# Patient Record
Sex: FEMALE | Race: WHITE | NOT HISPANIC OR LATINO
[De-identification: names, ages, dates, MRNs, and addresses within clinical notes are randomized per-mention and may not be internally consistent; named-entity substitution may affect disease eponyms.]

---

## 2023-01-04 PROBLEM — Z00.00 ENCOUNTER FOR PREVENTIVE HEALTH EXAMINATION: Status: ACTIVE | Noted: 2023-01-04

## 2023-01-20 ENCOUNTER — NON-APPOINTMENT (OUTPATIENT)
Age: 88
End: 2023-01-20

## 2023-01-31 ENCOUNTER — APPOINTMENT (OUTPATIENT)
Dept: ORTHOPEDIC SURGERY | Facility: CLINIC | Age: 88
End: 2023-01-31
Payer: MEDICARE

## 2023-01-31 VITALS — WEIGHT: 105 LBS | BODY MASS INDEX: 19.32 KG/M2 | HEIGHT: 62 IN

## 2023-01-31 DIAGNOSIS — M79.675 PAIN IN LEFT TOE(S): ICD-10-CM

## 2023-01-31 DIAGNOSIS — I99.8 OTHER DISORDER OF CIRCULATORY SYSTEM: ICD-10-CM

## 2023-01-31 DIAGNOSIS — S91.302A UNSPECIFIED OPEN WOUND, LEFT FOOT, INITIAL ENCOUNTER: ICD-10-CM

## 2023-01-31 DIAGNOSIS — S91.309A UNSPECIFIED OPEN WOUND, UNSPECIFIED FOOT, INITIAL ENCOUNTER: ICD-10-CM

## 2023-01-31 PROCEDURE — 99204 OFFICE O/P NEW MOD 45 MIN: CPT

## 2023-01-31 PROCEDURE — 73630 X-RAY EXAM OF FOOT: CPT | Mod: LT

## 2023-02-01 PROBLEM — I99.8 VASCULAR CALCIFICATION: Status: ACTIVE | Noted: 2023-02-01

## 2023-02-01 PROBLEM — S91.309A WOUND OF FOOT: Status: ACTIVE | Noted: 2023-02-01

## 2023-02-01 PROBLEM — S91.302A WOUND OF LEFT FOOT: Status: ACTIVE | Noted: 2023-02-01

## 2023-02-07 ENCOUNTER — APPOINTMENT (OUTPATIENT)
Dept: INFECTIOUS DISEASE | Facility: CLINIC | Age: 88
End: 2023-02-07
Payer: MEDICARE

## 2023-02-07 VITALS
WEIGHT: 105 LBS | HEIGHT: 62 IN | TEMPERATURE: 97.6 F | BODY MASS INDEX: 19.32 KG/M2 | HEART RATE: 76 BPM | SYSTOLIC BLOOD PRESSURE: 120 MMHG | DIASTOLIC BLOOD PRESSURE: 71 MMHG

## 2023-02-07 DIAGNOSIS — A49.02 METHICILLIN RESISTANT STAPHYLOCOCCUS AUREUS INFECTION, UNSPECIFIED SITE: ICD-10-CM

## 2023-02-07 DIAGNOSIS — Z87.39 PERSONAL HISTORY OF OTHER DISEASES OF THE MUSCULOSKELETAL SYSTEM AND CONNECTIVE TISSUE: ICD-10-CM

## 2023-02-07 PROCEDURE — 99203 OFFICE O/P NEW LOW 30 MIN: CPT

## 2023-02-10 NOTE — ASSESSMENT
[FreeTextEntry1] : 88 y/o female comes for visit for recent MRSA toe infection/osteomyelitis.\par \par She is stable and toe looks stable.\par \par Will check labs noted below.  No active s/s of infection at this time. \par \par Hold off abx given above. \par \par Doubt MRI will be of benefit given daughters refusing toe amputation and currently there is no clinical s/s of infection.

## 2023-02-10 NOTE — HISTORY OF PRESENT ILLNESS
[FreeTextEntry1] : 88 y/o female comes for visit for left 1st toe osteomyelitis and MRSA infection. \par \par She was managed in [place] by Dr. Quintana. Got 6 weeks iv abx with picc. \par \par Toe is better and pt has no complaints. \par \par She has a ho UTIs also. No symptoms today.\par \par Not on abx currently. \par \par No fever, GI or  issues today.

## 2023-02-10 NOTE — PHYSICAL EXAM
[General Appearance - Alert] : alert [General Appearance - In No Acute Distress] : in no acute distress [Sclera] : the sclera and conjunctiva were normal [PERRL With Normal Accommodation] : pupils were equal in size, round, reactive to light [Extraocular Movements] : extraocular movements were intact [Outer Ear] : the ears and nose were normal in appearance [Oropharynx] : the oropharynx was normal with no thrush [Jugular Venous Distention Increased] : there was no jugular-venous distention [Heart Sounds] : normal S1 and S2 [Auscultation Breath Sounds / Voice Sounds] : lungs were clear to auscultation bilaterally [Edema] : there was no peripheral edema [Bowel Sounds] : normal bowel sounds [Abdomen Soft] : soft [Abdomen Tenderness] : non-tender [Abdomen Mass (___ Cm)] : no abdominal mass palpated [Costovertebral Angle Tenderness] : no CVA tenderness [Skin Color & Pigmentation] : normal skin color and pigmentation [] : no rash [No Focal Deficits] : no focal deficits [FreeTextEntry1] : flat affect

## 2023-02-14 LAB
ANION GAP SERPL CALC-SCNC: 15 MMOL/L
BASOPHILS # BLD AUTO: 0.06 K/UL
BASOPHILS NFR BLD AUTO: 0.4 %
BUN SERPL-MCNC: 21 MG/DL
CALCIUM SERPL-MCNC: 9 MG/DL
CHLORIDE SERPL-SCNC: 105 MMOL/L
CO2 SERPL-SCNC: 20 MMOL/L
CREAT SERPL-MCNC: 0.72 MG/DL
CRP SERPL-MCNC: <3 MG/L
EGFR: 81 ML/MIN/1.73M2
EOSINOPHIL # BLD AUTO: 0.03 K/UL
EOSINOPHIL NFR BLD AUTO: 0.2 %
ERYTHROCYTE [SEDIMENTATION RATE] IN BLOOD BY WESTERGREN METHOD: 12 MM/HR
GLUCOSE SERPL-MCNC: 77 MG/DL
HCT VFR BLD CALC: 35.5 %
HGB BLD-MCNC: 10.2 G/DL
IMM GRANULOCYTES NFR BLD AUTO: 0.5 %
LYMPHOCYTES # BLD AUTO: 0.66 K/UL
LYMPHOCYTES NFR BLD AUTO: 4.2 %
MAN DIFF?: NORMAL
MCHC RBC-ENTMCNC: 25.2 PG
MCHC RBC-ENTMCNC: 28.7 GM/DL
MCV RBC AUTO: 87.7 FL
MONOCYTES # BLD AUTO: 0.75 K/UL
MONOCYTES NFR BLD AUTO: 4.7 %
NEUTROPHILS # BLD AUTO: 14.27 K/UL
NEUTROPHILS NFR BLD AUTO: 90 %
PLATELET # BLD AUTO: 307 K/UL
POTASSIUM SERPL-SCNC: 5.4 MMOL/L
RBC # BLD: 4.05 M/UL
RBC # FLD: 18.6 %
SODIUM SERPL-SCNC: 140 MMOL/L
WBC # FLD AUTO: 15.85 K/UL

## 2023-02-24 ENCOUNTER — APPOINTMENT (OUTPATIENT)
Dept: MRI IMAGING | Facility: CLINIC | Age: 88
End: 2023-02-24

## 2023-03-22 ENCOUNTER — EMERGENCY (EMERGENCY)
Facility: HOSPITAL | Age: 88
LOS: 1 days | Discharge: HOME | End: 2023-03-22
Payer: MEDICARE

## 2023-03-22 VITALS
DIASTOLIC BLOOD PRESSURE: 69 MMHG | SYSTOLIC BLOOD PRESSURE: 115 MMHG | TEMPERATURE: 98 F | RESPIRATION RATE: 16 BRPM | HEART RATE: 72 BPM | OXYGEN SATURATION: 97 %

## 2023-03-22 VITALS
RESPIRATION RATE: 20 BRPM | SYSTOLIC BLOOD PRESSURE: 154 MMHG | HEIGHT: 63 IN | OXYGEN SATURATION: 96 % | DIASTOLIC BLOOD PRESSURE: 80 MMHG | WEIGHT: 110.01 LBS | TEMPERATURE: 100 F | HEART RATE: 121 BPM

## 2023-03-22 LAB
ALBUMIN SERPL ELPH-MCNC: 3.8 G/DL — SIGNIFICANT CHANGE UP (ref 3.3–5)
ALP SERPL-CCNC: 125 U/L — HIGH (ref 40–120)
ALT FLD-CCNC: 9 U/L — LOW (ref 10–45)
ANION GAP SERPL CALC-SCNC: 13 MMOL/L — SIGNIFICANT CHANGE UP (ref 5–17)
APPEARANCE UR: ABNORMAL
AST SERPL-CCNC: 17 U/L — SIGNIFICANT CHANGE UP (ref 10–40)
BACTERIA # UR AUTO: NEGATIVE — SIGNIFICANT CHANGE UP
BASE EXCESS BLDV CALC-SCNC: -1 MMOL/L — SIGNIFICANT CHANGE UP (ref -2–3)
BASOPHILS # BLD AUTO: 0.01 K/UL — SIGNIFICANT CHANGE UP (ref 0–0.2)
BASOPHILS NFR BLD AUTO: 0.2 % — SIGNIFICANT CHANGE UP (ref 0–2)
BILIRUB SERPL-MCNC: 0.2 MG/DL — SIGNIFICANT CHANGE UP (ref 0.2–1.2)
BILIRUB UR-MCNC: NEGATIVE — SIGNIFICANT CHANGE UP
BUN SERPL-MCNC: 18 MG/DL — SIGNIFICANT CHANGE UP (ref 7–23)
CA-I SERPL-SCNC: 1.24 MMOL/L — SIGNIFICANT CHANGE UP (ref 1.15–1.33)
CALCIUM SERPL-MCNC: 9.3 MG/DL — SIGNIFICANT CHANGE UP (ref 8.4–10.5)
CHLORIDE BLDV-SCNC: 104 MMOL/L — SIGNIFICANT CHANGE UP (ref 96–108)
CHLORIDE SERPL-SCNC: 104 MMOL/L — SIGNIFICANT CHANGE UP (ref 96–108)
CO2 BLDV-SCNC: 26 MMOL/L — SIGNIFICANT CHANGE UP (ref 22–26)
CO2 SERPL-SCNC: 23 MMOL/L — SIGNIFICANT CHANGE UP (ref 22–31)
COLOR SPEC: SIGNIFICANT CHANGE UP
COMMENT - URINE: SIGNIFICANT CHANGE UP
CREAT SERPL-MCNC: 0.82 MG/DL — SIGNIFICANT CHANGE UP (ref 0.5–1.3)
DIFF PNL FLD: ABNORMAL
EGFR: 70 ML/MIN/1.73M2 — SIGNIFICANT CHANGE UP
EOSINOPHIL # BLD AUTO: 0.05 K/UL — SIGNIFICANT CHANGE UP (ref 0–0.5)
EOSINOPHIL NFR BLD AUTO: 0.8 % — SIGNIFICANT CHANGE UP (ref 0–6)
EPI CELLS # UR: 2 /HPF — SIGNIFICANT CHANGE UP
GAS PNL BLDV: 136 MMOL/L — SIGNIFICANT CHANGE UP (ref 136–145)
GAS PNL BLDV: SIGNIFICANT CHANGE UP
GAS PNL BLDV: SIGNIFICANT CHANGE UP
GLUCOSE BLDV-MCNC: 109 MG/DL — HIGH (ref 70–99)
GLUCOSE SERPL-MCNC: 117 MG/DL — HIGH (ref 70–99)
GLUCOSE UR QL: NEGATIVE — SIGNIFICANT CHANGE UP
HCO3 BLDV-SCNC: 25 MMOL/L — SIGNIFICANT CHANGE UP (ref 22–29)
HCT VFR BLD CALC: 30.2 % — LOW (ref 34.5–45)
HCT VFR BLDA CALC: 32 % — LOW (ref 34.5–46.5)
HGB BLD CALC-MCNC: 10.5 G/DL — LOW (ref 11.7–16.1)
HGB BLD-MCNC: 8.8 G/DL — LOW (ref 11.5–15.5)
HYALINE CASTS # UR AUTO: 5 /LPF — HIGH (ref 0–2)
IMM GRANULOCYTES NFR BLD AUTO: 0.5 % — SIGNIFICANT CHANGE UP (ref 0–0.9)
KETONES UR-MCNC: NEGATIVE — SIGNIFICANT CHANGE UP
LACTATE BLDV-MCNC: 2 MMOL/L — SIGNIFICANT CHANGE UP (ref 0.5–2)
LEUKOCYTE ESTERASE UR-ACNC: ABNORMAL
LYMPHOCYTES # BLD AUTO: 0.66 K/UL — LOW (ref 1–3.3)
LYMPHOCYTES # BLD AUTO: 10 % — LOW (ref 13–44)
MAGNESIUM SERPL-MCNC: 2 MG/DL — SIGNIFICANT CHANGE UP (ref 1.6–2.6)
MCHC RBC-ENTMCNC: 24.5 PG — LOW (ref 27–34)
MCHC RBC-ENTMCNC: 29.1 GM/DL — LOW (ref 32–36)
MCV RBC AUTO: 84.1 FL — SIGNIFICANT CHANGE UP (ref 80–100)
MONOCYTES # BLD AUTO: 0.7 K/UL — SIGNIFICANT CHANGE UP (ref 0–0.9)
MONOCYTES NFR BLD AUTO: 10.7 % — SIGNIFICANT CHANGE UP (ref 2–14)
NEUTROPHILS # BLD AUTO: 5.12 K/UL — SIGNIFICANT CHANGE UP (ref 1.8–7.4)
NEUTROPHILS NFR BLD AUTO: 77.8 % — HIGH (ref 43–77)
NITRITE UR-MCNC: NEGATIVE — SIGNIFICANT CHANGE UP
NRBC # BLD: 0 /100 WBCS — SIGNIFICANT CHANGE UP (ref 0–0)
PCO2 BLDV: 45 MMHG — HIGH (ref 39–42)
PH BLDV: 7.35 — SIGNIFICANT CHANGE UP (ref 7.32–7.43)
PH UR: 6 — SIGNIFICANT CHANGE UP (ref 5–8)
PLATELET # BLD AUTO: 241 K/UL — SIGNIFICANT CHANGE UP (ref 150–400)
PO2 BLDV: 24 MMHG — LOW (ref 25–45)
POTASSIUM BLDV-SCNC: 4.2 MMOL/L — SIGNIFICANT CHANGE UP (ref 3.5–5.1)
POTASSIUM SERPL-MCNC: 4.4 MMOL/L — SIGNIFICANT CHANGE UP (ref 3.5–5.3)
POTASSIUM SERPL-SCNC: 4.4 MMOL/L — SIGNIFICANT CHANGE UP (ref 3.5–5.3)
PROT SERPL-MCNC: 6.5 G/DL — SIGNIFICANT CHANGE UP (ref 6–8.3)
PROT UR-MCNC: ABNORMAL
RAPID RVP RESULT: SIGNIFICANT CHANGE UP
RBC # BLD: 3.59 M/UL — LOW (ref 3.8–5.2)
RBC # FLD: 17.5 % — HIGH (ref 10.3–14.5)
RBC CASTS # UR COMP ASSIST: 6 /HPF — HIGH (ref 0–4)
SAO2 % BLDV: 32.5 % — LOW (ref 67–88)
SARS-COV-2 RNA SPEC QL NAA+PROBE: SIGNIFICANT CHANGE UP
SODIUM SERPL-SCNC: 140 MMOL/L — SIGNIFICANT CHANGE UP (ref 135–145)
SP GR SPEC: 1.01 — SIGNIFICANT CHANGE UP (ref 1.01–1.02)
UROBILINOGEN FLD QL: NEGATIVE — SIGNIFICANT CHANGE UP
WBC # BLD: 6.57 K/UL — SIGNIFICANT CHANGE UP (ref 3.8–10.5)
WBC # FLD AUTO: 6.57 K/UL — SIGNIFICANT CHANGE UP (ref 3.8–10.5)
WBC UR QL: 435 /HPF — HIGH (ref 0–5)

## 2023-03-22 PROCEDURE — 81001 URINALYSIS AUTO W/SCOPE: CPT

## 2023-03-22 PROCEDURE — 83735 ASSAY OF MAGNESIUM: CPT

## 2023-03-22 PROCEDURE — 82947 ASSAY GLUCOSE BLOOD QUANT: CPT

## 2023-03-22 PROCEDURE — 85014 HEMATOCRIT: CPT

## 2023-03-22 PROCEDURE — 71045 X-RAY EXAM CHEST 1 VIEW: CPT

## 2023-03-22 PROCEDURE — 83605 ASSAY OF LACTIC ACID: CPT

## 2023-03-22 PROCEDURE — 80053 COMPREHEN METABOLIC PANEL: CPT

## 2023-03-22 PROCEDURE — 87040 BLOOD CULTURE FOR BACTERIA: CPT

## 2023-03-22 PROCEDURE — 99285 EMERGENCY DEPT VISIT HI MDM: CPT | Mod: 25

## 2023-03-22 PROCEDURE — 85025 COMPLETE CBC W/AUTO DIFF WBC: CPT

## 2023-03-22 PROCEDURE — 94640 AIRWAY INHALATION TREATMENT: CPT

## 2023-03-22 PROCEDURE — 84132 ASSAY OF SERUM POTASSIUM: CPT

## 2023-03-22 PROCEDURE — 84295 ASSAY OF SERUM SODIUM: CPT

## 2023-03-22 PROCEDURE — 82330 ASSAY OF CALCIUM: CPT

## 2023-03-22 PROCEDURE — 93005 ELECTROCARDIOGRAM TRACING: CPT

## 2023-03-22 PROCEDURE — 36415 COLL VENOUS BLD VENIPUNCTURE: CPT

## 2023-03-22 PROCEDURE — 82435 ASSAY OF BLOOD CHLORIDE: CPT

## 2023-03-22 PROCEDURE — 99053 MED SERV 10PM-8AM 24 HR FAC: CPT

## 2023-03-22 PROCEDURE — 71045 X-RAY EXAM CHEST 1 VIEW: CPT | Mod: 26

## 2023-03-22 PROCEDURE — 0225U NFCT DS DNA&RNA 21 SARSCOV2: CPT

## 2023-03-22 PROCEDURE — 85018 HEMOGLOBIN: CPT

## 2023-03-22 PROCEDURE — 99284 EMERGENCY DEPT VISIT MOD MDM: CPT

## 2023-03-22 PROCEDURE — 82803 BLOOD GASES ANY COMBINATION: CPT

## 2023-03-22 PROCEDURE — 96374 THER/PROPH/DIAG INJ IV PUSH: CPT

## 2023-03-22 RX ORDER — CEFPODOXIME PROXETIL 100 MG
1 TABLET ORAL
Qty: 28 | Refills: 0
Start: 2023-03-22 | End: 2023-04-04

## 2023-03-22 RX ORDER — SODIUM CHLORIDE 9 MG/ML
1000 INJECTION INTRAMUSCULAR; INTRAVENOUS; SUBCUTANEOUS ONCE
Refills: 0 | Status: COMPLETED | OUTPATIENT
Start: 2023-03-22 | End: 2023-03-22

## 2023-03-22 RX ORDER — CEFTRIAXONE 500 MG/1
1000 INJECTION, POWDER, FOR SOLUTION INTRAMUSCULAR; INTRAVENOUS ONCE
Refills: 0 | Status: COMPLETED | OUTPATIENT
Start: 2023-03-22 | End: 2023-03-22

## 2023-03-22 RX ORDER — IPRATROPIUM/ALBUTEROL SULFATE 18-103MCG
3 AEROSOL WITH ADAPTER (GRAM) INHALATION ONCE
Refills: 0 | Status: COMPLETED | OUTPATIENT
Start: 2023-03-22 | End: 2023-03-22

## 2023-03-22 RX ORDER — ACETAMINOPHEN 500 MG
975 TABLET ORAL ONCE
Refills: 0 | Status: COMPLETED | OUTPATIENT
Start: 2023-03-22 | End: 2023-03-22

## 2023-03-22 RX ADMIN — CEFTRIAXONE 100 MILLIGRAM(S): 500 INJECTION, POWDER, FOR SOLUTION INTRAMUSCULAR; INTRAVENOUS at 06:33

## 2023-03-22 RX ADMIN — SODIUM CHLORIDE 1000 MILLILITER(S): 9 INJECTION INTRAMUSCULAR; INTRAVENOUS; SUBCUTANEOUS at 01:21

## 2023-03-22 RX ADMIN — Medication 975 MILLIGRAM(S): at 01:40

## 2023-03-22 RX ADMIN — Medication 975 MILLIGRAM(S): at 01:10

## 2023-03-22 RX ADMIN — Medication 3 MILLILITER(S): at 01:20

## 2023-03-22 NOTE — ED ADULT NURSE NOTE - OBJECTIVE STATEMENT
87-year-old female with a history of hyperlipidemia coming from assisted living home for 3 days of cough.  Patient denying any fevers, chills, chest pain, shortness of breath, nausea, vomiting, belly pain, dysuria.  Patient was seen by PMD yesterday for her leg and notes that she has chronic edema and  weeping of the right leg   Has been unchanged.  Denying any pain in the leg.

## 2023-03-22 NOTE — ED PROVIDER NOTE - PHYSICAL EXAMINATION
Const:  thin elderly lady,  intermittently coughing at bedside  Eyes: no conjunctival injection, and symmetrical lids.  HEENT: Head NCAT, no lesions. Atraumatic external nose and ears. Moist MM.  Neck: Symmetric, trachea midline.   CVS: +S1/S2   RESP: Unlabored respiratory effort. Clear to auscultation bilaterally.  GI: Nontender/Nondistended   MSK: Normocephalic/Atraumatic   Skin: Warm, dry and intact.   Neuro: CNs II-XII grossly intact. Motor & Sensation grossly intact.  Psych: Awake, Alert, & Oriented (AAO) x3. Appropriate mood and affect.

## 2023-03-22 NOTE — ED PROVIDER NOTE - PATIENT PORTAL LINK FT
You can access the FollowMyHealth Patient Portal offered by [facility] by registering at the following website: [email or web address]. By joining [employer]’s FollowMyHealth portal, you will also be able to view your health information using other applications (apps) compatible with our system. You can access the FollowMyHealth Patient Portal offered by [facility] by registering at the following website: [email or web address]. By joining [employer]’s FollowMyHealth portal, you will also be able to view your health information using other applications (apps) compatible with our system.

## 2023-03-22 NOTE — ED ADULT NURSE NOTE - CHIEF COMPLAINT QUOTE
Rehabilitation Nursing  Inpatient Rehabilitation Admission Assessment of Functional Measures  and Plan of Care    Plan of Care  Copy from POC  Safety    Potential for Injury (Active)  Current Status (6/1/2018 10:30:00 AM): potential for falls  Weekly Goal: no falls this week  Discharge Goal: no falls by discharge    Body Systems    Integumentary (Active)  Current Status (6/1/2018 10:30:00 AM): potential for skin breakdown  Weekly Goal: no skin breakdown this week  Discharge Goal: no skin breakdown by discharge  Functional Measures  DONNY Eating:  DONNY Grooming:  DONNY Bathing:  DONNY Upper Body Dressing:  DONNY Lower Body Dressing:  DONNY Toileting:    DONNY Bladder Management  Level of Assistance:  Bladder Score = 2. Patient performs 25-49% of tasks and  requires maximal assistance for bladder management. [place] provides most of the  assist to roll or bridge to place and remove bedpan.  Frequency/Number of Accidents (4 days prior to admission):  Bladder accidents  prior to admission:  6  Frequency/Number of Accidents this Shift: Bladder accidents this shift:  0 .  Patient has not had an accident, but used a device/medication this shift  requiring: bedpan .    DONNY Bowel Management  Level of Assistance: Bowel Score = 2. Patient performs 25-49% of tasks and  requires maximal assistance for bowel management. [place] provides most of the  assist to roll or bridge to place AND remove bedpan.  Frequency/Number of Accidents (4 days prior to admission): Bowel accidents prior  to admission:  0 Patient has not had an accident, but used a device/medication 4  days prior to admission requiring: bedpan .  Frequency/Number of Accidents this Shift: Bowel accidents this shift: 0 .  Patient has not had an accident, but used a device/medication this shift  requiring: bedpan .    [facility] Bed/Chair/Wheelchair Transfer:  [facility] Toilet Transfer:  DONNY Tub/Shower Transfer:    Previously Documented Mode of Locomotion at Discharge:  [facility] Expected Mode of  Locomotion at Discharge:  DONNY Walk/Wheelchair:  DONNY Stairs:    DONNY Comprehension:  Both ( auditory and visual) modes of comprehension are used  equally. Comprehension Score = 6, Modified [place].  Patient comprehends  complex/abstract information in their primary language, requiring: Additional  time. Glasses.  DONNY Expression:  Vocal expression is the usual mode. Expression Score = 6,  Modified Independent.  Patient expresses complex/abstract information in their  primary language, requiring: Additional time.  DONNY Social Interaction:  Social Interaction Score = 6, Modified Independent.  Patient is modified independent for social interaction, requiring: Requires  additional time.  DONNY Problem Solving:  Problem Solving Score = 6, Modified [place].  Patient  makes appropriate decisions in order to solve complex problems, but requires  extra time.  DONNY Memory:  Memory Score = 6, Modified [place].  Patient is modified  independent for memory, requiring: Requires additional time.    Discharge Functional Goals:  Bladder: 6  Bowel: 7  Mode of Comprehension: B  Comprehension: 6  Mode of Expression: B  Expression: 7  Problem Solvin  Social Interaction: 6  Memory: 6    Signed by: Adriana Carr, Nurse     cough and fever x1 day

## 2023-03-22 NOTE — ED PROVIDER NOTE - PROGRESS NOTE DETAILS
Bernardino Dewey, PGY2 pt found to haqve uti. pt denies hx of allergies. will txt with ctx here. Pt is no longer tachy s/p IV fluids. will dc with return precautions. Bernardino Dewey, PGY2 pt found to haqve uti. pt chart noting iodine drug allergy and no others.  will txt with ctx here. Pt is no longer tachy s/p IV fluids. will dc with return precautions.

## 2023-03-22 NOTE — ED PROVIDER NOTE - OBJECTIVE STATEMENT
Patient is an 87-year-old female with a history of hyperlipidemia coming from assisted living home for 3 days of cough.  Patient denying any fevers, chills, chest pain, shortness of breath, nausea, vomiting, belly pain, dysuria.  Patient was seen by PMD yesterday for her leg and notes that she has chronic edema and  weeping of the right leg   Has been unchanged.  Denying any pain in the leg. Patient is an 87-year-old female with a history of hyperlipidemia coming from assisted living home for 3 days of cough.  Patient denying any fevers, chills, chest pain, shortness of breath, nausea, vomiting, belly pain, dysuria.  Patient was seen by PMD yesterday for her leg and notes that she has chronic edema and  weeping of the right leg   Has been unchanged.  Denying any pain in the leg.  iodine drug allergy.

## 2023-03-22 NOTE — ED PROVIDER NOTE - ATTENDING CONTRIBUTION TO CARE
MD Campbell:  patient seen and evaluated with the resident.  I was present for key portions of the History & Physical, and I agree with the Impression & Plan.    Patient is a 87-year-old female brought in by EMS for evaluation of cough.  Duration 4 days.  Context: Patient states that she lives at assisted living.  Walks with a walker at baseline  Associated symptoms: No chest pain, no shortness of breath no nausea, no vomiting.  No abdominal pain, no lower extremity edema    Vital signs: Tachycardia and fever appreciated.  Gen: eldelry female, mild respiratory distress.    Head: NC/AT.   PERRL, EOMI.    Neck: trachea midline, supple.    Resp: Good air movement, faint wheezing bilaterally   Cardiac RRR, no RMG.    Abdomen:  soft, nondistended, nontender; no R/G.  Ext: no deformities, no edema.    Neuro:  A&Ox4 appears non focal.  Moving all 4 extremities equally  Skin:  thin skin, otherwise arm and dry as visualized, no rash.     Psych:  Normal affect and mood.    Impression and plan: History physical concerning for pneumonia, and warrants work-up with RVP, blood cultures chest x-ray reassessment.  Given tachycardia fever and work of breathing anticipate admission.    ECG - pending

## 2023-03-22 NOTE — ED PROVIDER NOTE - NSFOLLOWUPINSTRUCTIONS_ED_ALL_ED_FT
You were found to have a urinary tract infection.   You were sent antibiotics to your pharmacy. Please take this for the next 2 weeks.   Please see your primary doctor in 2-3 days for follow-up care. Return to ER for any new or worsening symptoms including worsening fevers, chills, vomiting or abdominal pain.   You may take 500-1000 mg acetaminophen every 6 hours, as needed for pain          Urinary Tract Infection, Adult       A urinary tract infection (UTI) is an infection of any part of the urinary tract. The urinary tract includes the kidneys, ureters, bladder, and urethra. These organs make, store, and get rid of urine in the body.    An upper UTI affects the ureters and kidneys. A lower UTI affects the bladder and urethra.      What are the causes?    Most urinary tract infections are caused by bacteria in your genital area around your urethra, where urine leaves your body. These bacteria grow and cause inflammation of your urinary tract.      What increases the risk?    You are more likely to develop this condition if:  •You have a urinary catheter that stays in place.      •You are not able to control when you urinate or have a bowel movement (incontinence).    •You are female and you:  •Use a spermicide or diaphragm for birth control.      •Have low estrogen levels.      •Are pregnant.        •You have certain genes that increase your risk.      •You are sexually active.      •You take antibiotic medicines.    •You have a condition that causes your flow of urine to slow down, such as:  •An enlarged prostate, if you are male.      •Blockage in your urethra.      •A kidney stone.      •A nerve condition that affects your bladder control (neurogenic bladder).      •Not getting enough to drink, or not urinating often.      •You have certain medical conditions, such as:  •Diabetes.      •A weak disease-fighting system (immunesystem).      •Sickle cell disease.      •Gout.      •Spinal cord injury.          What are the signs or symptoms?    Symptoms of this condition include:  •Needing to urinate right away (urgency).      •Frequent urination. This may include small amounts of urine each time you urinate.      •Pain or burning with urination.      •Blood in the urine.      •Urine that smells bad or unusual.      •Trouble urinating.      •Cloudy urine.      •Vaginal discharge, if you are female.      •Pain in the abdomen or the lower back.      You may also have:  •Vomiting or a decreased appetite.      •Confusion.      •Irritability or tiredness.      •A fever or chills.      •Diarrhea.      The first symptom in older adults may be confusion. In some cases, they may not have any symptoms until the infection has worsened.      How is this diagnosed?    This condition is diagnosed based on your medical history and a physical exam. You may also have other tests, including:  •Urine tests.      •Blood tests.      •Tests for STIs (sexually transmitted infections).      If you have had more than one UTI, a cystoscopy or imaging studies may be done to determine the cause of the infections.      How is this treated?    Treatment for this condition includes:  •Antibiotic medicine.      •Over-the-counter medicines to treat discomfort.      •Drinking enough water to stay hydrated.      If you have frequent infections or have other conditions such as a kidney stone, you may need to see a health care provider who specializes in the urinary tract (urologist).    In rare cases, urinary tract infections can cause sepsis. Sepsis is a life-threatening condition that occurs when the body responds to an infection. Sepsis is treated in the hospital with IV antibiotics, fluids, and other medicines.      Follow these instructions at home:     Medicines     •Take over-the-counter and prescription medicines only as told by your health care provider.      •If you were prescribed an antibiotic medicine, take it as told by your health care provider. Do not stop using the antibiotic even if you start to feel better.      General instructions   •Make sure you:  •Empty your bladder often and completely. Do not hold urine for long periods of time.      •Empty your bladder after sex.      •Wipe from front to back after urinating or having a bowel movement if you are female. Use each tissue only one time when you wipe.        •Drink enough fluid to keep your urine pale yellow.      •Keep all follow-up visits. This is important.        Contact a health care provider if:    •Your symptoms do not get better after 1–2 days.      •Your symptoms go away and then return.        Get help right away if:    •You have severe pain in your back or your lower abdomen.      •You have a fever or chills.      •You have nausea or vomiting.        Summary    •A urinary tract infection (UTI) is an infection of any part of the urinary tract, which includes the kidneys, ureters, bladder, and urethra.      •Most urinary tract infections are caused by bacteria in your genital area.      •Treatment for this condition often includes antibiotic medicines.      •If you were prescribed an antibiotic medicine, take it as told by your health care provider. Do not stop using the antibiotic even if you start to feel better.      •Keep all follow-up visits. This is important.      This information is not intended to replace advice given to you by your health care provider. Make sure you discuss any questions you have with your health care provider.

## 2023-03-27 ENCOUNTER — NON-APPOINTMENT (OUTPATIENT)
Age: 88
End: 2023-03-27

## 2023-03-27 LAB
CULTURE RESULTS: SIGNIFICANT CHANGE UP
CULTURE RESULTS: SIGNIFICANT CHANGE UP
SPECIMEN SOURCE: SIGNIFICANT CHANGE UP
SPECIMEN SOURCE: SIGNIFICANT CHANGE UP

## 2023-04-05 ENCOUNTER — APPOINTMENT (OUTPATIENT)
Dept: WOUND CARE | Facility: CLINIC | Age: 88
End: 2023-04-05

## 2023-04-07 ENCOUNTER — APPOINTMENT (OUTPATIENT)
Dept: OPHTHALMOLOGY | Facility: CLINIC | Age: 88
End: 2023-04-07
Payer: MEDICARE

## 2023-04-07 ENCOUNTER — NON-APPOINTMENT (OUTPATIENT)
Age: 88
End: 2023-04-07

## 2023-04-07 PROCEDURE — 92002 INTRM OPH EXAM NEW PATIENT: CPT

## 2023-04-28 ENCOUNTER — APPOINTMENT (OUTPATIENT)
Dept: OPHTHALMOLOGY | Facility: CLINIC | Age: 88
End: 2023-04-28
Payer: MEDICARE

## 2023-04-28 ENCOUNTER — NON-APPOINTMENT (OUTPATIENT)
Age: 88
End: 2023-04-28

## 2023-04-28 PROCEDURE — 92012 INTRM OPH EXAM EST PATIENT: CPT

## 2023-05-09 ENCOUNTER — APPOINTMENT (OUTPATIENT)
Dept: OPHTHALMOLOGY | Facility: CLINIC | Age: 88
End: 2023-05-09
Payer: MEDICARE

## 2023-05-09 ENCOUNTER — NON-APPOINTMENT (OUTPATIENT)
Age: 88
End: 2023-05-09

## 2023-05-09 ENCOUNTER — EMERGENCY (EMERGENCY)
Facility: HOSPITAL | Age: 88
LOS: 1 days | Discharge: NURSING, REHAB OR LONG-TERM CARE | End: 2023-05-09
Attending: EMERGENCY MEDICINE
Payer: MEDICARE

## 2023-05-09 VITALS
DIASTOLIC BLOOD PRESSURE: 104 MMHG | HEIGHT: 63 IN | SYSTOLIC BLOOD PRESSURE: 144 MMHG | HEART RATE: 74 BPM | TEMPERATURE: 98 F | RESPIRATION RATE: 20 BRPM | WEIGHT: 100.09 LBS

## 2023-05-09 VITALS
DIASTOLIC BLOOD PRESSURE: 84 MMHG | HEART RATE: 73 BPM | SYSTOLIC BLOOD PRESSURE: 137 MMHG | OXYGEN SATURATION: 95 % | TEMPERATURE: 98 F | RESPIRATION RATE: 20 BRPM

## 2023-05-09 LAB
ALBUMIN SERPL ELPH-MCNC: 3.9 G/DL — SIGNIFICANT CHANGE UP (ref 3.3–5)
ALP SERPL-CCNC: 96 U/L — SIGNIFICANT CHANGE UP (ref 40–120)
ALT FLD-CCNC: 10 U/L — SIGNIFICANT CHANGE UP (ref 10–45)
ANION GAP SERPL CALC-SCNC: 12 MMOL/L — SIGNIFICANT CHANGE UP (ref 5–17)
ANISOCYTOSIS BLD QL: SIGNIFICANT CHANGE UP
APTT BLD: 31.4 SEC — SIGNIFICANT CHANGE UP (ref 27.5–35.5)
AST SERPL-CCNC: 15 U/L — SIGNIFICANT CHANGE UP (ref 10–40)
BASOPHILS # BLD AUTO: 0 K/UL — SIGNIFICANT CHANGE UP (ref 0–0.2)
BASOPHILS NFR BLD AUTO: 0 % — SIGNIFICANT CHANGE UP (ref 0–2)
BILIRUB SERPL-MCNC: 0.3 MG/DL — SIGNIFICANT CHANGE UP (ref 0.2–1.2)
BLD GP AB SCN SERPL QL: NEGATIVE — SIGNIFICANT CHANGE UP
BUN SERPL-MCNC: 28 MG/DL — HIGH (ref 7–23)
CALCIUM SERPL-MCNC: 9.4 MG/DL — SIGNIFICANT CHANGE UP (ref 8.4–10.5)
CHLORIDE SERPL-SCNC: 105 MMOL/L — SIGNIFICANT CHANGE UP (ref 96–108)
CO2 SERPL-SCNC: 25 MMOL/L — SIGNIFICANT CHANGE UP (ref 22–31)
CREAT SERPL-MCNC: 0.83 MG/DL — SIGNIFICANT CHANGE UP (ref 0.5–1.3)
EGFR: 69 ML/MIN/1.73M2 — SIGNIFICANT CHANGE UP
EOSINOPHIL # BLD AUTO: 0.09 K/UL — SIGNIFICANT CHANGE UP (ref 0–0.5)
EOSINOPHIL NFR BLD AUTO: 0.9 % — SIGNIFICANT CHANGE UP (ref 0–6)
GIANT PLATELETS BLD QL SMEAR: PRESENT — SIGNIFICANT CHANGE UP
GLUCOSE SERPL-MCNC: 104 MG/DL — HIGH (ref 70–99)
HCT VFR BLD CALC: 32.7 % — LOW (ref 34.5–45)
HGB BLD-MCNC: 9.7 G/DL — LOW (ref 11.5–15.5)
INR BLD: 1.12 RATIO — SIGNIFICANT CHANGE UP (ref 0.88–1.16)
LYMPHOCYTES # BLD AUTO: 1.21 K/UL — SIGNIFICANT CHANGE UP (ref 1–3.3)
LYMPHOCYTES # BLD AUTO: 12.2 % — LOW (ref 13–44)
MACROCYTES BLD QL: SLIGHT — SIGNIFICANT CHANGE UP
MAGNESIUM SERPL-MCNC: 2.2 MG/DL — SIGNIFICANT CHANGE UP (ref 1.6–2.6)
MANUAL SMEAR VERIFICATION: SIGNIFICANT CHANGE UP
MCHC RBC-ENTMCNC: 25.4 PG — LOW (ref 27–34)
MCHC RBC-ENTMCNC: 29.7 GM/DL — LOW (ref 32–36)
MCV RBC AUTO: 85.6 FL — SIGNIFICANT CHANGE UP (ref 80–100)
MONOCYTES # BLD AUTO: 0.6 K/UL — SIGNIFICANT CHANGE UP (ref 0–0.9)
MONOCYTES NFR BLD AUTO: 6.1 % — SIGNIFICANT CHANGE UP (ref 2–14)
NEUTROPHILS # BLD AUTO: 8 K/UL — HIGH (ref 1.8–7.4)
NEUTROPHILS NFR BLD AUTO: 80.8 % — HIGH (ref 43–77)
PLAT MORPH BLD: NORMAL — SIGNIFICANT CHANGE UP
PLATELET # BLD AUTO: 210 K/UL — SIGNIFICANT CHANGE UP (ref 150–400)
POIKILOCYTOSIS BLD QL AUTO: SLIGHT — SIGNIFICANT CHANGE UP
POLYCHROMASIA BLD QL SMEAR: SLIGHT — SIGNIFICANT CHANGE UP
POTASSIUM SERPL-MCNC: 4.1 MMOL/L — SIGNIFICANT CHANGE UP (ref 3.5–5.3)
POTASSIUM SERPL-SCNC: 4.1 MMOL/L — SIGNIFICANT CHANGE UP (ref 3.5–5.3)
PROT SERPL-MCNC: 6.4 G/DL — SIGNIFICANT CHANGE UP (ref 6–8.3)
PROTHROM AB SERPL-ACNC: 12.9 SEC — SIGNIFICANT CHANGE UP (ref 10.5–13.4)
RBC # BLD: 3.82 M/UL — SIGNIFICANT CHANGE UP (ref 3.8–5.2)
RBC # FLD: 22.6 % — HIGH (ref 10.3–14.5)
RBC BLD AUTO: ABNORMAL
RH IG SCN BLD-IMP: POSITIVE — SIGNIFICANT CHANGE UP
RH IG SCN BLD-IMP: POSITIVE — SIGNIFICANT CHANGE UP
SODIUM SERPL-SCNC: 142 MMOL/L — SIGNIFICANT CHANGE UP (ref 135–145)
WBC # BLD: 9.9 K/UL — SIGNIFICANT CHANGE UP (ref 3.8–10.5)
WBC # FLD AUTO: 9.9 K/UL — SIGNIFICANT CHANGE UP (ref 3.8–10.5)

## 2023-05-09 PROCEDURE — 92012 INTRM OPH EXAM EST PATIENT: CPT

## 2023-05-09 PROCEDURE — 99285 EMERGENCY DEPT VISIT HI MDM: CPT

## 2023-05-09 PROCEDURE — 85025 COMPLETE CBC W/AUTO DIFF WBC: CPT

## 2023-05-09 PROCEDURE — 73502 X-RAY EXAM HIP UNI 2-3 VIEWS: CPT | Mod: 26,RT

## 2023-05-09 PROCEDURE — 93005 ELECTROCARDIOGRAM TRACING: CPT

## 2023-05-09 PROCEDURE — 71045 X-RAY EXAM CHEST 1 VIEW: CPT

## 2023-05-09 PROCEDURE — 99053 MED SERV 10PM-8AM 24 HR FAC: CPT

## 2023-05-09 PROCEDURE — 80053 COMPREHEN METABOLIC PANEL: CPT

## 2023-05-09 PROCEDURE — 73562 X-RAY EXAM OF KNEE 3: CPT

## 2023-05-09 PROCEDURE — 85018 HEMOGLOBIN: CPT

## 2023-05-09 PROCEDURE — 83735 ASSAY OF MAGNESIUM: CPT

## 2023-05-09 PROCEDURE — 36415 COLL VENOUS BLD VENIPUNCTURE: CPT

## 2023-05-09 PROCEDURE — 82803 BLOOD GASES ANY COMBINATION: CPT

## 2023-05-09 PROCEDURE — 84132 ASSAY OF SERUM POTASSIUM: CPT

## 2023-05-09 PROCEDURE — 70450 CT HEAD/BRAIN W/O DYE: CPT | Mod: 26,MA

## 2023-05-09 PROCEDURE — 85610 PROTHROMBIN TIME: CPT

## 2023-05-09 PROCEDURE — 86901 BLOOD TYPING SEROLOGIC RH(D): CPT

## 2023-05-09 PROCEDURE — 86850 RBC ANTIBODY SCREEN: CPT

## 2023-05-09 PROCEDURE — 73080 X-RAY EXAM OF ELBOW: CPT | Mod: 26,RT

## 2023-05-09 PROCEDURE — 83605 ASSAY OF LACTIC ACID: CPT

## 2023-05-09 PROCEDURE — 71045 X-RAY EXAM CHEST 1 VIEW: CPT | Mod: 26

## 2023-05-09 PROCEDURE — 82947 ASSAY GLUCOSE BLOOD QUANT: CPT

## 2023-05-09 PROCEDURE — 73502 X-RAY EXAM HIP UNI 2-3 VIEWS: CPT

## 2023-05-09 PROCEDURE — 73552 X-RAY EXAM OF FEMUR 2/>: CPT

## 2023-05-09 PROCEDURE — 82330 ASSAY OF CALCIUM: CPT

## 2023-05-09 PROCEDURE — 85014 HEMATOCRIT: CPT

## 2023-05-09 PROCEDURE — 72170 X-RAY EXAM OF PELVIS: CPT

## 2023-05-09 PROCEDURE — 82435 ASSAY OF BLOOD CHLORIDE: CPT

## 2023-05-09 PROCEDURE — 73552 X-RAY EXAM OF FEMUR 2/>: CPT | Mod: 26,RT

## 2023-05-09 PROCEDURE — 86900 BLOOD TYPING SEROLOGIC ABO: CPT

## 2023-05-09 PROCEDURE — 99285 EMERGENCY DEPT VISIT HI MDM: CPT | Mod: 25

## 2023-05-09 PROCEDURE — 73080 X-RAY EXAM OF ELBOW: CPT

## 2023-05-09 PROCEDURE — 85730 THROMBOPLASTIN TIME PARTIAL: CPT

## 2023-05-09 PROCEDURE — 84295 ASSAY OF SERUM SODIUM: CPT

## 2023-05-09 PROCEDURE — 70450 CT HEAD/BRAIN W/O DYE: CPT | Mod: MA

## 2023-05-09 PROCEDURE — 73562 X-RAY EXAM OF KNEE 3: CPT | Mod: 26,RT

## 2023-05-09 RX ORDER — ACETAMINOPHEN 500 MG
650 TABLET ORAL ONCE
Refills: 0 | Status: COMPLETED | OUTPATIENT
Start: 2023-05-09 | End: 2023-05-09

## 2023-05-09 RX ADMIN — Medication 650 MILLIGRAM(S): at 01:39

## 2023-05-09 NOTE — ED PROVIDER NOTE - CROS ED CONS ALL NEG
Attempt to reach patient.  Left message for patient to call the surgery scheduling line.       Dennis Alvares, Surgery Scheduler     negative...

## 2023-05-09 NOTE — ED ADULT NURSE NOTE - NSIMPLEMENTINTERV_GEN_ALL_ED
Implemented All Fall with Harm Risk Interventions:  [place] to call system. Call bell, personal items and telephone within reach. Instruct patient to call for assistance. Room bathroom lighting operational. Non-slip footwear when patient is off stretcher. Physically safe environment: no spills, clutter or unnecessary equipment. Stretcher in lowest position, wheels locked, appropriate side rails in place. Provide visual cue, wrist band, yellow gown, etc. Monitor gait and stability. Monitor for mental status changes and reorient to person, place, and time. Review medications for side effects contributing to fall risk. Reinforce activity limits and safety measures with patient and family. Provide visual clues: red socks.

## 2023-05-09 NOTE — ED PROVIDER NOTE - PATIENT PORTAL LINK FT
You can access the FollowMyHealth Patient Portal offered by [facility] by registering at the following website: [email or web address]. By joining [employer]’s FollowMyHealth portal, you will also be able to view your health information using other applications (apps) compatible with our system.

## 2023-05-09 NOTE — ED PROVIDER NOTE - ATTENDING CONTRIBUTION TO CARE
Patient is an 87-year-old female with a history of hypertension, hypothyroidism, CAD here with her daughter for evaluation after a fall while in her nursing home earlier today.  Patient states that she was sleeping and got up to go to the bathroom when she fell.  Patient states that she recalls the entire event.  She reports that she did hit her head.  She reports she has a mild headache but denies neck pain, numbness or tingling. at baseline, patient walks with a walker.  Patient sustained skin tears to her right elbow and abrasion to her right knee.  No nausea, vomiting.  She denies any chest pain, shortness of breath.  She denies any abdominal pain or urinary symptoms.  Of note, patient is blind in her left eye.    VS noted  Gen. no acute distress, Non toxic, elderly  HEENT: EOMI, mmm  Lungs: CTAB/L no C/ W /R   CVS: RRR   Abd; Soft non tender, non distended   Pelvis: stable, no ttp to bilateral hips, full ROM  Ext: no edema, skin tear to right elbow, abrasion to right knee  Skin: no rash  Neuro AAOx3 non focal clear speech  a/p: s/p unwitnessed fall - patient recalls event, denies preceding symptoms of palpitations, chest pain, dizziness. Plan for CT Head, XR of chest, right elbow, pelvis XR, right LE XRAYS. Will give tylenol for pain.   - Miguel WILLIAM

## 2023-05-09 NOTE — ED PROVIDER NOTE - WR ORDER NAME 5
Xray Pelvis AP only Partial Purse String (Intermediate) Text: Given the location of the defect and the characteristics of the surrounding skin an intermediate purse string closure was deemed most appropriate.  Undermining was performed circumferentially around the surgical defect.  A purse string suture was then placed and tightened. Wound tension of the circular defect prevented complete closure of the wound.

## 2023-05-09 NOTE — ED PROVIDER NOTE - CLINICAL SUMMARY MEDICAL DECISION MAKING FREE TEXT BOX
87-year-old female with a history of CAD on aspirin, HTN, hypothyroidism, osteoporosis presents after an unwitnessed fall at her nursing home earlier this evening.  Concern for head strike given fall was unwitnessed however patient is not on anticoagulation.  Labs, CT head, XR chest/right elbow/pelvis/right hip/right femur/right knee, Tylenol ordered.

## 2023-05-09 NOTE — ED PROVIDER NOTE - NSICDXPASTMEDICALHX_GEN_ALL_CORE_FT
PAST MEDICAL HISTORY:  CAD (coronary artery disease)     HTN (hypertension)     Hypothyroidism     Osteoporosis

## 2023-05-09 NOTE — ED ADULT NURSE NOTE - OBJECTIVE STATEMENT
pt is an 88yo female PMH HTN, osteoporosis, CAD, thyroid disease, loop recorder, L eye blind BIBEMS from [facility] following a fall. per EMS, pt was found on the floor after falling out of bed around 1230AM, unknown LOC, pt takes aspirin daily, pt endorsing L frontal RICARDO, otherwise denies pain. pt has skin tear noted to R elbow, R knee, no active bleeding noted, some oozing noted at both sites. pt A&Ox3 gross neuro intact, no difficulty speaking in complete sentences, s1s2 heart sounds heard, pulses x 4, cochran x4, abdomen soft nontender nondistended, skin intact. pt denies chest pain, sob, n/v/d, abdominal pain, f/c, urinary symptoms, hematuria

## 2023-05-09 NOTE — ED PROVIDER NOTE - PROGRESS NOTE DETAILS
If testing negative patient will require nonemergent transport back to [facility].  Patient's daughter Sandra is to be updated at [phone]. Lab work shows Hgb 9.7.  Otherwise unremarkable.  XR right elbow, right knee, right hip, right femur, hips, CXR all negative for acute fracture.  CT head shows no evidence of acute intracranial hemorrhage, brain edema or mass effect.  However CT head also showed crescentic hyperdensity in the posterior aspect of the left optic globe that may represent acute on chronic hemorrhage.  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  Patient saw Dr. Cottrell who is a [facility] physician partner.  Ophthalmology was called over the phone and results were discussed.  Patient will be slotted for an appointment in their office this afternoon.  Patient daughter Sandra was contacted and is able to take the patient to the ophthalmology appointment this afternoon for further ophthalmologic evaluation.  Otherwise patient has no headache currently, eye is not proptotic.  Dispo to nursing facility per nonemergent transport.

## 2023-05-09 NOTE — ED PROVIDER NOTE - OBJECTIVE STATEMENT
87-year-old female with a history of CAD on aspirin, HTN, hypothyroidism, osteoporosis presents after a fall in her nursing home earlier today.  Patient was helped into her pajamas and put to sleep and when she woke up to go the bathroom she fell.  Patient pulled her call bell and was brought in by EMS for unwitnessed fall.  Patient believes she might of struck her head but has no injury to her head.  Patient has no neck, back, abdominal pain.  Patient has an abrasion and laceration of her right elbow and right knee.  Patient is able to move all 4 extremities without significant pain.  Patient walks with a walker at baseline and has poor proprioception due to bilateral wounds on both feet that are seen and followed by wound care in the outpatient setting.  patient has a mild headache that started when she arrived at the hospital.  Otherwise patient has no chest pain, shortness of breath, N/V/D/abdominal pain, dysuria, peripheral edema, fever/chills.

## 2023-05-09 NOTE — ED ADULT TRIAGE NOTE - ARRIVAL INFO ADDITIONAL COMMENTS
EMS and daughter states it is difficult getting pulse ox on pt due to cold fingers. No SOB or chest pain

## 2023-05-09 NOTE — ED PROVIDER NOTE - PHYSICAL EXAMINATION
GENERAL: well appearing in no acute distress, non-toxic appearing  HEAD: normocephalic, atraumatic  HEENT: normal conjunctiva, oral mucosa moist, uvula midline  CARDIAC: regular rate and rhythm, normal S1S2, no appreciable murmurs  PULM: normal breath sounds, clear to ascultation bilaterally, no rales, rhonchi, wheezing  GI: abdomen nondistended, soft, nontender, no guarding, rebound tenderness  :  no suprapubic tenderness  NEURO: moving all 4 extremities, no focal deficits, normal speech, AAOx3   MSK: no peripheral edema, no calf tenderness b/l, Patient is able to move all 4 extremities without significant pain or limitations in active or passive ROM  SKIN: well-perfused, extremities warm, Ecchymosis and wound to right elbow and right knee  PSYCH: appropriate mood and affect

## 2023-05-09 NOTE — ED PROVIDER NOTE - NSFOLLOWUPINSTRUCTIONS_ED_ALL_ED_FT
Please follow-up with your ophthalmologist Dr. Cottrell in his office this afternoon.    Please follow up with your primary care physician within 1 week of discharge from the emergency department.  Please bring a copy of your results with you.  Please return to the emergency department for worsening of your symptoms.    You may take Acetaminophen over the counter as needed for pain and/or fever. Use as directed and see medication warnings.  You may take Ibuprofen over the counter as needed for pain and/or fever. Use as directed and see medication warnings.      Call 911 or have someone else call if:  You have fallen and are unconscious.  You have fallen and cannot move part of your body.  Contact your healthcare provider if:  You have fallen and have pain or a headache.  You have questions or concerns about your condition or care.

## 2023-05-12 PROBLEM — I10 ESSENTIAL (PRIMARY) HYPERTENSION: Chronic | Status: ACTIVE | Noted: 2023-05-09

## 2023-05-12 PROBLEM — I25.10 ATHEROSCLEROTIC HEART DISEASE OF NATIVE CORONARY ARTERY WITHOUT ANGINA PECTORIS: Chronic | Status: ACTIVE | Noted: 2023-05-09

## 2023-05-12 PROBLEM — E03.9 HYPOTHYROIDISM, UNSPECIFIED: Chronic | Status: ACTIVE | Noted: 2023-05-09

## 2023-05-12 PROBLEM — M81.0 AGE-RELATED OSTEOPOROSIS WITHOUT CURRENT PATHOLOGICAL FRACTURE: Chronic | Status: ACTIVE | Noted: 2023-05-09

## 2023-05-31 ENCOUNTER — APPOINTMENT (OUTPATIENT)
Dept: OTOLARYNGOLOGY | Facility: CLINIC | Age: 88
End: 2023-05-31
Payer: MEDICARE

## 2023-05-31 ENCOUNTER — APPOINTMENT (OUTPATIENT)
Dept: ORTHOPEDIC SURGERY | Facility: CLINIC | Age: 88
End: 2023-05-31

## 2023-05-31 ENCOUNTER — NON-APPOINTMENT (OUTPATIENT)
Age: 88
End: 2023-05-31

## 2023-05-31 VITALS
HEART RATE: 82 BPM | DIASTOLIC BLOOD PRESSURE: 61 MMHG | WEIGHT: 105 LBS | HEIGHT: 62 IN | TEMPERATURE: 96.7 F | BODY MASS INDEX: 19.32 KG/M2 | SYSTOLIC BLOOD PRESSURE: 104 MMHG

## 2023-05-31 DIAGNOSIS — H61.23 IMPACTED CERUMEN, BILATERAL: ICD-10-CM

## 2023-05-31 DIAGNOSIS — H90.3 SENSORINEURAL HEARING LOSS, BILATERAL: ICD-10-CM

## 2023-05-31 PROCEDURE — 69210 REMOVE IMPACTED EAR WAX UNI: CPT

## 2023-05-31 PROCEDURE — 99203 OFFICE O/P NEW LOW 30 MIN: CPT | Mod: 25

## 2023-05-31 PROCEDURE — 92567 TYMPANOMETRY: CPT

## 2023-05-31 PROCEDURE — 92557 COMPREHENSIVE HEARING TEST: CPT

## 2023-05-31 NOTE — HISTORY OF PRESENT ILLNESS
[de-identified] : 89 yo \par c/o chr hearing loss\par no tinnitus or vertigo\par no other modifying factors\par no nasal or throat complaints\par  [Hearing Loss] : hearing loss [Presbycusis] : presbycusis [Nasal Congestion] : no nasal congestion [Ear Fullness] : ear fullness [Neck Mass] : no neck mass [Cough] : no cough

## 2023-05-31 NOTE — ASSESSMENT
[FreeTextEntry1] : Bilat SNHL\par w/only fair SDS\par Rec;: HAE\par bilateral sensorineural hearing loss-cleared for hearing aids\par

## 2023-05-31 NOTE — DATA REVIEWED
[de-identified] : Hearing Test performed to evaluate the extent of hearing loss and  to explain pt's symptoms\par today's hearing test was personally reviewed and revealed\par Tymps-wnl\par bilat SNHL

## 2023-09-18 ENCOUNTER — APPOINTMENT (OUTPATIENT)
Dept: GERIATRICS | Facility: CLINIC | Age: 88
End: 2023-09-18

## 2023-09-18 ENCOUNTER — APPOINTMENT (OUTPATIENT)
Dept: INFECTIOUS DISEASE | Facility: CLINIC | Age: 88
End: 2023-09-18
Payer: MEDICARE

## 2023-09-18 VITALS
HEIGHT: 62 IN | WEIGHT: 107 LBS | SYSTOLIC BLOOD PRESSURE: 101 MMHG | TEMPERATURE: 97.8 F | DIASTOLIC BLOOD PRESSURE: 59 MMHG | OXYGEN SATURATION: 97 % | BODY MASS INDEX: 19.69 KG/M2 | HEART RATE: 80 BPM

## 2023-09-18 PROCEDURE — 99213 OFFICE O/P EST LOW 20 MIN: CPT

## 2023-09-21 LAB
AMORPHOUS URATE CRYSTALS: PRESENT
APPEARANCE: ABNORMAL
BACTERIA: ABNORMAL /HPF
BILIRUBIN URINE: NEGATIVE
BLOOD URINE: ABNORMAL
CAST: 0 /LPF
COLOR: YELLOW
EPITHELIAL CELLS: 9 /HPF
GLUCOSE QUALITATIVE U: NEGATIVE MG/DL
KETONES URINE: NEGATIVE MG/DL
LEUKOCYTE ESTERASE URINE: ABNORMAL
MICROSCOPIC-UA: NORMAL
NITRITE URINE: NEGATIVE
PH URINE: 6
PROTEIN URINE: NORMAL MG/DL
RED BLOOD CELLS URINE: 225 /HPF
REVIEW: NORMAL
SPECIFIC GRAVITY URINE: 1.01
URINE CYTOLOGY: NORMAL
UROBILINOGEN URINE: 0.2 MG/DL
WBC CLUMPS: PRESENT
WHITE BLOOD CELLS URINE: 776 /HPF
YEAST-LIKE CELLS: PRESENT

## 2023-09-22 LAB — BACTERIA UR CULT: ABNORMAL

## 2023-09-26 ENCOUNTER — INPATIENT (INPATIENT)
Facility: HOSPITAL | Age: 88
LOS: 9 days | Discharge: HOME | DRG: 871 | End: 2023-10-06
Attending: INTERNAL MEDICINE | Admitting: STUDENT IN AN ORGANIZED HEALTH CARE EDUCATION/TRAINING PROGRAM
Payer: MEDICARE

## 2023-09-26 VITALS
RESPIRATION RATE: 24 BRPM | HEIGHT: 62 IN | SYSTOLIC BLOOD PRESSURE: 164 MMHG | WEIGHT: 98.11 LBS | OXYGEN SATURATION: 96 % | HEART RATE: 91 BPM | TEMPERATURE: 99 F | DIASTOLIC BLOOD PRESSURE: 75 MMHG

## 2023-09-26 DIAGNOSIS — U07.1 COVID-19: ICD-10-CM

## 2023-09-26 DIAGNOSIS — F32.9 MAJOR DEPRESSIVE DISORDER, SINGLE EPISODE, UNSPECIFIED: ICD-10-CM

## 2023-09-26 DIAGNOSIS — D86.0 SARCOIDOSIS OF LUNG: ICD-10-CM

## 2023-09-26 DIAGNOSIS — I25.10 ATHEROSCLEROTIC HEART DISEASE OF NATIVE CORONARY ARTERY WITHOUT ANGINA PECTORIS: ICD-10-CM

## 2023-09-26 DIAGNOSIS — I10 ESSENTIAL (PRIMARY) HYPERTENSION: ICD-10-CM

## 2023-09-26 DIAGNOSIS — J96.01 ACUTE RESPIRATORY FAILURE WITH HYPOXIA: ICD-10-CM

## 2023-09-26 LAB
ALBUMIN SERPL ELPH-MCNC: 3.7 G/DL — SIGNIFICANT CHANGE UP (ref 3.3–5)
ALP SERPL-CCNC: 161 U/L — HIGH (ref 40–120)
ALT FLD-CCNC: 12 U/L — SIGNIFICANT CHANGE UP (ref 10–45)
ANION GAP SERPL CALC-SCNC: 15 MMOL/L — SIGNIFICANT CHANGE UP (ref 5–17)
APPEARANCE UR: ABNORMAL
APTT BLD: 31 SEC — SIGNIFICANT CHANGE UP (ref 24.5–35.6)
AST SERPL-CCNC: 20 U/L — SIGNIFICANT CHANGE UP (ref 10–40)
BACTERIA # UR AUTO: NEGATIVE — SIGNIFICANT CHANGE UP
BASE EXCESS BLDA CALC-SCNC: -3.8 MMOL/L — LOW (ref -2–3)
BASE EXCESS BLDV CALC-SCNC: -20.4 MMOL/L — LOW (ref -2–3)
BASE EXCESS BLDV CALC-SCNC: 2 MMOL/L — SIGNIFICANT CHANGE UP (ref -2–3)
BASOPHILS # BLD AUTO: 0 K/UL — SIGNIFICANT CHANGE UP (ref 0–0.2)
BASOPHILS NFR BLD AUTO: 0 % — SIGNIFICANT CHANGE UP (ref 0–2)
BILIRUB SERPL-MCNC: 0.6 MG/DL — SIGNIFICANT CHANGE UP (ref 0.2–1.2)
BILIRUB UR-MCNC: NEGATIVE — SIGNIFICANT CHANGE UP
BUN SERPL-MCNC: 26 MG/DL — HIGH (ref 7–23)
CA-I SERPL-SCNC: 0.45 MMOL/L — CRITICAL LOW (ref 1.15–1.33)
CA-I SERPL-SCNC: 1.16 MMOL/L — SIGNIFICANT CHANGE UP (ref 1.15–1.33)
CALCIUM SERPL-MCNC: 9.2 MG/DL — SIGNIFICANT CHANGE UP (ref 8.4–10.5)
CHLORIDE BLDV-SCNC: 102 MMOL/L — SIGNIFICANT CHANGE UP (ref 96–108)
CHLORIDE BLDV-SCNC: 132 MMOL/L — HIGH (ref 96–108)
CHLORIDE SERPL-SCNC: 102 MMOL/L — SIGNIFICANT CHANGE UP (ref 96–108)
CO2 BLDA-SCNC: 23 MMOL/L — SIGNIFICANT CHANGE UP (ref 19–24)
CO2 BLDV-SCNC: 28 MMOL/L — HIGH (ref 22–26)
CO2 BLDV-SCNC: 6 MMOL/L — LOW (ref 22–26)
CO2 SERPL-SCNC: 23 MMOL/L — SIGNIFICANT CHANGE UP (ref 22–31)
COLOR SPEC: SIGNIFICANT CHANGE UP
CREAT SERPL-MCNC: 0.91 MG/DL — SIGNIFICANT CHANGE UP (ref 0.5–1.3)
D DIMER BLD IA.RAPID-MCNC: 652 NG/ML DDU — HIGH
DIFF PNL FLD: ABNORMAL
EGFR: 61 ML/MIN/1.73M2 — SIGNIFICANT CHANGE UP
EOSINOPHIL # BLD AUTO: 0 K/UL — SIGNIFICANT CHANGE UP (ref 0–0.5)
EOSINOPHIL NFR BLD AUTO: 0 % — SIGNIFICANT CHANGE UP (ref 0–6)
EPI CELLS # UR: 0 /HPF — SIGNIFICANT CHANGE UP
GAS PNL BLDA: SIGNIFICANT CHANGE UP
GAS PNL BLDV: 136 MMOL/L — SIGNIFICANT CHANGE UP (ref 136–145)
GAS PNL BLDV: 145 MMOL/L — SIGNIFICANT CHANGE UP (ref 136–145)
GAS PNL BLDV: SIGNIFICANT CHANGE UP
GAS PNL BLDV: SIGNIFICANT CHANGE UP
GIANT PLATELETS BLD QL SMEAR: PRESENT — SIGNIFICANT CHANGE UP
GLUCOSE BLDV-MCNC: 117 MG/DL — HIGH (ref 70–99)
GLUCOSE BLDV-MCNC: 49 MG/DL — CRITICAL LOW (ref 70–99)
GLUCOSE SERPL-MCNC: 121 MG/DL — HIGH (ref 70–99)
GLUCOSE UR QL: NEGATIVE — SIGNIFICANT CHANGE UP
HCO3 BLDA-SCNC: 22 MMOL/L — SIGNIFICANT CHANGE UP (ref 21–28)
HCO3 BLDV-SCNC: 26 MMOL/L — SIGNIFICANT CHANGE UP (ref 22–29)
HCO3 BLDV-SCNC: 5 MMOL/L — CRITICAL LOW (ref 22–29)
HCT VFR BLD CALC: 33.4 % — LOW (ref 34.5–45)
HCT VFR BLDA CALC: 14 % — CRITICAL LOW (ref 34.5–46.5)
HCT VFR BLDA CALC: 34 % — LOW (ref 34.5–46.5)
HGB BLD CALC-MCNC: 11.2 G/DL — LOW (ref 11.7–16.1)
HGB BLD CALC-MCNC: 4.8 G/DL — CRITICAL LOW (ref 11.7–16.1)
HGB BLD-MCNC: 10.7 G/DL — LOW (ref 11.5–15.5)
HOROWITZ INDEX BLDA+IHG-RTO: 21 — SIGNIFICANT CHANGE UP
HYALINE CASTS # UR AUTO: 2 /LPF — SIGNIFICANT CHANGE UP (ref 0–2)
INR BLD: 1.19 RATIO — HIGH (ref 0.85–1.18)
KETONES UR-MCNC: NEGATIVE — SIGNIFICANT CHANGE UP
LACTATE BLDV-MCNC: 0.6 MMOL/L — SIGNIFICANT CHANGE UP (ref 0.5–2)
LACTATE BLDV-MCNC: 1.1 MMOL/L — SIGNIFICANT CHANGE UP (ref 0.5–2)
LEUKOCYTE ESTERASE UR-ACNC: ABNORMAL
LYMPHOCYTES # BLD AUTO: 0.35 K/UL — LOW (ref 1–3.3)
LYMPHOCYTES # BLD AUTO: 3.5 % — LOW (ref 13–44)
MANUAL SMEAR VERIFICATION: SIGNIFICANT CHANGE UP
MCHC RBC-ENTMCNC: 29.3 PG — SIGNIFICANT CHANGE UP (ref 27–34)
MCHC RBC-ENTMCNC: 32 GM/DL — SIGNIFICANT CHANGE UP (ref 32–36)
MCV RBC AUTO: 91.5 FL — SIGNIFICANT CHANGE UP (ref 80–100)
MONOCYTES # BLD AUTO: 0.26 K/UL — SIGNIFICANT CHANGE UP (ref 0–0.9)
MONOCYTES NFR BLD AUTO: 2.6 % — SIGNIFICANT CHANGE UP (ref 2–14)
NEUTROPHILS # BLD AUTO: 9.3 K/UL — HIGH (ref 1.8–7.4)
NEUTROPHILS NFR BLD AUTO: 93 % — HIGH (ref 43–77)
NEUTS BAND # BLD: 0.9 % — SIGNIFICANT CHANGE UP (ref 0–8)
NITRITE UR-MCNC: NEGATIVE — SIGNIFICANT CHANGE UP
NT-PROBNP SERPL-SCNC: 2438 PG/ML — HIGH (ref 0–300)
OTHER CELLS CSF MANUAL: 6.3 ML/DL — LOW (ref 18–22)
PCO2 BLDA: 39 MMHG — SIGNIFICANT CHANGE UP (ref 32–45)
PCO2 BLDV: 40 MMHG — SIGNIFICANT CHANGE UP (ref 39–42)
PCO2 BLDV: <19 MMHG — LOW (ref 39–42)
PH BLDA: 7.35 — SIGNIFICANT CHANGE UP (ref 7.35–7.45)
PH BLDV: 7.23 — LOW (ref 7.32–7.43)
PH BLDV: 7.43 — SIGNIFICANT CHANGE UP (ref 7.32–7.43)
PH UR: 6 — SIGNIFICANT CHANGE UP (ref 5–8)
PLAT MORPH BLD: NORMAL — SIGNIFICANT CHANGE UP
PLATELET # BLD AUTO: 153 K/UL — SIGNIFICANT CHANGE UP (ref 150–400)
PO2 BLDA: 77 MMHG — LOW (ref 83–108)
PO2 BLDV: 48 MMHG — HIGH (ref 25–45)
PO2 BLDV: 69 MMHG — HIGH (ref 25–45)
POTASSIUM BLDV-SCNC: 3.7 MMOL/L — SIGNIFICANT CHANGE UP (ref 3.5–5.1)
POTASSIUM BLDV-SCNC: <1.2 MMOL/L — CRITICAL LOW (ref 3.5–5.1)
POTASSIUM SERPL-MCNC: 3.9 MMOL/L — SIGNIFICANT CHANGE UP (ref 3.5–5.3)
POTASSIUM SERPL-SCNC: 3.9 MMOL/L — SIGNIFICANT CHANGE UP (ref 3.5–5.3)
PROT SERPL-MCNC: 6.6 G/DL — SIGNIFICANT CHANGE UP (ref 6–8.3)
PROT UR-MCNC: ABNORMAL
PROTHROM AB SERPL-ACNC: 12.4 SEC — SIGNIFICANT CHANGE UP (ref 9.5–13)
RAPID RVP RESULT: DETECTED
RBC # BLD: 3.65 M/UL — LOW (ref 3.8–5.2)
RBC # FLD: 15.2 % — HIGH (ref 10.3–14.5)
RBC BLD AUTO: SIGNIFICANT CHANGE UP
RBC CASTS # UR COMP ASSIST: 93 /HPF — HIGH (ref 0–4)
SAO2 % BLDA: 96.4 % — SIGNIFICANT CHANGE UP (ref 94–98)
SAO2 % BLDV: 77.8 % — SIGNIFICANT CHANGE UP (ref 67–88)
SAO2 % BLDV: 91.9 % — HIGH (ref 67–88)
SARS-COV-2 RNA SPEC QL NAA+PROBE: DETECTED
SODIUM SERPL-SCNC: 140 MMOL/L — SIGNIFICANT CHANGE UP (ref 135–145)
SP GR SPEC: 1.01 — SIGNIFICANT CHANGE UP (ref 1.01–1.02)
TROPONIN T, HIGH SENSITIVITY RESULT: 62 NG/L — HIGH (ref 0–51)
TROPONIN T, HIGH SENSITIVITY RESULT: 68 NG/L — HIGH (ref 0–51)
UROBILINOGEN FLD QL: NEGATIVE — SIGNIFICANT CHANGE UP
WBC # BLD: 9.9 K/UL — SIGNIFICANT CHANGE UP (ref 3.8–10.5)
WBC # FLD AUTO: 9.9 K/UL — SIGNIFICANT CHANGE UP (ref 3.8–10.5)
WBC UR QL: 95 /HPF — HIGH (ref 0–5)

## 2023-09-26 PROCEDURE — 71275 CT ANGIOGRAPHY CHEST: CPT | Mod: 26,MA

## 2023-09-26 PROCEDURE — 99285 EMERGENCY DEPT VISIT HI MDM: CPT | Mod: GC

## 2023-09-26 PROCEDURE — 71045 X-RAY EXAM CHEST 1 VIEW: CPT | Mod: 26

## 2023-09-26 PROCEDURE — 99222 1ST HOSP IP/OBS MODERATE 55: CPT

## 2023-09-26 PROCEDURE — 99223 1ST HOSP IP/OBS HIGH 75: CPT

## 2023-09-26 RX ORDER — DEXAMETHASONE 0.5 MG/5ML
6 ELIXIR ORAL DAILY
Refills: 0 | Status: COMPLETED | OUTPATIENT
Start: 2023-09-26 | End: 2023-10-06

## 2023-09-26 RX ORDER — ONDANSETRON 8 MG/1
4 TABLET, FILM COATED ORAL EVERY 8 HOURS
Refills: 0 | Status: DISCONTINUED | OUTPATIENT
Start: 2023-09-26 | End: 2023-09-27

## 2023-09-26 RX ORDER — SODIUM CHLORIDE 9 MG/ML
500 INJECTION INTRAMUSCULAR; INTRAVENOUS; SUBCUTANEOUS ONCE
Refills: 0 | Status: COMPLETED | OUTPATIENT
Start: 2023-09-26 | End: 2023-09-26

## 2023-09-26 RX ORDER — ACETAMINOPHEN 500 MG
1000 TABLET ORAL ONCE
Refills: 0 | Status: DISCONTINUED | OUTPATIENT
Start: 2023-09-26 | End: 2023-09-26

## 2023-09-26 RX ORDER — MIRTAZAPINE 45 MG/1
7.5 TABLET, ORALLY DISINTEGRATING ORAL AT BEDTIME
Refills: 0 | Status: DISCONTINUED | OUTPATIENT
Start: 2023-09-26 | End: 2023-10-06

## 2023-09-26 RX ORDER — SODIUM CHLORIDE 9 MG/ML
1000 INJECTION INTRAMUSCULAR; INTRAVENOUS; SUBCUTANEOUS ONCE
Refills: 0 | Status: COMPLETED | OUTPATIENT
Start: 2023-09-26 | End: 2023-09-26

## 2023-09-26 RX ORDER — CYCLOPENTOLATE HYDROCHLORIDE 10 MG/ML
1 SOLUTION/ DROPS OPHTHALMIC
Refills: 0 | Status: DISCONTINUED | OUTPATIENT
Start: 2023-09-26 | End: 2023-10-06

## 2023-09-26 RX ORDER — ASPIRIN/CALCIUM CARB/MAGNESIUM 324 MG
81 TABLET ORAL DAILY
Refills: 0 | Status: DISCONTINUED | OUTPATIENT
Start: 2023-09-26 | End: 2023-10-06

## 2023-09-26 RX ORDER — LANOLIN ALCOHOL/MO/W.PET/CERES
3 CREAM (GRAM) TOPICAL AT BEDTIME
Refills: 0 | Status: DISCONTINUED | OUTPATIENT
Start: 2023-09-26 | End: 2023-10-06

## 2023-09-26 RX ORDER — IPRATROPIUM/ALBUTEROL SULFATE 18-103MCG
3 AEROSOL WITH ADAPTER (GRAM) INHALATION
Refills: 0 | Status: COMPLETED | OUTPATIENT
Start: 2023-09-26 | End: 2023-09-26

## 2023-09-26 RX ORDER — ATORVASTATIN CALCIUM 80 MG/1
20 TABLET, FILM COATED ORAL AT BEDTIME
Refills: 0 | Status: DISCONTINUED | OUTPATIENT
Start: 2023-09-26 | End: 2023-10-06

## 2023-09-26 RX ORDER — REMDESIVIR 5 MG/ML
INJECTION INTRAVENOUS
Refills: 0 | Status: COMPLETED | OUTPATIENT
Start: 2023-09-26 | End: 2023-09-30

## 2023-09-26 RX ORDER — ALBUTEROL 90 UG/1
2.5 AEROSOL, METERED ORAL EVERY 6 HOURS
Refills: 0 | Status: DISCONTINUED | OUTPATIENT
Start: 2023-09-26 | End: 2023-10-06

## 2023-09-26 RX ORDER — INFLUENZA VIRUS VACCINE 15; 15; 15; 15 UG/.5ML; UG/.5ML; UG/.5ML; UG/.5ML
0.7 SUSPENSION INTRAMUSCULAR ONCE
Refills: 0 | Status: DISCONTINUED | OUTPATIENT
Start: 2023-09-26 | End: 2023-10-06

## 2023-09-26 RX ORDER — PREDNISOLONE SODIUM PHOSPHATE 1 %
1 DROPS OPHTHALMIC (EYE)
Refills: 0 | Status: DISCONTINUED | OUTPATIENT
Start: 2023-09-26 | End: 2023-10-06

## 2023-09-26 RX ORDER — FERROUS SULFATE 325(65) MG
325 TABLET ORAL DAILY
Refills: 0 | Status: DISCONTINUED | OUTPATIENT
Start: 2023-09-26 | End: 2023-10-06

## 2023-09-26 RX ORDER — FLUOXETINE HCL 10 MG
20 CAPSULE ORAL DAILY
Refills: 0 | Status: DISCONTINUED | OUTPATIENT
Start: 2023-09-26 | End: 2023-10-06

## 2023-09-26 RX ORDER — REMDESIVIR 5 MG/ML
100 INJECTION INTRAVENOUS EVERY 24 HOURS
Refills: 0 | Status: COMPLETED | OUTPATIENT
Start: 2023-09-27 | End: 2023-09-30

## 2023-09-26 RX ORDER — ACETAMINOPHEN 500 MG
500 TABLET ORAL ONCE
Refills: 0 | Status: COMPLETED | OUTPATIENT
Start: 2023-09-26 | End: 2023-09-26

## 2023-09-26 RX ORDER — TRIAMTERENE/HYDROCHLOROTHIAZID 75 MG-50MG
1 TABLET ORAL DAILY
Refills: 0 | Status: DISCONTINUED | OUTPATIENT
Start: 2023-09-26 | End: 2023-10-06

## 2023-09-26 RX ORDER — TRAZODONE HCL 50 MG
1 TABLET ORAL
Refills: 0 | DISCHARGE

## 2023-09-26 RX ORDER — CALAMINE AND ZINC OXIDE AND PHENOL 160; 10 MG/ML; MG/ML
1 LOTION TOPICAL ONCE
Refills: 0 | Status: COMPLETED | OUTPATIENT
Start: 2023-09-26 | End: 2023-09-27

## 2023-09-26 RX ORDER — REMDESIVIR 5 MG/ML
200 INJECTION INTRAVENOUS EVERY 24 HOURS
Refills: 0 | Status: COMPLETED | OUTPATIENT
Start: 2023-09-26 | End: 2023-09-26

## 2023-09-26 RX ORDER — ACETAMINOPHEN 500 MG
650 TABLET ORAL EVERY 6 HOURS
Refills: 0 | Status: DISCONTINUED | OUTPATIENT
Start: 2023-09-26 | End: 2023-10-06

## 2023-09-26 RX ADMIN — Medication 81 MILLIGRAM(S): at 13:02

## 2023-09-26 RX ADMIN — Medication 20 MILLIGRAM(S): at 13:02

## 2023-09-26 RX ADMIN — Medication 325 MILLIGRAM(S): at 13:02

## 2023-09-26 RX ADMIN — MIRTAZAPINE 7.5 MILLIGRAM(S): 45 TABLET, ORALLY DISINTEGRATING ORAL at 22:24

## 2023-09-26 RX ADMIN — Medication 3 MILLILITER(S): at 05:40

## 2023-09-26 RX ADMIN — REMDESIVIR 200 MILLIGRAM(S): 5 INJECTION INTRAVENOUS at 22:21

## 2023-09-26 RX ADMIN — Medication 3 MILLILITER(S): at 05:44

## 2023-09-26 RX ADMIN — Medication 1 DROP(S): at 17:31

## 2023-09-26 RX ADMIN — SODIUM CHLORIDE 500 MILLILITER(S): 9 INJECTION INTRAMUSCULAR; INTRAVENOUS; SUBCUTANEOUS at 09:10

## 2023-09-26 RX ADMIN — Medication 500 MILLIGRAM(S): at 05:40

## 2023-09-26 RX ADMIN — SODIUM CHLORIDE 500 MILLILITER(S): 9 INJECTION INTRAMUSCULAR; INTRAVENOUS; SUBCUTANEOUS at 09:51

## 2023-09-26 RX ADMIN — ATORVASTATIN CALCIUM 20 MILLIGRAM(S): 80 TABLET, FILM COATED ORAL at 22:24

## 2023-09-26 RX ADMIN — Medication 3 MILLILITER(S): at 05:43

## 2023-09-26 RX ADMIN — ALBUTEROL 2.5 MILLIGRAM(S): 90 AEROSOL, METERED ORAL at 17:31

## 2023-09-26 RX ADMIN — Medication 125 MILLIGRAM(S): at 05:39

## 2023-09-26 RX ADMIN — SODIUM CHLORIDE 1000 MILLILITER(S): 9 INJECTION INTRAMUSCULAR; INTRAVENOUS; SUBCUTANEOUS at 05:44

## 2023-09-26 RX ADMIN — Medication 500 MILLIGRAM(S): at 07:27

## 2023-09-26 RX ADMIN — CYCLOPENTOLATE HYDROCHLORIDE 1 DROP(S): 10 SOLUTION/ DROPS OPHTHALMIC at 17:31

## 2023-09-26 NOTE — ED PROVIDER NOTE - CCCP TRG CHIEF CMPLNT
Consultation - Cardiology   Say Real 80 y o  male MRN: [id number]  Unit/Bed#:  Encounter: [id number]  Physician Requesting Consult: No att  providers found  Reason for Consult / Principal Problem: Coronary calcifications seen in CT scan    Assessment:  1  Coronary artery calcification  2  HTN  3  Sinus Tachycardia  4  CKD    Plan:  He is very stable from a cardiac standpoint  He is active without any anginal symptoms  I stressed to him that he must stop smoking  I will start him on Toprol XL 25 mg daily for better HR control and ASA 81 mg daily given his known CT scan findings  I will check an ECHO  If wall motion abnormalities are seen, he will be scheduled for a nuclear stress test     RTO 1 year  History of Present Illness     HPI: Say Real is a 80y o  year old male who denies any past cardiac history  He has HTN which is controlled  He smokes about 5 - 8 cigs a day  He denies significant FH of CAD  He denies DM  LDL is 46 on Lipitor 40 mg daily  ECG - Sinus Tachycardia, LAD, low QRS voltage, PRWP  He is active and denies any CP, chest pressure  He gets SOB at higher levels of activity  Recent CT of the chest showed moderate coronary calcifications  He has CKD with creatinine running about 1 48        Review of Systems:    Alert awake oriented, comfortable, denies any complaints  No fevers chills nausea vomiting  No weakness, dizziness, seizures  no cough, shortness of breath, or wheezing  Denies any palpitations, chest pain, diaphoresis  Denies leg edema, pain or paresthesias  Denies any skin rashes  Denies abdominal pain, bloody stools, masses  Denies any depression or suicidal ideations      Historical Information   Past Medical History:   Diagnosis Date   • Depression    • Hyperlipemia    • Hypertension      Past Surgical History:   Procedure Laterality Date   • PROSTATECTOMY       Social History     Substance and Sexual Activity   Alcohol Use Not Currently     Social
History     Substance and Sexual Activity   Drug Use Never     Social History     Tobacco Use   Smoking Status Current Every Day Smoker   • Packs/day: 0 25   • Types: Cigarettes   Smokeless Tobacco Never Used     Family History: non-contributory    Meds/Allergies   all current active meds have been reviewed  No Known Allergies    Objective   Vitals: Blood pressure 130/84, pulse (!) 107, height 5' 3" (1 6 m), weight 68 1 kg (150 lb 3 2 oz)  , Body mass index is 26 61 kg/m²  ,   Weight (last 2 days)     Date/Time Weight    10/25/22 1231 68 1 (150 2)                    Physical Exam:  GEN: Mark Morgan appears well, alert and oriented x 3, pleasant and cooperative   HEENT: pupils equal, round, and reactive to light; extraocular muscles intact  NECK: supple, no carotid bruits   HEART: regular rhythm, normal S1 and S2, no murmurs, clicks, gallops or rubs   LUNGS: clear to auscultation bilaterally; no wheezes, rales, or rhonchi   ABDOMEN: normal bowel sounds, soft, no tenderness, no distention  EXTREMITIES: peripheral pulses normal; no clubbing, cyanosis, or edema  NEURO: no focal findings   SKIN: normal without suspicious lesions on exposed skin    Lab Results:   No visits with results within 1 Day(s) from this visit  Latest known visit with results is:   Appointment on 07/25/2022   Component Date Value Ref Range Status   • Free T4 07/25/2022 0 91  0 76 - 1 46 ng/dL Final    Specimen collection should occur prior to Sulfasalazine administration due to the potential for falsely elevated results     • WBC 07/25/2022 7 01  4 31 - 10 16 Thousand/uL Final   • RBC 07/25/2022 4 60  3 88 - 5 62 Million/uL Final   • Hemoglobin 07/25/2022 13 8  12 0 - 17 0 g/dL Final   • Hematocrit 07/25/2022 42 8  36 5 - 49 3 % Final   • MCV 07/25/2022 93  82 - 98 fL Final   • MCH 07/25/2022 30 0  26 8 - 34 3 pg Final   • MCHC 07/25/2022 32 2  31 4 - 37 4 g/dL Final   • RDW 07/25/2022 13 4  11 6 - 15 1 % Final   • MPV 07/25/2022 10 7  8 9 -
12 7 fL Final   • Platelets 81[birth year]1971 223  149 - 390 Thousands/uL Final   • nRBC 07/25/2022 0  /100 WBCs Final   • Neutrophils Relative 07/25/2022 62  43 - 75 % Final   • Immat GRANS % 07/25/2022 0  0 - 2 % Final   • Lymphocytes Relative 07/25/2022 24  14 - 44 % Final   • Monocytes Relative 07/25/2022 8  4 - 12 % Final   • Eosinophils Relative 07/25/2022 5  0 - 6 % Final   • Basophils Relative 07/25/2022 1  0 - 1 % Final   • Neutrophils Absolute 07/25/2022 4 40  1 85 - 7 62 Thousands/µL Final   • Immature Grans Absolute 07/25/2022 0 03  0 00 - 0 20 Thousand/uL Final   • Lymphocytes Absolute 07/25/2022 1 67  0 60 - 4 47 Thousands/µL Final   • Monocytes Absolute 07/25/2022 0 55  0 17 - 1 22 Thousand/µL Final   • Eosinophils Absolute 07/25/2022 0 32  0 00 - 0 61 Thousand/µL Final   • Basophils Absolute 07/25/2022 0 04  0 00 - 0 10 Thousands/µL Final   • Sodium 07/25/2022 137  135 - 147 mmol/L Final   • Potassium 07/25/2022 4 0  3 5 - 5 3 mmol/L Final   • Chloride 07/25/2022 110 (A) 96 - 108 mmol/L Final   • CO2 07/25/2022 20 (A) 21 - 32 mmol/L Final   • ANION GAP 07/25/2022 7  4 - 13 mmol/L Final   • BUN 07/25/2022 34 (A) 5 - 25 mg/dL Final   • Creatinine 07/25/2022 1 48 (A) 0 60 - 1 30 mg/dL Final    Standardized to IDMS reference method   • Glucose, Fasting 07/25/2022 103 (A) 65 - 99 mg/dL Final    Specimen collection should occur prior to Sulfasalazine administration due to the potential for falsely depressed results  Specimen collection should occur prior to Sulfapyridine administration due to the potential for falsely elevated results  • Calcium 07/25/2022 9 1  8 3 - 10 1 mg/dL Final   • AST 07/25/2022 19  5 - 45 U/L Final    Specimen collection should occur prior to Sulfasalazine administration due to the potential for falsely depressed results      • ALT 07/25/2022 21  12 - 78 U/L Final    Specimen collection should occur prior to Sulfasalazine and/or Sulfapyridine administration due to the potential for
falsely depressed results  • Alkaline Phosphatase 07/25/2022 56  46 - 116 U/L Final   • Total Protein 07/25/2022 7 6  6 4 - 8 4 g/dL Final   • Albumin 07/25/2022 4 1  3 5 - 5 0 g/dL Final   • Total Bilirubin 07/25/2022 0 40  0 20 - 1 00 mg/dL Final    Use of this assay is not recommended for patients undergoing treatment with eltrombopag due to the potential for falsely elevated results  • eGFR 07/25/2022 47  ml/min/1 73sq m Final   • LDL Direct 07/25/2022 71  0 - 100 mg/dl Final   • Vit D, 25-Hydroxy 07/25/2022 46 1  30 0 - 100 0 ng/mL Final   • A/G Ratio 07/25/2022 1 67  1 10 - 1 80 Final   • Albumin Electrophoresis 07/25/2022 62 5  52 0 - 65 0 % Final   • Albumin CONC 07/25/2022 4 50  3 50 - 5 00 g/dl Final   • Alpha 1 07/25/2022 4 8  2 5 - 5 0 % Final   • ALPHA 1 CONC 07/25/2022 0 35  0 10 - 0 40 g/dL Final   • Alpha 2 07/25/2022 11 5  7 0 - 13 0 % Final   • ALPHA 2 CONC 07/25/2022 0 83  0 40 - 1 20 g/dL Final   • Beta-1 07/25/2022 5 0  5 0 - 13 0 % Final   • BETA 1 CONC 07/25/2022 0 36 (A) 0 40 - 0 80 g/dL Final   • Beta-2 07/25/2022 5 4  2 0 - 8 0 % Final   • BETA 2 CONC 07/25/2022 0 39  0 20 - 0 50 g/dL Final   • Gamma Globulin 07/25/2022 10 8 (A) 12 0 - 22 0 % Final   • GAMMA CONC 07/25/2022 0 78  0 50 - 1 60 g/dL Final   • M Peak ID 1 07/25/2022 2 90  % Final   • M PEAK 1 CONC 07/25/2022 0 21  g/dL Final   • Total Protein 07/25/2022 7 2  6 4 - 8 2 g/dL Final   • SPEP Interpretation 07/25/2022 See Comment   Final    The SPEP shows a monoclonal peak in the gamma region  Immunofixation to be performed  Reviewed by:  Braulio Myles MD **Electronic Signature**   • Sodium, Ur 07/25/2022 57   Final   • Creatinine, Ur 07/25/2022 126 0  mg/dL Final   • Cholesterol 07/25/2022 108  See Comment mg/dL Final    Cholesterol:         Pediatric <18 Years        Desirable          <170 mg/dL      Borderline High    170-199 mg/dL      High               >=200 mg/dL        Adult >=18 Years            Desirable         <200
mg/dL      Borderline High   200-239 mg/dL      High              >239 mg/dL     • Triglycerides 07/25/2022 155 (A) See Comment mg/dL Final    Triglyceride:     0-9Y            <75mg/dL     10Y-17Y         <90 mg/dL       >=18Y     Normal          <150 mg/dL     Borderline High 150-199 mg/dL     High            200-499 mg/dL        Very High       >499 mg/dL    Specimen collection should occur prior to N-Acetylcysteine or Metamizole administration due to the potential for falsely depressed results  • HDL, Direct 07/25/2022 31 (A) >=40 mg/dL Final    Specimen collection should occur prior to Metamizole administration due to the potential for falsley depressed results  • LDL Calculated 07/25/2022 46  0 - 100 mg/dL Final    LDL Cholesterol:     Optimal           <100 mg/dl     Near Optimal      100-129 mg/dl     Above Optimal       Borderline High 130-159 mg/dl       High            160-189 mg/dl       Very High       >189 mg/dl         This screening LDL is a calculated result  It does not have the accuracy of the Direct Measured LDL in the monitoring of patients with hyperlipidemia and/or statin therapy  Direct Measure LDL ([id number]) must be ordered separately in these patients  • TSH 3RD GENERATON 07/25/2022 1 390  0 450 - 4 500 uIU/mL Final    Adult TSH (3rd generation) reference range follows the recommended guidelines of the American Thyroid Association, January, 2020  • Immunofixation Interpretation 07/25/2022 See Comment   Final    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 
shortness of breath
yulisa all pertinent systems negative

## 2023-09-26 NOTE — H&P ADULT - PROBLEM SELECTOR PLAN 1
In setting of COVID-19 infection, complicated by underlying COPD, pulmonary sarcoidosis. Patient is with mild somnolence at time of admission, and is answering with 1-2 word answers  - 5 day course of remdesavir to run 9/26 - 9/30  - Started on dexamethasone 6 mg PO Qd  - Will check D-dimer then consider therapeutic AC   - VBG repeat obtained, however likely drawn from IV line, not accurate representation, will order ABG and follow up   - GOC as described above, full code for now, HCP currently flying from [place] to [place]

## 2023-09-26 NOTE — ED PROVIDER NOTE - CLINICAL SUMMARY MEDICAL DECISION MAKING FREE TEXT BOX
Patient is a 88-year-old female past medical history CAD, loop recorder, sarcoidosis pulmonary, hypertension, COPD presenting for shortness of breath. Currently with vital signs notably hypoxic to mid to low 80s on room air, with improvement to low 90s on 3 L O2, febrile 103 rectally.  Otherwise not tachycardic, not hypotensive.  Complaining of shortness of breath in the setting of a sick contact and rhinorrhea yesterday, with worsening of wheezing and shortness of breath today, with exam having equal breath sounds but with wheezing bilaterally.  Differential includes but not limited to viral syndrome with subsequent reactive airway or COPD exacerbation versus less likely pneumonia versus other infectious etiology versus less likely cardiac ACS or other etiology, versus less likely but cannot exclude PE.  To evaluate with labs including dimer, chest x-ray, give medications and reassess.  To defer CT imaging at this time pending lab results. Patient is a 88-year-old female past medical history CAD, loop recorder, sarcoidosis pulmonary, hypertension, COPD presenting for shortness of breath. Currently with vital signs notably hypoxic to mid to low 80s on room air, with improvement to low 90s on 3 L O2, febrile 103 rectally.  Otherwise not tachycardic, not hypotensive.  Complaining of shortness of breath in the setting of a sick contact and rhinorrhea yesterday, with worsening of wheezing and shortness of breath today, with exam having equal breath sounds but with wheezing bilaterally.  Differential includes but not limited to viral syndrome with subsequent reactive airway or COPD exacerbation versus less likely pneumonia versus other infectious etiology versus less likely cardiac ACS or other etiology, versus less likely but cannot exclude PE.  To evaluate with labs including dimer, chest x-ray, give medications and reassess.  To defer CT imaging at this time pending lab results.    pettet attending- see attending attestation for my mdm

## 2023-09-26 NOTE — ED ADULT NURSE REASSESSMENT NOTE - COMFORT CARE
darkened lights/side rails up/treatment delay explained/warm blanket provided
side rails up
darkened lights/plan of care explained/side rails up/wait time explained/warm blanket provided

## 2023-09-26 NOTE — CONSULT NOTE ADULT - ASSESSMENT
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 pt moved to [place] from [facility] to be near family Pt seen in the office for recurrent UTIs that have been previously worked up.  She also has  of clostridia diff.  colitis    pt has occasional had hematuria   presents with sob; nomial chest xray but hypoxia and is positive for covid  chest x-ray is normal  she is very frail    Covid with sob and on oxygen  would start remdesivir for 5 days and give decadron 6 mgqday.  check INR, ferritin and ddi shelby  Hold antibiotics  particularly with hs of clostridia difficile

## 2023-09-26 NOTE — H&P ADULT - TIME BILLING
History and physical exam   Review of outpatient records  Ordering of new medications  Goals of care discussion  Discussion of case with patients daughter

## 2023-09-26 NOTE — H&P ADULT - PROBLEM SELECTOR PLAN 6
- Continue with fluoxetine 20 mg PO Qd  - C/w mirtazepine 7.5 mg PO Qd  - Holding home Trazadone PRN to avoid oversedation

## 2023-09-26 NOTE — ED PROVIDER NOTE - ATTENDING CONTRIBUTION TO CARE
I, Chucky Ridley, performed a history and physical exam of the patient and discussed their management with the resident and/or advanced care provider. I reviewed the resident and/or advanced care provider's note and agree with the documented findings and plan of care. I was present and available for all procedures.     Patient presenting febrile with shortness of breath likely secondary to reactive airway disease secondary to viral exposure discussed with patient and daughter at bedside agreeable with plan for steroids nebs chest x-ray screening blood work including D-dimer although unlikely PE ACS pneumothorax dissection AAA bacterial pneumonia otherwise needs admission for further hypoxemic respiratory failure admission agreeable with plan    Well appearing and in NAD, head normal appearing atraumatic, trachea midline, mild respiratory distress, lungs coarse bs bilaterally, rrr no murmurs, soft NT ND abdomen, no visible extremity deformities, Alert and oriented, non focal neuro exam, skin warm and dry, normal affect and mood, no leg swelling, calf ttp or jvd

## 2023-09-26 NOTE — ED ADULT NURSE REASSESSMENT NOTE - GENERAL PATIENT STATE
comfortable appearance/cooperative
comfortable appearance/cooperative
comfortable appearance/cooperative/improvement verbalized/resting/sleeping

## 2023-09-26 NOTE — ED ADULT NURSE NOTE - OBJECTIVE STATEMENT
Patient is a 88 year old female brought in by EMS from the [facility] for increased SOB. EMS reports patient was found to be 80% on room air - placed on 6L NC and given one albuterol treatment in route to hospital. Patient reports she started experiencing worsening shortness of breath and difficulty breathing, feeling that it is difficult to catch her breath and that she is wheezing more. On assessment A&Ox4 (forgetful at times), PERRL, sensory grossly intact, able to move all extremities well, bilateral equal hand grasp, clear speech, bilateral wheezes heard, breathing comfortably on 3L NC, no accessory muscle use, NSR on the cardiac monitor, no JVD, bilateral lower leg pitting edema noted with erythema, abdomen is soft, nondistended, nontender, skin warm. PMH CAD, loop recorder, sarcoidosis pulmonary, hypertension, COPD.

## 2023-09-26 NOTE — CONSULT NOTE ADULT - SUBJECTIVE AND OBJECTIVE BOX
Patient is a 88y old  Female who presents with a chief complaint of   HPI:      PAST MEDICAL & SURGICAL HISTORY:  HTN (hypertension)      Hypothyroidism      Osteoporosis      CAD (coronary artery disease)          Social history:    FAMILY HISTORY:            Allergic/Immunologic:	No hives or rash   Allergies    No Known Allergies    Intolerances        Antimicrobials:          Vital Signs Last 24 Hrs  T(C): 36.7 (26 Sep 2023 11:30), Max: 39.4 (26 Sep 2023 05:10)  T(F): 98 (26 Sep 2023 11:30), Max: 103 (26 Sep 2023 05:10)  HR: 77 (26 Sep 2023 11:30) (77 - 103)  BP: 101/61 (26 Sep 2023 11:30) (72/42 - 164/75)  BP(mean): 81 (26 Sep 2023 06:18) (68 - 81)  RR: 17 (26 Sep 2023 11:30) (15 - 40)  SpO2: 95% (26 Sep 2023 11:30) (84% - 99%)    Parameters below as of 26 Sep 2023 11:30  Patient On (Oxygen Delivery Method): nasal cannula  O2 Flow (L/min): 3       No cachexia     Eyes:PERRL EOMI.NO discharge or conjunctival injection    ENMT:No sinus tenderness.No thrush.No pharyngeal exudate or erythema.Fair dental hygiene    Neck:Supple,No LN,no JVD      Respiratory:Good air entry bilaterally,CTA    Cardiovascular:S1 S2 wnl, No murmurs,rub or gallops    Gastrointestinal:Soft BS(+) no tenderness no masses ,No rebound or guarding    Genitourinary:No CVA tendereness     Rectal:    Extremities:No cyanosis,clubbing or edema.    Vascular:peripheral pulses felt    Neurological:AAO X 3,No grossly focal deficits                                   10.7   9.90  )-----------( 153      ( 26 Sep 2023 05:39 )             33.4         09-26    140  |  102  |  26<H>  ----------------------------<  121<H>  3.9   |  23  |  0.91    Ca    9.2      26 Sep 2023 05:39    TPro  6.6  /  Alb  3.7  /  TBili  0.6  /  DBili  x   /  AST  20  /  ALT  12  /  AlkPhos  161<H>  09-26      RECENT CULTURES:      MICROBIOLOGY:          Radiology:      Assessment:        Recommendations and Plan:    Pager [phone]  After 5 pm/weekends or if no response :[phone]

## 2023-09-26 NOTE — ED PROVIDER NOTE - PHYSICAL EXAMINATION
CONSTITUTIONAL: Well-developed; well-nourished  SKIN: warm, dry  HEAD: Normocephalic; atraumatic.  EYES: no conjunctival injection. no scleral icterus  ENT: No nasal discharge; airway clear, no exudates  NECK: Supple; non tender.  CARD: S1, S2 normal; no murmurs, gallops, or rubs. Regular rate and rhythm.   RESP: +B/l expiratory wheezes, equal breath sounds  ABD: soft ntnd, no guarding, no distention, no rigidity.   EXT: +BLE edema symmetric pitting  NEURO: Alert, oriented, grossly unremarkable  PSYCH: Cooperative, appropriate.

## 2023-09-26 NOTE — H&P ADULT - ASSESSMENT
88F PMH CAD, pulmonary sarcoidosis, COPD on no O2, HTN presents with shortness of breath and found to have AHRF secondary to COVID

## 2023-09-26 NOTE — H&P ADULT - NSHPPHYSICALEXAM_GEN_ALL_CORE
VITAL SIGNS:  T(C): 36.7 (09-26-23 @ 11:30), Max: 39.4 (09-26-23 @ 05:10)  T(F): 98 (09-26-23 @ 11:30), Max: 103 (09-26-23 @ 05:10)  HR: 77 (09-26-23 @ 11:30) (77 - 103)  BP: 101/61 (09-26-23 @ 11:30) (72/42 - 164/75)  BP(mean): 81 (09-26-23 @ 06:18) (68 - 81)  RR: 17 (09-26-23 @ 11:30) (15 - 40)  SpO2: 95% (09-26-23 @ 11:30) (84% - 99%)    PHYSICAL EXAM:  Constitutional: Frail, elderly   Head: NC/AT  Eyes: PERRL, EOMI, anicteric sclera  ENT: no nasal discharge; uvula midline, no oropharyngeal erythema or exudates; MMM  Neck: supple; no JVD or thyromegaly  Respiratory: Fine bilateral crackles   Cardiac: +S1/S2; RRR; no M/R/G; PMI non-displaced  Gastrointestinal: abdomen soft, NT/ND; no rebound or guarding; +BSx4  Back: spine midline, no bony tenderness or step-offs; no CVAT B/L  Extremities: WWP, no clubbing or cyanosis; no peripheral edema  Musculoskeletal: NROM x4; no joint swelling, tenderness or erythema  Vascular: 2+ radial, femoral, DP/PT pulses B/L  Dermatologic: skin warm, dry and intact; no rashes, wounds, or scars  Lymphatic: no submandibular or cervical LAD  Neurologic: CN2-12 intact, somnolent   Psychiatric: affect and characteristics of appearance, verbalizations, behaviors are appropriate Ivonne aJng MS RDN CDN #[phone]/yes

## 2023-09-26 NOTE — H&P ADULT - CONVERSATION DETAILS
Discussed goals of care given patient is with respiratory failure secondary to COVID infection and in setting of multiple chronic pulmonary issues (COPD, pulmonary sarcoidosis). Patient also somnolent compared to baseline.  Daughter Sandra reports that patients other daughter is HCP, and is flying to [place] from [place], and she will need to discuss goals of care with family. Sandra reports that she likely would not want patient intubated due to preexisting pulmonary pathology, however needs to formalize discussion with family prior to MOLST.

## 2023-09-26 NOTE — H&P ADULT - HISTORY OF PRESENT ILLNESS
88F PMH CAD, pulmonary sarcoidosis, COPD (not on home O2), HTN, presents with shortness of breath for several days and found to have acute hypoxic respiratory failure secondary to COVID. Patient is fully vaccinated but not boosted for COVID. Symptoms began on day before admission, and have progressed to the time of admission. EMS was called and found patient to be hypoxic, for which she was brought to the hospital.   In ED, labs notable for mild anemia. Troponin T downtrending and likely in setting of demand ischemia. CXR clear. CT PE without VTE. COVID swab positive. Patient now admitted to hospital for further evaluation and treatment.

## 2023-09-26 NOTE — H&P ADULT - NSICDXPASTMEDICALHX_GEN_ALL_CORE_FT
Addended by: CELY AYOUB on: 1/5/2023 11:28 AM     Modules accepted: Orders     PAST MEDICAL HISTORY:  CAD (coronary artery disease)     HTN (hypertension)     Hypothyroidism     Osteoporosis

## 2023-09-26 NOTE — ED PROVIDER NOTE - OBJECTIVE STATEMENT
Patient is a 88-year-old female past medical history CAD, loop recorder, sarcoidosis pulmonary, hypertension, COPD presenting for shortness of breath.  Patient and family at bedside states that while at [facility] she had been elevated up with sick for the past several days, yesterday started feeling like she had a cold runny nose and cough, no measured for subjective fevers, no shortness of breath yesterday.  Today started experiencing worsening shortness of breath and difficulty breathing, feeling that it is difficult to catch her breath and that she is wheezing more.  Found by EMS to have O2 sat while on room air in the low 80s, high 70s, given an albuterol treatment and placed on nonrebreather and brought to the ED.  Having ongoing symptoms currently including shortness of breath, wheezing, runny nose.  No chest pain, pleurisy, leg pain or complaints of swelling.  No recent travel, no changes in medications. No O2 requirement at baseline.

## 2023-09-26 NOTE — PATIENT PROFILE ADULT - NSPROMEDSADMININFO_GEN_A_NUR
Consent: The patient's consent was obtained including but not limited to risks of crusting, scabbing, blistering, scarring, darker or lighter pigmentary change, recurrence, incomplete removal and infection.
Render Note In Bullet Format When Appropriate: No
Detail Level: Detailed
Number Of Freeze-Thaw Cycles: 2 freeze-thaw cycles
Post-Care Instructions: I reviewed with the patient in detail post-care instructions. Patient is to wear sunprotection, and avoid picking at any of the treated lesions. Pt may apply Vaseline to crusted or scabbing areas.
Duration Of Freeze Thaw-Cycle (Seconds): 3
no concerns

## 2023-09-26 NOTE — ED PROVIDER NOTE - PROGRESS NOTE DETAILS
RAMOS Blandon PGY-2: At this time reassessed again by me due to hypoxemia being seen on nursing monitor outside room.   Satting 88%, tachypneic to 22.  No chest pain.  Patient subjectively not feeling short of breath.  No wheezing noted.  Lung sounds bilaterally.  Slight clavicular retraction.  Hung 500 normal saline bolus, BiPAP will be initiated.  Will reassess for MICU consult  if no improvement versus floor. RAMOS Blandon PGY-2: Patient was not placed on BiPAP, 4 L/min.  Work of breathing unchanged, patient mentating and without chest pain.  Patient saturation 94% for me in the room.  Discussed with the hospitalist, will take the patient as she is .

## 2023-09-27 DIAGNOSIS — R21 RASH AND OTHER NONSPECIFIC SKIN ERUPTION: ICD-10-CM

## 2023-09-27 LAB
A1C WITH ESTIMATED AVERAGE GLUCOSE RESULT: 6 % — HIGH (ref 4–5.6)
ALBUMIN SERPL ELPH-MCNC: 3.3 G/DL — SIGNIFICANT CHANGE UP (ref 3.3–5)
ALP SERPL-CCNC: 134 U/L — HIGH (ref 40–120)
ALT FLD-CCNC: 15 U/L — SIGNIFICANT CHANGE UP (ref 10–45)
ANION GAP SERPL CALC-SCNC: 13 MMOL/L — SIGNIFICANT CHANGE UP (ref 5–17)
APTT BLD: 26.6 SEC — SIGNIFICANT CHANGE UP (ref 24.5–35.6)
AST SERPL-CCNC: 22 U/L — SIGNIFICANT CHANGE UP (ref 10–40)
BILIRUB SERPL-MCNC: 0.2 MG/DL — SIGNIFICANT CHANGE UP (ref 0.2–1.2)
BUN SERPL-MCNC: 32 MG/DL — HIGH (ref 7–23)
CALCIUM SERPL-MCNC: 8.2 MG/DL — LOW (ref 8.4–10.5)
CHLORIDE SERPL-SCNC: 105 MMOL/L — SIGNIFICANT CHANGE UP (ref 96–108)
CO2 SERPL-SCNC: 22 MMOL/L — SIGNIFICANT CHANGE UP (ref 22–31)
CREAT SERPL-MCNC: 0.85 MG/DL — SIGNIFICANT CHANGE UP (ref 0.5–1.3)
D DIMER BLD IA.RAPID-MCNC: 461 NG/ML DDU — HIGH
EGFR: 66 ML/MIN/1.73M2 — SIGNIFICANT CHANGE UP
ESTIMATED AVERAGE GLUCOSE: 126 MG/DL — HIGH (ref 68–114)
FERRITIN SERPL-MCNC: 127 NG/ML — SIGNIFICANT CHANGE UP (ref 13–330)
GLUCOSE SERPL-MCNC: 107 MG/DL — HIGH (ref 70–99)
HCT VFR BLD CALC: 30.8 % — LOW (ref 34.5–45)
HGB BLD-MCNC: 9.9 G/DL — LOW (ref 11.5–15.5)
INR BLD: 1.12 RATIO — SIGNIFICANT CHANGE UP (ref 0.85–1.18)
MCHC RBC-ENTMCNC: 29.7 PG — SIGNIFICANT CHANGE UP (ref 27–34)
MCHC RBC-ENTMCNC: 32.1 GM/DL — SIGNIFICANT CHANGE UP (ref 32–36)
MCV RBC AUTO: 92.5 FL — SIGNIFICANT CHANGE UP (ref 80–100)
NRBC # BLD: 0 /100 WBCS — SIGNIFICANT CHANGE UP (ref 0–0)
PLATELET # BLD AUTO: 154 K/UL — SIGNIFICANT CHANGE UP (ref 150–400)
POTASSIUM SERPL-MCNC: 3.4 MMOL/L — LOW (ref 3.5–5.3)
POTASSIUM SERPL-SCNC: 3.4 MMOL/L — LOW (ref 3.5–5.3)
PROT SERPL-MCNC: 5.8 G/DL — LOW (ref 6–8.3)
PROTHROM AB SERPL-ACNC: 11.7 SEC — SIGNIFICANT CHANGE UP (ref 9.5–13)
RBC # BLD: 3.33 M/UL — LOW (ref 3.8–5.2)
RBC # FLD: 15.3 % — HIGH (ref 10.3–14.5)
SODIUM SERPL-SCNC: 140 MMOL/L — SIGNIFICANT CHANGE UP (ref 135–145)
TSH SERPL-MCNC: 1.32 UIU/ML — SIGNIFICANT CHANGE UP (ref 0.27–4.2)
WBC # BLD: 11.11 K/UL — HIGH (ref 3.8–10.5)
WBC # FLD AUTO: 11.11 K/UL — HIGH (ref 3.8–10.5)

## 2023-09-27 PROCEDURE — 99232 SBSQ HOSP IP/OBS MODERATE 35: CPT

## 2023-09-27 PROCEDURE — 99233 SBSQ HOSP IP/OBS HIGH 50: CPT

## 2023-09-27 PROCEDURE — 93970 EXTREMITY STUDY: CPT | Mod: 26

## 2023-09-27 RX ORDER — POTASSIUM CHLORIDE 20 MEQ
40 PACKET (EA) ORAL ONCE
Refills: 0 | Status: COMPLETED | OUTPATIENT
Start: 2023-09-27 | End: 2023-09-27

## 2023-09-27 RX ORDER — HYDROXYZINE HCL 10 MG
25 TABLET ORAL
Refills: 0 | Status: DISCONTINUED | OUTPATIENT
Start: 2023-09-27 | End: 2023-10-06

## 2023-09-27 RX ORDER — HYDROCORTISONE 1 %
1 OINTMENT (GRAM) TOPICAL
Refills: 0 | Status: DISCONTINUED | OUTPATIENT
Start: 2023-09-27 | End: 2023-09-29

## 2023-09-27 RX ORDER — DIPHENHYDRAMINE HCL 50 MG
25 CAPSULE ORAL ONCE
Refills: 0 | Status: COMPLETED | OUTPATIENT
Start: 2023-09-27 | End: 2023-09-27

## 2023-09-27 RX ORDER — ENOXAPARIN SODIUM 100 MG/ML
40 INJECTION SUBCUTANEOUS EVERY 24 HOURS
Refills: 0 | Status: DISCONTINUED | OUTPATIENT
Start: 2023-09-27 | End: 2023-10-06

## 2023-09-27 RX ADMIN — CYCLOPENTOLATE HYDROCHLORIDE 1 DROP(S): 10 SOLUTION/ DROPS OPHTHALMIC at 17:15

## 2023-09-27 RX ADMIN — Medication 6 MILLIGRAM(S): at 05:42

## 2023-09-27 RX ADMIN — Medication 3 MILLIGRAM(S): at 21:55

## 2023-09-27 RX ADMIN — Medication 20 MILLIGRAM(S): at 11:59

## 2023-09-27 RX ADMIN — REMDESIVIR 200 MILLIGRAM(S): 5 INJECTION INTRAVENOUS at 21:54

## 2023-09-27 RX ADMIN — Medication 1 DROP(S): at 05:42

## 2023-09-27 RX ADMIN — Medication 25 MILLIGRAM(S): at 11:59

## 2023-09-27 RX ADMIN — ATORVASTATIN CALCIUM 20 MILLIGRAM(S): 80 TABLET, FILM COATED ORAL at 21:54

## 2023-09-27 RX ADMIN — Medication 81 MILLIGRAM(S): at 11:59

## 2023-09-27 RX ADMIN — ALBUTEROL 2.5 MILLIGRAM(S): 90 AEROSOL, METERED ORAL at 22:24

## 2023-09-27 RX ADMIN — ALBUTEROL 2.5 MILLIGRAM(S): 90 AEROSOL, METERED ORAL at 17:14

## 2023-09-27 RX ADMIN — Medication 650 MILLIGRAM(S): at 22:25

## 2023-09-27 RX ADMIN — Medication 1 DROP(S): at 17:14

## 2023-09-27 RX ADMIN — Medication 1 TABLET(S): at 05:42

## 2023-09-27 RX ADMIN — Medication 325 MILLIGRAM(S): at 11:59

## 2023-09-27 RX ADMIN — ALBUTEROL 2.5 MILLIGRAM(S): 90 AEROSOL, METERED ORAL at 05:43

## 2023-09-27 RX ADMIN — CYCLOPENTOLATE HYDROCHLORIDE 1 DROP(S): 10 SOLUTION/ DROPS OPHTHALMIC at 05:43

## 2023-09-27 RX ADMIN — ALBUTEROL 2.5 MILLIGRAM(S): 90 AEROSOL, METERED ORAL at 00:49

## 2023-09-27 RX ADMIN — Medication 40 MILLIEQUIVALENT(S): at 14:07

## 2023-09-27 RX ADMIN — MIRTAZAPINE 7.5 MILLIGRAM(S): 45 TABLET, ORALLY DISINTEGRATING ORAL at 21:54

## 2023-09-27 RX ADMIN — CALAMINE AND ZINC OXIDE AND PHENOL 1 APPLICATION(S): 160; 10 LOTION TOPICAL at 21:55

## 2023-09-27 RX ADMIN — ALBUTEROL 2.5 MILLIGRAM(S): 90 AEROSOL, METERED ORAL at 12:00

## 2023-09-27 RX ADMIN — Medication 650 MILLIGRAM(S): at 21:55

## 2023-09-27 RX ADMIN — Medication 25 MILLIGRAM(S): at 01:23

## 2023-09-27 RX ADMIN — Medication 25 MILLIGRAM(S): at 17:14

## 2023-09-27 RX ADMIN — Medication 1 APPLICATION(S): at 17:15

## 2023-09-27 NOTE — PROGRESS NOTE ADULT - PROBLEM SELECTOR PLAN 1
Informed patient about medications. Add coreg once daily and discontinue norvasc    Patient understood    In setting of COVID-19 infection, complicated by underlying COPD, pulmonary sarcoidosis.   Patient respiratory status improved this AM, normal respiratory effort, alert, oriented x1, per daughter at her baseline menstal status   - 5 day course of remdesavir to run 9/26 - 9/30  - Started on dexamethasone 6 mg PO Qd  - s/p ID eval, rec hold antibiotics due to history of C diff  - BCX x2 NGTD   - reviewed CT angio chest , no PE  - D-dimer 652--> 461, borderline 2 times ULN, considering patient frailty (44kg) , hx of hematuria, negative CT angio, weighing risk vs. benefit, will c/w prophylactic lovenox 40mg subQ qd  - f/u [facility] duplex to r/o lower extremity DVT   -chest PT, IS, albuterol q6h

## 2023-09-27 NOTE — PROGRESS NOTE ADULT - SUBJECTIVE AND OBJECTIVE BOX
PROGRESS NOTE:     Patient is a 88y old  Female who presents with a chief complaint of AHRF (27 Sep 2023 08:29)      SUBJECTIVE / OVERNIGHT EVENTS:  Patient seen and evaluated at bedside. Patient denies chest pain/SOB, reports itchiness on her chest and her skull.     ADDITIONAL REVIEW OF SYSTEMS:    MEDICATIONS  (STANDING):  albuterol    0.083% 2.5 milliGRAM(s) Nebulizer every 6 hours  aspirin enteric coated 81 milliGRAM(s) Oral daily  atorvastatin 20 milliGRAM(s) Oral at bedtime  calamine/zinc oxide Lotion 1 Application(s) Topical once  cyclopentolate 1% Solution 1 Drop(s) Both EYES two times a day  dexAMETHasone     Tablet 6 milliGRAM(s) Oral daily  enoxaparin Injectable 40 milliGRAM(s) SubCutaneous every 24 hours  ferrous    sulfate 325 milliGRAM(s) Oral daily  FLUoxetine 20 milliGRAM(s) Oral daily  influenza  Vaccine (HIGH DOSE) 0.7 milliLiter(s) IntraMuscular once  mirtazapine 7.5 milliGRAM(s) Oral at bedtime  prednisoLONE acetate 1% Suspension 1 Drop(s) Both EYES two times a day  remdesivir  IVPB 100 milliGRAM(s) IV Intermittent every 24 hours  remdesivir  IVPB   IV Intermittent   triamterene 37.5 mG/hydrochlorothiazide 25 mG Tablet 1 Tablet(s) Oral daily    MEDICATIONS  (PRN):  acetaminophen     Tablet .. 650 milliGRAM(s) Oral every 6 hours PRN Temp greater or equal to 38C (100.4F), Mild Pain (1 - 3)  melatonin 3 milliGRAM(s) Oral at bedtime PRN Insomnia      CAPILLARY BLOOD GLUCOSE        I&O's Summary    26 Sep 2023 07:01  -  27 Sep 2023 07:00  --------------------------------------------------------  IN: 240 mL / OUT: 0 mL / NET: 240 mL        PHYSICAL EXAM:  Vital Signs Last 24 Hrs  T(C): 36.9 (27 Sep 2023 04:53), Max: 36.9 (27 Sep 2023 04:53)  T(F): 98.5 (27 Sep 2023 04:53), Max: 98.5 (27 Sep 2023 04:53)  HR: 85 (27 Sep 2023 04:53) (77 - 85)  BP: 105/73 (27 Sep 2023 04:53) (101/61 - 105/73)  BP(mean): --  RR: 18 (27 Sep 2023 04:53) (17 - 18)  SpO2: 96% (27 Sep 2023 04:53) (95% - 97%)    Parameters below as of 27 Sep 2023 04:53  Patient On (Oxygen Delivery Method): nasal cannula  O2 Flow (L/min): 3      CONSTITUTIONAL: NAD, frail appearance   HEENT: left eye blindness  RESPIRATORY: Normal respiratory effort; no wheezes/crackles on auscultation   CARDIOVASCULAR: Regular rate and rhythm, no murmur/rub/gallop  ABDOMEN: Nontender to palpation, normoactive bowel sounds, no rebound/guarding  EXTREMITIES: RLE larger in size than LLE (per daughter this is chronic) , + pitting edema   SKIN: R chest erythema + itchy ; no ocular or oral lesion;  PSYCH: A+O to person only     LABS:                        9.9    11.11 )-----------( 154      ( 27 Sep 2023 06:44 )             30.8     09-27    140  |  105  |  32<H>  ----------------------------<  107<H>  3.4<L>   |  22  |  0.85    Ca    8.2<L>      27 Sep 2023 06:44    TPro  5.8<L>  /  Alb  3.3  /  TBili  0.2  /  DBili  x   /  AST  22  /  ALT  15  /  AlkPhos  134<H>  09-27    PT/INR - ( 27 Sep 2023 06:44 )   PT: 11.7 sec;   INR: 1.12 ratio         PTT - ( 27 Sep 2023 06:44 )  PTT:26.6 sec      Urinalysis Basic - ( 27 Sep 2023 06:44 )    Color: x / Appearance: x / SG: x / pH: x  Gluc: 107 mg/dL / Ketone: x  / Bili: x / Urobili: x   Blood: x / Protein: x / Nitrite: x   Leuk Esterase: x / RBC: x / WBC x   Sq Epi: x / Non Sq Epi: x / Bacteria: x        Culture - Blood (collected 26 Sep 2023 05:25)  Source: .Blood Blood  Preliminary Report (27 Sep 2023 09:02):    No growth at 24 hours    Culture - Blood (collected 26 Sep 2023 05:15)  Source: .Blood Blood  Preliminary Report (27 Sep 2023 09:02):    No growth at 24 hours        RADIOLOGY & ADDITIONAL TESTS:  < from: CT Angio Chest PE Protocol w/ IV Cont (09.26.23 @ 08:32) >    FINDINGS:    LUNGS AND AIRWAYS: Central airways with mild amount of secretions.  Upper   lobe predominant centrilobular emphysema. Mild dependent and compressive   atelectasis in the lower lobes. Elevated right hemidiaphragm.  PLEURA: No pleural effusion.  MEDIASTINUM AND RASHID: No lymphadenopathy.  VESSELS: Calcification great vessels. Enlargement of the pulmonary trunk.   No pulmonary embolism.  HEART: Cardiomegaly No pericardial effusion.  CHEST WALL AND LOWER NECK: Within normal limits.  VISUALIZED UPPER ABDOMEN: Within normal limits.  BONES: Severe bilateral glenohumeral arthropathy.      IMPRESSION:    No pulmonary embolism.    Dilated pulmonary artery, suggestive of pulmonary hypertension.    Elevated right hemidiaphragm, also present on earliest available prior,   3/22/2023 chest x-ray.    --- End of Report ---      < end of copied text >      COORDINATION OF CARE:  Care Discussed with Consultants/Other Providers [Y/N]:  Prior or Outpatient Records Reviewed [Y/N]:

## 2023-09-27 NOTE — PROGRESS NOTE ADULT - TIME BILLING
chart review (current admission labs/images/notes), interviewing and examining patient, discussion with family, consultants, ACPs, placing orders, and writing notes.

## 2023-09-27 NOTE — PROGRESS NOTE ADULT - PROBLEM SELECTOR PLAN 7
-chest rash since yesterday, itchy, no ocular, no oral involvement  - CBC no eosinophilia, CMP unremarkable   - exam consistent with eczema   - will give emollient cream, and topical steroid cream -chest rash since yesterday, itchy, no ocular, no oral involvement  - CBC no eosinophilia, CMP unremarkable   - exam consistent with eczema   - will give topical steroid cream

## 2023-09-27 NOTE — PROGRESS NOTE ADULT - SUBJECTIVE AND OBJECTIVE BOX
infectious diseases progress note:    Patient is a 88y old  Female who presents with a chief complaint of AHRF (26 Sep 2023 12:27)        Infection due to severe acute respiratory syndrome coronavirus 2 (SARS-CoV-2)               Allergies    No Known Allergies    Intolerances        ANTIBIOTICS/RELEVANT:  antimicrobials  remdesivir  IVPB 100 milliGRAM(s) IV Intermittent every 24 hours  remdesivir  IVPB   IV Intermittent     immunologic:  influenza  Vaccine (HIGH DOSE) 0.7 milliLiter(s) IntraMuscular once    OTHER:  acetaminophen     Tablet .. 650 milliGRAM(s) Oral every 6 hours PRN  albuterol    0.083% 2.5 milliGRAM(s) Nebulizer every 6 hours  aluminum hydroxide/magnesium hydroxide/simethicone Suspension 30 milliLiter(s) Oral every 4 hours PRN  aspirin enteric coated 81 milliGRAM(s) Oral daily  atorvastatin 20 milliGRAM(s) Oral at bedtime  calamine/zinc oxide Lotion 1 Application(s) Topical once  cyclopentolate 1% Solution 1 Drop(s) Both EYES two times a day  dexAMETHasone     Tablet 6 milliGRAM(s) Oral daily  ferrous    sulfate 325 milliGRAM(s) Oral daily  FLUoxetine 20 milliGRAM(s) Oral daily  melatonin 3 milliGRAM(s) Oral at bedtime PRN  mirtazapine 7.5 milliGRAM(s) Oral at bedtime  ondansetron Injectable 4 milliGRAM(s) IV Push every 8 hours PRN  prednisoLONE acetate 1% Suspension 1 Drop(s) Both EYES two times a day  triamterene 37.5 mG/hydrochlorothiazide 25 mG Tablet 1 Tablet(s) Oral daily      Objective:  Vital Signs Last 24 Hrs  T(C): 36.9 (27 Sep 2023 04:53), Max: 36.9 (27 Sep 2023 04:53)  T(F): 98.5 (27 Sep 2023 04:53), Max: 98.5 (27 Sep 2023 04:53)  HR: 85 (27 Sep 2023 04:53) (77 - 103)  BP: 105/73 (27 Sep 2023 04:53) (72/42 - 105/73)  BP(mean): --  RR: 18 (27 Sep 2023 04:53) (17 - 40)  SpO2: 96% (27 Sep 2023 04:53) (84% - 99%)    Parameters below as of 27 Sep 2023 04:53  Patient On (Oxygen Delivery Method): nasal cannula  O2 Flow (L/min): 3       Eyes:JIM, EOMI  Ear/Nose/Throat: no oral lesion, no sinus tenderness on percussion	  Neck:no JVD, no lymphadenopathy, supple  Respiratory: CTA moises  Cardiovascular: S1S2 RRR, no murmurs  Gastrointestinal:soft, (+) BS, no HSM  Extremities:no e/e/c        LABS:                        9.9    11.11 )-----------( 154      ( 27 Sep 2023 06:44 )             30.8     09-27    140  |  105  |  32<H>  ----------------------------<  107<H>  3.4<L>   |  22  |  0.85    Ca    8.2<L>      27 Sep 2023 06:44    TPro  5.8<L>  /  Alb  3.3  /  TBili  0.2  /  DBili  x   /  AST  22  /  ALT  15  /  AlkPhos  134<H>  09-27    PT/INR - ( 27 Sep 2023 06:44 )   PT: 11.7 sec;   INR: 1.12 ratio         PTT - ( 27 Sep 2023 06:44 )  PTT:26.6 sec  Urinalysis Basic - ( 27 Sep 2023 06:44 )    Color: x / Appearance: x / SG: x / pH: x  Gluc: 107 mg/dL / Ketone: x  / Bili: x / Urobili: x   Blood: x / Protein: x / Nitrite: x   Leuk Esterase: x / RBC: x / WBC x   Sq Epi: x / Non Sq Epi: x / Bacteria: x          MICROBIOLOGY:    RECENT CULTURES:        RESPIRATORY CULTURES:              RADIOLOGY & ADDITIONAL STUDIES:        Pager [phone]  After 5 pm/weekends or if no response :[phone]

## 2023-09-28 LAB
ALBUMIN SERPL ELPH-MCNC: 3.6 G/DL — SIGNIFICANT CHANGE UP (ref 3.3–5)
ALP SERPL-CCNC: 142 U/L — HIGH (ref 40–120)
ALT FLD-CCNC: 21 U/L — SIGNIFICANT CHANGE UP (ref 10–45)
ANION GAP SERPL CALC-SCNC: 14 MMOL/L — SIGNIFICANT CHANGE UP (ref 5–17)
AST SERPL-CCNC: 29 U/L — SIGNIFICANT CHANGE UP (ref 10–40)
BILIRUB SERPL-MCNC: 0.2 MG/DL — SIGNIFICANT CHANGE UP (ref 0.2–1.2)
BUN SERPL-MCNC: 33 MG/DL — HIGH (ref 7–23)
CALCIUM SERPL-MCNC: 8.5 MG/DL — SIGNIFICANT CHANGE UP (ref 8.4–10.5)
CHLORIDE SERPL-SCNC: 107 MMOL/L — SIGNIFICANT CHANGE UP (ref 96–108)
CO2 SERPL-SCNC: 23 MMOL/L — SIGNIFICANT CHANGE UP (ref 22–31)
CREAT SERPL-MCNC: 0.83 MG/DL — SIGNIFICANT CHANGE UP (ref 0.5–1.3)
CULTURE RESULTS: SIGNIFICANT CHANGE UP
EGFR: 68 ML/MIN/1.73M2 — SIGNIFICANT CHANGE UP
GLUCOSE SERPL-MCNC: 93 MG/DL — SIGNIFICANT CHANGE UP (ref 70–99)
HCT VFR BLD CALC: 32.6 % — LOW (ref 34.5–45)
HGB BLD-MCNC: 10.3 G/DL — LOW (ref 11.5–15.5)
MCHC RBC-ENTMCNC: 29.5 PG — SIGNIFICANT CHANGE UP (ref 27–34)
MCHC RBC-ENTMCNC: 31.6 GM/DL — LOW (ref 32–36)
MCV RBC AUTO: 93.4 FL — SIGNIFICANT CHANGE UP (ref 80–100)
NRBC # BLD: 0 /100 WBCS — SIGNIFICANT CHANGE UP (ref 0–0)
PLATELET # BLD AUTO: 161 K/UL — SIGNIFICANT CHANGE UP (ref 150–400)
POTASSIUM SERPL-MCNC: 4.1 MMOL/L — SIGNIFICANT CHANGE UP (ref 3.5–5.3)
POTASSIUM SERPL-SCNC: 4.1 MMOL/L — SIGNIFICANT CHANGE UP (ref 3.5–5.3)
PROT SERPL-MCNC: 6.2 G/DL — SIGNIFICANT CHANGE UP (ref 6–8.3)
RBC # BLD: 3.49 M/UL — LOW (ref 3.8–5.2)
RBC # FLD: 15.4 % — HIGH (ref 10.3–14.5)
SODIUM SERPL-SCNC: 144 MMOL/L — SIGNIFICANT CHANGE UP (ref 135–145)
SPECIMEN SOURCE: SIGNIFICANT CHANGE UP
WBC # BLD: 11.98 K/UL — HIGH (ref 3.8–10.5)
WBC # FLD AUTO: 11.98 K/UL — HIGH (ref 3.8–10.5)

## 2023-09-28 PROCEDURE — 99222 1ST HOSP IP/OBS MODERATE 55: CPT | Mod: GC

## 2023-09-28 PROCEDURE — 99232 SBSQ HOSP IP/OBS MODERATE 35: CPT

## 2023-09-28 PROCEDURE — 99233 SBSQ HOSP IP/OBS HIGH 50: CPT

## 2023-09-28 RX ADMIN — REMDESIVIR 200 MILLIGRAM(S): 5 INJECTION INTRAVENOUS at 21:17

## 2023-09-28 RX ADMIN — Medication 1 APPLICATION(S): at 17:32

## 2023-09-28 RX ADMIN — Medication 1 APPLICATION(S): at 05:39

## 2023-09-28 RX ADMIN — Medication 3 MILLIGRAM(S): at 21:25

## 2023-09-28 RX ADMIN — CYCLOPENTOLATE HYDROCHLORIDE 1 DROP(S): 10 SOLUTION/ DROPS OPHTHALMIC at 05:40

## 2023-09-28 RX ADMIN — Medication 25 MILLIGRAM(S): at 05:40

## 2023-09-28 RX ADMIN — Medication 1 DROP(S): at 17:32

## 2023-09-28 RX ADMIN — Medication 6 MILLIGRAM(S): at 05:39

## 2023-09-28 RX ADMIN — Medication 81 MILLIGRAM(S): at 12:35

## 2023-09-28 RX ADMIN — Medication 325 MILLIGRAM(S): at 12:35

## 2023-09-28 RX ADMIN — CYCLOPENTOLATE HYDROCHLORIDE 1 DROP(S): 10 SOLUTION/ DROPS OPHTHALMIC at 17:32

## 2023-09-28 RX ADMIN — ENOXAPARIN SODIUM 40 MILLIGRAM(S): 100 INJECTION SUBCUTANEOUS at 17:31

## 2023-09-28 RX ADMIN — ALBUTEROL 2.5 MILLIGRAM(S): 90 AEROSOL, METERED ORAL at 23:59

## 2023-09-28 RX ADMIN — ALBUTEROL 2.5 MILLIGRAM(S): 90 AEROSOL, METERED ORAL at 05:39

## 2023-09-28 RX ADMIN — Medication 1 DROP(S): at 05:40

## 2023-09-28 RX ADMIN — MIRTAZAPINE 7.5 MILLIGRAM(S): 45 TABLET, ORALLY DISINTEGRATING ORAL at 21:17

## 2023-09-28 RX ADMIN — Medication 20 MILLIGRAM(S): at 12:35

## 2023-09-28 RX ADMIN — Medication 25 MILLIGRAM(S): at 17:30

## 2023-09-28 RX ADMIN — Medication 1 TABLET(S): at 05:40

## 2023-09-28 RX ADMIN — ALBUTEROL 2.5 MILLIGRAM(S): 90 AEROSOL, METERED ORAL at 17:31

## 2023-09-28 RX ADMIN — ATORVASTATIN CALCIUM 20 MILLIGRAM(S): 80 TABLET, FILM COATED ORAL at 21:17

## 2023-09-28 RX ADMIN — ALBUTEROL 2.5 MILLIGRAM(S): 90 AEROSOL, METERED ORAL at 12:36

## 2023-09-28 NOTE — CONSULT NOTE ADULT - SUBJECTIVE AND OBJECTIVE BOX
HPI:  88F PMH CAD, pulmonary sarcoidosis, COPD (not on home O2), HTN, presents with shortness of breath for several days and found to have acute hypoxic respiratory failure secondary to COVID. Patient is fully vaccinated but not boosted for COVID. Symptoms began on day before admission, and have progressed to the time of admission. EMS was called and found patient to be hypoxic, for which she was brought to the hospital.   In ED, labs notable for mild anemia. Troponin T downtrending and likely in setting of demand ischemia. CXR clear. CT PE without VTE. COVID swab positive. Patient now admitted to hospital for further evaluation and treatment. - on remdesivir (26 Sep 2023 12:27)    Dermatology consulted for itch on the central chest for the past 2 days.     PAST MEDICAL & SURGICAL HISTORY:  HTN (hypertension)      Hypothyroidism      Osteoporosis      CAD (coronary artery disease)          REVIEW OF SYSTEMS      General: no fevers/chills, no lethary	    Skin/Breast: see HPI  	  Ophthalmologic: no eye pain or change in vision  	  ENMT: no dysphagia or change in hearing    Respiratory and Thorax: no SOB or cough  	  Cardiovascular: no palpitations or chest pain    Gastrointestinal: no abdomenal pain or blood in stool     Genitourinary: no dysuria or frequency    Musculoskeletal: no joint pains or weakness	    Neurological:no weakness, numbness , or tingling    MEDICATIONS  (STANDING):  albuterol    0.083% 2.5 milliGRAM(s) Nebulizer every 6 hours  aspirin enteric coated 81 milliGRAM(s) Oral daily  atorvastatin 20 milliGRAM(s) Oral at bedtime  cyclopentolate 1% Solution 1 Drop(s) Both EYES two times a day  dexAMETHasone     Tablet 6 milliGRAM(s) Oral daily  enoxaparin Injectable 40 milliGRAM(s) SubCutaneous every 24 hours  ferrous    sulfate 325 milliGRAM(s) Oral daily  FLUoxetine 20 milliGRAM(s) Oral daily  hydrocortisone 1% Ointment 1 Application(s) Topical two times a day  hydrOXYzine hydrochloride 25 milliGRAM(s) Oral two times a day  influenza  Vaccine (HIGH DOSE) 0.7 milliLiter(s) IntraMuscular once  mirtazapine 7.5 milliGRAM(s) Oral at bedtime  prednisoLONE acetate 1% Suspension 1 Drop(s) Both EYES two times a day  remdesivir  IVPB 100 milliGRAM(s) IV Intermittent every 24 hours  remdesivir  IVPB   IV Intermittent   triamterene 37.5 mG/hydrochlorothiazide 25 mG Tablet 1 Tablet(s) Oral daily    ALLERGIES: No Known Allergies      Social History:  Lives in assisted living facility    FAMILY HISTORY:        VITAL SIGNS LAST 24 HOURS:  T(F): 98.7 (09-28 @ 13:46), Max: 98.9 (09-27 @ 22:28)  HR: 71 (09-28 @ 13:46) (71 - 82)  BP: 166/78 (09-28 @ 13:46) (146/82 - 166/78)  RR: 18 (09-28 @ 13:46) (17 - 19)    ___________________________________  Physical Exam:     The patient was alert and oriented X 3, and in no  apparent distress.  OP showed no ulcerations  There was no visible lymphadenopathy.  Conjunctiva were non injected  There was no clubbing or edema of extremities.  The scalp, hair, face, eyebrows, lips, OP, neck, chest, back,   extremities X 4, nails were examined.  There was no hyperhidrosis or bromhidrosis.    Of note on skin exam:   ____________________________________    LABS:                        10.3   11.98 )-----------( 161      ( 28 Sep 2023 06:56 )             32.6     09-28    144  |  107  |  33<H>  ----------------------------<  93  4.1   |  23  |  0.83    Ca    8.5      28 Sep 2023 06:56    TPro  6.2  /  Alb  3.6  /  TBili  0.2  /  DBili  x   /  AST  29  /  ALT  21  /  AlkPhos  142<H>  09-28    PT/INR - ( 27 Sep 2023 06:44 )   PT: 11.7 sec;   INR: 1.12 ratio         PTT - ( 27 Sep 2023 06:44 )  PTT:26.6 sec  Urinalysis Basic - ( 28 Sep 2023 06:56 )    Color: x / Appearance: x / SG: x / pH: x  Gluc: 93 mg/dL / Ketone: x  / Bili: x / Urobili: x   Blood: x / Protein: x / Nitrite: x   Leuk Esterase: x / RBC: x / WBC x   Sq Epi: x / Non Sq Epi: x / Bacteria: x     HPI:  88F PMH CAD, pulmonary sarcoidosis, COPD (not on home O2), HTN, presents with shortness of breath for several days and found to have acute hypoxic respiratory failure secondary to COVID. Patient is fully vaccinated but not boosted for COVID. Symptoms began on day before admission, and have progressed to the time of admission. EMS was called and found patient to be hypoxic, for which she was brought to the hospital.   In ED, labs notable for mild anemia. Troponin T downtrending and likely in setting of demand ischemia. CXR clear. CT PE without VTE. COVID swab positive. Patient now admitted to hospital for further evaluation and treatment. - on decadron and remdesivir (26 Sep 2023 12:27)    Dermatology consulted for itch on the central chest for the past 2 days.     PAST MEDICAL & SURGICAL HISTORY:  HTN (hypertension)      Hypothyroidism      Osteoporosis      CAD (coronary artery disease)          REVIEW OF SYSTEMS      General: no fevers/chills, no lethary	    Skin/Breast: see HPI  	  Ophthalmologic: no eye pain or change in vision  	  ENMT: no dysphagia or change in hearing    Respiratory and Thorax: no SOB or cough  	  Cardiovascular: no palpitations or chest pain    Gastrointestinal: no abdomenal pain or blood in stool     Genitourinary: no dysuria or frequency    Musculoskeletal: no joint pains or weakness	    Neurological:no weakness, numbness , or tingling    MEDICATIONS  (STANDING):  albuterol    0.083% 2.5 milliGRAM(s) Nebulizer every 6 hours  aspirin enteric coated 81 milliGRAM(s) Oral daily  atorvastatin 20 milliGRAM(s) Oral at bedtime  cyclopentolate 1% Solution 1 Drop(s) Both EYES two times a day  dexAMETHasone     Tablet 6 milliGRAM(s) Oral daily  enoxaparin Injectable 40 milliGRAM(s) SubCutaneous every 24 hours  ferrous    sulfate 325 milliGRAM(s) Oral daily  FLUoxetine 20 milliGRAM(s) Oral daily  hydrocortisone 1% Ointment 1 Application(s) Topical two times a day  hydrOXYzine hydrochloride 25 milliGRAM(s) Oral two times a day  influenza  Vaccine (HIGH DOSE) 0.7 milliLiter(s) IntraMuscular once  mirtazapine 7.5 milliGRAM(s) Oral at bedtime  prednisoLONE acetate 1% Suspension 1 Drop(s) Both EYES two times a day  remdesivir  IVPB 100 milliGRAM(s) IV Intermittent every 24 hours  remdesivir  IVPB   IV Intermittent   triamterene 37.5 mG/hydrochlorothiazide 25 mG Tablet 1 Tablet(s) Oral daily    ALLERGIES: No Known Allergies      Social History:  Lives in assisted living facility    FAMILY HISTORY:        VITAL SIGNS LAST 24 HOURS:  T(F): 98.7 (09-28 @ 13:46), Max: 98.9 (09-27 @ 22:28)  HR: 71 (09-28 @ 13:46) (71 - 82)  BP: 166/78 (09-28 @ 13:46) (146/82 - 166/78)  RR: 18 (09-28 @ 13:46) (17 - 19)    ___________________________________  Physical Exam:     The patient was alert and oriented X 3, and in no  apparent distress.  OP showed no ulcerations  There was no visible lymphadenopathy.  Conjunctiva were non injected  There was no clubbing or edema of extremities.  The scalp, hair, face, eyebrows, lips, OP, neck, chest, back,   extremities X 4, nails were examined.  There was no hyperhidrosis or bromhidrosis.    Of note on skin exam:   ____________________________________    LABS:                        10.3   11.98 )-----------( 161      ( 28 Sep 2023 06:56 )             32.6     09-28    144  |  107  |  33<H>  ----------------------------<  93  4.1   |  23  |  0.83    Ca    8.5      28 Sep 2023 06:56    TPro  6.2  /  Alb  3.6  /  TBili  0.2  /  DBili  x   /  AST  29  /  ALT  21  /  AlkPhos  142<H>  09-28    PT/INR - ( 27 Sep 2023 06:44 )   PT: 11.7 sec;   INR: 1.12 ratio         PTT - ( 27 Sep 2023 06:44 )  PTT:26.6 sec  Urinalysis Basic - ( 28 Sep 2023 06:56 )    Color: x / Appearance: x / SG: x / pH: x  Gluc: 93 mg/dL / Ketone: x  / Bili: x / Urobili: x   Blood: x / Protein: x / Nitrite: x   Leuk Esterase: x / RBC: x / WBC x   Sq Epi: x / Non Sq Epi: x / Bacteria: x     HPI:  88F PMH CAD, pulmonary sarcoidosis, COPD (not on home O2), HTN, presents with shortness of breath for several days and found to have acute hypoxic respiratory failure secondary to COVID. Patient is fully vaccinated but not boosted for COVID. Symptoms began on day before admission, and have progressed to the time of admission. EMS was called and found patient to be hypoxic, for which she was brought to the hospital.   In ED, labs notable for mild anemia. Troponin T downtrending and likely in setting of demand ischemia. CXR clear. CT PE without VTE. COVID swab positive. Patient now admitted to hospital for further evaluation and treatment. - on decadron and remdesivir (26 Sep 2023 12:27)    Dermatology consulted for itch on the central chest for the past 2 days.     PAST MEDICAL & SURGICAL HISTORY:  HTN (hypertension)      Hypothyroidism      Osteoporosis      CAD (coronary artery disease)          REVIEW OF SYSTEMS      General: no fevers/chills, no lethary	    Skin/Breast: see HPI  	  Ophthalmologic: no eye pain or change in vision  	  ENMT: no dysphagia or change in hearing    Respiratory and Thorax: no SOB or cough  	  Cardiovascular: no palpitations or chest pain    Gastrointestinal: no abdomenal pain or blood in stool     Genitourinary: no dysuria or frequency    Musculoskeletal: no joint pains or weakness	    Neurological:no weakness, numbness , or tingling    MEDICATIONS  (STANDING):  albuterol    0.083% 2.5 milliGRAM(s) Nebulizer every 6 hours  aspirin enteric coated 81 milliGRAM(s) Oral daily  atorvastatin 20 milliGRAM(s) Oral at bedtime  cyclopentolate 1% Solution 1 Drop(s) Both EYES two times a day  dexAMETHasone     Tablet 6 milliGRAM(s) Oral daily  enoxaparin Injectable 40 milliGRAM(s) SubCutaneous every 24 hours  ferrous    sulfate 325 milliGRAM(s) Oral daily  FLUoxetine 20 milliGRAM(s) Oral daily  hydrocortisone 1% Ointment 1 Application(s) Topical two times a day  hydrOXYzine hydrochloride 25 milliGRAM(s) Oral two times a day  influenza  Vaccine (HIGH DOSE) 0.7 milliLiter(s) IntraMuscular once  mirtazapine 7.5 milliGRAM(s) Oral at bedtime  prednisoLONE acetate 1% Suspension 1 Drop(s) Both EYES two times a day  remdesivir  IVPB 100 milliGRAM(s) IV Intermittent every 24 hours  remdesivir  IVPB   IV Intermittent   triamterene 37.5 mG/hydrochlorothiazide 25 mG Tablet 1 Tablet(s) Oral daily    ALLERGIES: No Known Allergies      Social History:  Lives in assisted living facility    FAMILY HISTORY:        VITAL SIGNS LAST 24 HOURS:  T(F): 98.7 (09-28 @ 13:46), Max: 98.9 (09-27 @ 22:28)  HR: 71 (09-28 @ 13:46) (71 - 82)  BP: 166/78 (09-28 @ 13:46) (146/82 - 166/78)  RR: 18 (09-28 @ 13:46) (17 - 19)    ___________________________________  Physical Exam:     The patient was alert and oriented X 3, and in no  apparent distress.  OP showed no ulcerations  There was no visible lymphadenopathy.  Conjunctiva were non injected  There was no clubbing or edema of extremities.  The scalp, hair, face, eyebrows, lips, OP, neck, chest, back,   extremities X 4, nails were examined.  There was no hyperhidrosis or bromhidrosis.    Of note on skin exam:   erythema and excoriation over scalp  upper chest with enlarged telangiectasias, otherwise clear   ____________________________________    LABS:                        10.3   11.98 )-----------( 161      ( 28 Sep 2023 06:56 )             32.6     09-28    144  |  107  |  33<H>  ----------------------------<  93  4.1   |  23  |  0.83    Ca    8.5      28 Sep 2023 06:56    TPro  6.2  /  Alb  3.6  /  TBili  0.2  /  DBili  x   /  AST  29  /  ALT  21  /  AlkPhos  142<H>  09-28    PT/INR - ( 27 Sep 2023 06:44 )   PT: 11.7 sec;   INR: 1.12 ratio         PTT - ( 27 Sep 2023 06:44 )  PTT:26.6 sec  Urinalysis Basic - ( 28 Sep 2023 06:56 )    Color: x / Appearance: x / SG: x / pH: x  Gluc: 93 mg/dL / Ketone: x  / Bili: x / Urobili: x   Blood: x / Protein: x / Nitrite: x   Leuk Esterase: x / RBC: x / WBC x   Sq Epi: x / Non Sq Epi: x / Bacteria: x     HPI:  88F PMH CAD, pulmonary sarcoidosis, COPD (not on home O2), HTN, presents with shortness of breath for several days and found to have acute hypoxic respiratory failure secondary to COVID. Patient is fully vaccinated but not boosted for COVID. Symptoms began on day before admission, and have progressed to the time of admission. EMS was called and found patient to be hypoxic, for which she was brought to the hospital.   In ED, labs notable for mild anemia. Troponin T downtrending and likely in setting of demand ischemia. CXR clear. CT PE without VTE. COVID swab positive. Patient now admitted to hospital for further evaluation and treatment. - on decadron and remdesivir (26 Sep 2023 12:27)    Dermatology consulted for itch on the central chest for the past 2 days.     PAST MEDICAL & SURGICAL HISTORY:  HTN (hypertension)      Hypothyroidism      Osteoporosis      CAD (coronary artery disease)          REVIEW OF SYSTEMS      General: no fevers/chills, no lethary	    Skin/Breast: see HPI  	  Ophthalmologic: no eye pain or change in vision  	  ENMT: no dysphagia or change in hearing    Respiratory and Thorax: no SOB or cough  	  Cardiovascular: no palpitations or chest pain    Gastrointestinal: no abdomenal pain or blood in stool     Genitourinary: no dysuria or frequency    Musculoskeletal: no joint pains or weakness	    Neurological:no weakness, numbness , or tingling    MEDICATIONS  (STANDING):  albuterol    0.083% 2.5 milliGRAM(s) Nebulizer every 6 hours  aspirin enteric coated 81 milliGRAM(s) Oral daily  atorvastatin 20 milliGRAM(s) Oral at bedtime  cyclopentolate 1% Solution 1 Drop(s) Both EYES two times a day  dexAMETHasone     Tablet 6 milliGRAM(s) Oral daily  enoxaparin Injectable 40 milliGRAM(s) SubCutaneous every 24 hours  ferrous    sulfate 325 milliGRAM(s) Oral daily  FLUoxetine 20 milliGRAM(s) Oral daily  hydrocortisone 1% Ointment 1 Application(s) Topical two times a day  hydrOXYzine hydrochloride 25 milliGRAM(s) Oral two times a day  influenza  Vaccine (HIGH DOSE) 0.7 milliLiter(s) IntraMuscular once  mirtazapine 7.5 milliGRAM(s) Oral at bedtime  prednisoLONE acetate 1% Suspension 1 Drop(s) Both EYES two times a day  remdesivir  IVPB 100 milliGRAM(s) IV Intermittent every 24 hours  remdesivir  IVPB   IV Intermittent   triamterene 37.5 mG/hydrochlorothiazide 25 mG Tablet 1 Tablet(s) Oral daily    ALLERGIES: No Known Allergies      Social History:  Lives in assisted living facility    FAMILY HISTORY:        VITAL SIGNS LAST 24 HOURS:  T(F): 98.7 (09-28 @ 13:46), Max: 98.9 (09-27 @ 22:28)  HR: 71 (09-28 @ 13:46) (71 - 82)  BP: 166/78 (09-28 @ 13:46) (146/82 - 166/78)  RR: 18 (09-28 @ 13:46) (17 - 19)    ___________________________________  Physical Exam:     The patient was alert and oriented X 3, and in no  apparent distress.  OP showed no ulcerations  There was no visible lymphadenopathy.  Conjunctiva were non injected  There was no clubbing or edema of extremities.  The scalp, hair, face, eyebrows, lips, OP, neck, chest, back,   extremities X 4, nails were examined.  There was no hyperhidrosis or bromhidrosis.    Of note on skin exam:   erythema and excoriations over scalp  upper chest with enlarged telangiectasias, otherwise clear   ____________________________________    LABS:                        10.3   11.98 )-----------( 161      ( 28 Sep 2023 06:56 )             32.6     09-28    144  |  107  |  33<H>  ----------------------------<  93  4.1   |  23  |  0.83    Ca    8.5      28 Sep 2023 06:56    TPro  6.2  /  Alb  3.6  /  TBili  0.2  /  DBili  x   /  AST  29  /  ALT  21  /  AlkPhos  142<H>  09-28    PT/INR - ( 27 Sep 2023 06:44 )   PT: 11.7 sec;   INR: 1.12 ratio         PTT - ( 27 Sep 2023 06:44 )  PTT:26.6 sec  Urinalysis Basic - ( 28 Sep 2023 06:56 )    Color: x / Appearance: x / SG: x / pH: x  Gluc: 93 mg/dL / Ketone: x  / Bili: x / Urobili: x   Blood: x / Protein: x / Nitrite: x   Leuk Esterase: x / RBC: x / WBC x   Sq Epi: x / Non Sq Epi: x / Bacteria: x     HPI:  88F PMH CAD, pulmonary sarcoidosis, COPD (not on home O2), HTN, presents with shortness of breath for several days and found to have acute hypoxic respiratory failure secondary to COVID. Patient is fully vaccinated but not boosted for COVID. Symptoms began on day before admission, and have progressed to the time of admission. EMS was called and found patient to be hypoxic, for which she was brought to the hospital.   In ED, labs notable for mild anemia. Troponin T downtrending and likely in setting of demand ischemia. CXR clear. CT PE without VTE. COVID swab positive. Patient now admitted to hospital for further evaluation and treatment. - on decadron and remdesivir (26 Sep 2023 12:27)    Dermatology consulted for itch on the central chest for the past 2 days.     PAST MEDICAL & SURGICAL HISTORY:  HTN (hypertension)      Hypothyroidism      Osteoporosis      CAD (coronary artery disease)          REVIEW OF SYSTEMS      General: no fevers/chills, no lethary	    Skin/Breast: see HPI  	  Ophthalmologic: no eye pain or change in vision  	  ENMT: no dysphagia or change in hearing    Respiratory and Thorax: no SOB or cough  	  Cardiovascular: no palpitations or chest pain    Gastrointestinal: no abdomenal pain or blood in stool     Genitourinary: no dysuria or frequency    Musculoskeletal: no joint pains or weakness	    Neurological:no weakness, numbness , or tingling    MEDICATIONS  (STANDING):  albuterol    0.083% 2.5 milliGRAM(s) Nebulizer every 6 hours  aspirin enteric coated 81 milliGRAM(s) Oral daily  atorvastatin 20 milliGRAM(s) Oral at bedtime  cyclopentolate 1% Solution 1 Drop(s) Both EYES two times a day  dexAMETHasone     Tablet 6 milliGRAM(s) Oral daily  enoxaparin Injectable 40 milliGRAM(s) SubCutaneous every 24 hours  ferrous    sulfate 325 milliGRAM(s) Oral daily  FLUoxetine 20 milliGRAM(s) Oral daily  hydrocortisone 1% Ointment 1 Application(s) Topical two times a day  hydrOXYzine hydrochloride 25 milliGRAM(s) Oral two times a day  influenza  Vaccine (HIGH DOSE) 0.7 milliLiter(s) IntraMuscular once  mirtazapine 7.5 milliGRAM(s) Oral at bedtime  prednisoLONE acetate 1% Suspension 1 Drop(s) Both EYES two times a day  remdesivir  IVPB 100 milliGRAM(s) IV Intermittent every 24 hours  remdesivir  IVPB   IV Intermittent   triamterene 37.5 mG/hydrochlorothiazide 25 mG Tablet 1 Tablet(s) Oral daily    ALLERGIES: No Known Allergies      Social History:  Lives in assisted living facility    FAMILY HISTORY: no relevant FH in 1st degree relatives        VITAL SIGNS LAST 24 HOURS:  T(F): 98.7 (09-28 @ 13:46), Max: 98.9 (09-27 @ 22:28)  HR: 71 (09-28 @ 13:46) (71 - 82)  BP: 166/78 (09-28 @ 13:46) (146/82 - 166/78)  RR: 18 (09-28 @ 13:46) (17 - 19)    ___________________________________  Physical Exam:     The patient was alert and oriented X 3, and in no  apparent distress.  OP showed no ulcerations  There was no visible lymphadenopathy.  Conjunctiva were non injected  There was no clubbing or edema of extremities.  The scalp, hair, face, eyebrows, lips, OP, neck, chest, back,   extremities X 4, nails were examined.  There was no hyperhidrosis or bromhidrosis.    Of note on skin exam:   erythema and excoriations over scalp  upper chest with enlarged telangiectasias, otherwise clear   ____________________________________    LABS:                        10.3   11.98 )-----------( 161      ( 28 Sep 2023 06:56 )             32.6     09-28    144  |  107  |  33<H>  ----------------------------<  93  4.1   |  23  |  0.83    Ca    8.5      28 Sep 2023 06:56    TPro  6.2  /  Alb  3.6  /  TBili  0.2  /  DBili  x   /  AST  29  /  ALT  21  /  AlkPhos  142<H>  09-28    PT/INR - ( 27 Sep 2023 06:44 )   PT: 11.7 sec;   INR: 1.12 ratio         PTT - ( 27 Sep 2023 06:44 )  PTT:26.6 sec  Urinalysis Basic - ( 28 Sep 2023 06:56 )    Color: x / Appearance: x / SG: x / pH: x  Gluc: 93 mg/dL / Ketone: x  / Bili: x / Urobili: x   Blood: x / Protein: x / Nitrite: x   Leuk Esterase: x / RBC: x / WBC x   Sq Epi: x / Non Sq Epi: x / Bacteria: x

## 2023-09-28 NOTE — OCCUPATIONAL THERAPY INITIAL EVALUATION ADULT - ADL RETRAINING, OT EVAL
Patient will dress upper body with minimal assistance in 4 weeks. Patient will dress lower body with minimal assistance, AE as needed in 4 weeks. Pt will self feed (I) with AE as needed within 4 weeks.

## 2023-09-28 NOTE — PHYSICAL THERAPY INITIAL EVALUATION ADULT - ADDITIONAL COMMENTS
Pt reports she lives alone in a assisted living facility with +elevator. Pt reports she uses rolling walker for ambulation PTA. Pt reports she has a HHA from 11AM-6PM/7 days a wk. Pt owns wheelchair, shower chair and commode. Pt has grab bars in the bathroom.

## 2023-09-28 NOTE — OCCUPATIONAL THERAPY INITIAL EVALUATION ADULT - LIVES WITH, PROFILE
Apt, 0 steps to enter, +elevator. Assist for ADLs. HHA [id number]/week  +Stall shower with grab bars and shower chair. Owns commode and wheelchair. Uses rolling walker for functional mobility/alone

## 2023-09-28 NOTE — PHYSICAL THERAPY INITIAL EVALUATION ADULT - FOLLOWS COMMANDS/ANSWERS QUESTIONS, REHAB EVAL
Patient called back to request for PHYSICAL THERAPY Orders/Referral. Once completed please call patient.    100% of the time

## 2023-09-28 NOTE — PHYSICAL THERAPY INITIAL EVALUATION ADULT - NSPTDISCHREC_GEN_A_CORE
If pt d/c home would require home PT, assist with all mobility/ADLs, & DME: rolling walker (pt owns), 3:1 commode (pt owns), & transport w/c (pt owns) with: anti-tippers, seat belt, swing away leg rests, & removable arm rests./[facility]acute Rehab

## 2023-09-28 NOTE — CONSULT NOTE ADULT - ASSESSMENT
Patient was seen at bedside and staffed in person with the dermatology attending Dr. Astorga   Recommendations were communicated with the primary team.  Please page [phone] for further related questions.    Myrna Greenberg MD  Resident Physician, PGY3  [facility] Dermatology  Pager: [phone]  Office: [phone].  #Pruritus over scalp and upper chest  Scalp with mild erythema and excoriations  No dermatitis appreciated over chest   - START clobetasol 0.05% solution BID to scalp for up to 2 weeks at a time with a 1 week break.   - START triamcinolone 0.1% ointment BID to affected areas on upper chest for up to 2 weeks on, then 1 week off.     Thank you for allowing us to participate in the care of this pt. Dermatology to sign off at this time. Please notify us and re-consult as needed for new or concerning changes to skin exam.     Patient was seen at bedside and staffed in person with the dermatology attending Dr. Astorga   Recommendations were communicated with the primary team.  Please page [phone] for further related questions.    Myrna Greenberg MD  Resident Physician, PGY3  [facility] Dermatology  Pager: [phone]  Office: [phone].  #Pruritus over scalp and upper chest  Scalp with mild erythema and excoriations  No dermatitis appreciated over chest   - START clobetasol 0.05% solution BID to scalp for up to 2 weeks prn itch and then stop  - START triamcinolone 0.1% ointment BID to affected areas on upper chest prn itch for up to 2 weeks and then stop    Thank you for allowing us to participate in the care of this pt. Dermatology to sign off at this time. Please notify us and re-consult as needed for new or concerning changes to skin exam.     Patient was seen at bedside and staffed in person with the dermatology attending Dr. Astorga   Recommendations were communicated with the primary team.  Please page [phone] for further related questions.    Myrna Greenberg MD  Resident Physician, PGY3  [facility] Dermatology  Pager: [phone]  Office: [phone].

## 2023-09-28 NOTE — CONSULT NOTE ADULT - ATTENDING COMMENTS
Patient with pruritus/excoriations on the chest/scalp without rash. Unclear etiology, though there is no obvious inflammatory component. Dysesthesia, which is particularly common on the scalp, may be considered. Recommend trial of triamcinolone for the body and clobetasol for scalp. Dermatology to sign off. Please reconsult with further concerns.    Jadiel Astorga MD, PharmD, MPH  Co-Director, Inpatient Dermatology Consultation Service, [facility] [place]

## 2023-09-28 NOTE — PROGRESS NOTE ADULT - SUBJECTIVE AND OBJECTIVE BOX
infectious diseases progress note:    Patient is a 88y old  Female who presents with a chief complaint of AHRF (27 Sep 2023 10:53)        Infection due to severe acute respiratory syndrome coronavirus 2 (SARS-CoV-2)               Allergies    No Known Allergies    Intolerances        ANTIBIOTICS/RELEVANT:  antimicrobials  remdesivir  IVPB 100 milliGRAM(s) IV Intermittent every 24 hours  remdesivir  IVPB   IV Intermittent     immunologic:  influenza  Vaccine (HIGH DOSE) 0.7 milliLiter(s) IntraMuscular once    OTHER:  acetaminophen     Tablet .. 650 milliGRAM(s) Oral every 6 hours PRN  albuterol    0.083% 2.5 milliGRAM(s) Nebulizer every 6 hours  aspirin enteric coated 81 milliGRAM(s) Oral daily  atorvastatin 20 milliGRAM(s) Oral at bedtime  cyclopentolate 1% Solution 1 Drop(s) Both EYES two times a day  dexAMETHasone     Tablet 6 milliGRAM(s) Oral daily  enoxaparin Injectable 40 milliGRAM(s) SubCutaneous every 24 hours  ferrous    sulfate 325 milliGRAM(s) Oral daily  FLUoxetine 20 milliGRAM(s) Oral daily  hydrocortisone 1% Ointment 1 Application(s) Topical two times a day  hydrOXYzine hydrochloride 25 milliGRAM(s) Oral two times a day  melatonin 3 milliGRAM(s) Oral at bedtime PRN  mirtazapine 7.5 milliGRAM(s) Oral at bedtime  prednisoLONE acetate 1% Suspension 1 Drop(s) Both EYES two times a day  triamterene 37.5 mG/hydrochlorothiazide 25 mG Tablet 1 Tablet(s) Oral daily      Objective:  Vital Signs Last 24 Hrs  T(C): 36.9 (28 Sep 2023 05:17), Max: 37.2 (27 Sep 2023 22:28)  T(F): 98.4 (28 Sep 2023 05:17), Max: 98.9 (27 Sep 2023 22:28)  HR: 76 (28 Sep 2023 05:17) (67 - 80)  BP: 164/84 (28 Sep 2023 05:17) (146/82 - 164/84)  BP(mean): --  RR: 19 (28 Sep 2023 05:17) (17 - 19)  SpO2: 97% (28 Sep 2023 05:17) (97% - 98%)    Parameters below as of 28 Sep 2023 05:17  Patient On (Oxygen Delivery Method): nasal cannula  O2 Flow (L/min): 4         Eyes:JIM, EOMI  Ear/Nose/Throat: no oral lesion, no sinus tenderness on percussion	  Neck:no JVD, no lymphadenopathy, supple  Respiratory: CTA moises  Cardiovascular: S1S2 RRR, no murmurs  Gastrointestinal:soft, (+) BS, no HSM  Extremities:no e/e/c        LABS:                        10.3   11.98 )-----------( 161      ( 28 Sep 2023 06:56 )             32.6     09-28    144  |  107  |  33<H>  ----------------------------<  93  4.1   |  23  |  0.83    Ca    8.5      28 Sep 2023 06:56    TPro  6.2  /  Alb  3.6  /  TBili  0.2  /  DBili  x   /  AST  29  /  ALT  21  /  AlkPhos  142<H>  09-28    PT/INR - ( 27 Sep 2023 06:44 )   PT: 11.7 sec;   INR: 1.12 ratio         PTT - ( 27 Sep 2023 06:44 )  PTT:26.6 sec  Urinalysis Basic - ( 28 Sep 2023 06:56 )    Color: x / Appearance: x / SG: x / pH: x  Gluc: 93 mg/dL / Ketone: x  / Bili: x / Urobili: x   Blood: x / Protein: x / Nitrite: x   Leuk Esterase: x / RBC: x / WBC x   Sq Epi: x / Non Sq Epi: x / Bacteria: x          MICROBIOLOGY:    RECENT CULTURES:  09-26 @ 05:25 .Blood Blood                No growth at 48 Hours    09-26 @ 05:15 .Blood Blood                No growth at 48 Hours          RESPIRATORY CULTURES:              RADIOLOGY & ADDITIONAL STUDIES:        Pager [phone]  After 5 pm/weekends or if no response :[phone]

## 2023-09-28 NOTE — OCCUPATIONAL THERAPY INITIAL EVALUATION ADULT - PERTINENT HX OF CURRENT PROBLEM, REHAB EVAL
88F PMH CAD, pulmonary sarcoidosis, COPD (not on home O2), HTN, presents with shortness of breath for several days and found to have acute hypoxic respiratory failure secondary to COVID. Patient is fully vaccinated but not boosted for COVID. Symptoms began on day before admission, and have progressed to the time of admission. EMS was called and found patient to be hypoxic, for which she was brought to the hospital. In ED, labs notable for mild anemia. Troponin T downtrending and likely in setting of demand ischemia. CXR clear. CT PE without VTE. COVID swab positive. Patient now admitted to hospital for further evaluation and treatment  CT angio chest-No pulmonary embolism. Dilated pulmonary artery, suggestive of pulmonary hypertension.

## 2023-09-28 NOTE — OCCUPATIONAL THERAPY INITIAL EVALUATION ADULT - TRANSFER TRAINING, PT EVAL
Patient will transfer to toilet with DME as needed (I) in 4 weeks. Pt will perform SPT (I) with AD as needed within 4 weeks.

## 2023-09-28 NOTE — PROGRESS NOTE ADULT - PROBLEM SELECTOR PLAN 7
-chest rash since yesterday, itchy, no ocular, no oral involvement  - CBC no eosinophilia, CMP unremarkable   - c/w topical steroid cream  - derm consult, f/u rec

## 2023-09-28 NOTE — OCCUPATIONAL THERAPY INITIAL EVALUATION ADULT - RANGE OF MOTION EXAMINATION, UPPER EXTREMITY
except shoulders, approx 0-60 (hx rotator cuff tears); arthritic changes/bilateral UE Active Assistive ROM was WFL  (within functional limits)

## 2023-09-28 NOTE — PROGRESS NOTE ADULT - PROBLEM SELECTOR PLAN 1
In setting of COVID-19 infection, complicated by underlying COPD, pulmonary sarcoidosis.   Patient respiratory status improved this AM, normal respiratory effort, alert, oriented x1, per daughter at her baseline menstal status   - 5 day course of remdesavir to run 9/26 - 9/30  - Started on dexamethasone 6 mg PO Qd  - s/p ID eval, rec hold antibiotics due to history of C diff  - BCX x2 NGTD   - reviewed CT angio chest , no PE  - D-dimer 652--> 461, borderline 2 times ULN, considering patient frailty (44kg) , hx of hematuria, negative CT angio, weighing risk vs. benefit, will c/w prophylactic lovenox 40mg subQ qd  -  [facility] duplex : NO lower extremity DVT   -chest PT, IS, albuterol q6h

## 2023-09-28 NOTE — PHYSICAL THERAPY INITIAL EVALUATION ADULT - ORIENTATION, REHAB EVAL
Pt states it's 2003 instead of 2023, however able to state correct month./oriented to person, place, time and situation

## 2023-09-28 NOTE — PHYSICAL THERAPY INITIAL EVALUATION ADULT - PERTINENT HX OF CURRENT PROBLEM, REHAB EVAL
Pt is a 88F with PMH CAD, pulmonary sarcoidosis, COPD (not on home O2), HTN, presents with shortness of breath for several days and found to have acute hypoxic respiratory failure secondary to COVID. Patient is fully vaccinated but not boosted for COVID. Symptoms began on day before admission, and have progressed to the time of admission. EMS was called and found patient to be hypoxic, for which she was brought to the hospital. In ED, labs notable for mild anemia. Troponin T downtrending and likely in setting of demand ischemia. CXR 9/26/23 clear. CT PE 9/26/23 without VTE. COVID swab positive. Patient now admitted to hospital for further evaluation and treatment. CT angio chest-No pulmonary embolism. Dilated pulmonary artery, suggestive of pulmonary hypertension. (-) [facility] BLE Vein Scan 9/27/23.

## 2023-09-28 NOTE — PROGRESS NOTE ADULT - SUBJECTIVE AND OBJECTIVE BOX
PROGRESS NOTE:     Patient is a 88y old  Female who presents with a chief complaint of AHRF (28 Sep 2023 09:12)      SUBJECTIVE / OVERNIGHT EVENTS:   Patient seen and evaluated at bedside.  Denies SOB at rest, chest pain,abdominal pain, nausea/vomiting. Continues to report itchy rash on her chest/ back and skull.     ADDITIONAL REVIEW OF SYSTEMS:    MEDICATIONS  (STANDING):  albuterol    0.083% 2.5 milliGRAM(s) Nebulizer every 6 hours  aspirin enteric coated 81 milliGRAM(s) Oral daily  atorvastatin 20 milliGRAM(s) Oral at bedtime  cyclopentolate 1% Solution 1 Drop(s) Both EYES two times a day  dexAMETHasone     Tablet 6 milliGRAM(s) Oral daily  enoxaparin Injectable 40 milliGRAM(s) SubCutaneous every 24 hours  ferrous    sulfate 325 milliGRAM(s) Oral daily  FLUoxetine 20 milliGRAM(s) Oral daily  hydrocortisone 1% Ointment 1 Application(s) Topical two times a day  hydrOXYzine hydrochloride 25 milliGRAM(s) Oral two times a day  influenza  Vaccine (HIGH DOSE) 0.7 milliLiter(s) IntraMuscular once  mirtazapine 7.5 milliGRAM(s) Oral at bedtime  prednisoLONE acetate 1% Suspension 1 Drop(s) Both EYES two times a day  remdesivir  IVPB 100 milliGRAM(s) IV Intermittent every 24 hours  remdesivir  IVPB   IV Intermittent   triamterene 37.5 mG/hydrochlorothiazide 25 mG Tablet 1 Tablet(s) Oral daily    MEDICATIONS  (PRN):  acetaminophen     Tablet .. 650 milliGRAM(s) Oral every 6 hours PRN Temp greater or equal to 38C (100.4F), Mild Pain (1 - 3)  melatonin 3 milliGRAM(s) Oral at bedtime PRN Insomnia      CAPILLARY BLOOD GLUCOSE        I&O's Summary    27 Sep 2023 07:01  -  28 Sep 2023 07:00  --------------------------------------------------------  IN: 490 mL / OUT: 0 mL / NET: 490 mL        PHYSICAL EXAM:  Vital Signs Last 24 Hrs  T(C): 36.9 (28 Sep 2023 05:17), Max: 37.2 (27 Sep 2023 22:28)  T(F): 98.4 (28 Sep 2023 05:17), Max: 98.9 (27 Sep 2023 22:28)  HR: 82 (28 Sep 2023 10:06) (76 - 82)  BP: 155/80 (28 Sep 2023 10:06) (146/82 - 164/84)  BP(mean): --  RR: 19 (28 Sep 2023 05:17) (17 - 19)  SpO2: 93% (28 Sep 2023 10:06) (93% - 97%)    Parameters below as of 28 Sep 2023 10:06  Patient On (Oxygen Delivery Method): nasal cannula  O2 Flow (L/min): 4      CONSTITUTIONAL: NAD  RESPIRATORY: Normal respiratory effort;   CARDIOVASCULAR: Regular rate and rhythm, normal S1 and S2, no murmur; No lower extremity edema  ABDOMEN: Nontender to palpation, normoactive bowel sounds, no rebound/guarding  SKIN: eczematous rash on chest, back, and back of skull , very itchy  PSYCH: alert, oriented to person     LABS:                        10.3   11.98 )-----------( 161      ( 28 Sep 2023 06:56 )             32.6     09-28    144  |  107  |  33<H>  ----------------------------<  93  4.1   |  23  |  0.83    Ca    8.5      28 Sep 2023 06:56    TPro  6.2  /  Alb  3.6  /  TBili  0.2  /  DBili  x   /  AST  29  /  ALT  21  /  AlkPhos  142<H>  09-28    PT/INR - ( 27 Sep 2023 06:44 )   PT: 11.7 sec;   INR: 1.12 ratio         PTT - ( 27 Sep 2023 06:44 )  PTT:26.6 sec      Urinalysis Basic - ( 28 Sep 2023 06:56 )    Color: x / Appearance: x / SG: x / pH: x  Gluc: 93 mg/dL / Ketone: x  / Bili: x / Urobili: x   Blood: x / Protein: x / Nitrite: x   Leuk Esterase: x / RBC: x / WBC x   Sq Epi: x / Non Sq Epi: x / Bacteria: x        Culture - Urine (collected 26 Sep 2023 09:15)  Source: Clean Catch Clean Catch (Midstream)  Final Report (28 Sep 2023 11:26):    <10,000 CFU/mL Normal Urogenital Yadira    Culture - Blood (collected 26 Sep 2023 05:25)  Source: .Blood Blood  Preliminary Report (28 Sep 2023 09:01):    No growth at 48 Hours    Culture - Blood (collected 26 Sep 2023 05:15)  Source: .Blood Blood  Preliminary Report (28 Sep 2023 09:01):    No growth at 48 Hours        RADIOLOGY & ADDITIONAL TESTS:  < from: [facility] Duplex Lower Ext Vein Scan, Bilat (09.27.23 @ 17:56) >  RIGHT:  Normal compressibility of the RIGHT common femoral, femoral and popliteal   veins.  Doppler examination shows normal spontaneous and phasic flow.      LEFT:  Normal compressibility of the LEFT common femoral, femoral and popliteal   veins.  Doppler examination shows normal spontaneous and phasic flow.      IMPRESSION:  No evidence of deep venous thrombosis in either lower extremity.  Calf veins were not studied.  < end of copied text >      COORDINATION OF CARE:  Care Discussed with Consultants/Other Providers [Y/N]:  Prior or Outpatient Records Reviewed [Y/N]:

## 2023-09-29 PROCEDURE — 99233 SBSQ HOSP IP/OBS HIGH 50: CPT

## 2023-09-29 PROCEDURE — 99232 SBSQ HOSP IP/OBS MODERATE 35: CPT

## 2023-09-29 RX ORDER — SENNA PLUS 8.6 MG/1
2 TABLET ORAL AT BEDTIME
Refills: 0 | Status: DISCONTINUED | OUTPATIENT
Start: 2023-09-29 | End: 2023-10-06

## 2023-09-29 RX ORDER — NEBIVOLOL HYDROCHLORIDE 5 MG/1
2.5 TABLET ORAL DAILY
Refills: 0 | Status: DISCONTINUED | OUTPATIENT
Start: 2023-09-29 | End: 2023-10-06

## 2023-09-29 RX ORDER — POLYETHYLENE GLYCOL 3350 17 G/17G
17 POWDER, FOR SOLUTION ORAL DAILY
Refills: 0 | Status: DISCONTINUED | OUTPATIENT
Start: 2023-09-29 | End: 2023-10-06

## 2023-09-29 RX ADMIN — MIRTAZAPINE 7.5 MILLIGRAM(S): 45 TABLET, ORALLY DISINTEGRATING ORAL at 22:13

## 2023-09-29 RX ADMIN — Medication 6 MILLIGRAM(S): at 06:37

## 2023-09-29 RX ADMIN — Medication 20 MILLIGRAM(S): at 13:38

## 2023-09-29 RX ADMIN — REMDESIVIR 200 MILLIGRAM(S): 5 INJECTION INTRAVENOUS at 22:12

## 2023-09-29 RX ADMIN — Medication 1 DROP(S): at 06:39

## 2023-09-29 RX ADMIN — CYCLOPENTOLATE HYDROCHLORIDE 1 DROP(S): 10 SOLUTION/ DROPS OPHTHALMIC at 06:39

## 2023-09-29 RX ADMIN — CYCLOPENTOLATE HYDROCHLORIDE 1 DROP(S): 10 SOLUTION/ DROPS OPHTHALMIC at 18:40

## 2023-09-29 RX ADMIN — Medication 1 APPLICATION(S): at 06:39

## 2023-09-29 RX ADMIN — ALBUTEROL 2.5 MILLIGRAM(S): 90 AEROSOL, METERED ORAL at 06:36

## 2023-09-29 RX ADMIN — NEBIVOLOL HYDROCHLORIDE 2.5 MILLIGRAM(S): 5 TABLET ORAL at 10:05

## 2023-09-29 RX ADMIN — Medication 25 MILLIGRAM(S): at 06:37

## 2023-09-29 RX ADMIN — POLYETHYLENE GLYCOL 3350 17 GRAM(S): 17 POWDER, FOR SOLUTION ORAL at 13:38

## 2023-09-29 RX ADMIN — Medication 25 MILLIGRAM(S): at 18:39

## 2023-09-29 RX ADMIN — ALBUTEROL 2.5 MILLIGRAM(S): 90 AEROSOL, METERED ORAL at 18:41

## 2023-09-29 RX ADMIN — Medication 81 MILLIGRAM(S): at 13:37

## 2023-09-29 RX ADMIN — Medication 1 APPLICATION(S): at 18:40

## 2023-09-29 RX ADMIN — ENOXAPARIN SODIUM 40 MILLIGRAM(S): 100 INJECTION SUBCUTANEOUS at 18:40

## 2023-09-29 RX ADMIN — Medication 325 MILLIGRAM(S): at 13:37

## 2023-09-29 RX ADMIN — ALBUTEROL 2.5 MILLIGRAM(S): 90 AEROSOL, METERED ORAL at 13:37

## 2023-09-29 RX ADMIN — Medication 1 APPLICATION(S): at 18:39

## 2023-09-29 RX ADMIN — SENNA PLUS 2 TABLET(S): 8.6 TABLET ORAL at 22:13

## 2023-09-29 RX ADMIN — Medication 1 TABLET(S): at 06:37

## 2023-09-29 RX ADMIN — Medication 1 DROP(S): at 18:40

## 2023-09-29 NOTE — PROGRESS NOTE ADULT - SUBJECTIVE AND OBJECTIVE BOX
PROGRESS NOTE:     Patient is a 88y old  Female who presents with a chief complaint of AHRF (29 Sep 2023 09:08)      SUBJECTIVE / OVERNIGHT EVENTS:  Patient seen and evaluated at bedside. No fever.  Reports SOB with exertion, but no SOB at rest, reports anxiety, no chest pain, no abdominal pain.     ADDITIONAL REVIEW OF SYSTEMS:    MEDICATIONS  (STANDING):  albuterol    0.083% 2.5 milliGRAM(s) Nebulizer every 6 hours  aspirin enteric coated 81 milliGRAM(s) Oral daily  atorvastatin 20 milliGRAM(s) Oral at bedtime  clobetasol 0.05% Cream 1 Application(s) Topical two times a day  cyclopentolate 1% Solution 1 Drop(s) Both EYES two times a day  dexAMETHasone     Tablet 6 milliGRAM(s) Oral daily  enoxaparin Injectable 40 milliGRAM(s) SubCutaneous every 24 hours  ferrous    sulfate 325 milliGRAM(s) Oral daily  FLUoxetine 20 milliGRAM(s) Oral daily  hydrOXYzine hydrochloride 25 milliGRAM(s) Oral two times a day  influenza  Vaccine (HIGH DOSE) 0.7 milliLiter(s) IntraMuscular once  mirtazapine 7.5 milliGRAM(s) Oral at bedtime  nebivolol 2.5 milliGRAM(s) Oral daily  prednisoLONE acetate 1% Suspension 1 Drop(s) Both EYES two times a day  remdesivir  IVPB   IV Intermittent   remdesivir  IVPB 100 milliGRAM(s) IV Intermittent every 24 hours  triamcinolone 0.1% Ointment 1 Application(s) Topical two times a day  triamterene 37.5 mG/hydrochlorothiazide 25 mG Tablet 1 Tablet(s) Oral daily    MEDICATIONS  (PRN):  acetaminophen     Tablet .. 650 milliGRAM(s) Oral every 6 hours PRN Temp greater or equal to 38C (100.4F), Mild Pain (1 - 3)  melatonin 3 milliGRAM(s) Oral at bedtime PRN Insomnia      CAPILLARY BLOOD GLUCOSE        I&O's Summary    28 Sep 2023 07:01  -  29 Sep 2023 07:00  --------------------------------------------------------  IN: 240 mL / OUT: 1250 mL / NET: -1010 mL        PHYSICAL EXAM:  Vital Signs Last 24 Hrs  T(C): 36.8 (29 Sep 2023 04:55), Max: 37.1 (28 Sep 2023 13:46)  T(F): 98.3 (29 Sep 2023 04:55), Max: 98.7 (28 Sep 2023 13:46)  HR: 87 (29 Sep 2023 04:55) (71 - 88)  BP: 169/84 (29 Sep 2023 04:55) (161/85 - 191/101)  BP(mean): --  RR: 18 (29 Sep 2023 04:55) (18 - 19)  SpO2: 92% (29 Sep 2023 04:55) (92% - 98%)    Parameters below as of 29 Sep 2023 04:55  Patient On (Oxygen Delivery Method): nasal cannula  O2 Flow (L/min): 4      CONSTITUTIONAL: NAD, cachectic appearance  RESPIRATORY: Normal respiratory effort; lungs are clear to auscultation bilaterally, no wheezes/crackles   CARDIOVASCULAR: Regular rate and rhythm, normal S1 and S2, no murmur/rub/gallop; No lower extremity edema  ABDOMEN: Nontender to palpation, normoactive bowel sounds, no rebound/guarding  PSYCH: A+O to person only; anxious affect   SKIN: erythema excoriation on chest and skull,  no longer itchy     LABS:                        10.3   11.98 )-----------( 161      ( 28 Sep 2023 06:56 )             32.6     09-28    144  |  107  |  33<H>  ----------------------------<  93  4.1   |  23  |  0.83    Ca    8.5      28 Sep 2023 06:56    TPro  6.2  /  Alb  3.6  /  TBili  0.2  /  DBili  x   /  AST  29  /  ALT  21  /  AlkPhos  142<H>  09-28          Urinalysis Basic - ( 28 Sep 2023 06:56 )    Color: x / Appearance: x / SG: x / pH: x  Gluc: 93 mg/dL / Ketone: x  / Bili: x / Urobili: x   Blood: x / Protein: x / Nitrite: x   Leuk Esterase: x / RBC: x / WBC x   Sq Epi: x / Non Sq Epi: x / Bacteria: x          RADIOLOGY & ADDITIONAL TESTS:  Results Reviewed:   Imaging Personally Reviewed:  Electrocardiogram Personally Reviewed:    COORDINATION OF CARE:  Care Discussed with Consultants/Other Providers [Y/N]:  Prior or Outpatient Records Reviewed [Y/N]:

## 2023-09-29 NOTE — PROGRESS NOTE ADULT - PROBLEM SELECTOR PLAN 1
In setting of COVID-19 infection, complicated by underlying COPD, pulmonary sarcoidosis.   Patient respiratory status improved this AM, normal respiratory effort, alert, oriented x1, per daughter at her baseline menstal status   - 5 day course of remdesavir to run 9/26 - 9/30  - Started on dexamethasone 6 mg PO Qd  - s/p ID eval, rec hold antibiotics due to history of C diff  - BCX x2 NGTD   - reviewed CT angio chest , no PE  - D-dimer 652--> 461, borderline 2 times ULN, considering patient frailty (44kg) , hx of hematuria, negative CT angio, weighing risk vs. benefit, will c/w prophylactic lovenox 40mg subQ qd  -  [facility] duplex : NO lower extremity DVT   -chest PT, IS, albuterol q6h In setting of COVID-19 infection, complicated by underlying COPD, pulmonary sarcoidosis.   Patient had sepsis 2/2 COVID present on admission , met sepsis criteria due to fever 103F RR 24, HR 91, known COVID infection all present on admission   - 5 day course of remdesavir to run 9/26 - 9/30  - Started on dexamethasone 6 mg PO Qd  - s/p ID eval, rec hold antibiotics due to history of C diff  - BCX x2 NGTD   - reviewed CT angio chest , no PE  - D-dimer 652--> 461, borderline 2 times ULN, considering patient frailty (44kg) , hx of hematuria, negative CT angio, weighing risk vs. benefit, will c/w prophylactic lovenox 40mg subQ qd  -  [facility] duplex : NO lower extremity DVT   -chest PT, IS, albuterol q6h

## 2023-09-29 NOTE — PROGRESS NOTE ADULT - SUBJECTIVE AND OBJECTIVE BOX
infectious diseases progress note:    Patient is a 88y old  Female who presents with a chief complaint of AHRF (28 Sep 2023 14:40)        Infection due to severe acute respiratory syndrome coronavirus 2 (SARS-CoV-2)               Allergies    No Known Allergies    Intolerances        ANTIBIOTICS/RELEVANT:  antimicrobials  remdesivir  IVPB   IV Intermittent   remdesivir  IVPB 100 milliGRAM(s) IV Intermittent every 24 hours    immunologic:  influenza  Vaccine (HIGH DOSE) 0.7 milliLiter(s) IntraMuscular once    OTHER:  acetaminophen     Tablet .. 650 milliGRAM(s) Oral every 6 hours PRN  albuterol    0.083% 2.5 milliGRAM(s) Nebulizer every 6 hours  aspirin enteric coated 81 milliGRAM(s) Oral daily  atorvastatin 20 milliGRAM(s) Oral at bedtime  clobetasol 0.05% Cream 1 Application(s) Topical two times a day  cyclopentolate 1% Solution 1 Drop(s) Both EYES two times a day  dexAMETHasone     Tablet 6 milliGRAM(s) Oral daily  enoxaparin Injectable 40 milliGRAM(s) SubCutaneous every 24 hours  ferrous    sulfate 325 milliGRAM(s) Oral daily  FLUoxetine 20 milliGRAM(s) Oral daily  hydrOXYzine hydrochloride 25 milliGRAM(s) Oral two times a day  melatonin 3 milliGRAM(s) Oral at bedtime PRN  mirtazapine 7.5 milliGRAM(s) Oral at bedtime  nebivolol 2.5 milliGRAM(s) Oral daily  prednisoLONE acetate 1% Suspension 1 Drop(s) Both EYES two times a day  triamcinolone 0.1% Ointment 1 Application(s) Topical two times a day  triamterene 37.5 mG/hydrochlorothiazide 25 mG Tablet 1 Tablet(s) Oral daily      Objective:  Vital Signs Last 24 Hrs  T(C): 36.8 (29 Sep 2023 04:55), Max: 37.1 (28 Sep 2023 13:46)  T(F): 98.3 (29 Sep 2023 04:55), Max: 98.7 (28 Sep 2023 13:46)  HR: 87 (29 Sep 2023 04:55) (71 - 88)  BP: 169/84 (29 Sep 2023 04:55) (155/80 - 191/101)  BP(mean): --  RR: 18 (29 Sep 2023 04:55) (18 - 19)  SpO2: 92% (29 Sep 2023 04:55) (92% - 98%)    Parameters below as of 29 Sep 2023 04:55  Patient On (Oxygen Delivery Method): nasal cannula  O2 Flow (L/min): 4         Eyes:JIM, EOMI  Ear/Nose/Throat: no oral lesion, no sinus tenderness on percussion	  Neck:no JVD, no lymphadenopathy, supple  Respiratory: CTA moises  Cardiovascular: S1S2 RRR, no murmurs  Gastrointestinal:soft, (+) BS, no HSM  Extremities:no e/e/c        LABS:                        10.3   11.98 )-----------( 161      ( 28 Sep 2023 06:56 )             32.6     09-28    144  |  107  |  33<H>  ----------------------------<  93  4.1   |  23  |  0.83    Ca    8.5      28 Sep 2023 06:56    TPro  6.2  /  Alb  3.6  /  TBili  0.2  /  DBili  x   /  AST  29  /  ALT  21  /  AlkPhos  142<H>  09-28      Urinalysis Basic - ( 28 Sep 2023 06:56 )    Color: x / Appearance: x / SG: x / pH: x  Gluc: 93 mg/dL / Ketone: x  / Bili: x / Urobili: x   Blood: x / Protein: x / Nitrite: x   Leuk Esterase: x / RBC: x / WBC x   Sq Epi: x / Non Sq Epi: x / Bacteria: x          MICROBIOLOGY:    RECENT CULTURES:  09-26 @ 09:15 Clean Catch Clean Catch (Midstream)                <10,000 CFU/mL Normal Urogenital Yadira    09-26 @ 05:25 .Blood Blood                No growth at 72 Hours    09-26 @ 05:15 .Blood Blood                No growth at 72 Hours          RESPIRATORY CULTURES:              RADIOLOGY & ADDITIONAL STUDIES:        Pager [phone]  After 5 pm/weekends or if no response :[phone]

## 2023-09-29 NOTE — PROGRESS NOTE ADULT - PROBLEM SELECTOR PLAN 7
s/p dermatology evaluation,   Recommend trial of triamcinolone for the body and clobetasol for scalp.      Dispo:  PT/OT rec rehab; discussed with kay Mcfarland, not interested in rehab, prefers to take patient home.

## 2023-09-30 LAB
ANION GAP SERPL CALC-SCNC: 13 MMOL/L — SIGNIFICANT CHANGE UP (ref 5–17)
BUN SERPL-MCNC: 36 MG/DL — HIGH (ref 7–23)
CALCIUM SERPL-MCNC: 8.6 MG/DL — SIGNIFICANT CHANGE UP (ref 8.4–10.5)
CHLORIDE SERPL-SCNC: 104 MMOL/L — SIGNIFICANT CHANGE UP (ref 96–108)
CO2 SERPL-SCNC: 25 MMOL/L — SIGNIFICANT CHANGE UP (ref 22–31)
CREAT SERPL-MCNC: 0.76 MG/DL — SIGNIFICANT CHANGE UP (ref 0.5–1.3)
EGFR: 76 ML/MIN/1.73M2 — SIGNIFICANT CHANGE UP
GLUCOSE SERPL-MCNC: 82 MG/DL — SIGNIFICANT CHANGE UP (ref 70–99)
HCT VFR BLD CALC: 35.3 % — SIGNIFICANT CHANGE UP (ref 34.5–45)
HGB BLD-MCNC: 11.2 G/DL — LOW (ref 11.5–15.5)
MCHC RBC-ENTMCNC: 29.4 PG — SIGNIFICANT CHANGE UP (ref 27–34)
MCHC RBC-ENTMCNC: 31.7 GM/DL — LOW (ref 32–36)
MCV RBC AUTO: 92.7 FL — SIGNIFICANT CHANGE UP (ref 80–100)
NRBC # BLD: 0 /100 WBCS — SIGNIFICANT CHANGE UP (ref 0–0)
PLATELET # BLD AUTO: 170 K/UL — SIGNIFICANT CHANGE UP (ref 150–400)
POTASSIUM SERPL-MCNC: 3.8 MMOL/L — SIGNIFICANT CHANGE UP (ref 3.5–5.3)
POTASSIUM SERPL-SCNC: 3.8 MMOL/L — SIGNIFICANT CHANGE UP (ref 3.5–5.3)
RBC # BLD: 3.81 M/UL — SIGNIFICANT CHANGE UP (ref 3.8–5.2)
RBC # FLD: 15 % — HIGH (ref 10.3–14.5)
SODIUM SERPL-SCNC: 142 MMOL/L — SIGNIFICANT CHANGE UP (ref 135–145)
WBC # BLD: 12.63 K/UL — HIGH (ref 3.8–10.5)
WBC # FLD AUTO: 12.63 K/UL — HIGH (ref 3.8–10.5)

## 2023-09-30 PROCEDURE — 99232 SBSQ HOSP IP/OBS MODERATE 35: CPT

## 2023-09-30 RX ADMIN — ALBUTEROL 2.5 MILLIGRAM(S): 90 AEROSOL, METERED ORAL at 23:50

## 2023-09-30 RX ADMIN — Medication 1 APPLICATION(S): at 07:10

## 2023-09-30 RX ADMIN — ENOXAPARIN SODIUM 40 MILLIGRAM(S): 100 INJECTION SUBCUTANEOUS at 18:42

## 2023-09-30 RX ADMIN — Medication 81 MILLIGRAM(S): at 08:01

## 2023-09-30 RX ADMIN — ALBUTEROL 2.5 MILLIGRAM(S): 90 AEROSOL, METERED ORAL at 12:14

## 2023-09-30 RX ADMIN — ALBUTEROL 2.5 MILLIGRAM(S): 90 AEROSOL, METERED ORAL at 18:42

## 2023-09-30 RX ADMIN — Medication 1 DROP(S): at 07:10

## 2023-09-30 RX ADMIN — ATORVASTATIN CALCIUM 20 MILLIGRAM(S): 80 TABLET, FILM COATED ORAL at 21:20

## 2023-09-30 RX ADMIN — Medication 1 DROP(S): at 18:47

## 2023-09-30 RX ADMIN — Medication 1 APPLICATION(S): at 06:32

## 2023-09-30 RX ADMIN — ATORVASTATIN CALCIUM 20 MILLIGRAM(S): 80 TABLET, FILM COATED ORAL at 00:46

## 2023-09-30 RX ADMIN — REMDESIVIR 200 MILLIGRAM(S): 5 INJECTION INTRAVENOUS at 18:42

## 2023-09-30 RX ADMIN — NEBIVOLOL HYDROCHLORIDE 2.5 MILLIGRAM(S): 5 TABLET ORAL at 06:24

## 2023-09-30 RX ADMIN — Medication 25 MILLIGRAM(S): at 18:42

## 2023-09-30 RX ADMIN — ALBUTEROL 2.5 MILLIGRAM(S): 90 AEROSOL, METERED ORAL at 00:50

## 2023-09-30 RX ADMIN — POLYETHYLENE GLYCOL 3350 17 GRAM(S): 17 POWDER, FOR SOLUTION ORAL at 08:01

## 2023-09-30 RX ADMIN — Medication 325 MILLIGRAM(S): at 08:01

## 2023-09-30 RX ADMIN — Medication 1 APPLICATION(S): at 18:47

## 2023-09-30 RX ADMIN — Medication 25 MILLIGRAM(S): at 06:24

## 2023-09-30 RX ADMIN — SENNA PLUS 2 TABLET(S): 8.6 TABLET ORAL at 21:20

## 2023-09-30 RX ADMIN — CYCLOPENTOLATE HYDROCHLORIDE 1 DROP(S): 10 SOLUTION/ DROPS OPHTHALMIC at 06:33

## 2023-09-30 RX ADMIN — CYCLOPENTOLATE HYDROCHLORIDE 1 DROP(S): 10 SOLUTION/ DROPS OPHTHALMIC at 18:47

## 2023-09-30 RX ADMIN — MIRTAZAPINE 7.5 MILLIGRAM(S): 45 TABLET, ORALLY DISINTEGRATING ORAL at 21:20

## 2023-09-30 RX ADMIN — Medication 1 TABLET(S): at 06:24

## 2023-09-30 RX ADMIN — Medication 6 MILLIGRAM(S): at 06:24

## 2023-09-30 RX ADMIN — Medication 20 MILLIGRAM(S): at 12:14

## 2023-09-30 RX ADMIN — ALBUTEROL 2.5 MILLIGRAM(S): 90 AEROSOL, METERED ORAL at 06:25

## 2023-09-30 RX ADMIN — Medication 1 APPLICATION(S): at 18:48

## 2023-09-30 NOTE — PROGRESS NOTE ADULT - PROBLEM SELECTOR PLAN 1
In setting of COVID-19 infection, complicated by underlying COPD, pulmonary sarcoidosis.   Patient had sepsis 2/2 COVID present on admission , met sepsis criteria due to fever 103F RR 24, HR 91, known COVID infection all present on admission   - 5 day course of remdesavir to run 9/26 - 9/30  - Started on dexamethasone 6 mg PO Qd  - s/p ID eval, rec hold antibiotics due to history of C diff  - BCX x2 NGTD   - reviewed CT angio chest , no PE  - D-dimer 652--> 461, borderline 2 times ULN, considering patient frailty (44kg) , hx of hematuria, negative CT angio, weighing risk vs. benefit, will c/w prophylactic lovenox 40mg subQ qd  -  [facility] duplex : NO lower extremity DVT   -chest PT, IS, albuterol q6h  - noted cough with eating, S & S eval

## 2023-09-30 NOTE — PROGRESS NOTE ADULT - PROBLEM SELECTOR PLAN 7
s/p dermatology evaluation,   Recommend trial of triamcinolone for the body and clobetasol for scalp.      Dispo:  PT/OT rec rehab; discussed with daughter Bruna, not interested in rehab, prefers to take patient home.

## 2023-09-30 NOTE — PROGRESS NOTE ADULT - SUBJECTIVE AND OBJECTIVE BOX
PROGRESS NOTE:     Patient is a 88y old  Female who presents with a chief complaint of AHRF (29 Sep 2023 10:54)      SUBJECTIVE / OVERNIGHT EVENTS:  Patient seen and evaluated at bedside. Denies SOB at rest, but reports SOB with exertion, + dry cough, no chest pain, no abdominal pain, no itchiness.     ADDITIONAL REVIEW OF SYSTEMS:    MEDICATIONS  (STANDING):  albuterol    0.083% 2.5 milliGRAM(s) Nebulizer every 6 hours  aspirin enteric coated 81 milliGRAM(s) Oral daily  atorvastatin 20 milliGRAM(s) Oral at bedtime  clobetasol 0.05% Cream 1 Application(s) Topical two times a day  cyclopentolate 1% Solution 1 Drop(s) Both EYES two times a day  dexAMETHasone     Tablet 6 milliGRAM(s) Oral daily  enoxaparin Injectable 40 milliGRAM(s) SubCutaneous every 24 hours  ferrous    sulfate 325 milliGRAM(s) Oral daily  FLUoxetine 20 milliGRAM(s) Oral daily  hydrOXYzine hydrochloride 25 milliGRAM(s) Oral two times a day  influenza  Vaccine (HIGH DOSE) 0.7 milliLiter(s) IntraMuscular once  mirtazapine 7.5 milliGRAM(s) Oral at bedtime  nebivolol 2.5 milliGRAM(s) Oral daily  polyethylene glycol 3350 17 Gram(s) Oral daily  prednisoLONE acetate 1% Suspension 1 Drop(s) Both EYES two times a day  remdesivir  IVPB 100 milliGRAM(s) IV Intermittent every 24 hours  remdesivir  IVPB   IV Intermittent   senna 2 Tablet(s) Oral at bedtime  triamcinolone 0.1% Ointment 1 Application(s) Topical two times a day  triamterene 37.5 mG/hydrochlorothiazide 25 mG Tablet 1 Tablet(s) Oral daily    MEDICATIONS  (PRN):  acetaminophen     Tablet .. 650 milliGRAM(s) Oral every 6 hours PRN Temp greater or equal to 38C (100.4F), Mild Pain (1 - 3)  benzonatate 100 milliGRAM(s) Oral three times a day PRN Cough  melatonin 3 milliGRAM(s) Oral at bedtime PRN Insomnia      CAPILLARY BLOOD GLUCOSE        I&O's Summary    29 Sep 2023 07:01  -  30 Sep 2023 07:00  --------------------------------------------------------  IN: 0 mL / OUT: 850 mL / NET: -850 mL        PHYSICAL EXAM:  Vital Signs Last 24 Hrs  T(C): 36.8 (30 Sep 2023 12:22), Max: 37 (29 Sep 2023 13:36)  T(F): 98.3 (30 Sep 2023 12:22), Max: 98.6 (29 Sep 2023 13:36)  HR: 71 (30 Sep 2023 12:22) (64 - 90)  BP: 152/78 (30 Sep 2023 12:22) (133/70 - 167/78)  BP(mean): --  RR: 18 (30 Sep 2023 12:22) (18 - 18)  SpO2: 92% (30 Sep 2023 12:22) (92% - 96%)    Parameters below as of 30 Sep 2023 12:22  Patient On (Oxygen Delivery Method): room air        CONSTITUTIONAL: NAD, cachectic   RESPIRATORY: Normal respiratory effort; + course lung sounds, no wheezes, no crackles, + cough on deep breath   CARDIOVASCULAR: Regular rate and rhythm, normal S1 and S2, no murmur; No lower extremity edema  ABDOMEN: Nontender to palpation, normoactive bowel sounds, no rebound/guarding  SKIN: chest excoriation, healing   PSYCH: A+O to person, place ; anxious affect     LABS:                        11.2   12.63 )-----------( 170      ( 30 Sep 2023 06:47 )             35.3     09-30    142  |  104  |  36<H>  ----------------------------<  82  3.8   |  25  |  0.76    Ca    8.6      30 Sep 2023 06:46            Urinalysis Basic - ( 30 Sep 2023 06:46 )    Color: x / Appearance: x / SG: x / pH: x  Gluc: 82 mg/dL / Ketone: x  / Bili: x / Urobili: x   Blood: x / Protein: x / Nitrite: x   Leuk Esterase: x / RBC: x / WBC x   Sq Epi: x / Non Sq Epi: x / Bacteria: x          RADIOLOGY & ADDITIONAL TESTS:  Results Reviewed:   Imaging Personally Reviewed:  Electrocardiogram Personally Reviewed:    COORDINATION OF CARE:  Care Discussed with Consultants/Other Providers [Y/N]:  Prior or Outpatient Records Reviewed [Y/N]:

## 2023-10-01 LAB
ALBUMIN SERPL ELPH-MCNC: 3.8 G/DL — SIGNIFICANT CHANGE UP (ref 3.3–5)
ALP SERPL-CCNC: 141 U/L — HIGH (ref 40–120)
ALT FLD-CCNC: 27 U/L — SIGNIFICANT CHANGE UP (ref 10–45)
ANION GAP SERPL CALC-SCNC: 12 MMOL/L — SIGNIFICANT CHANGE UP (ref 5–17)
AST SERPL-CCNC: 17 U/L — SIGNIFICANT CHANGE UP (ref 10–40)
BILIRUB SERPL-MCNC: 0.6 MG/DL — SIGNIFICANT CHANGE UP (ref 0.2–1.2)
BUN SERPL-MCNC: 31 MG/DL — HIGH (ref 7–23)
CALCIUM SERPL-MCNC: 8.9 MG/DL — SIGNIFICANT CHANGE UP (ref 8.4–10.5)
CHLORIDE SERPL-SCNC: 102 MMOL/L — SIGNIFICANT CHANGE UP (ref 96–108)
CO2 SERPL-SCNC: 28 MMOL/L — SIGNIFICANT CHANGE UP (ref 22–31)
CREAT SERPL-MCNC: 0.68 MG/DL — SIGNIFICANT CHANGE UP (ref 0.5–1.3)
CULTURE RESULTS: SIGNIFICANT CHANGE UP
CULTURE RESULTS: SIGNIFICANT CHANGE UP
EGFR: 84 ML/MIN/1.73M2 — SIGNIFICANT CHANGE UP
GLUCOSE SERPL-MCNC: 95 MG/DL — SIGNIFICANT CHANGE UP (ref 70–99)
HCT VFR BLD CALC: 37.7 % — SIGNIFICANT CHANGE UP (ref 34.5–45)
HGB BLD-MCNC: 12.2 G/DL — SIGNIFICANT CHANGE UP (ref 11.5–15.5)
MAGNESIUM SERPL-MCNC: 2.4 MG/DL — SIGNIFICANT CHANGE UP (ref 1.6–2.6)
MCHC RBC-ENTMCNC: 29.5 PG — SIGNIFICANT CHANGE UP (ref 27–34)
MCHC RBC-ENTMCNC: 32.4 GM/DL — SIGNIFICANT CHANGE UP (ref 32–36)
MCV RBC AUTO: 91.1 FL — SIGNIFICANT CHANGE UP (ref 80–100)
NRBC # BLD: 0 /100 WBCS — SIGNIFICANT CHANGE UP (ref 0–0)
PLATELET # BLD AUTO: 190 K/UL — SIGNIFICANT CHANGE UP (ref 150–400)
POTASSIUM SERPL-MCNC: 4.1 MMOL/L — SIGNIFICANT CHANGE UP (ref 3.5–5.3)
POTASSIUM SERPL-SCNC: 4.1 MMOL/L — SIGNIFICANT CHANGE UP (ref 3.5–5.3)
PROT SERPL-MCNC: 6.7 G/DL — SIGNIFICANT CHANGE UP (ref 6–8.3)
RBC # BLD: 4.14 M/UL — SIGNIFICANT CHANGE UP (ref 3.8–5.2)
RBC # FLD: 14.7 % — HIGH (ref 10.3–14.5)
SODIUM SERPL-SCNC: 142 MMOL/L — SIGNIFICANT CHANGE UP (ref 135–145)
SPECIMEN SOURCE: SIGNIFICANT CHANGE UP
SPECIMEN SOURCE: SIGNIFICANT CHANGE UP
WBC # BLD: 10.86 K/UL — HIGH (ref 3.8–10.5)
WBC # FLD AUTO: 10.86 K/UL — HIGH (ref 3.8–10.5)

## 2023-10-01 PROCEDURE — 99232 SBSQ HOSP IP/OBS MODERATE 35: CPT

## 2023-10-01 RX ADMIN — CYCLOPENTOLATE HYDROCHLORIDE 1 DROP(S): 10 SOLUTION/ DROPS OPHTHALMIC at 06:07

## 2023-10-01 RX ADMIN — Medication 81 MILLIGRAM(S): at 12:44

## 2023-10-01 RX ADMIN — ENOXAPARIN SODIUM 40 MILLIGRAM(S): 100 INJECTION SUBCUTANEOUS at 17:59

## 2023-10-01 RX ADMIN — Medication 325 MILLIGRAM(S): at 12:44

## 2023-10-01 RX ADMIN — Medication 1 APPLICATION(S): at 06:15

## 2023-10-01 RX ADMIN — MIRTAZAPINE 7.5 MILLIGRAM(S): 45 TABLET, ORALLY DISINTEGRATING ORAL at 21:41

## 2023-10-01 RX ADMIN — Medication 1 APPLICATION(S): at 06:07

## 2023-10-01 RX ADMIN — Medication 25 MILLIGRAM(S): at 18:01

## 2023-10-01 RX ADMIN — ALBUTEROL 2.5 MILLIGRAM(S): 90 AEROSOL, METERED ORAL at 17:59

## 2023-10-01 RX ADMIN — NEBIVOLOL HYDROCHLORIDE 2.5 MILLIGRAM(S): 5 TABLET ORAL at 06:07

## 2023-10-01 RX ADMIN — Medication 6 MILLIGRAM(S): at 06:06

## 2023-10-01 RX ADMIN — Medication 25 MILLIGRAM(S): at 06:07

## 2023-10-01 RX ADMIN — SENNA PLUS 2 TABLET(S): 8.6 TABLET ORAL at 21:41

## 2023-10-01 RX ADMIN — Medication 1 APPLICATION(S): at 17:58

## 2023-10-01 RX ADMIN — Medication 20 MILLIGRAM(S): at 12:44

## 2023-10-01 RX ADMIN — Medication 1 TABLET(S): at 06:06

## 2023-10-01 RX ADMIN — Medication 1 DROP(S): at 06:08

## 2023-10-01 RX ADMIN — Medication 1 DROP(S): at 17:58

## 2023-10-01 RX ADMIN — ALBUTEROL 2.5 MILLIGRAM(S): 90 AEROSOL, METERED ORAL at 12:43

## 2023-10-01 RX ADMIN — Medication 1 APPLICATION(S): at 17:59

## 2023-10-01 RX ADMIN — ATORVASTATIN CALCIUM 20 MILLIGRAM(S): 80 TABLET, FILM COATED ORAL at 21:41

## 2023-10-01 RX ADMIN — POLYETHYLENE GLYCOL 3350 17 GRAM(S): 17 POWDER, FOR SOLUTION ORAL at 12:44

## 2023-10-01 RX ADMIN — CYCLOPENTOLATE HYDROCHLORIDE 1 DROP(S): 10 SOLUTION/ DROPS OPHTHALMIC at 18:00

## 2023-10-01 RX ADMIN — ALBUTEROL 2.5 MILLIGRAM(S): 90 AEROSOL, METERED ORAL at 06:06

## 2023-10-01 NOTE — SWALLOW BEDSIDE ASSESSMENT ADULT - ASR SWALLOW RECOMMEND DIAG
Instrumental exam not indicated at this time, however, if pt with further episodes of coughing while eating, would consider MBS (if pt agreeable) to further assess swallow function.

## 2023-10-01 NOTE — PROGRESS NOTE ADULT - PROBLEM SELECTOR PLAN 1
In setting of COVID-19 infection, complicated by underlying COPD, pulmonary sarcoidosis.   O2 weaned to 2L today-- continue to wean as tolerated   Patient had sepsis 2/2 COVID present on admission , met sepsis criteria due to fever 103F RR 24, HR 91, known COVID infection all present on admission   - s/p 5 day course of remdesavir 9/26 - 9/30  - c/w dexamethasone 6 mg PO Qd (9/26-   )  - s/p ID eval, rec hold antibiotics due to history of C diff  - BCX x2 NGTD   - reviewed CT angio chest , no PE  - D-dimer 652--> 461, borderline 2 times ULN, considering patient frailty (44kg) , hx of hematuria, negative CT angio, weighing risk vs. benefit, will c/w prophylactic lovenox 40mg subQ qd  -  VA duplex : NO lower extremity DVT   -chest PT, IS, albuterol q6h  - noted cough with eating, S & S eval-- rec: regular diet thin liquid

## 2023-10-01 NOTE — PROGRESS NOTE ADULT - SUBJECTIVE AND OBJECTIVE BOX
PROGRESS NOTE:     Patient is a 88y old  Female who presents with a chief complaint of AHRF (30 Sep 2023 13:30)      SUBJECTIVE / OVERNIGHT EVENTS:  Patient seen and evaluated at bedside.  Denies SOB at rest, chest pain,  abdominal pain, nausea/vomiting    ADDITIONAL REVIEW OF SYSTEMS:    MEDICATIONS  (STANDING):  albuterol    0.083% 2.5 milliGRAM(s) Nebulizer every 6 hours  aspirin enteric coated 81 milliGRAM(s) Oral daily  atorvastatin 20 milliGRAM(s) Oral at bedtime  clobetasol 0.05% Cream 1 Application(s) Topical two times a day  cyclopentolate 1% Solution 1 Drop(s) Both EYES two times a day  dexAMETHasone     Tablet 6 milliGRAM(s) Oral daily  enoxaparin Injectable 40 milliGRAM(s) SubCutaneous every 24 hours  ferrous    sulfate 325 milliGRAM(s) Oral daily  FLUoxetine 20 milliGRAM(s) Oral daily  hydrOXYzine hydrochloride 25 milliGRAM(s) Oral two times a day  influenza  Vaccine (HIGH DOSE) 0.7 milliLiter(s) IntraMuscular once  mirtazapine 7.5 milliGRAM(s) Oral at bedtime  nebivolol 2.5 milliGRAM(s) Oral daily  polyethylene glycol 3350 17 Gram(s) Oral daily  prednisoLONE acetate 1% Suspension 1 Drop(s) Both EYES two times a day  senna 2 Tablet(s) Oral at bedtime  triamcinolone 0.1% Ointment 1 Application(s) Topical two times a day  triamterene 37.5 mG/hydrochlorothiazide 25 mG Tablet 1 Tablet(s) Oral daily    MEDICATIONS  (PRN):  acetaminophen     Tablet .. 650 milliGRAM(s) Oral every 6 hours PRN Temp greater or equal to 38C (100.4F), Mild Pain (1 - 3)  benzonatate 100 milliGRAM(s) Oral three times a day PRN Cough  melatonin 3 milliGRAM(s) Oral at bedtime PRN Insomnia      CAPILLARY BLOOD GLUCOSE        I&O's Summary    30 Sep 2023 07:01  -  01 Oct 2023 07:00  --------------------------------------------------------  IN: 400 mL / OUT: 450 mL / NET: -50 mL        PHYSICAL EXAM:  Vital Signs Last 24 Hrs  T(C): 36.8 (01 Oct 2023 05:50), Max: 36.8 (01 Oct 2023 05:50)  T(F): 98.2 (01 Oct 2023 05:50), Max: 98.2 (01 Oct 2023 05:50)  HR: 72 (01 Oct 2023 05:50) (67 - 72)  BP: 169/87 (01 Oct 2023 05:50) (169/87 - 174/82)  BP(mean): --  RR: 18 (01 Oct 2023 06:49) (18 - 18)  SpO2: 95% (01 Oct 2023 06:49) (95% - 96%)    Parameters below as of 01 Oct 2023 06:49  Patient On (Oxygen Delivery Method): nasal cannula  O2 Flow (L/min): 2      CONSTITUTIONAL: NAD, cachectic appearance   RESPIRATORY: Normal respiratory effort; lungs are clear to auscultation bilaterally, no wheezes/crackle   CARDIOVASCULAR: Regular rate and rhythm, normal S1 and S2, no murmur/rub/gallop; No lower extremity edema  ABDOMEN: Nontender to palpation, normoactive bowel sounds, no rebound/guarding  : dark urine   PSYCH: A+O to person and place     LABS:                        12.2   10.86 )-----------( 190      ( 01 Oct 2023 06:30 )             37.7     10-01    142  |  102  |  31<H>  ----------------------------<  95  4.1   |  28  |  0.68    Ca    8.9      01 Oct 2023 06:30  Mg     2.4     10-01    TPro  6.7  /  Alb  3.8  /  TBili  0.6  /  DBili  x   /  AST  17  /  ALT  27  /  AlkPhos  141<H>  10-01          Urinalysis Basic - ( 01 Oct 2023 06:30 )    Color: x / Appearance: x / SG: x / pH: x  Gluc: 95 mg/dL / Ketone: x  / Bili: x / Urobili: x   Blood: x / Protein: x / Nitrite: x   Leuk Esterase: x / RBC: x / WBC x   Sq Epi: x / Non Sq Epi: x / Bacteria: x          RADIOLOGY & ADDITIONAL TESTS:  Results Reviewed:   Imaging Personally Reviewed:  Electrocardiogram Personally Reviewed:    COORDINATION OF CARE:  Care Discussed with Consultants/Other Providers [Y/N]:  Prior or Outpatient Records Reviewed [Y/N]:

## 2023-10-01 NOTE — SWALLOW BEDSIDE ASSESSMENT ADULT - ASR SWALLOW DENTITION
upper dentures
Few day history of nausea and coffee ground emesis with limited PO intake. Macrocytic anemia.   - On Octreotide & Protonix ggt  - EGD in AM

## 2023-10-01 NOTE — SWALLOW BEDSIDE ASSESSMENT ADULT - SLP GENERAL OBSERVATIONS
Pt encountered in bed, asleep but rousable, subsequently alert and oriented x3. On 2L NC. Vocal quality WFL for age/gender.

## 2023-10-01 NOTE — SWALLOW BEDSIDE ASSESSMENT ADULT - CONSISTENCIES ADMINISTERED
pt refusing trials of purees; only accepting breakfast Ivorian at bedside brought in by pt's daughter thin liquid

## 2023-10-01 NOTE — SWALLOW BEDSIDE ASSESSMENT ADULT - COMMENTS
Pt found to have AHRF secondary to COVID  Per ID ->  remdesivir for 5 days, decadron. Hold antibiotics  particularly with hs of clostridia difficile. very cachetic and weak.   Pt w/ itchy rash, s/p derm eval.     CT Chest 9/26: No pulmonary embolism. Dilated pulmonary artery, suggestive of pulmonary hypertension. Elevated right hemidiaphragm, also present on earliest available prior, 3/22/2023 chest x-ray.    Swallow hx: pt is new to this service Pt found to have AHRF secondary to COVID  Per ID ->  remdesivir for 5 days, decadron. Hold antibiotics  particularly with hs of clostridia difficile. very cachetic and weak.   Pt w/ itchy rash, s/p derm eval.   9/30: As per Medicine MD -> noted cough with eating, S & S eval.    CT Chest 9/26: No pulmonary embolism. Dilated pulmonary artery, suggestive of pulmonary hypertension. Elevated right hemidiaphragm, also present on earliest available prior, 3/22/2023 chest x-ray.    Swallow hx: pt is new to this service

## 2023-10-01 NOTE — PROGRESS NOTE ADULT - PROBLEM SELECTOR PLAN 7
s/p dermatology evaluation,   Recommend trial of triamcinolone for the body and clobetasol for scalp.      Dispo:  PT/OT rec rehab; discussed with daughter Bruna, not interested in rehab, prefers to take patient home (assisted living facility). likely dc monday

## 2023-10-01 NOTE — SWALLOW BEDSIDE ASSESSMENT ADULT - ASPIRATION PRECAUTIONS
Monitor for s/s aspiration/laryngeal penetration. If noted:  D/C p.o. intake, provide non-oral nutrition/hydration/meds, and contact this service @ [phone]

## 2023-10-02 ENCOUNTER — APPOINTMENT (OUTPATIENT)
Dept: OPHTHALMOLOGY | Facility: CLINIC | Age: 88
End: 2023-10-02

## 2023-10-02 PROCEDURE — 99232 SBSQ HOSP IP/OBS MODERATE 35: CPT

## 2023-10-02 RX ADMIN — ENOXAPARIN SODIUM 40 MILLIGRAM(S): 100 INJECTION SUBCUTANEOUS at 17:28

## 2023-10-02 RX ADMIN — ATORVASTATIN CALCIUM 20 MILLIGRAM(S): 80 TABLET, FILM COATED ORAL at 21:09

## 2023-10-02 RX ADMIN — ALBUTEROL 2.5 MILLIGRAM(S): 90 AEROSOL, METERED ORAL at 17:29

## 2023-10-02 RX ADMIN — Medication 25 MILLIGRAM(S): at 05:38

## 2023-10-02 RX ADMIN — Medication 1 DROP(S): at 05:39

## 2023-10-02 RX ADMIN — Medication 1 APPLICATION(S): at 05:38

## 2023-10-02 RX ADMIN — Medication 81 MILLIGRAM(S): at 12:30

## 2023-10-02 RX ADMIN — ALBUTEROL 2.5 MILLIGRAM(S): 90 AEROSOL, METERED ORAL at 12:28

## 2023-10-02 RX ADMIN — CYCLOPENTOLATE HYDROCHLORIDE 1 DROP(S): 10 SOLUTION/ DROPS OPHTHALMIC at 17:30

## 2023-10-02 RX ADMIN — MIRTAZAPINE 7.5 MILLIGRAM(S): 45 TABLET, ORALLY DISINTEGRATING ORAL at 21:09

## 2023-10-02 RX ADMIN — SENNA PLUS 2 TABLET(S): 8.6 TABLET ORAL at 21:09

## 2023-10-02 RX ADMIN — CYCLOPENTOLATE HYDROCHLORIDE 1 DROP(S): 10 SOLUTION/ DROPS OPHTHALMIC at 05:40

## 2023-10-02 RX ADMIN — Medication 20 MILLIGRAM(S): at 12:30

## 2023-10-02 RX ADMIN — NEBIVOLOL HYDROCHLORIDE 2.5 MILLIGRAM(S): 5 TABLET ORAL at 05:38

## 2023-10-02 RX ADMIN — Medication 1 TABLET(S): at 05:37

## 2023-10-02 RX ADMIN — ALBUTEROL 2.5 MILLIGRAM(S): 90 AEROSOL, METERED ORAL at 23:24

## 2023-10-02 RX ADMIN — ALBUTEROL 2.5 MILLIGRAM(S): 90 AEROSOL, METERED ORAL at 05:39

## 2023-10-02 RX ADMIN — Medication 6 MILLIGRAM(S): at 05:37

## 2023-10-02 RX ADMIN — Medication 3 MILLIGRAM(S): at 21:12

## 2023-10-02 RX ADMIN — Medication 1 DROP(S): at 17:29

## 2023-10-02 RX ADMIN — Medication 325 MILLIGRAM(S): at 12:30

## 2023-10-02 RX ADMIN — POLYETHYLENE GLYCOL 3350 17 GRAM(S): 17 POWDER, FOR SOLUTION ORAL at 12:27

## 2023-10-02 NOTE — DIETITIAN INITIAL EVALUATION ADULT - PROBLEM SELECTOR PROBLEM 4
Pulmonary sarcoidosis Otezla Pregnancy And Lactation Text: This medication is Pregnancy Category C and it isn't known if it is safe during pregnancy. It is unknown if it is excreted in breast milk.

## 2023-10-02 NOTE — DIETITIAN INITIAL EVALUATION ADULT - ORAL INTAKE PTA/DIET HISTORY
pt confused, spoke briefly with daughter reported pt lives and [facility] and eats well. interview limited due to daughter being a teacher and having to get back to classroom. Daughter reports discharge today, unable to obtain confirmation from staff on 3tow.

## 2023-10-02 NOTE — PROGRESS NOTE ADULT - PROBLEM SELECTOR PLAN 7
s/p dermatology evaluation,   Recommend trial of triamcinolone for the body and clobetasol for scalp.      #Discharge planning 50mins   PT/OT rec rehab; discussed with daughter Bruna, not interested in rehab, prefers to take patient home (assisted living facility).   Discharge pending confirmation for [facility], F/U CM

## 2023-10-02 NOTE — DIETITIAN INITIAL EVALUATION ADULT - PERTINENT LABORATORY DATA
10-01    142  |  102  |  31<H>  ----------------------------<  95  4.1   |  28  |  0.68    Ca    8.9      01 Oct 2023 06:30  Mg     2.4     10-01    TPro  6.7  /  Alb  3.8  /  TBili  0.6  /  DBili  x   /  AST  17  /  ALT  27  /  AlkPhos  141<H>  10-01  A1C with Estimated Average Glucose Result: 6.0 % (09-27-23 @ 06:44)

## 2023-10-02 NOTE — DIETITIAN INITIAL EVALUATION ADULT - PERTINENT MEDS FT
MEDICATIONS  (STANDING):  albuterol    0.083% 2.5 milliGRAM(s) Nebulizer every 6 hours  aspirin enteric coated 81 milliGRAM(s) Oral daily  atorvastatin 20 milliGRAM(s) Oral at bedtime  clobetasol 0.05% Cream 1 Application(s) Topical two times a day  cyclopentolate 1% Solution 1 Drop(s) Both EYES two times a day  dexAMETHasone     Tablet 6 milliGRAM(s) Oral daily  enoxaparin Injectable 40 milliGRAM(s) SubCutaneous every 24 hours  ferrous    sulfate 325 milliGRAM(s) Oral daily  FLUoxetine 20 milliGRAM(s) Oral daily  hydrOXYzine hydrochloride 25 milliGRAM(s) Oral two times a day  influenza  Vaccine (HIGH DOSE) 0.7 milliLiter(s) IntraMuscular once  mirtazapine 7.5 milliGRAM(s) Oral at bedtime  nebivolol 2.5 milliGRAM(s) Oral daily  polyethylene glycol 3350 17 Gram(s) Oral daily  prednisoLONE acetate 1% Suspension 1 Drop(s) Both EYES two times a day  senna 2 Tablet(s) Oral at bedtime  triamcinolone 0.1% Ointment 1 Application(s) Topical two times a day  triamterene 37.5 mG/hydrochlorothiazide 25 mG Tablet 1 Tablet(s) Oral daily    MEDICATIONS  (PRN):  acetaminophen     Tablet .. 650 milliGRAM(s) Oral every 6 hours PRN Temp greater or equal to 38C (100.4F), Mild Pain (1 - 3)  benzonatate 100 milliGRAM(s) Oral three times a day PRN Cough  melatonin 3 milliGRAM(s) Oral at bedtime PRN Insomnia

## 2023-10-02 NOTE — DIETITIAN INITIAL EVALUATION ADULT - PROBLEM SELECTOR PLAN 1
In setting of COVID-19 infection, complicated by underlying COPD, pulmonary sarcoidosis. Patient is with mild somnolence at time of admission, and is answering with 1-2 word answers  - 5 day course of remdesavir to run 9/26 - 9/30  - Started on dexamethasone 6 mg PO Qd  - Will check D-dimer then consider therapeutic AC   - VBG repeat obtained, however likely drawn from IV line, not accurate representation, will order ABG and follow up   - GOC as described above, full code for now, HCP currently flying from [place] to [place]

## 2023-10-02 NOTE — CHART NOTE - NSCHARTNOTEFT_GEN_A_CORE
I have examined  BISHNU SEGURA 88y Female requires home home oxygen due to low oxygen saturations and (pulmonary diagnosis)     Treated this admission for (         sarcoidosis, COPD, COVID                                                     ) and now resolved    Patient is in chronic stable state and has following O2 saturations:    O2 sat room air at rest = ( 94%  )  O2 sat room air with ambulation = ( 87% )  O2 sat on oxygen ( NC @  2  L/min) with ambulation = (  90% )    Patient requiring continuous & portable nasal cannula oxygen @    2  L/min      Estelle Mendez PA-C  [id number] I have examined  BISHNU SEGURA 88y Female requires home home oxygen due to low oxygen saturations and sarcoidosis     Treated this admission for sarcoidosis, COPD, COVID and now resolved    Patient is in chronic stable state and has following O2 saturations:    O2 sat room air at rest = 94%  O2 sat room air with ambulation = 87%  O2 sat on oxygen ( NC @  2  L/min) with ambulation =  90%    Patient requiring continuous & portable nasal cannula oxygen @    2  L/min      Estelle Mendez PA-C  [id number]

## 2023-10-02 NOTE — PROGRESS NOTE ADULT - SUBJECTIVE AND OBJECTIVE BOX
infectious diseases progress note:    Patient is a 88y old  Female who presents with a chief complaint of AHRF (01 Oct 2023 12:51)        Infection due to severe acute respiratory syndrome coronavirus 2 (SARS-CoV-2)             Allergies    No Known Allergies    Intolerances        ANTIBIOTICS/RELEVANT:  antimicrobials    immunologic:  influenza  Vaccine (HIGH DOSE) 0.7 milliLiter(s) IntraMuscular once    OTHER:  acetaminophen     Tablet .. 650 milliGRAM(s) Oral every 6 hours PRN  albuterol    0.083% 2.5 milliGRAM(s) Nebulizer every 6 hours  aspirin enteric coated 81 milliGRAM(s) Oral daily  atorvastatin 20 milliGRAM(s) Oral at bedtime  benzonatate 100 milliGRAM(s) Oral three times a day PRN  clobetasol 0.05% Cream 1 Application(s) Topical two times a day  cyclopentolate 1% Solution 1 Drop(s) Both EYES two times a day  dexAMETHasone     Tablet 6 milliGRAM(s) Oral daily  enoxaparin Injectable 40 milliGRAM(s) SubCutaneous every 24 hours  ferrous    sulfate 325 milliGRAM(s) Oral daily  FLUoxetine 20 milliGRAM(s) Oral daily  hydrOXYzine hydrochloride 25 milliGRAM(s) Oral two times a day  melatonin 3 milliGRAM(s) Oral at bedtime PRN  mirtazapine 7.5 milliGRAM(s) Oral at bedtime  nebivolol 2.5 milliGRAM(s) Oral daily  polyethylene glycol 3350 17 Gram(s) Oral daily  prednisoLONE acetate 1% Suspension 1 Drop(s) Both EYES two times a day  senna 2 Tablet(s) Oral at bedtime  triamcinolone 0.1% Ointment 1 Application(s) Topical two times a day  triamterene 37.5 mG/hydrochlorothiazide 25 mG Tablet 1 Tablet(s) Oral daily      Objective:  Vital Signs Last 24 Hrs  T(C): 36.7 (02 Oct 2023 04:46), Max: 37 (01 Oct 2023 21:15)  T(F): 98 (02 Oct 2023 04:46), Max: 98.6 (01 Oct 2023 21:15)  HR: 61 (02 Oct 2023 04:46) (61 - 76)  BP: 170/85 (02 Oct 2023 04:46) (144/72 - 170/85)  BP(mean): --  RR: 18 (02 Oct 2023 04:46) (18 - 18)  SpO2: 94% (02 Oct 2023 04:46) (93% - 95%)    Parameters below as of 02 Oct 2023 04:46  Patient On (Oxygen Delivery Method): nasal cannula           Eyes:JIM, EOMI  Ear/Nose/Throat: no oral lesion, no sinus tenderness on percussion	  Neck:no JVD, no lymphadenopathy, supple  Respiratory: CTA moises  Cardiovascular: S1S2 RRR, no murmurs  Gastrointestinal:soft, (+) BS, no HSM  Extremities:no e/e/c        LABS:                        12.2   10.86 )-----------( 190      ( 01 Oct 2023 06:30 )             37.7     10-01    142  |  102  |  31<H>  ----------------------------<  95  4.1   |  28  |  0.68    Ca    8.9      01 Oct 2023 06:30  Mg     2.4     10-01    TPro  6.7  /  Alb  3.8  /  TBili  0.6  /  DBili  x   /  AST  17  /  ALT  27  /  AlkPhos  141<H>  10-01      Urinalysis Basic - ( 01 Oct 2023 06:30 )    Color: x / Appearance: x / SG: x / pH: x  Gluc: 95 mg/dL / Ketone: x  / Bili: x / Urobili: x   Blood: x / Protein: x / Nitrite: x   Leuk Esterase: x / RBC: x / WBC x   Sq Epi: x / Non Sq Epi: x / Bacteria: x          MICROBIOLOGY:    RECENT CULTURES:  09-26 @ 09:15 Clean Catch Clean Catch (Midstream)                <10,000 CFU/mL Normal Urogenital Yadira    09-26 @ 05:25 .Blood Blood                No growth at 5 days    09-26 @ 05:15 .Blood Blood                No growth at 5 days          RESPIRATORY CULTURES:              RADIOLOGY & ADDITIONAL STUDIES:        Pager [phone]  After 5 pm/weekends or if no response :[phone]

## 2023-10-02 NOTE — PROGRESS NOTE ADULT - SUBJECTIVE AND OBJECTIVE BOX
[facility] Division of Hospital Medicine  Ton Merino MD M-F, 8A-5P: MS Teams, Pager  Other Times: [facility] Extension / [facility] Pager      Patient is a 88y old  Female who presents with a chief complaint of Infection due to severe acute respiratory syndrome coronavirus 2 (SARS-CoV-2)     (02 Oct 2023 09:48)      SUBJECTIVE / OVERNIGHT EVENTS:      ADDITIONAL REVIEW OF SYSTEMS:    MEDICATIONS  (STANDING):  albuterol    0.083% 2.5 milliGRAM(s) Nebulizer every 6 hours  aspirin enteric coated 81 milliGRAM(s) Oral daily  atorvastatin 20 milliGRAM(s) Oral at bedtime  clobetasol 0.05% Cream 1 Application(s) Topical two times a day  cyclopentolate 1% Solution 1 Drop(s) Both EYES two times a day  dexAMETHasone     Tablet 6 milliGRAM(s) Oral daily  enoxaparin Injectable 40 milliGRAM(s) SubCutaneous every 24 hours  ferrous    sulfate 325 milliGRAM(s) Oral daily  FLUoxetine 20 milliGRAM(s) Oral daily  hydrOXYzine hydrochloride 25 milliGRAM(s) Oral two times a day  influenza  Vaccine (HIGH DOSE) 0.7 milliLiter(s) IntraMuscular once  mirtazapine 7.5 milliGRAM(s) Oral at bedtime  nebivolol 2.5 milliGRAM(s) Oral daily  polyethylene glycol 3350 17 Gram(s) Oral daily  prednisoLONE acetate 1% Suspension 1 Drop(s) Both EYES two times a day  senna 2 Tablet(s) Oral at bedtime  triamcinolone 0.1% Ointment 1 Application(s) Topical two times a day  triamterene 37.5 mG/hydrochlorothiazide 25 mG Tablet 1 Tablet(s) Oral daily    MEDICATIONS  (PRN):  acetaminophen     Tablet .. 650 milliGRAM(s) Oral every 6 hours PRN Temp greater or equal to 38C (100.4F), Mild Pain (1 - 3)  benzonatate 100 milliGRAM(s) Oral three times a day PRN Cough  melatonin 3 milliGRAM(s) Oral at bedtime PRN Insomnia      CAPILLARY BLOOD GLUCOSE          I&O's Summary    01 Oct 2023 07:01  -  02 Oct 2023 07:00  --------------------------------------------------------  IN: 480 mL / OUT: 1150 mL / NET: -670 mL        Daily     Daily     PHYSICAL EXAM:  Vital Signs Last 24 Hrs  T(C): 36.7 (02 Oct 2023 04:46), Max: 37 (01 Oct 2023 21:15)  T(F): 98 (02 Oct 2023 04:46), Max: 98.6 (01 Oct 2023 21:15)  HR: 61 (02 Oct 2023 04:46) (61 - 76)  BP: 170/85 (02 Oct 2023 04:46) (144/72 - 170/85)  BP(mean): --  RR: 18 (02 Oct 2023 04:46) (18 - 18)  SpO2: 94% (02 Oct 2023 04:46) (93% - 95%)    Parameters below as of 02 Oct 2023 04:46  Patient On (Oxygen Delivery Method): nasal cannula      CONSTITUTIONAL: NAD, well-developed, well-groomed  EYES: PERRLA; conjunctiva and sclera clear  ENMT: Moist oral mucosa, no pharyngeal injection or exudates; normal dentition  NECK: Supple, no palpable masses; no thyromegaly  RESPIRATORY: Normal respiratory effort; lungs are clear to auscultation bilaterally  CARDIOVASCULAR: Regular rate and rhythm, normal S1 and S2, no murmur/rub/gallop; No lower extremity edema; Peripheral pulses are 2+ bilaterally  ABDOMEN: Nontender to palpation, normoactive bowel sounds, no rebound/guarding; No hepatosplenomegaly  MUSCULOSKELETAL:  no clubbing or cyanosis of digits; no joint swelling or tenderness to palpation, moving all extremities   PSYCH: A+O to person, place, and time; affect appropriate  NEUROLOGY: CN 2-12 are intact and symmetric; no gross sensory/motor deficits   SKIN: No rashes; no palpable lesions    LABS:                        12.2   10.86 )-----------( 190      ( 01 Oct 2023 06:30 )             37.7     10-01    142  |  102  |  31<H>  ----------------------------<  95  4.1   |  28  |  0.68    Ca    8.9      01 Oct 2023 06:30  Mg     2.4     10-01    TPro  6.7  /  Alb  3.8  /  TBili  0.6  /  DBili  x   /  AST  17  /  ALT  27  /  AlkPhos  141<H>  10-01    LIVER FUNCTIONS - ( 01 Oct 2023 06:30 )  Alb: 3.8 g/dL / Pro: 6.7 g/dL / ALK PHOS: 141 U/L / ALT: 27 U/L / AST: 17 U/L / GGT: x                 Urinalysis Basic - ( 01 Oct 2023 06:30 )    Color: x / Appearance: x / SG: x / pH: x  Gluc: 95 mg/dL / Ketone: x  / Bili: x / Urobili: x   Blood: x / Protein: x / Nitrite: x   Leuk Esterase: x / RBC: x / WBC x   Sq Epi: x / Non Sq Epi: x / Bacteria: x        SARS-CoV-2: Detected (26 Sep 2023 05:37)      RADIOLOGY & ADDITIONAL TESTS:  Results Reviewed:   Imaging Personally Reviewed:  Electrocardiogram Personally Reviewed:    COORDINATION OF CARE:  Care Discussed with Consultants/Other Providers [Y/N]:  Prior or Outpatient Records Reviewed [Y/N]:

## 2023-10-02 NOTE — DIETITIAN INITIAL EVALUATION ADULT - ENTER FROM (CAL/KG)
Detail Level: Zone
Text: The above diagnosis and findings were noted on the exam and discussed with the patient, who declines treatment due to the lesions being asymptomatic.
25

## 2023-10-03 ENCOUNTER — TRANSCRIPTION ENCOUNTER (OUTPATIENT)
Age: 88
End: 2023-10-03

## 2023-10-03 DIAGNOSIS — Z02.9 ENCOUNTER FOR ADMINISTRATIVE EXAMINATIONS, UNSPECIFIED: ICD-10-CM

## 2023-10-03 LAB
ALBUMIN SERPL ELPH-MCNC: 3.9 G/DL — SIGNIFICANT CHANGE UP (ref 3.3–5)
ALP SERPL-CCNC: 143 U/L — HIGH (ref 40–120)
ALT FLD-CCNC: 21 U/L — SIGNIFICANT CHANGE UP (ref 10–45)
ANION GAP SERPL CALC-SCNC: 16 MMOL/L — SIGNIFICANT CHANGE UP (ref 5–17)
AST SERPL-CCNC: 17 U/L — SIGNIFICANT CHANGE UP (ref 10–40)
BILIRUB SERPL-MCNC: 0.8 MG/DL — SIGNIFICANT CHANGE UP (ref 0.2–1.2)
BUN SERPL-MCNC: 43 MG/DL — HIGH (ref 7–23)
CALCIUM SERPL-MCNC: 9.4 MG/DL — SIGNIFICANT CHANGE UP (ref 8.4–10.5)
CHLORIDE SERPL-SCNC: 100 MMOL/L — SIGNIFICANT CHANGE UP (ref 96–108)
CO2 SERPL-SCNC: 24 MMOL/L — SIGNIFICANT CHANGE UP (ref 22–31)
CREAT SERPL-MCNC: 0.88 MG/DL — SIGNIFICANT CHANGE UP (ref 0.5–1.3)
EGFR: 64 ML/MIN/1.73M2 — SIGNIFICANT CHANGE UP
GLUCOSE BLDC GLUCOMTR-MCNC: 164 MG/DL — HIGH (ref 70–99)
GLUCOSE SERPL-MCNC: 85 MG/DL — SIGNIFICANT CHANGE UP (ref 70–99)
HCT VFR BLD CALC: 46 % — HIGH (ref 34.5–45)
HGB BLD-MCNC: 14.7 G/DL — SIGNIFICANT CHANGE UP (ref 11.5–15.5)
MAGNESIUM SERPL-MCNC: 2.5 MG/DL — SIGNIFICANT CHANGE UP (ref 1.6–2.6)
MCHC RBC-ENTMCNC: 29.1 PG — SIGNIFICANT CHANGE UP (ref 27–34)
MCHC RBC-ENTMCNC: 32 GM/DL — SIGNIFICANT CHANGE UP (ref 32–36)
MCV RBC AUTO: 91.1 FL — SIGNIFICANT CHANGE UP (ref 80–100)
NRBC # BLD: 0 /100 WBCS — SIGNIFICANT CHANGE UP (ref 0–0)
PHOSPHATE SERPL-MCNC: 4.2 MG/DL — SIGNIFICANT CHANGE UP (ref 2.5–4.5)
PLATELET # BLD AUTO: 267 K/UL — SIGNIFICANT CHANGE UP (ref 150–400)
POTASSIUM SERPL-MCNC: 4.5 MMOL/L — SIGNIFICANT CHANGE UP (ref 3.5–5.3)
POTASSIUM SERPL-SCNC: 4.5 MMOL/L — SIGNIFICANT CHANGE UP (ref 3.5–5.3)
PROT SERPL-MCNC: 6.9 G/DL — SIGNIFICANT CHANGE UP (ref 6–8.3)
RBC # BLD: 5.05 M/UL — SIGNIFICANT CHANGE UP (ref 3.8–5.2)
RBC # FLD: 14.6 % — HIGH (ref 10.3–14.5)
SODIUM SERPL-SCNC: 140 MMOL/L — SIGNIFICANT CHANGE UP (ref 135–145)
WBC # BLD: 11.51 K/UL — HIGH (ref 3.8–10.5)
WBC # FLD AUTO: 11.51 K/UL — HIGH (ref 3.8–10.5)

## 2023-10-03 PROCEDURE — 99232 SBSQ HOSP IP/OBS MODERATE 35: CPT

## 2023-10-03 RX ORDER — DEXAMETHASONE 0.5 MG/5ML
1 ELIXIR ORAL
Qty: 2 | Refills: 0
Start: 2023-10-03 | End: 2023-10-04

## 2023-10-03 RX ORDER — L.ACIDOPH/B.ANIMALIS/B.LONGUM 15B CELL
1 CAPSULE ORAL
Refills: 0 | DISCHARGE

## 2023-10-03 RX ADMIN — Medication 20 MILLIGRAM(S): at 13:01

## 2023-10-03 RX ADMIN — Medication 1 TABLET(S): at 05:46

## 2023-10-03 RX ADMIN — Medication 1 DROP(S): at 18:03

## 2023-10-03 RX ADMIN — Medication 1 DROP(S): at 05:47

## 2023-10-03 RX ADMIN — CYCLOPENTOLATE HYDROCHLORIDE 1 DROP(S): 10 SOLUTION/ DROPS OPHTHALMIC at 05:47

## 2023-10-03 RX ADMIN — Medication 6 MILLIGRAM(S): at 05:47

## 2023-10-03 RX ADMIN — ALBUTEROL 2.5 MILLIGRAM(S): 90 AEROSOL, METERED ORAL at 05:48

## 2023-10-03 RX ADMIN — Medication 81 MILLIGRAM(S): at 13:01

## 2023-10-03 RX ADMIN — SENNA PLUS 2 TABLET(S): 8.6 TABLET ORAL at 22:49

## 2023-10-03 RX ADMIN — Medication 25 MILLIGRAM(S): at 05:46

## 2023-10-03 RX ADMIN — ALBUTEROL 2.5 MILLIGRAM(S): 90 AEROSOL, METERED ORAL at 13:01

## 2023-10-03 RX ADMIN — ATORVASTATIN CALCIUM 20 MILLIGRAM(S): 80 TABLET, FILM COATED ORAL at 22:49

## 2023-10-03 RX ADMIN — Medication 325 MILLIGRAM(S): at 13:01

## 2023-10-03 RX ADMIN — Medication 1 APPLICATION(S): at 05:47

## 2023-10-03 RX ADMIN — CYCLOPENTOLATE HYDROCHLORIDE 1 DROP(S): 10 SOLUTION/ DROPS OPHTHALMIC at 18:03

## 2023-10-03 RX ADMIN — NEBIVOLOL HYDROCHLORIDE 2.5 MILLIGRAM(S): 5 TABLET ORAL at 05:47

## 2023-10-03 RX ADMIN — ALBUTEROL 2.5 MILLIGRAM(S): 90 AEROSOL, METERED ORAL at 18:02

## 2023-10-03 RX ADMIN — POLYETHYLENE GLYCOL 3350 17 GRAM(S): 17 POWDER, FOR SOLUTION ORAL at 13:02

## 2023-10-03 RX ADMIN — ENOXAPARIN SODIUM 40 MILLIGRAM(S): 100 INJECTION SUBCUTANEOUS at 18:02

## 2023-10-03 RX ADMIN — MIRTAZAPINE 7.5 MILLIGRAM(S): 45 TABLET, ORALLY DISINTEGRATING ORAL at 22:49

## 2023-10-03 NOTE — DISCHARGE NOTE PROVIDER - CARE PROVIDER_API CALL
Emmanuel Noonan  Infectious Disease  [address] [place] [id number]  Phone: [phone]  Fax: [phone]  Established Patient  Follow Up Time: 2 weeks

## 2023-10-03 NOTE — DISCHARGE NOTE PROVIDER - NSDCCPCAREPLAN_GEN_ALL_CORE_FT
PRINCIPAL DISCHARGE DIAGNOSIS  Diagnosis: 2019 novel coronavirus disease (COVID-19)  Assessment and Plan of Treatment: You were admitted with a COVID infection. We treated you with remdesivir and dexamethasone. We are sending you back home with portable oxygen that you can use to help you breathe better. Please follow up with your PCP in 1-2 weeks.      SECONDARY DISCHARGE DIAGNOSES  Diagnosis: CAD (coronary artery disease)  Assessment and Plan of Treatment: Continue aspirin and atorvastatin    Diagnosis: Pulmonary sarcoidosis  Assessment and Plan of Treatment: Please continue to follow up with your PCP as needed    Diagnosis: HTN (hypertension)  Assessment and Plan of Treatment: Continue your current medications    Diagnosis: Acute respiratory failure with hypoxia  Assessment and Plan of Treatment: As above, you had a COVID infection and it has now resolved.     PRINCIPAL DISCHARGE DIAGNOSIS  Diagnosis: 2019 novel coronavirus disease (COVID-19)  Assessment and Plan of Treatment: You were admitted with a COVID infection. We treated you with Remdesivir and dexamethasone. Please follow up with your PCP in 1-2 weeks.      SECONDARY DISCHARGE DIAGNOSES  Diagnosis: Acute respiratory failure with hypoxia  Assessment and Plan of Treatment: As above, you had a COVID infection and it has now resolved.    Diagnosis: CAD (coronary artery disease)  Assessment and Plan of Treatment: Continue aspirin and atorvastatin    Diagnosis: Pulmonary sarcoidosis  Assessment and Plan of Treatment: Please continue to follow up with your PCP as needed    Diagnosis: HTN (hypertension)  Assessment and Plan of Treatment: Continue your current medications

## 2023-10-03 NOTE — PROGRESS NOTE ADULT - PROBLEM SELECTOR PLAN 1
In setting of COVID-19 infection, complicated by underlying COPD, pulmonary sarcoidosis.   O2 weaned to 2L today-- continue to wean as tolerated   Patient had sepsis 2/2 COVID present on admission , met sepsis criteria due to fever 103F RR 24, HR 91, known COVID infection all present on admission   - s/p 5 day course of remdesavir 9/26 - 9/30  - c/w dexamethasone 6 mg PO Qd x10 days given hypoxia   - s/p ID eval, rec hold antibiotics due to history of C diff  - BCX x2 NGTD   - reviewed CT angio chest , no PE  - D-dimer 652--> 461, borderline 2 times ULN, considering patient frailty (44kg) , hx of hematuria, negative CT angio, weighing risk vs. benefit, will c/w prophylactic lovenox 40mg subQ qd  -  [facility] duplex : NO lower extremity DVT   -chest PT, IS, albuterol q6h  - noted cough with eating, S & S eval-- rec: regular diet thin liquid

## 2023-10-03 NOTE — DISCHARGE NOTE PROVIDER - HOSPITAL COURSE
HPI:  88F PMH CAD, pulmonary sarcoidosis, COPD (not on home O2), HTN, presents with shortness of breath for several days and found to have acute hypoxic respiratory failure secondary to COVID. Patient is fully vaccinated but not boosted for COVID. Symptoms began on day before admission, and have progressed to the time of admission. EMS was called and found patient to be hypoxic, for which she was brought to the hospital.   In ED, labs notable for mild anemia. Troponin T downtrending and likely in setting of demand ischemia. CXR clear. CT PE without VTE. COVID swab positive. Patient now admitted to hospital for further evaluation and treatment.  (26 Sep 2023 12:27)    Hospital Course:  Pt was admitted [facility] in setting of covid-19 infx c/b underlying copd, sarcoidosis. Patient had sepsis 2/2 COVID present on admission , met sepsis criteria due to fever 103F RR 24, HR 91, known COVID infection all present on admission. She is s/p 5 day course of remdesivir 9/26 - 9/30, c/w dexamethasone 6 mg PO Qd until 10/5 (9/26- 10/5)  Pt had ID consult and was rec to hold antibiotics due to history of C diff. D-dimer elevated but 652--> 461, borderline 2 times ULN, considering patient frailty (44kg) , hx of hematuria, negative CT angio, weighing risk vs. benefit, pt  started on prophylactic lovenox 40mg subQ qd. VA duplex neg for LE DVT.  Pt continued on ASA and atorvastatin for CAD and triamterene 37.5 -  HCTZ 25 mg PO Qd for HTN. Pt noted w rash during admission, dermatology recommended triamcinolone for the body and clobetasol for scalp.  PT/OT recommended rehab; however family prefers to take patient home (assisted living facility).    Important Medication Changes and Reason: continue dexamethasone until 10/5.    Active or Pending Issues Requiring Follow-up: primary care follow up within 1-2 weeks    Advanced Directives:   [x] Full code  [ ] DNR  [ ] Hospice    Discharge Diagnoses: Covid-19 infection         HPI:  88F PMH CAD, pulmonary sarcoidosis, COPD (not on home O2), HTN, presents with shortness of breath for several days and found to have acute hypoxic respiratory failure secondary to COVID. Patient is fully vaccinated but not boosted for COVID. Symptoms began on day before admission, and have progressed to the time of admission. EMS was called and found patient to be hypoxic, for which she was brought to the hospital.   In ED, labs notable for mild anemia. Troponin T downtrending and likely in setting of demand ischemia. CXR clear. CT PE without VTE. COVID swab positive. Patient now admitted to hospital for further evaluation and treatment.  (26 Sep 2023 12:27)    Hospital Course:  Pt was admitted [facility] in setting of covid-19 infx c/b underlying copd, sarcoidosis. Patient had sepsis 2/2 COVID present on admission , met sepsis criteria due to fever 103F RR 24, HR 91, known COVID infection all present on admission. She is s/p 5 day course of remdesivir 9/26 - 9/30, c/w dexamethasone 6 mg PO Qd until 10/5 (9/26- 10/5)  Pt had ID consult and was rec to hold antibiotics due to history of C diff. D-dimer elevated but 652--> 461, borderline 2 times ULN, considering patient frailty (44kg) , hx of hematuria, negative CT angio, weighing risk vs. benefit, pt  started on prophylactic lovenox 40mg subQ qd. VA duplex neg for LE DVT.  Pt continued on ASA and atorvastatin for CAD and triamterene 37.5 -  HCTZ 25 mg PO Qd for HTN. Pt noted w rash during admission, dermatology recommended triamcinolone for the body and clobetasol for scalp.  PT/OT recommended rehab; however family prefers to take patient home (assisted living facility).  Pt also suffered inpatient fall on 10/5. X-rays ordered and CT head.    Active or Pending Issues Requiring Follow-up: primary care follow up within 1-2 weeks    Advanced Directives:   [x] Full code  [ ] DNR  [ ] Hospice    Discharge Diagnoses: Covid-19 infection         HPI:  88F PMH CAD, pulmonary sarcoidosis, COPD (not on home O2), HTN, presents with shortness of breath for several days and found to have acute hypoxic respiratory failure secondary to COVID. Patient is fully vaccinated but not boosted for COVID. Symptoms began on day before admission, and have progressed to the time of admission. EMS was called and found patient to be hypoxic, for which she was brought to the hospital.   In ED, labs notable for mild anemia. Troponin T downtrending and likely in setting of demand ischemia. CXR without acute findings. CT PE without VTE. COVID PCR was positive. Patient now admitted to hospital for further evaluation and treatment.  (26 Sep 2023 12:27)    Hospital Course:  Pt was admitted with AHRF in setting of Covid-19 infection c/b underlying COPD, sarcoidosis. Patient had sepsis 2/2 COVID present on admission, met sepsis criteria due to fever 103F RR 24, HR 91, known COVID infection all present on admission. She is s/p 5 day course of Remdesivir (9/26 - 9/30) and dexamethasone (9/26- 10/5)  Pt had ID consult and was rec to hold antibiotics due to history of C diff. D-dimer elevated but downtrended (652--> 461). Ultrasound of BL LE was negative for LE DVT. Pt was on prophylactic Lovenox 40mg subQ daily.   Pt continued on ASA and atorvastatin for CAD and triamterene 37.5 -  HCTZ 25 mg PO Qd for HTN. Pt noted w rash during admission, dermatology recommended triamcinolone for the body and clobetasol for scalp.  PT/OT recommended rehab; however family prefers to take patient home (assisted living facility).  Pt also suffered inpatient fall on 10/5- found seated on the floor on her buttocks. XR of pelvis, hip, left femur, left knee and left tibula/fibula were negative for acute fracture. Patient declined CT Head.     Active or Pending Issues Requiring Follow-up: Primary care follow up within 1-2 weeks.     Advanced Directives:   [x] Full code  [ ] DNR  [ ] Hospice    Discharge Diagnoses: Sepsis secondary to Covid-19 infection         HPI:  88F PMH CAD, pulmonary sarcoidosis, COPD (not on home O2), HTN, presents with shortness of breath for several days and found to have acute hypoxic respiratory failure secondary to COVID. Patient is fully vaccinated but not boosted for COVID. Symptoms began on day before admission, and have progressed to the time of admission. EMS was called and found patient to be hypoxic, for which she was brought to the hospital.   In ED, labs notable for mild anemia. Troponin T downtrending and likely in setting of demand ischemia. CXR without acute findings. CT PE without VTE. COVID PCR was positive. Patient now admitted to hospital for further evaluation and treatment.  (26 Sep 2023 12:27)    Hospital Course:  Pt was admitted with AHRF in setting of Covid-19 infection c/b underlying COPD, sarcoidosis. Patient had sepsis 2/2 COVID present on admission, met sepsis criteria due to fever 103F RR 24, HR 91, known COVID infection all present on admission. She is s/p 5 day course of Remdesivir (9/26 - 9/30) and dexamethasone (9/26- 10/5)  Pt had ID consult and was rec to hold antibiotics due to history of C diff. D-dimer elevated but downtrended (652--> 461). Ultrasound of BL LE was negative for LE DVT. Pt was on prophylactic Lovenox 40mg subQ daily.   Pt continued on ASA and atorvastatin for CAD and triamterene 37.5 -  HCTZ 25 mg PO Qd for HTN. Pt noted w rash during admission, dermatology recommended triamcinolone for the body and clobetasol for scalp.  PT/OT recommended rehab; however family prefers to take patient home (assisted living facility).  Pt also suffered inpatient fall on 10/5- found seated on the floor on her buttocks. XR of pelvis, hip, left femur, left knee and left tibula/fibula were negative for acute fracture. Patient CT Head negative.    Active or Pending Issues Requiring Follow-up: Primary care follow up within 1-2 weeks.     Advanced Directives:   [x] Full code  [ ] DNR  [ ] Hospice    Discharge Diagnoses: Sepsis secondary to Covid-19 infection

## 2023-10-03 NOTE — DISCHARGE NOTE PROVIDER - ATTENDING DISCHARGE PHYSICAL EXAMINATION:
Vital Signs Last 24 Hrs  T(C): 36.5 (06 Oct 2023 11:05), Max: 36.6 (05 Oct 2023 21:01)  T(F): 97.7 (06 Oct 2023 11:05), Max: 97.9 (05 Oct 2023 21:01)  HR: 60 (06 Oct 2023 11:05) (59 - 67)  BP: 128/61 (06 Oct 2023 11:05) (128/61 - 163/67)  BP(mean): --  RR: 18 (06 Oct 2023 11:05) (18 - 18)  SpO2: 95% (06 Oct 2023 11:05) (95% - 98%)    Parameters below as of 06 Oct 2023 11:05  Patient On (Oxygen Delivery Method): room air        CONSTITUTIONAL: NAD, well-developed, well-groomed  EYES: Conjunctiva and sclera clear  ENMT: Moist oral mucosa   NECK: Supple, midline  RESPIRATORY: Normal respiratory effort; lungs are clear to auscultation bilaterally  CARDIOVASCULAR: Regular rate and rhythm, normal S1 and S2, no murmur/rub/gallop; No lower extremity edema; Peripheral pulses are 2+ bilaterally  ABDOMEN: Nontender to palpation, normoactive bowel sounds, no rebound/guarding  MUSCULOSKELETAL:  No clubbing or cyanosis of digits; no joint swelling or tenderness to palpation  PSYCH: A+O to person, place, and time; affect appropriate  NEUROLOGY: CN 2-12 are intact and symmetric; no gross sensory deficits.

## 2023-10-03 NOTE — PROGRESS NOTE ADULT - PROBLEM SELECTOR PLAN 7
s/p dermatology evaluation,   Recommend trial of triamcinolone for the body and clobetasol for scalp.

## 2023-10-03 NOTE — DISCHARGE NOTE PROVIDER - DETAILS OF MALNUTRITION DIAGNOSIS/DIAGNOSES
This patient has been assessed with a concern for Malnutrition and was treated during this hospitalization for the following Nutrition diagnosis/diagnoses:     -  10/02/2023: Severe protein-calorie malnutrition   -  10/02/2023: Underweight (BMI < 19)

## 2023-10-03 NOTE — PROGRESS NOTE ADULT - PROBLEM SELECTOR PLAN 8
#Discharge planning 50mins   PT/OT rec rehab; daughter Bruna, previously declined [facility], prefers to take patient to [facility], however patient still hypoxic requiring supplemental oxygen , doesn't have HHA, and not able to manage oxygen herself per staff.   Now awaiting [facility] placement >  F/U CM #Discharge planning 50mins   At baseline patient needs ADL assist and uses walker to ambulate, from [facility].   PT/OT rec [facility]; patient's daughter previously declined [facility], prefers to take patient to halfway, however patient still hypoxic requiring supplemental oxygen , doesn't have HHA from licensed agency, and not able to manage oxygen herself per staff.   Now awaiting [facility] placement >  F/U CM

## 2023-10-03 NOTE — PROGRESS NOTE ADULT - SUBJECTIVE AND OBJECTIVE BOX
infectious diseases progress note:    Patient is a 88y old  Female who presents with a chief complaint of AHRF (03 Oct 2023 08:03)        Infection due to severe acute respiratory syndrome coronavirus 2 (SARS-CoV-2)               Allergies    No Known Allergies    Intolerances        ANTIBIOTICS/RELEVANT:  antimicrobials    immunologic:  influenza  Vaccine (HIGH DOSE) 0.7 milliLiter(s) IntraMuscular once    OTHER:  acetaminophen     Tablet .. 650 milliGRAM(s) Oral every 6 hours PRN  albuterol    0.083% 2.5 milliGRAM(s) Nebulizer every 6 hours  aspirin enteric coated 81 milliGRAM(s) Oral daily  atorvastatin 20 milliGRAM(s) Oral at bedtime  benzonatate 100 milliGRAM(s) Oral three times a day PRN  clobetasol 0.05% Cream 1 Application(s) Topical two times a day  cyclopentolate 1% Solution 1 Drop(s) Both EYES two times a day  dexAMETHasone     Tablet 6 milliGRAM(s) Oral daily  enoxaparin Injectable 40 milliGRAM(s) SubCutaneous every 24 hours  ferrous    sulfate 325 milliGRAM(s) Oral daily  FLUoxetine 20 milliGRAM(s) Oral daily  hydrOXYzine hydrochloride 25 milliGRAM(s) Oral two times a day  melatonin 3 milliGRAM(s) Oral at bedtime PRN  mirtazapine 7.5 milliGRAM(s) Oral at bedtime  nebivolol 2.5 milliGRAM(s) Oral daily  polyethylene glycol 3350 17 Gram(s) Oral daily  prednisoLONE acetate 1% Suspension 1 Drop(s) Both EYES two times a day  senna 2 Tablet(s) Oral at bedtime  triamcinolone 0.1% Ointment 1 Application(s) Topical two times a day  triamterene 37.5 mG/hydrochlorothiazide 25 mG Tablet 1 Tablet(s) Oral daily      Objective:  Vital Signs Last 24 Hrs  T(C): 37.1 (03 Oct 2023 04:53), Max: 37.1 (03 Oct 2023 04:53)  T(F): 98.7 (03 Oct 2023 04:53), Max: 98.7 (03 Oct 2023 04:53)  HR: 65 (03 Oct 2023 04:53) (65 - 90)  BP: 171/82 (03 Oct 2023 04:53) (146/78 - 171/82)  BP(mean): --  RR: 18 (03 Oct 2023 04:53) (18 - 18)  SpO2: 98% (03 Oct 2023 04:53) (92% - 98%)    Parameters below as of 03 Oct 2023 04:53  Patient On (Oxygen Delivery Method): nasal cannula  O2 Flow (L/min): 2       Eyes:JIM, EOMI  Ear/Nose/Throat: no oral lesion, no sinus tenderness on percussion	  Neck:no JVD, no lymphadenopathy, supple  Respiratory: CTA moises  Cardiovascular: S1S2 RRR, no murmurs  Gastrointestinal:soft, (+) BS, no HSM  Extremities:no e/e/c        LABS:                        14.7   11.51 )-----------( 267      ( 03 Oct 2023 07:14 )             46.0     10-03    140  |  100  |  43<H>  ----------------------------<  85  4.5   |  24  |  0.88    Ca    9.4      03 Oct 2023 07:14  Phos  4.2     10-03  Mg     2.5     10-03    TPro  6.9  /  Alb  3.9  /  TBili  0.8  /  DBili  x   /  AST  17  /  ALT  21  /  AlkPhos  143<H>  10-03      Urinalysis Basic - ( 03 Oct 2023 07:14 )    Color: x / Appearance: x / SG: x / pH: x  Gluc: 85 mg/dL / Ketone: x  / Bili: x / Urobili: x   Blood: x / Protein: x / Nitrite: x   Leuk Esterase: x / RBC: x / WBC x   Sq Epi: x / Non Sq Epi: x / Bacteria: x          MICROBIOLOGY:    RECENT CULTURES:        RESPIRATORY CULTURES:              RADIOLOGY & ADDITIONAL STUDIES:        Pager [phone]  After 5 pm/weekends or if no response :[phone]

## 2023-10-03 NOTE — DISCHARGE NOTE PROVIDER - NSDCFUADDAPPT_GEN_ALL_CORE_FT
APPTS ARE READY TO BE MADE: [x] YES    Best Family or Patient Contact (if needed):    Additional Information about above appointments (if needed):    1:   2:   3:     Other comments or requests:    APPTS ARE READY TO BE MADE: [x] YES    Best Family or Patient Contact (if needed):    Additional Information about above appointments (if needed):    1:   2:   3:     Other comments or requests:     Patient is being discharged to SNF. Patient/caregiver will arrange follow up appointments.

## 2023-10-03 NOTE — DISCHARGE NOTE PROVIDER - NSDCFUADDINST_GEN_ALL_CORE_FT
Wound care instructions:  1.) topical therapy: sacral/buttock injury - cleanse with incontinence cleanser, pat dry, apply Jennifer ointment BID and PRN for incontinent episodes  2.) Incontinence Management - incontinence cleanser, pads, pericare BID, continue external female urinary catheter  3.) Maintain on an alternating air with low air loss surface  4.) Turn and reposition Q 2 hours  5.) Nutrition optimization  6.) Offload heels/feet with complete cair air fluidized boots/trista lock pillow; ensure that the soles of the feet are not resting on the foot board of the bed.  7.) Chair cushion for chair sitting  8.) foot/toe wounds referred to wound care team Podiatrists who will see patient.  Follow up as outpatient at [facility] [address] [facility] [phone].

## 2023-10-03 NOTE — DISCHARGE NOTE PROVIDER - NSDCMRMEDTOKEN_GEN_ALL_CORE_FT
acetaminophen 500 mg oral tablet: 1 tab(s) orally 3 times a day for pain  albuterol 2.5 mg/3 mL (0.083%) inhalation solution: 3 milliliter(s) by nebulizer every 4 hours as needed  aspirin 81 mg oral delayed release tablet: 1 tab(s) orally once a day  atorvastatin 20 mg oral tablet: 1 tab(s) orally once a day  cyclopentolate 1% ophthalmic solution: 1 drop(s) in the left eye 2 times a day  D3 25 mcg (1000 intl units) oral tablet: 1 tab(s) orally once a day  ferrous sulfate 325 mg (65 mg elemental iron) oral tablet: 1 tab(s) orally once a day  Florastor 250 mg oral capsule: 1 cap(s) orally once a day  FLUoxetine 20 mg oral capsule: 1 cap(s) orally once a day  mirtazapine 7.5 mg oral tablet: 1 tab(s) orally once a day (at bedtime)  Nebivolol 2.5 mg oral tablet: 1 tab(s) orally once a day  Potassium Chloride (Eqv-Klor-Con M20) 20 mEq oral tablet, extended release: 1 tab(s) orally once a day  prednisoLONE acetate 1% ophthalmic suspension: 1 drop(s) in the left eye 2 times a day  Probiotic Formula oral capsule: 1 cap(s) orally once a day x 14 days, stop date 9/29/23  temazepam 30 mg oral capsule: 1 cap(s) orally once a day (at bedtime)  triamterene-hydrochlorothiazide 37.5 mg-25 mg oral capsule: 1 cap(s) orally once a day   acetaminophen 500 mg oral tablet: 1 tab(s) orally 3 times a day for pain  albuterol 2.5 mg/3 mL (0.083%) inhalation solution: 3 milliliter(s) by nebulizer every 4 hours as needed  aspirin 81 mg oral delayed release tablet: 1 tab(s) orally once a day  atorvastatin 20 mg oral tablet: 1 tab(s) orally once a day  cyclopentolate 1% ophthalmic solution: 1 drop(s) in the left eye 2 times a day  D3 25 mcg (1000 intl units) oral tablet: 1 tab(s) orally once a day  ferrous sulfate 325 mg (65 mg elemental iron) oral tablet: 1 tab(s) orally once a day  Florastor 250 mg oral capsule: 1 cap(s) orally once a day  FLUoxetine 20 mg oral capsule: 1 cap(s) orally once a day  mirtazapine 7.5 mg oral tablet: 1 tab(s) orally once a day (at bedtime)  Nebivolol 2.5 mg oral tablet: 1 tab(s) orally once a day  Potassium Chloride (Eqv-Klor-Con M20) 20 mEq oral tablet, extended release: 1 tab(s) orally once a day  prednisoLONE acetate 1% ophthalmic suspension: 1 drop(s) in the left eye 2 times a day  temazepam 30 mg oral capsule: 1 cap(s) orally once a day (at bedtime)  triamterene-hydrochlorothiazide 37.5 mg-25 mg oral capsule: 1 cap(s) orally once a day   acetaminophen 500 mg oral tablet: 1 tab(s) orally 3 times a day for pain  albuterol 2.5 mg/3 mL (0.083%) inhalation solution: 3 milliliter(s) by nebulizer every 4 hours as needed  aspirin 81 mg oral delayed release tablet: 1 tab(s) orally once a day  atorvastatin 20 mg oral tablet: 1 tab(s) orally once a day  clobetasol 0.05% topical cream: Apply topically to affected area 2 times a day 1 Apply topically to affected area 2 times a day until 10/12  cyclopentolate 1% ophthalmic solution: 1 drop(s) in the left eye 2 times a day  D3 25 mcg (1000 intl units) oral tablet: 1 tab(s) orally once a day  dexAMETHasone 6 mg oral tablet: 1 tab(s) orally once a day take until 10/5  ferrous sulfate 325 mg (65 mg elemental iron) oral tablet: 1 tab(s) orally once a day  Florastor 250 mg oral capsule: 1 cap(s) orally once a day  FLUoxetine 20 mg oral capsule: 1 cap(s) orally once a day  mirtazapine 7.5 mg oral tablet: 1 tab(s) orally once a day (at bedtime)  Nebivolol 2.5 mg oral tablet: 1 tab(s) orally once a day  Potassium Chloride (Eqv-Klor-Con M20) 20 mEq oral tablet, extended release: 1 tab(s) orally once a day  prednisoLONE acetate 1% ophthalmic suspension: 1 drop(s) in the left eye 2 times a day  temazepam 30 mg oral capsule: 1 cap(s) orally once a day (at bedtime)  triamcinolone 0.1% topical ointment: Apply topically to affected area 2 times a day 1 Apply topically to affected area 2 times a day through 10/12  triamterene-hydrochlorothiazide 37.5 mg-25 mg oral capsule: 1 cap(s) orally once a day   acetaminophen 500 mg oral tablet: 1 tab(s) orally 3 times a day for pain  albuterol 2.5 mg/3 mL (0.083%) inhalation solution: 3 milliliter(s) by nebulizer every 4 hours as needed  aspirin 81 mg oral delayed release tablet: 1 tab(s) orally once a day  atorvastatin 20 mg oral tablet: 1 tab(s) orally once a day  clobetasol 0.05% topical cream: Apply topically to affected area 2 times a day 1 Apply topically to affected area 2 times a day until 10/12  cyclopentolate 1% ophthalmic solution: 1 drop(s) in the left eye 2 times a day  D3 25 mcg (1000 intl units) oral tablet: 1 tab(s) orally once a day  ferrous sulfate 325 mg (65 mg elemental iron) oral tablet: 1 tab(s) orally once a day  Florastor 250 mg oral capsule: 1 cap(s) orally once a day  FLUoxetine 20 mg oral capsule: 1 cap(s) orally once a day  mirtazapine 7.5 mg oral tablet: 1 tab(s) orally once a day (at bedtime)  Nebivolol 2.5 mg oral tablet: 1 tab(s) orally once a day  Potassium Chloride (Eqv-Klor-Con M20) 20 mEq oral tablet, extended release: 1 tab(s) orally once a day  prednisoLONE acetate 1% ophthalmic suspension: 1 drop(s) in the left eye 2 times a day  temazepam 30 mg oral capsule: 1 cap(s) orally once a day (at bedtime)  triamcinolone 0.1% topical ointment: Apply topically to affected area 2 times a day 1 Apply topically to affected area 2 times a day through 10/12  triamterene-hydrochlorothiazide 37.5 mg-25 mg oral capsule: 1 cap(s) orally once a day

## 2023-10-03 NOTE — PROGRESS NOTE ADULT - SUBJECTIVE AND OBJECTIVE BOX
[facility] Division of Hospital Medicine  Ton Merino MD M-F, 8A-5P: MS Teams, Pager  Other Times: [facility] / [facility] Pager      Patient is a 88y old  Female who presents with a chief complaint of AHRF (03 Oct 2023 10:02)      SUBJECTIVE / OVERNIGHT EVENTS:  Patient was examined    ADDITIONAL REVIEW OF SYSTEMS:    MEDICATIONS  (STANDING):  albuterol    0.083% 2.5 milliGRAM(s) Nebulizer every 6 hours  aspirin enteric coated 81 milliGRAM(s) Oral daily  atorvastatin 20 milliGRAM(s) Oral at bedtime  clobetasol 0.05% Cream 1 Application(s) Topical two times a day  cyclopentolate 1% Solution 1 Drop(s) Both EYES two times a day  dexAMETHasone     Tablet 6 milliGRAM(s) Oral daily  enoxaparin Injectable 40 milliGRAM(s) SubCutaneous every 24 hours  ferrous    sulfate 325 milliGRAM(s) Oral daily  FLUoxetine 20 milliGRAM(s) Oral daily  hydrOXYzine hydrochloride 25 milliGRAM(s) Oral two times a day  influenza  Vaccine (HIGH DOSE) 0.7 milliLiter(s) IntraMuscular once  mirtazapine 7.5 milliGRAM(s) Oral at bedtime  nebivolol 2.5 milliGRAM(s) Oral daily  polyethylene glycol 3350 17 Gram(s) Oral daily  prednisoLONE acetate 1% Suspension 1 Drop(s) Both EYES two times a day  senna 2 Tablet(s) Oral at bedtime  triamcinolone 0.1% Ointment 1 Application(s) Topical two times a day  triamterene 37.5 mG/hydrochlorothiazide 25 mG Tablet 1 Tablet(s) Oral daily    MEDICATIONS  (PRN):  acetaminophen     Tablet .. 650 milliGRAM(s) Oral every 6 hours PRN Temp greater or equal to 38C (100.4F), Mild Pain (1 - 3)  benzonatate 100 milliGRAM(s) Oral three times a day PRN Cough  melatonin 3 milliGRAM(s) Oral at bedtime PRN Insomnia      CAPILLARY BLOOD GLUCOSE          I&O's Summary    02 Oct 2023 07:01  -  03 Oct 2023 07:00  --------------------------------------------------------  IN: 0 mL / OUT: 800 mL / NET: -800 mL        Daily     Daily     PHYSICAL EXAM:  Vital Signs Last 24 Hrs  T(C): 36.8 (03 Oct 2023 11:08), Max: 37.1 (03 Oct 2023 04:53)  T(F): 98.3 (03 Oct 2023 11:08), Max: 98.7 (03 Oct 2023 04:53)  HR: 67 (03 Oct 2023 11:08) (65 - 90)  BP: 139/70 (03 Oct 2023 11:08) (139/70 - 171/82)  BP(mean): --  RR: 18 (03 Oct 2023 11:08) (18 - 18)  SpO2: 98% (03 Oct 2023 11:08) (92% - 98%)    Parameters below as of 03 Oct 2023 11:08  Patient On (Oxygen Delivery Method): nasal cannula  O2 Flow (L/min): 2    CONSTITUTIONAL: NAD, well-developed, well-groomed  EYES: PERRLA; conjunctiva and sclera clear  ENMT: Moist oral mucosa, no pharyngeal injection or exudates; normal dentition  NECK: Supple, no palpable masses; no thyromegaly  RESPIRATORY: Normal respiratory effort; lungs are clear to auscultation bilaterally  CARDIOVASCULAR: Regular rate and rhythm, normal S1 and S2, no murmur/rub/gallop; No lower extremity edema; Peripheral pulses are 2+ bilaterally  ABDOMEN: Nontender to palpation, normoactive bowel sounds, no rebound/guarding; No hepatosplenomegaly  MUSCULOSKELETAL:  no clubbing or cyanosis of digits; no joint swelling or tenderness to palpation, moving all extremities   PSYCH: A+O to person, place, and time; affect appropriate  NEUROLOGY: CN 2-12 are intact and symmetric; no gross sensory/motor deficits   SKIN: No rashes; no palpable lesions    LABS:                        14.7   11.51 )-----------( 267      ( 03 Oct 2023 07:14 )             46.0     10-03    140  |  100  |  43<H>  ----------------------------<  85  4.5   |  24  |  0.88    Ca    9.4      03 Oct 2023 07:14  Phos  4.2     10-03  Mg     2.5     10-03    TPro  6.9  /  Alb  3.9  /  TBili  0.8  /  DBili  x   /  AST  17  /  ALT  21  /  AlkPhos  143<H>  10-03    LIVER FUNCTIONS - ( 03 Oct 2023 07:14 )  Alb: 3.9 g/dL / Pro: 6.9 g/dL / ALK PHOS: 143 U/L / ALT: 21 U/L / AST: 17 U/L / GGT: x                 Urinalysis Basic - ( 03 Oct 2023 07:14 )    Color: x / Appearance: x / SG: x / pH: x  Gluc: 85 mg/dL / Ketone: x  / Bili: x / Urobili: x   Blood: x / Protein: x / Nitrite: x   Leuk Esterase: x / RBC: x / WBC x   Sq Epi: x / Non Sq Epi: x / Bacteria: x        SARS-CoV-2: Detected (26 Sep 2023 05:37)      RADIOLOGY & ADDITIONAL TESTS:  Results Reviewed:   Imaging Personally Reviewed:  Electrocardiogram Personally Reviewed:    COORDINATION OF CARE:  Care Discussed with Consultants/Other Providers [Y/N]:  Prior or Outpatient Records Reviewed [Y/N]:   [facility] Division of Hospital Medicine  Ton Merino MD M-F, 8A-5P: MS Teams, Pager  Other Times: [facility] Extension / [facility] Pager      Patient is a 88y old  Female who presents with a chief complaint of AHRF (03 Oct 2023 10:02)      SUBJECTIVE / OVERNIGHT EVENTS:  Patient was examined today. Patient is asking about discharge plan, tried to explain that we are in the process of arranging oxygen and safe place for her to transition and we are communicating with her daughter. Patient denies any dyspnea, cough or chest pain. She is asking for more cold soda. RN made aware of her needs.     ADDITIONAL REVIEW OF SYSTEMS:    MEDICATIONS  (STANDING):  albuterol    0.083% 2.5 milliGRAM(s) Nebulizer every 6 hours  aspirin enteric coated 81 milliGRAM(s) Oral daily  atorvastatin 20 milliGRAM(s) Oral at bedtime  clobetasol 0.05% Cream 1 Application(s) Topical two times a day  cyclopentolate 1% Solution 1 Drop(s) Both EYES two times a day  dexAMETHasone     Tablet 6 milliGRAM(s) Oral daily  enoxaparin Injectable 40 milliGRAM(s) SubCutaneous every 24 hours  ferrous    sulfate 325 milliGRAM(s) Oral daily  FLUoxetine 20 milliGRAM(s) Oral daily  hydrOXYzine hydrochloride 25 milliGRAM(s) Oral two times a day  influenza  Vaccine (HIGH DOSE) 0.7 milliLiter(s) IntraMuscular once  mirtazapine 7.5 milliGRAM(s) Oral at bedtime  nebivolol 2.5 milliGRAM(s) Oral daily  polyethylene glycol 3350 17 Gram(s) Oral daily  prednisoLONE acetate 1% Suspension 1 Drop(s) Both EYES two times a day  senna 2 Tablet(s) Oral at bedtime  triamcinolone 0.1% Ointment 1 Application(s) Topical two times a day  triamterene 37.5 mG/hydrochlorothiazide 25 mG Tablet 1 Tablet(s) Oral daily    MEDICATIONS  (PRN):  acetaminophen     Tablet .. 650 milliGRAM(s) Oral every 6 hours PRN Temp greater or equal to 38C (100.4F), Mild Pain (1 - 3)  benzonatate 100 milliGRAM(s) Oral three times a day PRN Cough  melatonin 3 milliGRAM(s) Oral at bedtime PRN Insomnia      CAPILLARY BLOOD GLUCOSE          I&O's Summary    02 Oct 2023 07:01  -  03 Oct 2023 07:00  --------------------------------------------------------  IN: 0 mL / OUT: 800 mL / NET: -800 mL        Daily     Daily     PHYSICAL EXAM:  Vital Signs Last 24 Hrs  T(C): 36.8 (03 Oct 2023 11:08), Max: 37.1 (03 Oct 2023 04:53)  T(F): 98.3 (03 Oct 2023 11:08), Max: 98.7 (03 Oct 2023 04:53)  HR: 67 (03 Oct 2023 11:08) (65 - 90)  BP: 139/70 (03 Oct 2023 11:08) (139/70 - 171/82)  BP(mean): --  RR: 18 (03 Oct 2023 11:08) (18 - 18)  SpO2: 98% (03 Oct 2023 11:08) (92% - 98%)    Parameters below as of 03 Oct 2023 11:08  Patient On (Oxygen Delivery Method): nasal cannula  O2 Flow (L/min): 2    CONSTITUTIONAL: NAD, cachetic, elderly  EYES: PERRLA; conjunctiva and sclera clear  ENMT: Moist oral mucosa,  RESPIRATORY: Normal respiratory effort; lungs are clear to auscultation bilaterally  CARDIOVASCULAR: Regular rate and rhythm, normal S1 and S2, no murmur/rub/gallop;   No lower extremity edema; Peripheral pulses are 2+ bilaterally  ABDOMEN: Nontender to palpation, normoactive bowel sounds, no rebound/guarding  MUSCULOSKELETAL:  no clubbing or cyanosis of digits; no joint swelling or tenderness to palpation, moving all extremities   PSYCH: A+O to person, knows she is in hospital ([facility]), knows her  and date, cooperative       LABS:                        14.7   11.51 )-----------( 267      ( 03 Oct 2023 07:14 )             46.0     10-03    140  |  100  |  43<H>  ----------------------------<  85  4.5   |  24  |  0.88    Ca    9.4      03 Oct 2023 07:14  Phos  4.2     10-  Mg     2.5     10-    TPro  6.9  /  Alb  3.9  /  TBili  0.8  /  DBili  x   /  AST  17  /  ALT  21  /  AlkPhos  143<H>  10-03    LIVER FUNCTIONS - ( 03 Oct 2023 07:14 )  Alb: 3.9 g/dL / Pro: 6.9 g/dL / ALK PHOS: 143 U/L / ALT: 21 U/L / AST: 17 U/L / GGT: x                 Urinalysis Basic - ( 03 Oct 2023 07:14 )    Color: x / Appearance: x / SG: x / pH: x  Gluc: 85 mg/dL / Ketone: x  / Bili: x / Urobili: x   Blood: x / Protein: x / Nitrite: x   Leuk Esterase: x / RBC: x / WBC x   Sq Epi: x / Non Sq Epi: x / Bacteria: x        SARS-CoV-2: Detected (26 Sep 2023 05:37)      RADIOLOGY & ADDITIONAL TESTS:  Results Reviewed:   Imaging Personally Reviewed:  Electrocardiogram Personally Reviewed:    COORDINATION OF CARE:  Care Discussed with Consultants/Other Providers [Y/N]:  Prior or Outpatient Records Reviewed [Y/N]:

## 2023-10-04 PROCEDURE — 99232 SBSQ HOSP IP/OBS MODERATE 35: CPT

## 2023-10-04 PROCEDURE — 99221 1ST HOSP IP/OBS SF/LOW 40: CPT

## 2023-10-04 RX ADMIN — MIRTAZAPINE 7.5 MILLIGRAM(S): 45 TABLET, ORALLY DISINTEGRATING ORAL at 22:09

## 2023-10-04 RX ADMIN — ALBUTEROL 2.5 MILLIGRAM(S): 90 AEROSOL, METERED ORAL at 18:15

## 2023-10-04 RX ADMIN — CYCLOPENTOLATE HYDROCHLORIDE 1 DROP(S): 10 SOLUTION/ DROPS OPHTHALMIC at 18:16

## 2023-10-04 RX ADMIN — Medication 1 DROP(S): at 18:16

## 2023-10-04 RX ADMIN — Medication 6 MILLIGRAM(S): at 06:16

## 2023-10-04 RX ADMIN — ATORVASTATIN CALCIUM 20 MILLIGRAM(S): 80 TABLET, FILM COATED ORAL at 22:09

## 2023-10-04 RX ADMIN — Medication 325 MILLIGRAM(S): at 12:36

## 2023-10-04 RX ADMIN — POLYETHYLENE GLYCOL 3350 17 GRAM(S): 17 POWDER, FOR SOLUTION ORAL at 12:36

## 2023-10-04 RX ADMIN — Medication 1 APPLICATION(S): at 18:17

## 2023-10-04 RX ADMIN — Medication 1 TABLET(S): at 06:16

## 2023-10-04 RX ADMIN — ALBUTEROL 2.5 MILLIGRAM(S): 90 AEROSOL, METERED ORAL at 12:35

## 2023-10-04 RX ADMIN — ENOXAPARIN SODIUM 40 MILLIGRAM(S): 100 INJECTION SUBCUTANEOUS at 18:27

## 2023-10-04 RX ADMIN — Medication 81 MILLIGRAM(S): at 12:36

## 2023-10-04 RX ADMIN — CYCLOPENTOLATE HYDROCHLORIDE 1 DROP(S): 10 SOLUTION/ DROPS OPHTHALMIC at 06:17

## 2023-10-04 RX ADMIN — NEBIVOLOL HYDROCHLORIDE 2.5 MILLIGRAM(S): 5 TABLET ORAL at 06:16

## 2023-10-04 RX ADMIN — Medication 1 DROP(S): at 06:17

## 2023-10-04 RX ADMIN — Medication 20 MILLIGRAM(S): at 13:09

## 2023-10-04 NOTE — PROGRESS NOTE ADULT - PROBLEM SELECTOR PLAN 1
In setting of COVID-19 infection, complicated by underlying COPD, pulmonary sarcoidosis.   O2 weaned to 2L today-- continue to wean as tolerated   Patient had sepsis 2/2 COVID present on admission , met sepsis criteria due to fever 103F RR 24, HR 91, known COVID infection all present on admission   - s/p 5 day course of remdesavir 9/26 - 9/30  - dexamethasone 6 mg PO Qd x10 days given hypoxia   - s/p ID eval, rec hold antibiotics due to history of C diff  - BCX x2 NGTD   - reviewed CT angio chest , no PE  - D-dimer 652--> 461, borderline 2 times ULN, considering patient frailty (44kg) , hx of hematuria, negative CT angio, weighing risk vs. benefit, will c/w prophylactic lovenox 40mg subQ qd  -  [facility] duplex : NO lower extremity DVT   -chest PT, IS, albuterol q6h  - noted cough with eating, S & S eval-- rec: regular diet thin liquid    > Ambulation trial done on 10/2, 10/3 noted patient to be hypoxic without supplemental oxygen. Ambulation trial attempted again on 10/4, patient desatted to 84% on room air with ambulation, after rest sitting down 93% on room air. In setting of COVID-19 infection, complicated by underlying COPD, pulmonary sarcoidosis.   O2 weaned to 2L today-- continue to wean as tolerated   Patient had sepsis 2/2 COVID present on admission , met sepsis criteria due to fever 103F RR 24, HR 91, known COVID infection all present on admission   - s/p 5 day course of remdesavir 9/26 - 9/30  - dexamethasone 6 mg PO Qd x10 days given hypoxia   - s/p ID eval, rec hold antibiotics due to history of C diff  - BCX x2 NGTD   - reviewed CT angio chest , no PE  - D-dimer 652--> 461, borderline 2 times ULN, considering patient frailty (44kg) , hx of hematuria, negative CT angio, weighing risk vs. benefit, will c/w prophylactic lovenox 40mg subQ qd  -  VA duplex : NO lower extremity DVT   - chest PT, IS, albuterol q6h  - noted cough with eating, S & S eval-- rec: regular diet thin liquid    > Ambulation trial done on 10/2, 10/3 noted patient to be hypoxic without supplemental oxygen. Ambulation trial attempted again on 10/4, patient desatted to 84% on room air with ambulation, after rest sitting down 93% on room air.

## 2023-10-04 NOTE — PROGRESS NOTE ADULT - PROBLEM SELECTOR PLAN 8
#Discharge planning 50mins   At baseline patient needs ADL assist and uses walker to ambulate, from [facility].   PT/OT rec [facility]; patient's daughter previously declined [facility], prefered to take patient to [facility], however patient still hypoxic requiring supplemental oxygen with ambulation, doesn't have HHA from licensed agency, and not able to manage oxygen herself per staff.   Now awaiting [facility]EN placement >  F/U CM

## 2023-10-04 NOTE — CONSULT NOTE ADULT - SUBJECTIVE AND OBJECTIVE BOX
Wound Surgery Consult Note:    HPI:  88F PMH CAD, pulmonary sarcoidosis, COPD (not on home O2), HTN, presents with shortness of breath for several days and found to have acute hypoxic respiratory failure secondary to COVID. Patient is fully vaccinated but not boosted for COVID. Symptoms began on day before admission, and have progressed to the time of admission. EMS was called and found patient to be hypoxic, for which she was brought to the hospital.   In ED, labs notable for mild anemia. Troponin T downtrending and likely in setting of demand ischemia. CXR clear. CT PE without VTE. COVID swab positive. Patient now admitted to hospital for further evaluation and treatment.  (26 Sep 2023 12:27)    Request for wound care consult for sacral/bilateral buttocks skin breakdown received from nursing. Ms. Elaine was encountered on an alternating air with low air loss surface. He is grossly incontinent of mushy/liquid stool and urine.  Her extreme immobility, inactivity, incontinence of stool and urine as well as poor nutritional status all contribute to his high risk for pressure injury development and hinder healing. Identification of the initial signs of deep tissue damage at the level of the skin within 72 hours of admission make this an injury that occurred prior to admission.    Principles of pressure injury prevention and treatment including but not limited to offloading and turning and repositioning review with patient.     PAST MEDICAL & SURGICAL HISTORY:  HTN (hypertension)  Hypothyroidism  Osteoporosis  CAD (coronary artery disease)    REVIEW OF SYSTEMS:    Constitutional:     [ ] negative [ ] fevers [ ] chills [ ] weight loss [ ] weight gain  [x ] fatigue  HEENT:                  [x ] negative [ ] dry eyes [ ] eye irritation [ ] postnasal drip [ ] nasal congestion   CV:                         [x ] negative  [ ] chest pain [ ] orthopnea [ ] palpitations [ ] tachycardia  Resp:                     [ ] negative [ ] cough [x ] shortness of breath [ ] dyspnea [ ] wheezing [ ] sputum [ ] hemoptysis  GI:                          [ ] negative [ ] nausea [ ] vomiting [ ] diarrhea [ ] constipation [ ] abd pain [ ] dysphagia [x ] incontinent of bowel  :                        [ ] negative [ ] dysuria [ ] nocturia [ ] hematuria [ ] increased urinary frequency [ x] incontinent of urine  Musculoskeletal:     [ ] negative [ ] back pain [ ] myalgias [ ] arthralgias [ ] fracture [ ] ambulatory  [x ] non-ambulatory  Skin:                       [ ] negative [ ] rash [ ] itch [ ] wound [x ] skin discoloration  Neurological:        [x ] negative [ ] headache [ ] dizziness [ ] syncope [x ] weakness [ ] numbness  Psychiatric:           [x ] negative [ ] anxiety [ ] depression  Endocrine:            [ ] negative [ ] diabetes [ x] thyroid problem  Heme/Lymph:      [x ] negative [ ] anemia [ ] bleeding problem  Allergic/Immune: [x ] negative [ ] itchy eyes [ ] nasal discharge [ ] hives [ ] angioedema    [x ] All other systems negative    MEDICATIONS  (STANDING):  albuterol    0.083% 2.5 milliGRAM(s) Nebulizer every 6 hours  aspirin enteric coated 81 milliGRAM(s) Oral daily  atorvastatin 20 milliGRAM(s) Oral at bedtime  clobetasol 0.05% Cream 1 Application(s) Topical two times a day  cyclopentolate 1% Solution 1 Drop(s) Both EYES two times a day  dexAMETHasone     Tablet 6 milliGRAM(s) Oral daily  enoxaparin Injectable 40 milliGRAM(s) SubCutaneous every 24 hours  ferrous    sulfate 325 milliGRAM(s) Oral daily  FLUoxetine 20 milliGRAM(s) Oral daily  hydrOXYzine hydrochloride 25 milliGRAM(s) Oral two times a day  influenza  Vaccine (HIGH DOSE) 0.7 milliLiter(s) IntraMuscular once  mirtazapine 7.5 milliGRAM(s) Oral at bedtime  nebivolol 2.5 milliGRAM(s) Oral daily  polyethylene glycol 3350 17 Gram(s) Oral daily  prednisoLONE acetate 1% Suspension 1 Drop(s) Both EYES two times a day  senna 2 Tablet(s) Oral at bedtime  triamcinolone 0.1% Ointment 1 Application(s) Topical two times a day  triamterene 37.5 mG/hydrochlorothiazide 25 mG Tablet 1 Tablet(s) Oral daily    MEDICATIONS  (PRN):  acetaminophen     Tablet .. 650 milliGRAM(s) Oral every 6 hours PRN Temp greater or equal to 38C (100.4F), Mild Pain (1 - 3)  benzonatate 100 milliGRAM(s) Oral three times a day PRN Cough  melatonin 3 milliGRAM(s) Oral at bedtime PRN Insomnia    Allergies    No Known Allergies    Intolerances    SOCIAL HISTORY:  , Denies smoking, ETOH, drugs    FAMILY HISTORY: no pertinent family history among first degree relatives    Vital Signs Last 24 Hrs  T(C): 36.7 (04 Oct 2023 10:59), Max: 36.8 (03 Oct 2023 11:08)  T(F): 98.1 (04 Oct 2023 10:59), Max: 98.3 (03 Oct 2023 11:08)  HR: 60 (04 Oct 2023 10:59) (60 - 68)  BP: 100/60 (04 Oct 2023 10:59) (100/60 - 152/71)  BP(mean): --  RR: 19 (04 Oct 2023 10:59) (18 - 19)  SpO2: 90% (04 Oct 2023 10:59) (90% - 98%)    Parameters below as of 04 Oct 2023 10:59  Patient On (Oxygen Delivery Method): room air    Physical Exam:  General: lethargic, WN  Ophthamology: sclera clear  ENMT: moist mucous membranes, trachea midline  Respiratory: equal chest rise with respirations  Gastrointestinal: soft NT/ND  Neurology: nonverbal, not following commands  Psych: calm, doesn't want to be bothered  Musculoskeletal: no contractures  Vascular: BLE edema equal  Skin:  Sacral/bilateral buttocks with deep maroon discolored intact skin in and around the gluteal cleft  L 1cm X W 1cm x D none, no drainage  No odor, erythema, increased warmth, tenderness, induration, fluctuance    LABS:  10-03    140  |  100  |  43<H>  ----------------------------<  85  4.5   |  24  |  0.88    Ca    9.4      03 Oct 2023 07:14  Phos  4.2     10-03  Mg     2.5     10-03    TPro  6.9  /  Alb  3.9  /  TBili  0.8  /  DBili  x   /  AST  17  /  ALT  21  /  AlkPhos  143<H>  10-03                          14.7   11.51 )-----------( 267      ( 03 Oct 2023 07:14 )             46.0       Urinalysis Basic - ( 03 Oct 2023 07:14 )    Color: x / Appearance: x / SG: x / pH: x  Gluc: 85 mg/dL / Ketone: x  / Bili: x / Urobili: x   Blood: x / Protein: x / Nitrite: x   Leuk Esterase: x / RBC: x / WBC x   Sq Epi: x / Non Sq Epi: x / Bacteria: x

## 2023-10-04 NOTE — PROGRESS NOTE ADULT - SUBJECTIVE AND OBJECTIVE BOX
[facility] Division of Hospital Medicine  Ton Merino MD M-F, 8A-5P: MS Teams, Pager  Other Times: [facility] / [facility] Pager      Patient is a 88y old  Female who presents with a chief complaint of AHRF (04 Oct 2023 11:04)      SUBJECTIVE / OVERNIGHT EVENTS:  Patient was examined this morning. She denies any dyspnea cough, chest pain, fever. She is asking to be helped to dress because she thinks it is thursday and states on thursday she gets her hair done by a . Patient was reoriented to the date and day of the week.     ADDITIONAL REVIEW OF SYSTEMS:    MEDICATIONS  (STANDING):  albuterol    0.083% 2.5 milliGRAM(s) Nebulizer every 6 hours  aspirin enteric coated 81 milliGRAM(s) Oral daily  atorvastatin 20 milliGRAM(s) Oral at bedtime  clobetasol 0.05% Cream 1 Application(s) Topical two times a day  cyclopentolate 1% Solution 1 Drop(s) Both EYES two times a day  dexAMETHasone     Tablet 6 milliGRAM(s) Oral daily  enoxaparin Injectable 40 milliGRAM(s) SubCutaneous every 24 hours  ferrous    sulfate 325 milliGRAM(s) Oral daily  FLUoxetine 20 milliGRAM(s) Oral daily  hydrOXYzine hydrochloride 25 milliGRAM(s) Oral two times a day  influenza  Vaccine (HIGH DOSE) 0.7 milliLiter(s) IntraMuscular once  mirtazapine 7.5 milliGRAM(s) Oral at bedtime  nebivolol 2.5 milliGRAM(s) Oral daily  polyethylene glycol 3350 17 Gram(s) Oral daily  prednisoLONE acetate 1% Suspension 1 Drop(s) Both EYES two times a day  senna 2 Tablet(s) Oral at bedtime  triamcinolone 0.1% Ointment 1 Application(s) Topical two times a day  triamterene 37.5 mG/hydrochlorothiazide 25 mG Tablet 1 Tablet(s) Oral daily    MEDICATIONS  (PRN):  acetaminophen     Tablet .. 650 milliGRAM(s) Oral every 6 hours PRN Temp greater or equal to 38C (100.4F), Mild Pain (1 - 3)  benzonatate 100 milliGRAM(s) Oral three times a day PRN Cough  melatonin 3 milliGRAM(s) Oral at bedtime PRN Insomnia      CAPILLARY BLOOD GLUCOSE      POCT Blood Glucose.: 164 mg/dL (03 Oct 2023 17:02)      I&O's Summary    03 Oct 2023 07:01  -  04 Oct 2023 07:00  --------------------------------------------------------  IN: 0 mL / OUT: 500 mL / NET: -500 mL    04 Oct 2023 07:01  -  04 Oct 2023 14:00  --------------------------------------------------------  IN: 0 mL / OUT: 0 mL / NET: 0 mL        Daily     Daily     PHYSICAL EXAM:  Vital Signs Last 24 Hrs  T(C): 36.7 (04 Oct 2023 10:59), Max: 36.7 (03 Oct 2023 21:10)  T(F): 98.1 (04 Oct 2023 10:59), Max: 98.1 (03 Oct 2023 21:10)  HR: 60 (04 Oct 2023 10:59) (60 - 68)  BP: 100/60 (04 Oct 2023 10:59) (100/60 - 152/71)  BP(mean): --  RR: 19 (04 Oct 2023 11:04) (18 - 19)  SpO2: 93% (04 Oct 2023 11:04) (90% - 96%)    Parameters below as of 04 Oct 2023 11:04  Patient On (Oxygen Delivery Method): room air      CONSTITUTIONAL: NAD, cachetic, elderly  EYES: PERRLA; conjunctiva and sclera clear  ENMT: Moist oral mucosa,  RESPIRATORY: Normal respiratory effort; lungs are clear to auscultation bilaterally  CARDIOVASCULAR: Regular rate and rhythm, normal S1 and S2, no murmur/rub/gallop;   No lower extremity edema; Peripheral pulses are 2+ bilaterally  ABDOMEN: Nontender to palpation, normoactive bowel sounds, no rebound/guarding  MUSCULOSKELETAL:  no clubbing or cyanosis of digits; no joint swelling or tenderness to palpation, moving all extremities   PSYCH: A+O to person, knows she is in [facility]), knows her , month sometimes confused about day, cooperative       LABS:                        14.7   11.51 )-----------( 267      ( 03 Oct 2023 07:14 )             46.0     10-    140  |  100  |  43<H>  ----------------------------<  85  4.5   |  24  |  0.88    Ca    9.4      03 Oct 2023 07:14  Phos  4.2     10-  Mg     2.5     10-    TPro  6.9  /  Alb  3.9  /  TBili  0.8  /  DBili  x   /  AST  17  /  ALT  21  /  AlkPhos  143<H>  10-    LIVER FUNCTIONS - ( 03 Oct 2023 07:14 )  Alb: 3.9 g/dL / Pro: 6.9 g/dL / ALK PHOS: 143 U/L / ALT: 21 U/L / AST: 17 U/L / GGT: x                 Urinalysis Basic - ( 03 Oct 2023 07:14 )    Color: x / Appearance: x / SG: x / pH: x  Gluc: 85 mg/dL / Ketone: x  / Bili: x / Urobili: x   Blood: x / Protein: x / Nitrite: x   Leuk Esterase: x / RBC: x / WBC x   Sq Epi: x / Non Sq Epi: x / Bacteria: x        SARS-CoV-2: Detected (26 Sep 2023 05:37)      RADIOLOGY & ADDITIONAL TESTS:  Results Reviewed:   Imaging Personally Reviewed:  Electrocardiogram Personally Reviewed:    COORDINATION OF CARE:  Care Discussed with Consultants/Other Providers [Y/N]:  Prior or Outpatient Records Reviewed [Y/N]:

## 2023-10-04 NOTE — CONSULT NOTE ADULT - ASSESSMENT
Impression:    Sacral/bilateral Buttocks deep tissue injury present on admission  Incontinence of bowel and bladder  Incontinence Dermatitis    Recommend:  1.) topical therapy: sacral/buttock injury - cleanse with incontinence cleanser, pat dry, apply Jennifer ointment BID and PRN for incontinent episodes  2.) Incontinence Management - incontinence cleanser, pads, pericare BID, continue external female urinary catheter  3.) Maintain on an alternating air with low air loss surface  4.) Turn and reposition Q 2 hours  5.) Nutrition optimization  6.) Offload heels/feet with complete cair air fluidized boots/trista lock pillow; ensure that the soles of the feet are not resting on the foot board of the bed.  7.) Chair cushion for chair sitting    Care as per medicine. Will not actively follow but will remain available. Please recall for new issues or deterioration.  Upon [facility] [address] [place] [address] [phone]  Thank you for this consult  Discussed with clinical nurse  Fabi Lincoln, OLGA-C, CWOCN via Teams Impression:    Sacral/bilateral Buttocks deep tissue injury present on admission  Incontinence of bowel and bladder  Incontinence Dermatitis    Recommend:  1.) topical therapy: sacral/buttock injury - cleanse with incontinence cleanser, pat dry, apply Jennifer ointment BID and PRN for incontinent episodes  2.) Incontinence Management - incontinence cleanser, pads, pericare BID, continue external female urinary catheter  3.) Maintain on an alternating air with low air loss surface  4.) Turn and reposition Q 2 hours  5.) Nutrition optimization  6.) Offload heels/feet with complete cair air fluidized boots/trista lock pillow; ensure that the soles of the feet are not resting on the foot board of the bed.  7.) Chair cushion for chair sitting  8.) foot/toe wounds referred to wound care team Podiatrists who will see patient.    Care as per medicine. Will not actively follow but will remain available. Please recall for new issues or deterioration.  Upon [facility] [address] [place] [address] [phone]  Thank you for this consult  Discussed with clinical nurse  Fabi Lincoln, OLGA-C, CWOCN via Teams

## 2023-10-05 LAB
HCT VFR BLD CALC: 41.6 % — SIGNIFICANT CHANGE UP (ref 34.5–45)
HGB BLD-MCNC: 13.5 G/DL — SIGNIFICANT CHANGE UP (ref 11.5–15.5)
MCHC RBC-ENTMCNC: 29.3 PG — SIGNIFICANT CHANGE UP (ref 27–34)
MCHC RBC-ENTMCNC: 32.5 GM/DL — SIGNIFICANT CHANGE UP (ref 32–36)
MCV RBC AUTO: 90.4 FL — SIGNIFICANT CHANGE UP (ref 80–100)
MRSA PCR RESULT.: SIGNIFICANT CHANGE UP
NRBC # BLD: 0 /100 WBCS — SIGNIFICANT CHANGE UP (ref 0–0)
PLATELET # BLD AUTO: 336 K/UL — SIGNIFICANT CHANGE UP (ref 150–400)
RBC # BLD: 4.6 M/UL — SIGNIFICANT CHANGE UP (ref 3.8–5.2)
RBC # FLD: 14.3 % — SIGNIFICANT CHANGE UP (ref 10.3–14.5)
S AUREUS DNA NOSE QL NAA+PROBE: DETECTED
SARS-COV-2 RNA SPEC QL NAA+PROBE: SIGNIFICANT CHANGE UP
WBC # BLD: 20.69 K/UL — HIGH (ref 3.8–10.5)
WBC # FLD AUTO: 20.69 K/UL — HIGH (ref 3.8–10.5)

## 2023-10-05 PROCEDURE — 99232 SBSQ HOSP IP/OBS MODERATE 35: CPT

## 2023-10-05 RX ORDER — CHLORHEXIDINE GLUCONATE 213 G/1000ML
1 SOLUTION TOPICAL
Refills: 0 | Status: DISCONTINUED | OUTPATIENT
Start: 2023-10-05 | End: 2023-10-06

## 2023-10-05 RX ADMIN — Medication 81 MILLIGRAM(S): at 11:34

## 2023-10-05 RX ADMIN — Medication 6 MILLIGRAM(S): at 05:22

## 2023-10-05 RX ADMIN — NEBIVOLOL HYDROCHLORIDE 2.5 MILLIGRAM(S): 5 TABLET ORAL at 05:22

## 2023-10-05 RX ADMIN — Medication 1 APPLICATION(S): at 18:18

## 2023-10-05 RX ADMIN — MIRTAZAPINE 7.5 MILLIGRAM(S): 45 TABLET, ORALLY DISINTEGRATING ORAL at 21:30

## 2023-10-05 RX ADMIN — CHLORHEXIDINE GLUCONATE 1 APPLICATION(S): 213 SOLUTION TOPICAL at 18:21

## 2023-10-05 RX ADMIN — ALBUTEROL 2.5 MILLIGRAM(S): 90 AEROSOL, METERED ORAL at 05:22

## 2023-10-05 RX ADMIN — Medication 20 MILLIGRAM(S): at 11:34

## 2023-10-05 RX ADMIN — ALBUTEROL 2.5 MILLIGRAM(S): 90 AEROSOL, METERED ORAL at 11:42

## 2023-10-05 RX ADMIN — POLYETHYLENE GLYCOL 3350 17 GRAM(S): 17 POWDER, FOR SOLUTION ORAL at 11:42

## 2023-10-05 RX ADMIN — CYCLOPENTOLATE HYDROCHLORIDE 1 DROP(S): 10 SOLUTION/ DROPS OPHTHALMIC at 05:24

## 2023-10-05 RX ADMIN — Medication 1 DROP(S): at 18:17

## 2023-10-05 RX ADMIN — Medication 325 MILLIGRAM(S): at 11:34

## 2023-10-05 RX ADMIN — Medication 650 MILLIGRAM(S): at 18:22

## 2023-10-05 RX ADMIN — CYCLOPENTOLATE HYDROCHLORIDE 1 DROP(S): 10 SOLUTION/ DROPS OPHTHALMIC at 18:16

## 2023-10-05 RX ADMIN — Medication 1 TABLET(S): at 05:23

## 2023-10-05 RX ADMIN — ALBUTEROL 2.5 MILLIGRAM(S): 90 AEROSOL, METERED ORAL at 18:20

## 2023-10-05 RX ADMIN — ATORVASTATIN CALCIUM 20 MILLIGRAM(S): 80 TABLET, FILM COATED ORAL at 21:30

## 2023-10-05 RX ADMIN — Medication 1 DROP(S): at 05:23

## 2023-10-05 RX ADMIN — Medication 25 MILLIGRAM(S): at 05:22

## 2023-10-05 RX ADMIN — Medication 1 APPLICATION(S): at 18:17

## 2023-10-05 NOTE — PROGRESS NOTE ADULT - PROBLEM SELECTOR PLAN 3
- C/w ASA 81 mg PO Qd  - C/w atorvastatin 20 mg PO Qd

## 2023-10-05 NOTE — PROGRESS NOTE ADULT - PROBLEM SELECTOR PLAN 1
In setting of COVID-19 infection, complicated by underlying COPD, pulmonary sarcoidosis.   O2 weaned to 2L today-- continue to wean as tolerated   Patient had sepsis 2/2 COVID present on admission , met sepsis criteria due to fever 103F RR 24, HR 91, known COVID infection all present on admission   - s/p 5 day course of remdesavir 9/26 - 9/30  - dexamethasone 6 mg PO Qd x10 days given hypoxia   - s/p ID eval, rec hold antibiotics due to history of C diff  - BCX x2 NGTD   - reviewed CT angio chest , no PE  - D-dimer 652--> 461, borderline 2 times ULN, considering patient frailty (44kg) , hx of hematuria, negative CT angio, weighing risk vs. benefit, will c/w prophylactic lovenox 40mg subQ qd  -  VA duplex : NO lower extremity DVT   - chest PT, IS, albuterol q6h  - noted cough with eating, S & S eval-- rec: regular diet thin liquid    > Ambulation trial done on 10/2, 10/3 noted patient to be hypoxic without supplemental oxygen. Ambulation trial attempted again on 10/4, patient desatted to 84% on room air with ambulation, after rest sitting down 93% on room air.

## 2023-10-05 NOTE — PROGRESS NOTE ADULT - PROBLEM SELECTOR PLAN 5
- Continue with home triamterene 37.5 -  HCTZ 25 mg PO Qd

## 2023-10-05 NOTE — PROGRESS NOTE ADULT - ASSESSMENT
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 pt moved to [place] from [facility] to be near family Pt seen in the office for recurrent UTIs that have been previously worked up.  She also has  of clostridia diff.  colitis    pt has occasional had hematuria   presents with sob; nomial chest xray but hypoxia and is positive for covid  chest x-ray is normal  she is very frail    Covid with sob and on oxygen    remdesivir for 5 days     decadron   Hold antibiotics  particularly with hs of clostridia difficile   stable on regimen  no change 
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 pt moved to [place] from [facility] to be near family Pt seen in the office for recurrent UTIs that have been previously worked up.  She also has  of clostridia diff.  colitis    pt has occasional had hematuria   presents with sob; nomial chest xray but hypoxia and is positive for covid  chest x-ray is normal  she is very frail    Covid with sob and on oxygen  s/p   remdesivir for 5 days     decadron   Hold antibiotics  particularly with hs of clostridia difficile      very cachetic and weak  prognosis guarded   for  discharge on RA 
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 [place] [facility] to be near family Pt seen in the office for recurrent UTIs that have been previously worked up.  She also has  of clostridia diff.  colitis    pt has occasional had hematuria   presents with sob; nomial chest xray but hypoxia and is positive for covid  chest x-ray is normal  she is very frail    Covid with sob and on oxygen    remdesivir for 5 days     decadron   Hold antibiotics  particularly with hs of clostridia difficile      very cachetic and weak  prognosis guarded 
88F PMH CAD, pulmonary sarcoidosis, COPD on no O2, HTN presents with shortness of breath and found to have AHRF secondary to COVID
88F PMH CAD, pulmonary sarcoidosis, COPD on no O2, HTN presents with shortness of breath and found to have AHRF secondary to COVID.
88F PMH CAD, pulmonary sarcoidosis, COPD on no O2, HTN presents with shortness of breath and found to have AHRF secondary to COVID
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 [place] from [facility] to be near family Pt seen in the office for recurrent UTIs that have been previously worked up.  She also has  of clostridia diff.  colitis    pt has occasional had hematuria   presents with sob; nomial chest xray but hypoxia and is positive for covid  chest x-ray is normal  she is very frail    Covid with sob and on oxygen  would start remdesivir for 5 days    check INR, ferritin and ddi shelby  Hold antibiotics  particularly with hs of clostridia difficile   stable on regimen  no change 
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 pt moved [place] from [facility] to be near family Pt seen in the office for recurrent UTIs that have been previously worked up.  She also has  of clostridia diff.  colitis    pt has occasional had hematuria   presents with sob; nomial chest xray but hypoxia and is positive for covid  chest x-ray is normal  she is very frail    Covid with sob and on oxygen  s/p   remdesivir for 5 days     decadron   Hold antibiotics  particularly with hs of clostridia difficile      very cachetic and weak  prognosis guarded 
88F PMH CAD, pulmonary sarcoidosis, COPD on no O2, HTN presents with shortness of breath and found to have AHRF secondary to COVID
88F PMH CAD, pulmonary sarcoidosis, COPD on no O2, HTN presents with shortness of breath and found to have AHRF secondary to COVID.
88F PMH CAD, pulmonary sarcoidosis, COPD on no O2, HTN presents with shortness of breath and found to have AHRF secondary to COVID
88F PMH CAD, pulmonary sarcoidosis, COPD on no O2, HTN presents with shortness of breath and found to have AHRF secondary to COVID.
88F PMH CAD, pulmonary sarcoidosis, COPD on no O2, HTN presents with shortness of breath and found to have AHRF secondary to COVID
88F PMH CAD, pulmonary sarcoidosis, COPD on no O2, HTN presents with shortness of breath and found to have AHRF secondary to COVID.

## 2023-10-05 NOTE — PROGRESS NOTE ADULT - SUBJECTIVE AND OBJECTIVE BOX
[facility] Division of Hospital Medicine  Ton Merino MD M-F, 8A-5P: MS Teams, Pager  Other Times: [facility] Extension / [facility] Pager      Patient is a 88y old  Female who presents with a chief complaint of AHRF (04 Oct 2023 13:59)      SUBJECTIVE / OVERNIGHT EVENTS:      ADDITIONAL REVIEW OF SYSTEMS:    MEDICATIONS  (STANDING):  albuterol    0.083% 2.5 milliGRAM(s) Nebulizer every 6 hours  aspirin enteric coated 81 milliGRAM(s) Oral daily  atorvastatin 20 milliGRAM(s) Oral at bedtime  chlorhexidine 2% Cloths 1 Application(s) Topical <User Schedule>  clobetasol 0.05% Cream 1 Application(s) Topical two times a day  cyclopentolate 1% Solution 1 Drop(s) Both EYES two times a day  dexAMETHasone     Tablet 6 milliGRAM(s) Oral daily  enoxaparin Injectable 40 milliGRAM(s) SubCutaneous every 24 hours  ferrous    sulfate 325 milliGRAM(s) Oral daily  FLUoxetine 20 milliGRAM(s) Oral daily  hydrOXYzine hydrochloride 25 milliGRAM(s) Oral two times a day  influenza  Vaccine (HIGH DOSE) 0.7 milliLiter(s) IntraMuscular once  mirtazapine 7.5 milliGRAM(s) Oral at bedtime  nebivolol 2.5 milliGRAM(s) Oral daily  polyethylene glycol 3350 17 Gram(s) Oral daily  prednisoLONE acetate 1% Suspension 1 Drop(s) Both EYES two times a day  senna 2 Tablet(s) Oral at bedtime  triamcinolone 0.1% Ointment 1 Application(s) Topical two times a day  triamterene 37.5 mG/hydrochlorothiazide 25 mG Tablet 1 Tablet(s) Oral daily    MEDICATIONS  (PRN):  acetaminophen     Tablet .. 650 milliGRAM(s) Oral every 6 hours PRN Temp greater or equal to 38C (100.4F), Mild Pain (1 - 3)  benzonatate 100 milliGRAM(s) Oral three times a day PRN Cough  melatonin 3 milliGRAM(s) Oral at bedtime PRN Insomnia      CAPILLARY BLOOD GLUCOSE          I&O's Summary    04 Oct 2023 07:01  -  05 Oct 2023 07:00  --------------------------------------------------------  IN: 400 mL / OUT: 500 mL / NET: -100 mL    05 Oct 2023 07:01  -  05 Oct 2023 21:15  --------------------------------------------------------  IN: 720 mL / OUT: 550 mL / NET: 170 mL        Daily     Daily     PHYSICAL EXAM:  Vital Signs Last 24 Hrs  T(C): 36.6 (05 Oct 2023 21:01), Max: 36.7 (05 Oct 2023 11:34)  T(F): 97.9 (05 Oct 2023 21:01), Max: 98.1 (05 Oct 2023 11:34)  HR: 67 (05 Oct 2023 21:01) (63 - 68)  BP: 135/71 (05 Oct 2023 21:01) (130/69 - 140/71)  BP(mean): --  RR: 18 (05 Oct 2023 21:01) (18 - 18)  SpO2: 98% (05 Oct 2023 21:01) (98% - 100%)    Parameters below as of 05 Oct 2023 21:01  Patient On (Oxygen Delivery Method): room air      CONSTITUTIONAL: NAD, well-developed, well-groomed  EYES: PERRLA; conjunctiva and sclera clear  ENMT: Moist oral mucosa, no pharyngeal injection or exudates; normal dentition  NECK: Supple, no palpable masses; no thyromegaly  RESPIRATORY: Normal respiratory effort; lungs are clear to auscultation bilaterally  CARDIOVASCULAR: Regular rate and rhythm, normal S1 and S2, no murmur/rub/gallop; No lower extremity edema; Peripheral pulses are 2+ bilaterally  ABDOMEN: Nontender to palpation, normoactive bowel sounds, no rebound/guarding; No hepatosplenomegaly  MUSCULOSKELETAL:  no clubbing or cyanosis of digits; no joint swelling or tenderness to palpation, moving all extremities   PSYCH: A+O to person, place, and time; affect appropriate  NEUROLOGY: CN 2-12 are intact and symmetric; no gross sensory/motor deficits   SKIN: No rashes; no palpable lesions    LABS:                      COVID-19 PCR: NotDetec (05 Oct 2023 11:10)  SARS-CoV-2: Detected (26 Sep 2023 05:37)      RADIOLOGY & ADDITIONAL TESTS:  Results Reviewed:   Imaging Personally Reviewed:  Electrocardiogram Personally Reviewed:    COORDINATION OF CARE:  Care Discussed with Consultants/Other Providers [Y/N]:  Prior or Outpatient Records Reviewed [Y/N]:

## 2023-10-05 NOTE — PROGRESS NOTE ADULT - PROBLEM SELECTOR PLAN 8
#Discharge planning 50mins   At baseline patient needs ADL assist and uses walker to ambulate, from [facility].   PT/OT rec [facility]; patient's daughter previously declined [facility], preferred to take patient to [facility], however patient still hypoxic requiring supplemental oxygen with ambulation, doesn't have HHA from licensed agency, and not able to manage oxygen herself per staff.   CM awaiting [facility] placement , but now 10/5 patient's daughters want to take her home >  F/U CM regarding safe discharge plan.

## 2023-10-05 NOTE — CHART NOTE - NSCHARTNOTEFT_GEN_A_CORE
I have examined  BISHNU SEGURA 88y Female requires home oxygen due to low oxygen saturations and sarcoidosis     Treated this admission for sarcoidosis, COPD, COVID and now resolved    Patient is in chronic stable state and has following O2 saturations:    O2 sat room air at rest = 91%  O2 sat room air with ambulation = 86%    Patient requiring continuous & portable nasal cannula oxygen @    2  L/min    Lillian Gibbons PA-C  [id number]

## 2023-10-05 NOTE — CHART NOTE - NSCHARTNOTEFT_GEN_A_CORE
Medicine PA Note    Informed by RN that pt was found seated on the floor on her buttocks. She was sitting in the chair and chair alarms were on. Chair alarm was heard and RN staff went to bedside to assess pt.    Pt seen and assessed at bedside, in no acute distress, after pt was safely moved back to chair. She is sitting comfortably in chair, eating dinner, with no particular complaints. She states she did not hit her head. Denies pain in the head, pain in arms and buttocks, dizziness, N/V, and SOB. She is at her baseline mental status with no concerning changes at this time.    Vital Signs Last 24 Hrs  T(C): 36.7 (05 Oct 2023 11:34), Max: 37.2 (04 Oct 2023 20:38)  T(F): 98.1 (05 Oct 2023 11:34), Max: 99 (04 Oct 2023 20:38)  HR: 63 (05 Oct 2023 16:39) (63 - 73)  BP: 140/71 (05 Oct 2023 16:39) (126/70 - 140/71)  BP(mean): --  RR: 18 (05 Oct 2023 11:34) (18 - 18)  SpO2: 100% (05 Oct 2023 11:34) (94% - 100%)    Parameters below as of 05 Oct 2023 11:34  Patient On (Oxygen Delivery Method): room air    Physical Exam:  General: WN/WD, at baseline mental status, in no acute distress  Neurology: able to follow commands, + b/l handgrip, EOMs intact  Skin: skin warm and dry, no signs of active bleeding  Head: Normocephalic, atraumatic, no signs of bleeding, no pain upon palpation  Respiratory: unlabored breathing  Abdominal: Soft, NT, ND  MSK: FROM all 4 extremity, muscle strength 5/5 in b/l UE and LE, knee flexion and extension 5/5 strength, no pain on palpation of b/l UE/LE      Assessment & Plan:    87 yo F PMHx CAD, pulmonary sarcoidosis, COPD on no O2, HTN admitted with shortness of breath and found to have AHRF secondary to COVID.    >Ordered x-ray pelvis and x-ray hip urgent as a precaution  >Ordered CT head non-contrast urgent. Pt states she did not hit head, but she was found sitting on the floor by bedside RN, and will do CT head as a precautionary measure. Held tonight's Lovenox dose.  >Stat CBC  >Vital signs stable, pt at baseline mental status  >Keep bed alarm and chair alarm on at all times, and maintain fall precautions as ordered  >Follow post-fall protocol and take vital signs at 1 hr and 4 hrs after the fall  >Continue to monitor mental status  >Family member Sandra informed and in agreement with above plan  >Will sign out to night provider to f/u results of above imaging  >D/w Dr. Umana (coverage for Dr. Stevenson)    Lillian Gibbons PA-C  [id number]

## 2023-10-05 NOTE — PROGRESS NOTE ADULT - NUTRITIONAL ASSESSMENT
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 thankyou  
Called and relayed message to patient. Patient verbalized understanding and has no further questions at this time. She did say she tried to schedule, with urology that did not think they could help her, so they referred to urogyn (Dr. Ashley) who cannot get her in until January- which I verified.  Patient not willing to wait until then, but does not want to see a male dr. [facility] only has males, they stated they send to [facility] for female urogyn.  Patient was given number for them and also stated she would check if [place] has anyone closer that can get her in before January.  Advised her that if she does she can call us back and ask for us to fax referral over.  Patient verbalized understanding and has no further questions at this time.       
Left message for patient to call back.    
This patient has been assessed with a concern for Malnutrition and has been determined to have a diagnosis/diagnoses of Severe protein-calorie malnutrition and Underweight (BMI < 19).    This patient is being managed with:   Diet Regular-  Low Sodium  Kosher  Entered: Oct  1 2023 10:40AM  

## 2023-10-05 NOTE — PROGRESS NOTE ADULT - PROVIDER SPECIALTY LIST ADULT
Infectious Disease
Internal Medicine

## 2023-10-05 NOTE — PROGRESS NOTE ADULT - NSPROGADDITIONALINFOA_GEN_ALL_CORE
D/w KACIE Porter
Case and plan discussed with ACP: Estelle
D/w daughter Brunajossie Gonzalez, all questions answered   D/w ACP Greta Johnston
Case and plan discussed with ACP: Estelle
D/w KACIE Jin
D/w KACIE Kingston
D/w patient's three daughters in person, and her son-in-law via telephone, all questions answered
Case and plan discussed with ACP: Lillian
Case and plan discussed with ACP: Lillian
PT to ed for abdominal pain in llq, mild diarrhea, and nausea since last night. Reports hx of diverticulitis and states pain feels similar. Rates pain 6-7/10. Denies vomiting, sob, chest pain, and urinary symptoms. Denies fever and chills. Pt a&ox3, ambulatory. No distress.

## 2023-10-06 ENCOUNTER — TRANSCRIPTION ENCOUNTER (OUTPATIENT)
Age: 88
End: 2023-10-06

## 2023-10-06 ENCOUNTER — NON-APPOINTMENT (OUTPATIENT)
Age: 88
End: 2023-10-06

## 2023-10-06 VITALS
RESPIRATION RATE: 18 BRPM | TEMPERATURE: 98 F | SYSTOLIC BLOOD PRESSURE: 135 MMHG | HEART RATE: 68 BPM | OXYGEN SATURATION: 95 % | DIASTOLIC BLOOD PRESSURE: 67 MMHG

## 2023-10-06 PROCEDURE — 70450 CT HEAD/BRAIN W/O DYE: CPT | Mod: 26

## 2023-10-06 PROCEDURE — 73552 X-RAY EXAM OF FEMUR 2/>: CPT | Mod: 26,LT

## 2023-10-06 PROCEDURE — 99239 HOSP IP/OBS DSCHRG MGMT >30: CPT

## 2023-10-06 PROCEDURE — 73522 X-RAY EXAM HIPS BI 3-4 VIEWS: CPT | Mod: 26

## 2023-10-06 PROCEDURE — 73562 X-RAY EXAM OF KNEE 3: CPT | Mod: 26,LT

## 2023-10-06 PROCEDURE — 73590 X-RAY EXAM OF LOWER LEG: CPT | Mod: 26,LT

## 2023-10-06 RX ADMIN — ALBUTEROL 2.5 MILLIGRAM(S): 90 AEROSOL, METERED ORAL at 12:44

## 2023-10-06 RX ADMIN — CYCLOPENTOLATE HYDROCHLORIDE 1 DROP(S): 10 SOLUTION/ DROPS OPHTHALMIC at 06:07

## 2023-10-06 RX ADMIN — ALBUTEROL 2.5 MILLIGRAM(S): 90 AEROSOL, METERED ORAL at 18:14

## 2023-10-06 RX ADMIN — Medication 81 MILLIGRAM(S): at 12:51

## 2023-10-06 RX ADMIN — Medication 650 MILLIGRAM(S): at 17:48

## 2023-10-06 RX ADMIN — Medication 25 MILLIGRAM(S): at 18:26

## 2023-10-06 RX ADMIN — Medication 1 APPLICATION(S): at 18:15

## 2023-10-06 RX ADMIN — NEBIVOLOL HYDROCHLORIDE 2.5 MILLIGRAM(S): 5 TABLET ORAL at 05:40

## 2023-10-06 RX ADMIN — Medication 6 MILLIGRAM(S): at 06:07

## 2023-10-06 RX ADMIN — ALBUTEROL 2.5 MILLIGRAM(S): 90 AEROSOL, METERED ORAL at 00:36

## 2023-10-06 RX ADMIN — Medication 1 DROP(S): at 18:14

## 2023-10-06 RX ADMIN — Medication 20 MILLIGRAM(S): at 12:45

## 2023-10-06 RX ADMIN — CHLORHEXIDINE GLUCONATE 1 APPLICATION(S): 213 SOLUTION TOPICAL at 05:41

## 2023-10-06 RX ADMIN — POLYETHYLENE GLYCOL 3350 17 GRAM(S): 17 POWDER, FOR SOLUTION ORAL at 12:45

## 2023-10-06 RX ADMIN — ENOXAPARIN SODIUM 40 MILLIGRAM(S): 100 INJECTION SUBCUTANEOUS at 16:56

## 2023-10-06 RX ADMIN — Medication 1 TABLET(S): at 05:41

## 2023-10-06 RX ADMIN — Medication 325 MILLIGRAM(S): at 12:44

## 2023-10-06 RX ADMIN — ALBUTEROL 2.5 MILLIGRAM(S): 90 AEROSOL, METERED ORAL at 06:06

## 2023-10-06 RX ADMIN — Medication 1 DROP(S): at 06:07

## 2023-10-06 RX ADMIN — Medication 650 MILLIGRAM(S): at 16:54

## 2023-10-06 RX ADMIN — CYCLOPENTOLATE HYDROCHLORIDE 1 DROP(S): 10 SOLUTION/ DROPS OPHTHALMIC at 18:17

## 2023-10-06 RX ADMIN — Medication 1 APPLICATION(S): at 18:16

## 2023-10-06 NOTE — DISCHARGE NOTE NURSING/CASE MANAGEMENT/SOCIAL WORK - PATIENT PORTAL LINK FT
You can access the FollowMyHealth Patient Portal offered by [facility] by registering at the following website: [email or web address]. By joining [facility]’s FollowMyHealth portal, you will also be able to view your health information using other applications (apps) compatible with our system.

## 2023-10-06 NOTE — PROVIDER CONTACT NOTE (OTHER) - ASSESSMENT
Alert and oriented x 2-3, Patient is in no distress,
During ambulation patient became short of breath and needed 2 assistance to ambulate, However once patient sat down she was able to breathe better and her oxygen level went to 91-92% on room air.
Patient is A&O*3, patient is complaining of itchiness especially in her hair. Provider was contacted and calamine lotion was ordered. However, patient refused and stated benadryl is the only thing that would solve the discomfort.
Patient was immediately assessed and stated that she is in no pain and she feels fine. Vitals signs done bp 153/80, , o2 sat 90% and temp 98.2,
VSS. Pt states "she is fine" and still refuses despite education.

## 2023-10-06 NOTE — PROVIDER CONTACT NOTE (OTHER) - DATE AND TIME:
05-Oct-2023 17:39
05-Oct-2023 20:00
05-Oct-2023 13:18
27-Sep-2023 00:53
04-Oct-2023 11:06
06-Oct-2023 11:07

## 2023-10-06 NOTE — PROVIDER CONTACT NOTE (OTHER) - BACKGROUND
87 y/o female with a pmhx of CAD, loop recorder and sarcoidosis pulmonary and HTN. Admitted for Acute respiratory syndrome coronavirus 2
Patient diagnose with infection due to severe acute respiratory syndrome coronavirus 2.
88 t/o f with a pmhx of CAD, loop recorder, HTN and Copd, admitted for Covid
89 y/o f pmhx of CAD, loop recorder and HTN and COPD. admitted for Infection due to severe acute respiratory syndrome. Patient was advised to call for assistance via using the call bell.
Pt admitted with AHRF 2/2 COVID- s/p RDV, on PO decadron; on 2L NC, sats >95%. Scalp/upper chest pruritus/excoriations- Clobetasol solution BID to scalp and Triamcinolone 0.1% ointment BID

## 2023-10-06 NOTE — PROVIDER CONTACT NOTE (OTHER) - SITUATION
Pt refusing CT head and Hips s/p fall
Discontinuation of Isolation for COVID patient
Patient was assisted during ambulation, her lower extremities were weak and her gait was unsteady. Her level went to 84% on room air, but once she sits down her oxygen sat improved to 93% on room air
Chair alarm was heard  pt didn't use the call bell but was found seated on the floor.  Call bell in reach. Patient was immediately assessed and stated that she is in no pain and she feels fine.
Patient complaining of itchiness.
Patient was assisted with ambulation but was found to have an unsteady gait needing 2 strong assist to walk. Her oxygen level went to 86% on room air.

## 2023-10-06 NOTE — DISCHARGE NOTE NURSING/CASE MANAGEMENT/SOCIAL WORK - NSDCPEFALRISK_GEN_ALL_CORE
For information on Fall & Injury Prevention, visit: [email or web address] OR  [email or web address] OR  https://www.cdc.gov/steadi/patient.html

## 2023-10-06 NOTE — DISCHARGE NOTE NURSING/CASE MANAGEMENT/SOCIAL WORK - NSDCFUADDAPPT_GEN_ALL_CORE_FT
APPTS ARE READY TO BE MADE: [x] YES    Best Family or Patient Contact (if needed):    Additional Information about above appointments (if needed):    1:   2:   3:     Other comments or requests:     Patient is being discharged to SNF. Patient/caregiver will arrange follow up appointments.

## 2023-10-06 NOTE — PROVIDER CONTACT NOTE (OTHER) - NAME OF MD/NP/PA/DO NOTIFIED:
Unit leadership and bed board.
MARITZA Benavidez.
MARITZA Garber
MARITZA Benavidez.
Thais SALAS
Lillian Gibbons

## 2023-10-06 NOTE — PROVIDER CONTACT NOTE (OTHER) - ACTION/TREATMENT ORDERED:
No new orders at this time, plan of care ongoing.
Thais SALAS made aware. Will continue to monitor.
Provider ordered PO benadryl, it was administered and patient stated she feels relief when reassessed.
No new orders at this time. Plan of care ongoing.
xray ordered and other imaging diagnostics to r/o fracture or hit to the head. Plan of care ongoing. `

## 2023-10-06 NOTE — PROVIDER CONTACT NOTE (OTHER) - REASON
Discontinuation of Isolation for COVID patient
Ambulation trial
Oxygen level during ambulation on room air
Patient complaining of itchiness
Patient was found seated on the floor
Pt refusing CT head and Hips s/p fall

## 2023-10-12 ENCOUNTER — NON-APPOINTMENT (OUTPATIENT)
Age: 88
End: 2023-10-12

## 2023-10-20 ENCOUNTER — APPOINTMENT (OUTPATIENT)
Dept: INFECTIOUS DISEASE | Facility: CLINIC | Age: 88
End: 2023-10-20
Payer: MEDICARE

## 2023-10-20 VITALS
DIASTOLIC BLOOD PRESSURE: 60 MMHG | BODY MASS INDEX: 18.77 KG/M2 | HEART RATE: 75 BPM | SYSTOLIC BLOOD PRESSURE: 100 MMHG | TEMPERATURE: 97.7 F | WEIGHT: 102 LBS | HEIGHT: 62 IN

## 2023-10-20 PROCEDURE — 99213 OFFICE O/P EST LOW 20 MIN: CPT

## 2023-11-13 PROCEDURE — 80048 BASIC METABOLIC PNL TOTAL CA: CPT

## 2023-11-13 PROCEDURE — 0225U NFCT DS DNA&RNA 21 SARSCOV2: CPT

## 2023-11-13 PROCEDURE — 87086 URINE CULTURE/COLONY COUNT: CPT

## 2023-11-13 PROCEDURE — 73522 X-RAY EXAM HIPS BI 3-4 VIEWS: CPT

## 2023-11-13 PROCEDURE — 87040 BLOOD CULTURE FOR BACTERIA: CPT

## 2023-11-13 PROCEDURE — 93970 EXTREMITY STUDY: CPT

## 2023-11-13 PROCEDURE — 97110 THERAPEUTIC EXERCISES: CPT

## 2023-11-13 PROCEDURE — 82947 ASSAY GLUCOSE BLOOD QUANT: CPT

## 2023-11-13 PROCEDURE — 85027 COMPLETE CBC AUTOMATED: CPT

## 2023-11-13 PROCEDURE — 84484 ASSAY OF TROPONIN QUANT: CPT

## 2023-11-13 PROCEDURE — 96374 THER/PROPH/DIAG INJ IV PUSH: CPT | Mod: XU

## 2023-11-13 PROCEDURE — 73552 X-RAY EXAM OF FEMUR 2/>: CPT

## 2023-11-13 PROCEDURE — 71045 X-RAY EXAM CHEST 1 VIEW: CPT

## 2023-11-13 PROCEDURE — 93005 ELECTROCARDIOGRAM TRACING: CPT

## 2023-11-13 PROCEDURE — 73590 X-RAY EXAM OF LOWER LEG: CPT

## 2023-11-13 PROCEDURE — 82962 GLUCOSE BLOOD TEST: CPT

## 2023-11-13 PROCEDURE — 97166 OT EVAL MOD COMPLEX 45 MIN: CPT

## 2023-11-13 PROCEDURE — 83036 HEMOGLOBIN GLYCOSYLATED A1C: CPT

## 2023-11-13 PROCEDURE — 85610 PROTHROMBIN TIME: CPT

## 2023-11-13 PROCEDURE — 85014 HEMATOCRIT: CPT

## 2023-11-13 PROCEDURE — 83735 ASSAY OF MAGNESIUM: CPT

## 2023-11-13 PROCEDURE — 97112 NEUROMUSCULAR REEDUCATION: CPT

## 2023-11-13 PROCEDURE — 84100 ASSAY OF PHOSPHORUS: CPT

## 2023-11-13 PROCEDURE — 87635 SARS-COV-2 COVID-19 AMP PRB: CPT

## 2023-11-13 PROCEDURE — 70450 CT HEAD/BRAIN W/O DYE: CPT

## 2023-11-13 PROCEDURE — 85730 THROMBOPLASTIN TIME PARTIAL: CPT

## 2023-11-13 PROCEDURE — 36600 WITHDRAWAL OF ARTERIAL BLOOD: CPT

## 2023-11-13 PROCEDURE — 81001 URINALYSIS AUTO W/SCOPE: CPT

## 2023-11-13 PROCEDURE — 82330 ASSAY OF CALCIUM: CPT

## 2023-11-13 PROCEDURE — 97530 THERAPEUTIC ACTIVITIES: CPT

## 2023-11-13 PROCEDURE — 73562 X-RAY EXAM OF KNEE 3: CPT

## 2023-11-13 PROCEDURE — 82728 ASSAY OF FERRITIN: CPT

## 2023-11-13 PROCEDURE — 83605 ASSAY OF LACTIC ACID: CPT

## 2023-11-13 PROCEDURE — 84443 ASSAY THYROID STIM HORMONE: CPT

## 2023-11-13 PROCEDURE — 84132 ASSAY OF SERUM POTASSIUM: CPT

## 2023-11-13 PROCEDURE — 87640 STAPH A DNA AMP PROBE: CPT

## 2023-11-13 PROCEDURE — 85025 COMPLETE CBC W/AUTO DIFF WBC: CPT

## 2023-11-13 PROCEDURE — 99285 EMERGENCY DEPT VISIT HI MDM: CPT | Mod: 25

## 2023-11-13 PROCEDURE — 82803 BLOOD GASES ANY COMBINATION: CPT

## 2023-11-13 PROCEDURE — 97161 PT EVAL LOW COMPLEX 20 MIN: CPT

## 2023-11-13 PROCEDURE — 84295 ASSAY OF SERUM SODIUM: CPT

## 2023-11-13 PROCEDURE — 87641 MR-STAPH DNA AMP PROBE: CPT

## 2023-11-13 PROCEDURE — 92610 EVALUATE SWALLOWING FUNCTION: CPT

## 2023-11-13 PROCEDURE — 83880 ASSAY OF NATRIURETIC PEPTIDE: CPT

## 2023-11-13 PROCEDURE — 36415 COLL VENOUS BLD VENIPUNCTURE: CPT

## 2023-11-13 PROCEDURE — 97535 SELF CARE MNGMENT TRAINING: CPT

## 2023-11-13 PROCEDURE — 94640 AIRWAY INHALATION TREATMENT: CPT

## 2023-11-13 PROCEDURE — 85379 FIBRIN DEGRADATION QUANT: CPT

## 2023-11-13 PROCEDURE — 82435 ASSAY OF BLOOD CHLORIDE: CPT

## 2023-11-13 PROCEDURE — 71275 CT ANGIOGRAPHY CHEST: CPT | Mod: MA

## 2023-11-13 PROCEDURE — 80053 COMPREHEN METABOLIC PANEL: CPT

## 2023-11-13 PROCEDURE — 85018 HEMOGLOBIN: CPT

## 2024-01-19 ENCOUNTER — NON-APPOINTMENT (OUTPATIENT)
Age: 89
End: 2024-01-19

## 2024-01-19 ENCOUNTER — APPOINTMENT (OUTPATIENT)
Dept: OPHTHALMOLOGY | Facility: CLINIC | Age: 89
End: 2024-01-19
Payer: MEDICARE

## 2024-01-19 PROCEDURE — 92014 COMPRE OPH EXAM EST PT 1/>: CPT

## 2024-02-08 ENCOUNTER — INPATIENT (INPATIENT)
Facility: HOSPITAL | Age: 89
LOS: 12 days | Discharge: NURSING, REHAB OR LONG-TERM CARE | DRG: 391 | End: 2024-02-21
Attending: FAMILY MEDICINE | Admitting: FAMILY MEDICINE
Payer: MEDICARE

## 2024-02-08 VITALS
HEART RATE: 64 BPM | TEMPERATURE: 98 F | OXYGEN SATURATION: 95 % | WEIGHT: 108.91 LBS | HEIGHT: 62 IN | DIASTOLIC BLOOD PRESSURE: 76 MMHG | RESPIRATION RATE: 20 BRPM | SYSTOLIC BLOOD PRESSURE: 136 MMHG

## 2024-02-08 DIAGNOSIS — K57.92 DIVERTICULITIS OF INTESTINE, PART UNSPECIFIED, WITHOUT PERFORATION OR ABSCESS WITHOUT BLEEDING: ICD-10-CM

## 2024-02-08 LAB
ALBUMIN SERPL ELPH-MCNC: 4.5 G/DL — SIGNIFICANT CHANGE UP (ref 3.3–5)
ALP SERPL-CCNC: 191 U/L — HIGH (ref 40–120)
ALT FLD-CCNC: 12 U/L — SIGNIFICANT CHANGE UP (ref 10–45)
ANION GAP SERPL CALC-SCNC: 13 MMOL/L — SIGNIFICANT CHANGE UP (ref 5–17)
APPEARANCE UR: ABNORMAL
AST SERPL-CCNC: 16 U/L — SIGNIFICANT CHANGE UP (ref 10–40)
BACTERIA # UR AUTO: ABNORMAL /HPF
BASOPHILS # BLD AUTO: 0.04 K/UL — SIGNIFICANT CHANGE UP (ref 0–0.2)
BASOPHILS NFR BLD AUTO: 0.3 % — SIGNIFICANT CHANGE UP (ref 0–2)
BILIRUB SERPL-MCNC: 0.4 MG/DL — SIGNIFICANT CHANGE UP (ref 0.2–1.2)
BILIRUB UR-MCNC: NEGATIVE — SIGNIFICANT CHANGE UP
BUN SERPL-MCNC: 27 MG/DL — HIGH (ref 7–23)
CALCIUM SERPL-MCNC: 9.8 MG/DL — SIGNIFICANT CHANGE UP (ref 8.4–10.5)
CAST: 0 /LPF — SIGNIFICANT CHANGE UP (ref 0–4)
CHLORIDE SERPL-SCNC: 103 MMOL/L — SIGNIFICANT CHANGE UP (ref 96–108)
CO2 SERPL-SCNC: 25 MMOL/L — SIGNIFICANT CHANGE UP (ref 22–31)
COLOR SPEC: YELLOW — SIGNIFICANT CHANGE UP
CREAT SERPL-MCNC: 0.83 MG/DL — SIGNIFICANT CHANGE UP (ref 0.5–1.3)
DIFF PNL FLD: ABNORMAL
EGFR: 68 ML/MIN/1.73M2 — SIGNIFICANT CHANGE UP
EOSINOPHIL # BLD AUTO: 0.06 K/UL — SIGNIFICANT CHANGE UP (ref 0–0.5)
EOSINOPHIL NFR BLD AUTO: 0.4 % — SIGNIFICANT CHANGE UP (ref 0–6)
GLUCOSE SERPL-MCNC: 87 MG/DL — SIGNIFICANT CHANGE UP (ref 70–99)
GLUCOSE UR QL: NEGATIVE MG/DL — SIGNIFICANT CHANGE UP
HCT VFR BLD CALC: 43.5 % — SIGNIFICANT CHANGE UP (ref 34.5–45)
HGB BLD-MCNC: 14 G/DL — SIGNIFICANT CHANGE UP (ref 11.5–15.5)
IMM GRANULOCYTES NFR BLD AUTO: 0.4 % — SIGNIFICANT CHANGE UP (ref 0–0.9)
KETONES UR-MCNC: NEGATIVE MG/DL — SIGNIFICANT CHANGE UP
LEUKOCYTE ESTERASE UR-ACNC: ABNORMAL
LYMPHOCYTES # BLD AUTO: 0.99 K/UL — LOW (ref 1–3.3)
LYMPHOCYTES # BLD AUTO: 7.1 % — LOW (ref 13–44)
MCHC RBC-ENTMCNC: 29.9 PG — SIGNIFICANT CHANGE UP (ref 27–34)
MCHC RBC-ENTMCNC: 32.2 GM/DL — SIGNIFICANT CHANGE UP (ref 32–36)
MCV RBC AUTO: 92.8 FL — SIGNIFICANT CHANGE UP (ref 80–100)
MONOCYTES # BLD AUTO: 0.92 K/UL — HIGH (ref 0–0.9)
MONOCYTES NFR BLD AUTO: 6.6 % — SIGNIFICANT CHANGE UP (ref 2–14)
NEUTROPHILS # BLD AUTO: 11.86 K/UL — HIGH (ref 1.8–7.4)
NEUTROPHILS NFR BLD AUTO: 85.2 % — HIGH (ref 43–77)
NITRITE UR-MCNC: NEGATIVE — SIGNIFICANT CHANGE UP
NRBC # BLD: 0 /100 WBCS — SIGNIFICANT CHANGE UP (ref 0–0)
PH UR: 6 — SIGNIFICANT CHANGE UP (ref 5–8)
PLATELET # BLD AUTO: 198 K/UL — SIGNIFICANT CHANGE UP (ref 150–400)
POTASSIUM SERPL-MCNC: 4.3 MMOL/L — SIGNIFICANT CHANGE UP (ref 3.5–5.3)
POTASSIUM SERPL-SCNC: 4.3 MMOL/L — SIGNIFICANT CHANGE UP (ref 3.5–5.3)
PROT SERPL-MCNC: 7.4 G/DL — SIGNIFICANT CHANGE UP (ref 6–8.3)
PROT UR-MCNC: SIGNIFICANT CHANGE UP MG/DL
RBC # BLD: 4.69 M/UL — SIGNIFICANT CHANGE UP (ref 3.8–5.2)
RBC # FLD: 14.4 % — SIGNIFICANT CHANGE UP (ref 10.3–14.5)
RBC CASTS # UR COMP ASSIST: 3 /HPF — SIGNIFICANT CHANGE UP (ref 0–4)
REVIEW: SIGNIFICANT CHANGE UP
SODIUM SERPL-SCNC: 141 MMOL/L — SIGNIFICANT CHANGE UP (ref 135–145)
SP GR SPEC: 1.02 — SIGNIFICANT CHANGE UP (ref 1–1.03)
SQUAMOUS # UR AUTO: 6 /HPF — HIGH (ref 0–5)
UROBILINOGEN FLD QL: 0.2 MG/DL — SIGNIFICANT CHANGE UP (ref 0.2–1)
WBC # BLD: 13.92 K/UL — HIGH (ref 3.8–10.5)
WBC # FLD AUTO: 13.92 K/UL — HIGH (ref 3.8–10.5)
WBC UR QL: >998 /HPF — HIGH (ref 0–5)
YEAST-LIKE CELLS: PRESENT

## 2024-02-08 PROCEDURE — 99285 EMERGENCY DEPT VISIT HI MDM: CPT | Mod: GC

## 2024-02-08 PROCEDURE — 74018 RADEX ABDOMEN 1 VIEW: CPT | Mod: 26

## 2024-02-08 PROCEDURE — 74176 CT ABD & PELVIS W/O CONTRAST: CPT | Mod: 26,MA

## 2024-02-08 PROCEDURE — 76770 US EXAM ABDO BACK WALL COMP: CPT | Mod: 26

## 2024-02-08 PROCEDURE — 71045 X-RAY EXAM CHEST 1 VIEW: CPT | Mod: 26

## 2024-02-08 RX ORDER — HEPARIN SODIUM 5000 [USP'U]/ML
5000 INJECTION INTRAVENOUS; SUBCUTANEOUS EVERY 12 HOURS
Refills: 0 | Status: DISCONTINUED | OUTPATIENT
Start: 2024-02-08 | End: 2024-02-11

## 2024-02-08 RX ORDER — ALBUTEROL 90 UG/1
2 AEROSOL, METERED ORAL EVERY 6 HOURS
Refills: 0 | Status: DISCONTINUED | OUTPATIENT
Start: 2024-02-08 | End: 2024-02-16

## 2024-02-08 RX ORDER — ATORVASTATIN CALCIUM 80 MG/1
20 TABLET, FILM COATED ORAL AT BEDTIME
Refills: 0 | Status: DISCONTINUED | OUTPATIENT
Start: 2024-02-08 | End: 2024-02-21

## 2024-02-08 RX ORDER — PIPERACILLIN AND TAZOBACTAM 4; .5 G/20ML; G/20ML
3.38 INJECTION, POWDER, LYOPHILIZED, FOR SOLUTION INTRAVENOUS ONCE
Refills: 0 | Status: COMPLETED | OUTPATIENT
Start: 2024-02-08 | End: 2024-02-09

## 2024-02-08 RX ORDER — ASPIRIN/CALCIUM CARB/MAGNESIUM 324 MG
81 TABLET ORAL DAILY
Refills: 0 | Status: DISCONTINUED | OUTPATIENT
Start: 2024-02-08 | End: 2024-02-21

## 2024-02-08 RX ORDER — CEFTRIAXONE 500 MG/1
1000 INJECTION, POWDER, FOR SOLUTION INTRAMUSCULAR; INTRAVENOUS ONCE
Refills: 0 | Status: COMPLETED | OUTPATIENT
Start: 2024-02-08 | End: 2024-02-08

## 2024-02-08 RX ORDER — PREDNISOLONE SODIUM PHOSPHATE 1 %
1 DROPS OPHTHALMIC (EYE)
Refills: 0 | Status: DISCONTINUED | OUTPATIENT
Start: 2024-02-08 | End: 2024-02-09

## 2024-02-08 RX ORDER — CYCLOPENTOLATE HYDROCHLORIDE 10 MG/ML
1 SOLUTION/ DROPS OPHTHALMIC
Refills: 0 | Status: DISCONTINUED | OUTPATIENT
Start: 2024-02-08 | End: 2024-02-09

## 2024-02-08 RX ORDER — SODIUM CHLORIDE 9 MG/ML
1000 INJECTION INTRAMUSCULAR; INTRAVENOUS; SUBCUTANEOUS
Refills: 0 | Status: DISCONTINUED | OUTPATIENT
Start: 2024-02-08 | End: 2024-02-16

## 2024-02-08 RX ORDER — TEMAZEPAM 15 MG/1
30 CAPSULE ORAL AT BEDTIME
Refills: 0 | Status: DISCONTINUED | OUTPATIENT
Start: 2024-02-08 | End: 2024-02-15

## 2024-02-08 RX ORDER — FLUOXETINE HCL 10 MG
20 CAPSULE ORAL DAILY
Refills: 0 | Status: DISCONTINUED | OUTPATIENT
Start: 2024-02-08 | End: 2024-02-21

## 2024-02-08 RX ORDER — MIRTAZAPINE 45 MG/1
7.5 TABLET, ORALLY DISINTEGRATING ORAL DAILY
Refills: 0 | Status: DISCONTINUED | OUTPATIENT
Start: 2024-02-08 | End: 2024-02-21

## 2024-02-08 RX ORDER — CEFPODOXIME PROXETIL 100 MG
1 TABLET ORAL
Qty: 7 | Refills: 0
Start: 2024-02-08 | End: 2024-02-14

## 2024-02-08 RX ORDER — PIPERACILLIN AND TAZOBACTAM 4; .5 G/20ML; G/20ML
3.38 INJECTION, POWDER, LYOPHILIZED, FOR SOLUTION INTRAVENOUS ONCE
Refills: 0 | Status: COMPLETED | OUTPATIENT
Start: 2024-02-08 | End: 2024-02-08

## 2024-02-08 RX ORDER — PIPERACILLIN AND TAZOBACTAM 4; .5 G/20ML; G/20ML
3.38 INJECTION, POWDER, LYOPHILIZED, FOR SOLUTION INTRAVENOUS EVERY 8 HOURS
Refills: 0 | Status: DISCONTINUED | OUTPATIENT
Start: 2024-02-09 | End: 2024-02-15

## 2024-02-08 RX ADMIN — Medication 1 DROP(S): at 22:58

## 2024-02-08 RX ADMIN — SODIUM CHLORIDE 60 MILLILITER(S): 9 INJECTION INTRAMUSCULAR; INTRAVENOUS; SUBCUTANEOUS at 21:58

## 2024-02-08 RX ADMIN — CEFTRIAXONE 100 MILLIGRAM(S): 500 INJECTION, POWDER, FOR SOLUTION INTRAMUSCULAR; INTRAVENOUS at 16:43

## 2024-02-08 RX ADMIN — CYCLOPENTOLATE HYDROCHLORIDE 1 DROP(S): 10 SOLUTION/ DROPS OPHTHALMIC at 22:58

## 2024-02-08 RX ADMIN — PIPERACILLIN AND TAZOBACTAM 200 GRAM(S): 4; .5 INJECTION, POWDER, LYOPHILIZED, FOR SOLUTION INTRAVENOUS at 21:57

## 2024-02-08 RX ADMIN — ATORVASTATIN CALCIUM 20 MILLIGRAM(S): 80 TABLET, FILM COATED ORAL at 22:59

## 2024-02-08 RX ADMIN — HEPARIN SODIUM 5000 UNIT(S): 5000 INJECTION INTRAVENOUS; SUBCUTANEOUS at 22:59

## 2024-02-08 NOTE — H&P ADULT - NSHPREVIEWOFSYSTEMS_GEN_ALL_CORE
REVIEW OF SYSTEMS:  CONSTITUTIONAL: No fever,  no  weight loss  ENT:  No  tinnitus,   no   vertigo  NECK: No pain or stiffness  RESPIRATORY: No cough, wheezing, chills or hemoptysis;    No Shortness of Breath  CARDIOVASCULAR: No chest pain, palpitations, dizziness  GASTROINTESTINAL:  abdominal  pain. No nausea,   = vomiting, or hematemesis; No diarrhea  No melena or hematochezia.  GENITOURINARY: No dysuria, frequency, hematuria, or incontinence  NEUROLOGICAL: No headaches  SKIN: No itching,  no   rash  LYMPH Nodes: No enlarged glands  ENDOCRINE: No heat or cold intolerance  MUSCULOSKELETAL: No joint pain or swelling  PSYCHIATRIC: No depression, anxiety  HEME/LYMPH: No easy bruising, or bleeding gums  ALLERGY AND IMMUNOLOGIC: No hives or eczema

## 2024-02-08 NOTE — PROGRESS NOTE ADULT - ASSESSMENT
Assessment and recommendation :  Acute abdominal pain   sigmoid diverticulitis   Left hydronephrosis  H/O sarcoidosis   COPD and chronic respiratory failure oh home 02    Continue Antibiotics   continue prednisone   continue nebulizer treatement   DVT prophylaxis  Assessment and recommendation :  Acute abdominal pain   Acute sigmoid diverticulitis   Left hydronephrosis  H/O sarcoidosis on prednisone   COPD and chronic respiratory failure oh home 02    Continue Antibiotics   continue prednisone   continue nebulizer treatement   PPI   DVT prophylaxis

## 2024-02-08 NOTE — PROGRESS NOTE ADULT - SUBJECTIVE AND OBJECTIVE BOX
CHIEF COMPLAINT:        88 F        h/o   CAD,   pulmonary sarcoidosis, COPD (not on home O2), HTN,       BIBEMS from [facility] for evaluation of abdominal pain, constipation, vomiting x 4 days   .  pt  is   forgetful,. had  vomiting this morning    p er aide at bedside, patient has chronic constipation      has no history of abdominal surgeries  (08 Feb 2024 19:29)    PAST MEDICAL & SURGICAL HISTORY:  HTN (hypertension)      Hypothyroidism      Osteoporosis      CAD (coronary artery disease)          FAMILY HISTORY:  Review of Systems:  Review of Systems: REVIEW OF SYSTEMS:  CONSTITUTIONAL: No fever,  no  weight loss  ENT:  No  tinnitus,   no   vertigo  NECK: No pain or stiffness  RESPIRATORY: No cough, wheezing, chills or hemoptysis;    No Shortness of Breath  CARDIOVASCULAR: No chest pain, palpitations, dizziness  GASTROINTESTINAL:  abdominal  pain. No nausea,   = vomiting, or hematemesis; No diarrhea  No melena or hematochezia.  GENITOURINARY: No dysuria, frequency, hematuria, or incontinence  NEUROLOGICAL: No headaches  SKIN: No itching,  no   rash  LYMPH Nodes: No enlarged glands  ENDOCRINE: No heat or cold intolerance  MUSCULOSKELETAL: No joint pain or swelling  PSYCHIATRIC: No depression, anxiety  HEME/LYMPH: No easy bruising, or bleeding gums  ALLERGY AND IMMUNOLOGIC: No hives or eczema        OBJECTIVE:  Vital Signs Last 24 Hrs  T(C): 36.6 (08 Feb 2024 19:31), Max: 36.6 (08 Feb 2024 19:31)  T(F): 97.9 (08 Feb 2024 19:31), Max: 97.9 (08 Feb 2024 19:31)  HR: 68 (08 Feb 2024 19:31) (64 - 74)  BP: 122/71 (08 Feb 2024 19:31) (118/74 - 136/76)  BP(mean): 88 (08 Feb 2024 14:36) (88 - 88)  RR: 16 (08 Feb 2024 19:31) (16 - 20)  SpO2: 95% (08 Feb 2024 19:31) (94% - 95%)    Parameters below as of 08 Feb 2024 19:31  Patient On (Oxygen Delivery Method): room air          HOSPITAL MEDICATIONS:   albuterol    90 MICROgram(s) HFA Inhaler 2 Puff(s) Inhalation every 6 hours  aspirin enteric coated 81 milliGRAM(s) Oral daily  atorvastatin 20 milliGRAM(s) Oral at bedtime  cyclopentolate 1% Solution 1 Drop(s) Both EYES two times a day  FLUoxetine 20 milliGRAM(s) Oral daily  heparin   Injectable 5000 Unit(s) SubCutaneous every 12 hours  mirtazapine 7.5 milliGRAM(s) Oral daily  piperacillin/tazobactam IVPB. 3.375 Gram(s) IV Intermittent once  piperacillin/tazobactam IVPB.- 3.375 Gram(s) IV Intermittent once  prednisoLONE acetate 1% Suspension 1 Drop(s) Both EYES two times a day  sodium chloride 0.9%. 1000 milliLiter(s) IV Continuous <Continuous>  temazepam 30 milliGRAM(s) Oral at bedtime PRN    iodinated radiocontrast agents (Unknown)        PHYSICAL EXAM:Daily Height in cm: 157.48 (08 Feb 2024 12:42)    Daily   HEENT:     + NCAT  + EOMI  - Conjuctival edema   - Icterus   - Thrush   - ETT  - NGT/OGT  Neck:         + FROM    + JVD     - Nodes     - Masses    + Mid-line trachea   - Tracheostomy  Chest: normal findings  Lungs:          + CTA   - Rhonchi    - Rales    - Wheezing     - Decreased BS   - Dullness R L  Cardiac:       + S1 + S2    + RRR   - Irregular   - S3  - S4    - Murmurs   - Rub   - Hamman’s sign   Abdomen:    + BS     + Soft    + Non-tender     - Distended    - Organomegaly  - PEG  Extremities:   - Cyanosis U/L   - Clubbing  U/L  - LE/UE Edema   + Capillary refill    + Pulses   Neuro:        + Awake   +  Alert   - Confused   - Lethargic   - Sedated   - Generalized Weakness  Skin:        - Rashes    - Erythema   + Normal incisions   + IV sites intact  - Sacral decubitus    LABS:                        14.0   13.92 )-----------( 198      ( 08 Feb 2024 14:17 )             43.5     02-08    141  |  103  |  27<H>  ----------------------------<  87  4.3   |  25  |  0.83    Ca    9.8      08 Feb 2024 14:17    TPro  7.4  /  Alb  4.5  /  TBili  0.4  /  DBili  x   /  AST  16  /  ALT  12  /  AlkPhos  191<H>  02-08      LIVER FUNCTIONS - ( 08 Feb 2024 14:17 )  Alb: 4.5 g/dL / Pro: 7.4 g/dL / ALK PHOS: 191 U/L / ALT: 12 U/L / AST: 16 U/L / GGT: x                  LIVER FUNCTIONS - ( 08 Feb 2024 14:17 )  Alb: 4.5 g/dL / Pro: 7.4 g/dL / ALK PHOS: 191 U/L / ALT: 12 U/L / AST: 16 U/L / GGT: x             RADIOLOGY:  X Reviewed and interpreted by me   CHIEF COMPLAINT: nausea , vomiting ,abdominal pain     pulmonary consultation : sarcoidosis ,COPD , Chronic respiratory failure on home 02    date of service is 2/8/2024   88 F        h/o   CAD,   pulmonary sarcoidosis, COPD (not on home O2), HTN,       BIBEMS from [facility] for evaluation of abdominal pain, constipation, vomiting x 4 days, no hematemesis no fever no aspiration or SOB , no diarrhea    .  pt  is   forgetful,. had  vomiting this morning    p er aide at bedside, patient has chronic constipation CT scan of the abdomen shows sever hydroureter,  moderate left hydronephrosis , Diverticulitis sigmoid colon , vaginal pessary complete L 4 fracture      has no history of abdominal surgeries  (08 Feb 2024 19:29)    PAST MEDICAL & SURGICAL HISTORY:  HTN (hypertension)      Hypothyroidism      Osteoporosis      CAD (coronary artery disease)  Sarcoidosis   COPD   Chronic respiratory failure on home 02           FAMILY HISTORY:  Review of Systems:  Review of Systems: REVIEW OF SYSTEMS:  CONSTITUTIONAL: No fever,  no  weight loss  ENT:  No  tinnitus,   no   vertigo  NECK: No pain or stiffness  RESPIRATORY: No cough, wheezing, chills or hemoptysis;    No Shortness of Breath  CARDIOVASCULAR: No chest pain, palpitations, dizziness  GASTROINTESTINAL: + abdominal  pain. No nausea,  + vomiting, or hematemesis; No diarrhea  No melena or hematochezia. + constipation   GENITOURINARY: No dysuria, frequency, hematuria, or incontinence  NEUROLOGICAL: No headaches  SKIN: No itching,  no   rash  LYMPH Nodes: No enlarged glands  ENDOCRINE: No heat or cold intolerance  MUSCULOSKELETAL: No joint pain or swelling  PSYCHIATRIC: No depression, anxiety  HEME/LYMPH: No easy bruising, or bleeding gums  ALLERGY AND IMMUNOLOGIC: No hives or eczema        OBJECTIVE:  Vital Signs Last 24 Hrs  T(C): 36.6 (08 Feb 2024 19:31), Max: 36.6 (08 Feb 2024 19:31)  T(F): 97.9 (08 Feb 2024 19:31), Max: 97.9 (08 Feb 2024 19:31)  HR: 68 (08 Feb 2024 19:31) (64 - 74)  BP: 122/71 (08 Feb 2024 19:31) (118/74 - 136/76)  BP(mean): 88 (08 Feb 2024 14:36) (88 - 88)  RR: 16 (08 Feb 2024 19:31) (16 - 20)  SpO2: 95% (08 Feb 2024 19:31) (94% - 95%)    Parameters below as of 08 Feb 2024 19:31  Patient On (Oxygen Delivery Method): room air          HOSPITAL MEDICATIONS:   albuterol    90 MICROgram(s) HFA Inhaler 2 Puff(s) Inhalation every 6 hours  aspirin enteric coated 81 milliGRAM(s) Oral daily  atorvastatin 20 milliGRAM(s) Oral at bedtime  cyclopentolate 1% Solution 1 Drop(s) Both EYES two times a day  FLUoxetine 20 milliGRAM(s) Oral daily  heparin   Injectable 5000 Unit(s) SubCutaneous every 12 hours  mirtazapine 7.5 milliGRAM(s) Oral daily  piperacillin/tazobactam IVPB. 3.375 Gram(s) IV Intermittent once  piperacillin/tazobactam IVPB.- 3.375 Gram(s) IV Intermittent once  prednisoLONE acetate 1% Suspension 1 Drop(s) Both EYES two times a day  sodium chloride 0.9%. 1000 milliLiter(s) IV Continuous <Continuous>  temazepam 30 milliGRAM(s) Oral at bedtime PRN    iodinated radiocontrast agents (Unknown)        PHYSICAL EXAM:Daily Height in cm: 157.48 (08 Feb 2024 12:42)  Elderly female in some distress on 02   Daily   HEENT:     + NCAT  + EOMI  - Conjuctival edema   - Icterus   - Thrush   - ETT  - NGT/OGT  Neck:         + FROM    + JVD     - Nodes     - Masses    + Mid-line trachea   - Tracheostomy  Chest:       kyphosis   Lungs:          + CTA   - Rhonchi    +Rales    - Wheezing  +Decreased BS   - Dullness R L  Cardiac:       + S1 + S2    + RRR   - Irregular   - S3  - S4    - Murmurs   - Rub   - Hamman’s sign   Abdomen:    + BS     + Soft    + tender LLquadrant noted  Organomegaly  - PEG  Extremities:   - Cyanosis U/L   - Clubbing  U/L  - LE/UE Edema   + Capillary refill    + Pulses   Neuro:        + Awake   +  Alert   - Confused   - Lethargic   - Sedated   - Generalized Weakness  Skin:        - Rashes    - Erythema   + Normal incisions   + IV sites intact  - Sacral decubitus    LABS:                        14.0   13.92 )-----------( 198      ( 08 Feb 2024 14:17 )             43.5     02-08    141  |  103  |  27<H>  ----------------------------<  87  4.3   |  25  |  0.83    Ca    9.8      08 Feb 2024 14:17    TPro  7.4  /  Alb  4.5  /  TBili  0.4  /  DBili  x   /  AST  16  /  ALT  12  /  AlkPhos  191<H>  02-08      LIVER FUNCTIONS - ( 08 Feb 2024 14:17 )  Alb: 4.5 g/dL / Pro: 7.4 g/dL / ALK PHOS: 191 U/L / ALT: 12 U/L / AST: 16 U/L / GGT: x                  LIVER FUNCTIONS - ( 08 Feb 2024 14:17 )  Alb: 4.5 g/dL / Pro: 7.4 g/dL / ALK PHOS: 191 U/L / ALT: 12 U/L / AST: 16 U/L / GGT: x             RADIOLOGY:  X Reviewed and interpreted by me   CHIEF COMPLAINT: nausea , vomiting ,abdominal pain     pulmonary consultation : sarcoidosis ,COPD , Chronic respiratory failure on home 02    date of service is 2/8/2024   88 F        h/o   CAD,   pulmonary sarcoidosis, COPD (not on home O2), HTN,       BIBEMS from [facility] for evaluation of abdominal pain, constipation, vomiting x 4 days, no hematemesis no fever no aspiration or SOB , no diarrhea    .  pt  is   forgetful,. had  vomiting this morning    p er aide at bedside, patient has chronic constipation CT scan of the abdomen shows sever hydroureter,  moderate left hydronephrosis , Diverticulitis sigmoid colon , vaginal pessary complete L 4 fracture      has no history of abdominal surgeries S/P implanted Loop recorder  (08 Feb 2024 19:29)    PAST MEDICAL & SURGICAL HISTORY:  HTN (hypertension)      Hypothyroidism      Osteoporosis      CAD (coronary artery disease)  Sarcoidosis   COPD   Chronic respiratory failure on home 02           FAMILY HISTORY:  Review of Systems:  Review of Systems: REVIEW OF SYSTEMS:  CONSTITUTIONAL: No fever,  no  weight loss  ENT:  No  tinnitus,   no   vertigo  NECK: No pain or stiffness  RESPIRATORY: No cough, wheezing, chills or hemoptysis;    No Shortness of Breath  CARDIOVASCULAR: No chest pain, palpitations, dizziness  GASTROINTESTINAL: + abdominal  pain. No nausea,  + vomiting, or hematemesis; No diarrhea  No melena or hematochezia. + constipation   GENITOURINARY: No dysuria, frequency, hematuria, or incontinence  NEUROLOGICAL: No headaches  SKIN: No itching,  no   rash  LYMPH Nodes: No enlarged glands  ENDOCRINE: No heat or cold intolerance  MUSCULOSKELETAL: No joint pain or swelling  PSYCHIATRIC: No depression, anxiety  HEME/LYMPH: No easy bruising, or bleeding gums  ALLERGY AND IMMUNOLOGIC: No hives or eczema        OBJECTIVE:  Vital Signs Last 24 Hrs  T(C): 36.6 (08 Feb 2024 19:31), Max: 36.6 (08 Feb 2024 19:31)  T(F): 97.9 (08 Feb 2024 19:31), Max: 97.9 (08 Feb 2024 19:31)  HR: 68 (08 Feb 2024 19:31) (64 - 74)  BP: 122/71 (08 Feb 2024 19:31) (118/74 - 136/76)  BP(mean): 88 (08 Feb 2024 14:36) (88 - 88)  RR: 16 (08 Feb 2024 19:31) (16 - 20)  SpO2: 95% (08 Feb 2024 19:31) (94% - 95%)    Parameters below as of 08 Feb 2024 19:31  Patient On (Oxygen Delivery Method): room air          HOSPITAL MEDICATIONS:   albuterol    90 MICROgram(s) HFA Inhaler 2 Puff(s) Inhalation every 6 hours  aspirin enteric coated 81 milliGRAM(s) Oral daily  atorvastatin 20 milliGRAM(s) Oral at bedtime  cyclopentolate 1% Solution 1 Drop(s) Both EYES two times a day  FLUoxetine 20 milliGRAM(s) Oral daily  heparin   Injectable 5000 Unit(s) SubCutaneous every 12 hours  mirtazapine 7.5 milliGRAM(s) Oral daily  piperacillin/tazobactam IVPB. 3.375 Gram(s) IV Intermittent once  piperacillin/tazobactam IVPB.- 3.375 Gram(s) IV Intermittent once  prednisoLONE acetate 1% Suspension 1 Drop(s) Both EYES two times a day  sodium chloride 0.9%. 1000 milliLiter(s) IV Continuous <Continuous>  temazepam 30 milliGRAM(s) Oral at bedtime PRN    iodinated radiocontrast agents (Unknown)        PHYSICAL EXAM:Daily Height in cm: 157.48 (08 Feb 2024 12:42)  Elderly morbid obese   female in some distress on 02   Daily   HEENT:     + NCAT  + EOMI  - Conjuctival edema   - Icterus   - Thrush   - ETT  - NGT/OGT  Neck:         + FROM    + JVD     - Nodes     - Masses    + Mid-line trachea   - Tracheostomy  Chest:       kyphosis , implanted loop reordered   Lungs:          + CTA   - Rhonchi    +Rales    - Wheezing  +Decreased BS   - Dullness R L  Cardiac:       + S1 + S2    + RRR   - Irregular   - S3  - S4    - Murmurs   - Rub   - Hamman’s sign   Abdomen:    + BS     + Soft    + tender LLquadrant noted  Organomegaly  - PEG  Extremities:   - Cyanosis U/L   - Clubbing  U/L  - LE/UE Edema   + Capillary refill    + Pulses   Neuro:        + Awake   +  Alert   - Confused   - Lethargic   - Sedated   - Generalized Weakness  Skin:        - Rashes    - Erythema   + Normal incisions   + IV sites intact  - Sacral decubitus    LABS:                        14.0   13.92 )-----------( 198      ( 08 Feb 2024 14:17 )             43.5     02-08    141  |  103  |  27<H>  ----------------------------<  87  4.3   |  25  |  0.83    Ca    9.8      08 Feb 2024 14:17    TPro  7.4  /  Alb  4.5  /  TBili  0.4  /  DBili  x   /  AST  16  /  ALT  12  /  AlkPhos  191<H>  02-08      LIVER FUNCTIONS - ( 08 Feb 2024 14:17 )  Alb: 4.5 g/dL / Pro: 7.4 g/dL / ALK PHOS: 191 U/L / ALT: 12 U/L / AST: 16 U/L / GGT: x                  LIVER FUNCTIONS - ( 08 Feb 2024 14:17 )  Alb: 4.5 g/dL / Pro: 7.4 g/dL / ALK PHOS: 191 U/L / ALT: 12 U/L / AST: 16 U/L / GGT: x             RADIOLOGY:  X Reviewed and interpreted by me Elevated Rt hemidiaphragm , implanted loop recorder

## 2024-02-08 NOTE — H&P ADULT - ASSESSMENT
88 F        h/o   CAD,   pulmonary sarcoidosis, COPD (not on home O2), HTN,       BIBEMS from [facility] for evaluation of abdominal pain, constipation, vomiting x 4 days   .  pt  is   forgetful/  aide aide at bedside, patient has chronic constipation      has no history of abdominal surgeries      *  admitted  with  abd pain        Ct  a/p.  diverticulitis ,  sigmoid  colon, severe  L hydroureter/  mod  L   hydronephrosis                comp fx  L  4.  vaginal  pessary              on iv  zosyn     renal  u/s,  bladder  scan          if hydro   persists, then  needs  uro  eval    CAD    HTN    COPD,  /  pulm sarcoidosis   dvt ppx/  s/q  heparin       rad< from: CT Abdomen and Pelvis No Cont (02.08.24 @ 18:01) >  BONES: Left hip ORIF. Lumbar levoscoliosis. Degenerative changes of the   spine. Age indeterminate compression deformity of L4. Grade 1   anterolisthesis of L4 on L5 and L5 on S1.  IMPRESSION:  Thickening of the sigmoid colon with mild inflammatory changes adjacent   to multiple colonic diverticula suggestive of acute diverticulitis.   Severe left hydroureter and moderate left hydronephrosis of uncertain   etiology.  --- End of Report ---

## 2024-02-08 NOTE — H&P ADULT - NSHPLABSRESULTS_GEN_ALL_CORE
LABS:                        14.0   13.92 )-----------( 198      ( 2024 14:17 )             43.5         141  |  103  |  27<H>  ----------------------------<  87  4.3   |  25  |  0.83    Ca    9.8      2024 14:17    TPro  7.4  /  Alb  4.5  /  TBili  0.4  /  DBili  x   /  AST  16  /  ALT  12  /  AlkPhos  191<H>  0208          Urinalysis Basic - ( 2024 14:17 )    Color: Yellow / Appearance: Turbid / S.016 / pH: x  Gluc: 87 mg/dL / Ketone: Negative mg/dL  / Bili: Negative / Urobili: 0.2 mg/dL   Blood: x / Protein: Trace mg/dL / Nitrite: Negative   Leuk Esterase: Large / RBC: 3 /HPF / WBC >998 /HPF   Sq Epi: x / Non Sq Epi: 6 /HPF / Bacteria: Few /HPF

## 2024-02-08 NOTE — ED PROVIDER NOTE - ATTENDING CONTRIBUTION TO CARE
Attending TRINA Elias I performed a history and physical exam of the patient and discussed their management with the resident. I reviewed the resident's note and agree with the documented findings and plan of care, except as noted. My medical decision making and observations are as follows:    88 F with PMH CAD, pulmonary sarcoidosis, COPD (not on home O2), HTN, BIBEMS from [facility] for evaluation of abdominal pain, constipation, vomiting x 4 days.  Patient is unreliable historian and forgetful, answers questions with brief responses.  Has no complaints and denies abdominal pain or constipation, reporting stool output today morning that appeared normal.  Reports 1 episode of vomiting this morning appearing food colored, denies nausea at present time.  Per aide at bedside, patient has chronic constipation but has been straining more than usual over the past 3-4 days and history from nauseated with intermittent retching, only vomiting this morning.  Per further daughter over the phone, there is concern for possible bowel obstruction.  Patient has no history of abdominal surgeries.    On exam, patient no acute distress but appears mildly uncomfortable, repeatedly stating that she wants to go home. Normal work of breathing, speaking full sentences.  Mucous membranes moist, heart and lungs clear to auscultation, abdomen soft nontender nondistended.      MDM–elderly female presenting for evaluation of abdominal pain, constipation, vomiting with concern for possible bowel obstruction per collateral information.  Despite nonfocal exam and vital signs stability, unsure if patient truly passed bowel movement this morning.  Will evaluate with labs and CT.

## 2024-02-08 NOTE — ED PROVIDER NOTE - PROGRESS NOTE DETAILS
Shira PGY 3 Discussed lab and radiology findings with patient. Patient feels comfortable going home. Gave home care and follow up instructions. Discussed which symptoms to look out for and when to return to the ED for further evaluation. Patient given opportunity to ask questions about their medical condition and had all questions answered.   discussed w/ daughtter on the phone in regards to results, discussed w/ pt as well at first pt was hesistant to stay but ultmitely decided to be admitted for IV therapy Dr Shiva rivera of Dr Noonan made aware and also rec admission given UTI and divertic and stated he would let ACP be aware pt is being admitted and to be seen   Notified by aid at bedside that daughter and mother are TBA

## 2024-02-08 NOTE — H&P ADULT - NSHPPHYSICALEXAM_GEN_ALL_CORE
T(C): 36.6 (02-08-24 @ 19:31), Max: 36.6 (02-08-24 @ 19:31)  HR: 68 (02-08-24 @ 19:31) (64 - 74)  BP: 122/71 (02-08-24 @ 19:31) (118/74 - 136/76)  RR: 16 (02-08-24 @ 19:31) (16 - 20)  SpO2: 95% (02-08-24 @ 19:31) (94% - 95%)  GENERAL: NAD, lying in bed comfortably  HEAD:  Atraumatic, normocephalic  EYES: EOMI, PERRLA, conjunctiva and sclera clear  NECK: Supple, trachea midline, no JVD  HEART: Regular rate and rhythm, no murmurs, rubs, or gallops  LUNGS: Unlabored respirations.  Clear to auscultation bilaterally, no crackles, wheezing, or rhonchi  ABDOMEN: Soft,   mild  tenderness..  no  renound/, nondistended, +BS  EXTREMITIES: 2+ peripheral pulses bilaterally. No clubbing, cyanosis, or edema  NERVOUS SYSTEM:  A&Ox3, moving all extremities, no focal deficits   SKIN: No rashes or lesions

## 2024-02-08 NOTE — ED ADULT TRIAGE NOTE - WEIGHT IN KG
65 y.o F with multiple medical comorbidities presenting with three day history of diarrhea a/w blood after eating chinese food three days prior. Pt has some mild epigastric tenderness on exam. Would check CBC,BMP,Mg,Hydration. 49.4

## 2024-02-08 NOTE — ED ADULT NURSE NOTE - OBJECTIVE STATEMENT
Patient presents to Ed via amb with c/o abd pain and constipation. Per Ems patient sent to Ed due to abd pain   vomiting x 1 week 3 episodes within 1 hour today and constipation x 4 days. Patient A&Ox2 denies all complaints  and reports she wants to leave. Patient denies chest pain or sob per aide patient vomited this am and has been  constipated for several days. Patient has no cough fever or chills aide reports no complaints of headache or dizziness. Patient presents to Ed via amb with c/o abd pain and constipation. Per Ems patient sent to Ed due to abd pain   vomiting x 1 week 3 episodes within 1 hour today and constipation x 4 days. Patient A&Ox2 denies all complaints  and reports she wants to leave. Patient denies chest pain or sob per aide patient vomited this am and has been  constipated for several days. Patient has no cough fever or chills aide reports no complaints of headache or dizziness.  LFA 20g iv lock placed labs drawn and sent.

## 2024-02-08 NOTE — H&P ADULT - HISTORY OF PRESENT ILLNESS
88 F        h/o   CAD,   pulmonary sarcoidosis, COPD (not on home O2), HTN,       BIBEMS from [facility] for evaluation of abdominal pain, constipation, vomiting x 4 days   .  pt  is   forgetful,. had  vomiting this morning    p er aide at bedside, patient has chronic constipation      has no history of abdominal surgeries

## 2024-02-09 LAB
ANION GAP SERPL CALC-SCNC: 11 MMOL/L — SIGNIFICANT CHANGE UP (ref 5–17)
BUN SERPL-MCNC: 23 MG/DL — SIGNIFICANT CHANGE UP (ref 7–23)
CALCIUM SERPL-MCNC: 9 MG/DL — SIGNIFICANT CHANGE UP (ref 8.4–10.5)
CHLORIDE SERPL-SCNC: 104 MMOL/L — SIGNIFICANT CHANGE UP (ref 96–108)
CO2 SERPL-SCNC: 25 MMOL/L — SIGNIFICANT CHANGE UP (ref 22–31)
CREAT SERPL-MCNC: 0.89 MG/DL — SIGNIFICANT CHANGE UP (ref 0.5–1.3)
EGFR: 63 ML/MIN/1.73M2 — SIGNIFICANT CHANGE UP
GLUCOSE SERPL-MCNC: 90 MG/DL — SIGNIFICANT CHANGE UP (ref 70–99)
HCT VFR BLD CALC: 36.2 % — SIGNIFICANT CHANGE UP (ref 34.5–45)
HGB BLD-MCNC: 11.7 G/DL — SIGNIFICANT CHANGE UP (ref 11.5–15.5)
MCHC RBC-ENTMCNC: 29.8 PG — SIGNIFICANT CHANGE UP (ref 27–34)
MCHC RBC-ENTMCNC: 32.3 GM/DL — SIGNIFICANT CHANGE UP (ref 32–36)
MCV RBC AUTO: 92.3 FL — SIGNIFICANT CHANGE UP (ref 80–100)
NRBC # BLD: 0 /100 WBCS — SIGNIFICANT CHANGE UP (ref 0–0)
PLATELET # BLD AUTO: 156 K/UL — SIGNIFICANT CHANGE UP (ref 150–400)
POTASSIUM SERPL-MCNC: 3.9 MMOL/L — SIGNIFICANT CHANGE UP (ref 3.5–5.3)
POTASSIUM SERPL-SCNC: 3.9 MMOL/L — SIGNIFICANT CHANGE UP (ref 3.5–5.3)
RBC # BLD: 3.92 M/UL — SIGNIFICANT CHANGE UP (ref 3.8–5.2)
RBC # FLD: 14.3 % — SIGNIFICANT CHANGE UP (ref 10.3–14.5)
SODIUM SERPL-SCNC: 140 MMOL/L — SIGNIFICANT CHANGE UP (ref 135–145)
WBC # BLD: 7.72 K/UL — SIGNIFICANT CHANGE UP (ref 3.8–10.5)
WBC # FLD AUTO: 7.72 K/UL — SIGNIFICANT CHANGE UP (ref 3.8–10.5)

## 2024-02-09 PROCEDURE — 99222 1ST HOSP IP/OBS MODERATE 55: CPT

## 2024-02-09 RX ORDER — CYCLOPENTOLATE HYDROCHLORIDE 10 MG/ML
1 SOLUTION/ DROPS OPHTHALMIC
Refills: 0 | Status: DISCONTINUED | OUTPATIENT
Start: 2024-02-09 | End: 2024-02-21

## 2024-02-09 RX ORDER — ACETAMINOPHEN 500 MG
650 TABLET ORAL EVERY 6 HOURS
Refills: 0 | Status: DISCONTINUED | OUTPATIENT
Start: 2024-02-09 | End: 2024-02-21

## 2024-02-09 RX ORDER — CYCLOPENTOLATE HYDROCHLORIDE 10 MG/ML
1 SOLUTION/ DROPS OPHTHALMIC
Refills: 0 | Status: DISCONTINUED | OUTPATIENT
Start: 2024-02-09 | End: 2024-02-09

## 2024-02-09 RX ORDER — PREDNISOLONE SODIUM PHOSPHATE 1 %
1 DROPS OPHTHALMIC (EYE)
Refills: 0 | Status: DISCONTINUED | OUTPATIENT
Start: 2024-02-09 | End: 2024-02-21

## 2024-02-09 RX ORDER — INFLUENZA VIRUS VACCINE 15; 15; 15; 15 UG/.5ML; UG/.5ML; UG/.5ML; UG/.5ML
0.7 SUSPENSION INTRAMUSCULAR ONCE
Refills: 0 | Status: DISCONTINUED | OUTPATIENT
Start: 2024-02-09 | End: 2024-02-21

## 2024-02-09 RX ADMIN — MIRTAZAPINE 7.5 MILLIGRAM(S): 45 TABLET, ORALLY DISINTEGRATING ORAL at 13:13

## 2024-02-09 RX ADMIN — Medication 650 MILLIGRAM(S): at 15:20

## 2024-02-09 RX ADMIN — CYCLOPENTOLATE HYDROCHLORIDE 1 DROP(S): 10 SOLUTION/ DROPS OPHTHALMIC at 05:49

## 2024-02-09 RX ADMIN — ALBUTEROL 2 PUFF(S): 90 AEROSOL, METERED ORAL at 13:24

## 2024-02-09 RX ADMIN — ALBUTEROL 2 PUFF(S): 90 AEROSOL, METERED ORAL at 18:20

## 2024-02-09 RX ADMIN — Medication 1 DROP(S): at 05:49

## 2024-02-09 RX ADMIN — HEPARIN SODIUM 5000 UNIT(S): 5000 INJECTION INTRAVENOUS; SUBCUTANEOUS at 05:49

## 2024-02-09 RX ADMIN — PIPERACILLIN AND TAZOBACTAM 25 GRAM(S): 4; .5 INJECTION, POWDER, LYOPHILIZED, FOR SOLUTION INTRAVENOUS at 01:33

## 2024-02-09 RX ADMIN — ALBUTEROL 2 PUFF(S): 90 AEROSOL, METERED ORAL at 05:50

## 2024-02-09 RX ADMIN — Medication 650 MILLIGRAM(S): at 23:10

## 2024-02-09 RX ADMIN — Medication 650 MILLIGRAM(S): at 22:10

## 2024-02-09 RX ADMIN — Medication 20 MILLIGRAM(S): at 13:13

## 2024-02-09 RX ADMIN — Medication 650 MILLIGRAM(S): at 16:20

## 2024-02-09 RX ADMIN — HEPARIN SODIUM 5000 UNIT(S): 5000 INJECTION INTRAVENOUS; SUBCUTANEOUS at 18:19

## 2024-02-09 RX ADMIN — SODIUM CHLORIDE 60 MILLILITER(S): 9 INJECTION INTRAMUSCULAR; INTRAVENOUS; SUBCUTANEOUS at 07:12

## 2024-02-09 RX ADMIN — SODIUM CHLORIDE 60 MILLILITER(S): 9 INJECTION INTRAMUSCULAR; INTRAVENOUS; SUBCUTANEOUS at 05:58

## 2024-02-09 RX ADMIN — TEMAZEPAM 30 MILLIGRAM(S): 15 CAPSULE ORAL at 22:06

## 2024-02-09 RX ADMIN — Medication 81 MILLIGRAM(S): at 13:13

## 2024-02-09 RX ADMIN — CYCLOPENTOLATE HYDROCHLORIDE 1 DROP(S): 10 SOLUTION/ DROPS OPHTHALMIC at 18:46

## 2024-02-09 RX ADMIN — ATORVASTATIN CALCIUM 20 MILLIGRAM(S): 80 TABLET, FILM COATED ORAL at 22:05

## 2024-02-09 RX ADMIN — Medication 1 DROP(S): at 18:19

## 2024-02-09 RX ADMIN — PIPERACILLIN AND TAZOBACTAM 25 GRAM(S): 4; .5 INJECTION, POWDER, LYOPHILIZED, FOR SOLUTION INTRAVENOUS at 09:06

## 2024-02-09 RX ADMIN — PIPERACILLIN AND TAZOBACTAM 25 GRAM(S): 4; .5 INJECTION, POWDER, LYOPHILIZED, FOR SOLUTION INTRAVENOUS at 17:19

## 2024-02-09 NOTE — PATIENT PROFILE ADULT - FALL HARM RISK - HARM RISK INTERVENTIONS

## 2024-02-09 NOTE — CONSULT NOTE ADULT - ASSESSMENT
88 year old with CAD  COPD  presented with abd pain and CT suggestive of diverticulitis  but not abscess  pt is tender  but no rebound or diffuse peritoneal signs   pt is very high risk surgical candidate    agree with zosyn for diverticulitis  COPD management per medicine and pulmonary  At present I dont think that iR can drain anything  prognosis is guarded  she is very frail

## 2024-02-09 NOTE — CONSULT NOTE ADULT - SUBJECTIVE AND OBJECTIVE BOX
Patient is a 88y old  Female who presents with a chief complaint of abd  pain/  vomiting (08 Feb 2024 19:55)    HPI:    88 F        h/o   CAD,   pulmonary sarcoidosis, COPD (not on home O2), HTN,       BIBEMS from [facility] for evaluation of abdominal pain, constipation, vomiting x 4 days   .  pt  is   forgetful,. had  vomiting this morning    p er aide at bedside, patient has chronic constipation      has no history of abdominal surgeries  (08 Feb 2024 19:29)      PAST MEDICAL & SURGICAL HISTORY:  HTN (hypertension)      Hypothyroidism      Osteoporosis      CAD (coronary artery disease)          Social history:    FAMILY HISTORY:    REVIEW OF SYSTEMS  General:	Denies any malaise fatigue or chills. Fevers absent    Skin:No rash  	  Ophthalmologic:Denies any visual complaints,discharge redness or photophobia  	  ENMT:No nasal discharge,headache,sinus congestion or throat pain.No dental complaints    Respiratory and Thorax:No cough,sputum or chest pain.Denies shortness of breath  	  Cardiovascular:	No chest pain,palpitaions or dizziness       Allergic/Immunologic:	No hives or rash   Allergies    iodinated radiocontrast agents (Unknown)    Intolerances        Antimicrobials:    piperacillin/tazobactam IVPB.. 3.375 Gram(s) IV Intermittent every 8 hours        Vital Signs Last 24 Hrs  T(C): 36.7 (09 Feb 2024 09:10), Max: 36.9 (09 Feb 2024 04:15)  T(F): 98 (09 Feb 2024 09:10), Max: 98.4 (09 Feb 2024 04:15)  HR: 70 (09 Feb 2024 09:10) (64 - 74)  BP: 145/71 (09 Feb 2024 09:10) (118/74 - 157/67)  BP(mean): 88 (08 Feb 2024 14:36) (88 - 88)  RR: 16 (09 Feb 2024 09:10) (16 - 20)  SpO2: 92% (09 Feb 2024 09:10) (92% - 95%)    Parameters below as of 09 Feb 2024 09:10  Patient On (Oxygen Delivery Method): room air                  Eyes:PERRL EOMI.NO discharge or conjunctival injection    ENMT:No sinus tenderness.No thrush.No pharyngeal exudate or erythema.Fair dental hygiene    Neck:Supple,No LN,no JVD      Respiratory:Good air entry bilaterally,CTA    Cardiovascular:S1 S2 wnl, No murmurs,rub or gallops    Gastrointestinal:Soft BS(+) no tenderness no masses ,No rebound or guarding    Genitourinary:No CVA tendereness                                         11.7   7.72  )-----------( 156      ( 09 Feb 2024 06:39 )             36.2         02-09    140  |  104  |  23  ----------------------------<  90  3.9   |  25  |  0.89    Ca    9.0      09 Feb 2024 06:39    TPro  7.4  /  Alb  4.5  /  TBili  0.4  /  DBili  x   /  AST  16  /  ALT  12  /  AlkPhos  191<H>  02-08      RECENT CULTURES:      MICROBIOLOGY:          Radiology:      Assessment:        Recommendations and Plan:    Pager [phone]  After 5 pm/weekends or if no response :[phone]

## 2024-02-09 NOTE — PROGRESS NOTE ADULT - ASSESSMENT
Assessment and Plan:   Acute abdominal pain   Acute sigmoid diverticulitis   Left hydronephrosis  H/O sarcoidosis on prednisone   COPD and chronic respiratory failure oh home 02    Continue Antibiotics as per ID   considered urology consultation   continue prednisone   continue nebulizer treatement   PPI   DVT prophylaxis

## 2024-02-09 NOTE — PHYSICAL THERAPY INITIAL EVALUATION ADULT - ADDITIONAL COMMENTS
Pt reports she lives alone in a assisted living facility with +elevator. Pt reports she uses rolling walker for ambulation PTA. Pt reports she has a HHA from 11AM-8PM/7 days a wk. Pt owns wheelchair, shower chair and commode. Pt has grab bars in the bathroom.

## 2024-02-09 NOTE — PHYSICAL THERAPY INITIAL EVALUATION ADULT - PERTINENT HX OF CURRENT PROBLEM, REHAB EVAL
88 F h/o   CAD,   pulmonary sarcoidosis, COPD (not on home O2), HTN, BIBEMS from [facility] for evaluation of abdominal pain, constipation, vomiting x 4 days, patient has chronic constipation has no history of abdominal surgeries  *  admitted  with  abd pain, Ct  a/p.  diverticulitis ,  sigmoid  colon, severe  L hydroureter/  mod  L   hydronephrosis comp fx  L  4.  vaginal  pessary

## 2024-02-09 NOTE — PROGRESS NOTE ADULT - SUBJECTIVE AND OBJECTIVE BOX
---___---___---___---___---___---___ ---___---___---___---___---___---___---___---___---                  M E D I C A L   A T T E N D I N G   P R O G R E S S   N O T E  ---___---___---___---___---___---___ ---___---___---___---___---___---___---___---___---        ================================================    ++CHIEF COMPLAINT:   Patient is a 88y old  Female who presents with a chief complaint of abd  pain/  vomiting (2024 11:06)      Diverticulitis of intestine without perforation or abscess without bleeding      ---___---___---___---___---___---  PAST MEDICAL / Surgical  HISTORY:  PAST MEDICAL & SURGICAL HISTORY:  HTN (hypertension)      Hypothyroidism      Osteoporosis      CAD (coronary artery disease)          ---___---___---___---___---___---  FAMILY HISTORY:   FAMILY HISTORY:        ---___---___---___---___---___---  ALLERGIES:   Allergies    iodinated radiocontrast agents (Unknown)    Intolerances        ---___---___---___---___---___---  MEDICATIONS:  MEDICATIONS  (STANDING):  albuterol    90 MICROgram(s) HFA Inhaler 2 Puff(s) Inhalation every 6 hours  aspirin enteric coated 81 milliGRAM(s) Oral daily  atorvastatin 20 milliGRAM(s) Oral at bedtime  cyclopentolate 1% Solution 1 Drop(s) Both EYES two times a day  FLUoxetine 20 milliGRAM(s) Oral daily  heparin   Injectable 5000 Unit(s) SubCutaneous every 12 hours  influenza  Vaccine (HIGH DOSE) 0.7 milliLiter(s) IntraMuscular once  mirtazapine 7.5 milliGRAM(s) Oral daily  piperacillin/tazobactam IVPB.. 3.375 Gram(s) IV Intermittent every 8 hours  prednisoLONE acetate 1% Suspension 1 Drop(s) Both EYES two times a day  sodium chloride 0.9%. 1000 milliLiter(s) (60 mL/Hr) IV Continuous <Continuous>    MEDICATIONS  (PRN):  acetaminophen     Tablet .. 650 milliGRAM(s) Oral every 6 hours PRN Mild Pain (1 - 3)  temazepam 30 milliGRAM(s) Oral at bedtime PRN Insomnia      ---___---___---___---___---___---  REVIEW OF SYSTEM:    GEN: no fever, no chills, no pain  RESP: no SOB, no cough, no sputum  CVS: no chest pain, no palpitations, no edema  GI: no abdominal pain, no nausea, no vomiting, no constipation, no diarrhea  : no dysurea, no frequency, no hematurea  Neuro: no headache, no dizziness  PSYCH: no anxiety, no depression  Derm : no itching, no rash    ---___---___---___---___---___---  VITAL SIGNS:  88y , CAPILLARY BLOOD GLUCOSE        T(C): 36.7 (24 @ 09:10), Max: 36.9 (24 @ 04:15)  HR: 70 (24 @ 09:10) (64 - 74)  BP: 145/71 (24 @ 09:10) (118/74 - 157/67)  RR: 16 (24 @ 09:10) (16 - 20)  SpO2: 92% (24 @ 09:10) (92% - 95%)  ---___---___---___---___---___---  PHYSICAL EXAM:    GEN: A&O X 3 , NAD , comfortable  HEENT: NCAT, PERRL, MMM, hearing intact  Neck: supple , no JVD  CVS: S1S2 , regular , No M/R/G appreciated  PULM: CTA B/L,  no W/R/R appreciated  ABD.: soft. non tender, non distended,  bowel sounds present  Extrem: intact pulses , no edema   Derm: No rash , no ecchymoses  PSYCH : normal mood,  no delusion not anxious     ---___---___---___---___---___---            LAB AND IMAGIN.7   7.72  )-----------( 156      ( 2024 06:39 )             36.2                   140  |  104  |  23  ----------------------------<  90  3.9   |  25  |  0.89    Ca    9.0      2024 06:39    TPro  7.4  /  Alb  4.5  /  TBili  0.4  /  DBili  x   /  AST  16  /  ALT  12  /  AlkPhos  191<H>  02-08                            Urinalysis Basic - ( 2024 06:39 )    Color: x / Appearance: x / SG: x / pH: x  Gluc: 90 mg/dL / Ketone: x  / Bili: x / Urobili: x   Blood: x / Protein: x / Nitrite: x   Leuk Esterase: x / RBC: x / WBC x   Sq Epi: x / Non Sq Epi: x / Bacteria: x        [All pertinent / recent Imaging reviewed]         ---___---___---___---___---___---___ ---___---___---___---___---                         A S S E S S M E N T   A N D   P L A N :      HEALTH ISSUES - PROBLEM Dx:    88 F        h/o   CAD,   pulmonary sarcoidosis, COPD (not on home O2), HTN,       BIBEMS from [facility] for evaluation of abdominal pain, constipation, vomiting x 4 days   .  pt  is   forgetful/  aide aide at bedside, patient has chronic constipation      has no history of abdominal surgeries      *  admitted  with  abd pain        Ct  a/p.  diverticulitis ,  sigmoid  colon, severe  L hydroureter/  mod  L   hydronephrosis                comp fx  L  4.  vaginal  pessary              on iv  zosyn     renal  u/s,  bladder  scan          if hydro   persists, then  needs  uro  eval    CAD    HTN    COPD,  /  pulm sarcoidosis   dvt ppx/  s/q  heparin    id called   appreciate pulmonary input     -GI/DVT Prophylaxis.as ordered        ___________________________  Thank you,  Garry [facility]Hu [facility]  [phone]

## 2024-02-09 NOTE — PROGRESS NOTE ADULT - SUBJECTIVE AND OBJECTIVE BOX
BISHNU SEGURA  MRN#: [id number]  Subjective:   pulmonary progress note  : sarcoidosis ,COPD , Chronic respiratory failure on home 02    date of service is 2/9/2024   88 F        h/o   CAD,   pulmonary sarcoidosis, COPD (not on home O2), HTN,       BIBEMS from [facility] for evaluation of abdominal pain, constipation, vomiting x 4 days, no hematemesis no fever no aspiration or SOB , no diarrhea    .  pt  is   forgetful,. had  vomiting this morning    p er aide at bedside, patient has chronic constipation CT scan of the abdomen shows sever hydroureter,  moderate left hydronephrosis , Diverticulitis sigmoid colon , vaginal pessary complete L 4 fracture      has no history of abdominal surgeries S/P implanted Loop recorder  (08 Feb 2024 19:29), Id consultation appreciated on Zosyn     PAST MEDICAL & SURGICAL HISTORY:  HTN (hypertension)      Hypothyroidism      Osteoporosis      CAD (coronary artery disease)  Sarcoidosis   COPD   Chronic respiratory failure on home 02           OBJECTIVE:  ICU Vital Signs Last 24 Hrs  T(C): 37.2 (09 Feb 2024 16:00), Max: 37.2 (09 Feb 2024 16:00)  T(F): 99 (09 Feb 2024 16:00), Max: 99 (09 Feb 2024 16:00)  HR: 75 (09 Feb 2024 16:00) (68 - 75)  BP: 147/75 (09 Feb 2024 16:00) (129/78 - 157/67)  BP(mean): --  ABP: --  ABP(mean): --  RR: 16 (09 Feb 2024 16:00) (16 - 20)  SpO2: 94% (09 Feb 2024 16:00) (93% - 95%)    O2 Parameters below as of 09 Feb 2024 16:00  Patient On (Oxygen Delivery Method): room air            02-08 @ 07:01  -  02-09 @ 07:00  --------------------------------------------------------  IN: 0 mL / OUT: 100 mL / NET: -100 mL    02-09 @ 07:01  -  02-09 @ 21:00  --------------------------------------------------------  IN: 1045 mL / OUT: 950 mL / NET: 95 mL      PHYSICAL EXAM:Daily Height in cm: 157.48 (08 Feb 2024 12:42)  Elderly morbid obese   female in some distress   Daily   HEENT:     + NCAT  + EOMI  - Conjuctival edema   - Icterus   - Thrush   - ETT  - NGT/OGT  Neck:         + FROM    + JVD     - Nodes     - Masses    + Mid-line trachea   - Tracheostomy  Chest:       kyphosis , implanted loop reordered   Lungs:          + CTA   - Rhonchi    +Rales    - Wheezing  +Decreased BS   - Dullness R L  Cardiac:       + S1 + S2    + RRR   - Irregular   - S3  - S4    - Murmurs   - Rub   - Hamman’s sign   Abdomen:    + BS     + Soft    + tender LLquadrant noted  Organomegaly  - PEG  Extremities:   - Cyanosis U/L   - Clubbing  U/L  - LE/UE Edema   + Capillary refill    + Pulses   Neuro:        + Awake   +  Alert   - Confused   - Lethargic   - Sedated   - Generalized Weakness  Skin:        - Rashes    - Erythema   + Normal incisions   + IV sites intact  - Sacral decubitus    LABS:                 HOSPITAL MEDICATIONS: All mediciations reviewed and analyzed  MEDICATIONS  (STANDING):  albuterol    90 MICROgram(s) HFA Inhaler 2 Puff(s) Inhalation every 6 hours  aspirin enteric coated 81 milliGRAM(s) Oral daily  atorvastatin 20 milliGRAM(s) Oral at bedtime  cyclopentolate 1% Solution 1 Drop(s) Left EYE two times a day  FLUoxetine 20 milliGRAM(s) Oral daily  heparin   Injectable 5000 Unit(s) SubCutaneous every 12 hours  influenza  Vaccine (HIGH DOSE) 0.7 milliLiter(s) IntraMuscular once  mirtazapine 7.5 milliGRAM(s) Oral daily  piperacillin/tazobactam IVPB.. 3.375 Gram(s) IV Intermittent every 8 hours  prednisoLONE acetate 1% Suspension 1 Drop(s) Left EYE two times a day  sodium chloride 0.9%. 1000 milliLiter(s) (60 mL/Hr) IV Continuous <Continuous>    MEDICATIONS  (PRN):  acetaminophen     Tablet .. 650 milliGRAM(s) Oral every 6 hours PRN Mild Pain (1 - 3)  temazepam 30 milliGRAM(s) Oral at bedtime PRN Insomnia    LABS: All Lab data reviewed and analyzed                        11.7   7.72  )-----------( 156      ( 09 Feb 2024 06:39 )             36.2    02-09    140  |  104  |  23  ----------------------------<  90  3.9   |  25  |  0.89    Ca    9.0      09 Feb 2024 06:39    TPro  7.4  /  Alb  4.5  /  TBili  0.4  /  DBili  x   /  AST  16  /  ALT  12  /  AlkPhos  191<H>  02-08     LIVER FUNCTIONS - ( 08 Feb 2024 14:17 )  Alb: 4.5 g/dL / Pro: 7.4 g/dL / ALK PHOS: 191 U/L / ALT: 12 U/L / AST: 16 U/L / GGT: x           RADIOLOGY: - Reviewed and analyzed

## 2024-02-10 LAB
ANION GAP SERPL CALC-SCNC: 10 MMOL/L — SIGNIFICANT CHANGE UP (ref 5–17)
BUN SERPL-MCNC: 19 MG/DL — SIGNIFICANT CHANGE UP (ref 7–23)
CALCIUM SERPL-MCNC: 8.9 MG/DL — SIGNIFICANT CHANGE UP (ref 8.4–10.5)
CHLORIDE SERPL-SCNC: 107 MMOL/L — SIGNIFICANT CHANGE UP (ref 96–108)
CO2 SERPL-SCNC: 25 MMOL/L — SIGNIFICANT CHANGE UP (ref 22–31)
CREAT SERPL-MCNC: 1.02 MG/DL — SIGNIFICANT CHANGE UP (ref 0.5–1.3)
CULTURE RESULTS: SIGNIFICANT CHANGE UP
EGFR: 53 ML/MIN/1.73M2 — LOW
GLUCOSE SERPL-MCNC: 87 MG/DL — SIGNIFICANT CHANGE UP (ref 70–99)
HCT VFR BLD CALC: 36.5 % — SIGNIFICANT CHANGE UP (ref 34.5–45)
HGB BLD-MCNC: 11.9 G/DL — SIGNIFICANT CHANGE UP (ref 11.5–15.5)
MCHC RBC-ENTMCNC: 30 PG — SIGNIFICANT CHANGE UP (ref 27–34)
MCHC RBC-ENTMCNC: 32.6 GM/DL — SIGNIFICANT CHANGE UP (ref 32–36)
MCV RBC AUTO: 91.9 FL — SIGNIFICANT CHANGE UP (ref 80–100)
NRBC # BLD: 0 /100 WBCS — SIGNIFICANT CHANGE UP (ref 0–0)
PLATELET # BLD AUTO: 142 K/UL — LOW (ref 150–400)
POTASSIUM SERPL-MCNC: 3.6 MMOL/L — SIGNIFICANT CHANGE UP (ref 3.5–5.3)
POTASSIUM SERPL-SCNC: 3.6 MMOL/L — SIGNIFICANT CHANGE UP (ref 3.5–5.3)
RBC # BLD: 3.97 M/UL — SIGNIFICANT CHANGE UP (ref 3.8–5.2)
RBC # FLD: 14.3 % — SIGNIFICANT CHANGE UP (ref 10.3–14.5)
SODIUM SERPL-SCNC: 142 MMOL/L — SIGNIFICANT CHANGE UP (ref 135–145)
SPECIMEN SOURCE: SIGNIFICANT CHANGE UP
WBC # BLD: 6.52 K/UL — SIGNIFICANT CHANGE UP (ref 3.8–10.5)
WBC # FLD AUTO: 6.52 K/UL — SIGNIFICANT CHANGE UP (ref 3.8–10.5)

## 2024-02-10 PROCEDURE — 99232 SBSQ HOSP IP/OBS MODERATE 35: CPT

## 2024-02-10 RX ADMIN — Medication 20 MILLIGRAM(S): at 11:24

## 2024-02-10 RX ADMIN — HEPARIN SODIUM 5000 UNIT(S): 5000 INJECTION INTRAVENOUS; SUBCUTANEOUS at 17:12

## 2024-02-10 RX ADMIN — Medication 650 MILLIGRAM(S): at 20:33

## 2024-02-10 RX ADMIN — HEPARIN SODIUM 5000 UNIT(S): 5000 INJECTION INTRAVENOUS; SUBCUTANEOUS at 06:28

## 2024-02-10 RX ADMIN — TEMAZEPAM 30 MILLIGRAM(S): 15 CAPSULE ORAL at 21:15

## 2024-02-10 RX ADMIN — ATORVASTATIN CALCIUM 20 MILLIGRAM(S): 80 TABLET, FILM COATED ORAL at 21:15

## 2024-02-10 RX ADMIN — Medication 650 MILLIGRAM(S): at 09:44

## 2024-02-10 RX ADMIN — MIRTAZAPINE 7.5 MILLIGRAM(S): 45 TABLET, ORALLY DISINTEGRATING ORAL at 11:24

## 2024-02-10 RX ADMIN — Medication 1 DROP(S): at 17:13

## 2024-02-10 RX ADMIN — ALBUTEROL 2 PUFF(S): 90 AEROSOL, METERED ORAL at 11:25

## 2024-02-10 RX ADMIN — PIPERACILLIN AND TAZOBACTAM 25 GRAM(S): 4; .5 INJECTION, POWDER, LYOPHILIZED, FOR SOLUTION INTRAVENOUS at 00:59

## 2024-02-10 RX ADMIN — Medication 81 MILLIGRAM(S): at 11:24

## 2024-02-10 RX ADMIN — ALBUTEROL 2 PUFF(S): 90 AEROSOL, METERED ORAL at 06:29

## 2024-02-10 RX ADMIN — Medication 650 MILLIGRAM(S): at 08:44

## 2024-02-10 RX ADMIN — Medication 1 DROP(S): at 06:28

## 2024-02-10 RX ADMIN — CYCLOPENTOLATE HYDROCHLORIDE 1 DROP(S): 10 SOLUTION/ DROPS OPHTHALMIC at 17:14

## 2024-02-10 RX ADMIN — PIPERACILLIN AND TAZOBACTAM 25 GRAM(S): 4; .5 INJECTION, POWDER, LYOPHILIZED, FOR SOLUTION INTRAVENOUS at 08:25

## 2024-02-10 RX ADMIN — Medication 650 MILLIGRAM(S): at 19:33

## 2024-02-10 RX ADMIN — PIPERACILLIN AND TAZOBACTAM 25 GRAM(S): 4; .5 INJECTION, POWDER, LYOPHILIZED, FOR SOLUTION INTRAVENOUS at 17:12

## 2024-02-10 RX ADMIN — CYCLOPENTOLATE HYDROCHLORIDE 1 DROP(S): 10 SOLUTION/ DROPS OPHTHALMIC at 06:28

## 2024-02-10 NOTE — PROGRESS NOTE ADULT - SUBJECTIVE AND OBJECTIVE BOX
---___---___---___---___---___---___ ---___---___---___---___---___---___---___---___---                  M E D I C A L   A T T E N D I N G   P R O G R E S S   N O T E  ---___---___---___---___---___---___ ---___---___---___---___---___---___---___---___---        ================================================    ++CHIEF COMPLAINT:   Patient is a 88y old  Female who presents with a chief complaint of abd  pain/  vomiting (10 Feb 2024 15:04)      Diverticulitis of intestine without perforation or abscess without bleeding      ---___---___---___---___---___---  PAST MEDICAL / Surgical  HISTORY:  PAST MEDICAL & SURGICAL HISTORY:  HTN (hypertension)      Hypothyroidism      Osteoporosis      CAD (coronary artery disease)          ---___---___---___---___---___---  FAMILY HISTORY:   FAMILY HISTORY:        ---___---___---___---___---___---  ALLERGIES:   Allergies    iodinated radiocontrast agents (Unknown)    Intolerances        ---___---___---___---___---___---  MEDICATIONS:  MEDICATIONS  (STANDING):  albuterol    90 MICROgram(s) HFA Inhaler 2 Puff(s) Inhalation every 6 hours  aspirin enteric coated 81 milliGRAM(s) Oral daily  atorvastatin 20 milliGRAM(s) Oral at bedtime  cyclopentolate 1% Solution 1 Drop(s) Left EYE two times a day  FLUoxetine 20 milliGRAM(s) Oral daily  heparin   Injectable 5000 Unit(s) SubCutaneous every 12 hours  influenza  Vaccine (HIGH DOSE) 0.7 milliLiter(s) IntraMuscular once  mirtazapine 7.5 milliGRAM(s) Oral daily  piperacillin/tazobactam IVPB.. 3.375 Gram(s) IV Intermittent every 8 hours  prednisoLONE acetate 1% Suspension 1 Drop(s) Left EYE two times a day  sodium chloride 0.9%. 1000 milliLiter(s) (60 mL/Hr) IV Continuous <Continuous>    MEDICATIONS  (PRN):  acetaminophen     Tablet .. 650 milliGRAM(s) Oral every 6 hours PRN Mild Pain (1 - 3)  temazepam 30 milliGRAM(s) Oral at bedtime PRN Insomnia      ---___---___---___---___---___---  REVIEW OF SYSTEM:    GEN: no fever, no chills, no pain  RESP: no SOB, no cough, no sputum  CVS: no chest pain, no palpitations, no edema  GI: no abdominal pain, no nausea, no vomiting, no constipation, no diarrhea  : no dysurea, no frequency, no hematurea  Neuro: no headache, no dizziness  PSYCH: no anxiety, no depression  Derm : no itching, no rash    ---___---___---___---___---___---  VITAL SIGNS:  88y , CAPILLARY BLOOD GLUCOSE        T(C): 36.4 (02-10-24 @ 16:27), Max: 37.1 (24 @ 22:19)  HR: 72 (02-10-24 @ 16:27) (70 - 76)  BP: 166/70 (02-10-24 @ 16:27) (149/70 - 166/70)  RR: 18 (02-10-24 @ 16:27) (18 - 18)  SpO2: 92% (02-10-24 @ 16:27) (92% - 93%)  ---___---___---___---___---___---  PHYSICAL EXAM:    GEN: A&O X 3 , NAD , comfortable  HEENT: NCAT, PERRL, MMM, hearing intact  Neck: supple , no JVD  CVS: S1S2 , regular , No M/R/G appreciated  PULM: CTA B/L,  no W/R/R appreciated  ABD.: soft. non tender, non distended,  bowel sounds present  Extrem: intact pulses , no edema   Derm: No rash , no ecchymoses  PSYCH : normal mood,  no delusion not anxious     ---___---___---___---___---___---            LAB AND IMAGIN.9   6.52  )-----------( 142      ( 10 Feb 2024 06:55 )             36.5               02-10    142  |  107  |  19  ----------------------------<  87  3.6   |  25  |  1.02    Ca    8.9      10 Feb 2024 06:55                              Urinalysis Basic - ( 10 Feb 2024 06:55 )    Color: x / Appearance: x / SG: x / pH: x  Gluc: 87 mg/dL / Ketone: x  / Bili: x / Urobili: x   Blood: x / Protein: x / Nitrite: x   Leuk Esterase: x / RBC: x / WBC x   Sq Epi: x / Non Sq Epi: x / Bacteria: x        [All pertinent / recent Imaging reviewed]         ---___---___---___---___---___---___ ---___---___---___---___---                         A S S E S S M E N T   A N D   P L A N :      HEALTH ISSUES - PROBLEM Dx:      Ct  a/p.  diverticulitis ,  sigmoid  colon, severe  L hydroureter/  mod  L   hydronephrosis                comp fx  L  4.  vaginal  pessary              on iv  zosyn     renal  u/s,  bladder  scan          if hydro   persists, then  needs  uro  eval    CAD    HTN    COPD,  /  pulm sarcoidosis   dvt ppx/  s/q  heparin       appreciate pulmonary input     as per id   presented with abd pain and CT suggestive of diverticulitis  but not abscess  pt is tender  but no rebound or diffuse peritoneal signs   pt is very high risk surgical candidate    Continue zosyn for diverticulitis  COPD management per medicine and pulmonary  No drainable collection  Marked Left dilated renal pelvis + U/A, contaminated UC/S  she is very frail    Looks well, normalized WBC, no fever,  eating, abdomen benign.  Uncomplicated Left sided diverticulitis, can treat 5-7 days, when ready for DC can use amoxicillin/clavulanate (875/125 mg twice daily).     get oob to chair       -GI/DVT Prophylaxis as ordered       Thank you,  Garry Hermosillo  [phone]

## 2024-02-10 NOTE — PROGRESS NOTE ADULT - ASSESSMENT
88 year old with CAD  COPD  presented with abd pain and CT suggestive of diverticulitis  but not abscess  pt is tender  but no rebound or diffuse peritoneal signs   pt is very high risk surgical candidate    Continue zosyn for diverticulitis  COPD management per medicine and pulmonary  No drainable collection  Marked Left dilated renal pelvis + U/A, contaminated UC/S  prognosis is guarded  she is very frail      Chalres Chopra MD  pager [phone]  office [phone]  ID is available over the weekend [phone] 88 year old with CAD  COPD  presented with abd pain and CT suggestive of diverticulitis  but not abscess  pt is tender  but no rebound or diffuse peritoneal signs   pt is very high risk surgical candidate    Continue zosyn for diverticulitis  COPD management per medicine and pulmonary  No drainable collection  Marked Left dilated renal pelvis + U/A, contaminated UC/S  she is very frail    Looks well, normalized WBC, no fever,  eating, abdomen benign.  Uncomplicated Left sided diverticulitis, can treat 5-7 days, when ready for DC can use amoxicillin/clavulanate (875/125 mg twice daily).     Charles Chopra MD  pager [phone]  office [phone]  ID is available over the weekend [phone]

## 2024-02-10 NOTE — PROGRESS NOTE ADULT - ASSESSMENT
Assessment and Plan:   Acute abdominal pain   Acute sigmoid diverticulitis   Left hydronephrosis  H/O sarcoidosis on prednisone   COPD and chronic respiratory failure oh home 02    Continue Antibiotics as per ID   considered urology consultation   continue prednisone   continue nebulizer treatement PRN   PPI   DVT prophylaxis

## 2024-02-10 NOTE — PROGRESS NOTE ADULT - SUBJECTIVE AND OBJECTIVE BOX
Patient is a 88y old  Female who presents with a chief complaint of abd  pain/  vomiting (09 Feb 2024 21:00)    Being followed by ID for Diverticulitis    Interval history:  No acute events      ROS: Constipation  No cough, SOB, CP  No N/V/D./abd pain  No other complaints      Antimicrobials:    piperacillin/tazobactam IVPB.. 3.375 Gram(s) IV Intermittent every 8 hours      Vital Signs Last 24 Hrs  T(C): 36.6 (02-10-24 @ 08:21), Max: 37.2 (02-09-24 @ 16:00)  T(F): 97.8 (02-10-24 @ 08:21), Max: 99 (02-09-24 @ 16:00)  HR: 70 (02-10-24 @ 08:21) (70 - 76)  BP: 159/67 (02-10-24 @ 08:21) (147/75 - 159/67)  BP(mean): --  RR: 18 (02-10-24 @ 08:21) (16 - 18)  SpO2: 92% (02-10-24 @ 08:21) (92% - 94%)    Physical Exam:    Constitutional well preserved, NAD    HEENT EOMI, No pallor or icterus    No oral exudate or erythema    Neck supple no LN    Chest Clear to auscultation    CVS S1 S2 WNl No murmur or rub or gallop    Abd soft BS normal No tenderness no masses    Ext No cyanosis clubbing or edema    IV site no erythema tenderness or discharge    Joints no swelling or LOM    CNS AAO X 3 non focal    Lab Data:                          11.9   6.52  )-----------( 142      ( 10 Feb 2024 06:55 )             36.5       02-10    142  |  107  |  19  ----------------------------<  87  3.6   |  25  |  1.02    Ca    8.9      10 Feb 2024 06:55    TPro  7.4  /  Alb  4.5  /  TBili  0.4  /  DBili  x   /  AST  16  /  ALT  12  /  AlkPhos  191<H>  02-08        .Blood Blood-Peripheral  02-08-24   No growth at 24 hours  --  --      .Blood Blood-Peripheral  02-08-24   No growth at 24 hours  --  --      Clean Catch Clean Catch (Midstream)  02-08-24   >=3 organisms. Probable collection contamination.  --  --      < from: US Kidney and Bladder (02.08.24 @ 21:31) >  ACC: [id number] EXAM:  US KIDNEYS AND BLADDER   ORDERED BY: BETI OLMSTEAD     PROCEDURE DATE:  02/08/2024          INTERPRETATION:  CLINICAL INFORMATION: Hydronephrosis seen on CT    COMPARISON: CT abdomen  pelvis 2/8/2024    TECHNIQUE: Sonography of the kidneys and bladder.    FINDINGS:    Exam is technically  limited secondary to patient cooperation and body   habitus..    Right kidney: 8.1 cm. Not well visualized. No hydronephrosis. Assessment   is otherwise limited.    Left kidney: 9.5 cm. Mild hydronephrosis. Markedly dilated renal pelvis.   The visualized proximal ureter is also markedly dilated and tortuous, as   seen on prior CT.    Urinary bladder: Underdistended.    IMPRESSION:  Exam is technically  limited secondary to patient cooperation and body   habitus.    Mild left hydronephrosis and markedly dilated left renal pelvis and   tortuous ureter as seen on the  prior CT. Etiology remains uncertain.    No hydronephrosis on the right.    < end of copied text >  < from: CT Abdomen and Pelvis No Cont (02.08.24 @ 18:01) >  ACC: [id number] EXAM:  CT ABDOMEN AND PELVIS   ORDERED BY: RHONA BLOOD     PROCEDURE DATE:  02/08/2024          INTERPRETATION:  CLINICAL INFORMATION: Abdominal pain, constipation    COMPARISON: None.    CONTRAST/COMPLICATIONS:  IV Contrast: NONE  OralContrast: NONE  Complications: None reported at time of study completion    PROCEDURE:  CT of the Abdomen and Pelvis was performed.  Sagittal and coronal reformats were performed.    FINDINGS:  LOWER CHEST: Enlarged main pulmonary artery suggestive of pulmonary   hypertension. Coronary artery valvular calcifications. The lung bases are   clear. Elevated right diaphragm. Coronary artery calcification    LIVER: Within normal limits.  BILE DUCTS: Normal caliber.  GALLBLADDER: Cholelithiasis.  SPLEEN: Within normal limits.  PANCREAS: Within normal limits.  ADRENALS: Within normal limits.  KIDNEYS/URETERS: Severe left hydroureter with tortuosity of the ureter   and moderate hydronephrosis to the level of the UVJ. No obstructing stone   or lesion is visualized. No right hydronephrosis.    BLADDER: Within normal limits.  REPRODUCTIVE ORGANS: A vaginal pessary is in place    BOWEL: Colonic diverticulosis. Mild thickening of the mid sigmoid colon   with mild adjacent stranding suggestive of acute diverticulitis (602-43,   301-76). No bowel obstruction. Appendix is normal.  PERITONEUM: No ascites.  VESSELS: Atherosclerotic changes.  RETROPERITONEUM/LYMPH NODES: No lymphadenopathy.  ABDOMINAL WALL: Mild skin thickening and subcutaneous infiltration   overlying the coccyx.  BONES: Left hip ORIF. Lumbar levoscoliosis. Degenerative changes of the   spine. Age indeterminate compression deformity of L4. Grade 1   anterolisthesis of L4 on L5 and L5 on S1.    IMPRESSION:    Thickening of the sigmoid colon with mild inflammatory changes adjacent   to multiple colonic diverticula suggestive of acute diverticulitis.   Severe left hydroureter and moderate left hydronephrosis of uncertain   etiology.    < end of copied text >                     Patient is a 88y old  Female who presents with a chief complaint of abd  pain/  vomiting (09 Feb 2024 21:00)    Being followed by ID for Diverticulitis    Interval history:  No acute events      ROS: Feels well, "always feel well"  No cough, SOB, CP  No N/V/D./abd pain  No other complaints      Antimicrobials:    piperacillin/tazobactam IVPB.. 3.375 Gram(s) IV Intermittent every 8 hours      Vital Signs Last 24 Hrs  T(C): 36.6 (02-10-24 @ 08:21), Max: 37.2 (02-09-24 @ 16:00)  T(F): 97.8 (02-10-24 @ 08:21), Max: 99 (02-09-24 @ 16:00)  HR: 70 (02-10-24 @ 08:21) (70 - 76)  BP: 159/67 (02-10-24 @ 08:21) (147/75 - 159/67)  BP(mean): --  RR: 18 (02-10-24 @ 08:21) (16 - 18)  SpO2: 92% (02-10-24 @ 08:21) (92% - 94%)    Physical Exam:    Constitutional well preserved, NAD, sitting up eating lunch    HEENT EOMI, No pallor or icterus    No oral exudate or erythema    Neck supple no LN    Chest Clear to auscultation    CVS S1 S2 WNl No murmur or rub or gallop    Abd soft BS normal No tenderness no masses    Ext No cyanosis clubbing or edema    IV site no erythema tenderness or discharge    Joints no swelling or LOM    CNS AAO X 3 non focal    Lab Data:                          11.9   6.52  )-----------( 142      ( 10 Feb 2024 06:55 )             36.5       02-10    142  |  107  |  19  ----------------------------<  87  3.6   |  25  |  1.02    Ca    8.9      10 Feb 2024 06:55    TPro  7.4  /  Alb  4.5  /  TBili  0.4  /  DBili  x   /  AST  16  /  ALT  12  /  AlkPhos  191<H>  02-08        .Blood Blood-Peripheral  02-08-24   No growth at 24 hours  --  --      .Blood Blood-Peripheral  02-08-24   No growth at 24 hours  --  --      Clean Catch Clean Catch (Midstream)  02-08-24   >=3 organisms. Probable collection contamination.  --  --      < from: US Kidney and Bladder (02.08.24 @ 21:31) >  ACC: [id number] EXAM:  US KIDNEYS AND BLADDER   ORDERED BY: BETI OLMSTEAD     PROCEDURE DATE:  02/08/2024          INTERPRETATION:  CLINICAL INFORMATION: Hydronephrosis seen on CT    COMPARISON: CT abdomen  pelvis 2/8/2024    TECHNIQUE: Sonography of the kidneys and bladder.    FINDINGS:    Exam is technically  limited secondary to patient cooperation and body   habitus..    Right kidney: 8.1 cm. Not well visualized. No hydronephrosis. Assessment   is otherwise limited.    Left kidney: 9.5 cm. Mild hydronephrosis. Markedly dilated renal pelvis.   The visualized proximal ureter is also markedly dilated and tortuous, as   seen on prior CT.    Urinary bladder: Underdistended.    IMPRESSION:  Exam is technically  limited secondary to patient cooperation and body   habitus.    Mild left hydronephrosis and markedly dilated left renal pelvis and   tortuous ureter as seen on the  prior CT. Etiology remains uncertain.    No hydronephrosis on the right.    < end of copied text >  < from: CT Abdomen and Pelvis No Cont (02.08.24 @ 18:01) >  ACC: [id number] EXAM:  CT ABDOMEN AND PELVIS   ORDERED BY: RHONA BLOOD     PROCEDURE DATE:  02/08/2024          INTERPRETATION:  CLINICAL INFORMATION: Abdominal pain, constipation    COMPARISON: None.    CONTRAST/COMPLICATIONS:  IV Contrast: NONE  OralContrast: NONE  Complications: None reported at time of study completion    PROCEDURE:  CT of the Abdomen and Pelvis was performed.  Sagittal and coronal reformats were performed.    FINDINGS:  LOWER CHEST: Enlarged main pulmonary artery suggestive of pulmonary   hypertension. Coronary artery valvular calcifications. The lung bases are   clear. Elevated right diaphragm. Coronary artery calcification    LIVER: Within normal limits.  BILE DUCTS: Normal caliber.  GALLBLADDER: Cholelithiasis.  SPLEEN: Within normal limits.  PANCREAS: Within normal limits.  ADRENALS: Within normal limits.  KIDNEYS/URETERS: Severe left hydroureter with tortuosity of the ureter   and moderate hydronephrosis to the level of the UVJ. No obstructing stone   or lesion is visualized. No right hydronephrosis.    BLADDER: Within normal limits.  REPRODUCTIVE ORGANS: A vaginal pessary is in place    BOWEL: Colonic diverticulosis. Mild thickening of the mid sigmoid colon   with mild adjacent stranding suggestive of acute diverticulitis (602-43,   301-76). No bowel obstruction. Appendix is normal.  PERITONEUM: No ascites.  VESSELS: Atherosclerotic changes.  RETROPERITONEUM/LYMPH NODES: No lymphadenopathy.  ABDOMINAL WALL: Mild skin thickening and subcutaneous infiltration   overlying the coccyx.  BONES: Left hip ORIF. Lumbar levoscoliosis. Degenerative changes of the   spine. Age indeterminate compression deformity of L4. Grade 1   anterolisthesis of L4 on L5 and L5 on S1.    IMPRESSION:    Thickening of the sigmoid colon with mild inflammatory changes adjacent   to multiple colonic diverticula suggestive of acute diverticulitis.   Severe left hydroureter and moderate left hydronephrosis of uncertain   etiology.    < end of copied text >

## 2024-02-10 NOTE — PROGRESS NOTE ADULT - SUBJECTIVE AND OBJECTIVE BOX
BISHNU SEGURA  MRN#: [id number]  Subjective:   pulmonary progress note  : sarcoidosis ,COPD , Chronic respiratory failure on home 02    date of service is 2/10/2024   88 F        h/o   CAD,   pulmonary sarcoidosis, COPD (not on home O2), HTN,       BIBEMS from [facility] for evaluation of abdominal pain, constipation, vomiting x 4 days, no hematemesis no fever no aspiration or SOB , no diarrhea    .  pt  is   forgetful,. had  vomiting this morning    p er aide at bedside, patient has chronic constipation CT scan of the abdomen shows sever hydroureter,  moderate left hydronephrosis , Diverticulitis sigmoid colon , vaginal pessary complete L 4 fracture      has no history of abdominal surgeries S/P implanted Loop recorder  (08 Feb 2024 19:29), Id consultation appreciated  started on Zosyn , no new C/O no fever no more vomiting no SOB , acceptable 02 saturation     PAST MEDICAL & SURGICAL HISTORY:  HTN (hypertension)      Hypothyroidism      Osteoporosis      CAD (coronary artery disease)  Sarcoidosis   COPD   Chronic respiratory failure on home 02           OBJECTIVE:  ICU Vital Signs Last 24 Hrs  T(C): 37.2 (09 Feb 2024 16:00), Max: 37.2 (09 Feb 2024 16:00)  T(F): 99 (09 Feb 2024 16:00), Max: 99 (09 Feb 2024 16:00)  HR: 75 (09 Feb 2024 16:00) (68 - 75)  BP: 147/75 (09 Feb 2024 16:00) (129/78 - 157/67)  BP(mean): --  ABP: --  ABP(mean): --  RR: 16 (09 Feb 2024 16:00) (16 - 20)  SpO2: 94% (09 Feb 2024 16:00) (93% - 95%)    O2 Parameters below as of 09 Feb 2024 16:00  Patient On (Oxygen Delivery Method): room air            02-08 @ 07:01  -  02-09 @ 07:00  --------------------------------------------------------  IN: 0 mL / OUT: 100 mL / NET: -100 mL    02-09 @ 07:01 - 02-09 @ 21:00  --------------------------------------------------------  IN: 1045 mL / OUT: 950 mL / NET: 95 mL      PHYSICAL EXAM:Daily Height in cm: 157.48 (08 Feb 2024 12:42)  Elderly morbid obese   female in no distress   Daily   HEENT:     + NCAT  + EOMI  - Conjuctival edema   - Icterus   - Thrush   - ETT  - NGT/OGT  Neck:         + FROM    + JVD     - Nodes     - Masses    + Mid-line trachea   - Tracheostomy  Chest:       kyphosis , implanted loop reordered   Lungs:          + CTA   - Rhonchi    +Rales    - Wheezing  +Decreased BS   - Dullness R L  Cardiac:       + S1 + S2    + RRR   - Irregular   - S3  - S4    - Murmurs   - Rub   - Hamman’s sign   Abdomen:    + BS     + Soft    + tender LLquadrant noted  Organomegaly  - PEG  Extremities:   - Cyanosis U/L   - Clubbing  U/L  - LE/UE Edema   + Capillary refill    + Pulses   Neuro:        + Awake   +  Alert   - Confused   - Lethargic   - Sedated   - Generalized Weakness  Skin:        - Rashes    - Erythema   + Normal incisions   + IV sites intact  - Sacral decubitus    LABS:                 HOSPITAL MEDICATIONS: All mediciations reviewed and analyzed  MEDICATIONS  (STANDING):  albuterol    90 MICROgram(s) HFA Inhaler 2 Puff(s) Inhalation every 6 hours  aspirin enteric coated 81 milliGRAM(s) Oral daily  atorvastatin 20 milliGRAM(s) Oral at bedtime  cyclopentolate 1% Solution 1 Drop(s) Left EYE two times a day  FLUoxetine 20 milliGRAM(s) Oral daily  heparin   Injectable 5000 Unit(s) SubCutaneous every 12 hours  influenza  Vaccine (HIGH DOSE) 0.7 milliLiter(s) IntraMuscular once  mirtazapine 7.5 milliGRAM(s) Oral daily  piperacillin/tazobactam IVPB.. 3.375 Gram(s) IV Intermittent every 8 hours  prednisoLONE acetate 1% Suspension 1 Drop(s) Left EYE two times a day  sodium chloride 0.9%. 1000 milliLiter(s) (60 mL/Hr) IV Continuous <Continuous>    MEDICATIONS  (PRN):  acetaminophen     Tablet .. 650 milliGRAM(s) Oral every 6 hours PRN Mild Pain (1 - 3)  temazepam 30 milliGRAM(s) Oral at bedtime PRN Insomnia    LABS: All Lab data reviewed and analyzed                        11.7   7.72  )-----------( 156      ( 09 Feb 2024 06:39 )             36.2    02-09    140  |  104  |  23  ----------------------------<  90  3.9   |  25  |  0.89    Ca    9.0      09 Feb 2024 06:39    TPro  7.4  /  Alb  4.5  /  TBili  0.4  /  DBili  x   /  AST  16  /  ALT  12  /  AlkPhos  191<H>  02-08     LIVER FUNCTIONS - ( 08 Feb 2024 14:17 )  Alb: 4.5 g/dL / Pro: 7.4 g/dL / ALK PHOS: 191 U/L / ALT: 12 U/L / AST: 16 U/L / GGT: x           RADIOLOGY: - Reviewed and analyzed

## 2024-02-11 RX ADMIN — Medication 650 MILLIGRAM(S): at 02:38

## 2024-02-11 RX ADMIN — Medication 650 MILLIGRAM(S): at 13:36

## 2024-02-11 RX ADMIN — PIPERACILLIN AND TAZOBACTAM 25 GRAM(S): 4; .5 INJECTION, POWDER, LYOPHILIZED, FOR SOLUTION INTRAVENOUS at 08:42

## 2024-02-11 RX ADMIN — Medication 650 MILLIGRAM(S): at 03:38

## 2024-02-11 RX ADMIN — Medication 20 MILLIGRAM(S): at 11:16

## 2024-02-11 RX ADMIN — Medication 1 DROP(S): at 05:08

## 2024-02-11 RX ADMIN — TEMAZEPAM 30 MILLIGRAM(S): 15 CAPSULE ORAL at 22:41

## 2024-02-11 RX ADMIN — MIRTAZAPINE 7.5 MILLIGRAM(S): 45 TABLET, ORALLY DISINTEGRATING ORAL at 11:15

## 2024-02-11 RX ADMIN — ATORVASTATIN CALCIUM 20 MILLIGRAM(S): 80 TABLET, FILM COATED ORAL at 21:41

## 2024-02-11 RX ADMIN — Medication 81 MILLIGRAM(S): at 11:15

## 2024-02-11 RX ADMIN — CYCLOPENTOLATE HYDROCHLORIDE 1 DROP(S): 10 SOLUTION/ DROPS OPHTHALMIC at 05:09

## 2024-02-11 RX ADMIN — Medication 650 MILLIGRAM(S): at 14:31

## 2024-02-11 RX ADMIN — PIPERACILLIN AND TAZOBACTAM 25 GRAM(S): 4; .5 INJECTION, POWDER, LYOPHILIZED, FOR SOLUTION INTRAVENOUS at 17:03

## 2024-02-11 RX ADMIN — CYCLOPENTOLATE HYDROCHLORIDE 1 DROP(S): 10 SOLUTION/ DROPS OPHTHALMIC at 17:03

## 2024-02-11 RX ADMIN — Medication 650 MILLIGRAM(S): at 20:12

## 2024-02-11 RX ADMIN — SODIUM CHLORIDE 60 MILLILITER(S): 9 INJECTION INTRAMUSCULAR; INTRAVENOUS; SUBCUTANEOUS at 17:03

## 2024-02-11 RX ADMIN — Medication 650 MILLIGRAM(S): at 21:12

## 2024-02-11 RX ADMIN — Medication 1 DROP(S): at 17:03

## 2024-02-11 RX ADMIN — HEPARIN SODIUM 5000 UNIT(S): 5000 INJECTION INTRAVENOUS; SUBCUTANEOUS at 05:10

## 2024-02-11 RX ADMIN — PIPERACILLIN AND TAZOBACTAM 25 GRAM(S): 4; .5 INJECTION, POWDER, LYOPHILIZED, FOR SOLUTION INTRAVENOUS at 00:39

## 2024-02-11 NOTE — PROGRESS NOTE ADULT - SUBJECTIVE AND OBJECTIVE BOX
---___---___---___---___---___---___ ---___---___---___---___---___---___---___---___---                  M E D I C A L   A T T E N D I N G   P R O G R E S S   N O T E  ---___---___---___---___---___---___ ---___---___---___---___---___---___---___---___---        ================================================    ++CHIEF COMPLAINT:   Patient is a 88y old  Female who presents with a chief complaint of abd  pain/  vomiting (2024 15:37)      Diverticulitis of intestine without perforation or abscess without bleeding      ---___---___---___---___---___---  PAST MEDICAL / Surgical  HISTORY:  PAST MEDICAL & SURGICAL HISTORY:  HTN (hypertension)      Hypothyroidism      Osteoporosis      CAD (coronary artery disease)          ---___---___---___---___---___---  FAMILY HISTORY:   FAMILY HISTORY:        ---___---___---___---___---___---  ALLERGIES:   Allergies    iodinated radiocontrast agents (Unknown)    Intolerances        ---___---___---___---___---___---  MEDICATIONS:  MEDICATIONS  (STANDING):  albuterol    90 MICROgram(s) HFA Inhaler 2 Puff(s) Inhalation every 6 hours  aspirin enteric coated 81 milliGRAM(s) Oral daily  atorvastatin 20 milliGRAM(s) Oral at bedtime  cyclopentolate 1% Solution 1 Drop(s) Left EYE two times a day  FLUoxetine 20 milliGRAM(s) Oral daily  influenza  Vaccine (HIGH DOSE) 0.7 milliLiter(s) IntraMuscular once  mirtazapine 7.5 milliGRAM(s) Oral daily  piperacillin/tazobactam IVPB.. 3.375 Gram(s) IV Intermittent every 8 hours  prednisoLONE acetate 1% Suspension 1 Drop(s) Left EYE two times a day  sodium chloride 0.9%. 1000 milliLiter(s) (60 mL/Hr) IV Continuous <Continuous>    MEDICATIONS  (PRN):  acetaminophen     Tablet .. 650 milliGRAM(s) Oral every 6 hours PRN Mild Pain (1 - 3)  temazepam 30 milliGRAM(s) Oral at bedtime PRN Insomnia      ---___---___---___---___---___---  REVIEW OF SYSTEM:    GEN: no fever, no chills, no pain  RESP: no SOB, no cough, no sputum  CVS: no chest pain, no palpitations, no edema  GI: no abdominal pain, no nausea, no vomiting, no constipation, no diarrhea  : no dysurea, no frequency, no hematurea  Neuro: no headache, no dizziness  PSYCH: no anxiety, no depression  Derm : no itching, no rash    ---___---___---___---___---___---  VITAL SIGNS:  88y , CAPILLARY BLOOD GLUCOSE        T(C): 36.8 (24 @ 18:26), Max: 36.8 (24 @ 18:26)  HR: 74 (24 @ 18:26) (69 - 76)  BP: 151/77 (24 @ 18:26) (151/77 - 177/62)  RR: 18 (24 @ 18:26) (18 - 20)  SpO2: 93% (24 @ 18:26) (91% - 94%)  ---___---___---___---___---___---  PHYSICAL EXAM:    GEN: A&O X 3 , NAD , comfortable  HEENT: NCAT, PERRL, MMM, hearing intact  Neck: supple , no JVD  CVS: S1S2 , regular , No M/R/G appreciated  PULM: CTA B/L,  no W/R/R appreciated  ABD.: soft. non tender, non distended,  bowel sounds present  Extrem: intact pulses , no edema   Derm: No rash , no ecchymoses  PSYCH : normal mood,  no delusion not anxious     ---___---___---___---___---___---            LAB AND IMAGIN.9   6.52  )-----------( 142      ( 10 Feb 2024 06:55 )             36.5               02-10    142  |  107  |  19  ----------------------------<  87  3.6   |  25  |  1.02    Ca    8.9      10 Feb 2024 06:55         INTERPRETATION:  CLINICAL INFORMATION: Abdominal pain, constipation    COMPARISON: None.    CONTRAST/COMPLICATIONS:  IV Contrast: NONE  OralContrast: NONE  Complications: None reported at time of study completion    PROCEDURE:  CT of the Abdomen and Pelvis was performed.  Sagittal and coronal reformats were performed.    FINDINGS:  LOWER CHEST: Enlarged main pulmonary artery suggestive of pulmonary   hypertension. Coronary artery valvular calcifications. The lung bases are   clear. Elevated right diaphragm. Coronary artery calcification    LIVER: Within normal limits.  BILE DUCTS: Normal caliber.  GALLBLADDER: Cholelithiasis.  SPLEEN: Within normal limits.  PANCREAS: Within normal limits.  ADRENALS: Within normal limits.  KIDNEYS/URETERS: Severe left hydroureter with tortuosity of the ureter   and moderate hydronephrosis to the level of the UVJ. No obstructing stone   or lesion is visualized. No right hydronephrosis.    BLADDER: Within normal limits.  REPRODUCTIVE ORGANS: A vaginal pessary is in place    BOWEL: Colonic diverticulosis. Mild thickening of the mid sigmoid colon   with mild adjacent stranding suggestive of acute diverticulitis (602-43,   301-76). No bowel obstruction. Appendix is normal.  PERITONEUM: No ascites.  VESSELS: Atherosclerotic changes.  RETROPERITONEUM/LYMPH NODES: No lymphadenopathy.  ABDOMINAL WALL: Mild skin thickening and subcutaneous infiltration   overlying the coccyx.  BONES: Left hip ORIF. Lumbar levoscoliosis. Degenerative changes of the   spine. Age indeterminate compression deformity of L4. Grade 1   anterolisthesis of L4 on L5 and L5 on S1.    IMPRESSION:    Thickening of the sigmoid colon with mild inflammatory changes adjacent   to multiple colonic diverticula suggestive of acute diverticulitis.   Severe left hydroureter and moderate left hydronephrosis of uncertain   etiology.                    Urinalysis Basic - ( 10 Feb 2024 06:55 )    Color: x / Appearance: x / SG: x / pH: x  Gluc: 87 mg/dL / Ketone: x  / Bili: x / Urobili: x   Blood: x / Protein: x / Nitrite: x   Leuk Esterase: x / RBC: x / WBC x   Sq Epi: x / Non Sq Epi: x / Bacteria: x        [All pertinent / recent Imaging reviewed]         ---___---___---___---___---___---___ ---___---___---___---___---                         A S S E S S M E N T   A N D   P L A N :      HEALTH ISSUES - PROBLEM Dx:    Continue zosyn for diverticulitis  COPD management per medicine and pulmonary  No drainable collection  Marked Left dilated renal pelvis + U/A, contaminated UC/S  she is very frail    Looks well, normalized WBC, no fever,  eating, abdomen benign.  Uncomplicated Left sided diverticulitis, can treat 5-7 days, when ready for DC can use amoxicillin/clavulanate (875/125 mg twice daily).     get oob to chair     urology  consult in regards to ct scan findings              -GI/DVT Prophylaxis.    --------------------------------------------  Case discussed with   Education given on   ___________________________  Thank you,  aGrry Hermosillo  [phone]       ---___---___---___---___---___---___ ---___---___---___---___---___---___---___---___---                  M E D I C A L   A T T E N D I N G   P R O G R E S S   N O T E  ---___---___---___---___---___---___ ---___---___---___---___---___---___---___---___---        ================================================    ++CHIEF COMPLAINT:   Patient is a 88y old  Female who presents with a chief complaint of abd  pain/  vomiting (2024 15:37)      Diverticulitis of intestine without perforation or abscess without bleeding      ---___---___---___---___---___---  PAST MEDICAL / Surgical  HISTORY:  PAST MEDICAL & SURGICAL HISTORY:  HTN (hypertension)      Hypothyroidism      Osteoporosis      CAD (coronary artery disease)          ---___---___---___---___---___---  FAMILY HISTORY:   FAMILY HISTORY:        ---___---___---___---___---___---  ALLERGIES:   Allergies    iodinated radiocontrast agents (Unknown)    Intolerances        ---___---___---___---___---___---  MEDICATIONS:  MEDICATIONS  (STANDING):  albuterol    90 MICROgram(s) HFA Inhaler 2 Puff(s) Inhalation every 6 hours  aspirin enteric coated 81 milliGRAM(s) Oral daily  atorvastatin 20 milliGRAM(s) Oral at bedtime  cyclopentolate 1% Solution 1 Drop(s) Left EYE two times a day  FLUoxetine 20 milliGRAM(s) Oral daily  influenza  Vaccine (HIGH DOSE) 0.7 milliLiter(s) IntraMuscular once  mirtazapine 7.5 milliGRAM(s) Oral daily  piperacillin/tazobactam IVPB.. 3.375 Gram(s) IV Intermittent every 8 hours  prednisoLONE acetate 1% Suspension 1 Drop(s) Left EYE two times a day  sodium chloride 0.9%. 1000 milliLiter(s) (60 mL/Hr) IV Continuous <Continuous>    MEDICATIONS  (PRN):  acetaminophen     Tablet .. 650 milliGRAM(s) Oral every 6 hours PRN Mild Pain (1 - 3)  temazepam 30 milliGRAM(s) Oral at bedtime PRN Insomnia      ---___---___---___---___---___---  REVIEW OF SYSTEM:    GEN: no fever, no chills, no pain  RESP: no SOB, no cough, no sputum  CVS: no chest pain, no palpitations, no edema  GI: no abdominal pain, no nausea, no vomiting, no constipation, no diarrhea  : no dysurea, no frequency, no hematurea  Neuro: no headache, no dizziness  PSYCH: no anxiety, no depression  Derm : no itching, no rash    ---___---___---___---___---___---  VITAL SIGNS:  88y , CAPILLARY BLOOD GLUCOSE        T(C): 36.8 (24 @ 18:26), Max: 36.8 (24 @ 18:26)  HR: 74 (24 @ 18:26) (69 - 76)  BP: 151/77 (24 @ 18:26) (151/77 - 177/62)  RR: 18 (24 @ 18:26) (18 - 20)  SpO2: 93% (24 @ 18:26) (91% - 94%)  ---___---___---___---___---___---  PHYSICAL EXAM:    GEN: A&O X 3 , NAD , comfortable  HEENT: NCAT, PERRL, MMM, hearing intact  Neck: supple , no JVD  CVS: S1S2 , regular , No M/R/G appreciated  PULM: CTA B/L,  no W/R/R appreciated  ABD.: soft. non tender, non distended,  bowel sounds present  Extrem: intact pulses , no edema   Derm: No rash , no ecchymoses  PSYCH : normal mood,  no delusion not anxious     ---___---___---___---___---___---            LAB AND IMAGIN.9   6.52  )-----------( 142      ( 10 Feb 2024 06:55 )             36.5               02-10    142  |  107  |  19  ----------------------------<  87  3.6   |  25  |  1.02    Ca    8.9      10 Feb 2024 06:55         INTERPRETATION:  CLINICAL INFORMATION: Abdominal pain, constipation    COMPARISON: None.    CONTRAST/COMPLICATIONS:  IV Contrast: NONE  OralContrast: NONE  Complications: None reported at time of study completion    PROCEDURE:  CT of the Abdomen and Pelvis was performed.  Sagittal and coronal reformats were performed.    FINDINGS:  LOWER CHEST: Enlarged main pulmonary artery suggestive of pulmonary   hypertension. Coronary artery valvular calcifications. The lung bases are   clear. Elevated right diaphragm. Coronary artery calcification    LIVER: Within normal limits.  BILE DUCTS: Normal caliber.  GALLBLADDER: Cholelithiasis.  SPLEEN: Within normal limits.  PANCREAS: Within normal limits.  ADRENALS: Within normal limits.  KIDNEYS/URETERS: Severe left hydroureter with tortuosity of the ureter   and moderate hydronephrosis to the level of the UVJ. No obstructing stone   or lesion is visualized. No right hydronephrosis.    BLADDER: Within normal limits.  REPRODUCTIVE ORGANS: A vaginal pessary is in place    BOWEL: Colonic diverticulosis. Mild thickening of the mid sigmoid colon   with mild adjacent stranding suggestive of acute diverticulitis (602-43,   301-76). No bowel obstruction. Appendix is normal.  PERITONEUM: No ascites.  VESSELS: Atherosclerotic changes.  RETROPERITONEUM/LYMPH NODES: No lymphadenopathy.  ABDOMINAL WALL: Mild skin thickening and subcutaneous infiltration   overlying the coccyx.  BONES: Left hip ORIF. Lumbar levoscoliosis. Degenerative changes of the   spine. Age indeterminate compression deformity of L4. Grade 1   anterolisthesis of L4 on L5 and L5 on S1.    IMPRESSION:    Thickening of the sigmoid colon with mild inflammatory changes adjacent   to multiple colonic diverticula suggestive of acute diverticulitis.   Severe left hydroureter and moderate left hydronephrosis of uncertain   etiology.                    Urinalysis Basic - ( 10 Feb 2024 06:55 )    Color: x / Appearance: x / SG: x / pH: x  Gluc: 87 mg/dL / Ketone: x  / Bili: x / Urobili: x   Blood: x / Protein: x / Nitrite: x   Leuk Esterase: x / RBC: x / WBC x   Sq Epi: x / Non Sq Epi: x / Bacteria: x        [All pertinent / recent Imaging reviewed]         ---___---___---___---___---___---___ ---___---___---___---___---                         A S S E S S M E N T   A N D   P L A N :      HEALTH ISSUES - PROBLEM Dx:    Continue zosyn for diverticulitis  COPD management per medicine and pulmonary  No drainable collection  Marked Left dilated renal pelvis + U/A, contaminated UC/S  she is very frail    Looks well, normalized WBC, no fever,  eating, abdomen benign.  Uncomplicated Left sided diverticulitis, can treat 5-7 days, when ready for DC can use amoxicillin/clavulanate (875/125 mg twice daily).     get oob to chair     urology  consult in regards to ct scan findings  thrombocytopenia  off heparin monitor platelet              -GI/DVT Prophylaxis.    --------------------------------------------  Case discussed with   Education given on   ___________________________  Thank you,  Garry Hermosillo  [phone]

## 2024-02-11 NOTE — PROGRESS NOTE ADULT - SUBJECTIVE AND OBJECTIVE BOX
BISHNU SEGURA  MRN#: [id number]  Subjective:   pulmonary progress note  : sarcoidosis ,COPD , Chronic respiratory failure on home 02    date of service is 2/11/2024   88 F        h/o   CAD,   pulmonary sarcoidosis, COPD (not on home O2), HTN,       BIBEMS from [facility] for evaluation of abdominal pain, constipation, vomiting x 4 days, no hematemesis no fever no aspiration or SOB , no diarrhea    .  pt  is   forgetful,. had  vomiting this morning    p er aide at bedside, patient has chronic constipation CT scan of the abdomen shows sever hydroureter,  moderate left hydronephrosis , Diverticulitis sigmoid colon , vaginal pessary complete L 4 fracture      has no history of abdominal surgeries S/P implanted Loop recorder  (08 Feb 2024 19:29), Id consultation appreciated  started on Zosyn , no new C/O no fever no more vomiting no SOB , acceptable 02 saturation , condition is the same no new events BP is not well controlled , 02 saturation is 93% at rest no SOB or chest pain reported     PAST MEDICAL & SURGICAL HISTORY:  HTN (hypertension)      Hypothyroidism      Osteoporosis      CAD (coronary artery disease)  Sarcoidosis   COPD   Chronic respiratory failure on home 02           OBJECTIVE:  ICU Vital Signs Last 24 Hrs  T(C): 37.2 (09 Feb 2024 16:00), Max: 37.2 (09 Feb 2024 16:00)  T(F): 99 (09 Feb 2024 16:00), Max: 99 (09 Feb 2024 16:00)  HR: 75 (09 Feb 2024 16:00) (68 - 75)  BP: 147/75 (09 Feb 2024 16:00) (129/78 - 157/67)  BP(mean): --  ABP: --  ABP(mean): --  RR: 16 (09 Feb 2024 16:00) (16 - 20)  SpO2: 94% (09 Feb 2024 16:00) (93% - 95%)    O2 Parameters below as of 09 Feb 2024 16:00  Patient On (Oxygen Delivery Method): room air            02-08 @ 07:01  -  02-09 @ 07:00  --------------------------------------------------------  IN: 0 mL / OUT: 100 mL / NET: -100 mL    02-09 @ 07:01  -  02-09 @ 21:00  --------------------------------------------------------  IN: 1045 mL / OUT: 950 mL / NET: 95 mL      PHYSICAL EXAM:Daily Height in cm: 157.48 (08 Feb 2024 12:42)  Elderly morbid obese   female in no distress   Daily   HEENT:     + NCAT  + EOMI  - Conjuctival edema   - Icterus   - Thrush   - ETT  - NGT/OGT  Neck:         + FROM    + JVD     - Nodes     - Masses    + Mid-line trachea   - Tracheostomy  Chest:       kyphosis , implanted loop reordered   Lungs:          + CTA   - Rhonchi    +Rales    - Wheezing  +Decreased BS   - Dullness R L  Cardiac:       + S1 + S2    + RRR   - Irregular   - S3  - S4    - Murmurs   - Rub   - Hamman’s sign   Abdomen:    + BS     + Soft    + tender LLquadrant noted  Organomegaly  - PEG  Extremities:   - Cyanosis U/L   - Clubbing  U/L  - LE/UE Edema   + Capillary refill    + Pulses   Neuro:        + Awake   +  Alert   - Confused   - Lethargic   - Sedated   - Generalized Weakness  Skin:        - Rashes    - Erythema   + Normal incisions   + IV sites intact  - Sacral decubitus    LABS:                 HOSPITAL MEDICATIONS: All mediciations reviewed and analyzed  MEDICATIONS  (STANDING):  albuterol    90 MICROgram(s) HFA Inhaler 2 Puff(s) Inhalation every 6 hours  aspirin enteric coated 81 milliGRAM(s) Oral daily  atorvastatin 20 milliGRAM(s) Oral at bedtime  cyclopentolate 1% Solution 1 Drop(s) Left EYE two times a day  FLUoxetine 20 milliGRAM(s) Oral daily  heparin   Injectable 5000 Unit(s) SubCutaneous every 12 hours  influenza  Vaccine (HIGH DOSE) 0.7 milliLiter(s) IntraMuscular once  mirtazapine 7.5 milliGRAM(s) Oral daily  piperacillin/tazobactam IVPB.. 3.375 Gram(s) IV Intermittent every 8 hours  prednisoLONE acetate 1% Suspension 1 Drop(s) Left EYE two times a day  sodium chloride 0.9%. 1000 milliLiter(s) (60 mL/Hr) IV Continuous <Continuous>    MEDICATIONS  (PRN):  acetaminophen     Tablet .. 650 milliGRAM(s) Oral every 6 hours PRN Mild Pain (1 - 3)  temazepam 30 milliGRAM(s) Oral at bedtime PRN Insomnia    LABS: All Lab data reviewed and analyzed                          11.9   6.52  )-----------( 142      ( 10 Feb 2024 06:55 )             36.5    02-10    142  |  107  |  19  ----------------------------<  87  3.6   |  25  |  1.02    Ca    8.9      10 Feb 2024 06:55      Ca    9.0      09 Feb 2024 06:39    TPro  7.4  /  Alb  4.5  /  TBili  0.4  /  DBili  x   /  AST  16  /  ALT  12  /  AlkPhos  191<H>  02-08     LIVER FUNCTIONS - ( 08 Feb 2024 14:17 )  Alb: 4.5 g/dL / Pro: 7.4 g/dL / ALK PHOS: 191 U/L / ALT: 12 U/L / AST: 16 U/L / GGT: x           RADIOLOGY: - Reviewed and analyzed

## 2024-02-12 ENCOUNTER — TRANSCRIPTION ENCOUNTER (OUTPATIENT)
Age: 89
End: 2024-02-12

## 2024-02-12 LAB
ANION GAP SERPL CALC-SCNC: 16 MMOL/L — SIGNIFICANT CHANGE UP (ref 5–17)
BUN SERPL-MCNC: 13 MG/DL — SIGNIFICANT CHANGE UP (ref 7–23)
CALCIUM SERPL-MCNC: 9 MG/DL — SIGNIFICANT CHANGE UP (ref 8.4–10.5)
CHLORIDE SERPL-SCNC: 106 MMOL/L — SIGNIFICANT CHANGE UP (ref 96–108)
CO2 SERPL-SCNC: 22 MMOL/L — SIGNIFICANT CHANGE UP (ref 22–31)
CREAT SERPL-MCNC: 0.9 MG/DL — SIGNIFICANT CHANGE UP (ref 0.5–1.3)
EGFR: 62 ML/MIN/1.73M2 — SIGNIFICANT CHANGE UP
GLUCOSE SERPL-MCNC: 153 MG/DL — HIGH (ref 70–99)
HCT VFR BLD CALC: 39.5 % — SIGNIFICANT CHANGE UP (ref 34.5–45)
HGB BLD-MCNC: 12.7 G/DL — SIGNIFICANT CHANGE UP (ref 11.5–15.5)
MCHC RBC-ENTMCNC: 29.5 PG — SIGNIFICANT CHANGE UP (ref 27–34)
MCHC RBC-ENTMCNC: 32.2 GM/DL — SIGNIFICANT CHANGE UP (ref 32–36)
MCV RBC AUTO: 91.9 FL — SIGNIFICANT CHANGE UP (ref 80–100)
NRBC # BLD: 0 /100 WBCS — SIGNIFICANT CHANGE UP (ref 0–0)
PLATELET # BLD AUTO: 149 K/UL — LOW (ref 150–400)
POTASSIUM SERPL-MCNC: 3.4 MMOL/L — LOW (ref 3.5–5.3)
POTASSIUM SERPL-SCNC: 3.4 MMOL/L — LOW (ref 3.5–5.3)
RBC # BLD: 4.3 M/UL — SIGNIFICANT CHANGE UP (ref 3.8–5.2)
RBC # FLD: 14.1 % — SIGNIFICANT CHANGE UP (ref 10.3–14.5)
SODIUM SERPL-SCNC: 144 MMOL/L — SIGNIFICANT CHANGE UP (ref 135–145)
WBC # BLD: 11.69 K/UL — HIGH (ref 3.8–10.5)
WBC # FLD AUTO: 11.69 K/UL — HIGH (ref 3.8–10.5)

## 2024-02-12 PROCEDURE — 99232 SBSQ HOSP IP/OBS MODERATE 35: CPT

## 2024-02-12 RX ORDER — ALBUTEROL 90 UG/1
2 AEROSOL, METERED ORAL
Qty: 1 | Refills: 0
Start: 2024-02-12

## 2024-02-12 RX ORDER — POTASSIUM CHLORIDE 20 MEQ
1 PACKET (EA) ORAL
Refills: 0 | DISCHARGE

## 2024-02-12 RX ORDER — ALBUTEROL 90 UG/1
3 AEROSOL, METERED ORAL
Refills: 0 | DISCHARGE

## 2024-02-12 RX ORDER — NEBIVOLOL HYDROCHLORIDE 5 MG/1
1 TABLET ORAL
Refills: 0 | DISCHARGE

## 2024-02-12 RX ADMIN — ATORVASTATIN CALCIUM 20 MILLIGRAM(S): 80 TABLET, FILM COATED ORAL at 21:11

## 2024-02-12 RX ADMIN — TEMAZEPAM 30 MILLIGRAM(S): 15 CAPSULE ORAL at 21:53

## 2024-02-12 RX ADMIN — SODIUM CHLORIDE 60 MILLILITER(S): 9 INJECTION INTRAMUSCULAR; INTRAVENOUS; SUBCUTANEOUS at 21:11

## 2024-02-12 RX ADMIN — ALBUTEROL 2 PUFF(S): 90 AEROSOL, METERED ORAL at 17:49

## 2024-02-12 RX ADMIN — Medication 1 DROP(S): at 11:21

## 2024-02-12 RX ADMIN — MIRTAZAPINE 7.5 MILLIGRAM(S): 45 TABLET, ORALLY DISINTEGRATING ORAL at 11:21

## 2024-02-12 RX ADMIN — PIPERACILLIN AND TAZOBACTAM 25 GRAM(S): 4; .5 INJECTION, POWDER, LYOPHILIZED, FOR SOLUTION INTRAVENOUS at 01:00

## 2024-02-12 RX ADMIN — PIPERACILLIN AND TAZOBACTAM 25 GRAM(S): 4; .5 INJECTION, POWDER, LYOPHILIZED, FOR SOLUTION INTRAVENOUS at 08:34

## 2024-02-12 RX ADMIN — Medication 81 MILLIGRAM(S): at 11:21

## 2024-02-12 RX ADMIN — Medication 20 MILLIGRAM(S): at 11:59

## 2024-02-12 RX ADMIN — Medication 650 MILLIGRAM(S): at 05:37

## 2024-02-12 RX ADMIN — CYCLOPENTOLATE HYDROCHLORIDE 1 DROP(S): 10 SOLUTION/ DROPS OPHTHALMIC at 05:38

## 2024-02-12 RX ADMIN — ALBUTEROL 2 PUFF(S): 90 AEROSOL, METERED ORAL at 11:22

## 2024-02-12 RX ADMIN — Medication 1 DROP(S): at 05:38

## 2024-02-12 RX ADMIN — Medication 650 MILLIGRAM(S): at 06:37

## 2024-02-12 RX ADMIN — ALBUTEROL 2 PUFF(S): 90 AEROSOL, METERED ORAL at 05:37

## 2024-02-12 RX ADMIN — PIPERACILLIN AND TAZOBACTAM 25 GRAM(S): 4; .5 INJECTION, POWDER, LYOPHILIZED, FOR SOLUTION INTRAVENOUS at 16:33

## 2024-02-12 RX ADMIN — CYCLOPENTOLATE HYDROCHLORIDE 1 DROP(S): 10 SOLUTION/ DROPS OPHTHALMIC at 11:21

## 2024-02-12 NOTE — PROGRESS NOTE ADULT - ASSESSMENT
88 year old with CAD  COPD  presented with abd pain and CT suggestive of diverticulitis  but not abscess  pt is tender  but no rebound or diffuse peritoneal signs   pt is very high risk surgical candidate    Continue zosyn for diverticulitis  COPD management per medicine and pulmonary  No drainable collection  Marked Left dilated renal pelvis + U/A, contaminated UC/S  she is very frail    Looks well, normalized WBC, no fever,  eating, abdomen benign.  Uncomplicated Left sided diverticulitis, can treat 5-7 days, when ready for DC can use amoxicillin/clavulanate (875/125 mg twice daily).

## 2024-02-12 NOTE — PROVIDER CONTACT NOTE (MEDICATION) - BACKGROUND
88 yr old F PMH CAD, pulmonary sarcoidosis, HTN, COPD (not on home O2) c/o abdominal pain, constipation, vomiting x 4 days

## 2024-02-12 NOTE — DISCHARGE NOTE PROVIDER - PROVIDER TOKENS
PROVIDER:[TOKEN:[[id number]]],PROVIDER:[TOKEN:[[id number]]] PROVIDER:[TOKEN:[[id number]],ESTABLISHEDPATIENT:[T]],PROVIDER:[TOKEN:[[id number]]],PROVIDER:[TOKEN:[[id number]],FOLLOWUP:[1 week]],PROVIDER:[TOKEN:[[id number]],FOLLOWUP:[1 week]],PROVIDER:[TOKEN:[[id number]],FOLLOWUP:[1 week],ESTABLISHEDPATIENT:[T]]

## 2024-02-12 NOTE — PROVIDER CONTACT NOTE (MEDICATION) - ASSESSMENT
Pt AOx3 (confused at times) and refusing albuterol inhaler at the moment. Pt states she takes her inhaler at home in the morning.

## 2024-02-12 NOTE — DISCHARGE NOTE PROVIDER - NSDCFUADDAPPT_GEN_ALL_CORE_FT
APPTS ARE READY TO BE MADE: [X] YES    Best Family or Patient Contact (if needed):    Additional Information about above appointments (if needed):    1:   2:   3:     Other comments or requests:  APPTS ARE READY TO BE MADE: [X] YES    Best Family or Patient Contact (if needed):    Additional Information about above appointments (if needed):    Please follow up with GYN in 1 week  Please follow up with urology in 1 week  Please follow up with primary care in 1 week  Please follow up with pulmonology in 1 week    Other comments or requests:  APPTS ARE READY TO BE MADE: [X] YES    Best Family or Patient Contact (if needed):    Additional Information about above appointments (if needed):    Please follow up with GYN in 1 week  Please follow up with urology in 1 week  Please follow up with primary care in 1 week  Please follow up with pulmonology in 1 week    Provided patient with provider referral information, however patient prefers to schedule the appointments on their own.     Other comments or requests:  APPTS ARE READY TO BE MADE: [X] YES    Best Family or Patient Contact (if needed):    Additional Information about above appointments (if needed):    Please follow up with GYN in 1 week  Please follow up with urology in 1 week  Please follow up with primary care in 1 week  Please follow up with pulmonology in 1 week    Provided patient with provider referral information, however patient prefers to schedule the appointments on their own. [email or web address]      Other comments or requests:

## 2024-02-12 NOTE — DISCHARGE NOTE PROVIDER - NSDCCPCAREPLAN_GEN_ALL_CORE_FT
PRINCIPAL DISCHARGE DIAGNOSIS  Diagnosis: Diverticulitis  Assessment and Plan of Treatment: resolved  Complete 7 day course (total) of Antibiotics.  Last day of medication is 2/14/24      SECONDARY DISCHARGE DIAGNOSES  Diagnosis: Acute UTI  Assessment and Plan of Treatment: follow up with your [facility] provider     PRINCIPAL DISCHARGE DIAGNOSIS  Diagnosis: Diverticulitis  Assessment and Plan of Treatment: resolved  Complete 7 day course (total) of Antibiotics.  Last day of medication is 2/14/24  Please return to the hospital for any new or worsening symptoms, and follow up with your PCP.      SECONDARY DISCHARGE DIAGNOSES  Diagnosis: Acute UTI  Assessment and Plan of Treatment: RESOLVED. Treated with IV ABX. Follow up with urology outpatient and return to the hospital for any new or worsening symptoms.    Diagnosis: Hydronephrosis  Assessment and Plan of Treatment: RESOLVED. Pessary removed. Please follow up with urology outpatient.    Diagnosis: Presence of pessary  Assessment and Plan of Treatment: REMOVED. Please follow up with GYN outpatient.    Diagnosis: Moderate COPD (chronic obstructive pulmonary disease)  Assessment and Plan of Treatment: Continue home therapy regimen as prescribed. Follow up with pulmonologist outpatient. Please return to the hospital for any new or worsening symptoms.     PRINCIPAL DISCHARGE DIAGNOSIS  Diagnosis: Diverticulitis  Assessment and Plan of Treatment: resolved  Complete 7 day course (total) of Antibiotics.  Last day of medication is 2/14/24  Please return to the hospital for any new or worsening symptoms, and follow up with your PCP.      SECONDARY DISCHARGE DIAGNOSES  Diagnosis: Acute UTI  Assessment and Plan of Treatment: RESOLVED. Treated with IV ABX. Follow up with urology outpatient and return to the hospital for any new or worsening symptoms.    Diagnosis: Hydronephrosis  Assessment and Plan of Treatment: RESOLVED. Pessary removed. Please follow up with urology outpatient.    Diagnosis: Presence of pessary  Assessment and Plan of Treatment: REMOVED. Please follow up with GYN outpatient.    Diagnosis: Moderate COPD (chronic obstructive pulmonary disease)  Assessment and Plan of Treatment: Continue home therapy regimen as prescribed. Follow up with pulmonologist outpatient. Please return to the hospital for any new or worsening symptoms.    Diagnosis: Acute on chronic systolic congestive heart failure  Assessment and Plan of Treatment: Weigh yourself daily.  If you gain 3lbs in 3 days, or 5lbs in a week call your Health Care Provider.  Do not eat or drink foods containing more than 2000mg of salt (sodium) in your diet every day.  Call your Health Care Provider if you have any swelling or increased swelling in your feet, ankles, and/or stomach.  Take all of your medication as directed.  If you become dizzy call your Health Care Provider.

## 2024-02-12 NOTE — DISCHARGE NOTE PROVIDER - CARE PROVIDERS DIRECT ADDRESSES
,[email or web address],DirectAddress_Unknown

## 2024-02-12 NOTE — CONSULT NOTE ADULT - ASSESSMENT
88 year old female with left hydronephrosis; admitted for abdominal pain, constipation    -  -  - 88 year old female with left hydronephrosis; admitted for abdominal pain, constipation. Patient has moderate/severe hydroureter to the level of the distal ureter without clear etiology. However on imaging patients pessary may be obstructing left ureter as the ureter is partially collapsed past the pessary and is in clear contact with distal ureter. Patient has no acute urgent indication for intervention including flank pain, infectious symptoms or CVAT    Plan  - patients hydronephrosis may be secondary to obstruction from pessary, can try to remove pessary and reimage with RBUS in 72hours to see if hydronephrosis improves after pessary removal as etiology of obstruction  -No acute indication for intervention  -Trend Cr  - Discussed with Dr. Hoenig

## 2024-02-12 NOTE — PROGRESS NOTE ADULT - SUBJECTIVE AND OBJECTIVE BOX
---___---___---___---___---___---___ ---___---___---___---___---___---___---___---___---                  M E D I C A L   A T T E N D I N G   P R O G R E S S   N O T E  ---___---___---___---___---___---___ ---___---___---___---___---___---___---___---___---        ================================================    ++CHIEF COMPLAINT:   Patient is a 88y old  Female who presents with a chief complaint of abd  pain/  vomiting (2024 14:54)      Diverticulitis of intestine without perforation or abscess without bleeding      ---___---___---___---___---___---  PAST MEDICAL / Surgical  HISTORY:  PAST MEDICAL & SURGICAL HISTORY:  HTN (hypertension)      Hypothyroidism      Osteoporosis      CAD (coronary artery disease)          ---___---___---___---___---___---  FAMILY HISTORY:   FAMILY HISTORY:        ---___---___---___---___---___---  ALLERGIES:   Allergies    iodinated radiocontrast agents (Unknown)    Intolerances        ---___---___---___---___---___---  MEDICATIONS:  MEDICATIONS  (STANDING):  albuterol    90 MICROgram(s) HFA Inhaler 2 Puff(s) Inhalation every 6 hours  aspirin enteric coated 81 milliGRAM(s) Oral daily  atorvastatin 20 milliGRAM(s) Oral at bedtime  cyclopentolate 1% Solution 1 Drop(s) Left EYE two times a day  FLUoxetine 20 milliGRAM(s) Oral daily  influenza  Vaccine (HIGH DOSE) 0.7 milliLiter(s) IntraMuscular once  mirtazapine 7.5 milliGRAM(s) Oral daily  piperacillin/tazobactam IVPB.. 3.375 Gram(s) IV Intermittent every 8 hours  prednisoLONE acetate 1% Suspension 1 Drop(s) Left EYE two times a day  sodium chloride 0.9%. 1000 milliLiter(s) (60 mL/Hr) IV Continuous <Continuous>    MEDICATIONS  (PRN):  acetaminophen     Tablet .. 650 milliGRAM(s) Oral every 6 hours PRN Mild Pain (1 - 3)  temazepam 30 milliGRAM(s) Oral at bedtime PRN Insomnia      ---___---___---___---___---___---  REVIEW OF SYSTEM:    GEN: no fever, no chills, no pain  RESP: no SOB, no cough, no sputum  CVS: no chest pain, no palpitations, no edema  GI: no abdominal pain, no nausea, no vomiting, no constipation, no diarrhea  : no dysurea, no frequency, no hematurea  Neuro: no headache, no dizziness  PSYCH: no anxiety, no depression  Derm : no itching, no rash    ---___---___---___---___---___---  VITAL SIGNS:  88y , CAPILLARY BLOOD GLUCOSE        T(C): 36.4 (24 @ 17:01), Max: 36.9 (24 @ 13:42)  HR: 88 (24 @ 17:01) (72 - 90)  BP: 160/81 (24 @ 17:01) (144/69 - 172/81)  RR: 18 (24 @ 17:01) (18 - 18)  SpO2: 92% (24 @ 17:01) (90% - 94%)  ---___---___---___---___---___---  PHYSICAL EXAM:    GEN: A&O X 3 , NAD , comfortable  HEENT: NCAT, PERRL, MMM, hearing intact  Neck: supple , no JVD  CVS: S1S2 , regular , No M/R/G appreciated  PULM: CTA B/L,  no W/R/R appreciated  ABD.: soft. non tender, non distended,  bowel sounds present  Extrem: intact pulses , no edema   Derm: No rash , no ecchymoses  PSYCH : normal mood,  no delusion not anxious     ---___---___---___---___---___---            LAB AND IMAGIN.7   11.69 )-----------( 149      ( 2024 18:52 )             39.5               02-12    144  |  106  |  13  ----------------------------<  153<H>  3.4<L>   |  22  |  0.90    Ca    9.0      2024 18:52                              Urinalysis Basic - ( 2024 18:52 )    Color: x / Appearance: x / SG: x / pH: x  Gluc: 153 mg/dL / Ketone: x  / Bili: x / Urobili: x   Blood: x / Protein: x / Nitrite: x   Leuk Esterase: x / RBC: x / WBC x   Sq Epi: x / Non Sq Epi: x / Bacteria: x        [All pertinent / recent Imaging reviewed]         ---___---___---___---___---___---___ ---___---___---___---___---                         A S S E S S M E N T   A N D   P L A N :      HEALTH ISSUES - PROBLEM Dx:  Continue zosyn for diverticulitis  COPD management per medicine and pulmonary  No drainable collection  Marked Left dilated renal pelvis + U/A, contaminated UC/S  she is very frail    Looks well, normalized WBC, no fever,  eating, abdomen benign.  Uncomplicated Left sided diverticulitis, can treat 5-7 days, when ready for DC can use amoxicillin/clavulanate (875/125 mg twice daily).     get oob to chair     urology  consult in regards to ct scan findings appreciated          start dc planning  ___________________________  Thank you,  Garry Hermosillo  [phone]

## 2024-02-12 NOTE — DISCHARGE NOTE PROVIDER - NSDCFUADDINST_GEN_ALL_CORE_FT
Make appointment(s) to follow up with your Healthcare Provider(s) - bring all discharge paperwork including discharge medication list to follow up appointment.  When making appointment, inform office you have been recently discharged from the hospital. Make an appointment with your Healthcare Provider to address / confirm the position of your Pessary.  Make appointment(s) to follow up with your Healthcare Provider(s) - bring all discharge paperwork including discharge medication list to follow up appointment.  When making appointment, inform office you have been recently discharged from the hospital. Make an appointment with your Healthcare Provider to address Pessary  Make appointment(s) to follow up with your Healthcare Provider(s) - bring all discharge paperwork including discharge medication list to follow up appointment.  When making appointment, inform office you have been recently discharged from the hospital.

## 2024-02-12 NOTE — CONSULT NOTE ADULT - SUBJECTIVE AND OBJECTIVE BOX
UROLOGY CONSULT NOTE    HPI:  This is a 88y old Female with PMHx of HTN, hypothyroid, osteoporosis, CAD, COPD, pulmonary sarcoidosis, who was admitted for abdominal pain and constipation and was found on CT scan to have left hydronephrosis.  The patient states that she was sent to the hospital to "undergo an x ray and the next thing I know, Im admitted to the hospital".  She denies any flank pain, dysuria, difficulty voiding, foul-smelling urine, hematuria, fevers.  Of note, she has a pessary for prolapse that is managed by her gynecologist.      PAST MEDICAL HISTORY    HTN (hypertension)    Hypothyroidism    Osteoporosis    CAD (coronary artery disease)        PAST SURGICAL HISTORY    noncontributory    FAMILY HISTORY    noncontributory      HOME MEDICATIONS    acetaminophen 500 mg oral tablet: 1 tab(s) orally 3 times a day for pain (26 Sep 2023 10:23)  albuterol 2.5 mg/3 mL (0.083%) inhalation solution: 3 milliliter(s) by nebulizer every 4 hours as needed (26 Sep 2023 10:24)  aspirin 81 mg oral delayed release tablet: 1 tab(s) orally once a day (26 Sep 2023 10:23)  atorvastatin 20 mg oral tablet: 1 tab(s) orally once a day (26 Sep 2023 10:25)  cyclopentolate 1% ophthalmic solution: 1 drop(s) in the left eye 2 times a day (26 Sep 2023 10:25)  D3 25 mcg (1000 intl units) oral tablet: 1 tab(s) orally once a day (26 Sep 2023 10:25)  ferrous sulfate 325 mg (65 mg elemental iron) oral tablet: 1 tab(s) orally once a day (26 Sep 2023 10:25)  Florastor 250 mg oral capsule: 1 cap(s) orally once a day (26 Sep 2023 10:25)  FLUoxetine 20 mg oral capsule: 1 cap(s) orally once a day (26 Sep 2023 10:25)  mirtazapine 7.5 mg oral tablet: 1 tab(s) orally once a day (at bedtime) (26 Sep 2023 10:25)  Nebivolol 2.5 mg oral tablet: 1 tab(s) orally once a day (26 Sep 2023 10:25)  Potassium Chloride (Eqv-Klor-Con M20) 20 mEq oral tablet, extended release: 1 tab(s) orally once a day (26 Sep 2023 10:25)  prednisoLONE acetate 1% ophthalmic suspension: 1 drop(s) in the left eye 2 times a day (26 Sep 2023 10:25)  temazepam 30 mg oral capsule: 1 cap(s) orally once a day (at bedtime) (26 Sep 2023 10:25)  triamterene-hydrochlorothiazide 37.5 mg-25 mg oral capsule: 1 cap(s) orally once a day (26 Sep 2023 10:25)      DRUG ALLERGIES    iodinated radiocontrast agents (Unknown)      REVIEW OF SYSTEMS: Pertinent positives and negatives as stated in HPI, otherwise negative      VITAL SIGNS    T(F): 97.2, Max: 97.8 (02-12-24 @ 05:46)  HR: 85  BP: 172/81  RR: 18  SpO2: 90%    I's & O's      11 Feb 2024 07:01  -  12 Feb 2024 07:00  --------------------------------------------------------  IN: 1610 mL / OUT: 350 mL / NET: 1260 mL    12 Feb 2024 07:01  -  12 Feb 2024 12:34  --------------------------------------------------------  IN: 120 mL / OUT: 0 mL / NET: 120 mL        PHYSICAL EXAM    Gen: NAD, A+Ox3  Abd: Soft, NT/ND  Back: no CVAT bilaterally  Ext: No edema present b/l        Urine culture: contaminated      IMAGING:    CT: Thickening of the sigmoid colon with mild inflammatory changes adjacent   to multiple colonic diverticula suggestive of acute diverticulitis.   Severe left hydroureter and moderate left hydronephrosis of uncertain   etiology.    Renal US: Mild left hydronephrosis and markedly dilated left renal pelvis and   tortuous ureter as seen on the  prior CT. Etiology remains uncertain.  No hydronephrosis on the right.

## 2024-02-12 NOTE — DISCHARGE NOTE PROVIDER - HOSPITAL COURSE
HPI: 88 F with history of CAD, pulmonary sarcoidosis, COPD (not on home O2), and HTN.  BIBEMS from [facility] for evaluation of abdominal pain, constipation, vomiting x 4 days  Hospital Course: CT suggestive of diverticulitis  but no abscess  Abdomen was tender but no rebound or diffuse peritoneal signs  - seen by surgery : pt is very high risk surgical candidate  IV zosyn for diverticulitis while in hospital - discharge on amoxicillin/clavulanate (875/125 mg twice daily) for total antibiotic course of 7 days  Followed while in-hospital by pulmonary for COPD management  No drainable collection  Marked Left dilated renal pelvis + U/A, contaminated UC/S - Urology was consulted [.......................................]  she is very frail      Important Medication Changes and Reason:    Active or Pending Issues Requiring Follow-up:    Advanced Directives:   [ ] Full code  [ ] DNR  [ ] Hospice    Discharge Diagnoses:  Uncomplicated Left sided diverticulitis         HPI: 88 F with history of CAD, pulmonary sarcoidosis, COPD (not on home O2), and HTN.  BIBEMS from [facility] for evaluation of abdominal pain, constipation, vomiting x 4 days  Hospital Course: CT suggestive of diverticulitis  but no abscess  Abdomen was tender but no rebound or diffuse peritoneal signs  - seen by surgery : pt is very high risk surgical candidate  IV zosyn for diverticulitis while in hospital - discharge on amoxicillin/clavulanate (875/125 mg twice daily) for total antibiotic course of 7 days  Followed while in-hospital by pulmonary for COPD management  No drainable collection  Marked Left dilated renal pelvis + U/A, contaminated UC/S - Urology was consulted :  :patients hydronephrosis may be secondary to obstruction from pessary, can try to remove pessary and reimage with RBUS in 72hours to see if hydronephrosis improves after pessary removal as etiology of obstruction  -No acute indication for intervention"  she is very frail      Important Medication Changes and Reason:    Active or Pending Issues Requiring Follow-up: GYN to evaluate position of Pessary    Advanced Directives:   [X] Full code  [ ] DNR  [ ] Hospice    Discharge Diagnoses:  Uncomplicated Left sided diverticulitis         HPI: 88 F with history of CAD, pulmonary sarcoidosis, COPD (not on home O2), and HTN.  BIBEMS from [facility] for evaluation of abdominal pain, constipation, vomiting x 4 days    Hospital Course: CT suggestive of diverticulitis  but no abscess  Abdomen was tender but no rebound or diffuse peritoneal signs  - seen by surgery : pt is very high risk surgical candidate  IV zosyn for diverticulitis while in hospital - discharge on amoxicillin/clavulanate (875/125 mg twice daily) for total antibiotic course of 7 days  Followed while in-hospital by pulmonary for COPD management  No drainable collection  Marked Left dilated renal pelvis + U/A, contaminated UC/S - Urology was consulted :  :patients hydronephrosis may be secondary to obstruction from pessary, can try to remove pessary and reimage with RBUS in 72hours to see if hydronephrosis improves after pessary removal as etiology of obstruction. Pessary removed 2/13. Repeat US on 2/16.  -No acute indication for intervention"  she is very frail      H/O sarcoidosis on prednisone   COPD and chronic respiratory failure oh home 02    Continue Antibiotics as per ID  continue prednisone   continue nebulizer treatement PRN       Important Medication Changes and Reason:  See medication reconciliation.    Active or Pending Issues Requiring Follow-up:  Please follow up with GYN  Please follow up with urology  Please follow up with primary care  Please follow up with pulmonology      Advanced Directives:   [X] Full code  [ ] DNR  [ ] Hospice    Discharge Diagnoses:  Uncomplicated Left sided diverticulitis  Hydronephrosis  UTI  Pessary  COPD HPI: 88 F with history of CAD, pulmonary sarcoidosis, COPD (not on home O2), and HTN.   BIBEMS from [facility] for evaluation of abdominal pain, constipation, vomiting x 4 days    Hospital Course: CT suggestive of diverticulitis  but no abscess  Abdomen was tender but no rebound or diffuse peritoneal signs  - seen by surgery : pt is very high risk surgical candidate  IV zosyn for diverticulitis while in hospital - completed 7 day course   Course complicated with acute congestive heart failure  requiring PRN IV lasix and HTN urgency. BP meds adjusted by cardiology  m      Marked Left dilated renal pelvis + U/A, contaminated UC/S - per ID npo abx indicated. pt with no dysuria. Urology was consulted :  :patients hydronephrosis may be secondary to obstruction from pessary, can try to remove pessary and reimage with RBUS in 72hours to see if hydronephrosis improves after pessary removal as etiology of obstruction. Pessary removed 2/13. Repeat US on 2/16.  No acute indication for intervention". Recommend f/u after discharge with either Dr. Jordan Antonio or Dr. Yana Landrum to discuss treatment options for prolapse    she is very frail      H/O sarcoidosis on prednisone ; COPD and chronic respiratory failure oh home 02  . continued  prednisone and  nebulizer treatement PRN . pulmonary appreciated.   Medically cleared for discharge back to [facility] with cardiology, pulmonary follow up    Important Medication Changes and Reason:      Active or Pending Issues Requiring Follow-up:  Please follow up with GYN  Please follow up with urology  Please follow up with primary care  Please follow up with pulmonology  Follow up with cardiology      Advanced Directives:   [X] Full code  [ ] DNR  [ ] Hospice    Discharge Diagnoses:  Uncomplicated Left sided diverticulitis   acute systolic chf  Hypertensive urgency  Hydronephrosis  UTI  Pessary  COPD HPI: 88 F with history of CAD, pulmonary sarcoidosis, COPD (not on home O2), and HTN.   BIBEMS from [facility] for evaluation of abdominal pain, constipation, vomiting x 4 days    Hospital Course: CT suggestive of diverticulitis  but no abscess  Abdomen was tender but no rebound or diffuse peritoneal signs  - seen by surgery : pt is very high risk surgical candidate  IV zosyn for diverticulitis while in hospital - completed 7 day course   Course complicated with acute congestive heart failure  requiring PRN IV lasix and HTN urgency. BP meds adjusted by cardiology.         Marked Left dilated renal pelvis + U/A, contaminated UC/S - per ID npo abx indicated. pt with no dysuria. Urology was consulted :  :patients hydronephrosis may be secondary to obstruction from pessary, can try to remove pessary and reimage with RBUS in 72hours to see if hydronephrosis improves after pessary removal as etiology of obstruction. Pessary removed 2/13. Repeat US on 2/16.  No acute indication for intervention". Recommend f/u after discharge with either Dr. Jordan Antonio or Dr. Yana Landrum to discuss treatment options for prolapse    she is very frail      H/O sarcoidosis on prednisone ; COPD and chronic respiratory failure oh home 02  . continued  prednisone and  nebulizer treatement PRN . pulmonary appreciated.   Medically cleared for discharge back to [facility] with cardiology, pulmonary follow up    Important Medication Changes and Reason:      Active or Pending Issues Requiring Follow-up:  Please follow up with GYN  Please follow up with urology  Please follow up with primary care  Please follow up with pulmonology  Follow up with cardiology      Advanced Directives:   [X] Full code  [ ] DNR  [ ] Hospice    Discharge Diagnoses:  Uncomplicated Left sided diverticulitis   acute systolic chf  Hypertensive urgency  Hydronephrosis  UTI  Pessary  COPD

## 2024-02-12 NOTE — DISCHARGE NOTE PROVIDER - CARE PROVIDER_API CALL
Earle Avila.  Internal Medicine  [address] [facility] [address] [address], [facility] [place] [address]  Phone: [phone]  Fax: [phone]  Follow Up Time:     Garry Hermosillo  [address] [address]  Phone: [phone]  Fax: [phone]  Follow Up Time:    Earle Avila  Internal Medicine  [address] [place] [address] Primary [facility] [place] [address]  Phone: [phone]  Fax: [phone]  Established Patient  Follow Up Time:     Garry Hermosillo  [address] [address]  Phone: [phone]  Fax: [phone]  Follow Up Time:     Jordan Antonio  Urology  [address] [place] [address]  Phone: [phone]  Fax: [phone]  Follow Up Time: 1 week    Jada Grayson  Urogyn and Reconst Pelvic Surg  [address] [place] [address] [address]  [place] [address]  Phone: [phone]  Fax: [phone]  Follow Up Time: 1 week    Lucas Borges  Critical Care Medicine  [address] [place] [address] [address]  [place] [address]  Phone: [phone]  Fax: [phone]  Established Patient  Follow Up Time: 1 week

## 2024-02-12 NOTE — DISCHARGE NOTE NURSING/CASE MANAGEMENT/SOCIAL WORK - NSDCFUADDAPPT_GEN_ALL_CORE_FT
APPTS ARE READY TO BE MADE: [X] YES    Best Family or Patient Contact (if needed):    Additional Information about above appointments (if needed):    Please follow up with GYN in 1 week  Please follow up with urology in 1 week  Please follow up with primary care in 1 week  Please follow up with pulmonology in 1 week    Other comments or requests:

## 2024-02-12 NOTE — PROGRESS NOTE ADULT - SUBJECTIVE AND OBJECTIVE BOX
BISHNU SEGURA  MRN#: [id number]  Subjective:   pulmonary progress note  : sarcoidosis ,COPD , Chronic respiratory failure on home 02    date of service is 2/12/2024   88 F        h/o   CAD,   pulmonary sarcoidosis, COPD (not on home O2), HTN,       BIBEMS from [facility] for evaluation of abdominal pain, constipation, vomiting x 4 days, no hematemesis no fever no aspiration or SOB , no diarrhea    .  pt  is   forgetful,. had  vomiting this morning    p er aide at bedside, patient has chronic constipation CT scan of the abdomen shows sever hydroureter,  moderate left hydronephrosis , Diverticulitis sigmoid colon , vaginal pessary complete L 4 fracture      has no history of abdominal surgeries S/P implanted Loop recorder  (08 Feb 2024 19:29), Id consultation appreciated  started on Zosyn , no new C/O no fever no more vomiting no SOB , acceptable 02 saturation , condition is the same no new events BP is not well controlled , 02 saturation is 93% at rest no SOB or chest pain reported , ID follow up appreciated continue ABX , no fever no SOB or respiratory distress acceptable 02 saturation     PAST MEDICAL & SURGICAL HISTORY:  HTN (hypertension)      Hypothyroidism      Osteoporosis      CAD (coronary artery disease)  Sarcoidosis   COPD   Chronic respiratory failure on home 02           OBJECTIVE:  ICU Vital Signs Last 24 Hrs  T(C): 37.2 (09 Feb 2024 16:00), Max: 37.2 (09 Feb 2024 16:00)  T(F): 99 (09 Feb 2024 16:00), Max: 99 (09 Feb 2024 16:00)  HR: 75 (09 Feb 2024 16:00) (68 - 75)  BP: 147/75 (09 Feb 2024 16:00) (129/78 - 157/67)  BP(mean): --  ABP: --  ABP(mean): --  RR: 16 (09 Feb 2024 16:00) (16 - 20)  SpO2: 94% (09 Feb 2024 16:00) (93% - 95%)    O2 Parameters below as of 09 Feb 2024 16:00  Patient On (Oxygen Delivery Method): room air            02-08 @ 07:01  -  02-09 @ 07:00  --------------------------------------------------------  IN: 0 mL / OUT: 100 mL / NET: -100 mL    02-09 @ 07:01  -  02-09 @ 21:00  --------------------------------------------------------  IN: 1045 mL / OUT: 950 mL / NET: 95 mL      PHYSICAL EXAM:Daily Height in cm: 157.48 (08 Feb 2024 12:42)  Elderly morbid obese   female in no distress   Daily   HEENT:     + NCAT  + EOMI  - Conjuctival edema   - Icterus   - Thrush   - ETT  - NGT/OGT  Neck:         + FROM    + JVD     - Nodes     - Masses    + Mid-line trachea   - Tracheostomy  Chest:       kyphosis , implanted loop reordered   Lungs:          + CTA   - Rhonchi    +Rales    - Wheezing  +Decreased BS   - Dullness R L  Cardiac:       + S1 + S2    + RRR   - Irregular   - S3  - S4    - Murmurs   - Rub   - Hamman’s sign   Abdomen:    + BS     + Soft    + tender LLquadrant noted  Organomegaly  - PEG  Extremities:   - Cyanosis U/L   - Clubbing  U/L  - LE/UE Edema   + Capillary refill    + Pulses   Neuro:        + Awake   +  Alert   - Confused   - Lethargic   - Sedated   - Generalized Weakness  Skin:        - Rashes    - Erythema   + Normal incisions   + IV sites intact  - Sacral decubitus    LABS:                 HOSPITAL MEDICATIONS: All mediciations reviewed and analyzed  MEDICATIONS  (STANDING):  albuterol    90 MICROgram(s) HFA Inhaler 2 Puff(s) Inhalation every 6 hours  aspirin enteric coated 81 milliGRAM(s) Oral daily  atorvastatin 20 milliGRAM(s) Oral at bedtime  cyclopentolate 1% Solution 1 Drop(s) Left EYE two times a day  FLUoxetine 20 milliGRAM(s) Oral daily  heparin   Injectable 5000 Unit(s) SubCutaneous every 12 hours  influenza  Vaccine (HIGH DOSE) 0.7 milliLiter(s) IntraMuscular once  mirtazapine 7.5 milliGRAM(s) Oral daily  piperacillin/tazobactam IVPB.. 3.375 Gram(s) IV Intermittent every 8 hours  prednisoLONE acetate 1% Suspension 1 Drop(s) Left EYE two times a day  sodium chloride 0.9%. 1000 milliLiter(s) (60 mL/Hr) IV Continuous <Continuous>    MEDICATIONS  (PRN):  acetaminophen     Tablet .. 650 milliGRAM(s) Oral every 6 hours PRN Mild Pain (1 - 3)  temazepam 30 milliGRAM(s) Oral at bedtime PRN Insomnia    LABS: All Lab data reviewed and analyzed                          11.9   6.52  )-----------( 142      ( 10 Feb 2024 06:55 )             36.5    02-10    142  |  107  |  19  ----------------------------<  87  3.6   |  25  |  1.02    Ca    8.9      10 Feb 2024 06:55      Ca    9.0      09 Feb 2024 06:39    TPro  7.4  /  Alb  4.5  /  TBili  0.4  /  DBili  x   /  AST  16  /  ALT  12  /  AlkPhos  191<H>  02-08     LIVER FUNCTIONS - ( 08 Feb 2024 14:17 )  Alb: 4.5 g/dL / Pro: 7.4 g/dL / ALK PHOS: 191 U/L / ALT: 12 U/L / AST: 16 U/L / GGT: x           RADIOLOGY: - Reviewed and analyzed

## 2024-02-12 NOTE — DISCHARGE NOTE NURSING/CASE MANAGEMENT/SOCIAL WORK - PATIENT PORTAL LINK FT
You can access the FollowMyHealth Patient Portal offered by [facility] by registering at the following website: [email or web address]. By joining [facility]’s FollowMyHealth portal, you will also be able to view your health information using other applications (apps) compatible with our system.

## 2024-02-12 NOTE — PROGRESS NOTE ADULT - SUBJECTIVE AND OBJECTIVE BOX
infectious diseases progress note:    Patient is a 88y old  Female who presents with a chief complaint of abd  pain/  vomiting (11 Feb 2024 20:22)        Diverticulitis of intestine without perforation or abscess without bleeding             Allergies    iodinated radiocontrast agents (Unknown)    Intolerances        ANTIBIOTICS/RELEVANT:  antimicrobials  piperacillin/tazobactam IVPB.. 3.375 Gram(s) IV Intermittent every 8 hours    immunologic:  influenza  Vaccine (HIGH DOSE) 0.7 milliLiter(s) IntraMuscular once    OTHER:  acetaminophen     Tablet .. 650 milliGRAM(s) Oral every 6 hours PRN  albuterol    90 MICROgram(s) HFA Inhaler 2 Puff(s) Inhalation every 6 hours  aspirin enteric coated 81 milliGRAM(s) Oral daily  atorvastatin 20 milliGRAM(s) Oral at bedtime  cyclopentolate 1% Solution 1 Drop(s) Left EYE two times a day  FLUoxetine 20 milliGRAM(s) Oral daily  mirtazapine 7.5 milliGRAM(s) Oral daily  prednisoLONE acetate 1% Suspension 1 Drop(s) Left EYE two times a day  sodium chloride 0.9%. 1000 milliLiter(s) IV Continuous <Continuous>  temazepam 30 milliGRAM(s) Oral at bedtime PRN      Objective:  Vital Signs Last 24 Hrs  T(C): 36.6 (12 Feb 2024 05:46), Max: 36.8 (11 Feb 2024 18:26)  T(F): 97.8 (12 Feb 2024 05:46), Max: 98.3 (11 Feb 2024 18:26)  HR: 90 (12 Feb 2024 05:46) (72 - 90)  BP: 150/92 (12 Feb 2024 05:46) (144/69 - 156/68)  BP(mean): --  RR: 18 (12 Feb 2024 05:46) (18 - 20)  SpO2: 90% (12 Feb 2024 05:46) (90% - 94%)    Parameters below as of 12 Feb 2024 05:46  Patient On (Oxygen Delivery Method): room air           Eyes:JIM, EOMI  Ear/Nose/Throat: no oral lesion, no sinus tenderness on percussion	  Neck:no JVD, no lymphadenopathy, supple  Respiratory: CTA moises  Cardiovascular: S1S2 RRR, no murmurs  Gastrointestinal:soft, (+) BS, no HSM  Extremities:no e/e/c        LABS:                  MICROBIOLOGY:    RECENT CULTURES:  02-08 @ 16:40 .Blood Blood-Peripheral                No growth at 72 Hours    02-08 @ 16:30 .Blood Blood-Peripheral                No growth at 72 Hours    02-08 @ 15:03 Clean Catch Clean Catch (Midstream)                >=3 organisms. Probable collection contamination.          RESPIRATORY CULTURES:              RADIOLOGY & ADDITIONAL STUDIES:        Pager [phone]  After 5 pm/weekends or if no response :[phone]

## 2024-02-12 NOTE — DISCHARGE NOTE NURSING/CASE MANAGEMENT/SOCIAL WORK - NSDCPEFALRISK_GEN_ALL_CORE
For information on Fall & Injury Prevention, visit: [email or web address] OR  [email or web address] OR  https://www.cdc.gov/steadi/patient.html

## 2024-02-12 NOTE — DISCHARGE NOTE PROVIDER - NSDCMRMEDTOKEN_GEN_ALL_CORE_FT
acetaminophen 500 mg oral tablet: 1 tab(s) orally 3 times a day for pain  albuterol 90 mcg/inh inhalation aerosol: 2 puff(s) inhaled every 6 hours as needed for wheeze  amoxicillin-clavulanate 875 mg-125 mg oral tablet: 1 tab(s) orally 2 times a day  aspirin 81 mg oral delayed release tablet: 1 tab(s) orally once a day  atorvastatin 20 mg oral tablet: 1 tab(s) orally once a day  cyclopentolate 1% ophthalmic solution: 1 drop(s) in the left eye 2 times a day  D3 25 mcg (1000 intl units) oral tablet: 1 tab(s) orally once a day  ferrous sulfate 325 mg (65 mg elemental iron) oral tablet: 1 tab(s) orally once a day  Florastor 250 mg oral capsule: 1 cap(s) orally once a day  FLUoxetine 20 mg oral capsule: 1 cap(s) orally once a day  mirtazapine 7.5 mg oral tablet: 1 tab(s) orally once a day (at bedtime)  prednisoLONE acetate 1% ophthalmic suspension: 1 drop(s) in the left eye 2 times a day  temazepam 30 mg oral capsule: 1 cap(s) orally once a day (at bedtime)   acetaminophen 500 mg oral tablet: 2 tab(s) orally 2 times a day for pain  albuterol 90 mcg/inh inhalation aerosol: 2 puff(s) inhaled every 6 hours as needed for wheeze  amoxicillin-clavulanate 875 mg-125 mg oral tablet: 1 tab(s) orally 2 times a day  aspirin 81 mg oral delayed release tablet: 1 tab(s) orally once a day  atorvastatin 20 mg oral tablet: 1 tab(s) orally once a day  cyclopentolate 1% ophthalmic solution: 1 drop(s) in the left eye 2 times a day  D3 25 mcg (1000 intl units) oral tablet: 1 tab(s) orally once a day  ferrous sulfate 325 mg (65 mg elemental iron) oral tablet: 1 tab(s) orally once a day  FLUoxetine 20 mg oral capsule: 1 cap(s) orally once a day  mirtazapine 7.5 mg oral tablet: 1 tab(s) orally once a day (at bedtime)  nebivolol 2.5 mg oral tablet: 1 tab(s) orally once a day  potassium chloride 20 mEq oral tablet, extended release: 1 tab(s) orally once a day  prednisoLONE acetate 1% ophthalmic suspension: 1 drop(s) in the left eye 2 times a day  temazepam 30 mg oral capsule: 1 cap(s) orally once a day (at bedtime)  traZODone 50 mg oral tablet: 1 tab(s) orally once a day  triamterene-hydrochlorothiazide 37.5 mg-25 mg oral capsule: 1 cap(s) orally once a day   acetaminophen 500 mg oral tablet: 2 tab(s) orally 2 times a day for pain  albuterol 90 mcg/inh inhalation aerosol: 2 puff(s) inhaled every 6 hours as needed for wheeze  amoxicillin-clavulanate 875 mg-125 mg oral tablet: 1 tab(s) orally 2 times a day  aspirin 81 mg oral delayed release tablet: 1 tab(s) orally once a day  atorvastatin 20 mg oral tablet: 1 tab(s) orally once a day  cyclopentolate 1% ophthalmic solution: 1 drop(s) in the left eye 2 times a day  D3 25 mcg (1000 intl units) oral tablet: 1 tab(s) orally once a day  ferrous sulfate 325 mg (65 mg elemental iron) oral tablet: 1 tab(s) orally once a day  FLUoxetine 20 mg oral capsule: 1 cap(s) orally once a day  ipratropium-albuterol 0.5 mg-2.5 mg/3 mL inhalation solution: 3 milliliter(s) inhaled every 6 hours  mirtazapine 7.5 mg oral tablet: 1 tab(s) orally once a day (at bedtime)  nebivolol 2.5 mg oral tablet: 1 tab(s) orally once a day  potassium chloride 20 mEq oral tablet, extended release: 1 tab(s) orally once a day  prednisoLONE acetate 1% ophthalmic suspension: 1 drop(s) in the left eye 2 times a day  temazepam 30 mg oral capsule: 1 cap(s) orally once a day (at bedtime)  traZODone 50 mg oral tablet: 1 tab(s) orally once a day  triamterene-hydrochlorothiazide 37.5 mg-25 mg oral capsule: 1 cap(s) orally once a day   acetaminophen 500 mg oral tablet: 2 tab(s) orally 2 times a day for pain  albuterol 90 mcg/inh inhalation aerosol: 2 puff(s) inhaled every 6 hours as needed for wheeze  aspirin 81 mg oral delayed release tablet: 1 tab(s) orally once a day  atorvastatin 20 mg oral tablet: 1 tab(s) orally once a day  cyclopentolate 1% ophthalmic solution: 1 drop(s) in the left eye 2 times a day  D3 25 mcg (1000 intl units) oral tablet: 1 tab(s) orally once a day  ferrous sulfate 325 mg (65 mg elemental iron) oral tablet: 1 tab(s) orally once a day  FLUoxetine 20 mg oral capsule: 1 cap(s) orally once a day  mirtazapine 7.5 mg oral tablet: 1 tab(s) orally once a day (at bedtime)  potassium chloride 20 mEq oral tablet, extended release: 1 tab(s) orally once a day  prednisoLONE acetate 1% ophthalmic suspension: 1 drop(s) in the left eye 2 times a day  temazepam 30 mg oral capsule: 1 cap(s) orally once a day (at bedtime)  traZODone 50 mg oral tablet: 1 tab(s) orally once a day  triamterene-hydrochlorothiazide 37.5 mg-25 mg oral capsule: 1 cap(s) orally once a day   acetaminophen 500 mg oral tablet: 2 tab(s) orally 2 times a day for pain  albuterol 90 mcg/inh inhalation aerosol: 2 puff(s) inhaled every 6 hours as needed for wheeze  aspirin 81 mg oral delayed release tablet: 1 tab(s) orally once a day  atorvastatin 20 mg oral tablet: 1 tab(s) orally once a day  cyclopentolate 1% ophthalmic solution: 1 drop(s) in the left eye 2 times a day  D3 25 mcg (1000 intl units) oral tablet: 1 tab(s) orally once a day  ferrous sulfate 325 mg (65 mg elemental iron) oral tablet: 1 tab(s) orally once a day  FLUoxetine 20 mg oral capsule: 1 cap(s) orally once a day  home PT: as directed  mirtazapine 7.5 mg oral tablet: 1 tab(s) orally once a day (at bedtime)  potassium chloride 20 mEq oral tablet, extended release: 1 tab(s) orally once a day  prednisoLONE acetate 1% ophthalmic suspension: 1 drop(s) in the left eye 2 times a day  temazepam 30 mg oral capsule: 1 cap(s) orally once a day (at bedtime)  traZODone 50 mg oral tablet: 1 tab(s) orally once a day  triamterene-hydrochlorothiazide 37.5 mg-25 mg oral capsule: 1 cap(s) orally once a day

## 2024-02-13 PROCEDURE — 99232 SBSQ HOSP IP/OBS MODERATE 35: CPT

## 2024-02-13 PROCEDURE — 99222 1ST HOSP IP/OBS MODERATE 55: CPT | Mod: FS

## 2024-02-13 PROCEDURE — 99222 1ST HOSP IP/OBS MODERATE 55: CPT

## 2024-02-13 RX ORDER — POTASSIUM CHLORIDE 20 MEQ
40 PACKET (EA) ORAL ONCE
Refills: 0 | Status: COMPLETED | OUTPATIENT
Start: 2024-02-13 | End: 2024-02-13

## 2024-02-13 RX ORDER — NEBIVOLOL HYDROCHLORIDE 5 MG/1
2.5 TABLET ORAL DAILY
Refills: 0 | Status: DISCONTINUED | OUTPATIENT
Start: 2024-02-13 | End: 2024-02-17

## 2024-02-13 RX ADMIN — Medication 81 MILLIGRAM(S): at 12:15

## 2024-02-13 RX ADMIN — PIPERACILLIN AND TAZOBACTAM 25 GRAM(S): 4; .5 INJECTION, POWDER, LYOPHILIZED, FOR SOLUTION INTRAVENOUS at 01:04

## 2024-02-13 RX ADMIN — Medication 1 DROP(S): at 06:11

## 2024-02-13 RX ADMIN — ATORVASTATIN CALCIUM 20 MILLIGRAM(S): 80 TABLET, FILM COATED ORAL at 21:47

## 2024-02-13 RX ADMIN — PIPERACILLIN AND TAZOBACTAM 25 GRAM(S): 4; .5 INJECTION, POWDER, LYOPHILIZED, FOR SOLUTION INTRAVENOUS at 09:02

## 2024-02-13 RX ADMIN — Medication 650 MILLIGRAM(S): at 21:47

## 2024-02-13 RX ADMIN — MIRTAZAPINE 7.5 MILLIGRAM(S): 45 TABLET, ORALLY DISINTEGRATING ORAL at 12:15

## 2024-02-13 RX ADMIN — ALBUTEROL 2 PUFF(S): 90 AEROSOL, METERED ORAL at 06:10

## 2024-02-13 RX ADMIN — ALBUTEROL 2 PUFF(S): 90 AEROSOL, METERED ORAL at 00:15

## 2024-02-13 RX ADMIN — CYCLOPENTOLATE HYDROCHLORIDE 1 DROP(S): 10 SOLUTION/ DROPS OPHTHALMIC at 06:11

## 2024-02-13 RX ADMIN — ALBUTEROL 2 PUFF(S): 90 AEROSOL, METERED ORAL at 12:14

## 2024-02-13 RX ADMIN — Medication 650 MILLIGRAM(S): at 16:09

## 2024-02-13 RX ADMIN — NEBIVOLOL HYDROCHLORIDE 2.5 MILLIGRAM(S): 5 TABLET ORAL at 16:42

## 2024-02-13 RX ADMIN — Medication 650 MILLIGRAM(S): at 22:17

## 2024-02-13 RX ADMIN — PIPERACILLIN AND TAZOBACTAM 25 GRAM(S): 4; .5 INJECTION, POWDER, LYOPHILIZED, FOR SOLUTION INTRAVENOUS at 16:41

## 2024-02-13 RX ADMIN — ALBUTEROL 2 PUFF(S): 90 AEROSOL, METERED ORAL at 17:22

## 2024-02-13 RX ADMIN — CYCLOPENTOLATE HYDROCHLORIDE 1 DROP(S): 10 SOLUTION/ DROPS OPHTHALMIC at 12:14

## 2024-02-13 RX ADMIN — Medication 650 MILLIGRAM(S): at 17:22

## 2024-02-13 RX ADMIN — Medication 40 MILLIEQUIVALENT(S): at 13:13

## 2024-02-13 RX ADMIN — TEMAZEPAM 30 MILLIGRAM(S): 15 CAPSULE ORAL at 22:01

## 2024-02-13 RX ADMIN — Medication 20 MILLIGRAM(S): at 12:15

## 2024-02-13 RX ADMIN — Medication 1 DROP(S): at 12:14

## 2024-02-13 NOTE — PROGRESS NOTE ADULT - SUBJECTIVE AND OBJECTIVE BOX
infectious diseases progress note:    Patient is a 88y old  Female who presents with a chief complaint of abd  pain/  vomiting (13 Feb 2024 07:31)             Allergies    iodinated radiocontrast agents (Unknown)    Intolerances        ANTIBIOTICS/RELEVANT:  antimicrobials  piperacillin/tazobactam IVPB.. 3.375 Gram(s) IV Intermittent every 8 hours    immunologic:  influenza  Vaccine (HIGH DOSE) 0.7 milliLiter(s) IntraMuscular once    OTHER:  acetaminophen     Tablet .. 650 milliGRAM(s) Oral every 6 hours PRN  albuterol    90 MICROgram(s) HFA Inhaler 2 Puff(s) Inhalation every 6 hours  aspirin enteric coated 81 milliGRAM(s) Oral daily  atorvastatin 20 milliGRAM(s) Oral at bedtime  cyclopentolate 1% Solution 1 Drop(s) Left EYE two times a day  FLUoxetine 20 milliGRAM(s) Oral daily  mirtazapine 7.5 milliGRAM(s) Oral daily  prednisoLONE acetate 1% Suspension 1 Drop(s) Left EYE two times a day  sodium chloride 0.9%. 1000 milliLiter(s) IV Continuous <Continuous>  temazepam 30 milliGRAM(s) Oral at bedtime PRN      Objective:  Vital Signs Last 24 Hrs  T(C): 36.7 (13 Feb 2024 05:05), Max: 36.9 (12 Feb 2024 13:42)  T(F): 98.1 (13 Feb 2024 05:05), Max: 98.4 (12 Feb 2024 13:42)  HR: 87 (13 Feb 2024 05:05) (79 - 89)  BP: 152/72 (13 Feb 2024 05:15) (152/72 - 173/77)  BP(mean): --  RR: 18 (13 Feb 2024 05:05) (18 - 18)  SpO2: 93% (13 Feb 2024 05:05) (90% - 94%)    Parameters below as of 13 Feb 2024 05:05  Patient On (Oxygen Delivery Method): room air           Ear/Nose/Throat: no oral lesion, no sinus tenderness on percussion	  Neck:no JVD, no lymphadenopathy, supple  Respiratory: CTA moises  Cardiovascular: S1S2 RRR, no murmurs  Gastrointestinal:soft, (+) BS, no HSM  Extremities:no e/e/c        LABS:                        12.7   11.69 )-----------( 149      ( 12 Feb 2024 18:52 )             39.5     02-12    144  |  106  |  13  ----------------------------<  153<H>  3.4<L>   |  22  |  0.90    Ca    9.0      12 Feb 2024 18:52        Urinalysis Basic - ( 12 Feb 2024 18:52 )    Color: x / Appearance: x / SG: x / pH: x  Gluc: 153 mg/dL / Ketone: x  / Bili: x / Urobili: x   Blood: x / Protein: x / Nitrite: x   Leuk Esterase: x / RBC: x / WBC x   Sq Epi: x / Non Sq Epi: x / Bacteria: x          MICROBIOLOGY:    RECENT CULTURES:  02-08 @ 16:40 .Blood Blood-Peripheral                No growth at 4 days    02-08 @ 16:30 .Blood Blood-Peripheral                No growth at 4 days    02-08 @ 15:03 Clean Catch Clean Catch (Midstream)                >=3 organisms. Probable collection contamination.          RESPIRATORY CULTURES:              RADIOLOGY & ADDITIONAL STUDIES:        Pager [phone]  After 5 pm/weekends or if no response :[phone]

## 2024-02-13 NOTE — PROGRESS NOTE ADULT - SUBJECTIVE AND OBJECTIVE BOX
---___---___---___---___---___---___ ---___---___---___---___---___---___---___---___---                  M E D I C A L   A T T E N D I N G   P R O G R E S S   N O T E  ---___---___---___---___---___---___ ---___---___---___---___---___---___---___---___---        ================================================    ++CHIEF COMPLAINT:   Patient is a 88y old  Female who presents with a chief complaint of abd  pain/  vomiting (2024 12:21)      Diverticulitis of intestine without perforation or abscess without bleeding      ---___---___---___---___---___---  PAST MEDICAL / Surgical  HISTORY:  PAST MEDICAL & SURGICAL HISTORY:  HTN (hypertension)      Hypothyroidism      Osteoporosis      CAD (coronary artery disease)          ---___---___---___---___---___---  FAMILY HISTORY:   FAMILY HISTORY:        ---___---___---___---___---___---  ALLERGIES:   Allergies    iodinated radiocontrast agents (Unknown)    Intolerances        ---___---___---___---___---___---  MEDICATIONS:  MEDICATIONS  (STANDING):  albuterol    90 MICROgram(s) HFA Inhaler 2 Puff(s) Inhalation every 6 hours  aspirin enteric coated 81 milliGRAM(s) Oral daily  atorvastatin 20 milliGRAM(s) Oral at bedtime  cyclopentolate 1% Solution 1 Drop(s) Left EYE two times a day  FLUoxetine 20 milliGRAM(s) Oral daily  influenza  Vaccine (HIGH DOSE) 0.7 milliLiter(s) IntraMuscular once  mirtazapine 7.5 milliGRAM(s) Oral daily  piperacillin/tazobactam IVPB.. 3.375 Gram(s) IV Intermittent every 8 hours  prednisoLONE acetate 1% Suspension 1 Drop(s) Left EYE two times a day  sodium chloride 0.9%. 1000 milliLiter(s) (60 mL/Hr) IV Continuous <Continuous>    MEDICATIONS  (PRN):  acetaminophen     Tablet .. 650 milliGRAM(s) Oral every 6 hours PRN Mild Pain (1 - 3)  temazepam 30 milliGRAM(s) Oral at bedtime PRN Insomnia      ---___---___---___---___---___---  REVIEW OF SYSTEM:    GEN: no fever, no chills, no pain  RESP: no SOB, no cough, no sputum  CVS: no chest pain, no palpitations, no edema  GI: no abdominal pain, no nausea, no vomiting, no constipation, no diarrhea  : no dysurea, no frequency, no hematurea  Neuro: no headache, no dizziness  PSYCH: no anxiety, no depression  Derm : no itching, no rash    ---___---___---___---___---___---  VITAL SIGNS:  88y , CAPILLARY BLOOD GLUCOSE        T(C): 36.3 (24 @ 09:10), Max: 36.9 (24 @ 13:42)  HR: 90 (24 @ 09:10) (79 - 90)  BP: 188/92 (24 @ 09:10) (152/72 - 188/92)  RR: 18 (24 @ 09:10) (18 - 18)  SpO2: 86% (24 @ 09:10) (86% - 94%)  ---___---___---___---___---___---  PHYSICAL EXAM:    GEN: A&O X 3 , NAD , resting comfortably  HEENT: NCAT, PERRL, MMM, hearing intact  Neck: supple , no JVD  CVS: S1S2 , regular , No M/R/G appreciated  PULM: CTA B/L,  no W/R/R appreciated  ABD.: soft. non tender, non distended,  bowel sounds present  Extrem: intact pulses , no edema   Derm: No rash , no ecchymoses  PSYCH : normal mood,  no delusion not anxious     ---___---___---___---___---___---            LAB AND IMAGIN.7   11.69 )-----------( 149      ( 2024 18:52 )             39.5               02-12    144  |  106  |  13  ----------------------------<  153<H>  3.4<L>   |  22  |  0.90    Ca    9.0      2024 18:52                              Urinalysis Basic - ( 2024 18:52 )    Color: x / Appearance: x / SG: x / pH: x  Gluc: 153 mg/dL / Ketone: x  / Bili: x / Urobili: x   Blood: x / Protein: x / Nitrite: x   Leuk Esterase: x / RBC: x / WBC x   Sq Epi: x / Non Sq Epi: x / Bacteria: x        [All pertinent / recent Imaging reviewed]         ---___---___---___---___---___---___ ---___---___---___---___---                         A S S E S S M E N T   A N D   P L A N :      HEALTH ISSUES - PROBLEM Dx:  Continue zosyn for diverticulitis  COPD management per medicine and pulmonary  No drainable collection  Marked Left dilated renal pelvis + U/A, contaminated UC/S  she is very frail    Looks well, normalized WBC, no fever,  eating, abdomen benign.  gyn consult appreciated will need pessary management and  possible repeat ct scan if and when pessary removed to evaluate ureter and hydronephrosis    --------------------------------------------  Case discussed with  both opal donaldson and edouard   ___________________________  Thank you,  Garry Hermosillo  [phone]

## 2024-02-13 NOTE — CONSULT NOTE ADULT - SUBJECTIVE AND OBJECTIVE BOX
BISHNU SEGURA  88y  Female [id number]    HPI:  Pt is an 88y with Hx HTN, Sarcoidosis, and Osteoporosis who presented with vomiting. CT performed and found to have diverticulitis. Pt started on Zosyn and states that vomiting has improved. She reports no constipation, diarrhea, N/V, abdominal pain.    CT scan incidentally found to have L hydronephrosis and hydroureter. Urology consulted. Due to proximity of pessary to obstruction of distal L ureter, urology recommended pessary removal and reexamination 72h from removal to evaluate for improvement of hydronephrosis and hydroureter. GYN consulted for pessary evaluation and removal.    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 She states that she recently moved from [place] last year and has not found a GYN in this area to manage her pessary. She reports no flank pain or dysuria. She reports increased urinary frequency which she attributes to the IV fluids she has been receiving. She denies fever and chills. Pt reports that she has also had occasional vaginal bleeding that is light and comes on randomly which she did not mention to her previous GYN. She reports menopause at [age]yo.        Name of GYN Physician: Dr. Rivas in [place]  OBHx:  x2   GynHx: Denies fibroids, cysts, endometriosis, STI's, Abnormal pap smears   PMH: HTN, Sarcoidosis, Osteoporosis  PSH: Denies  Meds: Albuterol, Prozac, Remeron, Temazepam, Aspirin  Allx: NKDA    Vital Signs Last 24 Hrs  T(C): 36.3 (2024 09:10), Max: 36.9 (2024 13:42)  T(F): 97.4 (2024 09:10), Max: 98.4 (2024 13:42)  HR: 90 (2024 09:10) (79 - 90)  BP: 188/92 (2024 09:10) (152/72 - 188/92)  BP(mean): --  RR: 18 (2024 09:10) (18 - 18)  SpO2: 86% (2024 09:10) (86% - 94%)    Parameters below as of 2024 09:10  Patient On (Oxygen Delivery Method): room air        Physical Exam:   General: sitting comfortably in bed, NAD   HEENT: neck supple, full ROM  CV: Well perfused  Lungs: Saturating moderately well on RA  Abd: Soft, non-tender, non-distended.  Bowel sounds present.    :  No bleeding on pad.    External labia wnl.  Bimanual exam with no cervical motion tenderness, uterus wnl, adnexa non palpable b/l.  Cervix closed vs. Cervix dilated  Speculum Exam: No active bleeding from os.  Physiologic discharge.    Ext: non-tender b/l, no edema     LABS:                              12.7   11.69 )-----------( 149      ( 2024 18:52 )             39.5     02-    144  |  106  |  13  ----------------------------<  153<H>  3.4<L>   |  22  |  0.90    Ca    9.0      2024 18:52      I&O's Detail    2024 07:01  -  2024 07:00  --------------------------------------------------------  IN:    Oral Fluid: 360 mL    sodium chloride 0.9%: 720 mL  Total IN: 1080 mL    OUT:    Voided (mL): 650 mL  Total OUT: 650 mL    Total NET: 430 mL          Urinalysis Basic - ( 2024 18:52 )    Color: x / Appearance: x / SG: x / pH: x  Gluc: 153 mg/dL / Ketone: x  / Bili: x / Urobili: x   Blood: x / Protein: x / Nitrite: x   Leuk Esterase: x / RBC: x / WBC x   Sq Epi: x / Non Sq Epi: x / Bacteria: x        RADIOLOGY & ADDITIONAL STUDIES:    < from: CT Abdomen and Pelvis No Cont (24 @ 18:01) >  ACC: [id number] EXAM:  CT ABDOMEN AND PELVIS   ORDERED BY: RHONA BLOOD     PROCEDURE DATE:  2024          INTERPRETATION:  CLINICAL INFORMATION: Abdominal pain, constipation    COMPARISON: None.    CONTRAST/COMPLICATIONS:  IV Contrast: NONE  OralContrast: NONE  Complications: None reported at time of study completion    PROCEDURE:  CT of the Abdomen and Pelvis was performed.  Sagittal and coronal reformats were performed.    FINDINGS:  LOWER CHEST: Enlarged main pulmonary artery suggestive of pulmonary   hypertension. Coronary artery valvular calcifications. The lung bases are   clear. Elevated right diaphragm. Coronary artery calcification    LIVER: Within normal limits.  BILE DUCTS: Normal caliber.  GALLBLADDER: Cholelithiasis.  SPLEEN: Within normal limits.  PANCREAS: Within normal limits.  ADRENALS: Within normal limits.  KIDNEYS/URETERS: Severe left hydroureter with tortuosity of the ureter   and moderate hydronephrosis to the level of the UVJ. No obstructing stone   or lesion is visualized. No right hydronephrosis.    BLADDER: Within normal limits.  REPRODUCTIVE ORGANS: A vaginal pessary is in place    BOWEL: Colonic diverticulosis. Mild thickening of the mid sigmoid colon   with mild adjacent stranding suggestive of acute diverticulitis (602-43,   301-76). No bowel obstruction. Appendix is normal.  PERITONEUM: No ascites.  VESSELS: Atherosclerotic changes.  RETROPERITONEUM/LYMPH NODES: No lymphadenopathy.  ABDOMINAL WALL: Mild skin thickening and subcutaneous infiltration   overlying the coccyx.  BONES: Left hip ORIF. Lumbar levoscoliosis. Degenerative changes of the   spine. Age indeterminate compression deformity of L4. Grade 1   anterolisthesis of L4 on L5 and L5 on S1.    IMPRESSION:    Thickening of the sigmoid colon with mild inflammatory changes adjacent   to multiple colonic diverticula suggestive of acute diverticulitis.   Severe left hydroureter and moderate left hydronephrosis of uncertain   etiology.    --- End of Report ---           PERFECTO TAYLOR MD; Resident Radiologist  This document has been electronically signed.  ALAN ANAYA MD; Attending Radiologist  This document has been electronically signed. 2024  6:32PM    < end of copied text >   BISHNU SEGURA  88y  Female [id number]    HPI:  Pt is an 88y with Hx HTN, Sarcoidosis, and Osteoporosis who presented with vomiting. CT performed and found diverticulitis. Pt started on Zosyn and states that vomiting has improved. She reports no constipation, diarrhea, N/V, abdominal pain.    CT scan incidentally found to have L hydronephrosis and hydroureter. Urology consulted. Due to proximity of pessary to obstruction of distal L ureter, urology recommended pessary removal and reexamination 72h from removal to evaluate for improvement of hydronephrosis and hydroureter. GYN consulted for pessary evaluation and removal.    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 She states that she recently moved from [place] last year and has not found a GYN in this area to manage her pessary. She reports no flank pain or dysuria. She reports increased urinary frequency which she attributes to the IV fluids she has been receiving. She denies fever and chills. Pt reports that she has also had occasional vaginal bleeding that is light and comes on randomly which she did not mention to her previous GYN. She reports menopause at [age]yo.        Name of GYN Physician: Dr. Rivas in [place]  OBHx:  x2   GynHx: Denies fibroids, cysts, endometriosis, STI's, Abnormal pap smears   PMH: HTN, Sarcoidosis, Osteoporosis  PSH: Denies  Meds: Albuterol, Prozac, Remeron, Temazepam, Aspirin  Allx: NKDA    Vital Signs Last 24 Hrs  T(C): 36.3 (2024 09:10), Max: 36.9 (2024 13:42)  T(F): 97.4 (2024 09:10), Max: 98.4 (2024 13:42)  HR: 90 (2024 09:10) (79 - 90)  BP: 188/92 (2024 09:10) (152/72 - 188/92)  BP(mean): --  RR: 18 (2024 09:10) (18 - 18)  SpO2: 86% (2024 09:10) (86% - 94%)    Parameters below as of 2024 09:10  Patient On (Oxygen Delivery Method): room air        Physical Exam:   General: sitting comfortably in bed, NAD   HEENT: neck supple, full ROM  CV: Well perfused  Lungs: Saturating moderately well on RA  Abd: Soft, non-tender, non-distended.  :  Declined  Speculum Exam: Declined  Ext: non-tender b/l, no edema     LABS:                              12.7   11.69 )-----------( 149      ( 2024 18:52 )             39.5     02-    144  |  106  |  13  ----------------------------<  153<H>  3.4<L>   |  22  |  0.90    Ca    9.0      2024 18:52      I&O's Detail    2024 07:01  -  2024 07:00  --------------------------------------------------------  IN:    Oral Fluid: 360 mL    sodium chloride 0.9%: 720 mL  Total IN: 1080 mL    OUT:    Voided (mL): 650 mL  Total OUT: 650 mL    Total NET: 430 mL          Urinalysis Basic - ( 2024 18:52 )    Color: x / Appearance: x / SG: x / pH: x  Gluc: 153 mg/dL / Ketone: x  / Bili: x / Urobili: x   Blood: x / Protein: x / Nitrite: x   Leuk Esterase: x / RBC: x / WBC x   Sq Epi: x / Non Sq Epi: x / Bacteria: x        RADIOLOGY & ADDITIONAL STUDIES:    < from: CT Abdomen and Pelvis No Cont (24 @ 18:01) >  ACC: [id number] EXAM:  CT ABDOMEN AND PELVIS   ORDERED BY: RHONA BLOOD     PROCEDURE DATE:  2024          INTERPRETATION:  CLINICAL INFORMATION: Abdominal pain, constipation    COMPARISON: None.    CONTRAST/COMPLICATIONS:  IV Contrast: NONE  OralContrast: NONE  Complications: None reported at time of study completion    PROCEDURE:  CT of the Abdomen and Pelvis was performed.  Sagittal and coronal reformats were performed.    FINDINGS:  LOWER CHEST: Enlarged main pulmonary artery suggestive of pulmonary   hypertension. Coronary artery valvular calcifications. The lung bases are   clear. Elevated right diaphragm. Coronary artery calcification    LIVER: Within normal limits.  BILE DUCTS: Normal caliber.  GALLBLADDER: Cholelithiasis.  SPLEEN: Within normal limits.  PANCREAS: Within normal limits.  ADRENALS: Within normal limits.  KIDNEYS/URETERS: Severe left hydroureter with tortuosity of the ureter   and moderate hydronephrosis to the level of the UVJ. No obstructing stone   or lesion is visualized. No right hydronephrosis.    BLADDER: Within normal limits.  REPRODUCTIVE ORGANS: A vaginal pessary is in place    BOWEL: Colonic diverticulosis. Mild thickening of the mid sigmoid colon   with mild adjacent stranding suggestive of acute diverticulitis (602-43,   301-76). No bowel obstruction. Appendix is normal.  PERITONEUM: No ascites.  VESSELS: Atherosclerotic changes.  RETROPERITONEUM/LYMPH NODES: No lymphadenopathy.  ABDOMINAL WALL: Mild skin thickening and subcutaneous infiltration   overlying the coccyx.  BONES: Left hip ORIF. Lumbar levoscoliosis. Degenerative changes of the   spine. Age indeterminate compression deformity of L4. Grade 1   anterolisthesis of L4 on L5 and L5 on S1.    IMPRESSION:    Thickening of the sigmoid colon with mild inflammatory changes adjacent   to multiple colonic diverticula suggestive of acute diverticulitis.   Severe left hydroureter and moderate left hydronephrosis of uncertain   etiology.    --- End of Report ---           PERFECTO TAYLOR MD; Resident Radiologist  This document has been electronically signed.  ALAN ANAYA MD; Attending Radiologist  This document has been electronically signed. 2024  6:32PM    < end of copied text >

## 2024-02-13 NOTE — CONSULT NOTE ADULT - ASSESSMENT
Pt is 88y who presented with vomiting and was found to have diverticulitis on Zosyn with improved vomiting. Incidentally found to have asymptomatic L hydronephrosis and L hydroureter potentially obstructed by ring pessary. GYN consulted for pessary evaluation and removal, and pt found to have vaginal bleeding.    - Recommend TVUS to evaluate vaginal bleeding  -     Rachell Dodd PGY1    ***INCOMPLETE NOTE***   Pt is 88y who presented with vomiting and was found to have diverticulitis on Zosyn with improved vomiting. Incidentally found to have asymptomatic L hydronephrosis and L hydroureter potentially obstructed by ring pessary. GYN consulted for pessary evaluation and removal, and pt found to have vaginal bleeding.    - Recommend TVUS to evaluate vaginal bleeding  - Pessary removal recommended   - OP GYN follow up for vaginal bleeding work up and pessary management    Rachell Dodd PGY1    ***INCOMPLETE NOTE***   Pt is 88y who presented with vomiting and was found to have diverticulitis on Zosyn with improved vomiting. Incidentally found to have asymptomatic L hydronephrosis and L hydroureter potentially obstructed by ring pessary. GYN consulted for pessary evaluation and removal, and pt found to have vaginal bleeding.    - Recommend TVUS to evaluate vaginal bleeding  - Pessary removal recommended to pt. Risk for permanent kidney damage and infection explained, and pt declining pessary removal  - OP GYN follow up for vaginal bleeding work up and pessary management    D/w Dr. Wilcox and Dr. Alec Dodd PGY1     Pt is 88y who presented with vomiting and was found to have diverticulitis on Zosyn with improved vomiting. Incidentally found to have asymptomatic L hydronephrosis and L hydroureter potentially obstructed by ring pessary. GYN consulted for pessary evaluation and removal, and pt found to have vaginal bleeding.    - Recommend TVUS to evaluate vaginal bleeding  - Pessary removal recommended to pt. Risk for permanent kidney damage and infection explained, and pt declining pessary removal  - OP GYN follow up for vaginal bleeding work up and pessary management  - Attempted to call HCP (Jihan, [phone], number received from primary team) x3 without answer.     D/w Dr. Wilcox and Dr. Alec Dodd PGY1     Pt is 88y who presented with vomiting and was found to have diverticulitis on Zosyn with improved vomiting. Incidentally found to have asymptomatic L hydronephrosis and L hydroureter potentially obstructed by ring pessary. GYN consulted for pessary evaluation and removal, and pt found to have vaginal bleeding.    - Recommend TVUS to evaluate vaginal bleeding  - Pessary removal recommended to pt. Risk for permanent kidney damage and infection explained, and pt declining pessary removal  - OP GYN follow up for vaginal bleeding work up and pessary management  - Attempted to call HCP (Jihan, [phone], number received from primary team) x3 without answer.     D/w Dr. Brenden Dodd PGY1

## 2024-02-13 NOTE — PROGRESS NOTE ADULT - ASSESSMENT
88 year old female with left hydronephrosis; admitted for abdominal pain, constipation. Patient has moderate/severe hydroureter to the level of the distal ureter without clear etiology. However on imaging patients pessary may be obstructing left ureter as the ureter is partially collapsed past the pessary and is in clear contact with distal ureter. Patient has no acute urgent indication for intervention including flank pain, infectious symptoms or CVAT    Plan  - patients hydronephrosis may be secondary to obstruction from pessary, can try to remove pessary and reimage with RBUS in 72hours to see if hydronephrosis improves after pessary removal as etiology of obstruction  - Please touch base with patient's gynecologist who placed pessary as well about this.  - Please remove pessary today so can then see if hydronephrosis improves on interval imaging in 3 days  -No acute indication for intervention  -Trend Cr  - Discussed with Dr. Hoenig

## 2024-02-13 NOTE — CONSULT NOTE ADULT - SUBJECTIVE AND OBJECTIVE BOX
BISHNU SEGURA  88y  Female [id number]    HPI:  Pt is an 88y with Hx HTN, Sarcoidosis, and Osteoporosis who presented with vomiting. CT performed and found diverticulitis. Pt started on Zosyn and states that vomiting has improved. She reports no constipation, diarrhea, N/V, abdominal pain.    CT scan incidentally found to have L hydronephrosis and hydroureter. Urology consulted. Due to proximity of pessary to obstruction of distal L ureter, urology recommended pessary removal and reexamination 72h from removal to evaluate for improvement of hydronephrosis and hydroureter. GYN consulted for pessary evaluation and removal.    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 She states that she recently moved from [place] last year and has not found a GYN in this area to manage her pessary. She reports no flank pain or dysuria. She reports increased urinary frequency which she attributes to the IV fluids she has been receiving. She denies fever and chills. Pt reports that she has also had occasional vaginal bleeding that is light and comes on randomly which she did not mention to her previous GYN. She reports menopause at [age]yo.        Name of GYN Physician: Dr. Rivas in [place]  OBHx:  x2   GynHx: Denies fibroids, cysts, endometriosis, STI's, Abnormal pap smears   PMH: HTN, Sarcoidosis, Osteoporosis  PSH: Denies  Meds: Albuterol, Prozac, Remeron, Temazepam, Aspirin  Allx: NKDA    Vital Signs Last 24 Hrs  T(C): 36.3 (2024 09:10), Max: 36.9 (2024 13:42)  T(F): 97.4 (2024 09:10), Max: 98.4 (2024 13:42)  HR: 90 (2024 09:10) (79 - 90)  BP: 188/92 (2024 09:10) (152/72 - 188/92)  BP(mean): --  RR: 18 (2024 09:10) (18 - 18)  SpO2: 86% (2024 09:10) (86% - 94%)    Parameters below as of 2024 09:10  Patient On (Oxygen Delivery Method): room air        Physical Exam:   General: sitting comfortably in bed, NAD   HEENT: neck supple, full ROM  CV: Well perfused  Lungs: Saturating moderately well on RA  Back: No CVA tenderness b/l  Abd: Soft, non-tender, non-distended.  :  Declined  Speculum Exam: Declined  Ext: non-tender b/l, no edema     LABS:                              12.7   11.69 )-----------( 149      ( 2024 18:52 )             39.5     02-12    144  |  106  |  13  ----------------------------<  153<H>  3.4<L>   |  22  |  0.90    Ca    9.0      2024 18:52      I&O's Detail    2024 07:01  -  2024 07:00  --------------------------------------------------------  IN:    Oral Fluid: 360 mL    sodium chloride 0.9%: 720 mL  Total IN: 1080 mL    OUT:    Voided (mL): 650 mL  Total OUT: 650 mL    Total NET: 430 mL          Urinalysis Basic - ( 2024 18:52 )    Color: x / Appearance: x / SG: x / pH: x  Gluc: 153 mg/dL / Ketone: x  / Bili: x / Urobili: x   Blood: x / Protein: x / Nitrite: x   Leuk Esterase: x / RBC: x / WBC x   Sq Epi: x / Non Sq Epi: x / Bacteria: x        RADIOLOGY & ADDITIONAL STUDIES:    < from: CT Abdomen and Pelvis No Cont (24 @ 18:01) >  ACC: [id number] EXAM:  CT ABDOMEN AND PELVIS   ORDERED BY: RHONA BLOOD     PROCEDURE DATE:  2024          INTERPRETATION:  CLINICAL INFORMATION: Abdominal pain, constipation    COMPARISON: None.    CONTRAST/COMPLICATIONS:  IV Contrast: NONE  OralContrast: NONE  Complications: None reported at time of study completion    PROCEDURE:  CT of the Abdomen and Pelvis was performed.  Sagittal and coronal reformats were performed.    FINDINGS:  LOWER CHEST: Enlarged main pulmonary artery suggestive of pulmonary   hypertension. Coronary artery valvular calcifications. The lung bases are   clear. Elevated right diaphragm. Coronary artery calcification    LIVER: Within normal limits.  BILE DUCTS: Normal caliber.  GALLBLADDER: Cholelithiasis.  SPLEEN: Within normal limits.  PANCREAS: Within normal limits.  ADRENALS: Within normal limits.  KIDNEYS/URETERS: Severe left hydroureter with tortuosity of the ureter   and moderate hydronephrosis to the level of the UVJ. No obstructing stone   or lesion is visualized. No right hydronephrosis.    BLADDER: Within normal limits.  REPRODUCTIVE ORGANS: A vaginal pessary is in place    BOWEL: Colonic diverticulosis. Mild thickening of the mid sigmoid colon   with mild adjacent stranding suggestive of acute diverticulitis (602-43,   301-76). No bowel obstruction. Appendix is normal.  PERITONEUM: No ascites.  VESSELS: Atherosclerotic changes.  RETROPERITONEUM/LYMPH NODES: No lymphadenopathy.  ABDOMINAL WALL: Mild skin thickening and subcutaneous infiltration   overlying the coccyx.  BONES: Left hip ORIF. Lumbar levoscoliosis. Degenerative changes of the   spine. Age indeterminate compression deformity of L4. Grade 1   anterolisthesis of L4 on L5 and L5 on S1.    IMPRESSION:    Thickening of the sigmoid colon with mild inflammatory changes adjacent   to multiple colonic diverticula suggestive of acute diverticulitis.   Severe left hydroureter and moderate left hydronephrosis of uncertain   etiology.    --- End of Report ---           PERFECTO TAYLOR MD; Resident Radiologist  This document has been electronically signed.  ALAN ANAYA MD; Attending Radiologist  This document has been electronically signed. 2024  6:32PM    < end of copied text >       UROGYNECOLOGY CONSULT NOTE    BISHNU SEGURA  88y  Female [id number]    HPI:  Pt is an 88y with Hx HTN, Sarcoidosis, and Osteoporosis who presented with vomiting. CT performed and found diverticulitis. Pt started on Zosyn and states that vomiting has improved. She reports no constipation, diarrhea, N/V, abdominal pain.    CT scan incidentally found to have L hydronephrosis and hydroureter. Urology consulted. Due to proximity of pessary to obstruction of distal L ureter, urology recommended pessary removal and reexamination 72h from removal to evaluate for improvement of hydronephrosis and hydroureter. GYN consulted for pessary evaluation and removal.    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 She states that she recently moved from [place] last year and has not found a GYN in this area to manage her pessary. She reports no flank pain or dysuria. She reports increased urinary frequency which she attributes to the IV fluids she has been receiving. She denies fever and chills. Pt reports that she has also had occasional vaginal bleeding that is light and comes on randomly which she did not mention to her previous GYN. She reports menopause at [age]yo.        Name of GYN Physician: Dr. Rivas in [place]  OBHx:  x2   GynHx: Denies fibroids, cysts, endometriosis, STI's, Abnormal pap smears   PMH: HTN, Sarcoidosis, Osteoporosis  PSH: Denies  Meds: Albuterol, Prozac, Remeron, Temazepam, Aspirin  Allx: NKDA    Vital Signs Last 24 Hrs  T(C): 36.3 (2024 09:10), Max: 36.9 (2024 13:42)  T(F): 97.4 (2024 09:10), Max: 98.4 (2024 13:42)  HR: 90 (2024 09:10) (79 - 90)  BP: 188/92 (2024 09:10) (152/72 - 188/92)  BP(mean): --  RR: 18 (2024 09:10) (18 - 18)  SpO2: 86% (2024 09:10) (86% - 94%)    Parameters below as of 2024 09:10  Patient On (Oxygen Delivery Method): room air        Physical Exam:   General: sitting comfortably in bed, NAD   HEENT: neck supple, full ROM  CV: Well perfused  Lungs: Saturating moderately well on RA  Back: No CVA tenderness b/l  Abd: Soft, non-tender, non-distended.  :  Declined  Speculum Exam: Declined  Ext: non-tender b/l, no edema     LABS:                              12.7   11.69 )-----------( 149      ( 2024 18:52 )             39.5     02-    144  |  106  |  13  ----------------------------<  153<H>  3.4<L>   |  22  |  0.90    Ca    9.0      2024 18:52      I&O's Detail    2024 07:01  -  2024 07:00  --------------------------------------------------------  IN:    Oral Fluid: 360 mL    sodium chloride 0.9%: 720 mL  Total IN: 1080 mL    OUT:    Voided (mL): 650 mL  Total OUT: 650 mL    Total NET: 430 mL          Urinalysis Basic - ( 2024 18:52 )    Color: x / Appearance: x / SG: x / pH: x  Gluc: 153 mg/dL / Ketone: x  / Bili: x / Urobili: x   Blood: x / Protein: x / Nitrite: x   Leuk Esterase: x / RBC: x / WBC x   Sq Epi: x / Non Sq Epi: x / Bacteria: x        RADIOLOGY & ADDITIONAL STUDIES:    < from: CT Abdomen and Pelvis No Cont (24 @ 18:01) >  ACC: [id number] EXAM:  CT ABDOMEN AND PELVIS   ORDERED BY: RHONA BLOOD     PROCEDURE DATE:  2024          INTERPRETATION:  CLINICAL INFORMATION: Abdominal pain, constipation    COMPARISON: None.    CONTRAST/COMPLICATIONS:  IV Contrast: NONE  OralContrast: NONE  Complications: None reported at time of study completion    PROCEDURE:  CT of the Abdomen and Pelvis was performed.  Sagittal and coronal reformats were performed.    FINDINGS:  LOWER CHEST: Enlarged main pulmonary artery suggestive of pulmonary   hypertension. Coronary artery valvular calcifications. The lung bases are   clear. Elevated right diaphragm. Coronary artery calcification    LIVER: Within normal limits.  BILE DUCTS: Normal caliber.  GALLBLADDER: Cholelithiasis.  SPLEEN: Within normal limits.  PANCREAS: Within normal limits.  ADRENALS: Within normal limits.  KIDNEYS/URETERS: Severe left hydroureter with tortuosity of the ureter   and moderate hydronephrosis to the level of the UVJ. No obstructing stone   or lesion is visualized. No right hydronephrosis.    BLADDER: Within normal limits.  REPRODUCTIVE ORGANS: A vaginal pessary is in place    BOWEL: Colonic diverticulosis. Mild thickening of the mid sigmoid colon   with mild adjacent stranding suggestive of acute diverticulitis (602-43,   301-76). No bowel obstruction. Appendix is normal.  PERITONEUM: No ascites.  VESSELS: Atherosclerotic changes.  RETROPERITONEUM/LYMPH NODES: No lymphadenopathy.  ABDOMINAL WALL: Mild skin thickening and subcutaneous infiltration   overlying the coccyx.  BONES: Left hip ORIF. Lumbar levoscoliosis. Degenerative changes of the   spine. Age indeterminate compression deformity of L4. Grade 1   anterolisthesis of L4 on L5 and L5 on S1.    IMPRESSION:    Thickening of the sigmoid colon with mild inflammatory changes adjacent   to multiple colonic diverticula suggestive of acute diverticulitis.   Severe left hydroureter and moderate left hydronephrosis of uncertain   etiology.    --- End of Report ---           PERFECTO TAYLOR MD; Resident Radiologist  This document has been electronically signed.  ALAN ANAYA MD; Attending Radiologist  This document has been electronically signed. 2024  6:32PM    < end of copied text >

## 2024-02-13 NOTE — PROGRESS NOTE ADULT - ATTENDING COMMENTS
Pt seen and examined  films reviewed  left hydro significant, but to distal ureter  ? kinking vs other causation.  pt declined pessary removal for trial as asymptomatic  can arrange renal scan with lasix as in or outpt to followup

## 2024-02-13 NOTE — PROGRESS NOTE ADULT - ASSESSMENT
88 year old with CAD  COPD  presented with abd pain and CT suggestive of diverticulitis  but not abscess  pt is tender  but no rebound or diffuse peritoneal signs   pt is very high risk surgical candidate    Continue zosyn for diverticulitis  COPD management per medicine and pulmonary  No drainable collection  Marked Left dilated renal pelvis + U/A, contaminated UC/S  she is very frail    Looks well, normalized WBC, no fever,  eating, abdomen benign.  Uncomplicated Left sided diverticulitis, can treat 5   days, when ready for DC can use amoxicillin/clavulanate (875/125 mg twice daily).

## 2024-02-13 NOTE — PROGRESS NOTE ADULT - ASSESSMENT
Assessment and Plan:   Acute abdominal pain   Acute sigmoid diverticulitis on Zosyn   Left hydronephrosis  H/O sarcoidosis on prednisone   COPD and chronic respiratory failure oh home 02    Continue Antibiotics as per ID   considered urology consultation   continue prednisone   continue nebulizer treatement PRN   PPI   DVT prophylaxis

## 2024-02-13 NOTE — PROGRESS NOTE ADULT - SUBJECTIVE AND OBJECTIVE BOX
BISHNU SEGURA  MRN#: [id number]  Subjective:   pulmonary progress note  : sarcoidosis ,COPD , Chronic respiratory failure on home 02    date of service is 2/13/2024   88 F        h/o   CAD,   pulmonary sarcoidosis, COPD (not on home O2), HTN,       BIBEMS from [facility] for evaluation of abdominal pain, constipation, vomiting x 4 days, no hematemesis no fever no aspiration or SOB , no diarrhea    .  pt  is   forgetful,. had  vomiting this morning    p er aide at bedside, patient has chronic constipation CT scan of the abdomen shows sever hydroureter,  moderate left hydronephrosis , Diverticulitis sigmoid colon , vaginal pessary complete L 4 fracture      has no history of abdominal surgeries S/P implanted Loop recorder  (08 Feb 2024 19:29), Id consultation appreciated  started on Zosyn , no new C/O no fever no more vomiting no SOB , acceptable 02 saturation , condition is the same no new events BP is not well controlled , 02 saturation is 93% at rest no SOB or chest pain reported , ID follow up appreciated continue ABX , no fever no SOB or respiratory distress acceptable 02 saturation , on Zosyn for Diverticulitis , OBG, MD noted vaginal pessary , causing ureteral obstruction, recommended removal , no SOB at rest      PAST MEDICAL & SURGICAL HISTORY:  HTN (hypertension)      Hypothyroidism      Osteoporosis      CAD (coronary artery disease)  Sarcoidosis   COPD   Chronic respiratory failure on home 02           OBJECTIVE:  ICU Vital Signs Last 24 Hrs  T(C): 37.2 (09 Feb 2024 16:00), Max: 37.2 (09 Feb 2024 16:00)  T(F): 99 (09 Feb 2024 16:00), Max: 99 (09 Feb 2024 16:00)  HR: 75 (09 Feb 2024 16:00) (68 - 75)  BP: 147/75 (09 Feb 2024 16:00) (129/78 - 157/67)  BP(mean): --  ABP: --  ABP(mean): --  RR: 16 (09 Feb 2024 16:00) (16 - 20)  SpO2: 94% (09 Feb 2024 16:00) (93% - 95%)    O2 Parameters below as of 09 Feb 2024 16:00  Patient On (Oxygen Delivery Method): room air            02-08 @ 07:01  -  02-09 @ 07:00  --------------------------------------------------------  IN: 0 mL / OUT: 100 mL / NET: -100 mL    02-09 @ 07:01  -  02-09 @ 21:00  --------------------------------------------------------  IN: 1045 mL / OUT: 950 mL / NET: 95 mL      PHYSICAL EXAM:Daily Height in cm: 157.48 (08 Feb 2024 12:42)  Elderly morbid obese   female in no distress   Daily   HEENT:     + NCAT  + EOMI  - Conjuctival edema   - Icterus   - Thrush   - ETT  - NGT/OGT  Neck:         + FROM    + JVD     - Nodes     - Masses    + Mid-line trachea   - Tracheostomy  Chest:       kyphosis , implanted loop reordered   Lungs:          + CTA   - Rhonchi    +Rales    - Wheezing  +Decreased BS   - Dullness R L  Cardiac:       + S1 + S2    + RRR   - Irregular   - S3  - S4    - Murmurs   - Rub   - Hamman’s sign   Abdomen:    + BS     + Soft    + tender LLquadrant noted  Organomegaly  - PEG  Extremities:   - Cyanosis U/L   - Clubbing  U/L  - LE/UE Edema   + Capillary refill    + Pulses   Neuro:        + Awake   +  Alert   - Confused   - Lethargic   - Sedated   - Generalized Weakness  Skin:        - Rashes    - Erythema   + Normal incisions   + IV sites intact  - Sacral decubitus    LABS:                 HOSPITAL MEDICATIONS: All mediciations reviewed and analyzed  MEDICATIONS  (STANDING):  albuterol    90 MICROgram(s) HFA Inhaler 2 Puff(s) Inhalation every 6 hours  aspirin enteric coated 81 milliGRAM(s) Oral daily  atorvastatin 20 milliGRAM(s) Oral at bedtime  cyclopentolate 1% Solution 1 Drop(s) Left EYE two times a day  FLUoxetine 20 milliGRAM(s) Oral daily  heparin   Injectable 5000 Unit(s) SubCutaneous every 12 hours  influenza  Vaccine (HIGH DOSE) 0.7 milliLiter(s) IntraMuscular once  mirtazapine 7.5 milliGRAM(s) Oral daily  piperacillin/tazobactam IVPB.. 3.375 Gram(s) IV Intermittent every 8 hours  prednisoLONE acetate 1% Suspension 1 Drop(s) Left EYE two times a day  sodium chloride 0.9%. 1000 milliLiter(s) (60 mL/Hr) IV Continuous <Continuous>    MEDICATIONS  (PRN):  acetaminophen     Tablet .. 650 milliGRAM(s) Oral every 6 hours PRN Mild Pain (1 - 3)  temazepam 30 milliGRAM(s) Oral at bedtime PRN Insomnia    LABS: All Lab data reviewed and analyzed                        12.7   11.69 )-----------( 149      ( 12 Feb 2024 18:52 )             39.5   02-12    144  |  106  |  13  ----------------------------<  153<H>  3.4<L>   |  22  |  0.90    Ca    9.0      12 Feb 2024 18:52      Ca    8.9      10 Feb 2024 06:55      Ca    9.0      09 Feb 2024 06:39    TPro  7.4  /  Alb  4.5  /  TBili  0.4  /  DBili  x   /  AST  16  /  ALT  12  /  AlkPhos  191<H>  02-08     LIVER FUNCTIONS - ( 08 Feb 2024 14:17 )  Alb: 4.5 g/dL / Pro: 7.4 g/dL / ALK PHOS: 191 U/L / ALT: 12 U/L / AST: 16 U/L / GGT: x           RADIOLOGY: - Reviewed and analyzed

## 2024-02-13 NOTE — PROGRESS NOTE ADULT - SUBJECTIVE AND OBJECTIVE BOX
Subjective  Patient seen and examined. Resting comfortably.     Objective    Vital signs  T(F): , Max: 98.4 (02-12-24 @ 13:42)  HR: 87 (02-13-24 @ 05:05)  BP: 152/72 (02-13-24 @ 05:15)  SpO2: 93% (02-13-24 @ 05:05)  Wt(kg): --    Output     OUT:    Voided (mL): 650 mL  Total OUT: 650 mL    Total NET: -650 mL    Gen: NAD, A+Ox3  Abd: Soft, NT/ND  Back: no CVAT bilaterally  Ext: No edema present b/l    Labs      02-12 @ 18:52    WBC 11.69 / Hct 39.5  / SCr 0.90         Culture - Blood (collected 02-08-24 @ 16:40)  Source: .Blood Blood-Peripheral  Preliminary Report (02-12-24 @ 23:01):    No growth at 4 days    Culture - Blood (collected 02-08-24 @ 16:30)  Source: .Blood Blood-Peripheral  Preliminary Report (02-12-24 @ 23:01):    No growth at 4 days    Culture - Urine (collected 02-08-24 @ 15:03)  Source: Clean Catch Clean Catch (Midstream)  Final Report (02-10-24 @ 00:27):    >=3 organisms. Probable collection contamination.

## 2024-02-13 NOTE — CONSULT NOTE ADULT - ASSESSMENT
Pt is 88y who presented with vomiting and was found to have diverticulitis on Zosyn with improved vomiting. Incidentally found to have asymptomatic L hydronephrosis and L hydroureter potentially obstructed by ring pessary. Urogynecology consulted for pessary evaluation and removal as well as prolapse management.    - Pessary removal recommended to pt. Risk for permanent kidney damage and infection explained, pessary removed  - OP Urogynecology follow up for pessary management    D/w Dr. Alec Dodd PGY1    ***Note Incomplete*** Pt is 88y who presented with vomiting and was found to have diverticulitis on Zosyn with improved vomiting. Incidentally found to have asymptomatic L hydronephrosis and L hydroureter potentially obstructed by ring pessary. Urogynecology consulted for pessary evaluation and removal as well as prolapse management.    - Pessary removal recommended to pt. Risk for permanent kidney damage and infection explained, pessary removed  - OP Urogynecology follow up for pessary management    D/w Dr. Alec Dodd PGY1    ----  Urogyn Fellow Addendum    Patient seen and examined.  Reports that her GYN in [place] formerly managed her pessary where it would be removed once per year.  Last pessary removal was approximately one year ago.  Patient denies flank pain, dysuria.    T(C): 36.7 (02-13-24 @ 17:09), Max: 37.5 (02-13-24 @ 13:47)  HR: 100 (02-13-24 @ 17:09) (79 - 107)  BP: 167/84 (02-13-24 @ 17:09) (152/72 - 188/92)  RR: 19 (02-13-24 @ 17:09) (18 - 19)  SpO2: 91% (02-13-24 @ 17:09) (86% - 94%)    Physical Exam  Gen: NAD, thin appearing  HEENT: NCAT, sclera anicteric  Resp: non-labored  Abd: soft, non-distended, non-tender    88F admitted with diverticulitis found to have incidental severe L hydroureter caused by potentia; obstructed by ring pessary. Urogyn consulted for pessary removal.  -Pessary removed at bedside.  -Pt to follow up with urogyn as an outpatient for further management of prolapse  -Renal imaging per urology to reassess hydroureter after pessary removal  -Trend Cr  -appreciate urology and gyn recs    Sola Michael MD  Urogynecology Fellow, PGY-6

## 2024-02-14 LAB
ANION GAP SERPL CALC-SCNC: 14 MMOL/L — SIGNIFICANT CHANGE UP (ref 5–17)
BUN SERPL-MCNC: 17 MG/DL — SIGNIFICANT CHANGE UP (ref 7–23)
CALCIUM SERPL-MCNC: 9.4 MG/DL — SIGNIFICANT CHANGE UP (ref 8.4–10.5)
CHLORIDE SERPL-SCNC: 105 MMOL/L — SIGNIFICANT CHANGE UP (ref 96–108)
CO2 SERPL-SCNC: 24 MMOL/L — SIGNIFICANT CHANGE UP (ref 22–31)
CREAT SERPL-MCNC: 0.98 MG/DL — SIGNIFICANT CHANGE UP (ref 0.5–1.3)
EGFR: 56 ML/MIN/1.73M2 — LOW
GLUCOSE SERPL-MCNC: 100 MG/DL — HIGH (ref 70–99)
HCT VFR BLD CALC: 39.4 % — SIGNIFICANT CHANGE UP (ref 34.5–45)
HGB BLD-MCNC: 12.5 G/DL — SIGNIFICANT CHANGE UP (ref 11.5–15.5)
MCHC RBC-ENTMCNC: 29.6 PG — SIGNIFICANT CHANGE UP (ref 27–34)
MCHC RBC-ENTMCNC: 31.7 GM/DL — LOW (ref 32–36)
MCV RBC AUTO: 93.1 FL — SIGNIFICANT CHANGE UP (ref 80–100)
NRBC # BLD: 0 /100 WBCS — SIGNIFICANT CHANGE UP (ref 0–0)
PLATELET # BLD AUTO: 158 K/UL — SIGNIFICANT CHANGE UP (ref 150–400)
POTASSIUM SERPL-MCNC: 3.3 MMOL/L — LOW (ref 3.5–5.3)
POTASSIUM SERPL-SCNC: 3.3 MMOL/L — LOW (ref 3.5–5.3)
RBC # BLD: 4.23 M/UL — SIGNIFICANT CHANGE UP (ref 3.8–5.2)
RBC # FLD: 14.4 % — SIGNIFICANT CHANGE UP (ref 10.3–14.5)
SODIUM SERPL-SCNC: 143 MMOL/L — SIGNIFICANT CHANGE UP (ref 135–145)
WBC # BLD: 10.6 K/UL — HIGH (ref 3.8–10.5)
WBC # FLD AUTO: 10.6 K/UL — HIGH (ref 3.8–10.5)

## 2024-02-14 RX ORDER — POTASSIUM CHLORIDE 20 MEQ
20 PACKET (EA) ORAL ONCE
Refills: 0 | Status: COMPLETED | OUTPATIENT
Start: 2024-02-14 | End: 2024-02-19

## 2024-02-14 RX ADMIN — Medication 81 MILLIGRAM(S): at 18:15

## 2024-02-14 RX ADMIN — NEBIVOLOL HYDROCHLORIDE 2.5 MILLIGRAM(S): 5 TABLET ORAL at 05:23

## 2024-02-14 RX ADMIN — Medication 20 MILLIGRAM(S): at 18:16

## 2024-02-14 RX ADMIN — ALBUTEROL 2 PUFF(S): 90 AEROSOL, METERED ORAL at 05:23

## 2024-02-14 RX ADMIN — MIRTAZAPINE 7.5 MILLIGRAM(S): 45 TABLET, ORALLY DISINTEGRATING ORAL at 18:17

## 2024-02-14 RX ADMIN — Medication 650 MILLIGRAM(S): at 12:10

## 2024-02-14 RX ADMIN — TEMAZEPAM 30 MILLIGRAM(S): 15 CAPSULE ORAL at 21:33

## 2024-02-14 RX ADMIN — PIPERACILLIN AND TAZOBACTAM 25 GRAM(S): 4; .5 INJECTION, POWDER, LYOPHILIZED, FOR SOLUTION INTRAVENOUS at 17:32

## 2024-02-14 RX ADMIN — PIPERACILLIN AND TAZOBACTAM 25 GRAM(S): 4; .5 INJECTION, POWDER, LYOPHILIZED, FOR SOLUTION INTRAVENOUS at 09:11

## 2024-02-14 RX ADMIN — Medication 1 DROP(S): at 18:17

## 2024-02-14 RX ADMIN — Medication 650 MILLIGRAM(S): at 17:31

## 2024-02-14 RX ADMIN — CYCLOPENTOLATE HYDROCHLORIDE 1 DROP(S): 10 SOLUTION/ DROPS OPHTHALMIC at 05:23

## 2024-02-14 RX ADMIN — PIPERACILLIN AND TAZOBACTAM 25 GRAM(S): 4; .5 INJECTION, POWDER, LYOPHILIZED, FOR SOLUTION INTRAVENOUS at 01:57

## 2024-02-14 RX ADMIN — ATORVASTATIN CALCIUM 20 MILLIGRAM(S): 80 TABLET, FILM COATED ORAL at 21:32

## 2024-02-14 RX ADMIN — ALBUTEROL 2 PUFF(S): 90 AEROSOL, METERED ORAL at 01:57

## 2024-02-14 RX ADMIN — Medication 1 DROP(S): at 05:23

## 2024-02-14 NOTE — PROGRESS NOTE ADULT - ASSESSMENT
88 F        h/o   CAD,   pulmonary sarcoidosis, COPD (not on home O2), HTN,       BIBEMS from [facility] for evaluation of abdominal pain, constipation, vomiting x 4 days   .  pt  is   forgetful/  aide aide at bedside, patient has chronic constipation      has no history of abdominal surgeries      *  admitted  with  abd pain        Ct  a/p.  diverticulitis ,  sigmoid  colon, severe  L hydroureter/  mod  L   hydronephrosis                comp fx  L  4.  vaginal  pessary              on iv  zosyn    CAD    HTN    COPD,  /  pulm sarcoidosis    renal  u/s,  mild  hydro   dvt ppx/  s/q  heparin   on iv zosyn       rad< from: CT Abdomen and Pelvis No Cont (02.08.24 @ 18:01) >  BONES: Left hip ORIF. Lumbar levoscoliosis. Degenerative changes of the   spine. Age indeterminate compression deformity of L4. Grade 1   anterolisthesis of L4 on L5 and L5 on S1.  IMPRESSION:  Thickening of the sigmoid colon with mild inflammatory changes adjacent   to multiple colonic diverticula suggestive of acute diverticulitis.   Severe left hydroureter and moderate left hydronephrosis of uncertain   etiology.  --- End of Report ---       88 F        h/o   CAD,   pulmonary sarcoidosis, COPD (not on home O2), HTN,       BIBEMS from [facility] for evaluation of abdominal pain, constipation, vomiting x 4 days   .  pt  is   forgetful/  aide aide at bedside, patient has chronic constipation      has no history of abdominal surgeries      *  admitted  with  abd pain        Ct  a/p.  diverticulitis ,  sigmoid  colon, severe  L hydroureter/  mod  L   hydronephrosis    comp fx  L  4.  vaginal  pessary           on iv  zosyn    CAD    HTN    COPD?   pulm sarcoidosis    renal  u/s,  mild  hydro    pessary removed  by gyn  at bedside/  and now, pt  is  upset  about this   dvt ppx/  s/q  heparin   on iv zosyn       rad< from: CT Abdomen and Pelvis No Cont (02.08.24 @ 18:01) >  BONES: Left hip ORIF. Lumbar levoscoliosis. Degenerative changes of the   spine. Age indeterminate compression deformity of L4. Grade 1   anterolisthesis of L4 on L5 and L5 on S1.  IMPRESSION:  Thickening of the sigmoid colon with mild inflammatory changes adjacent   to multiple colonic diverticula suggestive of acute diverticulitis.   Severe left hydroureter and moderate left hydronephrosis of uncertain   etiology.  --- End of Report ---

## 2024-02-14 NOTE — PROGRESS NOTE ADULT - SUBJECTIVE AND OBJECTIVE BOX
BISHNU SEGURA  MRN#: [id number]  Subjective:   pulmonary progress note  : sarcoidosis ,COPD , Chronic respiratory failure on home 02    date of service is 2/14/2024   88 F        h/o   CAD,   pulmonary sarcoidosis, COPD (not on home O2), HTN,       BIBEMS from [facility] for evaluation of abdominal pain, constipation, vomiting x 4 days, no hematemesis no fever no aspiration or SOB , no diarrhea    .  pt  is   forgetful,. had  vomiting this morning    p er aide at bedside, patient has chronic constipation CT scan of the abdomen shows sever hydroureter,  moderate left hydronephrosis , Diverticulitis sigmoid colon , vaginal pessary complete L 4 fracture      has no history of abdominal surgeries S/P implanted Loop recorder  (08 Feb 2024 19:29), Id consultation appreciated  started on Zosyn , no new C/O no fever no more vomiting no SOB , acceptable 02 saturation , condition is the same no new events BP is not well controlled , 02 saturation is 93% at rest no SOB or chest pain reported , ID follow up appreciated continue ABX , no fever no SOB or respiratory distress acceptable 02 saturation , on Zosyn for Diverticulitis , OBG, MD noted vaginal pessary , causing ureteral obstruction, recommended removal , no SOB at rest , S/P pessary removal at bed side to follow yp with urology ,continue on Antibiotics , no SOB oe desaturation      PAST MEDICAL & SURGICAL HISTORY:  HTN (hypertension)      Hypothyroidism      Osteoporosis      CAD (coronary artery disease)  Sarcoidosis   COPD   Chronic respiratory failure on home 02           OBJECTIVE:  ICU Vital Signs Last 24 Hrs  T(C): 37.2 (09 Feb 2024 16:00), Max: 37.2 (09 Feb 2024 16:00)  T(F): 99 (09 Feb 2024 16:00), Max: 99 (09 Feb 2024 16:00)  HR: 75 (09 Feb 2024 16:00) (68 - 75)  BP: 147/75 (09 Feb 2024 16:00) (129/78 - 157/67)  BP(mean): --  ABP: --  ABP(mean): --  RR: 16 (09 Feb 2024 16:00) (16 - 20)  SpO2: 94% (09 Feb 2024 16:00) (93% - 95%)    O2 Parameters below as of 09 Feb 2024 16:00  Patient On (Oxygen Delivery Method): room air            02-08 @ 07:01  -  02-09 @ 07:00  --------------------------------------------------------  IN: 0 mL / OUT: 100 mL / NET: -100 mL    02-09 @ 07:01  - 02-09 @ 21:00  --------------------------------------------------------  IN: 1045 mL / OUT: 950 mL / NET: 95 mL      PHYSICAL EXAM:Daily Height in cm: 157.48 (08 Feb 2024 12:42)  Elderly morbid obese   female in no distress   Daily   HEENT:     + NCAT  + EOMI  - Conjuctival edema   - Icterus   - Thrush   - ETT  - NGT/OGT  Neck:         + FROM    + JVD     - Nodes     - Masses    + Mid-line trachea   - Tracheostomy  Chest:       kyphosis , implanted loop reordered   Lungs:          + CTA   - Rhonchi    +Rales    - Wheezing  +Decreased BS   - Dullness R L  Cardiac:       + S1 + S2    + RRR   - Irregular   - S3  - S4    - Murmurs   - Rub   - Hamman’s sign   Abdomen:    + BS     + Soft    + tender LLquadrant noted  Organomegaly  - PEG  Extremities:   - Cyanosis U/L   - Clubbing  U/L  - LE/UE Edema   + Capillary refill    + Pulses   Neuro:        + Awake   +  Alert   - Confused   - Lethargic   - Sedated   - Generalized Weakness  Skin:        - Rashes    - Erythema   + Normal incisions   + IV sites intact  - Sacral decubitus    LABS:                 HOSPITAL MEDICATIONS: All mediciations reviewed and analyzed  MEDICATIONS  (STANDING):  albuterol    90 MICROgram(s) HFA Inhaler 2 Puff(s) Inhalation every 6 hours  aspirin enteric coated 81 milliGRAM(s) Oral daily  atorvastatin 20 milliGRAM(s) Oral at bedtime  cyclopentolate 1% Solution 1 Drop(s) Left EYE two times a day  FLUoxetine 20 milliGRAM(s) Oral daily  heparin   Injectable 5000 Unit(s) SubCutaneous every 12 hours  influenza  Vaccine (HIGH DOSE) 0.7 milliLiter(s) IntraMuscular once  mirtazapine 7.5 milliGRAM(s) Oral daily  piperacillin/tazobactam IVPB.. 3.375 Gram(s) IV Intermittent every 8 hours  prednisoLONE acetate 1% Suspension 1 Drop(s) Left EYE two times a day  sodium chloride 0.9%. 1000 milliLiter(s) (60 mL/Hr) IV Continuous <Continuous>    MEDICATIONS  (PRN):  acetaminophen     Tablet .. 650 milliGRAM(s) Oral every 6 hours PRN Mild Pain (1 - 3)  temazepam 30 milliGRAM(s) Oral at bedtime PRN Insomnia    LABS: All Lab data reviewed and analyzed                        12.7 11.69 )-----------( 149      ( 12 Feb 2024 18:52 )             39.5   02-12    144  |  106  |  13  ----------------------------<  153<H>  3.4<L>   |  22  |  0.90    Ca    9.0      12 Feb 2024 18:52      Ca    8.9      10 Feb 2024 06:55      Ca    9.0      09 Feb 2024 06:39    TPro  7.4  /  Alb  4.5  /  TBili  0.4  /  DBili  x   /  AST  16  /  ALT  12  /  AlkPhos  191<H>  02-08     LIVER FUNCTIONS - ( 08 Feb 2024 14:17 )  Alb: 4.5 g/dL / Pro: 7.4 g/dL / ALK PHOS: 191 U/L / ALT: 12 U/L / AST: 16 U/L / GGT: x           RADIOLOGY: - Reviewed and analyzed

## 2024-02-14 NOTE — PROGRESS NOTE ADULT - ASSESSMENT
88 year old female with left hydronephrosis; admitted for abdominal pain, constipation. Patient has moderate/severe hydroureter to the level of the distal ureter without clear etiology. However on imaging patients pessary may be obstructing left ureter as the ureter is partially collapsed past the pessary and is in clear contact with distal ureter. Patient has no acute urgent indication for intervention including flank pain, infectious symptoms or CVAT    Plan  - patients hydronephrosis may be secondary to obstruction from pessary, can try to remove pessary and reimage with RBUS in 72hours to see if hydronephrosis improves after pessary removal as etiology of obstruction  - f/u gyn -- s/p pessary removal on 2/13  - Plan to see if hydronephrosis improves on interval imaging in 3 days  -No acute indication for intervention  -Trend Cr  -Recommend f/u after discharge with either Dr. Jordan Antonio or Dr. Yana Landrum to discuss treatment options for prolapse  - Discussed with Dr. Hoenig [facility]  [address] [place], [place] [id number] [phone]

## 2024-02-14 NOTE — PROGRESS NOTE ADULT - SUBJECTIVE AND OBJECTIVE BOX
date of service: 02-14-24 @ 09:57  afebrile  REVIEW OF SYSTEMS:  CONSTITUTIONAL: No fever,  no  weight loss  ENT:  No  tinnitus,   no   vertigo  NECK: No pain or stiffness  RESPIRATORY: No cough, wheezing, chills or hemoptysis;    No Shortness of Breath  CARDIOVASCULAR: No chest pain, palpitations, dizziness  GASTROINTESTINAL: No abdominal or epigastric pain. No nausea, vomiting, or hematemesis; No diarrhea  No melena or hematochezia.  GENITOURINARY: No dysuria, frequency, hematuria, or incontinence  NEUROLOGICAL: No headaches  SKIN: No itching,  no   rash  LYMPH Nodes: No enlarged glands  ENDOCRINE: No heat or cold intolerance  MUSCULOSKELETAL: No joint pain or swelling  PSYCHIATRIC: No depression, anxiety  HEME/LYMPH: No easy bruising, or bleeding gums  ALLERGY AND IMMUNOLOGIC: No hives or eczema	    MEDICATIONS  (STANDING):  albuterol    90 MICROgram(s) HFA Inhaler 2 Puff(s) Inhalation every 6 hours  aspirin enteric coated 81 milliGRAM(s) Oral daily  atorvastatin 20 milliGRAM(s) Oral at bedtime  cyclopentolate 1% Solution 1 Drop(s) Left EYE two times a day  FLUoxetine 20 milliGRAM(s) Oral daily  influenza  Vaccine (HIGH DOSE) 0.7 milliLiter(s) IntraMuscular once  mirtazapine 7.5 milliGRAM(s) Oral daily  nebivolol 2.5 milliGRAM(s) Oral daily  piperacillin/tazobactam IVPB.. 3.375 Gram(s) IV Intermittent every 8 hours  potassium chloride    Tablet ER 20 milliEquivalent(s) Oral once  prednisoLONE acetate 1% Suspension 1 Drop(s) Left EYE two times a day  sodium chloride 0.9%. 1000 milliLiter(s) (60 mL/Hr) IV Continuous <Continuous>    MEDICATIONS  (PRN):  acetaminophen     Tablet .. 650 milliGRAM(s) Oral every 6 hours PRN Mild Pain (1 - 3)  temazepam 30 milliGRAM(s) Oral at bedtime PRN Insomnia      Vital Signs Last 24 Hrs  T(C): 36.7 (14 Feb 2024 05:22), Max: 37.5 (13 Feb 2024 13:47)  T(F): 98.1 (14 Feb 2024 05:22), Max: 99.5 (13 Feb 2024 13:47)  HR: 79 (14 Feb 2024 05:22) (79 - 107)  BP: 181/84 (14 Feb 2024 05:22) (147/75 - 181/84)  BP(mean): --  RR: 18 (14 Feb 2024 05:22) (18 - 19)  SpO2: 92% (14 Feb 2024 05:22) (89% - 92%)    Parameters below as of 14 Feb 2024 05:22  Patient On (Oxygen Delivery Method): room air      CAPILLARY BLOOD GLUCOSE        I&O's Summary    13 Feb 2024 07:01  -  14 Feb 2024 07:00  --------------------------------------------------------  IN: 540 mL / OUT: 350 mL / NET: 190 mL          Appearance: Normal	  HEENT:   Normal oral mucosa, PERRL, EOMI	  Lymphatic: No lymphadenopathy  Cardiovascular: Normal S1 S2, No JVD  Respiratory: Lungs clear to auscultation	  Gastrointestinal:  Soft, Non-tender, + BS	  Skin: No rash, No ecchymoses	  Extremities:     LABS:                        12.5   10.60 )-----------( 158      ( 14 Feb 2024 07:21 )             39.4     02-14    143  |  105  |  17  ----------------------------<  100<H>  3.3<L>   |  24  |  0.98    Ca    9.4      14 Feb 2024 07:19            Urinalysis Basic - ( 14 Feb 2024 07:19 )    Color: x / Appearance: x / SG: x / pH: x  Gluc: 100 mg/dL / Ketone: x  / Bili: x / Urobili: x   Blood: x / Protein: x / Nitrite: x   Leuk Esterase: x / RBC: x / WBC x   Sq Epi: x / Non Sq Epi: x / Bacteria: x                      Consultant(s) Notes Reviewed:      Care Discussed with Consultants/Other Providers:

## 2024-02-14 NOTE — PROGRESS NOTE ADULT - SUBJECTIVE AND OBJECTIVE BOX
Subjective  Patient seen and examined. Resting comfortably.     Objective    Vital signs  T(F): , Max: 99.5 (02-13-24 @ 13:47)  HR: 79 (02-14-24 @ 05:22)  BP: 181/84 (02-14-24 @ 05:22)  SpO2: 92% (02-14-24 @ 05:22)  Wt(kg): --    Output     OUT:    Voided (mL): 650 mL  Total OUT: 650 mL    Total NET: -650 mL      OUT:    Voided (mL): 350 mL  Total OUT: 350 mL    Total NET: -350 mL    Gen: NAD, A+Ox3  Abd: Soft, NT/ND  Back: no CVAT bilaterally  Ext: No edema present b/l    Labs      02-12 @ 18:52    WBC 11.69 / Hct 39.5  / SCr 0.90         Culture - Blood (collected 02-08-24 @ 16:40)  Source: .Blood Blood-Peripheral  Final Report (02-13-24 @ 23:00):    No growth at 5 days    Culture - Blood (collected 02-08-24 @ 16:30)  Source: .Blood Blood-Peripheral  Final Report (02-13-24 @ 23:00):    No growth at 5 days    Culture - Urine (collected 02-08-24 @ 15:03)  Source: Clean Catch Clean Catch (Midstream)  Final Report (02-10-24 @ 00:27):    >=3 organisms. Probable collection contamination.        Urine Cx: ?  Blood Cx: ?    Imaging

## 2024-02-14 NOTE — PROGRESS NOTE ADULT - ASSESSMENT
Assessment and Plan:   Acute abdominal pain   Acute sigmoid diverticulitis on Zosyn   Left hydronephrosis  S/P pessary removal   H/O sarcoidosis on prednisone   COPD and chronic respiratory failure oh home 02    Continue Antibiotics as per ID   considered urology consultation   continue prednisone   continue nebulizer treatement PRN   PPI   DVT prophylaxis

## 2024-02-15 LAB
ANION GAP SERPL CALC-SCNC: 13 MMOL/L — SIGNIFICANT CHANGE UP (ref 5–17)
BUN SERPL-MCNC: 18 MG/DL — SIGNIFICANT CHANGE UP (ref 7–23)
CALCIUM SERPL-MCNC: 9 MG/DL — SIGNIFICANT CHANGE UP (ref 8.4–10.5)
CHLORIDE SERPL-SCNC: 107 MMOL/L — SIGNIFICANT CHANGE UP (ref 96–108)
CO2 SERPL-SCNC: 24 MMOL/L — SIGNIFICANT CHANGE UP (ref 22–31)
CREAT SERPL-MCNC: 0.94 MG/DL — SIGNIFICANT CHANGE UP (ref 0.5–1.3)
EGFR: 59 ML/MIN/1.73M2 — LOW
GLUCOSE SERPL-MCNC: 88 MG/DL — SIGNIFICANT CHANGE UP (ref 70–99)
HCT VFR BLD CALC: 36 % — SIGNIFICANT CHANGE UP (ref 34.5–45)
HGB BLD-MCNC: 11.4 G/DL — LOW (ref 11.5–15.5)
MCHC RBC-ENTMCNC: 29.8 PG — SIGNIFICANT CHANGE UP (ref 27–34)
MCHC RBC-ENTMCNC: 31.7 GM/DL — LOW (ref 32–36)
MCV RBC AUTO: 94.2 FL — SIGNIFICANT CHANGE UP (ref 80–100)
NRBC # BLD: 0 /100 WBCS — SIGNIFICANT CHANGE UP (ref 0–0)
PLATELET # BLD AUTO: 148 K/UL — LOW (ref 150–400)
POTASSIUM SERPL-MCNC: 3 MMOL/L — LOW (ref 3.5–5.3)
POTASSIUM SERPL-SCNC: 3 MMOL/L — LOW (ref 3.5–5.3)
RBC # BLD: 3.82 M/UL — SIGNIFICANT CHANGE UP (ref 3.8–5.2)
RBC # FLD: 14.5 % — SIGNIFICANT CHANGE UP (ref 10.3–14.5)
SODIUM SERPL-SCNC: 144 MMOL/L — SIGNIFICANT CHANGE UP (ref 135–145)
WBC # BLD: 8.86 K/UL — SIGNIFICANT CHANGE UP (ref 3.8–10.5)
WBC # FLD AUTO: 8.86 K/UL — SIGNIFICANT CHANGE UP (ref 3.8–10.5)

## 2024-02-15 PROCEDURE — 76770 US EXAM ABDO BACK WALL COMP: CPT | Mod: 26

## 2024-02-15 PROCEDURE — 99232 SBSQ HOSP IP/OBS MODERATE 35: CPT

## 2024-02-15 RX ORDER — APIXABAN 2.5 MG/1
2.5 TABLET, FILM COATED ORAL
Refills: 0 | Status: DISCONTINUED | OUTPATIENT
Start: 2024-02-15 | End: 2024-02-21

## 2024-02-15 RX ORDER — POTASSIUM CHLORIDE 20 MEQ
40 PACKET (EA) ORAL EVERY 4 HOURS
Refills: 0 | Status: COMPLETED | OUTPATIENT
Start: 2024-02-15 | End: 2024-02-15

## 2024-02-15 RX ORDER — KETOROLAC TROMETHAMINE 30 MG/ML
15 SYRINGE (ML) INJECTION ONCE
Refills: 0 | Status: DISCONTINUED | OUTPATIENT
Start: 2024-02-15 | End: 2024-02-15

## 2024-02-15 RX ORDER — TEMAZEPAM 15 MG/1
30 CAPSULE ORAL AT BEDTIME
Refills: 0 | Status: DISCONTINUED | OUTPATIENT
Start: 2024-02-15 | End: 2024-02-21

## 2024-02-15 RX ORDER — SACCHAROMYCES BOULARDII 250 MG
1 POWDER IN PACKET (EA) ORAL
Refills: 0 | DISCHARGE

## 2024-02-15 RX ORDER — IPRATROPIUM/ALBUTEROL SULFATE 18-103MCG
3 AEROSOL WITH ADAPTER (GRAM) INHALATION ONCE
Refills: 0 | Status: COMPLETED | OUTPATIENT
Start: 2024-02-15 | End: 2024-02-15

## 2024-02-15 RX ADMIN — Medication 1 DROP(S): at 17:16

## 2024-02-15 RX ADMIN — Medication 81 MILLIGRAM(S): at 11:15

## 2024-02-15 RX ADMIN — APIXABAN 2.5 MILLIGRAM(S): 2.5 TABLET, FILM COATED ORAL at 17:15

## 2024-02-15 RX ADMIN — ALBUTEROL 2 PUFF(S): 90 AEROSOL, METERED ORAL at 06:55

## 2024-02-15 RX ADMIN — CYCLOPENTOLATE HYDROCHLORIDE 1 DROP(S): 10 SOLUTION/ DROPS OPHTHALMIC at 06:53

## 2024-02-15 RX ADMIN — PIPERACILLIN AND TAZOBACTAM 25 GRAM(S): 4; .5 INJECTION, POWDER, LYOPHILIZED, FOR SOLUTION INTRAVENOUS at 00:18

## 2024-02-15 RX ADMIN — Medication 20 MILLIGRAM(S): at 11:16

## 2024-02-15 RX ADMIN — Medication 40 MILLIEQUIVALENT(S): at 11:15

## 2024-02-15 RX ADMIN — Medication 40 MILLIEQUIVALENT(S): at 14:59

## 2024-02-15 RX ADMIN — CYCLOPENTOLATE HYDROCHLORIDE 1 DROP(S): 10 SOLUTION/ DROPS OPHTHALMIC at 17:16

## 2024-02-15 RX ADMIN — MIRTAZAPINE 7.5 MILLIGRAM(S): 45 TABLET, ORALLY DISINTEGRATING ORAL at 11:15

## 2024-02-15 RX ADMIN — Medication 15 MILLIGRAM(S): at 00:12

## 2024-02-15 RX ADMIN — Medication 650 MILLIGRAM(S): at 21:29

## 2024-02-15 RX ADMIN — Medication 1 DROP(S): at 06:54

## 2024-02-15 RX ADMIN — ATORVASTATIN CALCIUM 20 MILLIGRAM(S): 80 TABLET, FILM COATED ORAL at 21:54

## 2024-02-15 RX ADMIN — Medication 650 MILLIGRAM(S): at 14:35

## 2024-02-15 RX ADMIN — NEBIVOLOL HYDROCHLORIDE 2.5 MILLIGRAM(S): 5 TABLET ORAL at 06:53

## 2024-02-15 RX ADMIN — Medication 650 MILLIGRAM(S): at 22:29

## 2024-02-15 RX ADMIN — TEMAZEPAM 30 MILLIGRAM(S): 15 CAPSULE ORAL at 22:20

## 2024-02-15 RX ADMIN — Medication 650 MILLIGRAM(S): at 13:53

## 2024-02-15 RX ADMIN — Medication 3 MILLILITER(S): at 21:53

## 2024-02-15 RX ADMIN — Medication 15 MILLIGRAM(S): at 01:12

## 2024-02-15 NOTE — PROGRESS NOTE ADULT - ASSESSMENT
88 year old female with left hydronephrosis; admitted for abdominal pain, constipation. Patient has moderate/severe hydroureter to the level of the distal ureter without clear etiology. However on imaging patients pessary may be obstructing left ureter as the ureter is partially collapsed past the pessary and is in clear contact with distal ureter. Patient has no acute urgent indication for intervention including flank pain, infectious symptoms or CVAT    Plan  - patients hydronephrosis may be secondary to obstruction from pessary, can try to remove pessary and reimage with RBUS in 72hours to see if hydronephrosis improves after pessary removal as etiology of obstruction  - f/u gyn -- s/p pessary removal on 2/13  - Plan to see if hydronephrosis improves on interval imaging today  -No acute indication for intervention  -Trend Cr  -Recommend f/u after discharge with either Dr. Jordan Antonio or Dr. Yana Landrum to discuss treatment options for prolapse  - Please inform urology if hydronephrosis not improved  - Please reconsult urology as needed  - Discussed with Dr. Hoenig [facility]  [address] [place] [id number] [phone]

## 2024-02-15 NOTE — PROGRESS NOTE ADULT - SUBJECTIVE AND OBJECTIVE BOX
infectious diseases progress note:    Patient is a 88y old  Female who presents with a chief complaint of abd  pain/  vomiting (14 Feb 2024 11:37)        Diverticulitis of intestine without perforation or abscess without bleeding           s    Allergies    iodinated radiocontrast agents (Unknown)    Intolerances        ANTIBIOTICS/RELEVANT:  antimicrobials    immunologic:  influenza  Vaccine (HIGH DOSE) 0.7 milliLiter(s) IntraMuscular once    OTHER:  acetaminophen     Tablet .. 650 milliGRAM(s) Oral every 6 hours PRN  albuterol    90 MICROgram(s) HFA Inhaler 2 Puff(s) Inhalation every 6 hours  aspirin enteric coated 81 milliGRAM(s) Oral daily  atorvastatin 20 milliGRAM(s) Oral at bedtime  cyclopentolate 1% Solution 1 Drop(s) Left EYE two times a day  FLUoxetine 20 milliGRAM(s) Oral daily  mirtazapine 7.5 milliGRAM(s) Oral daily  nebivolol 2.5 milliGRAM(s) Oral daily  potassium chloride    Tablet ER 20 milliEquivalent(s) Oral once  potassium chloride    Tablet ER 40 milliEquivalent(s) Oral every 4 hours  prednisoLONE acetate 1% Suspension 1 Drop(s) Left EYE two times a day  sodium chloride 0.9%. 1000 milliLiter(s) IV Continuous <Continuous>  temazepam 30 milliGRAM(s) Oral at bedtime PRN      Objective:  Vital Signs Last 24 Hrs  T(C): 36.3 (15 Feb 2024 05:56), Max: 37.3 (14 Feb 2024 13:00)  T(F): 97.4 (15 Feb 2024 05:56), Max: 99.1 (14 Feb 2024 13:00)  HR: 67 (15 Feb 2024 05:56) (67 - 94)  BP: 153/73 (15 Feb 2024 05:56) (153/73 - 166/69)  BP(mean): --  RR: 18 (15 Feb 2024 05:56) (17 - 18)  SpO2: 91% (15 Feb 2024 05:56) (91% - 96%)    Parameters below as of 15 Feb 2024 05:56  Patient On (Oxygen Delivery Method): nasal cannula  O2 Flow (L/min): 2         Eyes:JIM, EOMI  Ear/Nose/Throat: no oral lesion, no sinus tenderness on percussion	  Neck:no JVD, no lymphadenopathy, supple  Respiratory: CTA moises  Cardiovascular: S1S2 RRR, no murmurs  Gastrointestinal:soft, (+) BS, no HSM  Extremities:no e/e/c        LABS:                        11.4   8.86  )-----------( 148      ( 15 Feb 2024 06:57 )             36.0     02-15    144  |  107  |  18  ----------------------------<  88  3.0<L>   |  24  |  0.94    Ca    9.0      15 Feb 2024 06:54        Urinalysis Basic - ( 15 Feb 2024 06:54 )    Color: x / Appearance: x / SG: x / pH: x  Gluc: 88 mg/dL / Ketone: x  / Bili: x / Urobili: x   Blood: x / Protein: x / Nitrite: x   Leuk Esterase: x / RBC: x / WBC x   Sq Epi: x / Non Sq Epi: x / Bacteria: x          MICROBIOLOGY:    RECENT CULTURES:  02-08 @ 16:40 .Blood Blood-Peripheral                No growth at 5 days    02-08 @ 16:30 .Blood Blood-Peripheral                No growth at 5 days    02-08 @ 15:03 Clean Catch Clean Catch (Midstream)                >=3 organisms. Probable collection contamination.          RESPIRATORY CULTURES:              RADIOLOGY & ADDITIONAL STUDIES:        Pager [phone]  After 5 pm/weekends or if no response :[phone]

## 2024-02-15 NOTE — PHARMACOTHERAPY INTERVENTION NOTE - COMMENTS
Medication Reconciliation completed for patient.  These were confirmed with patient's facility.  Updated medications are reflected in Outpatient Medication Review.     Home Medications:  acetaminophen 500 mg oral tablet: 2 tab(s) orally 2 times a day for pain   aspirin 81 mg oral delayed release tablet: 1 tab(s) orally once a day   atorvastatin 20 mg oral tablet: 1 tab(s) orally once a day   cyclopentolate 1% ophthalmic solution: 1 drop(s) in the left eye 2 times a day  D3 25 mcg (1000 intl units) oral tablet: 1 tab(s) orally once a day   ferrous sulfate 325 mg (65 mg elemental iron) oral tablet: 1 tab(s) orally once a day   FLUoxetine 20 mg oral capsule: 1 cap(s) orally once a day   mirtazapine 7.5 mg oral tablet: 1 tab(s) orally once a day (at bedtime)  nebivolol 2.5 mg oral tablet: 1 tab(s) orally once a day   potassium chloride 20 mEq oral tablet, extended release: 1 tab(s) orally once a day   prednisoLONE acetate 1% ophthalmic suspension: 1 drop(s) in the left eye 2 times a day  temazepam 30 mg oral capsule: 1 cap(s) orally once a day (at bedtime)   traZODone 50 mg oral tablet: 1 tab(s) orally once a day   triamterene-hydrochlorothiazide 37.5 mg-25 mg oral capsule: 1 cap(s) orally once a day     Kim Leavitt, PharmD, BCPS  Clinical Pharmacy Specialist  Available on Teams

## 2024-02-15 NOTE — PROGRESS NOTE ADULT - ASSESSMENT
88 year old with CAD  COPD  presented with abd pain and CT suggestive of diverticulitis  but not abscess  pt is tender  but no rebound or diffuse peritoneal signs   pt is very high risk surgical candidate    Continue zosyn for diverticulitis  COPD management per medicine and pulmonary  No drainable collection  Marked Left dilated renal pelvis + U/A, contaminated UC/S  she is very frail    Looks well, normalized WBC, no fever,  eating, abdomen benign.  U     no sings or symptoms  of ongoing diverticulitis so will stop antibiotics to  the risk of clostridia diff.

## 2024-02-15 NOTE — PROGRESS NOTE ADULT - SUBJECTIVE AND OBJECTIVE BOX
Subjective  Patient seen and examined. Resting comfortably.     Objective    Vital signs  T(F): , Max: 99.1 (02-14-24 @ 13:00)  HR: 67 (02-15-24 @ 05:56)  BP: 153/73 (02-15-24 @ 05:56)  SpO2: 91% (02-15-24 @ 05:56)  Wt(kg): --    Output     OUT:    Voided (mL): 200 mL  Total OUT: 200 mL    Total NET: -200 mL      Gen: NAD, A+Ox3  Abd: Soft, NT/ND  Back: no CVAT bilaterally  Ext: No edema present b/l      Labs      02-15 @ 06:57    WBC 8.86  / Hct 36.0  / SCr --       02-15 @ 06:54    WBC --    / Hct --    / SCr 0.94         Culture - Blood (collected 02-08-24 @ 16:40)  Source: .Blood Blood-Peripheral  Final Report (02-13-24 @ 23:00):    No growth at 5 days    Culture - Blood (collected 02-08-24 @ 16:30)  Source: .Blood Blood-Peripheral  Final Report (02-13-24 @ 23:00):    No growth at 5 days    Culture - Urine (collected 02-08-24 @ 15:03)  Source: Clean Catch Clean Catch (Midstream)  Final Report (02-10-24 @ 00:27):    >=3 organisms. Probable collection contamination.        Urine Cx: ?  Blood Cx: ?    Imaging

## 2024-02-15 NOTE — PROGRESS NOTE ADULT - SUBJECTIVE AND OBJECTIVE BOX
BISHNU SEGURA  MRN#: [id number]  Subjective:   pulmonary progress note  : sarcoidosis ,COPD , Chronic respiratory failure on home 02    date of service is 2/15/2024   88 F        h/o   CAD,   pulmonary sarcoidosis, COPD (not on home O2), HTN,       BIBEMS from [facility] for evaluation of abdominal pain, constipation, vomiting x 4 days, no hematemesis no fever no aspiration or SOB , no diarrhea    .  pt  is   forgetful,. had  vomiting this morning    p er aide at bedside, patient has chronic constipation CT scan of the abdomen shows sever hydroureter,  moderate left hydronephrosis , Diverticulitis sigmoid colon , vaginal pessary complete L 4 fracture      has no history of abdominal surgeries S/P implanted Loop recorder  (08 Feb 2024 19:29), Id consultation appreciated  started on Zosyn , no new C/O no fever no more vomiting no SOB , acceptable 02 saturation , condition is the same no new events BP is not well controlled , 02 saturation is 93% at rest no SOB or chest pain reported , ID follow up appreciated continue ABX , no fever no SOB or respiratory distress acceptable 02 saturation , on Zosyn for Diverticulitis , OBG, MD noted vaginal pessary , causing ureteral obstruction, recommended removal , no SOB at rest , S/P pessary removal at bed side to follow yp with urology ,continue on Antibiotics , no SOB oe desaturation  , comfortable no SOB or new C/O on Antibiotics     PAST MEDICAL & SURGICAL HISTORY:  HTN (hypertension)      Hypothyroidism      Osteoporosis      CAD (coronary artery disease)  Sarcoidosis   COPD   Chronic respiratory failure on home 02           OBJECTIVE:  ICU Vital Signs Last 24 Hrs  T(C): 37.2 (09 Feb 2024 16:00), Max: 37.2 (09 Feb 2024 16:00)  T(F): 99 (09 Feb 2024 16:00), Max: 99 (09 Feb 2024 16:00)  HR: 75 (09 Feb 2024 16:00) (68 - 75)  BP: 147/75 (09 Feb 2024 16:00) (129/78 - 157/67)  BP(mean): --  ABP: --  ABP(mean): --  RR: 16 (09 Feb 2024 16:00) (16 - 20)  SpO2: 94% (09 Feb 2024 16:00) (93% - 95%)    O2 Parameters below as of 09 Feb 2024 16:00  Patient On (Oxygen Delivery Method): room air            02-08 @ 07:01 - 02-09 @ 07:00  --------------------------------------------------------  IN: 0 mL / OUT: 100 mL / NET: -100 mL    02-09 @ 07:01 - 02-09 @ 21:00  --------------------------------------------------------  IN: 1045 mL / OUT: 950 mL / NET: 95 mL      PHYSICAL EXAM:Daily Height in cm: 157.48 (08 Feb 2024 12:42)  Elderly morbid obese   female in no distress   Daily   HEENT:     + NCAT  + EOMI  - Conjuctival edema   - Icterus   - Thrush   - ETT  - NGT/OGT  Neck:         + FROM    + JVD     - Nodes     - Masses    + Mid-line trachea   - Tracheostomy  Chest:       kyphosis , implanted loop reordered   Lungs:          + CTA   - Rhonchi    +Rales    - Wheezing  +Decreased BS   - Dullness R L  Cardiac:       + S1 + S2    + RRR   - Irregular   - S3  - S4    - Murmurs   - Rub   - Hamman’s sign   Abdomen:    + BS     + Soft    + tender LLquadrant noted  Organomegaly  - PEG  Extremities:   - Cyanosis U/L   - Clubbing  U/L  - LE/UE Edema   + Capillary refill    + Pulses   Neuro:        + Awake   +  Alert   - Confused   - Lethargic   - Sedated   - Generalized Weakness  Skin:        - Rashes    - Erythema   + Normal incisions   + IV sites intact  - Sacral decubitus    LABS:                 HOSPITAL MEDICATIONS: All mediciations reviewed and analyzed  MEDICATIONS  (STANDING):  albuterol    90 MICROgram(s) HFA Inhaler 2 Puff(s) Inhalation every 6 hours  aspirin enteric coated 81 milliGRAM(s) Oral daily  atorvastatin 20 milliGRAM(s) Oral at bedtime  cyclopentolate 1% Solution 1 Drop(s) Left EYE two times a day  FLUoxetine 20 milliGRAM(s) Oral daily  heparin   Injectable 5000 Unit(s) SubCutaneous every 12 hours  influenza  Vaccine (HIGH DOSE) 0.7 milliLiter(s) IntraMuscular once  mirtazapine 7.5 milliGRAM(s) Oral daily  piperacillin/tazobactam IVPB.. 3.375 Gram(s) IV Intermittent every 8 hours  prednisoLONE acetate 1% Suspension 1 Drop(s) Left EYE two times a day  sodium chloride 0.9%. 1000 milliLiter(s) (60 mL/Hr) IV Continuous <Continuous>    MEDICATIONS  (PRN):  acetaminophen     Tablet .. 650 milliGRAM(s) Oral every 6 hours PRN Mild Pain (1 - 3)  temazepam 30 milliGRAM(s) Oral at bedtime PRN Insomnia    LABS: All Lab data reviewed and analyzed                          11.4   8.86  )-----------( 148      ( 15 Feb 2024 06:57 )             36.0   02-15    144  |  107  |  18  ----------------------------<  88  3.0<L>   |  24  |  0.94    Ca    9.0      15 Feb 2024 06:54      Ca    9.0      12 Feb 2024 18:52      Ca    8.9      10 Feb 2024 06:55      Ca    9.0      09 Feb 2024 06:39    TPro  7.4  /  Alb  4.5  /  TBili  0.4  /  DBili  x   /  AST  16  /  ALT  12  /  AlkPhos  191<H>  02-08     LIVER FUNCTIONS - ( 08 Feb 2024 14:17 )  Alb: 4.5 g/dL / Pro: 7.4 g/dL / ALK PHOS: 191 U/L / ALT: 12 U/L / AST: 16 U/L / GGT: x           RADIOLOGY: - Reviewed and analyzed

## 2024-02-15 NOTE — PROGRESS NOTE ADULT - SUBJECTIVE AND OBJECTIVE BOX
---___---___---___---___---___---___ ---___---___---___---___---___---___---___---___---                  M E D I C A L   A T T E N D I N G   P R O G R E S S   N O T E  ---___---___---___---___---___---___ ---___---___---___---___---___---___---___---___---        ================================================    ++CHIEF COMPLAINT:   Patient is a 88y old  Female who presents with a chief complaint of abd  pain/  vomiting (15 Feb 2024 13:06)      Diverticulitis of intestine without perforation or abscess without bleeding      ---___---___---___---___---___---  PAST MEDICAL / Surgical  HISTORY:  PAST MEDICAL & SURGICAL HISTORY:  HTN (hypertension)      Hypothyroidism      Osteoporosis      CAD (coronary artery disease)          ---___---___---___---___---___---  FAMILY HISTORY:   FAMILY HISTORY:        ---___---___---___---___---___---  ALLERGIES:   Allergies    iodinated radiocontrast agents (Unknown)    Intolerances        ---___---___---___---___---___---  MEDICATIONS:  MEDICATIONS  (STANDING):  albuterol    90 MICROgram(s) HFA Inhaler 2 Puff(s) Inhalation every 6 hours  apixaban 2.5 milliGRAM(s) Oral two times a day  aspirin enteric coated 81 milliGRAM(s) Oral daily  atorvastatin 20 milliGRAM(s) Oral at bedtime  cyclopentolate 1% Solution 1 Drop(s) Left EYE two times a day  FLUoxetine 20 milliGRAM(s) Oral daily  influenza  Vaccine (HIGH DOSE) 0.7 milliLiter(s) IntraMuscular once  mirtazapine 7.5 milliGRAM(s) Oral daily  nebivolol 2.5 milliGRAM(s) Oral daily  potassium chloride    Tablet ER 20 milliEquivalent(s) Oral once  prednisoLONE acetate 1% Suspension 1 Drop(s) Left EYE two times a day  sodium chloride 0.9%. 1000 milliLiter(s) (60 mL/Hr) IV Continuous <Continuous>    MEDICATIONS  (PRN):  acetaminophen     Tablet .. 650 milliGRAM(s) Oral every 6 hours PRN Mild Pain (1 - 3)  temazepam 30 milliGRAM(s) Oral at bedtime PRN Insomnia      ---___---___---___---___---___---  REVIEW OF SYSTEM:    GEN: no fever, no chills, no pain  RESP: no SOB, no cough, no sputum  CVS: no chest pain, no palpitations, no edema  GI: no abdominal pain, no nausea, no vomiting, no constipation, no diarrhea  : no dysurea, no frequency, no hematurea  Neuro: no headache, no dizziness  PSYCH: no anxiety, no depression  Derm : no itching, no rash    ---___---___---___---___---___---  VITAL SIGNS:  88y , CAPILLARY BLOOD GLUCOSE        T(C): 36.7 (02-15-24 @ 12:56), Max: 36.7 (02-15-24 @ 12:56)  HR: 76 (02-15-24 @ 12:56) (67 - 76)  BP: 163/91 (02-15-24 @ 12:56) (153/73 - 163/91)  RR: 18 (02-15-24 @ 12:56) (18 - 18)  SpO2: 91% (02-15-24 @ 12:56) (91% - 91%)  ---___---___---___---___---___---  PHYSICAL EXAM:    GEN: A&O X 3 , NAD , comfortable  HEENT: NCAT, PERRL, MMM, hearing intact  Neck: supple , no JVD  CVS: S1S2 , regular , No M/R/G appreciated  PULM: CTA B/L,  no W/R/R appreciated  ABD.: soft. non tender, non distended,  bowel sounds present  Extrem: intact pulses , no edema   Derm: No rash , no ecchymoses  PSYCH : normal mood,  no delusion not anxious     ---___---___---___---___---___---            LAB AND IMAGIN.4   8.86  )-----------( 148      ( 15 Feb 2024 06:57 )             36.0               02-15    144  |  107  |  18  ----------------------------<  88  3.0<L>   |  24  |  0.94    Ca    9.0      15 Feb 2024 06:54                              Urinalysis Basic - ( 15 Feb 2024 06:54 )    Color: x / Appearance: x / SG: x / pH: x  Gluc: 88 mg/dL / Ketone: x  / Bili: x / Urobili: x   Blood: x / Protein: x / Nitrite: x   Leuk Esterase: x / RBC: x / WBC x   Sq Epi: x / Non Sq Epi: x / Bacteria: x        [All pertinent / recent Imaging reviewed]         ---___---___---___---___---___---___ ---___---___---___---___---                         A S S E S S M E N T   A N D   P L A N :      HEALTH ISSUES - PROBLEM Dx:    ureteral stenosis  renal usd  pending   gyn and urology  consult appreciated   thrombocytopenia off heparin   htn  continue meds  hld  continue meds  -GI/DVT Prophylaxis. as ordered  START DC PLANNING    ___________________________  Thank you,  Garry Hermosillo  [phone]

## 2024-02-16 LAB
ANION GAP SERPL CALC-SCNC: 11 MMOL/L — SIGNIFICANT CHANGE UP (ref 5–17)
APPEARANCE UR: ABNORMAL
BILIRUB UR-MCNC: NEGATIVE — SIGNIFICANT CHANGE UP
BUN SERPL-MCNC: 16 MG/DL — SIGNIFICANT CHANGE UP (ref 7–23)
CALCIUM SERPL-MCNC: 9.1 MG/DL — SIGNIFICANT CHANGE UP (ref 8.4–10.5)
CHLORIDE SERPL-SCNC: 114 MMOL/L — HIGH (ref 96–108)
CO2 SERPL-SCNC: 22 MMOL/L — SIGNIFICANT CHANGE UP (ref 22–31)
COLOR SPEC: ABNORMAL
CREAT SERPL-MCNC: 0.67 MG/DL — SIGNIFICANT CHANGE UP (ref 0.5–1.3)
DIFF PNL FLD: ABNORMAL
EGFR: 85 ML/MIN/1.73M2 — SIGNIFICANT CHANGE UP
GLUCOSE SERPL-MCNC: 93 MG/DL — SIGNIFICANT CHANGE UP (ref 70–99)
GLUCOSE UR QL: NEGATIVE MG/DL — SIGNIFICANT CHANGE UP
HCT VFR BLD CALC: 36.9 % — SIGNIFICANT CHANGE UP (ref 34.5–45)
HGB BLD-MCNC: 11.2 G/DL — LOW (ref 11.5–15.5)
KETONES UR-MCNC: NEGATIVE MG/DL — SIGNIFICANT CHANGE UP
LEUKOCYTE ESTERASE UR-ACNC: ABNORMAL
MCHC RBC-ENTMCNC: 29.4 PG — SIGNIFICANT CHANGE UP (ref 27–34)
MCHC RBC-ENTMCNC: 30.4 GM/DL — LOW (ref 32–36)
MCV RBC AUTO: 96.9 FL — SIGNIFICANT CHANGE UP (ref 80–100)
NITRITE UR-MCNC: NEGATIVE — SIGNIFICANT CHANGE UP
NRBC # BLD: 0 /100 WBCS — SIGNIFICANT CHANGE UP (ref 0–0)
NT-PROBNP SERPL-SCNC: HIGH PG/ML (ref 0–300)
PH UR: 5.5 — SIGNIFICANT CHANGE UP (ref 5–8)
PLATELET # BLD AUTO: 159 K/UL — SIGNIFICANT CHANGE UP (ref 150–400)
POTASSIUM SERPL-MCNC: 4.1 MMOL/L — SIGNIFICANT CHANGE UP (ref 3.5–5.3)
POTASSIUM SERPL-SCNC: 4.1 MMOL/L — SIGNIFICANT CHANGE UP (ref 3.5–5.3)
PROT UR-MCNC: 100 MG/DL
RBC # BLD: 3.81 M/UL — SIGNIFICANT CHANGE UP (ref 3.8–5.2)
RBC # FLD: 14.6 % — HIGH (ref 10.3–14.5)
SODIUM SERPL-SCNC: 147 MMOL/L — HIGH (ref 135–145)
SP GR SPEC: 1.02 — SIGNIFICANT CHANGE UP (ref 1–1.03)
UROBILINOGEN FLD QL: 0.2 MG/DL — SIGNIFICANT CHANGE UP (ref 0.2–1)
WBC # BLD: 8.08 K/UL — SIGNIFICANT CHANGE UP (ref 3.8–10.5)
WBC # FLD AUTO: 8.08 K/UL — SIGNIFICANT CHANGE UP (ref 3.8–10.5)

## 2024-02-16 PROCEDURE — 71045 X-RAY EXAM CHEST 1 VIEW: CPT | Mod: 26

## 2024-02-16 PROCEDURE — 99233 SBSQ HOSP IP/OBS HIGH 50: CPT

## 2024-02-16 RX ORDER — IPRATROPIUM/ALBUTEROL SULFATE 18-103MCG
3 AEROSOL WITH ADAPTER (GRAM) INHALATION EVERY 6 HOURS
Refills: 0 | Status: DISCONTINUED | OUTPATIENT
Start: 2024-02-16 | End: 2024-02-21

## 2024-02-16 RX ORDER — TRIAMTERENE/HYDROCHLOROTHIAZID 75 MG-50MG
1 TABLET ORAL DAILY
Refills: 0 | Status: DISCONTINUED | OUTPATIENT
Start: 2024-02-16 | End: 2024-02-21

## 2024-02-16 RX ORDER — FUROSEMIDE 40 MG
20 TABLET ORAL ONCE
Refills: 0 | Status: COMPLETED | OUTPATIENT
Start: 2024-02-16 | End: 2024-02-16

## 2024-02-16 RX ADMIN — CYCLOPENTOLATE HYDROCHLORIDE 1 DROP(S): 10 SOLUTION/ DROPS OPHTHALMIC at 17:17

## 2024-02-16 RX ADMIN — Medication 650 MILLIGRAM(S): at 22:41

## 2024-02-16 RX ADMIN — Medication 1 DROP(S): at 17:18

## 2024-02-16 RX ADMIN — Medication 650 MILLIGRAM(S): at 16:30

## 2024-02-16 RX ADMIN — APIXABAN 2.5 MILLIGRAM(S): 2.5 TABLET, FILM COATED ORAL at 06:24

## 2024-02-16 RX ADMIN — ATORVASTATIN CALCIUM 20 MILLIGRAM(S): 80 TABLET, FILM COATED ORAL at 21:15

## 2024-02-16 RX ADMIN — NEBIVOLOL HYDROCHLORIDE 2.5 MILLIGRAM(S): 5 TABLET ORAL at 06:24

## 2024-02-16 RX ADMIN — Medication 20 MILLIGRAM(S): at 11:19

## 2024-02-16 RX ADMIN — MIRTAZAPINE 7.5 MILLIGRAM(S): 45 TABLET, ORALLY DISINTEGRATING ORAL at 11:19

## 2024-02-16 RX ADMIN — Medication 650 MILLIGRAM(S): at 15:32

## 2024-02-16 RX ADMIN — ALBUTEROL 2 PUFF(S): 90 AEROSOL, METERED ORAL at 11:19

## 2024-02-16 RX ADMIN — Medication 650 MILLIGRAM(S): at 07:49

## 2024-02-16 RX ADMIN — Medication 1 TABLET(S): at 14:00

## 2024-02-16 RX ADMIN — Medication 3 MILLILITER(S): at 17:17

## 2024-02-16 RX ADMIN — CYCLOPENTOLATE HYDROCHLORIDE 1 DROP(S): 10 SOLUTION/ DROPS OPHTHALMIC at 06:23

## 2024-02-16 RX ADMIN — Medication 20 MILLIGRAM(S): at 15:32

## 2024-02-16 RX ADMIN — TEMAZEPAM 30 MILLIGRAM(S): 15 CAPSULE ORAL at 21:19

## 2024-02-16 RX ADMIN — Medication 81 MILLIGRAM(S): at 11:19

## 2024-02-16 RX ADMIN — APIXABAN 2.5 MILLIGRAM(S): 2.5 TABLET, FILM COATED ORAL at 17:17

## 2024-02-16 RX ADMIN — Medication 1 DROP(S): at 06:23

## 2024-02-16 RX ADMIN — Medication 650 MILLIGRAM(S): at 08:30

## 2024-02-16 RX ADMIN — ALBUTEROL 2 PUFF(S): 90 AEROSOL, METERED ORAL at 06:22

## 2024-02-16 RX ADMIN — Medication 650 MILLIGRAM(S): at 21:41

## 2024-02-16 NOTE — CHART NOTE - NSCHARTNOTEFT_GEN_A_CORE
Brief Urogyn Fellow Note    Patient underwent RBUS on 2/15 (~72 hours after pessary removal) which continues to demonstrate unchanged left hydroureter, suggesting hydroureter is not secondary to ring with support pessary.  Spoke to patient's daughter Sandra today re follow up.  Recommend further workup of hydroureter per urology.  Patient to follow up with urogyn as an outpatient for further management of prolapse.  [place] office number provided to patient's daughter who verbalized understanding.  All questions answered.    Sola Michael MD  Urogynecology Fellow, PGY-7

## 2024-02-16 NOTE — CONSULT NOTE ADULT - REASON FOR ADMISSION
abd  pain/  vomiting

## 2024-02-16 NOTE — CONSULT NOTE ADULT - ASSESSMENT
88 F with PMH CAD, pulmonary sarcoidosis, COPD (not on home O2), HTN, BIBEMS from [facility] for evaluation of abdominal pain, constipation, vomiting x 4 days.  Patient is unreliable historian and forgetful, answers questions with brief responses.  Has no complaints and denies abdominal pain or constipation, reporting stool output today morning that appeared normal.  Reports 1 episode of vomiting this morning appearing food colored, denies nausea at present time.  Per aide at bedside, patient has chronic constipation but has been straining more than usual over the past 3-4 days and history from nauseated with intermittent retching, only vomiting this morning.  Per further daughter over the phone, there is concern for possible bowel obstruction.  Patient has no history of abdominal surgeries.  pt with hx of htn, sarcoidosis admitted with abdominal pain/ chest cw chf  continue triamterene HCTZ  TTE  dvt prophylaxis  check pro bnp  will consider ct chest no contrast  may add ACE/ARB for increase bp  will adjust cardiac meds

## 2024-02-16 NOTE — PROGRESS NOTE ADULT - ASSESSMENT
Assessment and Plan:   acute congestive heart failure   acute hypertensive urgency   respond to Lasix   2 Echocardiogram  BNP   daily BMP for hypernatremia    Acute abdominal pain   Acute sigmoid diverticulitis on Zosyn   Left hydronephrosis  S/P pessary removal   H/O sarcoidosis on prednisone   COPD and chronic respiratory failure oh home 02    Continue Antibiotics as per ID   considered urology consultation   continue prednisone   continue nebulizer treatement PRN   PPI   DVT prophylaxis   care discussed with floor BNP  care was discussed with daughter in the [facility]   critical care time was approximately 90 minutes

## 2024-02-16 NOTE — CONSULT NOTE ADULT - SUBJECTIVE AND OBJECTIVE BOX
Date of Service, 02-16-24 @ 17:09  CHIEF COMPLAINT:Patient is a 88y old  Female who presents with a chief complaint of abd  pain/  vomiting (16 Feb 2024 15:31)      HPI:  88 F with PMH CAD, pulmonary sarcoidosis, COPD (not on home O2), HTN, BIBEMS from [facility] for evaluation of abdominal pain, constipation, vomiting x 4 days.  Patient is unreliable historian and forgetful, answers questions with brief responses.  Has no complaints and denies abdominal pain or constipation, reporting stool output today morning that appeared normal.  Reports 1 episode of vomiting this morning appearing food colored, denies nausea at present time.  Per aide at bedside, patient has chronic constipation but has been straining more than usual over the past 3-4 days and history from nauseated with intermittent retching, only vomiting this morning.  Per further daughter over the phone, there is concern for possible bowel obstruction.  Patient has no history of abdominal surgeries.      PAST MEDICAL & SURGICAL HISTORY:  HTN (hypertension)  Hypothyroidism  Osteoporosis  CAD (coronary artery disease)          MEDICATIONS  (STANDING):  albuterol/ipratropium for Nebulization 3 milliLiter(s) Nebulizer every 6 hours  apixaban 2.5 milliGRAM(s) Oral two times a day  aspirin enteric coated 81 milliGRAM(s) Oral daily  atorvastatin 20 milliGRAM(s) Oral at bedtime  cyclopentolate 1% Solution 1 Drop(s) Left EYE two times a day  FLUoxetine 20 milliGRAM(s) Oral daily  influenza  Vaccine (HIGH DOSE) 0.7 milliLiter(s) IntraMuscular once  mirtazapine 7.5 milliGRAM(s) Oral daily  nebivolol 2.5 milliGRAM(s) Oral daily  potassium chloride    Tablet ER 20 milliEquivalent(s) Oral once  prednisoLONE acetate 1% Suspension 1 Drop(s) Left EYE two times a day  triamterene 37.5 mG/hydrochlorothiazide 25 mG Tablet 1 Tablet(s) Oral daily    MEDICATIONS  (PRN):  acetaminophen     Tablet .. 650 milliGRAM(s) Oral every 6 hours PRN Mild Pain (1 - 3)  temazepam 30 milliGRAM(s) Oral at bedtime PRN Insomnia      FAMILY HISTORY:      SOCIAL HISTORY:    [ ] Non-smoker  [ ] Smoker  [ ] Alcohol    Allergies    iodinated radiocontrast agents (Unknown)    Intolerances    	    REVIEW OF SYSTEMS:  CONSTITUTIONAL: No fever, weight loss, or fatigue  EYES: No eye pain, visual disturbances, or discharge  ENT:  No difficulty hearing, tinnitus, vertigo; No sinus or throat pain  NECK: No pain or stiffness  RESPIRATORY: No cough, wheezing, chills or hemoptysis; + Shortness of Breath  CARDIOVASCULAR: No chest pain, palpitations, passing out, dizziness, + leg swelling  GASTROINTESTINAL: No abdominal or epigastric pain. No nausea, vomiting, or hematemesis; No diarrhea or constipation. No melena or hematochezia.  GENITOURINARY: No dysuria, frequency, hematuria, or incontinence  NEUROLOGICAL: No headaches, memory loss, loss of strength, numbness, or tremors  SKIN: No itching, burning, rashes, or lesions   LYMPH Nodes: No enlarged glands  ENDOCRINE: No heat or cold intolerance; No hair loss  MUSCULOSKELETAL: No joint pain or swelling; No muscle, back, or extremity pain  PSYCHIATRIC: No depression, anxiety, mood swings, or difficulty sleeping  HEME/LYMPH: No easy bruising, or bleeding gums  ALLERGY AND IMMUNOLOGIC: No hives or eczema	    [ ] All others negative	  [x ] Unable to obtain    PHYSICAL EXAM:  T(C): 36.4 (02-16-24 @ 05:12), Max: 36.8 (02-15-24 @ 20:50)  HR: 75 (02-16-24 @ 05:12) (75 - 95)  BP: 145/82 (02-16-24 @ 05:12) (145/82 - 180/96)  RR: 18 (02-16-24 @ 05:12) (18 - 18)  SpO2: 97% (02-16-24 @ 05:12) (92% - 97%)  Wt(kg): --  I&O's Summary    15 Feb 2024 07:01  -  16 Feb 2024 07:00  --------------------------------------------------------  IN: 480 mL / OUT: 400 mL / NET: 80 mL        Appearance: Normal	  HEENT:   Normal oral mucosa, PERRL, EOMI	  Lymphatic: No lymphadenopathy  Cardiovascular: Normal S1 S2, No JVD,+ murmurs, No edema  Respiratory: rhonchi	  Psychiatry: A & O x 3, Mood & affect appropriate  Gastrointestinal:  Soft, Non-tender, + BS	  Skin: No rashes, No ecchymoses, No cyanosis	  Extremities: Normal range of motion, No clubbing, cyanosis or edema  Vascular: Peripheral pulses palpable 2+ bilaterally    TELEMETRY: 	    ECG:  	  RADIOLOGY:  OTHER: 	  	  LABS:	 	    CARDIAC MARKERS:                              11.2   8.08  )-----------( 159      ( 16 Feb 2024 07:29 )             36.9     02-16    147<H>  |  114<H>  |  16  ----------------------------<  93  4.1   |  22  |  0.67    Ca    9.1      16 Feb 2024 07:30      proBNP:   Lipid Profile:   HgA1c:   TSH:       PREVIOUS DIAGNOSTIC TESTING:    < from: 12 Lead ECG (02.08.24 @ 14:16) >  Diagnosis Line NORMAL SINUS RHYTHM  LEFT AXIS DEVIATION  ANTEROSEPTAL INFARCT (CITED ON OR BEFORE 22-MAR-2023)  ABNORMAL ECG  WHEN COMPARED WITH ECG OF 09-MAY-2023 01:32,  SIGNIFICANT CHANGES HAVE OCCURRED    < from: Xray Chest 1 View- PORTABLE-Urgent (Xray Chest 1 View- PORTABLE-Urgent .) (02.16.24 @ 10:06) >  IMPRESSION:  Increased pulmonary vascular congestion.  Bibasilar atelectasis. Trace bilateral effusions.    < from: CT Abdomen and Pelvis No Cont (02.08.24 @ 18:01) >  Thickening of the sigmoid colon with mild inflammatory changes adjacent   to multiple colonic diverticula suggestive of acute diverticulitis.   Severe left hydroureter and moderate left hydronephrosis of uncertain   etiology.    < end of copied text >  < from: CT Abdomen and Pelvis No Cont (02.08.24 @ 18:01) >  Thickening of the sigmoid colon with mild inflammatory changes adjacent   to multiple colonic diverticula suggestive of acute diverticulitis.   Severe left hydroureter and moderate left hydronephrosis of uncertain   etiology.

## 2024-02-16 NOTE — PROGRESS NOTE ADULT - SUBJECTIVE AND OBJECTIVE BOX
infectious diseases progress note:    Patient is a 88y old  Female who presents with a chief complaint of abd  pain/  vomiting (2024 14:25)        Diverticulitis of intestine without perforation or abscess without bleeding           Allergies    iodinated radiocontrast agents (Unknown)    Intolerances        ANTIBIOTICS/RELEVANT:  antimicrobials    immunologic:  influenza  Vaccine (HIGH DOSE) 0.7 milliLiter(s) IntraMuscular once    OTHER:  acetaminophen     Tablet .. 650 milliGRAM(s) Oral every 6 hours PRN  albuterol/ipratropium for Nebulization 3 milliLiter(s) Nebulizer every 6 hours  apixaban 2.5 milliGRAM(s) Oral two times a day  aspirin enteric coated 81 milliGRAM(s) Oral daily  atorvastatin 20 milliGRAM(s) Oral at bedtime  cyclopentolate 1% Solution 1 Drop(s) Left EYE two times a day  FLUoxetine 20 milliGRAM(s) Oral daily  furosemide   Injectable 20 milliGRAM(s) IV Push once  mirtazapine 7.5 milliGRAM(s) Oral daily  nebivolol 2.5 milliGRAM(s) Oral daily  potassium chloride    Tablet ER 20 milliEquivalent(s) Oral once  prednisoLONE acetate 1% Suspension 1 Drop(s) Left EYE two times a day  temazepam 30 milliGRAM(s) Oral at bedtime PRN  triamterene 37.5 mG/hydrochlorothiazide 25 mG Tablet 1 Tablet(s) Oral daily      Objective:  Vital Signs Last 24 Hrs  T(C): 36.4 (2024 05:12), Max: 36.8 (15 Feb 2024 20:50)  T(F): 97.5 (2024 05:12), Max: 98.2 (15 Feb 2024 20:50)  HR: 75 (2024 05:12) (75 - 95)  BP: 145/82 (2024 05:12) (145/82 - 180/96)  BP(mean): --  RR: 18 (2024 05:12) (18 - 18)  SpO2: 97% (2024 05:12) (92% - 97%)    Parameters below as of 2024 05:12  Patient On (Oxygen Delivery Method): nasal cannula  O2 Flow (L/min): 2         Eyes:JIM, EOMI  Ear/Nose/Throat: no oral lesion, no sinus tenderness on percussion	  Neck:no JVD, no lymphadenopathy, supple  Respiratory: CTA moises  Cardiovascular: S1S2 RRR, no murmurs  Gastrointestinal:soft, (+) BS, no HSM  Extremities:no e/e/c        LABS:                        11.2   8.08  )-----------( 159      ( 2024 07:29 )             36.9     02-16    147<H>  |  114<H>  |  16  ----------------------------<  93  4.1   |  22  |  0.67    Ca    9.1      2024 07:30        Urinalysis Basic - ( 2024 13:54 )    Color: Orange / Appearance: Turbid / S.017 / pH: x  Gluc: x / Ketone: Negative mg/dL  / Bili: Negative / Urobili: 0.2 mg/dL   Blood: x / Protein: 100 mg/dL / Nitrite: Negative   Leuk Esterase: Large / RBC: >1900 /HPF / WBC >998 /HPF   Sq Epi: x / Non Sq Epi: 2 /HPF / Bacteria: Occasional /HPF          MICROBIOLOGY:    RECENT CULTURES:        RESPIRATORY CULTURES:              RADIOLOGY & ADDITIONAL STUDIES:        Pager [phone]  After 5 pm/weekends or if no response :[phone]

## 2024-02-16 NOTE — PROGRESS NOTE ADULT - SUBJECTIVE AND OBJECTIVE BOX
BISHNU SEGURA  MRN#: [id number]  Subjective:   pulmonary progress note  : sarcoidosis ,COPD , Chronic respiratory failure on home 02    date of service is 2/16/2024   88 F        h/o   CAD,   pulmonary sarcoidosis, COPD (not on home O2), HTN,       BIBEMS from [facility] for evaluation of abdominal pain, constipation, vomiting x 4 days, no hematemesis no fever no aspiration or SOB , no diarrhea    .  pt  is   forgetful,. had  vomiting this morning    p er aide at bedside, patient has chronic constipation CT scan of the abdomen shows sever hydroureter,  moderate left hydronephrosis , Diverticulitis sigmoid colon , vaginal pessary complete L 4 fracture      has no history of abdominal surgeries S/P implanted Loop recorder  (08 Feb 2024 19:29), Id consultation appreciated  started on Zosyn , no new C/O no fever no more vomiting no SOB , acceptable 02 saturation , condition is the same no new events BP is not well controlled , 02 saturation is 93% at rest no SOB or chest pain reported , ID follow up appreciated continue ABX , no fever no SOB or respiratory distress acceptable 02 saturation , on Zosyn for Diverticulitis , OBG, MD noted vaginal pessary , causing ureteral obstruction, recommended removal , no SOB at rest , S/P pessary removal at bed side to follow yp with urology ,continue on Antibiotics , no SOB oe desaturation . called several time by the floor NP , for SOB and desaturation patient was in acute hypoxic respiratory failure , patient Blood pressure was 190/ 103, tachycardia , chest x ray shoe acute CHF given Lasix with good response , BP is improving < BNP is pending   , as well @ echocardiogram     PAST MEDICAL & SURGICAL HISTORY:  HTN (hypertension)      Hypothyroidism      Osteoporosis      CAD (coronary artery disease)  Sarcoidosis   COPD   Chronic respiratory failure on home 02           OBJECTIVE:  ICU Vital Signs Last 24 Hrs  T(C): 37.2 (09 Feb 2024 16:00), Max: 37.2 (09 Feb 2024 16:00)  T(F): 99 (09 Feb 2024 16:00), Max: 99 (09 Feb 2024 16:00)  HR: 75 (09 Feb 2024 16:00) (68 - 75)  BP: 147/75 (09 Feb 2024 16:00) (129/78 - 157/67)  BP(mean): --  ABP: --  ABP(mean): --  RR: 16 (09 Feb 2024 16:00) (16 - 20)  SpO2: 94% (09 Feb 2024 16:00) (93% - 95%)    O2 Parameters below as of 09 Feb 2024 16:00  Patient On (Oxygen Delivery Method): room air            02-08 @ 07:01  -  02-09 @ 07:00  --------------------------------------------------------  IN: 0 mL / OUT: 100 mL / NET: -100 mL    02-09 @ 07:01  -  02-09 @ 21:00  --------------------------------------------------------  IN: 1045 mL / OUT: 950 mL / NET: 95 mL      PHYSICAL EXAM:Daily Height in cm: 157.48 (08 Feb 2024 12:42)  Elderly morbid obese   female on 02 4 liter 02 saturation is 94% , very anxious female   Daily   HEENT:     + NCAT  + EOMI  - Conjuctival edema   - Icterus   - Thrush   - ETT  - NGT/OGT  Neck:         + FROM    + JVD     - Nodes     - Masses    + Mid-line trachea   - Tracheostomy  Chest:       kyphosis , implanted loop reordered   Lungs:          + CTA   - Rhonchi    +Rales    - Wheezing  +Decreased BS   - Dullness R L  Cardiac:       + S1 + S2    + RRR   - Irregular   - S3  - S4    - Murmurs   - Rub   - Hamman’s sign   Abdomen:    + BS     + Soft    + tender LLquadrant noted  Organomegaly  - PEG  Extremities:   - Cyanosis U/L   - Clubbing  U/L  - LE/UE Edema   + Capillary refill    + Pulses   Neuro:        + Awake   +  Alert   - Confused   - Lethargic   - Sedated   - Generalized Weakness  Skin:        - Rashes    - Erythema   + Normal incisions   + IV sites intact  - Sacral decubitus    LABS:                 HOSPITAL MEDICATIONS: All mediciations reviewed and analyzed  MEDICATIONS  (STANDING):  albuterol    90 MICROgram(s) HFA Inhaler 2 Puff(s) Inhalation every 6 hours  aspirin enteric coated 81 milliGRAM(s) Oral daily  atorvastatin 20 milliGRAM(s) Oral at bedtime  cyclopentolate 1% Solution 1 Drop(s) Left EYE two times a day  FLUoxetine 20 milliGRAM(s) Oral daily  heparin   Injectable 5000 Unit(s) SubCutaneous every 12 hours  influenza  Vaccine (HIGH DOSE) 0.7 milliLiter(s) IntraMuscular once  mirtazapine 7.5 milliGRAM(s) Oral daily  piperacillin/tazobactam IVPB.. 3.375 Gram(s) IV Intermittent every 8 hours  prednisoLONE acetate 1% Suspension 1 Drop(s) Left EYE two times a day  sodium chloride 0.9%. 1000 milliLiter(s) (60 mL/Hr) IV Continuous <Continuous>    MEDICATIONS  (PRN):  acetaminophen     Tablet .. 650 milliGRAM(s) Oral every 6 hours PRN Mild Pain (1 - 3)  temazepam 30 milliGRAM(s) Oral at bedtime PRN Insomnia    LABS: All Lab data reviewed and analyzed                          11.2   8.08  )-----------( 159      ( 16 Feb 2024 07:29 )             36.9    02-16    147<H>  |  114<H>  |  16  ----------------------------<  93  4.1   |  22  |  0.67    Ca    9.1      16 Feb 2024 07:30      Ca    9.0      12 Feb 2024 18:52        TPro  7.4  /  Alb  4.5  /  TBili  0.4  /  DBili  x   /  AST  16  /  ALT  12  /  AlkPhos  191<H>  02-08     LIVER FUNCTIONS - ( 08 Feb 2024 14:17 )  Alb: 4.5 g/dL / Pro: 7.4 g/dL / ALK PHOS: 191 U/L / ALT: 12 U/L / AST: 16 U/L / GGT: x           RADIOLOGY: - Reviewed and analyzed

## 2024-02-16 NOTE — PROGRESS NOTE ADULT - ASSESSMENT
88 year old with CAD  COPD    Diverticulitis   presented with abd pain and CT suggestive of diverticulitis  but not abscess  pt is tender  but no rebound or diffuse peritoneal signs   pt is very high risk surgical candidate   s/ p  zosyn for diverticulitis stopped yesterday      COPD management per medicine and pulmonary    U/A  Marked Left dilated renal pelvis + U/A, contaminated UC/S  with pyuria and hematuria after pessary removed  no dysuria or symptoms of UTI   Looks well, normalized WBC, no fever,  eating, abdomen benign.  Hold antibiotics unless signs of infection develop      Diarrhea  awaiting cdiff      Hypoxia  Appears to b e chf based on chest xray    Discussed with NP, daughter, and attending in detail  she wants to go home  if hypoxia improves hopefully she can go home  She is very frail and cachetic   prognosis poor

## 2024-02-17 LAB
ANION GAP SERPL CALC-SCNC: 13 MMOL/L — SIGNIFICANT CHANGE UP (ref 5–17)
BUN SERPL-MCNC: 16 MG/DL — SIGNIFICANT CHANGE UP (ref 7–23)
CALCIUM SERPL-MCNC: 9.5 MG/DL — SIGNIFICANT CHANGE UP (ref 8.4–10.5)
CHLORIDE SERPL-SCNC: 107 MMOL/L — SIGNIFICANT CHANGE UP (ref 96–108)
CO2 SERPL-SCNC: 24 MMOL/L — SIGNIFICANT CHANGE UP (ref 22–31)
CREAT SERPL-MCNC: 0.8 MG/DL — SIGNIFICANT CHANGE UP (ref 0.5–1.3)
EGFR: 71 ML/MIN/1.73M2 — SIGNIFICANT CHANGE UP
GI PCR PANEL: SIGNIFICANT CHANGE UP
GLUCOSE SERPL-MCNC: 86 MG/DL — SIGNIFICANT CHANGE UP (ref 70–99)
HCT VFR BLD CALC: 37.2 % — SIGNIFICANT CHANGE UP (ref 34.5–45)
HGB BLD-MCNC: 11.7 G/DL — SIGNIFICANT CHANGE UP (ref 11.5–15.5)
MAGNESIUM SERPL-MCNC: 2.2 MG/DL — SIGNIFICANT CHANGE UP (ref 1.6–2.6)
MCHC RBC-ENTMCNC: 29.5 PG — SIGNIFICANT CHANGE UP (ref 27–34)
MCHC RBC-ENTMCNC: 31.5 GM/DL — LOW (ref 32–36)
MCV RBC AUTO: 93.7 FL — SIGNIFICANT CHANGE UP (ref 80–100)
NRBC # BLD: 0 /100 WBCS — SIGNIFICANT CHANGE UP (ref 0–0)
PHOSPHATE SERPL-MCNC: 3.6 MG/DL — SIGNIFICANT CHANGE UP (ref 2.5–4.5)
PLATELET # BLD AUTO: 195 K/UL — SIGNIFICANT CHANGE UP (ref 150–400)
POTASSIUM SERPL-MCNC: 3.8 MMOL/L — SIGNIFICANT CHANGE UP (ref 3.5–5.3)
POTASSIUM SERPL-SCNC: 3.8 MMOL/L — SIGNIFICANT CHANGE UP (ref 3.5–5.3)
RBC # BLD: 3.97 M/UL — SIGNIFICANT CHANGE UP (ref 3.8–5.2)
RBC # FLD: 14.4 % — SIGNIFICANT CHANGE UP (ref 10.3–14.5)
SODIUM SERPL-SCNC: 144 MMOL/L — SIGNIFICANT CHANGE UP (ref 135–145)
WBC # BLD: 9.01 K/UL — SIGNIFICANT CHANGE UP (ref 3.8–10.5)
WBC # FLD AUTO: 9.01 K/UL — SIGNIFICANT CHANGE UP (ref 3.8–10.5)

## 2024-02-17 PROCEDURE — 71250 CT THORAX DX C-: CPT | Mod: 26

## 2024-02-17 RX ORDER — AMLODIPINE BESYLATE 2.5 MG/1
5 TABLET ORAL DAILY
Refills: 0 | Status: DISCONTINUED | OUTPATIENT
Start: 2024-02-17 | End: 2024-02-21

## 2024-02-17 RX ORDER — FUROSEMIDE 40 MG
20 TABLET ORAL ONCE
Refills: 0 | Status: COMPLETED | OUTPATIENT
Start: 2024-02-17 | End: 2024-02-17

## 2024-02-17 RX ORDER — NYSTATIN CREAM 100000 [USP'U]/G
1 CREAM TOPICAL
Refills: 0 | Status: DISCONTINUED | OUTPATIENT
Start: 2024-02-17 | End: 2024-02-21

## 2024-02-17 RX ADMIN — MIRTAZAPINE 7.5 MILLIGRAM(S): 45 TABLET, ORALLY DISINTEGRATING ORAL at 12:19

## 2024-02-17 RX ADMIN — AMLODIPINE BESYLATE 5 MILLIGRAM(S): 2.5 TABLET ORAL at 12:20

## 2024-02-17 RX ADMIN — Medication 1 TABLET(S): at 05:55

## 2024-02-17 RX ADMIN — Medication 3 MILLILITER(S): at 00:37

## 2024-02-17 RX ADMIN — CYCLOPENTOLATE HYDROCHLORIDE 1 DROP(S): 10 SOLUTION/ DROPS OPHTHALMIC at 05:55

## 2024-02-17 RX ADMIN — Medication 650 MILLIGRAM(S): at 17:15

## 2024-02-17 RX ADMIN — APIXABAN 2.5 MILLIGRAM(S): 2.5 TABLET, FILM COATED ORAL at 17:41

## 2024-02-17 RX ADMIN — Medication 650 MILLIGRAM(S): at 16:35

## 2024-02-17 RX ADMIN — Medication 3 MILLILITER(S): at 05:54

## 2024-02-17 RX ADMIN — Medication 81 MILLIGRAM(S): at 12:19

## 2024-02-17 RX ADMIN — Medication 1 DROP(S): at 05:55

## 2024-02-17 RX ADMIN — APIXABAN 2.5 MILLIGRAM(S): 2.5 TABLET, FILM COATED ORAL at 05:54

## 2024-02-17 RX ADMIN — Medication 650 MILLIGRAM(S): at 06:54

## 2024-02-17 RX ADMIN — Medication 20 MILLIGRAM(S): at 12:20

## 2024-02-17 RX ADMIN — NYSTATIN CREAM 1 APPLICATION(S): 100000 CREAM TOPICAL at 17:47

## 2024-02-17 RX ADMIN — Medication 20 MILLIGRAM(S): at 08:39

## 2024-02-17 RX ADMIN — NEBIVOLOL HYDROCHLORIDE 2.5 MILLIGRAM(S): 5 TABLET ORAL at 05:54

## 2024-02-17 RX ADMIN — Medication 1 DROP(S): at 17:41

## 2024-02-17 RX ADMIN — Medication 3 MILLILITER(S): at 12:19

## 2024-02-17 RX ADMIN — ATORVASTATIN CALCIUM 20 MILLIGRAM(S): 80 TABLET, FILM COATED ORAL at 21:07

## 2024-02-17 RX ADMIN — Medication 650 MILLIGRAM(S): at 05:54

## 2024-02-17 RX ADMIN — CYCLOPENTOLATE HYDROCHLORIDE 1 DROP(S): 10 SOLUTION/ DROPS OPHTHALMIC at 17:41

## 2024-02-17 RX ADMIN — Medication 3 MILLILITER(S): at 17:40

## 2024-02-17 RX ADMIN — TEMAZEPAM 30 MILLIGRAM(S): 15 CAPSULE ORAL at 22:12

## 2024-02-17 NOTE — PROGRESS NOTE ADULT - SUBJECTIVE AND OBJECTIVE BOX
BISHNU SEGURA  MRN#: [id number]  Subjective:   pulmonary progress note  : sarcoidosis ,COPD , Chronic respiratory failure on home 02    date of service is 2/17/2024   88 F        h/o   CAD,   pulmonary sarcoidosis, COPD (not on home O2), HTN,       BIBEMS from [facility] for evaluation of abdominal pain, constipation, vomiting x 4 days, no hematemesis no fever no aspiration or SOB , no diarrhea    .  pt  is   forgetful,. had  vomiting this morning    p er aide at bedside, patient has chronic constipation CT scan of the abdomen shows sever hydroureter,  moderate left hydronephrosis , Diverticulitis sigmoid colon , vaginal pessary complete L 4 fracture      has no history of abdominal surgeries S/P implanted Loop recorder  (08 Feb 2024 19:29), Id consultation appreciated  started on Zosyn , no new C/O no fever no more vomiting no SOB , acceptable 02 saturation , condition is the same no new events BP is not well controlled , 02 saturation is 93% at rest no SOB or chest pain reported , ID follow up appreciated continue ABX , no fever no SOB or respiratory distress acceptable 02 saturation , on Zosyn for Diverticulitis , OBG, MD noted vaginal pessary , causing ureteral obstruction, recommended removal , no SOB at rest , S/P pessary removal at bed side to follow yp with urology ,continue on Antibiotics , no SOB oe desaturation . called several time by the floor NP , for SOB and desaturation patient was in acute hypoxic respiratory failure , patient Blood pressure was 190/ 103, tachycardia , chest x ray shoe acute CHF given Lasix with good response , BP is improving < BNP is pending   , as well as echocardiogram , feeling better on 02 BNP is very elevated , Echo is still pending ,BP is better controlled     PAST MEDICAL & SURGICAL HISTORY:  HTN (hypertension)      Hypothyroidism      Osteoporosis      CAD (coronary artery disease)  Sarcoidosis   COPD   Chronic respiratory failure on home 02           OBJECTIVE:  ICU Vital Signs Last 24 Hrs  T(C): 37.2 (09 Feb 2024 16:00), Max: 37.2 (09 Feb 2024 16:00)  T(F): 99 (09 Feb 2024 16:00), Max: 99 (09 Feb 2024 16:00)  HR: 75 (09 Feb 2024 16:00) (68 - 75)  BP: 147/75 (09 Feb 2024 16:00) (129/78 - 157/67)  BP(mean): --  ABP: --  ABP(mean): --  RR: 16 (09 Feb 2024 16:00) (16 - 20)  SpO2: 94% (09 Feb 2024 16:00) (93% - 95%)    O2 Parameters below as of 09 Feb 2024 16:00  Patient On (Oxygen Delivery Method): room air            02-08 @ 07:01  -  02-09 @ 07:00  --------------------------------------------------------  IN: 0 mL / OUT: 100 mL / NET: -100 mL    02-09 @ 07:01  -  02-09 @ 21:00  --------------------------------------------------------  IN: 1045 mL / OUT: 950 mL / NET: 95 mL      PHYSICAL EXAM:Daily Height in cm: 157.48 (08 Feb 2024 12:42)  Elderly morbid obese   female on 02 4 liter 02 saturation is 94% , very anxious female   Daily   HEENT:     + NCAT  + EOMI  - Conjuctival edema   - Icterus   - Thrush   - ETT  - NGT/OGT  Neck:         + FROM    + JVD     - Nodes     - Masses    + Mid-line trachea   - Tracheostomy  Chest:       kyphosis , implanted loop reordered   Lungs:          + CTA   - Rhonchi    +Rales    - Wheezing  +Decreased BS   - Dullness R L  Cardiac:       + S1 + S2    + RRR   - Irregular   - S3  - S4    - Murmurs   - Rub   - Hamman’s sign   Abdomen:    + BS     + Soft    + tender LLquadrant noted  Organomegaly  - PEG  Extremities:   - Cyanosis U/L   - Clubbing  U/L  - LE/UE Edema   + Capillary refill    + Pulses   Neuro:        + Awake   +  Alert   - Confused   - Lethargic   - Sedated   - Generalized Weakness  Skin:        - Rashes    - Erythema   + Normal incisions   + IV sites intact  - Sacral decubitus    LABS:                 HOSPITAL MEDICATIONS: All mediciations reviewed and analyzed  MEDICATIONS  (STANDING):  albuterol    90 MICROgram(s) HFA Inhaler 2 Puff(s) Inhalation every 6 hours  aspirin enteric coated 81 milliGRAM(s) Oral daily  atorvastatin 20 milliGRAM(s) Oral at bedtime  cyclopentolate 1% Solution 1 Drop(s) Left EYE two times a day  FLUoxetine 20 milliGRAM(s) Oral daily  heparin   Injectable 5000 Unit(s) SubCutaneous every 12 hours  influenza  Vaccine (HIGH DOSE) 0.7 milliLiter(s) IntraMuscular once  mirtazapine 7.5 milliGRAM(s) Oral daily  piperacillin/tazobactam IVPB.. 3.375 Gram(s) IV Intermittent every 8 hours  prednisoLONE acetate 1% Suspension 1 Drop(s) Left EYE two times a day  sodium chloride 0.9%. 1000 milliLiter(s) (60 mL/Hr) IV Continuous <Continuous>    MEDICATIONS  (PRN):  acetaminophen     Tablet .. 650 milliGRAM(s) Oral every 6 hours PRN Mild Pain (1 - 3)  temazepam 30 milliGRAM(s) Oral at bedtime PRN Insomnia    LABS: All Lab data reviewed and analyzed                         11.7   9.01  )-----------( 195      ( 17 Feb 2024 06:57 )             37.2                 02-17    144  |  107  |  16  ----------------------------<  86  3.8   |  24  |  0.80    Ca    9.5      17 Feb 2024 06:57  Phos  3.6     02-17  Mg     2.2     02-17      Ca    9.1      16 Feb 2024 07:30      Ca    9.0      12 Feb 2024 18:52        TPro  7.4  /  Alb  4.5  /  TBili  0.4  /  DBili  x   /  AST  16  /  ALT  12  /  AlkPhos  191<H>  02-08     LIVER FUNCTIONS - ( 08 Feb 2024 14:17 )  Alb: 4.5 g/dL / Pro: 7.4 g/dL / ALK PHOS: 191 U/L / ALT: 12 U/L / AST: 16 U/L / GGT: x           RADIOLOGY: - Reviewed and analyzed

## 2024-02-17 NOTE — PROGRESS NOTE ADULT - SUBJECTIVE AND OBJECTIVE BOX
---___---___---___---___---___---___ ---___---___---___---___---___---___---___---___---                  M E D I C A L   A T T E N D I N G   P R O G R E S S   N O T E  ---___---___---___---___---___---___ ---___---___---___---___---___---___---___---___---        ================================================    ++CHIEF COMPLAINT:   Patient is a 88y old  Female who presents with a chief complaint of abd  pain/  vomiting (2024 16:46)      wheezing improved       ---___---___---___---___---___---  PAST MEDICAL / Surgical  HISTORY:  PAST MEDICAL & SURGICAL HISTORY:  HTN (hypertension)      Hypothyroidism      Osteoporosis      CAD (coronary artery disease)          ---___---___---___---___---___---  FAMILY HISTORY:   FAMILY HISTORY:        ---___---___---___---___---___---  ALLERGIES:   Allergies    iodinated radiocontrast agents (Unknown)    Intolerances        ---___---___---___---___---___---  MEDICATIONS:  MEDICATIONS  (STANDING):  albuterol/ipratropium for Nebulization 3 milliLiter(s) Nebulizer every 6 hours  amLODIPine   Tablet 5 milliGRAM(s) Oral daily  apixaban 2.5 milliGRAM(s) Oral two times a day  aspirin enteric coated 81 milliGRAM(s) Oral daily  atorvastatin 20 milliGRAM(s) Oral at bedtime  cyclopentolate 1% Solution 1 Drop(s) Left EYE two times a day  FLUoxetine 20 milliGRAM(s) Oral daily  influenza  Vaccine (HIGH DOSE) 0.7 milliLiter(s) IntraMuscular once  mirtazapine 7.5 milliGRAM(s) Oral daily  nystatin Powder 1 Application(s) Topical two times a day  potassium chloride    Tablet ER 20 milliEquivalent(s) Oral once  prednisoLONE acetate 1% Suspension 1 Drop(s) Left EYE two times a day  triamterene 37.5 mG/hydrochlorothiazide 25 mG Tablet 1 Tablet(s) Oral daily    MEDICATIONS  (PRN):  acetaminophen     Tablet .. 650 milliGRAM(s) Oral every 6 hours PRN Mild Pain (1 - 3)  temazepam 30 milliGRAM(s) Oral at bedtime PRN Insomnia      ---___---___---___---___---___---  REVIEW OF SYSTEM:    GEN: no fever, no chills, no pain  RESP: no SOB, no cough, no sputum  CVS: no chest pain, no palpitations, no edema  GI: no abdominal pain, no nausea, no vomiting, no constipation, no diarrhea  : no dysurea, no frequency, no hematurea  Neuro: no headache, no dizziness  PSYCH: no anxiety, no depression  Derm : no itching, no rash    ---___---___---___---___---___---  VITAL SIGNS:  88y , CAPILLARY BLOOD GLUCOSE        T(C): 36.8 (24 @ 20:16), Max: 36.8 (24 @ 20:16)  HR: 88 (24 @ 20:16) (76 - 88)  BP: 105/61 (24 @ 20:16) (105/61 - 161/89)  RR: 18 (24 @ 20:16) (18 - 18)  SpO2: 93% (24 @ 20:16) (92% - 98%)  ---___---___---___---___---___---  PHYSICAL EXAM:    GEN: A&O X 3 , NAD , comfortable  HEENT: NCAT, PERRL, MMM, hearing intact  Neck: supple , no JVD  CVS: S1S2 , regular , No M/R/G appreciated  PULM: CTA B/L,  no W/R/R appreciated  ABD.: soft. non tender, non distended,  bowel sounds present  Extrem: intact pulses , no edema   Derm: No rash , no ecchymoses  PSYCH : normal mood,  no delusion not anxious     ---___---___---___---___---___---            LAB AND IMAGIN.7   9.01  )-----------( 195      ( 2024 06:57 )             37.2               02-17    144  |  107  |  16  ----------------------------<  86  3.8   |  24  |  0.80    Ca    9.5      2024 06:57  Phos  3.6     02-17  Mg     2.2     02-17                              Urinalysis Basic - ( 2024 06:57 )    Color: x / Appearance: x / SG: x / pH: x  Gluc: 86 mg/dL / Ketone: x  / Bili: x / Urobili: x   Blood: x / Protein: x / Nitrite: x   Leuk Esterase: x / RBC: x / WBC x   Sq Epi: x / Non Sq Epi: x / Bacteria: x        [All pertinent / recent Imaging reviewed]         ---___---___---___---___---___---___ ---___---___---___---___---                         A S S E S S M E N T   A N D   P L A N :      HEALTH ISSUES - PROBLEM Dx:  copd now on solumedrol   ua reviewed no new abx  as per id   htn continue meds and add triamterene   appreciate cardiology inpiut  eliquis for dvt prophyl;axis   hld  on asa and statin                ___________________________  Thank you,  Garry Hermosillo  [phone]

## 2024-02-17 NOTE — PROGRESS NOTE ADULT - SUBJECTIVE AND OBJECTIVE BOX
Date of Service: 02-17-24 @ 09:53           CARDIOLOGY     PROGRESS  NOTE   ________________________________________________    CHIEF COMPLAINT:Patient is a 88y old  Female who presents with a chief complaint of abd  pain/  vomiting (16 Feb 2024 19:03)  no complain  	  REVIEW OF SYSTEMS:  CONSTITUTIONAL: No fever, weight loss, or fatigue  EYES: No eye pain, visual disturbances, or discharge  ENT:  No difficulty hearing, tinnitus, vertigo; No sinus or throat pain  NECK: No pain or stiffness  RESPIRATORY: No cough, wheezing, chills or hemoptysis; + Shortness of Breath  CARDIOVASCULAR: No chest pain, palpitations, passing out, dizziness, or leg swelling  GASTROINTESTINAL: No abdominal or epigastric pain. No nausea, vomiting, or hematemesis; No diarrhea or constipation. No melena or hematochezia.  GENITOURINARY: No dysuria, frequency, hematuria, or incontinence  NEUROLOGICAL: No headaches, memory loss, loss of strength, numbness, or tremors  SKIN: No itching, burning, rashes, or lesions   LYMPH Nodes: No enlarged glands  ENDOCRINE: No heat or cold intolerance; No hair loss  MUSCULOSKELETAL: No joint pain or swelling; No muscle, back, or extremity pain  PSYCHIATRIC: No depression, anxiety, mood swings, or difficulty sleeping  HEME/LYMPH: No easy bruising, or bleeding gums  ALLERGY AND IMMUNOLOGIC: No hives or eczema	    [x ] All others negative	  [ ] Unable to obtain    PHYSICAL EXAM:  T(C): 36.7 (02-17-24 @ 04:13), Max: 36.7 (02-17-24 @ 04:13)  HR: 76 (02-17-24 @ 04:13) (76 - 101)  BP: 161/89 (02-17-24 @ 04:13) (156/75 - 190/103)  RR: 18 (02-17-24 @ 04:13) (18 - 18)  SpO2: 92% (02-17-24 @ 04:13) (92% - 92%)  Wt(kg): --  I&O's Summary    16 Feb 2024 07:01  -  17 Feb 2024 07:00  --------------------------------------------------------  IN: 360 mL / OUT: 850 mL / NET: -490 mL        Appearance: cachectic  HEENT:   Normal oral mucosa, PERRL, EOMI	  Lymphatic: No lymphadenopathy  Cardiovascular: Normal S1 S2, No JVD, +murmurs, No edema  Respiratory:rhonchi  Psychiatry: A & O x 3, Mood & affect appropriate  Gastrointestinal:  Soft, Non-tender, + BS	  Skin: No rashes, No ecchymoses, No cyanosis	  Extremities: Normal range of motion, No clubbing, cyanosis or edema  Vascular: Peripheral pulses palpable 2+ bilaterally    MEDICATIONS  (STANDING):  albuterol/ipratropium for Nebulization 3 milliLiter(s) Nebulizer every 6 hours  apixaban 2.5 milliGRAM(s) Oral two times a day  aspirin enteric coated 81 milliGRAM(s) Oral daily  atorvastatin 20 milliGRAM(s) Oral at bedtime  cyclopentolate 1% Solution 1 Drop(s) Left EYE two times a day  FLUoxetine 20 milliGRAM(s) Oral daily  influenza  Vaccine (HIGH DOSE) 0.7 milliLiter(s) IntraMuscular once  mirtazapine 7.5 milliGRAM(s) Oral daily  nebivolol 2.5 milliGRAM(s) Oral daily  potassium chloride    Tablet ER 20 milliEquivalent(s) Oral once  prednisoLONE acetate 1% Suspension 1 Drop(s) Left EYE two times a day  triamterene 37.5 mG/hydrochlorothiazide 25 mG Tablet 1 Tablet(s) Oral daily      TELEMETRY: 	    ECG:  	  RADIOLOGY:  OTHER: 	  	  LABS:	 	    CARDIAC MARKERS:                                11.7   9.01  )-----------( 195      ( 17 Feb 2024 06:57 )             37.2     02-17    144  |  107  |  16  ----------------------------<  86  3.8   |  24  |  0.80    Ca    9.5      17 Feb 2024 06:57  Phos  3.6     02-17  Mg     2.2     02-17      proBNP:   Lipid Profile:   HgA1c:   TSH:   < from: CT Angio Chest PE Protocol w/ IV Cont (09.26.23 @ 08:32) >  No pulmonary embolism.    Dilated pulmonary artery, suggestive of pulmonary hypertension.        Assessment and plan  ---------------------------  88 F with PMH CAD, pulmonary sarcoidosis, COPD (not on home O2), HTN, BIBEMS from [facility] for evaluation of abdominal pain, constipation, vomiting x 4 days.  Patient is unreliable historian and forgetful, answers questions with brief responses.  Has no complaints and denies abdominal pain or constipation, reporting stool output today morning that appeared normal.  Reports 1 episode of vomiting this morning appearing food colored, denies nausea at present time.  Per aide at bedside, patient has chronic constipation but has been straining more than usual over the past 3-4 days and history from nauseated with intermittent retching, only vomiting this morning.  Per further daughter over the phone, there is concern for possible bowel obstruction.  Patient has no history of abdominal surgeries.  pt with hx of htn, sarcoidosis admitted with abdominal pain/ chest cw chf  continue triamterene HCTZ  TTE  dvt prophylaxis  check pro bnp  will consider ct chest no contrast  may add ACE/ARB for increase bp  will adjust cardiac meds  check echo, awaiting ct chest  add Norvasc 5 mg daily  dc nebevilol  severe protein deficiency , nutrition eval

## 2024-02-17 NOTE — PROGRESS NOTE ADULT - ASSESSMENT
Assessment and Plan:   acute congestive heart failure   S/P acute hypoxic respiratory failure   acute hypertensive urgency   respond to Lasix   2 Echocardiogram  BNP is very elvated   daily BMP for hypernatremia    Acute abdominal pain   Acute sigmoid diverticulitis S/P  Zosyn   Left hydronephrosis  S/P pessary removal   H/O sarcoidosis    COPD and chronic respiratory failure oh home 02    continue prednisone   continue nebulizer treatement PRN   PPI   DVT prophylaxis   care discussed with floor BMP

## 2024-02-18 LAB
CULTURE RESULTS: ABNORMAL
SPECIMEN SOURCE: SIGNIFICANT CHANGE UP

## 2024-02-18 RX ORDER — IPRATROPIUM/ALBUTEROL SULFATE 18-103MCG
3 AEROSOL WITH ADAPTER (GRAM) INHALATION
Qty: 0 | Refills: 0 | DISCHARGE
Start: 2024-02-18

## 2024-02-18 RX ADMIN — NYSTATIN CREAM 1 APPLICATION(S): 100000 CREAM TOPICAL at 17:59

## 2024-02-18 RX ADMIN — Medication 3 MILLILITER(S): at 17:58

## 2024-02-18 RX ADMIN — Medication 1 DROP(S): at 17:58

## 2024-02-18 RX ADMIN — Medication 650 MILLIGRAM(S): at 14:06

## 2024-02-18 RX ADMIN — Medication 650 MILLIGRAM(S): at 21:31

## 2024-02-18 RX ADMIN — CYCLOPENTOLATE HYDROCHLORIDE 1 DROP(S): 10 SOLUTION/ DROPS OPHTHALMIC at 05:48

## 2024-02-18 RX ADMIN — Medication 1 TABLET(S): at 05:48

## 2024-02-18 RX ADMIN — TEMAZEPAM 30 MILLIGRAM(S): 15 CAPSULE ORAL at 21:30

## 2024-02-18 RX ADMIN — AMLODIPINE BESYLATE 5 MILLIGRAM(S): 2.5 TABLET ORAL at 05:48

## 2024-02-18 RX ADMIN — MIRTAZAPINE 7.5 MILLIGRAM(S): 45 TABLET, ORALLY DISINTEGRATING ORAL at 11:28

## 2024-02-18 RX ADMIN — APIXABAN 2.5 MILLIGRAM(S): 2.5 TABLET, FILM COATED ORAL at 17:58

## 2024-02-18 RX ADMIN — APIXABAN 2.5 MILLIGRAM(S): 2.5 TABLET, FILM COATED ORAL at 05:48

## 2024-02-18 RX ADMIN — Medication 3 MILLILITER(S): at 11:27

## 2024-02-18 RX ADMIN — NYSTATIN CREAM 1 APPLICATION(S): 100000 CREAM TOPICAL at 05:51

## 2024-02-18 RX ADMIN — Medication 81 MILLIGRAM(S): at 11:27

## 2024-02-18 RX ADMIN — Medication 1 DROP(S): at 05:48

## 2024-02-18 RX ADMIN — ATORVASTATIN CALCIUM 20 MILLIGRAM(S): 80 TABLET, FILM COATED ORAL at 21:31

## 2024-02-18 RX ADMIN — Medication 20 MILLIGRAM(S): at 11:28

## 2024-02-18 RX ADMIN — Medication 650 MILLIGRAM(S): at 22:32

## 2024-02-18 RX ADMIN — CYCLOPENTOLATE HYDROCHLORIDE 1 DROP(S): 10 SOLUTION/ DROPS OPHTHALMIC at 17:58

## 2024-02-18 NOTE — PROGRESS NOTE ADULT - ASSESSMENT
Assessment and Plan:   acute congestive heart failure   S/P acute hypoxic respiratory failure   acute hypertensive urgency   respond to Lasix   2 Echocardiogram normal EF   BNP is very elevated   daily BMP for hypernatremia    Acute abdominal pain   Acute sigmoid diverticulitis S/P  Zosyn   Left hydronephrosis  S/P pessary removal   H/O sarcoidosis    COPD and chronic respiratory failure oh home 02    continue prednisone   continue nebulizer treatement PRN   PPI   DVT prophylaxis   care discussed with floor BMP

## 2024-02-18 NOTE — PROGRESS NOTE ADULT - SUBJECTIVE AND OBJECTIVE BOX
---___---___---___---___---___---___ ---___---___---___---___---___---___---___---___---                  M E D I C A L   A T T E N D I N G   P R O G R E S S   N O T E  ---___---___---___---___---___---___ ---___---___---___---___---___---___---___---___---        ================================================    ++CHIEF COMPLAINT:   Patient is a 88y old  Female who presents with a chief complaint of abd  pain/  vomiting (2024 15:22)      bp much better  feeling better      ---___---___---___---___---___---  PAST MEDICAL / Surgical  HISTORY:  PAST MEDICAL & SURGICAL HISTORY:  HTN (hypertension)      Hypothyroidism      Osteoporosis      CAD (coronary artery disease)          ---___---___---___---___---___---  FAMILY HISTORY:   FAMILY HISTORY:        ---___---___---___---___---___---  ALLERGIES:   Allergies    iodinated radiocontrast agents (Unknown)    Intolerances        ---___---___---___---___---___---  MEDICATIONS:  MEDICATIONS  (STANDING):  albuterol/ipratropium for Nebulization 3 milliLiter(s) Nebulizer every 6 hours  amLODIPine   Tablet 5 milliGRAM(s) Oral daily  apixaban 2.5 milliGRAM(s) Oral two times a day  aspirin enteric coated 81 milliGRAM(s) Oral daily  atorvastatin 20 milliGRAM(s) Oral at bedtime  cyclopentolate 1% Solution 1 Drop(s) Left EYE two times a day  FLUoxetine 20 milliGRAM(s) Oral daily  influenza  Vaccine (HIGH DOSE) 0.7 milliLiter(s) IntraMuscular once  mirtazapine 7.5 milliGRAM(s) Oral daily  nystatin Powder 1 Application(s) Topical two times a day  potassium chloride    Tablet ER 20 milliEquivalent(s) Oral once  prednisoLONE acetate 1% Suspension 1 Drop(s) Left EYE two times a day  triamterene 37.5 mG/hydrochlorothiazide 25 mG Tablet 1 Tablet(s) Oral daily    MEDICATIONS  (PRN):  acetaminophen     Tablet .. 650 milliGRAM(s) Oral every 6 hours PRN Mild Pain (1 - 3)  temazepam 30 milliGRAM(s) Oral at bedtime PRN Insomnia      ---___---___---___---___---___---  REVIEW OF SYSTEM:    GEN: no fever, no chills, no pain  RESP: no SOB, no cough, no sputum  CVS: no chest pain, no palpitations, no edema  GI: no abdominal pain, no nausea, no vomiting, no constipation, no diarrhea  : no dysurea, no frequency, no hematurea  Neuro: no headache, no dizziness  PSYCH: no anxiety, no depression  Derm : no itching, no rash    ---___---___---___---___---___---  VITAL SIGNS:  88y , CAPILLARY BLOOD GLUCOSE        T(C): 36.9 (24 @ 14:32), Max: 36.9 (24 @ 14:32)  HR: 85 (24 @ 14:32) (71 - 88)  BP: 120/74 (24 @ 14:32) (105/61 - 152/62)  RR: 18 (24 @ 14:32) (18 - 18)  SpO2: 91% (24 @ 14:32) (91% - 95%)  ---___---___---___---___---___---  PHYSICAL EXAM:    GEN: A&O X 3 , NAD , comfortable  HEENT: NCAT, PERRL, MMM, hearing intact  Neck: supple , no JVD  CVS: S1S2 , regular , No M/R/G appreciated  PULM: CTA B/L,  no W/R/R appreciated  ABD.: soft. non tender, non distended,  bowel sounds present  Extrem: intact pulses , no edema   Derm: No rash , no ecchymoses  PSYCH : normal mood,  no delusion not anxious     ---___---___---___---___---___---            LAB AND IMAGIN.7   9.01  )-----------( 195      ( 2024 06:57 )             37.2               02-17    144  |  107  |  16  ----------------------------<  86  3.8   |  24  |  0.80    Ca    9.5      2024 06:57  Phos  3.6     02-17  Mg     2.2     02-17                              Urinalysis Basic - ( 2024 06:57 )    Color: x / Appearance: x / SG: x / pH: x  Gluc: 86 mg/dL / Ketone: x  / Bili: x / Urobili: x   Blood: x / Protein: x / Nitrite: x   Leuk Esterase: x / RBC: x / WBC x   Sq Epi: x / Non Sq Epi: x / Bacteria: x        [All pertinent / recent Imaging reviewed]         ---___---___---___---___---___---___ ---___---___---___---___---                         A S S E S S M E N T   A N D   P L A N :      HEALTH ISSUES - PROBLEM Dx:  htn  improved   pulmonary vascular congestion improved dc lasic continue amlodapine and ace   ct chest CT of the Chest was performed.  Sagittal and coronal reformats were performed.    FINDINGS:    LUNGS AND AIRWAYS: Impacted right lower lobe distal airways. Similar elevation of the right hemidiaphragm. Bilateral lower lobe dependent atelectasis. Similar apically predominant emphysema. Left upper lobe linear atelectasis. 2 mm right middle lobe nodule (3-76).  PLEURA: No pleural effusion.  MEDIASTINUM AND RASHID: Multiple prominent subcentimeter mediastinal lymph nodes.  VESSELS: Aortic and coronary artery atherosclerotic changes. Main pulmonary artery measures up to 4.1 cm suggestive of pulmonary hypertension.  HEART: Heart size is normal. No pericardial effusion. Aortic valve and mitral annular calcifications.  CHEST WALL AND LOWER NECK: Within normal limits.  VISUALIZED UPPER ABDOMEN: Cholelithiasis. Small hiatal hernia.  BONES: Degenerative changes.    IMPRESSION:  No pulmonary edema.    --- End of Report ---  copd  COPD and chronic respiratory failure oh home 02    continue prednisone   continue nebulizer treatement PRN   change to prednisone po taper       start dc planning   eliquis for dvt prophlyaxis        ___________________________  Thank you,  Garry Hermosillo  [phone]

## 2024-02-18 NOTE — PROGRESS NOTE ADULT - SUBJECTIVE AND OBJECTIVE BOX
Date of Service: 02-18-24 @ 08:52           CARDIOLOGY     PROGRESS  NOTE   ________________________________________________    CHIEF COMPLAINT:Patient is a 88y old  Female who presents with a chief complaint of abd  pain/  vomiting (17 Feb 2024 21:52)  no complain  	  REVIEW OF SYSTEMS:  CONSTITUTIONAL: No fever, weight loss, or fatigue  EYES: No eye pain, visual disturbances, or discharge  ENT:  No difficulty hearing, tinnitus, vertigo; No sinus or throat pain  NECK: No pain or stiffness  RESPIRATORY: No cough, wheezing, chills or hemoptysis; No Shortness of Breath  CARDIOVASCULAR: No chest pain, palpitations, passing out, dizziness, or leg swelling  GASTROINTESTINAL: No abdominal or epigastric pain. No nausea, vomiting, or hematemesis; No diarrhea or constipation. No melena or hematochezia.  GENITOURINARY: No dysuria, frequency, hematuria, or incontinence  NEUROLOGICAL: No headaches, memory loss, loss of strength, numbness, or tremors  SKIN: No itching, burning, rashes, or lesions   LYMPH Nodes: No enlarged glands  ENDOCRINE: No heat or cold intolerance; No hair loss  MUSCULOSKELETAL: No joint pain or swelling; No muscle, back, or extremity pain  PSYCHIATRIC: No depression, anxiety, mood swings, or difficulty sleeping  HEME/LYMPH: No easy bruising, or bleeding gums  ALLERGY AND IMMUNOLOGIC: No hives or eczema	    [x ] All others negative	  [ ] Unable to obtain    PHYSICAL EXAM:  T(C): 36.3 (02-18-24 @ 04:53), Max: 36.8 (02-17-24 @ 20:16)  HR: 71 (02-18-24 @ 04:53) (71 - 88)  BP: 152/62 (02-18-24 @ 04:53) (105/61 - 152/62)  RR: 18 (02-18-24 @ 04:53) (18 - 18)  SpO2: 95% (02-18-24 @ 04:53) (93% - 98%)  Wt(kg): --  I&O's Summary    17 Feb 2024 07:01  -  18 Feb 2024 07:00  --------------------------------------------------------  IN: 240 mL / OUT: 1100 mL / NET: -860 mL        Appearance: Normal	  HEENT:   Normal oral mucosa, PERRL, EOMI	  Lymphatic: No lymphadenopathy  Cardiovascular: Normal S1 S2, No JVD, + murmurs, No edema  Respiratory:rhonchi  Psychiatry: A & O x 3, Mood & affect appropriate  Gastrointestinal:  Soft, Non-tender, + BS	  Skin: No rashes, No ecchymoses, No cyanosis	  Neurologic: Non-focal  Extremities: Normal range of motion, No clubbing, cyanosis or edema  Vascular: Peripheral pulses palpable 2+ bilaterally    MEDICATIONS  (STANDING):  albuterol/ipratropium for Nebulization 3 milliLiter(s) Nebulizer every 6 hours  amLODIPine   Tablet 5 milliGRAM(s) Oral daily  apixaban 2.5 milliGRAM(s) Oral two times a day  aspirin enteric coated 81 milliGRAM(s) Oral daily  atorvastatin 20 milliGRAM(s) Oral at bedtime  cyclopentolate 1% Solution 1 Drop(s) Left EYE two times a day  FLUoxetine 20 milliGRAM(s) Oral daily  influenza  Vaccine (HIGH DOSE) 0.7 milliLiter(s) IntraMuscular once  mirtazapine 7.5 milliGRAM(s) Oral daily  nystatin Powder 1 Application(s) Topical two times a day  potassium chloride    Tablet ER 20 milliEquivalent(s) Oral once  prednisoLONE acetate 1% Suspension 1 Drop(s) Left EYE two times a day  triamterene 37.5 mG/hydrochlorothiazide 25 mG Tablet 1 Tablet(s) Oral daily      TELEMETRY: 	    ECG:  	  RADIOLOGY:  OTHER: 	  	  LABS:	 	    CARDIAC MARKERS:                          11.7   9.01  )-----------( 195      ( 17 Feb 2024 06:57 )             37.2     02-17    144  |  107  |  16  ----------------------------<  86  3.8   |  24  |  0.80    Ca    9.5      17 Feb 2024 06:57  Phos  3.6     02-17  Mg     2.2     02-17      proBNP:   Lipid Profile:   HgA1c:   TSH:         Assessment and plan  ---------------------------  88 F with PMH CAD, pulmonary sarcoidosis, COPD (not on home O2), HTN, BIBEMS from [facility] for evaluation of abdominal pain, constipation, vomiting x 4 days.  Patient is unreliable historian and forgetful, answers questions with brief responses.  Has no complaints and denies abdominal pain or constipation, reporting stool output today morning that appeared normal.  Reports 1 episode of vomiting this morning appearing food colored, denies nausea at present time.  Per aide at bedside, patient has chronic constipation but has been straining more than usual over the past 3-4 days and history from nauseated with intermittent retching, only vomiting this morning.  Per further daughter over the phone, there is concern for possible bowel obstruction.  Patient has no history of abdominal surgeries.  pt with hx of htn, sarcoidosis admitted with abdominal pain/ chest cw chf  continue triamterene HCTZ  TTE  dvt prophylaxis  check pro bnp  will consider ct chest no contrast  may add ACE/ARB for increase bp  will adjust cardiac meds  check echo, awaiting ct chest  add Norvasc 5 mg daily  dc nebevilol  severe protein deficiency , nutrition eval  add lasix 20 mg iv daily, awaiting ct chest

## 2024-02-19 PROCEDURE — 99232 SBSQ HOSP IP/OBS MODERATE 35: CPT

## 2024-02-19 RX ADMIN — Medication 1 DROP(S): at 05:18

## 2024-02-19 RX ADMIN — Medication 20 MILLIEQUIVALENT(S): at 21:10

## 2024-02-19 RX ADMIN — APIXABAN 2.5 MILLIGRAM(S): 2.5 TABLET, FILM COATED ORAL at 18:57

## 2024-02-19 RX ADMIN — NYSTATIN CREAM 1 APPLICATION(S): 100000 CREAM TOPICAL at 18:57

## 2024-02-19 RX ADMIN — Medication 650 MILLIGRAM(S): at 07:01

## 2024-02-19 RX ADMIN — MIRTAZAPINE 7.5 MILLIGRAM(S): 45 TABLET, ORALLY DISINTEGRATING ORAL at 11:02

## 2024-02-19 RX ADMIN — Medication 3 MILLILITER(S): at 18:56

## 2024-02-19 RX ADMIN — ATORVASTATIN CALCIUM 20 MILLIGRAM(S): 80 TABLET, FILM COATED ORAL at 21:09

## 2024-02-19 RX ADMIN — Medication 650 MILLIGRAM(S): at 21:30

## 2024-02-19 RX ADMIN — APIXABAN 2.5 MILLIGRAM(S): 2.5 TABLET, FILM COATED ORAL at 05:17

## 2024-02-19 RX ADMIN — TEMAZEPAM 30 MILLIGRAM(S): 15 CAPSULE ORAL at 21:09

## 2024-02-19 RX ADMIN — Medication 650 MILLIGRAM(S): at 06:37

## 2024-02-19 RX ADMIN — Medication 3 MILLILITER(S): at 11:02

## 2024-02-19 RX ADMIN — CYCLOPENTOLATE HYDROCHLORIDE 1 DROP(S): 10 SOLUTION/ DROPS OPHTHALMIC at 18:57

## 2024-02-19 RX ADMIN — Medication 20 MILLIGRAM(S): at 11:02

## 2024-02-19 RX ADMIN — NYSTATIN CREAM 1 APPLICATION(S): 100000 CREAM TOPICAL at 05:20

## 2024-02-19 RX ADMIN — AMLODIPINE BESYLATE 5 MILLIGRAM(S): 2.5 TABLET ORAL at 05:17

## 2024-02-19 RX ADMIN — Medication 650 MILLIGRAM(S): at 21:09

## 2024-02-19 RX ADMIN — Medication 1 DROP(S): at 18:57

## 2024-02-19 RX ADMIN — CYCLOPENTOLATE HYDROCHLORIDE 1 DROP(S): 10 SOLUTION/ DROPS OPHTHALMIC at 05:18

## 2024-02-19 RX ADMIN — Medication 81 MILLIGRAM(S): at 11:02

## 2024-02-19 RX ADMIN — Medication 1 TABLET(S): at 05:17

## 2024-02-19 NOTE — PROGRESS NOTE ADULT - SUBJECTIVE AND OBJECTIVE BOX
Follow Up:    Diarrhea    Interval History/ROS:  VSS. C diff not collected. Patient was seen and examined at bedside. +[place]. Denies fever or dysuria. No diarrhea.    Allergies  iodinated radiocontrast agents (Unknown)        ANTIMICROBIALS:      OTHER MEDS:  MEDICATIONS  (STANDING):  acetaminophen     Tablet .. 650 every 6 hours PRN  albuterol/ipratropium for Nebulization 3 every 6 hours  amLODIPine   Tablet 5 daily  apixaban 2.5 two times a day  aspirin enteric coated 81 daily  atorvastatin 20 at bedtime  FLUoxetine 20 daily  influenza  Vaccine (HIGH DOSE) 0.7 once  mirtazapine 7.5 daily  temazepam 30 at bedtime PRN  triamterene 37.5 mG/hydrochlorothiazide 25 mG Tablet 1 daily      Vital Signs Last 24 Hrs  T(C): 36.4 (19 Feb 2024 09:14), Max: 36.9 (18 Feb 2024 14:32)  T(F): 97.6 (19 Feb 2024 09:14), Max: 98.4 (18 Feb 2024 14:32)  HR: 76 (19 Feb 2024 04:40) (76 - 85)  BP: 145/72 (19 Feb 2024 09:14) (120/74 - 154/80)  BP(mean): --  RR: 18 (19 Feb 2024 09:14) (18 - 18)  SpO2: 93% (19 Feb 2024 09:14) (91% - 95%)    Parameters below as of 19 Feb 2024 09:14  Patient On (Oxygen Delivery Method): nasal cannula  O2 Flow (L/min): 2      PHYSICAL EXAM:  Constitutional: non-toxic, no distress  Cardiovascular:   normal S1, S2, no murmur, RRR  Respiratory:  clear BS bilaterally, no wheezes, no rales  GI:  soft, non-tender, nondistended  Musculoskeletal:  no BLE edema  Neurologic: awake and alert; +[place]  Psychiatric:  awake, alert, appropriate mood              Basic Metabolic Panel in AM (02.17.24 @ 06:57)   Sodium: 144 mmol/L  Potassium: 3.8 mmol/L  Chloride: 107 mmol/L  Carbon Dioxide: 24 mmol/L  Anion Gap: 13 mmol/L  Blood Urea Nitrogen: 16 mg/dL  Creatinine: 0.80 mg/dL  Glucose: 86 mg/dL  Calcium: 9.5 mg/dL          Complete Blood Count in AM (02.17.24 @ 06:57)   Nucleated RBC: 0 /100 WBCs  WBC Count: 9.01 K/uL  RBC Count: 3.97 M/uL  Hemoglobin: 11.7 g/dL  Hematocrit: 37.2 %  Mean Cell Volume: 93.7 fl  Mean Cell Hemoglobin: 29.5 pg  Mean Cell Hemoglobin Conc: 31.5 gm/dL  Red Cell Distrib Width: 14.4 %  Platelet Count - Automated: 195 K/uL    MICROBIOLOGY:  v  .Stool  02-16-24   Numerous Yeast like cells isolated  No enteric pathogens isolated.  (Stool culture examined for Salmonella,  Shigella, Campylobacter, Aeromonas, Plesiomonas,  Vibrio, E.coli O157 and Yersinia)  No enteric gram negative rods isolated  --  --      .Blood Blood-Peripheral  02-08-24   No growth at 5 days  --  --      .Blood Blood-Peripheral  02-08-24   No growth at 5 days  --  --      Clean Catch Clean Catch (Midstream)  02-08-24   >=3 organisms. Probable collection contamination.  --  --                RADIOLOGY:  Imaging below independently reviewed.  < from: CT Chest No Cont (02.17.24 @ 17:19) >    ACC: [id number] EXAM:  CT CHEST   ORDERED BY: KAREN RENE     PROCEDURE DATE:  02/17/2024          INTERPRETATION:  CLINICAL INFORMATION: Heart failure exacerbation.    COMPARISON: CT chest 9/26/2023.    CONTRAST/COMPLICATIONS:  IV Contrast: NONE  Oral Contrast: NONE  Complications: None reported at time of study completion    PROCEDURE:  CT of the Chest was performed.  Sagittal and coronal reformats were performed.    FINDINGS:    LUNGS AND AIRWAYS: Impacted right lower lobe distal airways. Similar   elevation of the right hemidiaphragm. Bilateral lower lobe dependent   atelectasis. Similar apically predominant emphysema. Left upper lobe   linear atelectasis. 2 mm right middle lobe nodule (3-76).  PLEURA: No pleural effusion.  MEDIASTINUM AND RASHID: Multiple prominent subcentimeter mediastinal lymph   nodes.  VESSELS: Aortic and coronary artery atherosclerotic changes. Main   pulmonary artery measures up to 4.1 cm suggestive of pulmonary   hypertension.  HEART: Heart size is normal. No pericardial effusion. Aortic valve and   mitral annular calcifications.  CHEST WALL AND LOWER NECK: Within normal limits.  VISUALIZED UPPER ABDOMEN: Cholelithiasis. Small hiatal hernia.  BONES: Degenerative changes.    IMPRESSION:  No pulmonary edema.    --- End of Report ---           SARAHY KINGSTON MD; Resident Radiologist  This document has been electronically signed.  CLARY WANG MD; Attending Radiologist  This document has been electronically signed. Feb 18 2024  9:44AM    < end of copied text >

## 2024-02-19 NOTE — PROGRESS NOTE ADULT - SUBJECTIVE AND OBJECTIVE BOX
BISHNU SEGURA  MRN#: [id number]  Subjective:   pulmonary progress note  : sarcoidosis ,COPD , Chronic respiratory failure on home 02    date of service is 2/19/2024   88 F        h/o   CAD,   pulmonary sarcoidosis, COPD (not on home O2), HTN,       BIBEMS from [facility] for evaluation of abdominal pain, constipation, vomiting x 4 days, no hematemesis no fever no aspiration or SOB , no diarrhea    .  pt  is   forgetful,. had  vomiting this morning    p er aide at bedside, patient has chronic constipation CT scan of the abdomen shows sever hydroureter,  moderate left hydronephrosis , Diverticulitis sigmoid colon , vaginal pessary complete L 4 fracture      has no history of abdominal surgeries S/P implanted Loop recorder  (08 Feb 2024 19:29), Id consultation appreciated  started on Zosyn , no new C/O no fever no more vomiting no SOB , acceptable 02 saturation , condition is the same no new events BP is not well controlled , 02 saturation is 93% at rest no SOB or chest pain reported , ID follow up appreciated continue ABX , no fever no SOB or respiratory distress acceptable 02 saturation , on Zosyn for Diverticulitis , OBG, MD noted vaginal pessary , causing ureteral obstruction, recommended removal , no SOB at rest , S/P pessary removal at bed side to follow yp with urology ,continue on Antibiotics , no SOB oe desaturation . called several time by the floor NP , for SOB and desaturation patient was in acute hypoxic respiratory failure , patient Blood pressure was 190/ 103, tachycardia , chest x ray shoe acute CHF given Lasix with good response , BP is improving < BNP is pending   , as well as echocardiogram , feeling better on 02 BNP is very elevated , Echo is still pending ,BP is better controlled , echocardiogram is normal EF , breathing is better on 02, no over night events comfortable no New C/O slleping on round ,no reported C/O , BP is well controlled      PAST MEDICAL & SURGICAL HISTORY:  HTN (hypertension)      Hypothyroidism      Osteoporosis      CAD (coronary artery disease)  Sarcoidosis   COPD   Chronic respiratory failure on home 02           OBJECTIVE:  ICU Vital Signs Last 24 Hrs  T(C): 37.2 (09 Feb 2024 16:00), Max: 37.2 (09 Feb 2024 16:00)  T(F): 99 (09 Feb 2024 16:00), Max: 99 (09 Feb 2024 16:00)  HR: 75 (09 Feb 2024 16:00) (68 - 75)  BP: 147/75 (09 Feb 2024 16:00) (129/78 - 157/67)  BP(mean): --  ABP: --  ABP(mean): --  RR: 16 (09 Feb 2024 16:00) (16 - 20)  SpO2: 94% (09 Feb 2024 16:00) (93% - 95%)    O2 Parameters below as of 09 Feb 2024 16:00  Patient On (Oxygen Delivery Method): room air            02-08 @ 07:01  -  02-09 @ 07:00  --------------------------------------------------------  IN: 0 mL / OUT: 100 mL / NET: -100 mL    02-09 @ 07:01  -  02-09 @ 21:00  --------------------------------------------------------  IN: 1045 mL / OUT: 950 mL / NET: 95 mL      PHYSICAL EXAM:Daily Height in cm: 157.48 (08 Feb 2024 12:42)  Elderly morbid obese   female on 02 2 liter 02 saturation is 94% , very anxious female   Daily   HEENT:     + NCAT  + EOMI  - Conjuctival edema   - Icterus   - Thrush   - ETT  - NGT/OGT  Neck:         + FROM    + JVD     - Nodes     - Masses    + Mid-line trachea   - Tracheostomy  Chest:       kyphosis , implanted loop reordered   Lungs:          + CTA   - Rhonchi    +Rales    - Wheezing  +Decreased BS   - Dullness R L  Cardiac:       + S1 + S2    + RRR   - Irregular   - S3  - S4    - Murmurs   - Rub   - Hamman’s sign   Abdomen:    + BS     + Soft    + tender LLquadrant noted  Organomegaly  - PEG  Extremities:   - Cyanosis U/L   - Clubbing  U/L  - LE/UE Edema   + Capillary refill    + Pulses   Neuro:        + Awake   +  Alert   - Confused   - Lethargic   - Sedated   - Generalized Weakness  Skin:        - Rashes    - Erythema   + Normal incisions   + IV sites intact  - Sacral decubitus    LABS:                 HOSPITAL MEDICATIONS: All mediciations reviewed and analyzed  MEDICATIONS  (STANDING):  albuterol    90 MICROgram(s) HFA Inhaler 2 Puff(s) Inhalation every 6 hours  aspirin enteric coated 81 milliGRAM(s) Oral daily  atorvastatin 20 milliGRAM(s) Oral at bedtime  cyclopentolate 1% Solution 1 Drop(s) Left EYE two times a day  FLUoxetine 20 milliGRAM(s) Oral daily  heparin   Injectable 5000 Unit(s) SubCutaneous every 12 hours  influenza  Vaccine (HIGH DOSE) 0.7 milliLiter(s) IntraMuscular once  mirtazapine 7.5 milliGRAM(s) Oral daily  piperacillin/tazobactam IVPB.. 3.375 Gram(s) IV Intermittent every 8 hours  prednisoLONE acetate 1% Suspension 1 Drop(s) Left EYE two times a day  sodium chloride 0.9%. 1000 milliLiter(s) (60 mL/Hr) IV Continuous <Continuous>    MEDICATIONS  (PRN):  acetaminophen     Tablet .. 650 milliGRAM(s) Oral every 6 hours PRN Mild Pain (1 - 3)  temazepam 30 milliGRAM(s) Oral at bedtime PRN Insomnia    LABS: All Lab data reviewed and analyzed                         11.7   9.01  )-----------( 195      ( 17 Feb 2024 06:57 )             37.2                 02-17    144  |  107  |  16  ----------------------------<  86  3.8   |  24  |  0.80    Ca    9.5      17 Feb 2024 06:57  Phos  3.6     02-17  Mg     2.2     02-17      Ca    9.1      16 Feb 2024 07:30      Ca    9.0      12 Feb 2024 18:52        TPro  7.4  /  Alb  4.5  /  TBili  0.4  /  DBili  x   /  AST  16  /  ALT  12  /  AlkPhos  191<H>  02-08     LIVER FUNCTIONS - ( 08 Feb 2024 14:17 )  Alb: 4.5 g/dL / Pro: 7.4 g/dL / ALK PHOS: 191 U/L / ALT: 12 U/L / AST: 16 U/L / GGT: x           RADIOLOGY: - Reviewed and analyzed

## 2024-02-19 NOTE — PROGRESS NOTE ADULT - SUBJECTIVE AND OBJECTIVE BOX
---___---___---___---___---___---___ ---___---___---___---___---___---___---___---___---                  M E D I C A L   A T T E N D I N G   P R O G R E S S   N O T E  ---___---___---___---___---___---___ ---___---___---___---___---___---___---___---___---        ================================================    ++CHIEF COMPLAINT:   Patient is a 88y old  Female who presents with a chief complaint of abd  pain/  vomiting (19 Feb 2024 12:10)    feeling better       ---___---___---___---___---___---  PAST MEDICAL / Surgical  HISTORY:  PAST MEDICAL & SURGICAL HISTORY:  HTN (hypertension)      Hypothyroidism      Osteoporosis      CAD (coronary artery disease)          ---___---___---___---___---___---  FAMILY HISTORY:   FAMILY HISTORY:        ---___---___---___---___---___---  ALLERGIES:   Allergies    iodinated radiocontrast agents (Unknown)    Intolerances        ---___---___---___---___---___---  MEDICATIONS:  MEDICATIONS  (STANDING):  albuterol/ipratropium for Nebulization 3 milliLiter(s) Nebulizer every 6 hours  amLODIPine   Tablet 5 milliGRAM(s) Oral daily  apixaban 2.5 milliGRAM(s) Oral two times a day  aspirin enteric coated 81 milliGRAM(s) Oral daily  atorvastatin 20 milliGRAM(s) Oral at bedtime  cyclopentolate 1% Solution 1 Drop(s) Left EYE two times a day  FLUoxetine 20 milliGRAM(s) Oral daily  influenza  Vaccine (HIGH DOSE) 0.7 milliLiter(s) IntraMuscular once  mirtazapine 7.5 milliGRAM(s) Oral daily  nystatin Powder 1 Application(s) Topical two times a day  prednisoLONE acetate 1% Suspension 1 Drop(s) Left EYE two times a day  triamterene 37.5 mG/hydrochlorothiazide 25 mG Tablet 1 Tablet(s) Oral daily    MEDICATIONS  (PRN):  acetaminophen     Tablet .. 650 milliGRAM(s) Oral every 6 hours PRN Mild Pain (1 - 3)  temazepam 30 milliGRAM(s) Oral at bedtime PRN Insomnia      ---___---___---___---___---___---  REVIEW OF SYSTEM:    GEN: no fever, no chills, no pain  RESP: no SOB, no cough, no sputum  CVS: no chest pain, no palpitations, no edema  GI: no abdominal pain, no nausea, no vomiting, no constipation, no diarrhea  : no dysurea, no frequency, no hematurea  Neuro: no headache, no dizziness  PSYCH: no anxiety, no depression  Derm : no itching, no rash    ---___---___---___---___---___---  VITAL SIGNS:  88y , CAPILLARY BLOOD GLUCOSE        T(C): 36.8 (02-19-24 @ 20:11), Max: 36.8 (02-19-24 @ 20:11)  HR: 85 (02-19-24 @ 20:11) (76 - 90)  BP: 107/64 (02-19-24 @ 20:11) (107/64 - 154/80)  RR: 20 (02-19-24 @ 20:11) (18 - 20)  SpO2: 94% (02-19-24 @ 20:11) (92% - 94%)  ---___---___---___---___---___---  PHYSICAL EXAM:    GEN: A&O X 3 , NAD , comfortable  HEENT: NCAT, PERRL, MMM, hearing intact  Neck: supple , no JVD  CVS: S1S2 , regular , No M/R/G appreciated  PULM: CTA B/L,  no W/R/R appreciated  ABD.: soft. non tender, non distended,  bowel sounds present  Extrem: intact pulses , no edema   Derm: No rash , no ecchymoses  PSYCH : normal mood,  no delusion not anxious     ---___---___---___---___---___---            LAB AND IMAGING:                                                  [All pertinent / recent Imaging reviewed]         ---___---___---___---___---___---___ ---___---___---___---___---                         A S S E S S M E N T   A N D   P L A N :      HEALTH ISSUES - PROBLEM Dx:    COPD and chronic respiratory failure wean off o2    continue nebulizer treatement PRN   continue statin   continue fluoxetine   start dc planning   eliquis for dvt prophlyaxis    dc back to custodial  wean off o2   ___________________________  Thank you,  Garry Hermosillo  [phone]

## 2024-02-19 NOTE — DIETITIAN INITIAL EVALUATION ADULT - REASON
Pt declined nutrition focused physical exam, stating she wants eat her breakfast & "not be bothered." Follow-up as appropriate and able given thin appearance. Noted visible signs of fat loss from orbitals

## 2024-02-19 NOTE — DIETITIAN INITIAL EVALUATION ADULT - PERTINENT MEDS FT
MEDICATIONS  (STANDING):  albuterol/ipratropium for Nebulization 3 milliLiter(s) Nebulizer every 6 hours  amLODIPine   Tablet 5 milliGRAM(s) Oral daily  apixaban 2.5 milliGRAM(s) Oral two times a day  aspirin enteric coated 81 milliGRAM(s) Oral daily  atorvastatin 20 milliGRAM(s) Oral at bedtime  cyclopentolate 1% Solution 1 Drop(s) Left EYE two times a day  FLUoxetine 20 milliGRAM(s) Oral daily  influenza  Vaccine (HIGH DOSE) 0.7 milliLiter(s) IntraMuscular once  mirtazapine 7.5 milliGRAM(s) Oral daily  nystatin Powder 1 Application(s) Topical two times a day  potassium chloride    Tablet ER 20 milliEquivalent(s) Oral once  prednisoLONE acetate 1% Suspension 1 Drop(s) Left EYE two times a day  triamterene 37.5 mG/hydrochlorothiazide 25 mG Tablet 1 Tablet(s) Oral daily    MEDICATIONS  (PRN):  acetaminophen     Tablet .. 650 milliGRAM(s) Oral every 6 hours PRN Mild Pain (1 - 3)  temazepam 30 milliGRAM(s) Oral at bedtime PRN Insomnia

## 2024-02-19 NOTE — DIETITIAN INITIAL EVALUATION ADULT - ENERGY INTAKE
Per chart, pt noted with 51-75% PO intake x2 meals & 26-50% PO intake x4 meals since admission. Pt states she is eating well. Breakfast at bedside. Pt requesting assistance with meal set-up, which RD provided. Pt eating scrambled eggs & cheese blintzes with good intake. Pt requesting orange juice, RD to honor preference. Pt declined offer for nutritional supplements

## 2024-02-19 NOTE — DIETITIAN INITIAL EVALUATION ADULT - PHYSCIAL ASSESSMENT
Pt appears thin. Reports UBW of 109 pounds. Admit weight 108.7 pounds & weight (2/14) 111.9 pounds. Per previous RD note, pt weighed 98.1 pounds (10/2/23). Pt denied any recent changes in weight- ?weight gain vs inaccuracy of weight on 10/2/23

## 2024-02-19 NOTE — DIETITIAN INITIAL EVALUATION ADULT - REASON FOR ADMISSION
87yo female with PMH of CAD, pulmonary sarcoidosis, COPD, HTN admitted with abdominal pain, constipation, vomiting x4 days PTA. CT showed diverticulitis now s/p antibiotics

## 2024-02-19 NOTE — DIETITIAN INITIAL EVALUATION ADULT - OTHER INFO
Denies chewing/swallowing difficulty. Denies nausea/emesis despite being admitted with vomiting x4 days PTA. Last BM 2/19 per chart. Denies chewing/swallowing difficulty. Denies nausea/emesis despite being admitted with vomiting x4 days PTA. Last BM 2/19 per chart. Reports NKFA

## 2024-02-19 NOTE — PROGRESS NOTE ADULT - ASSESSMENT
88-yo F w/ PMH of CAD and COPD, admitted w/ CTAP suggestive of diverticulitis but no abscess. S/p ceftriaxone (2/8) and Zosyn (2/8-15) Previously w/ concerns for diarrhea, now stopped.    #Diverticulitis  #Abnormal CT scan  #Pyuria and bacteriuria w/ hematuria  #Diarrhea  #Leukocytosis  #Hypoxia  - Admitted with diverticulitis w/o abscess, s/p Zosyn. Diarrhea and leukocytosis improved.  - GIP neg.   - Pyuria and bacteriuria w/ hematuria after pessary removal. No dysuria. Patient was anyway covered with ABx for diverticulitis. C/t monitor  - Monitor off antibiotics for fever and leukocytosis. Collect C diff study if diarrhea is produced.  - Poor prognosis.  - Wean off oxygen as tolerated.    Plan discussed with primary team ACP.  Thank you for this consult. Inpatient ID team will peripherally follow.    Jericho Dash MD, PhD  Attending Physician  Division of Infectious Diseases  Department of Medicine    Please contact through MS Teams message.  Office: [phone] (after 5 PM or weekend)

## 2024-02-19 NOTE — PROGRESS NOTE ADULT - SUBJECTIVE AND OBJECTIVE BOX
Date of Service: 02-19-24 @ 08:50           CARDIOLOGY     PROGRESS  NOTE   ________________________________________________    CHIEF COMPLAINT:Patient is a 88y old  Female who presents with a chief complaint of abd  pain/  vomiting (18 Feb 2024 19:43)    	  REVIEW OF SYSTEMS:  CONSTITUTIONAL: No fever, weight loss, or fatigue  EYES: No eye pain, visual disturbances, or discharge  ENT:  No difficulty hearing, tinnitus, vertigo; No sinus or throat pain  NECK: No pain or stiffness  RESPIRATORY: No cough, wheezing, chills or hemoptysis; No Shortness of Breath  CARDIOVASCULAR: No chest pain, palpitations, passing out, dizziness, or leg swelling  GASTROINTESTINAL: No abdominal or epigastric pain. No nausea, vomiting, or hematemesis; No diarrhea or constipation. No melena or hematochezia.  GENITOURINARY: No dysuria, frequency, hematuria, or incontinence  NEUROLOGICAL: No headaches, memory loss, loss of strength, numbness, or tremors  SKIN: No itching, burning, rashes, or lesions   LYMPH Nodes: No enlarged glands  ENDOCRINE: No heat or cold intolerance; No hair loss  MUSCULOSKELETAL: No joint pain or swelling; No muscle, back, or extremity pain  PSYCHIATRIC: No depression, anxiety, mood swings, or difficulty sleeping  HEME/LYMPH: No easy bruising, or bleeding gums  ALLERGY AND IMMUNOLOGIC: No hives or eczema	    [ ] All others negative	  [ ] Unable to obtain    PHYSICAL EXAM:  T(C): 36.3 (02-19-24 @ 04:40), Max: 36.9 (02-18-24 @ 14:32)  HR: 76 (02-19-24 @ 04:40) (76 - 85)  BP: 154/80 (02-19-24 @ 04:40) (120/74 - 154/80)  RR: 18 (02-19-24 @ 04:40) (18 - 18)  SpO2: 93% (02-19-24 @ 04:40) (91% - 95%)  Wt(kg): --  I&O's Summary    18 Feb 2024 07:01  -  19 Feb 2024 07:00  --------------------------------------------------------  IN: 360 mL / OUT: 750 mL / NET: -390 mL        Appearance: Normal	  HEENT:   Normal oral mucosa, PERRL, EOMI	  Lymphatic: No lymphadenopathy  Cardiovascular: Normal S1 S2, No JVD, No murmurs, No edema  Respiratory: Lungs clear to auscultation	  Psychiatry: A & O x 3, Mood & affect appropriate  Gastrointestinal:  Soft, Non-tender, + BS	  Skin: No rashes, No ecchymoses, No cyanosis	  Neurologic: Non-focal  Extremities: Normal range of motion, No clubbing, cyanosis or edema  Vascular: Peripheral pulses palpable 2+ bilaterally    MEDICATIONS  (STANDING):  albuterol/ipratropium for Nebulization 3 milliLiter(s) Nebulizer every 6 hours  amLODIPine   Tablet 5 milliGRAM(s) Oral daily  apixaban 2.5 milliGRAM(s) Oral two times a day  aspirin enteric coated 81 milliGRAM(s) Oral daily  atorvastatin 20 milliGRAM(s) Oral at bedtime  cyclopentolate 1% Solution 1 Drop(s) Left EYE two times a day  FLUoxetine 20 milliGRAM(s) Oral daily  influenza  Vaccine (HIGH DOSE) 0.7 milliLiter(s) IntraMuscular once  mirtazapine 7.5 milliGRAM(s) Oral daily  nystatin Powder 1 Application(s) Topical two times a day  potassium chloride    Tablet ER 20 milliEquivalent(s) Oral once  prednisoLONE acetate 1% Suspension 1 Drop(s) Left EYE two times a day  triamterene 37.5 mG/hydrochlorothiazide 25 mG Tablet 1 Tablet(s) Oral daily      TELEMETRY: 	    ECG:  	  RADIOLOGY:  OTHER: 	  	  LABS:	 	    CARDIAC MARKERS:    proBNP:   Lipid Profile:   HgA1c:   TSH:       < from: CT Chest No Cont (02.17.24 @ 17:19) >  LUNGS AND AIRWAYS: Impacted right lower lobe distal airways. Similar   elevation of the right hemidiaphragm. Bilateral lower lobe dependent   atelectasis. Similar apically predominant emphysema. Left upper lobe   linear atelectasis. 2 mm right middle lobe nodule (3-76).  PLEURA: No pleural effusion.  MEDIASTINUM AND RASHID: Multiple prominent subcentimeter mediastinal lymph   nodes.  VESSELS: Aortic and coronary artery atherosclerotic changes. Main   pulmonary artery measures up to 4.1 cm suggestive of pulmonary   hypertension.  HEART: Heart size is normal. No pericardial effusion. Aortic valve and   mitral annular calcifications.  CHEST WALL AND LOWER NECK: Within normal limits.  VISUALIZED UPPER ABDOMEN: Cholelithiasis. Small hiatal hernia.  BONES: Degenerative changes.    IMPRESSION:  No pulmonary edema.      Assessment and plan  ---------------------------  88 F with PMH CAD, pulmonary sarcoidosis, COPD (not on home O2), HTN, BIBEMS from [facility] for evaluation of abdominal pain, constipation, vomiting x 4 days.  Patient is unreliable historian and forgetful, answers questions with brief responses.  Has no complaints and denies abdominal pain or constipation, reporting stool output today morning that appeared normal.  Reports 1 episode of vomiting this morning appearing food colored, denies nausea at present time.  Per aide at bedside, patient has chronic constipation but has been straining more than usual over the past 3-4 days and history from nauseated with intermittent retching, only vomiting this morning.  Per further daughter over the phone, there is concern for possible bowel obstruction.  Patient has no history of abdominal surgeries.  pt with hx of htn, sarcoidosis admitted with abdominal pain/ chest cw chf  continue triamterene HCTZ  TTE  dvt prophylaxis  check pro bnp  will consider ct chest no contrast  may add ACE/ARB for increase bp  will adjust cardiac meds  check echo, awaiting ct chest  add Norvasc 5 mg daily  dc nebevilol  severe protein deficiency , nutrition eval  ct chest noted abnormality on chest x ray sec to underlying emphysema/ atelectasis  increase ambulation, incentive spirometry, no objection to dc cardiac wise

## 2024-02-19 NOTE — DIETITIAN INITIAL EVALUATION ADULT - ADD RECOMMEND
1) Continue to monitor intake, weight, labs, GI tolerance, skin integrity  2) Provide food preferences within dietary restrictions when feasible   3) Encourage good PO intake  4) Pt declined diet education at this time

## 2024-02-19 NOTE — DIETITIAN INITIAL EVALUATION ADULT - REASON INDICATOR FOR ASSESSMENT
nutrition consult received for assessment    Source: patient, medical record, previous RD note  Per chart, patient forgetful & disoriented to time. Able to participate in interview; however, limited 2/2 patient agitation

## 2024-02-19 NOTE — DIETITIAN INITIAL EVALUATION ADULT - ORAL INTAKE PTA/DIET HISTORY
Pt resides at the [facility] where meals are provided. States she eats well but did not care to provide diet recall.

## 2024-02-20 LAB
APPEARANCE UR: ABNORMAL
BACTERIA # UR AUTO: ABNORMAL /HPF
BILIRUB UR-MCNC: NEGATIVE — SIGNIFICANT CHANGE UP
COLOR SPEC: ABNORMAL
DIFF PNL FLD: ABNORMAL
GLUCOSE UR QL: NEGATIVE MG/DL — SIGNIFICANT CHANGE UP
KETONES UR-MCNC: NEGATIVE MG/DL — SIGNIFICANT CHANGE UP
LEUKOCYTE ESTERASE UR-ACNC: ABNORMAL
NITRITE UR-MCNC: POSITIVE
PH UR: 5.5 — SIGNIFICANT CHANGE UP (ref 5–8)
PROT UR-MCNC: 30 MG/DL
RBC CASTS # UR COMP ASSIST: ABNORMAL /HPF
SARS-COV-2 RNA SPEC QL NAA+PROBE: SIGNIFICANT CHANGE UP
SP GR SPEC: 1.02 — SIGNIFICANT CHANGE UP (ref 1–1.03)
UROBILINOGEN FLD QL: 0.2 MG/DL — SIGNIFICANT CHANGE UP (ref 0.2–1)
WBC UR QL: SIGNIFICANT CHANGE UP /HPF (ref 0–5)

## 2024-02-20 PROCEDURE — 99232 SBSQ HOSP IP/OBS MODERATE 35: CPT

## 2024-02-20 RX ORDER — NEBIVOLOL HYDROCHLORIDE 5 MG/1
1 TABLET ORAL
Refills: 0 | DISCHARGE

## 2024-02-20 RX ORDER — TRIAMTERENE/HYDROCHLOROTHIAZID 75 MG-50MG
1 TABLET ORAL
Refills: 0 | DISCHARGE

## 2024-02-20 RX ADMIN — Medication 20 MILLIGRAM(S): at 12:14

## 2024-02-20 RX ADMIN — Medication 81 MILLIGRAM(S): at 12:14

## 2024-02-20 RX ADMIN — APIXABAN 2.5 MILLIGRAM(S): 2.5 TABLET, FILM COATED ORAL at 05:54

## 2024-02-20 RX ADMIN — NYSTATIN CREAM 1 APPLICATION(S): 100000 CREAM TOPICAL at 18:23

## 2024-02-20 RX ADMIN — APIXABAN 2.5 MILLIGRAM(S): 2.5 TABLET, FILM COATED ORAL at 18:20

## 2024-02-20 RX ADMIN — Medication 1 DROP(S): at 18:19

## 2024-02-20 RX ADMIN — MIRTAZAPINE 7.5 MILLIGRAM(S): 45 TABLET, ORALLY DISINTEGRATING ORAL at 12:15

## 2024-02-20 RX ADMIN — Medication 3 MILLILITER(S): at 23:21

## 2024-02-20 RX ADMIN — NYSTATIN CREAM 1 APPLICATION(S): 100000 CREAM TOPICAL at 05:53

## 2024-02-20 RX ADMIN — ATORVASTATIN CALCIUM 20 MILLIGRAM(S): 80 TABLET, FILM COATED ORAL at 21:23

## 2024-02-20 RX ADMIN — Medication 1 DROP(S): at 05:51

## 2024-02-20 RX ADMIN — Medication 3 MILLILITER(S): at 18:18

## 2024-02-20 RX ADMIN — Medication 650 MILLIGRAM(S): at 18:21

## 2024-02-20 RX ADMIN — Medication 3 MILLILITER(S): at 05:52

## 2024-02-20 RX ADMIN — Medication 650 MILLIGRAM(S): at 06:30

## 2024-02-20 RX ADMIN — Medication 3 MILLILITER(S): at 12:18

## 2024-02-20 RX ADMIN — AMLODIPINE BESYLATE 5 MILLIGRAM(S): 2.5 TABLET ORAL at 05:50

## 2024-02-20 RX ADMIN — CYCLOPENTOLATE HYDROCHLORIDE 1 DROP(S): 10 SOLUTION/ DROPS OPHTHALMIC at 05:53

## 2024-02-20 RX ADMIN — Medication 1 TABLET(S): at 05:52

## 2024-02-20 RX ADMIN — Medication 650 MILLIGRAM(S): at 05:51

## 2024-02-20 RX ADMIN — TEMAZEPAM 30 MILLIGRAM(S): 15 CAPSULE ORAL at 21:31

## 2024-02-20 RX ADMIN — CYCLOPENTOLATE HYDROCHLORIDE 1 DROP(S): 10 SOLUTION/ DROPS OPHTHALMIC at 18:18

## 2024-02-20 NOTE — PROGRESS NOTE ADULT - SUBJECTIVE AND OBJECTIVE BOX
Follow Up:  Diarrhea    Interval History/ROS:  Overnight: No acute events.  Patient remains afebrile.  Otherwise hemodynamically stable.  Latest labs show no resolved leukocytosis, BMP with renal function within normal limits.  Stool culture with numerous yeast cells.    Patient seen examined at bedside.  Denies any new pain or discomfort.    Allergies  iodinated radiocontrast agents (Unknown)    ANTIMICROBIALS:      OTHER MEDS:  MEDICATIONS  (STANDING):  acetaminophen     Tablet .. 650 every 6 hours PRN  albuterol/ipratropium for Nebulization 3 every 6 hours  amLODIPine   Tablet 5 daily  apixaban 2.5 two times a day  aspirin enteric coated 81 daily  atorvastatin 20 at bedtime  FLUoxetine 20 daily  influenza  Vaccine (HIGH DOSE) 0.7 once  mirtazapine 7.5 daily  temazepam 30 at bedtime PRN  triamterene 37.5 mG/hydrochlorothiazide 25 mG Tablet 1 daily      Vital Signs Last 24 Hrs  T(C): 36.3 (20 Feb 2024 05:55), Max: 36.8 (19 Feb 2024 20:11)  T(F): 97.4 (20 Feb 2024 05:55), Max: 98.2 (19 Feb 2024 20:11)  HR: 67 (20 Feb 2024 05:55) (67 - 90)  BP: 153/72 (20 Feb 2024 05:55) (107/64 - 153/72)  BP(mean): --  RR: 19 (20 Feb 2024 05:55) (18 - 20)  SpO2: 95% (20 Feb 2024 05:55) (92% - 95%)    Parameters below as of 20 Feb 2024 05:55  Patient On (Oxygen Delivery Method): nasal cannula  O2 Flow (L/min): 2      PHYSICAL EXAM:  Constitutional: non-toxic, no distress  Cardiovascular:   normal S1, S2, no murmur, RRR  Respiratory:  clear BS bilaterally, no wheezes, no rales  GI:  soft, non-tender, nondistended  Musculoskeletal:  no BLE edema  Neurologic: awake and alert; +[place]  Psychiatric:  awake, alert, appropriate mood                        MICROBIOLOGY:  v    Culture - Stool (collected 16 Feb 2024 07:29)  Source: .Stool  Final Report (18 Feb 2024 08:21):    Numerous Yeast like cells isolated    No enteric pathogens isolated.    (Stool culture examined for Salmonella,    Shigella, Campylobacter, Aeromonas, Plesiomonas,    Vibrio, E.coli O157 and Yersinia)    No enteric gram negative rods isolated                    RADIOLOGY:  Imaging reviewed

## 2024-02-20 NOTE — PROGRESS NOTE ADULT - SUBJECTIVE AND OBJECTIVE BOX
BISHNU SEGURA  MRN#: [id number]  Subjective:   pulmonary progress note  : sarcoidosis ,COPD , Chronic respiratory failure on home 02    date of service is 2/20/2024   88 F        h/o   CAD,   pulmonary sarcoidosis, COPD (not on home O2), HTN,       BIBEMS from [facility] for evaluation of abdominal pain, constipation, vomiting x 4 days, no hematemesis no fever no aspiration or SOB , no diarrhea    .  pt  is   forgetful,. had  vomiting this morning    p er aide at bedside, patient has chronic constipation CT scan of the abdomen shows sever hydroureter,  moderate left hydronephrosis , Diverticulitis sigmoid colon , vaginal pessary complete L 4 fracture      has no history of abdominal surgeries S/P implanted Loop recorder  (08 Feb 2024 19:29), Id consultation appreciated  started on Zosyn , no new C/O no fever no more vomiting no SOB , acceptable 02 saturation , condition is the same no new events BP is not well controlled , 02 saturation is 93% at rest no SOB or chest pain reported , ID follow up appreciated continue ABX , no fever no SOB or respiratory distress acceptable 02 saturation , on Zosyn for Diverticulitis , OBG, MD noted vaginal pessary , causing ureteral obstruction, recommended removal , no SOB at rest , S/P pessary removal at bed side to follow yp with urology ,continue on Antibiotics , no SOB oe desaturation . called several time by the floor NP , for SOB and desaturation patient was in acute hypoxic respiratory failure , patient Blood pressure was 190/ 103, tachycardia , chest x ray shoe acute CHF given Lasix with good response , BP is improving < BNP is pending   , as well as echocardiogram , feeling better on 02 BNP is very elevated , Echo is still pending ,BP is better controlled , echocardiogram is normal EF , breathing is better on 02, no over night events comfortable no New C/O slleping on round ,no reported C/O , BP is well controlled  , holding good 02 saturation at rest mild coughing no over night events     PAST MEDICAL & SURGICAL HISTORY:  HTN (hypertension)      Hypothyroidism      Osteoporosis      CAD (coronary artery disease)  Sarcoidosis   COPD   Chronic respiratory failure on home 02           OBJECTIVE:  ICU Vital Signs Last 24 Hrs  T(C): 37.2 (09 Feb 2024 16:00), Max: 37.2 (09 Feb 2024 16:00)  T(F): 99 (09 Feb 2024 16:00), Max: 99 (09 Feb 2024 16:00)  HR: 75 (09 Feb 2024 16:00) (68 - 75)  BP: 147/75 (09 Feb 2024 16:00) (129/78 - 157/67)  BP(mean): --  ABP: --  ABP(mean): --  RR: 16 (09 Feb 2024 16:00) (16 - 20)  SpO2: 94% (09 Feb 2024 16:00) (93% - 95%)    O2 Parameters below as of 09 Feb 2024 16:00  Patient On (Oxygen Delivery Method): room air            02-08 @ 07:01  -  02-09 @ 07:00  --------------------------------------------------------  IN: 0 mL / OUT: 100 mL / NET: -100 mL    02-09 @ 07:01  -  02-09 @ 21:00  --------------------------------------------------------  IN: 1045 mL / OUT: 950 mL / NET: 95 mL      PHYSICAL EXAM:Daily Height in cm: 157.48 (08 Feb 2024 12:42)  Elderly morbid obese   female on 02 2 liter 02 saturation is 94% , very anxious female   Daily   HEENT:     + NCAT  + EOMI  - Conjuctival edema   - Icterus   - Thrush   - ETT  - NGT/OGT  Neck:         + FROM    + JVD     - Nodes     - Masses    + Mid-line trachea   - Tracheostomy  Chest:       kyphosis , implanted loop reordered   Lungs:          + CTA   - Rhonchi    +Rales    - Wheezing  +Decreased BS   - Dullness R L  Cardiac:       + S1 + S2    + RRR   - Irregular   - S3  - S4    - Murmurs   - Rub   - Hamman’s sign   Abdomen:    + BS     + Soft    + tender LLquadrant noted  Organomegaly  - PEG  Extremities:   - Cyanosis U/L   - Clubbing  U/L  - LE/UE Edema   + Capillary refill    + Pulses   Neuro:        + Awake   +  Alert   - Confused   - Lethargic   - Sedated   - Generalized Weakness  Skin:        - Rashes    - Erythema   + Normal incisions   + IV sites intact  - Sacral decubitus    LABS:                 HOSPITAL MEDICATIONS: All mediciations reviewed and analyzed  MEDICATIONS  (STANDING):  albuterol    90 MICROgram(s) HFA Inhaler 2 Puff(s) Inhalation every 6 hours  aspirin enteric coated 81 milliGRAM(s) Oral daily  atorvastatin 20 milliGRAM(s) Oral at bedtime  cyclopentolate 1% Solution 1 Drop(s) Left EYE two times a day  FLUoxetine 20 milliGRAM(s) Oral daily  heparin   Injectable 5000 Unit(s) SubCutaneous every 12 hours  influenza  Vaccine (HIGH DOSE) 0.7 milliLiter(s) IntraMuscular once  mirtazapine 7.5 milliGRAM(s) Oral daily  piperacillin/tazobactam IVPB.. 3.375 Gram(s) IV Intermittent every 8 hours  prednisoLONE acetate 1% Suspension 1 Drop(s) Left EYE two times a day  sodium chloride 0.9%. 1000 milliLiter(s) (60 mL/Hr) IV Continuous <Continuous>    MEDICATIONS  (PRN):  acetaminophen     Tablet .. 650 milliGRAM(s) Oral every 6 hours PRN Mild Pain (1 - 3)  temazepam 30 milliGRAM(s) Oral at bedtime PRN Insomnia    LABS: All Lab data reviewed and analyzed                         11.7   9.01  )-----------( 195      ( 17 Feb 2024 06:57 )             37.2                 02-17    144  |  107  |  16  ----------------------------<  86  3.8   |  24  |  0.80    Ca    9.5      17 Feb 2024 06:57  Phos  3.6     02-17  Mg     2.2     02-17      Ca    9.1      16 Feb 2024 07:30      Ca    9.0      12 Feb 2024 18:52        TPro  7.4  /  Alb  4.5  /  TBili  0.4  /  DBili  x   /  AST  16  /  ALT  12  /  AlkPhos  191<H>  02-08     LIVER FUNCTIONS - ( 08 Feb 2024 14:17 )  Alb: 4.5 g/dL / Pro: 7.4 g/dL / ALK PHOS: 191 U/L / ALT: 12 U/L / AST: 16 U/L / GGT: x           RADIOLOGY: - Reviewed and analyzed

## 2024-02-20 NOTE — PROGRESS NOTE ADULT - ASSESSMENT
Assessment and Plan:   S/P acute congestive heart failure   S/P acute hypoxic respiratory failure   acute hypertensive urgency   respond to Lasix   considered LABA-LAMA , ANORA  one puff daily   2 Echocardiogram normal EF   BNP is very elevated   daily BMP for hypernatremia    Acute abdominal pain   Acute sigmoid diverticulitis S/P  Zosyn   Left hydronephrosis  S/P pessary removal   H/O sarcoidosis    COPD and chronic respiratory failure oh home 02    continue prednisone   continue nebulizer treatement PRN   PPI   DVT prophylaxis   care discussed with floor BMP

## 2024-02-20 NOTE — PROGRESS NOTE ADULT - SUBJECTIVE AND OBJECTIVE BOX
---___---___---___---___---___---___ ---___---___---___---___---___---___---___---___---                  M E D I C A L   A T T E N D I N G   P R O G R E S S   N O T E  ---___---___---___---___---___---___ ---___---___---___---___---___---___---___---___---        ================================================    ++CHIEF COMPLAINT:   Patient is a 88y old  Female who presents with a chief complaint of abd  pain/  vomiting (2024 13:49)      Diverticulitis of intestine without perforation or abscess without bleeding      ---___---___---___---___---___---  PAST MEDICAL / Surgical  HISTORY:  PAST MEDICAL & SURGICAL HISTORY:  HTN (hypertension)      Hypothyroidism      Osteoporosis      CAD (coronary artery disease)          ---___---___---___---___---___---  FAMILY HISTORY:   FAMILY HISTORY:        ---___---___---___---___---___---  ALLERGIES:   Allergies    iodinated radiocontrast agents (Unknown)    Intolerances        ---___---___---___---___---___---  MEDICATIONS:  MEDICATIONS  (STANDING):  albuterol/ipratropium for Nebulization 3 milliLiter(s) Nebulizer every 6 hours  amLODIPine   Tablet 5 milliGRAM(s) Oral daily  apixaban 2.5 milliGRAM(s) Oral two times a day  aspirin enteric coated 81 milliGRAM(s) Oral daily  atorvastatin 20 milliGRAM(s) Oral at bedtime  cyclopentolate 1% Solution 1 Drop(s) Left EYE two times a day  FLUoxetine 20 milliGRAM(s) Oral daily  influenza  Vaccine (HIGH DOSE) 0.7 milliLiter(s) IntraMuscular once  mirtazapine 7.5 milliGRAM(s) Oral daily  nystatin Powder 1 Application(s) Topical two times a day  prednisoLONE acetate 1% Suspension 1 Drop(s) Left EYE two times a day  triamterene 37.5 mG/hydrochlorothiazide 25 mG Tablet 1 Tablet(s) Oral daily    MEDICATIONS  (PRN):  acetaminophen     Tablet .. 650 milliGRAM(s) Oral every 6 hours PRN Mild Pain (1 - 3)  temazepam 30 milliGRAM(s) Oral at bedtime PRN Insomnia      ---___---___---___---___---___---  REVIEW OF SYSTEM:    GEN: no fever, no chills, no pain  RESP: no SOB, no cough, no sputum  CVS: no chest pain, no palpitations, no edema  GI: no abdominal pain, no nausea, no vomiting, no constipation, no diarrhea  : no dysurea, no frequency, no hematurea  Neuro: no headache, no dizziness  PSYCH: no anxiety, no depression  Derm : no itching, no rash    ---___---___---___---___---___---  VITAL SIGNS:  88y , CAPILLARY BLOOD GLUCOSE        T(C): 36.2 (24 @ 20:07), Max: 36.3 (24 @ 05:55)  HR: 92 (24 @ 20:07) (67 - 92)  BP: 102/61 (24 @ 20:07) (102/61 - 153/72)  RR: 19 (24 @ 20:07) (18 - 19)  SpO2: 93% (24 @ 20:07) (92% - 95%)  ---___---___---___---___---___---  PHYSICAL EXAM:    GEN: A&O X 3 , NAD , comfortable  HEENT: NCAT, PERRL, MMM, hearing intact  Neck: supple , no JVD  CVS: S1S2 , regular , No M/R/G appreciated  PULM: CTA B/L,  no W/R/R appreciated  ABD.: soft. non tender, non distended,  bowel sounds present  Extrem: intact pulses , no edema   Derm: No rash , no ecchymoses  PSYCH : normal mood,  no delusion not anxious     ---___---___---___---___---___---            LAB AND IMAGING:                                              Urinalysis Basic - ( 2024 16:17 )    Color: Red / Appearance: Turbid / S.018 / pH: x  Gluc: x / Ketone: Negative mg/dL  / Bili: Negative / Urobili: 0.2 mg/dL   Blood: x / Protein: 30 mg/dL / Nitrite: Positive   Leuk Esterase: Large / RBC: Too Numerous to count /HPF / WBC 10-15 /HPF   Sq Epi: x / Non Sq Epi: x / Bacteria: Moderate /HPF        [All pertinent / recent Imaging reviewed]         ---___---___---___---___---___---___ ---___---___---___---___---                         A S S E S S M E N T   A N D   P L A N :      HEALTH ISSUES - PROBLEM Dx:    copd improeving on room air now 93 %  continue nebulizers   htn  continuew norvasc and triamterene hctz  history of c diff  monitor of abx   depression  on fluoxetine  dvt prophylaxis  on eilquis   hld on  statin        __  Thank you,  Garry Hermosillo  [phone]

## 2024-02-20 NOTE — PROGRESS NOTE ADULT - ASSESSMENT
88-yo F w/ PMH of CAD and COPD, admitted w/ CTAP suggestive of diverticulitis but no abscess. S/p ceftriaxone (2/8) and Zosyn (2/8-15) Previously w/ concerns for diarrhea, now stopped.    #Diverticulitis  #Abnormal CT scan  #Pyuria and bacteriuria w/ hematuria  #Diarrhea  #Leukocytosis  #Hypoxia  - Admitted with diverticulitis w/o abscess, s/p Zosyn. Diarrhea and leukocytosis improved.  - GIP neg.   - Pyuria and bacteriuria w/ hematuria after pessary removal. No dysuria. Patient was anyway covered with ABx for diverticulitis. C/t monitor    Recommendations  Monitor off antibiotics  Monitor stool output, if diarrhea recurs obtain C.diff panel  Overall, Poor prognosis.  Wean off oxygen as tolerated.  Follow fever curve and WBC count    Joshua La MD  Attending Physician  Division of Infectious Diseases  Department of Medicine    Please contact through MS Teams message.  Office: [phone] (after 5 PM or weekend)   88-yo F w/ PMH of CAD and COPD, admitted w/ CTAP suggestive of diverticulitis but no abscess. S/p ceftriaxone (2/8) and Zosyn (2/8-15) Previously w/ concerns for diarrhea, now stopped.    #Diverticulitis  #Abnormal CT scan  #Pyuria and bacteriuria w/ hematuria  #Diarrhea  #Leukocytosis  #Hypoxia  - Admitted with diverticulitis w/o abscess, s/p Zosyn. Diarrhea and leukocytosis improved.  - GIP neg.   - Pyuria and bacteriuria w/ hematuria after pessary removal. No dysuria. Patient was anyway covered with ABx for diverticulitis. C/t monitor    Recommendations  Monitor off antibiotics  Monitor stool output, if diarrhea recurs obtain C.diff panel  Overall, Poor prognosis.  Wean off oxygen as tolerated.  Follow fever curve and WBC count    ID to sign off. Please contact as issues arise.    Joshua La MD  Attending Physician  Division of Infectious Diseases  Department of Medicine    Please contact through MS Teams message.  Office: [phone] (after 5 PM or weekend)

## 2024-02-21 VITALS
TEMPERATURE: 98 F | RESPIRATION RATE: 18 BRPM | SYSTOLIC BLOOD PRESSURE: 110 MMHG | HEART RATE: 91 BPM | OXYGEN SATURATION: 91 % | DIASTOLIC BLOOD PRESSURE: 66 MMHG

## 2024-02-21 PROCEDURE — 87040 BLOOD CULTURE FOR BACTERIA: CPT

## 2024-02-21 PROCEDURE — 80053 COMPREHEN METABOLIC PANEL: CPT

## 2024-02-21 PROCEDURE — 85027 COMPLETE CBC AUTOMATED: CPT

## 2024-02-21 PROCEDURE — 97116 GAIT TRAINING THERAPY: CPT

## 2024-02-21 PROCEDURE — 97530 THERAPEUTIC ACTIVITIES: CPT

## 2024-02-21 PROCEDURE — 85025 COMPLETE CBC W/AUTO DIFF WBC: CPT

## 2024-02-21 PROCEDURE — 81001 URINALYSIS AUTO W/SCOPE: CPT

## 2024-02-21 PROCEDURE — 74018 RADEX ABDOMEN 1 VIEW: CPT

## 2024-02-21 PROCEDURE — 87045 FECES CULTURE AEROBIC BACT: CPT

## 2024-02-21 PROCEDURE — 84100 ASSAY OF PHOSPHORUS: CPT

## 2024-02-21 PROCEDURE — 36415 COLL VENOUS BLD VENIPUNCTURE: CPT

## 2024-02-21 PROCEDURE — 87086 URINE CULTURE/COLONY COUNT: CPT

## 2024-02-21 PROCEDURE — 96374 THER/PROPH/DIAG INJ IV PUSH: CPT

## 2024-02-21 PROCEDURE — 80048 BASIC METABOLIC PNL TOTAL CA: CPT

## 2024-02-21 PROCEDURE — 71045 X-RAY EXAM CHEST 1 VIEW: CPT

## 2024-02-21 PROCEDURE — 83735 ASSAY OF MAGNESIUM: CPT

## 2024-02-21 PROCEDURE — 83880 ASSAY OF NATRIURETIC PEPTIDE: CPT

## 2024-02-21 PROCEDURE — 87046 STOOL CULTR AEROBIC BACT EA: CPT

## 2024-02-21 PROCEDURE — 94640 AIRWAY INHALATION TREATMENT: CPT

## 2024-02-21 PROCEDURE — 74176 CT ABD & PELVIS W/O CONTRAST: CPT | Mod: MA

## 2024-02-21 PROCEDURE — 99285 EMERGENCY DEPT VISIT HI MDM: CPT | Mod: 25

## 2024-02-21 PROCEDURE — 87507 IADNA-DNA/RNA PROBE TQ 12-25: CPT

## 2024-02-21 PROCEDURE — 87635 SARS-COV-2 COVID-19 AMP PRB: CPT

## 2024-02-21 PROCEDURE — 71250 CT THORAX DX C-: CPT

## 2024-02-21 PROCEDURE — 76770 US EXAM ABDO BACK WALL COMP: CPT

## 2024-02-21 PROCEDURE — 97110 THERAPEUTIC EXERCISES: CPT

## 2024-02-21 PROCEDURE — 97162 PT EVAL MOD COMPLEX 30 MIN: CPT

## 2024-02-21 PROCEDURE — 83690 ASSAY OF LIPASE: CPT

## 2024-02-21 RX ADMIN — NYSTATIN CREAM 1 APPLICATION(S): 100000 CREAM TOPICAL at 05:24

## 2024-02-21 RX ADMIN — Medication 3 MILLILITER(S): at 11:16

## 2024-02-21 RX ADMIN — Medication 650 MILLIGRAM(S): at 06:17

## 2024-02-21 RX ADMIN — Medication 20 MILLIGRAM(S): at 11:17

## 2024-02-21 RX ADMIN — Medication 81 MILLIGRAM(S): at 11:17

## 2024-02-21 RX ADMIN — Medication 1 TABLET(S): at 05:25

## 2024-02-21 RX ADMIN — Medication 1 DROP(S): at 05:26

## 2024-02-21 RX ADMIN — Medication 650 MILLIGRAM(S): at 10:57

## 2024-02-21 RX ADMIN — Medication 650 MILLIGRAM(S): at 03:22

## 2024-02-21 RX ADMIN — CYCLOPENTOLATE HYDROCHLORIDE 1 DROP(S): 10 SOLUTION/ DROPS OPHTHALMIC at 05:26

## 2024-02-21 RX ADMIN — MIRTAZAPINE 7.5 MILLIGRAM(S): 45 TABLET, ORALLY DISINTEGRATING ORAL at 11:18

## 2024-02-21 RX ADMIN — APIXABAN 2.5 MILLIGRAM(S): 2.5 TABLET, FILM COATED ORAL at 05:25

## 2024-02-21 RX ADMIN — Medication 3 MILLILITER(S): at 05:25

## 2024-02-21 RX ADMIN — AMLODIPINE BESYLATE 5 MILLIGRAM(S): 2.5 TABLET ORAL at 05:25

## 2024-02-21 NOTE — PROGRESS NOTE ADULT - REASON FOR ADMISSION
abd  pain/  vomiting

## 2024-02-21 NOTE — PROGRESS NOTE ADULT - SUBJECTIVE AND OBJECTIVE BOX
BISHNU SEGURA  MRN#: [id number]  Subjective:   pulmonary progress note  : sarcoidosis ,COPD , Chronic respiratory failure on home 02    date of service is 2/21/2024   88 F        h/o   CAD,   pulmonary sarcoidosis, COPD (not on home O2), HTN,       BIBEMS from [facility] for evaluation of abdominal pain, constipation, vomiting x 4 days, no hematemesis no fever no aspiration or SOB , no diarrhea    .  pt  is   forgetful,. had  vomiting this morning    p er aide at bedside, patient has chronic constipation CT scan of the abdomen shows sever hydroureter,  moderate left hydronephrosis , Diverticulitis sigmoid colon , vaginal pessary complete L 4 fracture      has no history of abdominal surgeries S/P implanted Loop recorder  (08 Feb 2024 19:29), Id consultation appreciated  started on Zosyn , no new C/O no fever no more vomiting no SOB , acceptable 02 saturation , condition is the same no new events BP is not well controlled , 02 saturation is 93% at rest no SOB or chest pain reported , ID follow up appreciated continue ABX , no fever no SOB or respiratory distress acceptable 02 saturation , on Zosyn for Diverticulitis , OBG, MD noted vaginal pessary , causing ureteral obstruction, recommended removal , no SOB at rest , S/P pessary removal at bed side to follow yp with urology ,continue on Antibiotics , no SOB oe desaturation . called several time by the floor NP , for SOB and desaturation patient was in acute hypoxic respiratory failure , patient Blood pressure was 190/ 103, tachycardia , chest x ray shoe acute CHF given Lasix with good response , BP is improving < BNP is pending   , as well as echocardiogram , feeling better on 02 BNP is very elevated , Echo is still pending ,BP is better controlled , echocardiogram is normal EF , breathing is better on 02, no over night events comfortable no New C/O slleping on round ,no reported C/O , BP is well controlled  , holding good 02 saturation at rest mild coughing no over night events , no new events no SOB at rest no fever acceptable 02 saturation     PAST MEDICAL & SURGICAL HISTORY:  HTN (hypertension)      Hypothyroidism      Osteoporosis      CAD (coronary artery disease)  Sarcoidosis   COPD   Chronic respiratory failure on home 02           OBJECTIVE:  ICU Vital Signs Last 24 Hrs  T(C): 37.2 (09 Feb 2024 16:00), Max: 37.2 (09 Feb 2024 16:00)  T(F): 99 (09 Feb 2024 16:00), Max: 99 (09 Feb 2024 16:00)  HR: 75 (09 Feb 2024 16:00) (68 - 75)  BP: 147/75 (09 Feb 2024 16:00) (129/78 - 157/67)  BP(mean): --  ABP: --  ABP(mean): --  RR: 16 (09 Feb 2024 16:00) (16 - 20)  SpO2: 94% (09 Feb 2024 16:00) (93% - 95%)    O2 Parameters below as of 09 Feb 2024 16:00  Patient On (Oxygen Delivery Method): room air            02-08 @ 07:01  -  02-09 @ 07:00  --------------------------------------------------------  IN: 0 mL / OUT: 100 mL / NET: -100 mL    02-09 @ 07:01  -  02-09 @ 21:00  --------------------------------------------------------  IN: 1045 mL / OUT: 950 mL / NET: 95 mL      PHYSICAL EXAM:Daily Height in cm: 157.48 (08 Feb 2024 12:42)  Elderly morbid obese   female on 02 2 liter 02 saturation is 94% , very anxious female   Daily   HEENT:     + NCAT  + EOMI  - Conjuctival edema   - Icterus   - Thrush   - ETT  - NGT/OGT  Neck:         + FROM    + JVD     - Nodes     - Masses    + Mid-line trachea   - Tracheostomy  Chest:       kyphosis , implanted loop reordered   Lungs:          + CTA   - Rhonchi    +Rales    - Wheezing  +Decreased BS   - Dullness R L  Cardiac:       + S1 + S2    + RRR   - Irregular   - S3  - S4    - Murmurs   - Rub   - Hamman’s sign   Abdomen:    + BS     + Soft    + tender LLquadrant noted  Organomegaly  - PEG  Extremities:   - Cyanosis U/L   - Clubbing  U/L  - LE/UE Edema   + Capillary refill    + Pulses   Neuro:        + Awake   +  Alert   - Confused   - Lethargic   - Sedated   - Generalized Weakness  Skin:        - Rashes    - Erythema   + Normal incisions   + IV sites intact  - Sacral decubitus    LABS:                 HOSPITAL MEDICATIONS: All mediciations reviewed and analyzed  MEDICATIONS  (STANDING):  albuterol    90 MICROgram(s) HFA Inhaler 2 Puff(s) Inhalation every 6 hours  aspirin enteric coated 81 milliGRAM(s) Oral daily  atorvastatin 20 milliGRAM(s) Oral at bedtime  cyclopentolate 1% Solution 1 Drop(s) Left EYE two times a day  FLUoxetine 20 milliGRAM(s) Oral daily  heparin   Injectable 5000 Unit(s) SubCutaneous every 12 hours  influenza  Vaccine (HIGH DOSE) 0.7 milliLiter(s) IntraMuscular once  mirtazapine 7.5 milliGRAM(s) Oral daily  piperacillin/tazobactam IVPB.. 3.375 Gram(s) IV Intermittent every 8 hours  prednisoLONE acetate 1% Suspension 1 Drop(s) Left EYE two times a day  sodium chloride 0.9%. 1000 milliLiter(s) (60 mL/Hr) IV Continuous <Continuous>    MEDICATIONS  (PRN):  acetaminophen     Tablet .. 650 milliGRAM(s) Oral every 6 hours PRN Mild Pain (1 - 3)  temazepam 30 milliGRAM(s) Oral at bedtime PRN Insomnia    LABS: All Lab data reviewed and analyzed                         11.7   9.01  )-----------( 195      ( 17 Feb 2024 06:57 )             37.2                 02-17    144  |  107  |  16  ----------------------------<  86  3.8   |  24  |  0.80    Ca    9.5      17 Feb 2024 06:57  Phos  3.6     02-17  Mg     2.2     02-17      Ca    9.1      16 Feb 2024 07:30      Ca    9.0      12 Feb 2024 18:52        TPro  7.4  /  Alb  4.5  /  TBili  0.4  /  DBili  x   /  AST  16  /  ALT  12  /  AlkPhos  191<H>  02-08     LIVER FUNCTIONS - ( 08 Feb 2024 14:17 )  Alb: 4.5 g/dL / Pro: 7.4 g/dL / ALK PHOS: 191 U/L / ALT: 12 U/L / AST: 16 U/L / GGT: x           RADIOLOGY: - Reviewed and analyzed

## 2024-02-21 NOTE — PROGRESS NOTE ADULT - SUBJECTIVE AND OBJECTIVE BOX
Date of Service: 02-21-24 @ 06:17           CARDIOLOGY     PROGRESS  NOTE   ________________________________________________    CHIEF COMPLAINT:Patient is a 88y old  Female who presents with a chief complaint of abd  pain/  vomiting (20 Feb 2024 22:01)  no complain  	  REVIEW OF SYSTEMS:  CONSTITUTIONAL: No fever, weight loss, or fatigue  EYES: No eye pain, visual disturbances, or discharge  ENT:  No difficulty hearing, tinnitus, vertigo; No sinus or throat pain  NECK: No pain or stiffness  RESPIRATORY: No cough, wheezing, chills or hemoptysis; No Shortness of Breath  CARDIOVASCULAR: No chest pain, palpitations, passing out, dizziness, or leg swelling  GASTROINTESTINAL: No abdominal or epigastric pain. No nausea, vomiting, or hematemesis; No diarrhea or constipation. No melena or hematochezia.  GENITOURINARY: No dysuria, frequency, hematuria, or incontinence  NEUROLOGICAL: No headaches, memory loss, loss of strength, numbness, or tremors  SKIN: No itching, burning, rashes, or lesions   LYMPH Nodes: No enlarged glands  ENDOCRINE: No heat or cold intolerance; No hair loss  MUSCULOSKELETAL: No joint pain or swelling; No muscle, back, or extremity pain  PSYCHIATRIC: No depression, anxiety, mood swings, or difficulty sleeping  HEME/LYMPH: No easy bruising, or bleeding gums  ALLERGY AND IMMUNOLOGIC: No hives or eczema	    [x ] All others negative	  [ ] Unable to obtain    PHYSICAL EXAM:  T(C): 36.6 (02-21-24 @ 04:52), Max: 36.6 (02-21-24 @ 04:52)  HR: 74 (02-21-24 @ 04:52) (74 - 92)  BP: 146/69 (02-21-24 @ 04:52) (102/61 - 146/69)  RR: 18 (02-21-24 @ 04:52) (18 - 19)  SpO2: 93% (02-21-24 @ 04:52) (92% - 93%)  Wt(kg): --  I&O's Summary    19 Feb 2024 07:01  -  20 Feb 2024 07:00  --------------------------------------------------------  IN: 480 mL / OUT: 0 mL / NET: 480 mL    20 Feb 2024 07:01  -  21 Feb 2024 06:17  --------------------------------------------------------  IN: 400 mL / OUT: 350 mL / NET: 50 mL        Appearance: Normal	  HEENT:   Normal oral mucosa, PERRL, EOMI	  Lymphatic: No lymphadenopathy  Cardiovascular: Normal S1 S2, No JVD, + murmurs, No edema  Respiratory: rhonchi  Psychiatry: A & O x 3, Mood & affect appropriate  Gastrointestinal:  Soft, Non-tender, + BS	  Skin: No rashes, No ecchymoses, No cyanosis	  Neurologic: Non-focal  Extremities: Normal range of motion, No clubbing, cyanosis or edema  Vascular: Peripheral pulses palpable 2+ bilaterally    MEDICATIONS  (STANDING):  albuterol/ipratropium for Nebulization 3 milliLiter(s) Nebulizer every 6 hours  amLODIPine   Tablet 5 milliGRAM(s) Oral daily  apixaban 2.5 milliGRAM(s) Oral two times a day  aspirin enteric coated 81 milliGRAM(s) Oral daily  atorvastatin 20 milliGRAM(s) Oral at bedtime  cyclopentolate 1% Solution 1 Drop(s) Left EYE two times a day  FLUoxetine 20 milliGRAM(s) Oral daily  influenza  Vaccine (HIGH DOSE) 0.7 milliLiter(s) IntraMuscular once  mirtazapine 7.5 milliGRAM(s) Oral daily  nystatin Powder 1 Application(s) Topical two times a day  prednisoLONE acetate 1% Suspension 1 Drop(s) Left EYE two times a day  triamterene 37.5 mG/hydrochlorothiazide 25 mG Tablet 1 Tablet(s) Oral daily      TELEMETRY: 	    ECG:  	  RADIOLOGY:  OTHER: 	  	  LABS:	 	    CARDIAC MARKERS:        proBNP:   Lipid Profile:   HgA1c:   TSH:     Notes    Notes: Case discussed via Interdisciplinary Rounds. Patient is from [facility]. Facility contacted # [phone]. Requested completed short  form and P/T notes. Completed and faxed. Awaiting response. Also spoke with  daughter, Deborah # [phone]. Above discharge plan discussed. Daughter in  agreement with same. Will continue to follow.    Assessment and plan  ---------------------------  88 F with PMH CAD, pulmonary sarcoidosis, COPD (not on home O2), HTN, BIBEMS from [facility] for evaluation of abdominal pain, constipation, vomiting x 4 days.  Patient is unreliable historian and forgetful, answers questions with brief responses.  Has no complaints and denies abdominal pain or constipation, reporting stool output today morning that appeared normal.  Reports 1 episode of vomiting this morning appearing food colored, denies nausea at present time.  Per aide at bedside, patient has chronic constipation but has been straining more than usual over the past 3-4 days and history from nauseated with intermittent retching, only vomiting this morning.  Per further daughter over the phone, there is concern for possible bowel obstruction.  Patient has no history of abdominal surgeries.  pt with hx of htn, sarcoidosis admitted with abdominal pain/ chest cw chf  continue triamterene HCTZ  dvt prophylaxis  check pro bnp  will consider ct chest no contrast  may add ACE/ARB for increase bp  will adjust cardiac meds  check echo, awaiting ct chest  add Norvasc 5 mg daily  dc nebevilol  severe protein deficiency , nutrition eval  ct chest noted abnormality on chest x ray sec to underlying emphysema/ atelectasis  increase ambulation, incentive spirometry, no objection to dc cardiac wise  may cancel TTE not a candidate for any invasive cardiac dow  continue current meds, fu lytes as out pt

## 2024-02-21 NOTE — PROGRESS NOTE ADULT - PROVIDER SPECIALTY LIST ADULT
Cardiology
Cardiology
Family Medicine
Infectious Disease
Urology
Cardiology
Cardiology
Family Medicine
Infectious Disease
Pulmonology
Cardiology
Family Medicine
Infectious Disease
Infectious Disease
Internal Medicine
Pulmonology
Urology
Urology
Pulmonology

## 2024-02-21 NOTE — PROGRESS NOTE ADULT - NUTRITIONAL ASSESSMENT
This patient has been assessed with a concern for Malnutrition and has been determined to have a diagnosis/diagnoses of Mild protein-calorie malnutrition.    This patient is being managed with:   Diet Low Fiber-  Kosher  Entered: Feb 9 2024  1:56PM  

## 2024-02-22 ENCOUNTER — NON-APPOINTMENT (OUTPATIENT)
Age: 89
End: 2024-02-22

## 2024-02-23 NOTE — CHART NOTE - NSCHARTNOTEFT_GEN_A_CORE
Patient was outreached but did not answer. A voicemail was left for the patient to return our call. ON [phone] [facility] COULDNT DO SO AT [phone] 02/23/24

## 2024-02-29 ENCOUNTER — NON-APPOINTMENT (OUTPATIENT)
Age: 89
End: 2024-02-29

## 2024-03-07 ENCOUNTER — APPOINTMENT (OUTPATIENT)
Dept: UROLOGY | Facility: CLINIC | Age: 89
End: 2024-03-07
Payer: MEDICARE

## 2024-03-07 VITALS
TEMPERATURE: 98 F | SYSTOLIC BLOOD PRESSURE: 113 MMHG | WEIGHT: 102 LBS | HEART RATE: 83 BPM | BODY MASS INDEX: 18.77 KG/M2 | DIASTOLIC BLOOD PRESSURE: 56 MMHG | RESPIRATION RATE: 17 BRPM | HEIGHT: 62 IN

## 2024-03-07 DIAGNOSIS — R31.0 GROSS HEMATURIA: ICD-10-CM

## 2024-03-07 DIAGNOSIS — Z63.4 DISAPPEARANCE AND DEATH OF FAMILY MEMBER: ICD-10-CM

## 2024-03-07 DIAGNOSIS — Z78.9 OTHER SPECIFIED HEALTH STATUS: ICD-10-CM

## 2024-03-07 DIAGNOSIS — N13.30 UNSPECIFIED HYDRONEPHROSIS: ICD-10-CM

## 2024-03-07 DIAGNOSIS — Z84.1 FAMILY HISTORY OF DISORDERS OF KIDNEY AND URETER: ICD-10-CM

## 2024-03-07 DIAGNOSIS — Z46.89 ENCOUNTER FOR FITTING AND ADJUSTMENT OF OTHER SPECIFIED DEVICES: ICD-10-CM

## 2024-03-07 PROCEDURE — 51798 US URINE CAPACITY MEASURE: CPT

## 2024-03-07 PROCEDURE — 99205 OFFICE O/P NEW HI 60 MIN: CPT

## 2024-03-07 PROCEDURE — G2211 COMPLEX E/M VISIT ADD ON: CPT

## 2024-03-07 RX ORDER — TEMAZEPAM 30 MG/1
30 CAPSULE ORAL
Refills: 0 | Status: ACTIVE | COMMUNITY

## 2024-03-07 RX ORDER — CHLORHEXIDINE GLUCONATE 4 %
325 (65 FE) LIQUID (ML) TOPICAL
Refills: 0 | Status: ACTIVE | COMMUNITY

## 2024-03-07 RX ORDER — ASPIRIN 81 MG/1
81 TABLET ORAL
Refills: 0 | Status: ACTIVE | COMMUNITY

## 2024-03-07 RX ORDER — ACETAMINOPHEN 500 MG/1
500 TABLET ORAL
Refills: 0 | Status: ACTIVE | COMMUNITY

## 2024-03-07 RX ORDER — MIRTAZAPINE 7.5 MG/1
7.5 TABLET, FILM COATED ORAL
Refills: 0 | Status: ACTIVE | COMMUNITY

## 2024-03-07 RX ORDER — TRIAMTERENE AND HYDROCHLOROTHIAZIDE 25; 37.5 MG/1; MG/1
37.5-25 TABLET ORAL
Refills: 0 | Status: ACTIVE | COMMUNITY

## 2024-03-07 RX ORDER — TRAZODONE HYDROCHLORIDE 50 MG/1
50 TABLET ORAL
Refills: 0 | Status: ACTIVE | COMMUNITY

## 2024-03-07 RX ORDER — ATORVASTATIN CALCIUM 20 MG/1
20 TABLET, FILM COATED ORAL
Refills: 0 | Status: ACTIVE | COMMUNITY

## 2024-03-07 RX ORDER — MULTIVIT-MIN/FOLIC/VIT K/LYCOP 400-300MCG
25 MCG TABLET ORAL
Refills: 0 | Status: ACTIVE | COMMUNITY

## 2024-03-07 RX ORDER — FLUOXETINE HYDROCHLORIDE 20 MG/1
20 TABLET ORAL
Refills: 0 | Status: ACTIVE | COMMUNITY

## 2024-03-07 SDOH — SOCIAL STABILITY - SOCIAL INSECURITY: DISSAPEARANCE AND DEATH OF FAMILY MEMBER: Z63.4

## 2024-03-07 NOTE — ASSESSMENT
[FreeTextEntry1] :  Ms. Elaine presents for initial evaluation of multiple urologic complaints.  I reviewed her hospital chart at length.  Her daughter is present as an independent historian  The patient has a urologic history notable for a pelvic organ prolapse that has [place].  During her recent hospitalization, she was found to have gross hematuria and imaging that showed left hydronephrosis and a torturous ureter that did not respond to pessary removal.  Currently she feels well with the pessary out and has no prolapse or voiding complaints.    Additionally, she has developed gross hematuria.  The etiology of which is unclear.  Risk factors include her age and smoking history.  Infectious etiology for hematuria cannot be ruled out, but recent urine cultures have been contaminated and patient denies any UTI symptoms.   Her exam was negative for any prolapse.  I see no need to attempt to replace the pessary. 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  She poorly tolerated a pelvic exam and would not undergo catheterization to use as a urine specimen.  She has declined vaginal Estrace. Furthermore, she has declined cystoscopy for hematuria workup stating she will not tolerate it in the office.  She is not amenable to any surgical intervention.  Furthermore, she has contrast allergies and would not be a candidate for a CT urogram nor is she amenable to an MR urogram.   In light of her age and her level of cooperation, the shared decision was made to observe the patient as she feels well without any symptomatic complaints.  Criteria for representation in the office or to the ER if the hematuria progresses were provided to the family

## 2024-03-07 NOTE — HISTORY OF PRESENT ILLNESS
[FreeTextEntry1] : 88F presents for initial evaluation of UTI  Self-referred   PMH significant for: CAD, pulmonary sarcoidosis, COPD (not on home O2),  and HTN.  PSH significant for: Nothing Significant meds: Nothing   Seen at [facility] ED on 2/21/24 for abdominal pain  Renal US (2/8): Mild left hydronephrosis and markedly dilated left renal pelvis and tortuous ureter as seen on the  prior CT. Etiologyremains uncertain -> unchanged for repeat study after pessary removal (2/15).   CT A/P (2/8): Severe left hydroureter and moderate left hydronephrosis of uncertain etiology.  Cr (2/17): 0.8  Bcx (2/8): no growth, Ucx (2/8, 2/16): contaminated.    Patient has a longstanding history of prolapsed managed with a pessary Device was removed every 3 months cleaned and [place] before patient moved to [place] Pessary has been out since her recent hospitalization She denies any prolapse complaints at the moment  Currently has gross hematuria since her hospitalization Hx of tobacco use: Yes, 10-pack-year history Hx of environmental exposures: No Hx of nephrolithiasis: Yes Personal Hx of hematuria previously: Yes, years ago resolved spontaneously Family Hx of  malignancy: No

## 2024-03-07 NOTE — PHYSICAL EXAM
[Well Groomed] : well groomed [] : no respiratory distress [Abdomen Soft] : soft [Abdomen Tenderness] : non-tender [Not Anxious] : not anxious [FreeTextEntry1] : Frail, hard of hearing [de-identified] : Significant vulvovaginal atrophy, urethral carbuncle, tender all over with examination.  Unable to tolerate catheterization for obtaining a clean urine specimen [de-identified] : Wheelchair-bound

## 2024-03-13 ENCOUNTER — APPOINTMENT (OUTPATIENT)
Dept: UROGYNECOLOGY | Facility: CLINIC | Age: 89
End: 2024-03-13

## 2024-03-20 ENCOUNTER — INPATIENT (INPATIENT)
Facility: HOSPITAL | Age: 89
LOS: 13 days | Discharge: NURSING, REHAB OR LONG-TERM CARE | DRG: 871 | End: 2024-04-03
Attending: FAMILY MEDICINE | Admitting: FAMILY MEDICINE
Payer: MEDICARE

## 2024-03-20 VITALS
HEIGHT: 62 IN | TEMPERATURE: 98 F | DIASTOLIC BLOOD PRESSURE: 50 MMHG | SYSTOLIC BLOOD PRESSURE: 80 MMHG | HEART RATE: 120 BPM | RESPIRATION RATE: 38 BRPM

## 2024-03-20 LAB
ALBUMIN SERPL ELPH-MCNC: 4.1 G/DL — SIGNIFICANT CHANGE UP (ref 3.3–5)
ALP SERPL-CCNC: 160 U/L — HIGH (ref 40–120)
ALT FLD-CCNC: 9 U/L — LOW (ref 10–45)
ANION GAP SERPL CALC-SCNC: 24 MMOL/L — HIGH (ref 5–17)
APPEARANCE UR: ABNORMAL
APTT BLD: 30.7 SEC — SIGNIFICANT CHANGE UP (ref 24.5–35.6)
AST SERPL-CCNC: 20 U/L — SIGNIFICANT CHANGE UP (ref 10–40)
BACTERIA # UR AUTO: NEGATIVE /HPF — SIGNIFICANT CHANGE UP
BASE EXCESS BLDV CALC-SCNC: -3.6 MMOL/L — LOW (ref -2–3)
BASE EXCESS BLDV CALC-SCNC: -5.2 MMOL/L — LOW (ref -2–3)
BASOPHILS # BLD AUTO: 0.26 K/UL — HIGH (ref 0–0.2)
BASOPHILS NFR BLD AUTO: 0.9 % — SIGNIFICANT CHANGE UP (ref 0–2)
BILIRUB SERPL-MCNC: 0.5 MG/DL — SIGNIFICANT CHANGE UP (ref 0.2–1.2)
BILIRUB UR-MCNC: ABNORMAL
BLD GP AB SCN SERPL QL: NEGATIVE — SIGNIFICANT CHANGE UP
BUN SERPL-MCNC: 27 MG/DL — HIGH (ref 7–23)
BURR CELLS BLD QL SMEAR: PRESENT — SIGNIFICANT CHANGE UP
CA-I SERPL-SCNC: 1.15 MMOL/L — SIGNIFICANT CHANGE UP (ref 1.15–1.33)
CA-I SERPL-SCNC: 1.2 MMOL/L — SIGNIFICANT CHANGE UP (ref 1.15–1.33)
CALCIUM SERPL-MCNC: 10.1 MG/DL — SIGNIFICANT CHANGE UP (ref 8.4–10.5)
CHLORIDE BLDV-SCNC: 103 MMOL/L — SIGNIFICANT CHANGE UP (ref 96–108)
CHLORIDE BLDV-SCNC: 107 MMOL/L — SIGNIFICANT CHANGE UP (ref 96–108)
CHLORIDE SERPL-SCNC: 100 MMOL/L — SIGNIFICANT CHANGE UP (ref 96–108)
CO2 BLDV-SCNC: 23 MMOL/L — SIGNIFICANT CHANGE UP (ref 22–26)
CO2 BLDV-SCNC: 23 MMOL/L — SIGNIFICANT CHANGE UP (ref 22–26)
CO2 SERPL-SCNC: 17 MMOL/L — LOW (ref 22–31)
COLOR SPEC: ABNORMAL
CREAT SERPL-MCNC: 1.1 MG/DL — SIGNIFICANT CHANGE UP (ref 0.5–1.3)
DACRYOCYTES BLD QL SMEAR: SLIGHT — SIGNIFICANT CHANGE UP
DIFF PNL FLD: ABNORMAL
EGFR: 49 ML/MIN/1.73M2 — LOW
ELLIPTOCYTES BLD QL SMEAR: SLIGHT — SIGNIFICANT CHANGE UP
EOSINOPHIL # BLD AUTO: 0 K/UL — SIGNIFICANT CHANGE UP (ref 0–0.5)
EOSINOPHIL NFR BLD AUTO: 0 % — SIGNIFICANT CHANGE UP (ref 0–6)
FLUAV AG NPH QL: SIGNIFICANT CHANGE UP
FLUBV AG NPH QL: SIGNIFICANT CHANGE UP
GAS PNL BLDV: 138 MMOL/L — SIGNIFICANT CHANGE UP (ref 136–145)
GAS PNL BLDV: 139 MMOL/L — SIGNIFICANT CHANGE UP (ref 136–145)
GAS PNL BLDV: SIGNIFICANT CHANGE UP
GLUCOSE BLDV-MCNC: 158 MG/DL — HIGH (ref 70–99)
GLUCOSE BLDV-MCNC: 96 MG/DL — SIGNIFICANT CHANGE UP (ref 70–99)
GLUCOSE SERPL-MCNC: 172 MG/DL — HIGH (ref 70–99)
GLUCOSE UR QL: ABNORMAL
HCO3 BLDV-SCNC: 22 MMOL/L — SIGNIFICANT CHANGE UP (ref 22–29)
HCO3 BLDV-SCNC: 22 MMOL/L — SIGNIFICANT CHANGE UP (ref 22–29)
HCT VFR BLD CALC: 31.2 % — LOW (ref 34.5–45)
HCT VFR BLDA CALC: 24 % — LOW (ref 34.5–46.5)
HCT VFR BLDA CALC: 29 % — LOW (ref 34.5–46.5)
HGB BLD CALC-MCNC: 7.9 G/DL — LOW (ref 11.7–16.1)
HGB BLD CALC-MCNC: 9.8 G/DL — LOW (ref 11.7–16.1)
HGB BLD-MCNC: 9.5 G/DL — LOW (ref 11.5–15.5)
INR BLD: 1.18 RATIO — SIGNIFICANT CHANGE UP (ref 0.85–1.18)
KETONES UR-MCNC: ABNORMAL
LACTATE BLDV-MCNC: 3.5 MMOL/L — HIGH (ref 0.5–2)
LACTATE BLDV-MCNC: 9.4 MMOL/L — CRITICAL HIGH (ref 0.5–2)
LEUKOCYTE ESTERASE UR-ACNC: ABNORMAL
LYMPHOCYTES # BLD AUTO: 0 % — LOW (ref 13–44)
LYMPHOCYTES # BLD AUTO: 0 K/UL — LOW (ref 1–3.3)
MANUAL SMEAR VERIFICATION: SIGNIFICANT CHANGE UP
MCHC RBC-ENTMCNC: 30.1 PG — SIGNIFICANT CHANGE UP (ref 27–34)
MCHC RBC-ENTMCNC: 30.4 GM/DL — LOW (ref 32–36)
MCV RBC AUTO: 98.7 FL — SIGNIFICANT CHANGE UP (ref 80–100)
MONOCYTES # BLD AUTO: 2.08 K/UL — HIGH (ref 0–0.9)
MONOCYTES NFR BLD AUTO: 7.1 % — SIGNIFICANT CHANGE UP (ref 2–14)
NEUTROPHILS # BLD AUTO: 27 K/UL — HIGH (ref 1.8–7.4)
NEUTROPHILS NFR BLD AUTO: 90.2 % — HIGH (ref 43–77)
NEUTS BAND # BLD: 1.8 % — SIGNIFICANT CHANGE UP (ref 0–8)
NITRITE UR-MCNC: POSITIVE
OB PNL STL: NEGATIVE — SIGNIFICANT CHANGE UP
OVALOCYTES BLD QL SMEAR: SLIGHT — SIGNIFICANT CHANGE UP
PCO2 BLDV: 42 MMHG — SIGNIFICANT CHANGE UP (ref 39–42)
PCO2 BLDV: 47 MMHG — HIGH (ref 39–42)
PH BLDV: 7.27 — LOW (ref 7.32–7.43)
PH BLDV: 7.33 — SIGNIFICANT CHANGE UP (ref 7.32–7.43)
PH UR: 5 — SIGNIFICANT CHANGE UP (ref 5–8)
PLAT MORPH BLD: NORMAL — SIGNIFICANT CHANGE UP
PLATELET # BLD AUTO: 342 K/UL — SIGNIFICANT CHANGE UP (ref 150–400)
PO2 BLDV: 23 MMHG — LOW (ref 25–45)
PO2 BLDV: 44 MMHG — SIGNIFICANT CHANGE UP (ref 25–45)
POIKILOCYTOSIS BLD QL AUTO: SLIGHT — SIGNIFICANT CHANGE UP
POLYCHROMASIA BLD QL SMEAR: SLIGHT — SIGNIFICANT CHANGE UP
POTASSIUM BLDV-SCNC: 3.9 MMOL/L — SIGNIFICANT CHANGE UP (ref 3.5–5.1)
POTASSIUM BLDV-SCNC: 4.3 MMOL/L — SIGNIFICANT CHANGE UP (ref 3.5–5.1)
POTASSIUM SERPL-MCNC: 4.3 MMOL/L — SIGNIFICANT CHANGE UP (ref 3.5–5.3)
POTASSIUM SERPL-SCNC: 4.3 MMOL/L — SIGNIFICANT CHANGE UP (ref 3.5–5.3)
PROT SERPL-MCNC: 6.6 G/DL — SIGNIFICANT CHANGE UP (ref 6–8.3)
PROT UR-MCNC: ABNORMAL
PROTHROM AB SERPL-ACNC: 12.3 SEC — SIGNIFICANT CHANGE UP (ref 9.5–13)
RBC # BLD: 3.16 M/UL — LOW (ref 3.8–5.2)
RBC # FLD: 15.8 % — HIGH (ref 10.3–14.5)
RBC BLD AUTO: ABNORMAL
RBC CASTS # UR COMP ASSIST: >50 /HPF — HIGH (ref 0–4)
RH IG SCN BLD-IMP: POSITIVE — SIGNIFICANT CHANGE UP
RSV RNA NPH QL NAA+NON-PROBE: SIGNIFICANT CHANGE UP
SAO2 % BLDV: 25.9 % — LOW (ref 67–88)
SAO2 % BLDV: 70.5 % — SIGNIFICANT CHANGE UP (ref 67–88)
SARS-COV-2 RNA SPEC QL NAA+PROBE: SIGNIFICANT CHANGE UP
SODIUM SERPL-SCNC: 141 MMOL/L — SIGNIFICANT CHANGE UP (ref 135–145)
SP GR SPEC: 1.01 — SIGNIFICANT CHANGE UP (ref 1.01–1.03)
TROPONIN T, HIGH SENSITIVITY RESULT: 44 NG/L — SIGNIFICANT CHANGE UP (ref 0–51)
UROBILINOGEN FLD QL: ABNORMAL
WBC # BLD: 29.35 K/UL — HIGH (ref 3.8–10.5)
WBC # FLD AUTO: 29.35 K/UL — HIGH (ref 3.8–10.5)
WBC UR QL: 6 /HPF — HIGH (ref 0–5)

## 2024-03-20 PROCEDURE — 99285 EMERGENCY DEPT VISIT HI MDM: CPT | Mod: GC

## 2024-03-20 PROCEDURE — 74176 CT ABD & PELVIS W/O CONTRAST: CPT | Mod: 26,MC

## 2024-03-20 PROCEDURE — 71045 X-RAY EXAM CHEST 1 VIEW: CPT | Mod: 26

## 2024-03-20 RX ORDER — ACETAMINOPHEN 500 MG
1000 TABLET ORAL ONCE
Refills: 0 | Status: COMPLETED | OUTPATIENT
Start: 2024-03-20 | End: 2024-03-20

## 2024-03-20 RX ORDER — DIPHENHYDRAMINE HCL 50 MG
50 CAPSULE ORAL ONCE
Refills: 0 | Status: COMPLETED | OUTPATIENT
Start: 2024-03-20 | End: 2024-03-20

## 2024-03-20 RX ORDER — CEFTRIAXONE 500 MG/1
1000 INJECTION, POWDER, FOR SOLUTION INTRAMUSCULAR; INTRAVENOUS ONCE
Refills: 0 | Status: COMPLETED | OUTPATIENT
Start: 2024-03-20 | End: 2024-03-20

## 2024-03-20 RX ORDER — SODIUM CHLORIDE 9 MG/ML
1000 INJECTION INTRAMUSCULAR; INTRAVENOUS; SUBCUTANEOUS ONCE
Refills: 0 | Status: COMPLETED | OUTPATIENT
Start: 2024-03-20 | End: 2024-03-20

## 2024-03-20 RX ORDER — SODIUM CHLORIDE 9 MG/ML
1500 INJECTION INTRAMUSCULAR; INTRAVENOUS; SUBCUTANEOUS ONCE
Refills: 0 | Status: COMPLETED | OUTPATIENT
Start: 2024-03-20 | End: 2024-03-20

## 2024-03-20 RX ORDER — PIPERACILLIN AND TAZOBACTAM 4; .5 G/20ML; G/20ML
3.38 INJECTION, POWDER, LYOPHILIZED, FOR SOLUTION INTRAVENOUS ONCE
Refills: 0 | Status: COMPLETED | OUTPATIENT
Start: 2024-03-20 | End: 2024-03-20

## 2024-03-20 RX ADMIN — SODIUM CHLORIDE 1500 MILLILITER(S): 9 INJECTION INTRAMUSCULAR; INTRAVENOUS; SUBCUTANEOUS at 17:58

## 2024-03-20 RX ADMIN — CEFTRIAXONE 100 MILLIGRAM(S): 500 INJECTION, POWDER, FOR SOLUTION INTRAMUSCULAR; INTRAVENOUS at 18:08

## 2024-03-20 RX ADMIN — Medication 50 MILLIGRAM(S): at 22:37

## 2024-03-20 RX ADMIN — Medication 125 MILLIGRAM(S): at 18:17

## 2024-03-20 RX ADMIN — PIPERACILLIN AND TAZOBACTAM 200 GRAM(S): 4; .5 INJECTION, POWDER, LYOPHILIZED, FOR SOLUTION INTRAVENOUS at 23:09

## 2024-03-20 RX ADMIN — SODIUM CHLORIDE 1000 MILLILITER(S): 9 INJECTION INTRAMUSCULAR; INTRAVENOUS; SUBCUTANEOUS at 21:55

## 2024-03-20 RX ADMIN — Medication 400 MILLIGRAM(S): at 18:18

## 2024-03-20 RX ADMIN — Medication 50 MILLIGRAM(S): at 18:18

## 2024-03-20 NOTE — ED ADULT NURSE REASSESSMENT NOTE - NS ED NURSE REASSESS COMMENT FT1
Two RNs at bedside for straight cath to maintain sterility. Drained 250 ml of Burgundy colored output. Md Lew aware. Orders to be placed

## 2024-03-20 NOTE — ED ADULT NURSE NOTE - OBJECTIVE STATEMENT
89 y/o female presents to the ED with SOB, Right sided flank pain. Pt endorses not having a BM in 5 days, and bloody colored urine, which she saw urology for. Pt states urology was not concerned and gave no follow up instructions. PT denies chest pain, sob, N/V/D, burning or frequency when urinating at this time. PT arrived on NRB via EMS, and titrated down to the NC, patient tolerated it well. Pt lung sounds clear and equal bilat, abdomen soft nontender, nondistended x4 quadrants. Pt placed on cardiac monitor, Sinus Tachycardic. Pt safety maintained, given hospital socks, educated on call bell use.

## 2024-03-20 NOTE — ED ADULT NURSE REASSESSMENT NOTE - NS ED NURSE REASSESS COMMENT FT1
Pt had two dark tarry stools in diaper, Md Vieira aware, orders to placed for Guiac. Pt had two dark tarry stools in diaper, Md Vieira aware, orders to placed for Guiac. Pt mentating well, speaking to RN.

## 2024-03-20 NOTE — ED PROVIDER NOTE - OBJECTIVE STATEMENT
This is a 88-year-old female with a past medical history notable for coronary artery disease hypertension pulmonary sarcoidosis hypothyroid and she is coming in with no bowel movement for the last 5 days.  According to EMS report the patient has been short of breath and has had right flank pain as well she is known to have diverticulitis.  I called the patient's daughter Deborah in [place] and she states that the patient was recently seen by the pulmonologist and she always breathes fast and she always complains of shortness of breath but that is been pretty stable.  The patient cannot contribute to the history as she states I do not know to most questions.

## 2024-03-20 NOTE — ED PROVIDER NOTE - PHYSICAL EXAMINATION
Awake alert talking appears uncomfortable.  She is markedly tachypneic she is got very dry lips and mucous membranes.  Tachycardic and regular.  Nontender abdomen.  Moving all 4 extremities without limitations.  No slurred speech or aphasia though recent memory is poor long-term memory seems intact reasonably.  A rectal exam for temperature was performed with nursing at bedside and there was blood on the thermometer.

## 2024-03-20 NOTE — ED PROVIDER NOTE - PROGRESS NOTE DETAILS
GONG, PGY 4: Patient responsive to fluid resuscitation, BP improved, temperature improved. Lactate downtrending. Will repeat and continue to monitor. LAWANDAG, PGY 4: Patient had lactate of 9, but has contrast allergy (develops rashes). Premedicated with methylprednisolone and benadryl at 6:20pm. Will obtain CTA abdomen/pelvis to evaluate for mesenteric ischemia vs intraabdominal source of infection superimposed on UTI. SEPSIS REASSESSMENT . I reassessed the patient, reviewed vital signs. heart RRR, nl S1 and S2, lungs CTAB b/l, no w/r/r, capillary refill normal in all extremities, 2/4 pulses in all extremities, skin warm and dry with no tenting. will admin more fluids. Attending Dr. Perez: Called to bedside for evaluation of anaphylaxis following CT scan. Patient brought back with severe respiratory distress and diminished responsiveness satting 70s on NC. Patient bagged briefly and put on NRB while prepared for possible intubation. Sats improved to 100%. No wheezing or rash or tongue/oropharynx swelling appreciated. Patient given Benadryl 25 mg and Pepcid 20 mg as she had been given Benadryl 50 mg and Solumedrol 125 mg prior to CT scan as pretreatment.

## 2024-03-21 DIAGNOSIS — K57.92 DIVERTICULITIS OF INTESTINE, PART UNSPECIFIED, WITHOUT PERFORATION OR ABSCESS WITHOUT BLEEDING: ICD-10-CM

## 2024-03-21 DIAGNOSIS — D72.829 ELEVATED WHITE BLOOD CELL COUNT, UNSPECIFIED: ICD-10-CM

## 2024-03-21 DIAGNOSIS — D64.9 ANEMIA, UNSPECIFIED: ICD-10-CM

## 2024-03-21 DIAGNOSIS — F32.9 MAJOR DEPRESSIVE DISORDER, SINGLE EPISODE, UNSPECIFIED: ICD-10-CM

## 2024-03-21 DIAGNOSIS — J44.9 CHRONIC OBSTRUCTIVE PULMONARY DISEASE, UNSPECIFIED: ICD-10-CM

## 2024-03-21 DIAGNOSIS — E03.9 HYPOTHYROIDISM, UNSPECIFIED: ICD-10-CM

## 2024-03-21 DIAGNOSIS — I10 ESSENTIAL (PRIMARY) HYPERTENSION: ICD-10-CM

## 2024-03-21 DIAGNOSIS — D86.0 SARCOIDOSIS OF LUNG: ICD-10-CM

## 2024-03-21 DIAGNOSIS — K59.00 CONSTIPATION, UNSPECIFIED: ICD-10-CM

## 2024-03-21 DIAGNOSIS — I95.9 HYPOTENSION, UNSPECIFIED: ICD-10-CM

## 2024-03-21 DIAGNOSIS — Z29.9 ENCOUNTER FOR PROPHYLACTIC MEASURES, UNSPECIFIED: ICD-10-CM

## 2024-03-21 DIAGNOSIS — J96.01 ACUTE RESPIRATORY FAILURE WITH HYPOXIA: ICD-10-CM

## 2024-03-21 DIAGNOSIS — N13.4 HYDROURETER: ICD-10-CM

## 2024-03-21 DIAGNOSIS — I25.10 ATHEROSCLEROTIC HEART DISEASE OF NATIVE CORONARY ARTERY WITHOUT ANGINA PECTORIS: ICD-10-CM

## 2024-03-21 LAB
ALBUMIN SERPL ELPH-MCNC: 2.8 G/DL — LOW (ref 3.3–5)
ALP SERPL-CCNC: 103 U/L — SIGNIFICANT CHANGE UP (ref 40–120)
ALT FLD-CCNC: 9 U/L — LOW (ref 10–45)
ANION GAP SERPL CALC-SCNC: 17 MMOL/L — SIGNIFICANT CHANGE UP (ref 5–17)
AST SERPL-CCNC: 28 U/L — SIGNIFICANT CHANGE UP (ref 10–40)
BASOPHILS # BLD AUTO: 0.05 K/UL — SIGNIFICANT CHANGE UP (ref 0–0.2)
BASOPHILS NFR BLD AUTO: 0.3 % — SIGNIFICANT CHANGE UP (ref 0–2)
BILIRUB SERPL-MCNC: 0.3 MG/DL — SIGNIFICANT CHANGE UP (ref 0.2–1.2)
BUN SERPL-MCNC: 28 MG/DL — HIGH (ref 7–23)
CALCIUM SERPL-MCNC: 7.8 MG/DL — LOW (ref 8.4–10.5)
CHLORIDE SERPL-SCNC: 104 MMOL/L — SIGNIFICANT CHANGE UP (ref 96–108)
CO2 SERPL-SCNC: 19 MMOL/L — LOW (ref 22–31)
CREAT SERPL-MCNC: 1.05 MG/DL — SIGNIFICANT CHANGE UP (ref 0.5–1.3)
EGFR: 51 ML/MIN/1.73M2 — LOW
EOSINOPHIL # BLD AUTO: 0 K/UL — SIGNIFICANT CHANGE UP (ref 0–0.5)
EOSINOPHIL NFR BLD AUTO: 0 % — SIGNIFICANT CHANGE UP (ref 0–6)
FERRITIN SERPL-MCNC: 114 NG/ML — SIGNIFICANT CHANGE UP (ref 13–330)
GLUCOSE SERPL-MCNC: 145 MG/DL — HIGH (ref 70–99)
HCT VFR BLD CALC: 26.6 % — LOW (ref 34.5–45)
HGB BLD-MCNC: 7.9 G/DL — LOW (ref 11.5–15.5)
IMM GRANULOCYTES NFR BLD AUTO: 0.5 % — SIGNIFICANT CHANGE UP (ref 0–0.9)
IRON SATN MFR SERPL: 19 UG/DL — LOW (ref 30–160)
IRON SATN MFR SERPL: 8 % — LOW (ref 14–50)
LYMPHOCYTES # BLD AUTO: 0.46 K/UL — LOW (ref 1–3.3)
LYMPHOCYTES # BLD AUTO: 2.3 % — LOW (ref 13–44)
MCHC RBC-ENTMCNC: 29.5 PG — SIGNIFICANT CHANGE UP (ref 27–34)
MCHC RBC-ENTMCNC: 29.7 GM/DL — LOW (ref 32–36)
MCV RBC AUTO: 99.3 FL — SIGNIFICANT CHANGE UP (ref 80–100)
MONOCYTES # BLD AUTO: 0.46 K/UL — SIGNIFICANT CHANGE UP (ref 0–0.9)
MONOCYTES NFR BLD AUTO: 2.3 % — SIGNIFICANT CHANGE UP (ref 2–14)
NEUTROPHILS # BLD AUTO: 18.88 K/UL — HIGH (ref 1.8–7.4)
NEUTROPHILS NFR BLD AUTO: 94.6 % — HIGH (ref 43–77)
NRBC # BLD: 0 /100 WBCS — SIGNIFICANT CHANGE UP (ref 0–0)
PLATELET # BLD AUTO: 235 K/UL — SIGNIFICANT CHANGE UP (ref 150–400)
POTASSIUM SERPL-MCNC: 4 MMOL/L — SIGNIFICANT CHANGE UP (ref 3.5–5.3)
POTASSIUM SERPL-SCNC: 4 MMOL/L — SIGNIFICANT CHANGE UP (ref 3.5–5.3)
PROT SERPL-MCNC: 4.6 G/DL — LOW (ref 6–8.3)
RBC # BLD: 2.68 M/UL — LOW (ref 3.8–5.2)
RBC # FLD: 15.9 % — HIGH (ref 10.3–14.5)
SODIUM SERPL-SCNC: 140 MMOL/L — SIGNIFICANT CHANGE UP (ref 135–145)
TIBC SERPL-MCNC: 252 UG/DL — SIGNIFICANT CHANGE UP (ref 220–430)
TRANSFERRIN SERPL-MCNC: 192 MG/DL — LOW (ref 200–360)
UIBC SERPL-MCNC: 233 UG/DL — SIGNIFICANT CHANGE UP (ref 110–370)
VIT B12 SERPL-MCNC: 572 PG/ML — SIGNIFICANT CHANGE UP (ref 232–1245)
WBC # BLD: 19.95 K/UL — HIGH (ref 3.8–10.5)
WBC # FLD AUTO: 19.95 K/UL — HIGH (ref 3.8–10.5)

## 2024-03-21 PROCEDURE — 99497 ADVNCD CARE PLAN 30 MIN: CPT | Mod: 25

## 2024-03-21 PROCEDURE — 74174 CTA ABD&PLVS W/CONTRAST: CPT | Mod: 26,MC

## 2024-03-21 PROCEDURE — 99222 1ST HOSP IP/OBS MODERATE 55: CPT

## 2024-03-21 PROCEDURE — 99223 1ST HOSP IP/OBS HIGH 75: CPT

## 2024-03-21 RX ORDER — SODIUM CHLORIDE 9 MG/ML
500 INJECTION, SOLUTION INTRAVENOUS ONCE
Refills: 0 | Status: COMPLETED | OUTPATIENT
Start: 2024-03-21 | End: 2024-03-21

## 2024-03-21 RX ORDER — TRAZODONE HCL 50 MG
1 TABLET ORAL
Refills: 0 | DISCHARGE

## 2024-03-21 RX ORDER — FAMOTIDINE 10 MG/ML
20 INJECTION INTRAVENOUS ONCE
Refills: 0 | Status: COMPLETED | OUTPATIENT
Start: 2024-03-21 | End: 2024-03-21

## 2024-03-21 RX ORDER — ATORVASTATIN CALCIUM 80 MG/1
20 TABLET, FILM COATED ORAL AT BEDTIME
Refills: 0 | Status: DISCONTINUED | OUTPATIENT
Start: 2024-03-21 | End: 2024-04-03

## 2024-03-21 RX ORDER — TRAZODONE HCL 50 MG
50 TABLET ORAL DAILY
Refills: 0 | Status: DISCONTINUED | OUTPATIENT
Start: 2024-03-21 | End: 2024-04-03

## 2024-03-21 RX ORDER — CHOLECALCIFEROL (VITAMIN D3) 125 MCG
1000 CAPSULE ORAL DAILY
Refills: 0 | Status: DISCONTINUED | OUTPATIENT
Start: 2024-03-21 | End: 2024-04-03

## 2024-03-21 RX ORDER — LANOLIN ALCOHOL/MO/W.PET/CERES
3 CREAM (GRAM) TOPICAL AT BEDTIME
Refills: 0 | Status: DISCONTINUED | OUTPATIENT
Start: 2024-03-21 | End: 2024-04-03

## 2024-03-21 RX ORDER — ACETAMINOPHEN 500 MG
2 TABLET ORAL
Refills: 0 | DISCHARGE

## 2024-03-21 RX ORDER — IPRATROPIUM/ALBUTEROL SULFATE 18-103MCG
3 AEROSOL WITH ADAPTER (GRAM) INHALATION EVERY 6 HOURS
Refills: 0 | Status: DISCONTINUED | OUTPATIENT
Start: 2024-03-21 | End: 2024-04-03

## 2024-03-21 RX ORDER — POTASSIUM CHLORIDE 20 MEQ
20 PACKET (EA) ORAL DAILY
Refills: 0 | Status: DISCONTINUED | OUTPATIENT
Start: 2024-03-21 | End: 2024-03-26

## 2024-03-21 RX ORDER — ASPIRIN/CALCIUM CARB/MAGNESIUM 324 MG
81 TABLET ORAL DAILY
Refills: 0 | Status: DISCONTINUED | OUTPATIENT
Start: 2024-03-21 | End: 2024-03-22

## 2024-03-21 RX ORDER — POLYETHYLENE GLYCOL 3350 17 G/17G
17 POWDER, FOR SOLUTION ORAL
Refills: 0 | Status: DISCONTINUED | OUTPATIENT
Start: 2024-03-21 | End: 2024-04-03

## 2024-03-21 RX ORDER — INFLUENZA VIRUS VACCINE 15; 15; 15; 15 UG/.5ML; UG/.5ML; UG/.5ML; UG/.5ML
0.7 SUSPENSION INTRAMUSCULAR ONCE
Refills: 0 | Status: COMPLETED | OUTPATIENT
Start: 2024-03-21 | End: 2024-03-21

## 2024-03-21 RX ORDER — FERROUS SULFATE 325(65) MG
325 TABLET ORAL DAILY
Refills: 0 | Status: DISCONTINUED | OUTPATIENT
Start: 2024-03-21 | End: 2024-04-03

## 2024-03-21 RX ORDER — SENNA PLUS 8.6 MG/1
1 TABLET ORAL
Refills: 0 | Status: DISCONTINUED | OUTPATIENT
Start: 2024-03-21 | End: 2024-04-03

## 2024-03-21 RX ORDER — SODIUM CHLORIDE 9 MG/ML
1000 INJECTION INTRAMUSCULAR; INTRAVENOUS; SUBCUTANEOUS
Refills: 0 | Status: DISCONTINUED | OUTPATIENT
Start: 2024-03-21 | End: 2024-03-23

## 2024-03-21 RX ORDER — CYCLOPENTOLATE HYDROCHLORIDE 10 MG/ML
1 SOLUTION/ DROPS OPHTHALMIC
Refills: 0 | Status: DISCONTINUED | OUTPATIENT
Start: 2024-03-21 | End: 2024-04-03

## 2024-03-21 RX ORDER — TEMAZEPAM 15 MG/1
30 CAPSULE ORAL AT BEDTIME
Refills: 0 | Status: DISCONTINUED | OUTPATIENT
Start: 2024-03-21 | End: 2024-03-28

## 2024-03-21 RX ORDER — FLUOXETINE HCL 10 MG
20 CAPSULE ORAL DAILY
Refills: 0 | Status: DISCONTINUED | OUTPATIENT
Start: 2024-03-21 | End: 2024-04-03

## 2024-03-21 RX ORDER — EPINEPHRINE 0.3 MG/.3ML
0.3 INJECTION INTRAMUSCULAR; SUBCUTANEOUS ONCE
Refills: 0 | Status: DISCONTINUED | OUTPATIENT
Start: 2024-03-21 | End: 2024-04-03

## 2024-03-21 RX ORDER — MIRTAZAPINE 45 MG/1
7.5 TABLET, ORALLY DISINTEGRATING ORAL AT BEDTIME
Refills: 0 | Status: DISCONTINUED | OUTPATIENT
Start: 2024-03-21 | End: 2024-04-03

## 2024-03-21 RX ORDER — TRIAMTERENE/HYDROCHLOROTHIAZID 75 MG-50MG
1 TABLET ORAL DAILY
Refills: 0 | Status: DISCONTINUED | OUTPATIENT
Start: 2024-03-21 | End: 2024-03-21

## 2024-03-21 RX ORDER — EPINEPHRINE 0.3 MG/.3ML
0.3 INJECTION INTRAMUSCULAR; SUBCUTANEOUS ONCE
Refills: 0 | Status: COMPLETED | OUTPATIENT
Start: 2024-03-21 | End: 2024-03-21

## 2024-03-21 RX ORDER — PIPERACILLIN AND TAZOBACTAM 4; .5 G/20ML; G/20ML
3.38 INJECTION, POWDER, LYOPHILIZED, FOR SOLUTION INTRAVENOUS EVERY 8 HOURS
Refills: 0 | Status: COMPLETED | OUTPATIENT
Start: 2024-03-21 | End: 2024-03-27

## 2024-03-21 RX ORDER — ACETAMINOPHEN 500 MG
650 TABLET ORAL EVERY 6 HOURS
Refills: 0 | Status: DISCONTINUED | OUTPATIENT
Start: 2024-03-21 | End: 2024-04-03

## 2024-03-21 RX ORDER — PREDNISOLONE SODIUM PHOSPHATE 1 %
1 DROPS OPHTHALMIC (EYE)
Refills: 0 | Status: DISCONTINUED | OUTPATIENT
Start: 2024-03-21 | End: 2024-04-03

## 2024-03-21 RX ORDER — ONDANSETRON 8 MG/1
4 TABLET, FILM COATED ORAL EVERY 8 HOURS
Refills: 0 | Status: DISCONTINUED | OUTPATIENT
Start: 2024-03-21 | End: 2024-04-03

## 2024-03-21 RX ADMIN — CYCLOPENTOLATE HYDROCHLORIDE 1 DROP(S): 10 SOLUTION/ DROPS OPHTHALMIC at 06:10

## 2024-03-21 RX ADMIN — CYCLOPENTOLATE HYDROCHLORIDE 1 DROP(S): 10 SOLUTION/ DROPS OPHTHALMIC at 18:42

## 2024-03-21 RX ADMIN — Medication 325 MILLIGRAM(S): at 08:50

## 2024-03-21 RX ADMIN — FAMOTIDINE 20 MILLIGRAM(S): 10 INJECTION INTRAVENOUS at 00:55

## 2024-03-21 RX ADMIN — Medication 50 MILLIGRAM(S): at 14:11

## 2024-03-21 RX ADMIN — SENNA PLUS 1 TABLET(S): 8.6 TABLET ORAL at 05:45

## 2024-03-21 RX ADMIN — Medication 3 MILLILITER(S): at 18:40

## 2024-03-21 RX ADMIN — Medication 20 MILLIEQUIVALENT(S): at 08:49

## 2024-03-21 RX ADMIN — TEMAZEPAM 30 MILLIGRAM(S): 15 CAPSULE ORAL at 21:14

## 2024-03-21 RX ADMIN — Medication 3 MILLILITER(S): at 05:44

## 2024-03-21 RX ADMIN — Medication 3 MILLILITER(S): at 14:31

## 2024-03-21 RX ADMIN — Medication 81 MILLIGRAM(S): at 08:50

## 2024-03-21 RX ADMIN — SODIUM CHLORIDE 60 MILLILITER(S): 9 INJECTION INTRAMUSCULAR; INTRAVENOUS; SUBCUTANEOUS at 22:10

## 2024-03-21 RX ADMIN — Medication 1000 UNIT(S): at 08:50

## 2024-03-21 RX ADMIN — PIPERACILLIN AND TAZOBACTAM 25 GRAM(S): 4; .5 INJECTION, POWDER, LYOPHILIZED, FOR SOLUTION INTRAVENOUS at 21:13

## 2024-03-21 RX ADMIN — Medication 650 MILLIGRAM(S): at 18:41

## 2024-03-21 RX ADMIN — PIPERACILLIN AND TAZOBACTAM 25 GRAM(S): 4; .5 INJECTION, POWDER, LYOPHILIZED, FOR SOLUTION INTRAVENOUS at 05:52

## 2024-03-21 RX ADMIN — ATORVASTATIN CALCIUM 20 MILLIGRAM(S): 80 TABLET, FILM COATED ORAL at 21:56

## 2024-03-21 RX ADMIN — EPINEPHRINE 0.3 MILLIGRAM(S): 0.3 INJECTION INTRAMUSCULAR; SUBCUTANEOUS at 00:55

## 2024-03-21 RX ADMIN — MIRTAZAPINE 7.5 MILLIGRAM(S): 45 TABLET, ORALLY DISINTEGRATING ORAL at 21:14

## 2024-03-21 RX ADMIN — SENNA PLUS 1 TABLET(S): 8.6 TABLET ORAL at 18:42

## 2024-03-21 RX ADMIN — Medication 650 MILLIGRAM(S): at 19:10

## 2024-03-21 RX ADMIN — PIPERACILLIN AND TAZOBACTAM 25 GRAM(S): 4; .5 INJECTION, POWDER, LYOPHILIZED, FOR SOLUTION INTRAVENOUS at 14:12

## 2024-03-21 RX ADMIN — SODIUM CHLORIDE 500 MILLILITER(S): 9 INJECTION, SOLUTION INTRAVENOUS at 04:51

## 2024-03-21 RX ADMIN — Medication 1 DROP(S): at 18:42

## 2024-03-21 RX ADMIN — POLYETHYLENE GLYCOL 3350 17 GRAM(S): 17 POWDER, FOR SOLUTION ORAL at 05:45

## 2024-03-21 RX ADMIN — Medication 20 MILLIGRAM(S): at 14:11

## 2024-03-21 RX ADMIN — Medication 1 DROP(S): at 05:44

## 2024-03-21 NOTE — CONSULT NOTE ADULT - SUBJECTIVE AND OBJECTIVE BOX
CHIEF COMPLAINT: SOB , fever      pulmonary consultation :acute hypoxic respiratory Failure on 02, sarcoidosis , COPD , Diastolic dysfunction , pulmonary HTN   This is an 89 y/o female w/ PMH pulmonary sarcoidosis, COPD not on home O2, CAD, hypothyroidism, and HTN presenting for lack of BM in the last 5 days along with SOB and L flank pain from the atria. The patient does endorse some left-sided upper abdominal pain but doesn't endorse any other symptoms. She states that she doesn't have any SOB, and states that her last BM prior to the hospital was yesterday. Called the patient's daughter Deborah, who stated that she was told the patient was having severe abdominal pain as well as some vomiting.   She was admitted here from 2/8-2/21 for abdominal pain, constipation and vomiting. At the time, CT was suggestive of diverticulitis as well and her course was complicated by fluid overload requiring IVP lasix. She was treated with IV Zosyn for 7 days. During this hospital course, she was also found to have a dilated L renal pelvis w/ +UA, but was ruled not have UTI by ID. Urology was consulted, who stated that her pessary could be the cause of the hydro, and removed it on 2/13.     In the ED, she was febrile to 101.8, tachycardic to 130, hypotensive to 80/50, and hypoxic requiring 4LNC. CBC revealed leukocytosis of 29.35, normocytic anemia of 9.5. CMP w/ elevated AG of 24, low bicarb of 17, . Presenting VBG w/ lactate of 9.4. she was straight catheterized for UA, which was red and turbid w/ large LE and >50 RBC/hpf, negative for bacteria. Initially CT A/P w/o contrast was done (due to contrast allergy), which revealed a possible mild diverticulitis of the descending colon and unchanged severe L hydroureter with layering hypodensity which could be stones or blood products. She was given Ceftriaxone and Zosyn, 2.5L IVNS. Lactate down to 3.5, BP improved. She had 2 dark tarry stools in her diaper, so she was premedicated w/ solumedrol and benadryl and CTA Abdomen/pelvis was ordered.  (21 Mar 2024 01:30), patient was SOB needed high 02 supplement , CT scan acute Diverticulitis     PAST MEDICAL & SURGICAL HISTORY:  HTN (hypertension)      Hypothyroidism      Osteoporosis  pulmonary hypertension   H/O anxiety     CAD (coronary artery disease)  sarcoidosis   COPD           FAMILY HISTORY:  Review of Systems:  Review of Systems: Review of Systems:   CONSTITUTIONAL: + fever,+ weight loss  EYES: No eye pain, visual disturbances, or discharge  RESPIRATORY: + SOB. No cough, wheezing, chills or hemoptysis  CARDIOVASCULAR: No chest pain, palpitations, dizziness, or leg swelling  GASTROINTESTINAL: +abdominal pain. No nausea, vomiting, or hematemesis; No diarrhea or constipation. No melena or hematochezia.  GENITOURINARY: +L flank pain, hematuria; No dysuria, frequency, or incontinence  NEUROLOGICAL: No headaches, memory loss, loss of strength, numbness, or tremors  SKIN: No itching, burning, rashes, or lesions   MUSCULOSKELETAL: No joint pain or swelling; No muscle, back pain  PSYCHIATRIC: + depression,+ anxiety,+ mood swings, or difficulty ,         OBJECTIVE:  Vital Signs Last 24 Hrs  T(C): 36.6 (21 Mar 2024 12:38), Max: 38.8 (20 Mar 2024 17:30)  T(F): 97.8 (21 Mar 2024 12:38), Max: 101.8 (20 Mar 2024 17:30)  HR: 75 (21 Mar 2024 12:38) (75 - 130)  BP: 97/53 (21 Mar 2024 12:38) (80/50 - 152/79)  BP(mean): 81 (20 Mar 2024 23:10) (81 - 95)  RR: 24 (21 Mar 2024 03:44) (20 - 38)  SpO2: 91% (21 Mar 2024 12:38) (91% - 100%)    Parameters below as of 21 Mar 2024 12:38  Patient On (Oxygen Delivery Method): nasal cannula  O2 Flow (L/min): 2        03-20 @ 07:01  -  03-21 @ 07:00  --------------------------------------------------------  IN: 0 mL / OUT: 451 mL / NET: -451 mL    03-21 @ 07:01  -  03-21 @ 14:38  --------------------------------------------------------  IN: 0 mL / OUT: 500 mL / NET: -500 mL      HOSPITAL MEDICATIONS:   acetaminophen     Tablet .. 650 milliGRAM(s) Oral every 6 hours PRN  albuterol/ipratropium for Nebulization 3 milliLiter(s) Nebulizer every 6 hours  aspirin enteric coated 81 milliGRAM(s) Oral daily  atorvastatin 20 milliGRAM(s) Oral at bedtime  cholecalciferol 1000 Unit(s) Oral daily  cyclopentolate 1% Solution 1 Drop(s) Both EYES two times a day  EPINEPHrine    Injectable 0.3 milliGRAM(s) IntraMuscular once  ferrous    sulfate 325 milliGRAM(s) Oral daily  FLUoxetine 20 milliGRAM(s) Oral daily  influenza  Vaccine (HIGH DOSE) 0.7 milliLiter(s) IntraMuscular once  melatonin 3 milliGRAM(s) Oral at bedtime PRN  mirtazapine 7.5 milliGRAM(s) Oral at bedtime  ondansetron Injectable 4 milliGRAM(s) IV Push every 8 hours PRN  piperacillin/tazobactam IVPB.. 3.375 Gram(s) IV Intermittent every 8 hours  polyethylene glycol 3350 17 Gram(s) Oral two times a day  potassium chloride    Tablet ER 20 milliEquivalent(s) Oral daily  prednisoLONE acetate 1% Suspension 1 Drop(s) Both EYES two times a day  senna 1 Tablet(s) Oral two times a day  temazepam 30 milliGRAM(s) Oral at bedtime PRN  traZODone 50 milliGRAM(s) Oral daily    iodinated radiocontrast agents (Anaphylaxis)        PHYSICAL EXAM: Daily Height in cm: 157.48 (20 Mar 2024 16:54)  Elderly agitated cachectic female on 02   Daily   HEENT:     + NCAT  + EOMI  - Conjuctival edema   - Icterus   - Thrush   - ETT  - NGT/OGT  Neck:         + FROM    + JVD     - Nodes     - Masses    + Mid-line trachea   - Tracheostomy  Chest:          pectus excavatum , increase A-P diameter   Lungs:          + CTA   + Rhonchi    - Rales    - Wheezing   + Decreased BS   - Dullness R L  Cardiac:       + S1 + S2    + RRR   - Irregular   - S3  - S4  + Murmurs   - Rub   - Hamman’s sign   Abdomen:    + BS     + Soft    + tender left lower quadrant   - Distended  - Organomegaly  - PEG  Extremities:   - Cyanosis U/L   - Clubbing  U/L  - LE/UE Edema   + Capillary refill    + Pulses   Neuro:        + Awake   +  Alert   - Confused   - Lethargic   - Sedated   - Generalized Weakness  Skin:        - Rashes    - Erythema   + Normal incisions   + IV sites intact  - Sacral decubitus    LABS:                        7.9    19.95 )-----------( 235      ( 21 Mar 2024 07:06 )             26.6     03-21    140  |  104  |  28<H>  ----------------------------<  145<H>  4.0   |  19<L>  |  1.05    Ca    7.8<L>      21 Mar 2024 07:05    TPro  4.6<L>  /  Alb  2.8<L>  /  TBili  0.3  /  DBili  x   /  AST  28  /  ALT  9<L>  /  AlkPhos  103  03-21    PT/INR - ( 20 Mar 2024 17:33 )   PT: 12.3 sec;   INR: 1.18 ratio         PTT - ( 20 Mar 2024 17:33 )  PTT:30.7 sec  LIVER FUNCTIONS - ( 21 Mar 2024 07:05 )  Alb: 2.8 g/dL / Pro: 4.6 g/dL / ALK PHOS: 103 U/L / ALT: 9 U/L / AST: 28 U/L / GGT: x            Venous Blood Gas:  03-20 @ 19:34  7.33/42/44/22/70.5  VBG Lactate: 3.5  Venous Blood Gas:  03-20 @ 18:22  7.30/43/28/21/35.7  VBG Lactate: 5.5  Venous Blood Gas:  03-20 @ 17:10  7.27/47/23/22/25.9  VBG Lactate: 9.4        LIVER FUNCTIONS - ( 21 Mar 2024 07:05 )  Alb: 2.8 g/dL / Pro: 4.6 g/dL / ALK PHOS: 103 U/L / ALT: 9 U/L / AST: 28 U/L / GGT: x             RADIOLOGY:  X Reviewed and interpreted by me Acute colitis and Diverticulitis , marked elevation of the RT hemidiaphragm no infiltrate

## 2024-03-21 NOTE — H&P ADULT - ASSESSMENT
87 y/o female w/ PMHx pulmonary sarcoidosis on prednisone, COPD not on home O2, CAD, hypothyroidism, and HTN presenting for lack of BM in the last 5 days along with SOB and R flank pain. Found to be febrile, tachycardic,  and hypotensive on arrival w/ leukocytosis of 29 and lactate of 9.4. CT suggestive of mild diverticulitis and persistent L severe hydroureter. Admitted for mesenteric ischemia rule-out, treatment of underlying infection. 89 y/o female w/ PMHx pulmonary sarcoidosis, COPD not on home O2, CAD, hypothyroidism, and HTN presenting for lack of BM in the last 5 days along with SOB and R flank pain. Found to be febrile, tachycardic,  and hypotensive on arrival w/ leukocytosis of 29 and lactate of 9.4. CT suggestive of mild diverticulitis and persistent L severe hydroureter. Admitted for mesenteric ischemia rule-out, treatment of underlying infection.

## 2024-03-21 NOTE — PROGRESS NOTE ADULT - SUBJECTIVE AND OBJECTIVE BOX
---___---___---___---___---___---___ ---___---___---___---___---___---___---___---___---                  M E D I C A L   A T T E N D I N G   P R O G R E S S   N O T E  ---___---___---___---___---___---___ ---___---___---___---___---___---___---___---___---        ================================================    ++CHIEF COMPLAINT:   Patient is a 88y old  Female who presents with a chief complaint of Diverticulitis, leukocytosis (21 Mar 2024 14:34)      Diverticulitis of intestine without perforation or abscess without bleeding      ---___---___---___---___---___---  PAST MEDICAL / Surgical  HISTORY:  PAST MEDICAL & SURGICAL HISTORY:  HTN (hypertension)      Hypothyroidism      Osteoporosis      CAD (coronary artery disease)          ---___---___---___---___---___---  FAMILY HISTORY:   FAMILY HISTORY:        ---___---___---___---___---___---  ALLERGIES:   Allergies    iodinated radiocontrast agents (Anaphylaxis)    Intolerances        ---___---___---___---___---___---  MEDICATIONS:  MEDICATIONS  (STANDING):  albuterol/ipratropium for Nebulization 3 milliLiter(s) Nebulizer every 6 hours  aspirin enteric coated 81 milliGRAM(s) Oral daily  atorvastatin 20 milliGRAM(s) Oral at bedtime  cholecalciferol 1000 Unit(s) Oral daily  cyclopentolate 1% Solution 1 Drop(s) Both EYES two times a day  EPINEPHrine    Injectable 0.3 milliGRAM(s) IntraMuscular once  ferrous    sulfate 325 milliGRAM(s) Oral daily  FLUoxetine 20 milliGRAM(s) Oral daily  influenza  Vaccine (HIGH DOSE) 0.7 milliLiter(s) IntraMuscular once  mirtazapine 7.5 milliGRAM(s) Oral at bedtime  piperacillin/tazobactam IVPB.. 3.375 Gram(s) IV Intermittent every 8 hours  polyethylene glycol 3350 17 Gram(s) Oral two times a day  potassium chloride    Tablet ER 20 milliEquivalent(s) Oral daily  prednisoLONE acetate 1% Suspension 1 Drop(s) Both EYES two times a day  senna 1 Tablet(s) Oral two times a day  traZODone 50 milliGRAM(s) Oral daily    MEDICATIONS  (PRN):  acetaminophen     Tablet .. 650 milliGRAM(s) Oral every 6 hours PRN Temp greater or equal to 38C (100.4F), Mild Pain (1 - 3)  melatonin 3 milliGRAM(s) Oral at bedtime PRN Insomnia  ondansetron Injectable 4 milliGRAM(s) IV Push every 8 hours PRN Nausea and/or Vomiting  temazepam 30 milliGRAM(s) Oral at bedtime PRN Insomnia      ---___---___---___---___---___---  REVIEW OF SYSTEM:    GEN: no fever, no chills, no pain  RESP: no SOB, no cough, no sputum  CVS: no chest pain, no palpitations, no edema  GI: no abdominal pain, no nausea, no vomiting, no constipation, no diarrhea  : no dysurea, no frequency, no hematurea  Neuro: no headache, no dizziness  PSYCH: no anxiety, no depression  Derm : no itching, no rash    ---___---___---___---___---___---  VITAL SIGNS:  88y , CAPILLARY BLOOD GLUCOSE      POCT Blood Glucose.: 169 mg/dL (20 Mar 2024 17:12)    T(C): 36.6 (24 @ 12:38), Max: 36.8 (24 @ 19:33)  HR: 75 (24 @ 12:38) (75 - 115)  BP: 97/53 (24 @ 12:38) (86/48 - 152/79)  RR: 24 (24 @ 03:44) (20 - 26)  SpO2: 91% (24 @ 12:38) (91% - 100%)  ---___---___---___---___---___---  PHYSICAL EXAM:    GEN: A&O X 3 , NAD , comfortable  HEENT: NCAT, PERRL, MMM, hearing intact  Neck: supple , no JVD  CVS: S1S2 , regular , No M/R/G appreciated  PULM: CTA B/L,  no W/R/R appreciated  ABD.: soft. non tender, non distended,  bowel sounds present  Extrem: intact pulses , no edema   Derm: No rash , no ecchymoses  PSYCH : normal mood,  no delusion not anxious     ---___---___---___---___---___---            LAB AND IMAGIN.9    19.95 )-----------( 235      ( 21 Mar 2024 07:06 )             26.6               03-    140  |  104  |  28<H>  ----------------------------<  145<H>  4.0   |  19<L>  |  1.05    Ca    7.8<L>      21 Mar 2024 07:05    TPro  4.6<L>  /  Alb  2.8<L>  /  TBili  0.3  /  DBili  x   /  AST  28  /  ALT  9<L>  /  AlkPhos  103  03-21    PT/INR - ( 20 Mar 2024 17:33 )   PT: 12.3 sec;   INR: 1.18 ratio         PTT - ( 20 Mar 2024 17:33 )  PTT:30.7 sec                        Urinalysis Basic - ( 21 Mar 2024 07:05 )    Color: x / Appearance: x / SG: x / pH: x  Gluc: 145 mg/dL / Ketone: x  / Bili: x / Urobili: x   Blood: x / Protein: x / Nitrite: x   Leuk Esterase: x / RBC: x / WBC x   Sq Epi: x / Non Sq Epi: x / Bacteria: x        [All pertinent / recent Imaging reviewed]         ---___---___---___---___---___---___ ---___---___---___---___---                         A S S E S S M E N T   A N D   P L A N :      HEALTH ISSUES - PROBLEM Dx:    89 y/o female w/ PMHx pulmonary sarcoidosis, COPD not on home O2, CAD, hypothyroidism, and HTN presenting for lack of BM in the last 5 days along with SOB and R flank pain. Found to be febrile, tachycardic,  and hypotensive on arrival w/ leukocytosis of 29 and lactate of 9.4. CT suggestive of mild diverticulitis and persistent L severe hydroureter. Admitted for mesenteric ischemia rule-out, treatment of underlying infection.      Problem/Plan - 1:  ·  Problem: Fever with leukocytosis and leukocyte count greater than or equal to 20,000.   ·  Plan: - WBC 29.35 w/ lactate 9.4 on presentation, now down to 3.5  - S/p Ceftriaxone and Zosyn and 2.5L IVNS, BP responded initially, now SBP back to 80s, but MAP >60  - CT A/P w/o IV contrast only showing diffuse colonic diverticulosis with mild inflammation in the left paracolic gutter, suggestive of possible mild diverticulosis of the descending colon  - CTA abdomen/pelvis done, negative for mesenteric ischemia, but showing distal transverse through sigmoid colon wall thickening and mild inflammatory changes, most likely representing colitis and diverticulitis  - F/u blood and urine cultures  id consult called patient is known to Dr Noonan   Problem/Plan - 2:  ·  Problem: Hypotension.   ·  Plan:  improved hold bp meds for now but may eventually go in to heart failure     -Problem/Plan - 3:  ·  Problem: Acute hypoxic respiratory failure.   ·  Plan: - Patient usually not on O2, was requiring 4LNC, now down to 2LNC, not SOB  - Wean as tolerated while maintaining SpO2 >94%  - Spoke to patient's daughter, she states that patient always gets placed on NC when going to the hospital, but ends up getting weaned off.    Problem/Plan - 4:  ·  Problem: Hydroureter, left.   ·  Plan: - Hopes were that this would resolve with pessary removal but seems to have persisted  - UA still bloody, and  layering hyperdensity in the dilated ureter in the pelvis, possibly stones or blood products, seen on CT abdomen/pelvis  - She followed up with urology on 3/7 at the time she declined cystoscopy for hematuria workup and doesn't want any surgical intervention, and was ruled out as a candidate for a CT urogram due to contrast allergy, and she is not amenable to an MR urogram  - Reconsult urology in AM for any further recommendations.    Problem/Plan - 5:  ·  Problem: Normocytic anemia.   ·  Plan: - Hb 9.5, down from 11.7 a month ago  - Check iron studies, B12  - Could be due to hematuria, but patient also apparently had 2 tarry BMs in ED but guaiac was negative  - Monitor for any episodes of melena or BRBPR.    Problem/Plan - 6:  ·  Problem: Constipation.   ·  Plan: - Seems like patient has this issue chronically  - Miralax and senna BID for now.    Problem/Plan - 7:  ·  Problem: Pulmonary sarcoidosis.   ·  Plan: - Patient not on any prednisone for a while per daughter  - S/p IV solumedrol 125mg in the ED.    Problem/Plan - 8:  ·  Problem: COPD (chronic obstructive pulmonary disease).   ·  Plan: - Not on home O2  - Duonebs q6hrs ATC for now  pulmonary consult dr cotto called .    Problem/Plan - 9:  ·  Problem: CAD (coronary artery disease).   ·  Plan: - C/w ASA 81mg daily  - C/w atorvastatin 20mg QHS.    Problem/Plan - 10:  ·  Problem: HTN (hypertension).   ·  Plan; - Hold triamterene-HCTZ 37.5-25mg for now.    Problem/Plan - 11:  ·  Problem: Major depression.   ·  Plan: - C/w fluoxetine 20mg daily + Mirtazapine 7.5mg QHS  - C/w trazodone 50mg daily.    Problem/Plan - 12:  ·  Problem: Prophylactic measure.   ·  Plan: DVT PPx: SCDs  Code Status: CPR.        Education given on   ___________________________  Thank you,  Garry Hermosillo  [phone]

## 2024-03-21 NOTE — CONSULT NOTE ADULT - ASSESSMENT
88-yo F w/ PMH of CAD and COPD, admitted w/ CTAP suggestive of diverticulitis but no abscess. S/p ceftriaxone (2/8) and Zosyn (2/8-15) Previously w/ concerns for diarrhea, now stopped.    #Diverticulitis  #Abnormal CT scan  #Pyuria and bacteriuria w/ hematuria   #Leukocytosis           Pt seen last admission  alert and non toxic but [place]. Denies dysuria or pain and exam benign     very similar last admission  She does have hydronephrosis that is not different. No abscess    continue present therapy

## 2024-03-21 NOTE — CONSULT NOTE ADULT - SUBJECTIVE AND OBJECTIVE BOX
Date of Service, 03-21-24 @ 21:09  CHIEF COMPLAINT:Patient is a 88y old  Female who presents with a chief complaint of Diverticulitis, leukocytosis (21 Mar 2024 18:54)      HPI:  This is an 89 y/o female w/ PMHx pulmonary sarcoidosis, COPD not on home O2, CAD, hypothyroidism, and HTN presenting for lack of BM in the last 5 days along with SOB and L flank pain from the atria. The patient does endorse some left-sided upper abdominal pain but doesn't endorse any other symptoms. She states that she doesn't have any SOB, and states that her last BM prior to the hospital was yesterday. Called the patient's daughter Deborah, who stated that she was told the patient was having severe abdominal pain as well as some vomiting.   She was admitted here from 2/8-2/21 for abdominal pain, constipation and vomiting. At the time, CT was suggestive of diverticulitis as well and her course was complicated by fluid overload requiring IVP lasix. She was treated with IV Zosyn for 7 days. During this hospital course, she was also found to have a dilated L renal pelvis w/ +UA, but was ruled not have UTI by ID. Urology was consulted, who stated that her pessary could be the cause of the hydro, and removed it on 2/13.   In the ED, she was febrile to 101.8, tachycardic to 130, hypotensive to 80/50, and hypoxic requiring 4LNC. CBC revealed leukocytosis of 29.35, normocytic anemia of 9.5. CMP w/ elevated AG of 24, low bicarb of 17, . Presenting VBG w/ lactate of 9.4. she was straight catheterized for UA, which was red and turvid w/ large LE and >50 RBC/hpf, negative for bacteria. Initially CT A/P w/o contrast was done (due to contrast allergy), which revealed a possible mild diverticulitis of the descending colon and unchanged severe L hydroureter with layering hyperdensity which could be stones or blood products. She was given Ceftriaxone and Zosyn, 2.5L IVNS. Lactate down to 3.5, BP improved. She had 2 dark tarry stools in her diaper, so she was premedicated w/ solumedrol and benadryl and CTA Abdomen/pelvis was ordered.       PAST MEDICAL & SURGICAL HISTORY:  HTN (hypertension)  Hypothyroidism  Osteoporosis  CAD (coronary artery disease)    MEDICATIONS  (STANDING):  albuterol/ipratropium for Nebulization 3 milliLiter(s) Nebulizer every 6 hours  aspirin enteric coated 81 milliGRAM(s) Oral daily  atorvastatin 20 milliGRAM(s) Oral at bedtime  cholecalciferol 1000 Unit(s) Oral daily  cyclopentolate 1% Solution 1 Drop(s) Both EYES two times a day  EPINEPHrine    Injectable 0.3 milliGRAM(s) IntraMuscular once  ferrous    sulfate 325 milliGRAM(s) Oral daily  FLUoxetine 20 milliGRAM(s) Oral daily  influenza  Vaccine (HIGH DOSE) 0.7 milliLiter(s) IntraMuscular once  mirtazapine 7.5 milliGRAM(s) Oral at bedtime  piperacillin/tazobactam IVPB.. 3.375 Gram(s) IV Intermittent every 8 hours  polyethylene glycol 3350 17 Gram(s) Oral two times a day  potassium chloride    Tablet ER 20 milliEquivalent(s) Oral daily  prednisoLONE acetate 1% Suspension 1 Drop(s) Both EYES two times a day  senna 1 Tablet(s) Oral two times a day  traZODone 50 milliGRAM(s) Oral daily    MEDICATIONS  (PRN):  acetaminophen     Tablet .. 650 milliGRAM(s) Oral every 6 hours PRN Temp greater or equal to 38C (100.4F), Mild Pain (1 - 3)  melatonin 3 milliGRAM(s) Oral at bedtime PRN Insomnia  ondansetron Injectable 4 milliGRAM(s) IV Push every 8 hours PRN Nausea and/or Vomiting  temazepam 30 milliGRAM(s) Oral at bedtime PRN Insomnia      FAMILY HISTORY:      SOCIAL HISTORY:    [ x] Non-smoker  [ ] Smoker  [ ] Alcohol    Allergies    iodinated radiocontrast agents (Anaphylaxis)    Intolerances    	    REVIEW OF SYSTEMS:  CONSTITUTIONAL: No fever, weight loss, or fatigue  EYES: No eye pain, visual disturbances, or discharge  ENT:  No difficulty hearing, tinnitus, vertigo; No sinus or throat pain  NECK: No pain or stiffness  RESPIRATORY: No cough, wheezing, chills or hemoptysis; + Shortness of Breath  CARDIOVASCULAR: No chest pain, palpitations, passing out, dizziness, or leg swelling  GASTROINTESTINAL: No abdominal or epigastric pain. No nausea, vomiting, or hematemesis; No diarrhea or constipation. No melena or hematochezia.  GENITOURINARY: No dysuria, frequency, hematuria, or incontinence  NEUROLOGICAL: No headaches, memory loss, loss of strength, numbness, or tremors  SKIN: No itching, burning, rashes, or lesions   LYMPH Nodes: No enlarged glands  ENDOCRINE: No heat or cold intolerance; No hair loss  MUSCULOSKELETAL: No joint pain or swelling; No muscle, back, or extremity pain  PSYCHIATRIC: No depression, anxiety, mood swings, or difficulty sleeping  HEME/LYMPH: No easy bruising, or bleeding gums  ALLERGY AND IMMUNOLOGIC: No hives or eczema	    [ ] All others negative	  [x ] Unable to obtain    PHYSICAL EXAM:  T(C): 36.7 (03-21-24 @ 20:36), Max: 36.8 (03-20-24 @ 23:10)  HR: 86 (03-21-24 @ 20:36) (75 - 115)  BP: 94/51 (03-21-24 @ 20:36) (86/48 - 144/82)  RR: 20 (03-21-24 @ 20:36) (20 - 26)  SpO2: 96% (03-21-24 @ 20:36) (91% - 100%)  Wt(kg): --  I&O's Summary    20 Mar 2024 07:01  -  21 Mar 2024 07:00  --------------------------------------------------------  IN: 0 mL / OUT: 451 mL / NET: -451 mL    21 Mar 2024 07:01  -  21 Mar 2024 21:09  --------------------------------------------------------  IN: 0 mL / OUT: 500 mL / NET: -500 mL        Appearance: Normal	  HEENT:   Normal oral mucosa, PERRL, EOMI	  Lymphatic: No lymphadenopathy  Cardiovascular: Normal S1 S2, No JVD, +murmurs, No edema  Respiratory:rhonchi  Psychiatry: dementia  Gastrointestinal:  Soft, Non-tender, + BS	  Skin: No rashes, No ecchymoses, No cyanosis	  Extremities: Normal range of motion, No clubbing, cyanosis or edema  Vascular: Peripheral pulses palpable 2+ bilaterally    TELEMETRY: 	    ECG:  	  RADIOLOGY:  OTHER: 	  	  LABS:	 	    CARDIAC MARKERS:                        7.9    19.95 )-----------( 235      ( 21 Mar 2024 07:06 )             26.6     03-21    140  |  104  |  28<H>  ----------------------------<  145<H>  4.0   |  19<L>  |  1.05    Ca    7.8<L>      21 Mar 2024 07:05    TPro  4.6<L>  /  Alb  2.8<L>  /  TBili  0.3  /  DBili  x   /  AST  28  /  ALT  9<L>  /  AlkPhos  103  03-21    proBNP:   Lipid Profile:   HgA1c:   TSH:   PT/INR - ( 20 Mar 2024 17:33 )   PT: 12.3 sec;   INR: 1.18 ratio         PTT - ( 20 Mar 2024 17:33 )  PTT:30.7 sec    PREVIOUS DIAGNOSTIC TESTING:    < from: 12 Lead ECG (03.20.24 @ 17:03) >  Diagnosis Line SINUS TACHYCARDIA  INFERIOR INFARCT , AGE UNDETERMINED  ANTERIOR INFARCT (CITED ON OR BEFORE 22-MAR-2023)  ABNORMAL ECG  WHEN COMPARED WITH ECG OF 08-FEB-2024 14:16,  NO SIGNIFICANT CHANGE WAS FOUND    < from: Xray Chest 1 View- PORTABLE-Urgent (03.20.24 @ 17:32) >  Support Devices/Implanted Hardware: Left chest wall loop recorder.  Heart: Heart size cannot be accurately assessed in this projection.  Pulmonary: Elevated right hemidiaphragm. No focal consolidations. No   pneumothorax or pleural effusion.  Bones: No acute bony pathology.  Miscellaneous: Suture anchors near the left humeral head.    IMPRESSION:  No focal consolidations.    < from: CT Angio Abdomen and Pelvis w/ IV Cont (03.21.24 @ 00:58) >  Distal transverse through sigmoid colon wall thickening and mild   inflammatory changes, most likely representing colitis and   diverticulitis. Patent mesenteric vessels. No pneumatosis or free air.    < from: 12 Lead ECG (03.20.24 @ 17:03) >  Diagnosis Line SINUS TACHYCARDIA  INFERIOR INFARCT , AGE UNDETERMINED  ANTERIOR INFARCT (CITED ON OR BEFORE 22-MAR-2023)  ABNORMAL ECG  WHEN COMPARED WITH ECG OF 08-FEB-2024 14:16,  NO SIGNIFICANT CHANGE WAS FOUND

## 2024-03-21 NOTE — H&P ADULT - HISTORY OF PRESENT ILLNESS
This is an 87 y/o female w/ PMHx pulmonary sarcoidosis on prednisone, COPD not on home O2, CAD, hypothyroidism, and HTN presenting for lack of BM in the last 5 days along with SOB and R flank pain. History mostly unclear as patient unable to remember what has happened recently.   She was admitted here from 2/8-2/21 for abdominal pain, constipation and vomiting. At the time, CT was suggestive of diverticulitis as well and her course was complicated by fluid overload requiring IVP lasix. She was treated with IV Zosyn for 7 days. During this hospital course, she was also found to have a dilated L renal pelvis w/ +UA, but was ruled not have UTI by ID. Urology was consulted, who stated that her pessary could be the cause of the hydro, and removed it on 2/13.     In the ED, she was febrile to 101.8, tachycardic to 130, hypotensive to 80/50, and hypoxic requiring 4LNC. CBC revealed leukocytosis of 29.35, normocytic anemia of 9.5. CMP w/ elevated AG of 24, low bicarb of 17, . Presenting VBG w/ lactate of 9.4. she was straight catheterized for UA, which was red and turvid w/ large LE and >50 RBC/hpf, negative for bacteria. Initially CT A/P w/o contrast was done (due to contrast allergy), which revealed a possible mild diverticulitis of the descending colon and unchanged severe L hydroureter with layering hyperdensity which could be stones or blood products. She was given Ceftriaxone and Zosyn, 2.5L IVNS. Lactate down to 3.5, BP improved. She had 2 dark tarry stools in her diaper, so she was premedicated w/ solumedrol and benadryl and CTA Abdomen/pelvis was ordered.  This is an 87 y/o female w/ PMHx pulmonary sarcoidosis, COPD not on home O2, CAD, hypothyroidism, and HTN presenting for lack of BM in the last 5 days along with SOB and R flank pain. History mostly unclear as patient unable to remember what has happened recently.   She was admitted here from 2/8-2/21 for abdominal pain, constipation and vomiting. At the time, CT was suggestive of diverticulitis as well and her course was complicated by fluid overload requiring IVP lasix. She was treated with IV Zosyn for 7 days. During this hospital course, she was also found to have a dilated L renal pelvis w/ +UA, but was ruled not have UTI by ID. Urology was consulted, who stated that her pessary could be the cause of the hydro, and removed it on 2/13.     In the ED, she was febrile to 101.8, tachycardic to 130, hypotensive to 80/50, and hypoxic requiring 4LNC. CBC revealed leukocytosis of 29.35, normocytic anemia of 9.5. CMP w/ elevated AG of 24, low bicarb of 17, . Presenting VBG w/ lactate of 9.4. she was straight catheterized for UA, which was red and turvid w/ large LE and >50 RBC/hpf, negative for bacteria. Initially CT A/P w/o contrast was done (due to contrast allergy), which revealed a possible mild diverticulitis of the descending colon and unchanged severe L hydroureter with layering hyperdensity which could be stones or blood products. She was given Ceftriaxone and Zosyn, 2.5L IVNS. Lactate down to 3.5, BP improved. She had 2 dark tarry stools in her diaper, so she was premedicated w/ solumedrol and benadryl and CTA Abdomen/pelvis was ordered.  This is an 87 y/o female w/ PMHx pulmonary sarcoidosis, COPD not on home O2, CAD, hypothyroidism, and HTN presenting for lack of BM in the last 5 days along with SOB and L flank pain from the atria. The patient does endorse some left-sided upper abdominal pain but doesn't endorse any other symptoms. She states that she doesn't have any SOB, and states that her last BM prior to the hospital was yesterday. Called the patient's daughter Deborah, who stated that she was told the patient was having severe abdominal pain as well as some vomiting.   She was admitted here from 2/8-2/21 for abdominal pain, constipation and vomiting. At the time, CT was suggestive of diverticulitis as well and her course was complicated by fluid overload requiring IVP lasix. She was treated with IV Zosyn for 7 days. During this hospital course, she was also found to have a dilated L renal pelvis w/ +UA, but was ruled not have UTI by ID. Urology was consulted, who stated that her pessary could be the cause of the hydro, and removed it on 2/13.     In the ED, she was febrile to 101.8, tachycardic to 130, hypotensive to 80/50, and hypoxic requiring 4LNC. CBC revealed leukocytosis of 29.35, normocytic anemia of 9.5. CMP w/ elevated AG of 24, low bicarb of 17, . Presenting VBG w/ lactate of 9.4. she was straight catheterized for UA, which was red and turvid w/ large LE and >50 RBC/hpf, negative for bacteria. Initially CT A/P w/o contrast was done (due to contrast allergy), which revealed a possible mild diverticulitis of the descending colon and unchanged severe L hydroureter with layering hyperdensity which could be stones or blood products. She was given Ceftriaxone and Zosyn, 2.5L IVNS. Lactate down to 3.5, BP improved. She had 2 dark tarry stools in her diaper, so she was premedicated w/ solumedrol and benadryl and CTA Abdomen/pelvis was ordered.

## 2024-03-21 NOTE — CONSULT NOTE ADULT - SUBJECTIVE AND OBJECTIVE BOX
Patient is a 88y old  Female who presents with a chief complaint of Diverticulitis, leukocytosis (21 Mar 2024 01:30)    HPI:  This is an 87 y/o female w/ PMHx pulmonary sarcoidosis, COPD not on home O2, CAD, hypothyroidism, and HTN presenting for lack of BM in the last 5 days along with SOB and L flank pain from the atria. The patient does endorse some left-sided upper abdominal pain but doesn't endorse any other symptoms. She states that she doesn't have any SOB, and states that her last BM prior to the hospital was yesterday. Called the patient's daughter Deborah, who stated that she was told the patient was having severe abdominal pain as well as some vomiting.   She was admitted here from 2/8-2/21 for abdominal pain, constipation and vomiting. At the time, CT was suggestive of diverticulitis as well and her course was complicated by fluid overload requiring IVP lasix. She was treated with IV Zosyn for 7 days. During this hospital course, she was also found to have a dilated L renal pelvis w/ +UA, but was ruled not have UTI by ID. Urology was consulted, who stated that her pessary could be the cause of the hydro, and removed it on 2/13.     In the ED, she was febrile to 101.8, tachycardic to 130, hypotensive to 80/50, and hypoxic requiring 4LNC. CBC revealed leukocytosis of 29.35, normocytic anemia of 9.5. CMP w/ elevated AG of 24, low bicarb of 17, . Presenting VBG w/ lactate of 9.4. she was straight catheterized for UA, which was red and turvid w/ large LE and >50 RBC/hpf, negative for bacteria. Initially CT A/P w/o contrast was done (due to contrast allergy), which revealed a possible mild diverticulitis of the descending colon and unchanged severe L hydroureter with layering hyperdensity which could be stones or blood products. She was given Ceftriaxone and Zosyn, 2.5L IVNS. Lactate down to 3.5, BP improved. She had 2 dark tarry stools in her diaper, so she was premedicated w/ solumedrol and benadryl and CTA Abdomen/pelvis was ordered.  (21 Mar 2024 01:30)      PAST MEDICAL & SURGICAL HISTORY:  HTN (hypertension)      Hypothyroidism      Osteoporosis      CAD (coronary artery disease)          Social history:    FAMILY HISTORY:    REVIEW OF SYSTEMS  General:	Denies any malaise fatigue or chills. Fevers absent         Allergic/Immunologic:	No hives or rash   Allergies    iodinated radiocontrast agents (Anaphylaxis)    Intolerances        Antimicrobials:    piperacillin/tazobactam IVPB.. 3.375 Gram(s) IV Intermittent every 8 hours        Vital Signs Last 24 Hrs  T(C): 36.4 (21 Mar 2024 03:44), Max: 38.8 (20 Mar 2024 17:30)  T(F): 97.6 (21 Mar 2024 03:44), Max: 101.8 (20 Mar 2024 17:30)  HR: 110 (21 Mar 2024 03:44) (82 - 130)  BP: 86/48 (21 Mar 2024 03:44) (80/50 - 152/79)  BP(mean): 81 (20 Mar 2024 23:10) (81 - 95)  RR: 24 (21 Mar 2024 03:44) (20 - 38)  SpO2: 96% (21 Mar 2024 03:44) (95% - 100%)    Parameters below as of 21 Mar 2024 03:44  Patient On (Oxygen Delivery Method): nasal cannula  O2 Flow (L/min): 2      PHYSICAL EXAM:Pleasant patient in no acute distress.      Constitutional:Comfortable.Awake and alert  No cachexia     Eyes:PERRL EOMI.NO discharge or conjunctival injection    ENMT:No sinus tenderness.No thrush.No pharyngeal exudate or erythema.Fair dental hygiene    Neck:Supple,No LN,no JVD      Respiratory:Good air entry bilaterally,CTA    Cardiovascular:S1 S2 wnl, No murmurs,rub or gallops    Gastrointestinal:Soft BS(+) no tenderness no masses ,No rebound or guarding    Genitourinary:No CVA tendereness     Rectal:    Extremities:No cyanosis,clubbing or edema.    Vascular:peripheral pulses felt    Neurological:AAO X 3,No grossly focal deficits                                     7.9    19.95 )-----------( 235      ( 21 Mar 2024 07:06 )             26.6         03-21    140  |  104  |  28<H>  ----------------------------<  145<H>  4.0   |  19<L>  |  1.05    Ca    7.8<L>      21 Mar 2024 07:05    TPro  4.6<L>  /  Alb  2.8<L>  /  TBili  0.3  /  DBili  x   /  AST  28  /  ALT  9<L>  /  AlkPhos  103  03-21      RECENT CULTURES:      MICROBIOLOGY:          Radiology:      Assessment:        Recommendations and Plan:    Pager [phone]  After 5 pm/weekends or if no response :[phone]

## 2024-03-21 NOTE — H&P ADULT - PROBLEM SELECTOR PLAN 1
- WBC 29.35 w/ lactate 9.4 on presentation, now down to 3.5  - S/p Ceftriaxone and Zosyn and 2.5L IVNS  - Continue IV Zosyn  - CT A/P w/o IV contrast only showing diffuse colonic diverticulosis with mild inflammation in the left paracolic gutter, suggestive of possible mild diverticulosis of the descending colon, do not think this alone could cause the patient's marked leukocytosis and fever  - F/u read of CTA A/P given patient's high lactate and fevers on presentation as well as tarry stools in the ED, r/o mesenteric ischemia  - F/u blood and urine cultures - WBC 29.35 w/ lactate 9.4 on presentation, now down to 3.5  - S/p Ceftriaxone and Zosyn and 2.5L IVNS, BP responded initially, now SBP back to 80s, but MAP >60  - Continue IV Zosyn  - CT A/P w/o IV contrast only showing diffuse colonic diverticulosis with mild inflammation in the left paracolic gutter, suggestive of possible mild diverticulosis of the descending colon, do not think this alone could cause the patient's marked leukocytosis and fever  - F/u read of CTA A/P given patient's high lactate and fevers on presentation as well as tarry stools in the ED, r/o mesenteric ischemia  - F/u blood and urine cultures - WBC 29.35 w/ lactate 9.4 on presentation, now down to 3.5  - S/p Ceftriaxone and Zosyn and 2.5L IVNS, BP responded initially, now SBP back to 80s, but MAP >60  - CT A/P w/o IV contrast only showing diffuse colonic diverticulosis with mild inflammation in the left paracolic gutter, suggestive of possible mild diverticulosis of the descending colon  - CTA abdomen/pelvis done, negative for mesenteric ischemia, but showing distal transverse through sigmoid colon wall thickening and mild inflammatory changes, most likely representing colitis and diverticulitis  - F/u blood and urine cultures  - Continue IV Zosyn for now  - Clear liquid diet for now, advance as tolerated

## 2024-03-21 NOTE — CONSULT NOTE ADULT - ASSESSMENT
This is an 89 y/o female w/ PMHx pulmonary sarcoidosis, COPD not on home O2, CAD, hypothyroidism, and HTN presenting for lack of BM in the last 5 days along with SOB and L flank pain from the atria. The patient does endorse some left-sided upper abdominal pain but doesn't endorse any other symptoms. She states that she doesn't have any SOB, and states that her last BM prior to the hospital was yesterday. Called the patient's daughter Deborah, who stated that she was told the patient was having severe abdominal pain as well as some vomiting.   She was admitted here from 2/8-2/21 for abdominal pain, constipation and vomiting. At the time, CT was suggestive of diverticulitis as well and her course was complicated by fluid overload requiring IVP lasix. She was treated with IV Zosyn for 7 days. During this hospital course, she was also found to have a dilated L renal pelvis w/ +UA, but was ruled not have UTI by ID. Urology was consulted, who stated that her pessary could be the cause of the hydro, and removed it on 2/13.   In the ED, she was febrile to 101.8, tachycardic to 130, hypotensive to 80/50, and hypoxic requiring 4LNC. CBC revealed leukocytosis of 29.35, normocytic anemia of 9.5. CMP w/ elevated AG of 24, low bicarb of 17, . Presenting VBG w/ lactate of 9.4. she was straight catheterized for UA, which was red and turvid w/ large LE and >50 RBC/hpf, negative for bacteria. Initially CT A/P w/o contrast was done (due to contrast allergy), which revealed a possible mild diverticulitis of the descending colon and unchanged severe L hydroureter with layering hyperdensity which could be stones or blood products. She was given Ceftriaxone and Zosyn, 2.5L IVNS. Lactate down to 3.5, BP improved. She had 2 dark tarry stools in her diaper, so she was premedicated w/ solumedrol and benadryl and CTA Abdomen/pelvis was ordered.  pt is well known to me with hx of sarcoidosis, cad with abdominal pain, she was recently dc sec to Diverticulitis  IN er pt with hypotension and hypoxia.  pt with sig abnormal ecg,,  with evidence of diverticulitis, continue Zosyn  start on ivf  ?tte if any evidence of chf  dvt prophylaxis  hold all bp meds  asa if no cintraindication

## 2024-03-21 NOTE — H&P ADULT - PROBLEM SELECTOR PLAN 9
DVT PPx: SCDs  Code Status: - C/w fluoxetine 20mg daily + Mirtazapine 7.5mg QHS - C/w fluoxetine 20mg daily + Mirtazapine 7.5mg QHS  - C/w trazodone 50mg daily - C/w ASA 81mg daily  - C/w atorvastatin 20mg QHS

## 2024-03-21 NOTE — H&P ADULT - NSHPREVIEWOFSYSTEMS_GEN_ALL_CORE
Review of Systems:   CONSTITUTIONAL: No fever, weight loss  EYES: No eye pain, visual disturbances, or discharge  ENMT:  No difficulty hearing, tinnitus, vertigo; No sinus or throat pain  RESPIRATORY: No SOB. No cough, wheezing, chills or hemoptysis  CARDIOVASCULAR: No chest pain, palpitations, dizziness, or leg swelling  GASTROINTESTINAL: No abdominal or epigastric pain. No nausea, vomiting, or hematemesis; No diarrhea or constipation. No melena or hematochezia.  GENITOURINARY: No dysuria, frequency, hematuria, or incontinence  NEUROLOGICAL: No headaches, memory loss, loss of strength, numbness, or tremors  SKIN: No itching, burning, rashes, or lesions   LYMPH NODES: No enlarged glands  ENDOCRINE: No heat or cold intolerance; No hair loss  MUSCULOSKELETAL: No joint pain or swelling; No muscle, back pain  PSYCHIATRIC: No depression, anxiety, mood swings, or difficulty sleeping  HEME/LYMPH: No easy bruising, or bleeding gums Review of Systems:   CONSTITUTIONAL: No fever, weight loss  EYES: No eye pain, visual disturbances, or discharge  RESPIRATORY: No SOB. No cough, wheezing, chills or hemoptysis  CARDIOVASCULAR: No chest pain, palpitations, dizziness, or leg swelling  GASTROINTESTINAL: +abdominal pain. No nausea, vomiting, or hematemesis; No diarrhea or constipation. No melena or hematochezia.  GENITOURINARY: +L flank pain, hematuria; No dysuria, frequency, or incontinence  NEUROLOGICAL: No headaches, memory loss, loss of strength, numbness, or tremors  SKIN: No itching, burning, rashes, or lesions   MUSCULOSKELETAL: No joint pain or swelling; No muscle, back pain  PSYCHIATRIC: No depression, anxiety, mood swings, or difficulty sleeping

## 2024-03-21 NOTE — H&P ADULT - PROBLEM SELECTOR PLAN 2
- Patient usually not on O2, now requiring 4LNC, tachypneic  - Unclear cause of hypoxia  - CXR clear without effusions or consolidations, only elevated R hemidiaphragm which has been seen on prior scans  - Could be COPD exacerbation?  - - Patient usually not on O2, now requiring 4LNC, tachypneic  - Unclear cause of hypoxia  - CXR clear without effusions or consolidations, only elevated R hemidiaphragm which has been seen on prior scans  - Could be COPD exacerbation vs sarcoidosis flare  - Duonebs q6hrs ATC for now, start IV Solumedrol 40mg BID? - Patient usually not on O2, now requiring 4LNC, tachypneic  - Unclear cause of hypoxia  - CXR clear without effusions or consolidations, only elevated R hemidiaphragm which has been seen on prior scans  - Could be COPD exacerbation vs sarcoidosis flare  - Duonebs q6hrs ATC for now, start IV Solumedrol 30mg BID? - Patient usually not on O2, now requiring 4LNC, tachypneic  - Unclear cause of hypoxia  - CXR clear without effusions or consolidations, only elevated R hemidiaphragm which has been seen on prior scans  - Could be COPD exacerbation vs sarcoidosis flare  - Duonebs q6hrs ATC for now, start IV Solumedrol 30mg BID?  - Looks like she briefly required NRB and was hypoxic to the 70s after coming back from CTA so there was concern for anaphylaxis, but her saturations improved while being bagged - Patient usually not on O2, was requiring 4LNC, now down to 2LNC, not SOB  - Wean as tolerated while maintaining SpO2 >94%  - CXR clear without effusions or consolidations, only elevated R hemidiaphragm which has been seen on prior scans  - Duonebs q6hrs ATC for now  - Looks like she briefly required NRB and was hypoxic to the 70s after coming back from CTA so there was concern for anaphylaxis, but her saturations improved while being bagged  - Spoke to patient's daughter, she states that patient always gets placed on NC when going to the hospital, but ends up getting weaned off - BP 80/50 on admission with tachycardia to 130s, was given 2.5L IVNS, tachycardia improved and BP improved to high of 152/79  - At bedside, BP now back down to 86/48, MAP 61  - Will give another 500cc bolus of LR, will have to be judicious w/ fluid as patient did have fluid overload requiring lasix last admission  - If MAP cannot be maintained above 60, will need to consult ICU, patient's family states they would want central line and vasopressors for hypotension

## 2024-03-21 NOTE — PATIENT PROFILE ADULT - FALL HARM RISK - PATIENT NEEDS ASSISTANCE
Normal urine. Moderate decrease in kidney function. I do not have the previous one to compare with. Please check if available,  Total cholesterol and LDL are both high. Low-cholesterol diet and moderate regular exercise are recommended.   Recheck in 3 months Standing/Walking

## 2024-03-21 NOTE — ED ADULT NURSE NOTE - NSFALLRSKASSISTTYPE_ED_ALL_ED
Chronic, uncontrolled. Latest blood pressure and vitals reviewed-     Temp:  [97 °F (36.1 °C)-99.4 °F (37.4 °C)]   Pulse:  [74-98]   Resp:  [13-20]   BP: (126-212)/()   SpO2:  [94 %-100 %] .   Home meds for hypertension were reviewed and noted below.   Hypertension Medications               fosinopriL (MONOPRIL) 20 MG tablet Take 1 tablet (20 mg total) by mouth once daily.    isosorbide mononitrate (IMDUR) 30 MG 24 hr tablet Take 1 tablet (30 mg total) by mouth once daily.    metoprolol succinate (TOPROL-XL) 50 MG 24 hr tablet Take 1 tablet (50 mg total) by mouth once daily.    nitroGLYCERIN (NITROSTAT) 0.4 MG SL tablet DISSOLVE 1 TABLET UNDER TONGUE FOR CHEST PAIN EVERY 5 MINUTES X 3 DOSES IF NO RELIEF GO TO EMERGENCY ROOM            While in the hospital, will manage blood pressure as follows; Continue home antihypertensive regimen    Will utilize p.r.n. blood pressure medication only if patient's blood pressure greater than 180/110 and she develops symptoms such as worsening chest pain or shortness of breath.   Standing/Walking/Toileting

## 2024-03-21 NOTE — H&P ADULT - PROBLEM SELECTOR PLAN 6
- Not on home O2  - Alaina q6hrs ATC for now  - - Not on home O2  - Alaina q6hrs ATC for now - Seems like patient has this issue chronically  - Miralax and senna BID for now

## 2024-03-21 NOTE — PATIENT PROFILE ADULT - FALL HARM RISK - HARM RISK INTERVENTIONS

## 2024-03-21 NOTE — H&P ADULT - PROBLEM SELECTOR PLAN 5
- Supposed to be on prednisone but unclear what dose  - - Supposed to be on prednisone but unclear what dose  - S/p IV solumedrol 125mg in the ED  - Given patient requiring 4LNC with clear CXR, will do IV solumedrol 30mg BID for now, consult pulm in AM for taper - Patient not on any prednisone for a while per daughter  - S/p IV solumedrol 125mg in the ED - Hb 9.5, down from 11.7 a month ago  - Check iron studies, B12  - Could be due to hematuria, but patient also apparently had 2 tarry BMs in ED but guaiac was negative  - Monitor for any episodes of melena or BRBPR

## 2024-03-21 NOTE — CONSULT NOTE ADULT - ASSESSMENT
Assessment and recommendation   acute hypoxic respiratory Failure on 02   keep 02 > saturation > 92%  Fever and sepsis of urinary tract origin    Sarcoidosis   COPD   Paralysis of the Right hemidiaphragm   acute left pyelonephritis  Acute Diverticulitis    anemia of chronic disease   continue Antibiotics   nebulizer treatement Duoneb q 6 hrs   DVT and GI prophylaxis   care discuss with NP on the floor   critical care time is 70 minutes  Assessment and recommendation   acute hypoxic respiratory Failure on 02   keep 02 > saturation > 92%  Fever and sepsis of urinary tract origin    Sarcoidosis   COPD   Paralysis of the Right hemidiaphragm   acute left pyelonephritis  Acute Diverticulitis    anemia of chronic disease   protein caloric malnutrition   continue Antibiotics   nebulizer treatement Duoneb q 6 hrs   DVT and GI prophylaxis   care discuss with NP on the floor   critical care time is 70 minutes

## 2024-03-21 NOTE — H&P ADULT - PROBLEM SELECTOR PLAN 4
- Seems like patient has this issue chronically  - Miralax and senna BID for now - Hopes were that this would resolve with pessary removal but seems to have persisted  - UA still bloody, and  layering hyperdensity in the dilated ureter in the pelvis, possibly stones or blood products, seen on CT abdomen/pelvis  - She followed up with urology on 3/7 at the time she declined cystoscopy for hematuria workup and doesn't want any surgical intervention, and was ruled out as a candidate for a CT urogram due to contrast allergy, and she is not amenable to an MR urogram  - Reconsult urology in AM for any further recommendations

## 2024-03-21 NOTE — H&P ADULT - NSHPLABSRESULTS_GEN_ALL_CORE
9.5    29.35 )-----------( 342      ( 20 Mar 2024 17:33 )             31.2         141  |  100  |  27<H>  ----------------------------<  172<H>  4.3   |  17<L>  |  1.10    Ca    10.1      20 Mar 2024 17:33    TPro  6.6  /  Alb  4.1  /  TBili  0.5  /  DBili  x   /  AST  20  /  ALT  9<L>  /  AlkPhos  160<H>  20          LIVER FUNCTIONS - ( 20 Mar 2024 17:33 )  Alb: 4.1 g/dL / Pro: 6.6 g/dL / ALK PHOS: 160 U/L / ALT: 9 U/L / AST: 20 U/L / GGT: x           PT/INR - ( 20 Mar 2024 17:33 )   PT: 12.3 sec;   INR: 1.18 ratio         PTT - ( 20 Mar 2024 17:33 )  PTT:30.7 sec  Urinalysis Basic - ( 20 Mar 2024 18:25 )    Color: Red / Appearance: Turbid / S.015 / pH: x  Gluc: x / Ketone: Small  / Bili: Large / Urobili: 4 mg/dL   Blood: x / Protein: 300 mg/dL / Nitrite: Positive   Leuk Esterase: Large / RBC: >50 /HPF / WBC 6 /HPF   Sq Epi: x / Non Sq Epi: x / Bacteria: Negative /HPF

## 2024-03-21 NOTE — H&P ADULT - PROBLEM SELECTOR PLAN 7
- C/w ASA 81mg daily  - C/w atorvastatin 20mg QHS - Patient not on any prednisone for a while per daughter  - S/p IV solumedrol 125mg in the ED

## 2024-03-21 NOTE — H&P ADULT - CONVERSATION DETAILS
Spoke to patient's daughter Deborah regarding patient's admission and code status. Communicated that CTA read is still awaiting, but patient's BP is relatively low w/ SBP in 80s at bedside. Asked Deborah what the patient's code status was. Deborah stated that the patient had told her before that she would want everything done including chest compressions and intubation. Discussed that given the patient's baseline level of health and comorbidities, it is unlikely that she would have a good outcome with return to previous QOL with CPR. Deborah expressed understanding and said that while she has her own beliefs, since the patient herself had expressed prior that these were her wishes, she would want to honor them. However, she also said that if a situation arose where the patient was urgently heading towards intubation or loss of a pulse, she would want a call before for discussion if possible. Also expressed to me that patient would want central lines as well if needed. Spoke to patient's daughter Deborah regarding patient's admission and code status. Communicated that CTA read is still awaiting, but patient's BP is relatively low w/ SBP in 80s at bedside. Asked Deborah what the patient's code status was. Deborah stated that the patient had told her before that she would want everything done including chest compressions and intubation. Discussed that given the patient's baseline level of health and comorbidities, it is unlikely that she would have a good outcome with return to previous QOL with CPR. Deborah expressed understanding and said that while she has her own beliefs, since the patient herself had expressed prior that these were her wishes, she would want to honor them. However, she also said that if a situation arose where the patient was urgently heading towards intubation or loss of a pulse, she would want a call before for discussion if possible. Also expressed to me that patient would want central lines and vasopressors as well if needed.

## 2024-03-21 NOTE — H&P ADULT - PROBLEM SELECTOR PLAN 3
- Hopes were that this would resolve with pessary removal but seems to have persisted  - UA still bloody, and  layering hyperdensity in the dilated ureter in the pelvis, possibly stones or blood products, seen on CT abdomen/pelvis  - Would reconsult urology for any further recommendations - Hopes were that this would resolve with pessary removal but seems to have persisted  - UA still bloody, and  layering hyperdensity in the dilated ureter in the pelvis, possibly stones or blood products, seen on CT abdomen/pelvis  - She followed up with urology on 3/7 at the time she declined cystoscopy for hematuria workup and doesn't want any surgical intervention, and was ruled out as a candidate for a CT urogram due to contrast allergy, and she is not amenable to an MR urogram  - Would reconsult urology for any further recommendations - Patient usually not on O2, was requiring 4LNC, now down to 2LNC, not SOB  - Wean as tolerated while maintaining SpO2 >94%  - CXR clear without effusions or consolidations, only elevated R hemidiaphragm which has been seen on prior scans  - Duonebs q6hrs ATC for now  - Looks like she briefly required NRB and was hypoxic to the 70s after coming back from CTA so there was concern for anaphylaxis, but her saturations improved while being bagged  - Spoke to patient's daughter, she states that patient always gets placed on NC when going to the hospital, but ends up getting weaned off

## 2024-03-21 NOTE — H&P ADULT - NSHPPHYSICALEXAM_GEN_ALL_CORE
Vital Signs Last 24 Hrs  T(C): 36.8 (20 Mar 2024 23:10), Max: 38.8 (20 Mar 2024 17:30)  T(F): 98.3 (20 Mar 2024 23:10), Max: 101.8 (20 Mar 2024 17:30)  HR: 82 (20 Mar 2024 23:10) (82 - 130)  BP: 130/69 (20 Mar 2024 23:10) (80/50 - 152/79)  BP(mean): 81 (20 Mar 2024 23:10) (81 - 95)  RR: 25 (20 Mar 2024 23:10) (20 - 38)  SpO2: 100% (20 Mar 2024 23:10) (98% - 100%)    Parameters below as of 20 Mar 2024 23:10  Patient On (Oxygen Delivery Method): nasal cannula  O2 Flow (L/min): 4      CONSTITUTIONAL: Well-groomed, in no apparent distress  EYES: No conjunctival or scleral injection, non-icteric;   ENMT: No external nasal lesions; MMM  NECK: Trachea midline without palpable neck mass; thyroid not enlarged and non-tender  RESPIRATORY: Breathing comfortably; no dullness to percussion; lungs CTA without wheeze/rhonchi/rales  CARDIOVASCULAR: +S1S2, RRR, no M/G/R; pedal pulses full and symmetric; no lower extremity edema  GASTROINTESTINAL: No palpable masses or tenderness, +BS throughout, no rebound/guarding; no hepatosplenomegaly; no hernia palpated  LYMPHATIC: No cervical LAD or tenderness  SKIN: No rashes or ulcers noted  NEUROLOGIC: CN II-XII intact; sensation intact in LEs b/l to light touch  PSYCHIATRIC: A+O x 3; mood and affect appropriate; appropriate insight and judgment Vital Signs Last 24 Hrs  T(C): 36.8 (20 Mar 2024 23:10), Max: 38.8 (20 Mar 2024 17:30)  T(F): 98.3 (20 Mar 2024 23:10), Max: 101.8 (20 Mar 2024 17:30)  HR: 82 (20 Mar 2024 23:10) (82 - 130)  BP: 130/69 (20 Mar 2024 23:10) (80/50 - 152/79)  BP(mean): 81 (20 Mar 2024 23:10) (81 - 95)  RR: 25 (20 Mar 2024 23:10) (20 - 38)  SpO2: 100% (20 Mar 2024 23:10) (98% - 100%)    Parameters below as of 20 Mar 2024 23:10  Patient On (Oxygen Delivery Method): nasal cannula  O2 Flow (L/min): 4      CONSTITUTIONAL: Well-groomed, in no apparent distress  EYES: No conjunctival or scleral injection, non-icteric; R eye PERRL, blind in L eye, L pupil nonreactive  RESPIRATORY: Breathing comfortably; no dullness to percussion; lungs CTA without wheeze/rhonchi/rales  CARDIOVASCULAR: +S1S2, RRR, no M/G/R; +1 LE edema bilaterally  GASTROINTESTINAL: Moderate tenderness to palpation in LUQ, no TTP in other quadrants, +BS throughout, no rebound/guarding  SKIN: No rashes or ulcers noted  NEUROLOGIC: No focal deficits  PSYCHIATRIC: AAOx2 (person and time, not to place) Vital Signs Last 24 Hrs  T(C): 36.8 (20 Mar 2024 23:10), Max: 38.8 (20 Mar 2024 17:30)  T(F): 98.3 (20 Mar 2024 23:10), Max: 101.8 (20 Mar 2024 17:30)  HR: 82 (20 Mar 2024 23:10) (82 - 130)  BP: 130/69 (20 Mar 2024 23:10) (80/50 - 152/79)  BP(mean): 81 (20 Mar 2024 23:10) (81 - 95)  RR: 25 (20 Mar 2024 23:10) (20 - 38)  SpO2: 100% (20 Mar 2024 23:10) (98% - 100%)    Parameters below as of 20 Mar 2024 23:10  Patient On (Oxygen Delivery Method): nasal cannula  O2 Flow (L/min): 4      CONSTITUTIONAL: Well-groomed, in no apparent distress  EYES: No conjunctival or scleral injection, non-icteric; R eye PERRL, blind in L eye, L pupil nonreactive  RESPIRATORY: Breathing comfortably; no dullness to percussion; lungs CTA without wheeze/rhonchi/rales  CARDIOVASCULAR: +S1S2, RRR, no M/G/R; +1 LE edema bilaterally  GASTROINTESTINAL: Moderate tenderness to palpation in LUQ, no TTP in other quadrants, +BS throughout, no rebound/guarding  SKIN: No rashes or ulcers noted  NEUROLOGIC: No focal deficits  PSYCHIATRIC: AAOx2 (person and time, not to place), answers most questions appropriately, has short-term memory deficit

## 2024-03-21 NOTE — H&P ADULT - PROBLEM SELECTOR PLAN 8
- C/w triamterene-HCTZ 37.5-25mg daily - C/w triamterene-HCTZ 37.5-25mg daily, hold for SBP <110 - Not on home O2  - Alaina q6hrs ATC for now

## 2024-03-21 NOTE — ED ADULT NURSE REASSESSMENT NOTE - NS ED NURSE REASSESS COMMENT FT1
notified by CT and other RNs pt w/ anaphylactic reaction in  CT. attending Mandile at bedside. pt got epi and pepcid per EMAR. at 0145 vitals taken pt denies any SOB. NRB titrated to 4L NC and oxygen remains >95%.

## 2024-03-21 NOTE — ED ADULT NURSE REASSESSMENT NOTE - NS ED NURSE REASSESS COMMENT FT1
Pt had episode of unresponsiveness in CT scan. Immediately brought back to [facility], Dr. Perez at bedside. Epipen administered to left thigh and other anaphylaxis protocol medications. NRB placed on patient and patient regained consciousness and back to baseline.

## 2024-03-21 NOTE — H&P ADULT - PROBLEM SELECTOR PLAN 10
DVT PPx: SCDs  Code Status: DVT PPx: SCDs  Code Status: CPR - C/w triamterene-HCTZ 37.5-25mg daily, hold for SBP <110 - Hold triamterene-HCTZ 37.5-25mg for now

## 2024-03-21 NOTE — H&P ADULT - NSHPPOAURINARYCATHETER_GEN_ALL_CORE
Hospitalist Progress Note      PCP: Denis Ruff    Date of Admission: 10/7/2020    Subjective: pt c/o worsening pain left LE more than rt    Medications:  Reviewed    Infusion Medications   Scheduled Medications    gabapentin  300 mg Oral TID    fleet  1 enema Rectal Once    amiodarone  200 mg Oral Daily    atorvastatin  40 mg Oral Nightly    cefTRIAXone (ROCEPHIN) IV  1 g Intravenous Q24H    docusate sodium  100 mg Oral Daily    dorzolamide-timolol  1 drop Both Eyes BID    DULoxetine  30 mg Oral BID    ferrous sulfate  325 mg Oral Daily with breakfast    lactobacillus  2 capsule Oral BID WC    latanoprost  1 drop Left Eye Nightly    levothyroxine  50 mcg Oral Daily    methylphenidate  5 mg Oral BID WC    metoprolol succinate  50 mg Oral BID    pantoprazole  40 mg Oral Daily    polyethylene glycol  17 g Oral Daily    rivaroxaban  20 mg Oral Daily    senna  2 tablet Oral BID    sodium chloride flush  10 mL Intravenous Q12H     PRN Meds: acetaminophen, aluminum & magnesium hydroxide-simethicone, benztropine mesylate, haloperidol lactate **OR** haloperidol, LORazepam **OR** LORazepam, magnesium hydroxide, traZODone, diphenhydrAMINE, nitroGLYCERIN, oxyCODONE-acetaminophen, oxyCODONE-acetaminophen      Intake/Output Summary (Last 24 hours) at 10/11/2020 1633  Last data filed at 10/11/2020 1439  Gross per 24 hour   Intake 480 ml   Output 6150 ml   Net -5670 ml       Physical Exam Performed:    BP (!) 144/77   Pulse 62   Temp 96.7 °F (35.9 °C) (Oral)   Resp 16   Ht 5' 9\" (1.753 m)   Wt 155 lb (70.3 kg)   SpO2 97%   BMI 22.89 kg/m²     General appearance: alert, appears stated age and cooperative  Head: Normocephalic, without obvious abnormality, atraumatic  Eyes: conjunctivae/corneas clear. PERRL, EOM's intact.   Neck: no adenopathy, no carotid bruit, no JVD, supple, symmetrical, trachea midline and thyroid not enlarged, symmetric, no tenderness/mass/nodules  Lungs: clear to auscultation bilaterally  Heart: regular rate and rhythm, S1, S2 normal, no murmur, click, rub or gallop  Abdomen: soft, mildly distended and tender; bowel sounds normal; no masses,  no organomegaly  Extremities: extremities normal, atraumatic, no cyanosis or edema  Pulses: 2+ and symmetric  Skin: Skin color, texture, turgor normal. No rashes or lesions  Neurologic: Grossly normal    Labs:   Recent Labs     10/09/20  0600 10/10/20  0545 10/11/20  0555   WBC 3.9* 3.3* 3.6*   HGB 8.6* 8.6* 7.6*   HCT 26.2* 26.4* 23.0*    146 179     Recent Labs     10/09/20  0600 10/10/20  0545 10/11/20  0555   * 136 137   K 3.9 3.6 3.6    105 105   CO2 22 22 24   BUN 16 13 10   CREATININE 1.2 1.1 1.0   CALCIUM 8.7 8.6 8.6     No results for input(s): AST, ALT, BILIDIR, BILITOT, ALKPHOS in the last 72 hours. No results for input(s): INR in the last 72 hours. No results for input(s): Lina Miranda in the last 72 hours. Urinalysis:      Lab Results   Component Value Date    NITRU POSITIVE 10/05/2020    WBCUA see below 10/05/2020    BACTERIA 4+ 10/05/2020    RBCUA see below 10/05/2020    BLOODU SMALL 10/05/2020    SPECGRAV 1.010 10/05/2020    GLUCOSEU Negative 10/05/2020       Radiology:  US RENAL COMPLETE   Final Result   Mild bilateral hydronephrosis. Small bladder calculus.                  Assessment/Plan:    Active Hospital Problems    Diagnosis    Constipation [K59.00]    Chronic obstructive pulmonary disease (HCC) [B12.5]    Complicated UTI (urinary tract infection) [N39.0]    PAF (paroxysmal atrial fibrillation) (HCC) [I48.0]    Coronary artery disease involving native heart without angina pectoris [I25.10]    Hypothyroidism [E03.9]    [facility]major [facility] disorder[facility] [K41.8]         #Complicated Proteus UTI    #Urinary Retention   - recent admission  for sepsis 2/2 UTI.  s/p cystoscopy for mendoza placement on 9/13/20 at [facility], + false urethral passage noted at that time. [facility] was then discharged to ARU before coming to [facility] psych unit for admission   - urology consulted on arrival to [facility], mendoza that was placed on 9/13 was removed on 10/2 but he failed voiding trial, and therefore nursing staff replaced mendoza on 10/2   - on 10/5 he started spiking fevers, UA consistent with UTI.  CT showed bilateral hydro- see above- despite mendoza appearing to be draining adequately   >> discharged from psych unit and readmitted to med surg floor on 10/7  - initially placed on IV Merrem, changed to Rocephin D#3 when proteus culture and sensitivity resulted   - Urology consulted  - renal USG as above. Mild mina hydronephrosis  - blood cultures (including one drawn from PICC line) are NG  -  His catheter is now draining clear urine.   - will need TURP outpt when more improved medically     #TONO on CKD stage III  - Cr on admission: 1.2  - baseline: ~1.1  - Crt 1.2 --> 1.5 --> 1.2-->1. 1      #Hyponatremia resolved  - Na is 133 -> 135-->136   cont saline   - monitor Na     #MDD  - s/p treatment on the geriatric psychiatric unit.  Will continue medications per psychiatry recs: duloxetine 30 mg BID and methylphenidate 5 mg BID       #Recent Retroperitoneal Hematoma  - 2/2 recent Lumbar procedure as below  - Xarelto was held initially, but was resumed when he was admitted to [facility].  H/h has remained stable if you look at the trend over the last few weeks      #COPD  - no AE  - add PRN Albuterol     #CAD s/p stent  - no c/o chest pain  - holding ASA for now, cont Lipitor     #AAA  - stable on CT.  Will need OP monitoring with repeat imaging in 5 years     #Atrial Fibrillation  - held Xarelto 2/2 hematuria and hematoma at [facility]/ARU  - cont Toprol XL and amio.   restarted Xarelto now       #HTN  - uncontrolled  - cont Toprol XL (losartan recently d/c'd 2/2 TONO)     #Hypothyroidism  - home dose of synthroid 50 mcg continued      #GERD  - cont Protonix       #Chronic dCHF  - noted on echo 9/19/2020 - EF 50%  - appears compensated  - cont Toprol XL     #Iron Deficiency Anemia  - cont Ferrous Sulfate  - monitor h/h      #Lumbar Stenosis  - s/p recent L4-S1 ALIF/PSF  - 8/31 with Dr. Steffen Rodriguez and Dr. Rhett Goldstein  - still with b/l LE pain/tingling, L>R, increase gabapentin     #Hx of CVA  - cont Lipitor     #RUE PICC in place since 9/14/2020     #Severe constipation  -Patient on [address] and [address].   - Add lactulose 2 doses today  - Placed order for fleets enema      Discharge planning for am.  For SNF vs ARU placement  Needs precert        DVT Prophylaxis: Xarelto  Diet: DIET GENERAL;  Dietary Nutrition Supplements: Standard High Calorie Oral Supplement  Code Status: DNR-CCA    PT/OT Eval Status: ordered    Dispo - cont care, poss dc in 1-2 days    Eva Aquino MD no

## 2024-03-22 LAB
ANION GAP SERPL CALC-SCNC: 13 MMOL/L — SIGNIFICANT CHANGE UP (ref 5–17)
BLD GP AB SCN SERPL QL: NEGATIVE — SIGNIFICANT CHANGE UP
BUN SERPL-MCNC: 25 MG/DL — HIGH (ref 7–23)
CALCIUM SERPL-MCNC: 8.2 MG/DL — LOW (ref 8.4–10.5)
CHLORIDE SERPL-SCNC: 107 MMOL/L — SIGNIFICANT CHANGE UP (ref 96–108)
CO2 SERPL-SCNC: 23 MMOL/L — SIGNIFICANT CHANGE UP (ref 22–31)
CREAT SERPL-MCNC: 0.99 MG/DL — SIGNIFICANT CHANGE UP (ref 0.5–1.3)
EGFR: 55 ML/MIN/1.73M2 — LOW
FERRITIN SERPL-MCNC: 126 NG/ML — SIGNIFICANT CHANGE UP (ref 13–330)
GLUCOSE SERPL-MCNC: 79 MG/DL — SIGNIFICANT CHANGE UP (ref 70–99)
HCT VFR BLD CALC: 21.7 % — LOW (ref 34.5–45)
HCT VFR BLD CALC: 22.5 % — LOW (ref 34.5–45)
HCT VFR BLD CALC: 25.5 % — LOW (ref 34.5–45)
HGB BLD-MCNC: 6.4 G/DL — CRITICAL LOW (ref 11.5–15.5)
HGB BLD-MCNC: 6.9 G/DL — CRITICAL LOW (ref 11.5–15.5)
HGB BLD-MCNC: 7.9 G/DL — LOW (ref 11.5–15.5)
IRON SATN MFR SERPL: 13 UG/DL — LOW (ref 30–160)
IRON SATN MFR SERPL: 6 % — LOW (ref 14–50)
MCHC RBC-ENTMCNC: 29.2 PG — SIGNIFICANT CHANGE UP (ref 27–34)
MCHC RBC-ENTMCNC: 29.5 GM/DL — LOW (ref 32–36)
MCHC RBC-ENTMCNC: 29.5 PG — SIGNIFICANT CHANGE UP (ref 27–34)
MCHC RBC-ENTMCNC: 30 PG — SIGNIFICANT CHANGE UP (ref 27–34)
MCHC RBC-ENTMCNC: 30.7 GM/DL — LOW (ref 32–36)
MCHC RBC-ENTMCNC: 31 GM/DL — LOW (ref 32–36)
MCV RBC AUTO: 95.1 FL — SIGNIFICANT CHANGE UP (ref 80–100)
MCV RBC AUTO: 97.8 FL — SIGNIFICANT CHANGE UP (ref 80–100)
MCV RBC AUTO: 99.1 FL — SIGNIFICANT CHANGE UP (ref 80–100)
NRBC # BLD: 0 /100 WBCS — SIGNIFICANT CHANGE UP (ref 0–0)
PLATELET # BLD AUTO: 155 K/UL — SIGNIFICANT CHANGE UP (ref 150–400)
PLATELET # BLD AUTO: 159 K/UL — SIGNIFICANT CHANGE UP (ref 150–400)
PLATELET # BLD AUTO: 184 K/UL — SIGNIFICANT CHANGE UP (ref 150–400)
POTASSIUM SERPL-MCNC: 3.5 MMOL/L — SIGNIFICANT CHANGE UP (ref 3.5–5.3)
POTASSIUM SERPL-SCNC: 3.5 MMOL/L — SIGNIFICANT CHANGE UP (ref 3.5–5.3)
RBC # BLD: 2.19 M/UL — LOW (ref 3.8–5.2)
RBC # BLD: 2.3 M/UL — LOW (ref 3.8–5.2)
RBC # BLD: 2.68 M/UL — LOW (ref 3.8–5.2)
RBC # FLD: 15.8 % — HIGH (ref 10.3–14.5)
RBC # FLD: 15.9 % — HIGH (ref 10.3–14.5)
RBC # FLD: 17 % — HIGH (ref 10.3–14.5)
RH IG SCN BLD-IMP: POSITIVE — SIGNIFICANT CHANGE UP
SODIUM SERPL-SCNC: 143 MMOL/L — SIGNIFICANT CHANGE UP (ref 135–145)
TIBC SERPL-MCNC: 224 UG/DL — SIGNIFICANT CHANGE UP (ref 220–430)
UIBC SERPL-MCNC: 211 UG/DL — SIGNIFICANT CHANGE UP (ref 110–370)
VIT B12 SERPL-MCNC: 544 PG/ML — SIGNIFICANT CHANGE UP (ref 232–1245)
WBC # BLD: 10.63 K/UL — HIGH (ref 3.8–10.5)
WBC # BLD: 11.17 K/UL — HIGH (ref 3.8–10.5)
WBC # BLD: 11.18 K/UL — HIGH (ref 3.8–10.5)
WBC # FLD AUTO: 10.63 K/UL — HIGH (ref 3.8–10.5)
WBC # FLD AUTO: 11.17 K/UL — HIGH (ref 3.8–10.5)
WBC # FLD AUTO: 11.18 K/UL — HIGH (ref 3.8–10.5)

## 2024-03-22 PROCEDURE — 99232 SBSQ HOSP IP/OBS MODERATE 35: CPT

## 2024-03-22 RX ADMIN — PIPERACILLIN AND TAZOBACTAM 25 GRAM(S): 4; .5 INJECTION, POWDER, LYOPHILIZED, FOR SOLUTION INTRAVENOUS at 05:26

## 2024-03-22 RX ADMIN — MIRTAZAPINE 7.5 MILLIGRAM(S): 45 TABLET, ORALLY DISINTEGRATING ORAL at 21:06

## 2024-03-22 RX ADMIN — CYCLOPENTOLATE HYDROCHLORIDE 1 DROP(S): 10 SOLUTION/ DROPS OPHTHALMIC at 06:39

## 2024-03-22 RX ADMIN — Medication 81 MILLIGRAM(S): at 13:29

## 2024-03-22 RX ADMIN — Medication 3 MILLILITER(S): at 13:30

## 2024-03-22 RX ADMIN — Medication 1 DROP(S): at 17:47

## 2024-03-22 RX ADMIN — Medication 325 MILLIGRAM(S): at 13:30

## 2024-03-22 RX ADMIN — CYCLOPENTOLATE HYDROCHLORIDE 1 DROP(S): 10 SOLUTION/ DROPS OPHTHALMIC at 17:48

## 2024-03-22 RX ADMIN — SENNA PLUS 1 TABLET(S): 8.6 TABLET ORAL at 05:27

## 2024-03-22 RX ADMIN — Medication 3 MILLILITER(S): at 05:27

## 2024-03-22 RX ADMIN — Medication 1000 UNIT(S): at 13:29

## 2024-03-22 RX ADMIN — Medication 3 MILLILITER(S): at 23:41

## 2024-03-22 RX ADMIN — Medication 20 MILLIEQUIVALENT(S): at 13:30

## 2024-03-22 RX ADMIN — PIPERACILLIN AND TAZOBACTAM 25 GRAM(S): 4; .5 INJECTION, POWDER, LYOPHILIZED, FOR SOLUTION INTRAVENOUS at 21:06

## 2024-03-22 RX ADMIN — TEMAZEPAM 30 MILLIGRAM(S): 15 CAPSULE ORAL at 21:10

## 2024-03-22 RX ADMIN — ATORVASTATIN CALCIUM 20 MILLIGRAM(S): 80 TABLET, FILM COATED ORAL at 21:05

## 2024-03-22 RX ADMIN — Medication 650 MILLIGRAM(S): at 16:01

## 2024-03-22 RX ADMIN — POLYETHYLENE GLYCOL 3350 17 GRAM(S): 17 POWDER, FOR SOLUTION ORAL at 05:27

## 2024-03-22 RX ADMIN — Medication 50 MILLIGRAM(S): at 13:30

## 2024-03-22 RX ADMIN — Medication 1 DROP(S): at 05:27

## 2024-03-22 RX ADMIN — Medication 20 MILLIGRAM(S): at 13:30

## 2024-03-22 RX ADMIN — PIPERACILLIN AND TAZOBACTAM 25 GRAM(S): 4; .5 INJECTION, POWDER, LYOPHILIZED, FOR SOLUTION INTRAVENOUS at 13:30

## 2024-03-22 NOTE — PROGRESS NOTE ADULT - SUBJECTIVE AND OBJECTIVE BOX
BISHNU SEGURA  MRN#: [id number]  Subjective:  pulmonary consultation :acute hypoxic respiratory Failure on 02, sarcoidosis , COPD , Diastolic dysfunction , pulmonary HTN   This is an 87 y/o female w/ PMH pulmonary sarcoidosis, COPD not on home O2, CAD, hypothyroidism, and HTN presenting for lack of BM in the last 5 days along with SOB and L flank pain from the atria. The patient does endorse some left-sided upper abdominal pain but doesn't endorse any other symptoms. She states that she doesn't have any SOB, and states that her last BM prior to the hospital was yesterday. Called the patient's daughter Deborah, who stated that she was told the patient was having severe abdominal pain as well as some vomiting.   She was admitted here from 2/8-2/21 for abdominal pain, constipation and vomiting. At the time, CT was suggestive of diverticulitis as well and her course was complicated by fluid overload requiring IVP lasix. She was treated with IV Zosyn for 7 days. During this hospital course, she was also found to have a dilated L renal pelvis w/ +UA, but was ruled not have UTI by ID. Urology was consulted, who stated that her pessary could be the cause of the hydro, and removed it on 2/13.     In the ED, she was febrile to 101.8, tachycardic to 130, hypotensive to 80/50, and hypoxic requiring 4LNC. CBC revealed leukocytosis of 29.35, normocytic anemia of 9.5. CMP w/ elevated AG of 24, low bicarb of 17, . Presenting VBG w/ lactate of 9.4. she was straight catheterized for UA, which was red and turbid w/ large LE and >50 RBC/hpf, negative for bacteria. Initially CT A/P w/o contrast was done (due to contrast allergy), which revealed a possible mild diverticulitis of the descending colon and unchanged severe L hydroureter with layering hypodensity which could be stones or blood products. She was given Ceftriaxone and Zosyn, 2.5L IVNS. Lactate down to 3.5, BP improved. She had 2 dark tarry stools in her diaper, so she was premedicated w/ solumedrol and benadryl and CTA Abdomen/pelvis was ordered.  (21 Mar 2024 01:30), patient was SOB needed high 02 supplement , CT scan acute Diverticulitis , afebrile down to nasal canula , on antibiotics , less distress no nausea or vomiting no headache      PAST MEDICAL & SURGICAL HISTORY:  HTN (hypertension)      Hypothyroidism      Osteoporosis  pulmonary hypertension   H/O anxiety     CAD (coronary artery disease)  sarcoidosis   COPD           FAMILY HISTORY:  Review of Systems:  Review of Systems: Review of Systems:   CONSTITUTIONAL: + fever,+ weight loss  EYES: No eye pain, visual disturbances, or discharge  RESPIRATORY: + SOB. No cough, wheezing, chills or hemoptysis  CARDIOVASCULAR: No chest pain, palpitations, dizziness, or leg swelling  GASTROINTESTINAL: +abdominal pain. No nausea, vomiting, or hematemesis; No diarrhea or constipation. No melena or hematochezia.  GENITOURINARY: +L flank pain, hematuria; No dysuria, frequency, or incontinence  NEUROLOGICAL: No headaches, memory loss, loss of strength, numbness, or tremors  SKIN: No itching, burning, rashes, or lesions   MUSCULOSKELETAL: No joint pain or swelling; No muscle, back pain  PSYCHIATRIC: + depression,+ anxiety,+ mood swings, or difficulty ,             OBJECTIVE:  ICU Vital Signs Last 24 Hrs  T(C): 36.8 (22 Mar 2024 16:23), Max: 36.9 (22 Mar 2024 00:28)  T(F): 98.2 (22 Mar 2024 16:23), Max: 98.4 (22 Mar 2024 00:28)  HR: 102 (22 Mar 2024 16:23) (82 - 102)  BP: 145/76 (22 Mar 2024 16:23) (94/51 - 145/76)  BP(mean): --  ABP: --  ABP(mean): --  RR: 20 (22 Mar 2024 16:23) (19 - 20)  SpO2: 93% (22 Mar 2024 16:23) (93% - 96%)    O2 Parameters below as of 22 Mar 2024 16:23  Patient On (Oxygen Delivery Method): nasal cannula  O2 Flow (L/min): 2          03-21 @ 07:01  -  03-22 @ 07:00  --------------------------------------------------------  IN: 240 mL / OUT: 1850 mL / NET: -1610 mL      PHYSICAL EXAM: Daily Height in cm: 157.48 (20 Mar 2024 16:54)  Elderly agitated cachectic female on 02   Daily   HEENT:     + NCAT  + EOMI  - Conjuctival edema   - Icterus   - Thrush   - ETT  - NGT/OGT  Neck:         + FROM    + JVD     - Nodes     - Masses    + Mid-line trachea   - Tracheostomy  Chest:          pectus excavatum , increase A-P diameter   Lungs:          + CTA   + Rhonchi    - Rales    - Wheezing   + Decreased BS   - Dullness R L  Cardiac:       + S1 + S2    + RRR   - Irregular   - S3  - S4  + Murmurs   - Rub   - Hamman’s sign   Abdomen:    + BS     + Soft    + tender left lower quadrant   - Distended  - Organomegaly  - PEG  Extremities:   - Cyanosis U/L   - Clubbing  U/L  - LE/UE Edema   + Capillary refill    + Pulses   Neuro:        + Awake   +  Alert   - Confused   - Lethargic   - Sedated   - Generalized Weakness  Skin:        - Rashes    - Erythema   + Normal incisions   + IV sites intact  - Sacral decubit      HOSPITAL MEDICATIONS: All mediciations reviewed and analyzed  MEDICATIONS  (STANDING):  albuterol/ipratropium for Nebulization 3 milliLiter(s) Nebulizer every 6 hours  atorvastatin 20 milliGRAM(s) Oral at bedtime  cholecalciferol 1000 Unit(s) Oral daily  cyclopentolate 1% Solution 1 Drop(s) Both EYES two times a day  EPINEPHrine    Injectable 0.3 milliGRAM(s) IntraMuscular once  ferrous    sulfate 325 milliGRAM(s) Oral daily  FLUoxetine 20 milliGRAM(s) Oral daily  influenza  Vaccine (HIGH DOSE) 0.7 milliLiter(s) IntraMuscular once  mirtazapine 7.5 milliGRAM(s) Oral at bedtime  piperacillin/tazobactam IVPB.. 3.375 Gram(s) IV Intermittent every 8 hours  polyethylene glycol 3350 17 Gram(s) Oral two times a day  potassium chloride    Tablet ER 20 milliEquivalent(s) Oral daily  prednisoLONE acetate 1% Suspension 1 Drop(s) Both EYES two times a day  senna 1 Tablet(s) Oral two times a day  sodium chloride 0.9%. 1000 milliLiter(s) (60 mL/Hr) IV Continuous <Continuous>  traZODone 50 milliGRAM(s) Oral daily    MEDICATIONS  (PRN):  acetaminophen     Tablet .. 650 milliGRAM(s) Oral every 6 hours PRN Temp greater or equal to 38C (100.4F), Mild Pain (1 - 3)  melatonin 3 milliGRAM(s) Oral at bedtime PRN Insomnia  ondansetron Injectable 4 milliGRAM(s) IV Push every 8 hours PRN Nausea and/or Vomiting  temazepam 30 milliGRAM(s) Oral at bedtime PRN Insomnia    LABS: All Lab data reviewed and analyzed                        6.9    10.63 )-----------( 159      ( 22 Mar 2024 08:57 )             22.5    03-22    143  |  107  |  25<H>  ----------------------------<  79  3.5   |  23  |  0.99    Ca    8.2<L>      22 Mar 2024 07:34    TPro  4.6<L>  /  Alb  2.8<L>  /  TBili  0.3  /  DBili  x   /  AST  28  /  ALT  9<L>  /  AlkPhos  103  03-21     LIVER FUNCTIONS - ( 21 Mar 2024 07:05 )  Alb: 2.8 g/dL / Pro: 4.6 g/dL / ALK PHOS: 103 U/L / ALT: 9 U/L / AST: 28 U/L / GGT: x           RADIOLOGY: - Reviewed and analyzed  BISHNU SEGURA  MRN#: [id number]  Subjective:  pulmonary consultation :acute hypoxic respiratory Failure on 02, sarcoidosis , COPD , Diastolic dysfunction , pulmonary HTN   This is an 89 y/o female w/ PMH pulmonary sarcoidosis, COPD not on home O2, CAD, hypothyroidism, and HTN presenting for lack of BM in the last 5 days along with SOB and L flank pain from the atria. The patient does endorse some left-sided upper abdominal pain but doesn't endorse any other symptoms. She states that she doesn't have any SOB, and states that her last BM prior to the hospital was yesterday. Called the patient's daughter Deborah, who stated that she was told the patient was having severe abdominal pain as well as some vomiting.   She was admitted here from 2/8-2/21 for abdominal pain, constipation and vomiting. At the time, CT was suggestive of diverticulitis as well and her course was complicated by fluid overload requiring IVP lasix. She was treated with IV Zosyn for 7 days. During this hospital course, she was also found to have a dilated L renal pelvis w/ +UA, but was ruled not have UTI by ID. Urology was consulted, who stated that her pessary could be the cause of the hydro, and removed it on 2/13.     In the ED, she was febrile to 101.8, tachycardic to 130, hypotensive to 80/50, and hypoxic requiring 4LNC. CBC revealed leukocytosis of 29.35, normocytic anemia of 9.5. CMP w/ elevated AG of 24, low bicarb of 17, . Presenting VBG w/ lactate of 9.4. she was straight catheterized for UA, which was red and turbid w/ large LE and >50 RBC/hpf, negative for bacteria. Initially CT A/P w/o contrast was done (due to contrast allergy), which revealed a possible mild diverticulitis of the descending colon and unchanged severe L hydroureter with layering hypodensity which could be stones or blood products. She was given Ceftriaxone and Zosyn, 2.5L IVNS. Lactate down to 3.5, BP improved. She had 2 dark tarry stools in her diaper, so she was premedicated w/ solumedrol and benadryl and CTA Abdomen/pelvis was ordered.  (21 Mar 2024 01:30), patient was SOB needed high 02 supplement , CT scan acute Diverticulitis , afebrile down to nasal canula , on antibiotics , less distress no nausea or vomiting no headache S/P blood transfusion     PAST MEDICAL & SURGICAL HISTORY:  HTN (hypertension)      Hypothyroidism      Osteoporosis  pulmonary hypertension   H/O anxiety     CAD (coronary artery disease)  sarcoidosis   COPD           FAMILY HISTORY:  Review of Systems:  Review of Systems: Review of Systems:   CONSTITUTIONAL: + fever,+ weight loss  EYES: No eye pain, visual disturbances, or discharge  RESPIRATORY: + SOB. No cough, wheezing, chills or hemoptysis  CARDIOVASCULAR: No chest pain, palpitations, dizziness, or leg swelling  GASTROINTESTINAL: +abdominal pain. No nausea, vomiting, or hematemesis; No diarrhea or constipation. No melena or hematochezia.  GENITOURINARY: +L flank pain, hematuria; No dysuria, frequency, or incontinence  NEUROLOGICAL: No headaches, memory loss, loss of strength, numbness, or tremors  SKIN: No itching, burning, rashes, or lesions   MUSCULOSKELETAL: No joint pain or swelling; No muscle, back pain  PSYCHIATRIC: + depression,+ anxiety,+ mood swings, or difficulty ,             OBJECTIVE:  ICU Vital Signs Last 24 Hrs  T(C): 36.8 (22 Mar 2024 16:23), Max: 36.9 (22 Mar 2024 00:28)  T(F): 98.2 (22 Mar 2024 16:23), Max: 98.4 (22 Mar 2024 00:28)  HR: 102 (22 Mar 2024 16:23) (82 - 102)  BP: 145/76 (22 Mar 2024 16:23) (94/51 - 145/76)  BP(mean): --  ABP: --  ABP(mean): --  RR: 20 (22 Mar 2024 16:23) (19 - 20)  SpO2: 93% (22 Mar 2024 16:23) (93% - 96%)    O2 Parameters below as of 22 Mar 2024 16:23  Patient On (Oxygen Delivery Method): nasal cannula  O2 Flow (L/min): 2          03-21 @ 07:01  -  03-22 @ 07:00  --------------------------------------------------------  IN: 240 mL / OUT: 1850 mL / NET: -1610 mL      PHYSICAL EXAM: Daily Height in cm: 157.48 (20 Mar 2024 16:54)  Elderly agitated cachectic female on 02   Daily   HEENT:     + NCAT  + EOMI  - Conjuctival edema   - Icterus   - Thrush   - ETT  - NGT/OGT  Neck:         + FROM    + JVD     - Nodes     - Masses    + Mid-line trachea   - Tracheostomy  Chest:          pectus excavatum , increase A-P diameter   Lungs:          + CTA   + Rhonchi    - Rales    - Wheezing   + Decreased BS   - Dullness R L  Cardiac:       + S1 + S2    + RRR   - Irregular   - S3  - S4  + Murmurs   - Rub   - Hamman’s sign   Abdomen:    + BS     + Soft    + tender left lower quadrant   - Distended  - Organomegaly  - PEG  Extremities:   - Cyanosis U/L   - Clubbing  U/L  - LE/UE Edema   + Capillary refill    + Pulses   Neuro:        + Awake   +  Alert   - Confused   - Lethargic   - Sedated   - Generalized Weakness  Skin:        - Rashes    - Erythema   + Normal incisions   + IV sites intact  - Sacral decubit      HOSPITAL MEDICATIONS: All mediciations reviewed and analyzed  MEDICATIONS  (STANDING):  albuterol/ipratropium for Nebulization 3 milliLiter(s) Nebulizer every 6 hours  atorvastatin 20 milliGRAM(s) Oral at bedtime  cholecalciferol 1000 Unit(s) Oral daily  cyclopentolate 1% Solution 1 Drop(s) Both EYES two times a day  EPINEPHrine    Injectable 0.3 milliGRAM(s) IntraMuscular once  ferrous    sulfate 325 milliGRAM(s) Oral daily  FLUoxetine 20 milliGRAM(s) Oral daily  influenza  Vaccine (HIGH DOSE) 0.7 milliLiter(s) IntraMuscular once  mirtazapine 7.5 milliGRAM(s) Oral at bedtime  piperacillin/tazobactam IVPB.. 3.375 Gram(s) IV Intermittent every 8 hours  polyethylene glycol 3350 17 Gram(s) Oral two times a day  potassium chloride    Tablet ER 20 milliEquivalent(s) Oral daily  prednisoLONE acetate 1% Suspension 1 Drop(s) Both EYES two times a day  senna 1 Tablet(s) Oral two times a day  sodium chloride 0.9%. 1000 milliLiter(s) (60 mL/Hr) IV Continuous <Continuous>  traZODone 50 milliGRAM(s) Oral daily    MEDICATIONS  (PRN):  acetaminophen     Tablet .. 650 milliGRAM(s) Oral every 6 hours PRN Temp greater or equal to 38C (100.4F), Mild Pain (1 - 3)  melatonin 3 milliGRAM(s) Oral at bedtime PRN Insomnia  ondansetron Injectable 4 milliGRAM(s) IV Push every 8 hours PRN Nausea and/or Vomiting  temazepam 30 milliGRAM(s) Oral at bedtime PRN Insomnia    LABS: All Lab data reviewed and analyzed                        6.9    10.63 )-----------( 159      ( 22 Mar 2024 08:57 )             22.5    03-22    143  |  107  |  25<H>  ----------------------------<  79  3.5   |  23  |  0.99    Ca    8.2<L>      22 Mar 2024 07:34    TPro  4.6<L>  /  Alb  2.8<L>  /  TBili  0.3  /  DBili  x   /  AST  28  /  ALT  9<L>  /  AlkPhos  103  03-21     LIVER FUNCTIONS - ( 21 Mar 2024 07:05 )  Alb: 2.8 g/dL / Pro: 4.6 g/dL / ALK PHOS: 103 U/L / ALT: 9 U/L / AST: 28 U/L / GGT: x           RADIOLOGY: - Reviewed and analyzed  BISHNU SEGURA  MRN#: [id number]  Subjective:  pulmonary progress note: :acute hypoxic respiratory Failure on 02, sarcoidosis , COPD , Diastolic dysfunction , pulmonary HTN date of service is 3/22/2024   This is an 89 y/o female w/ PMH pulmonary sarcoidosis, COPD not on home O2, CAD, hypothyroidism, and HTN presenting for lack of BM in the last 5 days along with SOB and L flank pain from the atria. The patient does endorse some left-sided upper abdominal pain but doesn't endorse any other symptoms. She states that she doesn't have any SOB, and states that her last BM prior to the hospital was yesterday. Called the patient's daughter Deborah, who stated that she was told the patient was having severe abdominal pain as well as some vomiting.   She was admitted here from 2/8-2/21 for abdominal pain, constipation and vomiting. At the time, CT was suggestive of diverticulitis as well and her course was complicated by fluid overload requiring IVP lasix. She was treated with IV Zosyn for 7 days. During this hospital course, she was also found to have a dilated L renal pelvis w/ +UA, but was ruled not have UTI by ID. Urology was consulted, who stated that her pessary could be the cause of the hydro, and removed it on 2/13.     In the ED, she was febrile to 101.8, tachycardic to 130, hypotensive to 80/50, and hypoxic requiring 4LNC. CBC revealed leukocytosis of 29.35, normocytic anemia of 9.5. CMP w/ elevated AG of 24, low bicarb of 17, . Presenting VBG w/ lactate of 9.4. she was straight catheterized for UA, which was red and turbid w/ large LE and >50 RBC/hpf, negative for bacteria. Initially CT A/P w/o contrast was done (due to contrast allergy), which revealed a possible mild diverticulitis of the descending colon and unchanged severe L hydroureter with layering hypodensity which could be stones or blood products. She was given Ceftriaxone and Zosyn, 2.5L IVNS. Lactate down to 3.5, BP improved. She had 2 dark tarry stools in her diaper, so she was premedicated w/ solumedrol and benadryl and CTA Abdomen/pelvis was ordered.  (21 Mar 2024 01:30), patient was SOB needed high 02 supplement , CT scan acute Diverticulitis , afebrile down to nasal canula , on antibiotics , less distress no nausea or vomiting no headache S/P blood transfusion     PAST MEDICAL & SURGICAL HISTORY:  HTN (hypertension)      Hypothyroidism      Osteoporosis  pulmonary hypertension   H/O anxiety     CAD (coronary artery disease)  sarcoidosis   COPD           FAMILY HISTORY:  Review of Systems:  Review of Systems: Review of Systems:   CONSTITUTIONAL: + fever,+ weight loss  EYES: No eye pain, visual disturbances, or discharge  RESPIRATORY: + SOB. No cough, wheezing, chills or hemoptysis  CARDIOVASCULAR: No chest pain, palpitations, dizziness, or leg swelling  GASTROINTESTINAL: +abdominal pain. No nausea, vomiting, or hematemesis; No diarrhea or constipation. No melena or hematochezia.  GENITOURINARY: +L flank pain, hematuria; No dysuria, frequency, or incontinence  NEUROLOGICAL: No headaches, memory loss, loss of strength, numbness, or tremors  SKIN: No itching, burning, rashes, or lesions   MUSCULOSKELETAL: No joint pain or swelling; No muscle, back pain  PSYCHIATRIC: + depression,+ anxiety,+ mood swings, or difficulty ,             OBJECTIVE:  ICU Vital Signs Last 24 Hrs  T(C): 36.8 (22 Mar 2024 16:23), Max: 36.9 (22 Mar 2024 00:28)  T(F): 98.2 (22 Mar 2024 16:23), Max: 98.4 (22 Mar 2024 00:28)  HR: 102 (22 Mar 2024 16:23) (82 - 102)  BP: 145/76 (22 Mar 2024 16:23) (94/51 - 145/76)  BP(mean): --  ABP: --  ABP(mean): --  RR: 20 (22 Mar 2024 16:23) (19 - 20)  SpO2: 93% (22 Mar 2024 16:23) (93% - 96%)    O2 Parameters below as of 22 Mar 2024 16:23  Patient On (Oxygen Delivery Method): nasal cannula  O2 Flow (L/min): 2          03-21 @ 07:01  -  03-22 @ 07:00  --------------------------------------------------------  IN: 240 mL / OUT: 1850 mL / NET: -1610 mL      PHYSICAL EXAM: Daily Height in cm: 157.48 (20 Mar 2024 16:54)  Elderly agitated cachectic female on 02   Daily   HEENT:     + NCAT  + EOMI  - Conjuctival edema   - Icterus   - Thrush   - ETT  - NGT/OGT  Neck:         + FROM    + JVD     - Nodes     - Masses    + Mid-line trachea   - Tracheostomy  Chest:          pectus excavatum , increase A-P diameter   Lungs:          + CTA   + Rhonchi    - Rales    - Wheezing   + Decreased BS   - Dullness R L  Cardiac:       + S1 + S2    + RRR   - Irregular   - S3  - S4  + Murmurs   - Rub   - Hamman’s sign   Abdomen:    + BS     + Soft    + tender left lower quadrant   - Distended  - Organomegaly  - PEG  Extremities:   - Cyanosis U/L   - Clubbing  U/L  - LE/UE Edema   + Capillary refill    + Pulses   Neuro:        + Awake   +  Alert   - Confused   - Lethargic   - Sedated   - Generalized Weakness  Skin:        - Rashes    - Erythema   + Normal incisions   + IV sites intact  - Sacral decubit      HOSPITAL MEDICATIONS: All mediciations reviewed and analyzed  MEDICATIONS  (STANDING):  albuterol/ipratropium for Nebulization 3 milliLiter(s) Nebulizer every 6 hours  atorvastatin 20 milliGRAM(s) Oral at bedtime  cholecalciferol 1000 Unit(s) Oral daily  cyclopentolate 1% Solution 1 Drop(s) Both EYES two times a day  EPINEPHrine    Injectable 0.3 milliGRAM(s) IntraMuscular once  ferrous    sulfate 325 milliGRAM(s) Oral daily  FLUoxetine 20 milliGRAM(s) Oral daily  influenza  Vaccine (HIGH DOSE) 0.7 milliLiter(s) IntraMuscular once  mirtazapine 7.5 milliGRAM(s) Oral at bedtime  piperacillin/tazobactam IVPB.. 3.375 Gram(s) IV Intermittent every 8 hours  polyethylene glycol 3350 17 Gram(s) Oral two times a day  potassium chloride    Tablet ER 20 milliEquivalent(s) Oral daily  prednisoLONE acetate 1% Suspension 1 Drop(s) Both EYES two times a day  senna 1 Tablet(s) Oral two times a day  sodium chloride 0.9%. 1000 milliLiter(s) (60 mL/Hr) IV Continuous <Continuous>  traZODone 50 milliGRAM(s) Oral daily    MEDICATIONS  (PRN):  acetaminophen     Tablet .. 650 milliGRAM(s) Oral every 6 hours PRN Temp greater or equal to 38C (100.4F), Mild Pain (1 - 3)  melatonin 3 milliGRAM(s) Oral at bedtime PRN Insomnia  ondansetron Injectable 4 milliGRAM(s) IV Push every 8 hours PRN Nausea and/or Vomiting  temazepam 30 milliGRAM(s) Oral at bedtime PRN Insomnia    LABS: All Lab data reviewed and analyzed                        6.9    10.63 )-----------( 159      ( 22 Mar 2024 08:57 )             22.5    03-22    143  |  107  |  25<H>  ----------------------------<  79  3.5   |  23  |  0.99    Ca    8.2<L>      22 Mar 2024 07:34    TPro  4.6<L>  /  Alb  2.8<L>  /  TBili  0.3  /  DBili  x   /  AST  28  /  ALT  9<L>  /  AlkPhos  103  03-21     LIVER FUNCTIONS - ( 21 Mar 2024 07:05 )  Alb: 2.8 g/dL / Pro: 4.6 g/dL / ALK PHOS: 103 U/L / ALT: 9 U/L / AST: 28 U/L / GGT: x           RADIOLOGY: - Reviewed and analyzed

## 2024-03-22 NOTE — PROGRESS NOTE ADULT - SUBJECTIVE AND OBJECTIVE BOX
infectious diseases progress note:    Patient is a 88y old  Female who presents with a chief complaint of Diverticulitis, leukocytosis (21 Mar 2024 21:08)        Diverticulitis of intestine without perforation or abscess without bleeding               Allergies    iodinated radiocontrast agents (Anaphylaxis)    Intolerances        ANTIBIOTICS/RELEVANT:  antimicrobials  piperacillin/tazobactam IVPB.. 3.375 Gram(s) IV Intermittent every 8 hours    immunologic:  influenza  Vaccine (HIGH DOSE) 0.7 milliLiter(s) IntraMuscular once    OTHER:  acetaminophen     Tablet .. 650 milliGRAM(s) Oral every 6 hours PRN  albuterol/ipratropium for Nebulization 3 milliLiter(s) Nebulizer every 6 hours  aspirin enteric coated 81 milliGRAM(s) Oral daily  atorvastatin 20 milliGRAM(s) Oral at bedtime  cholecalciferol 1000 Unit(s) Oral daily  cyclopentolate 1% Solution 1 Drop(s) Both EYES two times a day  EPINEPHrine    Injectable 0.3 milliGRAM(s) IntraMuscular once  ferrous    sulfate 325 milliGRAM(s) Oral daily  FLUoxetine 20 milliGRAM(s) Oral daily  melatonin 3 milliGRAM(s) Oral at bedtime PRN  mirtazapine 7.5 milliGRAM(s) Oral at bedtime  ondansetron Injectable 4 milliGRAM(s) IV Push every 8 hours PRN  polyethylene glycol 3350 17 Gram(s) Oral two times a day  potassium chloride    Tablet ER 20 milliEquivalent(s) Oral daily  prednisoLONE acetate 1% Suspension 1 Drop(s) Both EYES two times a day  senna 1 Tablet(s) Oral two times a day  sodium chloride 0.9%. 1000 milliLiter(s) IV Continuous <Continuous>  temazepam 30 milliGRAM(s) Oral at bedtime PRN  traZODone 50 milliGRAM(s) Oral daily      Objective:  Vital Signs Last 24 Hrs  T(C): 36.8 (22 Mar 2024 04:50), Max: 36.9 (22 Mar 2024 00:28)  T(F): 98.3 (22 Mar 2024 04:50), Max: 98.4 (22 Mar 2024 00:28)  HR: 89 (22 Mar 2024 04:50) (75 - 89)  BP: 115/68 (22 Mar 2024 04:50) (94/51 - 115/68)  BP(mean): --  RR: 19 (22 Mar 2024 04:50) (19 - 20)  SpO2: 95% (22 Mar 2024 04:50) (91% - 96%)    Parameters below as of 22 Mar 2024 04:50  Patient On (Oxygen Delivery Method): nasal cannula  O2 Flow (L/min): 2       s  Eyes:JIM, EOMI  Ear/Nose/Throat: no oral lesion, no sinus tenderness on percussion	  Neck:no JVD, no lymphadenopathy, supple  Respiratory: CTA moises  Cardiovascular: S1S2 RRR, no murmurs  Gastrointestinal:soft, (+) BS, no HSM  Extremities:no e/e/c        LABS:                        7.9    19.95 )-----------( 235      ( 21 Mar 2024 07:06 )             26.6     03-21    140  |  104  |  28<H>  ----------------------------<  145<H>  4.0   |  19<L>  |  1.05    Ca    7.8<L>      21 Mar 2024 07:05    TPro  4.6<L>  /  Alb  2.8<L>  /  TBili  0.3  /  DBili  x   /  AST  28  /  ALT  9<L>  /  AlkPhos  103  03-21    PT/INR - ( 20 Mar 2024 17:33 )   PT: 12.3 sec;   INR: 1.18 ratio         PTT - ( 20 Mar 2024 17:33 )  PTT:30.7 sec  Urinalysis Basic - ( 21 Mar 2024 07:05 )    Color: x / Appearance: x / SG: x / pH: x  Gluc: 145 mg/dL / Ketone: x  / Bili: x / Urobili: x   Blood: x / Protein: x / Nitrite: x   Leuk Esterase: x / RBC: x / WBC x   Sq Epi: x / Non Sq Epi: x / Bacteria: x          MICROBIOLOGY:    RECENT CULTURES:  03-20 @ 17:15 .Blood Blood-Peripheral                No growth at 24 hours    03-20 @ 17:10 .Blood Blood-Peripheral                No growth at 24 hours          RESPIRATORY CULTURES:              RADIOLOGY & ADDITIONAL STUDIES:        Pager [phone]  After 5 pm/weekends or if no response :[phone]

## 2024-03-22 NOTE — PROGRESS NOTE ADULT - ASSESSMENT
Assessment and Recommendation:   · Assessment	  Assessment and recommendation   acute hypoxic respiratory Failure on 02   keep 02 > saturation > 92%  Fever and sepsis of urinary tract origin    Sarcoidosis   COPD   Paralysis of the Right hemidiaphragm   acute left pyelonephritis  Acute Diverticulitis    anemia of chronic disease   protein caloric malnutrition   continue Antibiotics   nebulizer treatement Duoneb q 6 hrs   DVT and GI prophylaxis   care discuss with NP on the floor      Assessment and Recommendation:   · Assessment	  Assessment and recommendation   acute hypoxic respiratory Failure on 02   keep 02 > saturation > 92%  Fever and sepsis of urinary tract origin    Sarcoidosis   COPD   Paralysis of the Right hemidiaphragm   S/P blood transfusion   acute left pyelonephritis  Acute Diverticulitis    anemia of chronic disease   protein caloric malnutrition   continue Antibiotics   nebulizer treatement Duoneb q 6 hrs   DVT and GI prophylaxis   care discuss with NP on the floor

## 2024-03-22 NOTE — PROGRESS NOTE ADULT - SUBJECTIVE AND OBJECTIVE BOX
---___---___---___---___---___---___ ---___---___---___---___---___---___---___---___---                  M E D I C A L   A T T E N D I N G   P R O G R E S S   N O T E  ---___---___---___---___---___---___ ---___---___---___---___---___---___---___---___---        ================================================    ++CHIEF COMPLAINT:   Patient is a 88y old  Female who presents with a chief complaint of Diverticulitis, leukocytosis (22 Mar 2024 09:41)      Diverticulitis of intestine without perforation or abscess without bleeding      ---___---___---___---___---___---  PAST MEDICAL / Surgical  HISTORY:  PAST MEDICAL & SURGICAL HISTORY:  HTN (hypertension)      Hypothyroidism      Osteoporosis      CAD (coronary artery disease)          ---___---___---___---___---___---  FAMILY HISTORY:   FAMILY HISTORY:        ---___---___---___---___---___---  ALLERGIES:   Allergies    iodinated radiocontrast agents (Anaphylaxis)    Intolerances        ---___---___---___---___---___---  MEDICATIONS:  MEDICATIONS  (STANDING):  albuterol/ipratropium for Nebulization 3 milliLiter(s) Nebulizer every 6 hours  aspirin enteric coated 81 milliGRAM(s) Oral daily  atorvastatin 20 milliGRAM(s) Oral at bedtime  cholecalciferol 1000 Unit(s) Oral daily  cyclopentolate 1% Solution 1 Drop(s) Both EYES two times a day  EPINEPHrine    Injectable 0.3 milliGRAM(s) IntraMuscular once  ferrous    sulfate 325 milliGRAM(s) Oral daily  FLUoxetine 20 milliGRAM(s) Oral daily  influenza  Vaccine (HIGH DOSE) 0.7 milliLiter(s) IntraMuscular once  mirtazapine 7.5 milliGRAM(s) Oral at bedtime  piperacillin/tazobactam IVPB.. 3.375 Gram(s) IV Intermittent every 8 hours  polyethylene glycol 3350 17 Gram(s) Oral two times a day  potassium chloride    Tablet ER 20 milliEquivalent(s) Oral daily  prednisoLONE acetate 1% Suspension 1 Drop(s) Both EYES two times a day  senna 1 Tablet(s) Oral two times a day  sodium chloride 0.9%. 1000 milliLiter(s) (60 mL/Hr) IV Continuous <Continuous>  traZODone 50 milliGRAM(s) Oral daily    MEDICATIONS  (PRN):  acetaminophen     Tablet .. 650 milliGRAM(s) Oral every 6 hours PRN Temp greater or equal to 38C (100.4F), Mild Pain (1 - 3)  melatonin 3 milliGRAM(s) Oral at bedtime PRN Insomnia  ondansetron Injectable 4 milliGRAM(s) IV Push every 8 hours PRN Nausea and/or Vomiting  temazepam 30 milliGRAM(s) Oral at bedtime PRN Insomnia      ---___---___---___---___---___---  REVIEW OF SYSTEM:    GEN: no fever, no chills, no pain  RESP: no SOB, no cough, no sputum  CVS: no chest pain, no palpitations, no edema  GI: no abdominal pain, no nausea, no vomiting, no constipation, no diarrhea  : no dysurea, no frequency, no hematurea  Neuro: no headache, no dizziness  PSYCH: no anxiety, no depression  Derm : no itching, no rash    ---___---___---___---___---___---  VITAL SIGNS:  88y , CAPILLARY BLOOD GLUCOSE      POCT Blood Glucose.: 169 mg/dL (20 Mar 2024 17:12)    T(C): 36.8 (24 @ 04:50), Max: 36.9 (24 @ 00:28)  HR: 89 (24 @ 04:50) (84 - 89)  BP: 115/68 (24 @ 04:50) (94/51 - 115/68)  RR: 19 (24 @ 04:50) (19 - 20)  SpO2: 95% (24 @ 04:50) (93% - 96%)  ---___---___---___---___---___---  PHYSICAL EXAM:    GEN: A&O X 3 , NAD , comfortable  HEENT: NCAT, PERRL, MMM, hearing intact  Neck: supple , no JVD  CVS: S1S2 , regular , No M/R/G appreciated  PULM: CTA B/L,  no W/R/R appreciated  ABD.: soft. non tender, non distended,  bowel sounds present  Extrem: intact pulses , no edema   Derm: No rash , no ecchymoses  PSYCH : normal mood,  no delusion not anxious     ---___---___---___---___---___---            LAB AND IMAGIN.9    10.63 )-----------( 159      ( 22 Mar 2024 08:57 )             22.5               03-    143  |  107  |  25<H>  ----------------------------<  79  3.5   |  23  |  0.99    Ca    8.2<L>      22 Mar 2024 07:34    TPro  4.6<L>  /  Alb  2.8<L>  /  TBili  0.3  /  DBili  x   /  AST  28  /  ALT  9<L>  /  AlkPhos  103  03-21    PT/INR - ( 20 Mar 2024 17:33 )   PT: 12.3 sec;   INR: 1.18 ratio         PTT - ( 20 Mar 2024 17:33 )  PTT:30.7 sec                        Urinalysis Basic - ( 22 Mar 2024 07:34 )    Color: x / Appearance: x / SG: x / pH: x  Gluc: 79 mg/dL / Ketone: x  / Bili: x / Urobili: x   Blood: x / Protein: x / Nitrite: x   Leuk Esterase: x / RBC: x / WBC x   Sq Epi: x / Non Sq Epi: x / Bacteria: x        [All pertinent / recent Imaging reviewed]         ---___---___---___---___---___---___ ---___---___---___---___---                         A S S E S S M E N T   A N D   P L A N :      HEALTH ISSUES - PROBLEM Dx:  Fever with leukocytosis and leukocyte count greater than or equal to 20,000  cbc improved     anemia acute blood loss  transfuse one unite     Acute hypoxic respiratory failure   on home o2  Pulmonary sarcoidosis  COPD (chronic obstructive pulmonary disease)  pulmonary   CAD (coronary artery disease)    HTN (hypertension)  hold meds due to hypotension  Prophylactic measure         Major depression  continue meds  Hypotension  iv fluids  ___________________________  Thank you,  Garry Hermosillo  [phone]

## 2024-03-22 NOTE — PROGRESS NOTE ADULT - ASSESSMENT
88-yo F w/ PMH of CAD and COPD, admitted w/ CTAP suggestive of diverticulitis but no abscess. S/p ceftriaxone (2/8) and Zosyn (2/8-15) Previously w/ concerns for diarrhea, now stopped.    #Diverticulitis  #Abnormal CT scan  #Pyuria and bacteriuria w/ hematuria   #Leukocytosis           Pt seen last admission  alert and non toxic but [place]. Denies dysuria or pain and exam benign     very similar last admission  She does have hydronephrosis that is not different. No abscess   WBC down but urine is grossly bloody and nursing notes suggest GI bleeding    Await urine culture  She should have a cysto at some point   continue zosyn

## 2024-03-22 NOTE — PROGRESS NOTE ADULT - SUBJECTIVE AND OBJECTIVE BOX
Date of Service: 03-22-24 @ 09:42           CARDIOLOGY     PROGRESS  NOTE   ________________________________________________    CHIEF COMPLAINT:Patient is a 88y old  Female who presents with a chief complaint of Diverticulitis, leukocytosis (22 Mar 2024 07:26)  no complain  	  REVIEW OF SYSTEMS:  CONSTITUTIONAL: No fever, weight loss, or fatigue  EYES: No eye pain, visual disturbances, or discharge  ENT:  No difficulty hearing, tinnitus, vertigo; No sinus or throat pain  NECK: No pain or stiffness  RESPIRATORY: No cough, wheezing, chills or hemoptysis; No Shortness of Breath  CARDIOVASCULAR: No chest pain, palpitations, passing out, dizziness, or leg swelling  GASTROINTESTINAL: No abdominal or epigastric pain. No nausea, vomiting, or hematemesis; No diarrhea or constipation. No melena or hematochezia.  GENITOURINARY: No dysuria, frequency, hematuria, or incontinence  NEUROLOGICAL: No headaches, memory loss, loss of strength, numbness, or tremors  SKIN: No itching, burning, rashes, or lesions   LYMPH Nodes: No enlarged glands  ENDOCRINE: No heat or cold intolerance; No hair loss  MUSCULOSKELETAL: No joint pain or swelling; No muscle, back, or extremity pain  PSYCHIATRIC: No depression, anxiety, mood swings, or difficulty sleeping  HEME/LYMPH: No easy bruising, or bleeding gums  ALLERGY AND IMMUNOLOGIC: No hives or eczema	    [x ] All others negative	  [ ] Unable to obtain    PHYSICAL EXAM:  T(C): 36.8 (03-22-24 @ 04:50), Max: 36.9 (03-22-24 @ 00:28)  HR: 89 (03-22-24 @ 04:50) (75 - 89)  BP: 115/68 (03-22-24 @ 04:50) (94/51 - 115/68)  RR: 19 (03-22-24 @ 04:50) (19 - 20)  SpO2: 95% (03-22-24 @ 04:50) (91% - 96%)  Wt(kg): --  I&O's Summary    21 Mar 2024 07:01  -  22 Mar 2024 07:00  --------------------------------------------------------  IN: 240 mL / OUT: 1850 mL / NET: -1610 mL        Appearance: Normal	  HEENT:   Normal oral mucosa, PERRL, EOMI	  Lymphatic: No lymphadenopathy  Cardiovascular: Normal S1 S2, No JVD, + murmurs, No edema  Respiratory: rhonchi  Psychiatry fdementia  Gastrointestinal:  Soft, Non-tender, + BS	  Skin: No rashes, No ecchymoses, No cyanosis	  Neurologic: Non-focal  Extremities: Normal range of motion, No clubbing, cyanosis or edema  Vascular: Peripheral pulses palpable 2+ bilaterally    MEDICATIONS  (STANDING):  albuterol/ipratropium for Nebulization 3 milliLiter(s) Nebulizer every 6 hours  aspirin enteric coated 81 milliGRAM(s) Oral daily  atorvastatin 20 milliGRAM(s) Oral at bedtime  cholecalciferol 1000 Unit(s) Oral daily  cyclopentolate 1% Solution 1 Drop(s) Both EYES two times a day  EPINEPHrine    Injectable 0.3 milliGRAM(s) IntraMuscular once  ferrous    sulfate 325 milliGRAM(s) Oral daily  FLUoxetine 20 milliGRAM(s) Oral daily  influenza  Vaccine (HIGH DOSE) 0.7 milliLiter(s) IntraMuscular once  mirtazapine 7.5 milliGRAM(s) Oral at bedtime  piperacillin/tazobactam IVPB.. 3.375 Gram(s) IV Intermittent every 8 hours  polyethylene glycol 3350 17 Gram(s) Oral two times a day  potassium chloride    Tablet ER 20 milliEquivalent(s) Oral daily  prednisoLONE acetate 1% Suspension 1 Drop(s) Both EYES two times a day  senna 1 Tablet(s) Oral two times a day  sodium chloride 0.9%. 1000 milliLiter(s) (60 mL/Hr) IV Continuous <Continuous>  traZODone 50 milliGRAM(s) Oral daily      TELEMETRY: 	    ECG:  	  RADIOLOGY:  OTHER: 	  	  LABS:	 	    CARDIAC MARKERS:                                6.9    10.63 )-----------( 159      ( 22 Mar 2024 08:57 )             22.5     03-22    143  |  107  |  25<H>  ----------------------------<  79  3.5   |  23  |  0.99    Ca    8.2<L>      22 Mar 2024 07:34    TPro  4.6<L>  /  Alb  2.8<L>  /  TBili  0.3  /  DBili  x   /  AST  28  /  ALT  9<L>  /  AlkPhos  103  03-21    proBNP:   Lipid Profile:   HgA1c:   TSH:   PT/INR - ( 20 Mar 2024 17:33 )   PT: 12.3 sec;   INR: 1.18 ratio         PTT - ( 20 Mar 2024 17:33 )  PTT:30.7 sec      Assessment and plan  ---------------------------  This is an 87 y/o female w/ PMHx pulmonary sarcoidosis, COPD not on home O2, CAD, hypothyroidism, and HTN presenting for lack of BM in the last 5 days along with SOB and L flank pain from the atria. The patient does endorse some left-sided upper abdominal pain but doesn't endorse any other symptoms. She states that she doesn't have any SOB, and states that her last BM prior to the hospital was yesterday. Called the patient's daughter Deborah, who stated that she was told the patient was having severe abdominal pain as well as some vomiting.   She was admitted here from 2/8-2/21 for abdominal pain, constipation and vomiting. At the time, CT was suggestive of diverticulitis as well and her course was complicated by fluid overload requiring IVP lasix. She was treated with IV Zosyn for 7 days. During this hospital course, she was also found to have a dilated L renal pelvis w/ +UA, but was ruled not have UTI by ID. Urology was consulted, who stated that her pessary could be the cause of the hydro, and removed it on 2/13.   In the ED, she was febrile to 101.8, tachycardic to 130, hypotensive to 80/50, and hypoxic requiring 4LNC. CBC revealed leukocytosis of 29.35, normocytic anemia of 9.5. CMP w/ elevated AG of 24, low bicarb of 17, . Presenting VBG w/ lactate of 9.4. she was straight catheterized for UA, which was red and turvid w/ large LE and >50 RBC/hpf, negative for bacteria. Initially CT A/P w/o contrast was done (due to contrast allergy), which revealed a possible mild diverticulitis of the descending colon and unchanged severe L hydroureter with layering hyperdensity  which could be stones or blood products. She was given Ceftriaxone and Zosyn, 2.5L IVNS. Lactate down to 3.5, BP improved. She had 2 dark tarry stools in her diaper, so she was premedicated w/ solumedrol and benadryl and CTA Abdomen/pelvis was ordered.  pt is well known to me with hx of sarcoidosis, cad with abdominal pain, she was recently dc sec to Diverticulitis  IN er pt with hypotension and hypoxia.  pt with sig abnormal ecg,,  with evidence of diverticulitis, continue Zosyn  start on ivf  ?tte if any evidence of chf  dvt prophylaxis  hold all bp meds  asa if no contraindication  bp has improved continue with gentle hydration, abx  replete K, check mag level

## 2024-03-23 LAB
-  AMOXICILLIN/CLAVULANIC ACID: SIGNIFICANT CHANGE UP
-  AMPICILLIN/SULBACTAM: SIGNIFICANT CHANGE UP
-  AMPICILLIN: SIGNIFICANT CHANGE UP
-  AZTREONAM: SIGNIFICANT CHANGE UP
-  CEFAZOLIN: SIGNIFICANT CHANGE UP
-  CEFEPIME: SIGNIFICANT CHANGE UP
-  CEFOXITIN: SIGNIFICANT CHANGE UP
-  CEFTRIAXONE: SIGNIFICANT CHANGE UP
-  CEFUROXIME: SIGNIFICANT CHANGE UP
-  CIPROFLOXACIN: SIGNIFICANT CHANGE UP
-  ERTAPENEM: SIGNIFICANT CHANGE UP
-  GENTAMICIN: SIGNIFICANT CHANGE UP
-  IMIPENEM: SIGNIFICANT CHANGE UP
-  LEVOFLOXACIN: SIGNIFICANT CHANGE UP
-  MEROPENEM: SIGNIFICANT CHANGE UP
-  NITROFURANTOIN: SIGNIFICANT CHANGE UP
-  PIPERACILLIN/TAZOBACTAM: SIGNIFICANT CHANGE UP
-  TOBRAMYCIN: SIGNIFICANT CHANGE UP
-  TRIMETHOPRIM/SULFAMETHOXAZOLE: SIGNIFICANT CHANGE UP
ANION GAP SERPL CALC-SCNC: 13 MMOL/L — SIGNIFICANT CHANGE UP (ref 5–17)
BUN SERPL-MCNC: 15 MG/DL — SIGNIFICANT CHANGE UP (ref 7–23)
CALCIUM SERPL-MCNC: 8.1 MG/DL — LOW (ref 8.4–10.5)
CHLORIDE SERPL-SCNC: 108 MMOL/L — SIGNIFICANT CHANGE UP (ref 96–108)
CO2 SERPL-SCNC: 21 MMOL/L — LOW (ref 22–31)
CREAT SERPL-MCNC: 0.9 MG/DL — SIGNIFICANT CHANGE UP (ref 0.5–1.3)
CULTURE RESULTS: ABNORMAL
EGFR: 62 ML/MIN/1.73M2 — SIGNIFICANT CHANGE UP
GLUCOSE SERPL-MCNC: 76 MG/DL — SIGNIFICANT CHANGE UP (ref 70–99)
HCT VFR BLD CALC: 29.4 % — LOW (ref 34.5–45)
HGB BLD-MCNC: 8.8 G/DL — LOW (ref 11.5–15.5)
MAGNESIUM SERPL-MCNC: 2.2 MG/DL — SIGNIFICANT CHANGE UP (ref 1.6–2.6)
MCHC RBC-ENTMCNC: 28.8 PG — SIGNIFICANT CHANGE UP (ref 27–34)
MCHC RBC-ENTMCNC: 29.9 GM/DL — LOW (ref 32–36)
MCV RBC AUTO: 96.1 FL — SIGNIFICANT CHANGE UP (ref 80–100)
METHOD TYPE: SIGNIFICANT CHANGE UP
NRBC # BLD: 0 /100 WBCS — SIGNIFICANT CHANGE UP (ref 0–0)
ORGANISM # SPEC MICROSCOPIC CNT: ABNORMAL
ORGANISM # SPEC MICROSCOPIC CNT: ABNORMAL
PHOSPHATE SERPL-MCNC: 3 MG/DL — SIGNIFICANT CHANGE UP (ref 2.5–4.5)
PLATELET # BLD AUTO: 175 K/UL — SIGNIFICANT CHANGE UP (ref 150–400)
POTASSIUM SERPL-MCNC: 3.7 MMOL/L — SIGNIFICANT CHANGE UP (ref 3.5–5.3)
POTASSIUM SERPL-SCNC: 3.7 MMOL/L — SIGNIFICANT CHANGE UP (ref 3.5–5.3)
RBC # BLD: 3.06 M/UL — LOW (ref 3.8–5.2)
RBC # FLD: 16.9 % — HIGH (ref 10.3–14.5)
SODIUM SERPL-SCNC: 142 MMOL/L — SIGNIFICANT CHANGE UP (ref 135–145)
SPECIMEN SOURCE: SIGNIFICANT CHANGE UP
WBC # BLD: 10.78 K/UL — HIGH (ref 3.8–10.5)
WBC # FLD AUTO: 10.78 K/UL — HIGH (ref 3.8–10.5)

## 2024-03-23 PROCEDURE — 99232 SBSQ HOSP IP/OBS MODERATE 35: CPT

## 2024-03-23 PROCEDURE — 99222 1ST HOSP IP/OBS MODERATE 55: CPT | Mod: GC

## 2024-03-23 RX ORDER — HEPARIN SODIUM 5000 [USP'U]/ML
5000 INJECTION INTRAVENOUS; SUBCUTANEOUS EVERY 12 HOURS
Refills: 0 | Status: DISCONTINUED | OUTPATIENT
Start: 2024-03-23 | End: 2024-04-03

## 2024-03-23 RX ORDER — ASPIRIN/CALCIUM CARB/MAGNESIUM 324 MG
81 TABLET ORAL DAILY
Refills: 0 | Status: DISCONTINUED | OUTPATIENT
Start: 2024-03-23 | End: 2024-03-23

## 2024-03-23 RX ORDER — LOSARTAN POTASSIUM 100 MG/1
25 TABLET, FILM COATED ORAL DAILY
Refills: 0 | Status: DISCONTINUED | OUTPATIENT
Start: 2024-03-23 | End: 2024-04-03

## 2024-03-23 RX ADMIN — PIPERACILLIN AND TAZOBACTAM 25 GRAM(S): 4; .5 INJECTION, POWDER, LYOPHILIZED, FOR SOLUTION INTRAVENOUS at 06:07

## 2024-03-23 RX ADMIN — Medication 3 MILLILITER(S): at 14:05

## 2024-03-23 RX ADMIN — POLYETHYLENE GLYCOL 3350 17 GRAM(S): 17 POWDER, FOR SOLUTION ORAL at 06:05

## 2024-03-23 RX ADMIN — Medication 20 MILLIEQUIVALENT(S): at 14:05

## 2024-03-23 RX ADMIN — Medication 1 DROP(S): at 16:56

## 2024-03-23 RX ADMIN — Medication 3 MILLILITER(S): at 16:55

## 2024-03-23 RX ADMIN — Medication 325 MILLIGRAM(S): at 14:05

## 2024-03-23 RX ADMIN — Medication 20 MILLIGRAM(S): at 14:05

## 2024-03-23 RX ADMIN — ATORVASTATIN CALCIUM 20 MILLIGRAM(S): 80 TABLET, FILM COATED ORAL at 21:00

## 2024-03-23 RX ADMIN — PIPERACILLIN AND TAZOBACTAM 25 GRAM(S): 4; .5 INJECTION, POWDER, LYOPHILIZED, FOR SOLUTION INTRAVENOUS at 14:04

## 2024-03-23 RX ADMIN — HEPARIN SODIUM 5000 UNIT(S): 5000 INJECTION INTRAVENOUS; SUBCUTANEOUS at 16:56

## 2024-03-23 RX ADMIN — SODIUM CHLORIDE 60 MILLILITER(S): 9 INJECTION INTRAMUSCULAR; INTRAVENOUS; SUBCUTANEOUS at 06:40

## 2024-03-23 RX ADMIN — Medication 1 DROP(S): at 06:06

## 2024-03-23 RX ADMIN — Medication 3 MILLILITER(S): at 06:05

## 2024-03-23 RX ADMIN — PIPERACILLIN AND TAZOBACTAM 25 GRAM(S): 4; .5 INJECTION, POWDER, LYOPHILIZED, FOR SOLUTION INTRAVENOUS at 21:01

## 2024-03-23 RX ADMIN — MIRTAZAPINE 7.5 MILLIGRAM(S): 45 TABLET, ORALLY DISINTEGRATING ORAL at 21:00

## 2024-03-23 RX ADMIN — Medication 1000 UNIT(S): at 14:11

## 2024-03-23 RX ADMIN — Medication 50 MILLIGRAM(S): at 14:05

## 2024-03-23 RX ADMIN — SENNA PLUS 1 TABLET(S): 8.6 TABLET ORAL at 06:05

## 2024-03-23 RX ADMIN — Medication 650 MILLIGRAM(S): at 16:53

## 2024-03-23 RX ADMIN — SENNA PLUS 1 TABLET(S): 8.6 TABLET ORAL at 17:25

## 2024-03-23 RX ADMIN — POLYETHYLENE GLYCOL 3350 17 GRAM(S): 17 POWDER, FOR SOLUTION ORAL at 16:58

## 2024-03-23 RX ADMIN — CYCLOPENTOLATE HYDROCHLORIDE 1 DROP(S): 10 SOLUTION/ DROPS OPHTHALMIC at 06:06

## 2024-03-23 RX ADMIN — CYCLOPENTOLATE HYDROCHLORIDE 1 DROP(S): 10 SOLUTION/ DROPS OPHTHALMIC at 16:57

## 2024-03-23 NOTE — CONSULT NOTE ADULT - SUBJECTIVE AND OBJECTIVE BOX
HPI:  Patient is a 89 y/o female w/ PMHx pulmonary sarcoidosis, COPD not on home O2, CAD, hypothyroidism, and HTN presenting for lack of BM in the last 5 days along with SOB and L flank pain from the atria. The patient does endorse some left-sided upper abdominal pain but doesn't endorse any other symptoms. She states that she doesn't have any SOB, and states that her last BM prior to the hospital was yesterday. Called the patient's daughter Deborah, who stated that she was told the patient was having severe abdominal pain as well as some vomiting.   She was admitted here from 2/8-2/21 for abdominal pain, constipation and vomiting. At the time, CT was suggestive of diverticulitis as well and her course was complicated by fluid overload requiring IVP lasix. She was treated with IV Zosyn for 7 days. During this hospital course, she was also found to have a dilated L renal pelvis w/ +UA, but was ruled not have UTI by ID. Urology was consulted, who stated that her pessary could be the cause of the hydro, and removed it on 2/13.     In the ED, she was febrile to 101.8, tachycardic to 130, hypotensive to 80/50, and hypoxic requiring 4LNC. CBC revealed leukocytosis of 29.35, normocytic anemia of 9.5. CMP w/ elevated AG of 24, low bicarb of 17, . Presenting VBG w/ lactate of 9.4. she was straight catheterized for UA, which was red and turvid w/ large LE and >50 RBC/hpf, negative for bacteria. Initially CT A/P w/o contrast was done (due to contrast allergy), which revealed a possible mild diverticulitis of the descending colon and unchanged severe L hydroureter with layering hyperdensity which could be stones or blood products. She was given Ceftriaxone and Zosyn, 2.5L IVNS. Lactate down to 3.5, BP improved. She had 2 dark tarry stools in her diaper, so she was premedicated w/ solumedrol and benadryl and CTA Abdomen/pelvis was ordered.     Allergies:  iodinated radiocontrast agents (Anaphylaxis)    Home Medications:  aspirin 81 mg oral delayed release tablet: 1 tab(s) orally once a day (21 Mar 2024 03:26)  atorvastatin 20 mg oral tablet: 1 tab(s) orally once a day (21 Mar 2024 03:26)  cyclopentolate 1% ophthalmic solution: 1 drop(s) in the left eye 2 times a day (21 Mar 2024 03:26)  D3 25 mcg (1000 intl units) oral tablet: 1 tab(s) orally once a day (21 Mar 2024 03:26)  ferrous sulfate 325 mg (65 mg elemental iron) oral tablet: 1 tab(s) orally once a day (21 Mar 2024 03:26)  FLUoxetine 20 mg oral capsule: 1 cap(s) orally once a day (21 Mar 2024 03:26)  mirtazapine 7.5 mg oral tablet: 1 tab(s) orally once a day (at bedtime) (21 Mar 2024 03:26)  potassium chloride 20 mEq oral tablet, extended release: 1 tab(s) orally once a day (21 Mar 2024 03:26)  prednisoLONE acetate 1% ophthalmic suspension: 1 drop(s) in the left eye 2 times a day (21 Mar 2024 03:26)  temazepam 30 mg oral capsule: 1 cap(s) orally once a day (at bedtime) (21 Mar 2024 03:26)  traZODone 50 mg oral tablet: orally once a day (21 Mar 2024 03:25)  triamterene-hydrochlorothiazide 37.5 mg-25 mg oral capsule: 1 cap(s) orally once a day (21 Mar 2024 03:26)    Hospital Medications:  acetaminophen     Tablet .. 650 milliGRAM(s) Oral every 6 hours PRN  albuterol/ipratropium for Nebulization 3 milliLiter(s) Nebulizer every 6 hours  atorvastatin 20 milliGRAM(s) Oral at bedtime  cholecalciferol 1000 Unit(s) Oral daily  cyclopentolate 1% Solution 1 Drop(s) Both EYES two times a day  EPINEPHrine    Injectable 0.3 milliGRAM(s) IntraMuscular once  ferrous    sulfate 325 milliGRAM(s) Oral daily  FLUoxetine 20 milliGRAM(s) Oral daily  influenza  Vaccine (HIGH DOSE) 0.7 milliLiter(s) IntraMuscular once  melatonin 3 milliGRAM(s) Oral at bedtime PRN  mirtazapine 7.5 milliGRAM(s) Oral at bedtime  ondansetron Injectable 4 milliGRAM(s) IV Push every 8 hours PRN  piperacillin/tazobactam IVPB.. 3.375 Gram(s) IV Intermittent every 8 hours  polyethylene glycol 3350 17 Gram(s) Oral two times a day  potassium chloride    Tablet ER 20 milliEquivalent(s) Oral daily  prednisoLONE acetate 1% Suspension 1 Drop(s) Both EYES two times a day  senna 1 Tablet(s) Oral two times a day  temazepam 30 milliGRAM(s) Oral at bedtime PRN  traZODone 50 milliGRAM(s) Oral daily      PMHX/PSHX:  HTN (hypertension)    Hypothyroidism    Osteoporosis    CAD (coronary artery disease)        Family history:      Denies family history of colon cancer/polyps, stomach cancer/polyps, pancreatic cancer/masses, liver cancer/disease, ovarian cancer and endometrial cancer.    Social History:   Tob: Denies  EtOH: Denies  Illicit Drugs: Denies    ROS:     General:  No wt loss, fevers, chills, night sweats, fatigue  Eyes:  Good vision, no reported pain  ENT:  No sore throat, pain, runny nose, dysphagia  CV:  No pain, palpitations, hypo/hypertension  Pulm:  No dyspnea, cough, tachypnea, wheezing  GI:  see HPI  :  No pain, bleeding, incontinence, nocturia  Muscle:  No pain, weakness  Neuro:  No weakness, tingling, memory problems  Psych:  No fatigue, insomnia, mood problems, depression  Endocrine:  No polyuria, polydipsia, cold/heat intolerance  Heme:  No petechiae, ecchymosis, easy bruisability  Skin:  No rash, tattoos, scars, edema    PHYSICAL EXAM:     GENERAL:  No acute distress  HEENT:  NCAT, no scleral icterus   CHEST:  no respiratory distress  HEART:  Regular rate and rhythm  ABDOMEN:  Soft, non-tender, non-distended, normoactive bowel sounds,  no masses  EXTREMITIES: No edema  SKIN:  No rash/erythema/ecchymoses/petechiae/wounds/abscess/warm/dry  NEURO:  Alert and oriented x 3, no asterixis    Vital Signs:  Vital Signs Last 24 Hrs  T(C): 36.8 (23 Mar 2024 05:52), Max: 36.9 (22 Mar 2024 20:38)  T(F): 98.2 (23 Mar 2024 05:52), Max: 98.5 (22 Mar 2024 20:38)  HR: 92 (23 Mar 2024 05:52) (82 - 102)  BP: 174/76 (23 Mar 2024 05:52) (132/70 - 174/76)  BP(mean): --  RR: 18 (23 Mar 2024 05:52) (18 - 20)  SpO2: 96% (23 Mar 2024 05:52) (93% - 96%)    Parameters below as of 23 Mar 2024 05:52  Patient On (Oxygen Delivery Method): nasal cannula  O2 Flow (L/min): 2    Daily     Daily     LABS:                        8.8    10.78 )-----------( 175      ( 23 Mar 2024 06:52 )             29.4     Mean Cell Volume: 96.1 fl (03-23-24 @ 06:52)    03-23    142  |  108  |  15  ----------------------------<  76  3.7   |  21<L>  |  0.90    Ca    8.1<L>      23 Mar 2024 06:52  Phos  3.0     03-23  Mg     2.2     03-23          Urinalysis Basic - ( 23 Mar 2024 06:52 )    Color: x / Appearance: x / SG: x / pH: x  Gluc: 76 mg/dL / Ketone: x  / Bili: x / Urobili: x   Blood: x / Protein: x / Nitrite: x   Leuk Esterase: x / RBC: x / WBC x   Sq Epi: x / Non Sq Epi: x / Bacteria: x                              8.8    10.78 )-----------( 175      ( 23 Mar 2024 06:52 )             29.4                         7.9    11.18 )-----------( 155      ( 22 Mar 2024 20:53 )             25.5                         6.9    10.63 )-----------( 159      ( 22 Mar 2024 08:57 )             22.5                         6.4    11.17 )-----------( 184      ( 22 Mar 2024 07:34 )             21.7                         7.9    19.95 )-----------( 235      ( 21 Mar 2024 07:06 )             26.6       Imaging:    ACC: [id number] EXAM:  CT ANGIO ABD PELV (W)AW IC   ORDERED BY: PAULA PANDYA     PROCEDURE DATE:  03/21/2024          INTERPRETATION:  CLINICAL INFORMATION: Fever, hypotension and elevated   lactate. Evaluate for acute mesenteric ischemia.    COMPARISON: Same day CT abdomen pelvis. CT abdomen pelvis 2/8/2024.    CONTRAST/COMPLICATIONS:  IV Contrast: Omnipaque 350  90 cc administered   10 cc discarded  Oral Contrast: NONE  Complications: None reported at time of study completion    PROCEDURE:  CT Angiography of the Abdomen and Pelvis was performed.  Arterial and venous phases were acquired.  Sagittal and coronal reformats were performed as well as 3D (MIP)   reconstructions.    FINDINGS:  LOWER CHEST: Mild bilateral lower lobe dependent atelectasis. Coronary   artery calcifications.    LIVER: 1.1 cm indeterminate hypodense lesion in the right hepatic lobe   nearing the caudate lobe (3-4-34).  BILE DUCTS: Normal caliber.  GALLBLADDER: Mildly dilated. Cholelithiasis and/or sludge.  SPLEEN: Within normal limits.  PANCREAS: Atrophic.  ADRENALS: Within normal limits.  KIDNEYS/URETERS: Redemonstrated mild to moderate left hydronephrosis and   marked left hydroureter to the level of the bladder. Layering hyperdense   material at the distal hydroureter again seen.Right extrarenal pelvis.    BLADDER: Collapsed around Connor catheter.  REPRODUCTIVE ORGANS: Uterus and adnexa within normal limits.    BOWEL: No bowel obstruction. Appendix is not visualized. No evidence of   inflammation in the pericecal region. Intramural thickening, mesenteric   inflammation and mucosal hyperenhancement involving the distal transverse   colon, descending colon and sigmoid.  PERITONEUM: No ascites.  VESSELS: Atherosclerotic changes. Portal veins and SMV are patent. Celiac   trunk, SMA, ERIC and renal arteries are patent.  RETROPERITONEUM/LYMPH NODES: No lymphadenopathy.  ABDOMINAL WALL: Within normal limits.  BONES: Severe levoscoliosis. Left hip replacement. Grade 1 L5 on S1   anterolisthesis. Degenerative changes. Superior endplate compression   deformity of the L4 vertebral body.    IMPRESSION:  Distal transverse through sigmoid colon wall thickening and mild   inflammatory changes, most likely representing colitis and   diverticulitis. Patent mesenteric vessels. No pneumatosis or free air.      --- End of Report ---    CEM GRADY MD; Resident Radiologist  This document has been electronically signed.  CHRIS WADDELL MD; Attending Radiologist  This document has been electronically signed. Mar 21 2024  4:31AM             HPI:  Patient is a 87 y/o female w/ PMHx pulmonary sarcoidosis, COPD not on home O2, CAD, hypothyroidism, and HTN presenting for lack of BM in the last 5 days along with SOB and L flank pain from the atria. The patient does endorse some left-sided upper abdominal pain but doesn't endorse any other symptoms. She states that she doesn't have any SOB, and states that her last BM prior to the hospital was yesterday. Called the patient's daughter Deborah, who stated that she was told the patient was having severe abdominal pain as well as some vomiting.   She was admitted here from 2/8-2/21 for abdominal pain, constipation and vomiting. At the time, CT was suggestive of diverticulitis as well and her course was complicated by fluid overload requiring IVP lasix. She was treated with IV Zosyn for 7 days. During this hospital course, she was also found to have a dilated L renal pelvis w/ +UA, but was ruled not have UTI by ID. Urology was consulted, who stated that her pessary could be the cause of the hydro, and removed it on 2/13.     In the ED, she was febrile to 101.8, tachycardic to 130, hypotensive to 80/50, and hypoxic requiring 4LNC. CBC revealed leukocytosis of 29.35, normocytic anemia of 9.5. CMP w/ elevated AG of 24, low bicarb of 17, . Presenting VBG w/ lactate of 9.4. she was straight catheterized for UA, which was red and turbid w/ large LE and >50 RBC/hpf, negative for bacteria. Initially CT A/P w/o contrast was done (due to contrast allergy), which revealed a possible mild diverticulitis of the descending colon and unchanged severe L hydroureter with layering hyperdensity which could be stones or blood products. She was given Ceftriaxone and Zosyn, 2.5L IVNS. Lactate down to 3.5, BP improved. She had 2 dark tarry stools in her diaper, so she was premedicated w/ solumedrol and benadryl and CTA Abdomen/pelvis was ordered.     Allergies:  iodinated radiocontrast agents (Anaphylaxis)    Home Medications:  aspirin 81 mg oral delayed release tablet: 1 tab(s) orally once a day (21 Mar 2024 03:26)  atorvastatin 20 mg oral tablet: 1 tab(s) orally once a day (21 Mar 2024 03:26)  cyclopentolate 1% ophthalmic solution: 1 drop(s) in the left eye 2 times a day (21 Mar 2024 03:26)  D3 25 mcg (1000 intl units) oral tablet: 1 tab(s) orally once a day (21 Mar 2024 03:26)  ferrous sulfate 325 mg (65 mg elemental iron) oral tablet: 1 tab(s) orally once a day (21 Mar 2024 03:26)  FLUoxetine 20 mg oral capsule: 1 cap(s) orally once a day (21 Mar 2024 03:26)  mirtazapine 7.5 mg oral tablet: 1 tab(s) orally once a day (at bedtime) (21 Mar 2024 03:26)  potassium chloride 20 mEq oral tablet, extended release: 1 tab(s) orally once a day (21 Mar 2024 03:26)  prednisoLONE acetate 1% ophthalmic suspension: 1 drop(s) in the left eye 2 times a day (21 Mar 2024 03:26)  temazepam 30 mg oral capsule: 1 cap(s) orally once a day (at bedtime) (21 Mar 2024 03:26)  traZODone 50 mg oral tablet: orally once a day (21 Mar 2024 03:25)  triamterene-hydrochlorothiazide 37.5 mg-25 mg oral capsule: 1 cap(s) orally once a day (21 Mar 2024 03:26)    Hospital Medications:  acetaminophen     Tablet .. 650 milliGRAM(s) Oral every 6 hours PRN  albuterol/ipratropium for Nebulization 3 milliLiter(s) Nebulizer every 6 hours  atorvastatin 20 milliGRAM(s) Oral at bedtime  cholecalciferol 1000 Unit(s) Oral daily  cyclopentolate 1% Solution 1 Drop(s) Both EYES two times a day  EPINEPHrine    Injectable 0.3 milliGRAM(s) IntraMuscular once  ferrous    sulfate 325 milliGRAM(s) Oral daily  FLUoxetine 20 milliGRAM(s) Oral daily  influenza  Vaccine (HIGH DOSE) 0.7 milliLiter(s) IntraMuscular once  melatonin 3 milliGRAM(s) Oral at bedtime PRN  mirtazapine 7.5 milliGRAM(s) Oral at bedtime  ondansetron Injectable 4 milliGRAM(s) IV Push every 8 hours PRN  piperacillin/tazobactam IVPB.. 3.375 Gram(s) IV Intermittent every 8 hours  polyethylene glycol 3350 17 Gram(s) Oral two times a day  potassium chloride    Tablet ER 20 milliEquivalent(s) Oral daily  prednisoLONE acetate 1% Suspension 1 Drop(s) Both EYES two times a day  senna 1 Tablet(s) Oral two times a day  temazepam 30 milliGRAM(s) Oral at bedtime PRN  traZODone 50 milliGRAM(s) Oral daily      PMHX/PSHX:  HTN (hypertension)    Hypothyroidism    Osteoporosis    CAD (coronary artery disease)        Family history:      Denies family history of colon cancer/polyps, stomach cancer/polyps, pancreatic cancer/masses, liver cancer/disease, ovarian cancer and endometrial cancer.    Social History:   Tob: Denies  EtOH: Denies  Illicit Drugs: Denies    ROS:     General:  No wt loss, fevers, chills, night sweats, fatigue  Eyes:  Good vision, no reported pain  ENT:  No sore throat, pain, runny nose, dysphagia  CV:  No pain, palpitations, hypo/hypertension  Pulm:  No dyspnea, cough, tachypnea, wheezing  GI:  see HPI  :  No pain, bleeding, incontinence, nocturia  Muscle:  No pain, weakness  Neuro:  No weakness, tingling, memory problems  Psych:  No fatigue, insomnia, mood problems, depression  Endocrine:  No polyuria, polydipsia, cold/heat intolerance  Heme:  No petechiae, ecchymosis, easy bruisability  Skin:  No rash, tattoos, scars, edema    PHYSICAL EXAM:     GENERAL:  No acute distress  HEENT:  NCAT, no scleral icterus   CHEST:  no respiratory distress  HEART:  Regular rate and rhythm  ABDOMEN:  Soft, non-tender, non-distended, normoactive bowel sounds,  no masses  EXTREMITIES: No edema  SKIN:  No rash/erythema/ecchymoses/petechiae/wounds/abscess/warm/dry  NEURO:  Alert and oriented x 3, no asterixis    Vital Signs:  Vital Signs Last 24 Hrs  T(C): 36.8 (23 Mar 2024 05:52), Max: 36.9 (22 Mar 2024 20:38)  T(F): 98.2 (23 Mar 2024 05:52), Max: 98.5 (22 Mar 2024 20:38)  HR: 92 (23 Mar 2024 05:52) (82 - 102)  BP: 174/76 (23 Mar 2024 05:52) (132/70 - 174/76)  BP(mean): --  RR: 18 (23 Mar 2024 05:52) (18 - 20)  SpO2: 96% (23 Mar 2024 05:52) (93% - 96%)    Parameters below as of 23 Mar 2024 05:52  Patient On (Oxygen Delivery Method): nasal cannula  O2 Flow (L/min): 2    Daily     Daily     LABS:                        8.8    10.78 )-----------( 175      ( 23 Mar 2024 06:52 )             29.4     Mean Cell Volume: 96.1 fl (03-23-24 @ 06:52)    03-23    142  |  108  |  15  ----------------------------<  76  3.7   |  21<L>  |  0.90    Ca    8.1<L>      23 Mar 2024 06:52  Phos  3.0     03-23  Mg     2.2     03-23          Urinalysis Basic - ( 23 Mar 2024 06:52 )    Color: x / Appearance: x / SG: x / pH: x  Gluc: 76 mg/dL / Ketone: x  / Bili: x / Urobili: x   Blood: x / Protein: x / Nitrite: x   Leuk Esterase: x / RBC: x / WBC x   Sq Epi: x / Non Sq Epi: x / Bacteria: x                              8.8    10.78 )-----------( 175      ( 23 Mar 2024 06:52 )             29.4                         7.9    11.18 )-----------( 155      ( 22 Mar 2024 20:53 )             25.5                         6.9    10.63 )-----------( 159      ( 22 Mar 2024 08:57 )             22.5                         6.4    11.17 )-----------( 184      ( 22 Mar 2024 07:34 )             21.7                         7.9    19.95 )-----------( 235      ( 21 Mar 2024 07:06 )             26.6       Imaging:    ACC: [id number] EXAM:  CT ANGIO ABD PELV (W)AW IC   ORDERED BY: PAULA PANDYA     PROCEDURE DATE:  03/21/2024          INTERPRETATION:  CLINICAL INFORMATION: Fever, hypotension and elevated   lactate. Evaluate for acute mesenteric ischemia.    COMPARISON: Same day CT abdomen pelvis. CT abdomen pelvis 2/8/2024.    CONTRAST/COMPLICATIONS:  IV Contrast: Omnipaque 350  90 cc administered   10 cc discarded  Oral Contrast: NONE  Complications: None reported at time of study completion    PROCEDURE:  CT Angiography of the Abdomen and Pelvis was performed.  Arterial and venous phases were acquired.  Sagittal and coronal reformats were performed as well as 3D (MIP)   reconstructions.    FINDINGS:  LOWER CHEST: Mild bilateral lower lobe dependent atelectasis. Coronary   artery calcifications.    LIVER: 1.1 cm indeterminate hypodense lesion in the right hepatic lobe   nearing the caudate lobe (3-4-34).  BILE DUCTS: Normal caliber.  GALLBLADDER: Mildly dilated. Cholelithiasis and/or sludge.  SPLEEN: Within normal limits.  PANCREAS: Atrophic.  ADRENALS: Within normal limits.  KIDNEYS/URETERS: Redemonstrated mild to moderate left hydronephrosis and   marked left hydroureter to the level of the bladder. Layering hyperdense   material at the distal hydroureter again seen.Right extrarenal pelvis.    BLADDER: Collapsed around Connor catheter.  REPRODUCTIVE ORGANS: Uterus and adnexa within normal limits.    BOWEL: No bowel obstruction. Appendix is not visualized. No evidence of   inflammation in the pericecal region. Intramural thickening, mesenteric   inflammation and mucosal hyperenhancement involving the distal transverse   colon, descending colon and sigmoid.  PERITONEUM: No ascites.  VESSELS: Atherosclerotic changes. Portal veins and SMV are patent. Celiac   trunk, SMA, ERIC and renal arteries are patent.  RETROPERITONEUM/LYMPH NODES: No lymphadenopathy.  ABDOMINAL WALL: Within normal limits.  BONES: Severe levoscoliosis. Left hip replacement. Grade 1 L5 on S1   anterolisthesis. Degenerative changes. Superior endplate compression   deformity of the L4 vertebral body.    IMPRESSION:  Distal transverse through sigmoid colon wall thickening and mild   inflammatory changes, most likely representing colitis and   diverticulitis. Patent mesenteric vessels. No pneumatosis or free air.      --- End of Report ---    CEM GRADY MD; Resident Radiologist  This document has been electronically signed.  CHRIS WADDELL MD; Attending Radiologist  This document has been electronically signed. Mar 21 2024  4:31AM             HPI:  Patient is a 89 y/o female w/ PMHx pulmonary sarcoidosis, COPD not on home O2, CAD, hypothyroidism, and HTN presenting for lack of BM in the last 5 days along with SOB and L flank pain. The patient does endorse some left-sided upper abdominal pain but doesn't endorse any other symptoms. She states that she doesn't have any SOB, and states that her last BM prior to the hospital was yesterday. Called the patient's daughter Deborah, who stated that she was told the patient was having severe abdominal pain as well as some vomiting.   She was admitted here from 2/8-2/21 for abdominal pain, constipation and vomiting. At the time, CT was suggestive of diverticulitis as well and her course was complicated by fluid overload requiring IVP lasix. She was treated with IV Zosyn for 7 days. During this hospital course, she was also found to have a dilated L renal pelvis w/ +UA, but was ruled not have UTI by ID. Urology was consulted, who stated that her pessary could be the cause of the hydro, and removed it on 2/13.   In the ED, she was febrile to 101.8, tachycardic to 130, hypotensive to 80/50, and hypoxic requiring 4LNC. CBC revealed leukocytosis of 29.35, normocytic anemia of 9.5. CMP w/ elevated AG of 24, low bicarb of 17, . Presenting VBG w/ lactate of 9.4. she was straight catheterized for UA, which was red and turbid w/ large LE and >50 RBC/hpf, negative for bacteria. Initially CT A/P w/o contrast was done (due to contrast allergy), which revealed a possible mild diverticulitis of the descending colon and unchanged severe L hydroureter with layering hyperdensity which could be stones or blood products.      Patient states she has had episodes of diverticulitis in the past. She had a colonoscopy 1.5 years ago with 2 small polyps removed, she says they were benign. She endorses a history of PUD requiring hospitalization, transfusions, and EGD a number of years ago. She denies recent melena or hematochezia.    Allergies:  iodinated radiocontrast agents (Anaphylaxis)    Home Medications:  aspirin 81 mg oral delayed release tablet: 1 tab(s) orally once a day (21 Mar 2024 03:26)  atorvastatin 20 mg oral tablet: 1 tab(s) orally once a day (21 Mar 2024 03:26)  cyclopentolate 1% ophthalmic solution: 1 drop(s) in the left eye 2 times a day (21 Mar 2024 03:26)  D3 25 mcg (1000 intl units) oral tablet: 1 tab(s) orally once a day (21 Mar 2024 03:26)  ferrous sulfate 325 mg (65 mg elemental iron) oral tablet: 1 tab(s) orally once a day (21 Mar 2024 03:26)  FLUoxetine 20 mg oral capsule: 1 cap(s) orally once a day (21 Mar 2024 03:26)  mirtazapine 7.5 mg oral tablet: 1 tab(s) orally once a day (at bedtime) (21 Mar 2024 03:26)  potassium chloride 20 mEq oral tablet, extended release: 1 tab(s) orally once a day (21 Mar 2024 03:26)  prednisoLONE acetate 1% ophthalmic suspension: 1 drop(s) in the left eye 2 times a day (21 Mar 2024 03:26)  temazepam 30 mg oral capsule: 1 cap(s) orally once a day (at bedtime) (21 Mar 2024 03:26)  traZODone 50 mg oral tablet: orally once a day (21 Mar 2024 03:25)  triamterene-hydrochlorothiazide 37.5 mg-25 mg oral capsule: 1 cap(s) orally once a day (21 Mar 2024 03:26)    Hospital Medications:  acetaminophen     Tablet .. 650 milliGRAM(s) Oral every 6 hours PRN  albuterol/ipratropium for Nebulization 3 milliLiter(s) Nebulizer every 6 hours  atorvastatin 20 milliGRAM(s) Oral at bedtime  cholecalciferol 1000 Unit(s) Oral daily  cyclopentolate 1% Solution 1 Drop(s) Both EYES two times a day  EPINEPHrine    Injectable 0.3 milliGRAM(s) IntraMuscular once  ferrous    sulfate 325 milliGRAM(s) Oral daily  FLUoxetine 20 milliGRAM(s) Oral daily  influenza  Vaccine (HIGH DOSE) 0.7 milliLiter(s) IntraMuscular once  melatonin 3 milliGRAM(s) Oral at bedtime PRN  mirtazapine 7.5 milliGRAM(s) Oral at bedtime  ondansetron Injectable 4 milliGRAM(s) IV Push every 8 hours PRN  piperacillin/tazobactam IVPB.. 3.375 Gram(s) IV Intermittent every 8 hours  polyethylene glycol 3350 17 Gram(s) Oral two times a day  potassium chloride    Tablet ER 20 milliEquivalent(s) Oral daily  prednisoLONE acetate 1% Suspension 1 Drop(s) Both EYES two times a day  senna 1 Tablet(s) Oral two times a day  temazepam 30 milliGRAM(s) Oral at bedtime PRN  traZODone 50 milliGRAM(s) Oral daily    PMHX/PSHX:  HTN (hypertension)    Hypothyroidism    Osteoporosis    CAD (coronary artery disease)    Family history:      Denies family history of colon cancer/polyps, stomach cancer/polyps, pancreatic cancer/masses, liver cancer/disease, ovarian cancer and endometrial cancer.    Social History:   Tob: Denies  EtOH: Denies  Illicit Drugs: Denies    ROS:   General:  No wt loss  ENT:  No sore throat  CV:  No pain  Pulm:  No dyspnea, cough  GI:  see HPI  :  No pain, bleeding  Muscle:  No pain  Neuro:  No weakness  Psych:  No fatigue  Heme:  No petechiae  Skin:  No rash    PHYSICAL EXAM:   GENERAL:  No acute distress, +heard of hearing  HEENT:  NCAT, no scleral icterus   CHEST:  no respiratory distress  HEART:  Regular rate and rhythm  ABDOMEN:  Soft, non-tender, non-distended  EXTREMITIES: No edema  SKIN:  No rash/erythema/ecchymoses  NEURO:  Alert and oriented x 3    Vital Signs:  Vital Signs Last 24 Hrs  T(C): 36.8 (23 Mar 2024 05:52), Max: 36.9 (22 Mar 2024 20:38)  T(F): 98.2 (23 Mar 2024 05:52), Max: 98.5 (22 Mar 2024 20:38)  HR: 92 (23 Mar 2024 05:52) (82 - 102)  BP: 174/76 (23 Mar 2024 05:52) (132/70 - 174/76)  BP(mean): --  RR: 18 (23 Mar 2024 05:52) (18 - 20)  SpO2: 96% (23 Mar 2024 05:52) (93% - 96%)    Parameters below as of 23 Mar 2024 05:52  Patient On (Oxygen Delivery Method): nasal cannula  O2 Flow (L/min): 2    Daily     Daily     LABS:                        8.8    10.78 )-----------( 175      ( 23 Mar 2024 06:52 )             29.4     Mean Cell Volume: 96.1 fl (03-23-24 @ 06:52)    03-23    142  |  108  |  15  ----------------------------<  76  3.7   |  21<L>  |  0.90    Ca    8.1<L>      23 Mar 2024 06:52  Phos  3.0     03-23  Mg     2.2     03-23          Urinalysis Basic - ( 23 Mar 2024 06:52 )    Color: x / Appearance: x / SG: x / pH: x  Gluc: 76 mg/dL / Ketone: x  / Bili: x / Urobili: x   Blood: x / Protein: x / Nitrite: x   Leuk Esterase: x / RBC: x / WBC x   Sq Epi: x / Non Sq Epi: x / Bacteria: x                              8.8    10.78 )-----------( 175      ( 23 Mar 2024 06:52 )             29.4                         7.9    11.18 )-----------( 155      ( 22 Mar 2024 20:53 )             25.5                         6.9    10.63 )-----------( 159      ( 22 Mar 2024 08:57 )             22.5                         6.4    11.17 )-----------( 184      ( 22 Mar 2024 07:34 )             21.7                         7.9    19.95 )-----------( 235      ( 21 Mar 2024 07:06 )             26.6       Imaging:    ACC: [id number] EXAM:  CT ANGIO ABD PELV (W)AW IC   ORDERED BY: PAULA PANDYA     PROCEDURE DATE:  03/21/2024          INTERPRETATION:  CLINICAL INFORMATION: Fever, hypotension and elevated   lactate. Evaluate for acute mesenteric ischemia.    COMPARISON: Same day CT abdomen pelvis. CT abdomen pelvis 2/8/2024.    CONTRAST/COMPLICATIONS:  IV Contrast: Omnipaque 350  90 cc administered   10 cc discarded  Oral Contrast: NONE  Complications: None reported at time of study completion    PROCEDURE:  CT Angiography of the Abdomen and Pelvis was performed.  Arterial and venous phases were acquired.  Sagittal and coronal reformats were performed as well as 3D (MIP)   reconstructions.    FINDINGS:  LOWER CHEST: Mild bilateral lower lobe dependent atelectasis. Coronary   artery calcifications.    LIVER: 1.1 cm indeterminate hypodense lesion in the right hepatic lobe   nearing the caudate lobe (3-4-34).  BILE DUCTS: Normal caliber.  GALLBLADDER: Mildly dilated. Cholelithiasis and/or sludge.  SPLEEN: Within normal limits.  PANCREAS: Atrophic.  ADRENALS: Within normal limits.  KIDNEYS/URETERS: Redemonstrated mild to moderate left hydronephrosis and   marked left hydroureter to the level of the bladder. Layering hyperdense   material at the distal hydroureter again seen.Right extrarenal pelvis.    BLADDER: Collapsed around Connor catheter.  REPRODUCTIVE ORGANS: Uterus and adnexa within normal limits.    BOWEL: No bowel obstruction. Appendix is not visualized. No evidence of   inflammation in the pericecal region. Intramural thickening, mesenteric   inflammation and mucosal hyperenhancement involving the distal transverse   colon, descending colon and sigmoid.  PERITONEUM: No ascites.  VESSELS: Atherosclerotic changes. Portal veins and SMV are patent. Celiac   trunk, SMA, ERIC and renal arteries are patent.  RETROPERITONEUM/LYMPH NODES: No lymphadenopathy.  ABDOMINAL WALL: Within normal limits.  BONES: Severe levoscoliosis. Left hip replacement. Grade 1 L5 on S1   anterolisthesis. Degenerative changes. Superior endplate compression   deformity of the L4 vertebral body.    IMPRESSION:  Distal transverse through sigmoid colon wall thickening and mild   inflammatory changes, most likely representing colitis and   diverticulitis. Patent mesenteric vessels. No pneumatosis or free air.      --- End of Report ---    CEM GRADY MD; Resident Radiologist  This document has been electronically signed.  CHRIS WADDELL MD; Attending Radiologist  This document has been electronically signed. Mar 21 2024  4:31AM

## 2024-03-23 NOTE — CONSULT NOTE ADULT - REASON FOR ADMISSION
Diverticulitis, leukocytosis

## 2024-03-23 NOTE — PROGRESS NOTE ADULT - SUBJECTIVE AND OBJECTIVE BOX
BISHNU SEGURA  MRN#: [id number]  Subjective:  pulmonary progress note: :acute hypoxic respiratory Failure on 02, sarcoidosis , COPD , Diastolic dysfunction , pulmonary HTN date of service is 3/23/2024   This is an 89 y/o female w/ PMH pulmonary sarcoidosis, COPD not on home O2, CAD, hypothyroidism, and HTN presenting for lack of BM in the last 5 days along with SOB and L flank pain from the atria. The patient does endorse some left-sided upper abdominal pain but doesn't endorse any other symptoms. She states that she doesn't have any SOB, and states that her last BM prior to the hospital was yesterday. Called the patient's daughter Deborah, who stated that she was told the patient was having severe abdominal pain as well as some vomiting.   She was admitted here from 2/8-2/21 for abdominal pain, constipation and vomiting. At the time, CT was suggestive of diverticulitis as well and her course was complicated by fluid overload requiring IVP lasix. She was treated with IV Zosyn for 7 days. During this hospital course, she was also found to have a dilated L renal pelvis w/ +UA, but was ruled not have UTI by ID. Urology was consulted, who stated that her pessary could be the cause of the hydro, and removed it on 2/13.     In the ED, she was febrile to 101.8, tachycardic to 130, hypotensive to 80/50, and hypoxic requiring 4LNC. CBC revealed leukocytosis of 29.35, normocytic anemia of 9.5. CMP w/ elevated AG of 24, low bicarb of 17, . Presenting VBG w/ lactate of 9.4. she was straight catheterized for UA, which was red and turbid w/ large LE and >50 RBC/hpf, negative for bacteria. Initially CT A/P w/o contrast was done (due to contrast allergy), which revealed a possible mild diverticulitis of the descending colon and unchanged severe L hydroureter with layering hypodensity which could be stones or blood products. She was given Ceftriaxone and Zosyn, 2.5L IVNS. Lactate down to 3.5, BP improved. She had 2 dark tarry stools in her diaper, so she was premedicated w/ solumedrol and benadryl and CTA Abdomen/pelvis was ordered.  (21 Mar 2024 01:30), patient was SOB needed high 02 supplement , CT scan acute Diverticulitis , afebrile down to nasal canula , on antibiotics , less distress no nausea or vomiting no headache S/P blood transfusion , H/H is stable no chest pain no SOB at rest     PAST MEDICAL & SURGICAL HISTORY:  HTN (hypertension)      Hypothyroidism      Osteoporosis  pulmonary hypertension   H/O anxiety     CAD (coronary artery disease)  sarcoidosis   COPD           FAMILY HISTORY:  Review of Systems:  Review of Systems: Review of Systems:   CONSTITUTIONAL: + fever,+ weight loss  EYES: No eye pain, visual disturbances, or discharge  RESPIRATORY: + SOB. No cough, wheezing, chills or hemoptysis  CARDIOVASCULAR: No chest pain, palpitations, dizziness, or leg swelling  GASTROINTESTINAL: +abdominal pain. No nausea, vomiting, or hematemesis; No diarrhea or constipation. No melena or hematochezia.  GENITOURINARY: +L flank pain, hematuria; No dysuria, frequency, or incontinence  NEUROLOGICAL: No headaches, memory loss, loss of strength, numbness, or tremors  SKIN: No itching, burning, rashes, or lesions   MUSCULOSKELETAL: No joint pain or swelling; No muscle, back pain  PSYCHIATRIC: + depression,+ anxiety,+ mood swings, or difficulty ,             OBJECTIVE:  ICU Vital Signs Last 24 Hrs  T(C): 36.8 (22 Mar 2024 16:23), Max: 36.9 (22 Mar 2024 00:28)  T(F): 98.2 (22 Mar 2024 16:23), Max: 98.4 (22 Mar 2024 00:28)  HR: 102 (22 Mar 2024 16:23) (82 - 102)  BP: 145/76 (22 Mar 2024 16:23) (94/51 - 145/76)  BP(mean): --  ABP: --  ABP(mean): --  RR: 20 (22 Mar 2024 16:23) (19 - 20)  SpO2: 93% (22 Mar 2024 16:23) (93% - 96%)    O2 Parameters below as of 22 Mar 2024 16:23  Patient On (Oxygen Delivery Method): nasal cannula  O2 Flow (L/min): 2          03-21 @ 07:01  -  03-22 @ 07:00  --------------------------------------------------------  IN: 240 mL / OUT: 1850 mL / NET: -1610 mL      PHYSICAL EXAM: Daily Height in cm: 157.48 (20 Mar 2024 16:54)  Elderly agitated cachectic female on 02   Daily   HEENT:     + NCAT  + EOMI  - Conjuctival edema   - Icterus   - Thrush   - ETT  - NGT/OGT  Neck:         + FROM    + JVD     - Nodes     - Masses    + Mid-line trachea   - Tracheostomy  Chest:          pectus excavatum , increase A-P diameter   Lungs:          + CTA   + Rhonchi    - Rales    - Wheezing   + Decreased BS   - Dullness R L  Cardiac:       + S1 + S2    + RRR   - Irregular   - S3  - S4  + Murmurs   - Rub   - Hamman’s sign   Abdomen:    + BS     + Soft    + tender left lower quadrant   - Distended  - Organomegaly  - PEG  Extremities:   - Cyanosis U/L   - Clubbing  U/L  - LE/UE Edema   + Capillary refill    + Pulses   Neuro:        + Awake   +  Alert   - Confused   - Lethargic   - Sedated   - Generalized Weakness  Skin:        - Rashes    - Erythema   + Normal incisions   + IV sites intact  - Sacral decubit      HOSPITAL MEDICATIONS: All mediciations reviewed and analyzed  MEDICATIONS  (STANDING):  albuterol/ipratropium for Nebulization 3 milliLiter(s) Nebulizer every 6 hours  atorvastatin 20 milliGRAM(s) Oral at bedtime  cholecalciferol 1000 Unit(s) Oral daily  cyclopentolate 1% Solution 1 Drop(s) Both EYES two times a day  EPINEPHrine    Injectable 0.3 milliGRAM(s) IntraMuscular once  ferrous    sulfate 325 milliGRAM(s) Oral daily  FLUoxetine 20 milliGRAM(s) Oral daily  influenza  Vaccine (HIGH DOSE) 0.7 milliLiter(s) IntraMuscular once  mirtazapine 7.5 milliGRAM(s) Oral at bedtime  piperacillin/tazobactam IVPB.. 3.375 Gram(s) IV Intermittent every 8 hours  polyethylene glycol 3350 17 Gram(s) Oral two times a day  potassium chloride    Tablet ER 20 milliEquivalent(s) Oral daily  prednisoLONE acetate 1% Suspension 1 Drop(s) Both EYES two times a day  senna 1 Tablet(s) Oral two times a day  sodium chloride 0.9%. 1000 milliLiter(s) (60 mL/Hr) IV Continuous <Continuous>  traZODone 50 milliGRAM(s) Oral daily    MEDICATIONS  (PRN):  acetaminophen     Tablet .. 650 milliGRAM(s) Oral every 6 hours PRN Temp greater or equal to 38C (100.4F), Mild Pain (1 - 3)  melatonin 3 milliGRAM(s) Oral at bedtime PRN Insomnia  ondansetron Injectable 4 milliGRAM(s) IV Push every 8 hours PRN Nausea and/or Vomiting  temazepam 30 milliGRAM(s) Oral at bedtime PRN Insomnia    LABS: All Lab data reviewed and analyzed                          8.8    10.78 )-----------( 175      ( 23 Mar 2024 06:52 )             29.4   03-23    142  |  108  |  15  ----------------------------<  76  3.7   |  21<L>  |  0.90    Ca    8.1<L>      23 Mar 2024 06:52  Phos  3.0     03-23  Mg     2.2     03-23      Ca    8.2<L>      22 Mar 2024 07:34    TPro  4.6<L>  /  Alb  2.8<L>  /  TBili  0.3  /  DBili  x   /  AST  28  /  ALT  9<L>  /  AlkPhos  103  03-21     LIVER FUNCTIONS - ( 21 Mar 2024 07:05 )  Alb: 2.8 g/dL / Pro: 4.6 g/dL / ALK PHOS: 103 U/L / ALT: 9 U/L / AST: 28 U/L / GGT: x           RADIOLOGY: - Reviewed and analyzed

## 2024-03-23 NOTE — CONSULT NOTE ADULT - SUBJECTIVE AND OBJECTIVE BOX
HPI  This is an 87 y/o female w/ PMHx pulmonary sarcoidosis, COPD not on home O2, CAD, hypothyroidism, and HTN presenting for lack of BM in the last 5 days along with SOB and L flank pain from the atria. The patient does endorse some left-sided upper abdominal pain but doesn't endorse any other symptoms. She states that she doesn't have any SOB, and states that her last BM prior to the hospital was yesterday. Called the patient's daughter Deborah, who stated that she was told the patient was having severe abdominal pain as well as some vomiting. She was admitted here from 2/8-2/21 for abdominal pain, constipation and vomiting. At the time, CT was suggestive of diverticulitis as well and her course was complicated by fluid overload requiring IVP lasix. She was treated with IV Zosyn for 7 days. During this hospital course, she was also found to have a dilated L renal pelvis w/ +UA, but was ruled not have UTI by ID. Urology was consulted, who stated that her pessary could be the cause of the hydro, and removed it on 2/13.     In the ED, she was febrile to 101.8, tachycardic to 130, hypotensive to 80/50, and hypoxic requiring 4LNC. CBC revealed leukocytosis of 29.35, normocytic anemia of 9.5. CMP w/ elevated AG of 24, low bicarb of 17, . Presenting VBG w/ lactate of 9.4. she was straight catheterized for UA, which was red and turvid w/ large LE and >50 RBC/hpf, negative for bacteria. Initially CT A/P w/o contrast was done (due to contrast allergy), which revealed a possible mild diverticulitis of the descending colon and unchanged severe L hydroureter with layering hyperdensity which could be stones or blood products. She was given Ceftriaxone and Zosyn, 2.5L IVNS. Lactate down to 3.5, BP improved. She had 2 dark tarry stools in her diaper, so she was premedicated w/ solumedrol and benadryl and CTA Abdomen/pelvis was ordered.     Urology was consulted for hydronephrosis and hematuria. Last hospitalization, left hydro was present and urology recommended discontinuing pessary and following up outpatient for gross hematuria work-up. Patient followed up with Dr. Nj who did not see evidence of prolapse on exam and recommended to continue without the pessary. He also recommended at this visit to have a cystoscopy and CT urogram for gross hematuria work-up and patient and family declined further work-up. Lastly, as for the hydronephrosis patient and family did not wish for anything to be done surgically for this as her kidney function was normal so they did not want to pursue further work-up. 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 Urine color waxes and wanes but due to the worsening hematuria they sought further care. 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 Patient denies any dysuria but reports she does urinate frequently.      PAST MEDICAL & SURGICAL HISTORY:  HTN (hypertension)      Hypothyroidism      Osteoporosis      CAD (coronary artery disease)          MEDICATIONS  (STANDING):  albuterol/ipratropium for Nebulization 3 milliLiter(s) Nebulizer every 6 hours  atorvastatin 20 milliGRAM(s) Oral at bedtime  cholecalciferol 1000 Unit(s) Oral daily  cyclopentolate 1% Solution 1 Drop(s) Both EYES two times a day  EPINEPHrine    Injectable 0.3 milliGRAM(s) IntraMuscular once  ferrous    sulfate 325 milliGRAM(s) Oral daily  FLUoxetine 20 milliGRAM(s) Oral daily  heparin   Injectable 5000 Unit(s) SubCutaneous every 12 hours  influenza  Vaccine (HIGH DOSE) 0.7 milliLiter(s) IntraMuscular once  losartan 25 milliGRAM(s) Oral daily  mirtazapine 7.5 milliGRAM(s) Oral at bedtime  piperacillin/tazobactam IVPB.. 3.375 Gram(s) IV Intermittent every 8 hours  polyethylene glycol 3350 17 Gram(s) Oral two times a day  potassium chloride    Tablet ER 20 milliEquivalent(s) Oral daily  prednisoLONE acetate 1% Suspension 1 Drop(s) Both EYES two times a day  senna 1 Tablet(s) Oral two times a day  traZODone 50 milliGRAM(s) Oral daily    MEDICATIONS  (PRN):  acetaminophen     Tablet .. 650 milliGRAM(s) Oral every 6 hours PRN Temp greater or equal to 38C (100.4F), Mild Pain (1 - 3)  melatonin 3 milliGRAM(s) Oral at bedtime PRN Insomnia  ondansetron Injectable 4 milliGRAM(s) IV Push every 8 hours PRN Nausea and/or Vomiting  temazepam 30 milliGRAM(s) Oral at bedtime PRN Insomnia      FAMILY HISTORY:      Allergies    iodinated radiocontrast agents (Anaphylaxis)    Intolerances        SOCIAL HISTORY:    REVIEW OF SYSTEMS: Otherwise negative as stated in HPI    Physical Exam  Vital signs  T(C): 36.8 (03-23-24 @ 05:52), Max: 36.9 (03-22-24 @ 20:38)  HR: 92 (03-23-24 @ 05:52)  BP: 174/76 (03-23-24 @ 05:52)  SpO2: 96% (03-23-24 @ 05:52)  Wt(kg): --    Output    OUT:    Indwelling Catheter - Urethral (mL): 1200 mL  Total OUT: 1200 mL    Total NET: -1200 mL          Gen: NAD  Pulm: No respiratory distress	  CV: RRR  GI: S/ND/NT  : collado in place draining clear yellow urine       LABS:      03-23 @ 06:52    WBC 10.78 / Hct 29.4  / SCr 0.90     03-22 @ 20:53    WBC 11.18 / Hct 25.5  / SCr --       03-23    142  |  108  |  15  ----------------------------<  76  3.7   |  21<L>  |  0.90    Ca    8.1<L>      23 Mar 2024 06:52  Phos  3.0     03-23  Mg     2.2     03-23        Urinalysis Basic - ( 23 Mar 2024 06:52 )    Color: x / Appearance: x / SG: x / pH: x  Gluc: 76 mg/dL / Ketone: x  / Bili: x / Urobili: x   Blood: x / Protein: x / Nitrite: x   Leuk Esterase: x / RBC: x / WBC x   Sq Epi: x / Non Sq Epi: x / Bacteria: x        Culture - Urine (03.20.24 @ 18:25)    -  Amoxicillin/Clavulanic Acid: S <=8/4   -  Ampicillin: R >16 These ampicillin results predict results for amoxicillin   -  Ampicillin/Sulbactam: I 16/8   -  Aztreonam: S <=4   -  Cefazolin: S 4 For uncomplicated UTI with K. pneumoniae, E. coli, or P. mirablis: JUANI <=16 is sensitive and JUANI >=32 is resistant. This also predicts results for oral agents cefaclor, cefdinir, cefpodoxime, cefprozil, cefuroxime axetil, cephalexin and locarbef for uncomplicated UTI. Note that some isolates may be susceptible to these agents while testing resistant to cefazolin.   -  Cefepime: S <=2   -  Cefoxitin: S <=8   -  Ceftriaxone: S <=1   -  Cefuroxime: S <=4   -  Ciprofloxacin: S <=0.25   -  Ertapenem: S <=0.5   -  Gentamicin: S <=2   -  Imipenem: S <=1   -  Levofloxacin: S <=0.5   -  Meropenem: S <=1   -  Nitrofurantoin: S <=32 Should not be used to treat pyelonephritis   -  Piperacillin/Tazobactam: S <=8   -  Tobramycin: S <=2   -  Trimethoprim/Sulfamethoxazole: S <=0.5/9.5   Specimen Source: Clean Catch Clean Catch (Midstream)   Culture Results:   10,000 - 49,000 CFU/mL Escherichia coli   Organism Identification: Escherichia coli   Organism: Escherichia coli   Method Type: JUANI    < from: CT Angio Abdomen and Pelvis w/ IV Cont (03.21.24 @ 00:58) >  FINDINGS:  LOWER CHEST: Mild bilateral lower lobe dependent atelectasis. Coronary   artery calcifications.    LIVER: 1.1 cm indeterminate hypodense lesion in the right hepatic lobe   nearing the caudate lobe (3-4-34).  BILE DUCTS: Normal caliber.  GALLBLADDER: Mildly dilated. Cholelithiasis and/or sludge.  SPLEEN: Within normal limits.  PANCREAS: Atrophic.  ADRENALS: Within normal limits.  KIDNEYS/URETERS: Redemonstrated mild to moderate left hydronephrosis and   marked left hydroureter to the level of the bladder. Layering hyperdense   material at the distal hydroureter again seen.Right extrarenal pelvis.    BLADDER: Collapsed around Collado catheter.  REPRODUCTIVE ORGANS: Uterus and adnexa within normal limits.    BOWEL: No bowel obstruction. Appendix is not visualized. No evidence of   inflammation in the pericecal region. Intramural thickening, mesenteric   inflammation and mucosal hyperenhancement involving the distal transverse   colon, descending colon and sigmoid.  PERITONEUM: No ascites.  VESSELS: Atherosclerotic changes. Portal veins and SMV are patent. Celiac   trunk, SMA, ERIC and renal arteries are patent.  RETROPERITONEUM/LYMPH NODES: No lymphadenopathy.  ABDOMINAL WALL: Within normal limits.  BONES: Severe levoscoliosis. Left hip replacement. Grade 1 L5 on S1   anterolisthesis. Degenerative changes. Superior endplate compression   deformity of the L4 vertebral body.    IMPRESSION:  Distal transverse through sigmoid colon wall thickening and mild   inflammatory changes, most likely representing colitis and   diverticulitis. Patent mesenteric vessels. No pneumatosis or free air.    < end of copied text >

## 2024-03-23 NOTE — CONSULT NOTE ADULT - CONSULT REASON
leukocytosis
acute hypoxic respiratory Failure on 02   Sarcoidosis   COPD   acute diastolic dysfunction  sepsis of urinary tract origin
anemia
hypotension/ sob
Hydro and Hematuria

## 2024-03-23 NOTE — PROGRESS NOTE ADULT - SUBJECTIVE AND OBJECTIVE BOX
Date of Service: 03-23-24 @ 10:27           CARDIOLOGY     PROGRESS  NOTE   ________________________________________________    CHIEF COMPLAINT:Patient is a 88y old  Female who presents with a chief complaint of Diverticulitis, leukocytosis (23 Mar 2024 07:52)  comfortable  	  REVIEW OF SYSTEMS:  CONSTITUTIONAL: No fever, weight loss, or fatigue  EYES: No eye pain, visual disturbances, or discharge  ENT:  No difficulty hearing, tinnitus, vertigo; No sinus or throat pain  NECK: No pain or stiffness  RESPIRATORY: No cough, wheezing, chills or hemoptysis; + mild  Shortness of Breath  CARDIOVASCULAR: No chest pain, palpitations, passing out, dizziness, or leg swelling  GASTROINTESTINAL: No abdominal or epigastric pain. No nausea, vomiting, or hematemesis; No diarrhea or constipation. No melena or hematochezia.  GENITOURINARY: No dysuria, frequency, hematuria, or incontinence  NEUROLOGICAL: No headaches, memory loss, loss of strength, numbness, or tremors  SKIN: No itching, burning, rashes, or lesions   LYMPH Nodes: No enlarged glands  ENDOCRINE: No heat or cold intolerance; No hair loss  MUSCULOSKELETAL: No joint pain or swelling; No muscle, back, or extremity pain  PSYCHIATRIC: No depression, anxiety, mood swings, or difficulty sleeping  HEME/LYMPH: No easy bruising, or bleeding gums  ALLERGY AND IMMUNOLOGIC: No hives or eczema	    [x ] All others negative	  [ ] Unable to obtain    PHYSICAL EXAM:  T(C): 36.8 (03-23-24 @ 05:52), Max: 36.9 (03-22-24 @ 20:38)  HR: 92 (03-23-24 @ 05:52) (82 - 102)  BP: 174/76 (03-23-24 @ 05:52) (132/70 - 174/76)  RR: 18 (03-23-24 @ 05:52) (18 - 20)  SpO2: 96% (03-23-24 @ 05:52) (93% - 96%)  Wt(kg): --  I&O's Summary    22 Mar 2024 07:01  -  23 Mar 2024 07:00  --------------------------------------------------------  IN: 0 mL / OUT: 1200 mL / NET: -1200 mL        Appearance: Normal	  HEENT:   Normal oral mucosa, PERRL, EOMI	  Lymphatic: No lymphadenopathy  Cardiovascular: Normal S1 S2, No JVD, + murmurs, No edema  Respiratory:rhonchi  Psychiatry: A & O x 3, Mood & affect appropriate  Gastrointestinal:  Soft, Non-tender, + BS	  Skin: No rashes, No ecchymoses, No cyanosis	  Neurologic: Non-focal  Extremities: Normal range of motion, No clubbing, cyanosis or edema  Vascular: Peripheral pulses palpable 2+ bilaterally    MEDICATIONS  (STANDING):  albuterol/ipratropium for Nebulization 3 milliLiter(s) Nebulizer every 6 hours  atorvastatin 20 milliGRAM(s) Oral at bedtime  cholecalciferol 1000 Unit(s) Oral daily  cyclopentolate 1% Solution 1 Drop(s) Both EYES two times a day  EPINEPHrine    Injectable 0.3 milliGRAM(s) IntraMuscular once  ferrous    sulfate 325 milliGRAM(s) Oral daily  FLUoxetine 20 milliGRAM(s) Oral daily  influenza  Vaccine (HIGH DOSE) 0.7 milliLiter(s) IntraMuscular once  mirtazapine 7.5 milliGRAM(s) Oral at bedtime  piperacillin/tazobactam IVPB.. 3.375 Gram(s) IV Intermittent every 8 hours  polyethylene glycol 3350 17 Gram(s) Oral two times a day  potassium chloride    Tablet ER 20 milliEquivalent(s) Oral daily  prednisoLONE acetate 1% Suspension 1 Drop(s) Both EYES two times a day  senna 1 Tablet(s) Oral two times a day  traZODone 50 milliGRAM(s) Oral daily      TELEMETRY: 	    ECG:  	  RADIOLOGY:  OTHER: 	  	  LABS:	 	    CARDIAC MARKERS:                          8.8    10.78 )-----------( 175      ( 23 Mar 2024 06:52 )             29.4     03-23    142  |  108  |  15  ----------------------------<  76  3.7   |  21<L>  |  0.90    Ca    8.1<L>      23 Mar 2024 06:52  Phos  3.0     03-23  Mg     2.2     03-23      proBNP:   Lipid Profile:   HgA1c:   TSH:         Assessment and plan  ---------------------------  This is an 87 y/o female w/ PMHx pulmonary sarcoidosis, COPD not on home O2, CAD, hypothyroidism, and HTN presenting for lack of BM in the last 5 days along with SOB and L flank pain from the atria. The patient does endorse some left-sided upper abdominal pain but doesn't endorse any other symptoms. She states that she doesn't have any SOB, and states that her last BM prior to the hospital was yesterday. Called the patient's daughter Deborah, who stated that she was told the patient was having severe abdominal pain as well as some vomiting.   She was admitted here from 2/8-2/21 for abdominal pain, constipation and vomiting. At the time, CT was suggestive of diverticulitis as well and her course was complicated by fluid overload requiring IVP lasix. She was treated with IV Zosyn for 7 days. During this hospital course, she was also found to have a dilated L renal pelvis w/ +UA, but was ruled not have UTI by ID. Urology was consulted, who stated that her pessary could be the cause of the hydro, and removed it on 2/13.   In the ED, she was febrile to 101.8, tachycardic to 130, hypotensive to 80/50, and hypoxic requiring 4LNC. CBC revealed leukocytosis of 29.35, normocytic anemia of 9.5. CMP w/ elevated AG of 24, low bicarb of 17, . Presenting VBG w/ lactate of 9.4. she was straight catheterized for UA, which was red and turvid w/ large LE and >50 RBC/hpf, negative for bacteria. Initially CT A/P w/o contrast was done (due to contrast allergy), which revealed a possible mild diverticulitis of the descending colon and unchanged severe L hydroureter with layering hyperdensity  which could be stones or blood products. She was given Ceftriaxone and Zosyn, 2.5L IVNS. Lactate down to 3.5, BP improved. She had 2 dark tarry stools in her diaper, so she was premedicated w/ solumedrol and benadryl and CTA Abdomen/pelvis was ordered.  pt is well known to me with hx of sarcoidosis, cad with abdominal pain, she was recently dc sec to Diverticulitis  IN er pt with hypotension and hypoxia.  pt with sig abnormal ecg,,  with evidence of diverticulitis, continue Zosyn  start on ivf  ?tte if any evidence of chf  dvt prophylaxis  asa if no contraindication  bp has improved continue with gentle hydration, abx  replete K, check mag level  increase bp today, will  start on bp meds slowly  check QTC on Psych meds

## 2024-03-23 NOTE — PROGRESS NOTE ADULT - ASSESSMENT
88 F        h/o   CAD,   pulmonary sarcoidosis, COPD ,  HTN,        Ct  a/p.   2/24,  diverticulitis ,  sigmoid  colon, severe  L hydroureter/  mod  L   hydronephrosis    comp fx  L  4.  vaginal  pessary           admitted with high wbc/  lacatate    uti/    ecoli,  seen  by ID    and  diverticulitis on ct   CT A/P angio 3/21/ 24,  colitis and  diverticulitis. Patent mesenteric vessels. No pneumatosis or free air.    c/.c  anemia     CAD    HTN   on iv  zosyn /  wbc  10,000.  hb 8.8     dvt ppx            rad< from: CT Abdomen and Pelvis No Cont (02.08.24 @ 18:01) >  BONES: Left hip ORIF. Lumbar levoscoliosis. Degenerative changes of the   spine. Age indeterminate compression deformity of L4. Grade 1   anterolisthesis of L4 on L5 and L5 on S1.  IMPRESSION:  Thickening of the sigmoid colon with mild inflammatory changes adjacent   to multiple colonic diverticula suggestive of acute diverticulitis.   Severe left hydroureter and moderate left hydronephrosis of uncertain   etiology.  --- End of Report ---

## 2024-03-23 NOTE — CONSULT NOTE ADULT - ASSESSMENT
87 y/o female w/ PMHx pulmonary sarcoidosis, COPD not on home O2, CAD, hypothyroidism, and HTN presenting for lack of BM in the last 5 days along with SOB and L flank pain. Patient found to have UTI and diverticulitis, as well as worsening anemia.     Urology consulted for hematuria and hydronephrosis. Patient presented with hematuria which has now resolved. Urine color is clear yellow today. Ucx positive for e.coli. Discussed with patient and daughter that hematuria may be due to the infection. Patient had previously refused cystoscopy and CT urogram at her last clinic appointment with Dr. Nj. Discussed with patient and family that would recommend a cystoscopy outpatient for complete evaluation of the hematuria to rule out any malignancy and daughter understood this.   As for the left hydronephrosis. Patient with a history of reflux since childhood. Cr is normal at 0.9. Blood cultures are negative and urine culture was positive for e.coli. CT reviewed and hydro appears unchanged from prior exam, no delayed nephrogram noted on review. Patient and family do not wish for any intervention as this appears chronic and kidney function is normal and there is low suspicion for obstructive urosepsis due to her lack of bacteremia and lack of delayed nephrogram.     Plan  - No acute urologic intervention  - labs reviewed Cr normal   - urine color improved to clear yellow, catheter draining well.   - void trial per primary team  - follow-up outpatient with Dr. Nj or Dr. Antonio for recurrent UTI management and gross hematuria work-up if family decides.      89 y/o female w/ PMHx pulmonary sarcoidosis, COPD not on home O2, CAD, hypothyroidism, and HTN presenting for lack of BM in the last 5 days along with SOB and L flank pain. Patient found to have UTI and diverticulitis, as well as worsening anemia.     Urology consulted for hematuria and hydronephrosis. Patient presented with hematuria which has now resolved. Urine color is clear yellow today. Ucx positive for e.coli. Discussed with patient and daughter that hematuria may be due to the infection. Patient had previously refused cystoscopy and CT urogram at her last clinic appointment with Dr. Nj. Discussed with patient and family that would recommend a cystoscopy outpatient for complete evaluation of the hematuria to rule out any malignancy and daughter understood this.   As for the left hydronephrosis. Patient with a history of reflux since childhood. Cr is normal at 0.9. Blood cultures are negative and urine culture was positive for e.coli. CT reviewed and hydro appears unchanged from prior exam, no delayed nephrogram noted on review. Patient and family do not wish for any intervention as this appears chronic and kidney function is normal and there is low suspicion for obstructive urosepsis due to her lack of bacteremia and lack of delayed nephrogram.     Plan  - No acute urologic intervention  - labs reviewed Cr normal   - urine color improved to clear yellow, catheter draining well.   - void trial per primary team, obtain PVR after   - follow-up outpatient with Dr. Nj for recurrent UTI management and gross hematuria work-up if family decides.     Discussed with Dr. Antonio

## 2024-03-23 NOTE — PROGRESS NOTE ADULT - SUBJECTIVE AND OBJECTIVE BOX
date of service: 03-23-24 @ 12:13  afebrile  REVIEW OF SYSTEMS:  CONSTITUTIONAL: No fever,  no  weight loss  ENT:  No  tinnitus,   no   vertigo  NECK: No pain or stiffness  RESPIRATORY: No cough, wheezing, chills or hemoptysis;    No Shortness of Breath  CARDIOVASCULAR: No chest pain, palpitations, dizziness  GASTROINTESTINAL: No abdominal or epigastric pain. No nausea, vomiting, or hematemesis; No diarrhea  No melena or hematochezia.  GENITOURINARY: No dysuria, frequency, hematuria, or incontinence  NEUROLOGICAL: No headaches  SKIN: No itching,  no   rash  LYMPH Nodes: No enlarged glands  ENDOCRINE: No heat or cold intolerance  MUSCULOSKELETAL: No joint pain or swelling  PSYCHIATRIC: No depression, anxiety  HEME/LYMPH: No easy bruising, or bleeding gums  ALLERGY AND IMMUNOLOGIC: No hives or eczema	    MEDICATIONS  (STANDING):  albuterol/ipratropium for Nebulization 3 milliLiter(s) Nebulizer every 6 hours  atorvastatin 20 milliGRAM(s) Oral at bedtime  cholecalciferol 1000 Unit(s) Oral daily  cyclopentolate 1% Solution 1 Drop(s) Both EYES two times a day  EPINEPHrine    Injectable 0.3 milliGRAM(s) IntraMuscular once  ferrous    sulfate 325 milliGRAM(s) Oral daily  FLUoxetine 20 milliGRAM(s) Oral daily  heparin   Injectable 5000 Unit(s) SubCutaneous every 12 hours  influenza  Vaccine (HIGH DOSE) 0.7 milliLiter(s) IntraMuscular once  losartan 25 milliGRAM(s) Oral daily  mirtazapine 7.5 milliGRAM(s) Oral at bedtime  piperacillin/tazobactam IVPB.. 3.375 Gram(s) IV Intermittent every 8 hours  polyethylene glycol 3350 17 Gram(s) Oral two times a day  potassium chloride    Tablet ER 20 milliEquivalent(s) Oral daily  prednisoLONE acetate 1% Suspension 1 Drop(s) Both EYES two times a day  senna 1 Tablet(s) Oral two times a day  traZODone 50 milliGRAM(s) Oral daily    MEDICATIONS  (PRN):  acetaminophen     Tablet .. 650 milliGRAM(s) Oral every 6 hours PRN Temp greater or equal to 38C (100.4F), Mild Pain (1 - 3)  melatonin 3 milliGRAM(s) Oral at bedtime PRN Insomnia  ondansetron Injectable 4 milliGRAM(s) IV Push every 8 hours PRN Nausea and/or Vomiting  temazepam 30 milliGRAM(s) Oral at bedtime PRN Insomnia      Vital Signs Last 24 Hrs  T(C): 36.8 (23 Mar 2024 05:52), Max: 36.9 (22 Mar 2024 20:38)  T(F): 98.2 (23 Mar 2024 05:52), Max: 98.5 (22 Mar 2024 20:38)  HR: 92 (23 Mar 2024 05:52) (82 - 102)  BP: 174/76 (23 Mar 2024 05:52) (132/70 - 174/76)  BP(mean): --  RR: 18 (23 Mar 2024 05:52) (18 - 20)  SpO2: 96% (23 Mar 2024 05:52) (93% - 96%)    Parameters below as of 23 Mar 2024 05:52  Patient On (Oxygen Delivery Method): nasal cannula  O2 Flow (L/min): 2    CAPILLARY BLOOD GLUCOSE        I&O's Summary    22 Mar 2024 07:01  -  23 Mar 2024 07:00  --------------------------------------------------------  IN: 0 mL / OUT: 1200 mL / NET: -1200 mL          Appearance: Normal	  HEENT:   Normal oral mucosa, PERRL, EOMI	  Lymphatic: No lymphadenopathy  Cardiovascular: Normal S1 S2, No JVD  Respiratory: Lungs clear to auscultation	  Gastrointestinal:  Soft, Non-tender, + BS	  Skin: No rash, No ecchymoses	  Extremities:     LABS:                        8.8    10.78 )-----------( 175      ( 23 Mar 2024 06:52 )             29.4     03-23    142  |  108  |  15  ----------------------------<  76  3.7   |  21<L>  |  0.90    Ca    8.1<L>      23 Mar 2024 06:52  Phos  3.0     03-23  Mg     2.2     03-23            Urinalysis Basic - ( 23 Mar 2024 06:52 )    Color: x / Appearance: x / SG: x / pH: x  Gluc: 76 mg/dL / Ketone: x  / Bili: x / Urobili: x   Blood: x / Protein: x / Nitrite: x   Leuk Esterase: x / RBC: x / WBC x   Sq Epi: x / Non Sq Epi: x / Bacteria: x                      Consultant(s) Notes Reviewed:      Care Discussed with Consultants/Other Providers:

## 2024-03-23 NOTE — CONSULT NOTE ADULT - ATTENDING COMMENTS
88F pulmonary sarcoid, CAD, COPD, remote PUD who presented with hematuria, fever, leukocytosis and lactic acidosis, found to have E. coli UTI and diverticulitis, now on Zosyn and IVF with normalization of lactate and improvement in leukocytosis. Labs also notable for macrocytic anemia to 7s-9s from baseline 11s last month with low iron, normal ferritin, and BUN elevated to mid-20s on admission though now 15 with BUN:Cr 16. Had a colonoscopy 18 months ago following a separate episode of diverticulitis per patient/daughter.     Afebrile and hypertensive today with HR in 90s. Abd soft and nontender. Connor with brown urine.    Patient and daughter deny overt GI bleeding though note continued hematuria and are very frustrated Urology has not been by. Unclear how much of acute anemia is 2/2 hematuria.    Ok to continue abx for diverticulitis, would complete 5 day course (can narrow as sensitivities for urine come back). Ok for regular diet from GI perspective. Given new anemia and history of PUD, will tentatively plan for EGD on Monday to evaluate for recurrent PUD, NPO at MN Sunday. Would start daily PPI in the interim.

## 2024-03-23 NOTE — CONSULT NOTE ADULT - ASSESSMENT
#Sepsis  Pt febrile on admission. WBC 29K on admission, lactate 9.4.  +UTI (growing E coli), Bcx negative 3/20. Diverticulitis on imaging as below.  Remains on zosyn    #Diverticulitis on CT  CT A/P angio 3/21: "Distal transverse through sigmoid colon wall thickening and mild inflammatory changes, most likely representing colitis and   diverticulitis. Patent mesenteric vessels. No pneumatosis or free air."    #Anemia  Hemoglobin 14 in 2/2024, now dropped to 6.4 on 3/22. Normocytic. Iron studies w/ normal ferritin, TIBC but low %iron sat    Recommendations  - trend CBC, keep active T&S, transfuse for Hgb<8  -  Patient is a 89 y/o female w/ PMHx pulmonary sarcoidosis, COPD not on home O2, CAD, hypothyroidism, and HTN presenting for lack of BM in the last 5 days along with SOB and L flank pain. Patient found to have UTI and diverticulitis, as well as worsening anemia.     #Sepsis  Pt febrile on admission. WBC 29K on admission, lactate 9.4.  +UTI (growing E coli), Bcx negative 3/20. Diverticulitis on imaging as below.  Remains on zosyn  #UTI/hematuria: Patient and daughter note that patient had gross hematuria during this admission    #Diverticulitis on CT  CT A/P angio 3/21: "Distal transverse through sigmoid colon wall thickening and mild inflammatory changes, most likely representing colitis and   diverticulitis. Patent mesenteric vessels. No pneumatosis or free air."    #Anemia  Hemoglobin 14 in 2/2024, now dropped to 6.4 on 3/22. Normocytic. Iron studies w/ normal ferritin, TIBC but low %iron sat  Green formed stool noted on pad     Recommendations  - trend CBC, keep active T&S, transfuse for Hgb<8  - PPI BID  - given history of PUD and worsened anemia, can plan for EGD on Monday  - please keep NPO after MN on Sunday night  - antibiotics per primary team for treatment of diverticulitis    Adela Bauman, PGY5  Gastroenterology/Hepatology Fellow  Available on [employer] Teams  [phone] ([email or web address] Short Range Pager)  [phone] (Long Range Pager)    After 5pm, please contact the on-call GI fellow. [phone]

## 2024-03-23 NOTE — PROGRESS NOTE ADULT - ASSESSMENT
Assessment and Recommendation:   acute hypoxic respiratory Failure on 02   keep 02 > saturation > 92%  Fever and sepsis of urinary tract origin    Sarcoidosis   COPD   Paralysis of the Right hemidiaphragm   S/P blood transfusion   acute left pyelonephritis  Acute Diverticulitis    anemia of chronic disease   protein caloric malnutrition   continue Antibiotics   nebulizer treatement Duoneb q 6 hrs   DVT and GI prophylaxis   care discuss with NP on the floor

## 2024-03-24 PROCEDURE — 99232 SBSQ HOSP IP/OBS MODERATE 35: CPT | Mod: GC

## 2024-03-24 PROCEDURE — 99232 SBSQ HOSP IP/OBS MODERATE 35: CPT

## 2024-03-24 RX ADMIN — LOSARTAN POTASSIUM 25 MILLIGRAM(S): 100 TABLET, FILM COATED ORAL at 06:36

## 2024-03-24 RX ADMIN — TEMAZEPAM 30 MILLIGRAM(S): 15 CAPSULE ORAL at 21:30

## 2024-03-24 RX ADMIN — PIPERACILLIN AND TAZOBACTAM 25 GRAM(S): 4; .5 INJECTION, POWDER, LYOPHILIZED, FOR SOLUTION INTRAVENOUS at 06:34

## 2024-03-24 RX ADMIN — Medication 50 MILLIGRAM(S): at 12:23

## 2024-03-24 RX ADMIN — Medication 1 DROP(S): at 18:08

## 2024-03-24 RX ADMIN — Medication 650 MILLIGRAM(S): at 16:06

## 2024-03-24 RX ADMIN — Medication 3 MILLILITER(S): at 12:22

## 2024-03-24 RX ADMIN — Medication 3 MILLILITER(S): at 18:08

## 2024-03-24 RX ADMIN — HEPARIN SODIUM 5000 UNIT(S): 5000 INJECTION INTRAVENOUS; SUBCUTANEOUS at 18:07

## 2024-03-24 RX ADMIN — MIRTAZAPINE 7.5 MILLIGRAM(S): 45 TABLET, ORALLY DISINTEGRATING ORAL at 21:27

## 2024-03-24 RX ADMIN — Medication 20 MILLIGRAM(S): at 12:22

## 2024-03-24 RX ADMIN — PIPERACILLIN AND TAZOBACTAM 25 GRAM(S): 4; .5 INJECTION, POWDER, LYOPHILIZED, FOR SOLUTION INTRAVENOUS at 21:22

## 2024-03-24 RX ADMIN — Medication 1000 UNIT(S): at 12:23

## 2024-03-24 RX ADMIN — Medication 1 DROP(S): at 06:37

## 2024-03-24 RX ADMIN — Medication 3 MILLILITER(S): at 06:36

## 2024-03-24 RX ADMIN — SENNA PLUS 1 TABLET(S): 8.6 TABLET ORAL at 06:36

## 2024-03-24 RX ADMIN — Medication 325 MILLIGRAM(S): at 14:15

## 2024-03-24 RX ADMIN — Medication 3 MILLIGRAM(S): at 21:27

## 2024-03-24 RX ADMIN — ATORVASTATIN CALCIUM 20 MILLIGRAM(S): 80 TABLET, FILM COATED ORAL at 21:27

## 2024-03-24 RX ADMIN — HEPARIN SODIUM 5000 UNIT(S): 5000 INJECTION INTRAVENOUS; SUBCUTANEOUS at 06:36

## 2024-03-24 RX ADMIN — PIPERACILLIN AND TAZOBACTAM 25 GRAM(S): 4; .5 INJECTION, POWDER, LYOPHILIZED, FOR SOLUTION INTRAVENOUS at 14:15

## 2024-03-24 RX ADMIN — Medication 3 MILLILITER(S): at 00:14

## 2024-03-24 RX ADMIN — Medication 650 MILLIGRAM(S): at 06:35

## 2024-03-24 RX ADMIN — Medication 20 MILLIEQUIVALENT(S): at 12:22

## 2024-03-24 NOTE — PROGRESS NOTE ADULT - SUBJECTIVE AND OBJECTIVE BOX
Date of Service: 03-24-24 @ 10:04           CARDIOLOGY     PROGRESS  NOTE   ________________________________________________    CHIEF COMPLAINT:Patient is a 88y old  Female who presents with a chief complaint of Diverticulitis, leukocytosis (23 Mar 2024 16:27)  no complain  	  REVIEW OF SYSTEMS:  CONSTITUTIONAL: No fever, weight loss, or fatigue  EYES: No eye pain, visual disturbances, or discharge  ENT:  No difficulty hearing, tinnitus, vertigo; No sinus or throat pain  NECK: No pain or stiffness  RESPIRATORY: No cough, wheezing, chills or hemoptysis; decrease  Shortness of Breath  CARDIOVASCULAR: No chest pain, palpitations, passing out, dizziness, or leg swelling  GASTROINTESTINAL: No abdominal or epigastric pain. No nausea, vomiting, or hematemesis; No diarrhea or constipation. No melena or hematochezia.  GENITOURINARY: No dysuria, frequency, hematuria, or incontinence  NEUROLOGICAL: No headaches, memory loss, loss of strength, numbness, or tremors  SKIN: No itching, burning, rashes, or lesions   LYMPH Nodes: No enlarged glands  ENDOCRINE: No heat or cold intolerance; No hair loss  MUSCULOSKELETAL: No joint pain or swelling; No muscle, back, or extremity pain  PSYCHIATRIC: No depression, anxiety, mood swings, or difficulty sleeping  HEME/LYMPH: No easy bruising, or bleeding gums  ALLERGY AND IMMUNOLOGIC: No hives or eczema	    [ x] All others negative	  [ ] Unable to obtain    PHYSICAL EXAM:  T(C): 36.8 (03-24-24 @ 04:43), Max: 37.1 (03-23-24 @ 16:20)  HR: 95 (03-24-24 @ 04:43) (89 - 101)  BP: 121/77 (03-24-24 @ 04:43) (97/62 - 151/97)  RR: 19 (03-24-24 @ 04:43) (18 - 19)  SpO2: 98% (03-24-24 @ 04:43) (93% - 98%)  Wt(kg): --  I&O's Summary    23 Mar 2024 07:01  -  24 Mar 2024 07:00  --------------------------------------------------------  IN: 240 mL / OUT: 800 mL / NET: -560 mL        Appearance: Normal	  HEENT:   Normal oral mucosa, PERRL, EOMI	  Lymphatic: No lymphadenopathy  Cardiovascular: Normal S1 S2, No JVD, + murmurs, No edema  Respiratory: rhonchi  Psychiatry: A & O x 3, Mood & affect appropriate  Gastrointestinal:  Soft, Non-tender, + BS	  Skin: No rashes, No ecchymoses, No cyanosis	  Neurologic: Non-focal  Extremities: Normal range of motion, No clubbing, cyanosis or edema  Vascular: Peripheral pulses palpable 2+ bilaterally    MEDICATIONS  (STANDING):  albuterol/ipratropium for Nebulization 3 milliLiter(s) Nebulizer every 6 hours  atorvastatin 20 milliGRAM(s) Oral at bedtime  cholecalciferol 1000 Unit(s) Oral daily  cyclopentolate 1% Solution 1 Drop(s) Both EYES two times a day  EPINEPHrine    Injectable 0.3 milliGRAM(s) IntraMuscular once  ferrous    sulfate 325 milliGRAM(s) Oral daily  FLUoxetine 20 milliGRAM(s) Oral daily  heparin   Injectable 5000 Unit(s) SubCutaneous every 12 hours  influenza  Vaccine (HIGH DOSE) 0.7 milliLiter(s) IntraMuscular once  losartan 25 milliGRAM(s) Oral daily  mirtazapine 7.5 milliGRAM(s) Oral at bedtime  piperacillin/tazobactam IVPB.. 3.375 Gram(s) IV Intermittent every 8 hours  polyethylene glycol 3350 17 Gram(s) Oral two times a day  potassium chloride    Tablet ER 20 milliEquivalent(s) Oral daily  prednisoLONE acetate 1% Suspension 1 Drop(s) Both EYES two times a day  senna 1 Tablet(s) Oral two times a day  traZODone 50 milliGRAM(s) Oral daily      TELEMETRY: 	    ECG:  	  RADIOLOGY:  OTHER: 	  	  LABS:	 	    CARDIAC MARKERS:                        8.8    10.78 )-----------( 175      ( 23 Mar 2024 06:52 )             29.4     03-23    142  |  108  |  15  ----------------------------<  76  3.7   |  21<L>  |  0.90    Ca    8.1<L>      23 Mar 2024 06:52  Phos  3.0     03-23  Mg     2.2     03-23      proBNP:   Lipid Profile:   HgA1c:   TSH:       < from: 12 Lead ECG (03.20.24 @ 17:03) >  Ventricular Rate 126 BPM  Culture - Urine (03.20.24 @ 18:25)    -  Amoxicillin/Clavulanic Acid: S <=8/4   -  Ampicillin: R >16 These ampicillin results predict results for amoxicillin   -  Ampicillin/Sulbactam: I 16/8   -  Aztreonam: S <=4   -  Cefazolin: S 4 For uncomplicated UTI with K. pneumoniae, E. coli, or P. mirablis: JUANI <=16 is sensitive and JUANI >=32 is resistant. This also predicts results for oral agents cefaclor, cefdinir, cefpodoxime, cefprozil, cefuroxime axetil, cephalexin and locarbef for uncomplicated UTI. Note that some isolates may be susceptible to these agents while testing resistant to cefazolin.   -  Cefepime: S <=2   -  Cefoxitin: S <=8   -  Ceftriaxone: S <=1   -  Cefuroxime: S <=4   -  Ciprofloxacin: S <=0.25   -  Ertapenem: S <=0.5   -  Gentamicin: S <=2   -  Imipenem: S <=1   -  Levofloxacin: S <=0.5   -  Meropenem: S <=1   -  Nitrofurantoin: S <=32 Should not be used to treat pyelonephritis   -  Piperacillin/Tazobactam: S <=8   -  Tobramycin: S <=2   -  Trimethoprim/Sulfamethoxazole: S <=0.5/9.5   Specimen Source: Clean Catch Clean Catch (Midstream)   Culture Results:   10,000 - 49,000 CFU/mL Escherichia coli   Organism Identification: Escherichia coli   Organism: Escherichia coli   Method Type: JUANI      Atrial Rate 126 BPM    P-R Interval 144 ms    QRS Duration 80 ms    Q-T Interval 308 ms    QTC Calculation(Bazett) 446 ms    P Axis 64 degrees    R Axis -17 degrees    T Axis 51 degrees    Diagnosis Line SINUS TACHYCARDIA  INFERIOR INFARCT , AGE UNDETERMINED  ANTERIOR INFARCT (CITED ON OR BEFORE 22-MAR-2023)  ABNORMAL ECG  WHEN COMPARED WITH ECG OF 08-FEB-2024 14:16,  NO SIGNIFICANT CHANGE WAS FOUND      88F pulmonary sarcoid, CAD, COPD, remote PUD who presented with hematuria, fever, leukocytosis and lactic acidosis, found to have E. coli UTI and diverticulitis, now on Zosyn and IVF with normalization of lactate and improvement in leukocytosis. Labs also notable for macrocytic anemia to 7s-9s from baseline 11s last month with low iron, normal ferritin, and BUN elevated to mid-20s on admission though now 15 with BUN:Cr 16. Had a colonoscopy 18 months ago following a separate episode of diverticulitis per patient/daughter.     Afebrile and hypertensive today with HR in 90s. Abd soft and nontender. Connor with brown urine.    Patient and daughter deny overt GI bleeding though note continued hematuria and are very frustrated Urology has not been by. Unclear how much of acute anemia is 2/2 hematuria.    Ok to continue abx for diverticulitis, would complete 5 day course (can narrow as sensitivities for urine come back). Ok for regular diet from GI perspective. Given new anemia and history of PUD, will tentatively plan for EGD on Monday to evaluate for recurrent PUD, NPO at [facility] Sunday. Would start daily PPI in the interim.      Assessment and plan  ---------------------------  This is an 87 y/o female w/ PMHx pulmonary sarcoidosis, COPD not on home O2, CAD, hypothyroidism, and HTN presenting for lack of BM in the last 5 days along with SOB and L flank pain from the atria. The patient does endorse some left-sided upper abdominal pain but doesn't endorse any other symptoms. She states that she doesn't have any SOB, and states that her last BM prior to the hospital was yesterday. Called the patient's daughter Deborah, who stated that she was told the patient was having severe abdominal pain as well as some vomiting.   She was admitted here from 2/8-2/21 for abdominal pain, constipation and vomiting. At the time, CT was suggestive of diverticulitis as well and her course was complicated by fluid overload requiring IVP lasix. She was treated with IV Zosyn for 7 days. During this hospital course, she was also found to have a dilated L renal pelvis w/ +UA, but was ruled not have UTI by ID. Urology was consulted, who stated that her pessary could be the cause of the hydro, and removed it on 2/13.   In the ED, she was febrile to 101.8, tachycardic to 130, hypotensive to 80/50, and hypoxic requiring 4LNC. CBC revealed leukocytosis of 29.35, normocytic anemia of 9.5. CMP w/ elevated AG of 24, low bicarb of 17, . Presenting VBG w/ lactate of 9.4. she was straight catheterized for UA, which was red and turvid w/ large LE and >50 RBC/hpf, negative for bacteria. Initially CT A/P w/o contrast was done (due to contrast allergy), which revealed a possible mild diverticulitis of the descending colon and unchanged severe L hydroureter with layering hyperdensity  which could be stones or blood products. She was given Ceftriaxone and Zosyn, 2.5L IVNS. Lactate down to 3.5, BP improved. She had 2 dark tarry stools in her diaper, so she was premedicated w/ solumedrol and benadryl and CTA Abdomen/pelvis was ordered.  pt is well known to me with hx of sarcoidosis, cad with abdominal pain, she was recently dc sec to Diverticulitis  IN er pt with hypotension and hypoxia.  pt with sig abnormal ecg,,  with evidence of diverticulitis, continue Zosyn  start on ivf  ?tte if any evidence of chf  dvt prophylaxis  asa if no contraindication  bp has improved continue with gentle hydration, abx  replete K, check mag level  increase bp today, will  start on bp meds slowly  check QTC on Psych meds  fu hgb , GI noted urology  spoke to the daughter, diet has advanced

## 2024-03-24 NOTE — PROGRESS NOTE ADULT - ASSESSMENT
Patient is a 87 y/o female w/ PMHx pulmonary sarcoidosis, COPD not on home O2, CAD, hypothyroidism, and HTN presenting for lack of BM in the last 5 days along with SOB and L flank pain. Patient found to have UTI and diverticulitis, as well as worsening anemia.     #Sepsis  Pt febrile on admission. WBC 29K on admission, lactate 9.4.  +UTI (growing E coli), Bcx negative 3/20. Diverticulitis on imaging as below.  Remains on zosyn  #UTI/hematuria: Patient and daughter note that patient had gross hematuria during this admission    #Diverticulitis on CT  CT A/P angio 3/21: "Distal transverse through sigmoid colon wall thickening and mild inflammatory changes, most likely representing colitis and   diverticulitis. Patent mesenteric vessels. No pneumatosis or free air."    #Anemia  Hemoglobin 14 in 2/2024, now dropped to 6.4 on 3/22. Normocytic. Iron studies w/ normal ferritin, TIBC but low %iron sat  Green formed stool noted on pad     Recommendations:  - trend CBC, keep active T&S, transfuse for Hgb<8  - PPI BID x 4 weeks  - discussed with daughter at bedside. Will hold off on EGD for now since patient has had no overt GI bleeding and she remains on supplemental O2. Family expressing concern re hematuria would want to focus on work up for that. Please provide information for outpatient follow up with GI office at [address] [place] [address] ([phone]). Can then arrange EGD in outpatient setting.   - antibiotics per primary team for treatment of diverticulitis    Adlea Bauman, PGY6  Gastroenterology/Hepatology Fellow  Available on Microsoft Teams  [phone] ([employer] Short Range Pager)  [phone] (Long Range Pager)    After 5pm, please contact the on-call GI fellow. [phone] Patient is a 87 y/o female w/ PMHx pulmonary sarcoidosis, COPD not on home O2, CAD, hypothyroidism, and HTN presenting for lack of BM in the last 5 days along with SOB and L flank pain. Patient found to have UTI and diverticulitis, as well as worsening anemia.     #Sepsis  Pt febrile on admission. WBC 29K on admission, lactate 9.4.  +UTI (growing E coli), Bcx negative 3/20. Diverticulitis on imaging as below.  Remains on zosyn  #UTI/hematuria: Patient and daughter note that patient had gross hematuria during this admission    #Diverticulitis on CT  CT A/P angio 3/21: "Distal transverse through sigmoid colon wall thickening and mild inflammatory changes, most likely representing colitis and   diverticulitis. Patent mesenteric vessels. No pneumatosis or free air."    #Anemia  Hemoglobin 14 in 2/2024, now dropped to 6.4 on 3/22. Normocytic. Iron studies w/ normal ferritin, TIBC but low %iron sat  Green formed stool noted on pad     Recommendations:  - trend CBC, keep active T&S, transfuse for Hgb<8  - PPI BID x 4 weeks  - discussed with daughter at bedside. Will hold off on EGD for now since patient has had no overt GI bleeding and she remains on supplemental O2. Family expressing concern re hematuria would want to focus on work up for that. Please provide information for outpatient follow up with GI office at [address] [place] [address] ([phone]). Can then arrange EGD in outpatient setting.   - antibiotics per primary team for treatment of diverticulitis    GI signing off. Please call back as needed.    Adela Bauman, PGY6  Gastroenterology/Hepatology Fellow  Available on [employer] Teams  [phone] ([employer] Short Range Pager)  [phone] (Long Range Pager)    After 5pm, please contact the on-call GI fellow. [phone]

## 2024-03-24 NOTE — PROGRESS NOTE ADULT - SUBJECTIVE AND OBJECTIVE BOX
BISHNU SEGURA  MRN#: [id number]  Subjective:  pulmonary progress note: :acute hypoxic respiratory Failure on 02, sarcoidosis , COPD , Diastolic dysfunction , pulmonary HTN date of service is 3/24/2024   This is an 89 y/o female w/ PMH pulmonary sarcoidosis, COPD not on home O2, CAD, hypothyroidism, and HTN presenting for lack of BM in the last 5 days along with SOB and L flank pain from the atria. The patient does endorse some left-sided upper abdominal pain but doesn't endorse any other symptoms. She states that she doesn't have any SOB, and states that her last BM prior to the hospital was yesterday. Called the patient's daughter Deborah, who stated that she was told the patient was having severe abdominal pain as well as some vomiting.   She was admitted here from 2/8-2/21 for abdominal pain, constipation and vomiting. At the time, CT was suggestive of diverticulitis as well and her course was complicated by fluid overload requiring IVP lasix. She was treated with IV Zosyn for 7 days. During this hospital course, she was also found to have a dilated L renal pelvis w/ +UA, but was ruled not have UTI by ID. Urology was consulted, who stated that her pessary could be the cause of the hydro, and removed it on 2/13.     In the ED, she was febrile to 101.8, tachycardic to 130, hypotensive to 80/50, and hypoxic requiring 4LNC. CBC revealed leukocytosis of 29.35, normocytic anemia of 9.5. CMP w/ elevated AG of 24, low bicarb of 17, . Presenting VBG w/ lactate of 9.4. she was straight catheterized for UA, which was red and turbid w/ large LE and >50 RBC/hpf, negative for bacteria. Initially CT A/P w/o contrast was done (due to contrast allergy), which revealed a possible mild diverticulitis of the descending colon and unchanged severe L hydroureter with layering hypodensity which could be stones or blood products. She was given Ceftriaxone and Zosyn, 2.5L IVNS. Lactate down to 3.5, BP improved. She had 2 dark tarry stools in her diaper, so she was premedicated w/ solumedrol and benadryl and CTA Abdomen/pelvis was ordered.  (21 Mar 2024 01:30), patient was SOB needed high 02 supplement , CT scan acute Diverticulitis , afebrile down to nasal canula , on antibiotics , less distress no nausea or vomiting no headache S/P blood transfusion , H/H is stable no chest pain no SOB at rest , no fever no chest pain seen and examined with daughter at bed side no GI symptoms , discuss patient condition with daughter all question were answered     PAST MEDICAL & SURGICAL HISTORY:  HTN (hypertension)      Hypothyroidism      Osteoporosis  pulmonary hypertension   H/O anxiety     CAD (coronary artery disease)  sarcoidosis   COPD           FAMILY HISTORY:  Review of Systems:  Review of Systems: Review of Systems:   CONSTITUTIONAL: + fever,+ weight loss  EYES: No eye pain, visual disturbances, or discharge  RESPIRATORY: + SOB. No cough, wheezing, chills or hemoptysis  CARDIOVASCULAR: No chest pain, palpitations, dizziness, or leg swelling  GASTROINTESTINAL: +abdominal pain. No nausea, vomiting, or hematemesis; No diarrhea or constipation. No melena or hematochezia.  GENITOURINARY: +L flank pain, hematuria; No dysuria, frequency, or incontinence  NEUROLOGICAL: No headaches, memory loss, loss of strength, numbness, or tremors  SKIN: No itching, burning, rashes, or lesions   MUSCULOSKELETAL: No joint pain or swelling; No muscle, back pain  PSYCHIATRIC: + depression,+ anxiety,+ mood swings, or difficulty ,             OBJECTIVE:  ICU Vital Signs Last 24 Hrs  T(C): 36.8 (22 Mar 2024 16:23), Max: 36.9 (22 Mar 2024 00:28)  T(F): 98.2 (22 Mar 2024 16:23), Max: 98.4 (22 Mar 2024 00:28)  HR: 102 (22 Mar 2024 16:23) (82 - 102)  BP: 145/76 (22 Mar 2024 16:23) (94/51 - 145/76)  BP(mean): --  ABP: --  ABP(mean): --  RR: 20 (22 Mar 2024 16:23) (19 - 20)  SpO2: 93% (22 Mar 2024 16:23) (93% - 96%)    O2 Parameters below as of 22 Mar 2024 16:23  Patient On (Oxygen Delivery Method): nasal cannula  O2 Flow (L/min): 2          03-21 @ 07:01  -  03-22 @ 07:00  --------------------------------------------------------  IN: 240 mL / OUT: 1850 mL / NET: -1610 mL      PHYSICAL EXAM: Daily Height in cm: 157.48 (20 Mar 2024 16:54)  Elderly agitated cachectic female on 02 saturation is 93%  Daily   HEENT:     + NCAT  + EOMI  - Conjuctival edema   - Icterus   - Thrush   - ETT  - NGT/OGT  Neck:         + FROM    + JVD     - Nodes     - Masses    + Mid-line trachea   - Tracheostomy  Chest:          pectus excavatum , increase A-P diameter   Lungs:          + CTA   + Rhonchi    - Rales    - Wheezing   + Decreased BS   - Dullness R L  Cardiac:       + S1 + S2    + RRR   - Irregular   - S3  - S4  + Murmurs   - Rub   - Hamman’s sign   Abdomen:    + BS     + Soft    + tender left lower quadrant   - Distended  - Organomegaly  - PEG  Extremities:   - Cyanosis U/L   - Clubbing  U/L  - LE/UE Edema   + Capillary refill    + Pulses   Neuro:        + Awake   +  Alert   - Confused   - Lethargic   - Sedated   - Generalized Weakness  Skin:        - Rashes    - Erythema   + Normal incisions   + IV sites intact  - Sacral decubit      HOSPITAL MEDICATIONS: All mediciations reviewed and analyzed  MEDICATIONS  (STANDING):  albuterol/ipratropium for Nebulization 3 milliLiter(s) Nebulizer every 6 hours  atorvastatin 20 milliGRAM(s) Oral at bedtime  cholecalciferol 1000 Unit(s) Oral daily  cyclopentolate 1% Solution 1 Drop(s) Both EYES two times a day  EPINEPHrine    Injectable 0.3 milliGRAM(s) IntraMuscular once  ferrous    sulfate 325 milliGRAM(s) Oral daily  FLUoxetine 20 milliGRAM(s) Oral daily  influenza  Vaccine (HIGH DOSE) 0.7 milliLiter(s) IntraMuscular once  mirtazapine 7.5 milliGRAM(s) Oral at bedtime  piperacillin/tazobactam IVPB.. 3.375 Gram(s) IV Intermittent every 8 hours  polyethylene glycol 3350 17 Gram(s) Oral two times a day  potassium chloride    Tablet ER 20 milliEquivalent(s) Oral daily  prednisoLONE acetate 1% Suspension 1 Drop(s) Both EYES two times a day  senna 1 Tablet(s) Oral two times a day  sodium chloride 0.9%. 1000 milliLiter(s) (60 mL/Hr) IV Continuous <Continuous>  traZODone 50 milliGRAM(s) Oral daily    MEDICATIONS  (PRN):  acetaminophen     Tablet .. 650 milliGRAM(s) Oral every 6 hours PRN Temp greater or equal to 38C (100.4F), Mild Pain (1 - 3)  melatonin 3 milliGRAM(s) Oral at bedtime PRN Insomnia  ondansetron Injectable 4 milliGRAM(s) IV Push every 8 hours PRN Nausea and/or Vomiting  temazepam 30 milliGRAM(s) Oral at bedtime PRN Insomnia    LABS: All Lab data reviewed and analyzed                          8.8    10.78 )-----------( 175      ( 23 Mar 2024 06:52 )             29.4   03-23    142  |  108  |  15  ----------------------------<  76  3.7   |  21<L>  |  0.90    Ca    8.1<L>      23 Mar 2024 06:52  Phos  3.0     03-23  Mg     2.2     03-23      Ca    8.2<L>      22 Mar 2024 07:34    TPro  4.6<L>  /  Alb  2.8<L>  /  TBili  0.3  /  DBili  x   /  AST  28  /  ALT  9<L>  /  AlkPhos  103  03-21     LIVER FUNCTIONS - ( 21 Mar 2024 07:05 )  Alb: 2.8 g/dL / Pro: 4.6 g/dL / ALK PHOS: 103 U/L / ALT: 9 U/L / AST: 28 U/L / GGT: x           RADIOLOGY: - Reviewed and analyzed

## 2024-03-24 NOTE — PROGRESS NOTE ADULT - SUBJECTIVE AND OBJECTIVE BOX
Gastroenterology/Hepatology Progress Note    Interval Events:   No events overnight, no melena/hematochezia  On NC    Allergies:  iodinated radiocontrast agents (Anaphylaxis)      Hospital Medications:  acetaminophen     Tablet .. 650 milliGRAM(s) Oral every 6 hours PRN  albuterol/ipratropium for Nebulization 3 milliLiter(s) Nebulizer every 6 hours  atorvastatin 20 milliGRAM(s) Oral at bedtime  cholecalciferol 1000 Unit(s) Oral daily  cyclopentolate 1% Solution 1 Drop(s) Both EYES two times a day  EPINEPHrine    Injectable 0.3 milliGRAM(s) IntraMuscular once  ferrous    sulfate 325 milliGRAM(s) Oral daily  FLUoxetine 20 milliGRAM(s) Oral daily  heparin   Injectable 5000 Unit(s) SubCutaneous every 12 hours  influenza  Vaccine (HIGH DOSE) 0.7 milliLiter(s) IntraMuscular once  losartan 25 milliGRAM(s) Oral daily  melatonin 3 milliGRAM(s) Oral at bedtime PRN  mirtazapine 7.5 milliGRAM(s) Oral at bedtime  ondansetron Injectable 4 milliGRAM(s) IV Push every 8 hours PRN  piperacillin/tazobactam IVPB.. 3.375 Gram(s) IV Intermittent every 8 hours  polyethylene glycol 3350 17 Gram(s) Oral two times a day  potassium chloride    Tablet ER 20 milliEquivalent(s) Oral daily  prednisoLONE acetate 1% Suspension 1 Drop(s) Both EYES two times a day  senna 1 Tablet(s) Oral two times a day  temazepam 30 milliGRAM(s) Oral at bedtime PRN  traZODone 50 milliGRAM(s) Oral daily      ROS: 14 point ROS negative unless otherwise state in subjective    PHYSICAL EXAM:   Vital Signs:  Vital Signs Last 24 Hrs  T(C): 36.7 (24 Mar 2024 13:00), Max: 37.1 (23 Mar 2024 16:20)  T(F): 98.1 (24 Mar 2024 13:00), Max: 98.7 (23 Mar 2024 16:20)  HR: 88 (24 Mar 2024 13:00) (88 - 101)  BP: 145/76 (24 Mar 2024 13:00) (97/62 - 151/97)  BP(mean): --  RR: 18 (24 Mar 2024 13:00) (18 - 19)  SpO2: 97% (24 Mar 2024 13:00) (93% - 98%)    Parameters below as of 24 Mar 2024 13:00  Patient On (Oxygen Delivery Method): nasal cannula  O2 Flow (L/min): 2    Daily     Daily     GENERAL:  No acute distress, on NC  HEENT:  NCAT, no scleral icterus  CHEST: no resp distress  HEART:  RRR  ABDOMEN:  Soft, non-tender, non-distended   EXTREMITIES:  No cyanosis, clubbing, or edema  SKIN:  No rash/erythema/ecchymoses   NEURO:  Alert and oriented x 3     LABS:                        8.8    10.78 )-----------( 175      ( 23 Mar 2024 06:52 )             29.4       03-23    142  |  108  |  15  ----------------------------<  76  3.7   |  21<L>  |  0.90    Ca    8.1<L>      23 Mar 2024 06:52  Phos  3.0     03-23  Mg     2.2     03-23    Urinalysis Basic - ( 23 Mar 2024 06:52 )    Color: x / Appearance: x / SG: x / pH: x  Gluc: 76 mg/dL / Ketone: x  / Bili: x / Urobili: x   Blood: x / Protein: x / Nitrite: x   Leuk Esterase: x / RBC: x / WBC x   Sq Epi: x / Non Sq Epi: x / Bacteria: x

## 2024-03-24 NOTE — PROGRESS NOTE ADULT - ATTENDING COMMENTS
88F pulmonary sarcoid, CAD, COPD, remote PUD who presented with hematuria, fever, leukocytosis and lactic acidosis, found to have E. coli UTI and diverticulitis, now on Zosyn and IVF with normalization of lactate and improvement in leukocytosis. Noted to have macrocytic anemia to 7s-9s from baseline 11s last month with low iron, normal ferritin. Spoke to daughter who resides in [place] and is  to a GI, she is sure patient had a colonoscopy in [place] within last 18 months.     Hgb stable today. Afebrile and hypertensive today with HR in 90s. Abd soft and nontender. [place] with brown urine. Requiring 2LNC (not on O2 at home) and receiving a neb during exam.     Still without overt bleeding. Patient would like to go home.     Not optimized for EGD given pulmonary status, given hemodynamic stability and lack of overt bleeding, as well as patient desire to dc home as soon as feasible, will defer to outpatient setting. Should follow up with GI as outpatient. Should complete 5 day course of abx for diverticulitis. Should continue daily PPI as outpatient.    Follow up in Gastroenterology Clinic: [phone] ([facility] at [address] [place] [address]) or [phone] ([facility] at [address] [place] [address]) or [phone] ([facility] at [address] [place] [address])  or [place] Office: [phone] ([address] [place] [address]. [address] [place], [address])

## 2024-03-24 NOTE — PROGRESS NOTE ADULT - SUBJECTIVE AND OBJECTIVE BOX
---___---___---___---___---___---___ ---___---___---___---___---___---___---___---___---                  M E D I C A L   A T T E N D I N G   P R O G R E S S   N O T E  ---___---___---___---___---___---___ ---___---___---___---___---___---___---___---___---        ================================================    ++CHIEF COMPLAINT:   Patient is a 88y old  Female who presents with a chief complaint of Diverticulitis, leukocytosis (24 Mar 2024 15:23)      Diverticulitis of intestine without perforation or abscess without bleeding      ---___---___---___---___---___---  PAST MEDICAL / Surgical  HISTORY:  PAST MEDICAL & SURGICAL HISTORY:  HTN (hypertension)      Hypothyroidism      Osteoporosis      CAD (coronary artery disease)          ---___---___---___---___---___---  FAMILY HISTORY:   FAMILY HISTORY:        ---___---___---___---___---___---  ALLERGIES:   Allergies    iodinated radiocontrast agents (Anaphylaxis)    Intolerances        ---___---___---___---___---___---  MEDICATIONS:  MEDICATIONS  (STANDING):  albuterol/ipratropium for Nebulization 3 milliLiter(s) Nebulizer every 6 hours  atorvastatin 20 milliGRAM(s) Oral at bedtime  cholecalciferol 1000 Unit(s) Oral daily  cyclopentolate 1% Solution 1 Drop(s) Both EYES two times a day  EPINEPHrine    Injectable 0.3 milliGRAM(s) IntraMuscular once  ferrous    sulfate 325 milliGRAM(s) Oral daily  FLUoxetine 20 milliGRAM(s) Oral daily  heparin   Injectable 5000 Unit(s) SubCutaneous every 12 hours  influenza  Vaccine (HIGH DOSE) 0.7 milliLiter(s) IntraMuscular once  losartan 25 milliGRAM(s) Oral daily  mirtazapine 7.5 milliGRAM(s) Oral at bedtime  piperacillin/tazobactam IVPB.. 3.375 Gram(s) IV Intermittent every 8 hours  polyethylene glycol 3350 17 Gram(s) Oral two times a day  potassium chloride    Tablet ER 20 milliEquivalent(s) Oral daily  prednisoLONE acetate 1% Suspension 1 Drop(s) Both EYES two times a day  senna 1 Tablet(s) Oral two times a day  traZODone 50 milliGRAM(s) Oral daily    MEDICATIONS  (PRN):  acetaminophen     Tablet .. 650 milliGRAM(s) Oral every 6 hours PRN Temp greater or equal to 38C (100.4F), Mild Pain (1 - 3)  melatonin 3 milliGRAM(s) Oral at bedtime PRN Insomnia  ondansetron Injectable 4 milliGRAM(s) IV Push every 8 hours PRN Nausea and/or Vomiting  temazepam 30 milliGRAM(s) Oral at bedtime PRN Insomnia      ---___---___---___---___---___---  REVIEW OF SYSTEM:    GEN: no fever, no chills, no pain  RESP: no SOB, no cough, no sputum  CVS: no chest pain, no palpitations, no edema  GI: no abdominal pain, no nausea, no vomiting, no constipation, no diarrhea  : no dysurea, no frequency, no hematurea  Neuro: no headache, no dizziness  PSYCH: no anxiety, no depression  Derm : no itching, no rash    ---___---___---___---___---___---  VITAL SIGNS:  88y , CAPILLARY BLOOD GLUCOSE        T(C): 37.1 (24 @ 16:52), Max: 37.1 (24 @ 16:52)  HR: 56 (24 @ 16:52) (56 - 101)  BP: 130/70 (24 @ 16:52) (97/62 - 145/76)  RR: 18 (24 @ 16:52) (18 - 19)  SpO2: 93% (24 @ 16:52) (93% - 98%)  ---___---___---___---___---___---  PHYSICAL EXAM:    GEN: A&O X 3 , NAD , comfortable  HEENT: NCAT, PERRL, MMM, hearing intact  Neck: supple , no JVD  CVS: S1S2 , regular , No M/R/G appreciated  PULM: CTA B/L,  no W/R/R appreciated  ABD.: soft. non tender, non distended,  bowel sounds present  Extrem: intact pulses , no edema   Derm: No rash , no ecchymoses  PSYCH : normal mood,  no delusion not anxious     ---___---___---___---___---___---            LAB AND IMAGIN.8    10.78 )-----------( 175      ( 23 Mar 2024 06:52 )             29.4               03-    142  |  108  |  15  ----------------------------<  76  3.7   |  21<L>  |  0.90    Ca    8.1<L>      23 Mar 2024 06:52  Phos  3.0     -  Mg     2.2     03-23                              Urinalysis Basic - ( 23 Mar 2024 06:52 )    Color: x / Appearance: x / SG: x / pH: x  Gluc: 76 mg/dL / Ketone: x  / Bili: x / Urobili: x   Blood: x / Protein: x / Nitrite: x   Leuk Esterase: x / RBC: x / WBC x   Sq Epi: x / Non Sq Epi: x / Bacteria: x        [All pertinent / recent Imaging reviewed]         ---___---___---___---___---___---___ ---___---___---___---___---                         [facility] S S E S S M E N T   A N D   P L A N :      HEALTH ISSUES - PROBLEM Dx:  Acute hypoxic respiratory failure   on home o2  Pulmonary sarcoidosis  COPD (chronic obstructive pulmonary disease)  pulmonary   CAD (coronary artery disease)    HTN (hypertension)  hold meds due to hypotension  Prophylactic measure         Major depression  continue meds  Hypotension  iv fluids    alert and non toxic but [place]. Denies dysuria or pain and exam benign  doubt UTI     very similar last admission  She does have hydronephrosis that is not different. No abscess   WBC down but urine is grossly bloody and nursing notes suggest GI bleeding    Antibiotics this admission  Ceftriaxone 3/20  Zosyn 3/20 -->    Suggest  continue zosyn           --------------------------------------------    ___________________________  Thank you,  Garry Hermosillo  [phone]

## 2024-03-24 NOTE — PROGRESS NOTE ADULT - ASSESSMENT
Assessment and Recommendation:   acute hypoxic respiratory Failure on 02   keep 02 > saturation > 92%  Fever and sepsis of urinary tract origin    Sarcoidosis   COPD continue nebulizer treatement   Paralysis of the Right hemidiaphragm   S/P blood transfusion   acute left pyelonephritis  Acute Diverticulitis    anemia of chronic disease   protein caloric malnutrition   continue Antibiotics   nebulizer treatement Duoneb q 6 hrs   DVT and GI prophylaxis   care discuss with NP on the floor

## 2024-03-24 NOTE — PROGRESS NOTE ADULT - SUBJECTIVE AND OBJECTIVE BOX
Follow Up:  diverticulitis    Interval History/ROS:  fatigued, weak    Allergies  iodinated radiocontrast agents (Anaphylaxis)    ANTIMICROBIALS:  piperacillin/tazobactam IVPB.. 3.375 every 8 hours      OTHER MEDS:  MEDICATIONS  (STANDING):  acetaminophen     Tablet .. 650 every 6 hours PRN  albuterol/ipratropium for Nebulization 3 every 6 hours  atorvastatin 20 at bedtime  EPINEPHrine    Injectable 0.3 once  FLUoxetine 20 daily  heparin   Injectable 5000 every 12 hours  influenza  Vaccine (HIGH DOSE) 0.7 once  losartan 25 daily  melatonin 3 at bedtime PRN  mirtazapine 7.5 at bedtime  ondansetron Injectable 4 every 8 hours PRN  polyethylene glycol 3350 17 two times a day  senna 1 two times a day  temazepam 30 at bedtime PRN  traZODone 50 daily      Vital Signs Last 24 Hrs  T(C): 36.7 (24 Mar 2024 13:00), Max: 37.1 (23 Mar 2024 16:20)  T(F): 98.1 (24 Mar 2024 13:00), Max: 98.7 (23 Mar 2024 16:20)  HR: 88 (24 Mar 2024 13:00) (88 - 101)  BP: 145/76 (24 Mar 2024 13:00) (97/62 - 151/97)  BP(mean): --  RR: 18 (24 Mar 2024 13:00) (18 - 19)  SpO2: 97% (24 Mar 2024 13:00) (93% - 98%)    Parameters below as of 24 Mar 2024 13:00  Patient On (Oxygen Delivery Method): nasal cannula  O2 Flow (L/min): 2      PHYSICAL EXAM:  General: pale, NAD, Non-toxic  Neurology: A&Ox3, fatigued  Respiratory: Clear to auscultation bilaterally  CV: RRR, S1S2, no murmurs, rubs or gallops  Abdominal: Soft, Non-tender to light palpation, non-distended,   Extremities: No edema,   Line Sites: Clear  Skin: No rash                          8.8    10.78 )-----------( 175      ( 23 Mar 2024 06:52 )             29.4   WBC Count: 10.78 (03-23 @ 06:52)  WBC Count: 11.18 (03-22 @ 20:53)  WBC Count: 10.63 (03-22 @ 08:57)  WBC Count: 11.17 (03-22 @ 07:34)  WBC Count: 19.95 (03-21 @ 07:06)  WBC Count: 29.35 (03-20 @ 17:33)    03-23    142  |  108  |  15  ----------------------------<  76  3.7   |  21<L>  |  0.90    Ca    8.1<L>      23 Mar 2024 06:52  Phos  3.0     03-23  Mg     2.2     03-23    Urine Microscopic-Add On (NC) (03.20.24 @ 18:25)   Bacteria: Negative /HPF  Red Blood Cell - Urine: >50 /HPF  White Blood Cell - Urine: 6 /HPF  MICROBIOLOGY:  Clean Catch Clean Catch (Midstream)  03-20-24   10,000 - 49,000 CFU/mL Escherichia coli    Culture - Urine (03.20.24 @ 18:25)   - Amoxicillin/Clavulanic Acid: S <=8/4  - Ampicillin: R >16 These ampicillin results predict results for amoxicillin  - Ampicillin/Sulbactam: I 16/8  - Aztreonam: S <=4  - Cefazolin: S 4 For uncomplicated UTI with K. pneumoniae, E. coli, or P. mirablis: JUANI <=16 is sensitive and JUANI >=32 is resistant. This also predicts results for oral agents cefaclor, cefdinir, cefpodoxime, cefprozil, cefuroxime axetil, cephalexin and locarbef for uncomplicated UTI. Note that some isolates may be susceptible to these agents while testing resistant to cefazolin.  - Cefepime: S <=2  - Cefoxitin: S <=8  - Ceftriaxone: S <=1  - Cefuroxime: S <=4  - Ciprofloxacin: S <=0.25  - Ertapenem: S <=0.5  - Gentamicin: S <=2  - Imipenem: S <=1  - Levofloxacin: S <=0.5  - Meropenem: S <=1  - Nitrofurantoin: S <=32 Should not be used to treat pyelonephritis  - Piperacillin/Tazobactam: S <=8  - Tobramycin: S <=2  - Trimethoprim/Sulfamethoxazole: S <=0.5/9.5      .Blood Blood-Peripheral  03-20-24   No growth at 72 Hours  --  --      .Blood Blood-Peripheral  03-20-24   No growth at 72 Hours  --  --    RADIOLOGY:  < from: CT Angio Abdomen and Pelvis w/ IV Cont (03.21.24 @ 00:58) >  IMPRESSION:  Distal transverse through sigmoid colon wall thickening and mild   inflammatory changes, most likely representing colitis and   diverticulitis. Patent mesenteric vessels. No pneumatosis or free air.    < end of copied text >      Emmanuel De Los Santos MD; Division of Infectious Disease; Pager: [phone]; nights and weekends: [phone]

## 2024-03-25 LAB
ANION GAP SERPL CALC-SCNC: 13 MMOL/L — SIGNIFICANT CHANGE UP (ref 5–17)
BUN SERPL-MCNC: 11 MG/DL — SIGNIFICANT CHANGE UP (ref 7–23)
CALCIUM SERPL-MCNC: 8.4 MG/DL — SIGNIFICANT CHANGE UP (ref 8.4–10.5)
CHLORIDE SERPL-SCNC: 106 MMOL/L — SIGNIFICANT CHANGE UP (ref 96–108)
CO2 SERPL-SCNC: 22 MMOL/L — SIGNIFICANT CHANGE UP (ref 22–31)
CREAT SERPL-MCNC: 0.86 MG/DL — SIGNIFICANT CHANGE UP (ref 0.5–1.3)
CULTURE RESULTS: SIGNIFICANT CHANGE UP
CULTURE RESULTS: SIGNIFICANT CHANGE UP
EGFR: 65 ML/MIN/1.73M2 — SIGNIFICANT CHANGE UP
GLUCOSE SERPL-MCNC: 80 MG/DL — SIGNIFICANT CHANGE UP (ref 70–99)
HCT VFR BLD CALC: 29.1 % — LOW (ref 34.5–45)
HGB BLD-MCNC: 8.8 G/DL — LOW (ref 11.5–15.5)
MCHC RBC-ENTMCNC: 28.7 PG — SIGNIFICANT CHANGE UP (ref 27–34)
MCHC RBC-ENTMCNC: 30.2 GM/DL — LOW (ref 32–36)
MCV RBC AUTO: 94.8 FL — SIGNIFICANT CHANGE UP (ref 80–100)
NRBC # BLD: 0 /100 WBCS — SIGNIFICANT CHANGE UP (ref 0–0)
PLATELET # BLD AUTO: 188 K/UL — SIGNIFICANT CHANGE UP (ref 150–400)
POTASSIUM SERPL-MCNC: 3.5 MMOL/L — SIGNIFICANT CHANGE UP (ref 3.5–5.3)
POTASSIUM SERPL-SCNC: 3.5 MMOL/L — SIGNIFICANT CHANGE UP (ref 3.5–5.3)
RBC # BLD: 3.07 M/UL — LOW (ref 3.8–5.2)
RBC # FLD: 15.3 % — HIGH (ref 10.3–14.5)
SODIUM SERPL-SCNC: 141 MMOL/L — SIGNIFICANT CHANGE UP (ref 135–145)
SPECIMEN SOURCE: SIGNIFICANT CHANGE UP
SPECIMEN SOURCE: SIGNIFICANT CHANGE UP
WBC # BLD: 7.56 K/UL — SIGNIFICANT CHANGE UP (ref 3.8–10.5)
WBC # FLD AUTO: 7.56 K/UL — SIGNIFICANT CHANGE UP (ref 3.8–10.5)

## 2024-03-25 PROCEDURE — 99232 SBSQ HOSP IP/OBS MODERATE 35: CPT

## 2024-03-25 RX ORDER — BUDESONIDE, MICRONIZED 100 %
0.25 POWDER (GRAM) MISCELLANEOUS
Refills: 0 | Status: DISCONTINUED | OUTPATIENT
Start: 2024-03-25 | End: 2024-04-03

## 2024-03-25 RX ORDER — BUDESONIDE, MICRONIZED 100 %
0.25 POWDER (GRAM) MISCELLANEOUS
Refills: 0 | Status: DISCONTINUED | OUTPATIENT
Start: 2024-03-25 | End: 2024-03-25

## 2024-03-25 RX ADMIN — Medication 50 MILLIGRAM(S): at 11:30

## 2024-03-25 RX ADMIN — Medication 1000 UNIT(S): at 11:30

## 2024-03-25 RX ADMIN — Medication 0.25 MILLIGRAM(S): at 17:36

## 2024-03-25 RX ADMIN — LOSARTAN POTASSIUM 25 MILLIGRAM(S): 100 TABLET, FILM COATED ORAL at 05:26

## 2024-03-25 RX ADMIN — Medication 1 DROP(S): at 17:36

## 2024-03-25 RX ADMIN — Medication 20 MILLIGRAM(S): at 11:30

## 2024-03-25 RX ADMIN — Medication 3 MILLILITER(S): at 11:35

## 2024-03-25 RX ADMIN — HEPARIN SODIUM 5000 UNIT(S): 5000 INJECTION INTRAVENOUS; SUBCUTANEOUS at 05:26

## 2024-03-25 RX ADMIN — PIPERACILLIN AND TAZOBACTAM 25 GRAM(S): 4; .5 INJECTION, POWDER, LYOPHILIZED, FOR SOLUTION INTRAVENOUS at 05:21

## 2024-03-25 RX ADMIN — Medication 650 MILLIGRAM(S): at 21:29

## 2024-03-25 RX ADMIN — Medication 3 MILLILITER(S): at 05:27

## 2024-03-25 RX ADMIN — Medication 3 MILLILITER(S): at 23:11

## 2024-03-25 RX ADMIN — PIPERACILLIN AND TAZOBACTAM 25 GRAM(S): 4; .5 INJECTION, POWDER, LYOPHILIZED, FOR SOLUTION INTRAVENOUS at 21:14

## 2024-03-25 RX ADMIN — CYCLOPENTOLATE HYDROCHLORIDE 1 DROP(S): 10 SOLUTION/ DROPS OPHTHALMIC at 05:36

## 2024-03-25 RX ADMIN — Medication 3 MILLILITER(S): at 17:36

## 2024-03-25 RX ADMIN — SENNA PLUS 1 TABLET(S): 8.6 TABLET ORAL at 05:26

## 2024-03-25 RX ADMIN — Medication 650 MILLIGRAM(S): at 21:59

## 2024-03-25 RX ADMIN — HEPARIN SODIUM 5000 UNIT(S): 5000 INJECTION INTRAVENOUS; SUBCUTANEOUS at 17:36

## 2024-03-25 RX ADMIN — MIRTAZAPINE 7.5 MILLIGRAM(S): 45 TABLET, ORALLY DISINTEGRATING ORAL at 21:14

## 2024-03-25 RX ADMIN — CYCLOPENTOLATE HYDROCHLORIDE 1 DROP(S): 10 SOLUTION/ DROPS OPHTHALMIC at 17:36

## 2024-03-25 RX ADMIN — Medication 20 MILLIEQUIVALENT(S): at 11:30

## 2024-03-25 RX ADMIN — PIPERACILLIN AND TAZOBACTAM 25 GRAM(S): 4; .5 INJECTION, POWDER, LYOPHILIZED, FOR SOLUTION INTRAVENOUS at 13:39

## 2024-03-25 RX ADMIN — Medication 3 MILLIGRAM(S): at 21:15

## 2024-03-25 RX ADMIN — Medication 325 MILLIGRAM(S): at 11:29

## 2024-03-25 RX ADMIN — ATORVASTATIN CALCIUM 20 MILLIGRAM(S): 80 TABLET, FILM COATED ORAL at 21:14

## 2024-03-25 RX ADMIN — Medication 1 DROP(S): at 05:27

## 2024-03-25 NOTE — PROGRESS NOTE ADULT - SUBJECTIVE AND OBJECTIVE BOX
---___---___---___---___---___---___ ---___---___---___---___---___---___---___---___---                  M E D I C A L   A T T E N D I N G   P R O G R E S S   N O T E  ---___---___---___---___---___---___ ---___---___---___---___---___---___---___---___---        ================================================    ++CHIEF COMPLAINT:   Patient is a 88y old  Female who presents with a chief complaint of Diverticulitis, leukocytosis (25 Mar 2024 11:38)      Diverticulitis of intestine without perforation or abscess without bleeding      ---___---___---___---___---___---  PAST MEDICAL / Surgical  HISTORY:  PAST MEDICAL & SURGICAL HISTORY:  HTN (hypertension)      Hypothyroidism      Osteoporosis      CAD (coronary artery disease)          ---___---___---___---___---___---  FAMILY HISTORY:   FAMILY HISTORY:        ---___---___---___---___---___---  ALLERGIES:   Allergies    iodinated radiocontrast agents (Anaphylaxis)    Intolerances        ---___---___---___---___---___---  MEDICATIONS:  MEDICATIONS  (STANDING):  albuterol/ipratropium for Nebulization 3 milliLiter(s) Nebulizer every 6 hours  atorvastatin 20 milliGRAM(s) Oral at bedtime  buDESOnide    Inhalation Suspension 0.25 milliGRAM(s) Inhalation two times a day  cholecalciferol 1000 Unit(s) Oral daily  cyclopentolate 1% Solution 1 Drop(s) Both EYES two times a day  EPINEPHrine    Injectable 0.3 milliGRAM(s) IntraMuscular once  ferrous    sulfate 325 milliGRAM(s) Oral daily  FLUoxetine 20 milliGRAM(s) Oral daily  heparin   Injectable 5000 Unit(s) SubCutaneous every 12 hours  influenza  Vaccine (HIGH DOSE) 0.7 milliLiter(s) IntraMuscular once  losartan 25 milliGRAM(s) Oral daily  mirtazapine 7.5 milliGRAM(s) Oral at bedtime  piperacillin/tazobactam IVPB.. 3.375 Gram(s) IV Intermittent every 8 hours  polyethylene glycol 3350 17 Gram(s) Oral two times a day  potassium chloride    Tablet ER 20 milliEquivalent(s) Oral daily  prednisoLONE acetate 1% Suspension 1 Drop(s) Both EYES two times a day  senna 1 Tablet(s) Oral two times a day  traZODone 50 milliGRAM(s) Oral daily    MEDICATIONS  (PRN):  acetaminophen     Tablet .. 650 milliGRAM(s) Oral every 6 hours PRN Temp greater or equal to 38C (100.4F), Mild Pain (1 - 3)  melatonin 3 milliGRAM(s) Oral at bedtime PRN Insomnia  ondansetron Injectable 4 milliGRAM(s) IV Push every 8 hours PRN Nausea and/or Vomiting  temazepam 30 milliGRAM(s) Oral at bedtime PRN Insomnia      ---___---___---___---___---___---  REVIEW OF SYSTEM:    GEN: no fever, no chills, no pain  RESP: no SOB, no cough, no sputum  CVS: no chest pain, no palpitations, no edema  GI: no abdominal pain, no nausea, no vomiting, no constipation, no diarrhea  : no dysurea, no frequency, no hematurea  Neuro: no headache, no dizziness  PSYCH: no anxiety, no depression  Derm : no itching, no rash    ---___---___---___---___---___---  VITAL SIGNS:  88y , CAPILLARY BLOOD GLUCOSE        T(C): 36.8 (24 @ 16:40), Max: 36.8 (24 @ 14:07)  HR: 98 (24 @ 16:40) (68 - 104)  BP: 148/70 (24 @ 16:40) (123/78 - 148/70)  RR: 19 (24 @ 16:40) (18 - 19)  SpO2: 94% (24 @ 16:40) (93% - 95%)  ---___---___---___---___---___---  PHYSICAL EXAM:    GEN: A&O X 3 , NAD , comfortable  HEENT: NCAT, PERRL, MMM, hearing intact  Neck: supple , no JVD  CVS: S1S2 , regular , No M/R/G appreciated  PULM: CTA B/L,  no W/R/R appreciated  ABD.: soft. non tender, non distended,  bowel sounds present  Extrem: intact pulses , no edema   Derm: No rash , no ecchymoses  PSYCH : normal mood,  no delusion not anxious     ---___---___---___---___---___---            LAB AND IMAGIN.8    7.56  )-----------( 188      ( 25 Mar 2024 07:39 )             29.1               -    141  |  106  |  11  ----------------------------<  80  3.5   |  22  |  0.86    Ca    8.4      25 Mar 2024 07:39                              Urinalysis Basic - ( 25 Mar 2024 07:39 )    Color: x / Appearance: x / SG: x / pH: x  Gluc: 80 mg/dL / Ketone: x  / Bili: x / Urobili: x   Blood: x / Protein: x / Nitrite: x   Leuk Esterase: x / RBC: x / WBC x   Sq Epi: x / Non Sq Epi: x / Bacteria: x        [All pertinent / recent Imaging reviewed]         ---___---___---___---___---___---___ ---___---___---___---___---                         A S S E S S M E N T   A N D   P L A N :      HEALTH ISSUES - PROBLEM Dx:  Fever with leukocytosis and leukocyte count greater than or equal to 20,000    Constipation     colitis   as per gi  #Sepsis  Pt febrile on admission. WBC 29K on admission, lactate 9.4.  +UTI (growing E coli), Bcx negative 3/20. Diverticulitis on imaging as below.  Remains on zosyn  #UTI/hematuria: Patient and daughter note that patient had gross hematuria during this admission    #Diverticulitis on CT  CT A/P angio 3/21: "Distal transverse through sigmoid colon wall thickening and mild inflammatory changes, most likely representing colitis and   diverticulitis. Patent mesenteric vessels. No pneumatosis or free air."    #Anemia  Hemoglobin 14 in 2024, now dropped to 6.4 on 3/22. Normocytic. Iron studies w/ normal ferritin, TIBC but low %iron sat  Green formed stool noted on pad     Recommendations:  - trend CBC, keep active T&S, transfuse for Hgb<8  - PPI BID x 4 weeks  - continue meds for copd     restart meds for htn     continue meds for hld                     -GI/DVT Prophylaxis.    --------------------------------------------  Case discussed with   Education given on   ___________________________  Thank you,  Garry Hermosillo  [phone]

## 2024-03-25 NOTE — PROGRESS NOTE ADULT - ASSESSMENT
Assessment and Recommendation:   acute hypoxic respiratory Failure on 02   keep 02 > saturation > 92%  Fever and sepsis of urinary tract origin    Sarcoidosis   COPD continue nebulizer treatement   Paralysis of the Right hemidiaphragm   S/P blood transfusion   acute left pyelonephritis  Acute Diverticulitis    anemia of chronic disease   protein caloric malnutrition   continue Antibiotics   nebulizer treatement Duoneb q 6 hrs   DVT and GI prophylaxis   care discuss with NP on the floor      None

## 2024-03-25 NOTE — PROGRESS NOTE ADULT - SUBJECTIVE AND OBJECTIVE BOX
infectious diseases progress note:    Patient is a 88y old  Female who presents with a chief complaint of Diverticulitis, leukocytosis (24 Mar 2024 18:05)        Diverticulitis of intestine without perforation or abscess without bleeding               Allergies    iodinated radiocontrast agents (Anaphylaxis)    Intolerances        ANTIBIOTICS/RELEVANT:  antimicrobials  piperacillin/tazobactam IVPB.. 3.375 Gram(s) IV Intermittent every 8 hours    immunologic:  influenza  Vaccine (HIGH DOSE) 0.7 milliLiter(s) IntraMuscular once    OTHER:  acetaminophen     Tablet .. 650 milliGRAM(s) Oral every 6 hours PRN  albuterol/ipratropium for Nebulization 3 milliLiter(s) Nebulizer every 6 hours  atorvastatin 20 milliGRAM(s) Oral at bedtime  cholecalciferol 1000 Unit(s) Oral daily  cyclopentolate 1% Solution 1 Drop(s) Both EYES two times a day  EPINEPHrine    Injectable 0.3 milliGRAM(s) IntraMuscular once  ferrous    sulfate 325 milliGRAM(s) Oral daily  FLUoxetine 20 milliGRAM(s) Oral daily  heparin   Injectable 5000 Unit(s) SubCutaneous every 12 hours  losartan 25 milliGRAM(s) Oral daily  melatonin 3 milliGRAM(s) Oral at bedtime PRN  mirtazapine 7.5 milliGRAM(s) Oral at bedtime  ondansetron Injectable 4 milliGRAM(s) IV Push every 8 hours PRN  polyethylene glycol 3350 17 Gram(s) Oral two times a day  potassium chloride    Tablet ER 20 milliEquivalent(s) Oral daily  prednisoLONE acetate 1% Suspension 1 Drop(s) Both EYES two times a day  senna 1 Tablet(s) Oral two times a day  temazepam 30 milliGRAM(s) Oral at bedtime PRN  traZODone 50 milliGRAM(s) Oral daily      Objective:  Vital Signs Last 24 Hrs  T(C): 36.7 (25 Mar 2024 04:58), Max: 37.1 (24 Mar 2024 16:52)  T(F): 98.1 (25 Mar 2024 04:58), Max: 98.8 (24 Mar 2024 16:52)  HR: 68 (25 Mar 2024 04:58) (56 - 104)  BP: 123/78 (25 Mar 2024 04:58) (123/78 - 145/76)  BP(mean): --  RR: 18 (25 Mar 2024 04:58) (18 - 18)  SpO2: 93% (25 Mar 2024 04:58) (93% - 97%)    Parameters below as of 25 Mar 2024 04:58  Patient On (Oxygen Delivery Method): nasal cannula  O2 Flow (L/min): 1         Eyes:JIM, EOMI  Ear/Nose/Throat: no oral lesion, no sinus tenderness on percussion	  Neck:no JVD, no lymphadenopathy, supple  Respiratory: CTA moises  Cardiovascular: S1S2 RRR, no murmurs  Gastrointestinal:soft, (+) BS, no HSM  Extremities:no e/e/c        LABS:                        8.8    7.56  )-----------( 188      ( 25 Mar 2024 07:39 )             29.1                   MICROBIOLOGY:    RECENT CULTURES:  03-20 @ 18:25 Clean Catch Clean Catch (Midstream)   JUANI      Escherichia coli  Escherichia coli     10,000 - 49,000 CFU/mL Escherichia coli    03-20 @ 17:15 .Blood Blood-Peripheral                No growth at 4 days    03-20 @ 17:10 .Blood Blood-Peripheral                No growth at 4 days          RESPIRATORY CULTURES:              RADIOLOGY & ADDITIONAL STUDIES:        Pager [phone]  After 5 pm/weekends or if no response :[phone]

## 2024-03-25 NOTE — PROGRESS NOTE ADULT - ASSESSMENT
88-yo F w/ PMH of CAD and COPD, admitted w/ CTAP suggestive of diverticulitis but no abscess. S/p ceftriaxone (2/8) and Zosyn (2/8-15) Previously w/ concerns for diarrhea, now stopped.  admitted 3/21/24    #Diverticulitis  #Abnormal CT scan   #Leukocytosis  improved  E coli bacteruria  hematuria - minimal pyuria   (this urinary isolate is susceptible to Ceftriaxone and Zosyn)           Pt seen last admission  alert and non toxic but [place]. Denies dysuria or pain and exam benign  doubt UTI     very similar last admission  She does have hydronephrosis that is not different. No abscess   WBC down but urine is grossly bloody and nursing notes suggest GI bleeding   urine culture is positive for Ecoli sensitive   no signs of peritonitis      Suggest  continue zosyn   can switch to po antibiotics to complete 2 weeks when otherwise ready for discharge

## 2024-03-25 NOTE — PROGRESS NOTE ADULT - SUBJECTIVE AND OBJECTIVE BOX
BISHNU SEGURA  MRN#: [id number]  Subjective:  pulmonary progress note: :acute hypoxic respiratory Failure on 02, sarcoidosis , COPD , Diastolic dysfunction , pulmonary HTN date of service is 3/25/2024   This is an 87 y/o female w/ PMH pulmonary sarcoidosis, COPD not on home O2, CAD, hypothyroidism, and HTN presenting for lack of BM in the last 5 days along with SOB and L flank pain from the atria. The patient does endorse some left-sided upper abdominal pain but doesn't endorse any other symptoms. She states that she doesn't have any SOB, and states that her last BM prior to the hospital was yesterday. Called the patient's daughter Deborah, who stated that she was told the patient was having severe abdominal pain as well as some vomiting.   She was admitted here from 2/8-2/21 for abdominal pain, constipation and vomiting. At the time, CT was suggestive of diverticulitis as well and her course was complicated by fluid overload requiring IVP lasix. She was treated with IV Zosyn for 7 days. During this hospital course, she was also found to have a dilated L renal pelvis w/ +UA, but was ruled not have UTI by ID. Urology was consulted, who stated that her pessary could be the cause of the hydro, and removed it on 2/13.     In the ED, she was febrile to 101.8, tachycardic to 130, hypotensive to 80/50, and hypoxic requiring 4LNC. CBC revealed leukocytosis of 29.35, normocytic anemia of 9.5. CMP w/ elevated AG of 24, low bicarb of 17, . Presenting VBG w/ lactate of 9.4. she was straight catheterized for UA, which was red and turbid w/ large LE and >50 RBC/hpf, negative for bacteria. Initially CT A/P w/o contrast was done (due to contrast allergy), which revealed a possible mild diverticulitis of the descending colon and unchanged severe L hydroureter with layering hypodensity which could be stones or blood products. She was given Ceftriaxone and Zosyn, 2.5L IVNS. Lactate down to 3.5, BP improved. She had 2 dark tarry stools in her diaper, so she was premedicated w/ solumedrol and benadryl and CTA Abdomen/pelvis was ordered.  (21 Mar 2024 01:30), patient was SOB needed high 02 supplement , CT scan acute Diverticulitis , afebrile down to nasal canula , on antibiotics , less distress no nausea or vomiting no headache S/P blood transfusion , H/H is stable no chest pain no SOB at rest , no fever no chest pain seen and examined with daughter at bed side no GI symptoms , discuss patient condition with daughter all question were answered , no over night events 02 saturation is still on the low side will continue bronchodilator and try to wean off 02 safely     PAST MEDICAL & SURGICAL HISTORY:  HTN (hypertension)      Hypothyroidism      Osteoporosis  pulmonary hypertension   H/O anxiety     CAD (coronary artery disease)  sarcoidosis   COPD           FAMILY HISTORY:  Review of Systems:  Review of Systems: Review of Systems:   CONSTITUTIONAL: + fever,+ weight loss  EYES: No eye pain, visual disturbances, or discharge  RESPIRATORY: + SOB. No cough, wheezing, chills or hemoptysis  CARDIOVASCULAR: No chest pain, palpitations, dizziness, or leg swelling  GASTROINTESTINAL: +abdominal pain. No nausea, vomiting, or hematemesis; No diarrhea or constipation. No melena or hematochezia.  GENITOURINARY: +L flank pain, hematuria; No dysuria, frequency, or incontinence  NEUROLOGICAL: No headaches, memory loss, loss of strength, numbness, or tremors  SKIN: No itching, burning, rashes, or lesions   MUSCULOSKELETAL: No joint pain or swelling; No muscle, back pain  PSYCHIATRIC: + depression,+ anxiety,+ mood swings, or difficulty ,             OBJECTIVE:  ICU Vital Signs Last 24 Hrs  T(C): 36.8 (22 Mar 2024 16:23), Max: 36.9 (22 Mar 2024 00:28)  T(F): 98.2 (22 Mar 2024 16:23), Max: 98.4 (22 Mar 2024 00:28)  HR: 102 (22 Mar 2024 16:23) (82 - 102)  BP: 145/76 (22 Mar 2024 16:23) (94/51 - 145/76)  BP(mean): --  ABP: --  ABP(mean): --  RR: 20 (22 Mar 2024 16:23) (19 - 20)  SpO2: 93% (22 Mar 2024 16:23) (93% - 96%)    O2 Parameters below as of 22 Mar 2024 16:23  Patient On (Oxygen Delivery Method): nasal cannula  O2 Flow (L/min): 2          03-21 @ 07:01  -  03-22 @ 07:00  --------------------------------------------------------  IN: 240 mL / OUT: 1850 mL / NET: -1610 mL      PHYSICAL EXAM: Daily Height in cm: 157.48 (20 Mar 2024 16:54)  Elderly agitated cachectic female on 02 saturation is 93%  Daily   HEENT:     + NCAT  + EOMI  - Conjuctival edema   - Icterus   - Thrush   - ETT  - NGT/OGT  Neck:         + FROM    + JVD     - Nodes     - Masses    + Mid-line trachea   - Tracheostomy  Chest:          pectus excavatum , increase A-P diameter   Lungs:          + CTA   + Rhonchi    - Rales    - Wheezing   + Decreased BS   - Dullness R L  Cardiac:       + S1 + S2    + RRR   - Irregular   - S3  - S4  + Murmurs   - Rub   - Hamman’s sign   Abdomen:    + BS     + Soft    + tender left lower quadrant   - Distended  - Organomegaly  - PEG  Extremities:   - Cyanosis U/L   - Clubbing  U/L  - LE/UE Edema   + Capillary refill    + Pulses   Neuro:        + Awake   +  Alert   - Confused   - Lethargic   - Sedated   - Generalized Weakness  Skin:        - Rashes    - Erythema   + Normal incisions   + IV sites intact  - Sacral decubit      HOSPITAL MEDICATIONS: All mediciations reviewed and analyzed  MEDICATIONS  (STANDING):  albuterol/ipratropium for Nebulization 3 milliLiter(s) Nebulizer every 6 hours  atorvastatin 20 milliGRAM(s) Oral at bedtime  cholecalciferol 1000 Unit(s) Oral daily  cyclopentolate 1% Solution 1 Drop(s) Both EYES two times a day  EPINEPHrine    Injectable 0.3 milliGRAM(s) IntraMuscular once  ferrous    sulfate 325 milliGRAM(s) Oral daily  FLUoxetine 20 milliGRAM(s) Oral daily  influenza  Vaccine (HIGH DOSE) 0.7 milliLiter(s) IntraMuscular once  mirtazapine 7.5 milliGRAM(s) Oral at bedtime  piperacillin/tazobactam IVPB.. 3.375 Gram(s) IV Intermittent every 8 hours  polyethylene glycol 3350 17 Gram(s) Oral two times a day  potassium chloride    Tablet ER 20 milliEquivalent(s) Oral daily  prednisoLONE acetate 1% Suspension 1 Drop(s) Both EYES two times a day  senna 1 Tablet(s) Oral two times a day  sodium chloride 0.9%. 1000 milliLiter(s) (60 mL/Hr) IV Continuous <Continuous>  traZODone 50 milliGRAM(s) Oral daily    MEDICATIONS  (PRN):  acetaminophen     Tablet .. 650 milliGRAM(s) Oral every 6 hours PRN Temp greater or equal to 38C (100.4F), Mild Pain (1 - 3)  melatonin 3 milliGRAM(s) Oral at bedtime PRN Insomnia  ondansetron Injectable 4 milliGRAM(s) IV Push every 8 hours PRN Nausea and/or Vomiting  temazepam 30 milliGRAM(s) Oral at bedtime PRN Insomnia    LABS: All Lab data reviewed and analyzed                          8.8    10.78 )-----------( 175      ( 23 Mar 2024 06:52 )             29.4   03-23    142  |  108  |  15  ----------------------------<  76  3.7   |  21<L>  |  0.90    Ca    8.1<L>      23 Mar 2024 06:52  Phos  3.0     03-23  Mg     2.2     03-23      Ca    8.2<L>      22 Mar 2024 07:34    TPro  4.6<L>  /  Alb  2.8<L>  /  TBili  0.3  /  DBili  x   /  AST  28  /  ALT  9<L>  /  AlkPhos  103  03-21     LIVER FUNCTIONS - ( 21 Mar 2024 07:05 )  Alb: 2.8 g/dL / Pro: 4.6 g/dL / ALK PHOS: 103 U/L / ALT: 9 U/L / AST: 28 U/L / GGT: x           RADIOLOGY: - Reviewed and analyzed

## 2024-03-25 NOTE — PROGRESS NOTE ADULT - SUBJECTIVE AND OBJECTIVE BOX
Date of Service: 03-25-24 @ 09:37           CARDIOLOGY     PROGRESS  NOTE   ________________________________________________    CHIEF COMPLAINT:Patient is a 88y old  Female who presents with a chief complaint of Diverticulitis, leukocytosis (25 Mar 2024 08:38)  no complain  	  REVIEW OF SYSTEMS:  CONSTITUTIONAL: No fever, weight loss, or fatigue  EYES: No eye pain, visual disturbances, or discharge  ENT:  No difficulty hearing, tinnitus, vertigo; No sinus or throat pain  NECK: No pain or stiffness  RESPIRATORY: No cough, wheezing, chills or hemoptysis; No Shortness of Breath  CARDIOVASCULAR: No chest pain, palpitations, passing out, dizziness, or leg swelling  GASTROINTESTINAL: No abdominal or epigastric pain. No nausea, vomiting, or hematemesis; No diarrhea or constipation. No melena or hematochezia.  GENITOURINARY: No dysuria, frequency, hematuria, or incontinence  NEUROLOGICAL: No headaches, memory loss, loss of strength, numbness, or tremors  SKIN: No itching, burning, rashes, or lesions   LYMPH Nodes: No enlarged glands  ENDOCRINE: No heat or cold intolerance; No hair loss  MUSCULOSKELETAL: No joint pain or swelling; No muscle, back, or extremity pain  PSYCHIATRIC: No depression, anxiety, mood swings, or difficulty sleeping  HEME/LYMPH: No easy bruising, or bleeding gums  ALLERGY AND IMMUNOLOGIC: No hives or eczema	    [x ] All others negative	  [ ] Unable to obtain    PHYSICAL EXAM:  T(C): 36.7 (03-25-24 @ 04:58), Max: 37.1 (03-24-24 @ 16:52)  HR: 68 (03-25-24 @ 04:58) (56 - 104)  BP: 123/78 (03-25-24 @ 04:58) (123/78 - 145/76)  RR: 18 (03-25-24 @ 04:58) (18 - 18)  SpO2: 93% (03-25-24 @ 04:58) (93% - 97%)  Wt(kg): --  I&O's Summary    24 Mar 2024 07:01  -  25 Mar 2024 07:00  --------------------------------------------------------  IN: 180 mL / OUT: 600 mL / NET: -420 mL        Appearance: Normal	  HEENT:   Normal oral mucosa, PERRL, EOMI	  Lymphatic: No lymphadenopathy  Cardiovascular: Normal S1 S2, No JVD, + murmurs, No edema  Respiratory: rhonchi  Psychiatry: dementia  Gastrointestinal:  Soft, Non-tender, + BS	  Skin: No rashes, No ecchymoses, No cyanosis	  Extremities: Normal range of motion, No clubbing, cyanosis or edema  Vascular: Peripheral pulses palpable 2+ bilaterally    MEDICATIONS  (STANDING):  albuterol/ipratropium for Nebulization 3 milliLiter(s) Nebulizer every 6 hours  atorvastatin 20 milliGRAM(s) Oral at bedtime  cholecalciferol 1000 Unit(s) Oral daily  cyclopentolate 1% Solution 1 Drop(s) Both EYES two times a day  EPINEPHrine    Injectable 0.3 milliGRAM(s) IntraMuscular once  ferrous    sulfate 325 milliGRAM(s) Oral daily  FLUoxetine 20 milliGRAM(s) Oral daily  heparin   Injectable 5000 Unit(s) SubCutaneous every 12 hours  influenza  Vaccine (HIGH DOSE) 0.7 milliLiter(s) IntraMuscular once  losartan 25 milliGRAM(s) Oral daily  mirtazapine 7.5 milliGRAM(s) Oral at bedtime  piperacillin/tazobactam IVPB.. 3.375 Gram(s) IV Intermittent every 8 hours  polyethylene glycol 3350 17 Gram(s) Oral two times a day  potassium chloride    Tablet ER 20 milliEquivalent(s) Oral daily  prednisoLONE acetate 1% Suspension 1 Drop(s) Both EYES two times a day  senna 1 Tablet(s) Oral two times a day  traZODone 50 milliGRAM(s) Oral daily      TELEMETRY: 	    ECG:  	  RADIOLOGY:  OTHER: 	  	  LABS:	 	    CARDIAC MARKERS:                          8.8    7.56  )-----------( 188      ( 25 Mar 2024 07:39 )             29.1     03-25    141  |  106  |  11  ----------------------------<  80  3.5   |  22  |  0.86    Ca    8.4      25 Mar 2024 07:39      proBNP:   Lipid Profile:   HgA1c:   TSH:     < from: Xray Chest 1 View- PORTABLE-Urgent (03.20.24 @ 17:32) >  FINDINGS:  Support Devices/Implanted Hardware: Left chest wall loop recorder.  Heart: Heart size cannot be accurately assessed in this projection.  Pulmonary: Elevated right hemidiaphragm. No focal consolidations. No   pneumothorax or pleural effusion.  Bones: No acute bony pathology.  Miscellaneous: Suture anchors near the left humeral head.    IMPRESSION:  No focal consolidations.    Suggest  continue zosyn   can switch to po antibiotics to complete 2 weeks when otherwise ready for discharge       Assessment and plan  ---------------------------  This is an 87 y/o female w/ PMHx pulmonary sarcoidosis, COPD not on home O2, CAD, hypothyroidism, and HTN presenting for lack of BM in the last 5 days along with SOB and L flank pain from the atria. The patient does endorse some left-sided upper abdominal pain but doesn't endorse any other symptoms. She states that she doesn't have any SOB, and states that her last BM prior to the hospital was yesterday. Called the patient's daughter Deborah, who stated that she was told the patient was having severe abdominal pain as well as some vomiting.   She was admitted here from 2/8-2/21 for abdominal pain, constipation and vomiting. At the time, CT was suggestive of diverticulitis as well and her course was complicated by fluid overload requiring IVP lasix. She was treated with IV Zosyn for 7 days. During this hospital course, she was also found to have a dilated L renal pelvis w/ +UA, but was ruled not have UTI by ID. Urology was consulted, who stated that her pessary could be the cause of the hydro, and removed it on 2/13.   In the ED, she was febrile to 101.8, tachycardic to 130, hypotensive to 80/50, and hypoxic requiring 4LNC. CBC revealed leukocytosis of 29.35, normocytic anemia of 9.5. CMP w/ elevated AG of 24, low bicarb of 17, . Presenting VBG w/ lactate of 9.4. she was straight catheterized for UA, which was red and turvid w/ large LE and >50 RBC/hpf, negative for bacteria. Initially CT A/P w/o contrast was done (due to contrast allergy), which revealed a possible mild diverticulitis of the descending colon and unchanged severe L hydroureter with layering hyperdensity  which could be stones or blood products. She was given Ceftriaxone and Zosyn, 2.5L IVNS. Lactate down to 3.5, BP improved. She had 2 dark tarry stools in her diaper, so she was premedicated w/ solumedrol and benadryl and CTA Abdomen/pelvis was ordered.  pt is well known to me with hx of sarcoidosis, cad with abdominal pain, she was recently dc sec to Diverticulitis  IN er pt with hypotension and hypoxia.  pt with sig abnormal ecg,,  with evidence of diverticulitis, continue Zosyn  start on ivf  ?tte if any evidence of chf  dvt prophylaxis  asa if no contraindication  bp has improved continue with gentle hydration, abx  replete K, check mag level  increase bp today, will  start on bp meds slowly  check QTC on Psych meds  fu hgb , GI noted/ urology  spoke to the daughter, diet has advanced, no objection to dc cardiac wise  ID noted continue IV abx , on Zosyn  bp is well controilled

## 2024-03-26 ENCOUNTER — RESULT REVIEW (OUTPATIENT)
Age: 89
End: 2024-03-26

## 2024-03-26 LAB
ANION GAP SERPL CALC-SCNC: 16 MMOL/L — SIGNIFICANT CHANGE UP (ref 5–17)
BUN SERPL-MCNC: 8 MG/DL — SIGNIFICANT CHANGE UP (ref 7–23)
CALCIUM SERPL-MCNC: 8.4 MG/DL — SIGNIFICANT CHANGE UP (ref 8.4–10.5)
CHLORIDE SERPL-SCNC: 101 MMOL/L — SIGNIFICANT CHANGE UP (ref 96–108)
CO2 SERPL-SCNC: 23 MMOL/L — SIGNIFICANT CHANGE UP (ref 22–31)
CREAT SERPL-MCNC: 0.87 MG/DL — SIGNIFICANT CHANGE UP (ref 0.5–1.3)
EGFR: 64 ML/MIN/1.73M2 — SIGNIFICANT CHANGE UP
GLUCOSE SERPL-MCNC: 141 MG/DL — HIGH (ref 70–99)
HCT VFR BLD CALC: 28.1 % — LOW (ref 34.5–45)
HCT VFR BLD CALC: 28.8 % — LOW (ref 34.5–45)
HGB BLD-MCNC: 8.7 G/DL — LOW (ref 11.5–15.5)
HGB BLD-MCNC: 9 G/DL — LOW (ref 11.5–15.5)
MCHC RBC-ENTMCNC: 28.8 PG — SIGNIFICANT CHANGE UP (ref 27–34)
MCHC RBC-ENTMCNC: 29.6 PG — SIGNIFICANT CHANGE UP (ref 27–34)
MCHC RBC-ENTMCNC: 31 GM/DL — LOW (ref 32–36)
MCHC RBC-ENTMCNC: 31.3 GM/DL — LOW (ref 32–36)
MCV RBC AUTO: 93 FL — SIGNIFICANT CHANGE UP (ref 80–100)
MCV RBC AUTO: 94.7 FL — SIGNIFICANT CHANGE UP (ref 80–100)
NRBC # BLD: 0 /100 WBCS — SIGNIFICANT CHANGE UP (ref 0–0)
NRBC # BLD: 0 /100 WBCS — SIGNIFICANT CHANGE UP (ref 0–0)
NT-PROBNP SERPL-SCNC: HIGH PG/ML (ref 0–300)
PLATELET # BLD AUTO: 171 K/UL — SIGNIFICANT CHANGE UP (ref 150–400)
PLATELET # BLD AUTO: 204 K/UL — SIGNIFICANT CHANGE UP (ref 150–400)
POTASSIUM SERPL-MCNC: 3 MMOL/L — LOW (ref 3.5–5.3)
POTASSIUM SERPL-SCNC: 3 MMOL/L — LOW (ref 3.5–5.3)
RBC # BLD: 3.02 M/UL — LOW (ref 3.8–5.2)
RBC # BLD: 3.04 M/UL — LOW (ref 3.8–5.2)
RBC # FLD: 14.6 % — HIGH (ref 10.3–14.5)
RBC # FLD: 14.8 % — HIGH (ref 10.3–14.5)
SARS-COV-2 RNA SPEC QL NAA+PROBE: SIGNIFICANT CHANGE UP
SODIUM SERPL-SCNC: 140 MMOL/L — SIGNIFICANT CHANGE UP (ref 135–145)
WBC # BLD: 7.66 K/UL — SIGNIFICANT CHANGE UP (ref 3.8–10.5)
WBC # BLD: 7.76 K/UL — SIGNIFICANT CHANGE UP (ref 3.8–10.5)
WBC # FLD AUTO: 7.66 K/UL — SIGNIFICANT CHANGE UP (ref 3.8–10.5)
WBC # FLD AUTO: 7.76 K/UL — SIGNIFICANT CHANGE UP (ref 3.8–10.5)

## 2024-03-26 PROCEDURE — 93306 TTE W/DOPPLER COMPLETE: CPT | Mod: 26

## 2024-03-26 PROCEDURE — 71045 X-RAY EXAM CHEST 1 VIEW: CPT | Mod: 26

## 2024-03-26 PROCEDURE — 99232 SBSQ HOSP IP/OBS MODERATE 35: CPT

## 2024-03-26 RX ORDER — FUROSEMIDE 40 MG
40 TABLET ORAL ONCE
Refills: 0 | Status: COMPLETED | OUTPATIENT
Start: 2024-03-26 | End: 2024-03-26

## 2024-03-26 RX ORDER — POTASSIUM CHLORIDE 20 MEQ
40 PACKET (EA) ORAL ONCE
Refills: 0 | Status: DISCONTINUED | OUTPATIENT
Start: 2024-03-26 | End: 2024-03-27

## 2024-03-26 RX ADMIN — PIPERACILLIN AND TAZOBACTAM 25 GRAM(S): 4; .5 INJECTION, POWDER, LYOPHILIZED, FOR SOLUTION INTRAVENOUS at 21:28

## 2024-03-26 RX ADMIN — HEPARIN SODIUM 5000 UNIT(S): 5000 INJECTION INTRAVENOUS; SUBCUTANEOUS at 05:43

## 2024-03-26 RX ADMIN — Medication 1 DROP(S): at 05:44

## 2024-03-26 RX ADMIN — Medication 650 MILLIGRAM(S): at 09:35

## 2024-03-26 RX ADMIN — Medication 50 MILLIGRAM(S): at 12:16

## 2024-03-26 RX ADMIN — SENNA PLUS 1 TABLET(S): 8.6 TABLET ORAL at 05:43

## 2024-03-26 RX ADMIN — Medication 40 MILLIGRAM(S): at 14:08

## 2024-03-26 RX ADMIN — ATORVASTATIN CALCIUM 20 MILLIGRAM(S): 80 TABLET, FILM COATED ORAL at 21:28

## 2024-03-26 RX ADMIN — Medication 0.25 MILLIGRAM(S): at 17:34

## 2024-03-26 RX ADMIN — Medication 1 DROP(S): at 17:35

## 2024-03-26 RX ADMIN — LOSARTAN POTASSIUM 25 MILLIGRAM(S): 100 TABLET, FILM COATED ORAL at 05:43

## 2024-03-26 RX ADMIN — MIRTAZAPINE 7.5 MILLIGRAM(S): 45 TABLET, ORALLY DISINTEGRATING ORAL at 21:28

## 2024-03-26 RX ADMIN — Medication 3 MILLILITER(S): at 05:43

## 2024-03-26 RX ADMIN — PIPERACILLIN AND TAZOBACTAM 25 GRAM(S): 4; .5 INJECTION, POWDER, LYOPHILIZED, FOR SOLUTION INTRAVENOUS at 05:43

## 2024-03-26 RX ADMIN — HEPARIN SODIUM 5000 UNIT(S): 5000 INJECTION INTRAVENOUS; SUBCUTANEOUS at 17:34

## 2024-03-26 RX ADMIN — PIPERACILLIN AND TAZOBACTAM 25 GRAM(S): 4; .5 INJECTION, POWDER, LYOPHILIZED, FOR SOLUTION INTRAVENOUS at 14:08

## 2024-03-26 RX ADMIN — Medication 20 MILLIGRAM(S): at 12:15

## 2024-03-26 RX ADMIN — POLYETHYLENE GLYCOL 3350 17 GRAM(S): 17 POWDER, FOR SOLUTION ORAL at 05:43

## 2024-03-26 RX ADMIN — Medication 3 MILLILITER(S): at 23:13

## 2024-03-26 RX ADMIN — Medication 3 MILLIGRAM(S): at 21:37

## 2024-03-26 RX ADMIN — Medication 1000 UNIT(S): at 12:17

## 2024-03-26 RX ADMIN — Medication 3 MILLILITER(S): at 17:34

## 2024-03-26 RX ADMIN — CYCLOPENTOLATE HYDROCHLORIDE 1 DROP(S): 10 SOLUTION/ DROPS OPHTHALMIC at 17:34

## 2024-03-26 RX ADMIN — CYCLOPENTOLATE HYDROCHLORIDE 1 DROP(S): 10 SOLUTION/ DROPS OPHTHALMIC at 12:17

## 2024-03-26 RX ADMIN — Medication 325 MILLIGRAM(S): at 12:15

## 2024-03-26 RX ADMIN — Medication 0.25 MILLIGRAM(S): at 05:59

## 2024-03-26 RX ADMIN — Medication 650 MILLIGRAM(S): at 19:59

## 2024-03-26 RX ADMIN — Medication 3 MILLILITER(S): at 12:14

## 2024-03-26 NOTE — PHYSICAL THERAPY INITIAL EVALUATION ADULT - GAIT DEVIATIONS NOTED, PT EVAL
knees and hips buckling/decreased estuardo/decreased stride length/decreased weight-shifting ability

## 2024-03-26 NOTE — PROGRESS NOTE ADULT - ASSESSMENT
Assessment and Recommendation:   acute hypoxic respiratory Failure on 02   keep 02 > saturation > 92%  Fever and sepsis of urinary tract origin    Sarcoidosis   COPD continue nebulizer treatement   Paralysis of the Right hemidiaphragm   S/P blood transfusion   acute left pyelonephritis  Acute Diverticulitis    anemia of chronic disease   protein caloric malnutrition   continue Antibiotics   nebulizer treatement Duoneb q 6 hrs   Flovent 0.25 BID   DVT and GI prophylaxis   care discuss with NP on the floor

## 2024-03-26 NOTE — CHART NOTE - NSCHARTNOTEFT_GEN_A_CORE
Notified my primary RN that pt c/o worsening shortness of breath; however VSS but supplemental O2 was increased to 2-3L. Patient seen and assessed at bedside. Reports breathing is "ok," however pt's mental status waxes and wanes and poor historian.     Vital Signs Last 24 Hrs  T(C): 36.6 (26 Mar 2024 11:48), Max: 37.1 (25 Mar 2024 20:48)  T(F): 97.9 (26 Mar 2024 11:48), Max: 98.7 (25 Mar 2024 20:48)  HR: 105 (26 Mar 2024 11:48) (83 - 105)  BP: 116/72 (26 Mar 2024 11:48) (105/69 - 148/70)  BP(mean): --  RR: 18 (26 Mar 2024 10:16) (18 - 19)  SpO2: 97% (26 Mar 2024 11:48) (94% - 100%)    LIMITED PHYSICAL EXAM:  General: awake, alert, NAD  Heart: RRR, normal S1/S2, no LE edema b/l  Lungs: nonlabored breathing, poor inspiratory effort, bibasilar rales    A/P:  89 y/o Female w/ PMHx of HTN, CAD, COPD (no home O2), pulmonary sarcoidosis, hypothyroidism presented from [facility] w/ SOB and L flank pain; admitted for sepsis 2/2 colitis on Zosyn (until 3/28). Course c/b AHRF d/t COPD exacerbation vs. ?new CHF. Pulm and cards following. Now w/ worsening SOB, requiring increase of supplemental O2  - STAT CXR wet read w/ new pulmonary congestion and new small right pleural effusion  - STAT Lasix 40mg IV x1 given  - TTE ordered to assess for ?new CHF  - Continue w/ supplemental oxygen and wean PRN    MARITZA Aviles-C  Medicine  [id number]/teams

## 2024-03-26 NOTE — PROGRESS NOTE ADULT - SUBJECTIVE AND OBJECTIVE BOX
infectious diseases progress note:    Patient is a 88y old  Female who presents with a chief complaint of Diverticulitis, leukocytosis (26 Mar 2024 08:03)        Diverticulitis of intestine without perforation or abscess without bleeding             Allergies    iodinated radiocontrast agents (Anaphylaxis)    Intolerances        ANTIBIOTICS/RELEVANT:  antimicrobials  piperacillin/tazobactam IVPB.. 3.375 Gram(s) IV Intermittent every 8 hours    immunologic:  influenza  Vaccine (HIGH DOSE) 0.7 milliLiter(s) IntraMuscular once    OTHER:  acetaminophen     Tablet .. 650 milliGRAM(s) Oral every 6 hours PRN  albuterol/ipratropium for Nebulization 3 milliLiter(s) Nebulizer every 6 hours  atorvastatin 20 milliGRAM(s) Oral at bedtime  buDESOnide    Inhalation Suspension 0.25 milliGRAM(s) Inhalation two times a day  cholecalciferol 1000 Unit(s) Oral daily  cyclopentolate 1% Solution 1 Drop(s) Both EYES two times a day  EPINEPHrine    Injectable 0.3 milliGRAM(s) IntraMuscular once  ferrous    sulfate 325 milliGRAM(s) Oral daily  FLUoxetine 20 milliGRAM(s) Oral daily  heparin   Injectable 5000 Unit(s) SubCutaneous every 12 hours  losartan 25 milliGRAM(s) Oral daily  melatonin 3 milliGRAM(s) Oral at bedtime PRN  mirtazapine 7.5 milliGRAM(s) Oral at bedtime  ondansetron Injectable 4 milliGRAM(s) IV Push every 8 hours PRN  polyethylene glycol 3350 17 Gram(s) Oral two times a day  prednisoLONE acetate 1% Suspension 1 Drop(s) Both EYES two times a day  senna 1 Tablet(s) Oral two times a day  temazepam 30 milliGRAM(s) Oral at bedtime PRN  traZODone 50 milliGRAM(s) Oral daily      Objective:  Vital Signs Last 24 Hrs  T(C): 36.7 (26 Mar 2024 05:27), Max: 37.1 (25 Mar 2024 20:48)  T(F): 98 (26 Mar 2024 05:27), Max: 98.7 (25 Mar 2024 20:48)  HR: 83 (26 Mar 2024 05:27) (83 - 105)  BP: 105/69 (26 Mar 2024 05:27) (105/69 - 148/70)  BP(mean): --  RR: 18 (26 Mar 2024 05:27) (18 - 19)  SpO2: 98% (26 Mar 2024 05:27) (94% - 98%)    Parameters below as of 26 Mar 2024 05:27  Patient On (Oxygen Delivery Method): nasal cannula  O2 Flow (L/min): 2         Eyes:JIM, EOMI  Ear/Nose/Throat: no oral lesion, no sinus tenderness on percussion	  Neck:no JVD, no lymphadenopathy, supple  Respiratory: CTA moises  Cardiovascular: S1S2 RRR, no murmurs  Gastrointestinal:soft, (+) BS, no HSM  Extremities:no e/e/c        LABS:                        9.0    7.76  )-----------( 171      ( 26 Mar 2024 07:30 )             28.8     03-25    141  |  106  |  11  ----------------------------<  80  3.5   |  22  |  0.86    Ca    8.4      25 Mar 2024 07:39        Urinalysis Basic - ( 25 Mar 2024 07:39 )    Color: x / Appearance: x / SG: x / pH: x  Gluc: 80 mg/dL / Ketone: x  / Bili: x / Urobili: x   Blood: x / Protein: x / Nitrite: x   Leuk Esterase: x / RBC: x / WBC x   Sq Epi: x / Non Sq Epi: x / Bacteria: x          MICROBIOLOGY:    RECENT CULTURES:  03-20 @ 18:25 Clean Catch Clean Catch (Midstream)   JUANI      Escherichia coli  Escherichia coli     10,000 - 49,000 CFU/mL Escherichia coli    03-20 @ 17:15 .Blood Blood-Peripheral                No growth at 5 days    03-20 @ 17:10 .Blood Blood-Peripheral                No growth at 5 days          RESPIRATORY CULTURES:              RADIOLOGY & ADDITIONAL STUDIES:        Pager [phone]  After 5 pm/weekends or if no response :[phone]

## 2024-03-26 NOTE — OCCUPATIONAL THERAPY INITIAL EVALUATION ADULT - DIAGNOSIS, OT EVAL
Pt presents with lack of BM in the last 5 days along with SOB and L flank pain impacting functional mobility and ADLs

## 2024-03-26 NOTE — PHYSICAL THERAPY INITIAL EVALUATION ADULT - NSPTDISCHREC_GEN_A_CORE
Sub-acute Rehab If pt goes home, recommend home PT and assist for functional activities. Pt owns all necessary DME./Sub-acute Rehab

## 2024-03-26 NOTE — PROGRESS NOTE ADULT - SUBJECTIVE AND OBJECTIVE BOX
BISHNU SEGURA  MRN#: [id number]  Subjective:  pulmonary progress note: :acute hypoxic respiratory Failure on 02, sarcoidosis , COPD , Diastolic dysfunction , pulmonary HTN date of service is 3/26/2024   This is an 89 y/o female w/ PMH pulmonary sarcoidosis, COPD not on home O2, CAD, hypothyroidism, and HTN presenting for lack of BM in the last 5 days along with SOB and L flank pain from the atria. The patient does endorse some left-sided upper abdominal pain but doesn't endorse any other symptoms. She states that she doesn't have any SOB, and states that her last BM prior to the hospital was yesterday. Called the patient's daughter Deborah, who stated that she was told the patient was having severe abdominal pain as well as some vomiting.   She was admitted here from 2/8-2/21 for abdominal pain, constipation and vomiting. At the time, CT was suggestive of diverticulitis as well and her course was complicated by fluid overload requiring IVP lasix. She was treated with IV Zosyn for 7 days. During this hospital course, she was also found to have a dilated L renal pelvis w/ +UA, but was ruled not have UTI by ID. Urology was consulted, who stated that her pessary could be the cause of the hydro, and removed it on 2/13.     In the ED, she was febrile to 101.8, tachycardic to 130, hypotensive to 80/50, and hypoxic requiring 4LNC. CBC revealed leukocytosis of 29.35, normocytic anemia of 9.5. CMP w/ elevated AG of 24, low bicarb of 17, . Presenting VBG w/ lactate of 9.4. she was straight catheterized for UA, which was red and turbid w/ large LE and >50 RBC/hpf, negative for bacteria. Initially CT A/P w/o contrast was done (due to contrast allergy), which revealed a possible mild diverticulitis of the descending colon and unchanged severe L hydroureter with layering hypodensity which could be stones or blood products. She was given Ceftriaxone and Zosyn, 2.5L IVNS. Lactate down to 3.5, BP improved. She had 2 dark tarry stools in her diaper, so she was premedicated w/ solumedrol and benadryl and CTA Abdomen/pelvis was ordered.  (21 Mar 2024 01:30), patient was SOB needed high 02 supplement , CT scan acute Diverticulitis , afebrile down to nasal canula , on antibiotics , less distress no nausea or vomiting no headache S/P blood transfusion , H/H is stable no chest pain no SOB at rest , no fever no chest pain seen and examined with daughter at bed side no GI symptoms , discuss patient condition with daughter all question were answered , no over night events 02 saturation is still on the low side will continue bronchodilator and try to wean off 02 safely , no over night events adding Flovent nebulizer 0.25 mg BID     PAST MEDICAL & SURGICAL HISTORY:  HTN (hypertension)      Hypothyroidism      Osteoporosis  pulmonary hypertension   H/O anxiety     CAD (coronary artery disease)  sarcoidosis   COPD           FAMILY HISTORY:  Review of Systems:  Review of Systems: Review of Systems:   CONSTITUTIONAL: + fever,+ weight loss  EYES: No eye pain, visual disturbances, or discharge  RESPIRATORY: + SOB. No cough, wheezing, chills or hemoptysis  CARDIOVASCULAR: No chest pain, palpitations, dizziness, or leg swelling  GASTROINTESTINAL: +abdominal pain. No nausea, vomiting, or hematemesis; No diarrhea or constipation. No melena or hematochezia.  GENITOURINARY: +L flank pain, hematuria; No dysuria, frequency, or incontinence  NEUROLOGICAL: No headaches, memory loss, loss of strength, numbness, or tremors  SKIN: No itching, burning, rashes, or lesions   MUSCULOSKELETAL: No joint pain or swelling; No muscle, back pain  PSYCHIATRIC: + depression,+ anxiety,+ mood swings, or difficulty ,             OBJECTIVE:  ICU Vital Signs Last 24 Hrs  T(C): 36.8 (22 Mar 2024 16:23), Max: 36.9 (22 Mar 2024 00:28)  T(F): 98.2 (22 Mar 2024 16:23), Max: 98.4 (22 Mar 2024 00:28)  HR: 102 (22 Mar 2024 16:23) (82 - 102)  BP: 145/76 (22 Mar 2024 16:23) (94/51 - 145/76)  BP(mean): --  ABP: --  ABP(mean): --  RR: 20 (22 Mar 2024 16:23) (19 - 20)  SpO2: 93% (22 Mar 2024 16:23) (93% - 96%)    O2 Parameters below as of 22 Mar 2024 16:23  Patient On (Oxygen Delivery Method): nasal cannula  O2 Flow (L/min): 2          03-21 @ 07:01  -  03-22 @ 07:00  --------------------------------------------------------  IN: 240 mL / OUT: 1850 mL / NET: -1610 mL      PHYSICAL EXAM: Daily Height in cm: 157.48 (20 Mar 2024 16:54)  Elderly agitated cachectic female on 02 saturation is 93%  Daily   HEENT:     + NCAT  + EOMI  - Conjuctival edema   - Icterus   - Thrush   - ETT  - NGT/OGT  Neck:         + FROM    + JVD     - Nodes     - Masses    + Mid-line trachea   - Tracheostomy  Chest:          pectus excavatum , increase A-P diameter   Lungs:          + CTA   + Rhonchi    - Rales    - Wheezing   + Decreased BS   - Dullness R L  Cardiac:       + S1 + S2    + RRR   - Irregular   - S3  - S4  + Murmurs   - Rub   - Hamman’s sign   Abdomen:    + BS     + Soft    + tender left lower quadrant   - Distended  - Organomegaly  - PEG  Extremities:   - Cyanosis U/L   - Clubbing  U/L  - LE/UE Edema   + Capillary refill    + Pulses   Neuro:        + Awake   +  Alert   - Confused   - Lethargic   - Sedated   - Generalized Weakness  Skin:        - Rashes    - Erythema   + Normal incisions   + IV sites intact  - Sacral decubit      HOSPITAL MEDICATIONS: All mediciations reviewed and analyzed  MEDICATIONS  (STANDING):  albuterol/ipratropium for Nebulization 3 milliLiter(s) Nebulizer every 6 hours  atorvastatin 20 milliGRAM(s) Oral at bedtime  cholecalciferol 1000 Unit(s) Oral daily  cyclopentolate 1% Solution 1 Drop(s) Both EYES two times a day  EPINEPHrine    Injectable 0.3 milliGRAM(s) IntraMuscular once  ferrous    sulfate 325 milliGRAM(s) Oral daily  FLUoxetine 20 milliGRAM(s) Oral daily  influenza  Vaccine (HIGH DOSE) 0.7 milliLiter(s) IntraMuscular once  mirtazapine 7.5 milliGRAM(s) Oral at bedtime  piperacillin/tazobactam IVPB.. 3.375 Gram(s) IV Intermittent every 8 hours  polyethylene glycol 3350 17 Gram(s) Oral two times a day  potassium chloride    Tablet ER 20 milliEquivalent(s) Oral daily  prednisoLONE acetate 1% Suspension 1 Drop(s) Both EYES two times a day  senna 1 Tablet(s) Oral two times a day  sodium chloride 0.9%. 1000 milliLiter(s) (60 mL/Hr) IV Continuous <Continuous>  traZODone 50 milliGRAM(s) Oral daily    MEDICATIONS  (PRN):  acetaminophen     Tablet .. 650 milliGRAM(s) Oral every 6 hours PRN Temp greater or equal to 38C (100.4F), Mild Pain (1 - 3)  melatonin 3 milliGRAM(s) Oral at bedtime PRN Insomnia  ondansetron Injectable 4 milliGRAM(s) IV Push every 8 hours PRN Nausea and/or Vomiting  temazepam 30 milliGRAM(s) Oral at bedtime PRN Insomnia    LABS: All Lab data reviewed and analyzed                          8.8    10.78 )-----------( 175      ( 23 Mar 2024 06:52 )             29.4   03-23    142  |  108  |  15  ----------------------------<  76  3.7   |  21<L>  |  0.90    Ca    8.1<L>      23 Mar 2024 06:52  Phos  3.0     03-23  Mg     2.2     03-23      Ca    8.2<L>      22 Mar 2024 07:34    TPro  4.6<L>  /  Alb  2.8<L>  /  TBili  0.3  /  DBili  x   /  AST  28  /  ALT  9<L>  /  AlkPhos  103  03-21     LIVER FUNCTIONS - ( 21 Mar 2024 07:05 )  Alb: 2.8 g/dL / Pro: 4.6 g/dL / ALK PHOS: 103 U/L / ALT: 9 U/L / AST: 28 U/L / GGT: x           RADIOLOGY: - Reviewed and analyzed

## 2024-03-26 NOTE — PROGRESS NOTE ADULT - SUBJECTIVE AND OBJECTIVE BOX
Date of Service: 03-26-24 @ 08:04           CARDIOLOGY     PROGRESS  NOTE   ________________________________________________    CHIEF COMPLAINT:Patient is a 88y old  Female who presents with a chief complaint of Diverticulitis, leukocytosis (25 Mar 2024 19:31)  no complain  	  REVIEW OF SYSTEMS:  CONSTITUTIONAL: No fever, weight loss, or fatigue  EYES: No eye pain, visual disturbances, or discharge  ENT:  No difficulty hearing, tinnitus, vertigo; No sinus or throat pain  NECK: No pain or stiffness  RESPIRATORY: No cough, wheezing, chills or hemoptysis; No Shortness of Breath  CARDIOVASCULAR: No chest pain, palpitations, passing out, dizziness, or leg swelling  GASTROINTESTINAL: No abdominal or epigastric pain. No nausea, vomiting, or hematemesis; No diarrhea or constipation. No melena or hematochezia.  GENITOURINARY: No dysuria, frequency, hematuria, or incontinence  NEUROLOGICAL: No headaches, memory loss, loss of strength, numbness, or tremors  SKIN: No itching, burning, rashes, or lesions   LYMPH Nodes: No enlarged glands  ENDOCRINE: No heat or cold intolerance; No hair loss  MUSCULOSKELETAL: No joint pain or swelling; No muscle, back, or extremity pain  PSYCHIATRIC: No depression, anxiety, mood swings, or difficulty sleeping  HEME/LYMPH: No easy bruising, or bleeding gums  ALLERGY AND IMMUNOLOGIC: No hives or eczema	    [x ] All others negative	  [ ] Unable to obtain    PHYSICAL EXAM:  T(C): 36.7 (03-26-24 @ 05:27), Max: 37.1 (03-25-24 @ 20:48)  HR: 83 (03-26-24 @ 05:27) (83 - 105)  BP: 105/69 (03-26-24 @ 05:27) (105/69 - 148/70)  RR: 18 (03-26-24 @ 05:27) (18 - 19)  SpO2: 98% (03-26-24 @ 05:27) (94% - 98%)  Wt(kg): --  I&O's Summary    25 Mar 2024 07:01  -  26 Mar 2024 07:00  --------------------------------------------------------  IN: 840 mL / OUT: 400 mL / NET: 440 mL        Appearance: Normal	  HEENT:   Normal oral mucosa, PERRL, EOMI	  Lymphatic: No lymphadenopathy  Cardiovascular: Normal S1 S2, No JVD, + murmurs, No edema  Respiratory: rhonchi  Psychiatry: dementia  Gastrointestinal:  Soft, Non-tender, + BS	  Skin: No rashes, No ecchymoses, No cyanosis	  Neurologic: Non-focal  Extremities: Normal range of motion, No clubbing, cyanosis or edema  Vascular: Peripheral pulses palpable 2+ bilaterally    MEDICATIONS  (STANDING):  albuterol/ipratropium for Nebulization 3 milliLiter(s) Nebulizer every 6 hours  atorvastatin 20 milliGRAM(s) Oral at bedtime  buDESOnide    Inhalation Suspension 0.25 milliGRAM(s) Inhalation two times a day  cholecalciferol 1000 Unit(s) Oral daily  cyclopentolate 1% Solution 1 Drop(s) Both EYES two times a day  EPINEPHrine    Injectable 0.3 milliGRAM(s) IntraMuscular once  ferrous    sulfate 325 milliGRAM(s) Oral daily  FLUoxetine 20 milliGRAM(s) Oral daily  heparin   Injectable 5000 Unit(s) SubCutaneous every 12 hours  influenza  Vaccine (HIGH DOSE) 0.7 milliLiter(s) IntraMuscular once  losartan 25 milliGRAM(s) Oral daily  mirtazapine 7.5 milliGRAM(s) Oral at bedtime  piperacillin/tazobactam IVPB.. 3.375 Gram(s) IV Intermittent every 8 hours  polyethylene glycol 3350 17 Gram(s) Oral two times a day  potassium chloride    Tablet ER 20 milliEquivalent(s) Oral daily  prednisoLONE acetate 1% Suspension 1 Drop(s) Both EYES two times a day  senna 1 Tablet(s) Oral two times a day  traZODone 50 milliGRAM(s) Oral daily      TELEMETRY: 	    ECG:  	  RADIOLOGY:  OTHER: 	  	  LABS:	 	    CARDIAC MARKERS:                                8.8    7.56  )-----------( 188      ( 25 Mar 2024 07:39 )             29.1     03-25    141  |  106  |  11  ----------------------------<  80  3.5   |  22  |  0.86    Ca    8.4      25 Mar 2024 07:39      proBNP:   Lipid Profile:   HgA1c:   TSH:         Assessment and plan  ---------------------------  This is an 87 y/o female w/ PMHx pulmonary sarcoidosis, COPD not on home O2, CAD, hypothyroidism, and HTN presenting for lack of BM in the last 5 days along with SOB and L flank pain from the atria. The patient does endorse some left-sided upper abdominal pain but doesn't endorse any other symptoms. She states that she doesn't have any SOB, and states that her last BM prior to the hospital was yesterday. Called the patient's daughter Deborah, who stated that she was told the patient was having severe abdominal pain as well as some vomiting.   She was admitted here from 2/8-2/21 for abdominal pain, constipation and vomiting. At the time, CT was suggestive of diverticulitis as well and her course was complicated by fluid overload requiring IVP lasix. She was treated with IV Zosyn for 7 days. During this hospital course, she was also found to have a dilated L renal pelvis w/ +UA, but was ruled not have UTI by ID. Urology was consulted, who stated that her pessary could be the cause of the hydro, and removed it on 2/13.   In the ED, she was febrile to 101.8, tachycardic to 130, hypotensive to 80/50, and hypoxic requiring 4LNC. CBC revealed leukocytosis of 29.35, normocytic anemia of 9.5. CMP w/ elevated AG of 24, low bicarb of 17, . Presenting VBG w/ lactate of 9.4. she was straight catheterized for UA, which was red and turvid w/ large LE and >50 RBC/hpf, negative for bacteria. Initially CT A/P w/o contrast was done (due to contrast allergy), which revealed a possible mild diverticulitis of the descending colon and unchanged severe L hydroureter with layering hyperdensity  which could be stones or blood products. She was given Ceftriaxone and Zosyn, 2.5L IVNS. Lactate down to 3.5, BP improved. She had 2 dark tarry stools in her diaper, so she was premedicated w/ solumedrol and benadryl and CTA Abdomen/pelvis was ordered.  pt is well known to me with hx of sarcoidosis, cad with abdominal pain, she was recently dc sec to Diverticulitis  IN er pt with hypotension and hypoxia.  pt with sig abnormal ecg,,  with evidence of diverticulitis, continue Zosyn  start on ivf  ?tte if any evidence of chf  dvt prophylaxis  asa if no contraindication  bp has improved continue with gentle hydration, abx  replete K, check mag level  increase bp today, will  start on bp meds slowly  check QTC on Psych meds  fu hgb , GI noted/ urology  spoke to the daughter, diet has advanced, no objection to dc cardiac wise  ID noted continue IV abx , on Zosyn  bp is well controlled  oob to chair with assistant, dc potassium daily, on no diuretics

## 2024-03-26 NOTE — OCCUPATIONAL THERAPY INITIAL EVALUATION ADULT - PERTINENT HX OF CURRENT PROBLEM, REHAB EVAL
· BP soft but stable  · Lasix and spironolactone held   · Continue Coreg This is an 87 y/o female w/ PMHx pulmonary sarcoidosis, COPD not on home O2, CAD, hypothyroidism, and HTN presenting for lack of BM in the last 5 days along with SOB and L flank pain from the atria. The patient does endorse some left-sided upper abdominal pain but doesn't endorse any other symptoms. She states that she doesn't have any SOB, and states that her last BM prior to the hospital was yesterday. Called the patient's daughter Deborah, who stated that she was told the patient was having severe abdominal pain as well as some vomiting.   She was admitted here from 2/8-2/21 for abdominal pain, constipation and vomiting. At the time, CT was suggestive of diverticulitis as well and her course was complicated by fluid overload requiring IVP lasix. She was treated with IV Zosyn for 7 days. During this hospital course, she was also found to have a dilated L renal pelvis w/ +UA, but was ruled not have UTI by ID. Urology was consulted, who stated that her pessary could be the cause of the hydro, and removed it on 2/13. In the ED, she was febrile to 101.8, tachycardic to 130, hypotensive to 80/50, and hypoxic requiring 4LNC. CBC revealed leukocytosis of 29.35, normocytic anemia of 9.5. CMP w/ elevated AG of 24, low bicarb of 17, . Presenting VBG w/ lactate of 9.4. she was straight catheterized for UA, which was red and turvid w/ large LE and >50 RBC/hpf, negative for bacteria. Initially CT A/P w/o contrast was done (due to contrast allergy), which revealed a possible mild diverticulitis of the descending colon and unchanged severe L hydroureter with layering hyperdensity which could be stones or blood products. She was given Ceftriaxone and Zosyn, 2.5L IVNS. Lactate down to 3.5, BP improved. She had 2 dark tarry stools in her diaper, so she was premedicated w/ solumedrol and benadryl and CTA Abdomen/pelvis was ordered. CT ABDOMEN/PELVIS: Distal transverse through sigmoid colon wall thickening and mild inflammatory changes, most likely representing colitis and diverticulitis. Patent mesenteric vessels. No pneumatosis or free air.Possible mild diverticulitis of the descending colon.Unchanged severe left hydroureter. CT CHEST: No pulmonary edema. US KIDNEY: 1.No renal calculus or hydronephrosis on the right.2. Persistent mild left sided hydronephrosis with marked dilatation of the renal pelvis and dilated and tortuous visualized proximal left ureter, essentially unchanged compared to prior exams. XRAY CHEST: No focal consolidations. XRAY ABDOMEN: Nonobstructive bowel gas pattern.

## 2024-03-26 NOTE — PHYSICAL THERAPY INITIAL EVALUATION ADULT - ADDITIONAL COMMENTS
As per last PT eval on 2/9, pt lives in [facility] facility, uses rolling walker for ambulation. Has [facility] 9 hrs/day, 7 days/week. Owns wheelchair, shower chair commode, has grab bars in the bathroom. As per last PT note 2/19, pt ambulated 8 steps forward and back using rollator walker, with min/mod assist. Subacute rehab was recommended. As per last PT eval on 2/9, pt lives in [facility] facility, uses rolling walker for ambulation. Has HHA 9 hrs/day, 7 days/week. Owns wheelchair, shower chair commode, has grab bars in the bathroom. As per last PT note 2/19, pt ambulated 8 steps forward and back using rollator walker, with min/mod assist. Subacute rehab was recommended, however pt returned to A/[facility] facility As per last PT eval on 2/9, pt lives in [facility] facility, uses rolling walker for ambulation. Has [facility]HA 9 hrs/day, 7 days/week. Owns wheelchair, shower chair commode, has grab bars in the bathroom. As per last PT note 2/19, pt ambulated 8 steps forward and back using rollator walker, with min/mod assist. Subacute rehab was recommended, however pt returned to A/[facility] facility, was receiving home PT.

## 2024-03-26 NOTE — OCCUPATIONAL THERAPY INITIAL EVALUATION ADULT - NSOTDISCHREC_GEN_A_CORE
If pt to go back to [facility], pt will need assist with all transfers, functional mobility and ADLs 2/2 decreased balance, strength and endurance. Pt owns all needed DME (w/c , RW, shower chair)/Sub-acute Rehab

## 2024-03-26 NOTE — OCCUPATIONAL THERAPY INITIAL EVALUATION ADULT - LIVES WITH, PROFILE
PTA pt was a resident of a [facility] where she reports having an aide for 7 days a week , 9hrs + PT. Reports aide assisted as needed with all ADLs, and was SBA for dressing , bathing and amb with rollator. Pt reports owning a shower and w/c which she used for long distance.

## 2024-03-26 NOTE — PROGRESS NOTE ADULT - ASSESSMENT
88-yo F w/ PMH of CAD and COPD, admitted w/ CTAP suggestive of diverticulitis but no abscess. S/p ceftriaxone (2/8) and Zosyn (2/8-15) Previously w/ concerns for diarrhea, now stopped.  admitted 3/21/24    #Diverticulitis  #Abnormal CT scan   #Leukocytosis  improved  E coli bacteruria  hematuria - minimal pyuria   (this urinary isolate is susceptible to Ceftriaxone and Zosyn)           Pt seen last admission  alert and non toxic but [place]. Denies dysuria or pain and exam benign  doubt UTI     very similar last admission  She does have hydronephrosis that is not different. No abscess   WBC down but urine is grossly bloody and nursing notes suggest GI bleeding   urine culture is positive for Ecoli sensitive   no signs of peritonitis      Suggest  continue zosyn   can switch to po antibiotics to complete 2 weeks when otherwise ready for discharge   the hematuria has cleared

## 2024-03-26 NOTE — PHYSICAL THERAPY INITIAL EVALUATION ADULT - PERTINENT HX OF CURRENT PROBLEM, REHAB EVAL
88 y/oF admitted 3/21 presenting from the [facility] for lack of BM in the last 5 days, along with SOB and L flank pain. As per H&P, endorses some L upper abdominal pain. As per daughter, reports that A/[facility] facility reported that pt was having severe abdominal pain as well as some vomiting. She was admitted here from 2/8-2/21 for abdominal pain, constipation and vomiting. At the time, CT was suggestive of diverticulitis as well and her course was complicated by fluid overload requiring IVP lasix. She was treated with IV Zosyn for 7 days. During this hospital course, she was also found to have a dilated L renal pelvis w/ +UA, but was ruled not have UTI by ID. Urology was consulted, who stated that her pessary could be the cause of the hydro, and removed it on 2/13. In the ED, she was febrile to 101.8, tachycardic to 130, hypotensive to 80/50, and hypoxic requiring 4LNC. CBC revealed leukocytosis of 29.35, normocytic anemia of 9.5. CMP w/ elevated AG of 24, low bicarb of 17, . Presenting VBG w/ lactate of 9.4. she was straight catheterized for UA, which was red and turvid w/ large LE and >50 RBC/hpf, negative for bacteria. Initially CT A/P w/o contrast was done (due to contrast allergy), which revealed a possible mild diverticulitis of the descending colon and unchanged severe L hydroureter with layering hyperdensity which could be stones or blood products. She was given Ceftriaxone and Zosyn, 2.5L IVNS. Lactate down to 3.5, BP improved. She had 2 dark tarry stools in her diaper, so she was premedicated w/ solumedrol and benadryl and CTA Abdomen/pelvis was ordered.

## 2024-03-26 NOTE — PROGRESS NOTE ADULT - SUBJECTIVE AND OBJECTIVE BOX
---___---___---___---___---___---___ ---___---___---___---___---___---___---___---___---                  M E D I C A L   A T T E N D I N G   P R O G R E S S   N O T E  ---___---___---___---___---___---___ ---___---___---___---___---___---___---___---___---        ================================================    ++CHIEF COMPLAINT:   Patient is a 88y old  Female who presents with a chief complaint of Diverticulitis, leukocytosis (26 Mar 2024 08:33)      Diverticulitis of intestine without perforation or abscess without bleeding      ---___---___---___---___---___---  PAST MEDICAL / Surgical  HISTORY:  PAST MEDICAL & SURGICAL HISTORY:  HTN (hypertension)      Hypothyroidism      Osteoporosis      CAD (coronary artery disease)          ---___---___---___---___---___---  FAMILY HISTORY:   FAMILY HISTORY:        ---___---___---___---___---___---  ALLERGIES:   Allergies    iodinated radiocontrast agents (Anaphylaxis)    Intolerances        ---___---___---___---___---___---  MEDICATIONS:  MEDICATIONS  (STANDING):  albuterol/ipratropium for Nebulization 3 milliLiter(s) Nebulizer every 6 hours  atorvastatin 20 milliGRAM(s) Oral at bedtime  buDESOnide    Inhalation Suspension 0.25 milliGRAM(s) Inhalation two times a day  cholecalciferol 1000 Unit(s) Oral daily  cyclopentolate 1% Solution 1 Drop(s) Both EYES two times a day  EPINEPHrine    Injectable 0.3 milliGRAM(s) IntraMuscular once  ferrous    sulfate 325 milliGRAM(s) Oral daily  FLUoxetine 20 milliGRAM(s) Oral daily  heparin   Injectable 5000 Unit(s) SubCutaneous every 12 hours  influenza  Vaccine (HIGH DOSE) 0.7 milliLiter(s) IntraMuscular once  losartan 25 milliGRAM(s) Oral daily  mirtazapine 7.5 milliGRAM(s) Oral at bedtime  piperacillin/tazobactam IVPB.. 3.375 Gram(s) IV Intermittent every 8 hours  polyethylene glycol 3350 17 Gram(s) Oral two times a day  prednisoLONE acetate 1% Suspension 1 Drop(s) Both EYES two times a day  senna 1 Tablet(s) Oral two times a day  traZODone 50 milliGRAM(s) Oral daily    MEDICATIONS  (PRN):  acetaminophen     Tablet .. 650 milliGRAM(s) Oral every 6 hours PRN Temp greater or equal to 38C (100.4F), Mild Pain (1 - 3)  melatonin 3 milliGRAM(s) Oral at bedtime PRN Insomnia  ondansetron Injectable 4 milliGRAM(s) IV Push every 8 hours PRN Nausea and/or Vomiting  temazepam 30 milliGRAM(s) Oral at bedtime PRN Insomnia      ---___---___---___---___---___---  REVIEW OF SYSTEM:    GEN: no fever, no chills, no pain  RESP: no SOB, no cough, no sputum  CVS: no chest pain, no palpitations, no edema  GI: no abdominal pain, no nausea, no vomiting, no constipation, no diarrhea  : no dysurea, no frequency, no hematurea  Neuro: no headache, no dizziness  PSYCH: no anxiety, no depression  Derm : no itching, no rash    ---___---___---___---___---___---  VITAL SIGNS:  88y , CAPILLARY BLOOD GLUCOSE        T(C): 36.4 (24 @ 10:16), Max: 37.1 (24 @ 20:48)  HR: 104 (24 @ 10:16) (83 - 105)  BP: 135/84 (24 @ 10:16) (105/69 - 148/70)  RR: 18 (24 @ 10:16) (18 - 19)  SpO2: 100% (24 @ 10:16) (94% - 100%)  ---___---___---___---___---___---  PHYSICAL EXAM:    GEN: A&O X 3 , NAD , comfortable  HEENT: NCAT, PERRL, MMM, hearing intact  Neck: supple , no JVD  CVS: S1S2 , regular , No M/R/G appreciated  PULM: CTA B/L,  no W/R/R appreciated  ABD.: soft. non tender, non distended,  bowel sounds present  Extrem: intact pulses , no edema   Derm: No rash , no ecchymoses  PSYCH : normal mood,  no delusion not anxious     ---___---___---___---___---___---            LAB AND IMAGIN.0    7.76  )-----------( 171      ( 26 Mar 2024 07:30 )             28.8               03-    141  |  106  |  11  ----------------------------<  80  3.5   |  22  |  0.86    Ca    8.4      25 Mar 2024 07:39                              Urinalysis Basic - ( 25 Mar 2024 07:39 )    Color: x / Appearance: x / SG: x / pH: x  Gluc: 80 mg/dL / Ketone: x  / Bili: x / Urobili: x   Blood: x / Protein: x / Nitrite: x   Leuk Esterase: x / RBC: x / WBC x   Sq Epi: x / Non Sq Epi: x / Bacteria: x        [All pertinent / recent Imaging reviewed]         ---___---___---___---___---___---___ ---___---___---___---___---                         A S S E S S M E N T   A N D   P L A N :      HEALTH ISSUES - PROBLEM Dx:  Fever with leukocytosis and leukocyte count greater than or equal to 20,000    Constipation     colitis   as per gi  #Sepsis  Pt febrile on admission. WBC 29K on admission, lactate 9.4.  +UTI (growing E coli), Bcx negative 3/20. Diverticulitis on imaging as below.  Remains on zosyn  #UTI/hematuria: Patient and daughter note that patient had gross hematuria during this admission    #Diverticulitis on CT  CT A/P angio 3/21: "Distal transverse through sigmoid colon wall thickening and mild inflammatory changes, most likely representing colitis and   diverticulitis. Patent mesenteric vessels. No pneumatosis or free air."    #Anemia  Hemoglobin 14 in 2024, now dropped to 6.4 on 3/22. Normocytic. Iron studies w/ normal ferritin, TIBC but low %iron sat  Green formed stool noted on pad     Recommendations:  - trend CBC, keep active T&S, transfuse for Hgb<8  - PPI BID x 4 weeks  - continue meds for copd     restart meds for htn cardiology following   continue meds for hld     copd wean off o2 prior to dc     finias abx course as per id       urological workup for hydroureter has been done last admission    start dc planning     Dr Geneva Bowen covering Dr payton 3/27-              -GI/DVT Prophylaxis.    --------------------------------------------  Case discussed with   Education given on   ___________________________  Thank you,  Garry Hermosillo  [phone]

## 2024-03-27 LAB
ANION GAP SERPL CALC-SCNC: 13 MMOL/L — SIGNIFICANT CHANGE UP (ref 5–17)
ANION GAP SERPL CALC-SCNC: 14 MMOL/L — SIGNIFICANT CHANGE UP (ref 5–17)
BUN SERPL-MCNC: 8 MG/DL — SIGNIFICANT CHANGE UP (ref 7–23)
BUN SERPL-MCNC: 8 MG/DL — SIGNIFICANT CHANGE UP (ref 7–23)
CALCIUM SERPL-MCNC: 8.3 MG/DL — LOW (ref 8.4–10.5)
CALCIUM SERPL-MCNC: 8.4 MG/DL — SIGNIFICANT CHANGE UP (ref 8.4–10.5)
CHLORIDE SERPL-SCNC: 103 MMOL/L — SIGNIFICANT CHANGE UP (ref 96–108)
CHLORIDE SERPL-SCNC: 103 MMOL/L — SIGNIFICANT CHANGE UP (ref 96–108)
CO2 SERPL-SCNC: 25 MMOL/L — SIGNIFICANT CHANGE UP (ref 22–31)
CO2 SERPL-SCNC: 25 MMOL/L — SIGNIFICANT CHANGE UP (ref 22–31)
CREAT SERPL-MCNC: 0.81 MG/DL — SIGNIFICANT CHANGE UP (ref 0.5–1.3)
CREAT SERPL-MCNC: 0.91 MG/DL — SIGNIFICANT CHANGE UP (ref 0.5–1.3)
EGFR: 61 ML/MIN/1.73M2 — SIGNIFICANT CHANGE UP
EGFR: 70 ML/MIN/1.73M2 — SIGNIFICANT CHANGE UP
GLUCOSE BLDC GLUCOMTR-MCNC: 126 MG/DL — HIGH (ref 70–99)
GLUCOSE SERPL-MCNC: 110 MG/DL — HIGH (ref 70–99)
GLUCOSE SERPL-MCNC: 76 MG/DL — SIGNIFICANT CHANGE UP (ref 70–99)
HCT VFR BLD CALC: 29.7 % — LOW (ref 34.5–45)
HGB BLD-MCNC: 9.2 G/DL — LOW (ref 11.5–15.5)
MCHC RBC-ENTMCNC: 29.2 PG — SIGNIFICANT CHANGE UP (ref 27–34)
MCHC RBC-ENTMCNC: 31 GM/DL — LOW (ref 32–36)
MCV RBC AUTO: 94.3 FL — SIGNIFICANT CHANGE UP (ref 80–100)
NRBC # BLD: 0 /100 WBCS — SIGNIFICANT CHANGE UP (ref 0–0)
PLATELET # BLD AUTO: 211 K/UL — SIGNIFICANT CHANGE UP (ref 150–400)
POTASSIUM SERPL-MCNC: 3 MMOL/L — LOW (ref 3.5–5.3)
POTASSIUM SERPL-MCNC: 3.4 MMOL/L — LOW (ref 3.5–5.3)
POTASSIUM SERPL-SCNC: 3 MMOL/L — LOW (ref 3.5–5.3)
POTASSIUM SERPL-SCNC: 3.4 MMOL/L — LOW (ref 3.5–5.3)
RBC # BLD: 3.15 M/UL — LOW (ref 3.8–5.2)
RBC # FLD: 14.7 % — HIGH (ref 10.3–14.5)
SODIUM SERPL-SCNC: 141 MMOL/L — SIGNIFICANT CHANGE UP (ref 135–145)
SODIUM SERPL-SCNC: 142 MMOL/L — SIGNIFICANT CHANGE UP (ref 135–145)
WBC # BLD: 6.93 K/UL — SIGNIFICANT CHANGE UP (ref 3.8–10.5)
WBC # FLD AUTO: 6.93 K/UL — SIGNIFICANT CHANGE UP (ref 3.8–10.5)

## 2024-03-27 PROCEDURE — 71045 X-RAY EXAM CHEST 1 VIEW: CPT | Mod: 26

## 2024-03-27 RX ORDER — POTASSIUM CHLORIDE 20 MEQ
20 PACKET (EA) ORAL ONCE
Refills: 0 | Status: DISCONTINUED | OUTPATIENT
Start: 2024-03-27 | End: 2024-03-27

## 2024-03-27 RX ORDER — SPIRONOLACTONE 25 MG/1
12.5 TABLET, FILM COATED ORAL DAILY
Refills: 0 | Status: DISCONTINUED | OUTPATIENT
Start: 2024-03-27 | End: 2024-04-03

## 2024-03-27 RX ORDER — FUROSEMIDE 40 MG
20 TABLET ORAL DAILY
Refills: 0 | Status: DISCONTINUED | OUTPATIENT
Start: 2024-03-27 | End: 2024-03-30

## 2024-03-27 RX ORDER — ACETAMINOPHEN 500 MG
650 TABLET ORAL ONCE
Refills: 0 | Status: COMPLETED | OUTPATIENT
Start: 2024-03-27 | End: 2024-03-28

## 2024-03-27 RX ORDER — POTASSIUM CHLORIDE 20 MEQ
20 PACKET (EA) ORAL
Refills: 0 | Status: COMPLETED | OUTPATIENT
Start: 2024-03-27 | End: 2024-03-27

## 2024-03-27 RX ORDER — POTASSIUM CHLORIDE 20 MEQ
40 PACKET (EA) ORAL ONCE
Refills: 0 | Status: COMPLETED | OUTPATIENT
Start: 2024-03-27 | End: 2024-03-27

## 2024-03-27 RX ADMIN — CYCLOPENTOLATE HYDROCHLORIDE 1 DROP(S): 10 SOLUTION/ DROPS OPHTHALMIC at 05:48

## 2024-03-27 RX ADMIN — HEPARIN SODIUM 5000 UNIT(S): 5000 INJECTION INTRAVENOUS; SUBCUTANEOUS at 05:45

## 2024-03-27 RX ADMIN — CYCLOPENTOLATE HYDROCHLORIDE 1 DROP(S): 10 SOLUTION/ DROPS OPHTHALMIC at 17:15

## 2024-03-27 RX ADMIN — Medication 50 MILLIGRAM(S): at 12:49

## 2024-03-27 RX ADMIN — LOSARTAN POTASSIUM 25 MILLIGRAM(S): 100 TABLET, FILM COATED ORAL at 05:46

## 2024-03-27 RX ADMIN — Medication 1 DROP(S): at 05:46

## 2024-03-27 RX ADMIN — ATORVASTATIN CALCIUM 20 MILLIGRAM(S): 80 TABLET, FILM COATED ORAL at 21:28

## 2024-03-27 RX ADMIN — Medication 20 MILLIGRAM(S): at 12:49

## 2024-03-27 RX ADMIN — Medication 325 MILLIGRAM(S): at 12:48

## 2024-03-27 RX ADMIN — HEPARIN SODIUM 5000 UNIT(S): 5000 INJECTION INTRAVENOUS; SUBCUTANEOUS at 17:13

## 2024-03-27 RX ADMIN — SPIRONOLACTONE 12.5 MILLIGRAM(S): 25 TABLET, FILM COATED ORAL at 12:56

## 2024-03-27 RX ADMIN — TEMAZEPAM 30 MILLIGRAM(S): 15 CAPSULE ORAL at 21:50

## 2024-03-27 RX ADMIN — PIPERACILLIN AND TAZOBACTAM 25 GRAM(S): 4; .5 INJECTION, POWDER, LYOPHILIZED, FOR SOLUTION INTRAVENOUS at 14:57

## 2024-03-27 RX ADMIN — Medication 1 DROP(S): at 17:14

## 2024-03-27 RX ADMIN — Medication 3 MILLIGRAM(S): at 20:12

## 2024-03-27 RX ADMIN — MIRTAZAPINE 7.5 MILLIGRAM(S): 45 TABLET, ORALLY DISINTEGRATING ORAL at 21:28

## 2024-03-27 RX ADMIN — Medication 1000 UNIT(S): at 12:49

## 2024-03-27 RX ADMIN — PIPERACILLIN AND TAZOBACTAM 25 GRAM(S): 4; .5 INJECTION, POWDER, LYOPHILIZED, FOR SOLUTION INTRAVENOUS at 21:29

## 2024-03-27 RX ADMIN — Medication 3 MILLILITER(S): at 17:13

## 2024-03-27 RX ADMIN — Medication 0.25 MILLIGRAM(S): at 06:41

## 2024-03-27 RX ADMIN — Medication 20 MILLIGRAM(S): at 12:48

## 2024-03-27 RX ADMIN — Medication 0.25 MILLIGRAM(S): at 17:13

## 2024-03-27 RX ADMIN — Medication 3 MILLILITER(S): at 05:46

## 2024-03-27 RX ADMIN — PIPERACILLIN AND TAZOBACTAM 25 GRAM(S): 4; .5 INJECTION, POWDER, LYOPHILIZED, FOR SOLUTION INTRAVENOUS at 05:45

## 2024-03-27 RX ADMIN — Medication 3 MILLILITER(S): at 12:48

## 2024-03-27 RX ADMIN — Medication 20 MILLIEQUIVALENT(S): at 21:29

## 2024-03-27 RX ADMIN — Medication 20 MILLIEQUIVALENT(S): at 20:12

## 2024-03-27 NOTE — PROVIDER CONTACT NOTE (CHANGE IN STATUS NOTIFICATION) - ASSESSMENT
Patient began coughing, gasping for breath and skin was blush. Half of the pill was retrieved from the patients mouth. Choking maneuver was performed.

## 2024-03-27 NOTE — PROGRESS NOTE ADULT - ASSESSMENT
Assessment and Recommendation:   acute hypoxic respiratory Failure on 02   keep 02 > saturation > 92%  Fever and sepsis of urinary tract origin    Sarcoidosis   COPD continue nebulizer treatement   Paralysis of the Right hemidiaphragm   S/P blood transfusion   acute left pyelonephritis  Acute Diverticulitis    anemia of chronic disease   protein caloric malnutrition   continue Antibiotics   potasium supplement   nebulizer treatement Duoneb q 6 hrs   Flovent 0.25 BID   DVT and GI prophylaxis   care discuss with NP on the floor

## 2024-03-27 NOTE — PROGRESS NOTE ADULT - SUBJECTIVE AND OBJECTIVE BOX
Date of Service: 03-27-24 @ 08:03           CARDIOLOGY     PROGRESS  NOTE   ________________________________________________    CHIEF COMPLAINT:Patient is a 88y old  Female who presents with a chief complaint of Diverticulitis, leukocytosis (26 Mar 2024 19:38)  events noted   	  REVIEW OF SYSTEMS:  CONSTITUTIONAL: No fever, weight loss, or fatigue  EYES: No eye pain, visual disturbances, or discharge  ENT:  No difficulty hearing, tinnitus, vertigo; No sinus or throat pain  NECK: No pain or stiffness  RESPIRATORY: No cough, wheezing, chills or hemoptysis; + Shortness of Breath  CARDIOVASCULAR: No chest pain, palpitations, passing out, dizziness, or leg swelling  GASTROINTESTINAL: No abdominal or epigastric pain. No nausea, vomiting, or hematemesis; No diarrhea or constipation. No melena or hematochezia.  GENITOURINARY: No dysuria, frequency, hematuria, or incontinence  NEUROLOGICAL: No headaches, memory loss, loss of strength, numbness, or tremors  SKIN: No itching, burning, rashes, or lesions   LYMPH Nodes: No enlarged glands  ENDOCRINE: No heat or cold intolerance; No hair loss  MUSCULOSKELETAL: No joint pain or swelling; No muscle, back, or extremity pain  PSYCHIATRIC: No depression, anxiety, mood swings, or difficulty sleeping  HEME/LYMPH: No easy bruising, or bleeding gums  ALLERGY AND IMMUNOLOGIC: No hives or eczema	    [ x] All others negative	  [ ] Unable to obtain    PHYSICAL EXAM:  T(C): 36.5 (03-27-24 @ 05:49), Max: 36.8 (03-26-24 @ 21:09)  HR: 95 (03-27-24 @ 05:49) (95 - 105)  BP: 116/65 (03-27-24 @ 05:49) (116/65 - 135/84)  RR: 18 (03-27-24 @ 05:49) (18 - 18)  SpO2: 94% (03-27-24 @ 05:49) (94% - 100%)  Wt(kg): --  I&O's Summary    26 Mar 2024 07:01  -  27 Mar 2024 07:00  --------------------------------------------------------  IN: 560 mL / OUT: 1950 mL / NET: -1390 mL        Appearance: Normal	  HEENT:   Normal oral mucosa, PERRL, EOMI	  Lymphatic: No lymphadenopathy  Cardiovascular: Normal S1 S2, No JVD, + murmurs, No edema  Respiratory:rhonchi  Psychiatry: A & O x 3, Mood & affect appropriate  Gastrointestinal:  Soft, Non-tender, + BS	  Skin: No rashes, No ecchymoses, No cyanosis	  Neurologic: Non-focal  Extremities: Normal range of motion, No clubbing, cyanosis or edema  Vascular: Peripheral pulses palpable 2+ bilaterally    MEDICATIONS  (STANDING):  albuterol/ipratropium for Nebulization 3 milliLiter(s) Nebulizer every 6 hours  atorvastatin 20 milliGRAM(s) Oral at bedtime  buDESOnide    Inhalation Suspension 0.25 milliGRAM(s) Inhalation two times a day  cholecalciferol 1000 Unit(s) Oral daily  cyclopentolate 1% Solution 1 Drop(s) Both EYES two times a day  EPINEPHrine    Injectable 0.3 milliGRAM(s) IntraMuscular once  ferrous    sulfate 325 milliGRAM(s) Oral daily  FLUoxetine 20 milliGRAM(s) Oral daily  heparin   Injectable 5000 Unit(s) SubCutaneous every 12 hours  influenza  Vaccine (HIGH DOSE) 0.7 milliLiter(s) IntraMuscular once  losartan 25 milliGRAM(s) Oral daily  mirtazapine 7.5 milliGRAM(s) Oral at bedtime  piperacillin/tazobactam IVPB.. 3.375 Gram(s) IV Intermittent every 8 hours  polyethylene glycol 3350 17 Gram(s) Oral two times a day  prednisoLONE acetate 1% Suspension 1 Drop(s) Both EYES two times a day  senna 1 Tablet(s) Oral two times a day  traZODone 50 milliGRAM(s) Oral daily      TELEMETRY: 	    ECG:  	  RADIOLOGY:  OTHER: 	  	  LABS:	 	    CARDIAC MARKERS:                          9.2    6.93  )-----------( 211      ( 27 Mar 2024 07:16 )             29.7     03-26    140  |  101  |  8   ----------------------------<  141<H>  3.0<L>   |  23  |  0.87    Ca    8.4      26 Mar 2024 19:09      proBNP:   Lipid Profile:   HgA1c:   TSH:     < from: TTE W or WO Ultrasound Enhancing Agent (03.26.24 @ 15:34) >      1. Left ventricular systolic function is low normal with an ejection fraction of 50 % by Enrique's method of disks.   2. Entire septum, entire inferior wall, and apical anterior segment are abnormal.   3. Normal right ventricular cavity size and mildly reduced systolic function.   4. Ruptured chordae tendineae to the anterior mitral leaflet.   5. Trace mitral regurgitation.   6. No prior echocardiogram is available for comparison.      < from: Xray Chest 1 View- PORTABLE-Urgent (Xray Chest 1 View- PORTABLE-Urgent .) (03.26.24 @ 12:23) >  Loop recorder.  The heart is not accurately assessed in this AP projection.  Similarly elevated right hemidiaphragm.  Small bilateral pleural effusions with bibasilar atelectasis. Mild   pulmonary vascular congestion.  There is no pneumothorax.  Suture anchors in the left humeral head and chronic humeral deformities   bilaterally.    IMPRESSION:  Small bilateral pleural effusions with bibasilar atelectasis.  Mild pulmonary vascular congestion.      Assessment and plan  ---------------------------  This is an 87 y/o female w/ PMHx pulmonary sarcoidosis, COPD not on home O2, CAD, hypothyroidism, and HTN presenting for lack of BM in the last 5 days along with SOB and L flank pain from the atria. The patient does endorse some left-sided upper abdominal pain but doesn't endorse any other symptoms. She states that she doesn't have any SOB, and states that her last BM prior to the hospital was yesterday. Called the patient's daughter Deborah, who stated that she was told the patient was having severe abdominal pain as well as some vomiting.   She was admitted here from 2/8-2/21 for abdominal pain, constipation and vomiting. At the time, CT was suggestive of diverticulitis as well and her course was complicated by fluid overload requiring IVP lasix. She was treated with IV Zosyn for 7 days. During this hospital course, she was also found to have a dilated L renal pelvis w/ +UA, but was ruled not have UTI by ID. Urology was consulted, who stated that her pessary could be the cause of the hydro, and removed it on 2/13.   In the ED, she was febrile to 101.8, tachycardic to 130, hypotensive to 80/50, and hypoxic requiring 4LNC. CBC revealed leukocytosis of 29.35, normocytic anemia of 9.5. CMP w/ elevated AG of 24, low bicarb of 17, . Presenting VBG w/ lactate of 9.4. she was straight catheterized for UA, which was red and turvid w/ large LE and >50 RBC/hpf, negative for bacteria. Initially CT A/P w/o contrast was done (due to contrast allergy), which revealed a possible mild diverticulitis of the descending colon and unchanged severe L hydroureter with layering hyperdensity  which could be stones or blood products. She was given Ceftriaxone and Zosyn, 2.5L IVNS. Lactate down to 3.5, BP improved. She had 2 dark tarry stools in her diaper, so she was premedicated w/ solumedrol and benadryl and CTA Abdomen/pelvis was ordered.  pt is well known to me with hx of sarcoidosis, cad with abdominal pain, she was recently dc sec to Diverticulitis  IN er pt with hypotension and hypoxia.  pt with sig abnormal ecg,,   dvt prophylaxis  asa if no contraindication  check QTC on Psych meds  fu hgb , GI noted/ urology  events noted with increase sob, chest x ray with vascular congestion sec to chf  echo noted with  Ruptured chordae tendineae to the anterior mitral leaflet, and sig regional wall motion abnormality ?ischemia  not sure pt is a candidate for any aggressive/ invasive cardiac procedure  dc ivf  add small dose of diuretics GOC  add lasix and aldactone awaiting blood work

## 2024-03-27 NOTE — RAPID RESPONSE TEAM SUMMARY - NSSITUATIONBACKGROUNDRRT_GEN_ALL_CORE
87 y/o female w/ PMHx pulmonary sarcoidosis, COPD not on home O2, CAD, hypothyroidism, and HTN presenting for lack of BM in the last 5 days along with SOB and L flank pain from the atria.    RRT called for patient choking on her potassium pill. Nurse at bedside said she removed half of the pill from her mouth. Patient with intermittent coughing. O2 waveform hard to obtain, however once obtained was 97% on room air. -160s systolic. HR varied between low 100s and 160 during this episode of patient choking. Encouraged to keep coughing, put head of bed completely elevated.   Lung sounds clear bilaterally, no wheezing, no stridor.  Heart sounds tachyacrdic but regular    Patient stopped coughing and recovered.

## 2024-03-27 NOTE — PROVIDER CONTACT NOTE (CHANGE IN STATUS NOTIFICATION) - ACTION/TREATMENT ORDERED:
RRT called. Patient suctioned and repositioned. Chest x-ray performed. Provider at bedside. See RRT note.

## 2024-03-27 NOTE — CHART NOTE - NSCHARTNOTEFT_GEN_A_CORE
Arrived at room w/88 year old female w/daughter at bedside very irritated. Daughter immediately began screaming and is agitated stating she has not spoke to any medical provider regarding her mother's care  in 2 weeks and she is ANGRY. I tried to explain I just saw her Mom today and I would fill in anything she would like to know. Daughter further states she will not allow any care or any further conversation regarding her Mom's care until she specifically speaks to the cardiologist, and that I should contact him. She states if there is need for cardiology she would have transferred her mother to [facility]. Dr Bowen was notified of this situation earlier and Dr Gardiner said he would take care of this by speaking with the daughter about the need for tele. On arrival to pt room I was not aware that they had not yet spoken with Dr Gardiner and that he had not yet made the phone call. I gave Dr Gardiner the daughters phone number. He is fully aware of the urgency and that the family is not allowing care. The family refuses to move patient to a tele unit at this time. I explained the patient needed the tele unit because she was in heart failure and hypokalemic and hypoxic overnight with a BNP level of over 34, 000. I went through Ms Elaine's chart and filled her in on all questions she had asked. She is currently under treatment with IV furosemide and oral K replacement. explained the importance of telemetry and the possibility of arrhythmias when there is heart failure and electrolyte abnormalities and how it was important to escalate the care of her mom. I then notified Dr Gardiner again who says he will speak to the daughter Sandra and was given her phone number again. Daughter will not allow me to speak to the patient and is refusing transport to 4 for tele care. She does not want a PA near her Mom at this time. Will await further recs from Dr Gardiner. Pt is refusing tele at this time.

## 2024-03-27 NOTE — PROGRESS NOTE ADULT - ASSESSMENT
88 F        h/o   CAD,   pulmonary sarcoidosis, COPD ,  HTN,        Ct  a/p.   2/24,  diverticulitis ,  sigmoid  colon, severe  L hydroureter/  mod  L   hydronephrosis    comp fx  L  4.  vaginal  pessary           admitted with high wbc/  lactate .  from  uti/    ecoli,  seen  by Ia  and  diverticulitis on ct   CT A/P angio 3/21/ 24,  colitis and  diverticulitis. patent mesenteric vessels. No pneumatosis or free air.    c/.c  anemia     CAD    HTN     c/c  anemia   on iv  zosyn ,  for   2  weeks ,   seen by  NIDIA yusuf. form acute  diastolic chf        on lasix/  aldactone     Echo,  normal  ef/  RV  dysfunction,  mlple  wall  motion abnormalities/  mild  MR    f/p  by card/  given  advanced  age ,,  cachetic  state. doubt  if  pt is an ideal  candidate   for  any intervention      dvt ppx          rad< from: TTE W or WO Ultrasound Enhancing Agent (03.26.24 @ 15:34) >  CONCLUSIONS:      1. Left ventricular systolic function is low normal with an ejection fraction of 50 % by Enrique's method of disks.   2. Entire septum, entire inferior wall, and apical anterior segment are abnormal.   3. Normal right ventricular cavity size and mildly reduced systolic function.   4. Ruptured chordae tendineae to the anterior mitral leaflet.   5. Trace mitral regurgitation.   6. No prior echocardiogram is available for comparison.  _______________    < end of copied text >         rad< from: CT Abdomen and Pelvis No Cont (02.08.24 @ 18:01) >  BONES: Left hip ORIF. Lumbar levoscoliosis. Degenerative changes of the   spine. Age indeterminate compression deformity of L4. Grade 1   anterolisthesis of L4 on L5 and L5 on S1.  IMPRESSION:  Thickening of the sigmoid colon with mild inflammatory changes adjacent   to multiple colonic diverticula suggestive of acute diverticulitis.   Severe left hydroureter and moderate left hydronephrosis of uncertain   etiology.  --- End of Report ---

## 2024-03-27 NOTE — CHART NOTE - NSCHARTNOTEFT_GEN_A_CORE
Called to room for 88 year old female who is now a RRT. Pt was noted to begin coughing after swallowing a pill. Pt states she did not feel like she was choking on the pill however immediately began coughing. Does not feel the pill stuck in any area. PT is found to have shortness of breath and coughing. Pt is encouraged to cough. Her heart rate was also increased during the episode. Pt was encouraged to cough and gradually improved. Her oxygen sat was stable throughout the RR. Pt remained conscious and spoke throughout the episode. PT was left in the care of 4M. Pt was signed over to 4M ACP stable and feeling much better

## 2024-03-27 NOTE — PROGRESS NOTE ADULT - SUBJECTIVE AND OBJECTIVE BOX
BISHNU SEGURA  MRN#: [id number]  Subjective:  pulmonary progress note: :acute hypoxic respiratory Failure on 02, sarcoidosis , COPD , Diastolic dysfunction , pulmonary HTN date of service is 3/26/2024   This is an 87 y/o female w/ PMH pulmonary sarcoidosis, COPD not on home O2, CAD, hypothyroidism, and HTN presenting for lack of BM in the last 5 days along with SOB and L flank pain from the atria. The patient does endorse some left-sided upper abdominal pain but doesn't endorse any other symptoms. She states that she doesn't have any SOB, and states that her last BM prior to the hospital was yesterday. Called the patient's daughter Deborah, who stated that she was told the patient was having severe abdominal pain as well as some vomiting.   She was admitted here from 2/8-2/21 for abdominal pain, constipation and vomiting. At the time, CT was suggestive of diverticulitis as well and her course was complicated by fluid overload requiring IVP lasix. She was treated with IV Zosyn for 7 days. During this hospital course, she was also found to have a dilated L renal pelvis w/ +UA, but was ruled not have UTI by ID. Urology was consulted, who stated that her pessary could be the cause of the hydro, and removed it on 2/13.     In the ED, she was febrile to 101.8, tachycardic to 130, hypotensive to 80/50, and hypoxic requiring 4LNC. CBC revealed leukocytosis of 29.35, normocytic anemia of 9.5. CMP w/ elevated AG of 24, low bicarb of 17, . Presenting VBG w/ lactate of 9.4. she was straight catheterized for UA, which was red and turbid w/ large LE and >50 RBC/hpf, negative for bacteria. Initially CT A/P w/o contrast was done (due to contrast allergy), which revealed a possible mild diverticulitis of the descending colon and unchanged severe L hydroureter with layering hypodensity which could be stones or blood products. She was given Ceftriaxone and Zosyn, 2.5L IVNS. Lactate down to 3.5, BP improved. She had 2 dark tarry stools in her diaper, so she was premedicated w/ solumedrol and benadryl and CTA Abdomen/pelvis was ordered.  (21 Mar 2024 01:30), patient was SOB needed high 02 supplement , CT scan acute Diverticulitis , afebrile down to nasal canula , on antibiotics , less distress no nausea or vomiting no headache S/P blood transfusion , H/H is stable no chest pain no SOB at rest , no fever no chest pain seen and examined with daughter at bed side no GI symptoms , discuss patient condition with daughter all question were answered , no over night events 02 saturation is still on the low side will continue bronchodilator and try to wean off 02 safely , no over night events adding Flovent nebulizer 0.25 mg BID , no new C/P trial to wean off 02 to acceptable RA on going low potasium to give supplement     PAST MEDICAL & SURGICAL HISTORY:  HTN (hypertension)      Hypothyroidism      Osteoporosis  pulmonary hypertension   H/O anxiety     CAD (coronary artery disease)  sarcoidosis   COPD           FAMILY HISTORY:  Review of Systems:  Review of Systems: Review of Systems:   CONSTITUTIONAL: + fever,+ weight loss  EYES: No eye pain, visual disturbances, or discharge  RESPIRATORY: + SOB. No cough, wheezing, chills or hemoptysis  CARDIOVASCULAR: No chest pain, palpitations, dizziness, or leg swelling  GASTROINTESTINAL: +abdominal pain. No nausea, vomiting, or hematemesis; No diarrhea or constipation. No melena or hematochezia.  GENITOURINARY: +L flank pain, hematuria; No dysuria, frequency, or incontinence  NEUROLOGICAL: No headaches, memory loss, loss of strength, numbness, or tremors  SKIN: No itching, burning, rashes, or lesions   MUSCULOSKELETAL: No joint pain or swelling; No muscle, back pain  PSYCHIATRIC: + depression,+ anxiety,+ mood swings, or difficulty ,             OBJECTIVE:  ICU Vital Signs Last 24 Hrs  T(C): 36.8 (22 Mar 2024 16:23), Max: 36.9 (22 Mar 2024 00:28)  T(F): 98.2 (22 Mar 2024 16:23), Max: 98.4 (22 Mar 2024 00:28)  HR: 102 (22 Mar 2024 16:23) (82 - 102)  BP: 145/76 (22 Mar 2024 16:23) (94/51 - 145/76)  BP(mean): --  ABP: --  ABP(mean): --  RR: 20 (22 Mar 2024 16:23) (19 - 20)  SpO2: 93% (22 Mar 2024 16:23) (93% - 96%)    O2 Parameters below as of 22 Mar 2024 16:23  Patient On (Oxygen Delivery Method): nasal cannula  O2 Flow (L/min): 2          03-21 @ 07:01  -  03-22 @ 07:00  --------------------------------------------------------  IN: 240 mL / OUT: 1850 mL / NET: -1610 mL      PHYSICAL EXAM: Daily Height in cm: 157.48 (20 Mar 2024 16:54)  Elderly agitated cachectic female on 2 liter nasal  02 saturation is 93%  Daily   HEENT:     + NCAT  + EOMI  - Conjuctival edema   - Icterus   - Thrush   - ETT  - NGT/OGT  Neck:         + FROM    + JVD     - Nodes     - Masses    + Mid-line trachea   - Tracheostomy  Chest:          pectus excavatum , increase A-P diameter   Lungs:          + CTA   + Rhonchi    - Rales    - Wheezing   + Decreased BS   - Dullness R L  Cardiac:       + S1 + S2    + RRR   - Irregular   - S3  - S4  + Murmurs   - Rub   - Hamman’s sign   Abdomen:    + BS     + Soft    + tender left lower quadrant   - Distended  - Organomegaly  - PEG  Extremities:   - Cyanosis U/L   - Clubbing  U/L  - LE/UE Edema   + Capillary refill    + Pulses   Neuro:        + Awake   +  Alert   - Confused   - Lethargic   - Sedated   - Generalized Weakness  Skin:        - Rashes    - Erythema   + Normal incisions   + IV sites intact  - Sacral decubit      HOSPITAL MEDICATIONS: All mediciations reviewed and analyzed  MEDICATIONS  (STANDING):  albuterol/ipratropium for Nebulization 3 milliLiter(s) Nebulizer every 6 hours  atorvastatin 20 milliGRAM(s) Oral at bedtime  cholecalciferol 1000 Unit(s) Oral daily  cyclopentolate 1% Solution 1 Drop(s) Both EYES two times a day  EPINEPHrine    Injectable 0.3 milliGRAM(s) IntraMuscular once  ferrous    sulfate 325 milliGRAM(s) Oral daily  FLUoxetine 20 milliGRAM(s) Oral daily  influenza  Vaccine (HIGH DOSE) 0.7 milliLiter(s) IntraMuscular once  mirtazapine 7.5 milliGRAM(s) Oral at bedtime  piperacillin/tazobactam IVPB.. 3.375 Gram(s) IV Intermittent every 8 hours  polyethylene glycol 3350 17 Gram(s) Oral two times a day  potassium chloride    Tablet ER 20 milliEquivalent(s) Oral daily  prednisoLONE acetate 1% Suspension 1 Drop(s) Both EYES two times a day  senna 1 Tablet(s) Oral two times a day  sodium chloride 0.9%. 1000 milliLiter(s) (60 mL/Hr) IV Continuous <Continuous>  traZODone 50 milliGRAM(s) Oral daily    MEDICATIONS  (PRN):  acetaminophen     Tablet .. 650 milliGRAM(s) Oral every 6 hours PRN Temp greater or equal to 38C (100.4F), Mild Pain (1 - 3)  melatonin 3 milliGRAM(s) Oral at bedtime PRN Insomnia  ondansetron Injectable 4 milliGRAM(s) IV Push every 8 hours PRN Nausea and/or Vomiting  temazepam 30 milliGRAM(s) Oral at bedtime PRN Insomnia    LABS: All Lab data reviewed and analyzed                         9.2    6.93  )-----------( 211      ( 27 Mar 2024 07:16 )             29.7     03-27    141  |  103  |  8   ----------------------------<  110<H>  3.0<L>   |  25  |  0.81    Ca    8.3<L>      27 Mar 2024 10:44      Ca    8.1<L>      23 Mar 2024 06:52  Phos  3.0     03-23  Mg     2.2     03-23      Ca    8.2<L>      22 Mar 2024 07:34    TPro  4.6<L>  /  Alb  2.8<L>  /  TBili  0.3  /  DBili  x   /  AST  28  /  ALT  9<L>  /  AlkPhos  103  03-21     LIVER FUNCTIONS - ( 21 Mar 2024 07:05 )  Alb: 2.8 g/dL / Pro: 4.6 g/dL / ALK PHOS: 103 U/L / ALT: 9 U/L / AST: 28 U/L / GGT: x           RADIOLOGY: - Reviewed and analyzed  BISHNU SEGURA  MRN#: [id number]  Subjective:  pulmonary progress note: :acute hypoxic respiratory Failure on 02, sarcoidosis , COPD , Diastolic dysfunction , pulmonary HTN date of service is 3/27/2024   This is an 87 y/o female w/ PMH pulmonary sarcoidosis, COPD not on home O2, CAD, hypothyroidism, and HTN presenting for lack of BM in the last 5 days along with SOB and L flank pain from the atria. The patient does endorse some left-sided upper abdominal pain but doesn't endorse any other symptoms. She states that she doesn't have any SOB, and states that her last BM prior to the hospital was yesterday. Called the patient's daughter Deborah, who stated that she was told the patient was having severe abdominal pain as well as some vomiting.   She was admitted here from 2/8-2/21 for abdominal pain, constipation and vomiting. At the time, CT was suggestive of diverticulitis as well and her course was complicated by fluid overload requiring IVP lasix. She was treated with IV Zosyn for 7 days. During this hospital course, she was also found to have a dilated L renal pelvis w/ +UA, but was ruled not have UTI by ID. Urology was consulted, who stated that her pessary could be the cause of the hydro, and removed it on 2/13.     In the ED, she was febrile to 101.8, tachycardic to 130, hypotensive to 80/50, and hypoxic requiring 4LNC. CBC revealed leukocytosis of 29.35, normocytic anemia of 9.5. CMP w/ elevated AG of 24, low bicarb of 17, . Presenting VBG w/ lactate of 9.4. she was straight catheterized for UA, which was red and turbid w/ large LE and >50 RBC/hpf, negative for bacteria. Initially CT A/P w/o contrast was done (due to contrast allergy), which revealed a possible mild diverticulitis of the descending colon and unchanged severe L hydroureter with layering hypodensity which could be stones or blood products. She was given Ceftriaxone and Zosyn, 2.5L IVNS. Lactate down to 3.5, BP improved. She had 2 dark tarry stools in her diaper, so she was premedicated w/ solumedrol and benadryl and CTA Abdomen/pelvis was ordered.  (21 Mar 2024 01:30), patient was SOB needed high 02 supplement , CT scan acute Diverticulitis , afebrile down to nasal canula , on antibiotics , less distress no nausea or vomiting no headache S/P blood transfusion , H/H is stable no chest pain no SOB at rest , no fever no chest pain seen and examined with daughter at bed side no GI symptoms , discuss patient condition with daughter all question were answered , no over night events 02 saturation is still on the low side will continue bronchodilator and try to wean off 02 safely , no over night events adding Flovent nebulizer 0.25 mg BID , no new C/P trial to wean off 02 to acceptable RA on going low potasium to give supplement     PAST MEDICAL & SURGICAL HISTORY:  HTN (hypertension)      Hypothyroidism      Osteoporosis  pulmonary hypertension   H/O anxiety     CAD (coronary artery disease)  sarcoidosis   COPD           FAMILY HISTORY:  Review of Systems:  Review of Systems: Review of Systems:   CONSTITUTIONAL: + fever,+ weight loss  EYES: No eye pain, visual disturbances, or discharge  RESPIRATORY: + SOB. No cough, wheezing, chills or hemoptysis  CARDIOVASCULAR: No chest pain, palpitations, dizziness, or leg swelling  GASTROINTESTINAL: +abdominal pain. No nausea, vomiting, or hematemesis; No diarrhea or constipation. No melena or hematochezia.  GENITOURINARY: +L flank pain, hematuria; No dysuria, frequency, or incontinence  NEUROLOGICAL: No headaches, memory loss, loss of strength, numbness, or tremors  SKIN: No itching, burning, rashes, or lesions   MUSCULOSKELETAL: No joint pain or swelling; No muscle, back pain  PSYCHIATRIC: + depression,+ anxiety,+ mood swings, or difficulty ,             OBJECTIVE:  ICU Vital Signs Last 24 Hrs  T(C): 36.8 (22 Mar 2024 16:23), Max: 36.9 (22 Mar 2024 00:28)  T(F): 98.2 (22 Mar 2024 16:23), Max: 98.4 (22 Mar 2024 00:28)  HR: 102 (22 Mar 2024 16:23) (82 - 102)  BP: 145/76 (22 Mar 2024 16:23) (94/51 - 145/76)  BP(mean): --  ABP: --  ABP(mean): --  RR: 20 (22 Mar 2024 16:23) (19 - 20)  SpO2: 93% (22 Mar 2024 16:23) (93% - 96%)    O2 Parameters below as of 22 Mar 2024 16:23  Patient On (Oxygen Delivery Method): nasal cannula  O2 Flow (L/min): 2          03-21 @ 07:01  -  03-22 @ 07:00  --------------------------------------------------------  IN: 240 mL / OUT: 1850 mL / NET: -1610 mL      PHYSICAL EXAM: Daily Height in cm: 157.48 (20 Mar 2024 16:54)  Elderly agitated cachectic female on 2 liter nasal  02 saturation is 93%  Daily   HEENT:     + NCAT  + EOMI  - Conjuctival edema   - Icterus   - Thrush   - ETT  - NGT/OGT  Neck:         + FROM    + JVD     - Nodes     - Masses    + Mid-line trachea   - Tracheostomy  Chest:          pectus excavatum , increase A-P diameter   Lungs:          + CTA   + Rhonchi    - Rales    - Wheezing   + Decreased BS   - Dullness R L  Cardiac:       + S1 + S2    + RRR   - Irregular   - S3  - S4  + Murmurs   - Rub   - Hamman’s sign   Abdomen:    + BS     + Soft    + tender left lower quadrant   - Distended  - Organomegaly  - PEG  Extremities:   - Cyanosis U/L   - Clubbing  U/L  - LE/UE Edema   + Capillary refill    + Pulses   Neuro:        + Awake   +  Alert   - Confused   - Lethargic   - Sedated   - Generalized Weakness  Skin:        - Rashes    - Erythema   + Normal incisions   + IV sites intact  - Sacral decubit      HOSPITAL MEDICATIONS: All mediciations reviewed and analyzed  MEDICATIONS  (STANDING):  albuterol/ipratropium for Nebulization 3 milliLiter(s) Nebulizer every 6 hours  atorvastatin 20 milliGRAM(s) Oral at bedtime  cholecalciferol 1000 Unit(s) Oral daily  cyclopentolate 1% Solution 1 Drop(s) Both EYES two times a day  EPINEPHrine    Injectable 0.3 milliGRAM(s) IntraMuscular once  ferrous    sulfate 325 milliGRAM(s) Oral daily  FLUoxetine 20 milliGRAM(s) Oral daily  influenza  Vaccine (HIGH DOSE) 0.7 milliLiter(s) IntraMuscular once  mirtazapine 7.5 milliGRAM(s) Oral at bedtime  piperacillin/tazobactam IVPB.. 3.375 Gram(s) IV Intermittent every 8 hours  polyethylene glycol 3350 17 Gram(s) Oral two times a day  potassium chloride    Tablet ER 20 milliEquivalent(s) Oral daily  prednisoLONE acetate 1% Suspension 1 Drop(s) Both EYES two times a day  senna 1 Tablet(s) Oral two times a day  sodium chloride 0.9%. 1000 milliLiter(s) (60 mL/Hr) IV Continuous <Continuous>  traZODone 50 milliGRAM(s) Oral daily    MEDICATIONS  (PRN):  acetaminophen     Tablet .. 650 milliGRAM(s) Oral every 6 hours PRN Temp greater or equal to 38C (100.4F), Mild Pain (1 - 3)  melatonin 3 milliGRAM(s) Oral at bedtime PRN Insomnia  ondansetron Injectable 4 milliGRAM(s) IV Push every 8 hours PRN Nausea and/or Vomiting  temazepam 30 milliGRAM(s) Oral at bedtime PRN Insomnia    LABS: All Lab data reviewed and analyzed                         9.2    6.93  )-----------( 211      ( 27 Mar 2024 07:16 )             29.7     03-27    141  |  103  |  8   ----------------------------<  110<H>  3.0<L>   |  25  |  0.81    Ca    8.3<L>      27 Mar 2024 10:44      Ca    8.1<L>      23 Mar 2024 06:52  Phos  3.0     03-23  Mg     2.2     03-23      Ca    8.2<L>      22 Mar 2024 07:34    TPro  4.6<L>  /  Alb  2.8<L>  /  TBili  0.3  /  DBili  x   /  AST  28  /  ALT  9<L>  /  AlkPhos  103  03-21     LIVER FUNCTIONS - ( 21 Mar 2024 07:05 )  Alb: 2.8 g/dL / Pro: 4.6 g/dL / ALK PHOS: 103 U/L / ALT: 9 U/L / AST: 28 U/L / GGT: x           RADIOLOGY: - Reviewed and analyzed

## 2024-03-27 NOTE — PROGRESS NOTE ADULT - SUBJECTIVE AND OBJECTIVE BOX
date of service: 03-27-24 @ 10:09  [email or web address]  REVIEW OF SYSTEMS:  CONSTITUTIONAL: No fever,  no  weight loss  ENT:  No  tinnitus,   no   vertigo  NECK: No pain or stiffness  RESPIRATORY: No cough, wheezing, chills or hemoptysis;    No Shortness of Breath  CARDIOVASCULAR: No chest pain, palpitations, dizziness  GASTROINTESTINAL: No abdominal or epigastric pain. No nausea, vomiting, or hematemesis; No diarrhea  No melena or hematochezia.  GENITOURINARY: No dysuria, frequency, hematuria, or incontinence  NEUROLOGICAL: No headaches  SKIN: No itching,  no   rash  LYMPH Nodes: No enlarged glands  ENDOCRINE: No heat or cold intolerance  MUSCULOSKELETAL: No joint pain or swelling  PSYCHIATRIC: No depression, anxiety  HEME/LYMPH: No easy bruising, or bleeding gums  ALLERGY AND IMMUNOLOGIC: No hives or eczema	    MEDICATIONS  (STANDING):  albuterol/ipratropium for Nebulization 3 milliLiter(s) Nebulizer every 6 hours  atorvastatin 20 milliGRAM(s) Oral at bedtime  buDESOnide    Inhalation Suspension 0.25 milliGRAM(s) Inhalation two times a day  cholecalciferol 1000 Unit(s) Oral daily  cyclopentolate 1% Solution 1 Drop(s) Both EYES two times a day  EPINEPHrine    Injectable 0.3 milliGRAM(s) IntraMuscular once  ferrous    sulfate 325 milliGRAM(s) Oral daily  FLUoxetine 20 milliGRAM(s) Oral daily  furosemide   Injectable 20 milliGRAM(s) IV Push daily  heparin   Injectable 5000 Unit(s) SubCutaneous every 12 hours  influenza  Vaccine (HIGH DOSE) 0.7 milliLiter(s) IntraMuscular once  losartan 25 milliGRAM(s) Oral daily  mirtazapine 7.5 milliGRAM(s) Oral at bedtime  piperacillin/tazobactam IVPB.. 3.375 Gram(s) IV Intermittent every 8 hours  polyethylene glycol 3350 17 Gram(s) Oral two times a day  potassium chloride    Tablet ER 40 milliEquivalent(s) Oral once  prednisoLONE acetate 1% Suspension 1 Drop(s) Both EYES two times a day  senna 1 Tablet(s) Oral two times a day  spironolactone 12.5 milliGRAM(s) Oral daily  traZODone 50 milliGRAM(s) Oral daily    MEDICATIONS  (PRN):  acetaminophen     Tablet .. 650 milliGRAM(s) Oral every 6 hours PRN Temp greater or equal to 38C (100.4F), Mild Pain (1 - 3)  melatonin 3 milliGRAM(s) Oral at bedtime PRN Insomnia  ondansetron Injectable 4 milliGRAM(s) IV Push every 8 hours PRN Nausea and/or Vomiting  temazepam 30 milliGRAM(s) Oral at bedtime PRN Insomnia      Vital Signs Last 24 Hrs  T(C): 36.2 (27 Mar 2024 08:58), Max: 36.8 (26 Mar 2024 21:09)  T(F): 97.2 (27 Mar 2024 08:58), Max: 98.3 (26 Mar 2024 21:09)  HR: 92 (27 Mar 2024 08:58) (92 - 105)  BP: 122/69 (27 Mar 2024 08:58) (116/65 - 135/84)  BP(mean): --  RR: 16 (27 Mar 2024 08:58) (16 - 18)  SpO2: 95% (27 Mar 2024 08:58) (94% - 100%)    Parameters below as of 27 Mar 2024 08:58  Patient On (Oxygen Delivery Method): nasal cannula  O2 Flow (L/min): 2    CAPILLARY BLOOD GLUCOSE        I&O's Summary    26 Mar 2024 07:01  -  27 Mar 2024 07:00  --------------------------------------------------------  IN: 560 mL / OUT: 1950 mL / NET: -1390 mL          Appearance: Normal	  HEENT:   Normal oral mucosa, PERRL, EOMI	  Lymphatic: No lymphadenopathy  Cardiovascular: Normal S1 S2, No JVD  Respiratory: Lungs clear to auscultation	  Gastrointestinal:  Soft, Non-tender, + BS	  Skin: No rash, No ecchymoses	  Extremities:     LABS:                        9.2    6.93  )-----------( 211      ( 27 Mar 2024 07:16 )             29.7     03-27    142  |  103  |  8   ----------------------------<  76  3.4<L>   |  25  |  0.91    Ca    8.4      27 Mar 2024 07:16            Urinalysis Basic - ( 27 Mar 2024 07:16 )    Color: x / Appearance: x / SG: x / pH: x  Gluc: 76 mg/dL / Ketone: x  / Bili: x / Urobili: x   Blood: x / Protein: x / Nitrite: x   Leuk Esterase: x / RBC: x / WBC x   Sq Epi: x / Non Sq Epi: x / Bacteria: x                      Consultant(s) Notes Reviewed:      Care Discussed with Consultants/Other Providers:

## 2024-03-28 LAB
ANION GAP SERPL CALC-SCNC: 13 MMOL/L — SIGNIFICANT CHANGE UP (ref 5–17)
BUN SERPL-MCNC: 9 MG/DL — SIGNIFICANT CHANGE UP (ref 7–23)
CALCIUM SERPL-MCNC: 8.6 MG/DL — SIGNIFICANT CHANGE UP (ref 8.4–10.5)
CHLORIDE SERPL-SCNC: 103 MMOL/L — SIGNIFICANT CHANGE UP (ref 96–108)
CO2 SERPL-SCNC: 27 MMOL/L — SIGNIFICANT CHANGE UP (ref 22–31)
CREAT SERPL-MCNC: 0.83 MG/DL — SIGNIFICANT CHANGE UP (ref 0.5–1.3)
EGFR: 68 ML/MIN/1.73M2 — SIGNIFICANT CHANGE UP
GLUCOSE SERPL-MCNC: 88 MG/DL — SIGNIFICANT CHANGE UP (ref 70–99)
HCT VFR BLD CALC: 29.8 % — LOW (ref 34.5–45)
HGB BLD-MCNC: 9 G/DL — LOW (ref 11.5–15.5)
MCHC RBC-ENTMCNC: 28.5 PG — SIGNIFICANT CHANGE UP (ref 27–34)
MCHC RBC-ENTMCNC: 30.2 GM/DL — LOW (ref 32–36)
MCV RBC AUTO: 94.3 FL — SIGNIFICANT CHANGE UP (ref 80–100)
NRBC # BLD: 0 /100 WBCS — SIGNIFICANT CHANGE UP (ref 0–0)
PLATELET # BLD AUTO: 226 K/UL — SIGNIFICANT CHANGE UP (ref 150–400)
POTASSIUM SERPL-MCNC: 3.5 MMOL/L — SIGNIFICANT CHANGE UP (ref 3.5–5.3)
POTASSIUM SERPL-SCNC: 3.5 MMOL/L — SIGNIFICANT CHANGE UP (ref 3.5–5.3)
RBC # BLD: 3.16 M/UL — LOW (ref 3.8–5.2)
RBC # FLD: 14.4 % — SIGNIFICANT CHANGE UP (ref 10.3–14.5)
SODIUM SERPL-SCNC: 143 MMOL/L — SIGNIFICANT CHANGE UP (ref 135–145)
WBC # BLD: 5.05 K/UL — SIGNIFICANT CHANGE UP (ref 3.8–10.5)
WBC # FLD AUTO: 5.05 K/UL — SIGNIFICANT CHANGE UP (ref 3.8–10.5)

## 2024-03-28 RX ORDER — TEMAZEPAM 15 MG/1
30 CAPSULE ORAL AT BEDTIME
Refills: 0 | Status: DISCONTINUED | OUTPATIENT
Start: 2024-03-28 | End: 2024-04-03

## 2024-03-28 RX ORDER — PIPERACILLIN AND TAZOBACTAM 4; .5 G/20ML; G/20ML
3.38 INJECTION, POWDER, LYOPHILIZED, FOR SOLUTION INTRAVENOUS ONCE
Refills: 0 | Status: COMPLETED | OUTPATIENT
Start: 2024-03-28 | End: 2024-03-28

## 2024-03-28 RX ORDER — PIPERACILLIN AND TAZOBACTAM 4; .5 G/20ML; G/20ML
3.38 INJECTION, POWDER, LYOPHILIZED, FOR SOLUTION INTRAVENOUS EVERY 8 HOURS
Refills: 0 | Status: DISCONTINUED | OUTPATIENT
Start: 2024-03-28 | End: 2024-03-29

## 2024-03-28 RX ADMIN — HEPARIN SODIUM 5000 UNIT(S): 5000 INJECTION INTRAVENOUS; SUBCUTANEOUS at 06:33

## 2024-03-28 RX ADMIN — PIPERACILLIN AND TAZOBACTAM 25 GRAM(S): 4; .5 INJECTION, POWDER, LYOPHILIZED, FOR SOLUTION INTRAVENOUS at 13:18

## 2024-03-28 RX ADMIN — LOSARTAN POTASSIUM 25 MILLIGRAM(S): 100 TABLET, FILM COATED ORAL at 06:33

## 2024-03-28 RX ADMIN — MIRTAZAPINE 7.5 MILLIGRAM(S): 45 TABLET, ORALLY DISINTEGRATING ORAL at 21:46

## 2024-03-28 RX ADMIN — SENNA PLUS 1 TABLET(S): 8.6 TABLET ORAL at 06:33

## 2024-03-28 RX ADMIN — PIPERACILLIN AND TAZOBACTAM 200 GRAM(S): 4; .5 INJECTION, POWDER, LYOPHILIZED, FOR SOLUTION INTRAVENOUS at 11:22

## 2024-03-28 RX ADMIN — Medication 3 MILLILITER(S): at 11:22

## 2024-03-28 RX ADMIN — Medication 0.25 MILLIGRAM(S): at 18:47

## 2024-03-28 RX ADMIN — Medication 1000 UNIT(S): at 11:23

## 2024-03-28 RX ADMIN — POLYETHYLENE GLYCOL 3350 17 GRAM(S): 17 POWDER, FOR SOLUTION ORAL at 06:34

## 2024-03-28 RX ADMIN — CYCLOPENTOLATE HYDROCHLORIDE 1 DROP(S): 10 SOLUTION/ DROPS OPHTHALMIC at 06:39

## 2024-03-28 RX ADMIN — Medication 325 MILLIGRAM(S): at 11:23

## 2024-03-28 RX ADMIN — Medication 1 DROP(S): at 06:34

## 2024-03-28 RX ADMIN — Medication 20 MILLIGRAM(S): at 06:34

## 2024-03-28 RX ADMIN — Medication 650 MILLIGRAM(S): at 19:56

## 2024-03-28 RX ADMIN — Medication 0.25 MILLIGRAM(S): at 06:34

## 2024-03-28 RX ADMIN — ATORVASTATIN CALCIUM 20 MILLIGRAM(S): 80 TABLET, FILM COATED ORAL at 21:46

## 2024-03-28 RX ADMIN — SENNA PLUS 1 TABLET(S): 8.6 TABLET ORAL at 18:16

## 2024-03-28 RX ADMIN — Medication 3 MILLILITER(S): at 18:16

## 2024-03-28 RX ADMIN — HEPARIN SODIUM 5000 UNIT(S): 5000 INJECTION INTRAVENOUS; SUBCUTANEOUS at 18:17

## 2024-03-28 RX ADMIN — SPIRONOLACTONE 12.5 MILLIGRAM(S): 25 TABLET, FILM COATED ORAL at 06:34

## 2024-03-28 RX ADMIN — CYCLOPENTOLATE HYDROCHLORIDE 1 DROP(S): 10 SOLUTION/ DROPS OPHTHALMIC at 18:47

## 2024-03-28 RX ADMIN — Medication 650 MILLIGRAM(S): at 21:46

## 2024-03-28 RX ADMIN — Medication 50 MILLIGRAM(S): at 11:21

## 2024-03-28 RX ADMIN — Medication 3 MILLILITER(S): at 06:33

## 2024-03-28 RX ADMIN — PIPERACILLIN AND TAZOBACTAM 25 GRAM(S): 4; .5 INJECTION, POWDER, LYOPHILIZED, FOR SOLUTION INTRAVENOUS at 21:46

## 2024-03-28 RX ADMIN — Medication 1 DROP(S): at 18:17

## 2024-03-28 RX ADMIN — Medication 20 MILLIGRAM(S): at 11:23

## 2024-03-28 NOTE — PROGRESS NOTE ADULT - SUBJECTIVE AND OBJECTIVE BOX
BISHNU SEGURA  MRN#: [id number]  Subjective:  pulmonary progress note: :acute hypoxic respiratory Failure on 02, sarcoidosis , COPD , Diastolic dysfunction , pulmonary HTN date of service is 3/28/2024   This is an 89 y/o female w/ PMH pulmonary sarcoidosis, COPD not on home O2, CAD, hypothyroidism, and HTN presenting for lack of BM in the last 5 days along with SOB and L flank pain from the atria. The patient does endorse some left-sided upper abdominal pain but doesn't endorse any other symptoms. She states that she doesn't have any SOB, and states that her last BM prior to the hospital was yesterday. Called the patient's daughter Deborah, who stated that she was told the patient was having severe abdominal pain as well as some vomiting.   She was admitted here from 2/8-2/21 for abdominal pain, constipation and vomiting. At the time, CT was suggestive of diverticulitis as well and her course was complicated by fluid overload requiring IVP lasix. She was treated with IV Zosyn for 7 days. During this hospital course, she was also found to have a dilated L renal pelvis w/ +UA, but was ruled not have UTI by ID. Urology was consulted, who stated that her pessary could be the cause of the hydro, and removed it on 2/13.     In the ED, she was febrile to 101.8, tachycardic to 130, hypotensive to 80/50, and hypoxic requiring 4LNC. CBC revealed leukocytosis of 29.35, normocytic anemia of 9.5. CMP w/ elevated AG of 24, low bicarb of 17, . Presenting VBG w/ lactate of 9.4. she was straight catheterized for UA, which was red and turbid w/ large LE and >50 RBC/hpf, negative for bacteria. Initially CT A/P w/o contrast was done (due to contrast allergy), which revealed a possible mild diverticulitis of the descending colon and unchanged severe L hydroureter with layering hypodensity which could be stones or blood products. She was given Ceftriaxone and Zosyn, 2.5L IVNS. Lactate down to 3.5, BP improved. She had 2 dark tarry stools in her diaper, so she was premedicated w/ solumedrol and benadryl and CTA Abdomen/pelvis was ordered.  (21 Mar 2024 01:30), patient was SOB needed high 02 supplement , CT scan acute Diverticulitis , afebrile down to nasal canula , on antibiotics , less distress no nausea or vomiting no headache S/P blood transfusion , H/H is stable no chest pain no SOB at rest , no fever no chest pain seen and examined with daughter at bed side no GI symptoms , discuss patient condition with daughter all question were answered , no over night events 02 saturation is still on the low side will continue bronchodilator and try to wean off 02 safely , no over night events adding Flovent nebulizer 0.25 mg BID , no new C/P trial to wean off 02 to acceptable RA on going low potasium to give supplement , , potasium is better keep k+ > 4 continue weaning trial off 02     PAST MEDICAL & SURGICAL HISTORY:  HTN (hypertension)      Hypothyroidism      Osteoporosis  pulmonary hypertension   H/O anxiety     CAD (coronary artery disease)  sarcoidosis   COPD           FAMILY HISTORY:  Review of Systems:  Review of Systems: Review of Systems:   CONSTITUTIONAL: + fever,+ weight loss  EYES: No eye pain, visual disturbances, or discharge  RESPIRATORY: + SOB. No cough, wheezing, chills or hemoptysis  CARDIOVASCULAR: No chest pain, palpitations, dizziness, or leg swelling  GASTROINTESTINAL: +abdominal pain. No nausea, vomiting, or hematemesis; No diarrhea or constipation. No melena or hematochezia.  GENITOURINARY: +L flank pain, hematuria; No dysuria, frequency, or incontinence  NEUROLOGICAL: No headaches, memory loss, loss of strength, numbness, or tremors  SKIN: No itching, burning, rashes, or lesions   MUSCULOSKELETAL: No joint pain or swelling; No muscle, back pain  PSYCHIATRIC: + depression,+ anxiety,+ mood swings, or difficulty ,             OBJECTIVE:  ICU Vital Signs Last 24 Hrs  T(C): 36.8 (22 Mar 2024 16:23), Max: 36.9 (22 Mar 2024 00:28)  T(F): 98.2 (22 Mar 2024 16:23), Max: 98.4 (22 Mar 2024 00:28)  HR: 102 (22 Mar 2024 16:23) (82 - 102)  BP: 145/76 (22 Mar 2024 16:23) (94/51 - 145/76)  BP(mean): --  ABP: --  ABP(mean): --  RR: 20 (22 Mar 2024 16:23) (19 - 20)  SpO2: 93% (22 Mar 2024 16:23) (93% - 96%)    O2 Parameters below as of 22 Mar 2024 16:23  Patient On (Oxygen Delivery Method): nasal cannula  O2 Flow (L/min): 2          03-21 @ 07:01  -  03-22 @ 07:00  --------------------------------------------------------  IN: 240 mL / OUT: 1850 mL / NET: -1610 mL      PHYSICAL EXAM: Daily Height in cm: 157.48 (20 Mar 2024 16:54)  Elderly agitated cachectic female on 2 liter nasal  02 saturation is 93%  Daily   HEENT:     + NCAT  + EOMI  - Conjuctival edema   - Icterus   - Thrush   - ETT  - NGT/OGT  Neck:         + FROM    + JVD     - Nodes     - Masses    + Mid-line trachea   - Tracheostomy  Chest:          pectus excavatum , increase A-P diameter   Lungs:          + CTA   + Rhonchi    - Rales    - Wheezing   + Decreased BS   - Dullness R L  Cardiac:       + S1 + S2    + RRR   - Irregular   - S3  - S4  + Murmurs   - Rub   - Hamman’s sign   Abdomen:    + BS     + Soft    + tender left lower quadrant   - Distended  - Organomegaly  - PEG  Extremities:   - Cyanosis U/L   - Clubbing  U/L  - LE/UE Edema   + Capillary refill    + Pulses   Neuro:        + Awake   +  Alert   - Confused   - Lethargic   - Sedated   - Generalized Weakness  Skin:        - Rashes    - Erythema   + Normal incisions   + IV sites intact  - Sacral decubit      HOSPITAL MEDICATIONS: All mediciations reviewed and analyzed  MEDICATIONS  (STANDING):  albuterol/ipratropium for Nebulization 3 milliLiter(s) Nebulizer every 6 hours  atorvastatin 20 milliGRAM(s) Oral at bedtime  cholecalciferol 1000 Unit(s) Oral daily  cyclopentolate 1% Solution 1 Drop(s) Both EYES two times a day  EPINEPHrine    Injectable 0.3 milliGRAM(s) IntraMuscular once  ferrous    sulfate 325 milliGRAM(s) Oral daily  FLUoxetine 20 milliGRAM(s) Oral daily  influenza  Vaccine (HIGH DOSE) 0.7 milliLiter(s) IntraMuscular once  mirtazapine 7.5 milliGRAM(s) Oral at bedtime  piperacillin/tazobactam IVPB.. 3.375 Gram(s) IV Intermittent every 8 hours  polyethylene glycol 3350 17 Gram(s) Oral two times a day  potassium chloride    Tablet ER 20 milliEquivalent(s) Oral daily  prednisoLONE acetate 1% Suspension 1 Drop(s) Both EYES two times a day  senna 1 Tablet(s) Oral two times a day  sodium chloride 0.9%. 1000 milliLiter(s) (60 mL/Hr) IV Continuous <Continuous>  traZODone 50 milliGRAM(s) Oral daily    MEDICATIONS  (PRN):  acetaminophen     Tablet .. 650 milliGRAM(s) Oral every 6 hours PRN Temp greater or equal to 38C (100.4F), Mild Pain (1 - 3)  melatonin 3 milliGRAM(s) Oral at bedtime PRN Insomnia  ondansetron Injectable 4 milliGRAM(s) IV Push every 8 hours PRN Nausea and/or Vomiting  temazepam 30 milliGRAM(s) Oral at bedtime PRN Insomnia    LABS: All Lab data reviewed and analyzed                                    9.0    5.05  )-----------( 226      ( 28 Mar 2024 07:16 )             29.8   03-28    143  |  103  |  9   ----------------------------<  88  3.5   |  27  |  0.83    Ca    8.6      28 Mar 2024 07:22      Ca    8.3<L>      27 Mar 2024 10:44      Ca    8.1<L>      23 Mar 2024 06:52  Phos  3.0     03-23  Mg     2.2     03-23      Ca    8.2<L>      22 Mar 2024 07:34    TPro  4.6<L>  /  Alb  2.8<L>  /  TBili  0.3  /  DBili  x   /  AST  28  /  ALT  9<L>  /  AlkPhos  103  03-21     LIVER FUNCTIONS - ( 21 Mar 2024 07:05 )  Alb: 2.8 g/dL / Pro: 4.6 g/dL / ALK PHOS: 103 U/L / ALT: 9 U/L / AST: 28 U/L / GGT: x           RADIOLOGY: - Reviewed and analyzed

## 2024-03-28 NOTE — SWALLOW BEDSIDE ASSESSMENT ADULT - PHARYNGEAL PHASE
Timely initiation of pharyngeal swallow with reduced hyo-laryngeal elevation upon palpation. No overt s/s of laryngeal penetration/aspiration or changes to vocal quality noted during/after PO trials.

## 2024-03-28 NOTE — SWALLOW BEDSIDE ASSESSMENT ADULT - COMMENTS
3/21: GOC- MOLST discussed; pt remains full code. ED note pt w/ anaphylactic reaction in CT and received EPI and pepcid. Pulm following for acute hypoxic respiratory Failure on 02, sarcoidosis , COPD , Diastolic dysfunction , pulmonary HTN.  3/22- pt w/ black tarry BM and red colored urine in [place]. ID consulted awaiting urine cultures; pulm recommending nebulizer treatement Duoneb q 6 hrs.  3/23-GI consulted for anemia; recommending PPI BID, hx of PUD and worsening anemia plan for EGD Monday (NPO sunday), antibiotics per primary team for treatment of diverticulitis        CT A/P w/o contrast was done (due to contrast allergy), which revealed a possible mild diverticulitis of the descending colon and unchanged severe L hydroureter with layering hyperdensity which could be stones or blood products 3/21: GOC- MOLST discussed; pt remains full code. ED note pt w/ anaphylactic reaction in CT and received EPI and pepcid. Pulm following for acute hypoxic respiratory Failure on 02, sarcoidosis , COPD , Diastolic dysfunction , pulmonary HTN.  3/22- pt w/ black tarry BM and red colored urine in [place]. ID consulted awaiting urine cultures; pulm recommending nebulizer treatement Duoneb q 6 hrs.  3/23-GI consulted for anemia; recommending PPI BID, hx of PUD and worsening anemia plan for EGD Monday (NPO sunday), antibiotics per primary team for treatment of diverticulitis  3/24-GI: discussed with daughter at bedside. Will hold off on EGD for now since patient has had no overt GI bleeding and she remains on supplemental O2. Family expressing concern re hematuria would want to focus on work up for that  3/26- hypoxic with increased O2 needs (from 2L-3L)  3/27- followed by cardiology given advanced age, cachetic state. Doubt if pt is an ideal candidate for any intervention. Provider note reporting daughter's frustration with mother's care; RRT called as patient started to choke, gasping for breath, and blushing. Half of potasium pill retrieved from oral cavity. Choking maneuver was performed. Patient stopped coughing and recovered.     Imaging   CT A/P w/o contrast was done (due to contrast allergy), which revealed a possible mild diverticulitis of the descending colon and unchanged severe L hydroureter with layering hyperdensity which could be stones or blood products  *Patient known to this service. Bedside swallow evaluation completed 10/1/23:Pt presents with a mild oropharyngeal dysphagia vs presbyphagia, with slightly effortful mastication of regular solids and suspected delay in pharyngeal swallow trigger across consistencies. No sign of aspiration observed. Swallow profile appears functional for a regular texture diet." 3/21: GOC- MOLST discussed; pt remains full code. ED note pt w/ anaphylactic reaction in CT and received EPI and pepcid. Pulm following for acute hypoxic respiratory Failure on 02, sarcoidosis , COPD , Diastolic dysfunction , pulmonary HTN.  3/22- pt w/ black tarry BM and red colored urine in [place]. ID consulted awaiting urine cultures; pulm recommending nebulizer treatement Duoneb q 6 hrs.  3/23-GI consulted for anemia; recommending PPI BID, hx of PUD and worsening anemia plan for EGD Monday (NPO sunday), antibiotics per primary team for treatment of diverticulitis  3/24-GI: discussed with daughter at bedside. Will hold off on EGD for now since patient has had no overt GI bleeding and she remains on supplemental O2. Family expressing concern re hematuria would want to focus on work up for that  3/26- hypoxic with increased O2 needs (from 2L-3L)  3/27- followed by cardiology given advanced age, cachetic state. Doubt if pt is an ideal candidate for any intervention. Provider note reporting daughter's frustration with mother's care; RRT called as patient started to choke, gasping for breath, and blushing. Half of potasium pill retrieved from oral cavity. Choking maneuver was performed. Patient stopped coughing and recovered.   Imaging   CT A/P w/o contrast: Distal transverse through sigmoid colon wall thickening and mild inflammatory changes, most likely representing colitis and diverticulitis. Patent mesenteric vessels. No pneumatosis or free air.  CXR- Small bilateral pleural effusions with bibasilar atelectasis. Mild pulmonary vascular congestion.    *Patient known to this service. Bedside swallow evaluation completed 10/1/23:Pt presents with a mild oropharyngeal dysphagia vs presbyphagia, with slightly effortful mastication of regular solids and suspected delay in pharyngeal swallow trigger across consistencies. No sign of aspiration observed. Swallow profile appears functional for a regular texture diet."

## 2024-03-28 NOTE — SWALLOW BEDSIDE ASSESSMENT ADULT - SWALLOW EVAL: DIAGNOSIS
88Y F w/ PMH of CAD and COPD, admitted w/ CTAP suggestive of diverticulitis but no abscess. S/p ceftriaxone (2/8) and Zosyn (2/8-15) Previously w/ concerns for diarrhea, now stopped. Patient presenting with evidence of an tip-pharyngeal dysphagia vs. presbyphagia. Oral phase notable for slow, but adequate mastication of regular solids with slightly prolonged oral transit time. Pharyngeal phase notable for timely initiation of pharyngeal swallow triggered and reduced hyo-laryngeal excursion upon palpation. No overt s/s of laryngeal penetration/aspiration noted on trials of thin liquids via cup sips/straw, purees, and regular solids. Patient denies any hx of dysphagia or odynophagia. HHA reported patient with good intake during breakfast and lunch without coughing/throat clearing.

## 2024-03-28 NOTE — SWALLOW BEDSIDE ASSESSMENT ADULT - SLP PERTINENT HISTORY OF CURRENT PROBLEM
87 y/o female w/ PMHx pulmonary sarcoidosis, COPD not on home O2, CAD, hypothyroidism, and HTN presenting for lack of BM in the last 5 days along with SOB and L flank pain from the atria. The patient does endorse some left-sided upper abdominal pain but doesn't endorse any other symptoms. She states that she doesn't have any SOB, and states that her last BM prior to the hospital was yesterday. Called the patient's daughter Deborah, who stated that she was told the patient was having severe abdominal pain as well as some vomiting.

## 2024-03-28 NOTE — SWALLOW BEDSIDE ASSESSMENT ADULT - SLP GENERAL OBSERVATIONS
Patient received seated upright in bed, on 3L NC, A&Ox3, + tele, +[place]. Patient required max verbal encouragement to participate in bedside PO trials.

## 2024-03-28 NOTE — PROGRESS NOTE ADULT - SUBJECTIVE AND OBJECTIVE BOX
Date of Service: 03-28-24 @ 07:52           CARDIOLOGY     PROGRESS  NOTE   ________________________________________________    CHIEF COMPLAINT:Patient is a 88y old  Female who presents with a chief complaint of Diverticulitis, leukocytosis (27 Mar 2024 13:31)  events noted  	  REVIEW OF SYSTEMS:  CONSTITUTIONAL: No fever, weight loss, or fatigue  EYES: No eye pain, visual disturbances, or discharge  ENT:  No difficulty hearing, tinnitus, vertigo; No sinus or throat pain  NECK: No pain or stiffness  RESPIRATORY: No cough, wheezing, chills or hemoptysis; No Shortness of Breath  CARDIOVASCULAR: No chest pain, palpitations, passing out, dizziness, or leg swelling  GASTROINTESTINAL: No abdominal or epigastric pain. No nausea, vomiting, or hematemesis; No diarrhea or constipation. No melena or hematochezia.  GENITOURINARY: No dysuria, frequency, hematuria, or incontinence  NEUROLOGICAL: No headaches, memory loss, loss of strength, numbness, or tremors  SKIN: No itching, burning, rashes, or lesions   LYMPH Nodes: No enlarged glands  ENDOCRINE: No heat or cold intolerance; No hair loss  MUSCULOSKELETAL: No joint pain or swelling; No muscle, back, or extremity pain  PSYCHIATRIC: No depression, anxiety, mood swings, or difficulty sleeping  HEME/LYMPH: No easy bruising, or bleeding gums  ALLERGY AND IMMUNOLOGIC: No hives or eczema	    [ ] All others negative	  [x ] Unable to obtain    PHYSICAL EXAM:  T(C): 36.4 (03-28-24 @ 06:30), Max: 37.3 (03-27-24 @ 18:57)  HR: 83 (03-28-24 @ 06:30) (83 - 101)  BP: 120/70 (03-28-24 @ 06:30) (112/67 - 128/82)  RR: 18 (03-28-24 @ 06:30) (16 - 20)  SpO2: 95% (03-28-24 @ 06:30) (90% - 95%)  Wt(kg): --  I&O's Summary    27 Mar 2024 07:01  -  28 Mar 2024 07:00  --------------------------------------------------------  IN: 0 mL / OUT: 1300 mL / NET: -1300 mL        Appearance: Normal	  HEENT:   Normal oral mucosa, PERRL, EOMI	  Lymphatic: No lymphadenopathy  Cardiovascular: Normal S1 S2, No JVD, + murmurs, No edema  Respiratory:rhomnchi  Psychiatryanxious  Gastrointestinal:  Soft, Non-tender, + BS	  Skin: No rashes, No ecchymoses, No cyanosis	  Neurologic: Non-focal  Extremities: Normal range of motion, No clubbing, cyanosis or edema  Vascular: Peripheral pulses palpable 2+ bilaterally    MEDICATIONS  (STANDING):  albuterol/ipratropium for Nebulization 3 milliLiter(s) Nebulizer every 6 hours  atorvastatin 20 milliGRAM(s) Oral at bedtime  buDESOnide    Inhalation Suspension 0.25 milliGRAM(s) Inhalation two times a day  cholecalciferol 1000 Unit(s) Oral daily  cyclopentolate 1% Solution 1 Drop(s) Both EYES two times a day  EPINEPHrine    Injectable 0.3 milliGRAM(s) IntraMuscular once  ferrous    sulfate 325 milliGRAM(s) Oral daily  FLUoxetine 20 milliGRAM(s) Oral daily  furosemide   Injectable 20 milliGRAM(s) IV Push daily  heparin   Injectable 5000 Unit(s) SubCutaneous every 12 hours  influenza  Vaccine (HIGH DOSE) 0.7 milliLiter(s) IntraMuscular once  losartan 25 milliGRAM(s) Oral daily  mirtazapine 7.5 milliGRAM(s) Oral at bedtime  polyethylene glycol 3350 17 Gram(s) Oral two times a day  prednisoLONE acetate 1% Suspension 1 Drop(s) Both EYES two times a day  senna 1 Tablet(s) Oral two times a day  spironolactone 12.5 milliGRAM(s) Oral daily  traZODone 50 milliGRAM(s) Oral daily      TELEMETRY: 	    ECG:  	  RADIOLOGY:  OTHER: 	  	  LABS:	 	    CARDIAC MARKERS:                          9.0    5.05  )-----------( 226      ( 28 Mar 2024 07:16 )             29.8     03-27    141  |  103  |  8   ----------------------------<  110<H>  3.0<L>   |  25  |  0.81    Ca    8.3<L>      27 Mar 2024 10:44      proBNP:   Lipid Profile:   HgA1c:   TSH:       < from: TTE W or WO Ultrasound Enhancing Agent (03.26.24 @ 15:34) >   1. Left ventricular systolic function is low normal with an ejection fraction of 50 % by Enrique's method of disks.   2. Entire septum, entire inferior wall, and apical anterior segment are abnormal.   3. Normal right ventricular cavity size and mildly reduced systolic function.   4. Ruptured chordae tendineae to the anterior mitral leaflet.   5. Trace mitral regurgitation.   6. No prior echocardiogram is available for comparison.      Assessment and plan  ---------------------------  This is an 89 y/o female w/ PMHx pulmonary sarcoidosis, COPD not on home O2, CAD, hypothyroidism, and HTN presenting for lack of BM in the last 5 days along with SOB and L flank pain from the atria. The patient does endorse some left-sided upper abdominal pain but doesn't endorse any other symptoms. She states that she doesn't have any SOB, and states that her last BM prior to the hospital was yesterday. Called the patient's daughter Deborah, who stated that she was told the patient was having severe abdominal pain as well as some vomiting.   She was admitted here from 2/8-2/21 for abdominal pain, constipation and vomiting. At the time, CT was suggestive of diverticulitis as well and her course was complicated by fluid overload requiring IVP lasix. She was treated with IV Zosyn for 7 days. During this hospital course, she was also found to have a dilated L renal pelvis w/ +UA, but was ruled not have UTI by ID. Urology was consulted, who stated that her pessary could be the cause of the hydro, and removed it on 2/13.   In the ED, she was febrile to 101.8, tachycardic to 130, hypotensive to 80/50, and hypoxic requiring 4LNC. CBC revealed leukocytosis of 29.35, normocytic anemia of 9.5. CMP w/ elevated AG of 24, low bicarb of 17, . Presenting VBG w/ lactate of 9.4. she was straight catheterized for UA, which was red and turvid w/ large LE and >50 RBC/hpf, negative for bacteria. Initially CT A/P w/o contrast was done (due to contrast allergy), which revealed a possible mild diverticulitis of the descending colon and unchanged severe L hydroureter with layering hyperdensity  which could be stones or blood products. She was given Ceftriaxone and Zosyn, 2.5L IVNS. Lactate down to 3.5, BP improved. She had 2 dark tarry stools in her diaper, so she was premedicated w/ solumedrol and benadryl and CTA Abdomen/pelvis was ordered.  pt is well known to me with hx of sarcoidosis, cad with abdominal pain, she was recently dc sec to Diverticulitis  IN er pt with hypotension and hypoxia.  pt with sig abnormal ecg,,   dvt prophylaxis  asa if no contraindication  check QTC on Psych meds  fu hgb , GI noted/ urology  events noted with increase sob, chest x ray with vascular congestion sec to chf  echo noted with  Ruptured chordae tendineae to the anterior mitral leaflet, and sig regional wall motion abnormality ?ischemia  not sure pt is a candidate for any aggressive/ invasive cardiac procedure  dc ivf  add small dose of diuretics GOC  add lasix and aldactone awaiting blood work today  transferred to tele  spoke to the daughters, pt was told by Dr Noe the herat is great 2 weeks proior to admission , tried to get in touch message left  ?pt candidate fot ischemia dow  speech and swallow

## 2024-03-28 NOTE — PROGRESS NOTE ADULT - ASSESSMENT
88 F        h/o   CAD,   pulmonary sarcoidosis, COPD ,  HTN,        Ct  a/p.   2/24,  diverticulitis ,  sigmoid  colon, severe  L hydroureter/  mod  L   hydronephrosis    comp fx  L  4.  vaginal  pessary           admitted with high wbc/  lactate .  from  uti/    ecoli,  seen  by Ia  and  diverticulitis on ct   CT A/P angio 3/21/ 24,  colitis and  diverticulitis. patent  mesenteric vessels. No pneumatosis or free air.    c/.c  anemia     CAD    HTN     c/c  anemia   on iv  zosyn ,  for   2  weeks ,   seen by  NIDIA luna      sob. form acute  diastolic chf       Echo,  normal  ef/  RV  dysfunction,  mlple  wall  motion abnormalities/  mild  MR    f/p  by card/  given  advanced  age ,,  bed  bound  sttaus,  cachetic  state. doubt  if  pt is an ideal  candidate   for  any intervention      events noted/ s/p  rrt    on diuretics/  swallow  eval, pending    dvt ppx          rad< from: TTE W or WO Ultrasound Enhancing Agent (03.26.24 @ 15:34) >  CONCLUSIONS:      1. Left ventricular systolic function is low normal with an ejection fraction of 50 % by Enrique's method of disks.   2. Entire septum, entire inferior wall, and apical anterior segment are abnormal.   3. Normal right ventricular cavity size and mildly reduced systolic function.   4. Ruptured chordae tendineae to the anterior mitral leaflet.   5. Trace mitral regurgitation.   6. No prior echocardiogram is available for comparison.  _______________    < end of copied text >         rad< from: CT Abdomen and Pelvis No Cont (02.08.24 @ 18:01) >  BONES: Left hip ORIF. Lumbar levoscoliosis. Degenerative changes of the   spine. Age indeterminate compression deformity of L4. Grade 1   anterolisthesis of L4 on L5 and L5 on S1.  IMPRESSION:  Thickening of the sigmoid colon with mild inflammatory changes adjacent   to multiple colonic diverticula suggestive of acute diverticulitis.   Severe left hydroureter and moderate left hydronephrosis of uncertain   etiology.  --- End of Report ---

## 2024-03-28 NOTE — PROGRESS NOTE ADULT - ASSESSMENT
Assessment and Recommendation:   acute hypoxic respiratory Failure on 02   keep 02 > saturation > 92%  Fever and sepsis of urinary tract origin    Sarcoidosis   COPD continue nebulizer treatement   Paralysis of the Right hemidiaphragm   S/P blood transfusion   acute left pyelonephritis  Acute Diverticulitis    anemia of chronic disease   protein caloric malnutrition   continue Antibiotics   potasium supplement keep > 4  nebulizer treatement Duoneb q 6 hrs   Flovent 0.25 BID   DVT and GI prophylaxis   care discuss with NP on the floor

## 2024-03-28 NOTE — PROGRESS NOTE ADULT - SUBJECTIVE AND OBJECTIVE BOX
date of service: 03-28-24 @ 09:30  alessia,  events noted  REVIEW OF SYSTEMS:  CONSTITUTIONAL: No fever,  no  weight loss  ENT:  No  tinnitus,   no   vertigo  NECK: No pain or stiffness  RESPIRATORY: No cough, wheezing, chills or hemoptysis;    No Shortness of Breath  CARDIOVASCULAR: No chest pain, palpitations, dizziness  GASTROINTESTINAL: No abdominal or epigastric pain. No nausea, vomiting, or hematemesis; No diarrhea  No melena or hematochezia.  GENITOURINARY: No dysuria, frequency, hematuria, or incontinence  NEUROLOGICAL: No headaches  SKIN: No itching,  no   rash  LYMPH Nodes: No enlarged glands  ENDOCRINE: No heat or cold intolerance  MUSCULOSKELETAL: No joint pain or swelling  PSYCHIATRIC: No depression, anxiety  HEME/LYMPH: No easy bruising, or bleeding gums  ALLERGY AND IMMUNOLOGIC: No hives or eczema	    MEDICATIONS  (STANDING):  albuterol/ipratropium for Nebulization 3 milliLiter(s) Nebulizer every 6 hours  atorvastatin 20 milliGRAM(s) Oral at bedtime  buDESOnide    Inhalation Suspension 0.25 milliGRAM(s) Inhalation two times a day  cholecalciferol 1000 Unit(s) Oral daily  cyclopentolate 1% Solution 1 Drop(s) Both EYES two times a day  EPINEPHrine    Injectable 0.3 milliGRAM(s) IntraMuscular once  ferrous    sulfate 325 milliGRAM(s) Oral daily  FLUoxetine 20 milliGRAM(s) Oral daily  furosemide   Injectable 20 milliGRAM(s) IV Push daily  heparin   Injectable 5000 Unit(s) SubCutaneous every 12 hours  influenza  Vaccine (HIGH DOSE) 0.7 milliLiter(s) IntraMuscular once  losartan 25 milliGRAM(s) Oral daily  mirtazapine 7.5 milliGRAM(s) Oral at bedtime  polyethylene glycol 3350 17 Gram(s) Oral two times a day  prednisoLONE acetate 1% Suspension 1 Drop(s) Both EYES two times a day  senna 1 Tablet(s) Oral two times a day  spironolactone 12.5 milliGRAM(s) Oral daily  traZODone 50 milliGRAM(s) Oral daily    MEDICATIONS  (PRN):  acetaminophen     Tablet .. 650 milliGRAM(s) Oral every 6 hours PRN Temp greater or equal to 38C (100.4F), Mild Pain (1 - 3)  acetaminophen   Oral Liquid .. 650 milliGRAM(s) Oral once PRN Mild Pain (1 - 3), Moderate Pain (4 - 6)  melatonin 3 milliGRAM(s) Oral at bedtime PRN Insomnia  ondansetron Injectable 4 milliGRAM(s) IV Push every 8 hours PRN Nausea and/or Vomiting  temazepam 30 milliGRAM(s) Oral at bedtime PRN Insomnia      Vital Signs Last 24 Hrs  T(C): 36.4 (28 Mar 2024 06:30), Max: 37.3 (27 Mar 2024 18:57)  T(F): 97.5 (28 Mar 2024 06:30), Max: 99.2 (27 Mar 2024 20:49)  HR: 83 (28 Mar 2024 06:30) (83 - 101)  BP: 120/70 (28 Mar 2024 06:30) (112/67 - 128/82)  BP(mean): 82 (27 Mar 2024 20:49) (82 - 82)  RR: 18 (28 Mar 2024 06:30) (16 - 20)  SpO2: 95% (28 Mar 2024 06:30) (90% - 95%)    Parameters below as of 28 Mar 2024 06:30  Patient On (Oxygen Delivery Method): nasal cannula  O2 Flow (L/min): 3    CAPILLARY BLOOD GLUCOSE      POCT Blood Glucose.: 126 mg/dL (27 Mar 2024 17:52)    I&O's Summary    27 Mar 2024 07:01  -  28 Mar 2024 07:00  --------------------------------------------------------  IN: 0 mL / OUT: 1300 mL / NET: -1300 mL          Appearance: Normal	  HEENT:   Normal oral mucosa, PERRL, EOMI	  Lymphatic: No lymphadenopathy  Cardiovascular: Normal S1 S2, No JVD  Respiratory: Lungs clear to auscultation	  Gastrointestinal:  Soft, Non-tender, + BS	  Skin: No rash, No ecchymoses	  Extremities:     LABS:                        9.0    5.05  )-----------( 226      ( 28 Mar 2024 07:16 )             29.8     03-28    143  |  103  |  9   ----------------------------<  88  3.5   |  27  |  0.83    Ca    8.6      28 Mar 2024 07:22            Urinalysis Basic - ( 28 Mar 2024 07:22 )    Color: x / Appearance: x / SG: x / pH: x  Gluc: 88 mg/dL / Ketone: x  / Bili: x / Urobili: x   Blood: x / Protein: x / Nitrite: x   Leuk Esterase: x / RBC: x / WBC x   Sq Epi: x / Non Sq Epi: x / Bacteria: x                      Consultant(s) Notes Reviewed:      Care Discussed with Consultants/Other Providers:

## 2024-03-29 LAB
ANION GAP SERPL CALC-SCNC: 12 MMOL/L — SIGNIFICANT CHANGE UP (ref 5–17)
BUN SERPL-MCNC: 12 MG/DL — SIGNIFICANT CHANGE UP (ref 7–23)
CALCIUM SERPL-MCNC: 9 MG/DL — SIGNIFICANT CHANGE UP (ref 8.4–10.5)
CHLORIDE SERPL-SCNC: 102 MMOL/L — SIGNIFICANT CHANGE UP (ref 96–108)
CO2 SERPL-SCNC: 28 MMOL/L — SIGNIFICANT CHANGE UP (ref 22–31)
CREAT SERPL-MCNC: 0.97 MG/DL — SIGNIFICANT CHANGE UP (ref 0.5–1.3)
EGFR: 57 ML/MIN/1.73M2 — LOW
GLUCOSE SERPL-MCNC: 85 MG/DL — SIGNIFICANT CHANGE UP (ref 70–99)
HCT VFR BLD CALC: 29.5 % — LOW (ref 34.5–45)
HGB BLD-MCNC: 9.2 G/DL — LOW (ref 11.5–15.5)
MAGNESIUM SERPL-MCNC: 1.8 MG/DL — SIGNIFICANT CHANGE UP (ref 1.6–2.6)
MCHC RBC-ENTMCNC: 29 PG — SIGNIFICANT CHANGE UP (ref 27–34)
MCHC RBC-ENTMCNC: 31.2 GM/DL — LOW (ref 32–36)
MCV RBC AUTO: 93.1 FL — SIGNIFICANT CHANGE UP (ref 80–100)
NRBC # BLD: 0 /100 WBCS — SIGNIFICANT CHANGE UP (ref 0–0)
PHOSPHATE SERPL-MCNC: 4 MG/DL — SIGNIFICANT CHANGE UP (ref 2.5–4.5)
PLATELET # BLD AUTO: 224 K/UL — SIGNIFICANT CHANGE UP (ref 150–400)
POTASSIUM SERPL-MCNC: 3.1 MMOL/L — LOW (ref 3.5–5.3)
POTASSIUM SERPL-SCNC: 3.1 MMOL/L — LOW (ref 3.5–5.3)
RBC # BLD: 3.17 M/UL — LOW (ref 3.8–5.2)
RBC # FLD: 14.2 % — SIGNIFICANT CHANGE UP (ref 10.3–14.5)
SODIUM SERPL-SCNC: 142 MMOL/L — SIGNIFICANT CHANGE UP (ref 135–145)
WBC # BLD: 5.87 K/UL — SIGNIFICANT CHANGE UP (ref 3.8–10.5)
WBC # FLD AUTO: 5.87 K/UL — SIGNIFICANT CHANGE UP (ref 3.8–10.5)

## 2024-03-29 PROCEDURE — 71045 X-RAY EXAM CHEST 1 VIEW: CPT | Mod: 26

## 2024-03-29 PROCEDURE — 99232 SBSQ HOSP IP/OBS MODERATE 35: CPT

## 2024-03-29 PROCEDURE — 93010 ELECTROCARDIOGRAM REPORT: CPT

## 2024-03-29 RX ORDER — CEFUROXIME AXETIL 250 MG
500 TABLET ORAL EVERY 12 HOURS
Refills: 0 | Status: COMPLETED | OUTPATIENT
Start: 2024-03-29 | End: 2024-04-03

## 2024-03-29 RX ORDER — CHLORHEXIDINE GLUCONATE 213 G/1000ML
1 SOLUTION TOPICAL DAILY
Refills: 0 | Status: DISCONTINUED | OUTPATIENT
Start: 2024-03-29 | End: 2024-04-03

## 2024-03-29 RX ORDER — POTASSIUM CHLORIDE 20 MEQ
40 PACKET (EA) ORAL EVERY 4 HOURS
Refills: 0 | Status: COMPLETED | OUTPATIENT
Start: 2024-03-29 | End: 2024-03-29

## 2024-03-29 RX ADMIN — Medication 3 MILLILITER(S): at 00:48

## 2024-03-29 RX ADMIN — Medication 20 MILLIGRAM(S): at 11:51

## 2024-03-29 RX ADMIN — Medication 1 DROP(S): at 05:52

## 2024-03-29 RX ADMIN — SPIRONOLACTONE 12.5 MILLIGRAM(S): 25 TABLET, FILM COATED ORAL at 06:00

## 2024-03-29 RX ADMIN — Medication 3 MILLILITER(S): at 05:51

## 2024-03-29 RX ADMIN — Medication 325 MILLIGRAM(S): at 11:52

## 2024-03-29 RX ADMIN — Medication 3 MILLILITER(S): at 23:25

## 2024-03-29 RX ADMIN — Medication 50 MILLIGRAM(S): at 11:52

## 2024-03-29 RX ADMIN — Medication 650 MILLIGRAM(S): at 14:38

## 2024-03-29 RX ADMIN — Medication 3 MILLILITER(S): at 11:54

## 2024-03-29 RX ADMIN — PIPERACILLIN AND TAZOBACTAM 25 GRAM(S): 4; .5 INJECTION, POWDER, LYOPHILIZED, FOR SOLUTION INTRAVENOUS at 05:57

## 2024-03-29 RX ADMIN — HEPARIN SODIUM 5000 UNIT(S): 5000 INJECTION INTRAVENOUS; SUBCUTANEOUS at 05:52

## 2024-03-29 RX ADMIN — Medication 3 MILLILITER(S): at 17:42

## 2024-03-29 RX ADMIN — CYCLOPENTOLATE HYDROCHLORIDE 1 DROP(S): 10 SOLUTION/ DROPS OPHTHALMIC at 17:56

## 2024-03-29 RX ADMIN — MIRTAZAPINE 7.5 MILLIGRAM(S): 45 TABLET, ORALLY DISINTEGRATING ORAL at 21:06

## 2024-03-29 RX ADMIN — Medication 650 MILLIGRAM(S): at 21:06

## 2024-03-29 RX ADMIN — PIPERACILLIN AND TAZOBACTAM 25 GRAM(S): 4; .5 INJECTION, POWDER, LYOPHILIZED, FOR SOLUTION INTRAVENOUS at 13:40

## 2024-03-29 RX ADMIN — Medication 500 MILLIGRAM(S): at 17:37

## 2024-03-29 RX ADMIN — Medication 40 MILLIEQUIVALENT(S): at 09:56

## 2024-03-29 RX ADMIN — Medication 20 MILLIGRAM(S): at 05:53

## 2024-03-29 RX ADMIN — CYCLOPENTOLATE HYDROCHLORIDE 1 DROP(S): 10 SOLUTION/ DROPS OPHTHALMIC at 05:53

## 2024-03-29 RX ADMIN — Medication 1000 UNIT(S): at 11:52

## 2024-03-29 RX ADMIN — HEPARIN SODIUM 5000 UNIT(S): 5000 INJECTION INTRAVENOUS; SUBCUTANEOUS at 17:40

## 2024-03-29 RX ADMIN — POLYETHYLENE GLYCOL 3350 17 GRAM(S): 17 POWDER, FOR SOLUTION ORAL at 05:52

## 2024-03-29 RX ADMIN — Medication 40 MILLIEQUIVALENT(S): at 16:53

## 2024-03-29 RX ADMIN — Medication 0.25 MILLIGRAM(S): at 06:00

## 2024-03-29 RX ADMIN — Medication 3 MILLIGRAM(S): at 21:06

## 2024-03-29 RX ADMIN — Medication 650 MILLIGRAM(S): at 22:06

## 2024-03-29 RX ADMIN — Medication 650 MILLIGRAM(S): at 13:38

## 2024-03-29 RX ADMIN — ATORVASTATIN CALCIUM 20 MILLIGRAM(S): 80 TABLET, FILM COATED ORAL at 21:06

## 2024-03-29 RX ADMIN — SENNA PLUS 1 TABLET(S): 8.6 TABLET ORAL at 05:52

## 2024-03-29 RX ADMIN — Medication 1 DROP(S): at 17:39

## 2024-03-29 RX ADMIN — Medication 0.25 MILLIGRAM(S): at 18:15

## 2024-03-29 RX ADMIN — LOSARTAN POTASSIUM 25 MILLIGRAM(S): 100 TABLET, FILM COATED ORAL at 05:53

## 2024-03-29 NOTE — PROGRESS NOTE ADULT - SUBJECTIVE AND OBJECTIVE BOX
date of service: 03-29-24 @ 10:15  afbeirle  REVIEW OF SYSTEMS:  CONSTITUTIONAL: No fever,  no  weight loss  ENT:  No  tinnitus,   no   vertigo  NECK: No pain or stiffness  RESPIRATORY: No cough, wheezing, chills or hemoptysis;    No Shortness of Breath  CARDIOVASCULAR: No chest pain, palpitations, dizziness  GASTROINTESTINAL: No abdominal or epigastric pain. No nausea, vomiting, or hematemesis; No diarrhea  No melena or hematochezia.  GENITOURINARY: No dysuria, frequency, hematuria, or incontinence  NEUROLOGICAL: No headaches  SKIN: No itching,  no   rash  LYMPH Nodes: No enlarged glands  ENDOCRINE: No heat or cold intolerance  MUSCULOSKELETAL: No joint pain or swelling  PSYCHIATRIC: No depression, anxiety  HEME/LYMPH: No easy bruising, or bleeding gums  ALLERGY AND IMMUNOLOGIC: No hives or eczema	    MEDICATIONS  (STANDING):  albuterol/ipratropium for Nebulization 3 milliLiter(s) Nebulizer every 6 hours  atorvastatin 20 milliGRAM(s) Oral at bedtime  buDESOnide    Inhalation Suspension 0.25 milliGRAM(s) Inhalation two times a day  cholecalciferol 1000 Unit(s) Oral daily  cyclopentolate 1% Solution 1 Drop(s) Both EYES two times a day  EPINEPHrine    Injectable 0.3 milliGRAM(s) IntraMuscular once  ferrous    sulfate 325 milliGRAM(s) Oral daily  FLUoxetine 20 milliGRAM(s) Oral daily  furosemide   Injectable 20 milliGRAM(s) IV Push daily  heparin   Injectable 5000 Unit(s) SubCutaneous every 12 hours  influenza  Vaccine (HIGH DOSE) 0.7 milliLiter(s) IntraMuscular once  losartan 25 milliGRAM(s) Oral daily  mirtazapine 7.5 milliGRAM(s) Oral at bedtime  piperacillin/tazobactam IVPB.. 3.375 Gram(s) IV Intermittent every 8 hours  polyethylene glycol 3350 17 Gram(s) Oral two times a day  potassium chloride   Powder 40 milliEquivalent(s) Oral every 4 hours  prednisoLONE acetate 1% Suspension 1 Drop(s) Both EYES two times a day  senna 1 Tablet(s) Oral two times a day  spironolactone 12.5 milliGRAM(s) Oral daily  traZODone 50 milliGRAM(s) Oral daily    MEDICATIONS  (PRN):  acetaminophen     Tablet .. 650 milliGRAM(s) Oral every 6 hours PRN Temp greater or equal to 38C (100.4F), Mild Pain (1 - 3)  melatonin 3 milliGRAM(s) Oral at bedtime PRN Insomnia  ondansetron Injectable 4 milliGRAM(s) IV Push every 8 hours PRN Nausea and/or Vomiting  temazepam 30 milliGRAM(s) Oral at bedtime PRN Insomnia      Vital Signs Last 24 Hrs  T(C): 36.8 (29 Mar 2024 08:55), Max: 36.8 (29 Mar 2024 04:00)  T(F): 98.2 (29 Mar 2024 08:55), Max: 98.3 (29 Mar 2024 04:00)  HR: 59 (29 Mar 2024 08:55) (59 - 83)  BP: 130/53 (29 Mar 2024 08:55) (103/57 - 134/75)  BP(mean): --  RR: 18 (29 Mar 2024 08:55) (18 - 18)  SpO2: 100% (29 Mar 2024 08:55) (94% - 100%)    Parameters below as of 29 Mar 2024 08:55  Patient On (Oxygen Delivery Method): nasal cannula  O2 Flow (L/min): 3    CAPILLARY BLOOD GLUCOSE        I&O's Summary    28 Mar 2024 07:01  -  29 Mar 2024 07:00  --------------------------------------------------------  IN: 480 mL / OUT: 1675 mL / NET: -1195 mL    29 Mar 2024 07:01  -  29 Mar 2024 10:15  --------------------------------------------------------  IN: 120 mL / OUT: 0 mL / NET: 120 mL          Appearance: Normal	  HEENT:   Normal oral mucosa, PERRL, EOMI	  Lymphatic: No lymphadenopathy  Cardiovascular: Normal S1 S2, No JVD  Respiratory: Lungs clear to auscultation	  Gastrointestinal:  Soft, Non-tender, + BS	  Skin: No rash, No ecchymoses	  Extremities:     LABS:                        9.2    5.87  )-----------( 224      ( 29 Mar 2024 07:09 )             29.5     03-29    142  |  102  |  12  ----------------------------<  85  3.1<L>   |  28  |  0.97    Ca    9.0      29 Mar 2024 07:09  Phos  4.0     03-29  Mg     1.8     03-29            Urinalysis Basic - ( 29 Mar 2024 07:09 )    Color: x / Appearance: x / SG: x / pH: x  Gluc: 85 mg/dL / Ketone: x  / Bili: x / Urobili: x   Blood: x / Protein: x / Nitrite: x   Leuk Esterase: x / RBC: x / WBC x   Sq Epi: x / Non Sq Epi: x / Bacteria: x                      Consultant(s) Notes Reviewed:      Care Discussed with Consultants/Other Providers:

## 2024-03-29 NOTE — PROGRESS NOTE ADULT - SUBJECTIVE AND OBJECTIVE BOX
BISHNU SEGURA  MRN#: [id number]  Subjective:  pulmonary progress note: :acute hypoxic respiratory Failure on 02, sarcoidosis , COPD , Diastolic dysfunction , pulmonary HTN date of service is 3/29/2024   This is an 89 y/o female w/ PMH pulmonary sarcoidosis, COPD not on home O2, CAD, hypothyroidism, and HTN presenting for lack of BM in the last 5 days along with SOB and L flank pain from the atria. The patient does endorse some left-sided upper abdominal pain but doesn't endorse any other symptoms. She states that she doesn't have any SOB, and states that her last BM prior to the hospital was yesterday. Called the patient's daughter Deborah, who stated that she was told the patient was having severe abdominal pain as well as some vomiting.   She was admitted here from 2/8-2/21 for abdominal pain, constipation and vomiting. At the time, CT was suggestive of diverticulitis as well and her course was complicated by fluid overload requiring IVP lasix. She was treated with IV Zosyn for 7 days. During this hospital course, she was also found to have a dilated L renal pelvis w/ +UA, but was ruled not have UTI by ID. Urology was consulted, who stated that her pessary could be the cause of the hydro, and removed it on 2/13.     In the ED, she was febrile to 101.8, tachycardic to 130, hypotensive to 80/50, and hypoxic requiring 4LNC. CBC revealed leukocytosis of 29.35, normocytic anemia of 9.5. CMP w/ elevated AG of 24, low bicarb of 17, . Presenting VBG w/ lactate of 9.4. she was straight catheterized for UA, which was red and turbid w/ large LE and >50 RBC/hpf, negative for bacteria. Initially CT A/P w/o contrast was done (due to contrast allergy), which revealed a possible mild diverticulitis of the descending colon and unchanged severe L hydroureter with layering hypodensity which could be stones or blood products. She was given Ceftriaxone and Zosyn, 2.5L IVNS. Lactate down to 3.5, BP improved. She had 2 dark tarry stools in her diaper, so she was premedicated w/ solumedrol and benadryl and CTA Abdomen/pelvis was ordered.  (21 Mar 2024 01:30), patient was SOB needed high 02 supplement , CT scan acute Diverticulitis , afebrile down to nasal canula , on antibiotics , less distress no nausea or vomiting no headache S/P blood transfusion , H/H is stable no chest pain no SOB at rest , no fever no chest pain seen and examined with daughter at bed side no GI symptoms , discuss patient condition with daughter all question were answered , no over night events 02 saturation is still on the low side will continue bronchodilator and try to wean off 02 safely , no over night events adding Flovent nebulizer 0.25 mg BID , no new C/P trial to wean off 02 to acceptable RA on going low potasium to give supplement , , potasium is better keep k+ > 4 continue weaning trial off 02 , 02 saturation is 93% on 4 liter nasal 02 , finishing Antibiotics course     PAST MEDICAL & SURGICAL HISTORY:  HTN (hypertension)      Hypothyroidism      Osteoporosis  pulmonary hypertension   H/O anxiety     CAD (coronary artery disease)  sarcoidosis   COPD           FAMILY HISTORY:  Review of Systems:  Review of Systems: Review of Systems:   CONSTITUTIONAL: + fever,+ weight loss  EYES: No eye pain, visual disturbances, or discharge  RESPIRATORY: + SOB. No cough, wheezing, chills or hemoptysis  CARDIOVASCULAR: No chest pain, palpitations, dizziness, or leg swelling  GASTROINTESTINAL: +abdominal pain. No nausea, vomiting, or hematemesis; No diarrhea or constipation. No melena or hematochezia.  GENITOURINARY: +L flank pain, hematuria; No dysuria, frequency, or incontinence  NEUROLOGICAL: No headaches, memory loss, loss of strength, numbness, or tremors  SKIN: No itching, burning, rashes, or lesions   MUSCULOSKELETAL: No joint pain or swelling; No muscle, back pain  PSYCHIATRIC: + depression,+ anxiety,+ mood swings, or difficulty ,             OBJECTIVE:  ICU Vital Signs Last 24 Hrs  T(C): 36.8 (22 Mar 2024 16:23), Max: 36.9 (22 Mar 2024 00:28)  T(F): 98.2 (22 Mar 2024 16:23), Max: 98.4 (22 Mar 2024 00:28)  HR: 102 (22 Mar 2024 16:23) (82 - 102)  BP: 145/76 (22 Mar 2024 16:23) (94/51 - 145/76)  BP(mean): --  ABP: --  ABP(mean): --  RR: 20 (22 Mar 2024 16:23) (19 - 20)  SpO2: 93% (22 Mar 2024 16:23) (93% - 96%)    O2 Parameters below as of 22 Mar 2024 16:23  Patient On (Oxygen Delivery Method): nasal cannula  O2 Flow (L/min): 2          03-21 @ 07:01  -  03-22 @ 07:00  --------------------------------------------------------  IN: 240 mL / OUT: 1850 mL / NET: -1610 mL      PHYSICAL EXAM: Daily Height in cm: 157.48 (20 Mar 2024 16:54)  Elderly agitated cachectic female on 2 liter nasal  02 saturation is 93%  Daily   HEENT:     + NCAT  + EOMI  - Conjuctival edema   - Icterus   - Thrush   - ETT  - NGT/OGT  Neck:         + FROM    + JVD     - Nodes     - Masses    + Mid-line trachea   - Tracheostomy  Chest:          pectus excavatum , increase A-P diameter   Lungs:          + CTA   + Rhonchi    - Rales    - Wheezing   + Decreased BS   - Dullness R L  Cardiac:       + S1 + S2    + RRR   - Irregular   - S3  - S4  + Murmurs   - Rub   - Hamman’s sign   Abdomen:    + BS     + Soft    + tender left lower quadrant   - Distended  - Organomegaly  - PEG  Extremities:   - Cyanosis U/L   - Clubbing  U/L  - LE/UE Edema   + Capillary refill    + Pulses   Neuro:        + Awake   +  Alert   - Confused   - Lethargic   - Sedated   - Generalized Weakness  Skin:        - Rashes    - Erythema   + Normal incisions   + IV sites intact  - Sacral decubit      HOSPITAL MEDICATIONS: All mediciations reviewed and analyzed  MEDICATIONS  (STANDING):  albuterol/ipratropium for Nebulization 3 milliLiter(s) Nebulizer every 6 hours  atorvastatin 20 milliGRAM(s) Oral at bedtime  cholecalciferol 1000 Unit(s) Oral daily  cyclopentolate 1% Solution 1 Drop(s) Both EYES two times a day  EPINEPHrine    Injectable 0.3 milliGRAM(s) IntraMuscular once  ferrous    sulfate 325 milliGRAM(s) Oral daily  FLUoxetine 20 milliGRAM(s) Oral daily  influenza  Vaccine (HIGH DOSE) 0.7 milliLiter(s) IntraMuscular once  mirtazapine 7.5 milliGRAM(s) Oral at bedtime  piperacillin/tazobactam IVPB.. 3.375 Gram(s) IV Intermittent every 8 hours  polyethylene glycol 3350 17 Gram(s) Oral two times a day  potassium chloride    Tablet ER 20 milliEquivalent(s) Oral daily  prednisoLONE acetate 1% Suspension 1 Drop(s) Both EYES two times a day  senna 1 Tablet(s) Oral two times a day  sodium chloride 0.9%. 1000 milliLiter(s) (60 mL/Hr) IV Continuous <Continuous>  traZODone 50 milliGRAM(s) Oral daily    MEDICATIONS  (PRN):  acetaminophen     Tablet .. 650 milliGRAM(s) Oral every 6 hours PRN Temp greater or equal to 38C (100.4F), Mild Pain (1 - 3)  melatonin 3 milliGRAM(s) Oral at bedtime PRN Insomnia  ondansetron Injectable 4 milliGRAM(s) IV Push every 8 hours PRN Nausea and/or Vomiting  temazepam 30 milliGRAM(s) Oral at bedtime PRN Insomnia    LABS: All Lab data reviewed and analyzed                                      9.2    5.87  )-----------( 224      ( 29 Mar 2024 07:09 )             29.5    03-29    142  |  102  |  12  ----------------------------<  85  3.1<L>   |  28  |  0.97    Ca    9.0      29 Mar 2024 07:09  Phos  4.0     03-29  Mg     1.8     03-29      Ca    8.6      28 Mar 2024 07:22      Ca    8.3<L>      27 Mar 2024 10:44      Ca    8.1<L>      23 Mar 2024 06:52  Phos  3.0     03-23  Mg     2.2     03-23      Ca    8.2<L>      22 Mar 2024 07:34    TPro  4.6<L>  /  Alb  2.8<L>  /  TBili  0.3  /  DBili  x   /  AST  28  /  ALT  9<L>  /  AlkPhos  103  03-21     LIVER FUNCTIONS - ( 21 Mar 2024 07:05 )  Alb: 2.8 g/dL / Pro: 4.6 g/dL / ALK PHOS: 103 U/L / ALT: 9 U/L / AST: 28 U/L / GGT: x           RADIOLOGY: - Reviewed and analyzed

## 2024-03-29 NOTE — PROGRESS NOTE ADULT - SUBJECTIVE AND OBJECTIVE BOX
Date of Service: 03-29-24 @ 09:43           CARDIOLOGY     PROGRESS  NOTE   ________________________________________________    CHIEF COMPLAINT:Patient is a 88y old  Female who presents with a chief complaint of Diverticulitis, leukocytosis (28 Mar 2024 13:08)  no complain  	  REVIEW OF SYSTEMS:  CONSTITUTIONAL: No fever, weight loss, or fatigue  EYES: No eye pain, visual disturbances, or discharge  ENT:  No difficulty hearing, tinnitus, vertigo; No sinus or throat pain  NECK: No pain or stiffness  RESPIRATORY: No cough, wheezing, chills or hemoptysis; decrease  Shortness of Breath  CARDIOVASCULAR: No chest pain, palpitations, passing out, dizziness, or leg swelling  GASTROINTESTINAL: No abdominal or epigastric pain. No nausea, vomiting, or hematemesis; No diarrhea or constipation. No melena or hematochezia.  GENITOURINARY: No dysuria, frequency, hematuria, or incontinence  NEUROLOGICAL: No headaches, memory loss, loss of strength, numbness, or tremors  SKIN: No itching, burning, rashes, or lesions   LYMPH Nodes: No enlarged glands  ENDOCRINE: No heat or cold intolerance; No hair loss  MUSCULOSKELETAL: No joint pain or swelling; No muscle, back, or extremity pain  PSYCHIATRIC: No depression, anxiety, mood swings, or difficulty sleeping  HEME/LYMPH: No easy bruising, or bleeding gums  ALLERGY AND IMMUNOLOGIC: No hives or eczema	    [x ] All others negative	  [ ] Unable to obtain    PHYSICAL EXAM:  T(C): 36.8 (03-29-24 @ 08:55), Max: 36.8 (03-29-24 @ 04:00)  HR: 59 (03-29-24 @ 08:55) (59 - 83)  BP: 130/53 (03-29-24 @ 08:55) (103/57 - 134/75)  RR: 18 (03-29-24 @ 08:55) (18 - 18)  SpO2: 100% (03-29-24 @ 08:55) (94% - 100%)  Wt(kg): --  I&O's Summary    28 Mar 2024 07:01  -  29 Mar 2024 07:00  --------------------------------------------------------  IN: 480 mL / OUT: 1675 mL / NET: -1195 mL    29 Mar 2024 07:01  -  29 Mar 2024 09:43  --------------------------------------------------------  IN: 120 mL / OUT: 0 mL / NET: 120 mL        Appearance: Normal	  HEENT:   Normal oral mucosa, PERRL, EOMI	  Lymphatic: No lymphadenopathy  Cardiovascular: Normal S1 S2, No JVD, + murmurs, No edema  Respiratory: rhonchi  Psychiatry: A & O x 3, Mood & affect appropriate  Gastrointestinal:  Soft, Non-tender, + BS	  Skin: No rashes, No ecchymoses, No cyanosis	  Neurologic: Non-focal  Extremities: Normal range of motion, No clubbing, cyanosis or edema  Vascular: Peripheral pulses palpable 2+ bilaterally    MEDICATIONS  (STANDING):  albuterol/ipratropium for Nebulization 3 milliLiter(s) Nebulizer every 6 hours  atorvastatin 20 milliGRAM(s) Oral at bedtime  buDESOnide    Inhalation Suspension 0.25 milliGRAM(s) Inhalation two times a day  cholecalciferol 1000 Unit(s) Oral daily  cyclopentolate 1% Solution 1 Drop(s) Both EYES two times a day  EPINEPHrine    Injectable 0.3 milliGRAM(s) IntraMuscular once  ferrous    sulfate 325 milliGRAM(s) Oral daily  FLUoxetine 20 milliGRAM(s) Oral daily  furosemide   Injectable 20 milliGRAM(s) IV Push daily  heparin   Injectable 5000 Unit(s) SubCutaneous every 12 hours  influenza  Vaccine (HIGH DOSE) 0.7 milliLiter(s) IntraMuscular once  losartan 25 milliGRAM(s) Oral daily  mirtazapine 7.5 milliGRAM(s) Oral at bedtime  piperacillin/tazobactam IVPB.. 3.375 Gram(s) IV Intermittent every 8 hours  polyethylene glycol 3350 17 Gram(s) Oral two times a day  potassium chloride   Powder 40 milliEquivalent(s) Oral every 4 hours  prednisoLONE acetate 1% Suspension 1 Drop(s) Both EYES two times a day  senna 1 Tablet(s) Oral two times a day  spironolactone 12.5 milliGRAM(s) Oral daily  traZODone 50 milliGRAM(s) Oral daily      TELEMETRY: 	    ECG:  	  RADIOLOGY:  OTHER: 	  	  LABS:	 	    CARDIAC MARKERS:                                9.2    5.87  )-----------( 224      ( 29 Mar 2024 07:09 )             29.5     03-29    142  |  102  |  12  ----------------------------<  85  3.1<L>   |  28  |  0.97    Ca    9.0      29 Mar 2024 07:09  Phos  4.0     03-29  Mg     1.8     03-29      proBNP:   Lipid Profile:   HgA1c:   TSH:         Assessment and plan  ---------------------------  This is an 89 y/o female w/ PMHx pulmonary sarcoidosis, COPD not on home O2, CAD, hypothyroidism, and HTN presenting for lack of BM in the last 5 days along with SOB and L flank pain from the atria. The patient does endorse some left-sided upper abdominal pain but doesn't endorse any other symptoms. She states that she doesn't have any SOB, and states that her last BM prior to the hospital was yesterday. Called the patient's daughter Deborah, who stated that she was told the patient was having severe abdominal pain as well as some vomiting.   She was admitted here from 2/8-2/21 for abdominal pain, constipation and vomiting. At the time, CT was suggestive of diverticulitis as well and her course was complicated by fluid overload requiring IVP lasix. She was treated with IV Zosyn for 7 days. During this hospital course, she was also found to have a dilated L renal pelvis w/ +UA, but was ruled not have UTI by ID. Urology was consulted, who stated that her pessary could be the cause of the hydro, and removed it on 2/13.   In the ED, she was febrile to 101.8, tachycardic to 130, hypotensive to 80/50, and hypoxic requiring 4LNC. CBC revealed leukocytosis of 29.35, normocytic anemia of 9.5. CMP w/ elevated AG of 24, low bicarb of 17, . Presenting VBG w/ lactate of 9.4. she was straight catheterized for UA, which was red and turvid w/ large LE and >50 RBC/hpf, negative for bacteria. Initially CT A/P w/o contrast was done (due to contrast allergy), which revealed a possible mild diverticulitis of the descending colon and unchanged severe L hydroureter with layering hyperdensity  which could be stones or blood products. She was given Ceftriaxone and Zosyn, 2.5L IVNS. Lactate down to 3.5, BP improved. She had 2 dark tarry stools in her diaper, so she was premedicated w/ solumedrol and benadryl and CTA Abdomen/pelvis was ordered.  pt is well known to me with hx of sarcoidosis, cad with abdominal pain, she was recently dc sec to Diverticulitis  IN er pt with hypotension and hypoxia.  pt with sig abnormal ecg,,   dvt prophylaxis  asa if no contraindication  check QTC on Psych meds  fu hgb , GI noted/ urology  events noted with increase sob, chest x ray with vascular congestion sec to chf  echo noted with  Ruptured chordae tendineae to the anterior mitral leaflet, and sig regional wall motion abnormality ?ischemia  not sure pt is a candidate for any aggressive/ invasive cardiac procedure  dc ivf  add small dose of diuretics GOC  add lasix and aldactone awaiting blood work today  transferred to tele  speech and swallow  spoke to her PMD Dr Noe, plan for medical therapy only , discussed with daughter  replete K  will repeat chest x ray

## 2024-03-29 NOTE — CHART NOTE - NSCHARTNOTEFT_GEN_A_CORE
Medicine PA note    Notified by RN at 0540: Pt w/ a tele event:  for 2.77 secs, Asx, VSS.      - BMP ordered, Mag, Phos and EKG. Medicine PA note    Notified by RN at 0540: Pt w/ a first tele event:  for 2.77 secs, Asx, VSS. Pt seen and examined at bedside, noted resting in bed, receiving nebulizer tx, in NAD. PT A&O, denies HA, CP, palpitations, SOB and difficulty breathing.   - BMP ordered  - Mag, Phos ordered STAT   - EKG ordered  -pt expresses no other concerns at this time  -will endorse to primary team in AM    MARITZA Delcid  Medicine.

## 2024-03-29 NOTE — PROGRESS NOTE ADULT - ASSESSMENT
Assessment and Recommendation:   acute hypoxic respiratory Failure on 02   keep 02 > saturation > 92%  Fever and sepsis of urinary tract origin    Sarcoidosis   COPD continue nebulizer treatement   Paralysis of the Right hemidiaphragm   S/P blood transfusion   acute left pyelonephritis  Acute Diverticulitis    anemia of chronic disease   protein caloric malnutrition   continue Antibiotics as per ID   potasium supplement keep > 4  nebulizer treatement Duoneb q 6 hrs   Flovent 0.25 BID   DVT and GI prophylaxis   care discuss with NP on the floor

## 2024-03-29 NOTE — PROGRESS NOTE ADULT - SUBJECTIVE AND OBJECTIVE BOX
infectious diseases progress note:    Patient is a 88y old  Female who presents with a chief complaint of Diverticulitis, leukocytosis (29 Mar 2024 10:15)        Diverticulitis of intestine without perforation or abscess without bleeding               Allergies    iodinated radiocontrast agents (Anaphylaxis)    Intolerances        ANTIBIOTICS/RELEVANT:  antimicrobials  piperacillin/tazobactam IVPB.. 3.375 Gram(s) IV Intermittent every 8 hours    immunologic:  influenza  Vaccine (HIGH DOSE) 0.7 milliLiter(s) IntraMuscular once    OTHER:  acetaminophen     Tablet .. 650 milliGRAM(s) Oral every 6 hours PRN  albuterol/ipratropium for Nebulization 3 milliLiter(s) Nebulizer every 6 hours  atorvastatin 20 milliGRAM(s) Oral at bedtime  buDESOnide    Inhalation Suspension 0.25 milliGRAM(s) Inhalation two times a day  chlorhexidine 2% Cloths 1 Application(s) Topical daily  cholecalciferol 1000 Unit(s) Oral daily  cyclopentolate 1% Solution 1 Drop(s) Both EYES two times a day  EPINEPHrine    Injectable 0.3 milliGRAM(s) IntraMuscular once  ferrous    sulfate 325 milliGRAM(s) Oral daily  FLUoxetine 20 milliGRAM(s) Oral daily  furosemide   Injectable 20 milliGRAM(s) IV Push daily  heparin   Injectable 5000 Unit(s) SubCutaneous every 12 hours  losartan 25 milliGRAM(s) Oral daily  melatonin 3 milliGRAM(s) Oral at bedtime PRN  mirtazapine 7.5 milliGRAM(s) Oral at bedtime  ondansetron Injectable 4 milliGRAM(s) IV Push every 8 hours PRN  polyethylene glycol 3350 17 Gram(s) Oral two times a day  potassium chloride   Powder 40 milliEquivalent(s) Oral every 4 hours  prednisoLONE acetate 1% Suspension 1 Drop(s) Both EYES two times a day  senna 1 Tablet(s) Oral two times a day  spironolactone 12.5 milliGRAM(s) Oral daily  temazepam 30 milliGRAM(s) Oral at bedtime PRN  traZODone 50 milliGRAM(s) Oral daily      Objective:  Vital Signs Last 24 Hrs  T(C): 36.4 (29 Mar 2024 12:16), Max: 36.8 (29 Mar 2024 04:00)  T(F): 97.5 (29 Mar 2024 12:16), Max: 98.3 (29 Mar 2024 04:00)  HR: 84 (29 Mar 2024 12:16) (59 - 84)  BP: 121/65 (29 Mar 2024 12:16) (103/57 - 134/75)  BP(mean): --  RR: 18 (29 Mar 2024 14:08) (18 - 18)  SpO2: 94% (29 Mar 2024 14:08) (87% - 100%)    Parameters below as of 29 Mar 2024 14:08  Patient On (Oxygen Delivery Method): nasal cannula  O2 Flow (L/min): 3         Ear/Nose/Throat: no oral lesion, no sinus tenderness on percussion	  Neck:no JVD, no lymphadenopathy, supple  Respiratory: CTA moises  Cardiovascular: S1S2 RRR, no murmurs  Gastrointestinal:soft, (+) BS, no HSM  Extremities:no e/e/c        LABS:                        9.2    5.87  )-----------( 224      ( 29 Mar 2024 07:09 )             29.5     03-29    142  |  102  |  12  ----------------------------<  85  3.1<L>   |  28  |  0.97    Ca    9.0      29 Mar 2024 07:09  Phos  4.0     03-29  Mg     1.8     03-29        Urinalysis Basic - ( 29 Mar 2024 07:09 )    Color: x / Appearance: x / SG: x / pH: x  Gluc: 85 mg/dL / Ketone: x  / Bili: x / Urobili: x   Blood: x / Protein: x / Nitrite: x   Leuk Esterase: x / RBC: x / WBC x   Sq Epi: x / Non Sq Epi: x / Bacteria: x          MICROBIOLOGY:    RECENT CULTURES:        RESPIRATORY CULTURES:              RADIOLOGY & ADDITIONAL STUDIES:        Pager [phone]  After 5 pm/weekends or if no response :[phone]

## 2024-03-29 NOTE — PROGRESS NOTE ADULT - ASSESSMENT
88 F        h/o   CAD,   pulmonary sarcoidosis, COPD ,  HTN,        Ct  a/p.   2/24,  diverticulitis ,  sigmoid  colon, severe  L hydroureter/  mod  L   hydronephrosis    comp fx  L  4.  vaginal  pessary           admitted with high wbc/  lactate .  from  uti/    ecoli, , diverticulitis  CT A/P angio 3/21/ 24,  colitis and  diverticulitis. patent  mesenteric vessels. No pneumatosis or free air.    c/.c  anemia     CAD    HTN     on iv  zosyn ,  for   2  weeks ,   seen by  NIDIA luna       Echo,  normal  ef/  RV  dysfunction,  mlple  wall  motion abnormalities/  mild  MR    f/p  by card/  given  advanced  age ,,  bed  bound  status, ,  cachetic  state. doubt  if  pt is an ideal  candidate   for  any intervention       s/p  rrt    iv lasix. aldactone   hypokalemia     dvt ppx          rad< from: TTE W or WO Ultrasound Enhancing Agent (03.26.24 @ 15:34) >  CONCLUSIONS:      1. Left ventricular systolic function is low normal with an ejection fraction of 50 % by Enrique's method of disks.   2. Entire septum, entire inferior wall, and apical anterior segment are abnormal.   3. Normal right ventricular cavity size and mildly reduced systolic function.   4. Ruptured chordae tendineae to the anterior mitral leaflet.   5. Trace mitral regurgitation.   6. No prior echocardiogram is available for comparison.  _______________    < end of copied text >         rad< from: CT Abdomen and Pelvis No Cont (02.08.24 @ 18:01) >  BONES: Left hip ORIF. Lumbar levoscoliosis. Degenerative changes of the   spine. Age indeterminate compression deformity of L4. Grade 1   anterolisthesis of L4 on L5 and L5 on S1.  IMPRESSION:  Thickening of the sigmoid colon with mild inflammatory changes adjacent   to multiple colonic diverticula suggestive of acute diverticulitis.   Severe left hydroureter and moderate left hydronephrosis of uncertain   etiology.  --- End of Report ---

## 2024-03-29 NOTE — PROGRESS NOTE ADULT - ASSESSMENT
Quality 130: Documentation Of Current Medications In The Medical Record: Current Medications Documented Detail Level: Detailed 88-yo F w/ PMH of CAD and COPD, admitted w/ CTAP suggestive of diverticulitis but no abscess. S/p ceftriaxone (2/8) and Zosyn (2/8-15) Previously w/ concerns for diarrhea, now stopped.  admitted 3/21/24    #Diverticulitis  #Abnormal CT scan   #Leukocytosis  improved  E coli bacteruria  hematuria - minimal pyuria   (this urinary isolate is susceptible to Ceftriaxone and Zosyn)           Pt seen last admission  alert and non toxic but [place]. Denies dysuria or pain and exam benign  doubt UTI     very similar last admission  She does have hydronephrosis that is not different. No abscess   WBC down but urine is grossly bloody and nursing notes suggest GI bleeding   urine culture is positive for Ecoli sensitive   no signs of peritonitis      Suggest     can switch to po antibiotics to complete 2 weeks    the hematuria has cleared    dc zosyn  ceftin 500 mg bid until April 3

## 2024-03-30 LAB
ANION GAP SERPL CALC-SCNC: 12 MMOL/L — SIGNIFICANT CHANGE UP (ref 5–17)
BUN SERPL-MCNC: 19 MG/DL — SIGNIFICANT CHANGE UP (ref 7–23)
CALCIUM SERPL-MCNC: 9.1 MG/DL — SIGNIFICANT CHANGE UP (ref 8.4–10.5)
CHLORIDE SERPL-SCNC: 102 MMOL/L — SIGNIFICANT CHANGE UP (ref 96–108)
CO2 SERPL-SCNC: 28 MMOL/L — SIGNIFICANT CHANGE UP (ref 22–31)
CREAT SERPL-MCNC: 1.04 MG/DL — SIGNIFICANT CHANGE UP (ref 0.5–1.3)
EGFR: 52 ML/MIN/1.73M2 — LOW
GLUCOSE BLDC GLUCOMTR-MCNC: 141 MG/DL — HIGH (ref 70–99)
GLUCOSE SERPL-MCNC: 94 MG/DL — SIGNIFICANT CHANGE UP (ref 70–99)
MRSA PCR RESULT.: SIGNIFICANT CHANGE UP
POTASSIUM SERPL-MCNC: 3.9 MMOL/L — SIGNIFICANT CHANGE UP (ref 3.5–5.3)
POTASSIUM SERPL-SCNC: 3.9 MMOL/L — SIGNIFICANT CHANGE UP (ref 3.5–5.3)
S AUREUS DNA NOSE QL NAA+PROBE: SIGNIFICANT CHANGE UP
SODIUM SERPL-SCNC: 142 MMOL/L — SIGNIFICANT CHANGE UP (ref 135–145)

## 2024-03-30 RX ORDER — FUROSEMIDE 40 MG
20 TABLET ORAL DAILY
Refills: 0 | Status: DISCONTINUED | OUTPATIENT
Start: 2024-03-30 | End: 2024-04-03

## 2024-03-30 RX ADMIN — Medication 650 MILLIGRAM(S): at 05:58

## 2024-03-30 RX ADMIN — Medication 650 MILLIGRAM(S): at 18:15

## 2024-03-30 RX ADMIN — Medication 3 MILLILITER(S): at 13:27

## 2024-03-30 RX ADMIN — ATORVASTATIN CALCIUM 20 MILLIGRAM(S): 80 TABLET, FILM COATED ORAL at 21:30

## 2024-03-30 RX ADMIN — SPIRONOLACTONE 12.5 MILLIGRAM(S): 25 TABLET, FILM COATED ORAL at 05:59

## 2024-03-30 RX ADMIN — CYCLOPENTOLATE HYDROCHLORIDE 1 DROP(S): 10 SOLUTION/ DROPS OPHTHALMIC at 18:16

## 2024-03-30 RX ADMIN — HEPARIN SODIUM 5000 UNIT(S): 5000 INJECTION INTRAVENOUS; SUBCUTANEOUS at 18:14

## 2024-03-30 RX ADMIN — Medication 650 MILLIGRAM(S): at 18:45

## 2024-03-30 RX ADMIN — Medication 1 DROP(S): at 18:15

## 2024-03-30 RX ADMIN — HEPARIN SODIUM 5000 UNIT(S): 5000 INJECTION INTRAVENOUS; SUBCUTANEOUS at 06:01

## 2024-03-30 RX ADMIN — Medication 3 MILLILITER(S): at 05:58

## 2024-03-30 RX ADMIN — Medication 20 MILLIGRAM(S): at 13:28

## 2024-03-30 RX ADMIN — Medication 20 MILLIGRAM(S): at 06:03

## 2024-03-30 RX ADMIN — Medication 0.25 MILLIGRAM(S): at 18:14

## 2024-03-30 RX ADMIN — CHLORHEXIDINE GLUCONATE 1 APPLICATION(S): 213 SOLUTION TOPICAL at 13:39

## 2024-03-30 RX ADMIN — Medication 325 MILLIGRAM(S): at 13:28

## 2024-03-30 RX ADMIN — Medication 3 MILLILITER(S): at 18:14

## 2024-03-30 RX ADMIN — SENNA PLUS 1 TABLET(S): 8.6 TABLET ORAL at 05:59

## 2024-03-30 RX ADMIN — Medication 650 MILLIGRAM(S): at 06:58

## 2024-03-30 RX ADMIN — Medication 50 MILLIGRAM(S): at 13:28

## 2024-03-30 RX ADMIN — Medication 1 DROP(S): at 06:06

## 2024-03-30 RX ADMIN — LOSARTAN POTASSIUM 25 MILLIGRAM(S): 100 TABLET, FILM COATED ORAL at 06:00

## 2024-03-30 RX ADMIN — CYCLOPENTOLATE HYDROCHLORIDE 1 DROP(S): 10 SOLUTION/ DROPS OPHTHALMIC at 06:06

## 2024-03-30 RX ADMIN — MIRTAZAPINE 7.5 MILLIGRAM(S): 45 TABLET, ORALLY DISINTEGRATING ORAL at 21:30

## 2024-03-30 RX ADMIN — Medication 0.25 MILLIGRAM(S): at 06:09

## 2024-03-30 RX ADMIN — Medication 1000 UNIT(S): at 13:28

## 2024-03-30 RX ADMIN — Medication 500 MILLIGRAM(S): at 18:14

## 2024-03-30 RX ADMIN — Medication 500 MILLIGRAM(S): at 06:08

## 2024-03-30 RX ADMIN — Medication 3 MILLIGRAM(S): at 21:33

## 2024-03-30 RX ADMIN — POLYETHYLENE GLYCOL 3350 17 GRAM(S): 17 POWDER, FOR SOLUTION ORAL at 05:58

## 2024-03-30 NOTE — PROGRESS NOTE ADULT - SUBJECTIVE AND OBJECTIVE BOX
date of service: 03-30-24 @ 10:58    REVIEW OF SYSTEMS:  CONSTITUTIONAL: No fever,  no  weight loss  ENT:  No  tinnitus,   no   vertigo  NECK: No pain or stiffness  RESPIRATORY: No cough, wheezing, chills or hemoptysis;    No Shortness of Breath  CARDIOVASCULAR: No chest pain, palpitations, dizziness  GASTROINTESTINAL: No abdominal or epigastric pain. No nausea, vomiting, or hematemesis; No diarrhea  No melena or hematochezia.  GENITOURINARY: No dysuria, frequency, hematuria, or incontinence  NEUROLOGICAL: No headaches  SKIN: No itching,  no   rash  LYMPH Nodes: No enlarged glands  ENDOCRINE: No heat or cold intolerance  MUSCULOSKELETAL: No joint pain or swelling  PSYCHIATRIC: No depression, anxiety  HEME/LYMPH: No easy bruising, or bleeding gums  ALLERGY AND IMMUNOLOGIC: No hives or eczema	    MEDICATIONS  (STANDING):  albuterol/ipratropium for Nebulization 3 milliLiter(s) Nebulizer every 6 hours  atorvastatin 20 milliGRAM(s) Oral at bedtime  buDESOnide    Inhalation Suspension 0.25 milliGRAM(s) Inhalation two times a day  cefuroxime   Tablet 500 milliGRAM(s) Oral every 12 hours  chlorhexidine 2% Cloths 1 Application(s) Topical daily  cholecalciferol 1000 Unit(s) Oral daily  cyclopentolate 1% Solution 1 Drop(s) Both EYES two times a day  EPINEPHrine    Injectable 0.3 milliGRAM(s) IntraMuscular once  ferrous    sulfate 325 milliGRAM(s) Oral daily  FLUoxetine 20 milliGRAM(s) Oral daily  furosemide   Injectable 20 milliGRAM(s) IV Push daily  heparin   Injectable 5000 Unit(s) SubCutaneous every 12 hours  influenza  Vaccine (HIGH DOSE) 0.7 milliLiter(s) IntraMuscular once  losartan 25 milliGRAM(s) Oral daily  mirtazapine 7.5 milliGRAM(s) Oral at bedtime  polyethylene glycol 3350 17 Gram(s) Oral two times a day  prednisoLONE acetate 1% Suspension 1 Drop(s) Both EYES two times a day  senna 1 Tablet(s) Oral two times a day  spironolactone 12.5 milliGRAM(s) Oral daily  traZODone 50 milliGRAM(s) Oral daily    MEDICATIONS  (PRN):  acetaminophen     Tablet .. 650 milliGRAM(s) Oral every 6 hours PRN Temp greater or equal to 38C (100.4F), Mild Pain (1 - 3)  melatonin 3 milliGRAM(s) Oral at bedtime PRN Insomnia  ondansetron Injectable 4 milliGRAM(s) IV Push every 8 hours PRN Nausea and/or Vomiting  temazepam 30 milliGRAM(s) Oral at bedtime PRN Insomnia      Vital Signs Last 24 Hrs  T(C): 36.6 (30 Mar 2024 09:11), Max: 36.6 (29 Mar 2024 21:05)  T(F): 97.9 (30 Mar 2024 09:11), Max: 97.9 (29 Mar 2024 21:05)  HR: 78 (30 Mar 2024 09:11) (71 - 89)  BP: 109/58 (30 Mar 2024 09:11) (94/54 - 121/65)  BP(mean): --  RR: 18 (30 Mar 2024 09:11) (18 - 18)  SpO2: 91% (30 Mar 2024 09:11) (87% - 100%)    Parameters below as of 30 Mar 2024 09:11  Patient On (Oxygen Delivery Method): nasal cannula  O2 Flow (L/min): 3    CAPILLARY BLOOD GLUCOSE        I&O's Summary    29 Mar 2024 07:01  -  30 Mar 2024 07:00  --------------------------------------------------------  IN: 480 mL / OUT: 1250 mL / NET: -770 mL    30 Mar 2024 07:01  -  30 Mar 2024 10:58  --------------------------------------------------------  IN: 200 mL / OUT: 0 mL / NET: 200 mL          Appearance: Normal	  HEENT:   Normal oral mucosa, PERRL, EOMI	  Lymphatic: No lymphadenopathy  Cardiovascular: Normal S1 S2, No JVD  Respiratory: Lungs clear to auscultation	  Gastrointestinal:  Soft, Non-tender, + BS	  Skin: No rash, No ecchymoses	  Extremities:     LABS:                        9.2    5.87  )-----------( 224      ( 29 Mar 2024 07:09 )             29.5     03-30    142  |  102  |  19  ----------------------------<  94  3.9   |  28  |  1.04   afebrile  Ca    9.1      30 Mar 2024 06:09  Phos  4.0     03-29  Mg     1.8     03-29            Urinalysis Basic - ( 30 Mar 2024 06:09 )    Color: x / Appearance: x / SG: x / pH: x  Gluc: 94 mg/dL / Ketone: x  / Bili: x / Urobili: x   Blood: x / Protein: x / Nitrite: x   Leuk Esterase: x / RBC: x / WBC x   Sq Epi: x / Non Sq Epi: x / Bacteria: x                      Consultant(s) Notes Reviewed:      Care Discussed with Consultants/Other Providers:

## 2024-03-30 NOTE — PROGRESS NOTE ADULT - SUBJECTIVE AND OBJECTIVE BOX
BISHNU SEGURA  MRN#: [id number]  Subjective:  pulmonary progress note: :acute hypoxic respiratory Failure on 02, sarcoidosis , COPD , Diastolic dysfunction , pulmonary HTN date of service is 3/30 /2024   This is an 87 y/o female w/ PMH pulmonary sarcoidosis, COPD not on home O2, CAD, hypothyroidism, and HTN presenting for lack of BM in the last 5 days along with SOB and L flank pain from the atria. The patient does endorse some left-sided upper abdominal pain but doesn't endorse any other symptoms. She states that she doesn't have any SOB, and states that her last BM prior to the hospital was yesterday. Called the patient's daughter Deborah, who stated that she was told the patient was having severe abdominal pain as well as some vomiting.   She was admitted here from 2/8-2/21 for abdominal pain, constipation and vomiting. At the time, CT was suggestive of diverticulitis as well and her course was complicated by fluid overload requiring IVP lasix. She was treated with IV Zosyn for 7 days. During this hospital course, she was also found to have a dilated L renal pelvis w/ +UA, but was ruled not have UTI by ID. Urology was consulted, who stated that her pessary could be the cause of the hydro, and removed it on 2/13.     In the ED, she was febrile to 101.8, tachycardic to 130, hypotensive to 80/50, and hypoxic requiring 4LNC. CBC revealed leukocytosis of 29.35, normocytic anemia of 9.5. CMP w/ elevated AG of 24, low bicarb of 17, . Presenting VBG w/ lactate of 9.4. she was straight catheterized for UA, which was red and turbid w/ large LE and >50 RBC/hpf, negative for bacteria. Initially CT A/P w/o contrast was done (due to contrast allergy), which revealed a possible mild diverticulitis of the descending colon and unchanged severe L hydroureter with layering hypodensity which could be stones or blood products. She was given Ceftriaxone and Zosyn, 2.5L IVNS. Lactate down to 3.5, BP improved. She had 2 dark tarry stools in her diaper, so she was premedicated w/ solumedrol and benadryl and CTA Abdomen/pelvis was ordered.  (21 Mar 2024 01:30), patient was SOB needed high 02 supplement , CT scan acute Diverticulitis , afebrile down to nasal canula , on antibiotics , less distress no nausea or vomiting no headache S/P blood transfusion , H/H is stable no chest pain no SOB at rest , no fever no chest pain seen and examined with daughter at bed side no GI symptoms , discuss patient condition with daughter all question were answered , no over night events 02 saturation is still on the low side will continue bronchodilator and try to wean off 02 safely , no over night events adding Flovent nebulizer 0.25 mg BID , no new C/P trial to wean off 02 to acceptable RA on going low potasium to give supplement , , potasium is better keep k+ > 4 continue weaning trial off 02 , 02 saturation is 93% on 4 liter nasal 02 , finishing Antibiotics course , on 2 liter 02 saturation is 97% will decrease FI02 ti one liter keep 02 saturation . 92% , potasium supplement     PAST MEDICAL & SURGICAL HISTORY:  HTN (hypertension)      Hypothyroidism      Osteoporosis  pulmonary hypertension   H/O anxiety     CAD (coronary artery disease)  sarcoidosis   COPD           FAMILY HISTORY:  Review of Systems:  Review of Systems: Review of Systems:   CONSTITUTIONAL: + fever,+ weight loss  EYES: No eye pain, visual disturbances, or discharge  RESPIRATORY: + SOB. No cough, wheezing, chills or hemoptysis  CARDIOVASCULAR: No chest pain, palpitations, dizziness, or leg swelling  GASTROINTESTINAL: +abdominal pain. No nausea, vomiting, or hematemesis; No diarrhea or constipation. No melena or hematochezia.  GENITOURINARY: +L flank pain, hematuria; No dysuria, frequency, or incontinence  NEUROLOGICAL: No headaches, memory loss, loss of strength, numbness, or tremors  SKIN: No itching, burning, rashes, or lesions   MUSCULOSKELETAL: No joint pain or swelling; No muscle, back pain  PSYCHIATRIC: + depression,+ anxiety,+ mood swings, or difficulty ,             OBJECTIVE:  ICU Vital Signs Last 24 Hrs  T(C): 36.8 (22 Mar 2024 16:23), Max: 36.9 (22 Mar 2024 00:28)  T(F): 98.2 (22 Mar 2024 16:23), Max: 98.4 (22 Mar 2024 00:28)  HR: 102 (22 Mar 2024 16:23) (82 - 102)  BP: 145/76 (22 Mar 2024 16:23) (94/51 - 145/76)  BP(mean): --  ABP: --  ABP(mean): --  RR: 20 (22 Mar 2024 16:23) (19 - 20)  SpO2: 93% (22 Mar 2024 16:23) (93% - 96%)    O2 Parameters below as of 22 Mar 2024 16:23  Patient On (Oxygen Delivery Method): nasal cannula  O2 Flow (L/min): 2          03-21 @ 07:01  -  03-22 @ 07:00  --------------------------------------------------------  IN: 240 mL / OUT: 1850 mL / NET: -1610 mL      PHYSICAL EXAM: Daily Height in cm: 157.48 (20 Mar 2024 16:54)  Elderly agitated cachectic female on 2 liter nasal  02 saturation is 93%  Daily   HEENT:     + NCAT  + EOMI  - Conjuctival edema   - Icterus   - Thrush   - ETT  - NGT/OGT  Neck:         + FROM    + JVD     - Nodes     - Masses    + Mid-line trachea   - Tracheostomy  Chest:          pectus excavatum , increase A-P diameter   Lungs:          + CTA   + Rhonchi    - Rales    - Wheezing   + Decreased BS   - Dullness R L  Cardiac:       + S1 + S2    + RRR   - Irregular   - S3  - S4  + Murmurs   - Rub   - Hamman’s sign   Abdomen:    + BS     + Soft    + tender left lower quadrant   - Distended  - Organomegaly  - PEG  Extremities:   - Cyanosis U/L   - Clubbing  U/L  - LE/UE Edema   + Capillary refill    + Pulses   Neuro:        + Awake   +  Alert   - Confused   - Lethargic   - Sedated   - Generalized Weakness  Skin:        - Rashes    - Erythema   + Normal incisions   + IV sites intact  - Sacral decubit      HOSPITAL MEDICATIONS: All mediciations reviewed and analyzed  MEDICATIONS  (STANDING):  albuterol/ipratropium for Nebulization 3 milliLiter(s) Nebulizer every 6 hours  atorvastatin 20 milliGRAM(s) Oral at bedtime  cholecalciferol 1000 Unit(s) Oral daily  cyclopentolate 1% Solution 1 Drop(s) Both EYES two times a day  EPINEPHrine    Injectable 0.3 milliGRAM(s) IntraMuscular once  ferrous    sulfate 325 milliGRAM(s) Oral daily  FLUoxetine 20 milliGRAM(s) Oral daily  influenza  Vaccine (HIGH DOSE) 0.7 milliLiter(s) IntraMuscular once  mirtazapine 7.5 milliGRAM(s) Oral at bedtime  piperacillin/tazobactam IVPB.. 3.375 Gram(s) IV Intermittent every 8 hours  polyethylene glycol 3350 17 Gram(s) Oral two times a day  potassium chloride    Tablet ER 20 milliEquivalent(s) Oral daily  prednisoLONE acetate 1% Suspension 1 Drop(s) Both EYES two times a day  senna 1 Tablet(s) Oral two times a day  sodium chloride 0.9%. 1000 milliLiter(s) (60 mL/Hr) IV Continuous <Continuous>  traZODone 50 milliGRAM(s) Oral daily    MEDICATIONS  (PRN):  acetaminophen     Tablet .. 650 milliGRAM(s) Oral every 6 hours PRN Temp greater or equal to 38C (100.4F), Mild Pain (1 - 3)  melatonin 3 milliGRAM(s) Oral at bedtime PRN Insomnia  ondansetron Injectable 4 milliGRAM(s) IV Push every 8 hours PRN Nausea and/or Vomiting  temazepam 30 milliGRAM(s) Oral at bedtime PRN Insomnia    LABS: All Lab data reviewed and analyzed                                     9.2    5.87  )-----------( 224      ( 29 Mar 2024 07:09 )             29.5                          9.2    5.87  )-----------( 224      ( 29 Mar 2024 07:09 )             29.5    03-29    142  |  102  |  12  ----------------------------<  85  3.1<L>   |  28  |  0.97    Ca    9.0      29 Mar 2024 07:09  Phos  4.0     03-29  Mg     1.8     03-29      Ca    8.6      28 Mar 2024 07:22      Ca    8.3<L>      27 Mar 2024 10:44      Ca    8.1<L>      23 Mar 2024 06:52  Phos  3.0     03-23  Mg     2.2     03-23      Ca    8.2<L>      22 Mar 2024 07:34    TPro  4.6<L>  /  Alb  2.8<L>  /  TBili  0.3  /  DBili  x   /  AST  28  /  ALT  9<L>  /  AlkPhos  103  03-21     LIVER FUNCTIONS - ( 21 Mar 2024 07:05 )  Alb: 2.8 g/dL / Pro: 4.6 g/dL / ALK PHOS: 103 U/L / ALT: 9 U/L / AST: 28 U/L / GGT: x           RADIOLOGY: - Reviewed and analyzed

## 2024-03-30 NOTE — PROGRESS NOTE ADULT - SUBJECTIVE AND OBJECTIVE BOX
Date of Service: 03-30-24 @ 11:37           CARDIOLOGY     PROGRESS  NOTE   ________________________________________________    CHIEF COMPLAINT:Patient is a 88y old  Female who presents with a chief complaint of Diverticulitis, leukocytosis (30 Mar 2024 10:58)  no complain  	  REVIEW OF SYSTEMS:  CONSTITUTIONAL: No fever, weight loss, or fatigue  EYES: No eye pain, visual disturbances, or discharge  ENT:  No difficulty hearing, tinnitus, vertigo; No sinus or throat pain  NECK: No pain or stiffness  RESPIRATORY: No cough, wheezing, chills or hemoptysis; No Shortness of Breath  CARDIOVASCULAR: No chest pain, palpitations, passing out, dizziness, or leg swelling  GASTROINTESTINAL: No abdominal or epigastric pain. No nausea, vomiting, or hematemesis; No diarrhea or constipation. No melena or hematochezia.  GENITOURINARY: No dysuria, frequency, hematuria, or incontinence  NEUROLOGICAL: No headaches, memory loss, loss of strength, numbness, or tremors  SKIN: No itching, burning, rashes, or lesions   LYMPH Nodes: No enlarged glands  ENDOCRINE: No heat or cold intolerance; No hair loss  MUSCULOSKELETAL: No joint pain or swelling; No muscle, back, or extremity pain  PSYCHIATRIC: No depression, anxiety, mood swings, or difficulty sleeping  HEME/LYMPH: No easy bruising, or bleeding gums  ALLERGY AND IMMUNOLOGIC: No hives or eczema	    [x ] All others negative	  [ ] Unable to obtain    PHYSICAL EXAM:  T(C): 36.6 (03-30-24 @ 09:11), Max: 36.6 (03-29-24 @ 21:05)  HR: 78 (03-30-24 @ 09:11) (71 - 89)  BP: 109/58 (03-30-24 @ 09:11) (94/54 - 121/65)  RR: 18 (03-30-24 @ 09:11) (18 - 18)  SpO2: 91% (03-30-24 @ 09:11) (87% - 100%)  Wt(kg): --  I&O's Summary    29 Mar 2024 07:01  -  30 Mar 2024 07:00  --------------------------------------------------------  IN: 480 mL / OUT: 1250 mL / NET: -770 mL    30 Mar 2024 07:01  -  30 Mar 2024 11:37  --------------------------------------------------------  IN: 200 mL / OUT: 0 mL / NET: 200 mL        Appearance: Normal	  HEENT:   Normal oral mucosa, PERRL, EOMI	  Lymphatic: No lymphadenopathy  Cardiovascular: Normal S1 S2, No JVD, + murmurs, No edema  Respiratory: rhonchi  Psychiatry: A & O x 3, Mood & affect appropriate  Gastrointestinal:  Soft, Non-tender, + BS	  Skin: No rashes, No ecchymoses, No cyanosis	  Neurologic: Non-focal  Extremities: Normal range of motion, No clubbing, cyanosis or edema  Vascular: Peripheral pulses palpable 2+ bilaterally    MEDICATIONS  (STANDING):  albuterol/ipratropium for Nebulization 3 milliLiter(s) Nebulizer every 6 hours  atorvastatin 20 milliGRAM(s) Oral at bedtime  buDESOnide    Inhalation Suspension 0.25 milliGRAM(s) Inhalation two times a day  cefuroxime   Tablet 500 milliGRAM(s) Oral every 12 hours  chlorhexidine 2% Cloths 1 Application(s) Topical daily  cholecalciferol 1000 Unit(s) Oral daily  cyclopentolate 1% Solution 1 Drop(s) Both EYES two times a day  EPINEPHrine    Injectable 0.3 milliGRAM(s) IntraMuscular once  ferrous    sulfate 325 milliGRAM(s) Oral daily  FLUoxetine 20 milliGRAM(s) Oral daily  furosemide   Injectable 20 milliGRAM(s) IV Push daily  heparin   Injectable 5000 Unit(s) SubCutaneous every 12 hours  influenza  Vaccine (HIGH DOSE) 0.7 milliLiter(s) IntraMuscular once  losartan 25 milliGRAM(s) Oral daily  mirtazapine 7.5 milliGRAM(s) Oral at bedtime  polyethylene glycol 3350 17 Gram(s) Oral two times a day  prednisoLONE acetate 1% Suspension 1 Drop(s) Both EYES two times a day  senna 1 Tablet(s) Oral two times a day  spironolactone 12.5 milliGRAM(s) Oral daily  traZODone 50 milliGRAM(s) Oral daily      TELEMETRY: 	    ECG:  	  RADIOLOGY:  OTHER: 	  	  LABS:	 	    CARDIAC MARKERS:                                9.2    5.87  )-----------( 224      ( 29 Mar 2024 07:09 )             29.5     03-30    142  |  102  |  19  ----------------------------<  94  3.9   |  28  |  1.04    Ca    9.1      30 Mar 2024 06:09  Phos  4.0     03-29  Mg     1.8     03-29      proBNP:   Lipid Profile:   HgA1c:   TSH:         Assessment and plan  ---------------------------  This is an 87 y/o female w/ PMHx pulmonary sarcoidosis, COPD not on home O2, CAD, hypothyroidism, and HTN presenting for lack of BM in the last 5 days along with SOB and L flank pain from the atria. The patient does endorse some left-sided upper abdominal pain but doesn't endorse any other symptoms. She states that she doesn't have any SOB, and states that her last BM prior to the hospital was yesterday. Called the patient's daughter Deborah, who stated that she was told the patient was having severe abdominal pain as well as some vomiting.   She was admitted here from 2/8-2/21 for abdominal pain, constipation and vomiting. At the time, CT was suggestive of diverticulitis as well and her course was complicated by fluid overload requiring IVP lasix. She was treated with IV Zosyn for 7 days. During this hospital course, she was also found to have a dilated L renal pelvis w/ +UA, but was ruled not have UTI by ID. Urology was consulted, who stated that her pessary could be the cause of the hydro, and removed it on 2/13.   In the ED, she was febrile to 101.8, tachycardic to 130, hypotensive to 80/50, and hypoxic requiring 4LNC. CBC revealed leukocytosis of 29.35, normocytic anemia of 9.5. CMP w/ elevated AG of 24, low bicarb of 17, . Presenting VBG w/ lactate of 9.4. she was straight catheterized for UA, which was red and turvid w/ large LE and >50 RBC/hpf, negative for bacteria. Initially CT A/P w/o contrast was done (due to contrast allergy), which revealed a possible mild diverticulitis of the descending colon and unchanged severe L hydroureter with layering hyperdensity  which could be stones or blood products. She was given Ceftriaxone and Zosyn, 2.5L IVNS. Lactate down to 3.5, BP improved. She had 2 dark tarry stools in her diaper, so she was premedicated w/ solumedrol and benadryl and CTA Abdomen/pelvis was ordered.  pt is well known to me with hx of sarcoidosis, cad with abdominal pain, she was recently dc sec to Diverticulitis  IN er pt with hypotension and hypoxia.  pt with sig abnormal ecg,,   dvt prophylaxis  asa if no contraindication  check QTC on Psych meds  fu hgb , GI noted/ urology  events noted with increase sob, chest x ray with vascular congestion sec to chf  echo noted with  Ruptured chordae tendineae to the anterior mitral leaflet, and sig regional wall motion abnormality ?ischemia  not sure pt is a candidate for any aggressive/ invasive cardiac procedure  dc ivf  add small dose of diuretics GOC  add lasix and aldactone awaiting blood work   transferred to tele, noted  speech and swallow  spoke to her PMD Dr Noe, plan for medical therapy only , discussed with daughter  replete K  will repeat chest x ray tomorrow, dc planning for monday

## 2024-03-30 NOTE — PROGRESS NOTE ADULT - ASSESSMENT
88 F        h/o   CAD,   pulmonary sarcoidosis, COPD ,  HTN,        Ct  a/p.   2/24,  diverticulitis ,  sigmoid  colon, severe  L hydroureter/  mod  L   hydronephrosis    comp fx  L  4.  vaginal  pessary           admitted with high wbc/  lactate ,  from  uti/    ecoli, , diverticulitis    CT A/P angio 3/21/ 24,  colitis and  diverticulitis. patent   mesenteric vessels. No pneumatosis or free air.    c/.c  anemia     CAD    HTN       was on iv  zosyn .  and   now  on ceftin,   seen by  ID      Echo,  normal  ef/  RV  dysfunction,  mlple  wall  motion abnormalities/  mild  MR    f/p  by card/  given  advanced  age ,, bed  bound  status, ,  cachetic  state. doubt  if  pt is an ideal  candidate   for  any intervention      iv lasix./  aldactone/  stable  labs. potassium  3.9    dvt ppx          rad< from: TTE W or WO Ultrasound Enhancing Agent (03.26.24 @ 15:34) >  CONCLUSIONS:      1. Left ventricular systolic function is low normal with an ejection fraction of 50 % by Enrique's method of disks.   2. Entire septum, entire inferior wall, and apical anterior segment are abnormal.   3. Normal right ventricular cavity size and mildly reduced systolic function.   4. Ruptured chordae tendineae to the anterior mitral leaflet.   5. Trace mitral regurgitation.   6. No prior echocardiogram is available for comparison.  _______________    < end of copied text >         rad< from: CT Abdomen and Pelvis No Cont (02.08.24 @ 18:01) >  BONES: Left hip ORIF. Lumbar levoscoliosis. Degenerative changes of the   spine. Age indeterminate compression deformity of L4. Grade 1   anterolisthesis of L4 on L5 and L5 on S1.  IMPRESSION:  Thickening of the sigmoid colon with mild inflammatory changes adjacent   to multiple colonic diverticula suggestive of acute diverticulitis.   Severe left hydroureter and moderate left hydronephrosis of uncertain   etiology.  --- End of Report ---

## 2024-03-30 NOTE — PROGRESS NOTE ADULT - ASSESSMENT
Assessment and Recommendation:   acute hypoxic respiratory Failure on 02   decrease FI02 to one liter nasal canula   keep 02 > saturation > 92%  Fever and sepsis of urinary tract origin    Sarcoidosis   COPD continue nebulizer treatement   Paralysis of the Right hemidiaphragm   S/P blood transfusion   acute left pyelonephritis  Acute Diverticulitis    anemia of chronic disease   protein caloric malnutrition   continue Antibiotics as per ID   potasium supplement keep > 4  nebulizer treatement Duoneb q 6 hrs   Flovent 0.25 BID   DVT and GI prophylaxis   care discuss with NP on the floor

## 2024-03-31 LAB
ANION GAP SERPL CALC-SCNC: 15 MMOL/L — SIGNIFICANT CHANGE UP (ref 5–17)
BUN SERPL-MCNC: 20 MG/DL — SIGNIFICANT CHANGE UP (ref 7–23)
CALCIUM SERPL-MCNC: 9.4 MG/DL — SIGNIFICANT CHANGE UP (ref 8.4–10.5)
CHLORIDE SERPL-SCNC: 100 MMOL/L — SIGNIFICANT CHANGE UP (ref 96–108)
CO2 SERPL-SCNC: 26 MMOL/L — SIGNIFICANT CHANGE UP (ref 22–31)
CREAT SERPL-MCNC: 0.96 MG/DL — SIGNIFICANT CHANGE UP (ref 0.5–1.3)
EGFR: 57 ML/MIN/1.73M2 — LOW
GLUCOSE SERPL-MCNC: 88 MG/DL — SIGNIFICANT CHANGE UP (ref 70–99)
HCT VFR BLD CALC: 30.3 % — LOW (ref 34.5–45)
HGB BLD-MCNC: 9.1 G/DL — LOW (ref 11.5–15.5)
MCHC RBC-ENTMCNC: 28.5 PG — SIGNIFICANT CHANGE UP (ref 27–34)
MCHC RBC-ENTMCNC: 30 GM/DL — LOW (ref 32–36)
MCV RBC AUTO: 95 FL — SIGNIFICANT CHANGE UP (ref 80–100)
NRBC # BLD: 0 /100 WBCS — SIGNIFICANT CHANGE UP (ref 0–0)
PLATELET # BLD AUTO: 262 K/UL — SIGNIFICANT CHANGE UP (ref 150–400)
POTASSIUM SERPL-MCNC: 3.9 MMOL/L — SIGNIFICANT CHANGE UP (ref 3.5–5.3)
POTASSIUM SERPL-SCNC: 3.9 MMOL/L — SIGNIFICANT CHANGE UP (ref 3.5–5.3)
RBC # BLD: 3.19 M/UL — LOW (ref 3.8–5.2)
RBC # FLD: 14.4 % — SIGNIFICANT CHANGE UP (ref 10.3–14.5)
SODIUM SERPL-SCNC: 141 MMOL/L — SIGNIFICANT CHANGE UP (ref 135–145)
WBC # BLD: 8.73 K/UL — SIGNIFICANT CHANGE UP (ref 3.8–10.5)
WBC # FLD AUTO: 8.73 K/UL — SIGNIFICANT CHANGE UP (ref 3.8–10.5)

## 2024-03-31 PROCEDURE — 71045 X-RAY EXAM CHEST 1 VIEW: CPT | Mod: 26,76

## 2024-03-31 RX ADMIN — Medication 325 MILLIGRAM(S): at 12:58

## 2024-03-31 RX ADMIN — Medication 650 MILLIGRAM(S): at 05:11

## 2024-03-31 RX ADMIN — HEPARIN SODIUM 5000 UNIT(S): 5000 INJECTION INTRAVENOUS; SUBCUTANEOUS at 05:13

## 2024-03-31 RX ADMIN — Medication 1000 UNIT(S): at 13:02

## 2024-03-31 RX ADMIN — Medication 50 MILLIGRAM(S): at 12:58

## 2024-03-31 RX ADMIN — SPIRONOLACTONE 12.5 MILLIGRAM(S): 25 TABLET, FILM COATED ORAL at 05:14

## 2024-03-31 RX ADMIN — Medication 3 MILLIGRAM(S): at 21:34

## 2024-03-31 RX ADMIN — LOSARTAN POTASSIUM 25 MILLIGRAM(S): 100 TABLET, FILM COATED ORAL at 05:11

## 2024-03-31 RX ADMIN — CYCLOPENTOLATE HYDROCHLORIDE 1 DROP(S): 10 SOLUTION/ DROPS OPHTHALMIC at 05:12

## 2024-03-31 RX ADMIN — Medication 20 MILLIGRAM(S): at 05:10

## 2024-03-31 RX ADMIN — Medication 0.25 MILLIGRAM(S): at 05:14

## 2024-03-31 RX ADMIN — CYCLOPENTOLATE HYDROCHLORIDE 1 DROP(S): 10 SOLUTION/ DROPS OPHTHALMIC at 18:55

## 2024-03-31 RX ADMIN — ATORVASTATIN CALCIUM 20 MILLIGRAM(S): 80 TABLET, FILM COATED ORAL at 21:33

## 2024-03-31 RX ADMIN — Medication 3 MILLILITER(S): at 18:18

## 2024-03-31 RX ADMIN — Medication 500 MILLIGRAM(S): at 18:20

## 2024-03-31 RX ADMIN — CHLORHEXIDINE GLUCONATE 1 APPLICATION(S): 213 SOLUTION TOPICAL at 12:57

## 2024-03-31 RX ADMIN — MIRTAZAPINE 7.5 MILLIGRAM(S): 45 TABLET, ORALLY DISINTEGRATING ORAL at 21:34

## 2024-03-31 RX ADMIN — Medication 20 MILLIGRAM(S): at 12:58

## 2024-03-31 RX ADMIN — Medication 0.25 MILLIGRAM(S): at 18:55

## 2024-03-31 RX ADMIN — Medication 3 MILLILITER(S): at 12:58

## 2024-03-31 RX ADMIN — Medication 1 DROP(S): at 05:11

## 2024-03-31 RX ADMIN — Medication 500 MILLIGRAM(S): at 05:11

## 2024-03-31 RX ADMIN — Medication 1 DROP(S): at 18:18

## 2024-03-31 RX ADMIN — SENNA PLUS 1 TABLET(S): 8.6 TABLET ORAL at 18:20

## 2024-03-31 RX ADMIN — HEPARIN SODIUM 5000 UNIT(S): 5000 INJECTION INTRAVENOUS; SUBCUTANEOUS at 18:21

## 2024-03-31 RX ADMIN — Medication 3 MILLILITER(S): at 05:13

## 2024-03-31 NOTE — PROGRESS NOTE ADULT - SUBJECTIVE AND OBJECTIVE BOX
date of service: 03-31-24 @ 10:07  afberile  REVIEW OF SYSTEMS:  CONSTITUTIONAL: No fever,  no  weight loss  ENT:  No  tinnitus,   no   vertigo  NECK: No pain or stiffness  RESPIRATORY: No cough, wheezing, chills or hemoptysis;    No Shortness of Breath  CARDIOVASCULAR: No chest pain, palpitations, dizziness  GASTROINTESTINAL: No abdominal or epigastric pain. No nausea, vomiting, or hematemesis; No diarrhea  No melena or hematochezia.  GENITOURINARY: No dysuria, frequency, hematuria, or incontinence  NEUROLOGICAL: No headaches  SKIN: No itching,  no   rash  LYMPH Nodes: No enlarged glands  ENDOCRINE: No heat or cold intolerance  MUSCULOSKELETAL: No joint pain or swelling  PSYCHIATRIC: No depression, anxiety  HEME/LYMPH: No easy bruising, or bleeding gums  ALLERGY AND IMMUNOLOGIC: No hives or eczema	    MEDICATIONS  (STANDING):  albuterol/ipratropium for Nebulization 3 milliLiter(s) Nebulizer every 6 hours  atorvastatin 20 milliGRAM(s) Oral at bedtime  buDESOnide    Inhalation Suspension 0.25 milliGRAM(s) Inhalation two times a day  cefuroxime   Tablet 500 milliGRAM(s) Oral every 12 hours  chlorhexidine 2% Cloths 1 Application(s) Topical daily  cholecalciferol 1000 Unit(s) Oral daily  cyclopentolate 1% Solution 1 Drop(s) Both EYES two times a day  EPINEPHrine    Injectable 0.3 milliGRAM(s) IntraMuscular once  ferrous    sulfate 325 milliGRAM(s) Oral daily  FLUoxetine 20 milliGRAM(s) Oral daily  furosemide    Tablet 20 milliGRAM(s) Oral daily  heparin   Injectable 5000 Unit(s) SubCutaneous every 12 hours  influenza  Vaccine (HIGH DOSE) 0.7 milliLiter(s) IntraMuscular once  losartan 25 milliGRAM(s) Oral daily  mirtazapine 7.5 milliGRAM(s) Oral at bedtime  polyethylene glycol 3350 17 Gram(s) Oral two times a day  prednisoLONE acetate 1% Suspension 1 Drop(s) Both EYES two times a day  senna 1 Tablet(s) Oral two times a day  spironolactone 12.5 milliGRAM(s) Oral daily  traZODone 50 milliGRAM(s) Oral daily    MEDICATIONS  (PRN):  acetaminophen     Tablet .. 650 milliGRAM(s) Oral every 6 hours PRN Temp greater or equal to 38C (100.4F), Mild Pain (1 - 3)  melatonin 3 milliGRAM(s) Oral at bedtime PRN Insomnia  ondansetron Injectable 4 milliGRAM(s) IV Push every 8 hours PRN Nausea and/or Vomiting  temazepam 30 milliGRAM(s) Oral at bedtime PRN Insomnia      Vital Signs Last 24 Hrs  T(C): 36.4 (31 Mar 2024 04:43), Max: 36.7 (30 Mar 2024 21:25)  T(F): 97.5 (31 Mar 2024 04:43), Max: 98 (30 Mar 2024 21:25)  HR: 77 (31 Mar 2024 04:43) (77 - 88)  BP: 145/77 (31 Mar 2024 04:43) (91/50 - 145/77)  BP(mean): --  RR: 18 (31 Mar 2024 04:43) (18 - 18)  SpO2: 98% (31 Mar 2024 04:43) (94% - 98%)    Parameters below as of 31 Mar 2024 04:43  Patient On (Oxygen Delivery Method): nasal cannula  O2 Flow (L/min): 2    CAPILLARY BLOOD GLUCOSE      POCT Blood Glucose.: 141 mg/dL (30 Mar 2024 11:49)    I&O's Summary    30 Mar 2024 07:01  -  31 Mar 2024 07:00  --------------------------------------------------------  IN: 1100 mL / OUT: 125 mL / NET: 975 mL          Appearance: Normal	  HEENT:   Normal oral mucosa, PERRL, EOMI	  Lymphatic: No lymphadenopathy  Cardiovascular: Normal S1 S2, No JVD  Respiratory: Lungs clear to auscultation	  Gastrointestinal:  Soft, Non-tender, + BS	  Skin: No rash, No ecchymoses	  Extremities:     LABS:                        9.1    8.73  )-----------( 262      ( 31 Mar 2024 07:04 )             30.3     03-31    141  |  100  |  20  ----------------------------<  88  3.9   |  26  |  0.96    Ca    9.4      31 Mar 2024 07:03            Urinalysis Basic - ( 31 Mar 2024 07:03 )    Color: x / Appearance: x / SG: x / pH: x  Gluc: 88 mg/dL / Ketone: x  / Bili: x / Urobili: x   Blood: x / Protein: x / Nitrite: x   Leuk Esterase: x / RBC: x / WBC x   Sq Epi: x / Non Sq Epi: x / Bacteria: x                      Consultant(s) Notes Reviewed:      Care Discussed with Consultants/Other Providers:

## 2024-03-31 NOTE — PROGRESS NOTE ADULT - SUBJECTIVE AND OBJECTIVE BOX
Date of Service: 03-31-24 @ 08:48           CARDIOLOGY     PROGRESS  NOTE   ________________________________________________    CHIEF COMPLAINT:Patient is a 88y old  Female who presents with a chief complaint of Diverticulitis, leukocytosis (30 Mar 2024 14:07)  no complain  	  REVIEW OF SYSTEMS:  CONSTITUTIONAL: No fever, weight loss, or fatigue  EYES: No eye pain, visual disturbances, or discharge  ENT:  No difficulty hearing, tinnitus, vertigo; No sinus or throat pain  NECK: No pain or stiffness  RESPIRATORY: No cough, wheezing, chills or hemoptysis; No Shortness of Breath  CARDIOVASCULAR: No chest pain, palpitations, passing out, dizziness, or leg swelling  GASTROINTESTINAL: No abdominal or epigastric pain. No nausea, vomiting, or hematemesis; No diarrhea or constipation. No melena or hematochezia.  GENITOURINARY: No dysuria, frequency, hematuria, or incontinence  NEUROLOGICAL: No headaches, memory loss, loss of strength, numbness, or tremors  SKIN: No itching, burning, rashes, or lesions   LYMPH Nodes: No enlarged glands  ENDOCRINE: No heat or cold intolerance; No hair loss  MUSCULOSKELETAL: No joint pain or swelling; No muscle, back, or extremity pain  PSYCHIATRIC: No depression, anxiety, mood swings, or difficulty sleeping  HEME/LYMPH: No easy bruising, or bleeding gums  ALLERGY AND IMMUNOLOGIC: No hives or eczema	    [ x] All others negative	  [ ] Unable to obtain    PHYSICAL EXAM:  T(C): 36.4 (03-31-24 @ 04:43), Max: 36.7 (03-30-24 @ 21:25)  HR: 77 (03-31-24 @ 04:43) (77 - 88)  BP: 145/77 (03-31-24 @ 04:43) (91/50 - 145/77)  RR: 18 (03-31-24 @ 04:43) (18 - 18)  SpO2: 98% (03-31-24 @ 04:43) (91% - 98%)  Wt(kg): --  I&O's Summary    30 Mar 2024 07:01  -  31 Mar 2024 07:00  --------------------------------------------------------  IN: 1100 mL / OUT: 125 mL / NET: 975 mL        Appearance: Normal	  HEENT:   Normal oral mucosa, PERRL, EOMI	  Lymphatic: No lymphadenopathy  Cardiovascular: Normal S1 S2, No JVD, + murmurs, No edema  Respiratory: rhonchi  Psychiatry: A & O x 3, Mood & affect appropriate  Gastrointestinal:  Soft, Non-tender, + BS	  Skin: No rashes, No ecchymoses, No cyanosis	  Neurologic: Non-focal  Extremities: Normal range of motion, No clubbing, cyanosis or edema  Vascular: Peripheral pulses palpable 2+ bilaterally    MEDICATIONS  (STANDING):  albuterol/ipratropium for Nebulization 3 milliLiter(s) Nebulizer every 6 hours  atorvastatin 20 milliGRAM(s) Oral at bedtime  buDESOnide    Inhalation Suspension 0.25 milliGRAM(s) Inhalation two times a day  cefuroxime   Tablet 500 milliGRAM(s) Oral every 12 hours  chlorhexidine 2% Cloths 1 Application(s) Topical daily  cholecalciferol 1000 Unit(s) Oral daily  cyclopentolate 1% Solution 1 Drop(s) Both EYES two times a day  EPINEPHrine    Injectable 0.3 milliGRAM(s) IntraMuscular once  ferrous    sulfate 325 milliGRAM(s) Oral daily  FLUoxetine 20 milliGRAM(s) Oral daily  furosemide    Tablet 20 milliGRAM(s) Oral daily  heparin   Injectable 5000 Unit(s) SubCutaneous every 12 hours  influenza  Vaccine (HIGH DOSE) 0.7 milliLiter(s) IntraMuscular once  losartan 25 milliGRAM(s) Oral daily  mirtazapine 7.5 milliGRAM(s) Oral at bedtime  polyethylene glycol 3350 17 Gram(s) Oral two times a day  prednisoLONE acetate 1% Suspension 1 Drop(s) Both EYES two times a day  senna 1 Tablet(s) Oral two times a day  spironolactone 12.5 milliGRAM(s) Oral daily  traZODone 50 milliGRAM(s) Oral daily      TELEMETRY: 	    ECG:  	  RADIOLOGY:  OTHER: 	  	  LABS:	 	    CARDIAC MARKERS:                                9.1    8.73  )-----------( 262      ( 31 Mar 2024 07:04 )             30.3     03-31    141  |  100  |  20  ----------------------------<  88  3.9   |  26  |  0.96    Ca    9.4      31 Mar 2024 07:03      proBNP:   Lipid Profile:   HgA1c:   TSH:         Assessment and plan  ---------------------------  This is an 89 y/o female w/ PMHx pulmonary sarcoidosis, COPD not on home O2, CAD, hypothyroidism, and HTN presenting for lack of BM in the last 5 days along with SOB and L flank pain from the atria. The patient does endorse some left-sided upper abdominal pain but doesn't endorse any other symptoms. She states that she doesn't have any SOB, and states that her last BM prior to the hospital was yesterday. Called the patient's daughter Deborah, who stated that she was told the patient was having severe abdominal pain as well as some vomiting.   She was admitted here from 2/8-2/21 for abdominal pain, constipation and vomiting. At the time, CT was suggestive of diverticulitis as well and her course was complicated by fluid overload requiring IVP lasix. She was treated with IV Zosyn for 7 days. During this hospital course, she was also found to have a dilated L renal pelvis w/ +UA, but was ruled not have UTI by ID. Urology was consulted, who stated that her pessary could be the cause of the hydro, and removed it on 2/13.   In the ED, she was febrile to 101.8, tachycardic to 130, hypotensive to 80/50, and hypoxic requiring 4LNC. CBC revealed leukocytosis of 29.35, normocytic anemia of 9.5. CMP w/ elevated AG of 24, low bicarb of 17, . Presenting VBG w/ lactate of 9.4. she was straight catheterized for UA, which was red and turvid w/ large LE and >50 RBC/hpf, negative for bacteria. Initially CT A/P w/o contrast was done (due to contrast allergy), which revealed a possible mild diverticulitis of the descending colon and unchanged severe L hydroureter with layering hyperdensity  which could be stones or blood products. She was given Ceftriaxone and Zosyn, 2.5L IVNS. Lactate down to 3.5, BP improved. She had 2 dark tarry stools in her diaper, so she was premedicated w/ solumedrol and benadryl and CTA Abdomen/pelvis was ordered.  pt is well known to me with hx of sarcoidosis, cad with abdominal pain, she was recently dc sec to Diverticulitis  IN er pt with hypotension and hypoxia.  pt with sig abnormal ecg,,   dvt prophylaxis  asa if no contraindication  check QTC on Psych meds  fu hgb , GI noted/ urology  events noted with increase sob, chest x ray with vascular congestion sec to chf  echo noted with  Ruptured chordae tendineae to the anterior mitral leaflet, and sig regional wall motion abnormality ?ischemia  not sure pt is a candidate for any aggressive/ invasive cardiac procedure  dc ivf  add small dose of diuretics GOC  add lasix and aldactone awaiting blood work   transferred to tele, noted  speech and swallow  spoke to her PMD Dr Noe, plan for medical therapy only , discussed with daughter  margy YEN  awaiting chest x ray , ?dc in am

## 2024-03-31 NOTE — PROGRESS NOTE ADULT - SUBJECTIVE AND OBJECTIVE BOX
BISHNU SEGURA  MRN#: [id number]  Subjective:  pulmonary progress note: :acute hypoxic respiratory Failure on 02, sarcoidosis , COPD , Diastolic dysfunction , pulmonary HTN date of service is 3/30/2024   This is an 89 y/o female w/ PMH pulmonary sarcoidosis, COPD not on home O2, CAD, hypothyroidism, and HTN presenting for lack of BM in the last 5 days along with SOB and L flank pain from the atria. The patient does endorse some left-sided upper abdominal pain but doesn't endorse any other symptoms. She states that she doesn't have any SOB, and states that her last BM prior to the hospital was yesterday. Called the patient's daughter Deborah, who stated that she was told the patient was having severe abdominal pain as well as some vomiting.   She was admitted here from 2/8-2/21 for abdominal pain, constipation and vomiting. At the time, CT was suggestive of diverticulitis as well and her course was complicated by fluid overload requiring IVP lasix. She was treated with IV Zosyn for 7 days. During this hospital course, she was also found to have a dilated L renal pelvis w/ +UA, but was ruled not have UTI by ID. Urology was consulted, who stated that her pessary could be the cause of the hydro, and removed it on 2/13.     In the ED, she was febrile to 101.8, tachycardic to 130, hypotensive to 80/50, and hypoxic requiring 4LNC. CBC revealed leukocytosis of 29.35, normocytic anemia of 9.5. CMP w/ elevated AG of 24, low bicarb of 17, . Presenting VBG w/ lactate of 9.4. she was straight catheterized for UA, which was red and turbid w/ large LE and >50 RBC/hpf, negative for bacteria. Initially CT A/P w/o contrast was done (due to contrast allergy), which revealed a possible mild diverticulitis of the descending colon and unchanged severe L hydroureter with layering hypodensity which could be stones or blood products. She was given Ceftriaxone and Zosyn, 2.5L IVNS. Lactate down to 3.5, BP improved. She had 2 dark tarry stools in her diaper, so she was premedicated w/ solumedrol and benadryl and CTA Abdomen/pelvis was ordered.  (21 Mar 2024 01:30), patient was SOB needed high 02 supplement , CT scan acute Diverticulitis , afebrile down to nasal canula , on antibiotics , less distress no nausea or vomiting no headache S/P blood transfusion , H/H is stable no chest pain no SOB at rest , no fever no chest pain seen and examined with daughter at bed side no GI symptoms , discuss patient condition with daughter all question were answered , no over night events 02 saturation is still on the low side will continue bronchodilator and try to wean off 02 safely , no over night events adding Flovent nebulizer 0.25 mg BID , no new C/P trial to wean off 02 to acceptable RA on going low potasium to give supplement , , potasium is better keep k+ > 4 continue weaning trial off 02 , 02 saturation is 93% on 4 liter nasal 02 , finishing Antibiotics course     PAST MEDICAL & SURGICAL HISTORY:  HTN (hypertension)      Hypothyroidism      Osteoporosis  pulmonary hypertension   H/O anxiety     CAD (coronary artery disease)  sarcoidosis   COPD           FAMILY HISTORY:  Review of Systems:  Review of Systems: Review of Systems:   CONSTITUTIONAL: + fever,+ weight loss  EYES: No eye pain, visual disturbances, or discharge  RESPIRATORY: + SOB. No cough, wheezing, chills or hemoptysis  CARDIOVASCULAR: No chest pain, palpitations, dizziness, or leg swelling  GASTROINTESTINAL: +abdominal pain. No nausea, vomiting, or hematemesis; No diarrhea or constipation. No melena or hematochezia.  GENITOURINARY: +L flank pain, hematuria; No dysuria, frequency, or incontinence  NEUROLOGICAL: No headaches, memory loss, loss of strength, numbness, or tremors  SKIN: No itching, burning, rashes, or lesions   MUSCULOSKELETAL: No joint pain or swelling; No muscle, back pain  PSYCHIATRIC: + depression,+ anxiety,+ mood swings, or difficulty ,             OBJECTIVE:  ICU Vital Signs Last 24 Hrs  T(C): 36.8 (22 Mar 2024 16:23), Max: 36.9 (22 Mar 2024 00:28)  T(F): 98.2 (22 Mar 2024 16:23), Max: 98.4 (22 Mar 2024 00:28)  HR: 102 (22 Mar 2024 16:23) (82 - 102)  BP: 145/76 (22 Mar 2024 16:23) (94/51 - 145/76)  BP(mean): --  ABP: --  ABP(mean): --  RR: 20 (22 Mar 2024 16:23) (19 - 20)  SpO2: 93% (22 Mar 2024 16:23) (93% - 96%)    O2 Parameters below as of 22 Mar 2024 16:23  Patient On (Oxygen Delivery Method): nasal cannula  O2 Flow (L/min): 2          03-21 @ 07:01  -  03-22 @ 07:00  --------------------------------------------------------  IN: 240 mL / OUT: 1850 mL / NET: -1610 mL      PHYSICAL EXAM: Daily Height in cm: 157.48 (20 Mar 2024 16:54)  Elderly agitated cachectic female on 2 liter nasal  02 saturation is 93%  Daily   HEENT:     + NCAT  + EOMI  - Conjuctival edema   - Icterus   - Thrush   - ETT  - NGT/OGT  Neck:         + FROM    + JVD     - Nodes     - Masses    + Mid-line trachea   - Tracheostomy  Chest:          pectus excavatum , increase A-P diameter   Lungs:          + CTA   + Rhonchi    - Rales    - Wheezing   + Decreased BS   - Dullness R L  Cardiac:       + S1 + S2    + RRR   - Irregular   - S3  - S4  + Murmurs   - Rub   - Hamman’s sign   Abdomen:    + BS     + Soft    + tender left lower quadrant   - Distended  - Organomegaly  - PEG  Extremities:   - Cyanosis U/L   - Clubbing  U/L  - LE/UE Edema   + Capillary refill    + Pulses   Neuro:        + Awake   +  Alert   - Confused   - Lethargic   - Sedated   - Generalized Weakness  Skin:        - Rashes    - Erythema   + Normal incisions   + IV sites intact  - Sacral decubit      HOSPITAL MEDICATIONS: All mediciations reviewed and analyzed  MEDICATIONS  (STANDING):  albuterol/ipratropium for Nebulization 3 milliLiter(s) Nebulizer every 6 hours  atorvastatin 20 milliGRAM(s) Oral at bedtime  cholecalciferol 1000 Unit(s) Oral daily  cyclopentolate 1% Solution 1 Drop(s) Both EYES two times a day  EPINEPHrine    Injectable 0.3 milliGRAM(s) IntraMuscular once  ferrous    sulfate 325 milliGRAM(s) Oral daily  FLUoxetine 20 milliGRAM(s) Oral daily  influenza  Vaccine (HIGH DOSE) 0.7 milliLiter(s) IntraMuscular once  mirtazapine 7.5 milliGRAM(s) Oral at bedtime  piperacillin/tazobactam IVPB.. 3.375 Gram(s) IV Intermittent every 8 hours  polyethylene glycol 3350 17 Gram(s) Oral two times a day  potassium chloride    Tablet ER 20 milliEquivalent(s) Oral daily  prednisoLONE acetate 1% Suspension 1 Drop(s) Both EYES two times a day  senna 1 Tablet(s) Oral two times a day  sodium chloride 0.9%. 1000 milliLiter(s) (60 mL/Hr) IV Continuous <Continuous>  traZODone 50 milliGRAM(s) Oral daily    MEDICATIONS  (PRN):  acetaminophen     Tablet .. 650 milliGRAM(s) Oral every 6 hours PRN Temp greater or equal to 38C (100.4F), Mild Pain (1 - 3)  melatonin 3 milliGRAM(s) Oral at bedtime PRN Insomnia  ondansetron Injectable 4 milliGRAM(s) IV Push every 8 hours PRN Nausea and/or Vomiting  temazepam 30 milliGRAM(s) Oral at bedtime PRN Insomnia    LABS: All Lab data reviewed and analyzed                                      9.2    5.87  )-----------( 224      ( 29 Mar 2024 07:09 )             29.5    03-29    142  |  102  |  12  ----------------------------<  85  3.1<L>   |  28  |  0.97    Ca    9.0      29 Mar 2024 07:09  Phos  4.0     03-29  Mg     1.8     03-29      Ca    8.6      28 Mar 2024 07:22      Ca    8.3<L>      27 Mar 2024 10:44      Ca    8.1<L>      23 Mar 2024 06:52  Phos  3.0     03-23  Mg     2.2     03-23      Ca    8.2<L>      22 Mar 2024 07:34    TPro  4.6<L>  /  Alb  2.8<L>  /  TBili  0.3  /  DBili  x   /  AST  28  /  ALT  9<L>  /  AlkPhos  103  03-21     LIVER FUNCTIONS - ( 21 Mar 2024 07:05 )  Alb: 2.8 g/dL / Pro: 4.6 g/dL / ALK PHOS: 103 U/L / ALT: 9 U/L / AST: 28 U/L / GGT: x           RADIOLOGY: - Reviewed and analyzed  BISHNU SEGURA  MRN#: [id number]  Subjective:  pulmonary progress note: :acute hypoxic respiratory Failure on 02, sarcoidosis , COPD , Diastolic dysfunction , pulmonary HTN date of service is 3/31/2024   This is an 87 y/o female w/ PMH pulmonary sarcoidosis, COPD not on home O2, CAD, hypothyroidism, and HTN presenting for lack of BM in the last 5 days along with SOB and L flank pain from the atria. The patient does endorse some left-sided upper abdominal pain but doesn't endorse any other symptoms. She states that she doesn't have any SOB, and states that her last BM prior to the hospital was yesterday. Called the patient's daughter Deborah, who stated that she was told the patient was having severe abdominal pain as well as some vomiting.   She was admitted here from 2/8-2/21 for abdominal pain, constipation and vomiting. At the time, CT was suggestive of diverticulitis as well and her course was complicated by fluid overload requiring IVP lasix. She was treated with IV Zosyn for 7 days. During this hospital course, she was also found to have a dilated L renal pelvis w/ +UA, but was ruled not have UTI by ID. Urology was consulted, who stated that her pessary could be the cause of the hydro, and removed it on 2/13.     In the ED, she was febrile to 101.8, tachycardic to 130, hypotensive to 80/50, and hypoxic requiring 4LNC. CBC revealed leukocytosis of 29.35, normocytic anemia of 9.5. CMP w/ elevated AG of 24, low bicarb of 17, . Presenting VBG w/ lactate of 9.4. she was straight catheterized for UA, which was red and turbid w/ large LE and >50 RBC/hpf, negative for bacteria. Initially CT A/P w/o contrast was done (due to contrast allergy), which revealed a possible mild diverticulitis of the descending colon and unchanged severe L hydroureter with layering hypodensity which could be stones or blood products. She was given Ceftriaxone and Zosyn, 2.5L IVNS. Lactate down to 3.5, BP improved. She had 2 dark tarry stools in her diaper, so she was premedicated w/ solumedrol and benadryl and CTA Abdomen/pelvis was ordered.  (21 Mar 2024 01:30), patient was SOB needed high 02 supplement , CT scan acute Diverticulitis , afebrile down to nasal canula , on antibiotics , less distress no nausea or vomiting no headache S/P blood transfusion , H/H is stable no chest pain no SOB at rest , no fever no chest pain seen and examined with daughter at bed side no GI symptoms , discuss patient condition with daughter all question were answered , no over night events 02 saturation is still on the low side will continue bronchodilator and try to wean off 02 safely , no over night events adding Flovent nebulizer 0.25 mg BID , no new C/P trial to wean off 02 to acceptable RA on going low potasium to give supplement , , potasium is better keep k+ > 4 continue weaning trial off 02 , 02 saturation is 93% on 4 liter nasal 02 , finishing Antibiotics course , no new events no fever weaning trial off 02    PAST MEDICAL & SURGICAL HISTORY:  HTN (hypertension)      Hypothyroidism      Osteoporosis  pulmonary hypertension   H/O anxiety     CAD (coronary artery disease)  sarcoidosis   COPD           FAMILY HISTORY:  Review of Systems:  Review of Systems: Review of Systems:   CONSTITUTIONAL: + fever,+ weight loss  EYES: No eye pain, visual disturbances, or discharge  RESPIRATORY: + SOB. No cough, wheezing, chills or hemoptysis  CARDIOVASCULAR: No chest pain, palpitations, dizziness, or leg swelling  GASTROINTESTINAL: +abdominal pain. No nausea, vomiting, or hematemesis; No diarrhea or constipation. No melena or hematochezia.  GENITOURINARY: +L flank pain, hematuria; No dysuria, frequency, or incontinence  NEUROLOGICAL: No headaches, memory loss, loss of strength, numbness, or tremors  SKIN: No itching, burning, rashes, or lesions   MUSCULOSKELETAL: No joint pain or swelling; No muscle, back pain  PSYCHIATRIC: + depression,+ anxiety,+ mood swings, or difficulty ,             OBJECTIVE:  ICU Vital Signs Last 24 Hrs  T(C): 36.8 (22 Mar 2024 16:23), Max: 36.9 (22 Mar 2024 00:28)  T(F): 98.2 (22 Mar 2024 16:23), Max: 98.4 (22 Mar 2024 00:28)  HR: 102 (22 Mar 2024 16:23) (82 - 102)  BP: 145/76 (22 Mar 2024 16:23) (94/51 - 145/76)  BP(mean): --  ABP: --  ABP(mean): --  RR: 20 (22 Mar 2024 16:23) (19 - 20)  SpO2: 93% (22 Mar 2024 16:23) (93% - 96%)    O2 Parameters below as of 22 Mar 2024 16:23  Patient On (Oxygen Delivery Method): nasal cannula  O2 Flow (L/min): 2          03-21 @ 07:01  -  03-22 @ 07:00  --------------------------------------------------------  IN: 240 mL / OUT: 1850 mL / NET: -1610 mL      PHYSICAL EXAM: Daily Height in cm: 157.48 (20 Mar 2024 16:54)  Elderly agitated cachectic female on 2 liter nasal  02 saturation is 93%  Daily   HEENT:     + NCAT  + EOMI  - Conjuctival edema   - Icterus   - Thrush   - ETT  - NGT/OGT  Neck:         + FROM    + JVD     - Nodes     - Masses    + Mid-line trachea   - Tracheostomy  Chest:          pectus excavatum , increase A-P diameter   Lungs:          + CTA   + Rhonchi    - Rales    - Wheezing   + Decreased BS   - Dullness R L  Cardiac:       + S1 + S2    + RRR   - Irregular   - S3  - S4  + Murmurs   - Rub   - Hamman’s sign   Abdomen:    + BS     + Soft    + tender left lower quadrant   - Distended  - Organomegaly  - PEG  Extremities:   - Cyanosis U/L   - Clubbing  U/L  - LE/UE Edema   + Capillary refill    + Pulses   Neuro:        + Awake   +  Alert   - Confused   - Lethargic   - Sedated   - Generalized Weakness  Skin:        - Rashes    - Erythema   + Normal incisions   + IV sites intact  - Sacral decubit      HOSPITAL MEDICATIONS: All mediciations reviewed and analyzed  MEDICATIONS  (STANDING):  albuterol/ipratropium for Nebulization 3 milliLiter(s) Nebulizer every 6 hours  atorvastatin 20 milliGRAM(s) Oral at bedtime  cholecalciferol 1000 Unit(s) Oral daily  cyclopentolate 1% Solution 1 Drop(s) Both EYES two times a day  EPINEPHrine    Injectable 0.3 milliGRAM(s) IntraMuscular once  ferrous    sulfate 325 milliGRAM(s) Oral daily  FLUoxetine 20 milliGRAM(s) Oral daily  influenza  Vaccine (HIGH DOSE) 0.7 milliLiter(s) IntraMuscular once  mirtazapine 7.5 milliGRAM(s) Oral at bedtime  piperacillin/tazobactam IVPB.. 3.375 Gram(s) IV Intermittent every 8 hours  polyethylene glycol 3350 17 Gram(s) Oral two times a day  potassium chloride    Tablet ER 20 milliEquivalent(s) Oral daily  prednisoLONE acetate 1% Suspension 1 Drop(s) Both EYES two times a day  senna 1 Tablet(s) Oral two times a day  sodium chloride 0.9%. 1000 milliLiter(s) (60 mL/Hr) IV Continuous <Continuous>  traZODone 50 milliGRAM(s) Oral daily    MEDICATIONS  (PRN):  acetaminophen     Tablet .. 650 milliGRAM(s) Oral every 6 hours PRN Temp greater or equal to 38C (100.4F), Mild Pain (1 - 3)  melatonin 3 milliGRAM(s) Oral at bedtime PRN Insomnia  ondansetron Injectable 4 milliGRAM(s) IV Push every 8 hours PRN Nausea and/or Vomiting  temazepam 30 milliGRAM(s) Oral at bedtime PRN Insomnia    LABS: All Lab data reviewed and analyzed                        9.1    8.73  )-----------( 262      ( 31 Mar 2024 07:04 )             30.3   03-31    141  |  100  |  20  ----------------------------<  88  3.9   |  26  |  0.96    Ca    9.4      31 Mar 2024 07:03      Ca    9.0      29 Mar 2024 07:09  Phos  4.0     03-29  Mg     1.8     03-29      Ca    8.6      28 Mar 2024 07:22      Ca    8.3<L>      27 Mar 2024 10:44      Ca    8.1<L>      23 Mar 2024 06:52  Phos  3.0     03-23  Mg     2.2     03-23      Ca    8.2<L>      22 Mar 2024 07:34    TPro  4.6<L>  /  Alb  2.8<L>  /  TBili  0.3  /  DBili  x   /  AST  28  /  ALT  9<L>  /  AlkPhos  103  03-21     LIVER FUNCTIONS - ( 21 Mar 2024 07:05 )  Alb: 2.8 g/dL / Pro: 4.6 g/dL / ALK PHOS: 103 U/L / ALT: 9 U/L / AST: 28 U/L / GGT: x           RADIOLOGY: - Reviewed and analyzed

## 2024-03-31 NOTE — PROGRESS NOTE ADULT - ASSESSMENT
88 F        h/o   CAD,   pulmonary sarcoidosis, COPD ,  HTN,        Ct  a/p.   2/24,  diverticulitis ,  sigmoid  colon, severe  L hydroureter/  mod  L   hydronephrosis    comp fx  L  4.  vaginal  pessary           admitted with high wbc/  lactate ,  from  uti/    ecoli, , diverticulitis    CT A/P angio 3/21/ 24,  colitis and  diverticulitis. patent   mesenteric vessels. No pneumatosis or free air.    c/.c  anemia     CAD    HTN       was on iv  zosyn .  and   now  on ceftin,   seen by  ID      Echo,  normal  ef/  RV  dysfunction,  mlple  wall  motion abnormalities/  mild  MR    f/p  by card/  given  advanced  age ,, bed  bound  status, ,  cachetic  state. doubt  if  pt is an ideal  candidate   for  any intervention       po   lasix./  aldactone/  stable  labs. potassium  3.9    doing betetr/  Pt to  ambulate pt    dvt   ppx          rad< from: TTE W or WO Ultrasound Enhancing Agent (03.26.24 @ 15:34) >  CONCLUSIONS:      1. Left ventricular systolic function is low normal with an ejection fraction of 50 % by Enrique's method of disks.   2. Entire septum, entire inferior wall, and apical anterior segment are abnormal.   3. Normal right ventricular cavity size and mildly reduced systolic function.   4. Ruptured chordae tendineae to the anterior mitral leaflet.   5. Trace mitral regurgitation.   6. No prior echocardiogram is available for comparison.  _______________    < end of copied text >         rad< from: CT Abdomen and Pelvis No Cont (02.08.24 @ 18:01) >  BONES: Left hip ORIF. Lumbar levoscoliosis. Degenerative changes of the   spine. Age indeterminate compression deformity of L4. Grade 1   anterolisthesis of L4 on L5 and L5 on S1.  IMPRESSION:  Thickening of the sigmoid colon with mild inflammatory changes adjacent   to multiple colonic diverticula suggestive of acute diverticulitis.   Severe left hydroureter and moderate left hydronephrosis of uncertain   etiology.  --- End of Report ---

## 2024-04-01 LAB
ANION GAP SERPL CALC-SCNC: 13 MMOL/L — SIGNIFICANT CHANGE UP (ref 5–17)
BUN SERPL-MCNC: 21 MG/DL — SIGNIFICANT CHANGE UP (ref 7–23)
CALCIUM SERPL-MCNC: 9.3 MG/DL — SIGNIFICANT CHANGE UP (ref 8.4–10.5)
CHLORIDE SERPL-SCNC: 102 MMOL/L — SIGNIFICANT CHANGE UP (ref 96–108)
CO2 SERPL-SCNC: 25 MMOL/L — SIGNIFICANT CHANGE UP (ref 22–31)
CREAT SERPL-MCNC: 0.99 MG/DL — SIGNIFICANT CHANGE UP (ref 0.5–1.3)
EGFR: 55 ML/MIN/1.73M2 — LOW
GLUCOSE SERPL-MCNC: 91 MG/DL — SIGNIFICANT CHANGE UP (ref 70–99)
HCT VFR BLD CALC: 30.1 % — LOW (ref 34.5–45)
HGB BLD-MCNC: 9 G/DL — LOW (ref 11.5–15.5)
MCHC RBC-ENTMCNC: 28.3 PG — SIGNIFICANT CHANGE UP (ref 27–34)
MCHC RBC-ENTMCNC: 29.9 GM/DL — LOW (ref 32–36)
MCV RBC AUTO: 94.7 FL — SIGNIFICANT CHANGE UP (ref 80–100)
NRBC # BLD: 0 /100 WBCS — SIGNIFICANT CHANGE UP (ref 0–0)
PLATELET # BLD AUTO: 249 K/UL — SIGNIFICANT CHANGE UP (ref 150–400)
POTASSIUM SERPL-MCNC: 3.8 MMOL/L — SIGNIFICANT CHANGE UP (ref 3.5–5.3)
POTASSIUM SERPL-SCNC: 3.8 MMOL/L — SIGNIFICANT CHANGE UP (ref 3.5–5.3)
RBC # BLD: 3.18 M/UL — LOW (ref 3.8–5.2)
RBC # FLD: 14.3 % — SIGNIFICANT CHANGE UP (ref 10.3–14.5)
SODIUM SERPL-SCNC: 140 MMOL/L — SIGNIFICANT CHANGE UP (ref 135–145)
WBC # BLD: 7.38 K/UL — SIGNIFICANT CHANGE UP (ref 3.8–10.5)
WBC # FLD AUTO: 7.38 K/UL — SIGNIFICANT CHANGE UP (ref 3.8–10.5)

## 2024-04-01 RX ADMIN — Medication 3 MILLILITER(S): at 06:47

## 2024-04-01 RX ADMIN — SENNA PLUS 1 TABLET(S): 8.6 TABLET ORAL at 06:37

## 2024-04-01 RX ADMIN — Medication 650 MILLIGRAM(S): at 00:54

## 2024-04-01 RX ADMIN — CYCLOPENTOLATE HYDROCHLORIDE 1 DROP(S): 10 SOLUTION/ DROPS OPHTHALMIC at 18:07

## 2024-04-01 RX ADMIN — Medication 0.25 MILLIGRAM(S): at 08:17

## 2024-04-01 RX ADMIN — Medication 3 MILLILITER(S): at 12:18

## 2024-04-01 RX ADMIN — Medication 650 MILLIGRAM(S): at 20:17

## 2024-04-01 RX ADMIN — CYCLOPENTOLATE HYDROCHLORIDE 1 DROP(S): 10 SOLUTION/ DROPS OPHTHALMIC at 06:46

## 2024-04-01 RX ADMIN — Medication 1000 UNIT(S): at 12:17

## 2024-04-01 RX ADMIN — Medication 325 MILLIGRAM(S): at 12:17

## 2024-04-01 RX ADMIN — Medication 650 MILLIGRAM(S): at 15:43

## 2024-04-01 RX ADMIN — Medication 1 DROP(S): at 18:08

## 2024-04-01 RX ADMIN — Medication 50 MILLIGRAM(S): at 12:16

## 2024-04-01 RX ADMIN — HEPARIN SODIUM 5000 UNIT(S): 5000 INJECTION INTRAVENOUS; SUBCUTANEOUS at 06:44

## 2024-04-01 RX ADMIN — Medication 1 DROP(S): at 06:45

## 2024-04-01 RX ADMIN — Medication 20 MILLIGRAM(S): at 12:15

## 2024-04-01 RX ADMIN — HEPARIN SODIUM 5000 UNIT(S): 5000 INJECTION INTRAVENOUS; SUBCUTANEOUS at 18:04

## 2024-04-01 RX ADMIN — Medication 500 MILLIGRAM(S): at 06:37

## 2024-04-01 RX ADMIN — Medication 3 MILLIGRAM(S): at 21:46

## 2024-04-01 RX ADMIN — LOSARTAN POTASSIUM 25 MILLIGRAM(S): 100 TABLET, FILM COATED ORAL at 06:38

## 2024-04-01 RX ADMIN — SPIRONOLACTONE 12.5 MILLIGRAM(S): 25 TABLET, FILM COATED ORAL at 06:39

## 2024-04-01 RX ADMIN — CHLORHEXIDINE GLUCONATE 1 APPLICATION(S): 213 SOLUTION TOPICAL at 13:03

## 2024-04-01 RX ADMIN — Medication 3 MILLILITER(S): at 18:10

## 2024-04-01 RX ADMIN — ATORVASTATIN CALCIUM 20 MILLIGRAM(S): 80 TABLET, FILM COATED ORAL at 21:46

## 2024-04-01 RX ADMIN — MIRTAZAPINE 7.5 MILLIGRAM(S): 45 TABLET, ORALLY DISINTEGRATING ORAL at 21:46

## 2024-04-01 RX ADMIN — Medication 20 MILLIGRAM(S): at 06:36

## 2024-04-01 RX ADMIN — Medication 500 MILLIGRAM(S): at 18:08

## 2024-04-01 RX ADMIN — Medication 650 MILLIGRAM(S): at 01:45

## 2024-04-01 RX ADMIN — Medication 650 MILLIGRAM(S): at 21:12

## 2024-04-01 NOTE — PROGRESS NOTE ADULT - SUBJECTIVE AND OBJECTIVE BOX
BISHNU SEGURA  MRN#: [id number]  Subjective:  pulmonary progress note: :acute hypoxic respiratory Failure on 02, sarcoidosis , COPD , Diastolic dysfunction , pulmonary HTN date of service is 04/01/2024   This is an 89 y/o female w/ PMH pulmonary sarcoidosis, COPD not on home O2, CAD, hypothyroidism, and HTN presenting for lack of BM in the last 5 days along with SOB and L flank pain from the atria. The patient does endorse some left-sided upper abdominal pain but doesn't endorse any other symptoms. She states that she doesn't have any SOB, and states that her last BM prior to the hospital was yesterday. Called the patient's daughter Deborah, who stated that she was told the patient was having severe abdominal pain as well as some vomiting.   She was admitted here from 2/8-2/21 for abdominal pain, constipation and vomiting. At the time, CT was suggestive of diverticulitis as well and her course was complicated by fluid overload requiring IVP lasix. She was treated with IV Zosyn for 7 days. During this hospital course, she was also found to have a dilated L renal pelvis w/ +UA, but was ruled not have UTI by ID. Urology was consulted, who stated that her pessary could be the cause of the hydro, and removed it on 2/13.     In the ED, she was febrile to 101.8, tachycardic to 130, hypotensive to 80/50, and hypoxic requiring 4LNC. CBC revealed leukocytosis of 29.35, normocytic anemia of 9.5. CMP w/ elevated AG of 24, low bicarb of 17, . Presenting VBG w/ lactate of 9.4. she was straight catheterized for UA, which was red and turbid w/ large LE and >50 RBC/hpf, negative for bacteria. Initially CT A/P w/o contrast was done (due to contrast allergy), which revealed a possible mild diverticulitis of the descending colon and unchanged severe L hydroureter with layering hypodensity which could be stones or blood products. She was given Ceftriaxone and Zosyn, 2.5L IVNS. Lactate down to 3.5, BP improved. She had 2 dark tarry stools in her diaper, so she was premedicated w/ solumedrol and benadryl and CTA Abdomen/pelvis was ordered.  (21 Mar 2024 01:30), patient was SOB needed high 02 supplement , CT scan acute Diverticulitis , afebrile down to nasal canula , on antibiotics , less distress no nausea or vomiting no headache S/P blood transfusion , H/H is stable no chest pain no SOB at rest , no fever no chest pain seen and examined with daughter at bed side no GI symptoms , discuss patient condition with daughter all question were answered , no over night events 02 saturation is still on the low side will continue bronchodilator and try to wean off 02 safely , no over night events adding Flovent nebulizer 0.25 mg BID , no new C/P trial to wean off 02 to acceptable RA on going low potasium to give supplement , , potasium is better keep k+ > 4 continue weaning trial off 02 , 02 saturation is 93% on 4 liter nasal 02 , finishing Antibiotics course , no new events no fever weaning trial off 02, continue to need 02 appetite is still suboptimal no fever less coughing no respiratory distress     PAST MEDICAL & SURGICAL HISTORY:  HTN (hypertension)      Hypothyroidism      Osteoporosis  pulmonary hypertension   H/O anxiety     CAD (coronary artery disease)  sarcoidosis   COPD           FAMILY HISTORY:  Review of Systems:  Review of Systems: Review of Systems:   CONSTITUTIONAL: + fever,+ weight loss  EYES: No eye pain, visual disturbances, or discharge  RESPIRATORY: + SOB. No cough, wheezing, chills or hemoptysis  CARDIOVASCULAR: No chest pain, palpitations, dizziness, or leg swelling  GASTROINTESTINAL: +abdominal pain. No nausea, vomiting, or hematemesis; No diarrhea or constipation. No melena or hematochezia.  GENITOURINARY: +L flank pain, hematuria; No dysuria, frequency, or incontinence  NEUROLOGICAL: No headaches, memory loss, loss of strength, numbness, or tremors  SKIN: No itching, burning, rashes, or lesions   MUSCULOSKELETAL: No joint pain or swelling; No muscle, back pain  PSYCHIATRIC: + depression,+ anxiety,+ mood swings, or difficulty ,             OBJECTIVE:  ICU Vital Signs Last 24 Hrs  T(C): 36.8 (22 Mar 2024 16:23), Max: 36.9 (22 Mar 2024 00:28)  T(F): 98.2 (22 Mar 2024 16:23), Max: 98.4 (22 Mar 2024 00:28)  HR: 102 (22 Mar 2024 16:23) (82 - 102)  BP: 145/76 (22 Mar 2024 16:23) (94/51 - 145/76)  BP(mean): --  ABP: --  ABP(mean): --  RR: 20 (22 Mar 2024 16:23) (19 - 20)  SpO2: 93% (22 Mar 2024 16:23) (93% - 96%)    O2 Parameters below as of 22 Mar 2024 16:23  Patient On (Oxygen Delivery Method): nasal cannula  O2 Flow (L/min): 2          03-21 @ 07:01  -  03-22 @ 07:00  --------------------------------------------------------  IN: 240 mL / OUT: 1850 mL / NET: -1610 mL      PHYSICAL EXAM: Daily Height in cm: 157.48 (20 Mar 2024 16:54)  Elderly agitated cachectic female on 2 liter nasal  02 saturation is 93%  Daily   HEENT:     + NCAT  + EOMI  - Conjuctival edema   - Icterus   - Thrush   - ETT  - NGT/OGT  Neck:         + FROM    + JVD     - Nodes     - Masses    + Mid-line trachea   - Tracheostomy  Chest:          pectus excavatum , increase A-P diameter   Lungs:          + CTA   + Rhonchi    - Rales    - Wheezing   + Decreased BS   - Dullness R L  Cardiac:       + S1 + S2    + RRR   - Irregular   - S3  - S4  + Murmurs   - Rub   - Hamman’s sign   Abdomen:    + BS     + Soft    + tender left lower quadrant   - Distended  - Organomegaly  - PEG  Extremities:   - Cyanosis U/L   - Clubbing  U/L  - LE/UE Edema   + Capillary refill    + Pulses   Neuro:        + Awake   +  Alert   - Confused   - Lethargic   - Sedated   - Generalized Weakness  Skin:        - Rashes    - Erythema   + Normal incisions   + IV sites intact  - Sacral decubit      HOSPITAL MEDICATIONS: All mediciations reviewed and analyzed  MEDICATIONS  (STANDING):  albuterol/ipratropium for Nebulization 3 milliLiter(s) Nebulizer every 6 hours  atorvastatin 20 milliGRAM(s) Oral at bedtime  cholecalciferol 1000 Unit(s) Oral daily  cyclopentolate 1% Solution 1 Drop(s) Both EYES two times a day  EPINEPHrine    Injectable 0.3 milliGRAM(s) IntraMuscular once  ferrous    sulfate 325 milliGRAM(s) Oral daily  FLUoxetine 20 milliGRAM(s) Oral daily  influenza  Vaccine (HIGH DOSE) 0.7 milliLiter(s) IntraMuscular once  mirtazapine 7.5 milliGRAM(s) Oral at bedtime  piperacillin/tazobactam IVPB.. 3.375 Gram(s) IV Intermittent every 8 hours  polyethylene glycol 3350 17 Gram(s) Oral two times a day  potassium chloride    Tablet ER 20 milliEquivalent(s) Oral daily  prednisoLONE acetate 1% Suspension 1 Drop(s) Both EYES two times a day  senna 1 Tablet(s) Oral two times a day  sodium chloride 0.9%. 1000 milliLiter(s) (60 mL/Hr) IV Continuous <Continuous>  traZODone 50 milliGRAM(s) Oral daily    MEDICATIONS  (PRN):  acetaminophen     Tablet .. 650 milliGRAM(s) Oral every 6 hours PRN Temp greater or equal to 38C (100.4F), Mild Pain (1 - 3)  melatonin 3 milliGRAM(s) Oral at bedtime PRN Insomnia  ondansetron Injectable 4 milliGRAM(s) IV Push every 8 hours PRN Nausea and/or Vomiting  temazepam 30 milliGRAM(s) Oral at bedtime PRN Insomnia    LABS: All Lab data reviewed and analyzed                        9.1    8.73  )-----------( 262      ( 31 Mar 2024 07:04 )             30.3   03-31    141  |  100  |  20  ----------------------------<  88  3.9   |  26  |  0.96    Ca    9.4      31 Mar 2024 07:03      Ca    9.0      29 Mar 2024 07:09  Phos  4.0     03-29  Mg     1.8     03-29      Ca    8.6      28 Mar 2024 07:22      Ca    8.3<L>      27 Mar 2024 10:44      Ca    8.1<L>      23 Mar 2024 06:52  Phos  3.0     03-23  Mg     2.2     03-23      Ca    8.2<L>      22 Mar 2024 07:34    TPro  4.6<L>  /  Alb  2.8<L>  /  TBili  0.3  /  DBili  x   /  AST  28  /  ALT  9<L>  /  AlkPhos  103  03-21     LIVER FUNCTIONS - ( 21 Mar 2024 07:05 )  Alb: 2.8 g/dL / Pro: 4.6 g/dL / ALK PHOS: 103 U/L / ALT: 9 U/L / AST: 28 U/L / GGT: x           RADIOLOGY: - Reviewed and analyzed

## 2024-04-01 NOTE — PROGRESS NOTE ADULT - ASSESSMENT
88 F        h/o   CAD,   pulmonary sarcoidosis, COPD ,  HTN,        Ct  a/p.   2/24,  diverticulitis ,  sigmoid  colon, severe  L hydroureter/  mod  L   hydronephrosis    comp fx  L  4.  vaginal  pessary           admitted with high wbc/  lactate ,  from  uti/    ecoli, , diverticulitis    CT A/P angio 3/21/ 24,  colitis and  diverticulitis. patent   mesenteric vessels. No pneumatosis or free air.    c/.c  anemia     CAD    HTN       was on iv  zosyn .  and   now  on ceftin,   seen by  ID      Echo,  normal  ef/  RV  dysfunction,  mlple  wall  motion abnormalities/  mild  MR    f/p  by card/  given  advanced  age ,,  mostly  bed  bound  status, ,  cachetic  state. doubt  if  pt is an ideal  candidate   for  any intervention       po   lasix./  aldactone     PT  to  f/p   start   d/c  plans     dvt   ppx          rad< from: TTE W or WO Ultrasound Enhancing Agent (03.26.24 @ 15:34) >  CONCLUSIONS:      1. Left ventricular systolic function is low normal with an ejection fraction of 50 % by Enrique's method of disks.   2. Entire septum, entire inferior wall, and apical anterior segment are abnormal.   3. Normal right ventricular cavity size and mildly reduced systolic function.   4. Ruptured chordae tendineae to the anterior mitral leaflet.   5. Trace mitral regurgitation.   6. No prior echocardiogram is available for comparison.  _______________    < end of copied text >         rad< from: CT Abdomen and Pelvis No Cont (02.08.24 @ 18:01) >  BONES: Left hip ORIF. Lumbar levoscoliosis. Degenerative changes of the   spine. Age indeterminate compression deformity of L4. Grade 1   anterolisthesis of L4 on L5 and L5 on S1.  IMPRESSION:  Thickening of the sigmoid colon with mild inflammatory changes adjacent   to multiple colonic diverticula suggestive of acute diverticulitis.   Severe left hydroureter and moderate left hydronephrosis of uncertain   etiology.  --- End of Report ---

## 2024-04-01 NOTE — PROGRESS NOTE ADULT - SUBJECTIVE AND OBJECTIVE BOX
date of service: 04-01-24 @ 09:03  [facility]  REVIEW OF SYSTEMS:  CONSTITUTIONAL: No fever,  no  weight loss  ENT:  No  tinnitus,   no   vertigo  NECK: No pain or stiffness  RESPIRATORY: No cough, wheezing, chills or hemoptysis;    No Shortness of Breath  CARDIOVASCULAR: No chest pain, palpitations, dizziness  GASTROINTESTINAL: No abdominal or epigastric pain. No nausea, vomiting, or hematemesis; No diarrhea  No melena or hematochezia.  GENITOURINARY: No dysuria, frequency, hematuria, or incontinence  NEUROLOGICAL: No headaches  SKIN: No itching,  no   rash  LYMPH Nodes: No enlarged glands  ENDOCRINE: No heat or cold intolerance  MUSCULOSKELETAL: No joint pain or swelling  PSYCHIATRIC: No depression, anxiety  HEME/LYMPH: No easy bruising, or bleeding gums  ALLERGY AND IMMUNOLOGIC: No hives or eczema	    MEDICATIONS  (STANDING):  albuterol/ipratropium for Nebulization 3 milliLiter(s) Nebulizer every 6 hours  atorvastatin 20 milliGRAM(s) Oral at bedtime  buDESOnide    Inhalation Suspension 0.25 milliGRAM(s) Inhalation two times a day  cefuroxime   Tablet 500 milliGRAM(s) Oral every 12 hours  chlorhexidine 2% Cloths 1 Application(s) Topical daily  cholecalciferol 1000 Unit(s) Oral daily  cyclopentolate 1% Solution 1 Drop(s) Both EYES two times a day  EPINEPHrine    Injectable 0.3 milliGRAM(s) IntraMuscular once  ferrous    sulfate 325 milliGRAM(s) Oral daily  FLUoxetine 20 milliGRAM(s) Oral daily  furosemide    Tablet 20 milliGRAM(s) Oral daily  heparin   Injectable 5000 Unit(s) SubCutaneous every 12 hours  influenza  Vaccine (HIGH DOSE) 0.7 milliLiter(s) IntraMuscular once  losartan 25 milliGRAM(s) Oral daily  mirtazapine 7.5 milliGRAM(s) Oral at bedtime  polyethylene glycol 3350 17 Gram(s) Oral two times a day  prednisoLONE acetate 1% Suspension 1 Drop(s) Both EYES two times a day  senna 1 Tablet(s) Oral two times a day  spironolactone 12.5 milliGRAM(s) Oral daily  traZODone 50 milliGRAM(s) Oral daily    MEDICATIONS  (PRN):  acetaminophen     Tablet .. 650 milliGRAM(s) Oral every 6 hours PRN Temp greater or equal to 38C (100.4F), Mild Pain (1 - 3)  melatonin 3 milliGRAM(s) Oral at bedtime PRN Insomnia  ondansetron Injectable 4 milliGRAM(s) IV Push every 8 hours PRN Nausea and/or Vomiting  temazepam 30 milliGRAM(s) Oral at bedtime PRN Insomnia      Vital Signs Last 24 Hrs  T(C): 36.7 (01 Apr 2024 05:17), Max: 36.7 (31 Mar 2024 12:51)  T(F): 98 (01 Apr 2024 05:17), Max: 98 (31 Mar 2024 12:51)  HR: 68 (01 Apr 2024 05:17) (68 - 89)  BP: 120/80 (01 Apr 2024 05:17) (103/55 - 139/61)  BP(mean): --  RR: 18 (01 Apr 2024 05:17) (18 - 18)  SpO2: 93% (01 Apr 2024 05:17) (93% - 97%)    Parameters below as of 01 Apr 2024 05:17  Patient On (Oxygen Delivery Method): nasal cannula  O2 Flow (L/min): 2    CAPILLARY BLOOD GLUCOSE        I&O's Summary    31 Mar 2024 07:01  -  01 Apr 2024 07:00  --------------------------------------------------------  IN: 240 mL / OUT: 350 mL / NET: -110 mL          Appearance: Normal	  HEENT:   Normal oral mucosa, PERRL, EOMI	  Lymphatic: No lymphadenopathy  Cardiovascular: Normal S1 S2, No JVD  Respiratory: Lungs clear to auscultation	  Gastrointestinal:  Soft, Non-tender, + BS	  Skin: No rash, No ecchymoses	  Extremities:     LABS:                        9.0    7.38  )-----------( 249      ( 01 Apr 2024 05:49 )             30.1     04-01    140  |  102  |  21  ----------------------------<  91  3.8   |  25  |  0.99    Ca    9.3      01 Apr 2024 05:49            Urinalysis Basic - ( 01 Apr 2024 05:49 )    Color: x / Appearance: x / SG: x / pH: x  Gluc: 91 mg/dL / Ketone: x  / Bili: x / Urobili: x   Blood: x / Protein: x / Nitrite: x   Leuk Esterase: x / RBC: x / WBC x   Sq Epi: x / Non Sq Epi: x / Bacteria: x                      Consultant(s) Notes Reviewed:      Care Discussed with Consultants/Other Providers:

## 2024-04-01 NOTE — PROGRESS NOTE ADULT - SUBJECTIVE AND OBJECTIVE BOX
Date of Service: 04-01-24 @ 07:18           CARDIOLOGY     PROGRESS  NOTE   ________________________________________________    CHIEF COMPLAINT:Patient is a 88y old  Female who presents with a chief complaint of Diverticulitis, leukocytosis (31 Mar 2024 12:59)  no complain  	  REVIEW OF SYSTEMS:  CONSTITUTIONAL: No fever, weight loss, or fatigue  EYES: No eye pain, visual disturbances, or discharge  ENT:  No difficulty hearing, tinnitus, vertigo; No sinus or throat pain  NECK: No pain or stiffness  RESPIRATORY: No cough, wheezing, chills or hemoptysis; No Shortness of Breath  CARDIOVASCULAR: No chest pain, palpitations, passing out, dizziness, or leg swelling  GASTROINTESTINAL: No abdominal or epigastric pain. No nausea, vomiting, or hematemesis; No diarrhea or constipation. No melena or hematochezia.  GENITOURINARY: No dysuria, frequency, hematuria, or incontinence  NEUROLOGICAL: No headaches, memory loss, loss of strength, numbness, or tremors  SKIN: No itching, burning, rashes, or lesions   LYMPH Nodes: No enlarged glands  ENDOCRINE: No heat or cold intolerance; No hair loss  MUSCULOSKELETAL: No joint pain or swelling; No muscle, back, or extremity pain  PSYCHIATRIC: No depression, anxiety, mood swings, or difficulty sleeping  HEME/LYMPH: No easy bruising, or bleeding gums  ALLERGY AND IMMUNOLOGIC: No hives or eczema	    [x ] All others negative	  [ ] Unable to obtain    PHYSICAL EXAM:  T(C): 36.7 (04-01-24 @ 05:17), Max: 36.7 (03-31-24 @ 12:51)  HR: 68 (04-01-24 @ 05:17) (68 - 89)  BP: 120/80 (04-01-24 @ 05:17) (103/55 - 139/61)  RR: 18 (04-01-24 @ 05:17) (18 - 18)  SpO2: 93% (04-01-24 @ 05:17) (93% - 97%)  Wt(kg): --  I&O's Summary    31 Mar 2024 07:01  -  01 Apr 2024 07:00  --------------------------------------------------------  IN: 240 mL / OUT: 350 mL / NET: -110 mL        Appearance: Normal	  HEENT:   Normal oral mucosa, PERRL, EOMI	  Lymphatic: No lymphadenopathy  Cardiovascular: Normal S1 S2, No JVD, + murmurs, No edema  Respiratory: Lungs clear to auscultation	  Psychiatry: A & O x 3, Mood & affect appropriate  Gastrointestinal:  Soft, Non-tender, + BS	  Skin: No rashes, No ecchymoses, No cyanosis	  Neurologic: Non-focal  Extremities: Normal range of motion, No clubbing, cyanosis or edema  Vascular: Peripheral pulses palpable 2+ bilaterally    MEDICATIONS  (STANDING):  albuterol/ipratropium for Nebulization 3 milliLiter(s) Nebulizer every 6 hours  atorvastatin 20 milliGRAM(s) Oral at bedtime  buDESOnide    Inhalation Suspension 0.25 milliGRAM(s) Inhalation two times a day  cefuroxime   Tablet 500 milliGRAM(s) Oral every 12 hours  chlorhexidine 2% Cloths 1 Application(s) Topical daily  cholecalciferol 1000 Unit(s) Oral daily  cyclopentolate 1% Solution 1 Drop(s) Both EYES two times a day  EPINEPHrine    Injectable 0.3 milliGRAM(s) IntraMuscular once  ferrous    sulfate 325 milliGRAM(s) Oral daily  FLUoxetine 20 milliGRAM(s) Oral daily  furosemide    Tablet 20 milliGRAM(s) Oral daily  heparin   Injectable 5000 Unit(s) SubCutaneous every 12 hours  influenza  Vaccine (HIGH DOSE) 0.7 milliLiter(s) IntraMuscular once  losartan 25 milliGRAM(s) Oral daily  mirtazapine 7.5 milliGRAM(s) Oral at bedtime  polyethylene glycol 3350 17 Gram(s) Oral two times a day  prednisoLONE acetate 1% Suspension 1 Drop(s) Both EYES two times a day  senna 1 Tablet(s) Oral two times a day  spironolactone 12.5 milliGRAM(s) Oral daily  traZODone 50 milliGRAM(s) Oral daily      TELEMETRY: 	    ECG:  	  RADIOLOGY:  OTHER: 	  	  LABS:	 	    CARDIAC MARKERS:                                9.0    7.38  )-----------( 249      ( 01 Apr 2024 05:49 )             30.1     04-01    140  |  102  |  21  ----------------------------<  91  3.8   |  25  |  0.99    Ca    9.3      01 Apr 2024 05:49      proBNP:   Lipid Profile:   HgA1c:   TSH:       < from: Xray Chest 1 View- PORTABLE-Urgent (Xray Chest 1 View- PORTABLE-Urgent .) (03.31.24 @ 09:26) >  FINDINGS:  3/31/2024 at 8:48 AM  Loop recorder.  Elevation of the right hemidiaphragm.  The heart is normal in size.  Left basilar atelectasis and/or small layering effusion. Trace right   effusion or atelectasis.  There is no pneumothorax. Normal pulmonary vasculature  No acute bony abnormality severe OA at each shoulder..    3/31/2024 at 9:03 AM:  No interval change.    IMPRESSION:  Left basilar atelectasis and/or small layering effusion. Trace right   effusion or atelectasis.    Normal pulmonary vasculature      Assessment and plan  ---------------------------  This is an 87 y/o female w/ PMHx pulmonary sarcoidosis, COPD not on home O2, CAD, hypothyroidism, and HTN presenting for lack of BM in the last 5 days along with SOB and L flank pain from the atria. The patient does endorse some left-sided upper abdominal pain but doesn't endorse any other symptoms. She states that she doesn't have any SOB, and states that her last BM prior to the hospital was yesterday. Called the patient's daughter Deborah, who stated that she was told the patient was having severe abdominal pain as well as some vomiting.   She was admitted here from 2/8-2/21 for abdominal pain, constipation and vomiting. At the time, CT was suggestive of diverticulitis as well and her course was complicated by fluid overload requiring IVP lasix. She was treated with IV Zosyn for 7 days. During this hospital course, she was also found to have a dilated L renal pelvis w/ +UA, but was ruled not have UTI by ID. Urology was consulted, who stated that her pessary could be the cause of the hydro, and removed it on 2/13.   In the ED, she was febrile to 101.8, tachycardic to 130, hypotensive to 80/50, and hypoxic requiring 4LNC. CBC revealed leukocytosis of 29.35, normocytic anemia of 9.5. CMP w/ elevated AG of 24, low bicarb of 17, . Presenting VBG w/ lactate of 9.4. she was straight catheterized for UA, which was red and turvid w/ large LE and >50 RBC/hpf, negative for bacteria. Initially CT A/P w/o contrast was done (due to contrast allergy), which revealed a possible mild diverticulitis of the descending colon and unchanged severe L hydroureter with layering hyperdensity  which could be stones or blood products. She was given Ceftriaxone and Zosyn, 2.5L IVNS. Lactate down to 3.5, BP improved. She had 2 dark tarry stools in her diaper, so she was premedicated w/ solumedrol and benadryl and CTA Abdomen/pelvis was ordered.  pt is well known to me with hx of sarcoidosis, cad with abdominal pain, she was recently dc sec to Diverticulitis  IN er pt with hypotension and hypoxia.  pt with sig abnormal ecg,,   dvt prophylaxis  asa if no contraindication  check QTC on Psych meds  fu hgb , GI noted/ urology  events noted with increase sob, chest x ray with vascular congestion sec to chf  echo noted with  Ruptured chordae tendineae to the anterior mitral leaflet, and sig regional wall motion abnormality ?ischemia  not sure pt is a candidate for any aggressive/ invasive cardiac procedure  dc ivf  add small dose of diuretics GOC  add lasix and aldactone awaiting blood work   transferred to tele, noted  speech and swallow  spoke to her PMD Dr Noe, plan for medical therapy only , discussed with daughter  doing well, chest x ray noted, may dc and fu with Dr Winkler as out pt

## 2024-04-02 LAB — SARS-COV-2 RNA SPEC QL NAA+PROBE: SIGNIFICANT CHANGE UP

## 2024-04-02 RX ORDER — SODIUM CHLORIDE 9 MG/ML
1000 INJECTION, SOLUTION INTRAVENOUS
Refills: 0 | Status: DISCONTINUED | OUTPATIENT
Start: 2024-04-02 | End: 2024-04-03

## 2024-04-02 RX ADMIN — Medication 650 MILLIGRAM(S): at 15:13

## 2024-04-02 RX ADMIN — Medication 1 DROP(S): at 17:30

## 2024-04-02 RX ADMIN — Medication 3 MILLILITER(S): at 17:57

## 2024-04-02 RX ADMIN — Medication 0.25 MILLIGRAM(S): at 17:37

## 2024-04-02 RX ADMIN — Medication 3 MILLILITER(S): at 11:34

## 2024-04-02 RX ADMIN — Medication 20 MILLIGRAM(S): at 05:28

## 2024-04-02 RX ADMIN — SENNA PLUS 1 TABLET(S): 8.6 TABLET ORAL at 05:28

## 2024-04-02 RX ADMIN — LOSARTAN POTASSIUM 25 MILLIGRAM(S): 100 TABLET, FILM COATED ORAL at 05:26

## 2024-04-02 RX ADMIN — HEPARIN SODIUM 5000 UNIT(S): 5000 INJECTION INTRAVENOUS; SUBCUTANEOUS at 17:35

## 2024-04-02 RX ADMIN — ATORVASTATIN CALCIUM 20 MILLIGRAM(S): 80 TABLET, FILM COATED ORAL at 21:09

## 2024-04-02 RX ADMIN — Medication 3 MILLILITER(S): at 05:36

## 2024-04-02 RX ADMIN — Medication 1000 UNIT(S): at 11:32

## 2024-04-02 RX ADMIN — Medication 0.25 MILLIGRAM(S): at 05:36

## 2024-04-02 RX ADMIN — Medication 500 MILLIGRAM(S): at 17:32

## 2024-04-02 RX ADMIN — CHLORHEXIDINE GLUCONATE 1 APPLICATION(S): 213 SOLUTION TOPICAL at 11:30

## 2024-04-02 RX ADMIN — HEPARIN SODIUM 5000 UNIT(S): 5000 INJECTION INTRAVENOUS; SUBCUTANEOUS at 05:33

## 2024-04-02 RX ADMIN — Medication 20 MILLIGRAM(S): at 11:30

## 2024-04-02 RX ADMIN — Medication 1 DROP(S): at 05:34

## 2024-04-02 RX ADMIN — MIRTAZAPINE 7.5 MILLIGRAM(S): 45 TABLET, ORALLY DISINTEGRATING ORAL at 21:09

## 2024-04-02 RX ADMIN — CYCLOPENTOLATE HYDROCHLORIDE 1 DROP(S): 10 SOLUTION/ DROPS OPHTHALMIC at 05:35

## 2024-04-02 RX ADMIN — SENNA PLUS 1 TABLET(S): 8.6 TABLET ORAL at 17:31

## 2024-04-02 RX ADMIN — Medication 650 MILLIGRAM(S): at 02:36

## 2024-04-02 RX ADMIN — SPIRONOLACTONE 12.5 MILLIGRAM(S): 25 TABLET, FILM COATED ORAL at 05:27

## 2024-04-02 RX ADMIN — Medication 50 MILLIGRAM(S): at 11:35

## 2024-04-02 RX ADMIN — CYCLOPENTOLATE HYDROCHLORIDE 1 DROP(S): 10 SOLUTION/ DROPS OPHTHALMIC at 17:44

## 2024-04-02 RX ADMIN — Medication 650 MILLIGRAM(S): at 13:38

## 2024-04-02 RX ADMIN — SODIUM CHLORIDE 75 MILLILITER(S): 9 INJECTION, SOLUTION INTRAVENOUS at 16:05

## 2024-04-02 RX ADMIN — Medication 650 MILLIGRAM(S): at 03:46

## 2024-04-02 RX ADMIN — Medication 500 MILLIGRAM(S): at 05:28

## 2024-04-02 RX ADMIN — Medication 325 MILLIGRAM(S): at 11:33

## 2024-04-02 NOTE — PROGRESS NOTE ADULT - SUBJECTIVE AND OBJECTIVE BOX
BISHNU SEGURA  MRN#: [id number]  Subjective:  pulmonary progress note: :acute hypoxic respiratory Failure on 02, sarcoidosis , COPD , Diastolic dysfunction , pulmonary HTN date of service is 04/02/2024   This is an 89 y/o female w/ PMH pulmonary sarcoidosis, COPD not on home O2, CAD, hypothyroidism, and HTN presenting for lack of BM in the last 5 days along with SOB and L flank pain from the atria. The patient does endorse some left-sided upper abdominal pain but doesn't endorse any other symptoms. She states that she doesn't have any SOB, and states that her last BM prior to the hospital was yesterday. Called the patient's daughter Deborah, who stated that she was told the patient was having severe abdominal pain as well as some vomiting.   She was admitted here from 2/8-2/21 for abdominal pain, constipation and vomiting. At the time, CT was suggestive of diverticulitis as well and her course was complicated by fluid overload requiring IVP lasix. She was treated with IV Zosyn for 7 days. During this hospital course, she was also found to have a dilated L renal pelvis w/ +UA, but was ruled not have UTI by ID. Urology was consulted, who stated that her pessary could be the cause of the hydro, and removed it on 2/13.     In the ED, she was febrile to 101.8, tachycardic to 130, hypotensive to 80/50, and hypoxic requiring 4LNC. CBC revealed leukocytosis of 29.35, normocytic anemia of 9.5. CMP w/ elevated AG of 24, low bicarb of 17, . Presenting VBG w/ lactate of 9.4. she was straight catheterized for UA, which was red and turbid w/ large LE and >50 RBC/hpf, negative for bacteria. Initially CT A/P w/o contrast was done (due to contrast allergy), which revealed a possible mild diverticulitis of the descending colon and unchanged severe L hydroureter with layering hypodensity which could be stones or blood products. She was given Ceftriaxone and Zosyn, 2.5L IVNS. Lactate down to 3.5, BP improved. She had 2 dark tarry stools in her diaper, so she was premedicated w/ solumedrol and benadryl and CTA Abdomen/pelvis was ordered.  (21 Mar 2024 01:30), patient was SOB needed high 02 supplement , CT scan acute Diverticulitis , afebrile down to nasal canula , on antibiotics , less distress no nausea or vomiting no headache S/P blood transfusion , H/H is stable no chest pain no SOB at rest , no fever no chest pain seen and examined with daughter at bed side no GI symptoms , discuss patient condition with daughter all question were answered , no over night events 02 saturation is still on the low side will continue bronchodilator and try to wean off 02 safely , no over night events adding Flovent nebulizer 0.25 mg BID , no new C/P trial to wean off 02 to acceptable RA on going low potasium to give supplement , , potasium is better keep k+ > 4 continue weaning trial off 02 , 02 saturation is 93% on 4 liter nasal 02 , finishing Antibiotics course , no new events no fever weaning trial off 02, continue to need 02 appetite is still suboptimal no fever less coughing no respiratory distress , no over night events continue weaning of 02     PAST MEDICAL & SURGICAL HISTORY:  HTN (hypertension)      Hypothyroidism      Osteoporosis  pulmonary hypertension   H/O anxiety     CAD (coronary artery disease)  sarcoidosis   COPD           FAMILY HISTORY:  Review of Systems:  Review of Systems: Review of Systems:   CONSTITUTIONAL: + fever,+ weight loss  EYES: No eye pain, visual disturbances, or discharge  RESPIRATORY: + SOB. No cough, wheezing, chills or hemoptysis  CARDIOVASCULAR: No chest pain, palpitations, dizziness, or leg swelling  GASTROINTESTINAL: +abdominal pain. No nausea, vomiting, or hematemesis; No diarrhea or constipation. No melena or hematochezia.  GENITOURINARY: +L flank pain, hematuria; No dysuria, frequency, or incontinence  NEUROLOGICAL: No headaches, memory loss, loss of strength, numbness, or tremors  SKIN: No itching, burning, rashes, or lesions   MUSCULOSKELETAL: No joint pain or swelling; No muscle, back pain  PSYCHIATRIC: + depression,+ anxiety,+ mood swings, or difficulty ,             OBJECTIVE:  ICU Vital Signs Last 24 Hrs  T(C): 36.8 (22 Mar 2024 16:23), Max: 36.9 (22 Mar 2024 00:28)  T(F): 98.2 (22 Mar 2024 16:23), Max: 98.4 (22 Mar 2024 00:28)  HR: 102 (22 Mar 2024 16:23) (82 - 102)  BP: 145/76 (22 Mar 2024 16:23) (94/51 - 145/76)  BP(mean): --  ABP: --  ABP(mean): --  RR: 20 (22 Mar 2024 16:23) (19 - 20)  SpO2: 93% (22 Mar 2024 16:23) (93% - 96%)    O2 Parameters below as of 22 Mar 2024 16:23  Patient On (Oxygen Delivery Method): nasal cannula  O2 Flow (L/min): 2          03-21 @ 07:01  -  03-22 @ 07:00  --------------------------------------------------------  IN: 240 mL / OUT: 1850 mL / NET: -1610 mL      PHYSICAL EXAM: Daily Height in cm: 157.48 (20 Mar 2024 16:54)  Elderly agitated cachectic female on 1 liter nasal  02 saturation is 93%  Daily   HEENT:     + NCAT  + EOMI  - Conjuctival edema   - Icterus   - Thrush   - ETT  - NGT/OGT  Neck:         + FROM    + JVD     - Nodes     - Masses    + Mid-line trachea   - Tracheostomy  Chest:          pectus excavatum , increase A-P diameter   Lungs:          + CTA   + Rhonchi    - Rales    - Wheezing   + Decreased BS   - Dullness R L  Cardiac:       + S1 + S2    + RRR   - Irregular   - S3  - S4  + Murmurs   - Rub   - Hamman’s sign   Abdomen:    + BS     + Soft    + tender left lower quadrant   - Distended  - Organomegaly  - PEG  Extremities:   - Cyanosis U/L   - Clubbing  U/L  - LE/UE Edema   + Capillary refill    + Pulses   Neuro:        + Awake   +  Alert   - Confused   - Lethargic   - Sedated   - Generalized Weakness  Skin:        - Rashes    - Erythema   + Normal incisions   + IV sites intact  - Sacral decubit      HOSPITAL MEDICATIONS: All mediciations reviewed and analyzed  MEDICATIONS  (STANDING):  albuterol/ipratropium for Nebulization 3 milliLiter(s) Nebulizer every 6 hours  atorvastatin 20 milliGRAM(s) Oral at bedtime  cholecalciferol 1000 Unit(s) Oral daily  cyclopentolate 1% Solution 1 Drop(s) Both EYES two times a day  EPINEPHrine    Injectable 0.3 milliGRAM(s) IntraMuscular once  ferrous    sulfate 325 milliGRAM(s) Oral daily  FLUoxetine 20 milliGRAM(s) Oral daily  influenza  Vaccine (HIGH DOSE) 0.7 milliLiter(s) IntraMuscular once  mirtazapine 7.5 milliGRAM(s) Oral at bedtime  piperacillin/tazobactam IVPB.. 3.375 Gram(s) IV Intermittent every 8 hours  polyethylene glycol 3350 17 Gram(s) Oral two times a day  potassium chloride    Tablet ER 20 milliEquivalent(s) Oral daily  prednisoLONE acetate 1% Suspension 1 Drop(s) Both EYES two times a day  senna 1 Tablet(s) Oral two times a day  sodium chloride 0.9%. 1000 milliLiter(s) (60 mL/Hr) IV Continuous <Continuous>  traZODone 50 milliGRAM(s) Oral daily    MEDICATIONS  (PRN):  acetaminophen     Tablet .. 650 milliGRAM(s) Oral every 6 hours PRN Temp greater or equal to 38C (100.4F), Mild Pain (1 - 3)  melatonin 3 milliGRAM(s) Oral at bedtime PRN Insomnia  ondansetron Injectable 4 milliGRAM(s) IV Push every 8 hours PRN Nausea and/or Vomiting  temazepam 30 milliGRAM(s) Oral at bedtime PRN Insomnia    LABS: All Lab data reviewed and analyzed                        9.1    8.73  )-----------( 262      ( 31 Mar 2024 07:04 )             30.3   03-31    141  |  100  |  20  ----------------------------<  88  3.9   |  26  |  0.96    Ca    9.4      31 Mar 2024 07:03      Ca    9.0      29 Mar 2024 07:09  Phos  4.0     03-29  Mg     1.8     03-29      Ca    8.6      28 Mar 2024 07:22      Ca    8.3<L>      27 Mar 2024 10:44      Ca    8.1<L>      23 Mar 2024 06:52  Phos  3.0     03-23  Mg     2.2     03-23      Ca    8.2<L>      22 Mar 2024 07:34    TPro  4.6<L>  /  Alb  2.8<L>  /  TBili  0.3  /  DBili  x   /  AST  28  /  ALT  9<L>  /  AlkPhos  103  03-21     LIVER FUNCTIONS - ( 21 Mar 2024 07:05 )  Alb: 2.8 g/dL / Pro: 4.6 g/dL / ALK PHOS: 103 U/L / ALT: 9 U/L / AST: 28 U/L / GGT: x           RADIOLOGY: - Reviewed and analyzed

## 2024-04-02 NOTE — PROVIDER CONTACT NOTE (OTHER) - RECOMMENDATIONS
Notify Provider
continue to monitor
Notify provider.
Management is made aware and attending Provider
Notify provider.

## 2024-04-02 NOTE — PROVIDER CONTACT NOTE (OTHER) - BACKGROUND
Patient admitted for diverticulitis and Pmhx
Pt w/ severe L hydroureter with layering hyperdensity which could be stones or blood products.
Pt admitted for sepsis. PMH HTN, CAD, COPD, pulmonary sarcoidosis, hypothyroidism.
Pt collado was removed @1800 DTV between [id number]. Pt has not voided. Bladder scanned 82ml
Patient admitted for diverticulitis of intestine without perforation or abscess without bleeding.

## 2024-04-02 NOTE — PROVIDER CONTACT NOTE (OTHER) - REASON
Manual BP 82/58
Pt has not voided since collado removal
Patient complaining of shortness of breath
Patient daughter refusing transfer for patient to be watched continuously on telemetry
Pt with black tarry stool x1 and red colored urine in collado.

## 2024-04-02 NOTE — PROVIDER CONTACT NOTE (OTHER) - ASSESSMENT
Patient is A&O*3 forgetful at times.  Patient vital signs are stable however, patient is complaining of shortness of breath and oxygen was increase to 2L NC and patient was instructed to practice deep breathing exercises. Patient was also position upright with head of bed elevated.
Pt is AOx3, VS as chart. Denies any pain and discomfort at this time.
Patient A&O*3 and is confused and forgetful at times. Patient has CHF, hypokalemic and is hypoxic  and needs continuous pulse sac and telemetry monitoring. Patient received a bed on 4 [facility] where she can be monitored continuously. daughter is at bedside and refuses that patient be transferred to the next level of care. Provider is also present at bedside and has provided education why patient needs to be monitored and the reason for transfer. Patient daughter denies and wishes to take the risk of patient not being transferred and monitored.
Pt. A/Ox3, denies pain. Abdomen soft, nontender, non-distended
Pt a&ox3-4. Manual BP 82/58, HR 79. Pt c/o intermittent moderate headache, denies dizziness.

## 2024-04-02 NOTE — PROVIDER CONTACT NOTE (OTHER) - SITUATION
Patient daughter refusing transfer for patient to be watched continuously on telemetry. Patient got a bed on 4 [facility].
Pt collado was removed @1800 DTV between [id number]. Pt has not voided. Bladder scanned 82ml
Pt with black tarry stool x1 and red colored urine in collado.
Pt c/o headache that was unrelieved by dose of PO Tylenol given approximately an hour ago. Upon vital sign assessment, manual BP found to be 82/58.
Patient complaining of shortness of breath

## 2024-04-02 NOTE — PROVIDER CONTACT NOTE (OTHER) - ACTION/TREATMENT ORDERED:
NP aware. As per NP, IV fluid bolus to be ordered & given.
Provider stated she will com to the bedside soon.
ACP made aware, CBC and morning labs drawn.
none at this time
Management will reinforce education. No further nursing intervention at this time.

## 2024-04-02 NOTE — PROGRESS NOTE ADULT - SUBJECTIVE AND OBJECTIVE BOX
Date of Service: 04-02-24 @ 08:24           CARDIOLOGY     PROGRESS  NOTE   ________________________________________________    CHIEF COMPLAINT:Patient is a 88y old  Female who presents with a chief complaint of Diverticulitis, leukocytosis (01 Apr 2024 12:54)  no complain  	  REVIEW OF SYSTEMS:  CONSTITUTIONAL: No fever, weight loss, or fatigue  EYES: No eye pain, visual disturbances, or discharge  ENT:  No difficulty hearing, tinnitus, vertigo; No sinus or throat pain  NECK: No pain or stiffness  RESPIRATORY: No cough, wheezing, chills or hemoptysis; No Shortness of Breath  CARDIOVASCULAR: No chest pain, palpitations, passing out, dizziness, or leg swelling  GASTROINTESTINAL: No abdominal or epigastric pain. No nausea, vomiting, or hematemesis; No diarrhea or constipation. No melena or hematochezia.  GENITOURINARY: No dysuria, frequency, hematuria, or incontinence  NEUROLOGICAL: No headaches, memory loss, loss of strength, numbness, or tremors  SKIN: No itching, burning, rashes, or lesions   LYMPH Nodes: No enlarged glands  ENDOCRINE: No heat or cold intolerance; No hair loss  MUSCULOSKELETAL: No joint pain or swelling; No muscle, back, or extremity pain  PSYCHIATRIC: No depression, anxiety, mood swings, or difficulty sleeping  HEME/LYMPH: No easy bruising, or bleeding gums  ALLERGY AND IMMUNOLOGIC: No hives or eczema	    [x ] All others negative	  [ ] Unable to obtain    PHYSICAL EXAM:  T(C): 36.6 (04-02-24 @ 08:21), Max: 36.8 (04-01-24 @ 17:15)  HR: 70 (04-02-24 @ 08:21) (67 - 85)  BP: 110/65 (04-02-24 @ 08:21) (96/61 - 112/60)  RR: 18 (04-02-24 @ 08:21) (18 - 18)  SpO2: 92% (04-02-24 @ 08:21) (90% - 95%)  Wt(kg): --  I&O's Summary    01 Apr 2024 07:01  -  02 Apr 2024 07:00  --------------------------------------------------------  IN: 240 mL / OUT: 500 mL / NET: -260 mL        Appearance: Normal	  HEENT:   Normal oral mucosa, PERRL, EOMI	  Lymphatic: No lymphadenopathy  Cardiovascular: Normal S1 S2, No JVD, + murmurs, No edema  Respiratory: rhonchi  Psychiatry: A & O x 3, Mood & affect appropriate  Gastrointestinal:  Soft, Non-tender, + BS	  Skin: No rashes, No ecchymoses, No cyanosis	  Extremities: Normal range of motion, No clubbing, cyanosis or edema  Vascular: Peripheral pulses palpable 2+ bilaterally    MEDICATIONS  (STANDING):  albuterol/ipratropium for Nebulization 3 milliLiter(s) Nebulizer every 6 hours  atorvastatin 20 milliGRAM(s) Oral at bedtime  buDESOnide    Inhalation Suspension 0.25 milliGRAM(s) Inhalation two times a day  cefuroxime   Tablet 500 milliGRAM(s) Oral every 12 hours  chlorhexidine 2% Cloths 1 Application(s) Topical daily  cholecalciferol 1000 Unit(s) Oral daily  cyclopentolate 1% Solution 1 Drop(s) Both EYES two times a day  EPINEPHrine    Injectable 0.3 milliGRAM(s) IntraMuscular once  ferrous    sulfate 325 milliGRAM(s) Oral daily  FLUoxetine 20 milliGRAM(s) Oral daily  furosemide    Tablet 20 milliGRAM(s) Oral daily  heparin   Injectable 5000 Unit(s) SubCutaneous every 12 hours  influenza  Vaccine (HIGH DOSE) 0.7 milliLiter(s) IntraMuscular once  losartan 25 milliGRAM(s) Oral daily  mirtazapine 7.5 milliGRAM(s) Oral at bedtime  polyethylene glycol 3350 17 Gram(s) Oral two times a day  prednisoLONE acetate 1% Suspension 1 Drop(s) Both EYES two times a day  senna 1 Tablet(s) Oral two times a day  spironolactone 12.5 milliGRAM(s) Oral daily  traZODone 50 milliGRAM(s) Oral daily      TELEMETRY: 	    ECG:  	  RADIOLOGY:  OTHER: 	  	  LABS:	 	    CARDIAC MARKERS                        9.0    7.38  )-----------( 249      ( 01 Apr 2024 05:49 )             30.1     04-01    140  |  102  |  21  ----------------------------<  91  3.8   |  25  |  0.99    Ca    9.3      01 Apr 2024 05:49      proBNP:   Lipid Profile:   HgA1c:   TSH:         Assessment and plan  ---------------------------  This is an 87 y/o female w/ PMHx pulmonary sarcoidosis, COPD not on home O2, CAD, hypothyroidism, and HTN presenting for lack of BM in the last 5 days along with SOB and L flank pain from the atria. The patient does endorse some left-sided upper abdominal pain but doesn't endorse any other symptoms. She states that she doesn't have any SOB, and states that her last BM prior to the hospital was yesterday. Called the patient's daughter Deborah, who stated that she was told the patient was having severe abdominal pain as well as some vomiting.   She was admitted here from 2/8-2/21 for abdominal pain, constipation and vomiting. At the time, CT was suggestive of diverticulitis as well and her course was complicated by fluid overload requiring IVP lasix. She was treated with IV Zosyn for 7 days. During this hospital course, she was also found to have a dilated L renal pelvis w/ +UA, but was ruled not have UTI by ID. Urology was consulted, who stated that her pessary could be the cause of the hydro, and removed it on 2/13.   In the ED, she was febrile to 101.8, tachycardic to 130, hypotensive to 80/50, and hypoxic requiring 4LNC. CBC revealed leukocytosis of 29.35, normocytic anemia of 9.5. CMP w/ elevated AG of 24, low bicarb of 17, . Presenting VBG w/ lactate of 9.4. she was straight catheterized for UA, which was red and turvid w/ large LE and >50 RBC/hpf, negative for bacteria. Initially CT A/P w/o contrast was done (due to contrast allergy), which revealed a possible mild diverticulitis of the descending colon and unchanged severe L hydroureter with layering hyperdensity  which could be stones or blood products. She was given Ceftriaxone and Zosyn, 2.5L IVNS. Lactate down to 3.5, BP improved. She had 2 dark tarry stools in her diaper, so she was premedicated w/ solumedrol and benadryl and CTA Abdomen/pelvis was ordered.  pt is well known to me with hx of sarcoidosis, cad with abdominal pain, she was recently dc sec to Diverticulitis  IN er pt with hypotension and hypoxia.  pt with sig abnormal ecg,,   dvt prophylaxis  asa if no contraindication  check QTC on Psych meds  fu hgb , GI noted/ urology  events noted with increase sob, chest x ray with vascular congestion sec to chf  echo noted with  Ruptured chordae tendineae to the anterior mitral leaflet, and sig regional wall motion abnormality ?ischemia  not sure pt is a candidate for any aggressive/ invasive cardiac procedure  dc ivf  add small dose of diuretics  add lasix and aldactone blood work noted  transferred to tele, noted  speech and swallow  spoke to her PMD Dr Neo, plan for medical therapy only , discussed with daughter  doing well, chest x ray noted, may dc and fu with Dr Winkler as out pt  continue current meds, physical therapy  increase ambulation

## 2024-04-02 NOTE — PROGRESS NOTE ADULT - SUBJECTIVE AND OBJECTIVE BOX
---___---___---___---___---___---___ ---___---___---___---___---___---___---___---___---                  M E D I C A L   A T T E N D I N G   P R O G R E S S   N O T E  ---___---___---___---___---___---___ ---___---___---___---___---___---___---___---___---        ================================================    ++CHIEF COMPLAINT:   Patient is a 88y old  Female who presents with a chief complaint of Diverticulitis, leukocytosis (2024 13:17)      Diverticulitis of intestine without perforation or abscess without bleeding      ---___---___---___---___---___---  PAST MEDICAL / Surgical  HISTORY:  PAST MEDICAL & SURGICAL HISTORY:  HTN (hypertension)      Hypothyroidism      Osteoporosis      CAD (coronary artery disease)          ---___---___---___---___---___---  FAMILY HISTORY:   FAMILY HISTORY:        ---___---___---___---___---___---  ALLERGIES:   Allergies    iodinated radiocontrast agents (Anaphylaxis)    Intolerances        ---___---___---___---___---___---  MEDICATIONS:  MEDICATIONS  (STANDING):  albuterol/ipratropium for Nebulization 3 milliLiter(s) Nebulizer every 6 hours  atorvastatin 20 milliGRAM(s) Oral at bedtime  buDESOnide    Inhalation Suspension 0.25 milliGRAM(s) Inhalation two times a day  cefuroxime   Tablet 500 milliGRAM(s) Oral every 12 hours  chlorhexidine 2% Cloths 1 Application(s) Topical daily  cholecalciferol 1000 Unit(s) Oral daily  cyclopentolate 1% Solution 1 Drop(s) Both EYES two times a day  dextrose 5% + sodium chloride 0.45%. 1000 milliLiter(s) (75 mL/Hr) IV Continuous <Continuous>  EPINEPHrine    Injectable 0.3 milliGRAM(s) IntraMuscular once  ferrous    sulfate 325 milliGRAM(s) Oral daily  FLUoxetine 20 milliGRAM(s) Oral daily  furosemide    Tablet 20 milliGRAM(s) Oral daily  heparin   Injectable 5000 Unit(s) SubCutaneous every 12 hours  influenza  Vaccine (HIGH DOSE) 0.7 milliLiter(s) IntraMuscular once  losartan 25 milliGRAM(s) Oral daily  mirtazapine 7.5 milliGRAM(s) Oral at bedtime  polyethylene glycol 3350 17 Gram(s) Oral two times a day  prednisoLONE acetate 1% Suspension 1 Drop(s) Both EYES two times a day  senna 1 Tablet(s) Oral two times a day  spironolactone 12.5 milliGRAM(s) Oral daily  traZODone 50 milliGRAM(s) Oral daily    MEDICATIONS  (PRN):  acetaminophen     Tablet .. 650 milliGRAM(s) Oral every 6 hours PRN Temp greater or equal to 38C (100.4F), Mild Pain (1 - 3)  melatonin 3 milliGRAM(s) Oral at bedtime PRN Insomnia  ondansetron Injectable 4 milliGRAM(s) IV Push every 8 hours PRN Nausea and/or Vomiting  temazepam 30 milliGRAM(s) Oral at bedtime PRN Insomnia      ---___---___---___---___---___---  REVIEW OF SYSTEM:    GEN: no fever, no chills, no pain  RESP: no SOB, no cough, no sputum  CVS: no chest pain, no palpitations, no edema  GI: no abdominal pain, no nausea, no vomiting, no constipation, no diarrhea  : no dysurea, no frequency, no hematurea  Neuro: no headache, no dizziness  PSYCH: no anxiety, no depression  Derm : no itching, no rash    ---___---___---___---___---___---  VITAL SIGNS:  88y , CAPILLARY BLOOD GLUCOSE        T(C): 36.6 (24 @ 18:38), Max: 36.7 (24 @ 21:20)  HR: 78 (24 @ 18:38) (67 - 85)  BP: 101/54 (24 @ 18:38) (82/58 - 112/60)  RR: 18 (24 @ 18:38) (18 - 18)  SpO2: 92% (24 @ 18:38) (90% - 95%)  ---___---___---___---___---___---  PHYSICAL EXAM:    GEN: A&O X 3 , NAD , comfortable  HEENT: NCAT, PERRL, MMM, hearing intact  Neck: supple , no JVD  CVS: S1S2 , regular , No M/R/G appreciated  PULM: CTA B/L,  no W/R/R appreciated  ABD: wheezing noted  Extrem: intact pulses , no edema   Derm: No rash , no ecchymoses  PSYCH : normal mood,  no delusion not anxious     ---___---___---___---___---___---            LAB AND IMAGIN.0    7.38  )-----------( 249      ( 2024 05:49 )             30.1               04-    140  |  102  |  21  ----------------------------<  91  3.8   |  25  |  0.99    Ca    9.3      2024 05:49                              Urinalysis Basic - ( 2024 05:49 )    Color: x / Appearance: x / SG: x / pH: x  Gluc: 91 mg/dL / Ketone: x  / Bili: x / Urobili: x   Blood: x / Protein: x / Nitrite: x   Leuk Esterase: x / RBC: x / WBC x   Sq Epi: x / Non Sq Epi: x / Bacteria: x        [All pertinent / recent Imaging reviewed]         ---___---___---___---___---___---___ ---___---___---___---___---                         A S S E S S M E N T   A N D   P L A N :      HEALTH ISSUES - PROBLEM Dx:  Fever with leukocytosis and leukocyte count greater than or equal to 20,000    Constipation    Acute hypoxic respiratory failure    h/o   CAD,   pulmonary sarcoidosis, COPD ,  HTN,        Ct  a/p.   ,  diverticulitis ,  sigmoid  colon, severe  L hydroureter/  mod  L   hydronephrosis    comp fx  L  4.  vaginal  pessary           admitted with high wbc/  lactate ,  from  uti/    ecoli, , diverticulitis    CT A/P angio 3/21/ 24,  colitis and  diverticulitis. patent   mesenteric vessels. No pneumatosis or free air.    c/.c  anemia     CAD    HTN       was on iv  zosyn .  and   now  on ceftin,   seen by  ID      Echo,  normal  ef/  RV  dysfunction,  mlple  wall  motion abnormalities/  mild  MR    f/p  by card/  given  advanced  age ,,  mostly  bed  bound  status, ,  cachetic  state. doubt  if  pt is an ideal  candidate   for  any intervention       po   lasix./  aldactone     PT  to  f/p   start   d/c  plans     dvt   ppx                       -GI/DVT Prophylaxis.    --------------------------------------------  Case discussed with   Education given on   ___________________________  Thank you,  Garry Hermosillo  [phone]

## 2024-04-03 ENCOUNTER — TRANSCRIPTION ENCOUNTER (OUTPATIENT)
Age: 89
End: 2024-04-03

## 2024-04-03 VITALS
DIASTOLIC BLOOD PRESSURE: 54 MMHG | TEMPERATURE: 98 F | SYSTOLIC BLOOD PRESSURE: 108 MMHG | OXYGEN SATURATION: 94 % | RESPIRATION RATE: 18 BRPM | HEART RATE: 76 BPM

## 2024-04-03 LAB
ANION GAP SERPL CALC-SCNC: 18 MMOL/L — HIGH (ref 5–17)
BUN SERPL-MCNC: 29 MG/DL — HIGH (ref 7–23)
CALCIUM SERPL-MCNC: 9.1 MG/DL — SIGNIFICANT CHANGE UP (ref 8.4–10.5)
CHLORIDE SERPL-SCNC: 100 MMOL/L — SIGNIFICANT CHANGE UP (ref 96–108)
CO2 SERPL-SCNC: 22 MMOL/L — SIGNIFICANT CHANGE UP (ref 22–31)
CREAT SERPL-MCNC: 1.09 MG/DL — SIGNIFICANT CHANGE UP (ref 0.5–1.3)
EGFR: 49 ML/MIN/1.73M2 — LOW
GLUCOSE SERPL-MCNC: 84 MG/DL — SIGNIFICANT CHANGE UP (ref 70–99)
HCT VFR BLD CALC: 29.3 % — LOW (ref 34.5–45)
HGB BLD-MCNC: 9.2 G/DL — LOW (ref 11.5–15.5)
MCHC RBC-ENTMCNC: 29.5 PG — SIGNIFICANT CHANGE UP (ref 27–34)
MCHC RBC-ENTMCNC: 31.4 GM/DL — LOW (ref 32–36)
MCV RBC AUTO: 93.9 FL — SIGNIFICANT CHANGE UP (ref 80–100)
NRBC # BLD: 0 /100 WBCS — SIGNIFICANT CHANGE UP (ref 0–0)
PLATELET # BLD AUTO: 258 K/UL — SIGNIFICANT CHANGE UP (ref 150–400)
POTASSIUM SERPL-MCNC: 3.6 MMOL/L — SIGNIFICANT CHANGE UP (ref 3.5–5.3)
POTASSIUM SERPL-SCNC: 3.6 MMOL/L — SIGNIFICANT CHANGE UP (ref 3.5–5.3)
RBC # BLD: 3.12 M/UL — LOW (ref 3.8–5.2)
RBC # FLD: 14.6 % — HIGH (ref 10.3–14.5)
SODIUM SERPL-SCNC: 140 MMOL/L — SIGNIFICANT CHANGE UP (ref 135–145)
WBC # BLD: 6.91 K/UL — SIGNIFICANT CHANGE UP (ref 3.8–10.5)
WBC # FLD AUTO: 6.91 K/UL — SIGNIFICANT CHANGE UP (ref 3.8–10.5)

## 2024-04-03 PROCEDURE — 97162 PT EVAL MOD COMPLEX 30 MIN: CPT

## 2024-04-03 PROCEDURE — 82272 OCCULT BLD FECES 1-3 TESTS: CPT

## 2024-04-03 PROCEDURE — 92610 EVALUATE SWALLOWING FUNCTION: CPT

## 2024-04-03 PROCEDURE — 92526 ORAL FUNCTION THERAPY: CPT

## 2024-04-03 PROCEDURE — 85025 COMPLETE CBC W/AUTO DIFF WBC: CPT

## 2024-04-03 PROCEDURE — 87086 URINE CULTURE/COLONY COUNT: CPT

## 2024-04-03 PROCEDURE — 96375 TX/PRO/DX INJ NEW DRUG ADDON: CPT

## 2024-04-03 PROCEDURE — 86901 BLOOD TYPING SEROLOGIC RH(D): CPT

## 2024-04-03 PROCEDURE — 87640 STAPH A DNA AMP PROBE: CPT

## 2024-04-03 PROCEDURE — 87040 BLOOD CULTURE FOR BACTERIA: CPT

## 2024-04-03 PROCEDURE — 85027 COMPLETE CBC AUTOMATED: CPT

## 2024-04-03 PROCEDURE — 36430 TRANSFUSION BLD/BLD COMPNT: CPT

## 2024-04-03 PROCEDURE — 85730 THROMBOPLASTIN TIME PARTIAL: CPT

## 2024-04-03 PROCEDURE — 93005 ELECTROCARDIOGRAM TRACING: CPT

## 2024-04-03 PROCEDURE — 83550 IRON BINDING TEST: CPT

## 2024-04-03 PROCEDURE — 94640 AIRWAY INHALATION TREATMENT: CPT

## 2024-04-03 PROCEDURE — 74176 CT ABD & PELVIS W/O CONTRAST: CPT | Mod: MC

## 2024-04-03 PROCEDURE — 82330 ASSAY OF CALCIUM: CPT

## 2024-04-03 PROCEDURE — 87637 SARSCOV2&INF A&B&RSV AMP PRB: CPT

## 2024-04-03 PROCEDURE — P9016: CPT

## 2024-04-03 PROCEDURE — 82607 VITAMIN B-12: CPT

## 2024-04-03 PROCEDURE — 81001 URINALYSIS AUTO W/SCOPE: CPT

## 2024-04-03 PROCEDURE — 85014 HEMATOCRIT: CPT

## 2024-04-03 PROCEDURE — 80048 BASIC METABOLIC PNL TOTAL CA: CPT

## 2024-04-03 PROCEDURE — 71045 X-RAY EXAM CHEST 1 VIEW: CPT

## 2024-04-03 PROCEDURE — 85610 PROTHROMBIN TIME: CPT

## 2024-04-03 PROCEDURE — 96376 TX/PRO/DX INJ SAME DRUG ADON: CPT

## 2024-04-03 PROCEDURE — 97165 OT EVAL LOW COMPLEX 30 MIN: CPT

## 2024-04-03 PROCEDURE — 86850 RBC ANTIBODY SCREEN: CPT

## 2024-04-03 PROCEDURE — 84100 ASSAY OF PHOSPHORUS: CPT

## 2024-04-03 PROCEDURE — 83880 ASSAY OF NATRIURETIC PEPTIDE: CPT

## 2024-04-03 PROCEDURE — 83735 ASSAY OF MAGNESIUM: CPT

## 2024-04-03 PROCEDURE — 97110 THERAPEUTIC EXERCISES: CPT

## 2024-04-03 PROCEDURE — 84466 ASSAY OF TRANSFERRIN: CPT

## 2024-04-03 PROCEDURE — 82435 ASSAY OF BLOOD CHLORIDE: CPT

## 2024-04-03 PROCEDURE — 82728 ASSAY OF FERRITIN: CPT

## 2024-04-03 PROCEDURE — 86900 BLOOD TYPING SEROLOGIC ABO: CPT

## 2024-04-03 PROCEDURE — 36415 COLL VENOUS BLD VENIPUNCTURE: CPT

## 2024-04-03 PROCEDURE — 74174 CTA ABD&PLVS W/CONTRAST: CPT | Mod: MC

## 2024-04-03 PROCEDURE — 82803 BLOOD GASES ANY COMBINATION: CPT

## 2024-04-03 PROCEDURE — 82962 GLUCOSE BLOOD TEST: CPT

## 2024-04-03 PROCEDURE — 83605 ASSAY OF LACTIC ACID: CPT

## 2024-04-03 PROCEDURE — 80053 COMPREHEN METABOLIC PANEL: CPT

## 2024-04-03 PROCEDURE — 85018 HEMOGLOBIN: CPT

## 2024-04-03 PROCEDURE — 82947 ASSAY GLUCOSE BLOOD QUANT: CPT

## 2024-04-03 PROCEDURE — 87186 SC STD MICRODIL/AGAR DIL: CPT

## 2024-04-03 PROCEDURE — 86923 COMPATIBILITY TEST ELECTRIC: CPT

## 2024-04-03 PROCEDURE — 84132 ASSAY OF SERUM POTASSIUM: CPT

## 2024-04-03 PROCEDURE — 87641 MR-STAPH DNA AMP PROBE: CPT

## 2024-04-03 PROCEDURE — 93306 TTE W/DOPPLER COMPLETE: CPT

## 2024-04-03 PROCEDURE — 84295 ASSAY OF SERUM SODIUM: CPT

## 2024-04-03 PROCEDURE — 99285 EMERGENCY DEPT VISIT HI MDM: CPT

## 2024-04-03 PROCEDURE — 87635 SARS-COV-2 COVID-19 AMP PRB: CPT

## 2024-04-03 PROCEDURE — 83540 ASSAY OF IRON: CPT

## 2024-04-03 PROCEDURE — 96374 THER/PROPH/DIAG INJ IV PUSH: CPT

## 2024-04-03 PROCEDURE — 97530 THERAPEUTIC ACTIVITIES: CPT

## 2024-04-03 PROCEDURE — 84484 ASSAY OF TROPONIN QUANT: CPT

## 2024-04-03 RX ORDER — LANOLIN ALCOHOL/MO/W.PET/CERES
1 CREAM (GRAM) TOPICAL
Qty: 0 | Refills: 0 | DISCHARGE
Start: 2024-04-03

## 2024-04-03 RX ORDER — ASPIRIN/CALCIUM CARB/MAGNESIUM 324 MG
1 TABLET ORAL
Refills: 0 | DISCHARGE

## 2024-04-03 RX ORDER — ACETAMINOPHEN 500 MG
2 TABLET ORAL
Qty: 0 | Refills: 0 | DISCHARGE
Start: 2024-04-03

## 2024-04-03 RX ORDER — SPIRONOLACTONE 25 MG/1
0.5 TABLET, FILM COATED ORAL
Qty: 0 | Refills: 0 | DISCHARGE
Start: 2024-04-03

## 2024-04-03 RX ORDER — FUROSEMIDE 40 MG
1 TABLET ORAL
Qty: 0 | Refills: 0 | DISCHARGE
Start: 2024-04-03

## 2024-04-03 RX ORDER — TRIAMTERENE/HYDROCHLOROTHIAZID 75 MG-50MG
1 TABLET ORAL
Refills: 0 | DISCHARGE

## 2024-04-03 RX ORDER — POTASSIUM CHLORIDE 20 MEQ
1 PACKET (EA) ORAL
Refills: 0 | DISCHARGE

## 2024-04-03 RX ORDER — BUDESONIDE, MICRONIZED 100 %
0.25 POWDER (GRAM) MISCELLANEOUS
Qty: 1 | Refills: 0
Start: 2024-04-03

## 2024-04-03 RX ORDER — LOSARTAN POTASSIUM 100 MG/1
1 TABLET, FILM COATED ORAL
Qty: 0 | Refills: 0 | DISCHARGE
Start: 2024-04-03

## 2024-04-03 RX ADMIN — Medication 500 MILLIGRAM(S): at 05:37

## 2024-04-03 RX ADMIN — Medication 3 MILLILITER(S): at 12:50

## 2024-04-03 RX ADMIN — Medication 20 MILLIGRAM(S): at 12:49

## 2024-04-03 RX ADMIN — CYCLOPENTOLATE HYDROCHLORIDE 1 DROP(S): 10 SOLUTION/ DROPS OPHTHALMIC at 05:39

## 2024-04-03 RX ADMIN — SENNA PLUS 1 TABLET(S): 8.6 TABLET ORAL at 05:38

## 2024-04-03 RX ADMIN — Medication 3 MILLILITER(S): at 05:36

## 2024-04-03 RX ADMIN — Medication 650 MILLIGRAM(S): at 03:11

## 2024-04-03 RX ADMIN — Medication 1000 UNIT(S): at 12:49

## 2024-04-03 RX ADMIN — CHLORHEXIDINE GLUCONATE 1 APPLICATION(S): 213 SOLUTION TOPICAL at 12:57

## 2024-04-03 RX ADMIN — LOSARTAN POTASSIUM 25 MILLIGRAM(S): 100 TABLET, FILM COATED ORAL at 05:36

## 2024-04-03 RX ADMIN — POLYETHYLENE GLYCOL 3350 17 GRAM(S): 17 POWDER, FOR SOLUTION ORAL at 05:37

## 2024-04-03 RX ADMIN — Medication 325 MILLIGRAM(S): at 12:49

## 2024-04-03 RX ADMIN — Medication 650 MILLIGRAM(S): at 02:48

## 2024-04-03 RX ADMIN — Medication 20 MILLIGRAM(S): at 05:36

## 2024-04-03 RX ADMIN — Medication 1 DROP(S): at 05:38

## 2024-04-03 RX ADMIN — SPIRONOLACTONE 12.5 MILLIGRAM(S): 25 TABLET, FILM COATED ORAL at 05:37

## 2024-04-03 RX ADMIN — Medication 50 MILLIGRAM(S): at 12:49

## 2024-04-03 RX ADMIN — Medication 0.25 MILLIGRAM(S): at 05:38

## 2024-04-03 RX ADMIN — HEPARIN SODIUM 5000 UNIT(S): 5000 INJECTION INTRAVENOUS; SUBCUTANEOUS at 05:38

## 2024-04-03 NOTE — DISCHARGE NOTE NURSING/CASE MANAGEMENT/SOCIAL WORK - NSDCPEFALRISK_GEN_ALL_CORE
For information on Fall & Injury Prevention, visit: [email or web address] OR  [email or web address] OR  https://www.cdc.gov/steadi/patient.html

## 2024-04-03 NOTE — PROGRESS NOTE ADULT - SUBJECTIVE AND OBJECTIVE BOX
Date of Service: 04-03-24 @ 08:52           CARDIOLOGY     PROGRESS  NOTE   ________________________________________________    CHIEF COMPLAINT:Patient is a 88y old  Female who presents with a chief complaint of Diverticulitis, leukocytosis (02 Apr 2024 20:29)  no complain  	  REVIEW OF SYSTEMS:  CONSTITUTIONAL: No fever, weight loss, or fatigue  EYES: No eye pain, visual disturbances, or discharge  ENT:  No difficulty hearing, tinnitus, vertigo; No sinus or throat pain  NECK: No pain or stiffness  RESPIRATORY: No cough, wheezing, chills or hemoptysis; No Shortness of Breath  CARDIOVASCULAR: No chest pain, palpitations, passing out, dizziness, or leg swelling  GASTROINTESTINAL: No abdominal or epigastric pain. No nausea, vomiting, or hematemesis; No diarrhea or constipation. No melena or hematochezia.  GENITOURINARY: No dysuria, frequency, hematuria, or incontinence  NEUROLOGICAL: No headaches, memory loss, loss of strength, numbness, or tremors  SKIN: No itching, burning, rashes, or lesions   LYMPH Nodes: No enlarged glands  ENDOCRINE: No heat or cold intolerance; No hair loss  MUSCULOSKELETAL: No joint pain or swelling; No muscle, back, or extremity pain  PSYCHIATRIC: No depression, anxiety, mood swings, or difficulty sleeping  HEME/LYMPH: No easy bruising, or bleeding gums  ALLERGY AND IMMUNOLOGIC: No hives or eczema	    [ x] All others negative	  [ ] Unable to obtain    PHYSICAL EXAM:  T(C): 36.4 (04-03-24 @ 05:01), Max: 36.7 (04-02-24 @ 15:31)  HR: 69 (04-03-24 @ 05:01) (69 - 81)  BP: 122/60 (04-03-24 @ 05:01) (80/48 - 122/60)  RR: 19 (04-03-24 @ 05:01) (18 - 20)  SpO2: 100% (04-03-24 @ 05:01) (90% - 100%)  Wt(kg): --  I&O's Summary    02 Apr 2024 07:01  -  03 Apr 2024 07:00  --------------------------------------------------------  IN: 0 mL / OUT: 600 mL / NET: -600 mL        Appearance: Normal	  HEENT:   Normal oral mucosa, PERRL, EOMI	  Lymphatic: No lymphadenopathy  Cardiovascular: Normal S1 S2, No JVD, + murmurs, No edema  Respiratory: rhonchi  Psychiatry: A & O x 3, Mood & affect appropriate  Gastrointestinal:  Soft, Non-tender, + BS	  Skin: No rashes, No ecchymoses, No cyanosis	  Neurologic: Non-focal  Extremities: Normal range of motion, No clubbing, cyanosis or edema  Vascular: Peripheral pulses palpable 2+ bilaterally    MEDICATIONS  (STANDING):  albuterol/ipratropium for Nebulization 3 milliLiter(s) Nebulizer every 6 hours  atorvastatin 20 milliGRAM(s) Oral at bedtime  buDESOnide    Inhalation Suspension 0.25 milliGRAM(s) Inhalation two times a day  chlorhexidine 2% Cloths 1 Application(s) Topical daily  cholecalciferol 1000 Unit(s) Oral daily  cyclopentolate 1% Solution 1 Drop(s) Both EYES two times a day  dextrose 5% + sodium chloride 0.45%. 1000 milliLiter(s) (75 mL/Hr) IV Continuous <Continuous>  EPINEPHrine    Injectable 0.3 milliGRAM(s) IntraMuscular once  ferrous    sulfate 325 milliGRAM(s) Oral daily  FLUoxetine 20 milliGRAM(s) Oral daily  furosemide    Tablet 20 milliGRAM(s) Oral daily  heparin   Injectable 5000 Unit(s) SubCutaneous every 12 hours  influenza  Vaccine (HIGH DOSE) 0.7 milliLiter(s) IntraMuscular once  losartan 25 milliGRAM(s) Oral daily  mirtazapine 7.5 milliGRAM(s) Oral at bedtime  polyethylene glycol 3350 17 Gram(s) Oral two times a day  prednisoLONE acetate 1% Suspension 1 Drop(s) Both EYES two times a day  senna 1 Tablet(s) Oral two times a day  spironolactone 12.5 milliGRAM(s) Oral daily  traZODone 50 milliGRAM(s) Oral daily      TELEMETRY: 	    ECG:  	  RADIOLOGY:  OTHER: 	  	  LABS:	 	    CARDIAC MARKERS:                            9.2    6.91  )-----------( 258      ( 03 Apr 2024 07:29 )             29.3     04-03    140  |  100  |  29<H>  ----------------------------<  84  3.6   |  22  |  1.09    Ca    9.1      03 Apr 2024 07:29      proBNP:   Lipid Profile:   HgA1c:   TSH:         Assessment and plan  ---------------------------  This is an 89 y/o female w/ PMHx pulmonary sarcoidosis, COPD not on home O2, CAD, hypothyroidism, and HTN presenting for lack of BM in the last 5 days along with SOB and L flank pain from the atria. The patient does endorse some left-sided upper abdominal pain but doesn't endorse any other symptoms. She states that she doesn't have any SOB, and states that her last BM prior to the hospital was yesterday. Called the patient's daughter Deborah, who stated that she was told the patient was having severe abdominal pain as well as some vomiting.   She was admitted here from 2/8-2/21 for abdominal pain, constipation and vomiting. At the time, CT was suggestive of diverticulitis as well and her course was complicated by fluid overload requiring IVP lasix. She was treated with IV Zosyn for 7 days. During this hospital course, she was also found to have a dilated L renal pelvis w/ +UA, but was ruled not have UTI by ID. Urology was consulted, who stated that her pessary could be the cause of the hydro, and removed it on 2/13.   In the ED, she was febrile to 101.8, tachycardic to 130, hypotensive to 80/50, and hypoxic requiring 4LNC. CBC revealed leukocytosis of 29.35, normocytic anemia of 9.5. CMP w/ elevated AG of 24, low bicarb of 17, . Presenting VBG w/ lactate of 9.4. she was straight catheterized for UA, which was red and turvid w/ large LE and >50 RBC/hpf, negative for bacteria. Initially CT A/P w/o contrast was done (due to contrast allergy), which revealed a possible mild diverticulitis of the descending colon and unchanged severe L hydroureter with layering hyperdensity  which could be stones or blood products. She was given Ceftriaxone and Zosyn, 2.5L IVNS. Lactate down to 3.5, BP improved. She had 2 dark tarry stools in her diaper, so she was premedicated w/ solumedrol and benadryl and CTA Abdomen/pelvis was ordered.  pt is well known to me with hx of sarcoidosis, cad with abdominal pain, she was recently dc sec to Diverticulitis  IN er pt with hypotension and hypoxia.  pt with sig abnormal ecg,,   dvt prophylaxis  asa if no contraindication  check QTC on Psych meds  fu hgb , GI noted/ urology  events noted with increase sob, chest x ray with vascular congestion sec to chf  echo noted with  Ruptured chordae tendineae to the anterior mitral leaflet, and sig regional wall motion abnormality ?ischemia  not sure pt is a candidate for any aggressive/ invasive cardiac procedure  dc ivf  add small dose of diuretics  add lasix and aldactone blood work noted  transferred to tele, noted  speech and swallow  spoke to her PMD Dr Noe, plan for medical therapy only , discussed with daughter  doing well, chest x ray noted, may dc and fu with Dr Winkler as out pt  continue current meds, physical therapy  increase ambulation, dc planning

## 2024-04-03 NOTE — PROGRESS NOTE ADULT - REASON FOR ADMISSION
Diverticulitis, leukocytosis

## 2024-04-03 NOTE — PROGRESS NOTE ADULT - PROVIDER SPECIALTY LIST ADULT
Cardiology
Family Medicine
Family Medicine
Infectious Disease
Infectious Disease
Internal Medicine
Internal Medicine
Pulmonology
Cardiology
Family Medicine
Gastroenterology
Infectious Disease
Infectious Disease
Internal Medicine
Internal Medicine
Pulmonology
Cardiology
Family Medicine
Internal Medicine
Pulmonology
Infectious Disease
Internal Medicine
Internal Medicine
Pulmonology
Pulmonology

## 2024-04-03 NOTE — DISCHARGE NOTE PROVIDER - CARE PROVIDER_API CALL
Earle Avila .  Internal Medicine  [address] [place] [address], Primary [facility] [place] [address]  Phone: [phone]  Fax: [phone]  Follow Up Time:     Joe Nj  Urology  [address] [place] [address], [address]  [place] [address]  Phone: [phone]  Fax: [phone]  Follow Up Time:

## 2024-04-03 NOTE — DISCHARGE NOTE PROVIDER - CARE PROVIDERS DIRECT ADDRESSES
[email or web address]

## 2024-04-03 NOTE — DISCHARGE NOTE NURSING/CASE MANAGEMENT/SOCIAL WORK - PATIENT PORTAL LINK FT
You can access the FollowMyHealth Patient Portal offered by [facility] by registering at the following website: [email or web address]. By joining [facility]’s FollowMyHealth portal, you will also be able to view your health information using other applications (apps) compatible with our system.

## 2024-04-03 NOTE — PROGRESS NOTE ADULT - SUBJECTIVE AND OBJECTIVE BOX
BISHNU SEGURA  MRN#: [id number]  Subjective:  pulmonary progress note: :acute hypoxic respiratory Failure on 02, sarcoidosis , COPD , Diastolic dysfunction , pulmonary HTN date of service is 04/03/2024   This is an 87 y/o female w/ PMH pulmonary sarcoidosis, COPD not on home O2, CAD, hypothyroidism, and HTN presenting for lack of BM in the last 5 days along with SOB and L flank pain from the atria. The patient does endorse some left-sided upper abdominal pain but doesn't endorse any other symptoms. She states that she doesn't have any SOB, and states that her last BM prior to the hospital was yesterday. Called the patient's daughter Deborah, who stated that she was told the patient was having severe abdominal pain as well as some vomiting.   She was admitted here from 2/8-2/21 for abdominal pain, constipation and vomiting. At the time, CT was suggestive of diverticulitis as well and her course was complicated by fluid overload requiring IVP lasix. She was treated with IV Zosyn for 7 days. During this hospital course, she was also found to have a dilated L renal pelvis w/ +UA, but was ruled not have UTI by ID. Urology was consulted, who stated that her pessary could be the cause of the hydro, and removed it on 2/13.     In the ED, she was febrile to 101.8, tachycardic to 130, hypotensive to 80/50, and hypoxic requiring 4LNC. CBC revealed leukocytosis of 29.35, normocytic anemia of 9.5. CMP w/ elevated AG of 24, low bicarb of 17, . Presenting VBG w/ lactate of 9.4. she was straight catheterized for UA, which was red and turbid w/ large LE and >50 RBC/hpf, negative for bacteria. Initially CT A/P w/o contrast was done (due to contrast allergy), which revealed a possible mild diverticulitis of the descending colon and unchanged severe L hydroureter with layering hypodensity which could be stones or blood products. She was given Ceftriaxone and Zosyn, 2.5L IVNS. Lactate down to 3.5, BP improved. She had 2 dark tarry stools in her diaper, so she was premedicated w/ solumedrol and benadryl and CTA Abdomen/pelvis was ordered.  (21 Mar 2024 01:30), patient was SOB needed high 02 supplement , CT scan acute Diverticulitis , afebrile down to nasal canula , on antibiotics , less distress no nausea or vomiting no headache S/P blood transfusion , H/H is stable no chest pain no SOB at rest , no fever no chest pain seen and examined with daughter at bed side no GI symptoms , discuss patient condition with daughter all question were answered , no over night events 02 saturation is still on the low side will continue bronchodilator and try to wean off 02 safely , no over night events adding Flovent nebulizer 0.25 mg BID , no new C/P trial to wean off 02 to acceptable RA on going low potasium to give supplement , , potasium is better keep k+ > 4 continue weaning trial off 02 , 02 saturation is 93% on 4 liter nasal 02 , finishing Antibiotics course , no new events no fever weaning trial off 02, continue to need 02 appetite is still suboptimal no fever less coughing no respiratory distress , no over night events continue weaning of 02 , no over night events no SOB at rest no chest pain no GI symptoms      PAST MEDICAL & SURGICAL HISTORY:  HTN (hypertension)      Hypothyroidism      Osteoporosis  pulmonary hypertension   H/O anxiety     CAD (coronary artery disease)  sarcoidosis   COPD           FAMILY HISTORY:  Review of Systems:  Review of Systems: Review of Systems:   CONSTITUTIONAL: + fever,+ weight loss  EYES: No eye pain, visual disturbances, or discharge  RESPIRATORY: + SOB. No cough, wheezing, chills or hemoptysis  CARDIOVASCULAR: No chest pain, palpitations, dizziness, or leg swelling  GASTROINTESTINAL: +abdominal pain. No nausea, vomiting, or hematemesis; No diarrhea or constipation. No melena or hematochezia.  GENITOURINARY: +L flank pain, hematuria; No dysuria, frequency, or incontinence  NEUROLOGICAL: No headaches, memory loss, loss of strength, numbness, or tremors  SKIN: No itching, burning, rashes, or lesions   MUSCULOSKELETAL: No joint pain or swelling; No muscle, back pain  PSYCHIATRIC: + depression,+ anxiety,+ mood swings, or difficulty ,             OBJECTIVE:  ICU Vital Signs Last 24 Hrs  T(C): 36.8 (22 Mar 2024 16:23), Max: 36.9 (22 Mar 2024 00:28)  T(F): 98.2 (22 Mar 2024 16:23), Max: 98.4 (22 Mar 2024 00:28)  HR: 102 (22 Mar 2024 16:23) (82 - 102)  BP: 145/76 (22 Mar 2024 16:23) (94/51 - 145/76)  BP(mean): --  ABP: --  ABP(mean): --  RR: 20 (22 Mar 2024 16:23) (19 - 20)  SpO2: 93% (22 Mar 2024 16:23) (93% - 96%)    O2 Parameters below as of 22 Mar 2024 16:23  Patient On (Oxygen Delivery Method): nasal cannula  O2 Flow (L/min): 2          03-21 @ 07:01  -  03-22 @ 07:00  --------------------------------------------------------  IN: 240 mL / OUT: 1850 mL / NET: -1610 mL      PHYSICAL EXAM: Daily Height in cm: 157.48 (20 Mar 2024 16:54)  Elderly agitated cachectic female on 1 liter nasal  02 saturation is 93%  Daily   HEENT:     + NCAT  + EOMI  - Conjuctival edema   - Icterus   - Thrush   - ETT  - NGT/OGT  Neck:         + FROM    + JVD     - Nodes     - Masses    + Mid-line trachea   - Tracheostomy  Chest:          pectus excavatum , increase A-P diameter   Lungs:          + CTA   + Rhonchi    - Rales    - Wheezing   + Decreased BS   - Dullness R L  Cardiac:       + S1 + S2    + RRR   - Irregular   - S3  - S4  + Murmurs   - Rub   - Hamman’s sign   Abdomen:    + BS     + Soft    + tender left lower quadrant   - Distended  - Organomegaly  - PEG  Extremities:   - Cyanosis U/L   - Clubbing  U/L  - LE/UE Edema   + Capillary refill    + Pulses   Neuro:        + Awake   +  Alert   - Confused   - Lethargic   - Sedated   - Generalized Weakness  Skin:        - Rashes    - Erythema   + Normal incisions   + IV sites intact  - Sacral decubit      HOSPITAL MEDICATIONS: All mediciations reviewed and analyzed  MEDICATIONS  (STANDING):  albuterol/ipratropium for Nebulization 3 milliLiter(s) Nebulizer every 6 hours  atorvastatin 20 milliGRAM(s) Oral at bedtime  cholecalciferol 1000 Unit(s) Oral daily  cyclopentolate 1% Solution 1 Drop(s) Both EYES two times a day  EPINEPHrine    Injectable 0.3 milliGRAM(s) IntraMuscular once  ferrous    sulfate 325 milliGRAM(s) Oral daily  FLUoxetine 20 milliGRAM(s) Oral daily  influenza  Vaccine (HIGH DOSE) 0.7 milliLiter(s) IntraMuscular once  mirtazapine 7.5 milliGRAM(s) Oral at bedtime  piperacillin/tazobactam IVPB.. 3.375 Gram(s) IV Intermittent every 8 hours  polyethylene glycol 3350 17 Gram(s) Oral two times a day  potassium chloride    Tablet ER 20 milliEquivalent(s) Oral daily  prednisoLONE acetate 1% Suspension 1 Drop(s) Both EYES two times a day  senna 1 Tablet(s) Oral two times a day  sodium chloride 0.9%. 1000 milliLiter(s) (60 mL/Hr) IV Continuous <Continuous>  traZODone 50 milliGRAM(s) Oral daily    MEDICATIONS  (PRN):  acetaminophen     Tablet .. 650 milliGRAM(s) Oral every 6 hours PRN Temp greater or equal to 38C (100.4F), Mild Pain (1 - 3)  melatonin 3 milliGRAM(s) Oral at bedtime PRN Insomnia  ondansetron Injectable 4 milliGRAM(s) IV Push every 8 hours PRN Nausea and/or Vomiting  temazepam 30 milliGRAM(s) Oral at bedtime PRN Insomnia    LABS: All Lab data reviewed and analyzed                        9.2    6.91  )-----------( 258      ( 03 Apr 2024 07:29 )             29.3               04-03    140  |  100  |  29<H>  ----------------------------<  84  3.6   |  22  |  1.09    Ca    9.1      03 Apr 2024 07:29      Ca    9.4      31 Mar 2024 07:03      Ca    9.0      29 Mar 2024 07:09  Phos  4.0     03-29  Mg     1.8     03-29           LIVER FUNCTIONS - ( 21 Mar 2024 07:05 )  Alb: 2.8 g/dL / Pro: 4.6 g/dL / ALK PHOS: 103 U/L / ALT: 9 U/L / AST: 28 U/L / GGT: x           RADIOLOGY: - Reviewed and analyzed

## 2024-04-03 NOTE — DISCHARGE NOTE PROVIDER - HOSPITAL COURSE
HPI:  This is an 87 y/o female w/ PMHx pulmonary sarcoidosis, COPD not on home O2, CAD, hypothyroidism, and HTN presenting for lack of BM in the last 5 days along with SOB and L flank pain from the atria. The patient does endorse some left-sided upper abdominal pain but doesn't endorse any other symptoms. She states that she doesn't have any SOB, and states that her last BM prior to the hospital was yesterday. Called the patient's daughter Deborah, who stated that she was told the patient was having severe abdominal pain as well as some vomiting.   She was admitted here from 2/8-2/21 for abdominal pain, constipation and vomiting. At the time, CT was suggestive of diverticulitis as well and her course was complicated by fluid overload requiring IVP lasix. She was treated with IV Zosyn for 7 days. During this hospital course, she was also found to have a dilated L renal pelvis w/ +UA, but was ruled not have UTI by ID. Urology was consulted, who stated that her pessary could be the cause of the hydro, and removed it on 2/13.     In the ED, she was febrile to 101.8, tachycardic to 130, hypotensive to 80/50, and hypoxic requiring 4LNC. CBC revealed leukocytosis of 29.35, normocytic anemia of 9.5. CMP w/ elevated AG of 24, low bicarb of 17, . Presenting VBG w/ lactate of 9.4. she was straight catheterized for UA, which was red and turvid w/ large LE and >50 RBC/hpf, negative for bacteria. Initially CT A/P w/o contrast was done (due to contrast allergy), which revealed a possible mild diverticulitis of the descending colon and unchanged severe L hydroureter with layering hyperdensity which could be stones or blood products. She was given Ceftriaxone and Zosyn, 2.5L IVNS. Lactate down to 3.5, BP improved. She had 2 dark tarry stools in her diaper, so she was premedicated w/ solumedrol and benadryl and CTA Abdomen/pelvis was ordered.  (21 Mar 2024 01:30)    Hospital Course:  Admitted for sepsis 2/2 colitis/UTI. CTAP shows "Distal transverse through sigmoid colon wall thickening and mild inflammatory changes, most likely representing colitis and diverticulitis. Patent mesenteric vessels. No pneumatosis or free air." ID team followed. S/p CTX, zosyn, po ceftin. Presented also with AHRF i/s/o COPD and ADHF, s/p iv lasix, transitioned to po, pulm followed. RRT event on 3/27 for coughing post K supp, s/p S&S eval 3/28, continued on regular diet. Noted with left hydroureter on CT and hematuria, now resolved, collado initially placed and removed 3/30, pt to f/u outpt for cystoscopy. AoC anemia, s/p 1 unit PRBC 3/22, hgb stable 9.2 on 4/3. Medically cleared to be dc'ed as per attending Dr. Hermosillo.     Important Medication Changes and Reason:    Active or Pending Issues Requiring Follow-up:    Advanced Directives:   [ ] Full code  [ ] DNR  [ ] Hospice    Discharge Diagnoses:         HPI:  This is an 87 y/o female w/ PMHx pulmonary sarcoidosis, COPD not on home O2, CAD, hypothyroidism, and HTN presenting for lack of BM in the last 5 days along with SOB and L flank pain from the atria. The patient does endorse some left-sided upper abdominal pain but doesn't endorse any other symptoms. She states that she doesn't have any SOB, and states that her last BM prior to the hospital was yesterday. Called the patient's daughter Deborah, who stated that she was told the patient was having severe abdominal pain as well as some vomiting.   She was admitted here from 2/8-2/21 for abdominal pain, constipation and vomiting. At the time, CT was suggestive of diverticulitis as well and her course was complicated by fluid overload requiring IVP lasix. She was treated with IV Zosyn for 7 days. During this hospital course, she was also found to have a dilated L renal pelvis w/ +UA, but was ruled not have UTI by ID. Urology was consulted, who stated that her pessary could be the cause of the hydro, and removed it on 2/13.     In the ED, she was febrile to 101.8, tachycardic to 130, hypotensive to 80/50, and hypoxic requiring 4LNC. CBC revealed leukocytosis of 29.35, normocytic anemia of 9.5. CMP w/ elevated AG of 24, low bicarb of 17, . Presenting VBG w/ lactate of 9.4. she was straight catheterized for UA, which was red and turvid w/ large LE and >50 RBC/hpf, negative for bacteria. Initially CT A/P w/o contrast was done (due to contrast allergy), which revealed a possible mild diverticulitis of the descending colon and unchanged severe L hydroureter with layering hyperdensity which could be stones or blood products. She was given Ceftriaxone and Zosyn, 2.5L IVNS. Lactate down to 3.5, BP improved. She had 2 dark tarry stools in her diaper, so she was premedicated w/ solumedrol and benadryl and CTA Abdomen/pelvis was ordered.  (21 Mar 2024 01:30)    Hospital Course:  Admitted for sepsis 2/2 colitis/UTI. CTAP shows "Distal transverse through sigmoid colon wall thickening and mild inflammatory changes, most likely representing colitis and diverticulitis. Patent mesenteric vessels. No pneumatosis or free air." ID team followed. S/p CTX, zosyn, po ceftin. Presented also with AHRF i/s/o COPD and ADHF, s/p iv lasix, transitioned to po, pulm followed. RRT event on 3/27 for coughing post K supp, s/p S&S eval 3/28, continued on regular diet. Noted with left hydroureter on CT and hematuria, now resolved, collado initially placed and removed 3/30, pt to f/u outpt for cystoscopy. AoC anemia, s/p 1 unit PRBC 3/22, hgb stable 9.2 on 4/3. Medically cleared to be dc'ed as per attending Dr. Hermosillo.     Important Medication Changes and Reason:    Active or Pending Issues Requiring Follow-up:  F/u PCP, urology   Advanced Directives:   [ ] Full code  [ ] DNR  [ ] Hospice    Discharge Diagnoses:  diverticulitis  normocytic anemia  CAD  major depression  HTN  L hydroureter  COPD  acute UTI

## 2024-04-03 NOTE — DISCHARGE NOTE PROVIDER - NSDCCPCAREPLAN_GEN_ALL_CORE_FT
PRINCIPAL DISCHARGE DIAGNOSIS  Diagnosis: Diverticulitis  Assessment and Plan of Treatment: You were found to have diverticulitis upon admission. Infectious disease team was consulted. You were given treatment with IV and oral antibiotics and improved. Follow up with your PCP Dr. Austin Og as outpatient.         SECONDARY DISCHARGE DIAGNOSES  Diagnosis: Acute UTI  Assessment and Plan of Treatment: HOME CARE INSTRUCTIONS  Drink enough water and fluids to keep your urine clear or pale yellow.  Avoid caffeine, tea, and carbonated beverages. They tend to irritate your bladder.  Empty your bladder often. Avoid holding urine for long periods of time.  Empty your bladder before and after sexual intercourse.  After a bowel movement, women should cleanse from front to back. Use each tissue only once.  SEEK MEDICAL CARE IF:  You have back pain.  You develop a fever.  Your symptoms do not begin to resolve within 3 days.  SEEK IMMEDIATE MEDICAL CARE IF:  You have severe back pain or lower abdominal pain.  You develop chills.  You have nausea or vomiting.  You have continued burning or discomfort with urination.       PRINCIPAL DISCHARGE DIAGNOSIS  Diagnosis: Diverticulitis  Assessment and Plan of Treatment: You were found to have diverticulitis upon admission. Infectious disease team was consulted. You were given treatment with IV and oral antibiotics and improved. Follow up with your PCP Dr. Austin Og as outpatient.         SECONDARY DISCHARGE DIAGNOSES  Diagnosis: Normocytic anemia  Assessment and Plan of Treatment: Anemia is when you have a low blood count of hemoglobin (HGB) and/or hematocrit (HCT), or RBC (red blood cells).If your hgb is <13 (women) or <12 (men), then you are considered to be anemic.Losing a moderate to large amount of blood cause anemia.Although, there are several different types of anemia that are not due to blood loss.You may have received a blood transfusion during your hospitalization. You will need to follow up with your healthcare provider in one week for a blood to check your count.Call for an appointment.The primary symptoms of anemia is difficulty breathing with exertion or at rest,fatigue,bounding pulses, and/or palpitations.If you are persistantly having these symptroms, you will need to seek help from your healthcare provider.      Diagnosis: CAD (coronary artery disease)  Assessment and Plan of Treatment: Coronary artery disease is a condition where the arteries the supply the heart muscle get clogges with fatty deposits & puts you at risk for a heart attack  Call your doctor if you have any new pain, pressure, or discomfort in the center of your chest, pain, tingling or discomfort in arms, back, neck, jaw, or stomach, shortness of breath, nausea, vomiting, burping or heartburn, sweating, cold and clammy skin, racing or abnormal heartbeat for more than 10 minutes or if they keep coming & going.  Call 911 and do not tr to get to hospital by care  You can help yourself with lefestyle changes (quitting smoking if you smoke), eat lots of fruits & vegetables & low fat dairy products, not a lot of meat & fatty foods, walk or some form of physical activity most days of the week, lose weight if you are overweight  Take your cardiac medication as prescribed to lower cholesterol, to lower blood pressure, aspirin to prevent blood clots, and diabetes control  Make sure to keep appointments with doctor for cardiac follow up care      Diagnosis: Major depression  Assessment and Plan of Treatment: SEEK IMMEDIATE CARE IF  You have thoughts about hurting yourself or others.  You lose touch with reality (have psychotic symptoms).  You are taking medicine for depression and have a serious side effect      Diagnosis: HTN (hypertension)  Assessment and Plan of Treatment: Low salt diet  Activity as tolerated.  Take all medication as prescribed.  Follow up with your medical doctor for routine blood pressure monitoring at your next visit.  Notify your doctor if you have any of the following symptoms:   Dizziness, Lightheadedness, Blurry vision, Headache, Chest pain, Shortness of breath      Diagnosis: Hydroureter, left  Assessment and Plan of Treatment: You were noted to have a left hydroureter on imaging. Urology team was consulted. A collado catheter was placed and removed on 3/30. Now resolved. Follow up with urology Dr. Nj as outpatient for a cystoscopy.       Diagnosis: COPD (chronic obstructive pulmonary disease)  Assessment and Plan of Treatment: Call your Health Care provider upon arrival home to make a follow up appointment within one week.  Take all inhalers as prescribed by your Health Care Provider.  Take steroids as prescribed by your Health Care Provider.  If your cough increases infrequency and severity and/or you have shortness of breath or increased shortness of breath call your Health Care Provider.  If you develop fever, chills, night sweats, malaise, and/or change in mental status call your Health care Provider.  Nutrition is very important.  Eat small frequent meals.  Increase your activity as tolerated.  Do not stay in bed all day      Diagnosis: Acute UTI  Assessment and Plan of Treatment: HOME CARE INSTRUCTIONS  Drink enough water and fluids to keep your urine clear or pale yellow.  Avoid caffeine, tea, and carbonated beverages. They tend to irritate your bladder.  Empty your bladder often. Avoid holding urine for long periods of time.  Empty your bladder before and after sexual intercourse.  After a bowel movement, women should cleanse from front to back. Use each tissue only once.  SEEK MEDICAL CARE IF:  You have back pain.  You develop a fever.  Your symptoms do not begin to resolve within 3 days.  SEEK IMMEDIATE MEDICAL CARE IF:  You have severe back pain or lower abdominal pain.  You develop chills.  You have nausea or vomiting.  You have continued burning or discomfort with urination.

## 2024-04-03 NOTE — DISCHARGE NOTE PROVIDER - NSDCMRMEDTOKEN_GEN_ALL_CORE_FT
albuterol 90 mcg/inh inhalation aerosol: 2 puff(s) inhaled every 6 hours as needed for wheeze  aspirin 81 mg oral delayed release tablet: 1 tab(s) orally once a day  atorvastatin 20 mg oral tablet: 1 tab(s) orally once a day  cyclopentolate 1% ophthalmic solution: 1 drop(s) in the left eye 2 times a day  D3 25 mcg (1000 intl units) oral tablet: 1 tab(s) orally once a day  ferrous sulfate 325 mg (65 mg elemental iron) oral tablet: 1 tab(s) orally once a day  FLUoxetine 20 mg oral capsule: 1 cap(s) orally once a day  mirtazapine 7.5 mg oral tablet: 1 tab(s) orally once a day (at bedtime)  potassium chloride 20 mEq oral tablet, extended release: 1 tab(s) orally once a day  prednisoLONE acetate 1% ophthalmic suspension: 1 drop(s) in the left eye 2 times a day  temazepam 30 mg oral capsule: 1 cap(s) orally once a day (at bedtime)  traZODone 50 mg oral tablet: orally once a day  triamterene-hydrochlorothiazide 37.5 mg-25 mg oral capsule: 1 cap(s) orally once a day   acetaminophen 325 mg oral tablet: 2 tab(s) orally every 6 hours As needed Temp greater or equal to 38C (100.4F), Mild Pain (1 - 3)  albuterol 90 mcg/inh inhalation aerosol: 2 puff(s) inhaled every 6 hours as needed for wheeze  atorvastatin 20 mg oral tablet: 1 tab(s) orally once a day  cyclopentolate 1% ophthalmic solution: 1 drop(s) in the left eye 2 times a day  D3 25 mcg (1000 intl units) oral tablet: 1 tab(s) orally once a day  ferrous sulfate 325 mg (65 mg elemental iron) oral tablet: 1 tab(s) orally once a day  FLUoxetine 20 mg oral capsule: 1 cap(s) orally once a day  furosemide 20 mg oral tablet: 1 tab(s) orally once a day  losartan 25 mg oral tablet: 1 tab(s) orally once a day  melatonin 3 mg oral tablet: 1 tab(s) orally once a day (at bedtime) As needed Insomnia  mirtazapine 7.5 mg oral tablet: 1 tab(s) orally once a day (at bedtime)  prednisoLONE acetate 1% ophthalmic suspension: 1 drop(s) in the left eye 2 times a day  spironolactone 25 mg oral tablet: 0.5 tab(s) orally once a day  temazepam 30 mg oral capsule: 1 cap(s) orally once a day (at bedtime)  traZODone 50 mg oral tablet: orally once a day   acetaminophen 325 mg oral tablet: 2 tab(s) orally every 6 hours As needed Temp greater or equal to 38C (100.4F), Mild Pain (1 - 3)  albuterol 90 mcg/inh inhalation aerosol: 2 puff(s) inhaled every 6 hours as needed for wheeze  atorvastatin 20 mg oral tablet: 1 tab(s) orally once a day  budesonide 0.25 mg/2 mL inhalation suspension: 0.25 milligram(s) by nebulizer 2 times a day  cyclopentolate 1% ophthalmic solution: 1 drop(s) in the left eye 2 times a day  D3 25 mcg (1000 intl units) oral tablet: 1 tab(s) orally once a day  ferrous sulfate 325 mg (65 mg elemental iron) oral tablet: 1 tab(s) orally once a day  FLUoxetine 20 mg oral capsule: 1 cap(s) orally once a day  furosemide 20 mg oral tablet: 1 tab(s) orally once a day  losartan 25 mg oral tablet: 1 tab(s) orally once a day  melatonin 3 mg oral tablet: 1 tab(s) orally once a day (at bedtime) As needed Insomnia  mirtazapine 7.5 mg oral tablet: 1 tab(s) orally once a day (at bedtime)  prednisoLONE acetate 1% ophthalmic suspension: 1 drop(s) in the left eye 2 times a day  spironolactone 25 mg oral tablet: 0.5 tab(s) orally once a day  temazepam 30 mg oral capsule: 1 cap(s) orally once a day (at bedtime)  traZODone 50 mg oral tablet: orally once a day

## 2024-04-06 ENCOUNTER — INPATIENT (INPATIENT)
Facility: HOSPITAL | Age: 89
LOS: 5 days | Discharge: NURSING, REHAB OR LONG-TERM CARE | DRG: 872 | End: 2024-04-12
Attending: FAMILY MEDICINE | Admitting: SPECIALIST
Payer: MEDICARE

## 2024-04-06 VITALS
RESPIRATION RATE: 18 BRPM | HEIGHT: 62 IN | OXYGEN SATURATION: 94 % | WEIGHT: 100.09 LBS | HEART RATE: 102 BPM | DIASTOLIC BLOOD PRESSURE: 40 MMHG | TEMPERATURE: 99 F | SYSTOLIC BLOOD PRESSURE: 80 MMHG

## 2024-04-06 LAB
ALBUMIN SERPL ELPH-MCNC: 3.7 G/DL — SIGNIFICANT CHANGE UP (ref 3.3–5)
ALP SERPL-CCNC: 169 U/L — HIGH (ref 40–120)
ALT FLD-CCNC: 7 U/L — LOW (ref 10–45)
ANION GAP SERPL CALC-SCNC: 20 MMOL/L — HIGH (ref 5–17)
ANISOCYTOSIS BLD QL: SLIGHT — SIGNIFICANT CHANGE UP
APPEARANCE UR: ABNORMAL
APTT BLD: 30.3 SEC — SIGNIFICANT CHANGE UP (ref 24.5–35.6)
AST SERPL-CCNC: 21 U/L — SIGNIFICANT CHANGE UP (ref 10–40)
BACTERIA # UR AUTO: NEGATIVE /HPF — SIGNIFICANT CHANGE UP
BASE EXCESS BLDV CALC-SCNC: -6.2 MMOL/L — LOW (ref -2–3)
BASOPHILS # BLD AUTO: 0 K/UL — SIGNIFICANT CHANGE UP (ref 0–0.2)
BASOPHILS NFR BLD AUTO: 0 % — SIGNIFICANT CHANGE UP (ref 0–2)
BILIRUB SERPL-MCNC: 0.5 MG/DL — SIGNIFICANT CHANGE UP (ref 0.2–1.2)
BILIRUB UR-MCNC: NEGATIVE — SIGNIFICANT CHANGE UP
BLD GP AB SCN SERPL QL: NEGATIVE — SIGNIFICANT CHANGE UP
BUN SERPL-MCNC: 36 MG/DL — HIGH (ref 7–23)
CA-I SERPL-SCNC: 1.24 MMOL/L — SIGNIFICANT CHANGE UP (ref 1.15–1.33)
CALCIUM SERPL-MCNC: 9.3 MG/DL — SIGNIFICANT CHANGE UP (ref 8.4–10.5)
CAST: 2 /LPF — SIGNIFICANT CHANGE UP (ref 0–4)
CHLORIDE BLDV-SCNC: 100 MMOL/L — SIGNIFICANT CHANGE UP (ref 96–108)
CHLORIDE SERPL-SCNC: 99 MMOL/L — SIGNIFICANT CHANGE UP (ref 96–108)
CO2 BLDV-SCNC: 20 MMOL/L — LOW (ref 22–26)
CO2 SERPL-SCNC: 17 MMOL/L — LOW (ref 22–31)
COLOR SPEC: YELLOW — SIGNIFICANT CHANGE UP
CREAT SERPL-MCNC: 1.42 MG/DL — HIGH (ref 0.5–1.3)
DIFF PNL FLD: ABNORMAL
EGFR: 36 ML/MIN/1.73M2 — LOW
ELLIPTOCYTES BLD QL SMEAR: SLIGHT — SIGNIFICANT CHANGE UP
EOSINOPHIL # BLD AUTO: 0 K/UL — SIGNIFICANT CHANGE UP (ref 0–0.5)
EOSINOPHIL NFR BLD AUTO: 0 % — SIGNIFICANT CHANGE UP (ref 0–6)
FLUAV AG NPH QL: SIGNIFICANT CHANGE UP
FLUBV AG NPH QL: SIGNIFICANT CHANGE UP
GAS PNL BLDV: 133 MMOL/L — LOW (ref 136–145)
GAS PNL BLDV: SIGNIFICANT CHANGE UP
GAS PNL BLDV: SIGNIFICANT CHANGE UP
GLUCOSE BLDV-MCNC: 218 MG/DL — HIGH (ref 70–99)
GLUCOSE SERPL-MCNC: 217 MG/DL — HIGH (ref 70–99)
GLUCOSE UR QL: NEGATIVE MG/DL — SIGNIFICANT CHANGE UP
HCO3 BLDV-SCNC: 19 MMOL/L — LOW (ref 22–29)
HCT VFR BLD CALC: 33.9 % — LOW (ref 34.5–45)
HCT VFR BLDA CALC: 31 % — LOW (ref 34.5–46.5)
HGB BLD CALC-MCNC: 10.4 G/DL — LOW (ref 11.7–16.1)
HGB BLD-MCNC: 10.1 G/DL — LOW (ref 11.5–15.5)
INR BLD: 1.17 RATIO — SIGNIFICANT CHANGE UP (ref 0.85–1.18)
KETONES UR-MCNC: NEGATIVE MG/DL — SIGNIFICANT CHANGE UP
LACTATE BLDV-MCNC: 5.9 MMOL/L — CRITICAL HIGH (ref 0.5–2)
LEUKOCYTE ESTERASE UR-ACNC: ABNORMAL
LYMPHOCYTES # BLD AUTO: 0.51 K/UL — LOW (ref 1–3.3)
LYMPHOCYTES # BLD AUTO: 2.6 % — LOW (ref 13–44)
MACROCYTES BLD QL: SLIGHT — SIGNIFICANT CHANGE UP
MANUAL SMEAR VERIFICATION: SIGNIFICANT CHANGE UP
MCHC RBC-ENTMCNC: 28.7 PG — SIGNIFICANT CHANGE UP (ref 27–34)
MCHC RBC-ENTMCNC: 29.8 GM/DL — LOW (ref 32–36)
MCV RBC AUTO: 96.3 FL — SIGNIFICANT CHANGE UP (ref 80–100)
MONOCYTES # BLD AUTO: 0.51 K/UL — SIGNIFICANT CHANGE UP (ref 0–0.9)
MONOCYTES NFR BLD AUTO: 2.6 % — SIGNIFICANT CHANGE UP (ref 2–14)
NEUTROPHILS # BLD AUTO: 18.59 K/UL — HIGH (ref 1.8–7.4)
NEUTROPHILS NFR BLD AUTO: 82.6 % — HIGH (ref 43–77)
NEUTS BAND # BLD: 12.2 % — HIGH (ref 0–8)
NITRITE UR-MCNC: NEGATIVE — SIGNIFICANT CHANGE UP
OB PNL STL: NEGATIVE — SIGNIFICANT CHANGE UP
PCO2 BLDV: 35 MMHG — LOW (ref 39–42)
PH BLDV: 7.34 — SIGNIFICANT CHANGE UP (ref 7.32–7.43)
PH UR: 5.5 — SIGNIFICANT CHANGE UP (ref 5–8)
PLAT MORPH BLD: NORMAL — SIGNIFICANT CHANGE UP
PLATELET # BLD AUTO: 284 K/UL — SIGNIFICANT CHANGE UP (ref 150–400)
PO2 BLDV: 49 MMHG — HIGH (ref 25–45)
POIKILOCYTOSIS BLD QL AUTO: SLIGHT — SIGNIFICANT CHANGE UP
POLYCHROMASIA BLD QL SMEAR: SLIGHT — SIGNIFICANT CHANGE UP
POTASSIUM BLDV-SCNC: 4.3 MMOL/L — SIGNIFICANT CHANGE UP (ref 3.5–5.1)
POTASSIUM SERPL-MCNC: 4.3 MMOL/L — SIGNIFICANT CHANGE UP (ref 3.5–5.3)
POTASSIUM SERPL-SCNC: 4.3 MMOL/L — SIGNIFICANT CHANGE UP (ref 3.5–5.3)
PROT SERPL-MCNC: 6.7 G/DL — SIGNIFICANT CHANGE UP (ref 6–8.3)
PROT UR-MCNC: 100 MG/DL
PROTHROM AB SERPL-ACNC: 12.2 SEC — SIGNIFICANT CHANGE UP (ref 9.5–13)
RBC # BLD: 3.52 M/UL — LOW (ref 3.8–5.2)
RBC # FLD: 15.2 % — HIGH (ref 10.3–14.5)
RBC BLD AUTO: ABNORMAL
RBC CASTS # UR COMP ASSIST: 4 /HPF — SIGNIFICANT CHANGE UP (ref 0–4)
REVIEW: SIGNIFICANT CHANGE UP
RH IG SCN BLD-IMP: POSITIVE — SIGNIFICANT CHANGE UP
RSV RNA NPH QL NAA+NON-PROBE: SIGNIFICANT CHANGE UP
SAO2 % BLDV: 71.9 % — SIGNIFICANT CHANGE UP (ref 67–88)
SARS-COV-2 RNA SPEC QL NAA+PROBE: SIGNIFICANT CHANGE UP
SODIUM SERPL-SCNC: 136 MMOL/L — SIGNIFICANT CHANGE UP (ref 135–145)
SP GR SPEC: 1.01 — SIGNIFICANT CHANGE UP (ref 1–1.03)
SPHEROCYTES BLD QL SMEAR: SLIGHT — SIGNIFICANT CHANGE UP
SQUAMOUS # UR AUTO: 21 /HPF — HIGH (ref 0–5)
UROBILINOGEN FLD QL: 0.2 MG/DL — SIGNIFICANT CHANGE UP (ref 0.2–1)
WBC # BLD: 19.61 K/UL — HIGH (ref 3.8–10.5)
WBC # FLD AUTO: 19.61 K/UL — HIGH (ref 3.8–10.5)
WBC UR QL: >998 /HPF — HIGH (ref 0–5)

## 2024-04-06 PROCEDURE — 93308 TTE F-UP OR LMTD: CPT | Mod: 26

## 2024-04-06 PROCEDURE — 71045 X-RAY EXAM CHEST 1 VIEW: CPT | Mod: 26

## 2024-04-06 PROCEDURE — 99291 CRITICAL CARE FIRST HOUR: CPT

## 2024-04-06 RX ORDER — SODIUM CHLORIDE 9 MG/ML
500 INJECTION INTRAMUSCULAR; INTRAVENOUS; SUBCUTANEOUS ONCE
Refills: 0 | Status: COMPLETED | OUTPATIENT
Start: 2024-04-06 | End: 2024-04-06

## 2024-04-06 RX ORDER — VANCOMYCIN HCL 1 G
1000 VIAL (EA) INTRAVENOUS ONCE
Refills: 0 | Status: COMPLETED | OUTPATIENT
Start: 2024-04-06 | End: 2024-04-06

## 2024-04-06 RX ORDER — ACETAMINOPHEN 500 MG
675 TABLET ORAL ONCE
Refills: 0 | Status: COMPLETED | OUTPATIENT
Start: 2024-04-06 | End: 2024-04-06

## 2024-04-06 RX ORDER — CEFEPIME 1 G/1
1000 INJECTION, POWDER, FOR SOLUTION INTRAMUSCULAR; INTRAVENOUS ONCE
Refills: 0 | Status: COMPLETED | OUTPATIENT
Start: 2024-04-06 | End: 2024-04-06

## 2024-04-06 RX ORDER — SODIUM CHLORIDE 9 MG/ML
1000 INJECTION, SOLUTION INTRAVENOUS ONCE
Refills: 0 | Status: COMPLETED | OUTPATIENT
Start: 2024-04-06 | End: 2024-04-06

## 2024-04-06 RX ORDER — SODIUM CHLORIDE 9 MG/ML
500 INJECTION, SOLUTION INTRAVENOUS ONCE
Refills: 0 | Status: COMPLETED | OUTPATIENT
Start: 2024-04-06 | End: 2024-04-06

## 2024-04-06 RX ORDER — NOREPINEPHRINE BITARTRATE/D5W 8 MG/250ML
0.05 PLASTIC BAG, INJECTION (ML) INTRAVENOUS
Qty: 8 | Refills: 0 | Status: DISCONTINUED | OUTPATIENT
Start: 2024-04-06 | End: 2024-04-07

## 2024-04-06 RX ADMIN — Medication 270 MILLIGRAM(S): at 18:29

## 2024-04-06 RX ADMIN — SODIUM CHLORIDE 1000 MILLILITER(S): 9 INJECTION, SOLUTION INTRAVENOUS at 20:30

## 2024-04-06 RX ADMIN — SODIUM CHLORIDE 1000 MILLILITER(S): 9 INJECTION, SOLUTION INTRAVENOUS at 18:29

## 2024-04-06 RX ADMIN — SODIUM CHLORIDE 500 MILLILITER(S): 9 INJECTION, SOLUTION INTRAVENOUS at 20:40

## 2024-04-06 RX ADMIN — Medication 1000 MILLIGRAM(S): at 21:00

## 2024-04-06 RX ADMIN — CEFEPIME 100 MILLIGRAM(S): 1 INJECTION, POWDER, FOR SOLUTION INTRAMUSCULAR; INTRAVENOUS at 19:22

## 2024-04-06 RX ADMIN — CEFEPIME 1000 MILLIGRAM(S): 1 INJECTION, POWDER, FOR SOLUTION INTRAMUSCULAR; INTRAVENOUS at 20:30

## 2024-04-06 RX ADMIN — Medication 675 MILLIGRAM(S): at 18:30

## 2024-04-06 RX ADMIN — Medication 250 MILLIGRAM(S): at 19:43

## 2024-04-06 RX ADMIN — SODIUM CHLORIDE 500 MILLILITER(S): 9 INJECTION, SOLUTION INTRAVENOUS at 22:00

## 2024-04-06 RX ADMIN — Medication 4.26 MICROGRAM(S)/KG/MIN: at 22:13

## 2024-04-06 NOTE — ED ADULT NURSE NOTE - NSFALLHARMRISKINTERV_ED_ALL_ED

## 2024-04-06 NOTE — ED PROVIDER NOTE - OBJECTIVE STATEMENT
See MDM: 88F hx pulmonary sarcoidosis, COPD not on home O2, HTN, hypothyroidism, CAD, presenting with SOB. Patient AOx3 here, asking for something to drink, denies complaints at this time including fevers/chills, SOB, cough, chest or abdominal pain, urinary complaints, diarrhea. Per EMS collateral, noted to be SOB today and found to be 70% on RA, improved to 85% on NC. En route to ED, satting mid 90s on 2LNC. Had recent admission for UTI sepsis.

## 2024-04-06 NOTE — ED PROVIDER NOTE - ATTENDING CONTRIBUTION TO CARE
I was the supervising attending. I have independently seen face-to-face and examined the patient. I have reviewed the history and physical and discussed the MDM with the resident, fellow, SUZIE and/or student. I agree with the assessment and plan as presented unless otherwise documented as follows:    88F hx sarcoid, COPD, HTN, CAD, hypothyroidism, presenting from facility with SOB, found to be likely septic with fever, tachycardia, low/borderline BP to 80-90s systolic. Lungs clear B/L, abdomen soft/non-tender, satting well on 2LNC. Recent echo reviewed, EF 50%, had chordae tendinae rupture and being followed by cardiology. Will obtain sepsis workup with labs, cultures, UA, CXR, viral swab. Will give broad spectrum antibiotics given recent hospitalization. Will start with 1L IVF but favor avoiding aggressive fluid resuscitation given recently diagnosed MV disease. -Rosita Manning MD (Attending)

## 2024-04-06 NOTE — ED ADULT NURSE REASSESSMENT NOTE - NS ED NURSE REASSESS COMMENT FT1
Pt handoff received from Maricel QUINTANILLA. Pt is AxOx3. Pt receiving Levo drop at 0.3mcg/kg. Pt VS measured see flowsheet. Pt skin is intact. Stretcher locked and in lowest position, appropriate side rails up. Pt instructed to notify RN if assistance is needed.

## 2024-04-06 NOTE — ED PROVIDER NOTE - CLINICAL SUMMARY MEDICAL DECISION MAKING FREE TEXT BOX
88-year-old female with a past medical history of pulmonary sarcoidosis, COPD not on home O2, CAD, hypothyroidism, HTN presenting with shortness of breath. Patient resides at [facility]. Staff noticed the patient was having shortness of breath and hypoxemic in the 70s, EMS noted her to be hypoxemic in the 80s which corrected with oxygen. Patient is not complaining of chest pain, abdominal pain, nausea, vomiting, fevers. Patient is Febrile on presentation. Sepsis workup.

## 2024-04-06 NOTE — ED ADULT TRIAGE NOTE - CHIEF COMPLAINT QUOTE
Impression: Primary open-angle glaucoma, bilateral, moderate stage: L75.3308. OU.
ALT OD 8/20/14 Plan: Pt has Moderate POAG OU Gonio :Open to SS 1+ PG OU         Pachs:486/480      Today's IOP : 18 OU    Tmax & date :32/29 Target IOP low to mid teens Pt denies Fhx of Glaucoma Left eye is the better seeing eye HVF (4/8/19)OD inferior Step OS Nasal step C/D: .8-.8 deep cup / .6-.6 OCT:50/52 12/2/19 Pt denies Sulfa Allergy   // Pt denies Lung /Heart dx Pt is currently using :Latanoprost QHS OU, Dorzolamide BID OU No previous medications used Plan :
1. Cont:
Latanoprost QHS OU - Sample of Travatan given (05/4/2020) Dorzolamide BID OU- Sample of Azopt given (05/04/2020) 2. IOP high today. Patient reports difficulty getting medications due to cost. 
3. Poor SLT effect 4. Doing well with addition of Latanoprost - return for repeat HVF in 4 months or PRN 5. Discussed details about Glaucoma and that without proper control of pressures irreversible blindness can occur. Patient understands risks. Emphasize compliance with drop and without compliance vision loss progression can occur. shortness of breath

## 2024-04-06 NOTE — ED ADULT NURSE NOTE - OBJECTIVE STATEMENT
88YOF BIBEMS from Rehab c/o shortness of breath,, at facility pt O2 saturating in 70s, found to be in 80's on EMS arrival, placed on 15L NRB and saturation improved. Hx COPD/CAD/HTN/Lung Ca. Pt is AOx3, breathing unlabored, pulses strong x4, lungs clear b/l bases. Pt warm to touch, rectal temp 102.1. Denies pain/nausea/vomiting/dizziness/fever/chills/bloody urine or stool. Skin intact, normotensive, placed on room air at this time, normal saturation.

## 2024-04-06 NOTE — ED ADULT NURSE NOTE - NS ED NURSE TRANSPORT WITH
Cardiac Monitor/Defib/ACLS/Rescue Kit/O2/BVM MD, RN, EDT at bedside for txp. 4L NC tank at 2000psi/Cardiac Monitor/Defib/ACLS/Rescue Kit/O2/BVM/oxygen/IV pump

## 2024-04-07 DIAGNOSIS — A41.9 SEPSIS, UNSPECIFIED ORGANISM: ICD-10-CM

## 2024-04-07 LAB
ALBUMIN SERPL ELPH-MCNC: 3.1 G/DL — LOW (ref 3.3–5)
ALP SERPL-CCNC: 127 U/L — HIGH (ref 40–120)
ALT FLD-CCNC: 8 U/L — LOW (ref 10–45)
ANION GAP SERPL CALC-SCNC: 13 MMOL/L — SIGNIFICANT CHANGE UP (ref 5–17)
APTT BLD: 25.1 SEC — SIGNIFICANT CHANGE UP (ref 24.5–35.6)
AST SERPL-CCNC: 14 U/L — SIGNIFICANT CHANGE UP (ref 10–40)
BASOPHILS # BLD AUTO: 0.04 K/UL — SIGNIFICANT CHANGE UP (ref 0–0.2)
BASOPHILS NFR BLD AUTO: 0.3 % — SIGNIFICANT CHANGE UP (ref 0–2)
BILIRUB SERPL-MCNC: 0.5 MG/DL — SIGNIFICANT CHANGE UP (ref 0.2–1.2)
BUN SERPL-MCNC: 30 MG/DL — HIGH (ref 7–23)
CALCIUM SERPL-MCNC: 9.2 MG/DL — SIGNIFICANT CHANGE UP (ref 8.4–10.5)
CHLORIDE SERPL-SCNC: 104 MMOL/L — SIGNIFICANT CHANGE UP (ref 96–108)
CO2 SERPL-SCNC: 22 MMOL/L — SIGNIFICANT CHANGE UP (ref 22–31)
CREAT SERPL-MCNC: 1.11 MG/DL — SIGNIFICANT CHANGE UP (ref 0.5–1.3)
EGFR: 48 ML/MIN/1.73M2 — LOW
EOSINOPHIL # BLD AUTO: 0.05 K/UL — SIGNIFICANT CHANGE UP (ref 0–0.5)
EOSINOPHIL NFR BLD AUTO: 0.4 % — SIGNIFICANT CHANGE UP (ref 0–6)
GLUCOSE SERPL-MCNC: 91 MG/DL — SIGNIFICANT CHANGE UP (ref 70–99)
HCT VFR BLD CALC: 31 % — LOW (ref 34.5–45)
HCT VFR BLD CALC: 33.1 % — LOW (ref 34.5–45)
HGB BLD-MCNC: 9.7 G/DL — LOW (ref 11.5–15.5)
HGB BLD-MCNC: 9.9 G/DL — LOW (ref 11.5–15.5)
IMM GRANULOCYTES NFR BLD AUTO: 0.5 % — SIGNIFICANT CHANGE UP (ref 0–0.9)
INR BLD: 1.14 RATIO — SIGNIFICANT CHANGE UP (ref 0.85–1.18)
LYMPHOCYTES # BLD AUTO: 1.17 K/UL — SIGNIFICANT CHANGE UP (ref 1–3.3)
LYMPHOCYTES # BLD AUTO: 9.8 % — LOW (ref 13–44)
MAGNESIUM SERPL-MCNC: 2 MG/DL — SIGNIFICANT CHANGE UP (ref 1.6–2.6)
MCHC RBC-ENTMCNC: 28.2 PG — SIGNIFICANT CHANGE UP (ref 27–34)
MCHC RBC-ENTMCNC: 28.7 PG — SIGNIFICANT CHANGE UP (ref 27–34)
MCHC RBC-ENTMCNC: 29.9 GM/DL — LOW (ref 32–36)
MCHC RBC-ENTMCNC: 31.3 GM/DL — LOW (ref 32–36)
MCV RBC AUTO: 91.7 FL — SIGNIFICANT CHANGE UP (ref 80–100)
MCV RBC AUTO: 94.3 FL — SIGNIFICANT CHANGE UP (ref 80–100)
MONOCYTES # BLD AUTO: 0.87 K/UL — SIGNIFICANT CHANGE UP (ref 0–0.9)
MONOCYTES NFR BLD AUTO: 7.3 % — SIGNIFICANT CHANGE UP (ref 2–14)
NEUTROPHILS # BLD AUTO: 9.76 K/UL — HIGH (ref 1.8–7.4)
NEUTROPHILS NFR BLD AUTO: 81.7 % — HIGH (ref 43–77)
NRBC # BLD: 0 /100 WBCS — SIGNIFICANT CHANGE UP (ref 0–0)
NRBC # BLD: 0 /100 WBCS — SIGNIFICANT CHANGE UP (ref 0–0)
PHOSPHATE SERPL-MCNC: 4.4 MG/DL — SIGNIFICANT CHANGE UP (ref 2.5–4.5)
PLATELET # BLD AUTO: 208 K/UL — SIGNIFICANT CHANGE UP (ref 150–400)
PLATELET # BLD AUTO: 226 K/UL — SIGNIFICANT CHANGE UP (ref 150–400)
POTASSIUM SERPL-MCNC: 4.4 MMOL/L — SIGNIFICANT CHANGE UP (ref 3.5–5.3)
POTASSIUM SERPL-SCNC: 4.4 MMOL/L — SIGNIFICANT CHANGE UP (ref 3.5–5.3)
PROT SERPL-MCNC: 5.9 G/DL — LOW (ref 6–8.3)
PROTHROM AB SERPL-ACNC: 11.9 SEC — SIGNIFICANT CHANGE UP (ref 9.5–13)
RBC # BLD: 3.38 M/UL — LOW (ref 3.8–5.2)
RBC # BLD: 3.51 M/UL — LOW (ref 3.8–5.2)
RBC # FLD: 15.2 % — HIGH (ref 10.3–14.5)
RBC # FLD: 15.5 % — HIGH (ref 10.3–14.5)
SODIUM SERPL-SCNC: 139 MMOL/L — SIGNIFICANT CHANGE UP (ref 135–145)
WBC # BLD: 10.45 K/UL — SIGNIFICANT CHANGE UP (ref 3.8–10.5)
WBC # BLD: 11.95 K/UL — HIGH (ref 3.8–10.5)
WBC # FLD AUTO: 10.45 K/UL — SIGNIFICANT CHANGE UP (ref 3.8–10.5)
WBC # FLD AUTO: 11.95 K/UL — HIGH (ref 3.8–10.5)

## 2024-04-07 PROCEDURE — 74176 CT ABD & PELVIS W/O CONTRAST: CPT | Mod: 26,MC

## 2024-04-07 PROCEDURE — 71250 CT THORAX DX C-: CPT | Mod: 26,MC

## 2024-04-07 RX ORDER — FERROUS SULFATE 325(65) MG
325 TABLET ORAL DAILY
Refills: 0 | Status: DISCONTINUED | OUTPATIENT
Start: 2024-04-07 | End: 2024-04-12

## 2024-04-07 RX ORDER — BUDESONIDE, MICRONIZED 100 %
0.25 POWDER (GRAM) MISCELLANEOUS
Refills: 0 | Status: DISCONTINUED | OUTPATIENT
Start: 2024-04-07 | End: 2024-04-12

## 2024-04-07 RX ORDER — SODIUM CHLORIDE 9 MG/ML
500 INJECTION INTRAMUSCULAR; INTRAVENOUS; SUBCUTANEOUS ONCE
Refills: 0 | Status: COMPLETED | OUTPATIENT
Start: 2024-04-07 | End: 2024-04-07

## 2024-04-07 RX ORDER — CEFEPIME 1 G/1
1000 INJECTION, POWDER, FOR SOLUTION INTRAMUSCULAR; INTRAVENOUS EVERY 12 HOURS
Refills: 0 | Status: DISCONTINUED | OUTPATIENT
Start: 2024-04-07 | End: 2024-04-12

## 2024-04-07 RX ORDER — CEFTRIAXONE 500 MG/1
INJECTION, POWDER, FOR SOLUTION INTRAMUSCULAR; INTRAVENOUS
Refills: 0 | Status: DISCONTINUED | OUTPATIENT
Start: 2024-04-07 | End: 2024-04-07

## 2024-04-07 RX ORDER — CHOLECALCIFEROL (VITAMIN D3) 125 MCG
1000 CAPSULE ORAL DAILY
Refills: 0 | Status: DISCONTINUED | OUTPATIENT
Start: 2024-04-07 | End: 2024-04-12

## 2024-04-07 RX ORDER — MIRTAZAPINE 45 MG/1
7.5 TABLET, ORALLY DISINTEGRATING ORAL AT BEDTIME
Refills: 0 | Status: DISCONTINUED | OUTPATIENT
Start: 2024-04-07 | End: 2024-04-12

## 2024-04-07 RX ORDER — INFLUENZA VIRUS VACCINE 15; 15; 15; 15 UG/.5ML; UG/.5ML; UG/.5ML; UG/.5ML
0.7 SUSPENSION INTRAMUSCULAR ONCE
Refills: 0 | Status: DISCONTINUED | OUTPATIENT
Start: 2024-04-07 | End: 2024-04-12

## 2024-04-07 RX ORDER — ACETAMINOPHEN 500 MG
650 TABLET ORAL EVERY 6 HOURS
Refills: 0 | Status: DISCONTINUED | OUTPATIENT
Start: 2024-04-07 | End: 2024-04-12

## 2024-04-07 RX ORDER — HEPARIN SODIUM 5000 [USP'U]/ML
5000 INJECTION INTRAVENOUS; SUBCUTANEOUS EVERY 8 HOURS
Refills: 0 | Status: DISCONTINUED | OUTPATIENT
Start: 2024-04-07 | End: 2024-04-07

## 2024-04-07 RX ORDER — LANOLIN ALCOHOL/MO/W.PET/CERES
3 CREAM (GRAM) TOPICAL AT BEDTIME
Refills: 0 | Status: DISCONTINUED | OUTPATIENT
Start: 2024-04-07 | End: 2024-04-12

## 2024-04-07 RX ORDER — CEFEPIME 1 G/1
2000 INJECTION, POWDER, FOR SOLUTION INTRAMUSCULAR; INTRAVENOUS EVERY 8 HOURS
Refills: 0 | Status: DISCONTINUED | OUTPATIENT
Start: 2024-04-07 | End: 2024-04-07

## 2024-04-07 RX ORDER — ALBUTEROL 90 UG/1
2 AEROSOL, METERED ORAL EVERY 6 HOURS
Refills: 0 | Status: DISCONTINUED | OUTPATIENT
Start: 2024-04-07 | End: 2024-04-12

## 2024-04-07 RX ORDER — PREDNISOLONE SODIUM PHOSPHATE 1 %
1 DROPS OPHTHALMIC (EYE)
Refills: 0 | Status: DISCONTINUED | OUTPATIENT
Start: 2024-04-07 | End: 2024-04-12

## 2024-04-07 RX ORDER — ATORVASTATIN CALCIUM 80 MG/1
20 TABLET, FILM COATED ORAL AT BEDTIME
Refills: 0 | Status: DISCONTINUED | OUTPATIENT
Start: 2024-04-07 | End: 2024-04-12

## 2024-04-07 RX ORDER — FLUOXETINE HCL 10 MG
20 CAPSULE ORAL DAILY
Refills: 0 | Status: DISCONTINUED | OUTPATIENT
Start: 2024-04-07 | End: 2024-04-12

## 2024-04-07 RX ORDER — CEFTRIAXONE 500 MG/1
1000 INJECTION, POWDER, FOR SOLUTION INTRAMUSCULAR; INTRAVENOUS ONCE
Refills: 0 | Status: COMPLETED | OUTPATIENT
Start: 2024-04-07 | End: 2024-04-07

## 2024-04-07 RX ORDER — PANTOPRAZOLE SODIUM 20 MG/1
40 TABLET, DELAYED RELEASE ORAL
Refills: 0 | Status: DISCONTINUED | OUTPATIENT
Start: 2024-04-07 | End: 2024-04-12

## 2024-04-07 RX ORDER — FUROSEMIDE 40 MG
20 TABLET ORAL DAILY
Refills: 0 | Status: DISCONTINUED | OUTPATIENT
Start: 2024-04-07 | End: 2024-04-07

## 2024-04-07 RX ORDER — CYCLOPENTOLATE HYDROCHLORIDE 10 MG/ML
1 SOLUTION/ DROPS OPHTHALMIC
Refills: 0 | Status: DISCONTINUED | OUTPATIENT
Start: 2024-04-07 | End: 2024-04-12

## 2024-04-07 RX ORDER — TRAZODONE HCL 50 MG
50 TABLET ORAL DAILY
Refills: 0 | Status: DISCONTINUED | OUTPATIENT
Start: 2024-04-07 | End: 2024-04-12

## 2024-04-07 RX ADMIN — ALBUTEROL 2 PUFF(S): 90 AEROSOL, METERED ORAL at 17:15

## 2024-04-07 RX ADMIN — Medication 325 MILLIGRAM(S): at 15:41

## 2024-04-07 RX ADMIN — HEPARIN SODIUM 5000 UNIT(S): 5000 INJECTION INTRAVENOUS; SUBCUTANEOUS at 05:09

## 2024-04-07 RX ADMIN — Medication 3 MILLIGRAM(S): at 21:16

## 2024-04-07 RX ADMIN — SODIUM CHLORIDE 1000 MILLILITER(S): 9 INJECTION INTRAMUSCULAR; INTRAVENOUS; SUBCUTANEOUS at 20:11

## 2024-04-07 RX ADMIN — HEPARIN SODIUM 5000 UNIT(S): 5000 INJECTION INTRAVENOUS; SUBCUTANEOUS at 14:48

## 2024-04-07 RX ADMIN — CEFEPIME 100 MILLIGRAM(S): 1 INJECTION, POWDER, FOR SOLUTION INTRAMUSCULAR; INTRAVENOUS at 17:22

## 2024-04-07 RX ADMIN — CEFTRIAXONE 100 MILLIGRAM(S): 500 INJECTION, POWDER, FOR SOLUTION INTRAMUSCULAR; INTRAVENOUS at 05:09

## 2024-04-07 RX ADMIN — SODIUM CHLORIDE 500 MILLILITER(S): 9 INJECTION INTRAMUSCULAR; INTRAVENOUS; SUBCUTANEOUS at 03:00

## 2024-04-07 RX ADMIN — ATORVASTATIN CALCIUM 20 MILLIGRAM(S): 80 TABLET, FILM COATED ORAL at 21:16

## 2024-04-07 RX ADMIN — Medication 20 MILLIGRAM(S): at 15:41

## 2024-04-07 RX ADMIN — MIRTAZAPINE 7.5 MILLIGRAM(S): 45 TABLET, ORALLY DISINTEGRATING ORAL at 21:16

## 2024-04-07 RX ADMIN — Medication 50 MILLIGRAM(S): at 15:41

## 2024-04-07 RX ADMIN — CYCLOPENTOLATE HYDROCHLORIDE 1 DROP(S): 10 SOLUTION/ DROPS OPHTHALMIC at 17:24

## 2024-04-07 RX ADMIN — Medication 1 DROP(S): at 17:24

## 2024-04-07 RX ADMIN — PANTOPRAZOLE SODIUM 40 MILLIGRAM(S): 20 TABLET, DELAYED RELEASE ORAL at 05:09

## 2024-04-07 RX ADMIN — Medication 0.25 MILLIGRAM(S): at 17:16

## 2024-04-07 RX ADMIN — Medication 1000 UNIT(S): at 15:41

## 2024-04-07 RX ADMIN — PANTOPRAZOLE SODIUM 40 MILLIGRAM(S): 20 TABLET, DELAYED RELEASE ORAL at 17:23

## 2024-04-07 NOTE — PROGRESS NOTE ADULT - SUBJECTIVE AND OBJECTIVE BOX
INTERVAL HPI/OVERNIGHT EVENTS:    SUBJECTIVE: Patient seen and examined at bedside.       VITAL SIGNS:  ICU Vital Signs Last 24 Hrs  T(C): 37.1 (07 Apr 2024 04:00), Max: 39.8 (06 Apr 2024 19:44)  T(F): 98.8 (07 Apr 2024 04:00), Max: 103.6 (06 Apr 2024 19:44)  HR: 63 (07 Apr 2024 07:00) (62 - 102)  BP: 92/51 (07 Apr 2024 07:00) (71/47 - 118/97)  BP(mean): 69 (07 Apr 2024 07:00) (55 - 103)  ABP: --  ABP(mean): --  RR: 14 (07 Apr 2024 07:00) (14 - 32)  SpO2: 100% (07 Apr 2024 07:00) (78% - 100%)    O2 Parameters below as of 07 Apr 2024 02:05  Patient On (Oxygen Delivery Method): nasal cannula  O2 Flow (L/min): 3          Plateau pressure:   P/F ratio:     04-06 @ 07:01  -  04-07 @ 07:00  --------------------------------------------------------  IN: 0 mL / OUT: 0 mL / NET: 0 mL      CAPILLARY BLOOD GLUCOSE        ECG:    PHYSICAL EXAM:    General:   HEENT:   Neck:   Respiratory:   Cardiovascular:   Abdomen:   Extremities:  Neurological:    MEDICATIONS:  MEDICATIONS  (STANDING):  cefTRIAXone   IVPB      heparin   Injectable 5000 Unit(s) SubCutaneous every 8 hours  influenza  Vaccine (HIGH DOSE) 0.7 milliLiter(s) IntraMuscular once  norepinephrine Infusion 0.05 MICROgram(s)/kG/Min (4.26 mL/Hr) IV Continuous <Continuous>  pantoprazole  Injectable 40 milliGRAM(s) IV Push two times a day  sodium chloride 0.9% Bolus 500 milliLiter(s) IV Bolus once    MEDICATIONS  (PRN):  acetaminophen     Tablet .. 650 milliGRAM(s) Oral every 6 hours PRN Temp greater or equal to 38C (100.4F), Mild Pain (1 - 3)      ALLERGIES:  Allergies    iodinated radiocontrast agents (Anaphylaxis)    Intolerances        LABS:                        9.7    11.95 )-----------( 226      ( 07 Apr 2024 05:12 )             31.0     04-07    139  |  104  |  30<H>  ----------------------------<  91  4.4   |  22  |  1.11    Ca    9.2      07 Apr 2024 05:12  Phos  4.4     04-07  Mg     2.0     04-07    TPro  5.9<L>  /  Alb  3.1<L>  /  TBili  0.5  /  DBili  x   /  AST  14  /  ALT  8<L>  /  AlkPhos  127<H>  04-07    PT/INR - ( 07 Apr 2024 05:12 )   PT: 11.9 sec;   INR: 1.14 ratio         PTT - ( 07 Apr 2024 05:12 )  PTT:25.1 sec  Urinalysis Basic - ( 07 Apr 2024 05:12 )    Color: x / Appearance: x / SG: x / pH: x  Gluc: 91 mg/dL / Ketone: x  / Bili: x / Urobili: x   Blood: x / Protein: x / Nitrite: x   Leuk Esterase: x / RBC: x / WBC x   Sq Epi: x / Non Sq Epi: x / Bacteria: x        RADIOLOGY & ADDITIONAL TESTS: Reviewed.   INTERVAL HPI/OVERNIGHT EVENTS: NAOE    SUBJECTIVE: Patient seen and examined at bedside. Pt feels well. Denies chest pain, SOB. Denies dysuria.      VITAL SIGNS:  ICU Vital Signs Last 24 Hrs  T(C): 37.1 (07 Apr 2024 04:00), Max: 39.8 (06 Apr 2024 19:44)  T(F): 98.8 (07 Apr 2024 04:00), Max: 103.6 (06 Apr 2024 19:44)  HR: 63 (07 Apr 2024 07:00) (62 - 102)  BP: 92/51 (07 Apr 2024 07:00) (71/47 - 118/97)  BP(mean): 69 (07 Apr 2024 07:00) (55 - 103)  ABP: --  ABP(mean): --  RR: 14 (07 Apr 2024 07:00) (14 - 32)  SpO2: 100% (07 Apr 2024 07:00) (78% - 100%)    O2 Parameters below as of 07 Apr 2024 02:05  Patient On (Oxygen Delivery Method): nasal cannula  O2 Flow (L/min): 3          Plateau pressure:   P/F ratio:     04-06 @ 07:01  -  04-07 @ 07:00  --------------------------------------------------------  IN: 0 mL / OUT: 0 mL / NET: 0 mL      CAPILLARY BLOOD GLUCOSE        ECG:    PHYSICAL EXAM:    General: NAD  HEENT: anicteric  Neck: no JVD  Respiratory: CTAB  Cardiovascular: normal S1 S2, ?systolic murmur?  Abdomen: soft NT ND, no suprapubic tenderness  Extremities: no edema  Neurological: A&Ox3    MEDICATIONS:  MEDICATIONS  (STANDING):  cefTRIAXone   IVPB      heparin   Injectable 5000 Unit(s) SubCutaneous every 8 hours  influenza  Vaccine (HIGH DOSE) 0.7 milliLiter(s) IntraMuscular once  norepinephrine Infusion 0.05 MICROgram(s)/kG/Min (4.26 mL/Hr) IV Continuous <Continuous>  pantoprazole  Injectable 40 milliGRAM(s) IV Push two times a day  sodium chloride 0.9% Bolus 500 milliLiter(s) IV Bolus once    MEDICATIONS  (PRN):  acetaminophen     Tablet .. 650 milliGRAM(s) Oral every 6 hours PRN Temp greater or equal to 38C (100.4F), Mild Pain (1 - 3)      ALLERGIES:  Allergies    iodinated radiocontrast agents (Anaphylaxis)    Intolerances        LABS:                        9.7    11.95 )-----------( 226      ( 07 Apr 2024 05:12 )             31.0     04-07    139  |  104  |  30<H>  ----------------------------<  91  4.4   |  22  |  1.11    Ca    9.2      07 Apr 2024 05:12  Phos  4.4     04-07  Mg     2.0     04-07    TPro  5.9<L>  /  Alb  3.1<L>  /  TBili  0.5  /  DBili  x   /  AST  14  /  ALT  8<L>  /  AlkPhos  127<H>  04-07    PT/INR - ( 07 Apr 2024 05:12 )   PT: 11.9 sec;   INR: 1.14 ratio         PTT - ( 07 Apr 2024 05:12 )  PTT:25.1 sec  Urinalysis Basic - ( 07 Apr 2024 05:12 )    Color: x / Appearance: x / SG: x / pH: x  Gluc: 91 mg/dL / Ketone: x  / Bili: x / Urobili: x   Blood: x / Protein: x / Nitrite: x   Leuk Esterase: x / RBC: x / WBC x   Sq Epi: x / Non Sq Epi: x / Bacteria: x        RADIOLOGY & ADDITIONAL TESTS: Reviewed.

## 2024-04-07 NOTE — PROGRESS NOTE ADULT - ASSESSMENT
88-year-old female with a past medical history of pulmonary sarcoidosis, COPD not on home O2, CAD, hypothyroidism, HTN presenting with sepsis most likely 2/2 recurrent UTI and respiratory distress.     HEMODYNAMICS:  Patient is on pressors due to septic shock  started on levo 0.18, now weaned off   s/p fluid 2L in ED   POCUS ICU: IVC 1cm, ant lung field bilaterally A line and few focal B in posterior lung fields. IVC <1cm collapsible, likely fluid responsive.       NEUROLOGIC:  Appears at baseline AOX2  Eligible for early mobilization and immediate physical therapy?      CARDIOVASCULAR:    #h/o HF #ruptured chordae tinda  - TTE 03/2024 EF 50%; ruptured chordae,  - s/p total 2L, map < 65  - started on levophed at 2200  - levo rate 0.18, now weaned with fluids   - POCUS ICU: IVC 1cm, ant lung field bilaterally A line and few focal B in posterior lung fields. IVC <1cm collapsible, likely fluid responsive. Mildly reduced LV function   - TTE 03/2024 EF 50%, abnml septim/inferior wall/apical anterior seg, ruptured chordae tendinae to the anterior mitral leaflet, trace mitral regurg  - home lasix, held i/s/o hypotension  - pt weaned off of levo upon arrival to MICU will give fluid      RESPIRATORY:  #pulm sarcoidosis #AHRF #COPD  - pt with acute hypoxic respiratory Failure on 02 ; keep 02 > saturation > 92%  - COPD continue nebulizer treatement   - h/o Paralysis of the Right hemidiaphragm   - nebulizer treatement Duoneb q 6 hrs ; Flovent 0.25 BID   - Followed by Lucas Arita  - wean off oxygen as tolerated   - f/u blood gas        - h/o of reurrent UTI - pan sensitive e.coli   - UA contaminated epitehlal 21, pending repeat UA, pending cultures  - h/o of urology consult for hematuria - family deferred further imaging and workup i/s/o no interest for surgical intervention   - bladder scan q6  - straight cath if > 300 cc or collado placement     GI  - h/o colitis/diverticulosis and h/o melena  - CT 4/6 abd/pelv noncon with findings c/f colitis and colonic diverituclosis   - CT 3/21 Distal transvers through sigmoid colon with wall thicking consitent with colitis and diverticulitis  - CW PPI x4 weeks BID  -     HEMATOLOGIC:    #h/o macrocytic anemia   #h/o hematochezia 03/2024  - baseline hgb 11, low iron, normal ferritin  INFECTIOUS DISEASE:  Pt without strong objective or clinical evidence of infection. Will observe off antibiotics      SKIN:  Lines:         88-year-old female with a past medical history of pulmonary sarcoidosis, COPD not on home O2, CAD, hypothyroidism, HTN presenting with sepsis most likely 2/2 recurrent UTI and respiratory distress.       ====NEUROLOGIC====  Appears at baseline AOX2    # insomnia  - on melatonin 3, trazadone 50, mirtazipine 7.5, temazepam 30  - c/w melatonin, trazadone, mirtazpine  - hold temazepam  - see separate ISTOP note    # mood disorder, NOS  - c/w trazadone, mirtazipine, fluoxetine 20      CARDIOVASCULAR:    #h/o HF   #ruptured chordae tinda  - TTE 03/2024 EF 50%; ruptured chordae    # septic shock  - initially presented to ED w/ hypotension, was given a small amount of fluid resuscitation given c/f h/o HF. Then started on levo and brought to MICU  - POCUS ICU: IVC 1cm, ant lung field bilaterally A line and few focal B in posterior lung fields. IVC <1cm collapsible, likely fluid responsive. Mildly reduced LV function   - TTE 03/2024 EF 50%, abnml septim/inferior wall/apical anterior seg, ruptured chordae tendinae to the anterior mitral leaflet, trace mitral regurg  - pt weaned off of levo upon arrival to MICU and given further fluid resucitation given POCUS findings  - shock now resolved, off levo       RESPIRATORY:  #pulm sarcoidosis   #AHRF   #COPD  - pt with acute hypoxic respiratory Failure on 02 ; keep 02 > saturation > 92%  - COPD continue nebulizer treatement   - h/o Paralysis of the Right hemidiaphragm   - Followed by Lucas Arita  - wean off oxygen as tolerated   - c/w home symbicort BID, albuterol q6h PRN       - h/o of reurrent UTI - pan sensitive e.coli   - UA contaminated epitehlal 21, pending repeat UA, pending cultures  - h/o of urology consult for hematuria - initially family did not want cystoscopy but now they want it  - bladder scan q6  - straight cath if > 300 cc or collado placement     GI  # h/o colitis/diverticulosis and h/o melena  - CT 4/6 abd/pelv noncon with findings c/f colitis and colonic diverituclosis   - CT 3/21 Distal transvers through sigmoid colon with wall thicking consitent with colitis and diverticulitis  - CW PPI x4 weeks BID    # h/o C diff colitis, remote  - family very adamant that they want ID Dr. Noonan to manage antibiotics    HEMATOLOGIC:  #h/o macrocytic anemia   #h/o hematochezia 03/2024  - baseline hgb 11, low iron, normal ferritin      INFECTIOUS DISEASE:  # positive UA  - given h/o UTI, will treat as UTI although pt denies clinical symptoms of UTI  - cefepime 1g q8h    ETHICS  Full code, family would like everything done

## 2024-04-07 NOTE — H&P ADULT - NSHPPHYSICALEXAM_GEN_ALL_CORE
PHYSICAL EXAM:  GENERAL: NAD, Resting in bed, thin, frail  HEENT:  Head atraumatic, EOMI, PERRLA, conjunctiva and sclera clear; dry mucous membranes, normal oropharynx  NECK: Supple, No JVD, no lymphadenopathy,   CHEST/LUNG: Anterior mild crackles, No rhonchi, wheezing, or rubs. Unlabored respirations on room air  HEART: Regular rate and rhythm; No murmurs, rubs, or gallops  ABDOMEN: Bowel sounds present; Soft, Nontender, Nondistended.  EXTREMITIES:  2+ Peripheral Pulses, brisk capillary refill. No clubbing, cyanosis, or edema  NERVOUS SYSTEM:  Alert & Oriented X3, non-focal and spontaneous movements of all extremities  SKIN: No rashes or lesions PHYSICAL EXAM:  GENERAL: NAD, Resting in bed, thin, frail  HEENT:  Head atraumatic, EOMI, PERRLA, conjunctiva and sclera clear; dry mucous membranes, normal oropharynx  NECK: Supple, No JVD, no lymphadenopathy,   CHEST/LUNG: Anterior mild crackles posteriorly, No rhonchi, wheezing, or rubs. Unlabored respirations on room air  HEART: Regular rate and rhythm; No murmurs, rubs, or gallops  ABDOMEN: Bowel sounds present; Soft, Nontender, Nondistended.  EXTREMITIES:  2+ Peripheral Pulses, brisk capillary refill. No clubbing, cyanosis, or edema  NERVOUS SYSTEM:  Alert & Oriented X3, non-focal and spontaneous movements of all extremities  SKIN: No rashes or lesions

## 2024-04-07 NOTE — PROGRESS NOTE ADULT - ATTENDING COMMENTS
87 yo F PMH pulmonary sarcoid, COPD not on home O2, CAD, hypothyroidism, HTN, and prior hospitalization for UTI admitted for SOB and sepsis secondary to UTI. Admitted to MICU for shock, likely combined septic and hypovolemic. Now improved and off pressors.     - not in respiratory distress, weaning O2 as tolerated  - TTE 3/2024 with EF of 50% and ruptured chordae with trace MR, septal/inferior wall and apical WMA  - will maintain abx for UTI, follow up cultures  - urology discuss for cystoscopy evaluation  - downgrade to floors

## 2024-04-07 NOTE — CHART NOTE - NSCHARTNOTEFT_GEN_A_CORE
Confidential Drug Utilization Report  Search Terms: luiza cruz, 1935Search Date: 04/07/2024 13:25:46 PM  Searching on behalf of: [id number]  The Drug Utilization Report below displays all of the controlled substance prescriptions, if any, that your patient has filled in the last twelve months. The information displayed on this report is compiled from pharmacy submissions to the Department, and accurately reflects the information as submitted by the pharmacies.    This report was requested by: Irineo Gutierrez | Reference #: [id number]    Practitioner Count: 2  Pharmacy Count: 2  Current Opioid Prescriptions: 0  Current Benzodiazepine Prescriptions: 1  Current Stimulant Prescriptions: 0      Patient Demographic Information (PDI)       PDI	First Name	Last Name	Birth Date	Gender	Street Address	[facility]	Zip Code  A	Luiza Cruz	1935	Female	[address] [place]	[address]  B	Luiza Cruz	1935	Female	[address]	[facility]	[address]    Prescription Information      PDI Filter:    PDI	Current Rx	Drug Type	Rx Written	Rx Dispensed	Drug	Quantity	Days Supply	Prescriber Name	Prescriber MESERET #	Payment Method	Dispenser  A	Y	B	04/04/2024	04/04/2024	temazepam 30 mg capsule	30	30	Satinder Mi MD	[id number]	[place]	[facility]  B	N	B	02/29/2024	02/29/2024	temazepam 30 mg capsule	30	30	Carlos Enrique, Jackelyn	[id number]	Insurance	[facility] #[id number]  B	N	B	01/18/2024	01/18/2024	temazepam 30 mg capsule	30	30	Carlos Enrique, Aasma	[id number]	Insurance	[facility] #[id number]  B	N	B	12/14/2023	12/14/2023	temazepam 30 mg capsule	30	30	Carlos Enrique, Aasma	[id number]	Insurance	[facility] #[id number]  B	N	B	11/16/2023	11/16/2023	temazepam 30 mg capsule	30	30	Carlos Enrique, Aasma	[id number]	Insurance	[facility] #[id number]  B	N	B	10/19/2023	10/20/2023	temazepam 30 mg capsule	27	30	Carlos Enrique, Aasma	[id number]	Insurance	[facility] #[id number]  B	N	B	09/10/2023	09/13/2023	temazepam 30 mg capsule	25	30	Carlos Enrique, [facility]	[id number]	Insurance	[facility] #[id number]  B	N	B	08/17/2023	08/17/2023	temazepam 30 mg capsule	30	30	Carlos Enrique, [facility]	[id number]	Insurance	[facility] #[id number]  B	N	B	07/13/2023	07/21/2023	temazepam 30 mg capsule	30	30	Carlos Enrique, [facility]	[id number]	Insurance	[facility] #[id number]  B	N	B	06/22/2023	06/23/2023	temazepam 30 mg capsule	25	30	Carlos Enrique, [facility]	[id number]	Insurance	[facility] #[id number]  B	N	B	05/24/2023	05/24/2023	temazepam 30 mg capsule	30	30	Johann Sosa	[id number]	Insurance	[facility] #[id number]  B	N	B	04/24/2023	04/24/2023	temazepam 30 mg capsule	30	30	Dallas Weiss	[id number]	Insurance	[facility] #[id number]    * - Details of Drug Type : O = Opioid, B = Benzodiazepine, S = Stimulant    * - Drugs marked with an asterisk are compound drugs. If the compound drug is made up of more than one controlled substance, then each controlled substance will be a separate row in the table.

## 2024-04-07 NOTE — CHART NOTE - NSCHARTNOTEFT_GEN_A_CORE
Attending: Dr. Vieira  : Betty Ayala NP     INDICATION: Shock     PROCEDURE:  [X] LIMITED ECHO  [X] LIMITED CHEST  [ ] LIMITED RETROPERITONEAL  [ ] LIMITED ABDOMINAL  [ ] LIMITED DVT  [ ] NEEDLE GUIDANCE VASCULAR  [ ] NEEDLE GUIDANCE THORACENTESIS  [ ] NEEDLE GUIDANCE PARACENTESIS  [ ] NEEDLE GUIDANCE PERICARDIOCENTESIS  [ ] OTHER    FINDINGS:   Normal lung sliding Anterior lung fields bilaterally A line predominant Few focal B lines in b/l posterior lung fields No pleural effusions Left lower lobe consolidation     Mildly reduced LV systolic function RV < LV.  IVC estimated at 1cm with >50% collapsibility w/ respiration.    INTERPRETATION:   IVC <1cm IVC collapsible likely fluid responsive

## 2024-04-07 NOTE — H&P ADULT - NSHPLABSRESULTS_GEN_ALL_CORE
.  LABS:                         10.1   19.61 )-----------( 284      ( 2024 18:24 )             33.9         136  |  99  |  36<H>  ----------------------------<  217<H>  4.3   |  17<L>  |  1.42<H>    Ca    9.3      2024 18:24    TPro  6.7  /  Alb  3.7  /  TBili  0.5  /  DBili  x   /  AST  21  /  ALT  7<L>  /  AlkPhos  169<H>      PT/INR - ( 2024 18:24 )   PT: 12.2 sec;   INR: 1.17 ratio         PTT - ( 2024 18:24 )  PTT:30.3 sec  Urinalysis Basic - ( 2024 19:15 )    Color: Yellow / Appearance: Turbid / S.014 / pH: x  Gluc: x / Ketone: Negative mg/dL  / Bili: Negative / Urobili: 0.2 mg/dL   Blood: x / Protein: 100 mg/dL / Nitrite: Negative   Leuk Esterase: Large / RBC: 4 /HPF / WBC >998 /HPF   Sq Epi: x / Non Sq Epi: 21 /HPF / Bacteria: Negative /HPF        Lactate, Blood: 2.9 mmol/L ( @ 22:04)      RADIOLOGY, EKG & ADDITIONAL TESTS: Reviewed.    LUNGS AND LARGE AIRWAYS: Patent central airways. Centrilobular emphysema,   most prominent in the upper lobes. Partial atelectasis of the middle and   bilateral lower lobes. Left upper lobe linear atelectasis. No pulmonary   nodules, masses or consolidation. Elevation of the right hemidiaphragm.  PLEURA: No pleural effusion.  VESSELS: Atherosclerotic changes of the aorta. Dilated ascending aorta to   4.1 cm. Dilated main pulmonary artery to 4.3 cm.  HEART: Heart size is enlarged. No pericardial effusion.  MEDIASTINUM AND RASHID: Nolymphadenopathy.  CHEST WALL AND LOWER NECK: Coarse bilateral breast calcifications. Left   lower chest wall subcutaneous loop recorder. Severe bilateral   glenohumeral osteoarthritis with remodeling of the glenoid and small   bilateral glenohumeral joint effusions.    ABDOMEN AND PELVIS:  LIVER: Within normal limits.  BILE DUCTS: Normal caliber.  GALLBLADDER: Within normal limits.  SPLEEN: Within normal limits.  PANCREAS: Atrophic.  ADRENALS: Within normal limits.  KIDNEYS/URETERS: Severe left hydroureter to the level of the urinary   bladder, not significantly changed. Right extra renal pelvis.    BLADDER: Within normal limits.  REPRODUCTIVE ORGANS: Uterus and left ovary within normal limits. Right   ovary not visualized. No right adnexal mass.    BOWEL: Circumferential wall thickening of the proximal and mid sigmoid   colon with mild pericolonic inflammatory change compatible with a   colitis. No bowel obstruction. Colonic diverticulosis. Appendix is not   visualized. No evidence of inflammation in the pericecal region.  PERITONEUM: No ascites.  VESSELS: Atherosclerotic changes.  RETROPERITONEUM/LYMPH NODES: No lymphadenopathy.  ABDOMINAL WALL: Within normal limits.  BONES: Partially visualized left femoral intramedullary oumou with proximal   blade. Degenerative changes of the lumbar spine. S-shaped thoracolumbar   scoliosis. Compression deformity of the superior endplate of L4, not   significantly changed. Grade 1 anterolisthesis of L5 on S1.    IMPRESSION:  1. No pulmonary consolidation.  2. Colitis of the proximal and mid sigmoid colon which is infectious,   inflammatory or ischemic in etiology.    CXR  ar lungs.  No pneumothorax or large pleural effusion. Right hemidiaphragm elevation.  Heart size within normal limits. Loop recorder.  Severe bilateral glenohumeral joint osteoarthrosis.

## 2024-04-07 NOTE — CHART NOTE - NSCHARTNOTEFT_GEN_A_CORE
MICU Transfer Note  ---------------------------    Transfer from: MICU  Transfer to:  (X) Medicine    (  ) Telemetry    (  ) RCU    (  ) Palliative    (  ) Stroke Unit    (  ) _______________  Accepting Physician:      HPI:  88-year-old female with a past medical history of pulmonary sarcoidosis, COPD not on home O2, CAD, hypothyroidism, HTN presenting with shortness of breath. Patient resides at [facility]. Staff noticed the patient was having shortness of breath and hypoxemic in the 70s, EMS noted her to be hypoxemic in the 80s which corrected with oxygen. The patient has multiple admissions for recent febrile/sepsis workup in the setting of UTI. Recently discharged on 4/3/24, and was followed by ID for sepsis 2/2 colitis/uti, pulmonary for pulmonary sarcoid, cardiology for ruptured chordae tend and EF 50%, and urology for hematuria and h/o L hydroureter. In the previous admission, the pt's sepsis resolved with IV antibiotics, cardiology diuresed patient, and urology recommended further imaging (cystoscopy/ct urogram) however family refused per chart review. The patient returns to ED from [facility] with SOB and fever and turbid urine concerning for similar UTI from previous admission. Currently, no collado in place. The patient is not complaining of chest pain, abdominal pain, nausea, vomiting, fevers.     In the ED, the patient placed on NC sat > 93%, febrile/leukocytosis, and sepsis cultures sent. She was found to be hypotensive in setting of sepsis and received 1L followed by 500 cc in the setting of heart failure and ruptured chordae tendae with low threshold for HF exacerbation. Her BP temporarily improved with IVF, however, required pressor support with levophed 0.3. MICU consulted for hypotension requiring pressor support in the setting of sepsis and HF from possible UTI. On MICU evaluation, the patient was alert, awake, and had no pain. She received additional 500 cc of fluid and levophed reduced to 0.18. Pt accepted to MICU for IV pressor support    (07 Apr 2024 02:05)      MICU COURSE  Pt brought to MICU for IV pressor support. Pt weaned off of levo quickly once brought to MICU. POCUS showed volume responsive state, so pt given additional fluids. Although pt denied urinary symptoms and UA may have been contaminated, there was still a c/f UTI given h/o UTI and urological structural abnormalities (L hydro). Family now amenable to urologic evaluation    Follow up  [] complete treatment of UTI/discontinue as clinically appropriate  [] family is adamantly requesting ID consult (Dr. Noonan) for remote history of C diff. Pt has no diarrhea but they are concerned about the history of C diff  [] urology evaluation vs outpt follow up      OBJECTIVE --  Vital Signs Last 24 Hrs  T(C): 36.7 (07 Apr 2024 12:00), Max: 39.8 (06 Apr 2024 19:44)  T(F): 98.1 (07 Apr 2024 12:00), Max: 103.6 (06 Apr 2024 19:44)  HR: 75 (07 Apr 2024 15:00) (62 - 102)  BP: 94/52 (07 Apr 2024 15:00) (71/47 - 121/54)  BP(mean): 71 (07 Apr 2024 15:00) (55 - 103)  RR: 24 (07 Apr 2024 15:00) (13 - 58)  SpO2: 96% (07 Apr 2024 15:00) (70% - 100%)    Parameters below as of 07 Apr 2024 02:05  Patient On (Oxygen Delivery Method): nasal cannula  O2 Flow (L/min): 3      I&O's Summary    06 Apr 2024 07:01  -  07 Apr 2024 07:00  --------------------------------------------------------  IN: 0 mL / OUT: 0 mL / NET: 0 mL    07 Apr 2024 07:01  -  07 Apr 2024 15:33  --------------------------------------------------------  IN: 240 mL / OUT: 100 mL / NET: 140 mL        MEDICATIONS  (STANDING):  atorvastatin 20 milliGRAM(s) Oral at bedtime  buDESOnide    Inhalation Suspension 0.25 milliGRAM(s) Inhalation two times a day  cefepime   IVPB 1000 milliGRAM(s) IV Intermittent every 12 hours  cholecalciferol 1000 Unit(s) Oral daily  cyclopentolate 1% Solution 1 Drop(s) Both EYES two times a day  ferrous    sulfate 325 milliGRAM(s) Oral daily  FLUoxetine 20 milliGRAM(s) Oral daily  heparin   Injectable 5000 Unit(s) SubCutaneous every 8 hours  influenza  Vaccine (HIGH DOSE) 0.7 milliLiter(s) IntraMuscular once  mirtazapine 7.5 milliGRAM(s) Oral at bedtime  norepinephrine Infusion 0.05 MICROgram(s)/kG/Min (4.26 mL/Hr) IV Continuous <Continuous>  pantoprazole  Injectable 40 milliGRAM(s) IV Push two times a day  prednisoLONE acetate 1% Suspension 1 Drop(s) Left EYE two times a day  sodium chloride 0.9% Bolus 500 milliLiter(s) IV Bolus once  traZODone 50 milliGRAM(s) Oral daily    MEDICATIONS  (PRN):  acetaminophen     Tablet .. 650 milliGRAM(s) Oral every 6 hours PRN Temp greater or equal to 38C (100.4F), Mild Pain (1 - 3)  albuterol    90 MICROgram(s) HFA Inhaler 2 Puff(s) Inhalation every 6 hours PRN wheeze  melatonin 3 milliGRAM(s) Oral at bedtime PRN Insomnia        LABS                                            9.9                   Neurophils% (auto):   x      (04-07 @ 09:11):    10.45)-----------(208          Lymphocytes% (auto):  x                                             33.1                   Eosinphils% (auto):   x        Manual%: Neutrophils x    ; Lymphocytes x    ; Eosinophils x    ; Bands%: x    ; Blasts x                                    139    |  104    |  30                  Calcium: 9.2   / iCa: x      (04-07 @ 05:12)    ----------------------------<  91        Magnesium: 2.0                              4.4     |  22     |  1.11             Phosphorous: 4.4      TPro  5.9    /  Alb  3.1    /  TBili  0.5    /  DBili  x      /  AST  14     /  ALT  8      /  AlkPhos  127    07 Apr 2024 05:12    ( 04-07 @ 05:12 )   PT: 11.9 sec;   INR: 1.14 ratio  aPTT: 25.1 sec          ASSESSMENT & PLAN:   88-year-old female with a past medical history of pulmonary sarcoidosis, COPD not on home O2, CAD, hypothyroidism, HTN presenting with sepsis most likely 2/2 recurrent UTI and respiratory distress.       ====NEUROLOGIC====  Appears at baseline AOX2    # insomnia  - on melatonin 3, trazadone 50, mirtazipine 7.5, temazepam 30  - c/w melatonin, trazadone, mirtazpine  - hold temazepam  - see separate ISTOP note    # mood disorder, NOS  - c/w trazadone, mirtazipine, fluoxetine 20      CARDIOVASCULAR:    #h/o HF   #ruptured chordae tinda  - TTE 03/2024 EF 50%; ruptured chordae    # septic shock  - initially presented to ED w/ hypotension, was given a small amount of fluid resuscitation given c/f h/o HF. Then started on levo and brought to MICU  - POCUS ICU: IVC 1cm, ant lung field bilaterally A line and few focal B in posterior lung fields. IVC <1cm collapsible, likely fluid responsive. Mildly reduced LV function   - TTE 03/2024 EF 50%, abnml septim/inferior wall/apical anterior seg, ruptured chordae tendinae to the anterior mitral leaflet, trace mitral regurg  - pt weaned off of levo upon arrival to MICU and given further fluid resucitation given POCUS findings  - shock now resolved, off levo       RESPIRATORY:  #pulm sarcoidosis   #AHRF   #COPD  - pt with acute hypoxic respiratory Failure on 02 ; keep 02 > saturation > 92%  - COPD continue nebulizer treatement   - h/o Paralysis of the Right hemidiaphragm   - Followed by Lucas Arita  - wean off oxygen as tolerated   - c/w home symbicort BID, albuterol q6h PRN       - h/o of reurrent UTI - pan sensitive e.coli   - UA contaminated epitehlal 21, pending repeat UA, pending cultures  - h/o of urology consult for hematuria - initially family did not want cystoscopy but now they want it  - bladder scan q6  - straight cath if > 300 cc or collado placement     GI  # h/o colitis/diverticulosis and h/o melena  - CT 4/6 abd/pelv noncon with findings c/f colitis and colonic diverituclosis   - CT 3/21 Distal transvers through sigmoid colon with wall thicking consitent with colitis and diverticulitis  - CW PPI x4 weeks BID    # h/o C diff colitis, remote  - family very adamant that they want ID Dr. Noonan to manage antibiotics    HEMATOLOGIC:  #h/o macrocytic anemia   #h/o hematochezia 03/2024  - baseline hgb 11, low iron, normal ferritin      INFECTIOUS DISEASE:  # positive UA  - given h/o UTI, will treat as UTI although pt denies clinical symptoms of UTI  - cefepime 1g q8h    ETHICS  Full code, family would like everything done MICU Transfer Note  ---------------------------    Transfer from: MICU  Transfer to:  (X) Medicine    (  ) Telemetry    (  ) RCU    (  ) Palliative    (  ) Stroke Unit    (  ) _______________  Accepting Physician: Dr. Garry Hermosillo      HPI:  88-year-old female with a past medical history of pulmonary sarcoidosis, COPD not on home O2, CAD, hypothyroidism, HTN presenting with shortness of breath. Patient resides at [facility]. Staff noticed the patient was having shortness of breath and hypoxemic in the 70s, EMS noted her to be hypoxemic in the 80s which corrected with oxygen. The patient has multiple admissions for recent febrile/sepsis workup in the setting of UTI. Recently discharged on 4/3/24, and was followed by ID for sepsis 2/2 colitis/uti, pulmonary for pulmonary sarcoid, cardiology for ruptured chordae tend and EF 50%, and urology for hematuria and h/o L hydroureter. In the previous admission, the pt's sepsis resolved with IV antibiotics, cardiology diuresed patient, and urology recommended further imaging (cystoscopy/ct urogram) however family refused per chart review. The patient returns to ED from [facility] with SOB and fever and turbid urine concerning for similar UTI from previous admission. Currently, no collado in place. The patient is not complaining of chest pain, abdominal pain, nausea, vomiting, fevers.     In the ED, the patient placed on NC sat > 93%, febrile/leukocytosis, and sepsis cultures sent. She was found to be hypotensive in setting of sepsis and received 1L followed by 500 cc in the setting of heart failure and ruptured chordae tendae with low threshold for HF exacerbation. Her BP temporarily improved with IVF, however, required pressor support with levophed 0.3. MICU consulted for hypotension requiring pressor support in the setting of sepsis and HF from possible UTI. On MICU evaluation, the patient was alert, awake, and had no pain. She received additional 500 cc of fluid and levophed reduced to 0.18. Pt accepted to MICU for IV pressor support    (07 Apr 2024 02:05)      MICU COURSE  Pt brought to MICU for IV pressor support. Pt weaned off of levo quickly once brought to MICU. POCUS showed volume responsive state, so pt given additional fluids. Although pt denied urinary symptoms and UA may have been contaminated, there was still a c/f UTI given h/o UTI and urological structural abnormalities (L hydro). Family now amenable to urologic evaluation    Follow up  [] complete treatment of UTI/discontinue as clinically appropriate  [] family is adamantly requesting ID consult (Dr. Noonan) for remote history of C diff. Pt has no diarrhea but they are concerned about the history of C diff  [] urology evaluation vs outpt follow up      OBJECTIVE --  Vital Signs Last 24 Hrs  T(C): 36.7 (07 Apr 2024 12:00), Max: 39.8 (06 Apr 2024 19:44)  T(F): 98.1 (07 Apr 2024 12:00), Max: 103.6 (06 Apr 2024 19:44)  HR: 75 (07 Apr 2024 15:00) (62 - 102)  BP: 94/52 (07 Apr 2024 15:00) (71/47 - 121/54)  BP(mean): 71 (07 Apr 2024 15:00) (55 - 103)  RR: 24 (07 Apr 2024 15:00) (13 - 58)  SpO2: 96% (07 Apr 2024 15:00) (70% - 100%)    Parameters below as of 07 Apr 2024 02:05  Patient On (Oxygen Delivery Method): nasal cannula  O2 Flow (L/min): 3      I&O's Summary    06 Apr 2024 07:01  -  07 Apr 2024 07:00  --------------------------------------------------------  IN: 0 mL / OUT: 0 mL / NET: 0 mL    07 Apr 2024 07:01  -  07 Apr 2024 15:33  --------------------------------------------------------  IN: 240 mL / OUT: 100 mL / NET: 140 mL        MEDICATIONS  (STANDING):  atorvastatin 20 milliGRAM(s) Oral at bedtime  buDESOnide    Inhalation Suspension 0.25 milliGRAM(s) Inhalation two times a day  cefepime   IVPB 1000 milliGRAM(s) IV Intermittent every 12 hours  cholecalciferol 1000 Unit(s) Oral daily  cyclopentolate 1% Solution 1 Drop(s) Both EYES two times a day  ferrous    sulfate 325 milliGRAM(s) Oral daily  FLUoxetine 20 milliGRAM(s) Oral daily  heparin   Injectable 5000 Unit(s) SubCutaneous every 8 hours  influenza  Vaccine (HIGH DOSE) 0.7 milliLiter(s) IntraMuscular once  mirtazapine 7.5 milliGRAM(s) Oral at bedtime  norepinephrine Infusion 0.05 MICROgram(s)/kG/Min (4.26 mL/Hr) IV Continuous <Continuous>  pantoprazole  Injectable 40 milliGRAM(s) IV Push two times a day  prednisoLONE acetate 1% Suspension 1 Drop(s) Left EYE two times a day  sodium chloride 0.9% Bolus 500 milliLiter(s) IV Bolus once  traZODone 50 milliGRAM(s) Oral daily    MEDICATIONS  (PRN):  acetaminophen     Tablet .. 650 milliGRAM(s) Oral every 6 hours PRN Temp greater or equal to 38C (100.4F), Mild Pain (1 - 3)  albuterol    90 MICROgram(s) HFA Inhaler 2 Puff(s) Inhalation every 6 hours PRN wheeze  melatonin 3 milliGRAM(s) Oral at bedtime PRN Insomnia        LABS                                            9.9                   Neurophils% (auto):   x      (04-07 @ 09:11):    10.45)-----------(208          Lymphocytes% (auto):  x                                             33.1                   Eosinphils% (auto):   x        Manual%: Neutrophils x    ; Lymphocytes x    ; Eosinophils x    ; Bands%: x    ; Blasts x                                    139    |  104    |  30                  Calcium: 9.2   / iCa: x      (04-07 @ 05:12)    ----------------------------<  91        Magnesium: 2.0                              4.4     |  22     |  1.11             Phosphorous: 4.4      TPro  5.9    /  Alb  3.1    /  TBili  0.5    /  DBili  x      /  AST  14     /  ALT  8      /  AlkPhos  127    07 Apr 2024 05:12    ( 04-07 @ 05:12 )   PT: 11.9 sec;   INR: 1.14 ratio  aPTT: 25.1 sec          ASSESSMENT & PLAN:   88-year-old female with a past medical history of pulmonary sarcoidosis, COPD not on home O2, CAD, hypothyroidism, HTN presenting with sepsis most likely 2/2 recurrent UTI and respiratory distress.       ====NEUROLOGIC====  Appears at baseline AOX2    # insomnia  - on melatonin 3, trazadone 50, mirtazipine 7.5, temazepam 30  - c/w melatonin, trazadone, mirtazpine  - hold temazepam  - see separate ISTOP note    # mood disorder, NOS  - c/w trazadone, mirtazipine, fluoxetine 20      CARDIOVASCULAR:    #h/o HF   #ruptured chordae tinda  - TTE 03/2024 EF 50%; ruptured chordae    # septic shock  - initially presented to ED w/ hypotension, was given a small amount of fluid resuscitation given c/f h/o HF. Then started on levo and brought to MICU  - POCUS ICU: IVC 1cm, ant lung field bilaterally A line and few focal B in posterior lung fields. IVC <1cm collapsible, likely fluid responsive. Mildly reduced LV function   - TTE 03/2024 EF 50%, abnml septim/inferior wall/apical anterior seg, ruptured chordae tendinae to the anterior mitral leaflet, trace mitral regurg  - pt weaned off of levo upon arrival to MICU and given further fluid resucitation given POCUS findings  - shock now resolved, off levo       RESPIRATORY:  #pulm sarcoidosis   #AHRF   #COPD  - pt with acute hypoxic respiratory Failure on 02 ; keep 02 > saturation > 92%  - COPD continue nebulizer treatement   - h/o Paralysis of the Right hemidiaphragm   - Followed by Lucas Arita  - wean off oxygen as tolerated   - c/w home symbicort BID, albuterol q6h PRN       - h/o of reurrent UTI - pan sensitive e.coli   - UA contaminated epitehlal 21, pending repeat UA, pending cultures  - h/o of urology consult for hematuria - initially family did not want cystoscopy but now they want it  - bladder scan q6  - straight cath if > 300 cc or collado placement     GI  # h/o colitis/diverticulosis and h/o melena  - CT 4/6 abd/pelv noncon with findings c/f colitis and colonic diverituclosis   - CT 3/21 Distal transvers through sigmoid colon with wall thicking consitent with colitis and diverticulitis  - CW PPI x4 weeks BID    # h/o C diff colitis, remote  - family very adamant that they want NIDIA Noonan to manage antibiotics    HEMATOLOGIC:  #h/o macrocytic anemia   #h/o hematochezia 03/2024  - baseline hgb 11, low iron, normal ferritin      INFECTIOUS DISEASE:  # positive UA  - given h/o UTI, will treat as UTI although pt denies clinical symptoms of UTI  - cefepime 1g q8h    ETHICS  Full code, family would like everything done

## 2024-04-07 NOTE — ED ADULT NURSE REASSESSMENT NOTE - NS ED NURSE REASSESS COMMENT FT1
pt placed on purawick, skin dried, attached to wall suction. Pt educated on use of purawick, verbalized understanding. Pt repositioned in stretcher for comfort. Stretcher locked and in lowest position, appropriate side rails up. Pt instructed to notify RN if assistance is needed. Pt pending dispo.

## 2024-04-07 NOTE — H&P ADULT - ATTENDING COMMENTS
88F Hx HTN, COPD, Pulmonary Sarcoidosis,       -year-old female with a past medical history of pulmonary sarcoidosis, COPD not on home O2, CAD, hypothyroidism, HTN presenting with shortness of breath. Patient resides at [facility]. Staff noticed the patient was having shortness of breath and hypoxemic in the 70s, EMS noted her to be hypoxemic in the 80s which corrected with oxygen. The patient has multiple admissions for recent febrile/sepsis workup in the setting of UTI. Recently discharged on _____, and was followed by ID for ___, pulmonary for _____, cardiology for ruptured chordae tend and EF ___, and x consult for _____. Currently, the patient is not complaining of chest pain, abdominal pain, nausea, vomiting, fevers.     In the ED, the patient received 1L followed by 500 cc in the setting of heart failure and ruptured chordae tendae with low threshold for HF exacerbation. Her BP temporarily improved with IVF, however, required pressor support with levophed 0.3. MICU consulted for hypotension requiring pressor support in the setting of sepsis and HF from possible UTI. On MICU evaluation, the patient was alert, awake, and had no pain. She received additional 500 cc of fluid and levophed reduced to 0.18. Pt accepted to MICU for IV pressor support 88F Hx HTN, COPD, Pulmonary Sarcoidosis, CAD, HFpEF 50%, Diverticulosis, Lt Hydroureteronephrosis, Recurrent UTIs, Multiple Hospitalizations p/w ED Respiratory Distress and Hypotension s/p 2.0 Li IVF and started on Pressor.   - Awake and very responsive verbally A&O x 2   - 4Li NCO2 SpO2 100%  - Sepsis Associated Hypotension on Pressor titration to MAP > 65 mmHg   - TTE 03/2024 EF 50%, Abnormal Septum/ Inferior Wall/Apical Anterior Seg; Ruptured Chordae Tendinae to the anterior mitral leaflet, Trace MR  - Bedside POCUS and serial CBC, CMPs, PT/INR, Procalcitonin  - NPO overnight   - DVT P- SCD only   - GOC - Full Code       Patient seen and examined with ICU Resident/Fellow at bedside after lab data, medical records and radiology reports reviewed. I have read and agreeable in general with resident's Documented Note, Assessment and Management Plan which reflected my opinions from bedside round and discussion.   Total Critical Care Time = 45 Min excluding teaching and procedure activity.   I

## 2024-04-07 NOTE — H&P ADULT - ASSESSMENT
HEMODYNAMICS:  Patient is on pressors due to septic shock  started on levo 0.18, now weaned off   fluid 2L    NEUROLOGIC:  Appears at baseline AOX2  Eligible for early mobilization and immediate physical therapy?      CARDIOVASCULAR:    #h/o HF #ruptured chordae tinda  - TTE 03/2024 EF 50%; ruptured chordae,  - s/p total 2L, map < 65  - started on levophed at 2200  - levo rate 0.18, now weaned with fluids     RESPIRATORY:  - on NC for cmfort, wean as tolerated       SKIN:  Lines:  Decubiti:    GI:  Diet:   Bowel regiment:    #colitis #h/o diverticulosis/melena   - CT abd 4/6 with circumferential wall thickening consistent with colitis; colonic diverticulosis  - monitor BM, no melena  - no abdominal pain at this time     Urinary tract:  UO:   Connor: ? bladder scan q6       METABOLIC:  BMP showing evidence of   Liver functions tests show elevated ALK phos 169  Endocrine abnormalities include  04-06    136  |  99  |  36<H>  ----------------------------<  217<H>  4.3   |  17<L>  |  1.42<H>    Ca    9.3      06 Apr 2024 18:24    TPro  6.7  /  Alb  3.7  /  TBili  0.5  /  DBili  x   /  AST  21  /  ALT  7<L>  /  AlkPhos  169<H>  04-06      HEMATOLOGIC:  CBC results show  Coag panel shows  DVT prophylaxis with                         10.1   19.61 )-----------( 284      ( 06 Apr 2024 18:24 )             33.9     PT/INR - ( 06 Apr 2024 18:24 )   PT: 12.2 sec;   INR: 1.17 ratio         PTT - ( 06 Apr 2024 18:24 )  PTT:30.3 sec    INFECTIOUS DISEASE:  Pt without strong objective or clinical evidence of infection. Will observe off antibiotics     88-year-old female with a past medical history of pulmonary sarcoidosis, COPD not on home O2, CAD, hypothyroidism, HTN presenting with sepsis most likely 2/2 recurrent UTI and respiratory distress.     HEMODYNAMICS:  Patient is on pressors due to septic shock  started on levo 0.18, now weaned off   s/p fluid 2L in ED   POCUS ICU: IVC 1cm, ant lung field bilaterally A line and few focal B in posterior lung fields. IVC <1cm collapsible, likely fluid responsive.       NEUROLOGIC:  Appears at baseline AOX2  Eligible for early mobilization and immediate physical therapy?      CARDIOVASCULAR:    #h/o HF #ruptured chordae tinda  - TTE 03/2024 EF 50%; ruptured chordae,  - s/p total 2L, map < 65  - started on levophed at 2200  - levo rate 0.18, now weaned with fluids   - POCUS ICU: IVC 1cm, ant lung field bilaterally A line and few focal B in posterior lung fields. IVC <1cm collapsible, likely fluid responsive. Mildly reduced LV function   - TTE 03/2024 EF 50%, abnml septim/inferior wall/apical anterior seg, ruptured chordae tendinae to the anterior mitral leaflet, trace mitral regurg  - home lasix, held i/s/o hypotension  - pt weaned off of levo upon arrival to MICU will give fluid       RESPIRATORY:  #pulm sarcoidosis #AHRF #COPD  - pt with acute hypoxic respiratory Failure on 02 ; keep 02 > saturation > 92%  - COPD continue nebulizer treatement   - h/o Paralysis of the Right hemidiaphragm   - nebulizer treatement Duoneb q 6 hrs ; Flovent 0.25 BID   - Followed by Lucas Arita  - wean off oxygen as tolerated   - f/u blood gas        - h/o of reurrent UTI - pan sensitive e.coli   - UA contaminated epitehlal 21, pending repeat UA, pending cultures  - h/o of urology consult for hematuria - family deferred further imaging and workup i/s/o no interest for surgical intervention   - bladder scan q6  - straight cath if > 300 cc or collado placement     GI  - h/o colitis/diverticulosis and h/o melena  - CT 4/6 abd/pelv noncon with findings c/f colitis and colonic diverituclosis   - CT 3/21 Distal transvers through sigmoid colon with wall thicking consitent with colitis and diverticulitis  - CW PPI x4 weeks BID  -     HEMATOLOGIC:    #h/o macrocytic anemia   #h/o hematochezia 03/2024  - baseline hgb 11, low iron, normal ferritin  INFECTIOUS DISEASE:  Pt without strong objective or clinical evidence of infection. Will observe off antibiotics      SKIN:  Lines:         88-year-old female with a past medical history of pulmonary sarcoidosis, COPD not on home O2, CAD, hypothyroidism, HTN presenting with sepsis most likely 2/2 recurrent UTI and respiratory distress.     HEMODYNAMICS:  Patient is on pressors due to septic shock  started on levo 0.18, now weaned off   s/p fluid 2L in ED   POCUS ICU: IVC 1cm, ant lung field bilaterally A line and few focal B in posterior lung fields. IVC <1cm collapsible, likely fluid responsive.       NEUROLOGIC:  Appears at baseline AOX2  Eligible for early mobilization and immediate physical therapy?      CARDIOVASCULAR:    #h/o HF #ruptured chordae tinda  - TTE 03/2024 EF 50%; ruptured chordae,  - s/p total 2L, map < 65  - started on levophed at 2200  - levo rate 0.18, now weaned with fluids   - POCUS ICU: IVC 1cm, ant lung field bilaterally A line and few focal B in posterior lung fields. IVC <1cm collapsible, likely fluid responsive. Mildly reduced LV function   - TTE 03/2024 EF 50%, abnml septim/inferior wall/apical anterior seg, ruptured chordae tendinae to the anterior mitral leaflet, trace mitral regurg  - home lasix, held i/s/o hypotension  - pt weaned off of levo upon arrival to MICU will give fluid      RESPIRATORY:  #pulm sarcoidosis #AHRF #COPD  - pt with acute hypoxic respiratory Failure on 02 ; keep 02 > saturation > 92%  - COPD continue nebulizer treatement   - h/o Paralysis of the Right hemidiaphragm   - nebulizer treatement Duoneb q 6 hrs ; Flovent 0.25 BID   - Followed by Lucas Arita  - wean off oxygen as tolerated   - f/u blood gas        - h/o of reurrent UTI - pan sensitive e.coli   - UA contaminated epitehlal 21, pending repeat UA, pending cultures  - h/o of urology consult for hematuria - family deferred further imaging and workup i/s/o no interest for surgical intervention   - bladder scan q6  - straight cath if > 300 cc or collado placement     GI  - h/o colitis/diverticulosis and h/o melena  - CT 4/6 abd/pelv noncon with findings c/f colitis and colonic diverituclosis   - CT 3/21 Distal transvers through sigmoid colon with wall thicking consitent with colitis and diverticulitis  - CW PPI x4 weeks BID  -     HEMATOLOGIC:    #h/o macrocytic anemia   #h/o hematochezia 03/2024  - baseline hgb 11, low iron, normal ferritin  INFECTIOUS DISEASE:  Pt without strong objective or clinical evidence of infection. Will observe off antibiotics      SKIN:  Lines:

## 2024-04-08 ENCOUNTER — RESULT REVIEW (OUTPATIENT)
Age: 89
End: 2024-04-08

## 2024-04-08 LAB
CULTURE RESULTS: ABNORMAL
SPECIMEN SOURCE: SIGNIFICANT CHANGE UP

## 2024-04-08 PROCEDURE — 99222 1ST HOSP IP/OBS MODERATE 55: CPT

## 2024-04-08 PROCEDURE — 93321 DOPPLER ECHO F-UP/LMTD STD: CPT | Mod: 26

## 2024-04-08 PROCEDURE — 93308 TTE F-UP OR LMTD: CPT | Mod: 26

## 2024-04-08 RX ADMIN — Medication 3 MILLIGRAM(S): at 21:19

## 2024-04-08 RX ADMIN — Medication 0.25 MILLIGRAM(S): at 05:32

## 2024-04-08 RX ADMIN — Medication 0.25 MILLIGRAM(S): at 17:26

## 2024-04-08 RX ADMIN — PANTOPRAZOLE SODIUM 40 MILLIGRAM(S): 20 TABLET, DELAYED RELEASE ORAL at 17:21

## 2024-04-08 RX ADMIN — CEFEPIME 100 MILLIGRAM(S): 1 INJECTION, POWDER, FOR SOLUTION INTRAMUSCULAR; INTRAVENOUS at 17:20

## 2024-04-08 RX ADMIN — Medication 650 MILLIGRAM(S): at 12:55

## 2024-04-08 RX ADMIN — CYCLOPENTOLATE HYDROCHLORIDE 1 DROP(S): 10 SOLUTION/ DROPS OPHTHALMIC at 18:16

## 2024-04-08 RX ADMIN — Medication 1 DROP(S): at 05:28

## 2024-04-08 RX ADMIN — MIRTAZAPINE 7.5 MILLIGRAM(S): 45 TABLET, ORALLY DISINTEGRATING ORAL at 21:18

## 2024-04-08 RX ADMIN — Medication 50 MILLIGRAM(S): at 11:55

## 2024-04-08 RX ADMIN — Medication 1000 UNIT(S): at 11:55

## 2024-04-08 RX ADMIN — Medication 650 MILLIGRAM(S): at 11:55

## 2024-04-08 RX ADMIN — CEFEPIME 100 MILLIGRAM(S): 1 INJECTION, POWDER, FOR SOLUTION INTRAMUSCULAR; INTRAVENOUS at 05:25

## 2024-04-08 RX ADMIN — Medication 325 MILLIGRAM(S): at 11:55

## 2024-04-08 RX ADMIN — ATORVASTATIN CALCIUM 20 MILLIGRAM(S): 80 TABLET, FILM COATED ORAL at 21:18

## 2024-04-08 RX ADMIN — Medication 650 MILLIGRAM(S): at 19:46

## 2024-04-08 RX ADMIN — Medication 20 MILLIGRAM(S): at 11:55

## 2024-04-08 RX ADMIN — PANTOPRAZOLE SODIUM 40 MILLIGRAM(S): 20 TABLET, DELAYED RELEASE ORAL at 05:25

## 2024-04-08 RX ADMIN — Medication 650 MILLIGRAM(S): at 20:16

## 2024-04-08 RX ADMIN — CYCLOPENTOLATE HYDROCHLORIDE 1 DROP(S): 10 SOLUTION/ DROPS OPHTHALMIC at 05:28

## 2024-04-08 RX ADMIN — Medication 1 DROP(S): at 17:22

## 2024-04-08 NOTE — DIETITIAN INITIAL EVALUATION ADULT - ORAL NUTRITION SUPPLEMENTS
Pt dislikes Ensure oral nutrition supplements. RD to provide Mighty Shake BID to promote adequate PO intake.  Pt dislikes Ensure oral nutrition supplements; declines offer of any oral nutrition supplements at this time. RD to honor food preferences as able.

## 2024-04-08 NOTE — DIETITIAN INITIAL EVALUATION ADULT - ADD RECOMMEND
1) Recommend change to Kosher diet as tolerated; d/c DASH diet restriction to promote adequate PO intake. Defer texture/consistency to SLP/team.   2) Recommend Multivitamin and vitamin C daily to promote wound healing pending no medical contraindications.  3) Continue to monitor PO intake, weight, labs, skin, GI status, and diet.  4) Nutrition care plan to remain consistent with pt GOC.  5) RD remains available for diet education PRN.  6) Malnutrition sticker placed in chart.

## 2024-04-08 NOTE — DIETITIAN INITIAL EVALUATION ADULT - REASON INDICATOR FOR ASSESSMENT
Nutrition Consult for pressure injury stage 2 or >.   Source: Pt, previous RD notes, Electronic Medical Record.   Chart reviewed, events noted.

## 2024-04-08 NOTE — DIETITIAN INITIAL EVALUATION ADULT - ORAL INTAKE PTA/DIET HISTORY
Pt reports having a small appetite and PO intake at baseline PTA; typically consuming ~2 meals/day. Follows Kosher diet. Pt denies any known food allergies. Pt taking vitamin D3 supplements PTA. Denies any difficulty chewing/swallowing at this time.

## 2024-04-08 NOTE — DIETITIAN NUTRITION RISK NOTIFICATION - TREATMENT: THE FOLLOWING DIET HAS BEEN RECOMMENDED
Diet, Regular:   DASH/TLC {Sodium & Cholesterol Restricted} (DASH) (04-07-24 @ 10:59) [Active]

## 2024-04-08 NOTE — CONSULT NOTE ADULT - SUBJECTIVE AND OBJECTIVE BOX
HPI:  88-year-old female with a past medical history of pulmonary sarcoidosis, COPD not on home O2, CAD, hypothyroidism, HTN presenting with shortness of breath. Patient resides at [facility]. Staff noticed the patient was having shortness of breath and hypoxemic in the 70s, EMS noted her to be hypoxemic in the 80s which corrected with oxygen. The patient has multiple admissions for recent febrile/sepsis workup in the setting of UTI. Recently discharged on 4/3/24, and was followed by ID for sepsis 2/2 colitis/uti, pulmonary for pulmonary sarcoid, cardiology for ruptured chordae tend and EF 50%, and urology for hematuria and h/o L hydroureter. In the previous admission, the pt's sepsis resolved with IV antibiotics, cardiology diuresed patient, and urology recommended further imaging (cystoscopy/ct urogram) however family refused per chart review. The patient returns to ED from [facility] with SOB and fever and turbid urine concerning for similar UTI from previous admission. Currently, no collado in place. The patient is not complaining of chest pain, abdominal pain, nausea, vomiting, fevers.     In the ED, the patient placed on NC sat > 93%, febrile/leukocytosis, and sepsis cultures sent. She was found to be hypotensive in setting of sepsis and received 1L followed by 500 cc in the setting of heart failure and ruptured chordae tendae with low threshold for HF exacerbation. Her BP temporarily improved with IVF, however, required pressor support with levophed 0.3. MICU consulted for hypotension requiring pressor support in the setting of sepsis and HF from possible UTI. On MICU evaluation, the patient was alert, awake, and had no pain. She received additional 500 cc of fluid and levophed reduced to 0.18. Pt accepted to MICU for IV pressor support    (07 Apr 2024 02:05)      REVIEW OF SYSTEMS:    CONSTITUTIONAL: No weakness, fevers or chills  EYES/ENT: No visual changes;  No vertigo or throat pain   NECK: No pain or stiffness  RESPIRATORY: No cough, wheezing, hemoptysis; No shortness of breath  CARDIOVASCULAR: No chest pain or palpitations  GASTROINTESTINAL: No abdominal or epigastric pain. No nausea, vomiting, or hematemesis; No diarrhea or constipation. No melena or hematochezia.  GENITOURINARY: No dysuria, frequency or hematuria  NEUROLOGICAL: No numbness or weakness  SKIN: No itching, burning, rashes, or lesions   All other review of systems is negative unless indicated above.      Medications:   MEDICATIONS  (STANDING):  cefepime   IVPB 2000 milliGRAM(s) IV Intermittent every 8 hours  cefTRIAXone   IVPB      heparin   Injectable 5000 Unit(s) SubCutaneous every 8 hours  influenza  Vaccine (HIGH DOSE) 0.7 milliLiter(s) IntraMuscular once  norepinephrine Infusion 0.05 MICROgram(s)/kG/Min (4.26 mL/Hr) IV Continuous <Continuous>  pantoprazole  Injectable 40 milliGRAM(s) IV Push two times a day  sodium chloride 0.9% Bolus 500 milliLiter(s) IV Bolus once    MEDICATIONS  (PRN):  acetaminophen     Tablet .. 650 milliGRAM(s) Oral every 6 hours PRN Temp greater or equal to 38C (100.4F), Mild Pain (1 - 3)      Allergies    iodinated radiocontrast agents (Anaphylaxis)    Intolerances        PAST MEDICAL & SURGICAL HISTORY:  HTN (hypertension)      Hypothyroidism      Osteoporosis      CAD (coronary artery disease)      COPD (chronic obstructive pulmonary disease)      Pulmonary sarcoidosis      H/O pyelonephritis      Acute diverticulitis          Social :    No smoking       No ETOH use            Vital Signs Last 24 Hrs  T(C): 36.8 (07 Apr 2024 08:00), Max: 39.8 (06 Apr 2024 19:44)  T(F): 98.3 (07 Apr 2024 08:00), Max: 103.6 (06 Apr 2024 19:44)  HR: 67 (07 Apr 2024 10:00) (62 - 102)  BP: 107/51 (07 Apr 2024 10:00) (71/47 - 118/97)  BP(mean): 74 (07 Apr 2024 10:00) (55 - 103)  RR: 26 (07 Apr 2024 10:00) (13 - 35)  SpO2: 89% (07 Apr 2024 10:00) (78% - 100%)    Parameters below as of 07 Apr 2024 02:05  Patient On (Oxygen Delivery Method): nasal cannula  O2 Flow (L/min): 3    CAPILLARY BLOOD GLUCOSE          04-06 @ 07:01  -  04-07 @ 07:00  --------------------------------------------------------  IN: 0 mL / OUT: 0 mL / NET: 0 mL        Physical Exam:    Daily Height in cm: 157.48 (07 Apr 2024 02:05)    Daily   General:  Well appearing, NAD, not cachetic  HEENT:  Nonicteric, PERRLA  CV:  RRR, no murmur, no JVD  Lungs:  CTA B/L, no wheezes, rales, rhonchi  Abdomen:  Soft, non-tender, no distended, positive BS, no hepatosplenomegaly  Extremities:  2+ pulses, no c/c, no edema  Skin:  Warm and dry, no rashes  :  No collado  Neuro:  AAOx3, non-focal, CN II-XII grossly intact  No Restraints    LABS:                        9.9    10.45 )-----------( 208      ( 07 Apr 2024 09:11 )             33.1     04-07    139  |  104  |  30<H>  ----------------------------<  91  4.4   |  22  |  1.11    Ca    9.2      07 Apr 2024 05:12  Phos  4.4     04-07  Mg     2.0     04-07    TPro  5.9<L>  /  Alb  3.1<L>  /  TBili  0.5  /  DBili  x   /  AST  14  /  ALT  8<L>  /  AlkPhos  127<H>  04-07    PT/INR - ( 07 Apr 2024 05:12 )   PT: 11.9 sec;   INR: 1.14 ratio         PTT - ( 07 Apr 2024 05:12 )  PTT:25.1 sec  Urinalysis Basic - ( 07 Apr 2024 05:12 )    Color: x / Appearance: x / SG: x / pH: x  Gluc: 91 mg/dL / Ketone: x  / Bili: x / Urobili: x   Blood: x / Protein: x / Nitrite: x   Leuk Esterase: x / RBC: x / WBC x   Sq Epi: x / Non Sq Epi: x / Bacteria: x              RADIOLOGY & ADDITIONAL TESTS:    ---------------------------------------------------------------------------  I personally reviewed: [  ]EKG   [  ]CXR    [  ] CT    [  ]Other  ---------------------------------------------------------------------------  
Patient is a 88y old  Female who presents with a chief complaint of infection (07 Apr 2024 10:42)    HPI:  88-year-old female with a past medical history of pulmonary sarcoidosis, COPD not on home O2, CAD, hypothyroidism, HTN presenting with shortness of breath. Patient resides at [facility]. Staff noticed the patient was having shortness of breath and hypoxemic in the 70s, EMS noted her to be hypoxemic in the 80s which corrected with oxygen. The patient has multiple admissions for recent febrile/sepsis workup in the setting of UTI. Recently discharged on 4/3/24, and was followed by ID for sepsis 2/2 colitis/uti, pulmonary for pulmonary sarcoid, cardiology for ruptured chordae tend and EF 50%, and urology for hematuria and h/o L hydroureter. In the previous admission, the pt's sepsis resolved with IV antibiotics, cardiology diuresed patient, and urology recommended further imaging (cystoscopy/ct urogram) however family refused per chart review. The patient returns to ED from [facility] with SOB and fever and turbid urine concerning for similar UTI from previous admission. Currently, no collado in place. The patient is not complaining of chest pain, abdominal pain, nausea, vomiting, fevers.     In the ED, the patient placed on NC sat > 93%, febrile/leukocytosis, and sepsis cultures sent. She was found to be hypotensive in setting of sepsis and received 1L followed by 500 cc in the setting of heart failure and ruptured chordae tendae with low threshold for HF exacerbation. Her BP temporarily improved with IVF, however, required pressor support with levophed 0.3. MICU consulted for hypotension requiring pressor support in the setting of sepsis and HF from possible UTI. On MICU evaluation, the patient was alert, awake, and had no pain. She received additional 500 cc of fluid and levophed reduced to 0.18. Pt accepted to MICU for IV pressor support    (07 Apr 2024 02:05)      PAST MEDICAL & SURGICAL HISTORY:  HTN (hypertension)      Hypothyroidism      Osteoporosis      CAD (coronary artery disease)      COPD (chronic obstructive pulmonary disease)      Pulmonary sarcoidosis      H/O pyelonephritis      Acute diverticulitis          Social history:       Allergic/Immunologic:	No hives or rash   Allergies    iodinated radiocontrast agents (Anaphylaxis)    Intolerances        Antimicrobials:    cefepime   IVPB 1000 milliGRAM(s) IV Intermittent every 12 hours        Vital Signs Last 24 Hrs  T(C): 37 (08 Apr 2024 05:32), Max: 37 (08 Apr 2024 05:32)  T(F): 98.6 (08 Apr 2024 05:32), Max: 98.6 (08 Apr 2024 05:32)  HR: 75 (08 Apr 2024 05:32) (64 - 86)  BP: 106/58 (08 Apr 2024 05:32) (85/46 - 166/83)  BP(mean): 92 (07 Apr 2024 17:00) (61 - 92)  RR: 18 (08 Apr 2024 05:32) (18 - 58)  SpO2: 97% (08 Apr 2024 05:32) (70% - 100%)    Parameters below as of 08 Apr 2024 05:32  Patient On (Oxygen Delivery Method): room air           Eyes:PERRL EOMI.NO discharge or conjunctival injection    ENMT:No sinus tenderness.No thrush.No pharyngeal exudate or erythema.Fair dental hygiene    Neck:Supple,No LN,no JVD      Respiratory:Good air entry bilaterally,CTA    Cardiovascular:S1 S2 wnl, No murmurs,rub or gallops    Gastrointestinal:Soft BS(+) no tenderness no masses ,No rebound or guarding    Genitourinary:No CVA tendereness     Rectal:    Extremities:No cyanosis,clubbing or edema.    Vascular:peripheral pulses felt    Neurological:AAO X 3,No grossly focal deficits                                 9.9    10.45 )-----------( 208      ( 07 Apr 2024 09:11 )             33.1         04-07    139  |  104  |  30<H>  ----------------------------<  91  4.4   |  22  |  1.11    Ca    9.2      07 Apr 2024 05:12  Phos  4.4     04-07  Mg     2.0     04-07    TPro  5.9<L>  /  Alb  3.1<L>  /  TBili  0.5  /  DBili  x   /  AST  14  /  ALT  8<L>  /  AlkPhos  127<H>  04-07      RECENT CULTURES:  04-06 @ 19:00  .Blood Blood-Peripheral  --  --  --    No growth at 24 hours  --  04-06 @ 18:15  .Blood Blood-Peripheral  --  --  --    No growth at 24 hours  --      MICROBIOLOGY:  Culture Results:   No growth at 24 hours (04-06 @ 19:00)  Culture Results:   No growth at 24 hours (04-06 @ 18:15)          Radiology:      Assessment:        Recommendations and Plan:    Pager [phone]  After 5 pm/weekends or if no response :[phone]

## 2024-04-08 NOTE — DIETITIAN INITIAL EVALUATION ADULT - NSICDXPASTMEDICALHX_GEN_ALL_CORE_FT
PAST MEDICAL HISTORY:  Acute diverticulitis     CAD (coronary artery disease)     COPD (chronic obstructive pulmonary disease)     H/O pyelonephritis     HTN (hypertension)     Hypothyroidism     Osteoporosis     Pulmonary sarcoidosis

## 2024-04-08 NOTE — PROGRESS NOTE ADULT - SUBJECTIVE AND OBJECTIVE BOX
---___---___---___---___---___---___ ---___---___---___---___---___---___---___---___---                  M E D I C A L   A T T E N D I N G   P R O G R E S S   N O T E  ---___---___---___---___---___---___ ---___---___---___---___---___---___---___---___---        ================================================    ++CHIEF COMPLAINT:   Patient is a 88y old  Female who presents with a chief complaint of Sepsis     (2024 14:25)      Sepsis      ---___---___---___---___---___---  PAST MEDICAL / Surgical  HISTORY:  PAST MEDICAL & SURGICAL HISTORY:  HTN (hypertension)      Hypothyroidism      Osteoporosis      CAD (coronary artery disease)      COPD (chronic obstructive pulmonary disease)      Pulmonary sarcoidosis      H/O pyelonephritis      Acute diverticulitis          ---___---___---___---___---___---  FAMILY HISTORY:   FAMILY HISTORY:        ---___---___---___---___---___---  ALLERGIES:   Allergies    iodinated radiocontrast agents (Anaphylaxis)    Intolerances        ---___---___---___---___---___---  MEDICATIONS:  MEDICATIONS  (STANDING):  atorvastatin 20 milliGRAM(s) Oral at bedtime  buDESOnide    Inhalation Suspension 0.25 milliGRAM(s) Inhalation two times a day  cefepime   IVPB 1000 milliGRAM(s) IV Intermittent every 12 hours  cholecalciferol 1000 Unit(s) Oral daily  cyclopentolate 1% Solution 1 Drop(s) Both EYES two times a day  ferrous    sulfate 325 milliGRAM(s) Oral daily  FLUoxetine 20 milliGRAM(s) Oral daily  influenza  Vaccine (HIGH DOSE) 0.7 milliLiter(s) IntraMuscular once  mirtazapine 7.5 milliGRAM(s) Oral at bedtime  pantoprazole  Injectable 40 milliGRAM(s) IV Push two times a day  prednisoLONE acetate 1% Suspension 1 Drop(s) Left EYE two times a day  traZODone 50 milliGRAM(s) Oral daily    MEDICATIONS  (PRN):  acetaminophen     Tablet .. 650 milliGRAM(s) Oral every 6 hours PRN Temp greater or equal to 38C (100.4F), Mild Pain (1 - 3)  albuterol    90 MICROgram(s) HFA Inhaler 2 Puff(s) Inhalation every 6 hours PRN wheeze  melatonin 3 milliGRAM(s) Oral at bedtime PRN Insomnia      ---___---___---___---___---___---  REVIEW OF SYSTEM:    GEN: no fever, no chills, no pain  RESP: no SOB, no cough, no sputum  CVS: no chest pain, no palpitations, no edema  GI: no abdominal pain, no nausea, no vomiting, no constipation, no diarrhea  : no dysurea, no frequency, no hematurea  Neuro: no headache, no dizziness  PSYCH: no anxiety, no depression  Derm : no itching, no rash    ---___---___---___---___---___---  VITAL SIGNS:  88y , CAPILLARY BLOOD GLUCOSE        T(C): 36.6 (24 @ 12:30), Max: 37 (24 @ 05:32)  HR: 80 (24 @ 12:30) (75 - 81)  BP: 114/63 (24 @ 12:30) (92/51 - 114/63)  RR: 18 (24 @ 12:30) (18 - 18)  SpO2: 93% (24 @ 12:30) (90% - 97%)  ---___---___---___---___---___---  PHYSICAL EXAM:    GEN: A&O X 3 , NAD , comfortable  HEENT: NCAT, PERRL, MMM, hearing intact  Neck: supple , no JVD  CVS: S1S2 , regular , No M/R/G appreciated  PULM: CTA B/L,  no W/R/R appreciated  ABD.: soft. non tender, non distended,  bowel sounds present  Extrem: intact pulses , no edema   Derm: No rash , no ecchymoses  PSYCH : normal mood,  no delusion not anxious     ---___---___---___---___---___---            LAB AND IMAGIN.9    10.45 )-----------( 208      ( 2024 09:11 )             33.1               04-    139  |  104  |  30<H>  ----------------------------<  91  4.4   |  22  |  1.11    Ca    9.2      2024 05:12  Phos  4.4     -  Mg     2.0     -    TPro  5.9<L>  /  Alb  3.1<L>  /  TBili  0.5  /  DBili  x   /  AST  14  /  ALT  8<L>  /  AlkPhos  127<H>  07    PT/INR - ( 2024 05:12 )   PT: 11.9 sec;   INR: 1.14 ratio         PTT - ( 2024 05:12 )  PTT:25.1 sec                        Urinalysis Basic - ( 2024 05:12 )    Color: x / Appearance: x / SG: x / pH: x  Gluc: 91 mg/dL / Ketone: x  / Bili: x / Urobili: x   Blood: x / Protein: x / Nitrite: x   Leuk Esterase: x / RBC: x / WBC x   Sq Epi: x / Non Sq Epi: x / Bacteria: x        [All pertinent / recent Imaging reviewed]         ---___---___---___---___---___---___ ---___---___---___---___---                         A S S E S S M E N T   A N D   P L A N :      HEALTH ISSUES - PROBLEM Dx:    #pulm sarcoidosis   #AHRF   #COPD  - pt with acute hypoxic respiratory Failure on  ; keep  > saturation > 92%  - COPD continue nebulizer treatement   - h/o Paralysis of the Right hemidiaphragm   - Followed by Lucas Arita  - wean off oxygen as tolerated   - c/w home symbicort BID, albuterol q6h PRN       - h/o of reurrent UTI - pan sensitive e.coli   - UA contaminated epitehlal , pending repeat UA, pending cultures  - h/o of urology consult for hematuria - initially family did not want cystoscopy but now they want it  - bladder scan q6  - straight cath if > 300 cc or collado placement     GI  # h/o colitis/diverticulosis and h/o melena  - CT  abd/pelv noncon with findings c/f colitis and colonic diverituclosis   - CT 3/21 Distal transvers through sigmoid colon with wall thicking consitent with colitis and diverticulitis  - CW PPI x4 weeks BID    # h/o C diff colitis, remote  - family very adamant that they want ID Dr. Noonan to manage antibiotics he was called and made aware     HEMATOLOGIC:  #h/o macrocytic anemia   #h/o hematochezia 2024  - baseline hgb 11, low iron, normal ferritin      INFECTIOUS DISEASE:  # positive UA  - given h/o UTI, will treat as UTI although pt denies clinical symptoms of UTI  - cefepime 1g q8h    ETHICS  Full code, family would like everything done.          Education given on   ___________________________  Thank you,  Garry Hermosillo  [phone]

## 2024-04-08 NOTE — PHYSICAL THERAPY INITIAL EVALUATION ADULT - BALANCE DISTURBANCE, IDENTIFIED IMPAIRMENT CONTRIBUTE, REHAB EVAL
complains of pain/discomfort decreased endurance/impaired coordination/decreased ROM/decreased strength

## 2024-04-08 NOTE — DIETITIAN INITIAL EVALUATION ADULT - PERTINENT LABORATORY DATA
04-07    139  |  104  |  30<H>  ----------------------------<  91  4.4   |  22  |  1.11    Ca    9.2      07 Apr 2024 05:12  Phos  4.4     04-07  Mg     2.0     04-07    TPro  5.9<L>  /  Alb  3.1<L>  /  TBili  0.5  /  DBili  x   /  AST  14  /  ALT  8<L>  /  AlkPhos  127<H>  04-07  A1C with Estimated Average Glucose Result: 6.0 % (09-27-23 @ 06:44)

## 2024-04-08 NOTE — DIETITIAN INITIAL EVALUATION ADULT - NSFNSGIIOFT_GEN_A_CORE
Pt denies any current N/V/D/C. Per pt, unsure of last BM; no BM documented x admission per flowsheets. No current bowel regimen in place. Ordered for PPI.

## 2024-04-08 NOTE — PHYSICAL THERAPY INITIAL EVALUATION ADULT - PERTINENT HX OF CURRENT PROBLEM, REHAB EVAL
87 y/o F, PMH of pulmonary sarcoidosis, COPD not on home O2, CAD, hypothyroidism, HTN presenting with shortness of breath. Patient resides at [facility]. Staff noticed the patient was having shortness of breath and hypoxemic in the 70s, EMS noted her to be hypoxemic in the 80s which corrected with oxygen. The patient has multiple admissions for recent febrile/sepsis workup in the setting of UTI. Recently discharged on 4/3/24, and was followed by ID for sepsis 2/2 colitis/uti, pulmonary for pulmonary sarcoid, cardiology for ruptured chordae tend and EF 50%, and urology for hematuria and h/o L hydroureter. In the previous admission, the pt's sepsis resolved with IV antibiotics, cardiology diuresed patient, and urology recommended further imaging (cystoscopy/ct urogram) however family refused per chart review. The patient returns to ED from [facility] with SOB and fever and turbid urine concerning for similar UTI from previous admission. Currently, no collado in place. The patient is not complaining of chest pain, abdominal pain, nausea, vomiting, fevers.     In the ED, the patient placed on NC sat > 93%, febrile/leukocytosis, and sepsis cultures sent. She was found to be hypotensive in setting of sepsis and received 1L followed by 500 cc in the setting of heart failure and ruptured chordae tendae with low threshold for HF exacerbation. Her BP temporarily improved with IVF, however, required pressor support with levophed 0.3. MICU consulted for hypotension requiring pressor support in the setting of sepsis and HF from possible UTI. On MICU evaluation, the patient was alert, awake, and had no pain. She received additional 500 cc of fluid and levophed reduced to 0.18. Pt accepted to MICU for IV pressor support     -CT abdomen, chest and pelvis 4/6: 1. No pulmonary consolidation. 2. Colitis of the proximal and mid sigmoid colon which is infectious, inflammatory or ischemic in etiology.  -CXR: Clear lungs. 2N/208 89 y/o F, PMH of pulmonary sarcoidosis, COPD not on home O2, CAD, hypothyroidism, HTN presenting with shortness of breath. Patient resides at [facility]. Staff noticed the patient was having shortness of breath and hypoxemic in the 70s, EMS noted her to be hypoxemic in the 80s which corrected with oxygen. The patient has multiple admissions for recent febrile/sepsis workup in the setting of UTI. Recently discharged on 4/3/24, and was followed by ID for sepsis 2/2 colitis/uti, pulmonary for pulmonary sarcoid, cardiology for ruptured chordae tend and EF 50%, and urology for hematuria and h/o L hydroureter. In the previous admission, the pt's sepsis resolved with IV antibiotics, cardiology diuresed patient, and urology recommended further imaging (cystoscopy/ct urogram) however family refused per chart review. The patient returns to ED from [facility] with SOB and fever and turbid urine concerning for similar UTI from previous admission. Currently, no collado in place. The patient is not complaining of chest pain, abdominal pain, nausea, vomiting, fevers.     In the ED, the patient placed on NC sat > 93%, febrile/leukocytosis, and sepsis cultures sent. She was found to be hypotensive in setting of sepsis and received 1L followed by 500 cc in the setting of heart failure and ruptured chordae tendae with low threshold for HF exacerbation. Her BP temporarily improved with IVF, however, required pressor support with levophed 0.3. MICU consulted for hypotension requiring pressor support in the setting of sepsis and HF from possible UTI. On MICU evaluation, the patient was alert, awake, and had no pain. She received additional 500 cc of fluid and levophed reduced to 0.18. Pt accepted to MICU for IV pressor support .    -CT abdomen, chest and pelvis 4/6: 1. No pulmonary consolidation. 2. Colitis of the proximal and mid sigmoid colon which is infectious, inflammatory or ischemic in etiology.  -CXR: Clear lungs.

## 2024-04-08 NOTE — PHYSICAL THERAPY INITIAL EVALUATION ADULT - GAIT DISTANCE, PT EVAL
Phone OB RN education visit completed, educational material reviewed. Pt verbalized understanding. PN labs results entered in results console. 28 wk labs ordered. Pt recently arrived from [facility] on 3/22/22. Pt had [facility] appts [facility] and then traveled to [facility]. Pt states she received regular PN care in [facility]. Pt reports her LMP is 21 and was told her due date is 22 based on early ultrasound she had done at [facility]. Pt will sign JAMES for ultrasound during her next appt. Pt has hx of C/S done in [facility]. Pt states her last Pap smear was in  and was normal. Pt will complete EPDS and ELIZABETH-7 during next appt. Pt was scheduled for NOB appt. Pt desires . Pt. Has answered NO 5P questions and has NO  risk factors. Pt. Given What pregnant women need to know handout. Pt counseled on ACOG & ACNM guidelines recommending carrier testing. Carrier Testing, including Hemoglobin Evaluation offered through pt & insurance choice of [employer] or [facility]. Pt declines testing.
bed to chair/5 feet

## 2024-04-08 NOTE — DIETITIAN INITIAL EVALUATION ADULT - OTHER CALCULATIONS
Fluid needs deferred to team.  Estimated needs using dosing weight with consideration for Malnutrition factor, advanced age.

## 2024-04-08 NOTE — DIETITIAN INITIAL EVALUATION ADULT - ENERGY INTAKE
Fair (50-75%) In house, pt reports normal appetite as PTA. No PO intake information available per flowsheets at this time. Discussed food preferences, RD to honor as able.

## 2024-04-08 NOTE — DIETITIAN INITIAL EVALUATION ADULT - OTHER INFO
- UBW: ~105 pounds x 1 month ago per pt  - Dosing wt: 106 pounds (4/07)  - Wt hx per [facility] HIE in pounds: 114 (3/28), 108 (2/08), 98 (9/26/23), 105 (5/31/23)  	- Possible 8 lb (7%) weight loss x 1.5 weeks indicated per chart review - pt assumes recent weight loss x 1 month but unsure of amount.    - RD to continue to monitor weight trends as able.   - Nutritionally Pertinent Meds in-house: Abx, atorvastatin, vitamin D3.   - Pulm: Hx of COPD.   - Cardio: Hx of CHF.

## 2024-04-08 NOTE — CONSULT NOTE ADULT - ASSESSMENT
88 year old well known to me  recently discharged with possible UTI and diverticulitis   returns with weakness and sepsis syndrome    pt needed IVF.   CT with colitis but no diverticulitis  hs of C diff years ago but not recently     CHF  Diverticulitis ( hx)  Hx UTI recently    sepsis  suspect ischemic bowel    doubt C diff   no real diarrhea   better on empiric ab  await cultures  continue cefepime   cachexia

## 2024-04-08 NOTE — PHYSICAL THERAPY INITIAL EVALUATION ADULT - NSPTDISCHREC_GEN_A_CORE
Subacute Rehab; if home, pt would require assist with ALL ADL's and functional mobility and home PT.

## 2024-04-08 NOTE — PHYSICAL THERAPY INITIAL EVALUATION ADULT - ADDITIONAL COMMENTS
As per last PT eval on 3/21/24, pt lives in [facility] facility, uses rolling walker for ambulation. Has HHA 9 hrs/day, 7 days/week. Owns wheelchair, shower chair commode, has grab bars in the bathroom.     As per last PT note 2/19, pt ambulated 8 steps forward and back using rollator walker, with min/mod assist. Subacute rehab was recommended, however pt returned to A/L facility, was receiving home PT. Pt lives in A/L facility, uses rolling walker for ambulation, able to amb independently. Has HHA 8 hrs/day, 5 days/week. Owns wheelchair, RW, rollator, shower chair commode, has grab bars in the bathroom.     As per PT note 2/19, pt ambulated 8 steps forward and back using rollator walker, with min/mod assist. Subacute rehab was recommended, however pt returned to A/[facility] facility, was receiving home PT.

## 2024-04-08 NOTE — PHYSICAL THERAPY INITIAL EVALUATION ADULT - ACTIVE RANGE OF MOTION EXAMINATION, REHAB EVAL
except B shoulders to 30 degree flex/bilateral upper extremity Active ROM was WFL (within functional limits)/bilateral  lower extremity Active ROM was WFL (within functional limits)

## 2024-04-08 NOTE — DIETITIAN INITIAL EVALUATION ADULT - PERTINENT MEDS FT
MEDICATIONS  (STANDING):  atorvastatin 20 milliGRAM(s) Oral at bedtime  buDESOnide    Inhalation Suspension 0.25 milliGRAM(s) Inhalation two times a day  cefepime   IVPB 1000 milliGRAM(s) IV Intermittent every 12 hours  cholecalciferol 1000 Unit(s) Oral daily  cyclopentolate 1% Solution 1 Drop(s) Both EYES two times a day  ferrous    sulfate 325 milliGRAM(s) Oral daily  FLUoxetine 20 milliGRAM(s) Oral daily  influenza  Vaccine (HIGH DOSE) 0.7 milliLiter(s) IntraMuscular once  mirtazapine 7.5 milliGRAM(s) Oral at bedtime  pantoprazole  Injectable 40 milliGRAM(s) IV Push two times a day  prednisoLONE acetate 1% Suspension 1 Drop(s) Left EYE two times a day  traZODone 50 milliGRAM(s) Oral daily    MEDICATIONS  (PRN):  acetaminophen     Tablet .. 650 milliGRAM(s) Oral every 6 hours PRN Temp greater or equal to 38C (100.4F), Mild Pain (1 - 3)  albuterol    90 MICROgram(s) HFA Inhaler 2 Puff(s) Inhalation every 6 hours PRN wheeze  melatonin 3 milliGRAM(s) Oral at bedtime PRN Insomnia

## 2024-04-09 LAB
ALBUMIN SERPL ELPH-MCNC: 3 G/DL — LOW (ref 3.3–5)
ALP SERPL-CCNC: 104 U/L — SIGNIFICANT CHANGE UP (ref 40–120)
ALT FLD-CCNC: 6 U/L — LOW (ref 10–45)
ANION GAP SERPL CALC-SCNC: 11 MMOL/L — SIGNIFICANT CHANGE UP (ref 5–17)
AST SERPL-CCNC: 13 U/L — SIGNIFICANT CHANGE UP (ref 10–40)
BILIRUB SERPL-MCNC: 0.4 MG/DL — SIGNIFICANT CHANGE UP (ref 0.2–1.2)
BUN SERPL-MCNC: 22 MG/DL — SIGNIFICANT CHANGE UP (ref 7–23)
CALCIUM SERPL-MCNC: 8.5 MG/DL — SIGNIFICANT CHANGE UP (ref 8.4–10.5)
CHLORIDE SERPL-SCNC: 110 MMOL/L — HIGH (ref 96–108)
CO2 SERPL-SCNC: 22 MMOL/L — SIGNIFICANT CHANGE UP (ref 22–31)
CREAT SERPL-MCNC: 1 MG/DL — SIGNIFICANT CHANGE UP (ref 0.5–1.3)
EGFR: 55 ML/MIN/1.73M2 — LOW
GLUCOSE SERPL-MCNC: 70 MG/DL — SIGNIFICANT CHANGE UP (ref 70–99)
HCT VFR BLD CALC: 27.5 % — LOW (ref 34.5–45)
HGB BLD-MCNC: 8.4 G/DL — LOW (ref 11.5–15.5)
INR BLD: 1.02 RATIO — SIGNIFICANT CHANGE UP (ref 0.85–1.18)
MCHC RBC-ENTMCNC: 28.9 PG — SIGNIFICANT CHANGE UP (ref 27–34)
MCHC RBC-ENTMCNC: 30.5 GM/DL — LOW (ref 32–36)
MCV RBC AUTO: 94.5 FL — SIGNIFICANT CHANGE UP (ref 80–100)
NRBC # BLD: 0 /100 WBCS — SIGNIFICANT CHANGE UP (ref 0–0)
PLATELET # BLD AUTO: 192 K/UL — SIGNIFICANT CHANGE UP (ref 150–400)
POTASSIUM SERPL-MCNC: 3.6 MMOL/L — SIGNIFICANT CHANGE UP (ref 3.5–5.3)
POTASSIUM SERPL-SCNC: 3.6 MMOL/L — SIGNIFICANT CHANGE UP (ref 3.5–5.3)
PROT SERPL-MCNC: 5.3 G/DL — LOW (ref 6–8.3)
PROTHROM AB SERPL-ACNC: 11.2 SEC — SIGNIFICANT CHANGE UP (ref 9.5–13)
RBC # BLD: 2.91 M/UL — LOW (ref 3.8–5.2)
RBC # FLD: 15.5 % — HIGH (ref 10.3–14.5)
SODIUM SERPL-SCNC: 143 MMOL/L — SIGNIFICANT CHANGE UP (ref 135–145)
WBC # BLD: 5.48 K/UL — SIGNIFICANT CHANGE UP (ref 3.8–10.5)
WBC # FLD AUTO: 5.48 K/UL — SIGNIFICANT CHANGE UP (ref 3.8–10.5)

## 2024-04-09 PROCEDURE — 99232 SBSQ HOSP IP/OBS MODERATE 35: CPT

## 2024-04-09 RX ADMIN — Medication 650 MILLIGRAM(S): at 02:06

## 2024-04-09 RX ADMIN — Medication 3 MILLIGRAM(S): at 21:23

## 2024-04-09 RX ADMIN — Medication 650 MILLIGRAM(S): at 21:51

## 2024-04-09 RX ADMIN — Medication 325 MILLIGRAM(S): at 12:25

## 2024-04-09 RX ADMIN — MIRTAZAPINE 7.5 MILLIGRAM(S): 45 TABLET, ORALLY DISINTEGRATING ORAL at 21:22

## 2024-04-09 RX ADMIN — CEFEPIME 100 MILLIGRAM(S): 1 INJECTION, POWDER, FOR SOLUTION INTRAMUSCULAR; INTRAVENOUS at 05:16

## 2024-04-09 RX ADMIN — Medication 1 DROP(S): at 05:09

## 2024-04-09 RX ADMIN — PANTOPRAZOLE SODIUM 40 MILLIGRAM(S): 20 TABLET, DELAYED RELEASE ORAL at 05:10

## 2024-04-09 RX ADMIN — CEFEPIME 100 MILLIGRAM(S): 1 INJECTION, POWDER, FOR SOLUTION INTRAMUSCULAR; INTRAVENOUS at 17:51

## 2024-04-09 RX ADMIN — Medication 1000 UNIT(S): at 12:25

## 2024-04-09 RX ADMIN — Medication 650 MILLIGRAM(S): at 14:33

## 2024-04-09 RX ADMIN — Medication 0.25 MILLIGRAM(S): at 17:51

## 2024-04-09 RX ADMIN — Medication 1 DROP(S): at 17:55

## 2024-04-09 RX ADMIN — CYCLOPENTOLATE HYDROCHLORIDE 1 DROP(S): 10 SOLUTION/ DROPS OPHTHALMIC at 17:56

## 2024-04-09 RX ADMIN — Medication 20 MILLIGRAM(S): at 12:25

## 2024-04-09 RX ADMIN — PANTOPRAZOLE SODIUM 40 MILLIGRAM(S): 20 TABLET, DELAYED RELEASE ORAL at 17:51

## 2024-04-09 RX ADMIN — CYCLOPENTOLATE HYDROCHLORIDE 1 DROP(S): 10 SOLUTION/ DROPS OPHTHALMIC at 05:10

## 2024-04-09 RX ADMIN — Medication 650 MILLIGRAM(S): at 21:21

## 2024-04-09 RX ADMIN — Medication 0.25 MILLIGRAM(S): at 05:17

## 2024-04-09 RX ADMIN — Medication 650 MILLIGRAM(S): at 02:40

## 2024-04-09 RX ADMIN — ATORVASTATIN CALCIUM 20 MILLIGRAM(S): 80 TABLET, FILM COATED ORAL at 21:22

## 2024-04-09 RX ADMIN — Medication 50 MILLIGRAM(S): at 12:25

## 2024-04-09 NOTE — PROGRESS NOTE ADULT - ASSESSMENT
88 year old well known to me  recently discharged with possible UTI and diverticulitis   returns with weakness and sepsis syndrome    pt needed IVF.   CT with colitis but no diverticulitis  hs of C diff years ago but not recently     CHF  Diverticulitis ( hx)  Hx UTI recently    sepsis  suspect ischemic bowel    doubt C diff   no real diarrhea   better on empiric ab     continue cefepime  to complete short course in view of presentation with sepsis   no need to treat   the candida in the urine   cachexia

## 2024-04-09 NOTE — PROGRESS NOTE ADULT - SUBJECTIVE AND OBJECTIVE BOX
---___---___---___---___---___---___ ---___---___---___---___---___---___---___---___---                  M E D I C A L   A T T E N D I N G   P R O G R E S S   N O T E  ---___---___---___---___---___---___ ---___---___---___---___---___---___---___---___---        ================================================    ++CHIEF COMPLAINT:   Patient is a 88y old  Female who presents with a chief complaint of infection (2024 08:43)      Sepsis      ---___---___---___---___---___---  PAST MEDICAL / Surgical  HISTORY:  PAST MEDICAL & SURGICAL HISTORY:  HTN (hypertension)      Hypothyroidism      Osteoporosis      CAD (coronary artery disease)      COPD (chronic obstructive pulmonary disease)      Pulmonary sarcoidosis      H/O pyelonephritis      Acute diverticulitis          ---___---___---___---___---___---  FAMILY HISTORY:   FAMILY HISTORY:        ---___---___---___---___---___---  ALLERGIES:   Allergies    iodinated radiocontrast agents (Anaphylaxis)    Intolerances        ---___---___---___---___---___---  MEDICATIONS:  MEDICATIONS  (STANDING):  atorvastatin 20 milliGRAM(s) Oral at bedtime  buDESOnide    Inhalation Suspension 0.25 milliGRAM(s) Inhalation two times a day  cefepime   IVPB 1000 milliGRAM(s) IV Intermittent every 12 hours  cholecalciferol 1000 Unit(s) Oral daily  cyclopentolate 1% Solution 1 Drop(s) Both EYES two times a day  ferrous    sulfate 325 milliGRAM(s) Oral daily  FLUoxetine 20 milliGRAM(s) Oral daily  influenza  Vaccine (HIGH DOSE) 0.7 milliLiter(s) IntraMuscular once  mirtazapine 7.5 milliGRAM(s) Oral at bedtime  pantoprazole  Injectable 40 milliGRAM(s) IV Push two times a day  prednisoLONE acetate 1% Suspension 1 Drop(s) Left EYE two times a day  traZODone 50 milliGRAM(s) Oral daily    MEDICATIONS  (PRN):  acetaminophen     Tablet .. 650 milliGRAM(s) Oral every 6 hours PRN Temp greater or equal to 38C (100.4F), Mild Pain (1 - 3)  albuterol    90 MICROgram(s) HFA Inhaler 2 Puff(s) Inhalation every 6 hours PRN wheeze  melatonin 3 milliGRAM(s) Oral at bedtime PRN Insomnia      ---___---___---___---___---___---  REVIEW OF SYSTEM:    GEN: no fever, no chills, no pain  RESP: no SOB, no cough, no sputum  CVS: no chest pain, no palpitations, no edema  GI: no abdominal pain, no nausea, no vomiting, no constipation, no diarrhea  : no dysurea, no frequency, no hematurea  Neuro: no headache, no dizziness  PSYCH: no anxiety, no depression  Derm : no itching, no rash    ---___---___---___---___---___---  VITAL SIGNS:  88y , CAPILLARY BLOOD GLUCOSE        T(C): 36.7 (24 @ 05:03), Max: 36.7 (24 @ 22:30)  HR: 72 (24 @ 05:03) (72 - 72)  BP: 130/62 (24 @ 05:03) (106/57 - 130/62)  RR: 18 (24 @ 05:03) (18 - 18)  SpO2: 92% (24 @ 05:03) (91% - 92%)  ---___---___---___---___---___---  PHYSICAL EXAM:    GEN: A&O X 3 , NAD , comfortable  HEENT: NCAT, PERRL, MMM, hearing intact  Neck: supple , no JVD  CVS: S1S2 , regular , No M/R/G appreciated  PULM: CTA B/L,  no W/R/R appreciated  ABD.: soft. non tender, non distended,  bowel sounds present  Extrem: intact pulses , no edema   Derm: No rash , no ecchymoses  PSYCH : normal mood,  no delusion not anxious     ---___---___---___---___---___---            LAB AND IMAGIN.4    5.48  )-----------( 192      ( 2024 07:46 )             27.5               04-09    143  |  110<H>  |  22  ----------------------------<  70  3.6   |  22  |  1.00    Ca    8.5      2024 07:43    TPro  5.3<L>  /  Alb  3.0<L>  /  TBili  0.4  /  DBili  x   /  AST  13  /  ALT  6<L>  /  AlkPhos  104  04-09    PT/INR - ( 2024 07:46 )   PT: 11.2 sec;   INR: 1.02 ratio                                 Urinalysis Basic - ( 2024 07:43 )    Color: x / Appearance: x / SG: x / pH: x  Gluc: 70 mg/dL / Ketone: x  / Bili: x / Urobili: x   Blood: x / Protein: x / Nitrite: x   Leuk Esterase: x / RBC: x / WBC x   Sq Epi: x / Non Sq Epi: x / Bacteria: x        [All pertinent / recent Imaging reviewed]         ---___---___---___---___---___---___ ---___---___---___---___---                         A S S E S S M E N T   A N D   P L A N :      HEALTH ISSUES - PROBLEM Dx:    colitis on iv abx  htn cotinue meds   hld  continue statin     -GI/D VT Prophylaxis. as ordered    --------------------------------------------  Case discussed with   Education given on   ___________________________  Thank you,  Garry Hermosillo  [phone]

## 2024-04-09 NOTE — PROGRESS NOTE ADULT - SUBJECTIVE AND OBJECTIVE BOX
infectious diseases progress note:    Patient is a 88y old  Female who presents with a chief complaint of infection (08 Apr 2024 20:07)        Sepsis               Allergies    iodinated radiocontrast agents (Anaphylaxis)    Intolerances        ANTIBIOTICS/RELEVANT:  antimicrobials  cefepime   IVPB 1000 milliGRAM(s) IV Intermittent every 12 hours    immunologic:  influenza  Vaccine (HIGH DOSE) 0.7 milliLiter(s) IntraMuscular once    OTHER:  acetaminophen     Tablet .. 650 milliGRAM(s) Oral every 6 hours PRN  albuterol    90 MICROgram(s) HFA Inhaler 2 Puff(s) Inhalation every 6 hours PRN  atorvastatin 20 milliGRAM(s) Oral at bedtime  buDESOnide    Inhalation Suspension 0.25 milliGRAM(s) Inhalation two times a day  cholecalciferol 1000 Unit(s) Oral daily  cyclopentolate 1% Solution 1 Drop(s) Both EYES two times a day  ferrous    sulfate 325 milliGRAM(s) Oral daily  FLUoxetine 20 milliGRAM(s) Oral daily  melatonin 3 milliGRAM(s) Oral at bedtime PRN  mirtazapine 7.5 milliGRAM(s) Oral at bedtime  pantoprazole  Injectable 40 milliGRAM(s) IV Push two times a day  prednisoLONE acetate 1% Suspension 1 Drop(s) Left EYE two times a day  traZODone 50 milliGRAM(s) Oral daily      Objective:  Vital Signs Last 24 Hrs  T(C): 36.7 (09 Apr 2024 05:03), Max: 36.7 (08 Apr 2024 22:30)  T(F): 98 (09 Apr 2024 05:03), Max: 98 (08 Apr 2024 22:30)  HR: 72 (09 Apr 2024 05:03) (72 - 80)  BP: 130/62 (09 Apr 2024 05:03) (106/57 - 130/62)  BP(mean): --  RR: 18 (09 Apr 2024 05:03) (18 - 18)  SpO2: 92% (09 Apr 2024 05:03) (91% - 93%)    Parameters below as of 08 Apr 2024 22:30  Patient On (Oxygen Delivery Method): room air           Eyes:JIM, EOMI  Ear/Nose/Throat: no oral lesion, no sinus tenderness on percussion	  Neck:no JVD, no lymphadenopathy, supple  Respiratory: CTA moises  Cardiovascular: S1S2 RRR, no murmurs  Gastrointestinal:soft, (+) BS, no HSM  Extremities:no e/e/c        LABS:                        8.4    5.48  )-----------( 192      ( 09 Apr 2024 07:46 )             27.5           PT/INR - ( 09 Apr 2024 07:46 )   PT: 11.2 sec;   INR: 1.02 ratio                 MICROBIOLOGY:    RECENT CULTURES:  04-06 @ 19:15 Clean Catch Clean Catch (Midstream)                50,000 - 99,000 CFU/mL Candida tropicalis "Susceptibilities not performed"    04-06 @ 19:00 .Blood Blood-Peripheral                No growth at 48 Hours    04-06 @ 18:15 .Blood Blood-Peripheral                No growth at 48 Hours          RESPIRATORY CULTURES:              RADIOLOGY & ADDITIONAL STUDIES:        Pager [phone]  After 5 pm/weekends or if no response :[phone]

## 2024-04-10 LAB
ALBUMIN SERPL ELPH-MCNC: 3.1 G/DL — LOW (ref 3.3–5)
ALP SERPL-CCNC: 109 U/L — SIGNIFICANT CHANGE UP (ref 40–120)
ALT FLD-CCNC: 6 U/L — LOW (ref 10–45)
ANION GAP SERPL CALC-SCNC: 11 MMOL/L — SIGNIFICANT CHANGE UP (ref 5–17)
AST SERPL-CCNC: 12 U/L — SIGNIFICANT CHANGE UP (ref 10–40)
BILIRUB SERPL-MCNC: 0.4 MG/DL — SIGNIFICANT CHANGE UP (ref 0.2–1.2)
BUN SERPL-MCNC: 21 MG/DL — SIGNIFICANT CHANGE UP (ref 7–23)
CALCIUM SERPL-MCNC: 8.5 MG/DL — SIGNIFICANT CHANGE UP (ref 8.4–10.5)
CHLORIDE SERPL-SCNC: 107 MMOL/L — SIGNIFICANT CHANGE UP (ref 96–108)
CO2 SERPL-SCNC: 21 MMOL/L — LOW (ref 22–31)
CREAT SERPL-MCNC: 1.06 MG/DL — SIGNIFICANT CHANGE UP (ref 0.5–1.3)
EGFR: 51 ML/MIN/1.73M2 — LOW
GLUCOSE SERPL-MCNC: 85 MG/DL — SIGNIFICANT CHANGE UP (ref 70–99)
HCT VFR BLD CALC: 29.6 % — LOW (ref 34.5–45)
HGB BLD-MCNC: 9.1 G/DL — LOW (ref 11.5–15.5)
MCHC RBC-ENTMCNC: 28.8 PG — SIGNIFICANT CHANGE UP (ref 27–34)
MCHC RBC-ENTMCNC: 30.7 GM/DL — LOW (ref 32–36)
MCV RBC AUTO: 93.7 FL — SIGNIFICANT CHANGE UP (ref 80–100)
NRBC # BLD: 0 /100 WBCS — SIGNIFICANT CHANGE UP (ref 0–0)
PLATELET # BLD AUTO: 197 K/UL — SIGNIFICANT CHANGE UP (ref 150–400)
POTASSIUM SERPL-MCNC: 3.5 MMOL/L — SIGNIFICANT CHANGE UP (ref 3.5–5.3)
POTASSIUM SERPL-SCNC: 3.5 MMOL/L — SIGNIFICANT CHANGE UP (ref 3.5–5.3)
PROT SERPL-MCNC: 5.6 G/DL — LOW (ref 6–8.3)
RBC # BLD: 3.16 M/UL — LOW (ref 3.8–5.2)
RBC # FLD: 15.2 % — HIGH (ref 10.3–14.5)
SODIUM SERPL-SCNC: 139 MMOL/L — SIGNIFICANT CHANGE UP (ref 135–145)
WBC # BLD: 5.59 K/UL — SIGNIFICANT CHANGE UP (ref 3.8–10.5)
WBC # FLD AUTO: 5.59 K/UL — SIGNIFICANT CHANGE UP (ref 3.8–10.5)

## 2024-04-10 PROCEDURE — 99232 SBSQ HOSP IP/OBS MODERATE 35: CPT

## 2024-04-10 RX ADMIN — ATORVASTATIN CALCIUM 20 MILLIGRAM(S): 80 TABLET, FILM COATED ORAL at 21:36

## 2024-04-10 RX ADMIN — Medication 1000 UNIT(S): at 11:42

## 2024-04-10 RX ADMIN — Medication 0.25 MILLIGRAM(S): at 17:20

## 2024-04-10 RX ADMIN — Medication 1 DROP(S): at 18:51

## 2024-04-10 RX ADMIN — CYCLOPENTOLATE HYDROCHLORIDE 1 DROP(S): 10 SOLUTION/ DROPS OPHTHALMIC at 05:09

## 2024-04-10 RX ADMIN — CYCLOPENTOLATE HYDROCHLORIDE 1 DROP(S): 10 SOLUTION/ DROPS OPHTHALMIC at 18:51

## 2024-04-10 RX ADMIN — Medication 50 MILLIGRAM(S): at 11:42

## 2024-04-10 RX ADMIN — CEFEPIME 100 MILLIGRAM(S): 1 INJECTION, POWDER, FOR SOLUTION INTRAMUSCULAR; INTRAVENOUS at 17:20

## 2024-04-10 RX ADMIN — Medication 1 DROP(S): at 05:09

## 2024-04-10 RX ADMIN — PANTOPRAZOLE SODIUM 40 MILLIGRAM(S): 20 TABLET, DELAYED RELEASE ORAL at 05:08

## 2024-04-10 RX ADMIN — MIRTAZAPINE 7.5 MILLIGRAM(S): 45 TABLET, ORALLY DISINTEGRATING ORAL at 21:36

## 2024-04-10 RX ADMIN — CEFEPIME 100 MILLIGRAM(S): 1 INJECTION, POWDER, FOR SOLUTION INTRAMUSCULAR; INTRAVENOUS at 05:09

## 2024-04-10 RX ADMIN — Medication 20 MILLIGRAM(S): at 11:42

## 2024-04-10 RX ADMIN — Medication 325 MILLIGRAM(S): at 11:42

## 2024-04-10 RX ADMIN — PANTOPRAZOLE SODIUM 40 MILLIGRAM(S): 20 TABLET, DELAYED RELEASE ORAL at 17:20

## 2024-04-10 NOTE — PROGRESS NOTE ADULT - SUBJECTIVE AND OBJECTIVE BOX
---___---___---___---___---___---___ ---___---___---___---___---___---___---___---___---                  M E D I C A L   A T T E N D I N G   P R O G R E S S   N O T E  ---___---___---___---___---___---___ ---___---___---___---___---___---___---___---___---        ================================================    ++CHIEF COMPLAINT:   Patient is a 88y old  Female who presents with a chief complaint of infection (10 Apr 2024 08:29)      Sepsis      ---___---___---___---___---___---  PAST MEDICAL / Surgical  HISTORY:  PAST MEDICAL & SURGICAL HISTORY:  HTN (hypertension)      Hypothyroidism      Osteoporosis      CAD (coronary artery disease)      COPD (chronic obstructive pulmonary disease)      Pulmonary sarcoidosis      H/O pyelonephritis      Acute diverticulitis          ---___---___---___---___---___---  FAMILY HISTORY:   FAMILY HISTORY:        ---___---___---___---___---___---  ALLERGIES:   Allergies    iodinated radiocontrast agents (Anaphylaxis)    Intolerances        ---___---___---___---___---___---  MEDICATIONS:  MEDICATIONS  (STANDING):  atorvastatin 20 milliGRAM(s) Oral at bedtime  buDESOnide    Inhalation Suspension 0.25 milliGRAM(s) Inhalation two times a day  cefepime   IVPB 1000 milliGRAM(s) IV Intermittent every 12 hours  cholecalciferol 1000 Unit(s) Oral daily  cyclopentolate 1% Solution 1 Drop(s) Both EYES two times a day  ferrous    sulfate 325 milliGRAM(s) Oral daily  FLUoxetine 20 milliGRAM(s) Oral daily  influenza  Vaccine (HIGH DOSE) 0.7 milliLiter(s) IntraMuscular once  mirtazapine 7.5 milliGRAM(s) Oral at bedtime  pantoprazole  Injectable 40 milliGRAM(s) IV Push two times a day  prednisoLONE acetate 1% Suspension 1 Drop(s) Left EYE two times a day  traZODone 50 milliGRAM(s) Oral daily    MEDICATIONS  (PRN):  acetaminophen     Tablet .. 650 milliGRAM(s) Oral every 6 hours PRN Temp greater or equal to 38C (100.4F), Mild Pain (1 - 3)  albuterol    90 MICROgram(s) HFA Inhaler 2 Puff(s) Inhalation every 6 hours PRN wheeze  melatonin 3 milliGRAM(s) Oral at bedtime PRN Insomnia      ---___---___---___---___---___---  REVIEW OF SYSTEM:    GEN: no fever, no chills, no pain  RESP: no SOB, no cough, no sputum  CVS: no chest pain, no palpitations, no edema  GI: no abdominal pain, no nausea, no vomiting, no constipation, no diarrhea  : no dysurea, no frequency, no hematurea  Neuro: no headache, no dizziness  PSYCH: no anxiety, no depression  Derm : no itching, no rash    ---___---___---___---___---___---  VITAL SIGNS:  88y , CAPILLARY BLOOD GLUCOSE        T(C): 36.9 (04-10-24 @ 16:04), Max: 36.9 (04-10-24 @ 04:48)  HR: 87 (04-10-24 @ 16:04) (78 - 87)  BP: 126/67 (04-10-24 @ 16:04) (126/67 - 147/76)  RR: 18 (04-10-24 @ 16:04) (16 - 18)  SpO2: 95% (04-10-24 @ 16:04) (93% - 95%)  ---___---___---___---___---___---  PHYSICAL EXAM:    GEN: A&O X 3 , NAD , comfortable  HEENT: NCAT, PERRL, MMM, hearing intact  Neck: supple , no JVD  CVS: S1S2 , regular , No M/R/G appreciated  PULM: CTA B/L,  no W/R/R appreciated  ABD.: soft. non tender, non distended,  bowel sounds present  Extrem: intact pulses , no edema   Derm: No rash , no ecchymoses  PSYCH : normal mood,  no delusion not anxious     ---___---___---___---___---___---            LAB AND IMAGIN.1    5.59  )-----------( 197      ( 10 Apr 2024 06:48 )             29.6               04-10    139  |  107  |  21  ----------------------------<  85  3.5   |  21<L>  |  1.06    Ca    8.5      10 Apr 2024 06:48    TPro  5.6<L>  /  Alb  3.1<L>  /  TBili  0.4  /  DBili  x   /  AST  12  /  ALT  6<L>  /  AlkPhos  109  04-10    PT/INR - ( 2024 07:46 )   PT: 11.2 sec;   INR: 1.02 ratio                                 Urinalysis Basic - ( 10 Apr 2024 06:48 )    Color: x / Appearance: x / SG: x / pH: x  Gluc: 85 mg/dL / Ketone: x  / Bili: x / Urobili: x   Blood: x / Protein: x / Nitrite: x   Leuk Esterase: x / RBC: x / WBC x   Sq Epi: x / Non Sq Epi: x / Bacteria: x        [All pertinent / recent Imaging reviewed]         ---___---___---___---___---___---___ ---___---___---___---___---                         A S S E S S M E N T   A N D   P L A N :      HEALTH ISSUES - PROBLEM Dx:    colitis on iv abx  htn cotinue meds   hld  continue statin   copd continue meds     -GI/D VT Prophylaxis. as ordered         --------------------------------------------  Case discussed with   Education given on   ___________________________  Thank you,  Garry Hermosillo  [phone]

## 2024-04-10 NOTE — PROGRESS NOTE ADULT - ASSESSMENT
88 year old well known to me  recently discharged with possible UTI and diverticulitis   returns with weakness and sepsis syndrome    pt needed IVF.   CT with colitis but no diverticulitis  hs of C diff years ago but not recently     CHF  Diverticulitis ( hx)  Hx UTI recently    sepsis  suspect ischemic bowel    doubt C diff   no real diarrhea   better on empiric ab     continue cefepime  to complete short course in view of presentation with sepsis   no need to treat   the candida in the urine   cachexia    day 5/7  plan to dc antibiotics on Friday

## 2024-04-10 NOTE — PROGRESS NOTE ADULT - SUBJECTIVE AND OBJECTIVE BOX
infectious diseases progress note:    Patient is a 88y old  Female who presents with a chief complaint of infection (09 Apr 2024 20:58)        Sepsis               Allergies    iodinated radiocontrast agents (Anaphylaxis)    Intolerances        ANTIBIOTICS/RELEVANT:  antimicrobials  cefepime   IVPB 1000 milliGRAM(s) IV Intermittent every 12 hours    immunologic:  influenza  Vaccine (HIGH DOSE) 0.7 milliLiter(s) IntraMuscular once    OTHER:  acetaminophen     Tablet .. 650 milliGRAM(s) Oral every 6 hours PRN  albuterol    90 MICROgram(s) HFA Inhaler 2 Puff(s) Inhalation every 6 hours PRN  atorvastatin 20 milliGRAM(s) Oral at bedtime  buDESOnide    Inhalation Suspension 0.25 milliGRAM(s) Inhalation two times a day  cholecalciferol 1000 Unit(s) Oral daily  cyclopentolate 1% Solution 1 Drop(s) Both EYES two times a day  ferrous    sulfate 325 milliGRAM(s) Oral daily  FLUoxetine 20 milliGRAM(s) Oral daily  melatonin 3 milliGRAM(s) Oral at bedtime PRN  mirtazapine 7.5 milliGRAM(s) Oral at bedtime  pantoprazole  Injectable 40 milliGRAM(s) IV Push two times a day  prednisoLONE acetate 1% Suspension 1 Drop(s) Left EYE two times a day  traZODone 50 milliGRAM(s) Oral daily      Objective:  Vital Signs Last 24 Hrs  T(C): 36.9 (10 Apr 2024 04:48), Max: 36.9 (09 Apr 2024 20:20)  T(F): 98.5 (10 Apr 2024 04:48), Max: 98.5 (10 Apr 2024 04:48)  HR: 78 (10 Apr 2024 04:48) (78 - 79)  BP: 130/59 (10 Apr 2024 04:48) (130/59 - 130/59)  BP(mean): --  RR: 18 (10 Apr 2024 04:48) (18 - 18)  SpO2: 99% (09 Apr 2024 20:20) (99% - 99%)    Parameters below as of 10 Apr 2024 04:48  Patient On (Oxygen Delivery Method): room air           Eyes:JIM, EOMI  Ear/Nose/Throat: no oral lesion, no sinus tenderness on percussion	  Neck:no JVD, no lymphadenopathy, supple  Respiratory: CTA moises  Cardiovascular: S1S2 RRR, no murmurs  Gastrointestinal:soft, (+) BS, no HSM  Extremities:no e/e/c        LABS:                        9.1    5.59  )-----------( 197      ( 10 Apr 2024 06:48 )             29.6     04-10    139  |  107  |  21  ----------------------------<  85  3.5   |  21<L>  |  1.06    Ca    8.5      10 Apr 2024 06:48    TPro  5.6<L>  /  Alb  3.1<L>  /  TBili  0.4  /  DBili  x   /  AST  12  /  ALT  6<L>  /  AlkPhos  109  04-10    PT/INR - ( 09 Apr 2024 07:46 )   PT: 11.2 sec;   INR: 1.02 ratio           Urinalysis Basic - ( 10 Apr 2024 06:48 )    Color: x / Appearance: x / SG: x / pH: x  Gluc: 85 mg/dL / Ketone: x  / Bili: x / Urobili: x   Blood: x / Protein: x / Nitrite: x   Leuk Esterase: x / RBC: x / WBC x   Sq Epi: x / Non Sq Epi: x / Bacteria: x          MICROBIOLOGY:    RECENT CULTURES:  04-06 @ 19:15 Clean Catch Clean Catch (Midstream)                50,000 - 99,000 CFU/mL Candida tropicalis "Susceptibilities not performed"    04-06 @ 19:00 .Blood Blood-Peripheral                No growth at 72 Hours    04-06 @ 18:15 .Blood Blood-Peripheral                No growth at 72 Hours          RESPIRATORY CULTURES:              RADIOLOGY & ADDITIONAL STUDIES:        Pager [phone]  After 5 pm/weekends or if no response :[phone]

## 2024-04-11 LAB
ADD ON TEST-SPECIMEN IN LAB: SIGNIFICANT CHANGE UP
ALBUMIN SERPL ELPH-MCNC: 3.3 G/DL — SIGNIFICANT CHANGE UP (ref 3.3–5)
ALP SERPL-CCNC: 122 U/L — HIGH (ref 40–120)
ALT FLD-CCNC: 6 U/L — LOW (ref 10–45)
ANION GAP SERPL CALC-SCNC: 11 MMOL/L — SIGNIFICANT CHANGE UP (ref 5–17)
AST SERPL-CCNC: 12 U/L — SIGNIFICANT CHANGE UP (ref 10–40)
BILIRUB SERPL-MCNC: 0.5 MG/DL — SIGNIFICANT CHANGE UP (ref 0.2–1.2)
BUN SERPL-MCNC: 15 MG/DL — SIGNIFICANT CHANGE UP (ref 7–23)
CALCIUM SERPL-MCNC: 9 MG/DL — SIGNIFICANT CHANGE UP (ref 8.4–10.5)
CHLORIDE SERPL-SCNC: 107 MMOL/L — SIGNIFICANT CHANGE UP (ref 96–108)
CO2 SERPL-SCNC: 22 MMOL/L — SIGNIFICANT CHANGE UP (ref 22–31)
CREAT SERPL-MCNC: 0.83 MG/DL — SIGNIFICANT CHANGE UP (ref 0.5–1.3)
CULTURE RESULTS: SIGNIFICANT CHANGE UP
EGFR: 68 ML/MIN/1.73M2 — SIGNIFICANT CHANGE UP
GLUCOSE SERPL-MCNC: 90 MG/DL — SIGNIFICANT CHANGE UP (ref 70–99)
HCT VFR BLD CALC: 31 % — LOW (ref 34.5–45)
HGB BLD-MCNC: 9.7 G/DL — LOW (ref 11.5–15.5)
MAGNESIUM SERPL-MCNC: 1.8 MG/DL — SIGNIFICANT CHANGE UP (ref 1.6–2.6)
MCHC RBC-ENTMCNC: 29 PG — SIGNIFICANT CHANGE UP (ref 27–34)
MCHC RBC-ENTMCNC: 31.3 GM/DL — LOW (ref 32–36)
MCV RBC AUTO: 92.5 FL — SIGNIFICANT CHANGE UP (ref 80–100)
NRBC # BLD: 0 /100 WBCS — SIGNIFICANT CHANGE UP (ref 0–0)
PHOSPHATE SERPL-MCNC: 3.2 MG/DL — SIGNIFICANT CHANGE UP (ref 2.5–4.5)
PLATELET # BLD AUTO: 199 K/UL — SIGNIFICANT CHANGE UP (ref 150–400)
POTASSIUM SERPL-MCNC: 3.4 MMOL/L — LOW (ref 3.5–5.3)
POTASSIUM SERPL-SCNC: 3.4 MMOL/L — LOW (ref 3.5–5.3)
PROT SERPL-MCNC: 5.7 G/DL — LOW (ref 6–8.3)
RBC # BLD: 3.35 M/UL — LOW (ref 3.8–5.2)
RBC # FLD: 15.3 % — HIGH (ref 10.3–14.5)
SODIUM SERPL-SCNC: 140 MMOL/L — SIGNIFICANT CHANGE UP (ref 135–145)
SPECIMEN SOURCE: SIGNIFICANT CHANGE UP
WBC # BLD: 8.85 K/UL — SIGNIFICANT CHANGE UP (ref 3.8–10.5)
WBC # FLD AUTO: 8.85 K/UL — SIGNIFICANT CHANGE UP (ref 3.8–10.5)

## 2024-04-11 PROCEDURE — 99232 SBSQ HOSP IP/OBS MODERATE 35: CPT

## 2024-04-11 RX ORDER — POTASSIUM CHLORIDE 20 MEQ
40 PACKET (EA) ORAL ONCE
Refills: 0 | Status: COMPLETED | OUTPATIENT
Start: 2024-04-11 | End: 2024-04-11

## 2024-04-11 RX ADMIN — CYCLOPENTOLATE HYDROCHLORIDE 1 DROP(S): 10 SOLUTION/ DROPS OPHTHALMIC at 17:22

## 2024-04-11 RX ADMIN — Medication 325 MILLIGRAM(S): at 12:49

## 2024-04-11 RX ADMIN — PANTOPRAZOLE SODIUM 40 MILLIGRAM(S): 20 TABLET, DELAYED RELEASE ORAL at 17:24

## 2024-04-11 RX ADMIN — Medication 1 DROP(S): at 17:23

## 2024-04-11 RX ADMIN — CYCLOPENTOLATE HYDROCHLORIDE 1 DROP(S): 10 SOLUTION/ DROPS OPHTHALMIC at 05:04

## 2024-04-11 RX ADMIN — Medication 1000 UNIT(S): at 12:48

## 2024-04-11 RX ADMIN — MIRTAZAPINE 7.5 MILLIGRAM(S): 45 TABLET, ORALLY DISINTEGRATING ORAL at 20:26

## 2024-04-11 RX ADMIN — ATORVASTATIN CALCIUM 20 MILLIGRAM(S): 80 TABLET, FILM COATED ORAL at 20:26

## 2024-04-11 RX ADMIN — Medication 3 MILLIGRAM(S): at 20:26

## 2024-04-11 RX ADMIN — Medication 20 MILLIGRAM(S): at 12:49

## 2024-04-11 RX ADMIN — Medication 0.25 MILLIGRAM(S): at 05:03

## 2024-04-11 RX ADMIN — Medication 50 MILLIGRAM(S): at 12:49

## 2024-04-11 RX ADMIN — CEFEPIME 100 MILLIGRAM(S): 1 INJECTION, POWDER, FOR SOLUTION INTRAMUSCULAR; INTRAVENOUS at 17:24

## 2024-04-11 RX ADMIN — Medication 0.25 MILLIGRAM(S): at 17:24

## 2024-04-11 RX ADMIN — Medication 1 DROP(S): at 05:03

## 2024-04-11 RX ADMIN — PANTOPRAZOLE SODIUM 40 MILLIGRAM(S): 20 TABLET, DELAYED RELEASE ORAL at 05:03

## 2024-04-11 RX ADMIN — CEFEPIME 100 MILLIGRAM(S): 1 INJECTION, POWDER, FOR SOLUTION INTRAMUSCULAR; INTRAVENOUS at 05:03

## 2024-04-11 RX ADMIN — Medication 40 MILLIEQUIVALENT(S): at 12:49

## 2024-04-11 NOTE — PROGRESS NOTE ADULT - SUBJECTIVE AND OBJECTIVE BOX
---___---___---___---___---___---___ ---___---___---___---___---___---___---___---___---                  M E D I C A L   A T T E N D I N G   P R O G R E S S   N O T E  ---___---___---___---___---___---___ ---___---___---___---___---___---___---___---___---        ================================================    ++CHIEF COMPLAINT:   Patient is a 88y old  Female who presents with a chief complaint of infection (2024 08:23)      Sepsis      ---___---___---___---___---___---  PAST MEDICAL / Surgical  HISTORY:  PAST MEDICAL & SURGICAL HISTORY:  HTN (hypertension)      Hypothyroidism      Osteoporosis      CAD (coronary artery disease)      COPD (chronic obstructive pulmonary disease)      Pulmonary sarcoidosis      H/O pyelonephritis      Acute diverticulitis          ---___---___---___---___---___---  FAMILY HISTORY:   FAMILY HISTORY:        ---___---___---___---___---___---  ALLERGIES:   Allergies    iodinated radiocontrast agents (Anaphylaxis)    Intolerances        ---___---___---___---___---___---  MEDICATIONS:  MEDICATIONS  (STANDING):  atorvastatin 20 milliGRAM(s) Oral at bedtime  buDESOnide    Inhalation Suspension 0.25 milliGRAM(s) Inhalation two times a day  cefepime   IVPB 1000 milliGRAM(s) IV Intermittent every 12 hours  cholecalciferol 1000 Unit(s) Oral daily  cyclopentolate 1% Solution 1 Drop(s) Both EYES two times a day  ferrous    sulfate 325 milliGRAM(s) Oral daily  FLUoxetine 20 milliGRAM(s) Oral daily  influenza  Vaccine (HIGH DOSE) 0.7 milliLiter(s) IntraMuscular once  mirtazapine 7.5 milliGRAM(s) Oral at bedtime  pantoprazole  Injectable 40 milliGRAM(s) IV Push two times a day  prednisoLONE acetate 1% Suspension 1 Drop(s) Left EYE two times a day  traZODone 50 milliGRAM(s) Oral daily    MEDICATIONS  (PRN):  acetaminophen     Tablet .. 650 milliGRAM(s) Oral every 6 hours PRN Temp greater or equal to 38C (100.4F), Mild Pain (1 - 3)  albuterol    90 MICROgram(s) HFA Inhaler 2 Puff(s) Inhalation every 6 hours PRN wheeze  melatonin 3 milliGRAM(s) Oral at bedtime PRN Insomnia      ---___---___---___---___---___---  REVIEW OF SYSTEM:    GEN: no fever, no chills, no pain  RESP: no SOB, no cough, no sputum  CVS: no chest pain, no palpitations, no edema  GI: no abdominal pain, no nausea, no vomiting, no constipation, no diarrhea  : no dysurea, no frequency, no hematurea  Neuro: no headache, no dizziness  PSYCH: no anxiety, no depression  Derm : no itching, no rash    ---___---___---___---___---___---  VITAL SIGNS:  88y , CAPILLARY BLOOD GLUCOSE        T(C): 36.7 (24 @ 12:00), Max: 37.2 (04-10-24 @ 22:18)  HR: 79 (24 @ 12:00) (79 - 84)  BP: 116/78 (24 @ 12:00) (116/78 - 152/75)  RR: 18 (24 @ 12:00) (18 - 18)  SpO2: 92% (24 @ 12:00) (92% - 93%)  ---___---___---___---___---___---  PHYSICAL EXAM:    GEN: A&O X 3 , NAD , comfortable  HEENT: NCAT, PERRL, MMM, hearing intact  Neck: supple , no JVD  CVS: S1S2 , regular , No M/R/G appreciated  PULM: CTA B/L,  no W/R/R appreciated  ABD.: soft. non tender, non distended,  bowel sounds present  Extrem: intact pulses , no edema   Derm: No rash , no ecchymoses  PSYCH : normal mood,  no delusion not anxious     ---___---___---___---___---___---            LAB AND IMAGIN.7    8.85  )-----------( 199      ( 2024 07:48 )             31.0                   140  |  107  |  15  ----------------------------<  90  3.4<L>   |  22  |  0.83    Ca    9.0      2024 07:44  Phos  3.2       Mg     1.8         TPro  5.7<L>  /  Alb  3.3  /  TBili  0.5  /  DBili  x   /  AST  12  /  ALT  6<L>  /  AlkPhos  122<H>                              Urinalysis Basic - ( 2024 07:44 )    Color: x / Appearance: x / SG: x / pH: x  Gluc: 90 mg/dL / Ketone: x  / Bili: x / Urobili: x   Blood: x / Protein: x / Nitrite: x   Leuk Esterase: x / RBC: x / WBC x   Sq Epi: x / Non Sq Epi: x / Bacteria: x        [All pertinent / recent Imaging reviewed]         ---___---___---___---___---___---___ ---___---___---___---___---                         A S S E S S M E N T   A N D   P L A N :      HEALTH ISSUES - PROBLEM Dx:    colitis finish course of abx   copd cotinue current medication   hypokalemia  repelet potassium   depression /anxiety  continue meds     -GI/DVT Prophylaxis. as ordered  start dc planning  to rehab when finished with abx     ___________________________  Thank you,  Garry Hermosillo  [phone]

## 2024-04-11 NOTE — PROGRESS NOTE ADULT - ASSESSMENT
88 year old well known to me  recently discharged with possible UTI and diverticulitis   returns with weakness and sepsis syndrome    pt needed IVF.   CT with colitis but no diverticulitis  hs of C diff years ago but not recently     CHF  Diverticulitis ( hx)  Hx UTI recently    sepsis  suspect ischemic bowel    doubt C diff   no real diarrhea   better on empiric ab     continue cefepime  to complete short course in view of presentation with sepsis   no need to treat   the candida in the urine   cachexia    day 6/7  plan to dc antibiotics on Friday   can be discharged if otherwise  ready although underlying problems remain and she is likely to be  back

## 2024-04-11 NOTE — CHART NOTE - NSCHARTNOTEFT_GEN_A_CORE
Nutrition Follow Up Note  Patient seen for: Malnutrition follow-up     Chart reviewed, events noted.    Source: [] Patient       [x] Medical Record        [x] RN        [] Family at bedside       [] Other:    -If unable to interview patient: [] Trach/Vent/BiPAP  [x] Disoriented/confused/inappropriate to interview - attempted to wake pt 3x, fast asleep in chair    Diet Order:   Diet, Regular:   DASH/TLC {Sodium & Cholesterol Restricted} (DASH)  Kosher (04-08-24)    - Is current order appropriate/adequate? [] Yes  [x]  No: see recommendations below     - PO intake :   [] >75%  Adequate    [] 50-75%  Fair       [x] <50%  Poor  - Per RN pt with very little intake of breakfast, incorrect menu left at bedside, provided Kosher menu for pt    - Nutrition-related concerns:  - Pt with sepsis secondary to colitis, ordered for IV antibiotics   - Currently hypokalemic  - Ordered for remeron which may act as appetite stimulant  - Ordered for vitamin D3 and iron supplements     GI: Per RN, pt with no N/V, diarrhea/constipation reported today.  Last BM 4/10, no issues noted in flowsheets.   Bowel Regimen? [] Yes   [x] No  - Ordered for protonix    Weights:   Dosing wt 106 pounds, no daily wts to assess. RD unable to obtain bedscale wt as pt out of bed to chair. RD to continue to monitor wt as able    Nutritionally Pertinent MEDICATIONS  (STANDING):  atorvastatin  cefepime   IVPB  cholecalciferol  ferrous    sulfate  pantoprazole  Injectable    Pertinent Labs: 04-11 @ 07:44: Na 140, BUN 15, Cr 0.83, BG 90, K+ 3.4<L>, Phos 3.2, Mg 1.8, Alk Phos 122<H>, ALT/SGPT 6<L>, AST/SGOT 12, HbA1c --    Skin per nursing documentation: sacrum stage 1 pressure injury  Edema: Per flowsheet, none noted     Estimated Needs:   [x] no change since previous assessment  Needs based on dosing wt 48.1 kg (4/7)  Estimated kcal needs: 30-35 kcal/kg (8002-5562 kcal/day)  Estimated protein needs: 1.3-1.4 g pro/kg (63-67 g pro/day)  Fluid needs deferred to team.    Previous Nutrition Diagnosis: Severe chronic malnutrition   Nutrition Diagnosis is: [x] ongoing - Being addressed with PO diet, micronutrients     New Nutrition Diagnosis: [x] Not applicable    Nutrition Care Plan:  [x] In Progress  [] Achieved  [] Not applicable    Nutrition Interventions:     Education Provided:       [] Yes:  [x] No: N/A       Recommendations:       1) Recommend discontinuing DASH diet restrictions to promote adequate intake, defer diet texture/fluid consistency to team    2) Recommend addition of vitamin C and multivitamin daily to aid in wound healing pending no medical contraindications      Monitoring and Evaluation:   Continue to monitor nutritional intake, tolerance to diet prescription, weights, labs, skin integrity    RD remains available upon request and will follow up per protocol  Isaura Dominique RD - available on MS TEAMS

## 2024-04-11 NOTE — PROGRESS NOTE ADULT - SUBJECTIVE AND OBJECTIVE BOX
infectious diseases progress note:    Patient is a 88y old  Female who presents with a chief complaint of infection (10 Apr 2024 21:12)        Sepsis               Allergies    iodinated radiocontrast agents (Anaphylaxis)    Intolerances        ANTIBIOTICS/RELEVANT:  antimicrobials  cefepime   IVPB 1000 milliGRAM(s) IV Intermittent every 12 hours    immunologic:  influenza  Vaccine (HIGH DOSE) 0.7 milliLiter(s) IntraMuscular once    OTHER:  acetaminophen     Tablet .. 650 milliGRAM(s) Oral every 6 hours PRN  albuterol    90 MICROgram(s) HFA Inhaler 2 Puff(s) Inhalation every 6 hours PRN  atorvastatin 20 milliGRAM(s) Oral at bedtime  buDESOnide    Inhalation Suspension 0.25 milliGRAM(s) Inhalation two times a day  cholecalciferol 1000 Unit(s) Oral daily  cyclopentolate 1% Solution 1 Drop(s) Both EYES two times a day  ferrous    sulfate 325 milliGRAM(s) Oral daily  FLUoxetine 20 milliGRAM(s) Oral daily  melatonin 3 milliGRAM(s) Oral at bedtime PRN  mirtazapine 7.5 milliGRAM(s) Oral at bedtime  pantoprazole  Injectable 40 milliGRAM(s) IV Push two times a day  prednisoLONE acetate 1% Suspension 1 Drop(s) Left EYE two times a day  traZODone 50 milliGRAM(s) Oral daily      Objective:  Vital Signs Last 24 Hrs  T(C): 36.5 (11 Apr 2024 05:00), Max: 37.2 (10 Apr 2024 22:18)  T(F): 97.7 (11 Apr 2024 05:00), Max: 99 (10 Apr 2024 22:18)  HR: 82 (11 Apr 2024 05:00) (82 - 87)  BP: 152/75 (11 Apr 2024 05:00) (123/65 - 152/75)  BP(mean): --  RR: 18 (11 Apr 2024 05:00) (16 - 18)  SpO2: 93% (11 Apr 2024 05:00) (92% - 95%)    Parameters below as of 11 Apr 2024 05:00  Patient On (Oxygen Delivery Method): room air           Eyes:JIM, EOMI  Ear/Nose/Throat: no oral lesion, no sinus tenderness on percussion	  Neck:no JVD, no lymphadenopathy, supple  Respiratory: CTA moises  Cardiovascular: S1S2 RRR, no murmurs  Gastrointestinal:soft, (+) BS, no HSM  Extremities:no e/e/c        LABS:                        9.1    5.59  )-----------( 197      ( 10 Apr 2024 06:48 )             29.6     04-10    139  |  107  |  21  ----------------------------<  85  3.5   |  21<L>  |  1.06    Ca    8.5      10 Apr 2024 06:48    TPro  5.6<L>  /  Alb  3.1<L>  /  TBili  0.4  /  DBili  x   /  AST  12  /  ALT  6<L>  /  AlkPhos  109  04-10      Urinalysis Basic - ( 10 Apr 2024 06:48 )    Color: x / Appearance: x / SG: x / pH: x  Gluc: 85 mg/dL / Ketone: x  / Bili: x / Urobili: x   Blood: x / Protein: x / Nitrite: x   Leuk Esterase: x / RBC: x / WBC x   Sq Epi: x / Non Sq Epi: x / Bacteria: x          MICROBIOLOGY:    RECENT CULTURES:  04-06 @ 19:15 Clean Catch Clean Catch (Midstream)                50,000 - 99,000 CFU/mL Candida tropicalis "Susceptibilities not performed"    04-06 @ 19:00 .Blood Blood-Peripheral                No growth at 4 days    04-06 @ 18:15 .Blood Blood-Peripheral                No growth at 4 days          RESPIRATORY CULTURES:              RADIOLOGY & ADDITIONAL STUDIES:        Pager [phone]  After 5 pm/weekends or if no response :[phone]

## 2024-04-12 ENCOUNTER — TRANSCRIPTION ENCOUNTER (OUTPATIENT)
Age: 89
End: 2024-04-12

## 2024-04-12 VITALS
RESPIRATION RATE: 18 BRPM | SYSTOLIC BLOOD PRESSURE: 131 MMHG | OXYGEN SATURATION: 94 % | DIASTOLIC BLOOD PRESSURE: 68 MMHG | HEART RATE: 79 BPM | TEMPERATURE: 98 F

## 2024-04-12 PROBLEM — K57.92 DIVERTICULITIS OF INTESTINE, PART UNSPECIFIED, WITHOUT PERFORATION OR ABSCESS WITHOUT BLEEDING: Chronic | Status: ACTIVE | Noted: 2024-04-07

## 2024-04-12 PROBLEM — D86.0 SARCOIDOSIS OF LUNG: Chronic | Status: ACTIVE | Noted: 2024-04-07

## 2024-04-12 PROBLEM — Z87.448 PERSONAL HISTORY OF OTHER DISEASES OF URINARY SYSTEM: Chronic | Status: ACTIVE | Noted: 2024-04-07

## 2024-04-12 PROBLEM — J44.9 CHRONIC OBSTRUCTIVE PULMONARY DISEASE, UNSPECIFIED: Chronic | Status: ACTIVE | Noted: 2024-04-07

## 2024-04-12 LAB
ANION GAP SERPL CALC-SCNC: 11 MMOL/L — SIGNIFICANT CHANGE UP (ref 5–17)
BUN SERPL-MCNC: 14 MG/DL — SIGNIFICANT CHANGE UP (ref 7–23)
CALCIUM SERPL-MCNC: 9 MG/DL — SIGNIFICANT CHANGE UP (ref 8.4–10.5)
CHLORIDE SERPL-SCNC: 109 MMOL/L — HIGH (ref 96–108)
CO2 SERPL-SCNC: 22 MMOL/L — SIGNIFICANT CHANGE UP (ref 22–31)
CREAT SERPL-MCNC: 0.8 MG/DL — SIGNIFICANT CHANGE UP (ref 0.5–1.3)
CULTURE RESULTS: SIGNIFICANT CHANGE UP
EGFR: 71 ML/MIN/1.73M2 — SIGNIFICANT CHANGE UP
GLUCOSE SERPL-MCNC: 79 MG/DL — SIGNIFICANT CHANGE UP (ref 70–99)
HCT VFR BLD CALC: 27.8 % — LOW (ref 34.5–45)
HGB BLD-MCNC: 8.4 G/DL — LOW (ref 11.5–15.5)
MAGNESIUM SERPL-MCNC: 2.1 MG/DL — SIGNIFICANT CHANGE UP (ref 1.6–2.6)
MCHC RBC-ENTMCNC: 28.7 PG — SIGNIFICANT CHANGE UP (ref 27–34)
MCHC RBC-ENTMCNC: 30.2 GM/DL — LOW (ref 32–36)
MCV RBC AUTO: 94.9 FL — SIGNIFICANT CHANGE UP (ref 80–100)
NRBC # BLD: 0 /100 WBCS — SIGNIFICANT CHANGE UP (ref 0–0)
PHOSPHATE SERPL-MCNC: 3.1 MG/DL — SIGNIFICANT CHANGE UP (ref 2.5–4.5)
PLATELET # BLD AUTO: 184 K/UL — SIGNIFICANT CHANGE UP (ref 150–400)
POTASSIUM SERPL-MCNC: 3.7 MMOL/L — SIGNIFICANT CHANGE UP (ref 3.5–5.3)
POTASSIUM SERPL-SCNC: 3.7 MMOL/L — SIGNIFICANT CHANGE UP (ref 3.5–5.3)
RBC # BLD: 2.93 M/UL — LOW (ref 3.8–5.2)
RBC # FLD: 15.6 % — HIGH (ref 10.3–14.5)
SARS-COV-2 RNA SPEC QL NAA+PROBE: SIGNIFICANT CHANGE UP
SODIUM SERPL-SCNC: 142 MMOL/L — SIGNIFICANT CHANGE UP (ref 135–145)
SPECIMEN SOURCE: SIGNIFICANT CHANGE UP
WBC # BLD: 6.07 K/UL — SIGNIFICANT CHANGE UP (ref 3.8–10.5)
WBC # FLD AUTO: 6.07 K/UL — SIGNIFICANT CHANGE UP (ref 3.8–10.5)

## 2024-04-12 PROCEDURE — 80053 COMPREHEN METABOLIC PANEL: CPT

## 2024-04-12 PROCEDURE — 71250 CT THORAX DX C-: CPT | Mod: MC

## 2024-04-12 PROCEDURE — 99232 SBSQ HOSP IP/OBS MODERATE 35: CPT

## 2024-04-12 PROCEDURE — 97110 THERAPEUTIC EXERCISES: CPT

## 2024-04-12 PROCEDURE — 96375 TX/PRO/DX INJ NEW DRUG ADDON: CPT

## 2024-04-12 PROCEDURE — 87077 CULTURE AEROBIC IDENTIFY: CPT

## 2024-04-12 PROCEDURE — 99291 CRITICAL CARE FIRST HOUR: CPT

## 2024-04-12 PROCEDURE — 93005 ELECTROCARDIOGRAM TRACING: CPT

## 2024-04-12 PROCEDURE — 71045 X-RAY EXAM CHEST 1 VIEW: CPT

## 2024-04-12 PROCEDURE — 82330 ASSAY OF CALCIUM: CPT

## 2024-04-12 PROCEDURE — 80048 BASIC METABOLIC PNL TOTAL CA: CPT

## 2024-04-12 PROCEDURE — 36415 COLL VENOUS BLD VENIPUNCTURE: CPT

## 2024-04-12 PROCEDURE — 96365 THER/PROPH/DIAG IV INF INIT: CPT

## 2024-04-12 PROCEDURE — 86900 BLOOD TYPING SEROLOGIC ABO: CPT

## 2024-04-12 PROCEDURE — 82947 ASSAY GLUCOSE BLOOD QUANT: CPT

## 2024-04-12 PROCEDURE — 82435 ASSAY OF BLOOD CHLORIDE: CPT

## 2024-04-12 PROCEDURE — 93308 TTE F-UP OR LMTD: CPT

## 2024-04-12 PROCEDURE — 85027 COMPLETE CBC AUTOMATED: CPT

## 2024-04-12 PROCEDURE — 84295 ASSAY OF SERUM SODIUM: CPT

## 2024-04-12 PROCEDURE — 83735 ASSAY OF MAGNESIUM: CPT

## 2024-04-12 PROCEDURE — 85025 COMPLETE CBC W/AUTO DIFF WBC: CPT

## 2024-04-12 PROCEDURE — 82272 OCCULT BLD FECES 1-3 TESTS: CPT

## 2024-04-12 PROCEDURE — 85014 HEMATOCRIT: CPT

## 2024-04-12 PROCEDURE — 93321 DOPPLER ECHO F-UP/LMTD STD: CPT

## 2024-04-12 PROCEDURE — 94640 AIRWAY INHALATION TREATMENT: CPT

## 2024-04-12 PROCEDURE — 87086 URINE CULTURE/COLONY COUNT: CPT

## 2024-04-12 PROCEDURE — 86850 RBC ANTIBODY SCREEN: CPT

## 2024-04-12 PROCEDURE — 83605 ASSAY OF LACTIC ACID: CPT

## 2024-04-12 PROCEDURE — 84100 ASSAY OF PHOSPHORUS: CPT

## 2024-04-12 PROCEDURE — 86901 BLOOD TYPING SEROLOGIC RH(D): CPT

## 2024-04-12 PROCEDURE — 84132 ASSAY OF SERUM POTASSIUM: CPT

## 2024-04-12 PROCEDURE — 87635 SARS-COV-2 COVID-19 AMP PRB: CPT

## 2024-04-12 PROCEDURE — 85610 PROTHROMBIN TIME: CPT

## 2024-04-12 PROCEDURE — 81001 URINALYSIS AUTO W/SCOPE: CPT

## 2024-04-12 PROCEDURE — 97162 PT EVAL MOD COMPLEX 30 MIN: CPT

## 2024-04-12 PROCEDURE — 97116 GAIT TRAINING THERAPY: CPT

## 2024-04-12 PROCEDURE — 82803 BLOOD GASES ANY COMBINATION: CPT

## 2024-04-12 PROCEDURE — 87637 SARSCOV2&INF A&B&RSV AMP PRB: CPT

## 2024-04-12 PROCEDURE — 85018 HEMOGLOBIN: CPT

## 2024-04-12 PROCEDURE — 74176 CT ABD & PELVIS W/O CONTRAST: CPT | Mod: MC

## 2024-04-12 PROCEDURE — 85730 THROMBOPLASTIN TIME PARTIAL: CPT

## 2024-04-12 PROCEDURE — 96368 THER/DIAG CONCURRENT INF: CPT

## 2024-04-12 PROCEDURE — 87040 BLOOD CULTURE FOR BACTERIA: CPT

## 2024-04-12 RX ADMIN — PANTOPRAZOLE SODIUM 40 MILLIGRAM(S): 20 TABLET, DELAYED RELEASE ORAL at 06:11

## 2024-04-12 RX ADMIN — Medication 20 MILLIGRAM(S): at 11:20

## 2024-04-12 RX ADMIN — Medication 325 MILLIGRAM(S): at 11:20

## 2024-04-12 RX ADMIN — Medication 1 DROP(S): at 17:30

## 2024-04-12 RX ADMIN — Medication 1000 UNIT(S): at 11:20

## 2024-04-12 RX ADMIN — Medication 0.25 MILLIGRAM(S): at 06:11

## 2024-04-12 RX ADMIN — Medication 50 MILLIGRAM(S): at 11:20

## 2024-04-12 RX ADMIN — CEFEPIME 100 MILLIGRAM(S): 1 INJECTION, POWDER, FOR SOLUTION INTRAMUSCULAR; INTRAVENOUS at 16:29

## 2024-04-12 RX ADMIN — CEFEPIME 100 MILLIGRAM(S): 1 INJECTION, POWDER, FOR SOLUTION INTRAMUSCULAR; INTRAVENOUS at 06:11

## 2024-04-12 RX ADMIN — CYCLOPENTOLATE HYDROCHLORIDE 1 DROP(S): 10 SOLUTION/ DROPS OPHTHALMIC at 17:29

## 2024-04-12 RX ADMIN — Medication 650 MILLIGRAM(S): at 17:27

## 2024-04-12 NOTE — DISCHARGE NOTE PROVIDER - PROVIDER TOKENS
PROVIDER:[TOKEN:[[id number]]],PROVIDER:[TOKEN:[[id number]]],PROVIDER:[TOKEN:[[id number]]]

## 2024-04-12 NOTE — PROGRESS NOTE ADULT - ASSESSMENT
88 year old well known to me  recently discharged with possible UTI and diverticulitis   returns with weakness and sepsis syndrome    pt needed IVF.   CT with colitis but no diverticulitis  hs of C diff years ago but not recently     CHF  Diverticulitis ( hx)  Hx UTI recently    sepsis  suspect ischemic bowel    doubt C diff   no real diarrhea   better on empiric ab     continue cefepime  to complete short course in view of presentation with sepsis   no need to treat   the candida in the urine   cachexia    day 7/7  plan to dc antibiotics on Friday   can be discharged if otherwise  ready although underlying problems remain and she is likely to be  back   completes antibiotics  today

## 2024-04-12 NOTE — DISCHARGE NOTE NURSING/CASE MANAGEMENT/SOCIAL WORK - PATIENT PORTAL LINK FT
You can access the FollowMyHealth Patient Portal offered by [facility] by registering at the following website: [email or web address]. By joining [facility]’s FollowMyHealth portal, you will also be able to view your health information using other applications (apps) compatible with our system.

## 2024-04-12 NOTE — PROGRESS NOTE ADULT - SUBJECTIVE AND OBJECTIVE BOX
infectious diseases progress note:    Patient is a 88y old  Female who presents with a chief complaint of infection (11 Apr 2024 20:04)        Sepsis               Allergies    iodinated radiocontrast agents (Anaphylaxis)    Intolerances        ANTIBIOTICS/RELEVANT:  antimicrobials  cefepime   IVPB 1000 milliGRAM(s) IV Intermittent every 12 hours    immunologic:  influenza  Vaccine (HIGH DOSE) 0.7 milliLiter(s) IntraMuscular once    OTHER:  acetaminophen     Tablet .. 650 milliGRAM(s) Oral every 6 hours PRN  albuterol    90 MICROgram(s) HFA Inhaler 2 Puff(s) Inhalation every 6 hours PRN  atorvastatin 20 milliGRAM(s) Oral at bedtime  buDESOnide    Inhalation Suspension 0.25 milliGRAM(s) Inhalation two times a day  cholecalciferol 1000 Unit(s) Oral daily  cyclopentolate 1% Solution 1 Drop(s) Both EYES two times a day  ferrous    sulfate 325 milliGRAM(s) Oral daily  FLUoxetine 20 milliGRAM(s) Oral daily  melatonin 3 milliGRAM(s) Oral at bedtime PRN  mirtazapine 7.5 milliGRAM(s) Oral at bedtime  pantoprazole  Injectable 40 milliGRAM(s) IV Push two times a day  prednisoLONE acetate 1% Suspension 1 Drop(s) Left EYE two times a day  traZODone 50 milliGRAM(s) Oral daily      Objective:  Vital Signs Last 24 Hrs  T(C): 36.9 (12 Apr 2024 04:33), Max: 36.9 (12 Apr 2024 04:33)  T(F): 98.4 (12 Apr 2024 04:33), Max: 98.4 (12 Apr 2024 04:33)  HR: 75 (12 Apr 2024 04:33) (75 - 81)  BP: 110/55 (12 Apr 2024 04:33) (110/55 - 116/78)  BP(mean): --  RR: 18 (12 Apr 2024 04:33) (16 - 18)  SpO2: 95% (12 Apr 2024 04:33) (92% - 95%)    Parameters below as of 12 Apr 2024 04:33  Patient On (Oxygen Delivery Method): room air           Eyes:JIM, EOMI  Ear/Nose/Throat: no oral lesion, no sinus tenderness on percussion	  Neck:no JVD, no lymphadenopathy, supple  Respiratory: CTA moises  Cardiovascular: S1S2 RRR, no murmurs  Gastrointestinal:soft, (+) BS, no HSM  Extremities:no e/e/c        LABS:                        8.4    6.07  )-----------( 184      ( 12 Apr 2024 06:43 )             27.8     04-12    142  |  109<H>  |  14  ----------------------------<  79  3.7   |  22  |  0.80    Ca    9.0      12 Apr 2024 06:43  Phos  3.1     04-12  Mg     2.1     04-12    TPro  5.7<L>  /  Alb  3.3  /  TBili  0.5  /  DBili  x   /  AST  12  /  ALT  6<L>  /  AlkPhos  122<H>  04-11      Urinalysis Basic - ( 12 Apr 2024 06:43 )    Color: x / Appearance: x / SG: x / pH: x  Gluc: 79 mg/dL / Ketone: x  / Bili: x / Urobili: x   Blood: x / Protein: x / Nitrite: x   Leuk Esterase: x / RBC: x / WBC x   Sq Epi: x / Non Sq Epi: x / Bacteria: x          MICROBIOLOGY:    RECENT CULTURES:  04-06 @ 19:15 Clean Catch Clean Catch (Midstream)                50,000 - 99,000 CFU/mL Candida tropicalis "Susceptibilities not performed"    04-06 @ 19:00 .Blood Blood-Peripheral                No growth at 5 days    04-06 @ 18:15 .Blood Blood-Peripheral                No growth at 5 days          RESPIRATORY CULTURES:              RADIOLOGY & ADDITIONAL STUDIES:        Pager [phone]  After 5 pm/weekends or if no response :[phone]

## 2024-04-12 NOTE — DISCHARGE NOTE PROVIDER - HOSPITAL COURSE
HPI:  88-year-old female with a past medical history of pulmonary sarcoidosis, COPD not on home O2, CAD, hypothyroidism, HTN presenting with shortness of breath. Patient resides at [facility]. Staff noticed the patient was having shortness of breath and hypoxemic in the 70s, EMS noted her to be hypoxemic in the 80s which corrected with oxygen. The patient has multiple admissions for recent febrile/sepsis workup in the setting of UTI. Recently discharged on 4/3/24, and was followed by ID for sepsis 2/2 colitis/uti, pulmonary for pulmonary sarcoid, cardiology for ruptured chordae tend and EF 50%, and urology for hematuria and h/o L hydroureter. In the previous admission, the pt's sepsis resolved with IV antibiotics, cardiology diuresed patient, and urology recommended further imaging (cystoscopy/ct urogram) however family refused per chart review. The patient returns to ED from [facility] with SOB and fever and turbid urine concerning for similar UTI from previous admission. Currently, no collado in place. The patient is not complaining of chest pain, abdominal pain, nausea, vomiting, fevers.     In the ED, the patient placed on NC sat > 93%, febrile/leukocytosis, and sepsis cultures sent. She was found to be hypotensive in setting of sepsis and received 1L followed by 500 cc in the setting of heart failure and ruptured chordae tendae with low threshold for HF exacerbation. Her BP temporarily improved with IVF, however, required pressor support with levophed 0.3. MICU consulted for hypotension requiring pressor support in the setting of sepsis and HF from possible UTI. On MICU evaluation, the patient was alert, awake, and had no pain. She received additional 500 cc of fluid and levophed reduced to 0.18. Pt accepted to MICU for IV pressor support    (07 Apr 2024 02:05)    Hospital Course: Pt weaned off of levo quickly once brought to MICU. POCUS showed volume responsive state, so pt given additional fluids. Although pt denied urinary symptoms and UA may have been contaminated, there was still a concern for UTI given h/o UTI and urological structural abnormalities (L hydro). Family now amenable to urologic evaluation. ID was consulted recommending sepsis in setting of likely ischemic bowel seen on CTAP, doubt C diff given no real diarrhea. Patient continued on cefepime to complete short course in view of presentation with sepsis.     Physical Therapy Discharge Recommendations	is for Subacute Rehab; if home, pt would require assist with ALL ADL's and functional mobility and home PT.  Discharge/Dispo/Med rec discussed with attending  ____. Patient medically cleared for discharge ____ with outpatient follow up     Important Medication Changes and Reason:    Active or Pending Issues Requiring Follow-up:  - urology evaluation vs outpt follow up      Advanced Directives:   [ ] Full code  [ ] DNR  [ ] Hospice    Discharge Diagnoses:         HPI:  88-year-old female with a past medical history of pulmonary sarcoidosis, COPD not on home O2, CAD, hypothyroidism, HTN presenting with shortness of breath. Patient resides at [facility]. Staff noticed the patient was having shortness of breath and hypoxemic in the 70s, EMS noted her to be hypoxemic in the 80s which corrected with oxygen. The patient has multiple admissions for recent febrile/sepsis workup in the setting of UTI. Recently discharged on 4/3/24, and was followed by ID for sepsis 2/2 colitis/uti, pulmonary for pulmonary sarcoid, cardiology for ruptured chordae tend and EF 50%, and urology for hematuria and h/o L hydroureter. In the previous admission, the pt's sepsis resolved with IV antibiotics, cardiology diuresed patient, and urology recommended further imaging (cystoscopy/ct urogram) however family refused per chart review. The patient returns to ED from [facility] with SOB and fever and turbid urine concerning for similar UTI from previous admission. Currently, no collado in place. The patient is not complaining of chest pain, abdominal pain, nausea, vomiting, fevers.     In the ED, the patient placed on NC sat > 93%, febrile/leukocytosis, and sepsis cultures sent. She was found to be hypotensive in setting of sepsis and received 1L followed by 500 cc in the setting of heart failure and ruptured chordae tendae with low threshold for HF exacerbation. Her BP temporarily improved with IVF, however, required pressor support with levophed 0.3. MICU consulted for hypotension requiring pressor support in the setting of sepsis and HF from possible UTI. On MICU evaluation, the patient was alert, awake, and had no pain. She received additional 500 cc of fluid and levophed reduced to 0.18. Pt accepted to MICU for IV pressor support    (07 Apr 2024 02:05)    Hospital Course: Pt weaned off of levo quickly once brought to MICU. POCUS showed volume responsive state, so pt given additional fluids. Although pt denied urinary symptoms and UA may have been contaminated, there was still a concern for UTI given h/o UTI and urological structural abnormalities (L hydro). Family now amenable to urologic evaluation. ID was consulted recommending sepsis in setting of likely ischemic bowel seen on CTAP, doubt C diff given no real diarrhea. Patient continued on cefepime to complete short course in view of presentation with sepsis.     Physical Therapy Discharge Recommendations	is for Subacute Rehab; if home, pt would require assist with ALL ADL's and functional mobility and home PT.  Discharge/Dispo/Med rec discussed with attending Dr. Hermosillo. Patient medically cleared for discharge to home with outpatient follow up     Important Medication Changes and Reason:    Active or Pending Issues Requiring Follow-up:  - urology evaluation vs outpt follow up      Advanced Directives:   [ ] Full code  [ ] DNR  [ ] Hospice    Discharge Diagnoses:

## 2024-04-12 NOTE — DISCHARGE NOTE PROVIDER - DETAILS OF MALNUTRITION DIAGNOSIS/DIAGNOSES
This patient has been assessed with a concern for Malnutrition and was treated during this hospitalization for the following Nutrition diagnosis/diagnoses:     -  04/08/2024: Severe protein-calorie malnutrition

## 2024-04-12 NOTE — DISCHARGE NOTE PROVIDER - NSDCCPCAREPLAN_GEN_ALL_CORE_FT
[General Appearance - Well Developed] : well developed [General Appearance - Well Nourished] : well nourished [Bowel Sounds] : normal bowel sounds [Abdomen Soft] : soft [Urinary Bladder Findings] : the bladder was normal on palpation [Skin Color & Pigmentation] : normal skin color and pigmentation [Heart Rate And Rhythm] : Heart rate and rhythm were normal PRINCIPAL DISCHARGE DIAGNOSIS  Diagnosis: Sepsis, unspecified organism  Assessment and Plan of Treatment: likely due to ischemic bowel seen on CT Abdomen/Pelvis,   You completed a course of antibiotics  HOME CARE INSTRUCTIONS  Get plenty of rest.  Drink enough water and fluids to keep your urine clear or pale yellow.  Eat a well-balanced diet.  Call your caregiver for follow-up as recommended.  SEEK IMMEDIATE MEDICAL CARE IF:  You develop chills.  You have fevers not controlled by medicine.  You have extreme weakness, fainting, or dehydration.  You have repeated vomiting.  You develop severe belly (abdominal) pain or are passing bloody or tarry stools        SECONDARY DISCHARGE DIAGNOSES  Diagnosis: History of frequent urinary tract infections  Assessment and Plan of Treatment: Please follow up with urology upon discharge for further management and evaluation     [] : no respiratory distress [Oriented To Time, Place, And Person] : oriented to person, place, and time [Normal Station and Gait] : the gait and station were normal for the patient's age [No Focal Deficits] : no focal deficits

## 2024-04-12 NOTE — PROGRESS NOTE ADULT - NUTRITIONAL ASSESSMENT
This patient has been assessed with a concern for Malnutrition and has been determined to have a diagnosis/diagnoses of Severe protein-calorie malnutrition.    This patient is being managed with:   Diet Regular-  DASH/TLC {Sodium & Cholesterol Restricted} (DASH)  Margarita  Entered: Apr 8 2024  4:03PM  
This patient has been assessed with a concern for Malnutrition and has been determined to have a diagnosis/diagnoses of Severe protein-calorie malnutrition.    This patient is being managed with:   Diet Regular-  Kosher  Entered: Apr 11 2024  3:42PM  
This patient has been assessed with a concern for Malnutrition and has been determined to have a diagnosis/diagnoses of Severe protein-calorie malnutrition.    This patient is being managed with:   Diet Regular-  DASH/TLC {Sodium & Cholesterol Restricted} (DASH)  Margarita  Entered: Apr 8 2024  4:03PM  
This patient has been assessed with a concern for Malnutrition and has been determined to have a diagnosis/diagnoses of Severe protein-calorie malnutrition.    This patient is being managed with:   Diet Regular-  DASH/TLC {Sodium & Cholesterol Restricted} (DASH)  Margarita  Entered: Apr 8 2024  4:03PM  
This patient has been assessed with a concern for Malnutrition and has been determined to have a diagnosis/diagnoses of Severe protein-calorie malnutrition.    This patient is being managed with:   Diet Regular-  Kosher  Entered: Apr 11 2024  3:42PM

## 2024-04-12 NOTE — DISCHARGE NOTE PROVIDER - CARE PROVIDER_API CALL
Earle Avila [place]  Pulmonary Disease  [address] [place] [address]  Phone: [phone]  Fax: [phone]  Follow Up Time:     Jordan Antonio  Urology  [address] [place] [address]  Phone: [phone]  Fax: [phone]  Follow Up Time:     Garry Hermosillo  [address] [address]  Phone: [phone]  Fax: [phone]  Follow Up Time:

## 2024-04-12 NOTE — PROGRESS NOTE ADULT - SUBJECTIVE AND OBJECTIVE BOX
---___---___---___---___---___---___ ---___---___---___---___---___---___---___---___---                  M E D I C A L   A T T E N D I N G   P R O G R E S S   N O T E  ---___---___---___---___---___---___ ---___---___---___---___---___---___---___---___---        ================================================    ++CHIEF COMPLAINT:   Patient is a 88y old  Female who presents with a chief complaint of infection (2024 12:01)      Sepsis      ---___---___---___---___---___---  PAST MEDICAL / Surgical  HISTORY:  PAST MEDICAL & SURGICAL HISTORY:  HTN (hypertension)      Hypothyroidism      Osteoporosis      CAD (coronary artery disease)      COPD (chronic obstructive pulmonary disease)      Pulmonary sarcoidosis      H/O pyelonephritis      Acute diverticulitis          ---___---___---___---___---___---  FAMILY HISTORY:   FAMILY HISTORY:        ---___---___---___---___---___---  ALLERGIES:   Allergies    iodinated radiocontrast agents (Anaphylaxis)    Intolerances        ---___---___---___---___---___---  MEDICATIONS:  MEDICATIONS  (STANDING):  atorvastatin 20 milliGRAM(s) Oral at bedtime  buDESOnide    Inhalation Suspension 0.25 milliGRAM(s) Inhalation two times a day  cefepime   IVPB 1000 milliGRAM(s) IV Intermittent every 12 hours  cholecalciferol 1000 Unit(s) Oral daily  cyclopentolate 1% Solution 1 Drop(s) Both EYES two times a day  ferrous    sulfate 325 milliGRAM(s) Oral daily  FLUoxetine 20 milliGRAM(s) Oral daily  influenza  Vaccine (HIGH DOSE) 0.7 milliLiter(s) IntraMuscular once  mirtazapine 7.5 milliGRAM(s) Oral at bedtime  pantoprazole  Injectable 40 milliGRAM(s) IV Push two times a day  prednisoLONE acetate 1% Suspension 1 Drop(s) Left EYE two times a day  traZODone 50 milliGRAM(s) Oral daily    MEDICATIONS  (PRN):  acetaminophen     Tablet .. 650 milliGRAM(s) Oral every 6 hours PRN Temp greater or equal to 38C (100.4F), Mild Pain (1 - 3)  albuterol    90 MICROgram(s) HFA Inhaler 2 Puff(s) Inhalation every 6 hours PRN wheeze  melatonin 3 milliGRAM(s) Oral at bedtime PRN Insomnia      ---___---___---___---___---___---  REVIEW OF SYSTEM:    GEN: no fever, no chills, no pain  RESP: no SOB, no cough, no sputum  CVS: no chest pain, no palpitations, no edema  GI: no abdominal pain, no nausea, no vomiting, no constipation, no diarrhea  : no dysurea, no frequency, no hematurea  Neuro: no headache, no dizziness  PSYCH: no anxiety, no depression  Derm : no itching, no rash    ---___---___---___---___---___---  VITAL SIGNS:  88y , CAPILLARY BLOOD GLUCOSE        T(C): 36.8 (24 @ 15:15), Max: 36.9 (24 @ 04:33)  HR: 75 (24 @ 04:33) (75 - 81)  BP: 120/61 (24 @ 15:15) (110/55 - 120/61)  RR: 18 (24 @ 04:33) (16 - 18)  SpO2: 95% (24 @ 04:33) (92% - 95%)  ---___---___---___---___---___---  PHYSICAL EXAM:    GEN: A&O X 3 , NAD , comfortable  HEENT: NCAT, PERRL, MMM, hearing intact  Neck: supple , no JVD  CVS: S1S2 , regular , No M/R/G appreciated  PULM: CTA B/L,  no W/R/R appreciated  ABD.: soft. non tender, non distended,  bowel sounds present  Extrem: intact pulses , no edema   Derm: No rash , no ecchymoses  PSYCH : normal mood,  no delusion not anxious     ---___---___---___---___---___---            LAB AND IMAGIN.4    6.07  )-----------( 184      ( 2024 06:43 )             27.8               04-12    142  |  109<H>  |  14  ----------------------------<  79  3.7   |  22  |  0.80    Ca    9.0      2024 06:43  Phos  3.1     04-12  Mg     2.1     -12    TPro  5.7<L>  /  Alb  3.3  /  TBili  0.5  /  DBili  x   /  AST  12  /  ALT  6<L>  /  AlkPhos  122<H>  04-11                            Urinalysis Basic - ( 2024 06:43 )    Color: x / Appearance: x / SG: x / pH: x  Gluc: 79 mg/dL / Ketone: x  / Bili: x / Urobili: x   Blood: x / Protein: x / Nitrite: x   Leuk Esterase: x / RBC: x / WBC x   Sq Epi: x / Non Sq Epi: x / Bacteria: x        [All pertinent / recent Imaging reviewed]         ---___---___---___---___---___---___ ---___---___---___---___---                         A S S E S S M E N T   A N D   P L A N :      HEALTH ISSUES - PROBLEM Dx:  colitis finish course of abx   copd cotinue current medication   hypokalemia  repelet potassium   depression /anxiety  continue meds     -GI/DVT Prophylaxis. as ordered  start dc planning  to rehab when finished with abx            Education given on   ___________________________  Thank you,  Garry Hermosillo  [phone]

## 2024-04-12 NOTE — DISCHARGE NOTE PROVIDER - NSDCMRMEDTOKEN_GEN_ALL_CORE_FT
acetaminophen 325 mg oral tablet: 2 tab(s) orally every 6 hours As needed Temp greater or equal to 38C (100.4F), Mild Pain (1 - 3)  albuterol 90 mcg/inh inhalation aerosol: 2 puff(s) inhaled every 6 hours as needed for wheeze  atorvastatin 20 mg oral tablet: 1 tab(s) orally once a day  budesonide 0.25 mg/2 mL inhalation suspension: 0.25 milligram(s) by nebulizer 2 times a day  cyclopentolate 1% ophthalmic solution: 1 drop(s) in the left eye 2 times a day  D3 25 mcg (1000 intl units) oral tablet: 1 tab(s) orally once a day  ferrous sulfate 325 mg (65 mg elemental iron) oral tablet: 1 tab(s) orally once a day  FLUoxetine 20 mg oral capsule: 1 cap(s) orally once a day  furosemide 20 mg oral tablet: 1 tab(s) orally once a day  losartan 25 mg oral tablet: 1 tab(s) orally once a day  melatonin 3 mg oral tablet: 1 tab(s) orally once a day (at bedtime) As needed Insomnia  mirtazapine 7.5 mg oral tablet: 1 tab(s) orally once a day (at bedtime)  prednisoLONE acetate 1% ophthalmic suspension: 1 drop(s) in the left eye 2 times a day  temazepam 30 mg oral capsule: 1 cap(s) orally once a day (at bedtime)  traZODone 50 mg oral tablet: orally once a day   acetaminophen 325 mg oral tablet: 2 tab(s) orally every 6 hours As needed Temp greater or equal to 38C (100.4F), Mild Pain (1 - 3)  albuterol 90 mcg/inh inhalation aerosol: 2 puff(s) inhaled every 6 hours as needed for wheeze  atorvastatin 20 mg oral tablet: 1 tab(s) orally once a day  budesonide 0.25 mg/2 mL inhalation suspension: 0.25 milligram(s) by nebulizer 2 times a day  cyclopentolate 1% ophthalmic solution: 1 drop(s) in the left eye 2 times a day  D3 25 mcg (1000 intl units) oral tablet: 1 tab(s) orally once a day  ferrous sulfate 325 mg (65 mg elemental iron) oral tablet: 1 tab(s) orally once a day  FLUoxetine 20 mg oral capsule: 1 cap(s) orally once a day  losartan 25 mg oral tablet: 1 tab(s) orally once a day  melatonin 3 mg oral tablet: 1 tab(s) orally once a day (at bedtime) As needed Insomnia  mirtazapine 7.5 mg oral tablet: 1 tab(s) orally once a day (at bedtime)  prednisoLONE acetate 1% ophthalmic suspension: 1 drop(s) in the left eye 2 times a day  temazepam 30 mg oral capsule: 1 cap(s) orally once a day (at bedtime)  traZODone 50 mg oral tablet: orally once a day

## 2024-04-12 NOTE — DISCHARGE NOTE PROVIDER - NSDCFUADDAPPT_GEN_ALL_CORE_FT
APPTS ARE READY TO BE MADE: [x] YES    Best Family or Patient Contact (if needed):    Additional Information about above appointments (if needed):    1:   2:   3:     Other comments or requests:    APPTS ARE READY TO BE MADE: [x] YES    Best Family or Patient Contact (if needed):    Additional Information about above appointments (if needed):    1:   2:   3:     Other comments or requests:   Patient is being discharged to rehab. Patient/caregiver will arrange follow up appointments.

## 2024-04-12 NOTE — PROGRESS NOTE ADULT - PROVIDER SPECIALTY LIST ADULT
Family Medicine
Infectious Disease
Infectious Disease
Family Medicine
Infectious Disease
Infectious Disease
MICU

## 2024-04-12 NOTE — DISCHARGE NOTE PROVIDER - CARE PROVIDERS DIRECT ADDRESSES
,DirectAddress_Unknown,[email or web address]

## 2024-05-10 ENCOUNTER — INPATIENT (INPATIENT)
Facility: HOSPITAL | Age: 89
LOS: 6 days | Discharge: NURSING, REHAB OR LONG-TERM CARE | DRG: 189 | End: 2024-05-17
Attending: FAMILY MEDICINE | Admitting: FAMILY MEDICINE
Payer: MEDICARE

## 2024-05-10 VITALS — HEIGHT: 62 IN

## 2024-05-10 DIAGNOSIS — J96.01 ACUTE RESPIRATORY FAILURE WITH HYPOXIA: ICD-10-CM

## 2024-05-10 LAB
ALBUMIN SERPL ELPH-MCNC: 3.6 G/DL — SIGNIFICANT CHANGE UP (ref 3.3–5)
ALP SERPL-CCNC: 138 U/L — HIGH (ref 40–120)
ALT FLD-CCNC: 18 U/L — SIGNIFICANT CHANGE UP (ref 10–45)
ANION GAP SERPL CALC-SCNC: 16 MMOL/L — SIGNIFICANT CHANGE UP (ref 5–17)
APPEARANCE UR: ABNORMAL
APTT BLD: 31 SEC — SIGNIFICANT CHANGE UP (ref 24.5–35.6)
AST SERPL-CCNC: 34 U/L — SIGNIFICANT CHANGE UP (ref 10–40)
BACTERIA # UR AUTO: NEGATIVE /HPF — SIGNIFICANT CHANGE UP
BASE EXCESS BLDV CALC-SCNC: -1.2 MMOL/L — SIGNIFICANT CHANGE UP (ref -2–3)
BASE EXCESS BLDV CALC-SCNC: -2.5 MMOL/L — LOW (ref -2–3)
BASOPHILS # BLD AUTO: 0.06 K/UL — SIGNIFICANT CHANGE UP (ref 0–0.2)
BASOPHILS NFR BLD AUTO: 0.4 % — SIGNIFICANT CHANGE UP (ref 0–2)
BILIRUB SERPL-MCNC: 0.3 MG/DL — SIGNIFICANT CHANGE UP (ref 0.2–1.2)
BILIRUB UR-MCNC: NEGATIVE — SIGNIFICANT CHANGE UP
BUN SERPL-MCNC: 15 MG/DL — SIGNIFICANT CHANGE UP (ref 7–23)
CA-I SERPL-SCNC: 1.23 MMOL/L — SIGNIFICANT CHANGE UP (ref 1.15–1.33)
CA-I SERPL-SCNC: 1.25 MMOL/L — SIGNIFICANT CHANGE UP (ref 1.15–1.33)
CALCIUM SERPL-MCNC: 8.6 MG/DL — SIGNIFICANT CHANGE UP (ref 8.4–10.5)
CAST: 2 /LPF — SIGNIFICANT CHANGE UP (ref 0–4)
CHLORIDE BLDV-SCNC: 105 MMOL/L — SIGNIFICANT CHANGE UP (ref 96–108)
CHLORIDE BLDV-SCNC: 106 MMOL/L — SIGNIFICANT CHANGE UP (ref 96–108)
CHLORIDE SERPL-SCNC: 106 MMOL/L — SIGNIFICANT CHANGE UP (ref 96–108)
CO2 BLDV-SCNC: 27 MMOL/L — HIGH (ref 22–26)
CO2 BLDV-SCNC: 28 MMOL/L — HIGH (ref 22–26)
CO2 SERPL-SCNC: 21 MMOL/L — LOW (ref 22–31)
COLOR SPEC: YELLOW — SIGNIFICANT CHANGE UP
CREAT SERPL-MCNC: 0.76 MG/DL — SIGNIFICANT CHANGE UP (ref 0.5–1.3)
D DIMER BLD IA.RAPID-MCNC: 757 NG/ML DDU — HIGH
DIFF PNL FLD: ABNORMAL
EGFR: 76 ML/MIN/1.73M2 — SIGNIFICANT CHANGE UP
EOSINOPHIL # BLD AUTO: 0.15 K/UL — SIGNIFICANT CHANGE UP (ref 0–0.5)
EOSINOPHIL NFR BLD AUTO: 0.9 % — SIGNIFICANT CHANGE UP (ref 0–6)
FLUAV AG NPH QL: SIGNIFICANT CHANGE UP
FLUBV AG NPH QL: SIGNIFICANT CHANGE UP
GAS PNL BLDV: 137 MMOL/L — SIGNIFICANT CHANGE UP (ref 136–145)
GAS PNL BLDV: 138 MMOL/L — SIGNIFICANT CHANGE UP (ref 136–145)
GAS PNL BLDV: SIGNIFICANT CHANGE UP
GLUCOSE BLDC GLUCOMTR-MCNC: 148 MG/DL — HIGH (ref 70–99)
GLUCOSE BLDV-MCNC: 190 MG/DL — HIGH (ref 70–99)
GLUCOSE BLDV-MCNC: 199 MG/DL — HIGH (ref 70–99)
GLUCOSE SERPL-MCNC: 204 MG/DL — HIGH (ref 70–99)
GLUCOSE UR QL: NEGATIVE MG/DL — SIGNIFICANT CHANGE UP
HCO3 BLDV-SCNC: 25 MMOL/L — SIGNIFICANT CHANGE UP (ref 22–29)
HCO3 BLDV-SCNC: 26 MMOL/L — SIGNIFICANT CHANGE UP (ref 22–29)
HCT VFR BLD CALC: 34.1 % — LOW (ref 34.5–45)
HCT VFR BLDA CALC: 29 % — LOW (ref 34.5–46.5)
HCT VFR BLDA CALC: 33 % — LOW (ref 34.5–46.5)
HGB BLD CALC-MCNC: 10.9 G/DL — LOW (ref 11.7–16.1)
HGB BLD CALC-MCNC: 9.7 G/DL — LOW (ref 11.7–16.1)
HGB BLD-MCNC: 10.8 G/DL — LOW (ref 11.5–15.5)
IMM GRANULOCYTES NFR BLD AUTO: 0.5 % — SIGNIFICANT CHANGE UP (ref 0–0.9)
INR BLD: 1.1 RATIO — SIGNIFICANT CHANGE UP (ref 0.85–1.18)
KETONES UR-MCNC: NEGATIVE MG/DL — SIGNIFICANT CHANGE UP
LACTATE BLDV-MCNC: 1.3 MMOL/L — SIGNIFICANT CHANGE UP (ref 0.5–2)
LACTATE BLDV-MCNC: 1.7 MMOL/L — SIGNIFICANT CHANGE UP (ref 0.5–2)
LEUKOCYTE ESTERASE UR-ACNC: ABNORMAL
LYMPHOCYTES # BLD AUTO: 1.16 K/UL — SIGNIFICANT CHANGE UP (ref 1–3.3)
LYMPHOCYTES # BLD AUTO: 7.1 % — LOW (ref 13–44)
MCHC RBC-ENTMCNC: 30 PG — SIGNIFICANT CHANGE UP (ref 27–34)
MCHC RBC-ENTMCNC: 31.7 GM/DL — LOW (ref 32–36)
MCV RBC AUTO: 94.7 FL — SIGNIFICANT CHANGE UP (ref 80–100)
MONOCYTES # BLD AUTO: 0.53 K/UL — SIGNIFICANT CHANGE UP (ref 0–0.9)
MONOCYTES NFR BLD AUTO: 3.2 % — SIGNIFICANT CHANGE UP (ref 2–14)
NEUTROPHILS # BLD AUTO: 14.38 K/UL — HIGH (ref 1.8–7.4)
NEUTROPHILS NFR BLD AUTO: 87.9 % — HIGH (ref 43–77)
NITRITE UR-MCNC: NEGATIVE — SIGNIFICANT CHANGE UP
NRBC # BLD: 0 /100 WBCS — SIGNIFICANT CHANGE UP (ref 0–0)
NT-PROBNP SERPL-SCNC: 3388 PG/ML — HIGH (ref 0–300)
OTHER CELLS CSF MANUAL: 9.8 ML/DL — LOW (ref 18–22)
PCO2 BLDV: 57 MMHG — HIGH (ref 39–42)
PCO2 BLDV: 58 MMHG — HIGH (ref 39–42)
PH BLDV: 7.25 — LOW (ref 7.32–7.43)
PH BLDV: 7.27 — LOW (ref 7.32–7.43)
PH UR: 6 — SIGNIFICANT CHANGE UP (ref 5–8)
PLATELET # BLD AUTO: 209 K/UL — SIGNIFICANT CHANGE UP (ref 150–400)
PO2 BLDV: 44 MMHG — SIGNIFICANT CHANGE UP (ref 25–45)
PO2 BLDV: 59 MMHG — HIGH (ref 25–45)
POTASSIUM BLDV-SCNC: 3.6 MMOL/L — SIGNIFICANT CHANGE UP (ref 3.5–5.1)
POTASSIUM BLDV-SCNC: 3.7 MMOL/L — SIGNIFICANT CHANGE UP (ref 3.5–5.1)
POTASSIUM SERPL-MCNC: 3.5 MMOL/L — SIGNIFICANT CHANGE UP (ref 3.5–5.3)
POTASSIUM SERPL-SCNC: 3.5 MMOL/L — SIGNIFICANT CHANGE UP (ref 3.5–5.3)
PROCALCITONIN SERPL-MCNC: 0.18 NG/ML — HIGH (ref 0.02–0.1)
PROT SERPL-MCNC: 6.6 G/DL — SIGNIFICANT CHANGE UP (ref 6–8.3)
PROT UR-MCNC: 100 MG/DL
PROTHROM AB SERPL-ACNC: 11.5 SEC — SIGNIFICANT CHANGE UP (ref 9.5–13)
RBC # BLD: 3.6 M/UL — LOW (ref 3.8–5.2)
RBC # FLD: 15.4 % — HIGH (ref 10.3–14.5)
RBC CASTS # UR COMP ASSIST: 7 /HPF — HIGH (ref 0–4)
REVIEW: SIGNIFICANT CHANGE UP
RSV RNA NPH QL NAA+NON-PROBE: SIGNIFICANT CHANGE UP
SAO2 % BLDV: 70.1 % — SIGNIFICANT CHANGE UP (ref 67–88)
SAO2 % BLDV: 88.2 % — HIGH (ref 67–88)
SARS-COV-2 RNA SPEC QL NAA+PROBE: SIGNIFICANT CHANGE UP
SODIUM SERPL-SCNC: 143 MMOL/L — SIGNIFICANT CHANGE UP (ref 135–145)
SP GR SPEC: 1.01 — SIGNIFICANT CHANGE UP (ref 1–1.03)
SQUAMOUS # UR AUTO: 9 /HPF — HIGH (ref 0–5)
TROPONIN T, HIGH SENSITIVITY RESULT: 36 NG/L — SIGNIFICANT CHANGE UP (ref 0–51)
UROBILINOGEN FLD QL: 0.2 MG/DL — SIGNIFICANT CHANGE UP (ref 0.2–1)
WBC # BLD: 16.37 K/UL — HIGH (ref 3.8–10.5)
WBC # FLD AUTO: 16.37 K/UL — HIGH (ref 3.8–10.5)
WBC UR QL: >998 /HPF — HIGH (ref 0–5)
YEAST-LIKE CELLS: PRESENT

## 2024-05-10 PROCEDURE — 99222 1ST HOSP IP/OBS MODERATE 55: CPT

## 2024-05-10 PROCEDURE — 71045 X-RAY EXAM CHEST 1 VIEW: CPT | Mod: 26

## 2024-05-10 PROCEDURE — 99291 CRITICAL CARE FIRST HOUR: CPT | Mod: GC

## 2024-05-10 PROCEDURE — 71250 CT THORAX DX C-: CPT | Mod: 26,MC

## 2024-05-10 PROCEDURE — 99223 1ST HOSP IP/OBS HIGH 75: CPT

## 2024-05-10 PROCEDURE — 74176 CT ABD & PELVIS W/O CONTRAST: CPT | Mod: 26

## 2024-05-10 RX ORDER — VANCOMYCIN HCL 1 G
125 VIAL (EA) INTRAVENOUS EVERY 8 HOURS
Refills: 0 | Status: DISCONTINUED | OUTPATIENT
Start: 2024-05-10 | End: 2024-05-10

## 2024-05-10 RX ORDER — FLUOXETINE HCL 10 MG
20 CAPSULE ORAL DAILY
Refills: 0 | Status: DISCONTINUED | OUTPATIENT
Start: 2024-05-10 | End: 2024-05-17

## 2024-05-10 RX ORDER — HEPARIN SODIUM 5000 [USP'U]/ML
5000 INJECTION INTRAVENOUS; SUBCUTANEOUS EVERY 12 HOURS
Refills: 0 | Status: DISCONTINUED | OUTPATIENT
Start: 2024-05-10 | End: 2024-05-17

## 2024-05-10 RX ORDER — IPRATROPIUM/ALBUTEROL SULFATE 18-103MCG
3 AEROSOL WITH ADAPTER (GRAM) INHALATION ONCE
Refills: 0 | Status: COMPLETED | OUTPATIENT
Start: 2024-05-10 | End: 2024-05-10

## 2024-05-10 RX ORDER — HEPARIN SODIUM 5000 [USP'U]/ML
5000 INJECTION INTRAVENOUS; SUBCUTANEOUS EVERY 12 HOURS
Refills: 0 | Status: DISCONTINUED | OUTPATIENT
Start: 2024-05-10 | End: 2024-05-10

## 2024-05-10 RX ORDER — ENOXAPARIN SODIUM 100 MG/ML
50 INJECTION SUBCUTANEOUS EVERY 12 HOURS
Refills: 0 | Status: DISCONTINUED | OUTPATIENT
Start: 2024-05-10 | End: 2024-05-10

## 2024-05-10 RX ORDER — PREDNISOLONE SODIUM PHOSPHATE 1 %
1 DROPS OPHTHALMIC (EYE)
Refills: 0 | Status: DISCONTINUED | OUTPATIENT
Start: 2024-05-10 | End: 2024-05-17

## 2024-05-10 RX ORDER — IPRATROPIUM/ALBUTEROL SULFATE 18-103MCG
3 AEROSOL WITH ADAPTER (GRAM) INHALATION EVERY 6 HOURS
Refills: 0 | Status: DISCONTINUED | OUTPATIENT
Start: 2024-05-10 | End: 2024-05-17

## 2024-05-10 RX ORDER — FUROSEMIDE 40 MG
40 TABLET ORAL ONCE
Refills: 0 | Status: COMPLETED | OUTPATIENT
Start: 2024-05-10 | End: 2024-05-10

## 2024-05-10 RX ORDER — BUDESONIDE, MICRONIZED 100 %
0.25 POWDER (GRAM) MISCELLANEOUS
Refills: 0 | Status: DISCONTINUED | OUTPATIENT
Start: 2024-05-10 | End: 2024-05-17

## 2024-05-10 RX ORDER — CYCLOPENTOLATE HYDROCHLORIDE 10 MG/ML
1 SOLUTION/ DROPS OPHTHALMIC
Refills: 0 | Status: DISCONTINUED | OUTPATIENT
Start: 2024-05-10 | End: 2024-05-17

## 2024-05-10 RX ORDER — CEFEPIME 1 G/1
1000 INJECTION, POWDER, FOR SOLUTION INTRAMUSCULAR; INTRAVENOUS ONCE
Refills: 0 | Status: COMPLETED | OUTPATIENT
Start: 2024-05-10 | End: 2024-05-10

## 2024-05-10 RX ORDER — SODIUM CHLORIDE 9 MG/ML
4 INJECTION INTRAMUSCULAR; INTRAVENOUS; SUBCUTANEOUS EVERY 12 HOURS
Refills: 0 | Status: DISCONTINUED | OUTPATIENT
Start: 2024-05-10 | End: 2024-05-17

## 2024-05-10 RX ORDER — ACETAMINOPHEN 500 MG
1000 TABLET ORAL ONCE
Refills: 0 | Status: COMPLETED | OUTPATIENT
Start: 2024-05-10 | End: 2024-05-10

## 2024-05-10 RX ORDER — AZITHROMYCIN 500 MG/1
500 TABLET, FILM COATED ORAL ONCE
Refills: 0 | Status: COMPLETED | OUTPATIENT
Start: 2024-05-10 | End: 2024-05-10

## 2024-05-10 RX ORDER — ALBUTEROL 90 UG/1
2 AEROSOL, METERED ORAL THREE TIMES A DAY
Refills: 0 | Status: DISCONTINUED | OUTPATIENT
Start: 2024-05-10 | End: 2024-05-10

## 2024-05-10 RX ORDER — CEFEPIME 1 G/1
INJECTION, POWDER, FOR SOLUTION INTRAMUSCULAR; INTRAVENOUS
Refills: 0 | Status: DISCONTINUED | OUTPATIENT
Start: 2024-05-10 | End: 2024-05-17

## 2024-05-10 RX ORDER — SODIUM CHLORIDE 9 MG/ML
500 INJECTION INTRAMUSCULAR; INTRAVENOUS; SUBCUTANEOUS ONCE
Refills: 0 | Status: COMPLETED | OUTPATIENT
Start: 2024-05-10 | End: 2024-05-10

## 2024-05-10 RX ORDER — ALBUTEROL 90 UG/1
2 AEROSOL, METERED ORAL EVERY 6 HOURS
Refills: 0 | Status: DISCONTINUED | OUTPATIENT
Start: 2024-05-10 | End: 2024-05-10

## 2024-05-10 RX ORDER — CEFEPIME 1 G/1
1000 INJECTION, POWDER, FOR SOLUTION INTRAMUSCULAR; INTRAVENOUS EVERY 12 HOURS
Refills: 0 | Status: DISCONTINUED | OUTPATIENT
Start: 2024-05-11 | End: 2024-05-17

## 2024-05-10 RX ORDER — CEFTRIAXONE 500 MG/1
1000 INJECTION, POWDER, FOR SOLUTION INTRAMUSCULAR; INTRAVENOUS ONCE
Refills: 0 | Status: COMPLETED | OUTPATIENT
Start: 2024-05-10 | End: 2024-05-10

## 2024-05-10 RX ORDER — ATORVASTATIN CALCIUM 80 MG/1
20 TABLET, FILM COATED ORAL AT BEDTIME
Refills: 0 | Status: DISCONTINUED | OUTPATIENT
Start: 2024-05-10 | End: 2024-05-17

## 2024-05-10 RX ORDER — LOSARTAN POTASSIUM 100 MG/1
25 TABLET, FILM COATED ORAL DAILY
Refills: 0 | Status: DISCONTINUED | OUTPATIENT
Start: 2024-05-10 | End: 2024-05-10

## 2024-05-10 RX ADMIN — SODIUM CHLORIDE 500 MILLILITER(S): 9 INJECTION INTRAMUSCULAR; INTRAVENOUS; SUBCUTANEOUS at 08:40

## 2024-05-10 RX ADMIN — Medication 20 MILLIGRAM(S): at 22:48

## 2024-05-10 RX ADMIN — Medication 3 MILLILITER(S): at 16:55

## 2024-05-10 RX ADMIN — AZITHROMYCIN 255 MILLIGRAM(S): 500 TABLET, FILM COATED ORAL at 09:15

## 2024-05-10 RX ADMIN — Medication 3 MILLILITER(S): at 08:37

## 2024-05-10 RX ADMIN — Medication 20 MILLIGRAM(S): at 16:56

## 2024-05-10 RX ADMIN — HEPARIN SODIUM 5000 UNIT(S): 5000 INJECTION INTRAVENOUS; SUBCUTANEOUS at 20:07

## 2024-05-10 RX ADMIN — Medication 400 MILLIGRAM(S): at 11:11

## 2024-05-10 RX ADMIN — Medication 0.25 MILLIGRAM(S): at 18:29

## 2024-05-10 RX ADMIN — CEFEPIME 100 MILLIGRAM(S): 1 INJECTION, POWDER, FOR SOLUTION INTRAMUSCULAR; INTRAVENOUS at 14:20

## 2024-05-10 RX ADMIN — Medication 1 DROP(S): at 18:29

## 2024-05-10 RX ADMIN — Medication 40 MILLIGRAM(S): at 11:12

## 2024-05-10 RX ADMIN — SODIUM CHLORIDE 4 MILLILITER(S): 9 INJECTION INTRAMUSCULAR; INTRAVENOUS; SUBCUTANEOUS at 20:54

## 2024-05-10 RX ADMIN — CEFTRIAXONE 100 MILLIGRAM(S): 500 INJECTION, POWDER, FOR SOLUTION INTRAMUSCULAR; INTRAVENOUS at 08:40

## 2024-05-10 RX ADMIN — CYCLOPENTOLATE HYDROCHLORIDE 1 DROP(S): 10 SOLUTION/ DROPS OPHTHALMIC at 18:35

## 2024-05-10 RX ADMIN — Medication 125 MILLIGRAM(S): at 08:40

## 2024-05-10 RX ADMIN — Medication 20 MILLIGRAM(S): at 22:27

## 2024-05-10 RX ADMIN — Medication 3 MILLILITER(S): at 22:48

## 2024-05-10 NOTE — CHART NOTE - NSCHARTNOTEFT_GEN_A_CORE
Last pulmonologist to evaluate Ms. lEaine was Dr. Lucas Borges. Dr. Borges will be away through the end of the month per his office upon calling for consult. Deferred to house pulmonology. House pulm consulted.

## 2024-05-10 NOTE — ED ADULT NURSE NOTE - OBJECTIVE STATEMENT
89 y/o F PMHx COPD, HTN, CAD, BIB EMS from [facility] presents to the ED w/ SOB prior to arrival. Per EMS, pt was found to be short of breath and more lethargic, pt was given Magnesiumx4, Ttwmzzabx04, Atroventx3 from EMS. Pt was also placed on a NRB satting at 95. Pt arousable to stimulation. Pt A&Ox2 at baseline but found to be more lethargic. Pt poor historian. Pt dressed in gown, IV access obtained, placed on 4L NC.

## 2024-05-10 NOTE — PATIENT PROFILE ADULT - FALL HARM RISK - HARM RISK INTERVENTIONS

## 2024-05-10 NOTE — ED PROVIDER NOTE - OBJECTIVE STATEMENT
Luiza Elaine is an 88-year-old female presenting from [facility] with known history of C. difficile, diverticulitis, asthma/COPD, sarcoidosis, hypothyroidism, hypertension, CHF, presenting with concern of respiratory failure this morning at her facility.  She arrives via EMS having received 10 of IV Decadron, 1 DuoNeb, 4 magnesium, placed on oxygen with improvement in her oxygen saturation, known baseline AO 1-2 now responding only to vigorous stimuli.  Pt BP initially 220/99 at the facility, T 98.9 F, 130 BPM, RR 26 and O2 saturation 98% on NRB.

## 2024-05-10 NOTE — H&P ADULT - HISTORY OF PRESENT ILLNESS
88-year-old female    presenting from [facility] rehab center        h/o  C. difficile, diverticulitis, asthma/COPD, sarcoidosis, hypothyroidism, hypertension, CHF    presenting with concern of respiratory failure this morning at her facility.     ARRIVED via EMS having received 10 of IV Decadron, 1 DuoNeb, 4 magnesium,       placed on oxygen with improvement in her oxygen saturation, known baseline AO 1-2    responding only to vigorous stimuli.  Pt BP initially 220/99 at the facility, T 98.9 F, 130 BPM,   rejected  by icu  88-year-old female    presenting from [facility] rehab center /  sob       h/o  C. difficile, diverticulitis, asthma/COPD, sarcoidosis, hypothyroidism, hypertension, CHF    presenting with concern of respiratory failure this morning at her facility/  sob .     arrive d  form  having received 10 of IV Decadron, 1 DuoNeb, 4 magnesium,       placed on oxygen with improvement in her oxygen saturation, known baseline AO -2       bp     was  220/99 at the facility, T 98.9 F, 130 BPM,   rejected  by icu. pe r primary     pt  seen  in  er/  awake  and  alert. states she s unsure, why she  was  brought  to  er

## 2024-05-10 NOTE — H&P ADULT - NSHPLABSRESULTS_GEN_ALL_CORE
LABS:                        10.8   16.37 )-----------( 209      ( 10 May 2024 08:12 )             34.1     05-10    143  |  106  |  15  ----------------------------<  204<H>  3.5   |  21<L>  |  0.76    Ca    8.6      10 May 2024 08:12    TPro  6.6  /  Alb  3.6  /  TBili  0.3  /  DBili  x   /  AST  34  /  ALT  18  /  AlkPhos  138<H>  05-10    PT/INR - ( 10 May 2024 08:12 )   PT: 11.5 sec;   INR: 1.10 ratio         PTT - ( 10 May 2024 08:12 )  PTT:31.0 sec      Urinalysis Basic - ( 10 May 2024 08:31 )    Color: Yellow / Appearance: Turbid / S.014 / pH: x  Gluc: x / Ketone: Negative mg/dL  / Bili: Negative / Urobili: 0.2 mg/dL   Blood: x / Protein: 100 mg/dL / Nitrite: Negative   Leuk Esterase: Large / RBC: 7 /HPF / WBC >998 /HPF   Sq Epi: x / Non Sq Epi: 9 /HPF / Bacteria: Negative /HPF          05-10 @ 08:00  3.6  59

## 2024-05-10 NOTE — CONSULT NOTE ADULT - ASSESSMENT
88-year-old female presenting from [facility] rehab center with known history of C. difficile, diverticulitis, asthma/COPD, sarcoidosis, hypothyroidism, hypertension, CHF, presenting with concern of respiratory failure this morning at her facility.  She arrives via EMS having received 10 of IV Decadron, 1 DuoNeb, 4 magnesium, placed on oxygen with improvement in her oxygen saturation, known baseline AO 1-2 now responding only to vigorous stimuli.  Pt BP initially 220/99 at the facility, T 98.9 F, 130 BPM, RR 26 and O2 saturation 98% on NRB.  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 , sarcoidosis  pulmonary with increasing sob  ct chest  noted with possible pneumonia/ chf diastolic acute on chronic  agree with abx  start on lasix  20 mg daily  will consider possible pulmonary consult  review old echo  dvt prophylaxis

## 2024-05-10 NOTE — CONSULT NOTE ADULT - ASSESSMENT
88 year old well known to me  recently discharged with possible UTI and diverticulitis  manu prior admissions discharged about 3 weeks ago    returns with weakness and sepsis syndrome and sob        hs of C diff years ago but not recently   has been living in [facility]   CHF  Diverticulitis ( hx)  Hx UTI recently  sarcoid     Now has impressive right sided pna; most likely aspiration .  Also with catheter and pyuria   no concern about Legionella    Discussed with daughter  Pt is cachetic and repeatedly in the hospital  she understands the prognosis is poor     cefepime 1 q 12 hr

## 2024-05-10 NOTE — ED PROVIDER NOTE - CONVERSATION DETAILS
Spoke with patient's daughter Sandra Leonard regarding patient's goals of care.  Attempted to reach healthcare proxy Deborah Sanford but did not get through.  Given patient's presentation and decreased responsiveness, discussed that she will likely require intubation to improve her respiratory status. will trial NIPPV while awaiting labs work including VBG.  Given advanced age and comorbidities discussed that it may be worth considering consider deferring intubation.  Patient is family would want intubation at this time.

## 2024-05-10 NOTE — ED PROVIDER NOTE - PROGRESS NOTE DETAILS
Homer, PGY-3, EM: Spoke with patient's daughter Sandra Leonard, she is not the patient's healthcare proxy advised that she is in the hospital she was unaware.  Stated that her healthcare proxy is Deborah Sanford.  Attempted to call Deborah Sanford who did not  the phone will attempt again soon.  Patient's current status based on her paperwork from the facility is full code. Homer, PGY-3, EM: s/w Pt's HCP Deborah Sanford, pt wishes are to be full code thus they are respecting her wishes at this time. Homer, PGY-3, EM: s/w Dr. Hermosillo pt is a bounceback to his service, he accepts pending MICU rejects the patient, awaiting final recommendations from MICU. Pt improved significantly, on NRB will try NC and reassess. Homer, PGY-3, EM: Patient continues to remain stable, improved on BiPAP now transition to nasal cannula with an oxygen saturation of 95.  Blood pressure 125/62 MAP of 79 given this we will cancel the MICU consult at this time and admit on medicine.

## 2024-05-10 NOTE — ED PROVIDER NOTE - CLINICAL SUMMARY MEDICAL DECISION MAKING FREE TEXT BOX
Luiza Elaine is an 88-year-old female presenting [facility] with known history of C. difficile, diverticulitis, asthma/COPD, sarcoidosis, hypothyroidism, hypertension, CHF, presenting with concern of respiratory failure this morning at her facility. Differential diagnosis includes but is not limited to COPD exacerbation, sepsis, CHF exacerbation causing acute pulmonary edema, pneumonia, PE, viral infection.  Patient initially responsive only to vigorous stimuli requiring nonrebreather.  Given AMS will trial BiPAP but will likely proceed to intubation if necessary.  Labs sent for sepsis, cultures, cardiac including troponin and BNP, will get urine studies.  Definite admission will determine whether patient requires ICU versus floor. ABX ordered empirically for respiratory source.

## 2024-05-10 NOTE — H&P ADULT - NSHPREVIEWOFSYSTEMS_GEN_ALL_CORE
REVIEW OF SYSTEMS:  CONSTITUTIONAL: No fever,  no  weight loss  ENT:  No  tinnitus,   no   vertigo  NECK: No pain or stiffness  RESPIRATORY: No cough, wheezing, chills or hemoptysis;   + Shortness of Breath  CARDIOVASCULAR: No chest pain, palpitations, dizziness  GASTROINTESTINAL: No abdominal or epigastric pain. No nausea, vomiting, or hematemesis; No diarrhea  No melena or hematochezia.  GENITOURINARY: No dysuria, frequency, hematuria, or incontinence  NEUROLOGICAL: No headaches  SKIN: No itching,  no   rash  LYMPH Nodes: No enlarged glands  ENDOCRINE: No heat or cold intolerance  MUSCULOSKELETAL: No joint pain or swelling  PSYCHIATRIC: No depression, anxiety  HEME/LYMPH: No easy bruising, or bleeding gums  ALLERGY AND IMMUNOLOGIC: No hives or eczema

## 2024-05-10 NOTE — PATIENT PROFILE ADULT - FUNCTIONAL ASSESSMENT - BASIC MOBILITY 5.
[FreeTextEntry1] : warm compresses to be done\par appears to be resolving\par not clear whether this was related to the earring issue 
1 = Total assistance

## 2024-05-10 NOTE — H&P ADULT - NSHPPHYSICALEXAM_GEN_ALL_CORE
T(C): 37.9 (05-10-24 @ 08:20), Max: 37.9 (05-10-24 @ 08:20)  HR: 101 (05-10-24 @ 08:55) (101 - 112)  BP: 84/65 (05-10-24 @ 08:55) (84/65 - 143/64)  RR: 22 (05-10-24 @ 08:55) (22 - 28)  SpO2: 94% (05-10-24 @ 08:55) (94% - 96%)  GENERAL: NAD, lying in bed comfortably  HEAD:  Atraumatic, normocephalic  EYES: EOMI, PERRLA, conjunctiva and sclera clear  NECK: Supple, trachea midline, no JVD  HEART: Regular rate and rhythm, no murmurs, rubs, or gallops  LUNGS:  few  coasrs   b/sounds  ABDOMEN: Soft, nontender, nondistended, +BS  EXTREMITIES: 2+ peripheral pulses bilaterally. No clubbing, cyanosis, or edema  NERVOUS SYSTEM:   lethargic   SKIN: No rashes or lesions T(C): 37.9 (05-10-24 @ 08:20), Max: 37.9 (05-10-24 @ 08:20)  HR: 101 (05-10-24 @ 08:55) (101 - 112)  BP: 84/65 (05-10-24 @ 08:55) (84/65 - 143/64)  RR: 22 (05-10-24 @ 08:55) (22 - 28)  SpO2: 94% (05-10-24 @ 08:55) (94% - 96%)  GENERAL: NAD, lying in bed comfortably  HEAD:  Atraumatic, normocephalic  EYES: EOMI, PERRLA, conjunctiva and sclera clear  NECK: Supple, trachea midline, no JVD  HEART: Regular rate and rhythm, no murmurs, rubs, or gallops  LUNGS:  few  coasrs   b/sounds  ABDOMEN: Soft, nontender, nondistended, +BS  EXTREMITIES: 2+ peripheral pulses bilaterally. No clubbing, cyanosis, or edema  NERVOUS SYSTEM:    awkae.  able  to  answer  qustions   SKIN: No rashes or lesions

## 2024-05-10 NOTE — CHART NOTE - NSCHARTNOTEFT_GEN_A_CORE
Spoke with Daughter Deborah regarding medication clarification. Nurse Katie received a call from a daughter named Yasmin that is not listed on emergency contact stating that mother is on Lexapro. Per Deborah, she states that her mother is on Prozac and is requesting for it to be reinstated.  Spoke with doctor Andrés regarding the above. psychotropic medications held due to sedation. okay to resume Prozac.    Patient did not pass bedside swallow  Patient will remain NPO for now.    Called back daughter Deborah, to inform her of the above voicemail left to return call    Genia Marcus NP  spectralink [phone]

## 2024-05-10 NOTE — ED PROVIDER NOTE - PHYSICAL EXAMINATION
General: chronically ill-appearing  Head: Atraumatic, normocephalic  Eyes: no scleral icterus, no discharge  Neurology: A&Ox 0, opens eyes to vigorous stimulation  Respiratory: coarse breath sounds b/l, hypoxic to 82-84% on RA  CV: RRR, Extremities warm and well perfused  Abdominal: Soft, non-distended, non-tender, no masses  Extremities: 1+ LE edema R>L, no deformities  Skin: warm and dry. venous stasis dermatitis b/l, erythema to R lateral shin

## 2024-05-10 NOTE — H&P ADULT - ASSESSMENT
88 F       arrives   form  [facility],        h/o   CAD,   pulmonary sarcoidosis, COPD ,  HTN,       Ct  a/p.   2/24,  diverticulitis ,  sigmoid  colon, severe  L hydroureter/  mod  L   hydronephrosis    comp fx  L 4       ct   a/p angio 3/21/ 24,  colitis and  diverticulitis. patent   mesenteric vessels       h/o c  diff /  known  to id billy r cheryl     admitted  with sob./  acute  resp  failur,         from pna/  copd  exacerbation      cxr,, opacities      on iv  zosyn/known  to  house  ID      abg       house  pulm      given  recent  c  diff, will  start   empiric  po  vanco    c/.c  anemia     CAD/  known to  card    COPD   pulm sarcoid    HTN     Echo,  normal  ef/  RV  dysfunction,  mlple  wall  motion abnormalities/  mild  MR   depression , will  hold    meds,  for  now,  given  ams/  melatonin, temazepam  remeron, trazadone    dvt  ppx     proventil/  iv steroid        rad< from: CT Abdomen and Pelvis No Cont (04.07.24 @ 00:11) >  ST WALL AND LOWER NECK: Coarse bilateral breast calcifications. Left   lower chest wall subcutaneous loop recorder. Severe bilateral   glenohumeral osteoarthritis with remodeling of the glenoid and small   bilateral glenohumeral joint effusions.  ABDOMEN AND PELVIS:  LIVER: Within normal limits.  BILE DUCTS: Normal caliber.  GALLBLADDER: Within normal limits.  SPLEEN: Within normal limits.  PANCREAS: Atrophic.  ADRENALS: Within normal limits.  KIDNEYS/URETERS: Severe left hydroureter to the level of the urinary   bladder, not significantly changed. Right extra renal pelvis.  BLADDER: Within normal limits.  REPRODUCTIVE ORGANS: Uterus and left ovary within normal limits. Right   ovary not visualized. No right adnexal mass.  BOWEL: Circumferential wall thickening of the proximal and mid sigmoid   colon with mild pericolonic inflammatory change compatible with a   colitis. No bowel obstruction. Colonic diverticulosis. Appendix is not   visualized. No evidence of inflammation in the pericecal region.  PERITONEUM: No ascites.  VESSELS: Atherosclerotic changes.  RETROPERITONEUM/LYMPH NODES: No lymphadenopathy.  ABDOMINAL WALL: Within normal limits.  BONES: Partially visualized left femoral intramedullary oumou with proximal   blade. Degenerative changes of the lumbar spine. S-shaped thoracolumbar   scoliosis. Compression deformity of the superior endplate of L4, not   significantly changed. Grade 1 anterolisthesis of L5 on S1.    IMPRESSION:  1. No pulmonary consolidation.  2. Colitis of the proximal and mid sigmoid colon which is infectious,   inflammatory or ischemic in etiology.  --- End of Report ---         rad< from: TTE Limited W or WO Ultrasound Enhancing Agent (04.08.24 @ 14:17) >  NCLUSIONS:   1. Left ventricular cavity is normal in size. Left ventricular wall thickness is normal. Left ventricular systolic function is normal with an ejection fraction visually estimated at 55 to 60 %. There are no regional wall motion abnormalities seen.   2. Normal rightventricular cavity size, with normal wall thickness, and normal systolic function. Tricuspid annular plane systolic excursion (TAPSE) is 2.0 cm (normal >=1.7 cm).   3. The left atrium is severely dilated.   4. Mild mitral regurgitation.   5. Comparedto the transthoracic echocardiogram performed on 3/26/2024, regional wall motion abnormality not appreciated  < end of copied text >       88 F       arrives   form  [facility], /  with sob        h/o   CAD,   pulmonary sarcoidosis, COPD ,  HTN,        c/c  cachexia        Ct  a/p.   2/24,  diverticulitis ,  sigmoid  colon, severe  L hydroureter/  mod  L   hydronephrosis    comp fx  L 4       ct   a/p angio 3/21/ 24,  colitis and  diverticulitis. patent   mesenteric vessels       h/o c  diff /  known  to id dr luna     admitted  with sob.  from   [facility].  pt seen  in  er, on nasal  canula            pt  is  awake,  talking  and  able  to  answer   questions         sob,  from pna/  copd  exacerbation            wbc  16,000. hb  10.  sbp is  84  in  er              abnormal  u/a/  elevated  d  dimer             Ct  chest  ngio,  to  r/o  pe              cxr,, opacities/  pulm  eval             on iv  zosyn/known  to  house  ID    c/.c  anemia     CAD   known to  card    COPD   pulm sarcoid    HTN/  hold  meds /  follwo  bp curve      Echo,  normal  ef/  RV  dysfunction,  mlple  wall  motion abnormalities/  mild  MR        depression , will  hold    meds,  for  now,  given  pt's  ams  at rehab.  melatonin, temazepam  remeron, trazadone    dvt  ppx       proventil/  iv steroid     discussed  with er  attending       rad< from: CT Abdomen and Pelvis No Cont (04.07.24 @ 00:11) >  ST WALL AND LOWER NECK: Coarse bilateral breast calcifications. Left   lower chest wall subcutaneous loop recorder. Severe bilateral   glenohumeral osteoarthritis with remodeling of the glenoid and small   bilateral glenohumeral joint effusions.  ABDOMEN AND PELVIS:  LIVER: Within normal limits.  BILE DUCTS: Normal caliber.  GALLBLADDER: Within normal limits.  SPLEEN: Within normal limits.  PANCREAS: Atrophic.  ADRENALS: Within normal limits.  KIDNEYS/URETERS: Severe left hydroureter to the level of the urinary   bladder, not significantly changed. Right extra renal pelvis.  BLADDER: Within normal limits.  REPRODUCTIVE ORGANS: Uterus and left ovary within normal limits. Right   ovary not visualized. No right adnexal mass.  BOWEL: Circumferential wall thickening of the proximal and mid sigmoid   colon with mild pericolonic inflammatory change compatible with a   colitis. No bowel obstruction. Colonic diverticulosis. Appendix is not   visualized. No evidence of inflammation in the pericecal region.  PERITONEUM: No ascites.  VESSELS: Atherosclerotic changes.  RETROPERITONEUM/LYMPH NODES: No lymphadenopathy.  ABDOMINAL WALL: Within normal limits.  BONES: Partially visualized left femoral intramedullary oumou with proximal   blade. Degenerative changes of the lumbar spine. S-shaped thoracolumbar   scoliosis. Compression deformity of the superior endplate of L4, not   significantly changed. Grade 1 anterolisthesis of L5 on S1.    IMPRESSION:  1. No pulmonary consolidation.  2. Colitis of the proximal and mid sigmoid colon which is infectious,   inflammatory or ischemic in etiology.  --- End of Report ---         rad< from: TTE Limited W or WO Ultrasound Enhancing Agent (04.08.24 @ 14:17) >  NCLUSIONS:   1. Left ventricular cavity is normal in size. Left ventricular wall thickness is normal. Left ventricular systolic function is normal with an ejection fraction visually estimated at 55 to 60 %. There are no regional wall motion abnormalities seen.   2. Normal rightventricular cavity size, with normal wall thickness, and normal systolic function. Tricuspid annular plane systolic excursion (TAPSE) is 2.0 cm (normal >=1.7 cm).   3. The left atrium is severely dilated.   4. Mild mitral regurgitation.   5. Comparedto the transthoracic echocardiogram performed on 3/26/2024, regional wall motion abnormality not appreciated  < end of copied text >       88 F       arrives   form  [facility], /  with sob        h/o   CAD,   pulmonary sarcoidosis, COPD ,  HTN,        c/c  cachexia        Ct  a/p.   2/24,  diverticulitis ,  sigmoid  colon, severe  L hydroureter/  mod  L   hydronephrosis    comp fx  L 4       ct   a/p angio 3/21/ 24,  colitis and  diverticulitis. patent   mesenteric vessels       h/o c  diff /  known  to id dr luna     admitted  with sob.  from   [facility].  pt seen  in  er, on nasal  canula            pt  is  awake,  talking  and  able  to  answer   questions         sob,  from pna/  copd  exacerbation            wbc  16,000. hb  10.  sbp is  84  in  er             abnormal  u/a            elevated  d  dimer.  pt has  contrast  allergy.  v/q  scan/  leg   doppler               to  r/o  pe              cxr,, opacities/  pulm  eval             on iv  zosyn/known  to  house  ID    c/.c  anemia     CAD   known to  card    COPD   pulm sarcoid    HTN/  hold  meds /  follwo  bp curve      Echo,  normal  ef/  RV  dysfunction,  mlple  wall  motion abnormalities/  mild  MR        depression , will  hold    meds,  for  now,  given  pt's  ams  at rehab.  melatonin, temazepam  remeron, trazadone    dvt  ppx       proventil/  iv steroid     discussed  with er  attending       rad< from: CT Abdomen and Pelvis No Cont (04.07.24 @ 00:11) >  ST WALL AND LOWER NECK: Coarse bilateral breast calcifications. Left   lower chest wall subcutaneous loop recorder. Severe bilateral   glenohumeral osteoarthritis with remodeling of the glenoid and small   bilateral glenohumeral joint effusions.  ABDOMEN AND PELVIS:  LIVER: Within normal limits.  BILE DUCTS: Normal caliber.  GALLBLADDER: Within normal limits.  SPLEEN: Within normal limits.  PANCREAS: Atrophic.  ADRENALS: Within normal limits.  KIDNEYS/URETERS: Severe left hydroureter to the level of the urinary   bladder, not significantly changed. Right extra renal pelvis.  BLADDER: Within normal limits.  REPRODUCTIVE ORGANS: Uterus and left ovary within normal limits. Right   ovary not visualized. No right adnexal mass.  BOWEL: Circumferential wall thickening of the proximal and mid sigmoid   colon with mild pericolonic inflammatory change compatible with a   colitis. No bowel obstruction. Colonic diverticulosis. Appendix is not   visualized. No evidence of inflammation in the pericecal region.  PERITONEUM: No ascites.  VESSELS: Atherosclerotic changes.  RETROPERITONEUM/LYMPH NODES: No lymphadenopathy.  ABDOMINAL WALL: Within normal limits.  BONES: Partially visualized left femoral intramedullary oumou with proximal   blade. Degenerative changes of the lumbar spine. S-shaped thoracolumbar   scoliosis. Compression deformity of the superior endplate of L4, not   significantly changed. Grade 1 anterolisthesis of L5 on S1.    IMPRESSION:  1. No pulmonary consolidation.  2. Colitis of the proximal and mid sigmoid colon which is infectious,   inflammatory or ischemic in etiology.  --- End of Report ---         rad< from: TTE Limited W or WO Ultrasound Enhancing Agent (04.08.24 @ 14:17) >  NCLUSIONS:   1. Left ventricular cavity is normal in size. Left ventricular wall thickness is normal. Left ventricular systolic function is normal with an ejection fraction visually estimated at 55 to 60 %. There are no regional wall motion abnormalities seen.   2. Normal rightventricular cavity size, with normal wall thickness, and normal systolic function. Tricuspid annular plane systolic excursion (TAPSE) is 2.0 cm (normal >=1.7 cm).   3. The left atrium is severely dilated.   4. Mild mitral regurgitation.   5. Comparedto the transthoracic echocardiogram performed on 3/26/2024, regional wall motion abnormality not appreciated  < end of copied text >       88 F       arrives   form  [facility], /  with sob        h/o   CAD,   pulmonary sarcoidosis, COPD ,  HTN,        c/c  cachexia        Ct  a/p.   2/24,  diverticulitis ,  sigmoid  colon, severe  L hydroureter/  mod  L   hydronephrosis    comp fx  L 4       ct   a/p angio 3/21/ 24,  colitis and  diverticulitis. patent   mesenteric vessels       h/o c  diff /  known  to id dr luna     admitted  with sob.  from   [facility].  pt seen  in  er, on nasal  canula            pt  is  awake,  talking  and  able  to  answer   questions              sob,  from pna/  copd  exacerbation/  acute on  c/c  diastolic  chf              wbc  16,000. hb  10.  sbp is  84  in  er             abnormal  u/a             elevated  d  dimer.  pt has  contrast  allergy.  v/q  scan/  leg   doppler               to  r/o  pe              cxr,, opacities/  pulm  eval             on iv  zosyn/known  to  house  ID    c/.c  anemia     CAD   known to  card    COPD   pulm sarcoid    HTN/  hold  meds /  follow   bp curve      Echo,  3/24,          normal  ef/  RV  dysfunction,  mlple  wall  motion abnormalities/  mild  MR,  ruptured  chordae tendinae, to  ant  mitral     valve  leaflet     pt's card  d r abitan  at  [facility].  and  me d rx  was  the plan, a s pt is not a  candidate   for  any intervention     Depression , will  hold    meds,  for  now,  given  pt's  ams  at rehab.,   melatonin, temazepam  remeron, trazadone     dvt  ppx       proventil/  iv steroid     discussed  with er  attending       rad< from: CT Abdomen and Pelvis No Cont (04.07.24 @ 00:11) >  ST WALL AND LOWER NECK: Coarse bilateral breast calcifications. Left   lower chest wall subcutaneous loop recorder. Severe bilateral   glenohumeral osteoarthritis with remodeling of the glenoid and small   bilateral glenohumeral joint effusions.  ABDOMEN AND PELVIS:  LIVER: Within normal limits.  BILE DUCTS: Normal caliber.  GALLBLADDER: Within normal limits.  SPLEEN: Within normal limits.  PANCREAS: Atrophic.  ADRENALS: Within normal limits.  KIDNEYS/URETERS: Severe left hydroureter to the level of the urinary   bladder, not significantly changed. Right extra renal pelvis.  BLADDER: Within normal limits.  REPRODUCTIVE ORGANS: Uterus and left ovary within normal limits. Right   ovary not visualized. No right adnexal mass.  BOWEL: Circumferential wall thickening of the proximal and mid sigmoid   colon with mild pericolonic inflammatory change compatible with a   colitis. No bowel obstruction. Colonic diverticulosis. Appendix is not   visualized. No evidence of inflammation in the pericecal region.  PERITONEUM: No ascites.  VESSELS: Atherosclerotic changes.  RETROPERITONEUM/LYMPH NODES: No lymphadenopathy.  ABDOMINAL WALL: Within normal limits.  BONES: Partially visualized left femoral intramedullary oumou with proximal   blade. Degenerative changes of the lumbar spine. S-shaped thoracolumbar   scoliosis. Compression deformity of the superior endplate of L4, not   significantly changed. Grade 1 anterolisthesis of L5 on S1.    IMPRESSION:  1. No pulmonary consolidation.  2. Colitis of the proximal and mid sigmoid colon which is infectious,   inflammatory or ischemic in etiology.  --- End of Report ---         rad< from: TTE W or WO Ultrasound Enhancing Agent (03.26.24 @ 15:34) >  NCLUSIONS:    1. Left ventricular systolic function is low normal with an ejection fraction of 50 % by Enrique's method of disks.   2. Entire septum, entire inferior wall, and apical anterior segment are abnormal.   3. Normal right ventricular cavity size and mildly reduced systolic function.   4. Ruptured chordae tendineae to the anterior mitral leaflet.   5. Trace mitral regurgitation.   6. No prior echocardiogram is available for comparison.  ________________________________________________________________________________________  FINDINGS:   88 F       arrives   form  [facility], /  with sob        h/o   CAD,   pulmonary sarcoidosis, COPD ,  HTN,        c/c  cachexia        Ct  a/p.   2/24,  diverticulitis ,  sigmoid  colon, severe  L hydroureter/  mod  L   hydronephrosis    comp fx  L 4       ct   a/p angio 3/21/ 24,  colitis and  diverticulitis. patent   mesenteric vessels       h/o c  diff /  known  to id dr luna     admitted  with sob.  from   [facility].  pt seen  in  er, on nasal  canula            pt  is  awake,  talking  and  able  to  answer   questions              sob,  from pna/  copd  exacerbation/  acute on  c/c  diastolic  chf              wbc  16,000. hb  10.  sbp is  84  in  er             abnormal  u/a             elevated  d  dimer.  pt has  contrast  allergy.  v/q  scan/  leg   doppler               to  r/o  pe              cxr,, opacities/  pulm  eval             on iv  zosyn/known  to  house  ID    c/.c  anemia     CAD   known to  card    COPD   pulm sarcoid    HTN/  hold  meds /  follow   bp curve      Echo,  3/24,          normal  ef/  RV  dysfunction,  mlple  wall  motion abnormalities/  mild  MR,  ruptured  chordae tendinae, to  ant  mitral     valve  leaflet     pt's card  d r abitan  at  [facility].  and  me d rx  was  the plan, a s pt is not a  candidate   for  any intervention     Depression , will  hold    meds,  for  now,  given  pt's  ams  at rehab.,   melatonin, temazepam  remeron, trazadone     dvt  ppx       proventil/  iv steroid . airway  clerance/  hpuse  pulm called        Ct  chest.  consolidation//  collapse.. RML/  RLL,   fluid. emphysem. CHF    discussed  with er  attending     pt  with mlple  co morbidities      pt  is full code       rad< from: CT Chest No Cont (05.10.24 @ 10:24) >  MPRESSION:  Complete consolidation/atelectasis of right lower lobe and right middle   lobe, increased from prior exam can represent pneumonia.  Interlobular septal thickening suggestive of fluid overload. Trace   pleural effusions.  Emphysema.  --- End of Report ---    <<    rad< from: CT Abdomen and Pelvis No Cont (04.07.24 @ 00:11) >  ST WALL AND LOWER NECK: Coarse bilateral breast calcifications. Left   lower chest wall subcutaneous loop recorder. Severe bilateral   glenohumeral osteoarthritis with remodeling of the glenoid and small   bilateral glenohumeral joint effusions.  ABDOMEN AND PELVIS:  LIVER: Within normal limits.  BILE DUCTS: Normal caliber.  GALLBLADDER: Within normal limits.  SPLEEN: Within normal limits.  PANCREAS: Atrophic.  ADRENALS: Within normal limits.  KIDNEYS/URETERS: Severe left hydroureter to the level of the urinary   bladder, not significantly changed. Right extra renal pelvis.  BLADDER: Within normal limits.  REPRODUCTIVE ORGANS: Uterus and left ovary within normal limits. Right   ovary not visualized. No right adnexal mass.  BOWEL: Circumferential wall thickening of the proximal and mid sigmoid   colon with mild pericolonic inflammatory change compatible with a   colitis. No bowel obstruction. Colonic diverticulosis. Appendix is not   visualized. No evidence of inflammation in the pericecal region.  PERITONEUM: No ascites.  VESSELS: Atherosclerotic changes.  RETROPERITONEUM/LYMPH NODES: No lymphadenopathy.  ABDOMINAL WALL: Within normal limits.  BONES: Partially visualized left femoral intramedullary oumou with proximal   blade. Degenerative changes of the lumbar spine. S-shaped thoracolumbar   scoliosis. Compression deformity of the superior endplate of L4, not   significantly changed. Grade 1 anterolisthesis of L5 on S1.    IMPRESSION:  1. No pulmonary consolidation.  2. Colitis of the proximal and mid sigmoid colon which is infectious,   inflammatory or ischemic in etiology.  --- End of Report ---         rad< from: TTE W or WO Ultrasound Enhancing Agent (03.26.24 @ 15:34) >  NCLUSIONS:    1. Left ventricular systolic function is low normal with an ejection fraction of 50 % by Enrique's method of disks.   2. Entire septum, entire inferior wall, and apical anterior segment are abnormal.   3. Normal right ventricular cavity size and mildly reduced systolic function.   4. Ruptured chordae tendineae to the anterior mitral leaflet.   5. Trace mitral regurgitation.   6. No prior echocardiogram is available for comparison.  ________________________________________________________________________________________  FINDINGS:   88 F       arrives   form  [facility], /  with sob        h/o   CAD,   pulmonary sarcoidosis, COPD ,  HTN,        c/c  cachexia        Ct  a/p.   2/24,  diverticulitis ,  sigmoid  colon, severe  L hydroureter/  mod  L   hydronephrosis    comp fx  L 4       ct   a/p angio 3/21/ 24,  colitis and  diverticulitis. patent   mesenteric vessels       h/o c  diff /  known  to id dr luna     admitted  with sob.  from   [facility].  pt seen  in  er, on nasal  canula            pt  is   easily arousable,  and  able  to  answer   questions              sob,  from pna/  copd  exacerbation/  acute on  c/c  diastolic  chf              wbc  16,000. hb  10.  sbp is  84  in  er             abnormal  u/a.  collado             elevated  d  dimer.  pt has  contrast  allergy.  v/q  scan/  leg   doppler              cxr,, opacities/  pulm  eval              on iv  zosyn/known  to  house  ID    c/.c  anemia     CAD   known to  card     COPD      pulm sarcoid    HTN/  hold  meds /  follow   bp curve      Echo,  3/24,          normal  ef/  RV  dysfunction,  mlple  wall  motion abnormalities/  mild  MR,  ruptured  chordae tendinae, to  ant  mitral     valve  leaflet     pt's card  d r abitan  at  [facility].  and  me d rx  was  the plan, a s pt is not a  candidate   for  any intervention     Depression , will  hold    meds,  for  now,  given  pt's  ams  at rehab.,   melatonin, temazepam  remeron, trazadone     dvt  ppx       proventil/  iv steroid . airway  clerance/  hpuse  pulm called        Ct  chest.  consolidation//  collapse.. RML/  RLL,   fluid. emphysem. CHF       discussed  with er  attending        pt  with mlple  co morbidities.  cachexia ,  frail      pt  is full code       rad< from: CT Chest No Cont (05.10.24 @ 10:24) >  MPRESSION:  Complete consolidation/atelectasis of right lower lobe and right middle   lobe, increased from prior exam can represent pneumonia.  Interlobular septal thickening suggestive of fluid overload. Trace   pleural effusions.  Emphysema.  --- End of Report ---    <<    rad< from: CT Abdomen and Pelvis No Cont (04.07.24 @ 00:11) >  ST WALL AND LOWER NECK: Coarse bilateral breast calcifications. Left   lower chest wall subcutaneous loop recorder. Severe bilateral   glenohumeral osteoarthritis with remodeling of the glenoid and small   bilateral glenohumeral joint effusions.  ABDOMEN AND PELVIS:  LIVER: Within normal limits.  BILE DUCTS: Normal caliber.  GALLBLADDER: Within normal limits.  SPLEEN: Within normal limits.  PANCREAS: Atrophic.  ADRENALS: Within normal limits.  KIDNEYS/URETERS: Severe left hydroureter to the level of the urinary   bladder, not significantly changed. Right extra renal pelvis.  BLADDER: Within normal limits.  REPRODUCTIVE ORGANS: Uterus and left ovary within normal limits. Right   ovary not visualized. No right adnexal mass.  BOWEL: Circumferential wall thickening of the proximal and mid sigmoid   colon with mild pericolonic inflammatory change compatible with a   colitis. No bowel obstruction. Colonic diverticulosis. Appendix is not   visualized. No evidence of inflammation in the pericecal region.  PERITONEUM: No ascites.  VESSELS: Atherosclerotic changes.  RETROPERITONEUM/LYMPH NODES: No lymphadenopathy.  ABDOMINAL WALL: Within normal limits.  BONES: Partially visualized left femoral intramedullary oumou with proximal   blade. Degenerative changes of the lumbar spine. S-shaped thoracolumbar   scoliosis. Compression deformity of the superior endplate of L4, not   significantly changed. Grade 1 anterolisthesis of L5 on S1.    IMPRESSION:  1. No pulmonary consolidation.  2. Colitis of the proximal and mid sigmoid colon which is infectious,   inflammatory or ischemic in etiology.  --- End of Report ---         rad< from: TTE W or WO Ultrasound Enhancing Agent (03.26.24 @ 15:34) >  NCLUSIONS:    1. Left ventricular systolic function is low normal with an ejection fraction of 50 % by Enrique's method of disks.   2. Entire septum, entire inferior wall, and apical anterior segment are abnormal.   3. Normal right ventricular cavity size and mildly reduced systolic function.   4. Ruptured chordae tendineae to the anterior mitral leaflet.   5. Trace mitral regurgitation.   6. No prior echocardiogram is available for comparison.  ________________________________________________________________________________________  FINDINGS:

## 2024-05-10 NOTE — ED ADULT NURSE NOTE - NSFALLHARMRISKINTERV_ED_ALL_ED

## 2024-05-10 NOTE — CONSULT NOTE ADULT - ASSESSMENT
88female w/ PMH pulmonary sarcoidosis, COPD not on home O2, CAD, hypothyroidism, and HTN presenting for sob, fevers, and respiratory distress.   CT concerning for recurrent aspiration     # acute hypoxemic respiratory failure  # COPD  # aspiration pneumonia  # Sarcoidosis  # R elevated diaphragm   - likely 2/2 to aspiration, has episodes at home  on liquids, fevers increased WBC  - Abx per ID  - duonebs q 6, add symbicort BID  - Decrease steroids to prednisone 40 mg daily.   - No signs of sarcoid on CT, per patient remote history.   - Chronically elevated right diaphgram. Stable  - Can hold off on BIPAP for now given concerns for aspiration.   - S/S eval, NPO for now.   - wean O2 as tolerated.  88 female w/ PMH pulmonary sarcoidosis, COPD not on home O2, CAD, hypothyroidism, and HTN presenting for sob, fevers, and respiratory distress.   CT concerning for recurrent aspiration     # acute hypoxemic respiratory failure  # COPD  # aspiration pneumonia  # Sarcoidosis  # R elevated diaphragm   - likely 2/2 to aspiration, has episodes at home  on liquids, fevers increased WBC  - Abx per ID  - duonebs q 6, add symbicort BID  - Decrease steroids to prednisone 40 mg daily.   - No signs of sarcoid on CT, per patient remote history.   - Chronically elevated right diaphgram. Stable  - Can hold off on BIPAP for now given concerns for aspiration.   - diuresis per primary team, concerning on CT scan. Also with severely dilated LA possible increased LAP.   - S/S eval, NPO for now.   - wean O2 as tolerated.  88 female w/ PMH pulmonary sarcoidosis, COPD not on home O2, CAD, hypothyroidism, and HTN presenting for sob, fevers, and respiratory distress.   CT concerning for recurrent aspiration     # acute hypoxemic respiratory failure  # COPD  # aspiration pneumonia  # Sarcoidosis  # R elevated diaphragm   - likely 2/2 to aspiration, has episodes at home  on liquids, fevers increased WBC  - Abx per ID  - duonebs q 6, add symbicort BID  - Decrease steroids to prednisone 40 mg daily.   - No signs of sarcoid on CT, per patient remote history.   - Chronically elevated right diaphgram. Stable  - Can hold off on BIPAP for now given concerns for aspiration.   - diuresis per primary team, concerning on CT scan. Also with severely dilated LA possible increased LAP.  - Less likely to be PE, patient with mildly elevated d-dimer, age corrected is below cut off. Other reasons to be hypoxemic.   - S/S eval, NPO for now.   - wean O2 as tolerated.

## 2024-05-10 NOTE — CHART NOTE - NSCHARTNOTEFT_GEN_A_CORE
Patient cannot tolerate VQ scan at this time. Start lovenox 1 mg/kg q12 hr. Will re-visit VQ scan when pt more stable per Dr Hermosillo. VA Duplex lower extremities pending. Patient cannot tolerate VQ scan at this time. Start lovenox 1 mg/kg q12 hr. Will re-visit VQ scan when pt more stable per Dr Hermosillo. VA Duplex lower extremities pending.    ADDENDUM 18:20: Pulmonary did not recommend lovenox, subcutaneous heparin restarted as discussed with Dr. Bowen.

## 2024-05-11 LAB
ALBUMIN SERPL ELPH-MCNC: 3.7 G/DL — SIGNIFICANT CHANGE UP (ref 3.3–5)
ALP SERPL-CCNC: 117 U/L — SIGNIFICANT CHANGE UP (ref 40–120)
ALT FLD-CCNC: 14 U/L — SIGNIFICANT CHANGE UP (ref 10–45)
ANION GAP SERPL CALC-SCNC: 15 MMOL/L — SIGNIFICANT CHANGE UP (ref 5–17)
AST SERPL-CCNC: 15 U/L — SIGNIFICANT CHANGE UP (ref 10–40)
BILIRUB SERPL-MCNC: 0.3 MG/DL — SIGNIFICANT CHANGE UP (ref 0.2–1.2)
BUN SERPL-MCNC: 22 MG/DL — SIGNIFICANT CHANGE UP (ref 7–23)
CALCIUM SERPL-MCNC: 8.8 MG/DL — SIGNIFICANT CHANGE UP (ref 8.4–10.5)
CHLORIDE SERPL-SCNC: 104 MMOL/L — SIGNIFICANT CHANGE UP (ref 96–108)
CO2 SERPL-SCNC: 24 MMOL/L — SIGNIFICANT CHANGE UP (ref 22–31)
CREAT SERPL-MCNC: 0.69 MG/DL — SIGNIFICANT CHANGE UP (ref 0.5–1.3)
EGFR: 84 ML/MIN/1.73M2 — SIGNIFICANT CHANGE UP
GLUCOSE BLDC GLUCOMTR-MCNC: 149 MG/DL — HIGH (ref 70–99)
GLUCOSE BLDC GLUCOMTR-MCNC: 214 MG/DL — HIGH (ref 70–99)
GLUCOSE SERPL-MCNC: 159 MG/DL — HIGH (ref 70–99)
HCT VFR BLD CALC: 30 % — LOW (ref 34.5–45)
HGB BLD-MCNC: 9.7 G/DL — LOW (ref 11.5–15.5)
MCHC RBC-ENTMCNC: 29.7 PG — SIGNIFICANT CHANGE UP (ref 27–34)
MCHC RBC-ENTMCNC: 32.3 GM/DL — SIGNIFICANT CHANGE UP (ref 32–36)
MCV RBC AUTO: 91.7 FL — SIGNIFICANT CHANGE UP (ref 80–100)
NRBC # BLD: 0 /100 WBCS — SIGNIFICANT CHANGE UP (ref 0–0)
PLATELET # BLD AUTO: 163 K/UL — SIGNIFICANT CHANGE UP (ref 150–400)
POTASSIUM SERPL-MCNC: 3.5 MMOL/L — SIGNIFICANT CHANGE UP (ref 3.5–5.3)
POTASSIUM SERPL-SCNC: 3.5 MMOL/L — SIGNIFICANT CHANGE UP (ref 3.5–5.3)
PROT SERPL-MCNC: 6.4 G/DL — SIGNIFICANT CHANGE UP (ref 6–8.3)
RBC # BLD: 3.27 M/UL — LOW (ref 3.8–5.2)
RBC # FLD: 15.5 % — HIGH (ref 10.3–14.5)
SODIUM SERPL-SCNC: 143 MMOL/L — SIGNIFICANT CHANGE UP (ref 135–145)
WBC # BLD: 7.2 K/UL — SIGNIFICANT CHANGE UP (ref 3.8–10.5)
WBC # FLD AUTO: 7.2 K/UL — SIGNIFICANT CHANGE UP (ref 3.8–10.5)

## 2024-05-11 PROCEDURE — 93970 EXTREMITY STUDY: CPT | Mod: 26

## 2024-05-11 PROCEDURE — 99231 SBSQ HOSP IP/OBS SF/LOW 25: CPT

## 2024-05-11 RX ORDER — ACETAMINOPHEN 500 MG
650 TABLET ORAL EVERY 6 HOURS
Refills: 0 | Status: DISCONTINUED | OUTPATIENT
Start: 2024-05-11 | End: 2024-05-17

## 2024-05-11 RX ORDER — ACETAMINOPHEN 500 MG
750 TABLET ORAL ONCE
Refills: 0 | Status: COMPLETED | OUTPATIENT
Start: 2024-05-11 | End: 2024-05-11

## 2024-05-11 RX ORDER — TEMAZEPAM 15 MG/1
15 CAPSULE ORAL ONCE
Refills: 0 | Status: DISCONTINUED | OUTPATIENT
Start: 2024-05-11 | End: 2024-05-12

## 2024-05-11 RX ORDER — FUROSEMIDE 40 MG
20 TABLET ORAL DAILY
Refills: 0 | Status: DISCONTINUED | OUTPATIENT
Start: 2024-05-11 | End: 2024-05-12

## 2024-05-11 RX ORDER — TRAZODONE HCL 50 MG
50 TABLET ORAL AT BEDTIME
Refills: 0 | Status: DISCONTINUED | OUTPATIENT
Start: 2024-05-11 | End: 2024-05-12

## 2024-05-11 RX ADMIN — Medication 20 MILLIGRAM(S): at 11:44

## 2024-05-11 RX ADMIN — Medication 3 MILLILITER(S): at 23:09

## 2024-05-11 RX ADMIN — Medication 3 MILLILITER(S): at 05:25

## 2024-05-11 RX ADMIN — Medication 3 MILLILITER(S): at 11:44

## 2024-05-11 RX ADMIN — Medication 0.25 MILLIGRAM(S): at 05:25

## 2024-05-11 RX ADMIN — CYCLOPENTOLATE HYDROCHLORIDE 1 DROP(S): 10 SOLUTION/ DROPS OPHTHALMIC at 05:26

## 2024-05-11 RX ADMIN — Medication 0.25 MILLIGRAM(S): at 17:27

## 2024-05-11 RX ADMIN — SODIUM CHLORIDE 4 MILLILITER(S): 9 INJECTION INTRAMUSCULAR; INTRAVENOUS; SUBCUTANEOUS at 05:26

## 2024-05-11 RX ADMIN — Medication 750 MILLIGRAM(S): at 01:30

## 2024-05-11 RX ADMIN — CEFEPIME 100 MILLIGRAM(S): 1 INJECTION, POWDER, FOR SOLUTION INTRAMUSCULAR; INTRAVENOUS at 17:27

## 2024-05-11 RX ADMIN — Medication 650 MILLIGRAM(S): at 12:27

## 2024-05-11 RX ADMIN — Medication 50 MILLIGRAM(S): at 23:09

## 2024-05-11 RX ADMIN — SODIUM CHLORIDE 4 MILLILITER(S): 9 INJECTION INTRAMUSCULAR; INTRAVENOUS; SUBCUTANEOUS at 22:27

## 2024-05-11 RX ADMIN — CEFEPIME 100 MILLIGRAM(S): 1 INJECTION, POWDER, FOR SOLUTION INTRAMUSCULAR; INTRAVENOUS at 05:27

## 2024-05-11 RX ADMIN — Medication 20 MILLIGRAM(S): at 05:25

## 2024-05-11 RX ADMIN — ATORVASTATIN CALCIUM 20 MILLIGRAM(S): 80 TABLET, FILM COATED ORAL at 21:21

## 2024-05-11 RX ADMIN — Medication 1 DROP(S): at 17:28

## 2024-05-11 RX ADMIN — CYCLOPENTOLATE HYDROCHLORIDE 1 DROP(S): 10 SOLUTION/ DROPS OPHTHALMIC at 17:28

## 2024-05-11 RX ADMIN — Medication 20 MILLIGRAM(S): at 21:21

## 2024-05-11 RX ADMIN — Medication 650 MILLIGRAM(S): at 11:47

## 2024-05-11 RX ADMIN — Medication 650 MILLIGRAM(S): at 19:21

## 2024-05-11 RX ADMIN — Medication 300 MILLIGRAM(S): at 01:00

## 2024-05-11 RX ADMIN — Medication 650 MILLIGRAM(S): at 22:16

## 2024-05-11 RX ADMIN — Medication 3 MILLILITER(S): at 17:27

## 2024-05-11 RX ADMIN — Medication 20 MILLIGRAM(S): at 13:10

## 2024-05-11 RX ADMIN — Medication 1 DROP(S): at 05:26

## 2024-05-11 RX ADMIN — HEPARIN SODIUM 5000 UNIT(S): 5000 INJECTION INTRAVENOUS; SUBCUTANEOUS at 17:27

## 2024-05-11 RX ADMIN — HEPARIN SODIUM 5000 UNIT(S): 5000 INJECTION INTRAVENOUS; SUBCUTANEOUS at 05:25

## 2024-05-11 NOTE — DIETITIAN INITIAL EVALUATION ADULT - NUTRITION CONSULT
DANTE JOHNSON III  : 1962 10:09:24  ACCOUNT:  [id number]  HOME PHONE:  [phone]  WORK PHONE:  [phone] [employer] message on patient answering machine w/test results as indicated below per andrea Morin    Notify pt of normal Echo Doppler. CPM.     yes

## 2024-05-11 NOTE — PROGRESS NOTE ADULT - SUBJECTIVE AND OBJECTIVE BOX
Date of Service: 05-11-24 @ 10:31           CARDIOLOGY     PROGRESS  NOTE   ________________________________________________    CHIEF COMPLAINT:Patient is a 88y old  Female who presents with a chief complaint of sob (11 May 2024 09:53)  no complain  	  REVIEW OF SYSTEMS:  CONSTITUTIONAL: No fever, weight loss, or fatigue  EYES: No eye pain, visual disturbances, or discharge  ENT:  No difficulty hearing, tinnitus, vertigo; No sinus or throat pain  NECK: No pain or stiffness  RESPIRATORY: No cough, wheezing, chills or hemoptysis; No Shortness of Breath  CARDIOVASCULAR: No chest pain, palpitations, passing out, dizziness, or leg swelling  GASTROINTESTINAL: No abdominal or epigastric pain. No nausea, vomiting, or hematemesis; No diarrhea or constipation. No melena or hematochezia.  GENITOURINARY: No dysuria, frequency, hematuria, or incontinence  NEUROLOGICAL: No headaches, memory loss, loss of strength, numbness, or tremors  SKIN: No itching, burning, rashes, or lesions   LYMPH Nodes: No enlarged glands  ENDOCRINE: No heat or cold intolerance; No hair loss  MUSCULOSKELETAL: No joint pain or swelling; No muscle, back, or extremity pain  PSYCHIATRIC: No depression, anxiety, mood swings, or difficulty sleeping  HEME/LYMPH: No easy bruising, or bleeding gums  ALLERGY AND IMMUNOLOGIC: No hives or eczema	    [x ] All others negative	  [ ] Unable to obtain    PHYSICAL EXAM:  T(C): 36.6 (05-11-24 @ 08:41), Max: 37.4 (05-10-24 @ 23:47)  HR: 92 (05-11-24 @ 08:41) (80 - 93)  BP: 163/93 (05-11-24 @ 08:41) (92/62 - 163/93)  RR: 18 (05-11-24 @ 08:41) (18 - 23)  SpO2: 96% (05-11-24 @ 08:41) (90% - 97%)  Wt(kg): --  I&O's Summary      Appearance: Normal	  HEENT:   Normal oral mucosa, PERRL, EOMI	  Lymphatic: No lymphadenopathy  Cardiovascular: Normal S1 S2, No JVD,+ murmurs, No edema  Respiratory: rhonchi  Psychiatry: A & O x 3, Mood & affect appropriate  Gastrointestinal:  Soft, Non-tender, + BS	  Skin: No rashes, No ecchymoses, No cyanosis	  Extremities: Normal range of motion, No clubbing, cyanosis or edema  Vascular: Peripheral pulses palpable 2+ bilaterally    MEDICATIONS  (STANDING):  albuterol/ipratropium for Nebulization 3 milliLiter(s) Nebulizer every 6 hours  atorvastatin 20 milliGRAM(s) Oral at bedtime  buDESOnide    Inhalation Suspension 0.25 milliGRAM(s) Inhalation two times a day  cefepime   IVPB 1000 milliGRAM(s) IV Intermittent every 12 hours  cefepime   IVPB      cyclopentolate 1% Solution 1 Drop(s) Both EYES two times a day  FLUoxetine 20 milliGRAM(s) Oral daily  heparin   Injectable 5000 Unit(s) SubCutaneous every 12 hours  methylPREDNISolone sodium succinate Injectable 20 milliGRAM(s) IV Push three times a day  prednisoLONE acetate 1% Suspension 1 Drop(s) Both EYES two times a day  sodium chloride 7% Inhalation 4 milliLiter(s) Inhalation every 12 hours      TELEMETRY: 	    ECG:  	  RADIOLOGY:  OTHER: 	  	  LABS:	 	    CARDIAC MARKERS:                        9.7    7.20  )-----------( 163      ( 11 May 2024 09:45 )             30.0     05-10    143  |  106  |  15  ----------------------------<  204<H>  3.5   |  21<L>  |  0.76    Ca    8.6      10 May 2024 08:12    TPro  6.6  /  Alb  3.6  /  TBili  0.3  /  DBili  x   /  AST  34  /  ALT  18  /  AlkPhos  138<H>  05-10    proBNP:   Lipid Profile:   HgA1c:   TSH:   PT/INR - ( 10 May 2024 08:12 )   PT: 11.5 sec;   INR: 1.10 ratio         PTT - ( 10 May 2024 08:12 )  PTT:31.0 sec      < from: CT Chest No Cont (05.10.24 @ 10:24) >  Complete consolidation/atelectasis of right lower lobe and right middle   lobe, increased from prior exam can represent pneumonia.    Interlobular septal thickening suggestive of fluid overload. Trace   pleural effusions.    Emphysema.        Assessment and plan  ---------------------------  88-year-old [facility] with known history of C. difficile, diverticulitis, asthma/COPD, sarcoidosis, hypothyroidism, hypertension, CHF, presenting with concern of respiratory failure this morning at her facility.  She arrives via EMS having received 10 of IV Decadron, 1 DuoNeb, 4 magnesium, placed on oxygen with improvement in her oxygen saturation, known baseline AO 1-2 now responding only to vigorous stimuli.  Pt BP initially 220/99 at the facility, T 98.9 F, 130 BPM, RR 26 and O2 saturation 98% on NRB.  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 , sarcoidosis  pulmonary with increasing sob  ct chest  noted with possible pneumonia/ chf diastolic acute on chronic  agree with abx  start on lasix  20 mg daily  will consider possible pulmonary consult  review old echo  dvt prophylaxis  pulmonary eval sec to emphysema, add inhalers ?small dose of steroid  chest chest pulmonary edema, unless the findings are pulmonary fibrosis

## 2024-05-11 NOTE — SWALLOW BEDSIDE ASSESSMENT ADULT - ASR SWALLOW ASPIRATION MONITOR
Monitor for s/s aspiration/laryngeal penetration (e.g., including increase in supplemental oxygen requirements, WBC, or worsening chest imaging). If noted:  D/C p.o. intake, provide non-oral nutrition/hydration/meds, and contact this service @ [phone]/change of breathing pattern/cough/gurgly voice/fever/pneumonia/throat clearing/upper respiratory infection

## 2024-05-11 NOTE — DIETITIAN INITIAL EVALUATION ADULT - REASON INDICATOR FOR ASSESSMENT
Stage 2 Pressure Injury or greater  Information obtained from: Review of pt's current medical record, interview with pt's daughter, previous RD documentation from prior admission to [facility].

## 2024-05-11 NOTE — DIETITIAN INITIAL EVALUATION ADULT - PHYSCIAL ASSESSMENT
Weights:    Current Admission Weights:  - Drug Dosing Weight: 50.5 kg/ 111.3 pounds  (5/10/24)  - Daily weight: None available       Weight History per Komal LOYOLA:  - 49.4 kg/ 108.9 pounds (2/18/24), 48.1 kg/ 106 pounds (4/7/24), 45.4 kg/ 100.1 pounds (5/10/23)      IBW:  110 pounds   %IBW: 101%

## 2024-05-11 NOTE — SWALLOW BEDSIDE ASSESSMENT ADULT - MODE OF PRESENTATION
pt unable to self feed given reports of shaking/cup/spoon/fed by clinician pt reports distaste of water, w/ preference for gingerale/cup/straw/fed by clinician pt unable to self feed given reports of shaking/cup/fed by clinician pt unable to self feed given reports of shaking/fed by clinician fed by clinician

## 2024-05-11 NOTE — DIETITIAN INITIAL EVALUATION ADULT - PERTINENT LABORATORY DATA
05-11    143  |  104  |  22  ----------------------------<  159<H>  3.5   |  24  |  0.69    Ca    8.8      11 May 2024 09:50    TPro  6.4  /  Alb  3.7  /  TBili  0.3  /  DBili  x   /  AST  15  /  ALT  14  /  AlkPhos  117  05-11    POCT Blood Glucose.: 214 mg/dL (05-11-24 @ 11:40)  POCT Blood Glucose.: 149 mg/dL (05-11-24 @ 06:57)  POCT Blood Glucose.: 148 mg/dL (05-10-24 @ 20:09)    A1C with Estimated Average Glucose Result: 6.0 % (09-27-23 @ 06:44)

## 2024-05-11 NOTE — SWALLOW BEDSIDE ASSESSMENT ADULT - H & P REVIEW
Luiza Elaine is an 88-year-old female [facility] with known history of C. difficile, diverticulitis, asthma/COPD, sarcoidosis, hypothyroidism, hypertension, CHF, presenting with concern of respiratory failure this morning at her facility.  She arrives via EMS having received 10 of IV Decadron, 1 DuoNeb, 4 magnesium, placed on oxygen with improvement in her oxygen saturation, known baseline AO 1-2 now responding only to vigorous stimuli; on NRB w/ possibly intubation; pt was not intubated and eventually titrated to 4LNC. Not a candidate for ICU. ID stating pt w/ "impressive right sided PNA; most likely aspiration" . Pulm following for acute hypoxemic respiratory failure, no signs of sarcoid, chronically elevated R diaphragm - stable. Diuresis per primary team. Also w/ severely dilated LA, possible increased LAP. Less likely to be PE. per aide report Often cough with food at home, c/f aspiration PNA. Cards following:CHF w/ diastolic acute on chronic, on lasix; chest w/ pulmonary edema unless the findings are pulmonary fibrosis. S&S eval, NPO for now./yes Luiza Elaine is an 88-[facility]-old [facility] with known history of C. difficile, diverticulitis, asthma/COPD, sarcoidosis, hypothyroidism, hypertension, CHF, presenting with concern of respiratory failure this morning at her facility.  She arrives via EMS having received 10 of IV Decadron, 1 DuoNeb, 4 magnesium, placed on oxygen with improvement in her oxygen saturation, known baseline AO 1-2 now responding only to vigorous stimuli; on NRB w/ possibly intubation; pt was not intubated and eventually titrated to 4LNC. Not a candidate for ICU. ID stating pt w/ "impressive right sided PNA; most likely aspiration" . Pulm following for acute hypoxemic respiratory failure, no signs of sarcoid, chronically elevated R diaphragm - stable. Diuresis per primary team. Also w/ severely dilated LA, possible increased LAP. Less likely to be PE. per aide report Often cough with food at home, CT c/f recurrent aspiration PNA. pt w/ recurrent admissions for COPD and aspiration. Cards following:CHF w/ diastolic acute on chronic, on lasix; chest w/ pulmonary edema unless the findings are pulmonary fibrosis. S&S eval, NPO for now./yes

## 2024-05-11 NOTE — SWALLOW BEDSIDE ASSESSMENT ADULT - SWALLOW EVAL: DIAGNOSIS
Pt is a 88yoF with pmhx of asthma/COPD, sarcoidosis, hypothyroidism, CHF, p/w respiratory failure w/ c/f aspiration PNA. Pt p/w clinical s/sx of pharyngeal dysphagia. Swallow characterized by functional oral management w/ grossly timely initiation of swallow, and cough after regular solids and mildly thick liquids; cough suggestive of overt s/sx of aspiration/penetration. Although no overt s/sx of aspiration/penetration noted w/ thin liquids, given failed dysphagia screen, c/f aspiration PNA, and overt s/sx of aspiration/penetration, would recommend most conservative diet in the interim until objective swallow testing.

## 2024-05-11 NOTE — DIETITIAN INITIAL EVALUATION ADULT - NSFNSNUTRCHEWSWALLOWFT_GEN_A_CORE
Hospitalist Progress Note    Interval history/Subjective:  Mable Herrera is a 89 year old female admitted initially on 11/22/2019 with sepsis secondary to community acquired pneumonia. Sepsis now resolved. Pt sitting up in chair visiting with daughter. She reports that overall her breathing feels improved, but she can tell she needs a nebulizer tx soon. She notices that the rattling in her chest is gone. Still coughing, but not productive. No chest pain or abdominal pain. No N/V/D. Good appetite. No dizziness.       Objective:  Wt Readings from Last 1 Encounters:   11/23/19 74.8 kg     Temp Readings from Last 1 Encounters:   11/23/19 98.5 °F (36.9 °C) (Oral)     Pulse Readings from Last 1 Encounters:   11/23/19 78     Resp Readings from Last 1 Encounters:   11/23/19 20     BP Readings from Last 1 Encounters:   11/23/19 129/58       Intake/Output Summary (Last 24 hours) at 11/23/2019 1021  Last data filed at 11/23/2019 0834  Gross per 24 hour   Intake 490 ml   Output 1300 ml   Net -810 ml     Gen: NAD. Elderly female who is well nourished in appearance. Alert and interactive.  Psych: Pleasant. Cooperative. Appropriate affect and mood.   HEENT: NC/AT. Pupils equal. Conjunctiva unremarkable. External ears/nose without abnormalities. Moist oral mucosa.   CV: Regular rate and rhythm. No murmurs, gallops, or rubs.   Resp: Diminished bibasilar breath sounds, otherwise clear. Good effort. No crackles or wheezing.  GI: Soft. Non-distended. Active bowel sounds. Non-tender. No masses appreciated.   Ext: +2-3 BLE edema. Compression stockings in place. Not cyanotic.   Skin: No rashes noted.   Neuro: A/O x 3. No gross neuro deficits.      Labs:  Recent Labs   Lab 11/23/19  0520 11/22/19  1252   WBC 9.5 16.6*   HGB 11.7* 13.3   HCT 36.3 40.8   MCV 94.0 93.8   RBC 3.86* 4.35   MCH 30.3 30.6   MCHC 32.2 32.6    287     Recent Labs   Lab 11/23/19  0520 11/22/19  1555 11/22/19  1252   CO2 28  --  29   BUN 35*  --  38*    CREATININE 1.02*  --  1.08*   CALCIUM 8.7  --  9.6   ALBUMIN  --  3.1*  --    AST  --  15  --    ANIONGAP 13  --  12     Recent Labs   Lab 11/23/19  0520 11/22/19  1631   INR 2.6 2.7     No results found for: POCGLU  • warfarin  1.5 mg Oral Once   • WARFARIN - PHARMACIST MONITORED   Does not apply See Admin Instructions   • cefTRIAXone (ROCEPHIN) IV  1,000 mg Intravenous Daily   • azithromycin  500 mg Intravenous Daily   • albuterol-ipratropium 2.5 mg/0.5 mg  3 mL Nebulization 4x Daily Resp   • guaiFENesin  600 mg Oral 2 times per day   • sodium chloride (PF)  2 mL Intracatheter 2 times per day   • aspirin  81 mg Oral Daily   • budesonide-formoterol  2 puff Inhalation BID   • Calcium polycarbophil  625 mg Oral Daily   • furosemide  80 mg Oral Daily   • losartan  25 mg Oral Daily   • [START ON 11/25/2019] metOLazone  2.5 mg Oral Once per day on Mon   • metoPROLOL succinate  25 mg Oral Daily   • fluticasone  2 spray Each Nare Daily   • pantoprazole  40 mg Oral QAM AC   • atorvastatin  20 mg Oral Nightly     Continuous Infusions:  PRN Meds:benzonatate, sodium chloride, sodium chloride, sodium chloride, prochlorperazine, acetaminophen, polyethylene glycol, bisacodyl, aluminum-magnesium hydroxide-simethicone      Imaging:  No orders to display       Assessment/Plan:    Sepsis secondary to community acquired pneumonia  -presented for direct admission from PCP office  -has been having cough, dyspnea for > 1 month and completed course of Doxycycline and Prednisone as outpt with only transient and minimal improvement  -CXR suspicious for developing left lung base infiltrate  -sepsis now resolved and clinically improving  -rapid flu negative  -procalcitonin mildly elevated at 0.18  -legionella and strep pneumoniae negative  -blood cx's with no growth to date  -sputum cx pending  -respiratory pathogen panel in process  -continue Rocephin and Azithromycin  -PT/OT/discharge planning  -supportive care     COPD exacerbation  -in  the setting of above  -no active wheezing today  -continue scheduled duo nebs and home medications  -Mucinex, Aerobika     Hypoxia  -in the setting of all of the above  -wean O2 as tolerates     CKD-3  -creatinine 1.08>1.02, stable with baseline  -monitor labs     Hypokalemia  -K+ corrected  -supplement prn per protocol  -monitor     Hx of pulmonary embolism  -continue anticoagulation with Warfarin (target INR 2.0-3.0), pharmacy assisting     Diastolic dysfunction  -chronic BLE edema  -continue home diuretics  -compression stockings, low sodium diet  -closely monitor volume status with daily weights/strict I&O's  -closely monitor and replete electrolytes prn     HTN  -B/P acceptable  -continue PTA medications       Diet:Low sodium     Best practice:  DVT prophylaxis:Warfarin  Patton in Place: No  Central Line: No        Code Status:                        Selective Treatment/DNR       Disposition-Anticipate discharge in 1-2 days if medically ready.    >>Discussed with pt, daughter, and RN    >>Pt was seen in collaboration with DEMETRI Diaz  Hospitalist Service  11/23/2019       I personally saw and assessed patient at bedside. I reviewed and discussed the above-mentioned assessment and plan with OSMAN and we jointly formed treatment plan above. I also made adjustments as necessary. Feels better. On exam bilateral wheezes. Normal s1 and s2. Does not appears in distress. Plan as above.     Anahy Pena MD  11/23/2019 12:36 PM       Defer diet/texture modification to medical team/SLP as indicated

## 2024-05-11 NOTE — DIETITIAN INITIAL EVALUATION ADULT - NSFNSPHYEXAMSKINFT_GEN_A_CORE
Pressure Injury 1: sacrum, Suspected deep tissue injury  Pressure Injury 2: none, none  Pressure Injury 3: none, none  Pressure Injury 4: none, none  Pressure Injury 5: none, none  Pressure Injury 6: none, none  Pressure Injury 7: none, none  Pressure Injury 8: none, none  Pressure Injury 9: none, none  Pressure Injury 10: none, none  Pressure Injury 11: none, none

## 2024-05-11 NOTE — SWALLOW BEDSIDE ASSESSMENT ADULT - COMMENTS
hx con't  flowsheet 5/10 "pt requesting to drink, s/s pending, repeatedly informed patient she is NPO. bed side dysphagia screening failed. aide at bedside and pt made aware. daughter and other family arrived. insisting pt take PO meds. provider came to bedside, family informed of aspiration risk. family decline to comply. family demanding pt recieve PO prozac, accepting the aspiration risk. PO meds brought to bedside, family requesting gingerale instead of water. gingerale brought to bedside. daughter poured gingerale in pt mouth. family and aid reeducated regarding risk of aspiration. HOB elevated to 45 degrees at this time."    CT chest 5/10 IMPRESSION: Complete consolidation/atelectasis of right lower lobe and right middle lobe, increased from prior exam can represent pneumonia.Interlobular septal thickening suggestive of fluid overload. Trace pleural effusions. Emphysema.    FULL CODE.    dysphagia hx: x2 prior bedside swallow exams (10/1/23 and 3/28/24), both w/ recommendations for regular solids and thin liquids. hx con't dysphagia screen failed on 5/10 on 90mL w/ cough/throat clearing w/ indications for NPO  flowsheet 5/10 "pt requesting to drink, s/s pending, repeatedly informed patient she is NPO. bed side dysphagia screening failed. aide at bedside and pt made aware. daughter and other family arrived. insisting pt take PO meds. provider came to bedside, family informed of aspiration risk. family decline to comply. family demanding pt recieve PO prozac, accepting the aspiration risk. PO meds brought to bedside, family requesting gingerale instead of water. gingerale brought to bedside. daughter poured gingerale in pt mouth. family and aid reeducated regarding risk of aspiration. HOB elevated to 45 degrees at this time."    CT chest 5/10 IMPRESSION: Complete consolidation/atelectasis of right lower lobe and right middle lobe, increased from prior exam can represent pneumonia.Interlobular septal thickening suggestive of fluid overload. Trace pleural effusions. Emphysema.    FULL CODE.    dysphagia hx: x2 prior bedside swallow exams (10/1/23 and 3/28/24), both w/ recommendations for regular solids and thin liquids. hx con't dysphagia screen failed on 5/10 on 90mL w/ cough/throat clearing w/ indications for NPO  flowsheet 5/10 "pt requesting to drink, s/s pending, repeatedly informed patient she is NPO. bed side dysphagia screening failed. aide at bedside and pt made aware. daughter and other family arrived. insisting pt take PO meds. provider came to bedside, family informed of aspiration risk. family decline to comply. family demanding pt recieve PO prozac, accepting the aspiration risk. PO meds brought to bedside, family requesting gingerale instead of water. gingerale brought to bedside. daughter poured gingerale in pt mouth. family and aid reeducated regarding risk of aspiration. HOB elevated to 45 degrees at this time."  5/11: per d/w SOCORRO Ervin, pt has been feeding pt this morning despite NPO recommendations    CT chest 5/10 IMPRESSION: Complete consolidation/atelectasis of right lower lobe and right middle lobe, increased from prior exam can represent pneumonia.Interlobular septal thickening suggestive of fluid overload. Trace pleural effusions. Emphysema.    FULL CODE.    dysphagia hx: x2 prior bedside swallow exams (10/1/23 and 3/28/24), both w/ recommendations for regular solids and thin liquids. hx con't dysphagia screen failed on 5/10 on 90mL w/ cough/throat clearing w/ indications for NPO  flowsheet 5/10 "pt requesting to drink, s/s pending, repeatedly informed patient she is NPO. bed side dysphagia screening failed. aide at bedside and pt made aware. daughter and other family arrived. insisting pt take PO meds. provider came to bedside, family informed of aspiration risk. family decline to comply. family demanding pt recieve PO prozac, accepting the aspiration risk. PO meds brought to bedside, family requesting gingerale instead of water. gingerale brought to bedside. daughter poured gingerale in pt mouth. family and aid reeducated regarding risk of aspiration. HOB elevated to 45 degrees at this time."  5/11: per d/w SOCORRO Ervin, pt has been feeding pt cake and ginger-chavez this morning despite NPO recommendations    CT chest 5/10 IMPRESSION: Complete consolidation/atelectasis of right lower lobe and right middle lobe, increased from prior exam can represent pneumonia.Interlobular septal thickening suggestive of fluid overload. Trace pleural effusions. Emphysema.    FULL CODE.    dysphagia hx: x2 prior bedside swallow exams (10/1/23 and 3/28/24), both w/ recommendations for regular solids and thin liquids.

## 2024-05-11 NOTE — DIETITIAN INITIAL EVALUATION ADULT - ADD RECOMMEND
1. Medical team to advance diet when medically feasible via tolerated route. Consider advancing to no therapeutic dietary restrictions, Defer diet/texture modification to medical team/SLP as indicated. If NPO status > 7 days, consider alternate means of nutrition if within pt/family GOC.   2. If PO diet is advanced, consider adding daily multivitamin and vitamin C supplements if no medical contraindications to aid in wound healing.   3. Upon advancement of PO diet, consider adding Gelatin Plus 2x/day to help support wound healing  4. Monitor PO intake, GI tolerance, skin integrity and labs. RD remains available if needed, pt is aware.

## 2024-05-11 NOTE — DIETITIAN INITIAL EVALUATION ADULT - ORAL INTAKE PTA/DIET HISTORY
Per previous RD documentation on 4/8/24:  Pt reports having a small appetite and PO intake at baseline PTA; typically consuming ~2 meals/day. Follows Kosher diet. Pt denies any known food allergies. Pt taking vitamin D3 supplements PTA.

## 2024-05-11 NOTE — PROGRESS NOTE ADULT - SUBJECTIVE AND OBJECTIVE BOX
---___---___---___---___---___---___ ---___---___---___---___---___---___---___---___---                  M E D I C A L   A T T E N D I N G   P R O G R E S S   N O T E  ---___---___---___---___---___---___ ---___---___---___---___---___---___---___---___---        ================================================    ++CHIEF COMPLAINT:   Patient is a 88y old  Female who presents with a chief complaint of Chart Reviewed, Events Noted  "Patient is a 88y old  Female who presents with a chief complaint of SOB"     (11 May 2024 14:05)      Acute respiratory failure with hypoxia      ---___---___---___---___---___---  PAST MEDICAL / Surgical  HISTORY:  PAST MEDICAL & SURGICAL HISTORY:  HTN (hypertension)      Hypothyroidism      Osteoporosis      CAD (coronary artery disease)      COPD (chronic obstructive pulmonary disease)      Pulmonary sarcoidosis      H/O pyelonephritis      Acute diverticulitis          ---___---___---___---___---___---  FAMILY HISTORY:   FAMILY HISTORY:        ---___---___---___---___---___---  ALLERGIES:   Allergies    iodinated radiocontrast agents (Anaphylaxis)    Intolerances        ---___---___---___---___---___---  MEDICATIONS:  MEDICATIONS  (STANDING):  albuterol/ipratropium for Nebulization 3 milliLiter(s) Nebulizer every 6 hours  atorvastatin 20 milliGRAM(s) Oral at bedtime  buDESOnide    Inhalation Suspension 0.25 milliGRAM(s) Inhalation two times a day  cefepime   IVPB 1000 milliGRAM(s) IV Intermittent every 12 hours  cefepime   IVPB      cyclopentolate 1% Solution 1 Drop(s) Both EYES two times a day  FLUoxetine 20 milliGRAM(s) Oral daily  furosemide   Injectable 20 milliGRAM(s) IV Push daily  heparin   Injectable 5000 Unit(s) SubCutaneous every 12 hours  methylPREDNISolone sodium succinate Injectable 20 milliGRAM(s) IV Push three times a day  prednisoLONE acetate 1% Suspension 1 Drop(s) Both EYES two times a day  sodium chloride 7% Inhalation 4 milliLiter(s) Inhalation every 12 hours    MEDICATIONS  (PRN):  acetaminophen     Tablet .. 650 milliGRAM(s) Oral every 6 hours PRN Temp greater or equal to 38C (100.4F), Mild Pain (1 - 3)      ---___---___---___---___---___---  REVIEW OF SYSTEM:    GEN: no fever, no chills, no pain  RESP: no SOB, no cough, no sputum  CVS: no chest pain, no palpitations, no edema  GI: no abdominal pain, no nausea, no vomiting, no constipation, no diarrhea  : no dysurea, no frequency, no hematurea  Neuro: no headache, no dizziness  PSYCH: no anxiety, no depression  Derm : no itching, no rash    ---___---___---___---___---___---  VITAL SIGNS:  88y , CAPILLARY BLOOD GLUCOSE      POCT Blood Glucose.: 214 mg/dL (11 May 2024 11:40)    T(C): 36.6 (24 @ 08:41), Max: 37.4 (05-10-24 @ 23:47)  HR: 92 (24 @ 08:41) (88 - 92)  BP: 163/93 (24 @ 08:41) (118/71 - 163/93)  RR: 18 (24 @ 08:41) (18 - 18)  SpO2: 96% (24 @ 08:41) (96% - 97%)  ---___---___---___---___---___---  PHYSICAL EXAM:    GEN: A&O X 2 , NAD , comfortable  HEENT: NCAT, PERRL, MMM, hearing intact  Neck: supple , no JVD  CVS: S1S2 , regular , No M/R/G appreciated  PULM: CTA B/L,  no W/R/R appreciated  ABD.: soft. non tender, non distended,  bowel sounds present  Extrem: intact pulses , no edema   Derm: No rash , no ecchymoses  PSYCH : normal mood,  no delusion not anxious     ---___---___---___---___---___---            LAB AND IMAGIN.7    7.20  )-----------( 163      ( 11 May 2024 09:45 )             30.0                   143  |  104  |  22  ----------------------------<  159<H>  3.5   |  24  |  0.69    Ca    8.8      11 May 2024 09:50    TPro  6.4  /  Alb  3.7  /  TBili  0.3  /  DBili  x   /  AST  15  /  ALT  14  /  AlkPhos  117  05-11    PT/INR - ( 10 May 2024 08:12 )   PT: 11.5 sec;   INR: 1.10 ratio         PTT - ( 10 May 2024 08:12 )  PTT:31.0 sec                        Urinalysis Basic - ( 11 May 2024 09:50 )    Color: x / Appearance: x / SG: x / pH: x  Gluc: 159 mg/dL / Ketone: x  / Bili: x / Urobili: x   Blood: x / Protein: x / Nitrite: x   Leuk Esterase: x / RBC: x / WBC x   Sq Epi: x / Non Sq Epi: x / Bacteria: x        [All pertinent / recent Imaging reviewed]         ---___---___---___---___---___---___ ---___---___---___---___---                         A S S E S S M E N T   A N D   P L A N :      HEALTH ISSUES - PROBLEM Dx:  copd exacerbation  continue steroids   dyspnea  continue steroids and albuterol   hld  on statin   anxiety on temazepam   insomnia on trazodone       -GI/DVT Prophylaxis. as ordered       Thank you,  Garry Hermosillo  [phone]

## 2024-05-11 NOTE — SWALLOW BEDSIDE ASSESSMENT ADULT - PHARYNGEAL PHASE
Within functional limits although no overt s/sx of aspiration/penetration observed, given concerns for aspiration and failed dysphagia screen, suspect pt may have component of silent airway invasion w/ thin liquids Cough post oral intake pt also expressing significant distaste for thickened liquids/Cough post oral intake

## 2024-05-11 NOTE — PROGRESS NOTE ADULT - SUBJECTIVE AND OBJECTIVE BOX
INFECTIOUS DISEASES FOLLOW UP--Satinder Pitts MD  Pager [phone]    This is a follow up note for this  88y Female with  Acute respiratory failure with hypoxia  comfortable.  no new distress.  tolerating abx    Further ROS:  CONSTITUTIONAL:  No fever, ate some food this AM w daughter  GASTROINTESTINAL:  No nausea, vomiting, diarrhea, or abdominal pain  GENITOURINARY:  No dysuria  NEUROLOGIC:  No headache,     Allergies  iodinated radiocontrast agents (Anaphylaxis)    ANTIBIOTICS/RELEVANT:  antimicrobials  cefepime   IVPB 1000 milliGRAM(s) IV Intermittent every 12 hours  cefepime   IVPB        OTHER:  albuterol/ipratropium for Nebulization 3 milliLiter(s) Nebulizer every 6 hours  atorvastatin 20 milliGRAM(s) Oral at bedtime  buDESOnide    Inhalation Suspension 0.25 milliGRAM(s) Inhalation two times a day  cyclopentolate 1% Solution 1 Drop(s) Both EYES two times a day  FLUoxetine 20 milliGRAM(s) Oral daily  heparin   Injectable 5000 Unit(s) SubCutaneous every 12 hours  methylPREDNISolone sodium succinate Injectable 20 milliGRAM(s) IV Push three times a day  prednisoLONE acetate 1% Suspension 1 Drop(s) Both EYES two times a day  sodium chloride 7% Inhalation 4 milliLiter(s) Inhalation every 12 hours    Objective:  Vital Signs Last 24 Hrs  T(C): 36.6 (11 May 2024 08:41), Max: 37.4 (10 May 2024 23:47)  T(F): 97.9 (11 May 2024 08:41), Max: 99.3 (10 May 2024 23:47)  HR: 92 (11 May 2024 08:41) (80 - 93)  BP: 163/93 (11 May 2024 08:41) (92/62 - 163/93)  BP(mean): 73 (10 May 2024 10:55) (73 - 73)  RR: 18 (11 May 2024 08:41) (18 - 23)  SpO2: 96% (11 May 2024 08:41) (90% - 97%)    Parameters below as of 11 May 2024 08:41  Patient On (Oxygen Delivery Method): nasal cannula  O2 Flow (L/min): 4    PHYSICAL EXAM:  frail, alert, conversant  Constitutional:no acute distress  Ear/Nose/Throat: no oral lesions, 	  Respiratory: clear BL  Cardiovascular: S1S2  Gastrointestinal:soft, (+) BS, no tenderness  Extremities:no e/e/c  No Lymphadenopathy  IV sites not inflammed.    LABS:                        10.8   16.37 )-----------( 209      ( 10 May 2024 08:12 )             34.1     0510    143  |  106  |  15  ----------------------------<  204<H>  3.5   |  21<L>  |  0.76    Ca    8.6      10 May 2024 08:12    TPro  6.6  /  Alb  3.6  /  TBili  0.3  /  DBili  x   /  AST  34  /  ALT  18  /  AlkPhos  138<H>  05-10  PT/INR - ( 10 May 2024 08:12 )   PT: 11.5 sec;   INR: 1.10 ratio      PTT - ( 10 May 2024 08:12 )  PTT:31.0 sec  Urinalysis Basic - ( 10 May 2024 08:31 )    Color: Yellow / Appearance: Turbid / S.014 / pH: x  Gluc: x / Ketone: Negative mg/dL  / Bili: Negative / Urobili: 0.2 mg/dL   Blood: x / Protein: 100 mg/dL / Nitrite: Negative   Leuk Esterase: Large / RBC: 7 /HPF / WBC >998 /HPF   Sq Epi: x / Non Sq Epi: 9 /HPF / Bacteria: Negative /HPF    MICROBIOLOGY:  pending    imp/rx:  Frail, 87 W, presumed aspiration.  Appears stable this AM.  Daughter at bedside.    Will continue cefepime for now.  If stable would aim for transition back to [facility] on --can complete abx course there.  Assuming no bacteremia and improvement, hoping for a limited abx course to reduce risk of c.diff.    d/w daughter.

## 2024-05-11 NOTE — SWALLOW BEDSIDE ASSESSMENT ADULT - SLP GENERAL OBSERVATIONS
Pt received upright at bedside; on 4LNC, daughter present, pt Aox2-3, following all directives, verbally making wants/needs known; cachectic. Pt reports no hx of dyshphagia, daughter reports baseline cough for COPD. Rationale for swallow evaluation provided, to which daughter reported she now understands the gravity of aspiration risk and that she felt no one had fully explained the concern prior and that is why she continued to feed patient thin liquids and food until now (clinician noted gingerale at bedside). Exam results, recommendations, and aspiration risks made known to both pt and daughter and they expressed verbal understanding; both agreeable to conservative diet in the meantime. Pt reports that in the event that the MBS recommends NPO, that she would opt for pleasure feeds. Daughter Sandra ultimate goal is to keep pt on oral diet. Pt received upright at bedside; on 4LNC, daughter present, pt Aox2-3, following all directives, verbally making wants/needs known; cachectic. Pt reports no hx of dyshphagia, daughter reports baseline cough for COPD. Rationale for swallow evaluation provided, to which daughter reported she now understands the gravity of aspiration risk and that she felt no one had fully explained the concern prior and that is why she continued to feed patient thin liquids and food until now (clinician noted gingerale at bedside). Exam results, recommendations, and aspiration risks made known to both pt and daughter and they expressed verbal understanding; both agreeable to conservative diet in the meantime. Pt reports that in the event that the [facility]BS recommends NPO, that she would opt for pleasure feeds. Daughter Sandra ultimate goal is to keep pt on oral diet. Pt expressing general distaste for puree and thickened liquids.

## 2024-05-11 NOTE — DIETITIAN INITIAL EVALUATION ADULT - REASON FOR ADMISSION
Chart Reviewed, Events Noted  "Patient is a 88y old  Female who presents with a chief complaint of SOB"

## 2024-05-11 NOTE — DIETITIAN INITIAL EVALUATION ADULT - PERTINENT MEDS FT
MEDICATIONS  (STANDING):  albuterol/ipratropium for Nebulization 3 milliLiter(s) Nebulizer every 6 hours  atorvastatin 20 milliGRAM(s) Oral at bedtime  buDESOnide    Inhalation Suspension 0.25 milliGRAM(s) Inhalation two times a day  cefepime   IVPB 1000 milliGRAM(s) IV Intermittent every 12 hours  cefepime   IVPB      cyclopentolate 1% Solution 1 Drop(s) Both EYES two times a day  FLUoxetine 20 milliGRAM(s) Oral daily  furosemide   Injectable 20 milliGRAM(s) IV Push daily  heparin   Injectable 5000 Unit(s) SubCutaneous every 12 hours  methylPREDNISolone sodium succinate Injectable 20 milliGRAM(s) IV Push three times a day  prednisoLONE acetate 1% Suspension 1 Drop(s) Both EYES two times a day  sodium chloride 7% Inhalation 4 milliLiter(s) Inhalation every 12 hours    MEDICATIONS  (PRN):  acetaminophen     Tablet .. 650 milliGRAM(s) Oral every 6 hours PRN Temp greater or equal to 38C (100.4F), Mild Pain (1 - 3)

## 2024-05-11 NOTE — SWALLOW BEDSIDE ASSESSMENT ADULT - ADDITIONAL RECOMMENDATIONS
Swallow 1. pt will participate in objective swallow testing  2. pt will tolerate the least restrictive diet prior to d/c from facility

## 2024-05-12 LAB
CULTURE RESULTS: ABNORMAL
GLUCOSE BLDC GLUCOMTR-MCNC: 165 MG/DL — HIGH (ref 70–99)
GLUCOSE BLDC GLUCOMTR-MCNC: 210 MG/DL — HIGH (ref 70–99)
SPECIMEN SOURCE: SIGNIFICANT CHANGE UP

## 2024-05-12 PROCEDURE — 99221 1ST HOSP IP/OBS SF/LOW 40: CPT

## 2024-05-12 RX ORDER — MIRTAZAPINE 45 MG/1
7.5 TABLET, ORALLY DISINTEGRATING ORAL AT BEDTIME
Refills: 0 | Status: DISCONTINUED | OUTPATIENT
Start: 2024-05-12 | End: 2024-05-17

## 2024-05-12 RX ORDER — TEMAZEPAM 15 MG/1
15 CAPSULE ORAL AT BEDTIME
Refills: 0 | Status: DISCONTINUED | OUTPATIENT
Start: 2024-05-12 | End: 2024-05-17

## 2024-05-12 RX ORDER — SPIRONOLACTONE 25 MG/1
12.5 TABLET, FILM COATED ORAL DAILY
Refills: 0 | Status: DISCONTINUED | OUTPATIENT
Start: 2024-05-12 | End: 2024-05-16

## 2024-05-12 RX ORDER — METOPROLOL TARTRATE 50 MG
25 TABLET ORAL DAILY
Refills: 0 | Status: DISCONTINUED | OUTPATIENT
Start: 2024-05-12 | End: 2024-05-17

## 2024-05-12 RX ORDER — POTASSIUM CHLORIDE 20 MEQ
20 PACKET (EA) ORAL
Refills: 0 | Status: COMPLETED | OUTPATIENT
Start: 2024-05-12 | End: 2024-05-12

## 2024-05-12 RX ORDER — FUROSEMIDE 40 MG
20 TABLET ORAL
Refills: 0 | Status: DISCONTINUED | OUTPATIENT
Start: 2024-05-12 | End: 2024-05-14

## 2024-05-12 RX ADMIN — Medication 25 MILLIGRAM(S): at 17:31

## 2024-05-12 RX ADMIN — CYCLOPENTOLATE HYDROCHLORIDE 1 DROP(S): 10 SOLUTION/ DROPS OPHTHALMIC at 07:31

## 2024-05-12 RX ADMIN — HEPARIN SODIUM 5000 UNIT(S): 5000 INJECTION INTRAVENOUS; SUBCUTANEOUS at 05:31

## 2024-05-12 RX ADMIN — Medication 20 MILLIGRAM(S): at 14:15

## 2024-05-12 RX ADMIN — Medication 1 DROP(S): at 07:31

## 2024-05-12 RX ADMIN — ATORVASTATIN CALCIUM 20 MILLIGRAM(S): 80 TABLET, FILM COATED ORAL at 21:47

## 2024-05-12 RX ADMIN — Medication 0.25 MILLIGRAM(S): at 05:31

## 2024-05-12 RX ADMIN — Medication 0.25 MILLIGRAM(S): at 17:25

## 2024-05-12 RX ADMIN — SPIRONOLACTONE 12.5 MILLIGRAM(S): 25 TABLET, FILM COATED ORAL at 17:30

## 2024-05-12 RX ADMIN — SODIUM CHLORIDE 4 MILLILITER(S): 9 INJECTION INTRAMUSCULAR; INTRAVENOUS; SUBCUTANEOUS at 17:36

## 2024-05-12 RX ADMIN — Medication 3 MILLILITER(S): at 12:26

## 2024-05-12 RX ADMIN — CEFEPIME 100 MILLIGRAM(S): 1 INJECTION, POWDER, FOR SOLUTION INTRAMUSCULAR; INTRAVENOUS at 05:30

## 2024-05-12 RX ADMIN — Medication 20 MILLIGRAM(S): at 21:47

## 2024-05-12 RX ADMIN — Medication 20 MILLIGRAM(S): at 05:31

## 2024-05-12 RX ADMIN — Medication 20 MILLIGRAM(S): at 13:49

## 2024-05-12 RX ADMIN — Medication 20 MILLIEQUIVALENT(S): at 17:35

## 2024-05-12 RX ADMIN — HEPARIN SODIUM 5000 UNIT(S): 5000 INJECTION INTRAVENOUS; SUBCUTANEOUS at 17:25

## 2024-05-12 RX ADMIN — MIRTAZAPINE 7.5 MILLIGRAM(S): 45 TABLET, ORALLY DISINTEGRATING ORAL at 21:47

## 2024-05-12 RX ADMIN — Medication 20 MILLIGRAM(S): at 05:32

## 2024-05-12 RX ADMIN — TEMAZEPAM 15 MILLIGRAM(S): 15 CAPSULE ORAL at 00:03

## 2024-05-12 RX ADMIN — Medication 3 MILLILITER(S): at 17:25

## 2024-05-12 RX ADMIN — TEMAZEPAM 15 MILLIGRAM(S): 15 CAPSULE ORAL at 21:47

## 2024-05-12 RX ADMIN — CEFEPIME 100 MILLIGRAM(S): 1 INJECTION, POWDER, FOR SOLUTION INTRAMUSCULAR; INTRAVENOUS at 17:24

## 2024-05-12 RX ADMIN — Medication 3 MILLILITER(S): at 05:31

## 2024-05-12 RX ADMIN — Medication 1 DROP(S): at 17:33

## 2024-05-12 RX ADMIN — Medication 20 MILLIEQUIVALENT(S): at 13:49

## 2024-05-12 RX ADMIN — SODIUM CHLORIDE 4 MILLILITER(S): 9 INJECTION INTRAMUSCULAR; INTRAVENOUS; SUBCUTANEOUS at 05:32

## 2024-05-12 RX ADMIN — Medication 20 MILLIGRAM(S): at 12:26

## 2024-05-12 RX ADMIN — CYCLOPENTOLATE HYDROCHLORIDE 1 DROP(S): 10 SOLUTION/ DROPS OPHTHALMIC at 17:32

## 2024-05-12 NOTE — BH CONSULTATION LIAISON ASSESSMENT NOTE - RISK ASSESSMENT
Acute risk factors: acute exacerbation of medical illness  Chronic risk factors: hx of anxiety  Protective risk factors: stable residence assisted living

## 2024-05-12 NOTE — PROGRESS NOTE ADULT - SUBJECTIVE AND OBJECTIVE BOX
---___---___---___---___---___---___ ---___---___---___---___---___---___---___---___---                  M E D I C A L   A T T E N D I N G   P R O G R E S S   N O T E  ---___---___---___---___---___---___ ---___---___---___---___---___---___---___---___---        ================================================    ++CHIEF COMPLAINT:   Patient is a 88y old  Female who presents with a chief complaint of sob (12 May 2024 12:54)      Acute respiratory failure with hypoxia      ---___---___---___---___---___---  PAST MEDICAL / Surgical  HISTORY:  PAST MEDICAL & SURGICAL HISTORY:  HTN (hypertension)      Hypothyroidism      Osteoporosis      CAD (coronary artery disease)      COPD (chronic obstructive pulmonary disease)      Pulmonary sarcoidosis      H/O pyelonephritis      Acute diverticulitis          ---___---___---___---___---___---  FAMILY HISTORY:   FAMILY HISTORY:        ---___---___---___---___---___---  ALLERGIES:   Allergies    iodinated radiocontrast agents (Anaphylaxis)    Intolerances        ---___---___---___---___---___---  MEDICATIONS:  MEDICATIONS  (STANDING):  albuterol/ipratropium for Nebulization 3 milliLiter(s) Nebulizer every 6 hours  atorvastatin 20 milliGRAM(s) Oral at bedtime  buDESOnide    Inhalation Suspension 0.25 milliGRAM(s) Inhalation two times a day  cefepime   IVPB 1000 milliGRAM(s) IV Intermittent every 12 hours  cefepime   IVPB      cyclopentolate 1% Solution 1 Drop(s) Both EYES two times a day  FLUoxetine 20 milliGRAM(s) Oral daily  furosemide   Injectable 20 milliGRAM(s) IV Push two times a day  heparin   Injectable 5000 Unit(s) SubCutaneous every 12 hours  methylPREDNISolone sodium succinate Injectable 20 milliGRAM(s) IV Push three times a day  metoprolol succinate ER 25 milliGRAM(s) Oral daily  mirtazapine 7.5 milliGRAM(s) Oral at bedtime  prednisoLONE acetate 1% Suspension 1 Drop(s) Both EYES two times a day  sodium chloride 7% Inhalation 4 milliLiter(s) Inhalation every 12 hours  spironolactone 12.5 milliGRAM(s) Oral daily  temazepam 15 milliGRAM(s) Oral at bedtime    MEDICATIONS  (PRN):  acetaminophen     Tablet .. 650 milliGRAM(s) Oral every 6 hours PRN Temp greater or equal to 38C (100.4F), Mild Pain (1 - 3)      ---___---___---___---___---___---  REVIEW OF SYSTEM:    GEN: no fever, no chills, no pain  RESP: no SOB, no cough, no sputum  CVS: no chest pain, no palpitations, no edema  GI: no abdominal pain, no nausea, no vomiting, no constipation, no diarrhea  : no dysurea, no frequency, no hematurea  Neuro: no headache, no dizziness  PSYCH: no anxiety, no depression  Derm : no itching, no rash    ---___---___---___---___---___---  VITAL SIGNS:  88y , CAPILLARY BLOOD GLUCOSE      POCT Blood Glucose.: 210 mg/dL (12 May 2024 11:51)    T(C): 36.7 (24 @ 17:31), Max: 36.7 (24 @ 17:31)  HR: 87 (24 @ 17:31) (86 - 92)  BP: 147/68 (24 @ 17:31) (147/68 - 157/80)  RR: 18 (24 @ 17:31) (18 - 18)  SpO2: 98% (24 @ 17:31) (97% - 98%)  ---___---___---___---___---___---  PHYSICAL EXAM:    GEN: A&O X 3 , NAD , comfortable  HEENT: NCAT, PERRL, MMM, hearing intact  Neck: supple , no JVD  CVS: S1S2 , regular , No M/R/G appreciated  PULM: CTA B/L,  no W/R/R appreciated  ABD.: soft. non tender, non distended,  bowel sounds present  Extrem: intact pulses , no edema   Derm: No rash , no ecchymoses  PSYCH : normal mood,  no delusion not anxious     ---___---___---___---___---___---            LAB AND IMAGIN.7    7.20  )-----------( 163      ( 11 May 2024 09:45 )             30.0               05-    143  |  104  |  22  ----------------------------<  159<H>  3.5   |  24  |  0.69    Ca    8.8      11 May 2024 09:50    TPro  6.4  /  Alb  3.7  /  TBili  0.3  /  DBili  x   /  AST  15  /  ALT  14  /  AlkPhos  117  05-11                            Urinalysis Basic - ( 11 May 2024 09:50 )    Color: x / Appearance: x / SG: x / pH: x  Gluc: 159 mg/dL / Ketone: x  / Bili: x / Urobili: x   Blood: x / Protein: x / Nitrite: x   Leuk Esterase: x / RBC: x / WBC x   Sq Epi: x / Non Sq Epi: x / Bacteria: x        [All pertinent / recent Imaging reviewed]         ---___---___---___---___---___---___ ---___---___---___---___---                         A S S E S S M E N T   A N D   P L A N :      HEALTH ISSUES - PROBLEM Dx:    dyspnea   copd exacerbation   continue cefepime steroids andie   appreciate pulmonary and id consult   hld on statin   pulmonary sarcoidoisis    anxiety depression continue meds           --   ___________________________  Thank you,  Garry Hermosillo  [phone]

## 2024-05-12 NOTE — BH CONSULTATION LIAISON ASSESSMENT NOTE - NSBHCONSULTRECOMMENDOTHER_PSY_A_CORE FT
-Continue temazepam 15mg po qHs  -Continue fluoxetine 20mg po once daily  -Discontinue trazodone  -Restart mirtazapine 7.5mg po qHs No -Continue temazepam 15mg po qHs  -Continue fluoxetine 20mg po once daily  -Discontinue trazodone  -Restart mirtazapine 7.5mg po qHs for depression/anxiety and sleep/appetite     Medications above were confirmed by [facility]

## 2024-05-12 NOTE — BH CONSULTATION LIAISON ASSESSMENT NOTE - CURRENT MEDICATION
MEDICATIONS  (STANDING):  albuterol/ipratropium for Nebulization 3 milliLiter(s) Nebulizer every 6 hours  atorvastatin 20 milliGRAM(s) Oral at bedtime  buDESOnide    Inhalation Suspension 0.25 milliGRAM(s) Inhalation two times a day  cefepime   IVPB 1000 milliGRAM(s) IV Intermittent every 12 hours  cefepime   IVPB      cyclopentolate 1% Solution 1 Drop(s) Both EYES two times a day  FLUoxetine 20 milliGRAM(s) Oral daily  furosemide   Injectable 20 milliGRAM(s) IV Push daily  heparin   Injectable 5000 Unit(s) SubCutaneous every 12 hours  methylPREDNISolone sodium succinate Injectable 20 milliGRAM(s) IV Push three times a day  prednisoLONE acetate 1% Suspension 1 Drop(s) Both EYES two times a day  sodium chloride 7% Inhalation 4 milliLiter(s) Inhalation every 12 hours  traZODone 50 milliGRAM(s) Oral at bedtime    MEDICATIONS  (PRN):  acetaminophen     Tablet .. 650 milliGRAM(s) Oral every 6 hours PRN Temp greater or equal to 38C (100.4F), Mild Pain (1 - 3)

## 2024-05-12 NOTE — BH CONSULTATION LIAISON ASSESSMENT NOTE - NSBHCHARTREVIEWINVESTIGATE_PSY_A_CORE FT
5/11 [facility] Duplex LE vein bilateral unremarkable  5/10 CT AP  IMPRESSION:  *  Mural thickening in the ascending colon and rectosigmoid colon, which   may be exaggerated by underdistention. Colitis is considered.  *  Unchanged severe left hydroureter.  *  Small bilateral pleural effusions.  *  Interlobular septal thickening, suggestive of interstitial pulmonary   edema.  *  Marked atelectasis of the right lower lobe. Partial atelectasis of the   left lower lobe.  *  Dilated main artery, which may be seen with pulmonary hypertension.  *  Emphysema.  *  Cardiomegaly.

## 2024-05-12 NOTE — BH CONSULTATION LIAISON ASSESSMENT NOTE - NSBHCHARTREVIEWVS_PSY_A_CORE FT
Vital Signs Last 24 Hrs  T(C): 36.3 (12 May 2024 08:41), Max: 36.6 (12 May 2024 05:27)  T(F): 97.4 (12 May 2024 08:41), Max: 97.9 (12 May 2024 05:27)  HR: 92 (12 May 2024 08:41) (86 - 92)  BP: 157/80 (12 May 2024 08:41) (147/82 - 157/80)  BP(mean): --  RR: 18 (12 May 2024 08:41) (18 - 18)  SpO2: 97% (12 May 2024 08:41) (97% - 97%)    Parameters below as of 12 May 2024 08:41  Patient On (Oxygen Delivery Method): nasal cannula  O2 Flow (L/min): 4

## 2024-05-12 NOTE — BH CONSULTATION LIAISON ASSESSMENT NOTE - NSBHATTESTCOMMENTATTENDFT_PSY_A_CORE
Pt is an 89 y/o WF with hx of anxiety and insomnia, here for COPD exacerbation and takes prozac and restoril, along with remeron at [facility]. Pt denies acute complaints, sleep and appetite fair, no si/hi, denies depression/anxiety. no behavioral issues reported, pt compliant with treatment. no manic or psychotic symptoms present. agree with psych resident's A/P above.

## 2024-05-12 NOTE — BH CONSULTATION LIAISON ASSESSMENT NOTE - HPI (INCLUDE ILLNESS QUALITY, SEVERITY, DURATION, TIMING, CONTEXT, MODIFYING FACTORS, ASSOCIATED SIGNS AND SYMPTOMS)
Luiza Elaine is a 89 yo F, domiciled at [facility], with 3 children (Bruna, daughter, is healthcare proxy), , psychiatric hx of anxiety though never formally seen a psychiatrist, no previous psychiatric hospitalizations, no known SA or NSSIB, no known legal issues of hx of violence, no known substance abuse, past medical history of asthma, COPD, sarcoidosis, hypothyroidism, hypertension, CHF, diverticulitis who was admitted on 5/10/24 for concern of respiratory failure. Psychiatry consulted for medication management.     Per collateral (Bruna, daughter), patient suffers from anxiety and is prescribed fluoxetine, temazepam (confirmed via [facility], most recently prescribed 30mg qd in 05/2024). On interview, patient is oriented to self but disoriented to date (states year is 2004). Patient denies depressed mood, suicidal ideation, AVH. Patient denies seeing psychiatrist in the past, previous psychiatric hospitalizations. Patient reports that she takes temazepam, prescribed by her general practitioner. Patient unable to confirm other psychiatric medication. Per faxed medication records from [facility], patient takes mirtazapine 7.5mg po qhs, fluoxetine 20mg po daily, and temazepam 30mg po qHs for insomnia.

## 2024-05-12 NOTE — CHART NOTE - NSCHARTNOTEFT_GEN_A_CORE
Discussed with Dr Hermosillo regarding medications help on admission. Per Dr Acosta H&P "Depression , will  hold    meds,  for  now,  given  pt's  ams  at rehab.,   melatonin, Temazepam  Remeron, Trazadone" Daughter came tonight requesting that patient be prescribed temazepam. Patient is also on Prozac 20 mg daily which was ordered yesterday. Daughter states that  mother has been getting it while at the rehab and also while she is at home. The patient was also asking for her sleep aids.      B	Y	B	05/07/2024	05/07/2024	temazepam 30 mg capsule	5	5	Siskind, Satinder MONROE MD	[id number]	[place]	[facility]  B	N	B	04/23/2024	04/23/2024	temazepam 30 mg capsule	15	15	SiskindSatinder MD	[id number]	[place]	[facility]  B	N	B	04/16/2024	04/16/2024	temazepam 30 mg capsule	5	5	SiskindSatinder MD	[id number]	[place]	[facility]  B	N	B	04/12/2024	04/12/2024	temazepam 30 mg capsule	5	5	Kati Polanco	[id number]	[place]	[facility]  B	N	B	04/04/2024	04/04/2024	temazepam 30 mg capsule	30	30	SiskindSatinder MD	[id number]	[place]	[facility]  A	N	B	02/29/2024	02/29/2024	temazepam 30 mg capsule	30	30	Carlos Enrique, Aasma	[id number]	Insurance	[facility] #[id number]  A	N	B	01/18/2024	01/18/2024	temazepam 30 mg capsule	30	30	Carlos Enrique, Aasma	[id number]	Insurance	[facility] #[id number]  A	N	B	12/14/2023	12/14/2023	temazepam 30 mg capsule	30	30	Carlos Enrique, Aasma	[id number]	Insurance	[facility] #[id number]    Plan per dr Michel  okay to order Temazepam 15 mg times one dose  can resume Trazadone 50 mg for sleep at bedtime (standing).    Nurse was apprehensive to give both simultaneously, patient was given Trazadone, was still anxious and awake, slept after taking Temazepam  Psych to be consulted to manage medications depression possibly anxiety and insomnia,.    Will endorse to primary day team, attending to follow  Genia Marcus NP  [facility] [id number]

## 2024-05-12 NOTE — BH CONSULTATION LIAISON ASSESSMENT NOTE - SUMMARY
Luiza Elaine is a 89 yo F, domiciled at [facility], with 3 children (Bruna, daughter, is healthcare proxy), , psychiatric hx of anxiety though never formally seen a psychiatrist, no previous psychiatric hospitalizations, no known SA or NSSIB, no known legal issues of hx of violence, no known substance abuse, past medical history of asthma, COPD, sarcoidosis, hypothyroidism, hypertension, CHF, diverticulitis who was admitted on 5/10/24 for concern of respiratory failure. Psychiatry consulted for medication management.     Patient is alert with linear thought process though disoriented. Patient denies depressed mood, suicidal ideation, AVH. Patient with chronic anxiety, and prescribed  temazepam, fluoxetine, and mirtazepine (per faxed records from [facility]). Patient not exhibiting signs of benzodiazepine withdrawal.

## 2024-05-12 NOTE — PROGRESS NOTE ADULT - SUBJECTIVE AND OBJECTIVE BOX
Date of Service: 05-12-24 @ 12:54           CARDIOLOGY     PROGRESS  NOTE   ________________________________________________    CHIEF COMPLAINT:Patient is a 88y old  Female who presents with a chief complaint of sob (11 May 2024 22:45)  no complain, doing much better sitting up  	  REVIEW OF SYSTEMS:  CONSTITUTIONAL: No fever, weight loss, or fatigue  EYES: No eye pain, visual disturbances, or discharge  ENT:  No difficulty hearing, tinnitus, vertigo; No sinus or throat pain  NECK: No pain or stiffness  RESPIRATORY: No cough, wheezing, chills or hemoptysis; No Shortness of Breath  CARDIOVASCULAR: No chest pain, palpitations, passing out, dizziness, or leg swelling  GASTROINTESTINAL: No abdominal or epigastric pain. No nausea, vomiting, or hematemesis; No diarrhea or constipation. No melena or hematochezia.  GENITOURINARY: No dysuria, frequency, hematuria, or incontinence  NEUROLOGICAL: No headaches, memory loss, loss of strength, numbness, or tremors  SKIN: No itching, burning, rashes, or lesions   LYMPH Nodes: No enlarged glands  ENDOCRINE: No heat or cold intolerance; No hair loss  MUSCULOSKELETAL: No joint pain or swelling; No muscle, back, or extremity pain  PSYCHIATRIC: No depression, anxiety, mood swings, or difficulty sleeping  HEME/LYMPH: No easy bruising, or bleeding gums  ALLERGY AND IMMUNOLOGIC: No hives or eczema	    [x ] All others negative	  [ ] Unable to obtain    PHYSICAL EXAM:  T(C): 36.3 (05-12-24 @ 08:41), Max: 36.6 (05-12-24 @ 05:27)  HR: 92 (05-12-24 @ 08:41) (86 - 92)  BP: 157/80 (05-12-24 @ 08:41) (147/82 - 157/80)  RR: 18 (05-12-24 @ 08:41) (18 - 18)  SpO2: 97% (05-12-24 @ 08:41) (97% - 97%)  Wt(kg): --  I&O's Summary      Appearance: Normal	  HEENT:   Normal oral mucosa, PERRL, EOMI	  Lymphatic: No lymphadenopathy  Cardiovascular: Normal S1 S2, No JVD, + murmurs, No edema  Respiratory: rhonchi  Psychiatry: A & O x 3, Mood & affect appropriate  Gastrointestinal:  Soft, Non-tender, + BS	  Skin: No rashes, No ecchymoses, No cyanosis	  Extremities: Normal range of motion, No clubbing, cyanosis or edema  Vascular: Peripheral pulses palpable 2+ bilaterally    MEDICATIONS  (STANDING):  albuterol/ipratropium for Nebulization 3 milliLiter(s) Nebulizer every 6 hours  atorvastatin 20 milliGRAM(s) Oral at bedtime  buDESOnide    Inhalation Suspension 0.25 milliGRAM(s) Inhalation two times a day  cefepime   IVPB 1000 milliGRAM(s) IV Intermittent every 12 hours  cefepime   IVPB      cyclopentolate 1% Solution 1 Drop(s) Both EYES two times a day  FLUoxetine 20 milliGRAM(s) Oral daily  furosemide   Injectable 20 milliGRAM(s) IV Push daily  heparin   Injectable 5000 Unit(s) SubCutaneous every 12 hours  methylPREDNISolone sodium succinate Injectable 20 milliGRAM(s) IV Push three times a day  prednisoLONE acetate 1% Suspension 1 Drop(s) Both EYES two times a day  sodium chloride 7% Inhalation 4 milliLiter(s) Inhalation every 12 hours  traZODone 50 milliGRAM(s) Oral at bedtime      TELEMETRY: 	    ECG:  	  RADIOLOGY:  OTHER: 	  	  LABS:	 	    CARDIAC MARKERS:                        9.7    7.20  )-----------( 163      ( 11 May 2024 09:45 )             30.0     05-11    143  |  104  |  22  ----------------------------<  159<H>  3.5   |  24  |  0.69    Ca    8.8      11 May 2024 09:50    TPro  6.4  /  Alb  3.7  /  TBili  0.3  /  DBili  x   /  AST  15  /  ALT  14  /  AlkPhos  117  05-11    proBNP:   Lipid Profile:   HgA1c:   TSH:   < from: CT Chest No Cont (05.10.24 @ 10:24) >  Complete consolidation/atelectasis of right lower lobe and right middle   lobe, increased from prior exam can represent pneumonia.    Interlobular septal thickening suggestive of fluid overload. Trace   pleural effusions.    Emphysema.    < from: TTE Limited W or WO Ultrasound Enhancing Agent (04.08.24 @ 14:17) >   1. Left ventricular cavity is normal in size. Left ventricular wall thickness is normal. Left ventricular systolic function is normal with an ejection fraction visually estimated at 55 to 60 %. There are no regional wall motion abnormalities seen.   2. Normal rightventricular cavity size, with normal wall thickness, and normal systolic function. Tricuspid annular plane systolic excursion (TAPSE) is 2.0 cm (normal >=1.7 cm).   3. The left atrium is severely dilated.   4. Mild mitral regurgitation.   5. Comparedto the transthoracic echocardiogram performed on 3/26/2024, regional wall motion abnormality not appreciated.        Assessment and plan  ---------------------------  88-year-old female presenting [facility] [place] with known history of C. difficile, diverticulitis, asthma/COPD, sarcoidosis, hypothyroidism, hypertension, CHF, presenting with concern of respiratory failure this morning at her facility.  She arrives via EMS having received 10 of IV Decadron, 1 DuoNeb, 4 magnesium, placed on oxygen with improvement in her oxygen saturation, known baseline AO 1-2 now responding only to vigorous stimuli.  Pt BP initially 220/99 at the facility, T 98.9 F, 130 BPM, RR 26 and O2 saturation 98% on NRB.  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

## 2024-05-13 LAB
ALBUMIN SERPL ELPH-MCNC: 4.2 G/DL — SIGNIFICANT CHANGE UP (ref 3.3–5)
ALP SERPL-CCNC: 111 U/L — SIGNIFICANT CHANGE UP (ref 40–120)
ALT FLD-CCNC: 21 U/L — SIGNIFICANT CHANGE UP (ref 10–45)
ANION GAP SERPL CALC-SCNC: 17 MMOL/L — SIGNIFICANT CHANGE UP (ref 5–17)
ANION GAP SERPL CALC-SCNC: 19 MMOL/L — HIGH (ref 5–17)
AST SERPL-CCNC: 22 U/L — SIGNIFICANT CHANGE UP (ref 10–40)
BILIRUB SERPL-MCNC: 0.5 MG/DL — SIGNIFICANT CHANGE UP (ref 0.2–1.2)
BUN SERPL-MCNC: 28 MG/DL — HIGH (ref 7–23)
BUN SERPL-MCNC: 30 MG/DL — HIGH (ref 7–23)
CALCIUM SERPL-MCNC: 8.8 MG/DL — SIGNIFICANT CHANGE UP (ref 8.4–10.5)
CALCIUM SERPL-MCNC: 8.9 MG/DL — SIGNIFICANT CHANGE UP (ref 8.4–10.5)
CHLORIDE SERPL-SCNC: 100 MMOL/L — SIGNIFICANT CHANGE UP (ref 96–108)
CHLORIDE SERPL-SCNC: 98 MMOL/L — SIGNIFICANT CHANGE UP (ref 96–108)
CO2 SERPL-SCNC: 26 MMOL/L — SIGNIFICANT CHANGE UP (ref 22–31)
CO2 SERPL-SCNC: 27 MMOL/L — SIGNIFICANT CHANGE UP (ref 22–31)
CREAT SERPL-MCNC: 0.68 MG/DL — SIGNIFICANT CHANGE UP (ref 0.5–1.3)
CREAT SERPL-MCNC: 0.72 MG/DL — SIGNIFICANT CHANGE UP (ref 0.5–1.3)
EGFR: 81 ML/MIN/1.73M2 — SIGNIFICANT CHANGE UP
EGFR: 84 ML/MIN/1.73M2 — SIGNIFICANT CHANGE UP
GLUCOSE SERPL-MCNC: 149 MG/DL — HIGH (ref 70–99)
GLUCOSE SERPL-MCNC: 152 MG/DL — HIGH (ref 70–99)
HCT VFR BLD CALC: 34.3 % — LOW (ref 34.5–45)
HGB BLD-MCNC: 10.7 G/DL — LOW (ref 11.5–15.5)
MCHC RBC-ENTMCNC: 28.9 PG — SIGNIFICANT CHANGE UP (ref 27–34)
MCHC RBC-ENTMCNC: 31.2 GM/DL — LOW (ref 32–36)
MCV RBC AUTO: 92.7 FL — SIGNIFICANT CHANGE UP (ref 80–100)
NRBC # BLD: 0 /100 WBCS — SIGNIFICANT CHANGE UP (ref 0–0)
NT-PROBNP SERPL-SCNC: HIGH PG/ML (ref 0–300)
PLATELET # BLD AUTO: 245 K/UL — SIGNIFICANT CHANGE UP (ref 150–400)
POTASSIUM SERPL-MCNC: 3.8 MMOL/L — SIGNIFICANT CHANGE UP (ref 3.5–5.3)
POTASSIUM SERPL-MCNC: 4 MMOL/L — SIGNIFICANT CHANGE UP (ref 3.5–5.3)
POTASSIUM SERPL-SCNC: 3.8 MMOL/L — SIGNIFICANT CHANGE UP (ref 3.5–5.3)
POTASSIUM SERPL-SCNC: 4 MMOL/L — SIGNIFICANT CHANGE UP (ref 3.5–5.3)
PROT SERPL-MCNC: 6.9 G/DL — SIGNIFICANT CHANGE UP (ref 6–8.3)
RBC # BLD: 3.7 M/UL — LOW (ref 3.8–5.2)
RBC # FLD: 15.9 % — HIGH (ref 10.3–14.5)
SODIUM SERPL-SCNC: 143 MMOL/L — SIGNIFICANT CHANGE UP (ref 135–145)
SODIUM SERPL-SCNC: 144 MMOL/L — SIGNIFICANT CHANGE UP (ref 135–145)
WBC # BLD: 9.93 K/UL — SIGNIFICANT CHANGE UP (ref 3.8–10.5)
WBC # FLD AUTO: 9.93 K/UL — SIGNIFICANT CHANGE UP (ref 3.8–10.5)

## 2024-05-13 PROCEDURE — 99232 SBSQ HOSP IP/OBS MODERATE 35: CPT

## 2024-05-13 PROCEDURE — 74230 X-RAY XM SWLNG FUNCJ C+: CPT | Mod: 26

## 2024-05-13 PROCEDURE — 99233 SBSQ HOSP IP/OBS HIGH 50: CPT

## 2024-05-13 PROCEDURE — 71045 X-RAY EXAM CHEST 1 VIEW: CPT | Mod: 26

## 2024-05-13 RX ADMIN — Medication 3 MILLILITER(S): at 12:00

## 2024-05-13 RX ADMIN — Medication 3 MILLILITER(S): at 04:55

## 2024-05-13 RX ADMIN — Medication 20 MILLIGRAM(S): at 04:56

## 2024-05-13 RX ADMIN — CYCLOPENTOLATE HYDROCHLORIDE 1 DROP(S): 10 SOLUTION/ DROPS OPHTHALMIC at 04:55

## 2024-05-13 RX ADMIN — CEFEPIME 100 MILLIGRAM(S): 1 INJECTION, POWDER, FOR SOLUTION INTRAMUSCULAR; INTRAVENOUS at 18:13

## 2024-05-13 RX ADMIN — Medication 1 DROP(S): at 18:13

## 2024-05-13 RX ADMIN — Medication 20 MILLIGRAM(S): at 13:58

## 2024-05-13 RX ADMIN — CYCLOPENTOLATE HYDROCHLORIDE 1 DROP(S): 10 SOLUTION/ DROPS OPHTHALMIC at 18:14

## 2024-05-13 RX ADMIN — MIRTAZAPINE 7.5 MILLIGRAM(S): 45 TABLET, ORALLY DISINTEGRATING ORAL at 22:04

## 2024-05-13 RX ADMIN — TEMAZEPAM 15 MILLIGRAM(S): 15 CAPSULE ORAL at 22:03

## 2024-05-13 RX ADMIN — HEPARIN SODIUM 5000 UNIT(S): 5000 INJECTION INTRAVENOUS; SUBCUTANEOUS at 04:56

## 2024-05-13 RX ADMIN — Medication 650 MILLIGRAM(S): at 22:09

## 2024-05-13 RX ADMIN — CEFEPIME 100 MILLIGRAM(S): 1 INJECTION, POWDER, FOR SOLUTION INTRAMUSCULAR; INTRAVENOUS at 04:54

## 2024-05-13 RX ADMIN — Medication 20 MILLIGRAM(S): at 13:57

## 2024-05-13 RX ADMIN — ATORVASTATIN CALCIUM 20 MILLIGRAM(S): 80 TABLET, FILM COATED ORAL at 22:04

## 2024-05-13 RX ADMIN — Medication 0.25 MILLIGRAM(S): at 04:55

## 2024-05-13 RX ADMIN — SODIUM CHLORIDE 4 MILLILITER(S): 9 INJECTION INTRAMUSCULAR; INTRAVENOUS; SUBCUTANEOUS at 20:17

## 2024-05-13 RX ADMIN — SPIRONOLACTONE 12.5 MILLIGRAM(S): 25 TABLET, FILM COATED ORAL at 04:56

## 2024-05-13 RX ADMIN — Medication 25 MILLIGRAM(S): at 04:57

## 2024-05-13 RX ADMIN — Medication 650 MILLIGRAM(S): at 23:11

## 2024-05-13 RX ADMIN — Medication 0.25 MILLIGRAM(S): at 19:30

## 2024-05-13 RX ADMIN — Medication 1 DROP(S): at 04:55

## 2024-05-13 RX ADMIN — SODIUM CHLORIDE 4 MILLILITER(S): 9 INJECTION INTRAMUSCULAR; INTRAVENOUS; SUBCUTANEOUS at 04:57

## 2024-05-13 RX ADMIN — Medication 20 MILLIGRAM(S): at 22:03

## 2024-05-13 RX ADMIN — HEPARIN SODIUM 5000 UNIT(S): 5000 INJECTION INTRAVENOUS; SUBCUTANEOUS at 18:13

## 2024-05-13 RX ADMIN — Medication 3 MILLILITER(S): at 18:13

## 2024-05-13 RX ADMIN — Medication 20 MILLIGRAM(S): at 12:00

## 2024-05-13 NOTE — CONSULT NOTE ADULT - ASSESSMENT
A/P: 88-year-old female presenting from [facility] rehab center /  sob h/o  C. difficile, diverticulitis, asthma/COPD, sarcoidosis, hypothyroidism, hypertension, CHF    presenting with concern of respiratory failure this morning at her facility/  sob . arrive d  form  having received 10 of IV Decadron, 1 DuoNeb, 4 magnesium,   placed on oxygen with improvement in her oxygen saturation, known baseline AO -2   bp     was  220/99 at the facility, T 98.9 F, 130 BPM,   rejected  by icu. pe r primary dr    Wound Consult requested to assist w/ management of  Sacral region BL buttocks tissue injury CNRO DTPI    Recommendations:  Cavilon daily/FLORENCIA BID andf PRN soiling    Consider RIVER/PVR  Moisturize intact skin w/ SWEEN cream BID  Nutrition Consult for optimization in pt w/Increased nutritional needs            encourage high quality protein, nalini/ prosource, MVI & Vit C to promote wound healing    Continue turning and positioning w/ offloading assistive devices as per protocol  Continue w/ attends under pads and Pericare as per protocol  Waffle Cushion to chair when oob to chair  Continue w/ low air loss pressure redistribution bed surface   Care as per medicine, will remain available as requested  If needed f/u as outpatient at [facility] [address] [facility] [phone]  Seen and D/w team & RN  Thank you for this consult,  ERIC Andre, [facility]  [phone]  Nights/ Weekends/ Holidays please call:  General Surgery Consult pager ([phone]) for emergencies  Wound PT for multilayer leg wrapping or VAC issues (x [phone])

## 2024-05-13 NOTE — PROGRESS NOTE ADULT - SUBJECTIVE AND OBJECTIVE BOX
Date of Service: 05-13-24 @ 06:48           CARDIOLOGY     PROGRESS  NOTE   ________________________________________________    CHIEF COMPLAINT:Patient is a 88y old  Female who presents with a chief complaint of sob (12 May 2024 17:51)  doing better  	  REVIEW OF SYSTEMS:  CONSTITUTIONAL: No fever, weight loss, or fatigue  EYES: No eye pain, visual disturbances, or discharge  ENT:  No difficulty hearing, tinnitus, vertigo; No sinus or throat pain  NECK: No pain or stiffness  RESPIRATORY: No cough, wheezing, chills or hemoptysis; decrease Shortness of Breath  CARDIOVASCULAR: No chest pain, palpitations, passing out, dizziness, or leg swelling  GASTROINTESTINAL: No abdominal or epigastric pain. No nausea, vomiting, or hematemesis; No diarrhea or constipation. No melena or hematochezia.  GENITOURINARY: No dysuria, frequency, hematuria, or incontinence  NEUROLOGICAL: No headaches, memory loss, loss of strength, numbness, or tremors  SKIN: No itching, burning, rashes, or lesions   LYMPH Nodes: No enlarged glands  ENDOCRINE: No heat or cold intolerance; No hair loss  MUSCULOSKELETAL: No joint pain or swelling; No muscle, back, or extremity pain  PSYCHIATRIC: No depression, anxiety, mood swings, or difficulty sleeping  HEME/LYMPH: No easy bruising, or bleeding gums  ALLERGY AND IMMUNOLOGIC: No hives or eczema	    [ x] All others negative	  [ ] Unable to obtain    PHYSICAL EXAM:  T(C): 36.7 (05-13-24 @ 05:46), Max: 36.7 (05-12-24 @ 17:31)  HR: 77 (05-13-24 @ 05:46) (77 - 93)  BP: 163/90 (05-13-24 @ 05:46) (147/68 - 173/99)  RR: 18 (05-13-24 @ 05:46) (18 - 18)  SpO2: 98% (05-13-24 @ 05:46) (93% - 98%)  Wt(kg): --  I&O's Summary    12 May 2024 07:01  -  13 May 2024 06:48  --------------------------------------------------------  IN: 0 mL / OUT: 400 mL / NET: -400 mL        Appearance: Normal	  HEENT:   Normal oral mucosa, PERRL, EOMI	  Lymphatic: No lymphadenopathy  Cardiovascular: Normal S1 S2, No JVD, + murmurs, No edema  Respiratory: rhonchi  Psychiatry: A & O x 3, Mood & affect appropriate  Gastrointestinal:  Soft, Non-tender, + BS	  Skin: No rashes, No ecchymoses, No cyanosis	  Neurologic: Non-focal  Extremities: Normal range of motion, No clubbing, cyanosis or edema  Vascular: Peripheral pulses palpable 2+ bilaterally    MEDICATIONS  (STANDING):  albuterol/ipratropium for Nebulization 3 milliLiter(s) Nebulizer every 6 hours  atorvastatin 20 milliGRAM(s) Oral at bedtime  buDESOnide    Inhalation Suspension 0.25 milliGRAM(s) Inhalation two times a day  cefepime   IVPB 1000 milliGRAM(s) IV Intermittent every 12 hours  cefepime   IVPB      cyclopentolate 1% Solution 1 Drop(s) Both EYES two times a day  FLUoxetine 20 milliGRAM(s) Oral daily  furosemide   Injectable 20 milliGRAM(s) IV Push two times a day  heparin   Injectable 5000 Unit(s) SubCutaneous every 12 hours  methylPREDNISolone sodium succinate Injectable 20 milliGRAM(s) IV Push three times a day  metoprolol succinate ER 25 milliGRAM(s) Oral daily  mirtazapine 7.5 milliGRAM(s) Oral at bedtime  prednisoLONE acetate 1% Suspension 1 Drop(s) Both EYES two times a day  sodium chloride 7% Inhalation 4 milliLiter(s) Inhalation every 12 hours  spironolactone 12.5 milliGRAM(s) Oral daily  temazepam 15 milliGRAM(s) Oral at bedtime      TELEMETRY: 	    ECG:  	  RADIOLOGY:  OTHER: 	  	  LABS:	 	    CARDIAC MARKERS:                                9.7    7.20  )-----------( 163      ( 11 May 2024 09:45 )             30.0     05-11    143  |  104  |  22  ----------------------------<  159<H>  3.5   |  24  |  0.69    Ca    8.8      11 May 2024 09:50    TPro  6.4  /  Alb  3.7  /  TBili  0.3  /  DBili  x   /  AST  15  /  ALT  14  /  AlkPhos  117  05-11    proBNP:   Lipid Profile:   HgA1c:   TSH:           Assessment and plan  ---------------------------  88-[facility] with known history of C. difficile, diverticulitis, asthma/COPD, sarcoidosis, hypothyroidism, hypertension, CHF, presenting with concern of respiratory failure this morning at her facility.  She arrives via EMS having received 10 of IV Decadron, 1 DuoNeb, 4 magnesium, placed on oxygen with improvement in her oxygen saturation, known baseline AO 1-2 now responding only to vigorous stimuli.  Pt BP initially 220/99 at the facility, T 98.9 F, 130 BPM, RR 26 and O2 saturation 98% on NRB.  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 , sarcoidosis  pulmonary with increasing sob  ct chest  noted with possible pneumonia/ chf diastolic acute on chronic  agree with abx  start on lasix  20 mg daily  will consider possible pulmonary consult  review old echo  dvt prophylaxis  pulmonary eval sec to emphysema, add inhalers ?small dose of steroid  CT  chest pulmonary edema, unless the findings are pulmonary fibrosis  change lasix to 20 mg iv bid, follow up pro bnp  add aldactone 12.5 mg dfaily, add small dose of beta blocker  chest x ray today  i/o not accurate  awaiting blood work

## 2024-05-13 NOTE — SWALLOW VFSS/MBS ASSESSMENT ADULT - SLP GENERAL OBSERVATIONS
Pt received upright in radiology in DANIS Chair on 2LNC,  pt Aox2, following all directives however requiring repetition and increased volume of voice, verbally making wants/needs known; cachectic.

## 2024-05-13 NOTE — CONSULT NOTE ADULT - SUBJECTIVE AND OBJECTIVE BOX
Date of Service, 05-10-24 @ 19:54  CHIEF COMPLAINT:Patient is a 88y old  Female who presents with a chief complaint of sob (10 May 2024 16:52)      HPI:  88-year-old female presenting from [facility] [place] with known history of C. difficile, diverticulitis, asthma/COPD, sarcoidosis, hypothyroidism, hypertension, CHF, presenting with concern of respiratory failure this morning at her facility.  She arrives via EMS having received 10 of IV Decadron, 1 DuoNeb, 4 magnesium, placed on oxygen with improvement in her oxygen saturation, known baseline AO 1-2 now responding only to vigorous stimuli.  Pt BP initially 220/99 at the facility, T 98.9 F, 130 BPM, RR 26 and O2 saturation 98% on NRB.      PAST MEDICAL & SURGICAL HISTORY:  HTN (hypertension)  Hypothyroidism  Osteoporosis  CAD (coronary artery disease)  COPD (chronic obstructive pulmonary disease)  Pulmonary sarcoidosis  H/O pyelonephritis  Acute diverticulitis    MEDICATIONS  (STANDING):  albuterol/ipratropium for Nebulization 3 milliLiter(s) Nebulizer every 6 hours  atorvastatin 20 milliGRAM(s) Oral at bedtime  buDESOnide    Inhalation Suspension 0.25 milliGRAM(s) Inhalation two times a day  cefepime   IVPB      cyclopentolate 1% Solution 1 Drop(s) Both EYES two times a day  heparin   Injectable 5000 Unit(s) SubCutaneous every 12 hours  methylPREDNISolone sodium succinate Injectable 20 milliGRAM(s) IV Push three times a day  prednisoLONE acetate 1% Suspension 1 Drop(s) Both EYES two times a day  sodium chloride 7% Inhalation 4 milliLiter(s) Inhalation every 12 hours    MEDICATIONS  (PRN):      FAMILY HISTORY:      SOCIAL HISTORY:    [ x] Non-smoker  [ ] Smoker  [ ] Alcohol    Allergies    iodinated radiocontrast agents (Anaphylaxis)    Intolerances    	    REVIEW OF SYSTEMS:  CONSTITUTIONAL: No fever, weight loss, or fatigue  EYES: No eye pain, visual disturbances, or discharge  ENT:  No difficulty hearing, tinnitus, vertigo; No sinus or throat pain  NECK: No pain or stiffness  RESPIRATORY: No cough, wheezing, chills or hemoptysis; + Shortness of Breath  CARDIOVASCULAR: No chest pain, palpitations, passing out, dizziness, or leg swelling  GASTROINTESTINAL: No abdominal or epigastric pain. No nausea, vomiting, or hematemesis; No diarrhea or constipation. No melena or hematochezia.  GENITOURINARY: No dysuria, frequency, hematuria, or incontinence  NEUROLOGICAL: No headaches, memory loss, loss of strength, numbness, or tremors  SKIN: No itching, burning, rashes, or lesions   LYMPH Nodes: No enlarged glands  ENDOCRINE: No heat or cold intolerance; No hair loss  MUSCULOSKELETAL: No joint pain or swelling; No muscle, back, or extremity pain  PSYCHIATRIC: No depression, anxiety, mood swings, or difficulty sleeping  HEME/LYMPH: No easy bruising, or bleeding gums  ALLERGY AND IMMUNOLOGIC: No hives or eczema	    [ ] All others negative	  [x ] Unable to obtain    PHYSICAL EXAM:  T(C): 36.7 (05-10-24 @ 16:33), Max: 37.9 (05-10-24 @ 08:20)  HR: 80 (05-10-24 @ 16:33) (80 - 112)  BP: 150/68 (05-10-24 @ 16:33) (84/65 - 150/68)  RR: 18 (05-10-24 @ 16:33) (18 - 28)  SpO2: 95% (05-10-24 @ 16:33) (94% - 96%)  Wt(kg): --  I&O's Summary      Appearance: Normal	  HEENT:   Normal oral mucosa, PERRL, EOMI	  Lymphatic: No lymphadenopathy  Cardiovascular: Normal S1 S2, No JVD, + murmurs, No edema  Respiratory: rhonchi  Gastrointestinal:  Soft, Non-tender, + BS	  Skin: No rashes, No ecchymoses, No cyanosis	  Extremities: Normal range of motion, No clubbing, cyanosis or edema  Vascular: Peripheral pulses palpable 2+ bilaterally    TELEMETRY: 	    ECG:  	  RADIOLOGY:  OTHER: 	  	  LABS:	 	    CARDIAC MARKERS:                              10.8   16.37 )-----------( 209      ( 10 May 2024 08:12 )             34.1     05-10    143  |  106  |  15  ----------------------------<  204<H>  3.5   |  21<L>  |  0.76    Ca    8.6      10 May 2024 08:12    TPro  6.6  /  Alb  3.6  /  TBili  0.3  /  DBili  x   /  AST  34  /  ALT  18  /  AlkPhos  138<H>  05-10    proBNP:   Lipid Profile:   HgA1c:   TSH:   PT/INR - ( 10 May 2024 08:12 )   PT: 11.5 sec;   INR: 1.10 ratio         PTT - ( 10 May 2024 08:12 )  PTT:31.0 sec    PREVIOUS DIAGNOSTIC TESTING:     < from: 12 Lead ECG (04.06.24 @ 18:10) >  Diagnosis Line SINUS TACHYCARDIA  LEFT AXIS DEVIATION  T WAVE ABNORMALITY, CONSIDER ANTERIOR ISCHEMIA  ABNORMAL ECG  WHEN COMPARED WITH ECG OF 29-MAR-2024 06:12,  no  SIGNIFICANT CHANGES HAVE OCCURRED    < from: CT Chest No Cont (05.10.24 @ 10:24) >  Complete consolidation/atelectasis of right lower lobe and right middle   lobe, increased from prior exam can represent pneumonia.    Interlobular septal thickening suggestive of fluid overload. Trace   pleural effusions.    Emphysema.    < from: TTE Limited W or WO Ultrasound Enhancing Agent (04.08.24 @ 14:17) >   1. Left ventricular cavity is normal in size. Left ventricular wall thickness is normal. Left ventricular systolic function is normal with an ejection fraction visually estimated at 55 to 60 %. There are no regional wall motion abnormalities seen.   2. Normal rightventricular cavity size, with normal wall thickness, and normal systolic function. Tricuspid annular plane systolic excursion (TAPSE) is 2.0 cm (normal >=1.7 cm).   3. The left atrium is severely dilated.   4. Mild mitral regurgitation.   5. Comparedto the transthoracic echocardiogram performed on 3/26/2024, regional wall motion abnormality not appreciated.                
Patient is a 88y old  Female who presents with a chief complaint of sob (10 May 2024 10:13)    HPI:   88-year-old female    presenting from [facility] rehab center /  sob       h/o  C. difficile, diverticulitis, asthma/COPD, sarcoidosis, hypothyroidism, hypertension, CHF    presenting with concern of respiratory failure this morning at her facility/  sob .     arrive d  form  having received 10 of IV Decadron, 1 DuoNeb, 4 magnesium,       placed on oxygen with improvement in her oxygen saturation, known baseline AO -2       bp     was  220/99 at the facility, T 98.9 F, 130 BPM,   rejected  by icu. pe r primary     pt  seen  in  er/  awake  and  alert. states she s unsure, why she  was  brought  to  er  (10 May 2024 10:13)      PAST MEDICAL & SURGICAL HISTORY:  HTN (hypertension)      Hypothyroidism      Osteoporosis      CAD (coronary artery disease)      COPD (chronic obstructive pulmonary disease)      Pulmonary sarcoidosis      H/O pyelonephritis      Acute diverticulitis          Social history:    FAMILY HISTORY:    REVIEW OF SYSTEMS  General:	Denies any malaise fatigue or chills. Fevers absent    Skin:No rash  	        Allergic/Immunologic:	No hives or rash   Allergies    iodinated radiocontrast agents (Anaphylaxis)    Intolerances        Antimicrobials:          Vital Signs Last 24 Hrs  T(C): 37.9 (10 May 2024 08:20), Max: 37.9 (10 May 2024 08:20)  T(F): 100.3 (10 May 2024 08:20), Max: 100.3 (10 May 2024 08:20)  HR: 93 (10 May 2024 10:55) (93 - 112)  BP: 105/64 (10 May 2024 10:55) (84/65 - 143/64)  BP(mean): 73 (10 May 2024 10:55) (58 - 73)  RR: 23 (10 May 2024 10:55) (22 - 28)  SpO2: 96% (10 May 2024 10:55) (94% - 96%)    Parameters below as of 10 May 2024 10:55  Patient On (Oxygen Delivery Method): nasal cannula  O2 Flow (L/min): 4      PHYSICAL EXAM:Pleasant patient in no acute distress.         ENMT:No sinus tenderness.No thrush.No pharyngeal exudate or erythema.Fair dental hygiene    Neck:Supple,No LN,no JVD      Respiratory:Good air entry bilaterally,CTA    Cardiovascular:S1 S2 wnl, No murmurs,rub or gallops    Gastrointestinal:Soft BS(+) no tenderness no masses ,No rebound or guarding    Genitourinary:No CVA tendereness     Rectal:    Extremities:No cyanosis,clubbing or edema.    Vascular:peripheral pulses felt                                   10.8   16.37 )-----------( 209      ( 10 May 2024 08:12 )             34.1         05-10    143  |  106  |  15  ----------------------------<  204<H>  3.5   |  21<L>  |  0.76    Ca    8.6      10 May 2024 08:12    TPro  6.6  /  Alb  3.6  /  TBili  0.3  /  DBili  x   /  AST  34  /  ALT  18  /  AlkPhos  138<H>  05-10      RECENT CULTURES:      MICROBIOLOGY:          Radiology:      Assessment:        Recommendations and Plan:    Pager [phone]  After 5 pm/weekends or if no response :[phone]      
  Wound SURGERY CONSULT NOTE    HPI:   88-year-old female presenting from [facility] rehab center /  sob h/o  C. difficile, diverticulitis, asthma/COPD, sarcoidosis, hypothyroidism, hypertension, CHF    presenting with concern of respiratory failure this morning at her facility/  sob . arrive d  form  having received 10 of IV Decadron, 1 DuoNeb, 4 magnesium,   placed on oxygen with improvement in her oxygen saturation, known baseline AO -2   bp     was  220/99 at the facility, T 98.9 F, 130 BPM,   rejected  by icu. pe r primary     pt  seen  in  er/  awake  and  alert. states she s unsure, why she  was  brought  to  er  (10 May 2024 10:13)      Wound consult requested by team to assist w/ management of  sacral bl buttocks pressure injury.   Pt w/o  c/o pain, drainage, odor, color change,  or worsening swelling. Offloading and pericare initiated upon admission as pt Increasingly sedentary 2/2 to illness. Pt is Incontinent of urine & stool. (+) purewick.   Appetite good aide in room All questions asked and answered to pt's expressed understanding and satisfaction.    Current Diet: Diet, Pureed:   Moderately Thick Liquids (MODTHICKLIQS) (05-11-24 @ 18:12)      PAST MEDICAL & SURGICAL HISTORY:  HTN (hypertension)      Hypothyroidism      Osteoporosis      CAD (coronary artery disease)      COPD (chronic obstructive pulmonary disease)      Pulmonary sarcoidosis      H/O pyelonephritis      Acute diverticulitis          REVIEW OF SYSTEMS:   General/ Breast/ Skin/Vasc/ Neuro/ MSK: see HPI  All other systems negative    MEDICATIONS  (STANDING):  albuterol/ipratropium for Nebulization 3 milliLiter(s) Nebulizer every 6 hours  atorvastatin 20 milliGRAM(s) Oral at bedtime  buDESOnide    Inhalation Suspension 0.25 milliGRAM(s) Inhalation two times a day  cefepime   IVPB 1000 milliGRAM(s) IV Intermittent every 12 hours  cefepime   IVPB      cyclopentolate 1% Solution 1 Drop(s) Both EYES two times a day  FLUoxetine 20 milliGRAM(s) Oral daily  furosemide   Injectable 20 milliGRAM(s) IV Push two times a day  heparin   Injectable 5000 Unit(s) SubCutaneous every 12 hours  methylPREDNISolone sodium succinate Injectable 20 milliGRAM(s) IV Push three times a day  metoprolol succinate ER 25 milliGRAM(s) Oral daily  mirtazapine 7.5 milliGRAM(s) Oral at bedtime  prednisoLONE acetate 1% Suspension 1 Drop(s) Both EYES two times a day  sodium chloride 7% Inhalation 4 milliLiter(s) Inhalation every 12 hours  spironolactone 12.5 milliGRAM(s) Oral daily  temazepam 15 milliGRAM(s) Oral at bedtime    MEDICATIONS  (PRN):  acetaminophen     Tablet .. 650 milliGRAM(s) Oral every 6 hours PRN Temp greater or equal to 38C (100.4F), Mild Pain (1 - 3)      Allergies    iodinated radiocontrast agents (Anaphylaxis)    Intolerances        SOCIAL HISTORY:  No hx of smoking, ETOH, drugs    FAMILY HISTORY:   No pertinent family hx among first degree relatives.    PHYSICAL EXAM:  Vital Signs Last 24 Hrs  T(C): 36.7 (13 May 2024 08:35), Max: 36.7 (12 May 2024 17:31)  T(F): 98.1 (13 May 2024 08:35), Max: 98.1 (12 May 2024 17:31)  HR: 80 (13 May 2024 08:35) (77 - 93)  BP: 151/90 (13 May 2024 08:35) (147/68 - 173/99)  BP(mean): --  RR: 18 (13 May 2024 08:35) (18 - 18)  SpO2: 96% (13 May 2024 08:35) (93% - 98%)    Parameters below as of 13 May 2024 08:35  Patient On (Oxygen Delivery Method): nasal cannula  O2 Flow (L/min): 2      NAD, A&Ox3/ thin/ frail,  HEENT:  sclera clear, mucosa moist, throat clear, trachea midline, neck supple  Respiratory: nonlabored w/ equal chest rise  Gastrointestinal: soft NT/ND  : (+)purewick  Neurology:  weakened strength & sensation grossly intact  Psych: calm/ appropriate  Musculoskeletal:  no deformities/ contractures  Vascular: BLE equally warm  no cyanosis, clubbing, edema nor acute ischemia                BLE DP pulses faint                 BLE hemosiderin staining/ varicose veins  Skin:  thin, dry, pale, frail  Sacral region/BL buttocks area of persistent darkened tissue CNRO DTPI at the time         There is no blistering, drainage                increased warmth, tenderness, induration, fluctuance, nor crepitus    LABS/ CULTURES/ RADIOLOGY:                        10.7   9.93  )-----------( 245      ( 13 May 2024 07:51 )             34.3       144  |  100  |  30  ----------------------------<  152      [05-13-24 @ 07:51]  3.8   |  27  |  0.72        Ca     8.9     [05-13-24 @ 07:51]    TPro  6.9  /  Alb  4.2  /  TBili  0.5  /  DBili  x   /  AST  22  /  ALT  21  /  AlkPhos  111  [05-13-24 @ 07:51]      Culture - Blood (collected 05-10-24 @ 08:09)  Source: .Blood Blood-Peripheral  Preliminary Report (05-12-24 @ 15:01):    No growth at 48 Hours    Culture - Blood (collected 05-10-24 @ 07:59)  Source: .Blood Blood-Peripheral  Preliminary Report (05-12-24 @ 15:01):    No growth at 48 Hours                        
CHIEF COMPLAINT: SOB, cough     HPI:    88 female w/ PMH pulmonary sarcoidosis, COPD not on home O2, CAD, hypothyroidism, and HTN presenting with SOB, fevers, concerning for infection.    Limited issue as patient is forgetful. Per ED was in respiratory distress, unclear if wheezing, placed on bipap and possible intubation. Pt was however titrated down to 4L NC.     CT scan with worsening RLL opacities concerning for aspiration pna. Patient with recureent admission for COPD and aspiration. Per patient history of sarcoid but now active. See an outpatient pulm but does not know the name.     Pulmonary consulted for acute hypoxemic respiratory failure. Often cough with food at home per aid.     PAST MEDICAL & SURGICAL HISTORY:  HTN (hypertension)      Hypothyroidism      Osteoporosis      CAD (coronary artery disease)      COPD (chronic obstructive pulmonary disease)      Pulmonary sarcoidosis      H/O pyelonephritis      Acute diverticulitis          FAMILY HISTORY:      SOCIAL HISTORY:  Smoking: [ ] Never Smoked [ x] Former Smoker  Substance Use: [ x] Never Used [ ] Used ____  EtOH Use:None  Occupation: former       Allergies    iodinated radiocontrast agents (Anaphylaxis)    Intolerances        HOME MEDICATIONS:  Home Medications:  acetaminophen 325 mg oral tablet: 2 tab(s) orally every 6 hours As needed Temp greater or equal to 38C (100.4F), Mild Pain (1 - 3) (2024 13:20)  atorvastatin 20 mg oral tablet: 1 tab(s) orally once a day (2024 13:20)  cyclopentolate 1% ophthalmic solution: 1 drop(s) in the left eye 2 times a day (2024 13:20)  D3 25 mcg (1000 intl units) oral tablet: 1 tab(s) orally once a day (2024 13:20)  ferrous sulfate 325 mg (65 mg elemental iron) oral tablet: 1 tab(s) orally once a day (2024 13:20)  FLUoxetine 20 mg oral capsule: 1 cap(s) orally once a day (2024 13:20)  losartan 25 mg oral tablet: 1 tab(s) orally once a day (2024 13:20)  melatonin 3 mg oral tablet: 1 tab(s) orally once a day (at bedtime) As needed Insomnia (2024 13:20)  mirtazapine 7.5 mg oral tablet: 1 tab(s) orally once a day (at bedtime) (2024 13:20)  prednisoLONE acetate 1% ophthalmic suspension: 1 drop(s) in the left eye 2 times a day (2024 13:20)  temazepam 30 mg oral capsule: 1 cap(s) orally once a day (at bedtime) (2024 13:20)  traZODone 50 mg oral tablet: orally once a day (2024 13:20)      REVIEW OF SYSTEMS:  Constitutional: [ ] negative [ ] fevers [ ] chills [ ] weight loss [ ] weight gain  HEENT: [ ] negative [ ] dry eyes [ ] eye irritation [ ] postnasal drip [ ] nasal congestion  CV: [ ] negative  [ ] chest pain [ ] orthopnea [ ] palpitations [ ] murmur  Resp: [ ] negative [ ] cough [ ] shortness of breath [ ] dyspnea [ ] wheezing [ ] sputum [ ] hemoptysis  GI: [ ] negative [ ] nausea [ ] vomiting [ ] diarrhea [ ] constipation [ ] abd pain [ ] dysphagia   : [ ] negative [ ] dysuria [ ] nocturia [ ] hematuria [ ] increased urinary frequency  Musculoskeletal: [ ] negative [ ] back pain [ ] myalgias [ ] arthralgias [ ] fracture  Skin: [ ] negative [ ] rash [ ] itch  Neurological: [ ] negative [ ] headache [ ] dizziness [ ] syncope [ ] weakness [ ] numbness  Psychiatric: [ ] negative [ ] anxiety [ ] depression  Endocrine: [ ] negative [ ] diabetes [ ] thyroid problem  Hematologic/Lymphatic: [ ] negative [ ] anemia [ ] bleeding problem  Allergic/Immunologic: [ ] negative [ ] itchy eyes [ ] nasal discharge [ ] hives [ ] angioedema  [ ] All other systems negative  [ x] Unable to assess ROS because limited due to confusion.     OBJECTIVE:  ICU Vital Signs Last 24 Hrs  T(C): 36.7 (10 May 2024 16:33), Max: 37.9 (10 May 2024 08:20)  T(F): 98 (10 May 2024 16:33), Max: 100.3 (10 May 2024 08:20)  HR: 80 (10 May 2024 16:33) (80 - 112)  BP: 150/68 (10 May 2024 16:33) (84/65 - 150/68)  BP(mean): 73 (10 May 2024 10:55) (58 - 73)  ABP: --  ABP(mean): --  RR: 18 (10 May 2024 16:33) (18 - 28)  SpO2: 95% (10 May 2024 16:33) (94% - 96%)    O2 Parameters below as of 10 May 2024 16:33  Patient On (Oxygen Delivery Method): nasal cannula  O2 Flow (L/min): 4            CAPILLARY BLOOD GLUCOSE      POCT Blood Glucose.: 192 mg/dL (10 May 2024 08:01)      PHYSICAL EXAM:  General:   HEENT:   Lymph Nodes:  Neck:   Respiratory:   Cardiovascular:   Abdomen:   Extremities:   Skin:   Neurological:  Psychiatry:    LINES:     HOSPITAL MEDICATIONS:  Standing Meds:  albuterol/ipratropium for Nebulization 3 milliLiter(s) Nebulizer every 6 hours  atorvastatin 20 milliGRAM(s) Oral at bedtime  buDESOnide    Inhalation Suspension 0.25 milliGRAM(s) Inhalation two times a day  cefepime   IVPB      cyclopentolate 1% Solution 1 Drop(s) Both EYES two times a day  heparin   Injectable 5000 Unit(s) SubCutaneous every 12 hours  methylPREDNISolone sodium succinate Injectable 20 milliGRAM(s) IV Push three times a day  prednisoLONE acetate 1% Suspension 1 Drop(s) Both EYES two times a day  sodium chloride 7% Inhalation 4 milliLiter(s) Inhalation every 12 hours      PRN Meds:      LABS:                        10.8   16.37 )-----------( 209      ( 10 May 2024 08:12 )             34.1     Hgb Trend: 10.8<--  05-10    143  |  106  |  15  ----------------------------<  204<H>  3.5   |  21<L>  |  0.76    Ca    8.6      10 May 2024 08:12    TPro  6.6  /  Alb  3.6  /  TBili  0.3  /  DBili  x   /  AST  34  /  ALT  18  /  AlkPhos  138<H>  05-10    Creatinine Trend: 0.76<--, 0.80<--, 0.83<--  PT/INR - ( 10 May 2024 08:12 )   PT: 11.5 sec;   INR: 1.10 ratio         PTT - ( 10 May 2024 08:12 )  PTT:31.0 sec  Urinalysis Basic - ( 10 May 2024 08:31 )    Color: Yellow / Appearance: Turbid / S.014 / pH: x  Gluc: x / Ketone: Negative mg/dL  / Bili: Negative / Urobili: 0.2 mg/dL   Blood: x / Protein: 100 mg/dL / Nitrite: Negative   Leuk Esterase: Large / RBC: 7 /HPF / WBC >998 /HPF   Sq Epi: x / Non Sq Epi: 9 /HPF / Bacteria: Negative /HPF        Venous Blood Gas:  05-10 @ 11:25  7.27/57/44/26/70.1  VBG Lactate: 1.7  Venous Blood Gas:  05-10 @ 08:00  7.25/58/59/25/88.2  VBG Lactate: 1.3      MICROBIOLOGY:       RADIOLOGY:  [ x] Reviewed and interpreted by me    PULMONARY FUNCTION TESTS:    EKG:

## 2024-05-13 NOTE — SWALLOW VFSS/MBS ASSESSMENT ADULT - ADDITIONAL INFORMATION
It should be noted that patient had limited participation throughout study despite max support and rationale being provided to patient. All questions were answered. Patient initially declined testing; deferred certain trials as well as additional trials for exhaustive testing despite max support and following education/rationale. It should be noted that patient had limited participation throughout study despite max support and rationale being provided to patient. All questions were answered. Patient initially declined testing; deferred certain trials as well as additional trials for exhaustive testing despite max support and following education/rationale.     +Baseline intermittent cough response

## 2024-05-13 NOTE — SWALLOW VFSS/MBS ASSESSMENT ADULT - DIAGNOSTIC IMPRESSIONS
Ms. Elaine presents with an oropharyngeal dysphagia marked by prolonged mastication of solids and delayed oral transit across consistencies. Reduced epiglottic deflection results in impaired airway protection. Deep laryngeal penetration/ aspiration is seen with less viscous liquids/ thin liquids during the swallow. Trace laryngeal penetration with more viscous liquids. Pharyngeal residue is mostly reduced with multiple spontaneous repeat swallows. Patient was insensate to airway invasion, baseline cough confounds study to a degree.    Shallow penetration occurred with more viscous liquids, which appeared to fully eject. Less viscous liquids appeared to be deeper in airway (however remaining above vocal cords) with more frequency and amount. No aspiration was captured however can not r/o eventual residue descending subglottically with a meal given limitations of study 2/2 pt adamant declination for exhaustive trials.

## 2024-05-13 NOTE — PROGRESS NOTE ADULT - SUBJECTIVE AND OBJECTIVE BOX
---___---___---___---___---___---___ ---___---___---___---___---___---___---___---___---                  M E D I C A L   A T T E N D I N G   P R O G R E S S   N O T E  ---___---___---___---___---___---___ ---___---___---___---___---___---___---___---___---        ================================================    ++CHIEF COMPLAINT:   Patient is a 88y old  Female who presents with a chief complaint of sob (13 May 2024 19:01)      Acute respiratory failure with hypoxia  on o2     ---___---___---___---___---___---  PAST MEDICAL / Surgical  HISTORY:  PAST MEDICAL & SURGICAL HISTORY:  HTN (hypertension)      Hypothyroidism      Osteoporosis      CAD (coronary artery disease)      COPD (chronic obstructive pulmonary disease)      Pulmonary sarcoidosis      H/O pyelonephritis      Acute diverticulitis          ---___---___---___---___---___---  FAMILY HISTORY:   FAMILY HISTORY:        ---___---___---___---___---___---  ALLERGIES:   Allergies    iodinated radiocontrast agents (Anaphylaxis)    Intolerances        ---___---___---___---___---___---  MEDICATIONS:  MEDICATIONS  (STANDING):  albuterol/ipratropium for Nebulization 3 milliLiter(s) Nebulizer every 6 hours  atorvastatin 20 milliGRAM(s) Oral at bedtime  buDESOnide    Inhalation Suspension 0.25 milliGRAM(s) Inhalation two times a day  cefepime   IVPB 1000 milliGRAM(s) IV Intermittent every 12 hours  cefepime   IVPB      cyclopentolate 1% Solution 1 Drop(s) Both EYES two times a day  FLUoxetine 20 milliGRAM(s) Oral daily  furosemide   Injectable 20 milliGRAM(s) IV Push two times a day  heparin   Injectable 5000 Unit(s) SubCutaneous every 12 hours  methylPREDNISolone sodium succinate Injectable 20 milliGRAM(s) IV Push three times a day  metoprolol succinate ER 25 milliGRAM(s) Oral daily  mirtazapine 7.5 milliGRAM(s) Oral at bedtime  prednisoLONE acetate 1% Suspension 1 Drop(s) Both EYES two times a day  sodium chloride 7% Inhalation 4 milliLiter(s) Inhalation every 12 hours  spironolactone 12.5 milliGRAM(s) Oral daily  temazepam 15 milliGRAM(s) Oral at bedtime    MEDICATIONS  (PRN):  acetaminophen     Tablet .. 650 milliGRAM(s) Oral every 6 hours PRN Temp greater or equal to 38C (100.4F), Mild Pain (1 - 3)      ---___---___---___---___---___---  REVIEW OF SYSTEM:    GEN: no fever, no chills, no pain  RESP: no SOB, no cough, no sputum  CVS: no chest pain, no palpitations, no edema  GI: no abdominal pain, no nausea, no vomiting, no constipation, no diarrhea  : no dysurea, no frequency, no hematurea  Neuro: no headache, no dizziness  PSYCH: no anxiety, no depression  Derm : no itching, no rash    ---___---___---___---___---___---  VITAL SIGNS:  88y , CAPILLARY BLOOD GLUCOSE      POCT Blood Glucose.: 210 mg/dL (12 May 2024 11:51)    T(C): 36.6 (05-13-24 @ 15:52), Max: 36.7 (05-13-24 @ 05:46)  HR: 81 (05-13-24 @ 15:52) (77 - 93)  BP: 146/72 (05-13-24 @ 15:52) (146/72 - 173/99)  RR: 18 (05-13-24 @ 15:52) (18 - 18)  SpO2: 96% (05-13-24 @ 15:52) (93% - 98%)  ---___---___---___---___---___---  PHYSICAL EXAM:    GEN: A&O X 3 , NAD , comfortable  HEENT: NCAT, PERRL, MMM, hearing intact  Neck: supple , no JVD  CVS: S1S2 , regular , No M/R/G appreciated  PULM: CTA B/L,  no W/R/R appreciated  ABD.: soft. non tender, non distended,  bowel sounds present  Extrem: intact pulses , no edema   Derm: No rash , no ecchymoses  PSYCH : normal mood,  no delusion not anxious     ---___---___---___---___---___---            LAB AND IMAGING:                          10.7   9.93  )-----------( 245      ( 13 May 2024 07:51 )             34.3               05-13    144  |  100  |  30<H>  ----------------------------<  152<H>  3.8   |  27  |  0.72    Ca    8.9      13 May 2024 07:51    TPro  6.9  /  Alb  4.2  /  TBili  0.5  /  DBili  x   /  AST  22  /  ALT  21  /  AlkPhos  111  05-13                            Urinalysis Basic - ( 13 May 2024 07:51 )    Color: x / Appearance: x / SG: x / pH: x  Gluc: 152 mg/dL / Ketone: x  / Bili: x / Urobili: x   Blood: x / Protein: x / Nitrite: x   Leuk Esterase: x / RBC: x / WBC x   Sq Epi: x / Non Sq Epi: x / Bacteria: x        [All pertinent / recent Imaging reviewed]         ---___---___---___---___---___---___ ---___---___---___---___---                         A S S E S S M E N T   A N D   P L A N :      HEALTH ISSUES - PROBLEM Dx:    copd   pulmonary sarcoidosis   htn     id pulmonary following   continue o2 and steroids  and nebulizer   monitor clinically           -GI/DVT Prophylaxis.  as ordered  --------------------------------------------  Case discussed with   Education given on   ___________________________  Thank you,  Garry Hermosillo  [phone]

## 2024-05-13 NOTE — SWALLOW VFSS/MBS ASSESSMENT ADULT - COMMENTS
hx con't dysphagia screen failed on 5/10 on 90mL w/ cough/throat clearing w/ indications for NPO  flowsheet 5/10 "pt requesting to drink, s/s pending, repeatedly informed patient she is NPO. bed side dysphagia screening failed. aide at bedside and pt made aware. daughter and other family arrived. insisting pt take PO meds. provider came to bedside, family informed of aspiration risk. family decline to comply. family demanding pt recieve PO prozac, accepting the aspiration risk. PO meds brought to bedside, family requesting gingerale instead of water. gingerale brought to bedside. daughter poured gingerale in pt mouth. family and aid reeducated regarding risk of aspiration. HOB elevated to 45 degrees at this time."  5/11: per d/w SOCORRO Ervin, pt has been feeding pt cake and ginger-chavez this morning despite NPO recommendations    CT chest 5/10 IMPRESSION: Complete consolidation/atelectasis of right lower lobe and right middle lobe, increased from prior exam can represent pneumonia.Interlobular septal thickening suggestive of fluid overload. Trace pleural effusions. Emphysema.    FULL CODE.    Speech & Swallow  dysphagia hx: x2 prior bedside swallow exams (10/1/23 and 3/28/24), both w/ recommendations for regular solids and thin liquids. Continued history:   -dysphagia screen failed on 5/10 on 90mL w/ cough/throat clearing w/ indications for NPO  -flowsheet 5/10 "pt requesting to drink, s/s pending, repeatedly informed patient she is NPO. bed side dysphagia screening failed. aide at bedside and pt made aware. daughter and other family arrived. insisting pt take PO meds. provider came to bedside, family informed of aspiration risk. family decline to comply. family demanding pt recieve PO prozac, accepting the aspiration risk. PO meds brought to bedside, family requesting gingerale instead of water. gingerale brought to bedside. daughter poured gingerale in pt mouth. family and aid reeducated regarding risk of aspiration. HOB elevated to 45 degrees at this time."  -5/11: per d/w SOCORRO Ervin, pt has been feeding pt cake and ginger-chavez this morning despite NPO recommendations    -CT chest 5/10 IMPRESSION: Complete consolidation/atelectasis of right lower lobe and right middle lobe, increased from prior exam can represent pneumonia.Interlobular septal thickening suggestive of fluid overload. Trace pleural effusions. Emphysema.    FULL CODE.    -Speech & Swallow-  -dysphagia hx: x2 prior bedside swallow exams (10/1/23 and 3/28/24), both w/ recommendations for regular solids and thin liquids.

## 2024-05-13 NOTE — SWALLOW VFSS/MBS ASSESSMENT ADULT - ORAL PHASE
Delayed oral transit time/Residue in oral cavity/Uncontrolled bolus / spillover in tip-pharynx/Uncontrolled bolus / spillover in hypopharynx Delayed oral transit time/Reduced anterior - posterior transport/Uncontrolled bolus / spillover in tip-pharynx

## 2024-05-13 NOTE — SWALLOW VFSS/MBS ASSESSMENT ADULT - H & P REVIEW
Luiza Elaine is an 88[facility] with known history of C. difficile, diverticulitis, asthma/COPD, sarcoidosis, hypothyroidism, hypertension, CHF, presenting with concern of respiratory failure this morning at her facility.  She arrives via EMS having received 10 of IV Decadron, 1 DuoNeb, 4 magnesium, placed on oxygen with improvement in her oxygen saturation, known baseline AO 1-2 now responding only to vigorous stimuli; on NRB w/ possibly intubation; pt was not intubated and eventually titrated to 4LNC. Not a candidate for ICU. ID stating pt w/ "impressive right sided PNA; most likely aspiration" . Pulm following for acute hypoxemic respiratory failure, no signs of sarcoid, chronically elevated R diaphragm - stable. Diuresis per primary team. Also w/ severely dilated LA, possible increased LAP. Less likely to be PE. per aide report Often cough with food at home, CT c/f recurrent aspiration PNA. pt w/ recurrent admissions for COPD and aspiration. Cards following:CHF w/ diastolic acute on chronic, on lasix; chest w/ pulmonary edema unless the findings are pulmonary fibrosis. S&S eval, NPO for now./yes

## 2024-05-13 NOTE — SWALLOW VFSS/MBS ASSESSMENT ADULT - CONSISTENCIES ADMINISTERED
mildly thick liquids cup. thin liquids straw and cup serial sips 2-3 pt declined puree texture/regular solid

## 2024-05-13 NOTE — PROGRESS NOTE ADULT - SUBJECTIVE AND OBJECTIVE BOX
CHIEF COMPLAINT: SOB, cough     Interval History: saturating 93% initially on room air, later upon movement desaturated so placed back on 2L NC. Coarse breath sounds bilaterally     PAST MEDICAL & SURGICAL HISTORY:  HTN (hypertension)      Hypothyroidism      Osteoporosis      CAD (coronary artery disease)      COPD (chronic obstructive pulmonary disease)      Pulmonary sarcoidosis      H/O pyelonephritis      Acute diverticulitis          FAMILY HISTORY:      SOCIAL HISTORY:  Smoking: [ ] Never Smoked [ x] Former Smoker  Substance Use: [ x] Never Used [ ] Used ____  EtOH Use:None  Occupation: former       Allergies    iodinated radiocontrast agents (Anaphylaxis)    Intolerances        HOME MEDICATIONS:  Home Medications:  acetaminophen 325 mg oral tablet: 2 tab(s) orally every 6 hours As needed Temp greater or equal to 38C (100.4F), Mild Pain (1 - 3) (2024 13:20)  atorvastatin 20 mg oral tablet: 1 tab(s) orally once a day (2024 13:20)  cyclopentolate 1% ophthalmic solution: 1 drop(s) in the left eye 2 times a day (2024 13:20)  D3 25 mcg (1000 intl units) oral tablet: 1 tab(s) orally once a day (2024 13:20)  ferrous sulfate 325 mg (65 mg elemental iron) oral tablet: 1 tab(s) orally once a day (2024 13:20)  FLUoxetine 20 mg oral capsule: 1 cap(s) orally once a day (2024 13:20)  losartan 25 mg oral tablet: 1 tab(s) orally once a day (2024 13:20)  melatonin 3 mg oral tablet: 1 tab(s) orally once a day (at bedtime) As needed Insomnia (2024 13:20)  mirtazapine 7.5 mg oral tablet: 1 tab(s) orally once a day (at bedtime) (2024 13:20)  prednisoLONE acetate 1% ophthalmic suspension: 1 drop(s) in the left eye 2 times a day (2024 13:20)  temazepam 30 mg oral capsule: 1 cap(s) orally once a day (at bedtime) (2024 13:20)  traZODone 50 mg oral tablet: orally once a day (2024 13:20)      REVIEW OF SYSTEMS:  Constitutional: [ ] negative [ ] fevers [ ] chills [ ] weight loss [ ] weight gain  HEENT: [ ] negative [ ] dry eyes [ ] eye irritation [ ] postnasal drip [ ] nasal congestion  CV: [ ] negative  [ ] chest pain [ ] orthopnea [ ] palpitations [ ] murmur  Resp: [ ] negative [ ] cough [ ] shortness of breath [ ] dyspnea [ ] wheezing [ ] sputum [ ] hemoptysis  GI: [ ] negative [ ] nausea [ ] vomiting [ ] diarrhea [ ] constipation [ ] abd pain [ ] dysphagia   : [ ] negative [ ] dysuria [ ] nocturia [ ] hematuria [ ] increased urinary frequency  Musculoskeletal: [ ] negative [ ] back pain [ ] myalgias [ ] arthralgias [ ] fracture  Skin: [ ] negative [ ] rash [ ] itch  Neurological: [ ] negative [ ] headache [ ] dizziness [ ] syncope [ ] weakness [ ] numbness  Psychiatric: [ ] negative [ ] anxiety [ ] depression  Endocrine: [ ] negative [ ] diabetes [ ] thyroid problem  Hematologic/Lymphatic: [ ] negative [ ] anemia [ ] bleeding problem  Allergic/Immunologic: [ ] negative [ ] itchy eyes [ ] nasal discharge [ ] hives [ ] angioedema  [ ] All other systems negative  [ x] Unable to assess ROS because limited due to confusion.     OBJECTIVE:  ICU Vital Signs Last 24 Hrs  T(C): 36.7 (10 May 2024 16:33), Max: 37.9 (10 May 2024 08:20)  T(F): 98 (10 May 2024 16:33), Max: 100.3 (10 May 2024 08:20)  HR: 80 (10 May 2024 16:33) (80 - 112)  BP: 150/68 (10 May 2024 16:33) (84/65 - 150/68)  BP(mean): 73 (10 May 2024 10:55) (58 - 73)  ABP: --  ABP(mean): --  RR: 18 (10 May 2024 16:33) (18 - 28)  SpO2: 95% (10 May 2024 16:33) (94% - 96%)    O2 Parameters below as of 10 May 2024 16:33  Patient On (Oxygen Delivery Method): nasal cannula  O2 Flow (L/min): 4            CAPILLARY BLOOD GLUCOSE      POCT Blood Glucose.: 192 mg/dL (10 May 2024 08:01)      PHYSICAL EXAM:  Gen: awake, alert, anxious, frail  Resp: breathing comfortably 2L NC, coarse breath sounds bilaterally but no wheezing,   CVS: tachycardic   Abd: soft, non tender, non distended  Ext: warm, well perfused, no edema        HOSPITAL MEDICATIONS:  Standing Meds:  albuterol/ipratropium for Nebulization 3 milliLiter(s) Nebulizer every 6 hours  atorvastatin 20 milliGRAM(s) Oral at bedtime  buDESOnide    Inhalation Suspension 0.25 milliGRAM(s) Inhalation two times a day  cefepime   IVPB      cyclopentolate 1% Solution 1 Drop(s) Both EYES two times a day  heparin   Injectable 5000 Unit(s) SubCutaneous every 12 hours  methylPREDNISolone sodium succinate Injectable 20 milliGRAM(s) IV Push three times a day  prednisoLONE acetate 1% Suspension 1 Drop(s) Both EYES two times a day  sodium chloride 7% Inhalation 4 milliLiter(s) Inhalation every 12 hours      PRN Meds:      LABS:                        10.8   16.37 )-----------( 209      ( 10 May 2024 08:12 )             34.1     Hgb Trend: 10.8<--  05-10    143  |  106  |  15  ----------------------------<  204<H>  3.5   |  21<L>  |  0.76    Ca    8.6      10 May 2024 08:12    TPro  6.6  /  Alb  3.6  /  TBili  0.3  /  DBili  x   /  AST  34  /  ALT  18  /  AlkPhos  138<H>  05-10    Creatinine Trend: 0.76<--, 0.80<--, 0.83<--  PT/INR - ( 10 May 2024 08:12 )   PT: 11.5 sec;   INR: 1.10 ratio         PTT - ( 10 May 2024 08:12 )  PTT:31.0 sec  Urinalysis Basic - ( 10 May 2024 08:31 )    Color: Yellow / Appearance: Turbid / S.014 / pH: x  Gluc: x / Ketone: Negative mg/dL  / Bili: Negative / Urobili: 0.2 mg/dL   Blood: x / Protein: 100 mg/dL / Nitrite: Negative   Leuk Esterase: Large / RBC: 7 /HPF / WBC >998 /HPF   Sq Epi: x / Non Sq Epi: 9 /HPF / Bacteria: Negative /HPF        Venous Blood Gas:  05-10 @ 11:25  7.27/57/44/26/70.1  VBG Lactate: 1.7  Venous Blood Gas:  05-10 @ 08:00  7.25/58/59/25/88.2  VBG Lactate: 1.3      MICROBIOLOGY:       RADIOLOGY:  [ x] Reviewed and interpreted by me

## 2024-05-13 NOTE — PROGRESS NOTE ADULT - SUBJECTIVE AND OBJECTIVE BOX
infectious diseases progress note:    Patient is a 88y old  Female who presents with a chief complaint of sob (13 May 2024 06:47)        Acute respiratory failure with hypoxia               Allergies    iodinated radiocontrast agents (Anaphylaxis)    Intolerances        ANTIBIOTICS/RELEVANT:  antimicrobials  cefepime   IVPB 1000 milliGRAM(s) IV Intermittent every 12 hours  cefepime   IVPB        immunologic:    OTHER:  acetaminophen     Tablet .. 650 milliGRAM(s) Oral every 6 hours PRN  albuterol/ipratropium for Nebulization 3 milliLiter(s) Nebulizer every 6 hours  atorvastatin 20 milliGRAM(s) Oral at bedtime  buDESOnide    Inhalation Suspension 0.25 milliGRAM(s) Inhalation two times a day  cyclopentolate 1% Solution 1 Drop(s) Both EYES two times a day  FLUoxetine 20 milliGRAM(s) Oral daily  furosemide   Injectable 20 milliGRAM(s) IV Push two times a day  heparin   Injectable 5000 Unit(s) SubCutaneous every 12 hours  methylPREDNISolone sodium succinate Injectable 20 milliGRAM(s) IV Push three times a day  metoprolol succinate ER 25 milliGRAM(s) Oral daily  mirtazapine 7.5 milliGRAM(s) Oral at bedtime  prednisoLONE acetate 1% Suspension 1 Drop(s) Both EYES two times a day  sodium chloride 7% Inhalation 4 milliLiter(s) Inhalation every 12 hours  spironolactone 12.5 milliGRAM(s) Oral daily  temazepam 15 milliGRAM(s) Oral at bedtime      Objective:  Vital Signs Last 24 Hrs  T(C): 36.7 (13 May 2024 05:46), Max: 36.7 (12 May 2024 17:31)  T(F): 98 (13 May 2024 05:46), Max: 98.1 (12 May 2024 17:31)  HR: 77 (13 May 2024 05:46) (77 - 93)  BP: 163/90 (13 May 2024 05:46) (147/68 - 173/99)  BP(mean): --  RR: 18 (13 May 2024 05:46) (18 - 18)  SpO2: 98% (13 May 2024 05:46) (93% - 98%)    Parameters below as of 13 May 2024 05:46  Patient On (Oxygen Delivery Method): nasal cannula  O2 Flow (L/min): 2       Eyes:JIM, EOMI  Ear/Nose/Throat: no oral lesion, no sinus tenderness on percussion	  Neck:no JVD, no lymphadenopathy, supple  Respiratory: CTA moises  Cardiovascular: S1S2 RRR, no murmurs  Gastrointestinal:soft, (+) BS, no HSM  Extremities:no e/e/c        LABS:                        10.7   9.93  )-----------( x        ( 13 May 2024 07:51 )             34.3     05-11    143  |  104  |  22  ----------------------------<  159<H>  3.5   |  24  |  0.69    Ca    8.8      11 May 2024 09:50    TPro  6.4  /  Alb  3.7  /  TBili  0.3  /  DBili  x   /  AST  15  /  ALT  14  /  AlkPhos  117  05-11      Urinalysis Basic - ( 11 May 2024 09:50 )    Color: x / Appearance: x / SG: x / pH: x  Gluc: 159 mg/dL / Ketone: x  / Bili: x / Urobili: x   Blood: x / Protein: x / Nitrite: x   Leuk Esterase: x / RBC: x / WBC x   Sq Epi: x / Non Sq Epi: x / Bacteria: x          MICROBIOLOGY:    RECENT CULTURES:  05-10 @ 08:31 Catheterized Catheterized                50,000 - 99,000 CFU/mL Candida tropicalis  "Susceptibilities not performed"    05-10 @ 08:09 .Blood Blood-Peripheral                No growth at 48 Hours    05-10 @ 07:59 .Blood Blood-Peripheral                No growth at 48 Hours          RESPIRATORY CULTURES:              RADIOLOGY & ADDITIONAL STUDIES:        Pager [phone]  After 5 pm/weekends or if no response :[phone]

## 2024-05-13 NOTE — SWALLOW VFSS/MBS ASSESSMENT ADULT - PHARYNGEAL PHASE COMMENTS
Pharyngeal phase c/b latency in the swallow trigger to the level of the valleculae with consistent spillover to the pyriform sinuses, increased depth, amount and frequency. Base of tongue/BoT retraction and pharyngeal constriction were reduced however resulted in adequate pharyngeal bolus propulsion. Trace-Mild vallecular residue present.  Hyolaryngeal elevation and excursion were mildly reduced. Adequate epiglottic inversion. Adequate relaxation of UES. No penetration or aspiration were visualized.  Of note, pt declined additional trials therefore limited sample from study.     To note, high shoulder girdle obscured viewing intermittent during this study. Pharyngeal phase c/b latency in the swallow trigger to the level of the valleculae. Base of tongue/BoT retraction and pharyngeal constriction were reduced however resulted in adequate pharyngeal bolus propulsion. Trace-Mild vallecular residue present.  Hyolaryngeal elevation and excursion were mildly reduced. Adequate epiglottic inversion. Adequate relaxation of UES. No penetration or aspiration were visualized.  Of note, pt declined additional trials therefore limited sample from study.     To note, high shoulder girdle obscured viewing intermittent during this study. Pharyngeal phase c/b latency in the swallow trigger to the level of the valleculae with consistent spillover to the pyriform sinuses, increased depth, amount and frequency with straw vs cup presentations. Base of tongue/BoT retraction and pharyngeal constriction were reduced however resulted in adequate pharyngeal bolus propulsion. Trace-Mild vallecular residue present.  Hyolaryngeal elevation and excursion were mildly reduced. Adequate epiglottic inversion. Adequate relaxation of UES.    Penetration shallow occurred with more viscous liquids, which appeared to fully eject. Less viscous liquids appeared to be deeper in airway (remaining above vocal cords) with more frequency and amount. No aspiration was captured however can not r/o eventual residue descending subglottically with a meal.

## 2024-05-14 LAB — GLUCOSE BLDC GLUCOMTR-MCNC: 154 MG/DL — HIGH (ref 70–99)

## 2024-05-14 PROCEDURE — 99232 SBSQ HOSP IP/OBS MODERATE 35: CPT

## 2024-05-14 PROCEDURE — 99233 SBSQ HOSP IP/OBS HIGH 50: CPT

## 2024-05-14 RX ORDER — FUROSEMIDE 40 MG
40 TABLET ORAL DAILY
Refills: 0 | Status: DISCONTINUED | OUTPATIENT
Start: 2024-05-14 | End: 2024-05-15

## 2024-05-14 RX ORDER — LOSARTAN POTASSIUM 100 MG/1
25 TABLET, FILM COATED ORAL DAILY
Refills: 0 | Status: DISCONTINUED | OUTPATIENT
Start: 2024-05-14 | End: 2024-05-15

## 2024-05-14 RX ADMIN — CYCLOPENTOLATE HYDROCHLORIDE 1 DROP(S): 10 SOLUTION/ DROPS OPHTHALMIC at 19:13

## 2024-05-14 RX ADMIN — TEMAZEPAM 15 MILLIGRAM(S): 15 CAPSULE ORAL at 21:21

## 2024-05-14 RX ADMIN — Medication 20 MILLIGRAM(S): at 12:29

## 2024-05-14 RX ADMIN — SPIRONOLACTONE 12.5 MILLIGRAM(S): 25 TABLET, FILM COATED ORAL at 06:39

## 2024-05-14 RX ADMIN — Medication 0.25 MILLIGRAM(S): at 07:07

## 2024-05-14 RX ADMIN — Medication 3 MILLILITER(S): at 12:29

## 2024-05-14 RX ADMIN — Medication 1 DROP(S): at 18:22

## 2024-05-14 RX ADMIN — HEPARIN SODIUM 5000 UNIT(S): 5000 INJECTION INTRAVENOUS; SUBCUTANEOUS at 06:39

## 2024-05-14 RX ADMIN — Medication 20 MILLIGRAM(S): at 06:39

## 2024-05-14 RX ADMIN — ATORVASTATIN CALCIUM 20 MILLIGRAM(S): 80 TABLET, FILM COATED ORAL at 21:21

## 2024-05-14 RX ADMIN — CEFEPIME 100 MILLIGRAM(S): 1 INJECTION, POWDER, FOR SOLUTION INTRAMUSCULAR; INTRAVENOUS at 06:39

## 2024-05-14 RX ADMIN — Medication 20 MILLIGRAM(S): at 06:38

## 2024-05-14 RX ADMIN — Medication 3 MILLILITER(S): at 00:08

## 2024-05-14 RX ADMIN — CEFEPIME 100 MILLIGRAM(S): 1 INJECTION, POWDER, FOR SOLUTION INTRAMUSCULAR; INTRAVENOUS at 18:22

## 2024-05-14 RX ADMIN — Medication 0.25 MILLIGRAM(S): at 18:22

## 2024-05-14 RX ADMIN — HEPARIN SODIUM 5000 UNIT(S): 5000 INJECTION INTRAVENOUS; SUBCUTANEOUS at 18:23

## 2024-05-14 RX ADMIN — Medication 3 MILLILITER(S): at 18:44

## 2024-05-14 RX ADMIN — SODIUM CHLORIDE 4 MILLILITER(S): 9 INJECTION INTRAMUSCULAR; INTRAVENOUS; SUBCUTANEOUS at 18:45

## 2024-05-14 RX ADMIN — CYCLOPENTOLATE HYDROCHLORIDE 1 DROP(S): 10 SOLUTION/ DROPS OPHTHALMIC at 06:38

## 2024-05-14 RX ADMIN — Medication 1 DROP(S): at 06:38

## 2024-05-14 RX ADMIN — Medication 3 MILLILITER(S): at 06:38

## 2024-05-14 RX ADMIN — Medication 25 MILLIGRAM(S): at 06:39

## 2024-05-14 RX ADMIN — Medication 20 MILLIGRAM(S): at 15:37

## 2024-05-14 RX ADMIN — MIRTAZAPINE 7.5 MILLIGRAM(S): 45 TABLET, ORALLY DISINTEGRATING ORAL at 21:21

## 2024-05-14 RX ADMIN — SODIUM CHLORIDE 4 MILLILITER(S): 9 INJECTION INTRAMUSCULAR; INTRAVENOUS; SUBCUTANEOUS at 06:52

## 2024-05-14 NOTE — PROGRESS NOTE ADULT - SUBJECTIVE AND OBJECTIVE BOX
infectious diseases progress note:    Patient is a 88y old  Female who presents with a chief complaint of sob (13 May 2024 19:44)        Acute respiratory failure with hypoxia          ROS:  CONSTITUTIONAL:  Negative fever or chills, feels well, good appetite  EYES:  Negative  blurry vision or double vision  CARDIOVASCULAR:  Negative for chest pain or palpitations  RESPIRATORY:  Negative for cough, wheezing, or SOB   GASTROINTESTINAL:  Negative for nausea, vomiting, diarrhea, constipation, or abdominal pain  GENITOURINARY:  Negative frequency, urgency or dysuria  NEUROLOGIC:  No headache, confusion, dizziness, lightheadedness    Allergies    iodinated radiocontrast agents (Anaphylaxis)    Intolerances        ANTIBIOTICS/RELEVANT:  antimicrobials  cefepime   IVPB 1000 milliGRAM(s) IV Intermittent every 12 hours  cefepime   IVPB        immunologic:    OTHER:  acetaminophen     Tablet .. 650 milliGRAM(s) Oral every 6 hours PRN  albuterol/ipratropium for Nebulization 3 milliLiter(s) Nebulizer every 6 hours  atorvastatin 20 milliGRAM(s) Oral at bedtime  buDESOnide    Inhalation Suspension 0.25 milliGRAM(s) Inhalation two times a day  cyclopentolate 1% Solution 1 Drop(s) Both EYES two times a day  FLUoxetine 20 milliGRAM(s) Oral daily  furosemide   Injectable 20 milliGRAM(s) IV Push two times a day  heparin   Injectable 5000 Unit(s) SubCutaneous every 12 hours  methylPREDNISolone sodium succinate Injectable 20 milliGRAM(s) IV Push three times a day  metoprolol succinate ER 25 milliGRAM(s) Oral daily  mirtazapine 7.5 milliGRAM(s) Oral at bedtime  prednisoLONE acetate 1% Suspension 1 Drop(s) Both EYES two times a day  sodium chloride 7% Inhalation 4 milliLiter(s) Inhalation every 12 hours  spironolactone 12.5 milliGRAM(s) Oral daily  temazepam 15 milliGRAM(s) Oral at bedtime      Objective:  Vital Signs Last 24 Hrs  T(C): 36.4 (14 May 2024 06:54), Max: 36.7 (13 May 2024 20:57)  T(F): 97.6 (14 May 2024 06:54), Max: 98.1 (13 May 2024 20:57)  HR: 74 (14 May 2024 06:54) (74 - 83)  BP: 153/83 (14 May 2024 06:54) (143/91 - 153/83)  BP(mean): --  RR: 18 (14 May 2024 06:54) (16 - 18)  SpO2: 98% (14 May 2024 06:54) (92% - 100%)    Parameters below as of 14 May 2024 06:54  Patient On (Oxygen Delivery Method): nasal cannula  O2 Flow (L/min): 2         Eyes:JIM, EOMI  Ear/Nose/Throat: no oral lesion, no sinus tenderness on percussion	  Neck:no JVD, no lymphadenopathy, supple  Respiratory: CTA moises  Cardiovascular: S1S2 RRR, no murmurs  Gastrointestinal:soft, (+) BS, no HSM  Extremities:no e/e/c        LABS:                        10.7   9.93  )-----------( 245      ( 13 May 2024 07:51 )             34.3     05-13    144  |  100  |  30<H>  ----------------------------<  152<H>  3.8   |  27  |  0.72    Ca    8.9      13 May 2024 07:51    TPro  6.9  /  Alb  4.2  /  TBili  0.5  /  DBili  x   /  AST  22  /  ALT  21  /  AlkPhos  111  05-13      Urinalysis Basic - ( 13 May 2024 07:51 )    Color: x / Appearance: x / SG: x / pH: x  Gluc: 152 mg/dL / Ketone: x  / Bili: x / Urobili: x   Blood: x / Protein: x / Nitrite: x   Leuk Esterase: x / RBC: x / WBC x   Sq Epi: x / Non Sq Epi: x / Bacteria: x          MICROBIOLOGY:    RECENT CULTURES:  05-10 @ 08:31 Catheterized Catheterized                50,000 - 99,000 CFU/mL Candida tropicalis  "Susceptibilities not performed"    05-10 @ 08:09 .Blood Blood-Peripheral                No growth at 72 Hours    05-10 @ 07:59 .Blood Blood-Peripheral                No growth at 72 Hours          RESPIRATORY CULTURES:              RADIOLOGY & ADDITIONAL STUDIES:        Pager [phone]  After 5 pm/weekends or if no response :[phone]

## 2024-05-14 NOTE — PHYSICAL THERAPY INITIAL EVALUATION ADULT - ACTIVE RANGE OF MOTION EXAMINATION, REHAB EVAL
B/L AROM UE limited to 50% of the total ROM/bilateral  lower extremity Active ROM was WFL (within functional limits)/deficits as listed below

## 2024-05-14 NOTE — PROVIDER CONTACT NOTE (OTHER) - ACTION/TREATMENT ORDERED:
Provider came to bedside. education provided on aspiration risk. family accept risk, PO prozac ordered.
Document refusal of boots

## 2024-05-14 NOTE — PHYSICAL THERAPY INITIAL EVALUATION ADULT - GAIT DEVIATIONS NOTED, PT EVAL
decreased estuardo/increased time in double stance/decreased velocity of limb motion/decreased step length/decreased weight-shifting ability

## 2024-05-14 NOTE — PHYSICAL THERAPY INITIAL EVALUATION ADULT - PERTINENT HX OF CURRENT PROBLEM, REHAB EVAL
88-year-old female presenting from [facility] rehab center/sob h/o  C. difficile, diverticulitis, asthma/COPD, sarcoidosis, hypothyroidism, hypertension, CHF presenting with concern of respiratory failure this morning at her facility/sob; arrived  form  having received 10 of IV Decadron, 1 DuoNeb, 4 magnesium,   placed on oxygen with improvement in her oxygen saturation, known baseline AO -2 bp     was  220/99 at the facility, T 98.9 F, 130 BPM,   rejected  by ICU pe r primary dr pt  seen  in  er  awake  and  alert. states she s unsure, why she  was  brought  to ER. Hospital course: (5/10) CT Chest: Complete consolidation/atelectasis of right lower lobe and right middle lobe, increased from prior exam can represent pneumonia. Interlobular septal thickening suggestive of fluid overload. Trace pleural effusions.  Emphysema. (5/10) CT Abd/Pelvis: Mural thickening in the ascending colon and rectosigmoid colon, which   may be exaggerated by underdistention. Colitis is considered. Unchanged severe left hydroureter. (5/11) [facility] Duplex: No evidence of deep venous thrombosis in either lower extremity. (5/13) CXR: Bilateral patchy airspace and reticular opacities, unchanged. Small bilateral pleural effusions, worse on the right.

## 2024-05-14 NOTE — PHYSICAL THERAPY INITIAL EVALUATION ADULT - ADDITIONAL COMMENTS
Patient is a resident in intermediate; ambulated independently w/RW, needs assistance for ADLs; pt. has 24 hrs x 7 days HHA support

## 2024-05-14 NOTE — PROGRESS NOTE ADULT - SUBJECTIVE AND OBJECTIVE BOX
CHIEF COMPLAINT: SOB, cough     Interval History: Breathing comfortably on room air, but wears oxygen occasionally 2L NC. Requesting ginger ale, would not answer other questions as focused on diet    PAST MEDICAL & SURGICAL HISTORY:  HTN (hypertension)      Hypothyroidism      Osteoporosis      CAD (coronary artery disease)      COPD (chronic obstructive pulmonary disease)      Pulmonary sarcoidosis      H/O pyelonephritis      Acute diverticulitis          FAMILY HISTORY:      SOCIAL HISTORY:  Smoking: [ ] Never Smoked [ x] Former Smoker  Substance Use: [ x] Never Used [ ] Used ____  EtOH Use:None  Occupation: former       Allergies    iodinated radiocontrast agents (Anaphylaxis)    Intolerances        HOME MEDICATIONS:  Home Medications:  acetaminophen 325 mg oral tablet: 2 tab(s) orally every 6 hours As needed Temp greater or equal to 38C (100.4F), Mild Pain (1 - 3) (2024 13:20)  atorvastatin 20 mg oral tablet: 1 tab(s) orally once a day (2024 13:20)  cyclopentolate 1% ophthalmic solution: 1 drop(s) in the left eye 2 times a day (2024 13:20)  D3 25 mcg (1000 intl units) oral tablet: 1 tab(s) orally once a day (2024 13:20)  ferrous sulfate 325 mg (65 mg elemental iron) oral tablet: 1 tab(s) orally once a day (2024 13:20)  FLUoxetine 20 mg oral capsule: 1 cap(s) orally once a day (2024 13:20)  losartan 25 mg oral tablet: 1 tab(s) orally once a day (2024 13:20)  melatonin 3 mg oral tablet: 1 tab(s) orally once a day (at bedtime) As needed Insomnia (2024 13:20)  mirtazapine 7.5 mg oral tablet: 1 tab(s) orally once a day (at bedtime) (2024 13:20)  prednisoLONE acetate 1% ophthalmic suspension: 1 drop(s) in the left eye 2 times a day (2024 13:20)  temazepam 30 mg oral capsule: 1 cap(s) orally once a day (at bedtime) (2024 13:20)  traZODone 50 mg oral tablet: orally once a day (2024 13:20)      REVIEW OF SYSTEMS:  Constitutional: [ ] negative [ ] fevers [ ] chills [ ] weight loss [ ] weight gain  HEENT: [ ] negative [ ] dry eyes [ ] eye irritation [ ] postnasal drip [ ] nasal congestion  CV: [ ] negative  [ ] chest pain [ ] orthopnea [ ] palpitations [ ] murmur  Resp: [ ] negative [ ] cough [ ] shortness of breath [ ] dyspnea [ ] wheezing [ ] sputum [ ] hemoptysis  GI: [ ] negative [ ] nausea [ ] vomiting [ ] diarrhea [ ] constipation [ ] abd pain [ ] dysphagia   : [ ] negative [ ] dysuria [ ] nocturia [ ] hematuria [ ] increased urinary frequency  Musculoskeletal: [ ] negative [ ] back pain [ ] myalgias [ ] arthralgias [ ] fracture  Skin: [ ] negative [ ] rash [ ] itch  Neurological: [ ] negative [ ] headache [ ] dizziness [ ] syncope [ ] weakness [ ] numbness  Psychiatric: [ ] negative [ ] anxiety [ ] depression  Endocrine: [ ] negative [ ] diabetes [ ] thyroid problem  Hematologic/Lymphatic: [ ] negative [ ] anemia [ ] bleeding problem  Allergic/Immunologic: [ ] negative [ ] itchy eyes [ ] nasal discharge [ ] hives [ ] angioedema  [ ] All other systems negative  [ x] Unable to assess ROS because limited due to confusion.     OBJECTIVE:  ICU Vital Signs Last 24 Hrs  T(C): 36.7 (10 May 2024 16:33), Max: 37.9 (10 May 2024 08:20)  T(F): 98 (10 May 2024 16:33), Max: 100.3 (10 May 2024 08:20)  HR: 80 (10 May 2024 16:33) (80 - 112)  BP: 150/68 (10 May 2024 16:33) (84/65 - 150/68)  BP(mean): 73 (10 May 2024 10:55) (58 - 73)  ABP: --  ABP(mean): --  RR: 18 (10 May 2024 16:33) (18 - 28)  SpO2: 95% (10 May 2024 16:33) (94% - 96%)    O2 Parameters below as of 10 May 2024 16:33  Patient On (Oxygen Delivery Method): nasal cannula  O2 Flow (L/min): 4            CAPILLARY BLOOD GLUCOSE      POCT Blood Glucose.: 192 mg/dL (10 May 2024 08:01)      PHYSICAL EXAM:  Gen: awake, alert, anxious, frail  Resp: breathing comfortably on room air, coarse breath sounds bilaterally but no wheezing,   CVS: tachycardic   Abd: soft, non tender, non distended  Ext: warm, well perfused, no edema        HOSPITAL MEDICATIONS:  Standing Meds:  albuterol/ipratropium for Nebulization 3 milliLiter(s) Nebulizer every 6 hours  atorvastatin 20 milliGRAM(s) Oral at bedtime  buDESOnide    Inhalation Suspension 0.25 milliGRAM(s) Inhalation two times a day  cefepime   IVPB      cyclopentolate 1% Solution 1 Drop(s) Both EYES two times a day  heparin   Injectable 5000 Unit(s) SubCutaneous every 12 hours  methylPREDNISolone sodium succinate Injectable 20 milliGRAM(s) IV Push three times a day  prednisoLONE acetate 1% Suspension 1 Drop(s) Both EYES two times a day  sodium chloride 7% Inhalation 4 milliLiter(s) Inhalation every 12 hours      PRN Meds:      LABS:                        10.8   16.37 )-----------( 209      ( 10 May 2024 08:12 )             34.1     Hgb Trend: 10.8<--  05-10    143  |  106  |  15  ----------------------------<  204<H>  3.5   |  21<L>  |  0.76    Ca    8.6      10 May 2024 08:12    TPro  6.6  /  Alb  3.6  /  TBili  0.3  /  DBili  x   /  AST  34  /  ALT  18  /  AlkPhos  138<H>  05-10    Creatinine Trend: 0.76<--, 0.80<--, 0.83<--  PT/INR - ( 10 May 2024 08:12 )   PT: 11.5 sec;   INR: 1.10 ratio         PTT - ( 10 May 2024 08:12 )  PTT:31.0 sec  Urinalysis Basic - ( 10 May 2024 08:31 )    Color: Yellow / Appearance: Turbid / S.014 / pH: x  Gluc: x / Ketone: Negative mg/dL  / Bili: Negative / Urobili: 0.2 mg/dL   Blood: x / Protein: 100 mg/dL / Nitrite: Negative   Leuk Esterase: Large / RBC: 7 /HPF / WBC >998 /HPF   Sq Epi: x / Non Sq Epi: 9 /HPF / Bacteria: Negative /HPF        Venous Blood Gas:  05-10 @ 11:25  7.27/57/44/26/70.1  VBG Lactate: 1.7  Venous Blood Gas:  05-10 @ 08:00  7.25/58/59/25/88.2  VBG Lactate: 1.3             RADIOLOGY:  [ x] Reviewed and interpreted by me

## 2024-05-14 NOTE — PROGRESS NOTE ADULT - TIME BILLING
review of the medical record, interpretation of labs, imaging studies, discussion with interdisciplinary team, physical exam and medical decision making as above
review of the medical record, interpretation of labs, imaging studies, discussion with interdisciplinary team, physical exam and medical decision making as above

## 2024-05-14 NOTE — PHYSICAL THERAPY INITIAL EVALUATION ADULT - IMPAIRMENTS CONTRIBUTING TO GAIT DEVIATIONS, PT EVAL
ataxic/impaired balance/impaired coordination/decreased flexibility/impaired postural control/decreased ROM/decreased strength

## 2024-05-14 NOTE — PROVIDER CONTACT NOTE (OTHER) - RECOMMENDATIONS
If you receive a survey via e-mail or mail, please take the time to complete and return as your feedback is very helpful to our practice.                   In order to make sure we are meeting our patients' needs, we require a 36 hour notice from you prior to picking up your medications. Attached is a table that will help you determine when your prescriptions will be ready for pick-up.                      If the refill is requested between when the clinic opens and 12 pm on  You can  your prescription at the pharmacy after 6 PM on   Monday Tuesday Tuesday Wednesday Wednesday Thursday Thursday Friday Friday Monday     If the refill is requested between 12 pm and after the clinic closes on You can  your prescription at the pharmacy after 12 PM on   Monday Wednesday Tuesday Thursday Wednesday Friday Thursday Monday Friday Tuesday       
Notify PA

## 2024-05-14 NOTE — PROVIDER CONTACT NOTE (OTHER) - SITUATION
pt NPO, pending s/s. failed bedside dysphagia screening. family requesting PO meds
Pt has an order for heel off  boots, but the pt refused to wear them. Pt was educated on the use of the boots to prevent pressure ulcers, but pt still refused.

## 2024-05-14 NOTE — PROGRESS NOTE ADULT - SUBJECTIVE AND OBJECTIVE BOX
---___---___---___---___---___---___ ---___---___---___---___---___---___---___---___---                  M E D I C A L   A T T E N D I N G   P R O G R E S S   N O T E  ---___---___---___---___---___---___ ---___---___---___---___---___---___---___---___---        ================================================    ++CHIEF COMPLAINT:   Patient is a 88y old  Female who presents with a chief complaint of sob (14 May 2024 15:13)      Acute respiratory failure with hypoxia      ---___---___---___---___---___---  PAST MEDICAL / Surgical  HISTORY:  PAST MEDICAL & SURGICAL HISTORY:  HTN (hypertension)      Hypothyroidism      Osteoporosis      CAD (coronary artery disease)      COPD (chronic obstructive pulmonary disease)      Pulmonary sarcoidosis      H/O pyelonephritis      Acute diverticulitis          ---___---___---___---___---___---  FAMILY HISTORY:   FAMILY HISTORY:        ---___---___---___---___---___---  ALLERGIES:   Allergies    iodinated radiocontrast agents (Anaphylaxis)    Intolerances        ---___---___---___---___---___---  MEDICATIONS:  MEDICATIONS  (STANDING):  albuterol/ipratropium for Nebulization 3 milliLiter(s) Nebulizer every 6 hours  atorvastatin 20 milliGRAM(s) Oral at bedtime  buDESOnide    Inhalation Suspension 0.25 milliGRAM(s) Inhalation two times a day  cefepime   IVPB 1000 milliGRAM(s) IV Intermittent every 12 hours  cefepime   IVPB      cyclopentolate 1% Solution 1 Drop(s) Both EYES two times a day  FLUoxetine 20 milliGRAM(s) Oral daily  furosemide    Tablet 40 milliGRAM(s) Oral daily  heparin   Injectable 5000 Unit(s) SubCutaneous every 12 hours  losartan 25 milliGRAM(s) Oral daily  metoprolol succinate ER 25 milliGRAM(s) Oral daily  mirtazapine 7.5 milliGRAM(s) Oral at bedtime  prednisoLONE acetate 1% Suspension 1 Drop(s) Both EYES two times a day  predniSONE   Tablet 40 milliGRAM(s) Oral daily  sodium chloride 7% Inhalation 4 milliLiter(s) Inhalation every 12 hours  spironolactone 12.5 milliGRAM(s) Oral daily  temazepam 15 milliGRAM(s) Oral at bedtime    MEDICATIONS  (PRN):  acetaminophen     Tablet .. 650 milliGRAM(s) Oral every 6 hours PRN Temp greater or equal to 38C (100.4F), Mild Pain (1 - 3)      ---___---___---___---___---___---  REVIEW OF SYSTEM:    GEN: no fever, no chills, no pain  RESP: no SOB, no cough, no sputum  CVS: no chest pain, no palpitations, no edema  GI: no abdominal pain, no nausea, no vomiting, no constipation, no diarrhea  : no dysurea, no frequency, no hematurea  Neuro: no headache, no dizziness  PSYCH: no anxiety, no depression  Derm : no itching, no rash    ---___---___---___---___---___---  VITAL SIGNS:  88y , CAPILLARY BLOOD GLUCOSE        T(C): 36.3 (05-14-24 @ 17:42), Max: 36.7 (05-13-24 @ 20:57)  HR: 76 (05-14-24 @ 17:42) (73 - 91)  BP: 149/79 (05-14-24 @ 17:42) (143/91 - 154/84)  RR: 18 (05-14-24 @ 17:42) (16 - 18)  SpO2: 95% (05-14-24 @ 17:42) (92% - 100%)  ---___---___---___---___---___---  PHYSICAL EXAM:    GEN: A&O X 3 , NAD , comfortable  HEENT: NCAT, PERRL, MMM, hearing intact  Neck: supple , no JVD  CVS: S1S2 , regular , No M/R/G appreciated  PULM: CTA B/L,  no W/R/R appreciated  ABD.: soft. non tender, non distended,  bowel sounds present  Extrem: intact pulses , no edema   Derm: No rash , no ecchymoses  PSYCH : normal mood,  no delusion not anxious     ---___---___---___---___---___---            LAB AND IMAGING:                          10.7   9.93  )-----------( 245      ( 13 May 2024 07:51 )             34.3               05-13    144  |  100  |  30<H>  ----------------------------<  152<H>  3.8   |  27  |  0.72    Ca    8.9      13 May 2024 07:51    TPro  6.9  /  Alb  4.2  /  TBili  0.5  /  DBili  x   /  AST  22  /  ALT  21  /  AlkPhos  111  05-13                            Urinalysis Basic - ( 13 May 2024 07:51 )    Color: x / Appearance: x / SG: x / pH: x  Gluc: 152 mg/dL / Ketone: x  / Bili: x / Urobili: x   Blood: x / Protein: x / Nitrite: x   Leuk Esterase: x / RBC: x / WBC x   Sq Epi: x / Non Sq Epi: x / Bacteria: x        [All pertinent / recent Imaging reviewed]         ---___---___---___---___---___---___ ---___---___---___---___---                         A S S E S S M E N T   A N D   P L A N :      HEALTH ISSUES - PROBLEM Dx:          ---___---___---___---___---___---___ ---___---___---___---___---___---___---___---___---                  M E D I C A L   A T T E N D I N G   P R O G R E S S   N O T E  ---___---___---___---___---___---___ ---___---___---___---___---___---___---___---___---        ================================================    ++CHIEF COMPLAINT:   Patient is a 88y old  Female who presents with a chief complaint of sob (14 May 2024 15:13)      Acute respiratory failure with hypoxia      ---___---___---___---___---___---  PAST MEDICAL / Surgical  HISTORY:  PAST MEDICAL & SURGICAL HISTORY:  HTN (hypertension)      Hypothyroidism      Osteoporosis      CAD (coronary artery disease)      COPD (chronic obstructive pulmonary disease)      Pulmonary sarcoidosis      H/O pyelonephritis      Acute diverticulitis          ---___---___---___---___---___---  FAMILY HISTORY:   FAMILY HISTORY:        ---___---___---___---___---___---  ALLERGIES:   Allergies    iodinated radiocontrast agents (Anaphylaxis)    Intolerances        ---___---___---___---___---___---  MEDICATIONS:  MEDICATIONS  (STANDING):  albuterol/ipratropium for Nebulization 3 milliLiter(s) Nebulizer every 6 hours  atorvastatin 20 milliGRAM(s) Oral at bedtime  buDESOnide    Inhalation Suspension 0.25 milliGRAM(s) Inhalation two times a day  cefepime   IVPB 1000 milliGRAM(s) IV Intermittent every 12 hours  cefepime   IVPB      cyclopentolate 1% Solution 1 Drop(s) Both EYES two times a day  FLUoxetine 20 milliGRAM(s) Oral daily  furosemide    Tablet 40 milliGRAM(s) Oral daily  heparin   Injectable 5000 Unit(s) SubCutaneous every 12 hours  losartan 25 milliGRAM(s) Oral daily  metoprolol succinate ER 25 milliGRAM(s) Oral daily  mirtazapine 7.5 milliGRAM(s) Oral at bedtime  prednisoLONE acetate 1% Suspension 1 Drop(s) Both EYES two times a day  predniSONE   Tablet 40 milliGRAM(s) Oral daily  sodium chloride 7% Inhalation 4 milliLiter(s) Inhalation every 12 hours  spironolactone 12.5 milliGRAM(s) Oral daily  temazepam 15 milliGRAM(s) Oral at bedtime    MEDICATIONS  (PRN):  acetaminophen     Tablet .. 650 milliGRAM(s) Oral every 6 hours PRN Temp greater or equal to 38C (100.4F), Mild Pain (1 - 3)      ---___---___---___---___---___---  REVIEW OF SYSTEM:    GEN: no fever, no chills, no pain  RESP: no SOB, no cough, no sputum  CVS: no chest pain, no palpitations, no edema  GI: no abdominal pain, no nausea, no vomiting, no constipation, no diarrhea  : no dysurea, no frequency, no hematurea  Neuro: no headache, no dizziness  PSYCH: no anxiety, no depression  Derm : no itching, no rash    ---___---___---___---___---___---  VITAL SIGNS:  88y , CAPILLARY BLOOD GLUCOSE        T(C): 36.3 (05-14-24 @ 17:42), Max: 36.7 (05-13-24 @ 20:57)  HR: 76 (05-14-24 @ 17:42) (73 - 91)  BP: 149/79 (05-14-24 @ 17:42) (143/91 - 154/84)  RR: 18 (05-14-24 @ 17:42) (16 - 18)  SpO2: 95% (05-14-24 @ 17:42) (92% - 100%)  ---___---___---___---___---___---  PHYSICAL EXAM:    GEN: A&O X 3 , NAD , comfortable  HEENT: NCAT, PERRL, MMM, hearing intact  Neck: supple , no JVD  CVS: S1S2 , regular , No M/R/G appreciated  PULM: CTA B/L,  no W/R/R appreciated  ABD.: soft. non tender, non distended,  bowel sounds present  Extrem: intact pulses , no edema   Derm: No rash , no ecchymoses  PSYCH : normal mood,  no delusion not anxious     ---___---___---___---___---___---            LAB AND IMAGING:                          10.7   9.93  )-----------( 245      ( 13 May 2024 07:51 )             34.3               05-13    144  |  100  |  30<H>  ----------------------------<  152<H>  3.8   |  27  |  0.72    Ca    8.9      13 May 2024 07:51    TPro  6.9  /  Alb  4.2  /  TBili  0.5  /  DBili  x   /  AST  22  /  ALT  21  /  AlkPhos  111  05-13                            Urinalysis Basic - ( 13 May 2024 07:51 )    Color: x / Appearance: x / SG: x / pH: x  Gluc: 152 mg/dL / Ketone: x  / Bili: x / Urobili: x   Blood: x / Protein: x / Nitrite: x   Leuk Esterase: x / RBC: x / WBC x   Sq Epi: x / Non Sq Epi: x / Bacteria: x        [All pertinent / recent Imaging reviewed]         ---___---___---___---___---___---___ ---___---___---___---___---                         A S S E S S M E N T   A N D   P L A N :      HEALTH ISSUES - PROBLEM Dx:    pneumonia   dyspnea     finish abx   finish steroids and nebulizers  -GI/DVT Prophylaxis. as ordered       ___________________________  Thank you,  Garry Hermosillo  [phone]            -GI/DVT Prophylaxis.    --------------------------------------------  Case discussed with   Education given on   ___________________________  Thank you,  Garry Hermosillo  [phone]       ---___---___---___---___---___---___ ---___---___---___---___---___---___---___---___---                  M E D I C A L   A T T E N D I N G   P R O G R E S S   N O T E  ---___---___---___---___---___---___ ---___---___---___---___---___---___---___---___---        ================================================    ++CHIEF COMPLAINT:   Patient is a 88y old  Female who presents with a chief complaint of sob (14 May 2024 15:13)      Acute respiratory failure with hypoxia      ---___---___---___---___---___---  PAST MEDICAL / Surgical  HISTORY:  PAST MEDICAL & SURGICAL HISTORY:  HTN (hypertension)      Hypothyroidism      Osteoporosis      CAD (coronary artery disease)      COPD (chronic obstructive pulmonary disease)      Pulmonary sarcoidosis      H/O pyelonephritis      Acute diverticulitis          ---___---___---___---___---___---  FAMILY HISTORY:   FAMILY HISTORY:        ---___---___---___---___---___---  ALLERGIES:   Allergies    iodinated radiocontrast agents (Anaphylaxis)    Intolerances        ---___---___---___---___---___---  MEDICATIONS:  MEDICATIONS  (STANDING):  albuterol/ipratropium for Nebulization 3 milliLiter(s) Nebulizer every 6 hours  atorvastatin 20 milliGRAM(s) Oral at bedtime  buDESOnide    Inhalation Suspension 0.25 milliGRAM(s) Inhalation two times a day  cefepime   IVPB 1000 milliGRAM(s) IV Intermittent every 12 hours  cefepime   IVPB      cyclopentolate 1% Solution 1 Drop(s) Both EYES two times a day  FLUoxetine 20 milliGRAM(s) Oral daily  furosemide    Tablet 40 milliGRAM(s) Oral daily  heparin   Injectable 5000 Unit(s) SubCutaneous every 12 hours  losartan 25 milliGRAM(s) Oral daily  metoprolol succinate ER 25 milliGRAM(s) Oral daily  mirtazapine 7.5 milliGRAM(s) Oral at bedtime  prednisoLONE acetate 1% Suspension 1 Drop(s) Both EYES two times a day  predniSONE   Tablet 40 milliGRAM(s) Oral daily  sodium chloride 7% Inhalation 4 milliLiter(s) Inhalation every 12 hours  spironolactone 12.5 milliGRAM(s) Oral daily  temazepam 15 milliGRAM(s) Oral at bedtime    MEDICATIONS  (PRN):  acetaminophen     Tablet .. 650 milliGRAM(s) Oral every 6 hours PRN Temp greater or equal to 38C (100.4F), Mild Pain (1 - 3)      ---___---___---___---___---___---  REVIEW OF SYSTEM:    GEN: no fever, no chills, no pain  RESP: no SOB, no cough, no sputum  CVS: no chest pain, no palpitations, no edema  GI: no abdominal pain, no nausea, no vomiting, no constipation, no diarrhea  : no dysurea, no frequency, no hematurea  Neuro: no headache, no dizziness  PSYCH: no anxiety, no depression  Derm : no itching, no rash    ---___---___---___---___---___---  VITAL SIGNS:  88y , CAPILLARY BLOOD GLUCOSE        T(C): 36.3 (05-14-24 @ 17:42), Max: 36.7 (05-13-24 @ 20:57)  HR: 76 (05-14-24 @ 17:42) (73 - 91)  BP: 149/79 (05-14-24 @ 17:42) (143/91 - 154/84)  RR: 18 (05-14-24 @ 17:42) (16 - 18)  SpO2: 95% (05-14-24 @ 17:42) (92% - 100%)  ---___---___---___---___---___---  PHYSICAL EXAM:    GEN: A&O X 3 , NAD , comfortable  HEENT: NCAT, PERRL, MMM, hearing intact  Neck: supple , no JVD  CVS: S1S2 , regular , No M/R/G appreciated  PULM: CTA B/L,  no W/R/R appreciated  ABD.: soft. non tender, non distended,  bowel sounds present  Extrem: intact pulses , no edema   Derm: No rash , no ecchymoses  PSYCH : normal mood,  no delusion not anxious     ---___---___---___---___---___---            LAB AND IMAGING:                          10.7   9.93  )-----------( 245      ( 13 May 2024 07:51 )             34.3               05-13    144  |  100  |  30<H>  ----------------------------<  152<H>  3.8   |  27  |  0.72    Ca    8.9      13 May 2024 07:51    TPro  6.9  /  Alb  4.2  /  TBili  0.5  /  DBili  x   /  AST  22  /  ALT  21  /  AlkPhos  111  05-13                            Urinalysis Basic - ( 13 May 2024 07:51 )    Color: x / Appearance: x / SG: x / pH: x  Gluc: 152 mg/dL / Ketone: x  / Bili: x / Urobili: x   Blood: x / Protein: x / Nitrite: x   Leuk Esterase: x / RBC: x / WBC x   Sq Epi: x / Non Sq Epi: x / Bacteria: x        [All pertinent / recent Imaging reviewed]         ---___---___---___---___---___---___ ---___---___---___---___---                         A S S E S S M E N T   A N D   P L A N :      HEALTH ISSUES - PROBLEM Dx:          ---___---___---___---___---___---___ ---___---___---___---___---___---___---___---___---                  M E D I C A L   A T T E N D I N G   P R O G R E S S   N O T E  ---___---___---___---___---___---___ ---___---___---___---___---___---___---___---___---        ================================================    ++CHIEF COMPLAINT:   Patient is a 88y old  Female who presents with a chief complaint of sob (14 May 2024 15:13)      Acute respiratory failure with hypoxia      ---___---___---___---___---___---  PAST MEDICAL / Surgical  HISTORY:  PAST MEDICAL & SURGICAL HISTORY:  HTN (hypertension)      Hypothyroidism      Osteoporosis      CAD (coronary artery disease)      COPD (chronic obstructive pulmonary disease)      Pulmonary sarcoidosis      H/O pyelonephritis      Acute diverticulitis          ---___---___---___---___---___---  FAMILY HISTORY:   FAMILY HISTORY:        ---___---___---___---___---___---  ALLERGIES:   Allergies    iodinated radiocontrast agents (Anaphylaxis)    Intolerances        ---___---___---___---___---___---  MEDICATIONS:  MEDICATIONS  (STANDING):  albuterol/ipratropium for Nebulization 3 milliLiter(s) Nebulizer every 6 hours  atorvastatin 20 milliGRAM(s) Oral at bedtime  buDESOnide    Inhalation Suspension 0.25 milliGRAM(s) Inhalation two times a day  cefepime   IVPB 1000 milliGRAM(s) IV Intermittent every 12 hours  cefepime   IVPB      cyclopentolate 1% Solution 1 Drop(s) Both EYES two times a day  FLUoxetine 20 milliGRAM(s) Oral daily  furosemide    Tablet 40 milliGRAM(s) Oral daily  heparin   Injectable 5000 Unit(s) SubCutaneous every 12 hours  losartan 25 milliGRAM(s) Oral daily  metoprolol succinate ER 25 milliGRAM(s) Oral daily  mirtazapine 7.5 milliGRAM(s) Oral at bedtime  prednisoLONE acetate 1% Suspension 1 Drop(s) Both EYES two times a day  predniSONE   Tablet 40 milliGRAM(s) Oral daily  sodium chloride 7% Inhalation 4 milliLiter(s) Inhalation every 12 hours  spironolactone 12.5 milliGRAM(s) Oral daily  temazepam 15 milliGRAM(s) Oral at bedtime    MEDICATIONS  (PRN):  acetaminophen     Tablet .. 650 milliGRAM(s) Oral every 6 hours PRN Temp greater or equal to 38C (100.4F), Mild Pain (1 - 3)      ---___---___---___---___---___---  REVIEW OF SYSTEM:    GEN: no fever, no chills, no pain  RESP: no SOB, no cough, no sputum  CVS: no chest pain, no palpitations, no edema  GI: no abdominal pain, no nausea, no vomiting, no constipation, no diarrhea  : no dysurea, no frequency, no hematurea  Neuro: no headache, no dizziness  PSYCH: no anxiety, no depression  Derm : no itching, no rash    ---___---___---___---___---___---  VITAL SIGNS:  88y , CAPILLARY BLOOD GLUCOSE        T(C): 36.3 (05-14-24 @ 17:42), Max: 36.7 (05-13-24 @ 20:57)  HR: 76 (05-14-24 @ 17:42) (73 - 91)  BP: 149/79 (05-14-24 @ 17:42) (143/91 - 154/84)  RR: 18 (05-14-24 @ 17:42) (16 - 18)  SpO2: 95% (05-14-24 @ 17:42) (92% - 100%)  ---___---___---___---___---___---  PHYSICAL EXAM:    GEN: A&O X 3 , NAD , comfortable  HEENT: NCAT, PERRL, MMM, hearing intact  Neck: supple , no JVD  CVS: S1S2 , regular , No M/R/G appreciated  PULM: CTA B/L,  no W/R/R appreciated  ABD.: soft. non tender, non distended,  bowel sounds present  Extrem: intact pulses , no edema   Derm: No rash , no ecchymoses  PSYCH : normal mood,  no delusion not anxious     ---___---___---___---___---___---            LAB AND IMAGING:                          10.7   9.93  )-----------( 245      ( 13 May 2024 07:51 )             34.3               05-13    144  |  100  |  30<H>  ----------------------------<  152<H>  3.8   |  27  |  0.72    Ca    8.9      13 May 2024 07:51    TPro  6.9  /  Alb  4.2  /  TBili  0.5  /  DBili  x   /  AST  22  /  ALT  21  /  AlkPhos  111  05-13                            Urinalysis Basic - ( 13 May 2024 07:51 )    Color: x / Appearance: x / SG: x / pH: x  Gluc: 152 mg/dL / Ketone: x  / Bili: x / Urobili: x   Blood: x / Protein: x / Nitrite: x   Leuk Esterase: x / RBC: x / WBC x   Sq Epi: x / Non Sq Epi: x / Bacteria: x        [All pertinent / recent Imaging reviewed]         ---___---___---___---___---___---___ ---___---___---___---___---                         [facility] S S M E N T   A N D   P L A N :      HEALTH ISSUES - PROBLEM Dx:    copd   pulmonary sarcoidosis   htn     id pulmonary following   continue o2 and steroids  and nebulizer   monitor clinically           -GI/DVT Prophylaxis.  as ordered         ___________________________  Thank you,  Garry Hermosillo  [phone]            -GI/DVT Prophylaxis.    --------------------------------------------  Case discussed with   Education given on   ___________________________  Thank you,  Garry Hermosillo  [phone]

## 2024-05-14 NOTE — PROGRESS NOTE ADULT - SUBJECTIVE AND OBJECTIVE BOX
Date of Service: 05-14-24 @ 09:07           CARDIOLOGY     PROGRESS  NOTE   ________________________________________________    CHIEF COMPLAINT:Patient is a 88y old  Female who presents with a chief complaint of sob (14 May 2024 08:38)  doing better  	  REVIEW OF SYSTEMS:  CONSTITUTIONAL: No fever, weight loss, or fatigue  EYES: No eye pain, visual disturbances, or discharge  ENT:  No difficulty hearing, tinnitus, vertigo; No sinus or throat pain  NECK: No pain or stiffness  RESPIRATORY: No cough, wheezing, chills or hemoptysis; No Shortness of Breath  CARDIOVASCULAR: No chest pain, palpitations, passing out, dizziness, or leg swelling  GASTROINTESTINAL: No abdominal or epigastric pain. No nausea, vomiting, or hematemesis; No diarrhea or constipation. No melena or hematochezia.  GENITOURINARY: No dysuria, frequency, hematuria, or incontinence  NEUROLOGICAL: No headaches, memory loss, loss of strength, numbness, or tremors  SKIN: No itching, burning, rashes, or lesions   LYMPH Nodes: No enlarged glands  ENDOCRINE: No heat or cold intolerance; No hair loss  MUSCULOSKELETAL: No joint pain or swelling; No muscle, back, or extremity pain  PSYCHIATRIC: No depression, anxiety, mood swings, or difficulty sleeping  HEME/LYMPH: No easy bruising, or bleeding gums  ALLERGY AND IMMUNOLOGIC: No hives or eczema	    [x ] All others negative	  [ ] Unable to obtain    PHYSICAL EXAM:  T(C): 36.3 (05-14-24 @ 07:15), Max: 36.7 (05-13-24 @ 20:57)  HR: 91 (05-14-24 @ 07:15) (74 - 91)  BP: 154/84 (05-14-24 @ 07:15) (143/91 - 154/84)  RR: 18 (05-14-24 @ 07:15) (16 - 18)  SpO2: 98% (05-14-24 @ 07:15) (92% - 100%)  Wt(kg): --  I&O's Summary    13 May 2024 07:01  -  14 May 2024 07:00  --------------------------------------------------------  IN: 360 mL / OUT: 1200 mL / NET: -840 mL        Appearance: Normal	  HEENT:   Normal oral mucosa, PERRL, EOMI	  Lymphatic: No lymphadenopathy  Cardiovascular: Normal S1 S2, No JVD, + murmurs, No edema  Respiratory: rhonchi  Psychiatry: A & O x 3, Mood & affect appropriate  Gastrointestinal:  Soft, Non-tender, + BS	  Skin: No rashes, No ecchymoses, No cyanosis	  Neurologic: Non-focal  Extremities: Normal range of motion, No clubbing, cyanosis or edema  Vascular: Peripheral pulses palpable 2+ bilaterally    MEDICATIONS  (STANDING):  albuterol/ipratropium for Nebulization 3 milliLiter(s) Nebulizer every 6 hours  atorvastatin 20 milliGRAM(s) Oral at bedtime  buDESOnide    Inhalation Suspension 0.25 milliGRAM(s) Inhalation two times a day  cefepime   IVPB 1000 milliGRAM(s) IV Intermittent every 12 hours  cefepime   IVPB      cyclopentolate 1% Solution 1 Drop(s) Both EYES two times a day  FLUoxetine 20 milliGRAM(s) Oral daily  furosemide   Injectable 20 milliGRAM(s) IV Push two times a day  heparin   Injectable 5000 Unit(s) SubCutaneous every 12 hours  methylPREDNISolone sodium succinate Injectable 20 milliGRAM(s) IV Push three times a day  metoprolol succinate ER 25 milliGRAM(s) Oral daily  mirtazapine 7.5 milliGRAM(s) Oral at bedtime  prednisoLONE acetate 1% Suspension 1 Drop(s) Both EYES two times a day  sodium chloride 7% Inhalation 4 milliLiter(s) Inhalation every 12 hours  spironolactone 12.5 milliGRAM(s) Oral daily  temazepam 15 milliGRAM(s) Oral at bedtime      TELEMETRY: 	    ECG:  	  RADIOLOGY:  OTHER: 	  	  LABS:	 	    CARDIAC MARKERS:                        10.7   9.93  )-----------( 245      ( 13 May 2024 07:51 )             34.3     05-13    144  |  100  |  30<H>  ----------------------------<  152<H>  3.8   |  27  |  0.72    Ca    8.9      13 May 2024 07:51    TPro  6.9  /  Alb  4.2  /  TBili  0.5  /  DBili  x   /  AST  22  /  ALT  21  /  AlkPhos  111  05-13    proBNP:   Lipid Profile:   HgA1c:   TSH:     < from: Xray Chest 1 View- PORTABLE-Urgent (Xray Chest 1 View- PORTABLE-Urgent .) (05.13.24 @ 07:50) >  IMPRESSION:  Bilateral patchy airspace and reticular opacities, unchanged.  Small bilateral pleural effusions, worse on the right.        Assessment and plan  ---------------------------  88-year-old female presenting from [facility] with known history of C. difficile, diverticulitis, asthma/COPD, sarcoidosis, hypothyroidism, hypertension, CHF, presenting with concern of respiratory failure this morning at her facility.  She arrives via EMS having received 10 of IV Decadron, 1 DuoNeb, 4 magnesium, placed on oxygen with improvement in her oxygen saturation, known baseline AO 1-2 now responding only to vigorous stimuli.  Pt BP initially 220/99 at the facility, T 98.9 F, 130 BPM, RR 26 and O2 saturation 98% on NRB.  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 , sarcoidosis  pulmonary with increasing sob  ct chest  noted with possible pneumonia/ chf diastolic acute on chronic  agree with abx  start on lasix  20 mg daily  will consider possible pulmonary consult  review old echo  dvt prophylaxis  pulmonary eval sec to emphysema, add inhalers ?small dose of steroid  CT  chest pulmonary edema, unless the findings are pulmonary fibrosis  change lasix to 20 mg iv bid, follow up pro bnp  add aldactone 12.5 mg dfaily, add small dose of beta blocker  chest x ray today  out put negative 1200 cc today noted, continue diuresis ,   will switch to lasix 40 mg daily

## 2024-05-15 LAB
ANION GAP SERPL CALC-SCNC: 11 MMOL/L — SIGNIFICANT CHANGE UP (ref 5–17)
BUN SERPL-MCNC: 36 MG/DL — HIGH (ref 7–23)
CALCIUM SERPL-MCNC: 8.6 MG/DL — SIGNIFICANT CHANGE UP (ref 8.4–10.5)
CHLORIDE SERPL-SCNC: 99 MMOL/L — SIGNIFICANT CHANGE UP (ref 96–108)
CO2 SERPL-SCNC: 32 MMOL/L — HIGH (ref 22–31)
CREAT SERPL-MCNC: 0.62 MG/DL — SIGNIFICANT CHANGE UP (ref 0.5–1.3)
CULTURE RESULTS: SIGNIFICANT CHANGE UP
CULTURE RESULTS: SIGNIFICANT CHANGE UP
EGFR: 86 ML/MIN/1.73M2 — SIGNIFICANT CHANGE UP
GLUCOSE SERPL-MCNC: 131 MG/DL — HIGH (ref 70–99)
NT-PROBNP SERPL-SCNC: 7065 PG/ML — HIGH (ref 0–300)
POTASSIUM SERPL-MCNC: 3.7 MMOL/L — SIGNIFICANT CHANGE UP (ref 3.5–5.3)
POTASSIUM SERPL-SCNC: 3.7 MMOL/L — SIGNIFICANT CHANGE UP (ref 3.5–5.3)
SODIUM SERPL-SCNC: 142 MMOL/L — SIGNIFICANT CHANGE UP (ref 135–145)
SPECIMEN SOURCE: SIGNIFICANT CHANGE UP
SPECIMEN SOURCE: SIGNIFICANT CHANGE UP

## 2024-05-15 PROCEDURE — 99233 SBSQ HOSP IP/OBS HIGH 50: CPT

## 2024-05-15 PROCEDURE — 99232 SBSQ HOSP IP/OBS MODERATE 35: CPT

## 2024-05-15 PROCEDURE — 71045 X-RAY EXAM CHEST 1 VIEW: CPT | Mod: 26

## 2024-05-15 RX ORDER — FUROSEMIDE 40 MG
20 TABLET ORAL DAILY
Refills: 0 | Status: DISCONTINUED | OUTPATIENT
Start: 2024-05-15 | End: 2024-05-17

## 2024-05-15 RX ORDER — LOSARTAN POTASSIUM 100 MG/1
25 TABLET, FILM COATED ORAL
Refills: 0 | Status: DISCONTINUED | OUTPATIENT
Start: 2024-05-15 | End: 2024-05-16

## 2024-05-15 RX ADMIN — Medication 25 MILLIGRAM(S): at 05:53

## 2024-05-15 RX ADMIN — Medication 3 MILLILITER(S): at 13:56

## 2024-05-15 RX ADMIN — Medication 0.25 MILLIGRAM(S): at 05:51

## 2024-05-15 RX ADMIN — Medication 20 MILLIGRAM(S): at 13:56

## 2024-05-15 RX ADMIN — CEFEPIME 100 MILLIGRAM(S): 1 INJECTION, POWDER, FOR SOLUTION INTRAMUSCULAR; INTRAVENOUS at 17:56

## 2024-05-15 RX ADMIN — Medication 1 DROP(S): at 17:57

## 2024-05-15 RX ADMIN — CYCLOPENTOLATE HYDROCHLORIDE 1 DROP(S): 10 SOLUTION/ DROPS OPHTHALMIC at 17:58

## 2024-05-15 RX ADMIN — TEMAZEPAM 15 MILLIGRAM(S): 15 CAPSULE ORAL at 22:08

## 2024-05-15 RX ADMIN — LOSARTAN POTASSIUM 25 MILLIGRAM(S): 100 TABLET, FILM COATED ORAL at 05:54

## 2024-05-15 RX ADMIN — ATORVASTATIN CALCIUM 20 MILLIGRAM(S): 80 TABLET, FILM COATED ORAL at 22:07

## 2024-05-15 RX ADMIN — Medication 40 MILLIGRAM(S): at 05:53

## 2024-05-15 RX ADMIN — Medication 40 MILLIGRAM(S): at 05:52

## 2024-05-15 RX ADMIN — HEPARIN SODIUM 5000 UNIT(S): 5000 INJECTION INTRAVENOUS; SUBCUTANEOUS at 05:53

## 2024-05-15 RX ADMIN — SPIRONOLACTONE 12.5 MILLIGRAM(S): 25 TABLET, FILM COATED ORAL at 05:53

## 2024-05-15 RX ADMIN — HEPARIN SODIUM 5000 UNIT(S): 5000 INJECTION INTRAVENOUS; SUBCUTANEOUS at 17:55

## 2024-05-15 RX ADMIN — Medication 3 MILLILITER(S): at 05:51

## 2024-05-15 RX ADMIN — CYCLOPENTOLATE HYDROCHLORIDE 1 DROP(S): 10 SOLUTION/ DROPS OPHTHALMIC at 05:55

## 2024-05-15 RX ADMIN — LOSARTAN POTASSIUM 25 MILLIGRAM(S): 100 TABLET, FILM COATED ORAL at 17:55

## 2024-05-15 RX ADMIN — Medication 1 DROP(S): at 05:54

## 2024-05-15 RX ADMIN — SODIUM CHLORIDE 4 MILLILITER(S): 9 INJECTION INTRAMUSCULAR; INTRAVENOUS; SUBCUTANEOUS at 05:52

## 2024-05-15 RX ADMIN — CEFEPIME 100 MILLIGRAM(S): 1 INJECTION, POWDER, FOR SOLUTION INTRAMUSCULAR; INTRAVENOUS at 05:52

## 2024-05-15 RX ADMIN — MIRTAZAPINE 7.5 MILLIGRAM(S): 45 TABLET, ORALLY DISINTEGRATING ORAL at 22:07

## 2024-05-15 NOTE — PROGRESS NOTE ADULT - SUBJECTIVE AND OBJECTIVE BOX
---___---___---___---___---___---___ ---___---___---___---___---___---___---___---___---                  M E D I C A L   A T T E N D I N G   P R O G R E S S   N O T E  ---___---___---___---___---___---___ ---___---___---___---___---___---___---___---___---        ================================================    ++CHIEF COMPLAINT:   Patient is a 88y old  Female who presents with a chief complaint of sob (15 May 2024 17:39)      Acute respiratory failure with hypoxia      ---___---___---___---___---___---  PAST MEDICAL / Surgical  HISTORY:  PAST MEDICAL & SURGICAL HISTORY:  HTN (hypertension)      Hypothyroidism      Osteoporosis      CAD (coronary artery disease)      COPD (chronic obstructive pulmonary disease)      Pulmonary sarcoidosis      H/O pyelonephritis      Acute diverticulitis          ---___---___---___---___---___---  FAMILY HISTORY:   FAMILY HISTORY:        ---___---___---___---___---___---  ALLERGIES:   Allergies    iodinated radiocontrast agents (Anaphylaxis)    Intolerances        ---___---___---___---___---___---  MEDICATIONS:  MEDICATIONS  (STANDING):  albuterol/ipratropium for Nebulization 3 milliLiter(s) Nebulizer every 6 hours  atorvastatin 20 milliGRAM(s) Oral at bedtime  buDESOnide    Inhalation Suspension 0.25 milliGRAM(s) Inhalation two times a day  cefepime   IVPB 1000 milliGRAM(s) IV Intermittent every 12 hours  cefepime   IVPB      cyclopentolate 1% Solution 1 Drop(s) Both EYES two times a day  FLUoxetine 20 milliGRAM(s) Oral daily  furosemide    Tablet 20 milliGRAM(s) Oral daily  heparin   Injectable 5000 Unit(s) SubCutaneous every 12 hours  losartan 25 milliGRAM(s) Oral two times a day  metoprolol succinate ER 25 milliGRAM(s) Oral daily  mirtazapine 7.5 milliGRAM(s) Oral at bedtime  prednisoLONE acetate 1% Suspension 1 Drop(s) Both EYES two times a day  predniSONE   Tablet 40 milliGRAM(s) Oral daily  sodium chloride 7% Inhalation 4 milliLiter(s) Inhalation every 12 hours  spironolactone 12.5 milliGRAM(s) Oral daily  temazepam 15 milliGRAM(s) Oral at bedtime    MEDICATIONS  (PRN):  acetaminophen     Tablet .. 650 milliGRAM(s) Oral every 6 hours PRN Temp greater or equal to 38C (100.4F), Mild Pain (1 - 3)      ---___---___---___---___---___---  REVIEW OF SYSTEM:    GEN: no fever, no chills, no pain  RESP: no SOB, no cough, no sputum  CVS: no chest pain, no palpitations, no edema  GI: no abdominal pain, no nausea, no vomiting, no constipation, no diarrhea  : no dysurea, no frequency, no hematurea  Neuro: no headache, no dizziness  PSYCH: no anxiety, no depression  Derm : no itching, no rash    ---___---___---___---___---___---  VITAL SIGNS:  88y , CAPILLARY BLOOD GLUCOSE      POCT Blood Glucose.: 154 mg/dL (14 May 2024 20:44)    T(C): 36.4 (05-15-24 @ 17:28), Max: 36.5 (05-15-24 @ 05:51)  HR: 74 (05-15-24 @ 17:28) (69 - 74)  BP: 137/67 (05-15-24 @ 17:28) (137/67 - 159/81)  RR: 18 (05-15-24 @ 17:28) (18 - 18)  SpO2: 90% (05-15-24 @ 17:28) (90% - 94%)  ---___---___---___---___---___---  PHYSICAL EXAM:    GEN: A&O X 3 , NAD , comfortable  HEENT: NCAT, PERRL, MMM, hearing intact  Neck: supple , no JVD  CVS: S1S2 , regular , No M/R/G appreciated  PULM: CTA B/L,  no W/R/R appreciated  ABD.: soft. non tender, non distended,  bowel sounds present  Extrem: intact pulses , no edema   Derm: No rash , no ecchymoses  PSYCH : normal mood,  no delusion not anxious     ---___---___---___---___---___---            LAB AND IMAGIN-15    142  |  99  |  36<H>  ----------------------------<  131<H>  3.7   |  32<H>  |  0.62    Ca    8.6      15 May 2024 07:21                              Urinalysis Basic - ( 15 May 2024 07:21 )    Color: x / Appearance: x / SG: x / pH: x  Gluc: 131 mg/dL / Ketone: x  / Bili: x / Urobili: x   Blood: x / Protein: x / Nitrite: x   Leuk Esterase: x / RBC: x / WBC x   Sq Epi: x / Non Sq Epi: x / Bacteria: x        [All pertinent / recent Imaging reviewed]         ---___---___---___---___---___---___ ---___---___---___---___---                         A S S E S S M E N T   A N D   P L A N :      HEALTH ISSUES - PROBLEM Dx:    copd   pulmonary sarcoidosis   htn     id pulmonary following   continue o2 and steroids  and nebulizer   hld continue  meds  anxiety on fluoxetine    continue abx        -GI/DVT Prophylaxis.    --------------------------------------------  Case discussed with   Education given on   ___________________________  Thank you,  Garry Hermosillo  [phone]

## 2024-05-15 NOTE — PROGRESS NOTE ADULT - SUBJECTIVE AND OBJECTIVE BOX
CHIEF COMPLAINT: SOB, cough     Interval History: Breathing comfortably on room air, saturating 96 on minimal oxygen     PAST MEDICAL & SURGICAL HISTORY:  HTN (hypertension)      Hypothyroidism      Osteoporosis      CAD (coronary artery disease)      COPD (chronic obstructive pulmonary disease)      Pulmonary sarcoidosis      H/O pyelonephritis      Acute diverticulitis          FAMILY HISTORY:      SOCIAL HISTORY:  Smoking: [ ] Never Smoked [ x] Former Smoker  Substance Use: [ x] Never Used [ ] Used ____  EtOH Use:None  Occupation: former       Allergies    iodinated radiocontrast agents (Anaphylaxis)    Intolerances        HOME MEDICATIONS:  Home Medications:  acetaminophen 325 mg oral tablet: 2 tab(s) orally every 6 hours As needed Temp greater or equal to 38C (100.4F), Mild Pain (1 - 3) (2024 13:20)  atorvastatin 20 mg oral tablet: 1 tab(s) orally once a day (2024 13:20)  cyclopentolate 1% ophthalmic solution: 1 drop(s) in the left eye 2 times a day (2024 13:20)  D3 25 mcg (1000 intl units) oral tablet: 1 tab(s) orally once a day (2024 13:20)  ferrous sulfate 325 mg (65 mg elemental iron) oral tablet: 1 tab(s) orally once a day (2024 13:20)  FLUoxetine 20 mg oral capsule: 1 cap(s) orally once a day (2024 13:20)  losartan 25 mg oral tablet: 1 tab(s) orally once a day (2024 13:20)  melatonin 3 mg oral tablet: 1 tab(s) orally once a day (at bedtime) As needed Insomnia (2024 13:20)  mirtazapine 7.5 mg oral tablet: 1 tab(s) orally once a day (at bedtime) (2024 13:20)  prednisoLONE acetate 1% ophthalmic suspension: 1 drop(s) in the left eye 2 times a day (2024 13:20)  temazepam 30 mg oral capsule: 1 cap(s) orally once a day (at bedtime) (2024 13:20)  traZODone 50 mg oral tablet: orally once a day (2024 13:20)      REVIEW OF SYSTEMS:  Constitutional: [ ] negative [ ] fevers [ ] chills [ ] weight loss [ ] weight gain  HEENT: [ ] negative [ ] dry eyes [ ] eye irritation [ ] postnasal drip [ ] nasal congestion  CV: [ ] negative  [ ] chest pain [ ] orthopnea [ ] palpitations [ ] murmur  Resp: [ ] negative [ ] cough [ ] shortness of breath [ ] dyspnea [ ] wheezing [ ] sputum [ ] hemoptysis  GI: [ ] negative [ ] nausea [ ] vomiting [ ] diarrhea [ ] constipation [ ] abd pain [ ] dysphagia   : [ ] negative [ ] dysuria [ ] nocturia [ ] hematuria [ ] increased urinary frequency  Musculoskeletal: [ ] negative [ ] back pain [ ] myalgias [ ] arthralgias [ ] fracture  Skin: [ ] negative [ ] rash [ ] itch  Neurological: [ ] negative [ ] headache [ ] dizziness [ ] syncope [ ] weakness [ ] numbness  Psychiatric: [ ] negative [ ] anxiety [ ] depression  Endocrine: [ ] negative [ ] diabetes [ ] thyroid problem  Hematologic/Lymphatic: [ ] negative [ ] anemia [ ] bleeding problem  Allergic/Immunologic: [ ] negative [ ] itchy eyes [ ] nasal discharge [ ] hives [ ] angioedema  [ ] All other systems negative  [ x] Unable to assess ROS because limited due to confusion.     OBJECTIVE:  ICU Vital Signs Last 24 Hrs  T(C): 36.7 (10 May 2024 16:33), Max: 37.9 (10 May 2024 08:20)  T(F): 98 (10 May 2024 16:33), Max: 100.3 (10 May 2024 08:20)  HR: 80 (10 May 2024 16:33) (80 - 112)  BP: 150/68 (10 May 2024 16:33) (84/65 - 150/68)  BP(mean): 73 (10 May 2024 10:55) (58 - 73)  ABP: --  ABP(mean): --  RR: 18 (10 May 2024 16:33) (18 - 28)  SpO2: 95% (10 May 2024 16:33) (94% - 96%)    O2 Parameters below as of 10 May 2024 16:33  Patient On (Oxygen Delivery Method): nasal cannula  O2 Flow (L/min): 4            CAPILLARY BLOOD GLUCOSE      POCT Blood Glucose.: 192 mg/dL (10 May 2024 08:01)      PHYSICAL EXAM:  Gen: awake, alert, anxious, frail  Resp: breathing comfortably on room air, coarse breath sounds bilaterally but no wheezing,   CVS: tachycardic   Abd: soft, non tender, non distended  Ext: warm, well perfused, no edema        HOSPITAL MEDICATIONS:  Standing Meds:  albuterol/ipratropium for Nebulization 3 milliLiter(s) Nebulizer every 6 hours  atorvastatin 20 milliGRAM(s) Oral at bedtime  buDESOnide    Inhalation Suspension 0.25 milliGRAM(s) Inhalation two times a day  cefepime   IVPB      cyclopentolate 1% Solution 1 Drop(s) Both EYES two times a day  heparin   Injectable 5000 Unit(s) SubCutaneous every 12 hours  methylPREDNISolone sodium succinate Injectable 20 milliGRAM(s) IV Push three times a day  prednisoLONE acetate 1% Suspension 1 Drop(s) Both EYES two times a day  sodium chloride 7% Inhalation 4 milliLiter(s) Inhalation every 12 hours      PRN Meds:      LABS:                        10.8   16.37 )-----------( 209      ( 10 May 2024 08:12 )             34.1     Hgb Trend: 10.8<--  05-10    143  |  106  |  15  ----------------------------<  204<H>  3.5   |  21<L>  |  0.76    Ca    8.6      10 May 2024 08:12    TPro  6.6  /  Alb  3.6  /  TBili  0.3  /  DBili  x   /  AST  34  /  ALT  18  /  AlkPhos  138<H>  05-10    Creatinine Trend: 0.76<--, 0.80<--, 0.83<--  PT/INR - ( 10 May 2024 08:12 )   PT: 11.5 sec;   INR: 1.10 ratio         PTT - ( 10 May 2024 08:12 )  PTT:31.0 sec  Urinalysis Basic - ( 10 May 2024 08:31 )    Color: Yellow / Appearance: Turbid / S.014 / pH: x  Gluc: x / Ketone: Negative mg/dL  / Bili: Negative / Urobili: 0.2 mg/dL   Blood: x / Protein: 100 mg/dL / Nitrite: Negative   Leuk Esterase: Large / RBC: 7 /HPF / WBC >998 /HPF   Sq Epi: x / Non Sq Epi: 9 /HPF / Bacteria: Negative /HPF        Venous Blood Gas:  05-10 @ 11:25  7.27/57/44/26/70.1  VBG Lactate: 1.7  Venous Blood Gas:  05-10 @ 08:00  7.25/58/59//88.2  VBG Lactate: 1.3             RADIOLOGY:  [ x] Reviewed and interpreted by me

## 2024-05-15 NOTE — PROGRESS NOTE ADULT - SUBJECTIVE AND OBJECTIVE BOX
Date of Service: 05-15-24 @ 09:04           CARDIOLOGY     PROGRESS  NOTE   ________________________________________________    CHIEF COMPLAINT:Patient is a 88y old  Female who presents with a chief complaint of sob (15 May 2024 07:40)  no complain  	  REVIEW OF SYSTEMS:  CONSTITUTIONAL: No fever, weight loss, or fatigue  EYES: No eye pain, visual disturbances, or discharge  ENT:  No difficulty hearing, tinnitus, vertigo; No sinus or throat pain  NECK: No pain or stiffness  RESPIRATORY: No cough, wheezing, chills or hemoptysis; No Shortness of Breath  CARDIOVASCULAR: No chest pain, palpitations, passing out, dizziness, or leg swelling  GASTROINTESTINAL: No abdominal or epigastric pain. No nausea, vomiting, or hematemesis; No diarrhea or constipation. No melena or hematochezia.  GENITOURINARY: No dysuria, frequency, hematuria, or incontinence  NEUROLOGICAL: No headaches, memory loss, loss of strength, numbness, or tremors  SKIN: No itching, burning, rashes, or lesions   LYMPH Nodes: No enlarged glands  ENDOCRINE: No heat or cold intolerance; No hair loss  MUSCULOSKELETAL: No joint pain or swelling; No muscle, back, or extremity pain  PSYCHIATRIC: No depression, anxiety, mood swings, or difficulty sleeping  HEME/LYMPH: No easy bruising, or bleeding gums  ALLERGY AND IMMUNOLOGIC: No hives or eczema	    [ x] All others negative	  [ ] Unable to obtain    PHYSICAL EXAM:  T(C): 36.5 (05-15-24 @ 05:51), Max: 36.5 (05-15-24 @ 05:51)  HR: 69 (05-15-24 @ 05:51) (69 - 76)  BP: 159/81 (05-15-24 @ 05:51) (148/89 - 159/81)  RR: 18 (05-15-24 @ 05:51) (18 - 18)  SpO2: 94% (05-15-24 @ 05:51) (94% - 98%)  Wt(kg): --  I&O's Summary    14 May 2024 07:01  -  15 May 2024 07:00  --------------------------------------------------------  IN: 0 mL / OUT: 650 mL / NET: -650 mL        Appearance: Normal	  HEENT:   Normal oral mucosa, PERRL, EOMI	  Lymphatic: No lymphadenopathy  Cardiovascular: Normal S1 S2, No JVD, + murmurs, No edema  Respiratory: rhonchi  Psychiatry: A & O x 3, Mood & affect appropriate  Gastrointestinal:  Soft, Non-tender, + BS	  Skin: No rashes, No ecchymoses, No cyanosis	  Neurologic: Non-focal  Extremities: Normal range of motion, No clubbing, cyanosis or edema  Vascular: Peripheral pulses palpable 2+ bilaterally    MEDICATIONS  (STANDING):  albuterol/ipratropium for Nebulization 3 milliLiter(s) Nebulizer every 6 hours  atorvastatin 20 milliGRAM(s) Oral at bedtime  buDESOnide    Inhalation Suspension 0.25 milliGRAM(s) Inhalation two times a day  cefepime   IVPB 1000 milliGRAM(s) IV Intermittent every 12 hours  cefepime   IVPB      cyclopentolate 1% Solution 1 Drop(s) Both EYES two times a day  FLUoxetine 20 milliGRAM(s) Oral daily  furosemide    Tablet 40 milliGRAM(s) Oral daily  heparin   Injectable 5000 Unit(s) SubCutaneous every 12 hours  losartan 25 milliGRAM(s) Oral daily  metoprolol succinate ER 25 milliGRAM(s) Oral daily  mirtazapine 7.5 milliGRAM(s) Oral at bedtime  prednisoLONE acetate 1% Suspension 1 Drop(s) Both EYES two times a day  predniSONE   Tablet 40 milliGRAM(s) Oral daily  sodium chloride 7% Inhalation 4 milliLiter(s) Inhalation every 12 hours  spironolactone 12.5 milliGRAM(s) Oral daily  temazepam 15 milliGRAM(s) Oral at bedtime      TELEMETRY: 	    ECG:  	  RADIOLOGY:  OTHER: 	  	  LABS:	 	    CARDIAC MARKERS:    05-15    142  |  99  |  36<H>  ----------------------------<  131<H>  3.7   |  32<H>  |  0.62    Ca    8.6      15 May 2024 07:21      proBNP:   Lipid Profile:   HgA1c:   TSH:     < from: Xray Chest 1 View- PORTABLE-Urgent (Xray Chest 1 View- PORTABLE-Urgent .) (05.13.24 @ 07:50) >  Bilateral patchy airspace and reticular opacities, unchanged.  Small bilateral pleural effusions, worse on the right.      Assessment and plan  ---------------------------  88[facility] known history of C. difficile, diverticulitis, asthma/COPD, sarcoidosis, hypothyroidism, hypertension, CHF, presenting with concern of respiratory failure this morning at her facility.  She arrives via EMS having received 10 of IV Decadron, 1 DuoNeb, 4 magnesium, placed on oxygen with improvement in her oxygen saturation, known baseline AO 1-2 now responding only to vigorous stimuli.  Pt BP initially 220/99 at the facility, T 98.9 F, 130 BPM, RR 26 and O2 saturation 98% on NRB.  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 , sarcoidosis  pulmonary with increasing sob  ct chest  noted with possible pneumonia/ chf diastolic acute on chronic  agree with abx  will consider possible pulmonary consult  review old echo  dvt prophylaxis  pulmonary eval sec to emphysema, add inhalers ?small dose of steroid  CT  chest pulmonary edema, unless the findings are pulmonary fibrosis  change lasix to 20 mg iv bid, follow up pro bnp  add aldactone 12.5 mg dfaily, add small dose of beta blocker  chest x ray today  out put negative 1200 cc today noted, continue diuresis ,   will switch to lasix 20  mg daily, with increase renal function  may increase losartan to 25 mg po bid

## 2024-05-15 NOTE — PROGRESS NOTE ADULT - SUBJECTIVE AND OBJECTIVE BOX
infectious diseases progress note:    Patient is a 88y old  Female who presents with a chief complaint of sob (14 May 2024 20:38)        Acute respiratory failure with hypoxia               Allergies    iodinated radiocontrast agents (Anaphylaxis)    Intolerances        ANTIBIOTICS/RELEVANT:  antimicrobials  cefepime   IVPB 1000 milliGRAM(s) IV Intermittent every 12 hours  cefepime   IVPB        immunologic:    OTHER:  acetaminophen     Tablet .. 650 milliGRAM(s) Oral every 6 hours PRN  albuterol/ipratropium for Nebulization 3 milliLiter(s) Nebulizer every 6 hours  atorvastatin 20 milliGRAM(s) Oral at bedtime  buDESOnide    Inhalation Suspension 0.25 milliGRAM(s) Inhalation two times a day  cyclopentolate 1% Solution 1 Drop(s) Both EYES two times a day  FLUoxetine 20 milliGRAM(s) Oral daily  furosemide    Tablet 40 milliGRAM(s) Oral daily  heparin   Injectable 5000 Unit(s) SubCutaneous every 12 hours  losartan 25 milliGRAM(s) Oral daily  metoprolol succinate ER 25 milliGRAM(s) Oral daily  mirtazapine 7.5 milliGRAM(s) Oral at bedtime  prednisoLONE acetate 1% Suspension 1 Drop(s) Both EYES two times a day  predniSONE   Tablet 40 milliGRAM(s) Oral daily  sodium chloride 7% Inhalation 4 milliLiter(s) Inhalation every 12 hours  spironolactone 12.5 milliGRAM(s) Oral daily  temazepam 15 milliGRAM(s) Oral at bedtime      Objective:  Vital Signs Last 24 Hrs  T(C): 36.5 (15 May 2024 05:51), Max: 36.5 (15 May 2024 05:51)  T(F): 97.7 (15 May 2024 05:51), Max: 97.7 (15 May 2024 05:51)  HR: 69 (15 May 2024 05:51) (69 - 76)  BP: 159/81 (15 May 2024 05:51) (148/89 - 159/81)  BP(mean): --  RR: 18 (15 May 2024 05:51) (18 - 18)  SpO2: 94% (15 May 2024 05:51) (94% - 98%)    Parameters below as of 15 May 2024 05:51  Patient On (Oxygen Delivery Method): nasal cannula  O2 Flow (L/min): 2         Eyes:JIM, EOMI  Ear/Nose/Throat: no oral lesion, no sinus tenderness on percussion	  Neck:no JVD, no lymphadenopathy, supple  Respiratory: CTA moises  Cardiovascular: S1S2 RRR, no murmurs  Gastrointestinal:soft, (+) BS, no HSM  Extremities:no e/e/c        LABS:                        10.7   9.93  )-----------( 245      ( 13 May 2024 07:51 )             34.3     05-13    144  |  100  |  30<H>  ----------------------------<  152<H>  3.8   |  27  |  0.72    Ca    8.9      13 May 2024 07:51    TPro  6.9  /  Alb  4.2  /  TBili  0.5  /  DBili  x   /  AST  22  /  ALT  21  /  AlkPhos  111  05-13      Urinalysis Basic - ( 13 May 2024 07:51 )    Color: x / Appearance: x / SG: x / pH: x  Gluc: 152 mg/dL / Ketone: x  / Bili: x / Urobili: x   Blood: x / Protein: x / Nitrite: x   Leuk Esterase: x / RBC: x / WBC x   Sq Epi: x / Non Sq Epi: x / Bacteria: x          MICROBIOLOGY:    RECENT CULTURES:  05-10 @ 08:31 Catheterized Catheterized                50,000 - 99,000 CFU/mL Candida tropicalis  "Susceptibilities not performed"    05-10 @ 08:09 .Blood Blood-Peripheral                No growth at 4 days    05-10 @ 07:59 .Blood Blood-Peripheral                No growth at 4 days          RESPIRATORY CULTURES:              RADIOLOGY & ADDITIONAL STUDIES:        Pager [phone]  After 5 pm/weekends or if no response :[phone]

## 2024-05-16 LAB
ANION GAP SERPL CALC-SCNC: 13 MMOL/L — SIGNIFICANT CHANGE UP (ref 5–17)
BUN SERPL-MCNC: 39 MG/DL — HIGH (ref 7–23)
CALCIUM SERPL-MCNC: 8.3 MG/DL — LOW (ref 8.4–10.5)
CHLORIDE SERPL-SCNC: 98 MMOL/L — SIGNIFICANT CHANGE UP (ref 96–108)
CO2 SERPL-SCNC: 32 MMOL/L — HIGH (ref 22–31)
CREAT SERPL-MCNC: 0.62 MG/DL — SIGNIFICANT CHANGE UP (ref 0.5–1.3)
EGFR: 86 ML/MIN/1.73M2 — SIGNIFICANT CHANGE UP
GLUCOSE BLDC GLUCOMTR-MCNC: 184 MG/DL — HIGH (ref 70–99)
GLUCOSE BLDC GLUCOMTR-MCNC: 220 MG/DL — HIGH (ref 70–99)
GLUCOSE BLDC GLUCOMTR-MCNC: 84 MG/DL — SIGNIFICANT CHANGE UP (ref 70–99)
GLUCOSE SERPL-MCNC: 85 MG/DL — SIGNIFICANT CHANGE UP (ref 70–99)
POTASSIUM SERPL-MCNC: 3 MMOL/L — LOW (ref 3.5–5.3)
POTASSIUM SERPL-SCNC: 3 MMOL/L — LOW (ref 3.5–5.3)
SODIUM SERPL-SCNC: 143 MMOL/L — SIGNIFICANT CHANGE UP (ref 135–145)

## 2024-05-16 PROCEDURE — 99233 SBSQ HOSP IP/OBS HIGH 50: CPT | Mod: FS

## 2024-05-16 RX ORDER — LOSARTAN POTASSIUM 100 MG/1
50 TABLET, FILM COATED ORAL DAILY
Refills: 0 | Status: DISCONTINUED | OUTPATIENT
Start: 2024-05-16 | End: 2024-05-17

## 2024-05-16 RX ORDER — SPIRONOLACTONE 25 MG/1
25 TABLET, FILM COATED ORAL DAILY
Refills: 0 | Status: DISCONTINUED | OUTPATIENT
Start: 2024-05-16 | End: 2024-05-17

## 2024-05-16 RX ORDER — SENNA PLUS 8.6 MG/1
2 TABLET ORAL AT BEDTIME
Refills: 0 | Status: DISCONTINUED | OUTPATIENT
Start: 2024-05-16 | End: 2024-05-17

## 2024-05-16 RX ORDER — POTASSIUM CHLORIDE 20 MEQ
40 PACKET (EA) ORAL EVERY 4 HOURS
Refills: 0 | Status: COMPLETED | OUTPATIENT
Start: 2024-05-16 | End: 2024-05-16

## 2024-05-16 RX ORDER — POLYETHYLENE GLYCOL 3350 17 G/17G
17 POWDER, FOR SOLUTION ORAL DAILY
Refills: 0 | Status: DISCONTINUED | OUTPATIENT
Start: 2024-05-16 | End: 2024-05-17

## 2024-05-16 RX ADMIN — Medication 1 DROP(S): at 21:02

## 2024-05-16 RX ADMIN — Medication 20 MILLIGRAM(S): at 10:54

## 2024-05-16 RX ADMIN — Medication 40 MILLIGRAM(S): at 06:10

## 2024-05-16 RX ADMIN — TEMAZEPAM 15 MILLIGRAM(S): 15 CAPSULE ORAL at 21:02

## 2024-05-16 RX ADMIN — POLYETHYLENE GLYCOL 3350 17 GRAM(S): 17 POWDER, FOR SOLUTION ORAL at 10:55

## 2024-05-16 RX ADMIN — HEPARIN SODIUM 5000 UNIT(S): 5000 INJECTION INTRAVENOUS; SUBCUTANEOUS at 06:09

## 2024-05-16 RX ADMIN — Medication 0.25 MILLIGRAM(S): at 06:09

## 2024-05-16 RX ADMIN — Medication 0.25 MILLIGRAM(S): at 17:29

## 2024-05-16 RX ADMIN — SODIUM CHLORIDE 4 MILLILITER(S): 9 INJECTION INTRAMUSCULAR; INTRAVENOUS; SUBCUTANEOUS at 17:29

## 2024-05-16 RX ADMIN — Medication 1 DROP(S): at 06:09

## 2024-05-16 RX ADMIN — CYCLOPENTOLATE HYDROCHLORIDE 1 DROP(S): 10 SOLUTION/ DROPS OPHTHALMIC at 17:52

## 2024-05-16 RX ADMIN — CEFEPIME 100 MILLIGRAM(S): 1 INJECTION, POWDER, FOR SOLUTION INTRAMUSCULAR; INTRAVENOUS at 06:10

## 2024-05-16 RX ADMIN — Medication 3 MILLILITER(S): at 10:57

## 2024-05-16 RX ADMIN — Medication 40 MILLIEQUIVALENT(S): at 13:19

## 2024-05-16 RX ADMIN — LOSARTAN POTASSIUM 25 MILLIGRAM(S): 100 TABLET, FILM COATED ORAL at 06:09

## 2024-05-16 RX ADMIN — SPIRONOLACTONE 12.5 MILLIGRAM(S): 25 TABLET, FILM COATED ORAL at 06:10

## 2024-05-16 RX ADMIN — HEPARIN SODIUM 5000 UNIT(S): 5000 INJECTION INTRAVENOUS; SUBCUTANEOUS at 17:29

## 2024-05-16 RX ADMIN — Medication 3 MILLILITER(S): at 17:29

## 2024-05-16 RX ADMIN — CEFEPIME 100 MILLIGRAM(S): 1 INJECTION, POWDER, FOR SOLUTION INTRAMUSCULAR; INTRAVENOUS at 17:30

## 2024-05-16 RX ADMIN — MIRTAZAPINE 7.5 MILLIGRAM(S): 45 TABLET, ORALLY DISINTEGRATING ORAL at 21:02

## 2024-05-16 RX ADMIN — ATORVASTATIN CALCIUM 20 MILLIGRAM(S): 80 TABLET, FILM COATED ORAL at 21:01

## 2024-05-16 RX ADMIN — Medication 5 MILLIGRAM(S): at 10:54

## 2024-05-16 RX ADMIN — SENNA PLUS 2 TABLET(S): 8.6 TABLET ORAL at 21:02

## 2024-05-16 RX ADMIN — Medication 20 MILLIGRAM(S): at 06:10

## 2024-05-16 RX ADMIN — Medication 40 MILLIEQUIVALENT(S): at 17:29

## 2024-05-16 RX ADMIN — Medication 3 MILLILITER(S): at 06:09

## 2024-05-16 RX ADMIN — CYCLOPENTOLATE HYDROCHLORIDE 1 DROP(S): 10 SOLUTION/ DROPS OPHTHALMIC at 06:08

## 2024-05-16 RX ADMIN — Medication 40 MILLIEQUIVALENT(S): at 10:54

## 2024-05-16 RX ADMIN — Medication 25 MILLIGRAM(S): at 06:09

## 2024-05-16 NOTE — PROGRESS NOTE ADULT - SUBJECTIVE AND OBJECTIVE BOX
---___---___---___---___---___---___ ---___---___---___---___---___---___---___---___---                  M E D I C A L   A T T E N D I N G   P R O G R E S S   N O T E  ---___---___---___---___---___---___ ---___---___---___---___---___---___---___---___---        ================================================    ++CHIEF COMPLAINT:   Patient is a 88y old  Female who presents with a chief complaint of sob (16 May 2024 08:50)      Acute respiratory failure with hypoxia      ---___---___---___---___---___---  PAST MEDICAL / Surgical  HISTORY:  PAST MEDICAL & SURGICAL HISTORY:  HTN (hypertension)      Hypothyroidism      Osteoporosis      CAD (coronary artery disease)      COPD (chronic obstructive pulmonary disease)      Pulmonary sarcoidosis      H/O pyelonephritis      Acute diverticulitis          ---___---___---___---___---___---  FAMILY HISTORY:   FAMILY HISTORY:        ---___---___---___---___---___---  ALLERGIES:   Allergies    iodinated radiocontrast agents (Anaphylaxis)    Intolerances        ---___---___---___---___---___---  MEDICATIONS:  MEDICATIONS  (STANDING):  albuterol/ipratropium for Nebulization 3 milliLiter(s) Nebulizer every 6 hours  atorvastatin 20 milliGRAM(s) Oral at bedtime  buDESOnide    Inhalation Suspension 0.25 milliGRAM(s) Inhalation two times a day  cefepime   IVPB 1000 milliGRAM(s) IV Intermittent every 12 hours  cefepime   IVPB      cyclopentolate 1% Solution 1 Drop(s) Both EYES two times a day  FLUoxetine 20 milliGRAM(s) Oral daily  furosemide    Tablet 20 milliGRAM(s) Oral daily  heparin   Injectable 5000 Unit(s) SubCutaneous every 12 hours  losartan 50 milliGRAM(s) Oral daily  metoprolol succinate ER 25 milliGRAM(s) Oral daily  mirtazapine 7.5 milliGRAM(s) Oral at bedtime  polyethylene glycol 3350 17 Gram(s) Oral daily  potassium chloride    Tablet ER 40 milliEquivalent(s) Oral every 4 hours  prednisoLONE acetate 1% Suspension 1 Drop(s) Both EYES two times a day  predniSONE   Tablet 40 milliGRAM(s) Oral daily  senna 2 Tablet(s) Oral at bedtime  sodium chloride 7% Inhalation 4 milliLiter(s) Inhalation every 12 hours  spironolactone 25 milliGRAM(s) Oral daily  temazepam 15 milliGRAM(s) Oral at bedtime    MEDICATIONS  (PRN):  acetaminophen     Tablet .. 650 milliGRAM(s) Oral every 6 hours PRN Temp greater or equal to 38C (100.4F), Mild Pain (1 - 3)  bisacodyl 5 milliGRAM(s) Oral every 12 hours PRN Constipation      ---___---___---___---___---___---  REVIEW OF SYSTEM:     unable to obtain     ---___---___---___---___---___---  VITAL SIGNS:  88y , CAPILLARY BLOOD GLUCOSE      POCT Blood Glucose.: 220 mg/dL (16 May 2024 11:21)    T(C): 36.4 (24 @ 15:56), Max: 36.4 (05-15-24 @ 17:28)  HR: 67 (24 @ 15:56) (64 - 74)  BP: 112/58 (24 @ 15:56) (112/58 - 154/73)  RR: 16 (24 @ 15:56) (16 - 18)  SpO2: 95% (24 @ 15:56) (85% - 100%)  ---___---___---___---___---___---  PHYSICAL EXAM:    GEN: A&O X 3 , NAD , comfortable  HEENT: NCAT, PERRL, MMM, hearing intact  Neck: supple , no JVD  CVS: S1S2 , regular , No M/R/G appreciated  PULM: CTA B/L,  no W/R/R appreciated  ABD.: soft. non tender, non distended,  bowel sounds present  Extrem: intact pulses , no edema   Derm: No rash , no ecchymoses  PSYCH : normal mood,  no delusion not anxious     ---___---___---___---___---___---            LAB AND IMAGIN-16    143  |  98  |  39<H>  ----------------------------<  85  3.0<L>   |  32<H>  |  0.62    Ca    8.3<L>      16 May 2024 06:57                              Urinalysis Basic - ( 16 May 2024 06:57 )    Color: x / Appearance: x / SG: x / pH: x  Gluc: 85 mg/dL / Ketone: x  / Bili: x / Urobili: x   Blood: x / Protein: x / Nitrite: x   Leuk Esterase: x / RBC: x / WBC x   Sq Epi: x / Non Sq Epi: x / Bacteria: x        [All pertinent / recent Imaging reviewed]         ---___---___---___---___---___---___ ---___---___---___---___---                         A S S E S S M E N T   A N D   P L A N :      HEALTH ISSUES - PROBLEM Dx:    copd   pulmonary sarcoidoisis   volume overload     copd   pulmonary sarcoidosis   htn     id pulmonary following   continue o2 and steroids  and nebulizer   hld continue  meds  anxiety on fluoxetine    continue abx               ___________________________  Thank you,  Garry Hermosillo  [phone]

## 2024-05-16 NOTE — CHART NOTE - NSCHARTNOTEFT_GEN_A_CORE
I have examined  BISHNU SEGURA 88y Female requires home oxygen due to low oxygen saturations and COPD    Treated this admission for Shortness of Breath and now resolved.     Patient is in chronic stable state and has following O2 saturations:    O2 sat room air at rest = 85%  O2 sat on oxygen NC @ 1 L/min) = 96%  Patient unable to ambulate at this time    Patient requiring continuous & portable nasal cannula oxygen @ 1  L/min    Naty Velazquez PA-C  Department of Medicine

## 2024-05-16 NOTE — PROGRESS NOTE ADULT - SUBJECTIVE AND OBJECTIVE BOX
CHIEF COMPLAINT: SOB, cough     Interval History: no acute events, no complaints     PAST MEDICAL & SURGICAL HISTORY:  HTN (hypertension)      Hypothyroidism      Osteoporosis      CAD (coronary artery disease)      COPD (chronic obstructive pulmonary disease)      Pulmonary sarcoidosis      H/O pyelonephritis      Acute diverticulitis          FAMILY HISTORY:      SOCIAL HISTORY:  Smoking: [ ] Never Smoked [ x] Former Smoker  Substance Use: [ x] Never Used [ ] Used ____  EtOH Use:None  Occupation: former       Allergies    iodinated radiocontrast agents (Anaphylaxis)    Intolerances        HOME MEDICATIONS:  Home Medications:  acetaminophen 325 mg oral tablet: 2 tab(s) orally every 6 hours As needed Temp greater or equal to 38C (100.4F), Mild Pain (1 - 3) (2024 13:20)  atorvastatin 20 mg oral tablet: 1 tab(s) orally once a day (2024 13:20)  cyclopentolate 1% ophthalmic solution: 1 drop(s) in the left eye 2 times a day (2024 13:20)  D3 25 mcg (1000 intl units) oral tablet: 1 tab(s) orally once a day (2024 13:20)  ferrous sulfate 325 mg (65 mg elemental iron) oral tablet: 1 tab(s) orally once a day (2024 13:20)  FLUoxetine 20 mg oral capsule: 1 cap(s) orally once a day (2024 13:20)  losartan 25 mg oral tablet: 1 tab(s) orally once a day (2024 13:20)  melatonin 3 mg oral tablet: 1 tab(s) orally once a day (at bedtime) As needed Insomnia (2024 13:20)  mirtazapine 7.5 mg oral tablet: 1 tab(s) orally once a day (at bedtime) (2024 13:20)  prednisoLONE acetate 1% ophthalmic suspension: 1 drop(s) in the left eye 2 times a day (2024 13:20)  temazepam 30 mg oral capsule: 1 cap(s) orally once a day (at bedtime) (2024 13:20)  traZODone 50 mg oral tablet: orally once a day (2024 13:20)      REVIEW OF SYSTEMS:  Constitutional: [ ] negative [ ] fevers [ ] chills [ ] weight loss [ ] weight gain  HEENT: [ ] negative [ ] dry eyes [ ] eye irritation [ ] postnasal drip [ ] nasal congestion  CV: [ ] negative  [ ] chest pain [ ] orthopnea [ ] palpitations [ ] murmur  Resp: [ ] negative [ ] cough [ ] shortness of breath [ ] dyspnea [ ] wheezing [ ] sputum [ ] hemoptysis  GI: [ ] negative [ ] nausea [ ] vomiting [ ] diarrhea [ ] constipation [ ] abd pain [ ] dysphagia   : [ ] negative [ ] dysuria [ ] nocturia [ ] hematuria [ ] increased urinary frequency  Musculoskeletal: [ ] negative [ ] back pain [ ] myalgias [ ] arthralgias [ ] fracture  Skin: [ ] negative [ ] rash [ ] itch  Neurological: [ ] negative [ ] headache [ ] dizziness [ ] syncope [ ] weakness [ ] numbness  Psychiatric: [ ] negative [ ] anxiety [ ] depression  Endocrine: [ ] negative [ ] diabetes [ ] thyroid problem  Hematologic/Lymphatic: [ ] negative [ ] anemia [ ] bleeding problem  Allergic/Immunologic: [ ] negative [ ] itchy eyes [ ] nasal discharge [ ] hives [ ] angioedema  [ ] All other systems negative  [ x] Unable to assess ROS because limited due to confusion.     OBJECTIVE:  ICU Vital Signs Last 24 Hrs  T(C): 36.7 (10 May 2024 16:33), Max: 37.9 (10 May 2024 08:20)  T(F): 98 (10 May 2024 16:33), Max: 100.3 (10 May 2024 08:20)  HR: 80 (10 May 2024 16:33) (80 - 112)  BP: 150/68 (10 May 2024 16:33) (84/65 - 150/68)  BP(mean): 73 (10 May 2024 10:55) (58 - 73)  ABP: --  ABP(mean): --  RR: 18 (10 May 2024 16:33) (18 - 28)  SpO2: 95% (10 May 2024 16:33) (94% - 96%)    O2 Parameters below as of 10 May 2024 16:33  Patient On (Oxygen Delivery Method): nasal cannula  O2 Flow (L/min): 4            CAPILLARY BLOOD GLUCOSE      POCT Blood Glucose.: 192 mg/dL (10 May 2024 08:01)      PHYSICAL EXAM:  Gen: awake, alert, anxious, frail  Resp: breathing comfortably on room air, coarse breath sounds bilaterally but no wheezing,   CVS: tachycardic   Abd: soft, non tender, non distended  Ext: warm, well perfused, no edema        HOSPITAL MEDICATIONS:  Standing Meds:  albuterol/ipratropium for Nebulization 3 milliLiter(s) Nebulizer every 6 hours  atorvastatin 20 milliGRAM(s) Oral at bedtime  buDESOnide    Inhalation Suspension 0.25 milliGRAM(s) Inhalation two times a day  cefepime   IVPB      cyclopentolate 1% Solution 1 Drop(s) Both EYES two times a day  heparin   Injectable 5000 Unit(s) SubCutaneous every 12 hours  methylPREDNISolone sodium succinate Injectable 20 milliGRAM(s) IV Push three times a day  prednisoLONE acetate 1% Suspension 1 Drop(s) Both EYES two times a day  sodium chloride 7% Inhalation 4 milliLiter(s) Inhalation every 12 hours      PRN Meds:      LABS:                        10.8   16.37 )-----------( 209      ( 10 May 2024 08:12 )             34.1     Hgb Trend: 10.8<--  0510    143  |  106  |  15  ----------------------------<  204<H>  3.5   |  21<L>  |  0.76    Ca    8.6      10 May 2024 08:12    TPro  6.6  /  Alb  3.6  /  TBili  0.3  /  DBili  x   /  AST  34  /  ALT  18  /  AlkPhos  138<H>  0510    Creatinine Trend: 0.76<--, 0.80<--, 0.83<--  PT/INR - ( 10 May 2024 08:12 )   PT: 11.5 sec;   INR: 1.10 ratio         PTT - ( 10 May 2024 08:12 )  PTT:31.0 sec  Urinalysis Basic - ( 10 May 2024 08:31 )    Color: Yellow / Appearance: Turbid / S.014 / pH: x  Gluc: x / Ketone: Negative mg/dL  / Bili: Negative / Urobili: 0.2 mg/dL   Blood: x / Protein: 100 mg/dL / Nitrite: Negative   Leuk Esterase: Large / RBC: 7 /HPF / WBC >998 /HPF   Sq Epi: x / Non Sq Epi: 9 /HPF / Bacteria: Negative /HPF        Venous Blood Gas:  05-10 @ 11:25  7.27/57/44/26/70.1  VBG Lactate: 1.7  Venous Blood Gas:  05-10 @ 08:00  7.25/58/59//88.2  VBG Lactate: 1.3             RADIOLOGY:  [ x] Reviewed and interpreted by me

## 2024-05-16 NOTE — PROGRESS NOTE ADULT - SUBJECTIVE AND OBJECTIVE BOX
Date of Service: 05-16-24 @ 08:50           CARDIOLOGY     PROGRESS  NOTE   ________________________________________________    CHIEF COMPLAINT:Patient is a 88y old  Female who presents with a chief complaint of sob (15 May 2024 21:10)  doing better  	  REVIEW OF SYSTEMS:  CONSTITUTIONAL: No fever, weight loss, or fatigue  EYES: No eye pain, visual disturbances, or discharge  ENT:  No difficulty hearing, tinnitus, vertigo; No sinus or throat pain  NECK: No pain or stiffness  RESPIRATORY: No cough, wheezing, chills or hemoptysis; No Shortness of Breath  CARDIOVASCULAR: No chest pain, palpitations, passing out, dizziness, or leg swelling  GASTROINTESTINAL: No abdominal or epigastric pain. No nausea, vomiting, or hematemesis; No diarrhea or constipation. No melena or hematochezia.  GENITOURINARY: No dysuria, frequency, hematuria, or incontinence  NEUROLOGICAL: No headaches, memory loss, loss of strength, numbness, or tremors  SKIN: No itching, burning, rashes, or lesions   LYMPH Nodes: No enlarged glands  ENDOCRINE: No heat or cold intolerance; No hair loss  MUSCULOSKELETAL: No joint pain or swelling; No muscle, back, or extremity pain  PSYCHIATRIC: No depression, anxiety, mood swings, or difficulty sleeping  HEME/LYMPH: No easy bruising, or bleeding gums  ALLERGY AND IMMUNOLOGIC: No hives or eczema	    [ x] All others negative	  [ ] Unable to obtain    PHYSICAL EXAM:  T(C): 36.4 (05-16-24 @ 00:52), Max: 36.4 (05-15-24 @ 17:28)  HR: 67 (05-16-24 @ 00:52) (67 - 74)  BP: 150/79 (05-16-24 @ 05:29) (137/67 - 154/73)  RR: 18 (05-16-24 @ 00:52) (18 - 18)  SpO2: 100% (05-16-24 @ 00:52) (90% - 100%)  Wt(kg): --  I&O's Summary    15 May 2024 07:01  -  16 May 2024 07:00  --------------------------------------------------------  IN: 200 mL / OUT: 300 mL / NET: -100 mL        Appearance: Normal	  HEENT:   Normal oral mucosa, PERRL, EOMI	  Lymphatic: No lymphadenopathy  Cardiovascular: Normal S1 S2, No JVD, + murmurs, No edema  Respiratory: rhonchi  Psychiatry: A & O x 3, Mood & affect appropriate  Gastrointestinal:  Soft, Non-tender, + BS	  Skin: No rashes, No ecchymoses, No cyanosis	  Neurologic: Non-focal  Extremities: Normal range of motion, No clubbing, cyanosis or edema  Vascular: Peripheral pulses palpable 2+ bilaterally    MEDICATIONS  (STANDING):  albuterol/ipratropium for Nebulization 3 milliLiter(s) Nebulizer every 6 hours  atorvastatin 20 milliGRAM(s) Oral at bedtime  buDESOnide    Inhalation Suspension 0.25 milliGRAM(s) Inhalation two times a day  cefepime   IVPB 1000 milliGRAM(s) IV Intermittent every 12 hours  cefepime   IVPB      cyclopentolate 1% Solution 1 Drop(s) Both EYES two times a day  FLUoxetine 20 milliGRAM(s) Oral daily  furosemide    Tablet 20 milliGRAM(s) Oral daily  heparin   Injectable 5000 Unit(s) SubCutaneous every 12 hours  losartan 25 milliGRAM(s) Oral two times a day  metoprolol succinate ER 25 milliGRAM(s) Oral daily  mirtazapine 7.5 milliGRAM(s) Oral at bedtime  potassium chloride    Tablet ER 40 milliEquivalent(s) Oral every 4 hours  prednisoLONE acetate 1% Suspension 1 Drop(s) Both EYES two times a day  predniSONE   Tablet 40 milliGRAM(s) Oral daily  sodium chloride 7% Inhalation 4 milliLiter(s) Inhalation every 12 hours  spironolactone 12.5 milliGRAM(s) Oral daily  temazepam 15 milliGRAM(s) Oral at bedtime      TELEMETRY: 	    ECG:  	  RADIOLOGY:  OTHER: 	  	  LABS:	 	    CARDIAC MARKERS:            05-16    143  |  98  |  39<H>  ----------------------------<  85  3.0<L>   |  32<H>  |  0.62    Ca    8.3<L>      16 May 2024 06:57      proBNP:   Lipid Profile:   HgA1c:   TSH:         Assessment and plan  ---------------------------  88[facility] with known history of C. difficile, diverticulitis, asthma/COPD, sarcoidosis, hypothyroidism, hypertension, CHF, presenting with concern of respiratory failure this morning at her facility.  She arrives via EMS having received 10 of IV Decadron, 1 DuoNeb, 4 magnesium, placed on oxygen with improvement in her oxygen saturation, known baseline AO 1-2 now responding only to vigorous stimuli.  Pt BP initially 220/99 at the facility, T 98.9 F, 130 BPM, RR 26 and O2 saturation 98% on NRB.  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 , sarcoidosis  pulmonary with increasing sob  ct chest  noted with possible pneumonia/ chf diastolic acute on chronic  agree with abx  will consider possible pulmonary consult  review old echo  dvt prophylaxis  pulmonary eval sec to emphysema, add inhalers ?small dose of steroid  CT  chest pulmonary edema, unless the findings are pulmonary fibrosis  change lasix to 20 mg iv bid, follow up pro bnp  add aldactone 12.5 mg dfaily, add small dose of beta blocker  chest x ray today  out put negative 1200 cc today noted, continue diuresis ,   will switch to lasix 20  mg daily, with increase renal function  may increase losartan to 25 mg po bid to adjust bp med  repletae k, increase aldactone to 25 mg dfaily fu K level closely

## 2024-05-17 ENCOUNTER — TRANSCRIPTION ENCOUNTER (OUTPATIENT)
Age: 89
End: 2024-05-17

## 2024-05-17 VITALS
RESPIRATION RATE: 18 BRPM | TEMPERATURE: 98 F | SYSTOLIC BLOOD PRESSURE: 141 MMHG | HEART RATE: 80 BPM | DIASTOLIC BLOOD PRESSURE: 99 MMHG | OXYGEN SATURATION: 93 %

## 2024-05-17 LAB
ANION GAP SERPL CALC-SCNC: 12 MMOL/L — SIGNIFICANT CHANGE UP (ref 5–17)
BUN SERPL-MCNC: 37 MG/DL — HIGH (ref 7–23)
CALCIUM SERPL-MCNC: 8.7 MG/DL — SIGNIFICANT CHANGE UP (ref 8.4–10.5)
CHLORIDE SERPL-SCNC: 103 MMOL/L — SIGNIFICANT CHANGE UP (ref 96–108)
CO2 SERPL-SCNC: 28 MMOL/L — SIGNIFICANT CHANGE UP (ref 22–31)
CREAT SERPL-MCNC: 0.63 MG/DL — SIGNIFICANT CHANGE UP (ref 0.5–1.3)
EGFR: 86 ML/MIN/1.73M2 — SIGNIFICANT CHANGE UP
GLUCOSE SERPL-MCNC: 96 MG/DL — SIGNIFICANT CHANGE UP (ref 70–99)
POTASSIUM SERPL-MCNC: 4.8 MMOL/L — SIGNIFICANT CHANGE UP (ref 3.5–5.3)
POTASSIUM SERPL-SCNC: 4.8 MMOL/L — SIGNIFICANT CHANGE UP (ref 3.5–5.3)
SODIUM SERPL-SCNC: 143 MMOL/L — SIGNIFICANT CHANGE UP (ref 135–145)

## 2024-05-17 PROCEDURE — 83605 ASSAY OF LACTIC ACID: CPT

## 2024-05-17 PROCEDURE — 81001 URINALYSIS AUTO W/SCOPE: CPT

## 2024-05-17 PROCEDURE — 84145 PROCALCITONIN (PCT): CPT

## 2024-05-17 PROCEDURE — 71045 X-RAY EXAM CHEST 1 VIEW: CPT

## 2024-05-17 PROCEDURE — 87086 URINE CULTURE/COLONY COUNT: CPT

## 2024-05-17 PROCEDURE — 87040 BLOOD CULTURE FOR BACTERIA: CPT

## 2024-05-17 PROCEDURE — 85014 HEMATOCRIT: CPT

## 2024-05-17 PROCEDURE — 87077 CULTURE AEROBIC IDENTIFY: CPT

## 2024-05-17 PROCEDURE — 74230 X-RAY XM SWLNG FUNCJ C+: CPT

## 2024-05-17 PROCEDURE — 99233 SBSQ HOSP IP/OBS HIGH 50: CPT | Mod: FS

## 2024-05-17 PROCEDURE — 97161 PT EVAL LOW COMPLEX 20 MIN: CPT

## 2024-05-17 PROCEDURE — 83880 ASSAY OF NATRIURETIC PEPTIDE: CPT

## 2024-05-17 PROCEDURE — 92610 EVALUATE SWALLOWING FUNCTION: CPT

## 2024-05-17 PROCEDURE — 85730 THROMBOPLASTIN TIME PARTIAL: CPT

## 2024-05-17 PROCEDURE — 96374 THER/PROPH/DIAG INJ IV PUSH: CPT

## 2024-05-17 PROCEDURE — 71250 CT THORAX DX C-: CPT | Mod: MC

## 2024-05-17 PROCEDURE — 94660 CPAP INITIATION&MGMT: CPT

## 2024-05-17 PROCEDURE — 96375 TX/PRO/DX INJ NEW DRUG ADDON: CPT

## 2024-05-17 PROCEDURE — 82947 ASSAY GLUCOSE BLOOD QUANT: CPT

## 2024-05-17 PROCEDURE — 85610 PROTHROMBIN TIME: CPT

## 2024-05-17 PROCEDURE — 92611 MOTION FLUOROSCOPY/SWALLOW: CPT

## 2024-05-17 PROCEDURE — 93970 EXTREMITY STUDY: CPT

## 2024-05-17 PROCEDURE — 82435 ASSAY OF BLOOD CHLORIDE: CPT

## 2024-05-17 PROCEDURE — 84132 ASSAY OF SERUM POTASSIUM: CPT

## 2024-05-17 PROCEDURE — 92507 TX SP LANG VOICE COMM INDIV: CPT

## 2024-05-17 PROCEDURE — 87637 SARSCOV2&INF A&B&RSV AMP PRB: CPT

## 2024-05-17 PROCEDURE — 82803 BLOOD GASES ANY COMBINATION: CPT

## 2024-05-17 PROCEDURE — 82330 ASSAY OF CALCIUM: CPT

## 2024-05-17 PROCEDURE — 85379 FIBRIN DEGRADATION QUANT: CPT

## 2024-05-17 PROCEDURE — 85025 COMPLETE CBC W/AUTO DIFF WBC: CPT

## 2024-05-17 PROCEDURE — 84484 ASSAY OF TROPONIN QUANT: CPT

## 2024-05-17 PROCEDURE — 85027 COMPLETE CBC AUTOMATED: CPT

## 2024-05-17 PROCEDURE — 80053 COMPREHEN METABOLIC PANEL: CPT

## 2024-05-17 PROCEDURE — 99291 CRITICAL CARE FIRST HOUR: CPT

## 2024-05-17 PROCEDURE — 85018 HEMOGLOBIN: CPT

## 2024-05-17 PROCEDURE — 84295 ASSAY OF SERUM SODIUM: CPT

## 2024-05-17 PROCEDURE — 94640 AIRWAY INHALATION TREATMENT: CPT

## 2024-05-17 PROCEDURE — 74176 CT ABD & PELVIS W/O CONTRAST: CPT | Mod: MC

## 2024-05-17 PROCEDURE — 82962 GLUCOSE BLOOD TEST: CPT

## 2024-05-17 PROCEDURE — 36415 COLL VENOUS BLD VENIPUNCTURE: CPT

## 2024-05-17 PROCEDURE — 80048 BASIC METABOLIC PNL TOTAL CA: CPT

## 2024-05-17 RX ORDER — PREDNISOLONE SODIUM PHOSPHATE 1 %
1 DROPS OPHTHALMIC (EYE)
Refills: 0 | DISCHARGE

## 2024-05-17 RX ORDER — METOPROLOL TARTRATE 50 MG
1 TABLET ORAL
Qty: 0 | Refills: 0 | DISCHARGE
Start: 2024-05-17

## 2024-05-17 RX ORDER — TRAZODONE HCL 50 MG
0 TABLET ORAL
Refills: 0 | DISCHARGE

## 2024-05-17 RX ORDER — IPRATROPIUM/ALBUTEROL SULFATE 18-103MCG
3 AEROSOL WITH ADAPTER (GRAM) INHALATION
Qty: 0 | Refills: 0 | DISCHARGE
Start: 2024-05-17

## 2024-05-17 RX ORDER — LOSARTAN POTASSIUM 100 MG/1
1 TABLET, FILM COATED ORAL
Qty: 0 | Refills: 0 | DISCHARGE
Start: 2024-05-17

## 2024-05-17 RX ORDER — TEMAZEPAM 15 MG/1
1 CAPSULE ORAL
Qty: 0 | Refills: 0 | DISCHARGE
Start: 2024-05-17

## 2024-05-17 RX ORDER — ACETAMINOPHEN 500 MG
2 TABLET ORAL
Qty: 0 | Refills: 0 | DISCHARGE
Start: 2024-05-17

## 2024-05-17 RX ORDER — SENNA PLUS 8.6 MG/1
2 TABLET ORAL
Refills: 0 | DISCHARGE
Start: 2024-05-17

## 2024-05-17 RX ORDER — TEMAZEPAM 15 MG/1
1 CAPSULE ORAL
Refills: 0 | DISCHARGE

## 2024-05-17 RX ORDER — FUROSEMIDE 40 MG
1 TABLET ORAL
Qty: 0 | Refills: 0 | DISCHARGE
Start: 2024-05-17

## 2024-05-17 RX ORDER — POLYETHYLENE GLYCOL 3350 17 G/17G
17 POWDER, FOR SOLUTION ORAL
Qty: 0 | Refills: 0 | DISCHARGE
Start: 2024-05-17

## 2024-05-17 RX ORDER — PREDNISOLONE SODIUM PHOSPHATE 1 %
1 DROPS OPHTHALMIC (EYE)
Refills: 0 | DISCHARGE
Start: 2024-05-17

## 2024-05-17 RX ADMIN — Medication 25 MILLIGRAM(S): at 05:50

## 2024-05-17 RX ADMIN — Medication 1 DROP(S): at 11:35

## 2024-05-17 RX ADMIN — CEFEPIME 100 MILLIGRAM(S): 1 INJECTION, POWDER, FOR SOLUTION INTRAMUSCULAR; INTRAVENOUS at 05:49

## 2024-05-17 RX ADMIN — CYCLOPENTOLATE HYDROCHLORIDE 1 DROP(S): 10 SOLUTION/ DROPS OPHTHALMIC at 06:16

## 2024-05-17 RX ADMIN — Medication 3 MILLILITER(S): at 11:36

## 2024-05-17 RX ADMIN — HEPARIN SODIUM 5000 UNIT(S): 5000 INJECTION INTRAVENOUS; SUBCUTANEOUS at 18:04

## 2024-05-17 RX ADMIN — LOSARTAN POTASSIUM 50 MILLIGRAM(S): 100 TABLET, FILM COATED ORAL at 05:50

## 2024-05-17 RX ADMIN — Medication 20 MILLIGRAM(S): at 05:49

## 2024-05-17 RX ADMIN — Medication 40 MILLIGRAM(S): at 05:49

## 2024-05-17 RX ADMIN — Medication 650 MILLIGRAM(S): at 05:51

## 2024-05-17 RX ADMIN — SODIUM CHLORIDE 4 MILLILITER(S): 9 INJECTION INTRAMUSCULAR; INTRAVENOUS; SUBCUTANEOUS at 05:49

## 2024-05-17 RX ADMIN — Medication 0.25 MILLIGRAM(S): at 05:48

## 2024-05-17 RX ADMIN — Medication 3 MILLILITER(S): at 05:48

## 2024-05-17 RX ADMIN — HEPARIN SODIUM 5000 UNIT(S): 5000 INJECTION INTRAVENOUS; SUBCUTANEOUS at 05:49

## 2024-05-17 RX ADMIN — Medication 20 MILLIGRAM(S): at 11:36

## 2024-05-17 RX ADMIN — CEFEPIME 100 MILLIGRAM(S): 1 INJECTION, POWDER, FOR SOLUTION INTRAMUSCULAR; INTRAVENOUS at 15:12

## 2024-05-17 RX ADMIN — POLYETHYLENE GLYCOL 3350 17 GRAM(S): 17 POWDER, FOR SOLUTION ORAL at 11:36

## 2024-05-17 RX ADMIN — SPIRONOLACTONE 25 MILLIGRAM(S): 25 TABLET, FILM COATED ORAL at 05:50

## 2024-05-17 RX ADMIN — Medication 3 MILLILITER(S): at 00:11

## 2024-05-17 NOTE — DISCHARGE NOTE PROVIDER - NSDCFUADDAPPT_GEN_ALL_CORE_FT
APPTS ARE READY TO BE MADE: [x] YES    Best Family or Patient Contact (if needed):    Additional Information about above appointments (if needed):    1:   2:   3:     Other comments or requests:    APPTS ARE READY TO BE MADE: [x] YES    Best Family or Patient Contact (if needed):    Additional Information about above appointments (if needed):    1:  2:   3:     Other comments or requests:

## 2024-05-17 NOTE — PROGRESS NOTE ADULT - SUBJECTIVE AND OBJECTIVE BOX
Date of Service: 05-17-24 @ 08:25           CARDIOLOGY     PROGRESS  NOTE   ________________________________________________    CHIEF COMPLAINT:Patient is a 88y old  Female who presents with a chief complaint of sob (16 May 2024 19:01)  doing well  	  REVIEW OF SYSTEMS:  CONSTITUTIONAL: No fever, weight loss, or fatigue  EYES: No eye pain, visual disturbances, or discharge  ENT:  No difficulty hearing, tinnitus, vertigo; No sinus or throat pain  NECK: No pain or stiffness  RESPIRATORY: No cough, wheezing, chills or hemoptysis; No Shortness of Breath  CARDIOVASCULAR: No chest pain, palpitations, passing out, dizziness, or leg swelling  GASTROINTESTINAL: No abdominal or epigastric pain. No nausea, vomiting, or hematemesis; No diarrhea or constipation. No melena or hematochezia.  GENITOURINARY: No dysuria, frequency, hematuria, or incontinence  NEUROLOGICAL: No headaches, memory loss, loss of strength, numbness, or tremors  SKIN: No itching, burning, rashes, or lesions   LYMPH Nodes: No enlarged glands  ENDOCRINE: No heat or cold intolerance; No hair loss  MUSCULOSKELETAL: No joint pain or swelling; No muscle, back, or extremity pain  PSYCHIATRIC: No depression, anxiety, mood swings, or difficulty sleeping  HEME/LYMPH: No easy bruising, or bleeding gums  ALLERGY AND IMMUNOLOGIC: No hives or eczema	    [ x] All others negative	  [ ] Unable to obtain    PHYSICAL EXAM:  T(C): 36.3 (05-17-24 @ 05:47), Max: 36.4 (05-16-24 @ 15:56)  HR: 68 (05-17-24 @ 05:47) (64 - 68)  BP: 147/84 (05-17-24 @ 05:47) (112/58 - 147/84)  RR: 18 (05-17-24 @ 05:47) (16 - 18)  SpO2: 94% (05-17-24 @ 05:47) (85% - 96%)  Wt(kg): --  I&O's Summary    16 May 2024 07:01  -  17 May 2024 07:00  --------------------------------------------------------  IN: 480 mL / OUT: 0 mL / NET: 480 mL        Appearance: Normal	  HEENT:   Normal oral mucosa, PERRL, EOMI	  Lymphatic: No lymphadenopathy  Cardiovascular: Normal S1 S2, No JVD, + murmurs, No edema  Respiratory:rhonchi  Psychiatry: A & O x 3, Mood & affect appropriate  Gastrointestinal:  Soft, Non-tender, + BS	  Skin: No rashes, No ecchymoses, No cyanosis	  Neurologic: Non-focal  Extremities: Normal range of motion, No clubbing, cyanosis or edema  Vascular: Peripheral pulses palpable 2+ bilaterally    MEDICATIONS  (STANDING):  albuterol/ipratropium for Nebulization 3 milliLiter(s) Nebulizer every 6 hours  atorvastatin 20 milliGRAM(s) Oral at bedtime  buDESOnide    Inhalation Suspension 0.25 milliGRAM(s) Inhalation two times a day  cefepime   IVPB 1000 milliGRAM(s) IV Intermittent every 12 hours  cefepime   IVPB      cyclopentolate 1% Solution 1 Drop(s) Both EYES two times a day  FLUoxetine 20 milliGRAM(s) Oral daily  furosemide    Tablet 20 milliGRAM(s) Oral daily  heparin   Injectable 5000 Unit(s) SubCutaneous every 12 hours  losartan 50 milliGRAM(s) Oral daily  metoprolol succinate ER 25 milliGRAM(s) Oral daily  mirtazapine 7.5 milliGRAM(s) Oral at bedtime  polyethylene glycol 3350 17 Gram(s) Oral daily  prednisoLONE acetate 1% Suspension 1 Drop(s) Both EYES two times a day  predniSONE   Tablet 40 milliGRAM(s) Oral daily  senna 2 Tablet(s) Oral at bedtime  sodium chloride 7% Inhalation 4 milliLiter(s) Inhalation every 12 hours  spironolactone 25 milliGRAM(s) Oral daily  temazepam 15 milliGRAM(s) Oral at bedtime      TELEMETRY: 	    ECG:  	  RADIOLOGY:  OTHER: 	  	  LABS:	 	    CARDIAC MARKERS:            05-17    143  |  103  |  37<H>  ----------------------------<  96  4.8   |  28  |  0.63    Ca    8.7      17 May 2024 07:02      proBNP:   Lipid Profile:   HgA1c:   TSH:         Assessment and plan  ---------------------------  88[facility] with known history of C. difficile, diverticulitis, asthma/COPD, sarcoidosis, hypothyroidism, hypertension, CHF, presenting with concern of respiratory failure this morning at her facility.  She arrives via EMS having received 10 of IV Decadron, 1 DuoNeb, 4 magnesium, placed on oxygen with improvement in her oxygen saturation, known baseline AO 1-2 now responding only to vigorous stimuli.  Pt BP initially 220/99 at the facility, T 98.9 F, 130 BPM, RR 26 and O2 saturation 98% on NRB.  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 , sarcoidosis  pulmonary with increasing sob  ct chest  noted with possible pneumonia/ chf diastolic acute on chronic  agree with abx  will consider possible pulmonary consult  review old echo  dvt prophylaxis  pulmonary eval sec to emphysema, add inhalers ?small dose of steroid  CT  chest pulmonary edema, unless the findings are pulmonary fibrosis  change lasix to 20 mg iv bid, follow up pro bnp  add aldactone 12.5 mg dfaily, add small dose of beta blocker  chest x ray today  out put negative 1200 cc today noted, continue diuresis ,   will switch to lasix 20  mg daily, with increase renal function  may increase losartan to 25 mg po bid to adjust bp med  dc aldactone

## 2024-05-17 NOTE — DISCHARGE NOTE PROVIDER - CARE PROVIDER_API CALL
[facility]  [address] [place] [address]  Phone: [phone]  Fax: [phone]  Follow Up Time: 1 week

## 2024-05-17 NOTE — PROGRESS NOTE ADULT - ASSESSMENT
88 year old well known to me  recently discharged with possible UTI and diverticulitis  many prior admissions discharged about 3 weeks ago    returns with weakness and sepsis syndrome and sob        hs of C diff years ago but not recently   has been living in [facility]   CHF  Diverticulitis ( hx)  Hx UTI recently  sarcoid     Now has impressive right sided pna; most likely aspiration .  Also with catheter and pyuria   no concern about Legionella    Discussed with daughter  Pt is cachetic and repeatedly in the hospital  she understands the prognosis is poor     cefepime 1 q 12 hr   urine with candida  but doubt  it is significant        
88 female w/ PMH pulmonary sarcoidosis, COPD not on home O2, CAD, hypothyroidism, and HTN presenting for sob, fevers, and respiratory distress.   CT concerning for recurrent aspiration     # acute hypoxemic respiratory failure  # COPD  # R aspiration pneumonia  # Sarcoidosis  # R elevated diaphragm   - likely 2/2 to aspiration, has epis    cefepime q12h   - duonebs q 6,  symbicort BID  - prednisone 40 mg po daily x 5 days, then decrease prednisone by 10 mg every 3 days (pred 30mg x 3 days, 20mg x 3 days, 10mg  x 3 days then dc)   - No signs of sarcoid on CT, per patient remote history.   - Chronically elevated right diaphgram. Stable  -  REcommend against BIPAP for now given concerns for aspiration.   - diuresis per primary team,   - Less likely to be PE, patient with mildly elevated d-dimer, age corrected is below cut off. Other reasons to be hypoxemic.    - wean O2 as tolerated.     On day prior to discharge please email use HOME token to arrange for outpatient follow up in our pulmonary clinic.     Please include our clinic information ([address], [facility] [address]. Phone ) In case they have any questions. 
88 female w/ PMH pulmonary sarcoidosis, COPD not on home O2, CAD, hypothyroidism, and HTN presenting for sob, fevers, and respiratory distress.   CT concerning for recurrent aspiration     # acute hypoxemic respiratory failure  # COPD  # R aspiration pneumonia  # Sarcoidosis  # R elevated diaphragm   - likely 2/2 to aspiration, has episodes at home  on liquids, fevers increased WBC  - Abx per ID - cefepime q12h   - duonebs q 6, add symbicort BID  - please decrease steroids to prednisone 40 mg daily. IF not tolerating po - can do methylprednisolone 4O mg IV once daily. High dose steroids started on MAy 10th, day 5 of steorids today.   - No signs of sarcoid on CT, per patient remote history.   - Chronically elevated right diaphgram. Stable  -  REcommend against BIPAP for now given concerns for aspiration.   - diuresis per primary team,   - Less likely to be PE, patient with mildly elevated d-dimer, age corrected is below cut off. Other reasons to be hypoxemic.    - wean O2 as tolerated. 
88 year old well known to me  recently discharged with possible UTI and diverticulitis  many prior admissions discharged about 3 weeks ago    returns with weakness and sepsis syndrome and sob        hs of C diff years ago but not recently   has been living in [facility]   CHF  Diverticulitis ( hx)  Hx UTI recently  sarcoid     Now has impressive right sided pna; most likely aspiration .  Also with catheter and pyuria   no concern about Legionella    Discussed with daughter  Pt is cachetic and repeatedly in the hospital  she understands the prognosis is poor     cefepime 1 q 12 hr   urine with candida  but doubt  it is significant   continue present therapy  day 6/8  would tapor prednisone if at all possible        
88 female w/ PMH pulmonary sarcoidosis, COPD not on home O2, CAD, hypothyroidism, and HTN presenting for sob, fevers, and respiratory distress.   CT concerning for recurrent aspiration     # acute hypoxemic respiratory failure  # COPD  # R aspiration pneumonia  # Sarcoidosis  # R elevated diaphragm   - likely 2/2 to aspiration, has epis    cefepime q12h   - duonebs q 6,  symbicort BID  - prednisone 40 mg po daily x 5 days, then decrease prednisone by 10 mg every 3 days (pred 30mg x 3 days, 20mg x 3 days, 10mg  x 3 days then dc)   - No signs of sarcoid on CT, per patient remote history.   - Chronically elevated right diaphgram. Stable  -  REcommend against BIPAP for now given concerns for aspiration.   - diuresis per primary team,   - Less likely to be PE, patient with mildly elevated d-dimer, age corrected is below cut off. Other reasons to be hypoxemic.    - wean O2 as tolerated. Goal SopO2 >90%    For patients being discharge home, please email use HOME token to arrange for outpatient follow up in our pulmonary clinic on day prior to discharge.  Initial visit will be a telehealth video call within 48 hours of discharge. If patient declines telehealth please call the number below to arrange for in person visit.     Please include our clinic information ([address], [facility] [address]. Phone ) In case they have any questions.     If patient is being discharged to [facility] please ask them to call our phone number to schedule an in person follow up. 
88 female w/ PMH pulmonary sarcoidosis, COPD not on home O2, CAD, hypothyroidism, and HTN presenting for sob, fevers, and respiratory distress.   CT concerning for recurrent aspiration     # acute hypoxemic respiratory failure  # COPD  # R aspiration pneumonia  # Sarcoidosis  # R elevated diaphragm   - likely 2/2 to aspiration, has episodes at home  on liquids, fevers increased WBC  - Abx per ID - cefepime q12h   - duonebs q 6, add symbicort BID  - please decrease steroids to prednisone 40 mg daily. IF not tolerating po - can do methylprednisolone 4O mg IV once daily. High dose steroids started on MAy 10th, day 5 of steorids today.   - No signs of sarcoid on CT, per patient remote history.   - Chronically elevated right diaphgram. Stable  -  REcommend against BIPAP for now given concerns for aspiration.   - diuresis per primary team,   - Less likely to be PE, patient with mildly elevated d-dimer, age corrected is below cut off. Other reasons to be hypoxemic.    - wean O2 as tolerated. 
88 year old well known to me  recently discharged with possible UTI and diverticulitis  many prior admissions discharged about 3 weeks ago    returns with weakness and sepsis syndrome and sob        hs of C diff years ago but not recently   has [facility]   CHF  Diverticulitis ( hx)  Hx UTI recently  sarcoid     Now has impressive right sided pna; most likely aspiration .  Also with catheter and pyuria   no concern about Legionella    Discussed with daughter  Pt is cachetic and repeatedly in the hospital  she understands the prognosis is poor     cefepime 1 q 12 hr   urine with candida  but doubt  it is significant   continue present therapy        
88 female w/ PMH pulmonary sarcoidosis, COPD not on home O2, CAD, hypothyroidism, and HTN presenting for sob, fevers, and respiratory distress.   CT concerning for recurrent aspiration     # acute hypoxemic respiratory failure  # COPD  # aspiration pneumonia  # Sarcoidosis  # R elevated diaphragm   - likely 2/2 to aspiration, has episodes at home  on liquids, fevers increased WBC  - Abx per ID  - duonebs q 6, add symbicort BID  - please decrease steroids to prednisone 40 mg daily. IF not tolerating po - can do methylprednisolone 4O mg IV once daily   - No signs of sarcoid on CT, per patient remote history.   - Chronically elevated right diaphgram. Stable  - Can hold off on BIPAP for now given concerns for aspiration.   - diuresis per primary team, concerning on CT scan. Also with severely dilated LA possible increased LAP.  - Less likely to be PE, patient with mildly elevated d-dimer, age corrected is below cut off. Other reasons to be hypoxemic.    - wean O2 as tolerated.

## 2024-05-17 NOTE — DISCHARGE NOTE NURSING/CASE MANAGEMENT/SOCIAL WORK - PATIENT PORTAL LINK FT
You can access the FollowMyHealth Patient Portal offered by [facility] by registering at the following website: [email or web address]. By joining [employer]’s FollowMyHealth portal, you will also be able to view your health information using other applications (apps) compatible with our system.

## 2024-05-17 NOTE — DISCHARGE NOTE PROVIDER - NSDCFUSCHEDAPPT_GEN_ALL_CORE_FT
Goldberg, Naomi  [facility]  [address]ALM [address]  Scheduled Appointment: 06/10/2024    Yusef Edwards  [facility]  [address]THALM [address]  Scheduled Appointment: 07/23/2024

## 2024-05-17 NOTE — PROGRESS NOTE ADULT - SUBJECTIVE AND OBJECTIVE BOX
---___---___---___---___---___---___ ---___---___---___---___---___---___---___---___---                  M E D I C A L   A T T E N D I N G   P R O G R E S S   N O T E  ---___---___---___---___---___---___ ---___---___---___---___---___---___---___---___---        ================================================    ++CHIEF COMPLAINT:   Patient is a 88y old  Female who presents with a chief complaint of sob (17 May 2024 08:24)      much improved today       ---___---___---___---___---___---  PAST MEDICAL / Surgical  HISTORY:  PAST MEDICAL & SURGICAL HISTORY:  HTN (hypertension)      Hypothyroidism      Osteoporosis      CAD (coronary artery disease)      COPD (chronic obstructive pulmonary disease)      Pulmonary sarcoidosis      H/O pyelonephritis      Acute diverticulitis          ---___---___---___---___---___---  FAMILY HISTORY:   FAMILY HISTORY:        ---___---___---___---___---___---  ALLERGIES:   Allergies    iodinated radiocontrast agents (Anaphylaxis)    Intolerances        ---___---___---___---___---___---  MEDICATIONS:  MEDICATIONS  (STANDING):  albuterol/ipratropium for Nebulization 3 milliLiter(s) Nebulizer every 6 hours  atorvastatin 20 milliGRAM(s) Oral at bedtime  buDESOnide    Inhalation Suspension 0.25 milliGRAM(s) Inhalation two times a day  cefepime   IVPB 1000 milliGRAM(s) IV Intermittent every 12 hours  cefepime   IVPB      cyclopentolate 1% Solution 1 Drop(s) Both EYES two times a day  FLUoxetine 20 milliGRAM(s) Oral daily  furosemide    Tablet 20 milliGRAM(s) Oral daily  heparin   Injectable 5000 Unit(s) SubCutaneous every 12 hours  losartan 50 milliGRAM(s) Oral daily  metoprolol succinate ER 25 milliGRAM(s) Oral daily  mirtazapine 7.5 milliGRAM(s) Oral at bedtime  polyethylene glycol 3350 17 Gram(s) Oral daily  prednisoLONE acetate 1% Suspension 1 Drop(s) Both EYES two times a day  predniSONE   Tablet 40 milliGRAM(s) Oral daily  senna 2 Tablet(s) Oral at bedtime  sodium chloride 7% Inhalation 4 milliLiter(s) Inhalation every 12 hours  temazepam 15 milliGRAM(s) Oral at bedtime    MEDICATIONS  (PRN):  acetaminophen     Tablet .. 650 milliGRAM(s) Oral every 6 hours PRN Temp greater or equal to 38C (100.4F), Mild Pain (1 - 3)  bisacodyl 5 milliGRAM(s) Oral every 12 hours PRN Constipation      ---___---___---___---___---___---  REVIEW OF SYSTEM:    GEN: no fever, no chills, no pain  RESP: no SOB, no cough, no sputum  CVS: no chest pain, no palpitations, no edema  GI: no abdominal pain, no nausea, no vomiting, no constipation, no diarrhea  : no dysurea, no frequency, no hematurea  Neuro: no headache, no dizziness  PSYCH: no anxiety, no depression  Derm : no itching, no rash    ---___---___---___---___---___---  VITAL SIGNS:  88y , CAPILLARY BLOOD GLUCOSE      POCT Blood Glucose.: 84 mg/dL (16 May 2024 21:38)    T(C): 36.5 (24 @ 08:32), Max: 36.5 (24 @ 08:32)  HR: 77 (24 @ 08:32) (67 - 77)  BP: 121/79 (24 @ 08:32) (112/58 - 147/84)  RR: 18 (24 @ 08:32) (16 - 18)  SpO2: 97% (24 @ 08:32) (94% - 97%)  ---___---___---___---___---___---  PHYSICAL EXAM:    GEN: A&O X 3 , NAD , comfortable  HEENT: NCAT, PERRL, MMM, hearing intact  Neck: supple , no JVD  CVS: S1S2 , regular , No M/R/G appreciated  PULM: CTA B/L,  no W/R/R appreciated  ABD.: soft. non tender, non distended,  bowel sounds present  Extrem: intact pulses , no edema   Derm: No rash , no ecchymoses  PSYCH : normal mood,  no delusion not anxious     ---___---___---___---___---___---            LAB AND IMAGIN-17    143  |  103  |  37<H>  ----------------------------<  96  4.8   |  28  |  0.63    Ca    8.7      17 May 2024 07:02                              Urinalysis Basic - ( 17 May 2024 07:02 )    Color: x / Appearance: x / SG: x / pH: x  Gluc: 96 mg/dL / Ketone: x  / Bili: x / Urobili: x   Blood: x / Protein: x / Nitrite: x   Leuk Esterase: x / RBC: x / WBC x   Sq Epi: x / Non Sq Epi: x / Bacteria: x        [All pertinent / recent Imaging reviewed]         ---___---___---___---___---___---___ ---___---___---___---___---                         A S S E S S M E N T   A N D   P L A N :      HEALTH ISSUES - PROBLEM Dx:  pulmonary sarcoidosis  finished course of steroids and finis abx on 1l iter 02   hypoxia on  1 liter o2 now and to leave to rehab with o2   volume overload continue lasix   anxiety  continue meds  GI/DVT Prophylaxis.  as cristian  dc to rehab   --------------------------------------------   spoke to daughter regarding patient care  ___________________________  Thank you,  Garry Hermosillo  [phone]

## 2024-05-17 NOTE — DISCHARGE NOTE PROVIDER - NSDCCPCAREPLAN_GEN_ALL_CORE_FT
PRINCIPAL DISCHARGE DIAGNOSIS  Diagnosis: Acute hypoxic respiratory failure  Assessment and Plan of Treatment: CT chest noted with complete consolidation/atelectasis of right lower lobe and right middle lobe, increased from prior exam - can represent pneumonia. Interlobular septal thickening suggestive of fluid overload. Trace pleural effusions. Emphysema.   You completed a course of antibiotics; continue steriods as prescribed  Speech and swallow evaluation recommends soft and bite sized diet

## 2024-05-17 NOTE — DISCHARGE NOTE PROVIDER - NSDCMRMEDTOKEN_GEN_ALL_CORE_FT
acetaminophen 325 mg oral tablet: 2 tab(s) orally every 6 hours As needed Temp greater or equal to 38C (100.4F), Mild Pain (1 - 3)  albuterol 90 mcg/inh inhalation aerosol: 2 puff(s) inhaled every 6 hours as needed for wheeze  atorvastatin 20 mg oral tablet: 1 tab(s) orally once a day  bisacodyl 5 mg oral delayed release tablet: 1 tab(s) orally every 12 hours As needed Constipation  budesonide 0.25 mg/2 mL inhalation suspension: 0.25 milligram(s) by nebulizer 2 times a day  cyclopentolate 1% ophthalmic solution: 1 drop(s) in the left eye 2 times a day  D3 25 mcg (1000 intl units) oral tablet: 1 tab(s) orally once a day  ferrous sulfate 325 mg (65 mg elemental iron) oral tablet: 1 tab(s) orally once a day  FLUoxetine 20 mg oral capsule: 1 cap(s) orally once a day  furosemide 20 mg oral tablet: 1 tab(s) orally once a day  ipratropium-albuterol 0.5 mg-2.5 mg/3 mL inhalation solution: 3 milliliter(s) inhaled every 6 hours  losartan 50 mg oral tablet: 1 tab(s) orally once a day  metoprolol succinate 25 mg oral tablet, extended release: 1 tab(s) orally once a day  mirtazapine 7.5 mg oral tablet: 1 tab(s) orally once a day (at bedtime)  polyethylene glycol 3350 oral powder for reconstitution: 17 gram(s) orally once a day  prednisoLONE acetate 1% ophthalmic suspension: 1 drop(s) in the left eye 2 times a day  prednisoLONE acetate 1% ophthalmic suspension: 1 drop(s) to each affected eye 2 times a day  senna leaf extract oral tablet: 2 tab(s) orally once a day (at bedtime)  temazepam 15 mg oral capsule: 1 cap(s) orally once a day (at bedtime)  temazepam 30 mg oral capsule: 1 cap(s) orally once a day (at bedtime)  traZODone 50 mg oral tablet: orally once a day   acetaminophen 325 mg oral tablet: 2 tab(s) orally every 6 hours As needed Temp greater or equal to 38C (100.4F), Mild Pain (1 - 3)  atorvastatin 20 mg oral tablet: 1 tab(s) orally once a day  bisacodyl 5 mg oral delayed release tablet: 1 tab(s) orally every 12 hours As needed Constipation  budesonide 0.25 mg/2 mL inhalation suspension: 0.25 milligram(s) by nebulizer 2 times a day  cyclopentolate 1% ophthalmic solution: 1 drop(s) in the left eye 2 times a day  D3 25 mcg (1000 intl units) oral tablet: 1 tab(s) orally once a day  ferrous sulfate 325 mg (65 mg elemental iron) oral tablet: 1 tab(s) orally once a day  FLUoxetine 20 mg oral capsule: 1 cap(s) orally once a day  furosemide 20 mg oral tablet: 1 tab(s) orally once a day  ipratropium-albuterol 0.5 mg-2.5 mg/3 mL inhalation solution: 3 milliliter(s) inhaled every 6 hours  losartan 50 mg oral tablet: 1 tab(s) orally once a day  metoprolol succinate 25 mg oral tablet, extended release: 1 tab(s) orally once a day  mirtazapine 7.5 mg oral tablet: 1 tab(s) orally once a day (at bedtime)  polyethylene glycol 3350 oral powder for reconstitution: 17 gram(s) orally once a day  prednisoLONE acetate 1% ophthalmic suspension: 1 drop(s) to each affected eye 2 times a day  predniSONE 10 mg oral tablet: 4 tab(s) orally once a day start 5/18x 2 days, then 30 mg x 3 days, 20 mg x 3 days, then 20 mg x 3 days-then stop  senna leaf extract oral tablet: 2 tab(s) orally once a day (at bedtime)  temazepam 15 mg oral capsule: 1 cap(s) orally once a day (at bedtime)

## 2024-05-17 NOTE — PROGRESS NOTE ADULT - PROVIDER SPECIALTY LIST ADULT
Cardiology
Cardiology
Family Medicine
Family Medicine
Infectious Disease
Pulmonology
Cardiology
Cardiology
Family Medicine
Infectious Disease
Cardiology
Family Medicine
Family Medicine
Infectious Disease
Infectious Disease
Pulmonology

## 2024-05-17 NOTE — DISCHARGE NOTE NURSING/CASE MANAGEMENT/SOCIAL WORK - NSDCPEFALRISK_GEN_ALL_CORE
For information on Fall & Injury Prevention, visit: [email or web address] OR  [email or web address] OR  https://www.cdc.gov/steadi/patient.html

## 2024-05-17 NOTE — DISCHARGE NOTE NURSING/CASE MANAGEMENT/SOCIAL WORK - NSDCFUADDAPPT_GEN_ALL_CORE_FT
APPTS ARE READY TO BE MADE: [x] YES    Best Family or Patient Contact (if needed):    Additional Information about above appointments (if needed):    1:  2:   3:     Other comments or requests:

## 2024-05-17 NOTE — DISCHARGE NOTE PROVIDER - HOSPITAL COURSE
HPI:   88-year-old female    presenting from [facility] rehab center /  sob       h/o  C. difficile, diverticulitis, asthma/COPD, sarcoidosis, hypothyroidism, hypertension, CHF    presenting with concern of respiratory failure this morning at her facility/  sob .     arrive d  form  having received 10 of IV Decadron, 1 DuoNeb, 4 magnesium,       placed on oxygen with improvement in her oxygen saturation, known baseline AO -2       bp     was  220/99 at the facility, T 98.9 F, 130 BPM,   rejected  by icu. pe r primary dr    pt  seen  in  er/  awake  and  alert. states she s unsure, why she  was  brought  to  er  (10 May 2024 10:13)    Hospital Course: Patient was admitted for further medical management. CT chest noted with complete consolidation/atelectasis of right lower lobe and right middle lobe, increased from prior exam - can represent pneumonia. Interlobular septal thickening suggestive of fluid overload. Trace pleural effusions. Emphysema. Patient initiated on cefepime q12h, duonebs q6, symbicort BID and recommended to transition off of IV steroids to PO steroid taper as follows: prednisone 40 mg po daily x 5 days, then decrease prednisone by 10 mg every 3 days (pred 30mg x 3 days, 20mg x 3 days, 10mg  x 3 days then dc). No signs of sarcoid on CT, per patient remote history. Speech and swallow evaluated patient recommending soft and bite sized diet     Discharge/Dispo/Med rec discussed with attending . ____. Patient medically cleared for discharge ____ with outpatient follow up     Important Medication Changes and Reason:    Active or Pending Issues Requiring Follow-up:  Follow-up with primary care physician within 1 week.       Advanced Directives:   [ ] Full code  [ ] DNR  [ ] Hospice    Discharge Diagnoses:         HPI:   88-year-old female    presenting from [facility] rehab center /  sob       h/o  C. difficile, diverticulitis, asthma/COPD, sarcoidosis, hypothyroidism, hypertension, CHF    presenting with concern of respiratory failure this morning at her facility/  sob .     arrive d  form  having received 10 of IV Decadron, 1 DuoNeb, 4 magnesium,       placed on oxygen with improvement in her oxygen saturation, known baseline AO -2       bp     was  220/99 at the facility, T 98.9 F, 130 BPM,   rejected  by icu. pe r primary dr    pt  seen  in  er/  awake  and  alert. states she s unsure, why she  was  brought  to  er  (10 May 2024 10:13)    Hospital Course: Patient was admitted for further medical management. CT chest noted with complete consolidation/atelectasis of right lower lobe and right middle lobe, increased from prior exam - can represent pneumonia. Interlobular septal thickening suggestive of fluid overload. Trace pleural effusions. Emphysema. Patient initiated on cefepime q12h, duonebs q6, symbicort BID and recommended to transition off of IV steroids to PO steroid taper as follows: prednisone 40 mg po daily x 5 days, then decrease prednisone by 10 mg every 3 days (pred 30mg x 3 days, 20mg x 3 days, 10mg  x 3 days then dc). No signs of sarcoid on CT, per patient remote history. Speech and swallow evaluated patient recommending soft and bite sized diet     Discharge/Dispo/Med rec discussed with attending Dr. Hermosillo. Patient medically cleared for discharge to rehab.     Important Medication Changes and Reason:  DC trazadone 2/2 AMS  Active or Pending Issues Requiring Follow-up:    Advanced Directives:   [x ] Full code  [ ] DNR  [ ] Hospice    Discharge Diagnoses:  PNA  COPD exacerbation

## 2024-05-30 NOTE — DISCHARGE NOTE PROVIDER - NS AS DC PROVIDER CONTACT Y/N MULTI
Meredith Wilson, DO:  patient presenting with right-sided throat pain status post operative procedure earlier today, patient unable to tolerate any secretions.  patient states "they look with a camera, unclear with a saw."  On exam, patient with swollen uvula, spitting into bag, swollen right side of neck, plan to moved to main ED for evaluation by another provider. Yes

## 2024-06-10 ENCOUNTER — NON-APPOINTMENT (OUTPATIENT)
Age: 89
End: 2024-06-10

## 2024-06-10 ENCOUNTER — APPOINTMENT (OUTPATIENT)
Dept: OPHTHALMOLOGY | Facility: CLINIC | Age: 89
End: 2024-06-10
Payer: MEDICARE

## 2024-06-10 PROCEDURE — 92012 INTRM OPH EXAM EST PATIENT: CPT

## 2024-06-18 ENCOUNTER — INPATIENT (INPATIENT)
Facility: HOSPITAL | Age: 89
LOS: 8 days | Discharge: HOME HEALTH | DRG: 603 | End: 2024-06-27
Attending: FAMILY MEDICINE | Admitting: FAMILY MEDICINE
Payer: MEDICARE

## 2024-06-18 VITALS
OXYGEN SATURATION: 92 % | SYSTOLIC BLOOD PRESSURE: 137 MMHG | DIASTOLIC BLOOD PRESSURE: 70 MMHG | WEIGHT: 119.93 LBS | HEART RATE: 109 BPM | TEMPERATURE: 98 F | HEIGHT: 62 IN | RESPIRATION RATE: 24 BRPM

## 2024-06-18 DIAGNOSIS — L03.90 CELLULITIS, UNSPECIFIED: ICD-10-CM

## 2024-06-18 LAB
ADD ON TEST-SPECIMEN IN LAB: SIGNIFICANT CHANGE UP
ADD ON TEST-SPECIMEN IN LAB: SIGNIFICANT CHANGE UP
ALBUMIN SERPL ELPH-MCNC: 3.6 G/DL — SIGNIFICANT CHANGE UP (ref 3.3–5)
ALP SERPL-CCNC: 126 U/L — HIGH (ref 40–120)
ALT FLD-CCNC: 17 U/L — SIGNIFICANT CHANGE UP (ref 10–45)
ANION GAP SERPL CALC-SCNC: 18 MMOL/L — HIGH (ref 5–17)
APTT BLD: 30.1 SEC — SIGNIFICANT CHANGE UP (ref 24.5–35.6)
AST SERPL-CCNC: 28 U/L — SIGNIFICANT CHANGE UP (ref 10–40)
BASE EXCESS BLDV CALC-SCNC: -0.7 MMOL/L — SIGNIFICANT CHANGE UP (ref -2–3)
BASE EXCESS BLDV CALC-SCNC: -1.2 MMOL/L — SIGNIFICANT CHANGE UP (ref -2–3)
BASOPHILS # BLD AUTO: 0.05 K/UL — SIGNIFICANT CHANGE UP (ref 0–0.2)
BASOPHILS NFR BLD AUTO: 0.3 % — SIGNIFICANT CHANGE UP (ref 0–2)
BILIRUB SERPL-MCNC: 0.4 MG/DL — SIGNIFICANT CHANGE UP (ref 0.2–1.2)
BUN SERPL-MCNC: 22 MG/DL — SIGNIFICANT CHANGE UP (ref 7–23)
CA-I SERPL-SCNC: 1.17 MMOL/L — SIGNIFICANT CHANGE UP (ref 1.15–1.33)
CA-I SERPL-SCNC: 1.21 MMOL/L — SIGNIFICANT CHANGE UP (ref 1.15–1.33)
CALCIUM SERPL-MCNC: 8.9 MG/DL — SIGNIFICANT CHANGE UP (ref 8.4–10.5)
CHLORIDE BLDV-SCNC: 103 MMOL/L — SIGNIFICANT CHANGE UP (ref 96–108)
CHLORIDE BLDV-SCNC: 105 MMOL/L — SIGNIFICANT CHANGE UP (ref 96–108)
CHLORIDE SERPL-SCNC: 103 MMOL/L — SIGNIFICANT CHANGE UP (ref 96–108)
CO2 BLDV-SCNC: 26 MMOL/L — SIGNIFICANT CHANGE UP (ref 22–26)
CO2 BLDV-SCNC: 26 MMOL/L — SIGNIFICANT CHANGE UP (ref 22–26)
CO2 SERPL-SCNC: 21 MMOL/L — LOW (ref 22–31)
CREAT SERPL-MCNC: 1.04 MG/DL — SIGNIFICANT CHANGE UP (ref 0.5–1.3)
EGFR: 52 ML/MIN/1.73M2 — LOW
EOSINOPHIL # BLD AUTO: 0.02 K/UL — SIGNIFICANT CHANGE UP (ref 0–0.5)
EOSINOPHIL NFR BLD AUTO: 0.1 % — SIGNIFICANT CHANGE UP (ref 0–6)
FLUAV AG NPH QL: SIGNIFICANT CHANGE UP
FLUBV AG NPH QL: SIGNIFICANT CHANGE UP
GAS PNL BLDV: 133 MMOL/L — LOW (ref 136–145)
GAS PNL BLDV: 138 MMOL/L — SIGNIFICANT CHANGE UP (ref 136–145)
GAS PNL BLDV: SIGNIFICANT CHANGE UP
GLUCOSE BLDV-MCNC: 159 MG/DL — HIGH (ref 70–99)
GLUCOSE BLDV-MCNC: 314 MG/DL — HIGH (ref 70–99)
GLUCOSE SERPL-MCNC: 173 MG/DL — HIGH (ref 70–99)
HCO3 BLDV-SCNC: 24 MMOL/L — SIGNIFICANT CHANGE UP (ref 22–29)
HCO3 BLDV-SCNC: 25 MMOL/L — SIGNIFICANT CHANGE UP (ref 22–29)
HCT VFR BLD CALC: 32.4 % — LOW (ref 34.5–45)
HCT VFR BLDA CALC: 29 % — LOW (ref 34.5–46.5)
HCT VFR BLDA CALC: 30 % — LOW (ref 34.5–46.5)
HGB BLD CALC-MCNC: 10.7 G/DL — LOW (ref 11.7–16.1)
HGB BLD CALC-MCNC: 9.5 G/DL — LOW (ref 11.7–16.1)
HGB BLD-MCNC: 10.2 G/DL — LOW (ref 11.5–15.5)
IMM GRANULOCYTES NFR BLD AUTO: 0.6 % — SIGNIFICANT CHANGE UP (ref 0–0.9)
INR BLD: 1.25 RATIO — HIGH (ref 0.85–1.18)
LACTATE BLDV-MCNC: 1.1 MMOL/L — SIGNIFICANT CHANGE UP (ref 0.5–2)
LACTATE BLDV-MCNC: 3.3 MMOL/L — HIGH (ref 0.5–2)
LYMPHOCYTES # BLD AUTO: 0.78 K/UL — LOW (ref 1–3.3)
LYMPHOCYTES # BLD AUTO: 4 % — LOW (ref 13–44)
MCHC RBC-ENTMCNC: 28.5 PG — SIGNIFICANT CHANGE UP (ref 27–34)
MCHC RBC-ENTMCNC: 31.5 GM/DL — LOW (ref 32–36)
MCV RBC AUTO: 90.5 FL — SIGNIFICANT CHANGE UP (ref 80–100)
MONOCYTES # BLD AUTO: 0.78 K/UL — SIGNIFICANT CHANGE UP (ref 0–0.9)
MONOCYTES NFR BLD AUTO: 4 % — SIGNIFICANT CHANGE UP (ref 2–14)
NEUTROPHILS # BLD AUTO: 17.66 K/UL — HIGH (ref 1.8–7.4)
NEUTROPHILS NFR BLD AUTO: 91 % — HIGH (ref 43–77)
NRBC # BLD: 0 /100 WBCS — SIGNIFICANT CHANGE UP (ref 0–0)
OTHER CELLS CSF MANUAL: 9.9 ML/DL — LOW (ref 18–22)
PCO2 BLDV: 43 MMHG — HIGH (ref 39–42)
PCO2 BLDV: 44 MMHG — HIGH (ref 39–42)
PH BLDV: 7.36 — SIGNIFICANT CHANGE UP (ref 7.32–7.43)
PH BLDV: 7.36 — SIGNIFICANT CHANGE UP (ref 7.32–7.43)
PLATELET # BLD AUTO: 179 K/UL — SIGNIFICANT CHANGE UP (ref 150–400)
PO2 BLDV: 42 MMHG — SIGNIFICANT CHANGE UP (ref 25–45)
PO2 BLDV: 83 MMHG — HIGH (ref 25–45)
POTASSIUM BLDV-SCNC: 3.4 MMOL/L — LOW (ref 3.5–5.1)
POTASSIUM BLDV-SCNC: 4 MMOL/L — SIGNIFICANT CHANGE UP (ref 3.5–5.1)
POTASSIUM SERPL-MCNC: 3.9 MMOL/L — SIGNIFICANT CHANGE UP (ref 3.5–5.3)
POTASSIUM SERPL-SCNC: 3.9 MMOL/L — SIGNIFICANT CHANGE UP (ref 3.5–5.3)
PROT SERPL-MCNC: 6.6 G/DL — SIGNIFICANT CHANGE UP (ref 6–8.3)
PROTHROM AB SERPL-ACNC: 13 SEC — SIGNIFICANT CHANGE UP (ref 9.5–13)
RBC # BLD: 3.58 M/UL — LOW (ref 3.8–5.2)
RBC # FLD: 16.8 % — HIGH (ref 10.3–14.5)
RSV RNA NPH QL NAA+NON-PROBE: SIGNIFICANT CHANGE UP
SAO2 % BLDV: 66.8 % — LOW (ref 67–88)
SAO2 % BLDV: 95.4 % — HIGH (ref 67–88)
SARS-COV-2 RNA SPEC QL NAA+PROBE: SIGNIFICANT CHANGE UP
SODIUM SERPL-SCNC: 142 MMOL/L — SIGNIFICANT CHANGE UP (ref 135–145)
TROPONIN T, HIGH SENSITIVITY RESULT: 40 NG/L — SIGNIFICANT CHANGE UP (ref 0–51)
WBC # BLD: 19.41 K/UL — HIGH (ref 3.8–10.5)
WBC # FLD AUTO: 19.41 K/UL — HIGH (ref 3.8–10.5)

## 2024-06-18 PROCEDURE — 73590 X-RAY EXAM OF LOWER LEG: CPT | Mod: 26,RT

## 2024-06-18 PROCEDURE — 73630 X-RAY EXAM OF FOOT: CPT | Mod: 26,RT

## 2024-06-18 PROCEDURE — 99291 CRITICAL CARE FIRST HOUR: CPT | Mod: GC

## 2024-06-18 PROCEDURE — 71045 X-RAY EXAM CHEST 1 VIEW: CPT | Mod: 26

## 2024-06-18 PROCEDURE — 99284 EMERGENCY DEPT VISIT MOD MDM: CPT | Mod: GC

## 2024-06-18 RX ORDER — ACETAMINOPHEN 325 MG
650 TABLET ORAL EVERY 6 HOURS
Refills: 0 | Status: DISCONTINUED | OUTPATIENT
Start: 2024-06-18 | End: 2024-06-27

## 2024-06-18 RX ORDER — BUDESONIDE 0.25 MG/2ML
0.25 INHALANT ORAL
Refills: 0 | Status: DISCONTINUED | OUTPATIENT
Start: 2024-06-18 | End: 2024-06-27

## 2024-06-18 RX ORDER — IPRATROPIUM BROMIDE AND ALBUTEROL SULFATE .5; 3 MG/3ML; MG/3ML
3 SOLUTION RESPIRATORY (INHALATION) ONCE
Refills: 0 | Status: COMPLETED | OUTPATIENT
Start: 2024-06-18 | End: 2024-06-18

## 2024-06-18 RX ORDER — FUROSEMIDE 10 MG/ML
20 INJECTION, SOLUTION INTRAMUSCULAR; INTRAVENOUS DAILY
Refills: 0 | Status: DISCONTINUED | OUTPATIENT
Start: 2024-06-18 | End: 2024-06-27

## 2024-06-18 RX ORDER — METOPROLOL TARTRATE 50 MG
25 TABLET ORAL DAILY
Refills: 0 | Status: DISCONTINUED | OUTPATIENT
Start: 2024-06-18 | End: 2024-06-22

## 2024-06-18 RX ORDER — POLYETHYLENE GLYCOL 3350 1 G/G
17 POWDER ORAL DAILY
Refills: 0 | Status: DISCONTINUED | OUTPATIENT
Start: 2024-06-18 | End: 2024-06-27

## 2024-06-18 RX ORDER — FLUOXETINE HCL 40 MG
20 CAPSULE ORAL DAILY
Refills: 0 | Status: DISCONTINUED | OUTPATIENT
Start: 2024-06-18 | End: 2024-06-27

## 2024-06-18 RX ORDER — CEFTRIAXONE SODIUM 500 MG
1000 VIAL (EA) INJECTION EVERY 24 HOURS
Refills: 0 | Status: DISCONTINUED | OUTPATIENT
Start: 2024-06-18 | End: 2024-06-22

## 2024-06-18 RX ORDER — VANCOMYCIN HYDROCHLORIDE 50 MG/ML
1000 KIT ORAL ONCE
Refills: 0 | Status: COMPLETED | OUTPATIENT
Start: 2024-06-18 | End: 2024-06-18

## 2024-06-18 RX ORDER — FERROUS SULFATE 325(65) MG
325 TABLET ORAL DAILY
Refills: 0 | Status: DISCONTINUED | OUTPATIENT
Start: 2024-06-18 | End: 2024-06-27

## 2024-06-18 RX ORDER — LORAZEPAM 0.5 MG
0.5 TABLET ORAL ONCE
Refills: 0 | Status: DISCONTINUED | OUTPATIENT
Start: 2024-06-18 | End: 2024-06-18

## 2024-06-18 RX ORDER — PIPERACILLIN SODIUM AND TAZOBACTAM SODIUM 3; .375 G/15ML; G/15ML
3.38 INJECTION, POWDER, LYOPHILIZED, FOR SOLUTION INTRAVENOUS ONCE
Refills: 0 | Status: COMPLETED | OUTPATIENT
Start: 2024-06-18 | End: 2024-06-18

## 2024-06-18 RX ORDER — METHYLPREDNISOLONE ACETATE 20 MG/ML
125 VIAL (ML) INJECTION ONCE
Refills: 0 | Status: COMPLETED | OUTPATIENT
Start: 2024-06-18 | End: 2024-06-18

## 2024-06-18 RX ORDER — ATORVASTATIN CALCIUM 20 MG/1
20 TABLET, FILM COATED ORAL AT BEDTIME
Refills: 0 | Status: DISCONTINUED | OUTPATIENT
Start: 2024-06-18 | End: 2024-06-27

## 2024-06-18 RX ORDER — IPRATROPIUM BROMIDE AND ALBUTEROL SULFATE .5; 3 MG/3ML; MG/3ML
3 SOLUTION RESPIRATORY (INHALATION) EVERY 6 HOURS
Refills: 0 | Status: DISCONTINUED | OUTPATIENT
Start: 2024-06-18 | End: 2024-06-27

## 2024-06-18 RX ORDER — HEPARIN SODIUM 50 [USP'U]/ML
5000 INJECTION, SOLUTION INTRAVENOUS EVERY 12 HOURS
Refills: 0 | Status: DISCONTINUED | OUTPATIENT
Start: 2024-06-18 | End: 2024-06-27

## 2024-06-18 RX ORDER — PANTOPRAZOLE SODIUM 40 MG/10ML
40 INJECTION, POWDER, FOR SOLUTION INTRAVENOUS
Refills: 0 | Status: DISCONTINUED | OUTPATIENT
Start: 2024-06-18 | End: 2024-06-27

## 2024-06-18 RX ORDER — METHYLPREDNISOLONE ACETATE 20 MG/ML
20 VIAL (ML) INJECTION THREE TIMES A DAY
Refills: 0 | Status: DISCONTINUED | OUTPATIENT
Start: 2024-06-18 | End: 2024-06-20

## 2024-06-18 RX ORDER — LOSARTAN POTASSIUM 100 MG/1
50 TABLET, FILM COATED ORAL DAILY
Refills: 0 | Status: DISCONTINUED | OUTPATIENT
Start: 2024-06-18 | End: 2024-06-27

## 2024-06-18 RX ORDER — MAGNESIUM SULFATE 100 %
2 POWDER (GRAM) MISCELLANEOUS ONCE
Refills: 0 | Status: COMPLETED | OUTPATIENT
Start: 2024-06-18 | End: 2024-06-18

## 2024-06-18 RX ORDER — IPRATROPIUM BROMIDE AND ALBUTEROL SULFATE .5; 3 MG/3ML; MG/3ML
3 SOLUTION RESPIRATORY (INHALATION) EVERY 6 HOURS
Refills: 0 | Status: DISCONTINUED | OUTPATIENT
Start: 2024-06-18 | End: 2024-06-21

## 2024-06-18 RX ORDER — ACETAMINOPHEN 325 MG
1000 TABLET ORAL ONCE
Refills: 0 | Status: COMPLETED | OUTPATIENT
Start: 2024-06-18 | End: 2024-06-18

## 2024-06-18 RX ADMIN — Medication 400 MILLIGRAM(S): at 18:26

## 2024-06-18 RX ADMIN — PIPERACILLIN SODIUM AND TAZOBACTAM SODIUM 200 GRAM(S): 3; .375 INJECTION, POWDER, LYOPHILIZED, FOR SOLUTION INTRAVENOUS at 18:26

## 2024-06-18 RX ADMIN — Medication 125 MILLIGRAM(S): at 17:31

## 2024-06-18 RX ADMIN — Medication 150 GRAM(S): at 18:55

## 2024-06-18 RX ADMIN — Medication 1000 MILLIGRAM(S): at 18:56

## 2024-06-18 RX ADMIN — IPRATROPIUM BROMIDE AND ALBUTEROL SULFATE 3 MILLILITER(S): .5; 3 SOLUTION RESPIRATORY (INHALATION) at 17:31

## 2024-06-18 RX ADMIN — VANCOMYCIN HYDROCHLORIDE 250 MILLIGRAM(S): KIT at 19:30

## 2024-06-18 RX ADMIN — Medication 0.5 MILLIGRAM(S): at 19:29

## 2024-06-18 NOTE — ED ADULT NURSE NOTE - CHPI ED NUR SYMPTOMS NEG
no body aches/no chest pain/no chills/no cough/no diaphoresis/no edema [Follow-Up] : a follow-up evaluation of

## 2024-06-19 RX ORDER — CYCLOPENTOLATE HYDROCHLORIDE 10 MG/ML
1 SOLUTION/ DROPS OPHTHALMIC
Refills: 0 | DISCHARGE

## 2024-06-19 RX ADMIN — Medication 100 MILLIGRAM(S): at 01:40

## 2024-06-19 RX ADMIN — Medication 25 MILLIGRAM(S): at 05:48

## 2024-06-19 RX ADMIN — PANTOPRAZOLE SODIUM 40 MILLIGRAM(S): 40 INJECTION, POWDER, FOR SOLUTION INTRAVENOUS at 06:00

## 2024-06-19 RX ADMIN — POLYETHYLENE GLYCOL 3350 17 GRAM(S): 1 POWDER ORAL at 13:58

## 2024-06-19 RX ADMIN — Medication 325 MILLIGRAM(S): at 13:56

## 2024-06-19 RX ADMIN — LOSARTAN POTASSIUM 50 MILLIGRAM(S): 100 TABLET, FILM COATED ORAL at 05:47

## 2024-06-19 RX ADMIN — Medication 650 MILLIGRAM(S): at 18:46

## 2024-06-19 RX ADMIN — IPRATROPIUM BROMIDE AND ALBUTEROL SULFATE 3 MILLILITER(S): .5; 3 SOLUTION RESPIRATORY (INHALATION) at 18:16

## 2024-06-19 RX ADMIN — ATORVASTATIN CALCIUM 20 MILLIGRAM(S): 20 TABLET, FILM COATED ORAL at 21:23

## 2024-06-19 RX ADMIN — Medication 3 MILLIGRAM(S): at 23:12

## 2024-06-19 RX ADMIN — Medication 650 MILLIGRAM(S): at 06:18

## 2024-06-19 RX ADMIN — Medication 650 MILLIGRAM(S): at 05:48

## 2024-06-19 RX ADMIN — BUDESONIDE 0.25 MILLIGRAM(S): 0.25 INHALANT ORAL at 05:53

## 2024-06-19 RX ADMIN — IPRATROPIUM BROMIDE AND ALBUTEROL SULFATE 3 MILLILITER(S): .5; 3 SOLUTION RESPIRATORY (INHALATION) at 23:12

## 2024-06-19 RX ADMIN — IPRATROPIUM BROMIDE AND ALBUTEROL SULFATE 3 MILLILITER(S): .5; 3 SOLUTION RESPIRATORY (INHALATION) at 18:17

## 2024-06-19 RX ADMIN — Medication 650 MILLIGRAM(S): at 18:16

## 2024-06-19 RX ADMIN — IPRATROPIUM BROMIDE AND ALBUTEROL SULFATE 3 MILLILITER(S): .5; 3 SOLUTION RESPIRATORY (INHALATION) at 05:53

## 2024-06-19 RX ADMIN — IPRATROPIUM BROMIDE AND ALBUTEROL SULFATE 3 MILLILITER(S): .5; 3 SOLUTION RESPIRATORY (INHALATION) at 23:09

## 2024-06-19 RX ADMIN — HEPARIN SODIUM 5000 UNIT(S): 50 INJECTION, SOLUTION INTRAVENOUS at 05:52

## 2024-06-19 RX ADMIN — Medication 20 MILLIGRAM(S): at 21:24

## 2024-06-19 RX ADMIN — IPRATROPIUM BROMIDE AND ALBUTEROL SULFATE 3 MILLILITER(S): .5; 3 SOLUTION RESPIRATORY (INHALATION) at 13:58

## 2024-06-19 RX ADMIN — Medication 20 MILLIGRAM(S): at 01:41

## 2024-06-19 RX ADMIN — Medication 20 MILLIGRAM(S): at 14:02

## 2024-06-19 RX ADMIN — BUDESONIDE 0.25 MILLIGRAM(S): 0.25 INHALANT ORAL at 18:17

## 2024-06-19 RX ADMIN — Medication 20 MILLIGRAM(S): at 05:51

## 2024-06-19 RX ADMIN — FUROSEMIDE 20 MILLIGRAM(S): 10 INJECTION, SOLUTION INTRAMUSCULAR; INTRAVENOUS at 05:48

## 2024-06-19 RX ADMIN — Medication 20 MILLIGRAM(S): at 13:59

## 2024-06-19 RX ADMIN — Medication 650 MILLIGRAM(S): at 23:12

## 2024-06-19 RX ADMIN — HEPARIN SODIUM 5000 UNIT(S): 50 INJECTION, SOLUTION INTRAVENOUS at 18:17

## 2024-06-19 RX ADMIN — Medication 650 MILLIGRAM(S): at 13:57

## 2024-06-20 LAB
ANION GAP SERPL CALC-SCNC: 13 MMOL/L — SIGNIFICANT CHANGE UP (ref 5–17)
BASOPHILS # BLD AUTO: 0.01 K/UL — SIGNIFICANT CHANGE UP (ref 0–0.2)
BASOPHILS NFR BLD AUTO: 0.1 % — SIGNIFICANT CHANGE UP (ref 0–2)
BUN SERPL-MCNC: 32 MG/DL — HIGH (ref 7–23)
CALCIUM SERPL-MCNC: 9 MG/DL — SIGNIFICANT CHANGE UP (ref 8.4–10.5)
CHLORIDE SERPL-SCNC: 103 MMOL/L — SIGNIFICANT CHANGE UP (ref 96–108)
CO2 SERPL-SCNC: 27 MMOL/L — SIGNIFICANT CHANGE UP (ref 22–31)
CREAT SERPL-MCNC: 0.74 MG/DL — SIGNIFICANT CHANGE UP (ref 0.5–1.3)
EGFR: 78 ML/MIN/1.73M2 — SIGNIFICANT CHANGE UP
EOSINOPHIL # BLD AUTO: 0 K/UL — SIGNIFICANT CHANGE UP (ref 0–0.5)
EOSINOPHIL NFR BLD AUTO: 0 % — SIGNIFICANT CHANGE UP (ref 0–6)
GLUCOSE SERPL-MCNC: 165 MG/DL — HIGH (ref 70–99)
HCT VFR BLD CALC: 30.9 % — LOW (ref 34.5–45)
HGB BLD-MCNC: 9.4 G/DL — LOW (ref 11.5–15.5)
IMM GRANULOCYTES NFR BLD AUTO: 0.3 % — SIGNIFICANT CHANGE UP (ref 0–0.9)
LYMPHOCYTES # BLD AUTO: 0.46 K/UL — LOW (ref 1–3.3)
LYMPHOCYTES # BLD AUTO: 3.9 % — LOW (ref 13–44)
MCHC RBC-ENTMCNC: 28.7 PG — SIGNIFICANT CHANGE UP (ref 27–34)
MCHC RBC-ENTMCNC: 30.4 GM/DL — LOW (ref 32–36)
MCV RBC AUTO: 94.2 FL — SIGNIFICANT CHANGE UP (ref 80–100)
MONOCYTES # BLD AUTO: 0.26 K/UL — SIGNIFICANT CHANGE UP (ref 0–0.9)
MONOCYTES NFR BLD AUTO: 2.2 % — SIGNIFICANT CHANGE UP (ref 2–14)
NEUTROPHILS # BLD AUTO: 10.91 K/UL — HIGH (ref 1.8–7.4)
NEUTROPHILS NFR BLD AUTO: 93.5 % — HIGH (ref 43–77)
NRBC # BLD: 0 /100 WBCS — SIGNIFICANT CHANGE UP (ref 0–0)
PLATELET # BLD AUTO: 179 K/UL — SIGNIFICANT CHANGE UP (ref 150–400)
POTASSIUM SERPL-MCNC: 3.5 MMOL/L — SIGNIFICANT CHANGE UP (ref 3.5–5.3)
POTASSIUM SERPL-SCNC: 3.5 MMOL/L — SIGNIFICANT CHANGE UP (ref 3.5–5.3)
RBC # BLD: 3.28 M/UL — LOW (ref 3.8–5.2)
RBC # FLD: 16.2 % — HIGH (ref 10.3–14.5)
SODIUM SERPL-SCNC: 143 MMOL/L — SIGNIFICANT CHANGE UP (ref 135–145)
WBC # BLD: 11.68 K/UL — HIGH (ref 3.8–10.5)
WBC # FLD AUTO: 11.68 K/UL — HIGH (ref 3.8–10.5)

## 2024-06-20 PROCEDURE — 99222 1ST HOSP IP/OBS MODERATE 55: CPT

## 2024-06-20 RX ORDER — METHYLPREDNISOLONE ACETATE 20 MG/ML
20 VIAL (ML) INJECTION
Refills: 0 | Status: DISCONTINUED | OUTPATIENT
Start: 2024-06-20 | End: 2024-06-22

## 2024-06-20 RX ADMIN — FUROSEMIDE 20 MILLIGRAM(S): 10 INJECTION, SOLUTION INTRAMUSCULAR; INTRAVENOUS at 05:56

## 2024-06-20 RX ADMIN — Medication 25 MILLIGRAM(S): at 05:56

## 2024-06-20 RX ADMIN — IPRATROPIUM BROMIDE AND ALBUTEROL SULFATE 3 MILLILITER(S): .5; 3 SOLUTION RESPIRATORY (INHALATION) at 18:05

## 2024-06-20 RX ADMIN — Medication 650 MILLIGRAM(S): at 18:05

## 2024-06-20 RX ADMIN — IPRATROPIUM BROMIDE AND ALBUTEROL SULFATE 3 MILLILITER(S): .5; 3 SOLUTION RESPIRATORY (INHALATION) at 06:23

## 2024-06-20 RX ADMIN — BUDESONIDE 0.25 MILLIGRAM(S): 0.25 INHALANT ORAL at 06:24

## 2024-06-20 RX ADMIN — Medication 650 MILLIGRAM(S): at 05:56

## 2024-06-20 RX ADMIN — ATORVASTATIN CALCIUM 20 MILLIGRAM(S): 20 TABLET, FILM COATED ORAL at 21:09

## 2024-06-20 RX ADMIN — Medication 325 MILLIGRAM(S): at 12:03

## 2024-06-20 RX ADMIN — IPRATROPIUM BROMIDE AND ALBUTEROL SULFATE 3 MILLILITER(S): .5; 3 SOLUTION RESPIRATORY (INHALATION) at 12:02

## 2024-06-20 RX ADMIN — IPRATROPIUM BROMIDE AND ALBUTEROL SULFATE 3 MILLILITER(S): .5; 3 SOLUTION RESPIRATORY (INHALATION) at 12:00

## 2024-06-20 RX ADMIN — Medication 20 MILLIGRAM(S): at 05:56

## 2024-06-20 RX ADMIN — Medication 20 MILLIGRAM(S): at 12:07

## 2024-06-20 RX ADMIN — Medication 3 MILLIGRAM(S): at 21:14

## 2024-06-20 RX ADMIN — LOSARTAN POTASSIUM 50 MILLIGRAM(S): 100 TABLET, FILM COATED ORAL at 05:56

## 2024-06-20 RX ADMIN — BUDESONIDE 0.25 MILLIGRAM(S): 0.25 INHALANT ORAL at 18:06

## 2024-06-20 RX ADMIN — Medication 650 MILLIGRAM(S): at 06:56

## 2024-06-20 RX ADMIN — IPRATROPIUM BROMIDE AND ALBUTEROL SULFATE 3 MILLILITER(S): .5; 3 SOLUTION RESPIRATORY (INHALATION) at 18:00

## 2024-06-20 RX ADMIN — Medication 650 MILLIGRAM(S): at 19:00

## 2024-06-20 RX ADMIN — PANTOPRAZOLE SODIUM 40 MILLIGRAM(S): 40 INJECTION, POWDER, FOR SOLUTION INTRAVENOUS at 05:56

## 2024-06-20 RX ADMIN — Medication 650 MILLIGRAM(S): at 12:00

## 2024-06-20 RX ADMIN — HEPARIN SODIUM 5000 UNIT(S): 50 INJECTION, SOLUTION INTRAVENOUS at 18:06

## 2024-06-20 RX ADMIN — Medication 650 MILLIGRAM(S): at 13:00

## 2024-06-20 RX ADMIN — HEPARIN SODIUM 5000 UNIT(S): 50 INJECTION, SOLUTION INTRAVENOUS at 05:57

## 2024-06-20 RX ADMIN — POLYETHYLENE GLYCOL 3350 17 GRAM(S): 1 POWDER ORAL at 12:03

## 2024-06-20 RX ADMIN — Medication 100 MILLIGRAM(S): at 01:03

## 2024-06-20 RX ADMIN — Medication 20 MILLIGRAM(S): at 18:07

## 2024-06-20 RX ADMIN — IPRATROPIUM BROMIDE AND ALBUTEROL SULFATE 3 MILLILITER(S): .5; 3 SOLUTION RESPIRATORY (INHALATION) at 06:24

## 2024-06-20 RX ADMIN — Medication 650 MILLIGRAM(S): at 00:50

## 2024-06-21 LAB
ANION GAP SERPL CALC-SCNC: 14 MMOL/L — SIGNIFICANT CHANGE UP (ref 5–17)
BUN SERPL-MCNC: 38 MG/DL — HIGH (ref 7–23)
CALCIUM SERPL-MCNC: 8.8 MG/DL — SIGNIFICANT CHANGE UP (ref 8.4–10.5)
CHLORIDE SERPL-SCNC: 103 MMOL/L — SIGNIFICANT CHANGE UP (ref 96–108)
CO2 SERPL-SCNC: 26 MMOL/L — SIGNIFICANT CHANGE UP (ref 22–31)
CREAT SERPL-MCNC: 0.76 MG/DL — SIGNIFICANT CHANGE UP (ref 0.5–1.3)
EGFR: 75 ML/MIN/1.73M2 — SIGNIFICANT CHANGE UP
GLUCOSE SERPL-MCNC: 142 MG/DL — HIGH (ref 70–99)
MAGNESIUM SERPL-MCNC: 2.6 MG/DL — SIGNIFICANT CHANGE UP (ref 1.6–2.6)
PHOSPHATE SERPL-MCNC: 3.3 MG/DL — SIGNIFICANT CHANGE UP (ref 2.5–4.5)
POTASSIUM SERPL-MCNC: 3.8 MMOL/L — SIGNIFICANT CHANGE UP (ref 3.5–5.3)
POTASSIUM SERPL-SCNC: 3.8 MMOL/L — SIGNIFICANT CHANGE UP (ref 3.5–5.3)
SODIUM SERPL-SCNC: 143 MMOL/L — SIGNIFICANT CHANGE UP (ref 135–145)

## 2024-06-21 PROCEDURE — 99231 SBSQ HOSP IP/OBS SF/LOW 25: CPT

## 2024-06-21 RX ADMIN — Medication 650 MILLIGRAM(S): at 11:19

## 2024-06-21 RX ADMIN — Medication 20 MILLIGRAM(S): at 05:33

## 2024-06-21 RX ADMIN — ATORVASTATIN CALCIUM 20 MILLIGRAM(S): 20 TABLET, FILM COATED ORAL at 21:32

## 2024-06-21 RX ADMIN — FUROSEMIDE 20 MILLIGRAM(S): 10 INJECTION, SOLUTION INTRAMUSCULAR; INTRAVENOUS at 05:36

## 2024-06-21 RX ADMIN — Medication 650 MILLIGRAM(S): at 23:30

## 2024-06-21 RX ADMIN — LOSARTAN POTASSIUM 50 MILLIGRAM(S): 100 TABLET, FILM COATED ORAL at 05:42

## 2024-06-21 RX ADMIN — IPRATROPIUM BROMIDE AND ALBUTEROL SULFATE 3 MILLILITER(S): .5; 3 SOLUTION RESPIRATORY (INHALATION) at 00:07

## 2024-06-21 RX ADMIN — Medication 25 MILLIGRAM(S): at 05:33

## 2024-06-21 RX ADMIN — Medication 3 MILLIGRAM(S): at 21:34

## 2024-06-21 RX ADMIN — Medication 650 MILLIGRAM(S): at 00:08

## 2024-06-21 RX ADMIN — Medication 650 MILLIGRAM(S): at 06:15

## 2024-06-21 RX ADMIN — Medication 20 MILLIGRAM(S): at 11:21

## 2024-06-21 RX ADMIN — IPRATROPIUM BROMIDE AND ALBUTEROL SULFATE 3 MILLILITER(S): .5; 3 SOLUTION RESPIRATORY (INHALATION) at 11:18

## 2024-06-21 RX ADMIN — HEPARIN SODIUM 5000 UNIT(S): 50 INJECTION, SOLUTION INTRAVENOUS at 05:42

## 2024-06-21 RX ADMIN — Medication 650 MILLIGRAM(S): at 17:16

## 2024-06-21 RX ADMIN — IPRATROPIUM BROMIDE AND ALBUTEROL SULFATE 3 MILLILITER(S): .5; 3 SOLUTION RESPIRATORY (INHALATION) at 23:30

## 2024-06-21 RX ADMIN — BUDESONIDE 0.25 MILLIGRAM(S): 0.25 INHALANT ORAL at 05:35

## 2024-06-21 RX ADMIN — IPRATROPIUM BROMIDE AND ALBUTEROL SULFATE 3 MILLILITER(S): .5; 3 SOLUTION RESPIRATORY (INHALATION) at 05:35

## 2024-06-21 RX ADMIN — IPRATROPIUM BROMIDE AND ALBUTEROL SULFATE 3 MILLILITER(S): .5; 3 SOLUTION RESPIRATORY (INHALATION) at 17:15

## 2024-06-21 RX ADMIN — Medication 100 MILLIGRAM(S): at 01:48

## 2024-06-21 RX ADMIN — PANTOPRAZOLE SODIUM 40 MILLIGRAM(S): 40 INJECTION, POWDER, FOR SOLUTION INTRAVENOUS at 05:35

## 2024-06-21 RX ADMIN — Medication 650 MILLIGRAM(S): at 11:49

## 2024-06-21 RX ADMIN — Medication 20 MILLIGRAM(S): at 17:15

## 2024-06-21 RX ADMIN — Medication 325 MILLIGRAM(S): at 11:19

## 2024-06-21 RX ADMIN — BUDESONIDE 0.25 MILLIGRAM(S): 0.25 INHALANT ORAL at 17:15

## 2024-06-21 RX ADMIN — HEPARIN SODIUM 5000 UNIT(S): 50 INJECTION, SOLUTION INTRAVENOUS at 17:14

## 2024-06-21 RX ADMIN — Medication 650 MILLIGRAM(S): at 00:50

## 2024-06-21 RX ADMIN — Medication 650 MILLIGRAM(S): at 17:46

## 2024-06-21 RX ADMIN — Medication 650 MILLIGRAM(S): at 05:32

## 2024-06-21 RX ADMIN — POLYETHYLENE GLYCOL 3350 17 GRAM(S): 1 POWDER ORAL at 11:19

## 2024-06-22 RX ORDER — BISACODYL 5 MG
5 TABLET, DELAYED RELEASE (ENTERIC COATED) ORAL AT BEDTIME
Refills: 0 | Status: DISCONTINUED | OUTPATIENT
Start: 2024-06-22 | End: 2024-06-27

## 2024-06-22 RX ORDER — CEFPODOXIME PROXETIL 50 MG/5 ML
400 SUSPENSION, RECONSTITUTED, ORAL (ML) ORAL EVERY 12 HOURS
Refills: 0 | Status: COMPLETED | OUTPATIENT
Start: 2024-06-22 | End: 2024-06-25

## 2024-06-22 RX ORDER — CEFPODOXIME PROXETIL 50 MG/5 ML
200 SUSPENSION, RECONSTITUTED, ORAL (ML) ORAL EVERY 12 HOURS
Refills: 0 | Status: DISCONTINUED | OUTPATIENT
Start: 2024-06-22 | End: 2024-06-22

## 2024-06-22 RX ORDER — PREDNISONE 10 MG/1
20 TABLET ORAL DAILY
Refills: 0 | Status: DISCONTINUED | OUTPATIENT
Start: 2024-06-23 | End: 2024-06-27

## 2024-06-22 RX ORDER — METOPROLOL TARTRATE 50 MG
50 TABLET ORAL DAILY
Refills: 0 | Status: DISCONTINUED | OUTPATIENT
Start: 2024-06-22 | End: 2024-06-27

## 2024-06-22 RX ADMIN — HEPARIN SODIUM 5000 UNIT(S): 50 INJECTION, SOLUTION INTRAVENOUS at 05:21

## 2024-06-22 RX ADMIN — IPRATROPIUM BROMIDE AND ALBUTEROL SULFATE 3 MILLILITER(S): .5; 3 SOLUTION RESPIRATORY (INHALATION) at 17:17

## 2024-06-22 RX ADMIN — PANTOPRAZOLE SODIUM 40 MILLIGRAM(S): 40 INJECTION, POWDER, FOR SOLUTION INTRAVENOUS at 05:22

## 2024-06-22 RX ADMIN — LOSARTAN POTASSIUM 50 MILLIGRAM(S): 100 TABLET, FILM COATED ORAL at 05:21

## 2024-06-22 RX ADMIN — Medication 650 MILLIGRAM(S): at 11:20

## 2024-06-22 RX ADMIN — Medication 650 MILLIGRAM(S): at 00:43

## 2024-06-22 RX ADMIN — BUDESONIDE 0.25 MILLIGRAM(S): 0.25 INHALANT ORAL at 05:22

## 2024-06-22 RX ADMIN — Medication 650 MILLIGRAM(S): at 06:30

## 2024-06-22 RX ADMIN — POLYETHYLENE GLYCOL 3350 17 GRAM(S): 1 POWDER ORAL at 11:20

## 2024-06-22 RX ADMIN — Medication 400 MILLIGRAM(S): at 19:37

## 2024-06-22 RX ADMIN — ATORVASTATIN CALCIUM 20 MILLIGRAM(S): 20 TABLET, FILM COATED ORAL at 21:00

## 2024-06-22 RX ADMIN — Medication 650 MILLIGRAM(S): at 05:23

## 2024-06-22 RX ADMIN — IPRATROPIUM BROMIDE AND ALBUTEROL SULFATE 3 MILLILITER(S): .5; 3 SOLUTION RESPIRATORY (INHALATION) at 05:22

## 2024-06-22 RX ADMIN — Medication 650 MILLIGRAM(S): at 17:24

## 2024-06-22 RX ADMIN — Medication 20 MILLIGRAM(S): at 05:22

## 2024-06-22 RX ADMIN — FUROSEMIDE 20 MILLIGRAM(S): 10 INJECTION, SOLUTION INTRAMUSCULAR; INTRAVENOUS at 05:21

## 2024-06-22 RX ADMIN — Medication 3 MILLIGRAM(S): at 23:06

## 2024-06-22 RX ADMIN — BUDESONIDE 0.25 MILLIGRAM(S): 0.25 INHALANT ORAL at 17:17

## 2024-06-22 RX ADMIN — IPRATROPIUM BROMIDE AND ALBUTEROL SULFATE 3 MILLILITER(S): .5; 3 SOLUTION RESPIRATORY (INHALATION) at 11:21

## 2024-06-22 RX ADMIN — Medication 100 MILLIGRAM(S): at 00:01

## 2024-06-22 RX ADMIN — Medication 650 MILLIGRAM(S): at 23:06

## 2024-06-22 RX ADMIN — Medication 20 MILLIGRAM(S): at 11:20

## 2024-06-22 RX ADMIN — Medication 650 MILLIGRAM(S): at 17:17

## 2024-06-22 RX ADMIN — HEPARIN SODIUM 5000 UNIT(S): 50 INJECTION, SOLUTION INTRAVENOUS at 17:18

## 2024-06-22 RX ADMIN — Medication 325 MILLIGRAM(S): at 11:20

## 2024-06-22 RX ADMIN — Medication 25 MILLIGRAM(S): at 05:21

## 2024-06-23 RX ORDER — BISACODYL 5 MG
5 TABLET, DELAYED RELEASE (ENTERIC COATED) ORAL ONCE
Refills: 0 | Status: COMPLETED | OUTPATIENT
Start: 2024-06-23 | End: 2024-06-23

## 2024-06-23 RX ORDER — TEMAZEPAM 30 MG/1
15 CAPSULE ORAL ONCE
Refills: 0 | Status: DISCONTINUED | OUTPATIENT
Start: 2024-06-23 | End: 2024-06-23

## 2024-06-23 RX ORDER — IPRATROPIUM BROMIDE AND ALBUTEROL SULFATE .5; 3 MG/3ML; MG/3ML
3 SOLUTION RESPIRATORY (INHALATION) ONCE
Refills: 0 | Status: COMPLETED | OUTPATIENT
Start: 2024-06-23 | End: 2024-06-23

## 2024-06-23 RX ADMIN — Medication 650 MILLIGRAM(S): at 12:06

## 2024-06-23 RX ADMIN — Medication 400 MILLIGRAM(S): at 17:56

## 2024-06-23 RX ADMIN — Medication 325 MILLIGRAM(S): at 12:06

## 2024-06-23 RX ADMIN — Medication 650 MILLIGRAM(S): at 18:00

## 2024-06-23 RX ADMIN — Medication 650 MILLIGRAM(S): at 17:56

## 2024-06-23 RX ADMIN — IPRATROPIUM BROMIDE AND ALBUTEROL SULFATE 3 MILLILITER(S): .5; 3 SOLUTION RESPIRATORY (INHALATION) at 21:17

## 2024-06-23 RX ADMIN — HEPARIN SODIUM 5000 UNIT(S): 50 INJECTION, SOLUTION INTRAVENOUS at 05:30

## 2024-06-23 RX ADMIN — Medication 650 MILLIGRAM(S): at 05:30

## 2024-06-23 RX ADMIN — Medication 650 MILLIGRAM(S): at 00:15

## 2024-06-23 RX ADMIN — Medication 650 MILLIGRAM(S): at 21:17

## 2024-06-23 RX ADMIN — Medication 3 MILLIGRAM(S): at 01:52

## 2024-06-23 RX ADMIN — LOSARTAN POTASSIUM 50 MILLIGRAM(S): 100 TABLET, FILM COATED ORAL at 05:30

## 2024-06-23 RX ADMIN — Medication 5 MILLIGRAM(S): at 01:52

## 2024-06-23 RX ADMIN — Medication 50 MILLIGRAM(S): at 05:30

## 2024-06-23 RX ADMIN — BUDESONIDE 0.25 MILLIGRAM(S): 0.25 INHALANT ORAL at 05:30

## 2024-06-23 RX ADMIN — PREDNISONE 20 MILLIGRAM(S): 10 TABLET ORAL at 06:49

## 2024-06-23 RX ADMIN — HEPARIN SODIUM 5000 UNIT(S): 50 INJECTION, SOLUTION INTRAVENOUS at 17:56

## 2024-06-23 RX ADMIN — PANTOPRAZOLE SODIUM 40 MILLIGRAM(S): 40 INJECTION, POWDER, FOR SOLUTION INTRAVENOUS at 05:30

## 2024-06-23 RX ADMIN — Medication 3 MILLIGRAM(S): at 21:17

## 2024-06-23 RX ADMIN — Medication 650 MILLIGRAM(S): at 06:30

## 2024-06-23 RX ADMIN — Medication 650 MILLIGRAM(S): at 13:36

## 2024-06-23 RX ADMIN — Medication 5 MILLIGRAM(S): at 13:39

## 2024-06-23 RX ADMIN — IPRATROPIUM BROMIDE AND ALBUTEROL SULFATE 3 MILLILITER(S): .5; 3 SOLUTION RESPIRATORY (INHALATION) at 17:56

## 2024-06-23 RX ADMIN — IPRATROPIUM BROMIDE AND ALBUTEROL SULFATE 3 MILLILITER(S): .5; 3 SOLUTION RESPIRATORY (INHALATION) at 05:30

## 2024-06-23 RX ADMIN — IPRATROPIUM BROMIDE AND ALBUTEROL SULFATE 3 MILLILITER(S): .5; 3 SOLUTION RESPIRATORY (INHALATION) at 01:52

## 2024-06-23 RX ADMIN — BUDESONIDE 0.25 MILLIGRAM(S): 0.25 INHALANT ORAL at 17:56

## 2024-06-23 RX ADMIN — Medication 400 MILLIGRAM(S): at 06:49

## 2024-06-23 RX ADMIN — FUROSEMIDE 20 MILLIGRAM(S): 10 INJECTION, SOLUTION INTRAMUSCULAR; INTRAVENOUS at 05:30

## 2024-06-23 RX ADMIN — Medication 20 MILLIGRAM(S): at 12:07

## 2024-06-23 RX ADMIN — ATORVASTATIN CALCIUM 20 MILLIGRAM(S): 20 TABLET, FILM COATED ORAL at 21:17

## 2024-06-23 RX ADMIN — IPRATROPIUM BROMIDE AND ALBUTEROL SULFATE 3 MILLILITER(S): .5; 3 SOLUTION RESPIRATORY (INHALATION) at 12:07

## 2024-06-23 RX ADMIN — POLYETHYLENE GLYCOL 3350 17 GRAM(S): 1 POWDER ORAL at 12:06

## 2024-06-23 RX ADMIN — TEMAZEPAM 15 MILLIGRAM(S): 30 CAPSULE ORAL at 21:59

## 2024-06-24 PROCEDURE — 99231 SBSQ HOSP IP/OBS SF/LOW 25: CPT

## 2024-06-24 RX ORDER — TEMAZEPAM 30 MG/1
15 CAPSULE ORAL ONCE
Refills: 0 | Status: DISCONTINUED | OUTPATIENT
Start: 2024-06-24 | End: 2024-06-24

## 2024-06-24 RX ADMIN — FUROSEMIDE 20 MILLIGRAM(S): 10 INJECTION, SOLUTION INTRAMUSCULAR; INTRAVENOUS at 05:33

## 2024-06-24 RX ADMIN — BUDESONIDE 0.25 MILLIGRAM(S): 0.25 INHALANT ORAL at 05:35

## 2024-06-24 RX ADMIN — Medication 650 MILLIGRAM(S): at 05:36

## 2024-06-24 RX ADMIN — Medication 650 MILLIGRAM(S): at 05:34

## 2024-06-24 RX ADMIN — Medication 650 MILLIGRAM(S): at 11:54

## 2024-06-24 RX ADMIN — IPRATROPIUM BROMIDE AND ALBUTEROL SULFATE 3 MILLILITER(S): .5; 3 SOLUTION RESPIRATORY (INHALATION) at 17:06

## 2024-06-24 RX ADMIN — ATORVASTATIN CALCIUM 20 MILLIGRAM(S): 20 TABLET, FILM COATED ORAL at 21:53

## 2024-06-24 RX ADMIN — PREDNISONE 20 MILLIGRAM(S): 10 TABLET ORAL at 05:34

## 2024-06-24 RX ADMIN — HEPARIN SODIUM 5000 UNIT(S): 50 INJECTION, SOLUTION INTRAVENOUS at 05:35

## 2024-06-24 RX ADMIN — Medication 650 MILLIGRAM(S): at 17:06

## 2024-06-24 RX ADMIN — Medication 400 MILLIGRAM(S): at 17:06

## 2024-06-24 RX ADMIN — Medication 325 MILLIGRAM(S): at 11:54

## 2024-06-24 RX ADMIN — PANTOPRAZOLE SODIUM 40 MILLIGRAM(S): 40 INJECTION, POWDER, FOR SOLUTION INTRAVENOUS at 05:34

## 2024-06-24 RX ADMIN — HEPARIN SODIUM 5000 UNIT(S): 50 INJECTION, SOLUTION INTRAVENOUS at 17:07

## 2024-06-24 RX ADMIN — Medication 5 MILLIGRAM(S): at 22:47

## 2024-06-24 RX ADMIN — Medication 50 MILLIGRAM(S): at 05:34

## 2024-06-24 RX ADMIN — POLYETHYLENE GLYCOL 3350 17 GRAM(S): 1 POWDER ORAL at 11:54

## 2024-06-24 RX ADMIN — BUDESONIDE 0.25 MILLIGRAM(S): 0.25 INHALANT ORAL at 17:06

## 2024-06-24 RX ADMIN — Medication 20 MILLIGRAM(S): at 11:54

## 2024-06-24 RX ADMIN — Medication 3 MILLIGRAM(S): at 22:48

## 2024-06-24 RX ADMIN — LOSARTAN POTASSIUM 50 MILLIGRAM(S): 100 TABLET, FILM COATED ORAL at 05:34

## 2024-06-24 RX ADMIN — IPRATROPIUM BROMIDE AND ALBUTEROL SULFATE 3 MILLILITER(S): .5; 3 SOLUTION RESPIRATORY (INHALATION) at 11:54

## 2024-06-24 RX ADMIN — Medication 400 MILLIGRAM(S): at 05:33

## 2024-06-24 RX ADMIN — IPRATROPIUM BROMIDE AND ALBUTEROL SULFATE 3 MILLILITER(S): .5; 3 SOLUTION RESPIRATORY (INHALATION) at 05:35

## 2024-06-24 RX ADMIN — TEMAZEPAM 15 MILLIGRAM(S): 30 CAPSULE ORAL at 22:48

## 2024-06-25 RX ADMIN — LOSARTAN POTASSIUM 50 MILLIGRAM(S): 100 TABLET, FILM COATED ORAL at 07:33

## 2024-06-25 RX ADMIN — HEPARIN SODIUM 5000 UNIT(S): 50 INJECTION, SOLUTION INTRAVENOUS at 07:28

## 2024-06-25 RX ADMIN — IPRATROPIUM BROMIDE AND ALBUTEROL SULFATE 3 MILLILITER(S): .5; 3 SOLUTION RESPIRATORY (INHALATION) at 00:33

## 2024-06-25 RX ADMIN — Medication 5 MILLIGRAM(S): at 21:37

## 2024-06-25 RX ADMIN — Medication 650 MILLIGRAM(S): at 05:00

## 2024-06-25 RX ADMIN — Medication 650 MILLIGRAM(S): at 00:32

## 2024-06-25 RX ADMIN — Medication 50 MILLIGRAM(S): at 07:30

## 2024-06-25 RX ADMIN — HEPARIN SODIUM 5000 UNIT(S): 50 INJECTION, SOLUTION INTRAVENOUS at 17:09

## 2024-06-25 RX ADMIN — Medication 400 MILLIGRAM(S): at 09:50

## 2024-06-25 RX ADMIN — Medication 650 MILLIGRAM(S): at 11:56

## 2024-06-25 RX ADMIN — Medication 650 MILLIGRAM(S): at 17:34

## 2024-06-25 RX ADMIN — Medication 650 MILLIGRAM(S): at 07:28

## 2024-06-25 RX ADMIN — PREDNISONE 20 MILLIGRAM(S): 10 TABLET ORAL at 07:33

## 2024-06-25 RX ADMIN — Medication 650 MILLIGRAM(S): at 02:45

## 2024-06-25 RX ADMIN — Medication 3 MILLIGRAM(S): at 21:37

## 2024-06-25 RX ADMIN — IPRATROPIUM BROMIDE AND ALBUTEROL SULFATE 3 MILLILITER(S): .5; 3 SOLUTION RESPIRATORY (INHALATION) at 17:09

## 2024-06-25 RX ADMIN — Medication 20 MILLIGRAM(S): at 11:57

## 2024-06-25 RX ADMIN — FUROSEMIDE 20 MILLIGRAM(S): 10 INJECTION, SOLUTION INTRAMUSCULAR; INTRAVENOUS at 07:29

## 2024-06-25 RX ADMIN — BUDESONIDE 0.25 MILLIGRAM(S): 0.25 INHALANT ORAL at 17:09

## 2024-06-25 RX ADMIN — BUDESONIDE 0.25 MILLIGRAM(S): 0.25 INHALANT ORAL at 07:28

## 2024-06-25 RX ADMIN — PANTOPRAZOLE SODIUM 40 MILLIGRAM(S): 40 INJECTION, POWDER, FOR SOLUTION INTRAVENOUS at 07:29

## 2024-06-25 RX ADMIN — ATORVASTATIN CALCIUM 20 MILLIGRAM(S): 20 TABLET, FILM COATED ORAL at 21:37

## 2024-06-25 RX ADMIN — IPRATROPIUM BROMIDE AND ALBUTEROL SULFATE 3 MILLILITER(S): .5; 3 SOLUTION RESPIRATORY (INHALATION) at 11:57

## 2024-06-25 RX ADMIN — Medication 650 MILLIGRAM(S): at 17:08

## 2024-06-25 RX ADMIN — IPRATROPIUM BROMIDE AND ALBUTEROL SULFATE 3 MILLILITER(S): .5; 3 SOLUTION RESPIRATORY (INHALATION) at 07:28

## 2024-06-25 RX ADMIN — Medication 325 MILLIGRAM(S): at 11:56

## 2024-06-25 NOTE — PROGRESS NOTE ADULT - PROBLEM SELECTOR PROBLEM 4
Pulmonary sarcoidosis
chest wall non-tender, breathing is unlabored without accessory muscle use, normal breath sounds
Pulmonary sarcoidosis

## 2024-06-26 RX ORDER — TEMAZEPAM 30 MG/1
15 CAPSULE ORAL ONCE
Refills: 0 | Status: DISCONTINUED | OUTPATIENT
Start: 2024-06-26 | End: 2024-06-26

## 2024-06-26 RX ADMIN — FUROSEMIDE 20 MILLIGRAM(S): 10 INJECTION, SOLUTION INTRAMUSCULAR; INTRAVENOUS at 08:07

## 2024-06-26 RX ADMIN — PREDNISONE 20 MILLIGRAM(S): 10 TABLET ORAL at 08:07

## 2024-06-26 RX ADMIN — PANTOPRAZOLE SODIUM 40 MILLIGRAM(S): 40 INJECTION, POWDER, FOR SOLUTION INTRAVENOUS at 08:06

## 2024-06-26 RX ADMIN — Medication 650 MILLIGRAM(S): at 08:39

## 2024-06-26 RX ADMIN — ATORVASTATIN CALCIUM 20 MILLIGRAM(S): 20 TABLET, FILM COATED ORAL at 21:00

## 2024-06-26 RX ADMIN — IPRATROPIUM BROMIDE AND ALBUTEROL SULFATE 3 MILLILITER(S): .5; 3 SOLUTION RESPIRATORY (INHALATION) at 00:11

## 2024-06-26 RX ADMIN — Medication 20 MILLIGRAM(S): at 13:16

## 2024-06-26 RX ADMIN — Medication 650 MILLIGRAM(S): at 08:05

## 2024-06-26 RX ADMIN — TEMAZEPAM 15 MILLIGRAM(S): 30 CAPSULE ORAL at 21:30

## 2024-06-26 RX ADMIN — Medication 50 MILLIGRAM(S): at 08:06

## 2024-06-26 RX ADMIN — IPRATROPIUM BROMIDE AND ALBUTEROL SULFATE 3 MILLILITER(S): .5; 3 SOLUTION RESPIRATORY (INHALATION) at 13:15

## 2024-06-26 RX ADMIN — Medication 650 MILLIGRAM(S): at 01:16

## 2024-06-26 RX ADMIN — Medication 650 MILLIGRAM(S): at 18:28

## 2024-06-26 RX ADMIN — BUDESONIDE 0.25 MILLIGRAM(S): 0.25 INHALANT ORAL at 17:07

## 2024-06-26 RX ADMIN — Medication 3 MILLIGRAM(S): at 21:00

## 2024-06-26 RX ADMIN — IPRATROPIUM BROMIDE AND ALBUTEROL SULFATE 3 MILLILITER(S): .5; 3 SOLUTION RESPIRATORY (INHALATION) at 05:48

## 2024-06-26 RX ADMIN — Medication 650 MILLIGRAM(S): at 13:15

## 2024-06-26 RX ADMIN — HEPARIN SODIUM 5000 UNIT(S): 50 INJECTION, SOLUTION INTRAVENOUS at 08:06

## 2024-06-26 RX ADMIN — Medication 650 MILLIGRAM(S): at 00:06

## 2024-06-26 RX ADMIN — Medication 325 MILLIGRAM(S): at 13:15

## 2024-06-26 RX ADMIN — LOSARTAN POTASSIUM 50 MILLIGRAM(S): 100 TABLET, FILM COATED ORAL at 08:07

## 2024-06-26 RX ADMIN — Medication 650 MILLIGRAM(S): at 17:07

## 2024-06-26 RX ADMIN — Medication 650 MILLIGRAM(S): at 13:47

## 2024-06-26 RX ADMIN — IPRATROPIUM BROMIDE AND ALBUTEROL SULFATE 3 MILLILITER(S): .5; 3 SOLUTION RESPIRATORY (INHALATION) at 17:08

## 2024-06-26 RX ADMIN — HEPARIN SODIUM 5000 UNIT(S): 50 INJECTION, SOLUTION INTRAVENOUS at 17:07

## 2024-06-26 RX ADMIN — BUDESONIDE 0.25 MILLIGRAM(S): 0.25 INHALANT ORAL at 08:05

## 2024-06-27 ENCOUNTER — TRANSCRIPTION ENCOUNTER (OUTPATIENT)
Age: 89
End: 2024-06-27

## 2024-06-27 VITALS
RESPIRATION RATE: 18 BRPM | HEART RATE: 69 BPM | SYSTOLIC BLOOD PRESSURE: 129 MMHG | TEMPERATURE: 98 F | DIASTOLIC BLOOD PRESSURE: 72 MMHG | OXYGEN SATURATION: 96 %

## 2024-06-27 PROCEDURE — 85730 THROMBOPLASTIN TIME PARTIAL: CPT

## 2024-06-27 PROCEDURE — 85025 COMPLETE CBC W/AUTO DIFF WBC: CPT

## 2024-06-27 PROCEDURE — 82947 ASSAY GLUCOSE BLOOD QUANT: CPT

## 2024-06-27 PROCEDURE — 96375 TX/PRO/DX INJ NEW DRUG ADDON: CPT

## 2024-06-27 PROCEDURE — 94640 AIRWAY INHALATION TREATMENT: CPT

## 2024-06-27 PROCEDURE — 85018 HEMOGLOBIN: CPT

## 2024-06-27 PROCEDURE — 82330 ASSAY OF CALCIUM: CPT

## 2024-06-27 PROCEDURE — 85014 HEMATOCRIT: CPT

## 2024-06-27 PROCEDURE — 84295 ASSAY OF SERUM SODIUM: CPT

## 2024-06-27 PROCEDURE — 83735 ASSAY OF MAGNESIUM: CPT

## 2024-06-27 PROCEDURE — 83605 ASSAY OF LACTIC ACID: CPT

## 2024-06-27 PROCEDURE — 96374 THER/PROPH/DIAG INJ IV PUSH: CPT

## 2024-06-27 PROCEDURE — 85652 RBC SED RATE AUTOMATED: CPT

## 2024-06-27 PROCEDURE — 82803 BLOOD GASES ANY COMBINATION: CPT

## 2024-06-27 PROCEDURE — 84100 ASSAY OF PHOSPHORUS: CPT

## 2024-06-27 PROCEDURE — 36415 COLL VENOUS BLD VENIPUNCTURE: CPT

## 2024-06-27 PROCEDURE — 85610 PROTHROMBIN TIME: CPT

## 2024-06-27 PROCEDURE — 87040 BLOOD CULTURE FOR BACTERIA: CPT

## 2024-06-27 PROCEDURE — 97161 PT EVAL LOW COMPLEX 20 MIN: CPT

## 2024-06-27 PROCEDURE — 80053 COMPREHEN METABOLIC PANEL: CPT

## 2024-06-27 PROCEDURE — 83880 ASSAY OF NATRIURETIC PEPTIDE: CPT

## 2024-06-27 PROCEDURE — 73590 X-RAY EXAM OF LOWER LEG: CPT

## 2024-06-27 PROCEDURE — 86140 C-REACTIVE PROTEIN: CPT

## 2024-06-27 PROCEDURE — 87637 SARSCOV2&INF A&B&RSV AMP PRB: CPT

## 2024-06-27 PROCEDURE — 82435 ASSAY OF BLOOD CHLORIDE: CPT

## 2024-06-27 PROCEDURE — 97116 GAIT TRAINING THERAPY: CPT

## 2024-06-27 PROCEDURE — 84484 ASSAY OF TROPONIN QUANT: CPT

## 2024-06-27 PROCEDURE — 73630 X-RAY EXAM OF FOOT: CPT

## 2024-06-27 PROCEDURE — 84132 ASSAY OF SERUM POTASSIUM: CPT

## 2024-06-27 PROCEDURE — 94660 CPAP INITIATION&MGMT: CPT

## 2024-06-27 PROCEDURE — 71045 X-RAY EXAM CHEST 1 VIEW: CPT

## 2024-06-27 PROCEDURE — 80048 BASIC METABOLIC PNL TOTAL CA: CPT

## 2024-06-27 PROCEDURE — 97530 THERAPEUTIC ACTIVITIES: CPT

## 2024-06-27 PROCEDURE — 99285 EMERGENCY DEPT VISIT HI MDM: CPT | Mod: 25

## 2024-06-27 RX ORDER — FLUOXETINE HCL 10 MG
1 CAPSULE ORAL
Refills: 0 | DISCHARGE

## 2024-06-27 RX ORDER — FERROUS SULFATE 325(65) MG
1 TABLET ORAL
Qty: 0 | Refills: 0 | DISCHARGE
Start: 2024-06-27

## 2024-06-27 RX ORDER — ATORVASTATIN CALCIUM 80 MG/1
1 TABLET, FILM COATED ORAL
Refills: 0 | DISCHARGE

## 2024-06-27 RX ORDER — FERROUS SULFATE 325(65) MG
1 TABLET ORAL
Refills: 0 | DISCHARGE

## 2024-06-27 RX ORDER — MIRTAZAPINE 45 MG/1
1 TABLET, ORALLY DISINTEGRATING ORAL
Refills: 0 | DISCHARGE

## 2024-06-27 RX ORDER — PREDNISONE 10 MG/1
2 TABLET ORAL
Qty: 2 | Refills: 0
Start: 2024-06-27 | End: 2024-06-27

## 2024-06-27 RX ORDER — TIOTROPIUM BROMIDE 18 UG/1
2 CAPSULE ORAL; RESPIRATORY (INHALATION)
Qty: 30 | Refills: 0
Start: 2024-06-27 | End: 2024-07-26

## 2024-06-27 RX ORDER — IPRATROPIUM BROMIDE AND ALBUTEROL SULFATE .5; 3 MG/3ML; MG/3ML
3 SOLUTION RESPIRATORY (INHALATION) EVERY 6 HOURS
Refills: 0 | Status: DISCONTINUED | OUTPATIENT
Start: 2024-06-27 | End: 2024-06-27

## 2024-06-27 RX ORDER — TIOTROPIUM BROMIDE 18 UG/1
2 CAPSULE ORAL; RESPIRATORY (INHALATION) DAILY
Refills: 0 | Status: DISCONTINUED | OUTPATIENT
Start: 2024-06-27 | End: 2024-06-27

## 2024-06-27 RX ORDER — ATORVASTATIN CALCIUM 20 MG/1
1 TABLET, FILM COATED ORAL
Qty: 0 | Refills: 0 | DISCHARGE
Start: 2024-06-27

## 2024-06-27 RX ORDER — METOPROLOL TARTRATE 50 MG
1 TABLET ORAL
Qty: 30 | Refills: 0
Start: 2024-06-27 | End: 2024-07-26

## 2024-06-27 RX ORDER — FLUOXETINE HCL 40 MG
1 CAPSULE ORAL
Qty: 0 | Refills: 0 | DISCHARGE
Start: 2024-06-27

## 2024-06-27 RX ORDER — LOSARTAN POTASSIUM 100 MG/1
1 TABLET, FILM COATED ORAL
Qty: 0 | Refills: 0 | DISCHARGE
Start: 2024-06-27

## 2024-06-27 RX ORDER — FUROSEMIDE 10 MG/ML
1 INJECTION, SOLUTION INTRAMUSCULAR; INTRAVENOUS
Qty: 0 | Refills: 0 | DISCHARGE
Start: 2024-06-27

## 2024-06-27 RX ORDER — IPRATROPIUM BROMIDE AND ALBUTEROL SULFATE .5; 3 MG/3ML; MG/3ML
3 SOLUTION RESPIRATORY (INHALATION)
Qty: 360 | Refills: 0
Start: 2024-06-27 | End: 2024-07-26

## 2024-06-27 RX ORDER — POLYETHYLENE GLYCOL 3350 1 G/G
17 POWDER ORAL
Qty: 0 | Refills: 0 | DISCHARGE
Start: 2024-06-27

## 2024-06-27 RX ORDER — PANTOPRAZOLE SODIUM 40 MG/10ML
1 INJECTION, POWDER, FOR SOLUTION INTRAVENOUS
Qty: 30 | Refills: 0
Start: 2024-06-27 | End: 2024-07-26

## 2024-06-27 RX ADMIN — IPRATROPIUM BROMIDE AND ALBUTEROL SULFATE 3 MILLILITER(S): .5; 3 SOLUTION RESPIRATORY (INHALATION) at 05:48

## 2024-06-27 RX ADMIN — HEPARIN SODIUM 5000 UNIT(S): 50 INJECTION, SOLUTION INTRAVENOUS at 05:48

## 2024-06-27 RX ADMIN — Medication 650 MILLIGRAM(S): at 12:09

## 2024-06-27 RX ADMIN — PANTOPRAZOLE SODIUM 40 MILLIGRAM(S): 40 INJECTION, POWDER, FOR SOLUTION INTRAVENOUS at 05:47

## 2024-06-27 RX ADMIN — IPRATROPIUM BROMIDE AND ALBUTEROL SULFATE 3 MILLILITER(S): .5; 3 SOLUTION RESPIRATORY (INHALATION) at 01:23

## 2024-06-27 RX ADMIN — FUROSEMIDE 20 MILLIGRAM(S): 10 INJECTION, SOLUTION INTRAMUSCULAR; INTRAVENOUS at 05:48

## 2024-06-27 RX ADMIN — Medication 650 MILLIGRAM(S): at 06:06

## 2024-06-27 RX ADMIN — Medication 650 MILLIGRAM(S): at 11:31

## 2024-06-27 RX ADMIN — BUDESONIDE 0.25 MILLIGRAM(S): 0.25 INHALANT ORAL at 05:49

## 2024-06-27 RX ADMIN — Medication 325 MILLIGRAM(S): at 11:31

## 2024-06-27 RX ADMIN — Medication 50 MILLIGRAM(S): at 05:48

## 2024-06-27 RX ADMIN — Medication 650 MILLIGRAM(S): at 00:10

## 2024-06-27 RX ADMIN — Medication 650 MILLIGRAM(S): at 05:47

## 2024-06-27 RX ADMIN — PREDNISONE 20 MILLIGRAM(S): 10 TABLET ORAL at 05:48

## 2024-06-27 RX ADMIN — LOSARTAN POTASSIUM 50 MILLIGRAM(S): 100 TABLET, FILM COATED ORAL at 05:48

## 2024-06-27 RX ADMIN — Medication 20 MILLIGRAM(S): at 11:31

## 2024-06-27 RX ADMIN — Medication 650 MILLIGRAM(S): at 01:23

## 2024-06-28 ENCOUNTER — TRANSCRIPTION ENCOUNTER (OUTPATIENT)
Age: 89
End: 2024-06-28

## 2024-07-01 ENCOUNTER — INPATIENT (INPATIENT)
Facility: HOSPITAL | Age: 89
LOS: 9 days | Discharge: HOME | DRG: 189 | End: 2024-07-11
Attending: FAMILY MEDICINE | Admitting: FAMILY MEDICINE
Payer: MEDICARE

## 2024-07-01 VITALS
DIASTOLIC BLOOD PRESSURE: 79 MMHG | HEART RATE: 77 BPM | OXYGEN SATURATION: 80 % | WEIGHT: 100.09 LBS | SYSTOLIC BLOOD PRESSURE: 124 MMHG | HEIGHT: 62 IN | RESPIRATION RATE: 22 BRPM

## 2024-07-01 DIAGNOSIS — J96.01 ACUTE RESPIRATORY FAILURE WITH HYPOXIA: ICD-10-CM

## 2024-07-01 LAB
ALBUMIN SERPL ELPH-MCNC: 3.9 G/DL — SIGNIFICANT CHANGE UP (ref 3.3–5)
ALP SERPL-CCNC: 111 U/L — SIGNIFICANT CHANGE UP (ref 40–120)
ALT FLD-CCNC: 26 U/L — SIGNIFICANT CHANGE UP (ref 10–45)
ANION GAP SERPL CALC-SCNC: 15 MMOL/L — SIGNIFICANT CHANGE UP (ref 5–17)
APPEARANCE UR: ABNORMAL
APTT BLD: 28.9 SEC — SIGNIFICANT CHANGE UP (ref 24.5–35.6)
AST SERPL-CCNC: 21 U/L — SIGNIFICANT CHANGE UP (ref 10–40)
BACTERIA # UR AUTO: NEGATIVE /HPF — SIGNIFICANT CHANGE UP
BASE EXCESS BLDV CALC-SCNC: 5.6 MMOL/L — HIGH (ref -2–3)
BASOPHILS # BLD AUTO: 0.04 K/UL — SIGNIFICANT CHANGE UP (ref 0–0.2)
BASOPHILS NFR BLD AUTO: 0.2 % — SIGNIFICANT CHANGE UP (ref 0–2)
BILIRUB SERPL-MCNC: 0.3 MG/DL — SIGNIFICANT CHANGE UP (ref 0.2–1.2)
BILIRUB UR-MCNC: NEGATIVE — SIGNIFICANT CHANGE UP
BUN SERPL-MCNC: 26 MG/DL — HIGH (ref 7–23)
CA-I SERPL-SCNC: 1.19 MMOL/L — SIGNIFICANT CHANGE UP (ref 1.15–1.33)
CALCIUM SERPL-MCNC: 9.2 MG/DL — SIGNIFICANT CHANGE UP (ref 8.4–10.5)
CAST: 0 /LPF — SIGNIFICANT CHANGE UP (ref 0–4)
CHLORIDE BLDV-SCNC: 104 MMOL/L — SIGNIFICANT CHANGE UP (ref 96–108)
CHLORIDE SERPL-SCNC: 104 MMOL/L — SIGNIFICANT CHANGE UP (ref 96–108)
CO2 BLDV-SCNC: 36 MMOL/L — HIGH (ref 22–26)
CO2 SERPL-SCNC: 26 MMOL/L — SIGNIFICANT CHANGE UP (ref 22–31)
COLOR SPEC: YELLOW — SIGNIFICANT CHANGE UP
CREAT SERPL-MCNC: 0.74 MG/DL — SIGNIFICANT CHANGE UP (ref 0.5–1.3)
DIFF PNL FLD: ABNORMAL
EGFR: 78 ML/MIN/1.73M2 — SIGNIFICANT CHANGE UP
EOSINOPHIL # BLD AUTO: 0.09 K/UL — SIGNIFICANT CHANGE UP (ref 0–0.5)
EOSINOPHIL NFR BLD AUTO: 0.4 % — SIGNIFICANT CHANGE UP (ref 0–6)
FLUAV AG NPH QL: SIGNIFICANT CHANGE UP
FLUBV AG NPH QL: SIGNIFICANT CHANGE UP
GAS PNL BLDV: 139 MMOL/L — SIGNIFICANT CHANGE UP (ref 136–145)
GAS PNL BLDV: SIGNIFICANT CHANGE UP
GAS PNL BLDV: SIGNIFICANT CHANGE UP
GLUCOSE BLDV-MCNC: 116 MG/DL — HIGH (ref 70–99)
GLUCOSE SERPL-MCNC: 129 MG/DL — HIGH (ref 70–99)
GLUCOSE UR QL: NEGATIVE MG/DL — SIGNIFICANT CHANGE UP
HCO3 BLDV-SCNC: 34 MMOL/L — HIGH (ref 22–29)
HCT VFR BLD CALC: 37.9 % — SIGNIFICANT CHANGE UP (ref 34.5–45)
HCT VFR BLDA CALC: 36 % — SIGNIFICANT CHANGE UP (ref 34.5–46.5)
HGB BLD CALC-MCNC: 11.9 G/DL — SIGNIFICANT CHANGE UP (ref 11.7–16.1)
HGB BLD-MCNC: 11.6 G/DL — SIGNIFICANT CHANGE UP (ref 11.5–15.5)
IMM GRANULOCYTES NFR BLD AUTO: 1.6 % — HIGH (ref 0–0.9)
INR BLD: 0.96 RATIO — SIGNIFICANT CHANGE UP (ref 0.85–1.18)
KETONES UR-MCNC: NEGATIVE MG/DL — SIGNIFICANT CHANGE UP
LACTATE BLDV-MCNC: 2.1 MMOL/L — HIGH (ref 0.5–2)
LEUKOCYTE ESTERASE UR-ACNC: ABNORMAL
LYMPHOCYTES # BLD AUTO: 1.25 K/UL — SIGNIFICANT CHANGE UP (ref 1–3.3)
LYMPHOCYTES # BLD AUTO: 5.9 % — LOW (ref 13–44)
MAGNESIUM SERPL-MCNC: 2.2 MG/DL — SIGNIFICANT CHANGE UP (ref 1.6–2.6)
MCHC RBC-ENTMCNC: 28.9 PG — SIGNIFICANT CHANGE UP (ref 27–34)
MCHC RBC-ENTMCNC: 30.6 GM/DL — LOW (ref 32–36)
MCV RBC AUTO: 94.3 FL — SIGNIFICANT CHANGE UP (ref 80–100)
MONOCYTES # BLD AUTO: 1.14 K/UL — HIGH (ref 0–0.9)
MONOCYTES NFR BLD AUTO: 5.4 % — SIGNIFICANT CHANGE UP (ref 2–14)
NEUTROPHILS # BLD AUTO: 18.17 K/UL — HIGH (ref 1.8–7.4)
NEUTROPHILS NFR BLD AUTO: 86.5 % — HIGH (ref 43–77)
NITRITE UR-MCNC: NEGATIVE — SIGNIFICANT CHANGE UP
NRBC # BLD: 0 /100 WBCS — SIGNIFICANT CHANGE UP (ref 0–0)
NT-PROBNP SERPL-SCNC: 494 PG/ML — HIGH (ref 0–300)
OTHER CELLS CSF MANUAL: 8.6 ML/DL — LOW (ref 18–22)
PCO2 BLDV: 65 MMHG — HIGH (ref 39–42)
PH BLDV: 7.32 — SIGNIFICANT CHANGE UP (ref 7.32–7.43)
PH UR: 6 — SIGNIFICANT CHANGE UP (ref 5–8)
PLATELET # BLD AUTO: 259 K/UL — SIGNIFICANT CHANGE UP (ref 150–400)
PO2 BLDV: 35 MMHG — SIGNIFICANT CHANGE UP (ref 25–45)
POTASSIUM BLDV-SCNC: 3.8 MMOL/L — SIGNIFICANT CHANGE UP (ref 3.5–5.1)
POTASSIUM SERPL-MCNC: 4.2 MMOL/L — SIGNIFICANT CHANGE UP (ref 3.5–5.3)
POTASSIUM SERPL-SCNC: 4.2 MMOL/L — SIGNIFICANT CHANGE UP (ref 3.5–5.3)
PROT SERPL-MCNC: 6.8 G/DL — SIGNIFICANT CHANGE UP (ref 6–8.3)
PROT UR-MCNC: NEGATIVE MG/DL — SIGNIFICANT CHANGE UP
PROTHROM AB SERPL-ACNC: 10.6 SEC — SIGNIFICANT CHANGE UP (ref 9.5–13)
RBC # BLD: 4.02 M/UL — SIGNIFICANT CHANGE UP (ref 3.8–5.2)
RBC # FLD: 17.6 % — HIGH (ref 10.3–14.5)
RBC CASTS # UR COMP ASSIST: 0 /HPF — SIGNIFICANT CHANGE UP (ref 0–4)
REVIEW: SIGNIFICANT CHANGE UP
RSV RNA NPH QL NAA+NON-PROBE: SIGNIFICANT CHANGE UP
SAO2 % BLDV: 51.9 % — LOW (ref 67–88)
SARS-COV-2 RNA SPEC QL NAA+PROBE: SIGNIFICANT CHANGE UP
SODIUM SERPL-SCNC: 145 MMOL/L — SIGNIFICANT CHANGE UP (ref 135–145)
SP GR SPEC: 1.01 — SIGNIFICANT CHANGE UP (ref 1–1.03)
SQUAMOUS # UR AUTO: 0 /HPF — SIGNIFICANT CHANGE UP (ref 0–5)
TROPONIN T, HIGH SENSITIVITY RESULT: 33 NG/L — SIGNIFICANT CHANGE UP (ref 0–51)
UROBILINOGEN FLD QL: 0.2 MG/DL — SIGNIFICANT CHANGE UP (ref 0.2–1)
WBC # BLD: 21.03 K/UL — HIGH (ref 3.8–10.5)
WBC # FLD AUTO: 21.03 K/UL — HIGH (ref 3.8–10.5)
WBC UR QL: 270 /HPF — HIGH (ref 0–5)
YEAST-LIKE CELLS: PRESENT

## 2024-07-01 PROCEDURE — 71250 CT THORAX DX C-: CPT | Mod: 26

## 2024-07-01 PROCEDURE — 99222 1ST HOSP IP/OBS MODERATE 55: CPT

## 2024-07-01 PROCEDURE — 99291 CRITICAL CARE FIRST HOUR: CPT | Mod: FS

## 2024-07-01 PROCEDURE — 78582 LUNG VENTILAT&PERFUS IMAGING: CPT | Mod: 26

## 2024-07-01 PROCEDURE — 71045 X-RAY EXAM CHEST 1 VIEW: CPT | Mod: 26

## 2024-07-01 RX ORDER — ALBUTEROL 90 MCG
2 AEROSOL REFILL (GRAM) INHALATION EVERY 8 HOURS
Refills: 0 | Status: DISCONTINUED | OUTPATIENT
Start: 2024-07-01 | End: 2024-07-11

## 2024-07-01 RX ORDER — PIPERACILLIN SODIUM AND TAZOBACTAM SODIUM 3; .375 G/15ML; G/15ML
3.38 INJECTION, POWDER, LYOPHILIZED, FOR SOLUTION INTRAVENOUS ONCE
Refills: 0 | Status: COMPLETED | OUTPATIENT
Start: 2024-07-01 | End: 2024-07-01

## 2024-07-01 RX ORDER — METHYLPREDNISOLONE ACETATE 20 MG/ML
20 VIAL (ML) INJECTION THREE TIMES A DAY
Refills: 0 | Status: DISCONTINUED | OUTPATIENT
Start: 2024-07-01 | End: 2024-07-05

## 2024-07-01 RX ORDER — ACETAMINOPHEN 325 MG
650 TABLET ORAL ONCE
Refills: 0 | Status: COMPLETED | OUTPATIENT
Start: 2024-07-01 | End: 2024-07-01

## 2024-07-01 RX ORDER — FLUCONAZOLE 200 MG
150 TABLET ORAL ONCE
Refills: 0 | Status: COMPLETED | OUTPATIENT
Start: 2024-07-01 | End: 2024-07-01

## 2024-07-01 RX ORDER — TEMAZEPAM 30 MG/1
15 CAPSULE ORAL AT BEDTIME
Refills: 0 | Status: DISCONTINUED | OUTPATIENT
Start: 2024-07-01 | End: 2024-07-08

## 2024-07-01 RX ORDER — ENOXAPARIN SODIUM 100 MG/ML
40 INJECTION SUBCUTANEOUS EVERY 12 HOURS
Refills: 0 | Status: DISCONTINUED | OUTPATIENT
Start: 2024-07-01 | End: 2024-07-10

## 2024-07-01 RX ORDER — FLUOXETINE HCL 40 MG
20 CAPSULE ORAL DAILY
Refills: 0 | Status: DISCONTINUED | OUTPATIENT
Start: 2024-07-01 | End: 2024-07-11

## 2024-07-01 RX ORDER — MIRTAZAPINE 15 MG/1
7.5 TABLET, FILM COATED ORAL AT BEDTIME
Refills: 0 | Status: DISCONTINUED | OUTPATIENT
Start: 2024-07-01 | End: 2024-07-11

## 2024-07-01 RX ORDER — CEFTRIAXONE SODIUM 500 MG
1000 VIAL (EA) INJECTION ONCE
Refills: 0 | Status: COMPLETED | OUTPATIENT
Start: 2024-07-01 | End: 2024-07-01

## 2024-07-01 RX ORDER — PANTOPRAZOLE SODIUM 40 MG/10ML
40 INJECTION, POWDER, FOR SOLUTION INTRAVENOUS
Refills: 0 | Status: DISCONTINUED | OUTPATIENT
Start: 2024-07-01 | End: 2024-07-11

## 2024-07-01 RX ORDER — LOSARTAN POTASSIUM 100 MG/1
50 TABLET, FILM COATED ORAL DAILY
Refills: 0 | Status: DISCONTINUED | OUTPATIENT
Start: 2024-07-01 | End: 2024-07-10

## 2024-07-01 RX ORDER — CEFTRIAXONE SODIUM 500 MG
1000 VIAL (EA) INJECTION EVERY 24 HOURS
Refills: 0 | Status: DISCONTINUED | OUTPATIENT
Start: 2024-07-01 | End: 2024-07-01

## 2024-07-01 RX ORDER — METOPROLOL TARTRATE 50 MG
50 TABLET ORAL DAILY
Refills: 0 | Status: DISCONTINUED | OUTPATIENT
Start: 2024-07-01 | End: 2024-07-11

## 2024-07-01 RX ORDER — HEPARIN SODIUM 50 [USP'U]/ML
5000 INJECTION, SOLUTION INTRAVENOUS EVERY 12 HOURS
Refills: 0 | Status: DISCONTINUED | OUTPATIENT
Start: 2024-07-01 | End: 2024-07-01

## 2024-07-01 RX ORDER — ASPIRIN 325 MG/1
1 TABLET, FILM COATED ORAL
Refills: 0 | DISCHARGE

## 2024-07-01 RX ORDER — IPRATROPIUM BROMIDE AND ALBUTEROL SULFATE .5; 3 MG/3ML; MG/3ML
3 SOLUTION RESPIRATORY (INHALATION) EVERY 6 HOURS
Refills: 0 | Status: DISCONTINUED | OUTPATIENT
Start: 2024-07-01 | End: 2024-07-11

## 2024-07-01 RX ORDER — POLYETHYLENE GLYCOL 3350 1 G/G
17 POWDER ORAL DAILY
Refills: 0 | Status: DISCONTINUED | OUTPATIENT
Start: 2024-07-01 | End: 2024-07-11

## 2024-07-01 RX ORDER — SENNOSIDES 8.6 MG
2 TABLET ORAL AT BEDTIME
Refills: 0 | Status: DISCONTINUED | OUTPATIENT
Start: 2024-07-01 | End: 2024-07-11

## 2024-07-01 RX ORDER — CHOLECALCIFEROL (VITAMIN D3) 125 MCG
1 CAPSULE ORAL
Refills: 0 | DISCHARGE

## 2024-07-01 RX ORDER — FUROSEMIDE 10 MG/ML
20 INJECTION, SOLUTION INTRAMUSCULAR; INTRAVENOUS DAILY
Refills: 0 | Status: DISCONTINUED | OUTPATIENT
Start: 2024-07-02 | End: 2024-07-10

## 2024-07-01 RX ORDER — IPRATROPIUM BROMIDE AND ALBUTEROL SULFATE .5; 3 MG/3ML; MG/3ML
3 SOLUTION RESPIRATORY (INHALATION)
Refills: 0 | Status: COMPLETED | OUTPATIENT
Start: 2024-07-01 | End: 2024-07-01

## 2024-07-01 RX ORDER — METHYLPREDNISOLONE ACETATE 20 MG/ML
125 VIAL (ML) INJECTION ONCE
Refills: 0 | Status: COMPLETED | OUTPATIENT
Start: 2024-07-01 | End: 2024-07-01

## 2024-07-01 RX ORDER — AZITHROMYCIN 250 MG/1
500 TABLET, FILM COATED ORAL ONCE
Refills: 0 | Status: COMPLETED | OUTPATIENT
Start: 2024-07-01 | End: 2024-07-01

## 2024-07-01 RX ORDER — PIPERACILLIN SODIUM AND TAZOBACTAM SODIUM 3; .375 G/15ML; G/15ML
3.38 INJECTION, POWDER, LYOPHILIZED, FOR SOLUTION INTRAVENOUS EVERY 8 HOURS
Refills: 0 | Status: DISCONTINUED | OUTPATIENT
Start: 2024-07-02 | End: 2024-07-06

## 2024-07-01 RX ORDER — FUROSEMIDE 10 MG/ML
20 INJECTION, SOLUTION INTRAMUSCULAR; INTRAVENOUS ONCE
Refills: 0 | Status: COMPLETED | OUTPATIENT
Start: 2024-07-01 | End: 2024-07-01

## 2024-07-01 RX ORDER — ATORVASTATIN CALCIUM 20 MG/1
20 TABLET, FILM COATED ORAL AT BEDTIME
Refills: 0 | Status: DISCONTINUED | OUTPATIENT
Start: 2024-07-01 | End: 2024-07-11

## 2024-07-01 RX ORDER — BUDESONIDE/FORMOTEROL FUMARATE 160-4.5MCG
2 HFA AEROSOL WITH ADAPTER (GRAM) INHALATION
Refills: 0 | Status: DISCONTINUED | OUTPATIENT
Start: 2024-07-01 | End: 2024-07-11

## 2024-07-01 RX ADMIN — MIRTAZAPINE 7.5 MILLIGRAM(S): 15 TABLET, FILM COATED ORAL at 21:06

## 2024-07-01 RX ADMIN — IPRATROPIUM BROMIDE AND ALBUTEROL SULFATE 3 MILLILITER(S): .5; 3 SOLUTION RESPIRATORY (INHALATION) at 23:49

## 2024-07-01 RX ADMIN — ATORVASTATIN CALCIUM 20 MILLIGRAM(S): 20 TABLET, FILM COATED ORAL at 21:05

## 2024-07-01 RX ADMIN — IPRATROPIUM BROMIDE AND ALBUTEROL SULFATE 3 MILLILITER(S): .5; 3 SOLUTION RESPIRATORY (INHALATION) at 10:47

## 2024-07-01 RX ADMIN — AZITHROMYCIN 255 MILLIGRAM(S): 250 TABLET, FILM COATED ORAL at 11:08

## 2024-07-01 RX ADMIN — FUROSEMIDE 20 MILLIGRAM(S): 10 INJECTION, SOLUTION INTRAMUSCULAR; INTRAVENOUS at 13:32

## 2024-07-01 RX ADMIN — IPRATROPIUM BROMIDE AND ALBUTEROL SULFATE 3 MILLILITER(S): .5; 3 SOLUTION RESPIRATORY (INHALATION) at 11:08

## 2024-07-01 RX ADMIN — Medication 650 MILLIGRAM(S): at 23:49

## 2024-07-01 RX ADMIN — Medication 125 MILLIGRAM(S): at 10:48

## 2024-07-01 RX ADMIN — IPRATROPIUM BROMIDE AND ALBUTEROL SULFATE 3 MILLILITER(S): .5; 3 SOLUTION RESPIRATORY (INHALATION) at 10:41

## 2024-07-01 RX ADMIN — Medication 20 MILLIGRAM(S): at 13:32

## 2024-07-01 RX ADMIN — PIPERACILLIN SODIUM AND TAZOBACTAM SODIUM 25 GRAM(S): 3; .375 INJECTION, POWDER, LYOPHILIZED, FOR SOLUTION INTRAVENOUS at 21:06

## 2024-07-01 RX ADMIN — PIPERACILLIN SODIUM AND TAZOBACTAM SODIUM 200 GRAM(S): 3; .375 INJECTION, POWDER, LYOPHILIZED, FOR SOLUTION INTRAVENOUS at 16:30

## 2024-07-01 RX ADMIN — Medication 20 MILLIGRAM(S): at 21:05

## 2024-07-01 RX ADMIN — Medication 100 MILLIGRAM(S): at 10:48

## 2024-07-01 RX ADMIN — TEMAZEPAM 15 MILLIGRAM(S): 30 CAPSULE ORAL at 23:49

## 2024-07-01 RX ADMIN — Medication 150 MILLIGRAM(S): at 12:22

## 2024-07-01 NOTE — H&P ADULT - HISTORY OF PRESENT ILLNESS
88 yo F            with a PMH of COPD, HTN, diverticulitis,           asthma/COPD, sarcoidosis, hypothyroidism, heart failure,         recent admission of resp failure requiring  bipap          presents from Assisted Living for acute onset of   sob,  and difficulty breathing.     Per EMS pt was hypoxic on scene, was placed on NRB with improvement in oxygen saturation.      Pt denies chest pain. No reported fever, chills.

## 2024-07-01 NOTE — CONSULT NOTE ADULT - SUBJECTIVE AND OBJECTIVE BOX
Date of Service, 07-01-24 @ 23:03  CHIEF COMPLAINT:Patient is a 89y old  Female who presents with a chief complaint of sob (01 Jul 2024 15:54)      HPI:  88 yo F with a PMH of COPD, HTN, diverticulitis, asthma/COPD, sarcoidosis, hypothyroidism, heart failure, recent admission of resp failure requiring BIPAP presents from Assisted Living for acute onset of SOB and difficulty breathing. Per EMS pt was hypoxic on scene, was placed on NRB with improvement in oxygen saturation. Pt denies chest pain. No reported fever, chills.      PAST MEDICAL & SURGICAL HISTORY:  HTN (hypertension)  Hypothyroidism  Osteoporosis  CAD (coronary artery disease)  COPD (chronic obstructive pulmonary disease)  Pulmonary sarcoidosis  H/O pyelonephritis  Acute diverticulitis      MEDICATIONS  (STANDING):  acetaminophen     Tablet .. 650 milliGRAM(s) Oral once  albuterol    90 MICROgram(s) HFA Inhaler 2 Puff(s) Inhalation every 8 hours  albuterol/ipratropium for Nebulization 3 milliLiter(s) Nebulizer every 6 hours  atorvastatin 20 milliGRAM(s) Oral at bedtime  budesonide  80 MICROgram(s)/formoterol 4.5 MICROgram(s) Inhaler 2 Puff(s) Inhalation two times a day  FLUoxetine 20 milliGRAM(s) Oral daily  heparin   Injectable 5000 Unit(s) SubCutaneous every 12 hours  losartan 50 milliGRAM(s) Oral daily  methylPREDNISolone sodium succinate Injectable 20 milliGRAM(s) IV Push three times a day  metoprolol succinate ER 50 milliGRAM(s) Oral daily  mirtazapine 7.5 milliGRAM(s) Oral at bedtime  pantoprazole    Tablet 40 milliGRAM(s) Oral before breakfast  polyethylene glycol 3350 17 Gram(s) Oral daily  senna 2 Tablet(s) Oral at bedtime    MEDICATIONS  (PRN):  temazepam 15 milliGRAM(s) Oral at bedtime PRN Insomnia      FAMILY HISTORY:      SOCIAL HISTORY:    [x ] Non-smoker  [ ] Smoker  [ ] Alcohol    Allergies    iodinated radiocontrast agents (Anaphylaxis)    Intolerances    	    REVIEW OF SYSTEMS:  CONSTITUTIONAL: No fever, weight loss, or fatigue  EYES: No eye pain, visual disturbances, or discharge  ENT:  No difficulty hearing, tinnitus, vertigo; No sinus or throat pain  NECK: No pain or stiffness  RESPIRATORY: No cough, wheezing, chills or hemoptysis; + Shortness of Breath  CARDIOVASCULAR: No chest pain, palpitations, passing out, dizziness, or leg swelling  GASTROINTESTINAL: No abdominal or epigastric pain. No nausea, vomiting, or hematemesis; No diarrhea or constipation. No melena or hematochezia.  GENITOURINARY: No dysuria, frequency, hematuria, or incontinence  NEUROLOGICAL: No headaches, memory loss, loss of strength, numbness, or tremors  SKIN: No itching, burning, rashes, or lesions   LYMPH Nodes: No enlarged glands  ENDOCRINE: No heat or cold intolerance; No hair loss  MUSCULOSKELETAL: No joint pain or swelling; No muscle, back, or extremity pain  PSYCHIATRIC: No depression, anxiety, mood swings, or difficulty sleeping  HEME/LYMPH: No easy bruising, or bleeding gums  ALLERGY AND IMMUNOLOGIC: No hives or eczema	    [x ] All others negative	  [ ] Unable to obtain    PHYSICAL EXAM:  T(C): 36.4 (07-01-24 @ 21:28), Max: 37 (07-01-24 @ 20:37)  HR: 73 (07-01-24 @ 21:28) (73 - 96)  BP: 116/65 (07-01-24 @ 21:28) (113/62 - 161/78)  RR: 18 (07-01-24 @ 21:28) (18 - 25)  SpO2: 96% (07-01-24 @ 21:28) (80% - 100%)  Wt(kg): --  I&O's Summary      Appearance: Normal	  HEENT:   Normal oral mucosa, PERRL, EOMI	  Lymphatic: No lymphadenopathy  Cardiovascular: Normal S1 S2, No JVD, + murmurs, No edema  Respiratory: decrease bs bl  Gastrointestinal:  Soft, Non-tender, + BS	  Skin: No rashes, No ecchymoses, No cyanosis	  Neurologic: Non-focal  Extremities: Normal range of motion, No clubbing, cyanosis or edema  Vascular: Peripheral pulses palpable 2+ bilaterally    TELEMETRY: 	    ECG:  	  RADIOLOGY:  OTHER: 	  	  LABS:	 	    CARDIAC MARKERS:                              11.6   21.03 )-----------( 259      ( 01 Jul 2024 10:19 )             37.9     07-01    145  |  104  |  26<H>  ----------------------------<  129<H>  4.2   |  26  |  0.74    Ca    9.2      01 Jul 2024 10:19  Mg     2.2     07-01    TPro  6.8  /  Alb  3.9  /  TBili  0.3  /  DBili  x   /  AST  21  /  ALT  26  /  AlkPhos  111  07-01    proBNP:   Lipid Profile:   HgA1c:   TSH:   PT/INR - ( 01 Jul 2024 10:19 )   PT: 10.6 sec;   INR: 0.96 ratio         PTT - ( 01 Jul 2024 10:19 )  PTT:28.9 sec    PREVIOUS DIAGNOSTIC TESTING:    < from: 12 Lead ECG (06.18.24 @ 17:33) >  Diagnosis Line SINUS TACHYCARDIA  ANTERIOR INFARCT (CITED ON OR BEFORE 18-JUN-2024)  ABNORMAL ECG  WHEN COMPARED WITH ECG OF 10-MAY-2024 07:51,  SIGNIFICANT CHANGES HAVE OCCURRED    < from: TTE Limited W or WO Ultrasound Enhancing Agent (04.08.24 @ 14:17) >   1. Left ventricular cavity is normal in size. Left ventricular wall thickness is normal. Left ventricular systolic function is normal with an ejection fraction visually estimated at 55 to 60 %. There are no regional wall motion abnormalities seen.   2. Normal rightventricular cavity size, with normal wall thickness, and normal systolic function. Tricuspid annular plane systolic excursion (TAPSE) is 2.0 cm (normal >=1.7 cm).   3. The left atrium is severely dilated.   4. Mild mitral regurgitation.   5. Comparedto the transthoracic echocardiogram performed on 3/26/2024, regional wall motion abnormality not appreciated.      < from: NM Pulmonary Ventilation/Perfusion Scan (07.01.24 @ 16:17) >  FINDINGS: Heterogeneous radiotracer distribution in both lungs with  radiotracer holdup in the origin of both mainstem bronchi likely due to   underlying ventilatory problems. Poor radiotracer distribution in both   lung bases right worse than left and worse on ventilation images.    Large perfusion defect is noted in the apical posterior and anterior left   upper lobe segments which are difficult to clearly compare to ventilation   images due to poor ventilation effort and indeterminate in nature.    Artifacts are noted on the ventilation and perfusion images limiting   accurate evaluation.    IMPRESSION:    Intermediate probability of pulmonary embolus.    < from: CT Chest No Cont (07.01.24 @ 16:56) >  IMPRESSION: Right middle lobe complete atelectasis as on May 10, 2024.   Right lower lobe partial atelectasis with interval improvement since May   10, 2024.    Opacification of the right lower lung central airways as described above   new since May 10, 2024 may be due to internal secretions although   aspiration cannot be similar appearance.    Cluster of ill-defined groundglass nodular opacities within the right   upper lobe is a nonspecific finding may be of infectious etiology.

## 2024-07-01 NOTE — ED PROVIDER NOTE - PHYSICAL EXAMINATION
CONSTITUTIONAL: Chronically ill appearing.   ENT: Airway patent, moist mucous membranes.   EYES: Pupils equal, round and reactive to light. EOMI. Conjunctiva normal appearing.   CARDIAC: Normal rate, regular rhythm.  Heart sounds S1, S2.    RESPIRATORY: Breath sounds clear and equal bilaterally. +increased WOB. O2 sat 100% on NRB.   GASTROINTESTINAL: Abdomen soft, non-tender, not distended.  MUSCULOSKELETAL: Moving all extremities.   NEUROLOGICAL: Alert and oriented x3, grossly normal.

## 2024-07-01 NOTE — ED ADULT NURSE NOTE - OBJECTIVE STATEMENT
89 year old female, A&Ox1-1 at baseline, pmh of c. diff, diverticulitis, asthma/COPD, carcoidosis, hypothyroidism, heart failure, presenting to ED    89-year-old woman with history of C. difficile, diverticulitis, asthma/COPD, sarcoidosis, hypothyroidism, hypertension, heart failure, presenting due to shortness of breath. Hypoxic respiratory failure. Considerations include PE, PNA, COPD exacerbation, CHF exacerbation or some permutation therein. Will treat for COPD empirically and obtain labs and imaging. Will initiate a premedication with steroids and anti-histamine in case PE ct indicated.     Lungs are clear, but wob is increased. Pt is oriented to self only.     Pt will need admission. 89 year old female, A&Ox1-1 at baseline, pmh of c. diff, diverticulitis, asthma/COPD, sarcoidosis, hypothyroidism, heart failure, bib EMS from [facility] for shortness of breath. On arrival, patient has increased work of breathing. SPO2 100% on 15 L NRB. Patient also has productive cough. A&Ox1-2 at baseline. Patient placed on cardiac monitor, NSR 80s. Peripheral pulses strong and equal bilaterally. IV placed and labs drawn. Safety and comfort measures maintained.

## 2024-07-01 NOTE — H&P ADULT - ASSESSMENT
89 year old woman,      h/o  emphysema, ,  c/c  L  hydroureter,  HTN.  depression   prior    h/o   C. difficile, diverticulitis,   pna        asthma/COPD, sarcoidosis,   c/c  diastolic  heart failure /  ulcer  left 2nd  toe /  no  osteo on prior  visit       admitted  with    sob.  arrives   from [facility]       wbc  of  21,000  on  arrival,  is  afebrile.  from  uti,/  probable  pna,           on  iv  rocephin/  iv steroid         elevated  d  dimer,   await  ct  chest/  r/o pna /  pe      h/o  underlying chronic lung disease./  emphysema      has   superficial     ulcerations, on  bony prominences  of hammer  toes.  not  infected    HTN/  HLD    c/c  diastolic  chf, on lasix/ card   known to  pr       on lipitor, lasix. cozaar, toprol     dvt  ppx     dr zamora  will  assume care        rad< from: [employer] W or WO Ultrasound Enhancing Agent (04.08.24 @ 14:17) >  CONCLUSIONS:   1. Left ventricular cavity is normal in size. Left ventricular wall thickness is normal. Left ventricular systolic function is normal with an ejection fraction visually estimated at 55 to 60 %. There are no regional wall motion abnormalities seen.   2. Normal rightventricular cavity size, with normal wall thickness, and normal systolic function. Tricuspid annular plane systolic excursion (TAPSE) is 2.0 cm (normal >=1.7 cm).   3. The left atrium is severely dilated.   4. Mild mitral regurgitation.   5. Comparedto the transthoracic echocardiogram performed on 3/26/2024, regional wall motion abnormality not appreciated.  ________________________________________________________________________________________  < end of copied text >                           89 year old woman,      h/o  emphysema, ,  c/c  L  hydroureter,  HTN.  depression   prior    h/o   C. difficile, diverticulitis,   pna        asthma/COPD, sarcoidosis,   c/c  diastolic  heart failure /  ulcer  left 2nd  toe /  no  osteo on prior  visit     [facility]  chart/  faxed medication records from [facility], ,  mirtazapine 7.5mg po qhs, fluoxetine 20mg po daily, and temazepam 30mg po qHs for insomnia.    admitted  with    sob.  arrives   from [facility]       wbc  of  21,000  on  arrival,  is  afebrile.  from  uti,/  probable  pna,           on  iv  rocephin/  iv steroid         elevated  d  dimer,   await  ct  chest/  r/o pna /  pe      h/o  underlying chronic lung disease./  emphysema      has   superficial     ulcerations, on  bony prominences  of hammer  toes.  not  infected    HTN/  HLD    c/c  diastolic  chf, on lasix/ card   known to  pr       on lipitor, lasix. cozaar, toprol     dvt  ppx     dr zamora  will  assume care        rad< from: [employer] W or WO Ultrasound Enhancing Agent (04.08.24 @ 14:17) >  CONCLUSIONS:   1. Left ventricular cavity is normal in size. Left ventricular wall thickness is normal. Left ventricular systolic function is normal with an ejection fraction visually estimated at 55 to 60 %. There are no regional wall motion abnormalities seen.   2. Normal rightventricular cavity size, with normal wall thickness, and normal systolic function. Tricuspid annular plane systolic excursion (TAPSE) is 2.0 cm (normal >=1.7 cm).   3. The left atrium is severely dilated.   4. Mild mitral regurgitation.   5. Comparedto the transthoracic echocardiogram performed on 3/26/2024, regional wall motion abnormality not appreciated.  ________________________________________________________________________________________  < end of copied text >                           89 year old woman,      h/o  emphysema, ,  c/c  L  hydroureter,  HTN.  depression   prior    h/o   C. difficile, diverticulitis,   pna        asthma/COPD, sarcoidosis,   c/c  diastolic  heart failure /  ulcer  left 2nd  toe /  no  osteo on prior  visit     prior   chart/  from psych  note,   faxed medication records from [facility], ,  mirtazapine 7.5mg po qhs, fluoxetine 20mg po daily, and temazepam 30mg po qHs for insomnia.    admitted  with    sob.  arrives   from [facility]       wbc  of  21,000  on  arrival,  is  afebrile.  from  uti,/  probable  pna,           on  iv  rocephin/  iv steroid /  nebs           elevated  d  dimer,   await  ct  chest/  r/o pna //  has   severe  contrast allergy   v/q  scan. ordered     h/o  underlying chronic lung disease./  emphysema      has   superficial     ulcerations, on  bony prominences  of hammer  toes.  not  infected    HTN/  HLD    c/c  diastolic  chf, on lasix/ card   known to  pt    Anxiety,  seen by psych  on  [facility]  visit, on  paxil,  remeron./   temazapam.   prn  dose  lowered.  pt  frail, bmi  18      on lipitor, lasix. cozaar, toprol     dvt  ppx     dr zamora  will  assume care        rad< from: [employer] W or WO Ultrasound Enhancing Agent (04.08.24 @ 14:17) >  CONCLUSIONS:   1. Left ventricular cavity is normal in size. Left ventricular wall thickness is normal. Left ventricular systolic function is normal with an ejection fraction visually estimated at 55 to 60 %. There are no regional wall motion abnormalities seen.   2. Normal rightventricular cavity size, with normal wall thickness, and normal systolic function. Tricuspid annular plane systolic excursion (TAPSE) is 2.0 cm (normal >=1.7 cm).   3. The left atrium is severely dilated.   4. Mild mitral regurgitation.   5. Comparedto the transthoracic echocardiogram performed on 3/26/2024, regional wall motion abnormality not appreciated.  ________________________________________________________________________________________  < end of copied text >

## 2024-07-01 NOTE — H&P ADULT - NSHPPHYSICALEXAM_GEN_ALL_CORE
T(C): 36.7 (07-01-24 @ 10:55), Max: 36.7 (07-01-24 @ 10:55)  HR: 96 (07-01-24 @ 11:33) (76 - 96)  BP: 161/78 (07-01-24 @ 11:33) (117/76 - 161/78)  RR: 25 (07-01-24 @ 11:33) (20 - 25)  SpO2: 100% (07-01-24 @ 11:33) (80% - 100%)  GENERAL: NAD, lying in bed comfortably  HEAD:  Atraumatic, normocephalic  EYES: EOMI, PERRLA, conjunctiva and sclera clear  NECK: Supple, trachea midline, no JVD  HEART: Regular rate and rhythm, no murmurs, rubs, or gallops  LUNGS: Unlabored respirations.  , decerased  b/sounds.  bases  ABDOMEN: Soft, nontender, nondistended, +BS  EXTREMITIES: 2+ peripheral pulses bilaterally. No clubbing, cyanosis, or edema  NERVOUS SYSTEM:  A&Ox3, moving all extremities, no focal deficits   SKIN: No rashes or lesions

## 2024-07-01 NOTE — ED ADULT NURSE NOTE - PATIENT IS UNABLE TO BE SCREENED DUE TO:
Neurology    EEG w/ epileptiform discharges. Fosphenytoin/Vimpat load. Will continue Vimpat 100 mg BID. Repeat EEG first thing tomorrow. cvEEG may be considered. I attempted to call the patient's  per request, though rang busy twice. Will try again tomorrow. Thank you.       Matt Weston NP  [address] [place] [address] [id number] Neurology Acuity of illness

## 2024-07-01 NOTE — CONSULT NOTE ADULT - SUBJECTIVE AND OBJECTIVE BOX
Patient is a 89y old  Female who presents with a chief complaint of sob (01 Jul 2024 12:40)    HPI:  89 yo F            with a PMH of COPD, HTN, diverticulitis,           asthma/COPD, sarcoidosis, hypothyroidism, heart failure,         recent admission of resp failure requiring  bipap          presents from [facility]isted [facility] for acute onset of   sob,  and difficulty breathing.     Per EMS pt was hypoxic on scene, was placed on NRB with improvement in oxygen saturation.      Pt denies chest pain. No reported fever, chills. (01 Jul 2024 12:40)      PAST MEDICAL & SURGICAL HISTORY:  HTN (hypertension)      Hypothyroidism      Osteoporosis      CAD (coronary artery disease)      COPD (chronic obstructive pulmonary disease)      Pulmonary sarcoidosis      H/O pyelonephritis      Acute diverticulitis          Social history:    FAMILY HISTORY:    REVIEW OF SYSTEMS  General:	Denies any malaise fatigue or chills. Fevers absent         Allergic/Immunologic:	No hives or rash   Allergies    iodinated radiocontrast agents (Anaphylaxis)    Intolerances        Antimicrobials:    cefTRIAXone   IVPB 1000 milliGRAM(s) IV Intermittent every 24 hours        Vital Signs Last 24 Hrs  T(C): 36.8 (01 Jul 2024 14:32), Max: 36.8 (01 Jul 2024 14:32)  T(F): 98.2 (01 Jul 2024 14:32), Max: 98.2 (01 Jul 2024 14:32)  HR: 76 (01 Jul 2024 14:32) (76 - 96)  BP: 129/74 (01 Jul 2024 14:32) (117/76 - 161/78)  BP(mean): --  RR: 22 (01 Jul 2024 14:32) (20 - 25)  SpO2: 98% (01 Jul 2024 14:32) (80% - 100%)    Parameters below as of 01 Jul 2024 14:32  Patient On (Oxygen Delivery Method): nasal cannula  O2 Flow (L/min): 3      PHYSICAL EXAM:Pleasant patient in no acute distress.      Constitutional:Comfortable.Awake and alert  No cachexia     Eyes:PERRL EOMI.NO discharge or conjunctival injection    ENMT:No sinus tenderness.No thrush.No pharyngeal exudate or erythema.Fair dental hygiene    Neck:Supple,No LN,no JVD      Respiratory:Good air entry bilaterally,CTA    Cardiovascular:S1 S2 wnl, No murmurs,rub or gallops       Vascular:peripheral pulses felt    Neurological:AAO X 3,No grossly focal deficits    Skin:No rash     Lymph Nodes:No palpable LNs    Musculoskeletal:No joint swelling or LOM    Psychiatric:Affect normal.                                11.6   21.03 )-----------( 259      ( 01 Jul 2024 10:19 )             37.9         07-01    145  |  104  |  26<H>  ----------------------------<  129<H>  4.2   |  26  |  0.74    Ca    9.2      01 Jul 2024 10:19  Mg     2.2     07-01    TPro  6.8  /  Alb  3.9  /  TBili  0.3  /  DBili  x   /  AST  21  /  ALT  26  /  AlkPhos  111  07-01      RECENT CULTURES:      MICROBIOLOGY:          Radiology:      Assessment:        Recommendations and Plan:    Pager [phone]  After 5 pm/weekends or if no response :[phone]

## 2024-07-01 NOTE — ED PROVIDER NOTE - CLINICAL SUMMARY MEDICAL DECISION MAKING FREE TEXT BOX
Dr. Millan  89-year-old woman with history of C. difficile, diverticulitis, asthma/COPD, sarcoidosis, hypothyroidism, hypertension, heart failure, presenting due to shortness of breath. Hypoxic respiratory failure. Considerations include PE, PNA, COPD exacerbation, CHF exacerbation or some permutation therein. Will treat for COPD empirically and obtain labs and imaging. Will initiate a premedication with steroids and anti-histamine in case PE ct indicated.     Lungs are clear, but wob is increased. Pt is oriented to self only.     Pt will need admission.

## 2024-07-01 NOTE — ED PROVIDER NOTE - WHICH SHOWED
I read ekg as nsr rate, LAD, septal qs, t waves inverted in iii, v3, v2, qtc 440 no st elevation or depression.

## 2024-07-01 NOTE — ED PROVIDER NOTE - ATTENDING APP SHARED VISIT CONTRIBUTION OF CARE
Critical care for hypoxia 30 minutes    89-year-old woman with history of C. difficile, diverticulitis, asthma/COPD, sarcoidosis, hypothyroidism, hypertension, heart failure, presenting due to shortness of breath. Hypoxic respiratory failure. Considerations include PE, PNA, COPD exacerbation, CHF exacerbation or some permutation therein. Will treat for COPD empirically and obtain labs and imaging. Will initiate a premedication with steroids and anti-histamine in case PE ct indicated.     Lungs are clear, but wob is increased.     Pt will need admission.

## 2024-07-01 NOTE — H&P ADULT - PATIENT'S PREFERRED PRONOUN
Preoperative Diagnoses:  1. Left L4-5 and L5-S1 disc herniation  2. Lumbar radiculopathy  3. 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 Left L4-5 microdiscectomy  2. Left L5-S1 microdiskectomy  3. Use of operating microscope    Surgeon:  Sancho Frazier M.D.    Assistant Surgeon:  DENICE Mejia     Anesthesia:  General endotracheal anesthesia    Indications:  Kristal is a very pleasant 33-year-old with severe left lower extremity pain. I discussed the role of surgical intervention. I discussed alternatives to surgery. I discussed the nature of the above procedures in detail. I discussed the associated risks and possible complications. These include but are not limited to infection, incidental durotomy, neurovascular injury, deep venous thrombosis, pulmonary embolism, failure to improve, permanent weakness, permanent numbness, chronic back pain, the need for additional procedures in the future, acute blood loss anemia requiring blood transfusion, recurrent disc herniation, and the fact that the patient's overall condition may be worse following surgery. After this discussion, the patient voiced understanding and elected to proceed.    Technique:  Informed consent was obtained. I marked the operative site myself with the patient alert and awake in the holding area. STEPHANE hose and serial compression devices were applied to the lower extremities. The patient was given IV antibiotics. The patient was taken to the operating room. General endotracheal anesthesia was induced. The patient was transferred to the operating table with the Kvng frame and placed prone. All bony prominences were well-padded. The head, face, eyes, and peripheral nerves were free from excessive compression. The skin overlying the lumbar spine was sterilely prepared with alcohol.    An 18-gauge needle was placed at the level of the L4-5 and L5-S1 discs. A lateral radiograph was  obtained, confirming needle placement at the appropriate levels. The skin overlying the lumbar spine was sterilely prepared and draped. A midline 5 cm skin incision was made centered over the L4-5 and L5-S1 interspaces.  The subcutaneous layer was exposed. The dorsolumbar fascia was exposed and incised in line with the skin incision. A left sided approach was taken to the L4-5 and L5-S1 interspaces. The fascia and muscle were reflected off the L4 and L5 spinous processes. The L4 and L5 laminae were exposed. Great care was taken to preserve the left L4-5 and L5-S1 facet joint capsules. Curettes were placed under the inferior edge of the L4 and L5 laminae.  A lateral radiograph was obtained, confirming dissection of the appropriate levels.    The skin, subcutaneous layers, fascia, and muscle were infiltrated with a total of 30 cc of 0.25% Marcaine without epinephrine.    Appropriate size retractors were placed. The operating microscope was sterilely draped and brought into the surgical field. The remainder of the procedure was completed using the operating microscope. This allowed for excellent visualization by the surgeon and assistant. Use of the operating microscope allowed for a smaller incision with less dissection.    A left L4 hemilaminotomy was created using a high-speed bur, curettes, and Kerrison rongeurs. A minimal left L4-5 medial facetectomy was completed. Less than one quarter of the medial left L4-5 facet joint was resected. Exposure of the left L4-5 lateral recess was completed with resection of a lateral portion of the ligamentum flavum. The left L5 pedicle was identified. The left L5 nerve root was identified. Retraction of the left L5 nerve root towards midline, but not past midline, exposed a large contained central disc herniation. The large disc herniation severely compressed the traversing left L5 nerve root. A Penfield 4 instrument was used to create an annulotomy. Through this annulotomy,  multiple fragments of disc were removed. Using micro-pituitary rongeurs, curettes, and pulsatile lavage, many fragments of disc were excised. All loose fragments of disc were removed from within the L4-5 interspace.  Tanisha curettes were used to excise the disc osteophyte complex.  At the completion of the discectomy, no further free fragments of disc were identified. The severe left L4-5 lateral recess was adequately decompressed. The left L5 nerve root traversed and exited freely without compression. This completed the left L4-5 microdiscectomy.    A left L5 inferior hemilaminotomy and a left S1 superior hemilaminotomy were created using a high-speed bur, curettes, and Kerrison rongeurs. A minimal left L5-S1 medial facetectomy was completed. Less than one quarter of the medial left L5-S1 facet joint was resected. Exposure of the left L5-S1 lateral recess was completed with resection of a lateral portion of the ligamentum flavum. The left S1 pedicle was identified. The left S1 nerve root was identified. Retraction of the left S1 nerve root towards midline, but not past midline, exposed a large contained calcified disc herniation with a large disc osteophyte complex. The large disc herniation severely compressed the traversing left S1 nerve root.  Tanisha curettes were used to excise the large disc osteophyte complex.  This exposed an annulotomy. Through this annulotomy, multiple fragments of disc were removed. Using micro-pituitary rongeurs, curettes, and pulsatile lavage, many fragments of disc were excised. All loose fragments of disc were removed from within the L5-S1 interspace.  At the completion of the discectomy, no further free fragments of disc were identified. The severe left L5-S1 lateral recess was adequately decompressed. The left S1 nerve root traversed and exited freely without compression. This completed left L5-S1 microdiscectomy.    The wound was filled with dilute Betadine irrigation. 1 L of  dilute Betadine was flushed through the wound over 3 minutes. The Betadine was then completely flushed with normal saline solution. This completed dilute Betadine irrigation of the wound.    The wound was reinspected. No further free fragments of disc were identified. Inspection of the nerve roots and dural sac revealed no abnormalities. No evidence of tear or leak.     Locally harvested fat was placed over the hemilaminotomy site.    The dorsal lumbar fascia was reapproximated using #1 Vicryl. The subcutaneous layer was closed with 2-0 Vicryl. The skin was closed in subcuticular fashion with 4-0 Monocryl. The incision was dressed in Steri-Strips and a sterile dressing.    The patient was transferred from the operating table to the hospital bed. The patient emerged from general endotracheal anesthesia without complication and was taken to the post anesthesia care in good condition.    Throughout the procedure, hemostasis was obtained with bipolar cautery, thrombin-soaked gelfoam, FloSeal, cottonoid patties, and bone wax.  The retractors were periodically released and the wound was irrigated with antibiotic solution.      At the completion of the procedure, all instrument, sponge, and needle counts were reported as correct.    Complications:  None    Estimated blood loss:  Minimal    Specimens:  None    Drains:  None    Her/She

## 2024-07-01 NOTE — ED PROVIDER NOTE - OBJECTIVE STATEMENT
88 yo F with a PMH of COPD, HTN, diverticulitis, asthma/COPD, sarcoidosis, hypothyroidism, heart failure, recent admission of resp failure requiring BIPAP presents from [facility]isted [facility] for acute onset of SOB and difficulty breathing. Per EMS pt was hypoxic on scene, was placed on NRB with improvement in oxygen saturation. Pt denies chest pain. No reported fever, chills.

## 2024-07-01 NOTE — ED ADULT NURSE NOTE - NSFALLHARMRISKINTERV_ED_ALL_ED

## 2024-07-01 NOTE — H&P ADULT - NSHPLABSRESULTS_GEN_ALL_CORE
LABS:                        11.6   21.03 )-----------( 259      ( 2024 10:19 )             37.9     07-    145  |  104  |  26<H>  ----------------------------<  129<H>  4.2   |  26  |  0.74    Ca    9.2      2024 10:19  Mg     2.2         TPro  6.8  /  Alb  3.9  /  TBili  0.3  /  DBili  x   /  AST  21  /  ALT  26  /  AlkPhos  111  07-    PT/INR - ( 2024 10:19 )   PT: 10.6 sec;   INR: 0.96 ratio         PTT - ( 2024 10:19 )  PTT:28.9 sec      Urinalysis Basic - ( 2024 10:49 )    Color: Yellow / Appearance: Cloudy / S.012 / pH: x  Gluc: x / Ketone: Negative mg/dL  / Bili: Negative / Urobili: 0.2 mg/dL   Blood: x / Protein: Negative mg/dL / Nitrite: Negative   Leuk Esterase: Large / RBC: 0 /HPF /  /HPF   Sq Epi: x / Non Sq Epi: 0 /HPF / Bacteria: Negative /HPF           @ 10:17  3.8  35

## 2024-07-02 LAB
ANION GAP SERPL CALC-SCNC: 16 MMOL/L — SIGNIFICANT CHANGE UP (ref 5–17)
BUN SERPL-MCNC: 30 MG/DL — HIGH (ref 7–23)
CALCIUM SERPL-MCNC: 8.9 MG/DL — SIGNIFICANT CHANGE UP (ref 8.4–10.5)
CHLORIDE SERPL-SCNC: 98 MMOL/L — SIGNIFICANT CHANGE UP (ref 96–108)
CO2 SERPL-SCNC: 27 MMOL/L — SIGNIFICANT CHANGE UP (ref 22–31)
CREAT SERPL-MCNC: 0.7 MG/DL — SIGNIFICANT CHANGE UP (ref 0.5–1.3)
EGFR: 83 ML/MIN/1.73M2 — SIGNIFICANT CHANGE UP
GLUCOSE SERPL-MCNC: 195 MG/DL — HIGH (ref 70–99)
HCT VFR BLD CALC: 34.7 % — SIGNIFICANT CHANGE UP (ref 34.5–45)
HGB BLD-MCNC: 10.6 G/DL — LOW (ref 11.5–15.5)
MCHC RBC-ENTMCNC: 28.2 PG — SIGNIFICANT CHANGE UP (ref 27–34)
MCHC RBC-ENTMCNC: 30.5 GM/DL — LOW (ref 32–36)
MCV RBC AUTO: 92.3 FL — SIGNIFICANT CHANGE UP (ref 80–100)
NRBC # BLD: 0 /100 WBCS — SIGNIFICANT CHANGE UP (ref 0–0)
PLATELET # BLD AUTO: 218 K/UL — SIGNIFICANT CHANGE UP (ref 150–400)
POTASSIUM SERPL-MCNC: 3.5 MMOL/L — SIGNIFICANT CHANGE UP (ref 3.5–5.3)
POTASSIUM SERPL-SCNC: 3.5 MMOL/L — SIGNIFICANT CHANGE UP (ref 3.5–5.3)
RBC # BLD: 3.76 M/UL — LOW (ref 3.8–5.2)
RBC # FLD: 17.3 % — HIGH (ref 10.3–14.5)
SODIUM SERPL-SCNC: 141 MMOL/L — SIGNIFICANT CHANGE UP (ref 135–145)
WBC # BLD: 17.5 K/UL — HIGH (ref 3.8–10.5)
WBC # FLD AUTO: 17.5 K/UL — HIGH (ref 3.8–10.5)

## 2024-07-02 PROCEDURE — 99221 1ST HOSP IP/OBS SF/LOW 40: CPT

## 2024-07-02 RX ADMIN — IPRATROPIUM BROMIDE AND ALBUTEROL SULFATE 3 MILLILITER(S): .5; 3 SOLUTION RESPIRATORY (INHALATION) at 13:00

## 2024-07-02 RX ADMIN — Medication 50 MILLIGRAM(S): at 05:16

## 2024-07-02 RX ADMIN — IPRATROPIUM BROMIDE AND ALBUTEROL SULFATE 3 MILLILITER(S): .5; 3 SOLUTION RESPIRATORY (INHALATION) at 17:56

## 2024-07-02 RX ADMIN — FUROSEMIDE 20 MILLIGRAM(S): 10 INJECTION, SOLUTION INTRAMUSCULAR; INTRAVENOUS at 05:16

## 2024-07-02 RX ADMIN — Medication 2 PUFF(S): at 05:15

## 2024-07-02 RX ADMIN — Medication 2 TABLET(S): at 21:02

## 2024-07-02 RX ADMIN — IPRATROPIUM BROMIDE AND ALBUTEROL SULFATE 3 MILLILITER(S): .5; 3 SOLUTION RESPIRATORY (INHALATION) at 05:15

## 2024-07-02 RX ADMIN — Medication 20 MILLIGRAM(S): at 21:01

## 2024-07-02 RX ADMIN — PIPERACILLIN SODIUM AND TAZOBACTAM SODIUM 25 GRAM(S): 3; .375 INJECTION, POWDER, LYOPHILIZED, FOR SOLUTION INTRAVENOUS at 16:00

## 2024-07-02 RX ADMIN — Medication 1 APPLICATION(S): at 13:01

## 2024-07-02 RX ADMIN — PIPERACILLIN SODIUM AND TAZOBACTAM SODIUM 25 GRAM(S): 3; .375 INJECTION, POWDER, LYOPHILIZED, FOR SOLUTION INTRAVENOUS at 05:29

## 2024-07-02 RX ADMIN — PANTOPRAZOLE SODIUM 40 MILLIGRAM(S): 40 INJECTION, POWDER, FOR SOLUTION INTRAVENOUS at 05:16

## 2024-07-02 RX ADMIN — MIRTAZAPINE 7.5 MILLIGRAM(S): 15 TABLET, FILM COATED ORAL at 21:02

## 2024-07-02 RX ADMIN — IPRATROPIUM BROMIDE AND ALBUTEROL SULFATE 3 MILLILITER(S): .5; 3 SOLUTION RESPIRATORY (INHALATION) at 23:11

## 2024-07-02 RX ADMIN — LOSARTAN POTASSIUM 50 MILLIGRAM(S): 100 TABLET, FILM COATED ORAL at 05:16

## 2024-07-02 RX ADMIN — Medication 20 MILLIGRAM(S): at 17:47

## 2024-07-02 RX ADMIN — ATORVASTATIN CALCIUM 20 MILLIGRAM(S): 20 TABLET, FILM COATED ORAL at 21:02

## 2024-07-02 RX ADMIN — PIPERACILLIN SODIUM AND TAZOBACTAM SODIUM 25 GRAM(S): 3; .375 INJECTION, POWDER, LYOPHILIZED, FOR SOLUTION INTRAVENOUS at 21:47

## 2024-07-02 RX ADMIN — Medication 20 MILLIGRAM(S): at 05:16

## 2024-07-02 RX ADMIN — Medication 20 MILLIGRAM(S): at 12:59

## 2024-07-02 RX ADMIN — ENOXAPARIN SODIUM 40 MILLIGRAM(S): 100 INJECTION SUBCUTANEOUS at 05:15

## 2024-07-02 RX ADMIN — Medication 650 MILLIGRAM(S): at 00:40

## 2024-07-02 RX ADMIN — ENOXAPARIN SODIUM 40 MILLIGRAM(S): 100 INJECTION SUBCUTANEOUS at 17:58

## 2024-07-02 NOTE — PATIENT PROFILE ADULT - NSPROPTRIGHTCAREGIVER_GEN_A_NUR
This record has been dictated.The nurses notes have been reviewed and accepted. The vital signs,medications and allergies have been reviewed.More than 30 minutes were spent on todays visit with greater than 90% of the time spent  in direct face to face consultation .   no

## 2024-07-02 NOTE — PATIENT PROFILE ADULT - FUNCTIONAL ASSESSMENT - BASIC MOBILITY 6.
Anesthesia Post-op Note    Patient: Modesto Unger  Procedure(s) Performed: EGD (ESOPHAGOGASTRODUODENOSCOPY)  Anesthesia type: MAC    Vitals Value Taken Time   Temp 36.5 °C (97.7 °F) 11/16/23 1227   Pulse 68 11/16/23 1227   Resp 20 11/16/23 1227   SpO2 94 % 11/16/23 1227   /61 11/16/23 1227         Patient Location: PACU Phase 1  Post-op Vital Signs:stable  Level of Consciousness: awake and alert  Respiratory Status: spontaneous ventilation  Cardiovascular stable  Hydration: euvolemic  Pain Management: adequately controlled  Handoff: Handoff to receiving clinician was performed and questions were answered  Vomiting: none  Nausea: None  Airway Patency:patent  Post-op Assessment: awake, alert, appropriately conversant, or baseline, no complications, patient tolerated procedure well, no evidence of recall, dentition within defined limits and moving all extremities      No notable events documented.                       2 = A lot of assistance

## 2024-07-02 NOTE — PROGRESS NOTE ADULT - ASSESSMENT
89 year old well known to me, seen many times over the last several years for recurrent episodes of pna, uti, confusion and diverticulitis and possible ischemic bowel   Pt has    Sarcoid  COPD  CHF  CAD  Pyelo  Diverticulitis   Presents again from assisted living with sob, which she currently denies   no hs of dysuria  chest xray essential clear  CT chest reviewed  atelectasis  GGC  mental status is at baseline      zosyn day 2  check CT of the [facility] reviewed    recurrent atelesis and aspiration  not a good candidate for bronch or treatment for JAE if present complete   5 days of zosyn   ID will cover for me the rest of week

## 2024-07-02 NOTE — CONSULT NOTE ADULT - SUBJECTIVE AND OBJECTIVE BOX
Wound Surgery Consult Note:    HPI:  89 yo F with a PMH of COPD, HTN, diverticulitis, asthma/COPD, sarcoidosis, hypothyroidism, heart failure, recent admission of resp failure requiring  bipap. Patient presents from [facility]isted Living for acute onset of   sob,  and difficulty breathing.   Per EMS pt was hypoxic on scene, was placed on NRB with improvement in oxygen saturation.   Pt denies chest pain. No reported fever, chills. (01 Jul 2024 12:40)    Request for wound care reevaluation of the sacrum and bilateral buttocks received from nursing. Ms. Elaine was encountered on an alternating air with low air loss surface. She is confused.  She is incontinent of stool and urine. Her immobility, inactivity, incontinence of bowel and bladder in addition to poor nutritional status all contribute to her risk of pressure injury development and hinder healing. Principles of pressure injury prevention including but not limited to turning and positioning reviewed with patient.     PAST MEDICAL & SURGICAL HISTORY:  HTN (hypertension)  Hypothyroidism  Osteoporosis  CAD (coronary artery disease)  COPD (chronic obstructive pulmonary disease)  Pulmonary sarcoidosis  H/O pyelonephritis  Acute diverticulitis    REVIEW OF SYSTEMS  unable to obtain due to patient's confusion    MEDICATIONS  (STANDING):  albuterol    90 MICROgram(s) HFA Inhaler 2 Puff(s) Inhalation every 8 hours  albuterol/ipratropium for Nebulization 3 milliLiter(s) Nebulizer every 6 hours  atorvastatin 20 milliGRAM(s) Oral at bedtime  budesonide  80 MICROgram(s)/formoterol 4.5 MICROgram(s) Inhaler 2 Puff(s) Inhalation two times a day  chlorhexidine 2% Cloths 1 Application(s) Topical daily  enoxaparin Injectable 40 milliGRAM(s) SubCutaneous every 12 hours  FLUoxetine 20 milliGRAM(s) Oral daily  furosemide    Tablet 20 milliGRAM(s) Oral daily  losartan 50 milliGRAM(s) Oral daily  methylPREDNISolone sodium succinate Injectable 20 milliGRAM(s) IV Push three times a day  metoprolol succinate ER 50 milliGRAM(s) Oral daily  mirtazapine 7.5 milliGRAM(s) Oral at bedtime  pantoprazole    Tablet 40 milliGRAM(s) Oral before breakfast  piperacillin/tazobactam IVPB.. 3.375 Gram(s) IV Intermittent every 8 hours  polyethylene glycol 3350 17 Gram(s) Oral daily  senna 2 Tablet(s) Oral at bedtime    MEDICATIONS  (PRN):  temazepam 15 milliGRAM(s) Oral at bedtime PRN Insomnia    Allergies    iodinated radiocontrast agents (Anaphylaxis)    Intolerances    SOCIAL HISTORY:  unable to obtain due to patient's confusion    FAMILY HISTORY: unable to obtain due to patient's confusion    Vital Signs Last 24 Hrs  T(C): 36.4 (01 Jul 2024 21:28), Max: 37 (01 Jul 2024 20:37)  T(F): 97.5 (01 Jul 2024 21:28), Max: 98.6 (01 Jul 2024 20:37)  HR: 70 (02 Jul 2024 05:10) (70 - 81)  BP: 148/95 (02 Jul 2024 05:10) (113/62 - 148/95)  BP(mean): --  RR: 18 (01 Jul 2024 21:28) (18 - 22)  SpO2: 96% (01 Jul 2024 21:28) (95% - 98%)    Parameters below as of 01 Jul 2024 21:28  Patient On (Oxygen Delivery Method): nasal cannula  O2 Flow (L/min): 2    Physical Exam:  General: alert, confused, WN  Ophthamology: sclera clear  ENMT: moist mucous membranes, trachea midline  Respiratory: equal chest rise with respirations  Gastrointestinal: soft NT/ND  Neurology: verbal,  following commands  Psych: calm, cooperative  Musculoskeletal: no contractures  Vascular: BLE edema equal  Skin:  Sacral/bilateral buttocks skin with intact, deep maroon discolored skin in and around the gluteal cleft, no drainage  No odor, increased warmth, tenderness, induration      LABS:  07-02    141  |  98  |  30<H>  ----------------------------<  195<H>  3.5   |  27  |  0.70    Ca    8.9      02 Jul 2024 09:13  Mg     2.2     07-01    TPro  6.8  /  Alb  3.9  /  TBili  0.3  /  DBili  x   /  AST  21  /  ALT  26  /  AlkPhos  111  07-01                          10.6   17.50 )-----------( 218      ( 02 Jul 2024 09:13 )             34.7     PT/INR - ( 01 Jul 2024 10:19 )   PT: 10.6 sec;   INR: 0.96 ratio         PTT - ( 01 Jul 2024 10:19 )  PTT:28.9 sec  Urinalysis Basic - ( 02 Jul 2024 09:13 )    Color: x / Appearance: x / SG: x / pH: x  Gluc: 195 mg/dL / Ketone: x  / Bili: x / Urobili: x   Blood: x / Protein: x / Nitrite: x   Leuk Esterase: x / RBC: x / WBC x   Sq Epi: x / Non Sq Epi: x / Bacteria: x       Wound Surgery Consult Note:    HPI:  88 yo F with a PMH of COPD, HTN, diverticulitis, asthma/COPD, sarcoidosis, hypothyroidism, heart failure, recent admission of resp failure requiring  bipap. Patient presents from Assisted Living for acute onset of   sob,  and difficulty breathing.   Per EMS pt was hypoxic on scene, was placed on NRB with improvement in oxygen saturation.   Pt denies chest pain. No reported fever, chills. (01 Jul 2024 12:40)    Request for wound care reevaluation of the sacrum and bilateral buttocks received from nursing. Ms. Elaine was encountered on an alternating air with low air loss surface. She is confused.  She is incontinent of stool and urine. Her immobility, inactivity, incontinence of bowel and bladder in addition to poor nutritional status all contribute to her risk of pressure injury development and hinder healing. Principles of pressure injury prevention including but not limited to turning and positioning reviewed with patient.     PAST MEDICAL & SURGICAL HISTORY:  HTN (hypertension)  Hypothyroidism  Osteoporosis  CAD (coronary artery disease)  COPD (chronic obstructive pulmonary disease)  Pulmonary sarcoidosis  H/O pyelonephritis  Acute diverticulitis    REVIEW OF SYSTEMS  unable to obtain due to patient's confusion    MEDICATIONS  (STANDING):  albuterol    90 MICROgram(s) HFA Inhaler 2 Puff(s) Inhalation every 8 hours  albuterol/ipratropium for Nebulization 3 milliLiter(s) Nebulizer every 6 hours  atorvastatin 20 milliGRAM(s) Oral at bedtime  budesonide  80 MICROgram(s)/formoterol 4.5 MICROgram(s) Inhaler 2 Puff(s) Inhalation two times a day  chlorhexidine 2% Cloths 1 Application(s) Topical daily  enoxaparin Injectable 40 milliGRAM(s) SubCutaneous every 12 hours  FLUoxetine 20 milliGRAM(s) Oral daily  furosemide    Tablet 20 milliGRAM(s) Oral daily  losartan 50 milliGRAM(s) Oral daily  methylPREDNISolone sodium succinate Injectable 20 milliGRAM(s) IV Push three times a day  metoprolol succinate ER 50 milliGRAM(s) Oral daily  mirtazapine 7.5 milliGRAM(s) Oral at bedtime  pantoprazole    Tablet 40 milliGRAM(s) Oral before breakfast  piperacillin/tazobactam IVPB.. 3.375 Gram(s) IV Intermittent every 8 hours  polyethylene glycol 3350 17 Gram(s) Oral daily  senna 2 Tablet(s) Oral at bedtime    MEDICATIONS  (PRN):  temazepam 15 milliGRAM(s) Oral at bedtime PRN Insomnia    Allergies    iodinated radiocontrast agents (Anaphylaxis)    Intolerances    SOCIAL HISTORY:  unable to obtain due to patient's confusion    FAMILY HISTORY: unable to obtain due to patient's confusion    Vital Signs Last 24 Hrs  T(C): 36.4 (01 Jul 2024 21:28), Max: 37 (01 Jul 2024 20:37)  T(F): 97.5 (01 Jul 2024 21:28), Max: 98.6 (01 Jul 2024 20:37)  HR: 70 (02 Jul 2024 05:10) (70 - 81)  BP: 148/95 (02 Jul 2024 05:10) (113/62 - 148/95)  BP(mean): --  RR: 18 (01 Jul 2024 21:28) (18 - 22)  SpO2: 96% (01 Jul 2024 21:28) (95% - 98%)    Parameters below as of 01 Jul 2024 21:28  Patient On (Oxygen Delivery Method): nasal cannula  O2 Flow (L/min): 2    Physical Exam:  General: alert, confused, WN  Ophthamology: sclera clear  ENMT: moist mucous membranes, trachea midline  Respiratory: equal chest rise with respirations  Gastrointestinal: soft NT/ND  Neurology: verbal,  following commands  Psych: calm, cooperative  Musculoskeletal: no contractures  Vascular: BLE edema equal  Skin:  Sacral/bilateral buttocks skin with intact, deep maroon discolored skin in and around the gluteal cleft, no drainage, L 5cm x W 5cm x D none  No odor, increased warmth, tenderness, induration      LABS:  07-02    141  |  98  |  30<H>  ----------------------------<  195<H>  3.5   |  27  |  0.70    Ca    8.9      02 Jul 2024 09:13  Mg     2.2     07-01    TPro  6.8  /  Alb  3.9  /  TBili  0.3  /  DBili  x   /  AST  21  /  ALT  26  /  AlkPhos  111  07-01                          10.6   17.50 )-----------( 218      ( 02 Jul 2024 09:13 )             34.7     PT/INR - ( 01 Jul 2024 10:19 )   PT: 10.6 sec;   INR: 0.96 ratio         PTT - ( 01 Jul 2024 10:19 )  PTT:28.9 sec  Urinalysis Basic - ( 02 Jul 2024 09:13 )    Color: x / Appearance: x / SG: x / pH: x  Gluc: 195 mg/dL / Ketone: x  / Bili: x / Urobili: x   Blood: x / Protein: x / Nitrite: x   Leuk Esterase: x / RBC: x / WBC x   Sq Epi: x / Non Sq Epi: x / Bacteria: x

## 2024-07-03 LAB
HCT VFR BLD CALC: 33.4 % — LOW (ref 34.5–45)
HGB BLD-MCNC: 10 G/DL — LOW (ref 11.5–15.5)
MCHC RBC-ENTMCNC: 28.2 PG — SIGNIFICANT CHANGE UP (ref 27–34)
MCHC RBC-ENTMCNC: 29.9 GM/DL — LOW (ref 32–36)
MCV RBC AUTO: 94.4 FL — SIGNIFICANT CHANGE UP (ref 80–100)
NRBC # BLD: 0 /100 WBCS — SIGNIFICANT CHANGE UP (ref 0–0)
PLATELET # BLD AUTO: 197 K/UL — SIGNIFICANT CHANGE UP (ref 150–400)
RBC # BLD: 3.54 M/UL — LOW (ref 3.8–5.2)
RBC # FLD: 17.5 % — HIGH (ref 10.3–14.5)
WBC # BLD: 18.64 K/UL — HIGH (ref 3.8–10.5)
WBC # FLD AUTO: 18.64 K/UL — HIGH (ref 3.8–10.5)

## 2024-07-03 PROCEDURE — 73630 X-RAY EXAM OF FOOT: CPT | Mod: 26,RT

## 2024-07-03 RX ORDER — MUPIROCIN 20 MG/G
1 OINTMENT TOPICAL
Refills: 0 | Status: DISCONTINUED | OUTPATIENT
Start: 2024-07-03 | End: 2024-07-04

## 2024-07-03 RX ADMIN — PIPERACILLIN SODIUM AND TAZOBACTAM SODIUM 25 GRAM(S): 3; .375 INJECTION, POWDER, LYOPHILIZED, FOR SOLUTION INTRAVENOUS at 14:14

## 2024-07-03 RX ADMIN — Medication 20 MILLIGRAM(S): at 21:37

## 2024-07-03 RX ADMIN — ATORVASTATIN CALCIUM 20 MILLIGRAM(S): 20 TABLET, FILM COATED ORAL at 21:37

## 2024-07-03 RX ADMIN — Medication 2 PUFF(S): at 18:03

## 2024-07-03 RX ADMIN — PANTOPRAZOLE SODIUM 40 MILLIGRAM(S): 40 INJECTION, POWDER, FOR SOLUTION INTRAVENOUS at 05:05

## 2024-07-03 RX ADMIN — MIRTAZAPINE 7.5 MILLIGRAM(S): 15 TABLET, FILM COATED ORAL at 21:37

## 2024-07-03 RX ADMIN — IPRATROPIUM BROMIDE AND ALBUTEROL SULFATE 3 MILLILITER(S): .5; 3 SOLUTION RESPIRATORY (INHALATION) at 23:57

## 2024-07-03 RX ADMIN — Medication 20 MILLIGRAM(S): at 14:13

## 2024-07-03 RX ADMIN — Medication 20 MILLIGRAM(S): at 14:14

## 2024-07-03 RX ADMIN — ENOXAPARIN SODIUM 40 MILLIGRAM(S): 100 INJECTION SUBCUTANEOUS at 05:04

## 2024-07-03 RX ADMIN — PIPERACILLIN SODIUM AND TAZOBACTAM SODIUM 25 GRAM(S): 3; .375 INJECTION, POWDER, LYOPHILIZED, FOR SOLUTION INTRAVENOUS at 21:37

## 2024-07-03 RX ADMIN — IPRATROPIUM BROMIDE AND ALBUTEROL SULFATE 3 MILLILITER(S): .5; 3 SOLUTION RESPIRATORY (INHALATION) at 18:03

## 2024-07-03 RX ADMIN — IPRATROPIUM BROMIDE AND ALBUTEROL SULFATE 3 MILLILITER(S): .5; 3 SOLUTION RESPIRATORY (INHALATION) at 14:14

## 2024-07-03 RX ADMIN — Medication 2 TABLET(S): at 21:37

## 2024-07-03 RX ADMIN — Medication 50 MILLIGRAM(S): at 05:06

## 2024-07-03 RX ADMIN — PIPERACILLIN SODIUM AND TAZOBACTAM SODIUM 25 GRAM(S): 3; .375 INJECTION, POWDER, LYOPHILIZED, FOR SOLUTION INTRAVENOUS at 05:06

## 2024-07-03 RX ADMIN — Medication 2 PUFF(S): at 05:06

## 2024-07-03 RX ADMIN — LOSARTAN POTASSIUM 50 MILLIGRAM(S): 100 TABLET, FILM COATED ORAL at 05:06

## 2024-07-03 RX ADMIN — IPRATROPIUM BROMIDE AND ALBUTEROL SULFATE 3 MILLILITER(S): .5; 3 SOLUTION RESPIRATORY (INHALATION) at 05:04

## 2024-07-03 RX ADMIN — ENOXAPARIN SODIUM 40 MILLIGRAM(S): 100 INJECTION SUBCUTANEOUS at 18:03

## 2024-07-03 RX ADMIN — Medication 20 MILLIGRAM(S): at 05:04

## 2024-07-03 RX ADMIN — MUPIROCIN 1 APPLICATION(S): 20 OINTMENT TOPICAL at 21:40

## 2024-07-03 RX ADMIN — FUROSEMIDE 20 MILLIGRAM(S): 10 INJECTION, SOLUTION INTRAMUSCULAR; INTRAVENOUS at 05:06

## 2024-07-03 NOTE — DIETITIAN INITIAL EVALUATION ADULT - ETIOLOGY
Decreased ability to consume sufficient energy in the setting of increased nutrient needs Increased nutrient demands for wound healing

## 2024-07-03 NOTE — DIETITIAN INITIAL EVALUATION ADULT - REASON FOR ADMISSION
Chart Reviewed, Events Noted  "Patient is a 89y old  Female who presents with a chief complaint of SOB"

## 2024-07-03 NOTE — DIETITIAN INITIAL EVALUATION ADULT - PERTINENT MEDS FT
MEDICATIONS  (STANDING):  albuterol    90 MICROgram(s) HFA Inhaler 2 Puff(s) Inhalation every 8 hours  albuterol/ipratropium for Nebulization 3 milliLiter(s) Nebulizer every 6 hours  atorvastatin 20 milliGRAM(s) Oral at bedtime  budesonide  80 MICROgram(s)/formoterol 4.5 MICROgram(s) Inhaler 2 Puff(s) Inhalation two times a day  chlorhexidine 2% Cloths 1 Application(s) Topical daily  enoxaparin Injectable 40 milliGRAM(s) SubCutaneous every 12 hours  FLUoxetine 20 milliGRAM(s) Oral daily  furosemide    Tablet 20 milliGRAM(s) Oral daily  losartan 50 milliGRAM(s) Oral daily  methylPREDNISolone sodium succinate Injectable 20 milliGRAM(s) IV Push three times a day  metoprolol succinate ER 50 milliGRAM(s) Oral daily  mirtazapine 7.5 milliGRAM(s) Oral at bedtime  pantoprazole    Tablet 40 milliGRAM(s) Oral before breakfast  piperacillin/tazobactam IVPB.. 3.375 Gram(s) IV Intermittent every 8 hours  polyethylene glycol 3350 17 Gram(s) Oral daily  senna 2 Tablet(s) Oral at bedtime    MEDICATIONS  (PRN):  temazepam 15 milliGRAM(s) Oral at bedtime PRN Insomnia

## 2024-07-03 NOTE — DIETITIAN INITIAL EVALUATION ADULT - ADD RECOMMEND
1. Continue no therapeutic dietary restrictions, Defer diet/texture advancement to medical team/SLP as indicated   2. Consider adding daily multivitamin and vitamin C supplements if no medical contraindications to aid in wound healing.   3. Recommend adding Ensure Plus High Protein 1x/Day to help augment PO intakes of meals  4. Encourage adequate PO intakes as tolerated. Honor food preferences as appropriate and available. Staff to provide assistance with feeding during meal times as warranted by patient   5. Monitor PO intake, GI tolerance, skin integrity and labs. RD remains available if needed  6. BMI Sticker Placed in Chart

## 2024-07-03 NOTE — CONSULT NOTE ADULT - SUBJECTIVE AND OBJECTIVE BOX
# PULMONARY CONSULTATION NOTE  [facility] DIVISION OF PULMONARY, CRITICAL CARE, AND SLEEP MEDICINE  Office: [phone]    **Patient Name**: BISHNU SEGURA  MRN-[id number]    CHIEF COMPLAINT: Patient is a 89y old  Female who presents with a chief complaint of sob (03 Jul 2024 11:11)      HISTORY OF PRESENT ILLNESS:   HPI:  88 yo F            with a PMH of COPD, HTN, diverticulitis,           asthma/COPD, sarcoidosis, hypothyroidism, heart failure,         recent admission of resp failure requiring  bipap          presents from Assisted Living for acute onset of   sob,  and difficulty breathing.     Per EMS pt was hypoxic on scene, was placed on NRB with improvement in oxygen saturation.      Pt denies chest pain. No reported fever, chills. (01 Jul 2024 12:40)      # Histories:  ## Family:  FAMILY HISTORY:      ## PMH/PSH:  PAST MEDICAL & SURGICAL HISTORY:  HTN (hypertension)      Hypothyroidism      Osteoporosis      CAD (coronary artery disease)      COPD (chronic obstructive pulmonary disease)      Pulmonary sarcoidosis      H/O pyelonephritis      Acute diverticulitis          Allergies    iodinated radiocontrast agents (Anaphylaxis)    Intolerances         ## Outpatient Pulmonologist:    ## Social History:  - Tobacco:  - Alcohol:  - Other drugs:  - Marital Status:  - Pets:  - Employment:  - Exposure / Travel:  - Code status:    # ROS  CONSTITUTIONAL:  CARDIOVASCULAR:  PULMONARY:  GASTROINTESTINAL:  [ ] ALL OTHER REVIEW OF SYSTEMS ARE NEGATIVE   [ ] UNABLE TO OBTAIN REVIEW OF SYSTEMS DUE TO     ## O/E:  ICU Vital Signs Last 24 Hrs  T(C): 36.3 (03 Jul 2024 08:50), Max: 36.9 (02 Jul 2024 23:39)  T(F): 97.4 (03 Jul 2024 08:50), Max: 98.5 (02 Jul 2024 23:39)  HR: 70 (03 Jul 2024 08:50) (70 - 76)  BP: 118/81 (03 Jul 2024 08:50) (99/56 - 153/86)  BP(mean): --  ABP: --  ABP(mean): --  RR: 18 (03 Jul 2024 08:50) (16 - 18)  SpO2: 96% (03 Jul 2024 08:50) (95% - 97%)    O2 Parameters below as of 03 Jul 2024 08:50  Patient On (Oxygen Delivery Method): nasal cannula  O2 Flow (L/min): 2        I&O's Summary    02 Jul 2024 07:01  -  03 Jul 2024 07:00  --------------------------------------------------------  IN: 100 mL / OUT: 0 mL / NET: 100 mL        Gen: OOB to chair in no apparent distress on nasal cannula, speaking in full sentences, protecting airway  HEENT: PERRL  Resp: CTA B/L no c/r/w  CVS: S1S2 no m/r/g  Abd: soft NT/ND +BS  Ext: no c/c/e  Neuro: A&Ox3    # Medications  MEDICATIONS  (STANDING):  albuterol    90 MICROgram(s) HFA Inhaler 2 Puff(s) Inhalation every 8 hours  albuterol/ipratropium for Nebulization 3 milliLiter(s) Nebulizer every 6 hours  atorvastatin 20 milliGRAM(s) Oral at bedtime  budesonide  80 MICROgram(s)/formoterol 4.5 MICROgram(s) Inhaler 2 Puff(s) Inhalation two times a day  chlorhexidine 2% Cloths 1 Application(s) Topical daily  enoxaparin Injectable 40 milliGRAM(s) SubCutaneous every 12 hours  FLUoxetine 20 milliGRAM(s) Oral daily  furosemide    Tablet 20 milliGRAM(s) Oral daily  losartan 50 milliGRAM(s) Oral daily  methylPREDNISolone sodium succinate Injectable 20 milliGRAM(s) IV Push three times a day  metoprolol succinate ER 50 milliGRAM(s) Oral daily  mirtazapine 7.5 milliGRAM(s) Oral at bedtime  mupirocin 2% Ointment 1 Application(s) Topical <User Schedule>  pantoprazole    Tablet 40 milliGRAM(s) Oral before breakfast  piperacillin/tazobactam IVPB.. 3.375 Gram(s) IV Intermittent every 8 hours  polyethylene glycol 3350 17 Gram(s) Oral daily  senna 2 Tablet(s) Oral at bedtime    MEDICATIONS  (PRN):  temazepam 15 milliGRAM(s) Oral at bedtime PRN Insomnia      ```  ## Labs:  ** CBC: **                        10.0   18.64 )-----------( 197      ( 03 Jul 2024 07:07 )             33.4     ** Chem:  **  07-02    141  |  98  |  30<H>  ----------------------------<  195<H>  3.5   |  27  |  0.70    Ca    8.9      02 Jul 2024 09:13    ```    # Radiology / Imaging  ## CT Chest:  < from: CT Chest No Cont (07.01.24 @ 16:56) >  Right middle lobe complete atelectasis as on May 10, 2024.   Right lower lobe partial atelectasis with interval improvement since May   10, 2024.  Opacification of the right lower lung central airways as described above   new since May 10, 2024 may be due to internal secretions although   aspiration cannot be similar appearance.  Cluster of ill-defined groundglass nodular opacities within the right   upper lobe is a nonspecific finding may be of infectious etiology.  < end of copied text >

## 2024-07-03 NOTE — DIETITIAN INITIAL EVALUATION ADULT - NSFNSNUTRCHEWSWALLOWFT_GEN_A_CORE
-- Ordered for mechanically altered diet texture, Defer diet/texture modification to medical team/SLP as indicated

## 2024-07-03 NOTE — DIETITIAN INITIAL EVALUATION ADULT - NSFNSPHYEXAMSKINFT_GEN_A_CORE
-- Per nursing wound care note on 7/2/24: Sacral/bilateral buttocks skin deep tissue injury present on admission

## 2024-07-03 NOTE — CONSULT NOTE ADULT - SUBJECTIVE AND OBJECTIVE BOX
Patient is a 89y old  Female who presents with a chief complaint of Chart Reviewed, Events Noted  "Patient is a 89y old  Female who presents with a chief complaint of SOB"     (03 Jul 2024 10:28)      HPI:  88 yo F            with a PMH of COPD, HTN, diverticulitis,           asthma/COPD, sarcoidosis, hypothyroidism, heart failure,         recent admission of resp failure requiring  bipap          presents from Assisted Living for acute onset of   sob,  and difficulty breathing.     Per EMS pt was hypoxic on scene, was placed on NRB with improvement in oxygen saturation.      Pt denies chest pain. No reported fever, chills. (01 Jul 2024 12:40)      PAST MEDICAL & SURGICAL HISTORY:  HTN (hypertension)      Hypothyroidism      Osteoporosis      CAD (coronary artery disease)      COPD (chronic obstructive pulmonary disease)      Pulmonary sarcoidosis      H/O pyelonephritis      Acute diverticulitis          MEDICATIONS  (STANDING):  albuterol    90 MICROgram(s) HFA Inhaler 2 Puff(s) Inhalation every 8 hours  albuterol/ipratropium for Nebulization 3 milliLiter(s) Nebulizer every 6 hours  atorvastatin 20 milliGRAM(s) Oral at bedtime  budesonide  80 MICROgram(s)/formoterol 4.5 MICROgram(s) Inhaler 2 Puff(s) Inhalation two times a day  chlorhexidine 2% Cloths 1 Application(s) Topical daily  enoxaparin Injectable 40 milliGRAM(s) SubCutaneous every 12 hours  FLUoxetine 20 milliGRAM(s) Oral daily  furosemide    Tablet 20 milliGRAM(s) Oral daily  losartan 50 milliGRAM(s) Oral daily  methylPREDNISolone sodium succinate Injectable 20 milliGRAM(s) IV Push three times a day  metoprolol succinate ER 50 milliGRAM(s) Oral daily  mirtazapine 7.5 milliGRAM(s) Oral at bedtime  pantoprazole    Tablet 40 milliGRAM(s) Oral before breakfast  piperacillin/tazobactam IVPB.. 3.375 Gram(s) IV Intermittent every 8 hours  polyethylene glycol 3350 17 Gram(s) Oral daily  senna 2 Tablet(s) Oral at bedtime    MEDICATIONS  (PRN):  temazepam 15 milliGRAM(s) Oral at bedtime PRN Insomnia      Allergies    iodinated radiocontrast agents (Anaphylaxis)    Intolerances        VITALS:    Vital Signs Last 24 Hrs  T(C): 36.3 (03 Jul 2024 08:50), Max: 36.9 (02 Jul 2024 23:39)  T(F): 97.4 (03 Jul 2024 08:50), Max: 98.5 (02 Jul 2024 23:39)  HR: 70 (03 Jul 2024 08:50) (70 - 76)  BP: 118/81 (03 Jul 2024 08:50) (99/56 - 153/86)  BP(mean): --  RR: 18 (03 Jul 2024 08:50) (16 - 18)  SpO2: 96% (03 Jul 2024 08:50) (95% - 97%)    Parameters below as of 03 Jul 2024 08:50  Patient On (Oxygen Delivery Method): nasal cannula  O2 Flow (L/min): 2      LABS:                          10.0   18.64 )-----------( 197      ( 03 Jul 2024 07:07 )             33.4       07-02    141  |  98  |  30<H>  ----------------------------<  195<H>  3.5   |  27  |  0.70    Ca    8.9      02 Jul 2024 09:13        CAPILLARY BLOOD GLUCOSE              LOWER EXTREMITY PHYSICAL EXAM:    Vascular: DP/PT 1/4, B/L, CFT <4 seconds B/L, Temperature gradient warm to cold, B/L.   Neuro: Epicritic sensation diminished to the level of digit, B/L.  Musculoskeletal/Ortho: unremarkable  Skin: R foot dorsal third digit wound to bone with exposed phalanx, no erythema, no purulence, no malodor, no drainage, no signs of acute infection. L foot no open wounds or sings of infection.       RADIOLOGY & ADDITIONAL STUDIES:

## 2024-07-03 NOTE — DIETITIAN INITIAL EVALUATION ADULT - REASON INDICATOR FOR ASSESSMENT
MST Score 2 or >  Information obtained from: Review of pt's current medical record, previous RD documentation from recent admission to [facility]

## 2024-07-03 NOTE — DIETITIAN INITIAL EVALUATION ADULT - PERTINENT LABORATORY DATA
07-02    141  |  98  |  30<H>  ----------------------------<  195<H>  3.5   |  27  |  0.70    Ca    8.9      02 Jul 2024 09:13    A1C with Estimated Average Glucose Result: 6.0 % (09-27-23 @ 06:44)

## 2024-07-03 NOTE — DIETITIAN INITIAL EVALUATION ADULT - ORAL INTAKE PTA/DIET HISTORY
Per Previous RD documentation:  "Patient states she eats Kosher food at home and requests Kosher meals in   in the hospital also. Patient states she has stable weight at 105 lbs on 3 occasions over the past month. She disputes   recorded weight at 119lbs. Patient denies chewing or swallowing difficulty, Pt denies nausea, vomiting, diarrhea, or constipation. Patient refuses nutritional supplements (no ensure)."

## 2024-07-04 DIAGNOSIS — R04.0 EPISTAXIS: ICD-10-CM

## 2024-07-04 LAB
-  DAPTOMYCIN: SIGNIFICANT CHANGE UP
-  GENTAMICIN: SIGNIFICANT CHANGE UP
-  LINEZOLID: SIGNIFICANT CHANGE UP
-  OXACILLIN: SIGNIFICANT CHANGE UP
-  PENICILLIN: SIGNIFICANT CHANGE UP
-  RIFAMPIN: SIGNIFICANT CHANGE UP
-  TETRACYCLINE: SIGNIFICANT CHANGE UP
-  TRIMETHOPRIM/SULFAMETHOXAZOLE: SIGNIFICANT CHANGE UP
-  VANCOMYCIN: SIGNIFICANT CHANGE UP
ANION GAP SERPL CALC-SCNC: 15 MMOL/L — SIGNIFICANT CHANGE UP (ref 5–17)
BUN SERPL-MCNC: 30 MG/DL — HIGH (ref 7–23)
CALCIUM SERPL-MCNC: 8.4 MG/DL — SIGNIFICANT CHANGE UP (ref 8.4–10.5)
CHLORIDE SERPL-SCNC: 94 MMOL/L — LOW (ref 96–108)
CO2 SERPL-SCNC: 30 MMOL/L — SIGNIFICANT CHANGE UP (ref 22–31)
CREAT SERPL-MCNC: 0.72 MG/DL — SIGNIFICANT CHANGE UP (ref 0.5–1.3)
CRP SERPL-MCNC: <3 MG/L — SIGNIFICANT CHANGE UP (ref 0–4)
CULTURE RESULTS: ABNORMAL
EGFR: 80 ML/MIN/1.73M2 — SIGNIFICANT CHANGE UP
ERYTHROCYTE [SEDIMENTATION RATE] IN BLOOD: 12 MM/HR — SIGNIFICANT CHANGE UP (ref 0–20)
GLUCOSE SERPL-MCNC: 189 MG/DL — HIGH (ref 70–99)
HCT VFR BLD CALC: 32.7 % — LOW (ref 34.5–45)
HGB BLD-MCNC: 10.3 G/DL — LOW (ref 11.5–15.5)
MCHC RBC-ENTMCNC: 29 PG — SIGNIFICANT CHANGE UP (ref 27–34)
MCHC RBC-ENTMCNC: 31.5 GM/DL — LOW (ref 32–36)
MCV RBC AUTO: 92.1 FL — SIGNIFICANT CHANGE UP (ref 80–100)
METHOD TYPE: SIGNIFICANT CHANGE UP
NRBC # BLD: 0 /100 WBCS — SIGNIFICANT CHANGE UP (ref 0–0)
ORGANISM # SPEC MICROSCOPIC CNT: ABNORMAL
ORGANISM # SPEC MICROSCOPIC CNT: ABNORMAL
PLATELET # BLD AUTO: 198 K/UL — SIGNIFICANT CHANGE UP (ref 150–400)
POTASSIUM SERPL-MCNC: 3.3 MMOL/L — LOW (ref 3.5–5.3)
POTASSIUM SERPL-SCNC: 3.3 MMOL/L — LOW (ref 3.5–5.3)
RBC # BLD: 3.55 M/UL — LOW (ref 3.8–5.2)
RBC # FLD: 17.3 % — HIGH (ref 10.3–14.5)
SODIUM SERPL-SCNC: 139 MMOL/L — SIGNIFICANT CHANGE UP (ref 135–145)
SPECIMEN SOURCE: SIGNIFICANT CHANGE UP
WBC # BLD: 11.74 K/UL — HIGH (ref 3.8–10.5)
WBC # FLD AUTO: 11.74 K/UL — HIGH (ref 3.8–10.5)

## 2024-07-04 PROCEDURE — 30901 CONTROL OF NOSEBLEED: CPT | Mod: RT

## 2024-07-04 RX ORDER — SODIUM CHLORIDE 0.65 %
2 AEROSOL, SPRAY (ML) NASAL THREE TIMES A DAY
Refills: 0 | Status: DISCONTINUED | OUTPATIENT
Start: 2024-07-04 | End: 2024-07-11

## 2024-07-04 RX ORDER — OXYMETAZOLINE HYDROCHLORIDE 0.05 G/100ML
1 SPRAY NASAL
Refills: 0 | Status: COMPLETED | OUTPATIENT
Start: 2024-07-04 | End: 2024-07-06

## 2024-07-04 RX ORDER — VANCOMYCIN HYDROCHLORIDE 50 MG/ML
750 KIT ORAL ONCE
Refills: 0 | Status: COMPLETED | OUTPATIENT
Start: 2024-07-04 | End: 2024-07-05

## 2024-07-04 RX ADMIN — MIRTAZAPINE 7.5 MILLIGRAM(S): 15 TABLET, FILM COATED ORAL at 21:40

## 2024-07-04 RX ADMIN — LOSARTAN POTASSIUM 50 MILLIGRAM(S): 100 TABLET, FILM COATED ORAL at 05:20

## 2024-07-04 RX ADMIN — IPRATROPIUM BROMIDE AND ALBUTEROL SULFATE 3 MILLILITER(S): .5; 3 SOLUTION RESPIRATORY (INHALATION) at 11:10

## 2024-07-04 RX ADMIN — IPRATROPIUM BROMIDE AND ALBUTEROL SULFATE 3 MILLILITER(S): .5; 3 SOLUTION RESPIRATORY (INHALATION) at 17:20

## 2024-07-04 RX ADMIN — PANTOPRAZOLE SODIUM 40 MILLIGRAM(S): 40 INJECTION, POWDER, FOR SOLUTION INTRAVENOUS at 05:21

## 2024-07-04 RX ADMIN — Medication 20 MILLIGRAM(S): at 21:41

## 2024-07-04 RX ADMIN — Medication 20 MILLIGRAM(S): at 11:11

## 2024-07-04 RX ADMIN — ATORVASTATIN CALCIUM 20 MILLIGRAM(S): 20 TABLET, FILM COATED ORAL at 21:41

## 2024-07-04 RX ADMIN — PIPERACILLIN SODIUM AND TAZOBACTAM SODIUM 25 GRAM(S): 3; .375 INJECTION, POWDER, LYOPHILIZED, FOR SOLUTION INTRAVENOUS at 21:41

## 2024-07-04 RX ADMIN — ENOXAPARIN SODIUM 40 MILLIGRAM(S): 100 INJECTION SUBCUTANEOUS at 05:20

## 2024-07-04 RX ADMIN — POLYETHYLENE GLYCOL 3350 17 GRAM(S): 1 POWDER ORAL at 11:11

## 2024-07-04 RX ADMIN — MUPIROCIN 1 APPLICATION(S): 20 OINTMENT TOPICAL at 09:21

## 2024-07-04 RX ADMIN — Medication 20 MILLIGRAM(S): at 14:28

## 2024-07-04 RX ADMIN — Medication 20 MILLIGRAM(S): at 05:20

## 2024-07-04 RX ADMIN — PIPERACILLIN SODIUM AND TAZOBACTAM SODIUM 25 GRAM(S): 3; .375 INJECTION, POWDER, LYOPHILIZED, FOR SOLUTION INTRAVENOUS at 05:21

## 2024-07-04 RX ADMIN — Medication 1 APPLICATION(S): at 11:15

## 2024-07-04 RX ADMIN — PIPERACILLIN SODIUM AND TAZOBACTAM SODIUM 25 GRAM(S): 3; .375 INJECTION, POWDER, LYOPHILIZED, FOR SOLUTION INTRAVENOUS at 14:27

## 2024-07-04 RX ADMIN — IPRATROPIUM BROMIDE AND ALBUTEROL SULFATE 3 MILLILITER(S): .5; 3 SOLUTION RESPIRATORY (INHALATION) at 05:20

## 2024-07-04 RX ADMIN — Medication 50 MILLIGRAM(S): at 05:20

## 2024-07-04 RX ADMIN — FUROSEMIDE 20 MILLIGRAM(S): 10 INJECTION, SOLUTION INTRAMUSCULAR; INTRAVENOUS at 05:20

## 2024-07-04 RX ADMIN — Medication 2 PUFF(S): at 05:21

## 2024-07-04 NOTE — DISCHARGE NOTE PROVIDER - CARE PROVIDER_API CALL
[facility]  [address] [place] [address]  Phone: [phone]  Fax: [phone]  Follow Up Time:

## 2024-07-04 NOTE — CONSULT NOTE ADULT - PROBLEM SELECTOR RECOMMENDATION 9
- s/p nasal packing, no need for abx.   - nasal packing is dissolvable. No need for removal.   - rec Afrin 2 spray into each nose BID x 2 days.   - rec nasal saline 2 sprays TID to keep nasal mucosa moist.   - Avoid nasal trauma, please trim prongs of nasal cannula (education provided to nurse at bedside)   - Avoid bending with head blow the waist.  no heavy lifting.   - No further inpatient ENT intervention.   - Please call [phone] if concerns or issues

## 2024-07-04 NOTE — DISCHARGE NOTE PROVIDER - HOSPITAL COURSE
HPI:  89 yo F            with a PMH of COPD, HTN, diverticulitis,           asthma/COPD, sarcoidosis, hypothyroidism, heart failure,         recent admission of resp failure requiring  bipap          presents from Assisted Living for acute onset of   sob,  and difficulty breathing.     Per EMS pt was hypoxic on scene, was placed on NRB with improvement in oxygen saturation.      Pt denies chest pain. No reported fever, chills. (01 Jul 2024 12:40)    Hospital Course:      Important Medication Changes and Reason:    Active or Pending Issues Requiring Follow-up:    Advanced Directives:   [ ] Full code  [ ] DNR  [ ] Hospice    Discharge Diagnoses:         HPI:  88 yo F            with a PMH of COPD,on home O2, chronic R hemidiaphragm paralysis, h/o recurrent aspiration events, HTN, diverticulitis,           asthma/COPD, sarcoidosis, hypothyroidism, heart failure,         recent admission of resp failure requiring  bipap          presents from Assisted Living for acute onset of   sob,  and difficulty breathing.     Per EMS pt was hypoxic on scene, was placed on NRB with improvement in oxygen saturation.      Pt denies chest pain. No reported fever, chills. (01 Jul 2024 12:40)    Hospital Course:  Admitted with hypoxic respiratory failure , was on NRBM initially, then transitioned to NC oxygen. Pulmonary was consulted. S/P iV solumedrol, now on po prednisone.   Noted RML PNA and UTI. Completed a course of zosyn.   89F PMH COPD, sarcoidosis, chronic R hemidiaphragm paralysis, H/O recurrent aspiration events p/w dyspnea and hypoxemia  - Increased oxygen requirements recently, but now on 2LNC spO2 91%. Remains largely asymptomatic.  - Currently on full AC with Lovenox. V/Q scan "intermediate risk" of PE, but patient's underlying severe centrilobular emphysema and chronic atelectasis d/t R hemidiaphragm paralysis make the study very difficult to make a definitive diagnosis of PE. Patient supposedly had an anaphylactoid reaction to contrast in the past, so if PE truly needs to be ruled out, then would need pre-medication and preferentially use low-osmolality or iso-osmolar contrast media. CTA done on 7/9, no PE.   - c/w DuoNEBs Q6h, but would change to PRN per pulm  - Patient prefers nebulizers to inhalers, and does the same at home.        Important Medication Changes and Reason:    Active or Pending Issues Requiring Follow-up:    Advanced Directives:   [ ] Full code  [ ] DNR  [ ] Hospice    Discharge Diagnoses:         HPI:  89 yo F            with a PMH of COPD,on home O2, chronic R hemidiaphragm paralysis, h/o recurrent aspiration events, HTN, diverticulitis,           asthma/COPD, sarcoidosis, hypothyroidism, heart failure,         recent admission of resp failure requiring  bipap          presents from Assisted Living for acute onset of   sob,  and difficulty breathing.     Per EMS pt was hypoxic on scene, was placed on NRB with improvement in oxygen saturation.      Pt denies chest pain. No reported fever, chills. (01 Jul 2024 12:40)    Hospital Course:  Admitted with hypoxic respiratory failure , was on NRBM initially, then transitioned to NC oxygen. Pulmonary was consulted. S/P iV solumedrol, now on po prednisone.   Noted RML PNA and UTI. Completed a course of zosyn.   89F PMH COPD, sarcoidosis, chronic R hemidiaphragm paralysis, H/O recurrent aspiration events p/w dyspnea and hypoxemia  - Increased oxygen requirements recently, but now on 2LNC spO2 91%. Remains largely asymptomatic.  - Currently on full AC with Lovenox. V/Q scan "intermediate risk" of PE, but patient's underlying severe centrilobular emphysema and chronic atelectasis d/t R hemidiaphragm paralysis make the study very difficult to make a definitive diagnosis of PE. Patient supposedly had an anaphylactoid reaction to contrast in the past, so if PE truly needs to be ruled out, then would need pre-medication and preferentially use low-osmolality or iso-osmolar contrast media. CTA done on 7/9, no PE.   - c/w DuoNEBs Q6h, but would change to PRN per pulm  - Patient prefers nebulizers to inhalers, and does the same at home.    89F presents with R foot 4rd digit wound to bone  - Pt seen and evaluated    - Afebrile, WBC 11.74  - R foot dorsal third digit wound to bone with exposed phalanx, no erythema, no purulence, no malodor, no drainage, no signs of acute infection. L foot no open wounds or sings of infection.   - R foot XR: no OM, no gas  - Discussion has with patient at bedside about clinical concern for OM given bone exposure through wound, pt understands that she is at increased risk for infection of soft tissue and bone and at risk for infectious spread. Discussed surgical options with pt. Pt not amenable to surgical intervention at this time and states she has had the wound for a long time with no problems and she understands risk but would not like surgery. Pt demonstrated understanding.   - Stable for d/c from podiatry, local wound care only  - Please reconsult PRN  - Follow up information and wound care listed in discharge note provider.   - Discussed with attending           Important Medication Changes and Reason:    Active or Pending Issues Requiring Follow-up:    Advanced Directives:   [ ] Full code  [ ] DNR  [ ] Hospice    Discharge Diagnoses:         HPI:  89 yo F            with a PMH of COPD,on home O2, chronic R hemidiaphragm paralysis, h/o recurrent aspiration events, HTN, diverticulitis,           asthma/COPD, sarcoidosis, hypothyroidism, heart failure,         recent admission of resp failure requiring  bipap          presents from Assisted Living for acute onset of   sob,  and difficulty breathing.     Per EMS pt was hypoxic on scene, was placed on NRB with improvement in oxygen saturation.      Pt denies chest pain. No reported fever, chills. (01 Jul 2024 12:40)    Hospital Course:  Admitted with hypoxic respiratory failure , was on NRBM initially, then transitioned to NC oxygen. Pulmonary was consulted. S/P iV solumedrol, now on po prednisone.   Noted RML PNA and UTI. Completed a course of zosyn.   89F PMH COPD, sarcoidosis, chronic R hemidiaphragm paralysis, H/O recurrent aspiration events p/w dyspnea and hypoxemia  - Increased oxygen requirements recently, but now on 2LNC spO2 91%. Remains largely asymptomatic.  - Currently on full AC with Lovenox. V/Q scan "intermediate risk" of PE, but patient's underlying severe centrilobular emphysema and chronic atelectasis d/t R hemidiaphragm paralysis make the study very difficult to make a definitive diagnosis of PE. Patient supposedly had an anaphylactoid reaction to contrast in the past, so if PE truly needs to be ruled out, then would need pre-medication and preferentially use low-osmolality or iso-osmolar contrast media. CTA done on 7/9, no PE.   - c/w DuoNEBs Q6h, but would change to PRN per pulm  - Patient prefers nebulizers to inhalers, and does the same at home.  Seen by podiatry for R foot dorsal third digit wound to bone with exposed phalanx, no erythema, no purulence, no malodor, no drainage, no signs of acute infection. L foot no open wounds or sings of infection.   - R foot XR: no OM, no gas  - Discussion has with patient at bedside about clinical concern for OM given bone exposure through wound, pt understands that she is at increased risk for infection of soft tissue and bone and at risk for infectious spread. Discussed surgical options with pt. Pt not amenable to surgical intervention at this time and states she has had the wound for a long time with no problems and she understands risk but would not like surgery. Pt demonstrated understanding. Also patient had an episode of epistaxis from R nose. S/P dissolvable packing. Had  no further episode of nose bleed.  Cleared for discharge AS PER dR Harper. mEDICATION RECONCILIATION Dw dR Harper      Important Medication Changes and Reason:    Active or Pending Issues Requiring Follow-up:  Podiatry, pulmonary, PCP  Advanced Directives:   [ x] Full code  [ ] DNR  [ ] Hospice    Discharge Diagnoses:  Acute hypoxic resp failure  Epistaxis  UTI  R foot wound          HPI:  88 yo F            with a PMH of COPD,on home O2, chronic R hemidiaphragm paralysis, h/o recurrent aspiration events, HTN, diverticulitis,           asthma/COPD, sarcoidosis, hypothyroidism, heart failure,         recent admission of resp failure requiring  bipap          presents from Assisted Living for acute onset of   sob,  and difficulty breathing.     Per EMS pt was hypoxic on scene, was placed on NRB with improvement in oxygen saturation.      Pt denies chest pain. No reported fever, chills. (01 Jul 2024 12:40)    Hospital Course:  Admitted with hypoxic respiratory failure , was on NRBM initially, then transitioned to NC oxygen. Pulmonary was consulted. S/P iV solumedrol, now on po prednisone.   Noted RML PNA and UTI. Completed a course of zosyn.   89F PMH COPD, sarcoidosis, chronic R hemidiaphragm paralysis, H/O recurrent aspiration events p/w dyspnea and hypoxemia  - Increased oxygen requirements recently, but now on 2LNC spO2 91%. Remains largely asymptomatic.  - Currently on full AC with Lovenox. V/Q scan "intermediate risk" of PE, but patient's underlying severe centrilobular emphysema and chronic atelectasis d/t R hemidiaphragm paralysis make the study very difficult to make a definitive diagnosis of PE. Patient supposedly had an anaphylactoid reaction to contrast in the past, so if PE truly needs to be ruled out, then would need pre-medication and preferentially use low-osmolality or iso-osmolar contrast media. CTA done on 7/9, no PE.   - Pulmonary recommended duoneb and DC prednisone.  - Patient prefers nebulizers to inhalers, and does the same at home.  Seen by podiatry for R foot dorsal third digit wound to bone with exposed phalanx, no erythema, no purulence, no malodor, no drainage, no signs of acute infection. L foot no open wounds or sings of infection.   - R foot XR: no OM, no gas  - Discussion has with patient at bedside about clinical concern for OM given bone exposure through wound, pt understands that she is at increased risk for infection of soft tissue and bone and at risk for infectious spread. Discussed surgical options with pt. Pt not amenable to surgical intervention at this time and states she has had the wound for a long time with no problems and she understands risk but would not like surgery. Pt demonstrated understanding. Also patient had an episode of epistaxis from R nose. S/P dissolvable packing. Had  no further episode of nose bleed.  Cleared for discharge AS PER dR Harper. mEDICATION RECONCILIATION Mikel Harper      Important Medication Changes and Reason:    Active or Pending Issues Requiring Follow-up:  Podiatry, pulmonary, PCP  Advanced Directives:   [ x] Full code  [ ] DNR  [ ] Hospice    Discharge Diagnoses:  Acute hypoxic resp failure  Epistaxis  UTI  R foot wound          HPI:  89 yo F            with a PMH of COPD,on home O2, chronic R hemidiaphragm paralysis, h/o recurrent aspiration events, HTN, diverticulitis,           asthma/COPD, sarcoidosis, hypothyroidism, heart failure,         recent admission of resp failure requiring  bipap          presents from Assisted Living for acute onset of   sob,  and difficulty breathing.     Per EMS pt was hypoxic on scene, was placed on NRB with improvement in oxygen saturation.      Pt denies chest pain. No reported fever, chills. (01 Jul 2024 12:40)    Hospital Course:  Admitted with hypoxic respiratory failure , was on NRBM initially, then transitioned to NC oxygen. Pulmonary was consulted. S/P iV solumedrol, now on po prednisone.   Noted RML PNA and UTI. Completed a course of zosyn.   89F PMH COPD, sarcoidosis, chronic R hemidiaphragm paralysis, H/O recurrent aspiration events p/w dyspnea and hypoxemia  - Increased oxygen requirements recently, but now on 2LNC spO2 91%. Remains largely asymptomatic.  - Currently on full AC with Lovenox. V/Q scan "intermediate risk" of PE, but patient's underlying severe centrilobular emphysema and chronic atelectasis d/t R hemidiaphragm paralysis make the study very difficult to make a definitive diagnosis of PE. Patient supposedly had an anaphylactoid reaction to contrast in the past, so if PE truly needs to be ruled out, then would need pre-medication and preferentially use low-osmolality or iso-osmolar contrast media. CTA done on 7/9, no PE.   - Pulmonary recommended duoneb and DC prednisone.  - Patient prefers nebulizers to inhalers, and does the same at home.  Seen by podiatry for R foot dorsal third digit wound to bone with exposed phalanx, no erythema, no purulence, no malodor, no drainage, no signs of acute infection. L foot no open wounds or sings of infection.   - R foot XR: no OM, no gas  - Discussion has with patient at bedside about clinical concern for OM given bone exposure through wound, pt understands that she is at increased risk for infection of soft tissue and bone and at risk for infectious spread. Discussed surgical options with pt. Pt not amenable to surgical intervention at this time and states she has had the wound for a long time with no problems and she understands risk but would not like surgery. Pt demonstrated understanding. Also patient had an episode of epistaxis from R nose. S/P dissolvable packing. Had  no further episode of nose bleed.  Cleared for discharge AS PER dR Harper. mEDICATION RECONCILIATION Dw dR Harper      Important Medication Changes and Reason:  DC losartan 2/2 low BP  Active or Pending Issues Requiring Follow-up:  Podiatry, pulmonary, PCP  Advanced Directives:   [ x] Full code  [ ] DNR  [ ] Hospice    Discharge Diagnoses:  Acute hypoxic resp failure  Epistaxis  UTI  R foot wound          Unsure (2)

## 2024-07-04 NOTE — CONSULT NOTE ADULT - ASSESSMENT
Impression:    Sacral/bilateral buttocks skin deep tissue injury present on admission  Incontinence of bladder and bowel  Incontinence Dermatitis    Recommend:  1.) topical therapy: sacral/bilateral buttocks skin - cleanse gently with incontinence cleanser,  pat dry, apply FLORENCIA ointment BID and PRN for incontinence episodes  2.) Incontinence Management - incontinence cleanser, pads, pericare BID  3.) Maintain on an alternating air with low air loss surface  4.) Turn and reposition Q 2 hours  5.) Nutrition optimization   6.) Offload heels/feet with complete cair air fluidized boots/pillows; ensure that the soles of the feet are not resting on the foot board of the bed.  7.) chair cushion for chair sitting  8.) Toe wounds per wound care Podiatrists who will see patient    Care as per medicine. Will not actively follow but will remain available. Please recall for new issues or deterioration.  Upon discharge f[facility] [address] [place] [address] [phone]  Thank you for this consult  Fabi Lincoln, OLGA-C, CWOCN via TEAMS  
 89 yo F with a PMH of COPD, HTN, diverticulitis, asthma/COPD, sarcoidosis, hypothyroidism, heart failure, recent admission of resp failure requiring BIPAP presents from Assisted Living for acute onset of SOB and difficulty breathing. Per EMS pt was hypoxic on scene, was placed on NRB with improvement in oxygen saturation. Pt denies chest pain. No reported fever, chills.  hypoxia sec to underlying lung disease, VQ scan noted , will consider cta of the chest  pulmonary eval  hx of cad and chf will  adjust meds  ?pneumoniae will start on cefepime/ will discuss with pulmonary  Duoneb
89F PMH COPD, sarcoidosis, chronic R hemidiaphragm paralysis, H/O recurrent aspiration events p/w dyspnea and hypoxemia  - Patient currently doing well on 2.5LNC (spO2 90-92%), largely asymptomatic  - Continue to titrate FiO2 as tolerated to maintain spO2 >90%. Patient may need oxygen on discharge, and needs to be evaluated by checking saturation at rest and/or exertion.  - Agree with empiric antibiotics, cultures pending.  - Currently with clear lungs, i.e., no wheezing. Can transition Solu-Medrol 20mg IV TID to prednisone 20mg PO daily  - c/w DuoNEBs Q6h, but would change to PRN. c/w budesonide NEBs Q12h  - Patient prefers nebulizers to inhalers, and does the same at home.  - Continue to encourage OOB to chair, incentive spirometry  - Aspiration precautions  - We will continue to follow the patient with you.
89 year old, HOF A&Ox3 F, with medical history of asthma/COPD, HTN, pulmonary sarcoidosis, hypothyroidism, heart failure, CAD, is admitted for acute hypoxic respiratory failure secondary to PNA and COPD exacerbation, currently on 2L NC. ENT consulted for right epistaxis. On exam, noted blood clot with mild bleeding from right naris anteriorly, after suctioning and removal of blood clot. Noted mild bleeding from excoriations on right anterior nasal septum, c/w irritations from nasal cannula prong. s/p nasal packing of surgicel coated with bacitracin ointment.  Left nose patient and without abnormal discharges. No dripping of blood or dry blood noted in posterior oropharynx. Pt tolerated procedure well.    
89F presents with R foot 4rd digit wound to bone  - Pt seen and evaluated    - Afebrile, WBC 18.64  - R foot dorsal third digit wound to bone with exposed phalanx, no erythema, no purulence, no malodor, no drainage, no signs of acute infection. L foot no open wounds or sings of infection.   - Ordered R foot XR   - Ordered RIVER PVR  - Pod plan local wound care vs R third digit amputation pending Imaging/ pt decision   - Please document medical clearance for possible amputation of 3rd toe   - Discussed with attending 
 89 year old well known to me, seen many times over the last several years for recurrent episodes of pna, uti, confusion and diverticulitis and possible ischemic bowel   Pt has    Sarcoid  COPD  CHF  CAD  Pyelo  Diverticulitis   Presents again from assisted living with sob, which she currently denies   no hs of dysuria  chest xray essential clear  mental status is at baseline    change to zosyn  check CT of the [facility]   VQ pending

## 2024-07-04 NOTE — DISCHARGE NOTE PROVIDER - NSDCMRMEDTOKEN_GEN_ALL_CORE_FT
atorvastatin 20 mg oral tablet: 1 tab(s) orally once a day (at bedtime)  D3 25 mcg (1000 intl units) oral tablet: 1 tab(s) orally once a day  ferrous sulfate 325 mg (65 mg elemental iron) oral tablet: 1 tab(s) orally once a day  FLUoxetine 20 mg oral capsule: 1 cap(s) orally once a day  furosemide 20 mg oral tablet: 1 tab(s) orally once a day  ipratropium-albuterol 0.5 mg-2.5 mg/3 mL inhalation solution: 3 milliliter(s) inhaled every 6 hours as needed for  shortness of breath and/or wheezing  losartan 50 mg oral tablet: 1 tab(s) orally once a day  metoprolol succinate 50 mg oral tablet, extended release: 1 tab(s) orally once a day  mirtazapine 7.5 mg oral tablet: 1 tab(s) orally once a day  Multiple Vitamins oral tablet: 1 tab(s) orally once a day  pantoprazole 40 mg oral delayed release tablet: 1 tab(s) orally once a day (before a meal)  senna leaf extract oral tablet: 2 tab(s) orally once a day (at bedtime) as needed  tiotropium 2.5 mcg/inh inhalation aerosol: 2 puff(s) inhaled once a day  traZODone 50 mg oral tablet: 1 tab(s) orally once a day (at bedtime) as needed  Tylenol 500 mg oral tablet: 2 tab(s) orally once a day as needed for  mild pain   atorvastatin 20 mg oral tablet: 1 tab(s) orally once a day (at bedtime)  FLUoxetine 20 mg oral capsule: 1 cap(s) orally once a day  furosemide 20 mg oral tablet: 1 tab(s) orally once a day  losartan 50 mg oral tablet: 1 tab(s) orally once a day  metoprolol succinate 50 mg oral tablet, extended release: 1 tab(s) orally once a day  mirtazapine 7.5 mg oral tablet: 1 tab(s) orally once a day  pantoprazole 40 mg oral delayed release tablet: 1 tab(s) orally once a day (before a meal)  polyethylene glycol 3350 oral powder for reconstitution: 17 gram(s) orally once a day  senna leaf extract oral tablet: 2 tab(s) orally once a day (at bedtime)  sodium chloride 0.65% nasal spray: 2 spray(s) nasal 3 times a day  tiotropium 2.5 mcg/inh inhalation aerosol: 2 puff(s) inhaled once a day   atorvastatin 20 mg oral tablet: 1 tab(s) orally once a day (at bedtime)  budesonide-formoterol 80 mcg-4.5 mcg/inh inhalation aerosol: 2 puff(s) inhaled 2 times a day  FLUoxetine 20 mg oral capsule: 1 cap(s) orally once a day  furosemide 20 mg oral tablet: 1 tab(s) orally once a day  losartan 50 mg oral tablet: 1 tab(s) orally once a day  metoprolol succinate 50 mg oral tablet, extended release: 1 tab(s) orally once a day  mirtazapine 7.5 mg oral tablet: 1 tab(s) orally once a day  pantoprazole 40 mg oral delayed release tablet: 1 tab(s) orally once a day (before a meal)  polyethylene glycol 3350 oral powder for reconstitution: 17 gram(s) orally once a day  senna leaf extract oral tablet: 2 tab(s) orally once a day (at bedtime)  sodium chloride 0.65% nasal spray: 2 spray(s) nasal 3 times a day  temazepam 30 mg oral capsule: 1 cap(s) orally once a day (at bedtime) as per longterm med record sent with the patient  Tylenol 500 mg oral tablet: 2 tab(s) orally once a day as needed for pain   atorvastatin 20 mg oral tablet: 1 tab(s) orally once a day (at bedtime)  budesonide-formoterol 80 mcg-4.5 mcg/inh inhalation aerosol: 2 puff(s) inhaled 2 times a day  FLUoxetine 20 mg oral capsule: 1 cap(s) orally once a day  furosemide 20 mg oral tablet: 1 tab(s) orally once a day  ipratropium-albuterol 0.5 mg-2.5 mg/3 mL inhalation solution: 3 milliliter(s) inhaled every 6 hours  losartan 50 mg oral tablet: 1 tab(s) orally once a day  metoprolol succinate 50 mg oral tablet, extended release: 1 tab(s) orally once a day  mirtazapine 7.5 mg oral tablet: 1 tab(s) orally once a day  mupirocin 2% topical ointment: Apply topically to affected area once a day  pantoprazole 40 mg oral delayed release tablet: 1 tab(s) orally once a day (before a meal)  polyethylene glycol 3350 oral powder for reconstitution: 17 gram(s) orally once a day  senna leaf extract oral tablet: 2 tab(s) orally once a day (at bedtime)  sodium chloride 0.65% nasal spray: 2 spray(s) nasal 3 times a day  temazepam 30 mg oral capsule: 1 cap(s) orally once a day (at bedtime) as per [facility] med record sent with the patient  Tylenol 500 mg oral tablet: 2 tab(s) orally once a day as needed for pain   atorvastatin 20 mg oral tablet: 1 tab(s) orally once a day (at bedtime)  budesonide-formoterol 80 mcg-4.5 mcg/inh inhalation aerosol: 2 puff(s) inhaled 2 times a day  FLUoxetine 20 mg oral capsule: 1 cap(s) orally once a day  furosemide 20 mg oral tablet: 1 tab(s) orally once a day  ipratropium-albuterol 0.5 mg-2.5 mg/3 mL inhalation solution: 3 milliliter(s) inhaled every 6 hours  metoprolol succinate 50 mg oral tablet, extended release: 1 tab(s) orally once a day  mirtazapine 7.5 mg oral tablet: 1 tab(s) orally once a day  mupirocin 2% topical ointment: Apply topically to affected area once a day  pantoprazole 40 mg oral delayed release tablet: 1 tab(s) orally once a day (before a meal)  polyethylene glycol 3350 oral powder for reconstitution: 17 gram(s) orally once a day  senna leaf extract oral tablet: 2 tab(s) orally once a day (at bedtime)  sodium chloride 0.65% nasal spray: 2 spray(s) nasal 3 times a day  temazepam 30 mg oral capsule: 1 cap(s) orally once a day (at bedtime) as per [facility] med record sent with the patient  Tylenol 500 mg oral tablet: 2 tab(s) orally once a day as needed for pain

## 2024-07-04 NOTE — DISCHARGE NOTE PROVIDER - NSDCFUADDAPPT_GEN_ALL_CORE_FT
Podiatry Discharge Instructions:  Follow up: Please follow up with Dr. Myles within 1 week of discharge from the hospital, please call [phone] for appointment and discuss that you recently were seen in the hospital.  Wound Care:  Please apply mupirocin to right digit wound daily followed by davin pad.   Weight bearing: Please weight bear as tolerated in a surgical shoe.  Antibiotics: Please continue as instructed. Podiatry Discharge Instructions:  Follow up: Please follow up with Dr. Myles within 1 week of discharge from the hospital, please call [phone] for appointment and discuss that you recently were seen in the hospital.  Wound Care:  Please apply mupirocin to right digit wound 3x weekly followed by mitulyvn pad.   Weight bearing: Please weight bear as tolerated in a surgical shoe.  Antibiotics: Please continue as instructed. APPTS ARE READY TO BE MADE: [ x] YES    Best Family or Patient Contact (if needed):    Additional Information about above appointments (if needed):    1:   2:   3:     Other comments or requests:       Podiatry Discharge Instructions:  Follow up: Please follow up with Dr. Myles within 1 week of discharge from the hospital, please call [phone] for appointment and discuss that you recently were seen in the hospital.  Wound Care:  Please apply mupirocin to right digit wound 3x weekly followed by alleyvn pad.   Weight bearing: Please weight bear as tolerated in a surgical shoe.  Antibiotics: Please continue as instructed. APPTS ARE READY TO BE MADE: [ x] YES    Best Family or Patient Contact (if needed):    Additional Information about above appointments (if needed):    1:   2:   3:     Other comments or requests:       Podiatry Discharge Instructions:  Follow up: Please follow up with Dr. Myles within 1 week of discharge from the hospital, please call [phone] for appointment and discuss that you recently were seen in the hospital.  Wound Care:  Please apply mupirocin to right digit wound 3x weekly followed by alleyvn pad.   Weight bearing: Please weight bear as tolerated in a surgical shoe.  Antibiotics: Please continue as instructed.    Met with patient face to face and provided the patient with provider referral information, however patient prefers to schedule the appointments on their own.  As per patient, daughter Sandra schedules her appointments. Gave IPR card to Home Health Aide.

## 2024-07-04 NOTE — PROGRESS NOTE ADULT - ASSESSMENT
89F presents with R foot 4rd digit wound to bone  - Pt seen and evaluated    - Afebrile, WBC 11.74  - R foot dorsal third digit wound to bone with exposed phalanx, no erythema, no purulence, no malodor, no drainage, no signs of acute infection. L foot no open wounds or sings of infection.   - R foot XR: no OM, no gas  - Discussion has with patient at bedside about clinical concern for OM given bone exposure through wound, pt understands that she is at increased risk for infection of soft tissue and bone and at risk for infectious spread. Discussed surgical options with pt. Pt not amenable to surgical intervention at this time and states she has had the wound for a long time with no problems and she understands risk but would not like surgery. Pt demonstrated understanding.   - Stable for d/c from podiatry, local wound care only  - Follow up information and wound care listed in discharge note provider.   - Discussed with attending  89F presents with R foot 4rd digit wound to bone  - Pt seen and evaluated    - Afebrile, WBC 11.74  - R foot dorsal third digit wound to bone with exposed phalanx, no erythema, no purulence, no malodor, no drainage, no signs of acute infection. L foot no open wounds or sings of infection.   - R foot XR: no OM, no gas  - Discussion has with patient at bedside about clinical concern for OM given bone exposure through wound, pt understands that she is at increased risk for infection of soft tissue and bone and at risk for infectious spread. Discussed surgical options with pt. Pt not amenable to surgical intervention at this time and states she has had the wound for a long time with no problems and she understands risk but would not like surgery. Pt demonstrated understanding.   - Stable for d/c from podiatry, local wound care only  - Please reconsult PRN  - Follow up information and wound care listed in discharge note provider.   - Discussed with attending

## 2024-07-04 NOTE — PROVIDER CONTACT NOTE (OTHER) - ACTION/TREATMENT ORDERED:
NP aware. No new orders. Plan of care ongoing. NP aware. No new orders. ENT came to bedside. Suction provided and nasal packing applied to right nostril. Plan of care ongoing.

## 2024-07-04 NOTE — DISCHARGE NOTE PROVIDER - NSDCFUSCHEDAPPT_GEN_ALL_CORE_FT
[facility]  OPHTHALM [address] [facility]  Scheduled Appointment: 07/23/2024

## 2024-07-04 NOTE — DISCHARGE NOTE PROVIDER - DETAILS OF MALNUTRITION DIAGNOSIS/DIAGNOSES
This patient has been assessed with a concern for Malnutrition and was treated during this hospitalization for the following Nutrition diagnosis/diagnoses:     -  07/03/2024: Underweight (BMI < 19)

## 2024-07-04 NOTE — DISCHARGE NOTE PROVIDER - NSDCCPCAREPLAN_GEN_ALL_CORE_FT
PRINCIPAL DISCHARGE DIAGNOSIS  Diagnosis: Acute hypoxic respiratory failure  Assessment and Plan of Treatment:       SECONDARY DISCHARGE DIAGNOSES  Diagnosis: Right-sided epistaxis  Assessment and Plan of Treatment:     Diagnosis: Chronic wound  Assessment and Plan of Treatment: 89F presents with R foot 3rd digit wound to bone  Seen by podiatry  - R foot dorsal third digit wound to bone with exposed phalanx, no erythema, no purulence, no malodor, no drainage, no signs of acute infection.   -  Discussed surgical options with patient,  not amenable to surgical intervention at this time and states she has had the wound for a long time with no problems and she understands risk but would not like surgery.  - Follow up information and wound care listed in discharge note provider.       Diagnosis: Acute UTI  Assessment and Plan of Treatment:      PRINCIPAL DISCHARGE DIAGNOSIS  Diagnosis: Acute hypoxic respiratory failure  Assessment and Plan of Treatment: You required 100% NRBM, Now back to nC. Received 5 day course of antibiotic for possible pNA. Follow up with pulmonary doctor in 1-2 weeks.      SECONDARY DISCHARGE DIAGNOSES  Diagnosis: Right-sided epistaxis  Assessment and Plan of Treatment: ENT placed dissolvable packing. Now resolved    Diagnosis: Chronic wound  Assessment and Plan of Treatment: Has  R foot 3rd digit wound to bone  Seen by podiatry  - R foot dorsal third digit wound to bone with exposed phalanx, no erythema, no purulence, no malodor, no drainage, no signs of acute infection.   -  Discussed surgical options with patient,  not amenable to surgical intervention at this time and states she has had the wound for a long time with no problems and she understands risk but would not like surgery.  - Follow up information and wound care listed in discharge note provider.       Diagnosis: Acute UTI  Assessment and Plan of Treatment: Received 5 days of Zosyn. Now asymptomatic.

## 2024-07-04 NOTE — CONSULT NOTE ADULT - SUBJECTIVE AND OBJECTIVE BOX
CC: right epistaxis     HPI: 89 year old, Hard of hearing, A&Ox3 Female, with medical history of asthma/COPD, HTN, pulmonary sarcoidosis, hypothyroidism, heart failure, CAD, is admitted for acute hypoxic respiratory failure secondary to PNA and COPD exacerbation, currently on 2L NC. ENT consulted for right epistaxis. Per patient, she had one small episode of right epistaxis earlier, but it spontaneously resolved. However, she is having a more significant nosebleed from this second episode. Denies worsening shortness of breath, hemoptysis, post nasal drip, or taste of blood in mouth. Denies history of coagulapathy.     PAST MEDICAL & SURGICAL HISTORY:  HTN (hypertension)  Hypothyroidism  Osteoporosis  CAD (coronary artery disease)  COPD (chronic obstructive pulmonary disease)  Pulmonary sarcoidosis  H/O pyelonephritis  Acute diverticulitis    Allergies  iodinated radiocontrast agents (Anaphylaxis)    Intolerances    MEDICATIONS  (STANDING):  albuterol    90 MICROgram(s) HFA Inhaler 2 Puff(s) Inhalation every 8 hours  albuterol/ipratropium for Nebulization 3 milliLiter(s) Nebulizer every 6 hours  atorvastatin 20 milliGRAM(s) Oral at bedtime  budesonide  80 MICROgram(s)/formoterol 4.5 MICROgram(s) Inhaler 2 Puff(s) Inhalation two times a day  chlorhexidine 2% Cloths 1 Application(s) Topical daily  enoxaparin Injectable 40 milliGRAM(s) SubCutaneous every 12 hours  FLUoxetine 20 milliGRAM(s) Oral daily  furosemide    Tablet 20 milliGRAM(s) Oral daily  losartan 50 milliGRAM(s) Oral daily  methylPREDNISolone sodium succinate Injectable 20 milliGRAM(s) IV Push three times a day  metoprolol succinate ER 50 milliGRAM(s) Oral daily  mirtazapine 7.5 milliGRAM(s) Oral at bedtime  pantoprazole    Tablet 40 milliGRAM(s) Oral before breakfast  piperacillin/tazobactam IVPB.. 3.375 Gram(s) IV Intermittent every 8 hours  polyethylene glycol 3350 17 Gram(s) Oral daily  senna 2 Tablet(s) Oral at bedtime    MEDICATIONS  (PRN):  temazepam 15 milliGRAM(s) Oral at bedtime PRN Insomnia    Social History: denies drinking or smoking   Family history: no pertinent family history     ROS:   ENT: all negative except as noted in HPI   CV: denies palpitations  Pulm: denies SOB, cough, hemoptysis  GI: denies change in appetite, indigestion, n/v  : denies pertinent urinary symptoms, urgency  Neuro: denies numbness/tingling, loss of sensation  Psych: endorses feeling nervous  MS: endorses muscle weakness, instability  Heme: denies easy bruising or bleeding  Endo: denies heat/cold intolerance, excessive sweating  Vascular: denies LE edema    Vital Signs Last 24 Hrs  T(C): 36.3 (04 Jul 2024 16:30), Max: 36.7 (04 Jul 2024 08:46)  T(F): 97.3 (04 Jul 2024 16:30), Max: 98 (04 Jul 2024 08:46)  HR: 70 (04 Jul 2024 16:30) (70 - 73)  BP: 161/74 (04 Jul 2024 16:30) (137/70 - 161/74)  BP(mean): --  RR: 19 (04 Jul 2024 16:30) (18 - 19)  SpO2: 98% (04 Jul 2024 16:30) (96% - 98%)    Parameters below as of 04 Jul 2024 16:30  Patient On (Oxygen Delivery Method): nasal cannula                  10.3   11.74 )-----------( 198      ( 04 Jul 2024 10:47 )             32.7    07-04    139  |  94<L>  |  30<H>  ----------------------------<  189<H>  3.3<L>   |  30  |  0.72    Ca    8.4      04 Jul 2024 10:47      PHYSICAL EXAM:  Gen: NAD, siting in chair, calm   Skin: no actively bleeding lesions   Head: Normocephalic, Atraumatic  Face: no edema, erythema, or fluctuance. Parotid glands soft without mass  Eyes: no scleral injection  Nose: noted blood clot with mild bleeding from right naris anteriorly, s/p removal of blood clot. Noted excoriations on right anterior nasal septum, c/w irritations from nasal cannula prong. s/p surgicel coated with bacitracin ointment.  Left nose patient and without abnormal discharges.   Mouth: denture in place. No posterior oropharyngx dripping of blood/ or blood clot noted. No Stridor / Drooling / Trismus.  Mucosa moist, tongue/uvula midline, oropharynx clear  Neck: Flat, supple, no lymphadenopathy, trachea midline, no masses  Lymphatic: No lymphadenopathy  Resp: breathing comfortably, no stridor  CV: no peripheral edema/cyanosis  GI: nondistended   Peripheral vascular: no JVD or edema  Neuro: facial nerve intact, no facial droop      Diagnostic Nasal Endoscopy: (Scope #2 used)    Fiberoptic Indirect laryngoscopy:  (Scope #2 used)    IMAGING/ADDITIONAL STUDIES:

## 2024-07-05 PROCEDURE — 99232 SBSQ HOSP IP/OBS MODERATE 35: CPT

## 2024-07-05 RX ORDER — PREDNISONE 10 MG/1
20 TABLET ORAL DAILY
Refills: 0 | Status: DISCONTINUED | OUTPATIENT
Start: 2024-07-06 | End: 2024-07-10

## 2024-07-05 RX ORDER — POTASSIUM CHLORIDE 600 MG/1
40 TABLET, FILM COATED, EXTENDED RELEASE ORAL ONCE
Refills: 0 | Status: COMPLETED | OUTPATIENT
Start: 2024-07-05 | End: 2024-07-05

## 2024-07-05 RX ORDER — MUPIROCIN 20 MG/G
1 OINTMENT TOPICAL
Refills: 0 | Status: DISCONTINUED | OUTPATIENT
Start: 2024-07-05 | End: 2024-07-11

## 2024-07-05 RX ORDER — ACETAMINOPHEN 325 MG
650 TABLET ORAL ONCE
Refills: 0 | Status: COMPLETED | OUTPATIENT
Start: 2024-07-05 | End: 2024-07-05

## 2024-07-05 RX ADMIN — Medication 20 MILLIGRAM(S): at 05:36

## 2024-07-05 RX ADMIN — Medication 1 APPLICATION(S): at 13:19

## 2024-07-05 RX ADMIN — Medication 2 SPRAY(S): at 05:36

## 2024-07-05 RX ADMIN — Medication 2 TABLET(S): at 21:21

## 2024-07-05 RX ADMIN — IPRATROPIUM BROMIDE AND ALBUTEROL SULFATE 3 MILLILITER(S): .5; 3 SOLUTION RESPIRATORY (INHALATION) at 05:36

## 2024-07-05 RX ADMIN — Medication 20 MILLIGRAM(S): at 13:16

## 2024-07-05 RX ADMIN — POTASSIUM CHLORIDE 40 MILLIEQUIVALENT(S): 600 TABLET, FILM COATED, EXTENDED RELEASE ORAL at 17:44

## 2024-07-05 RX ADMIN — Medication 650 MILLIGRAM(S): at 06:34

## 2024-07-05 RX ADMIN — MIRTAZAPINE 7.5 MILLIGRAM(S): 15 TABLET, FILM COATED ORAL at 21:22

## 2024-07-05 RX ADMIN — Medication 2 SPRAY(S): at 13:19

## 2024-07-05 RX ADMIN — ATORVASTATIN CALCIUM 20 MILLIGRAM(S): 20 TABLET, FILM COATED ORAL at 21:21

## 2024-07-05 RX ADMIN — PIPERACILLIN SODIUM AND TAZOBACTAM SODIUM 25 GRAM(S): 3; .375 INJECTION, POWDER, LYOPHILIZED, FOR SOLUTION INTRAVENOUS at 21:22

## 2024-07-05 RX ADMIN — VANCOMYCIN HYDROCHLORIDE 250 MILLIGRAM(S): KIT at 03:34

## 2024-07-05 RX ADMIN — TEMAZEPAM 15 MILLIGRAM(S): 30 CAPSULE ORAL at 00:16

## 2024-07-05 RX ADMIN — ENOXAPARIN SODIUM 40 MILLIGRAM(S): 100 INJECTION SUBCUTANEOUS at 21:00

## 2024-07-05 RX ADMIN — PANTOPRAZOLE SODIUM 40 MILLIGRAM(S): 40 INJECTION, POWDER, FOR SOLUTION INTRAVENOUS at 06:15

## 2024-07-05 RX ADMIN — IPRATROPIUM BROMIDE AND ALBUTEROL SULFATE 3 MILLILITER(S): .5; 3 SOLUTION RESPIRATORY (INHALATION) at 13:15

## 2024-07-05 RX ADMIN — FUROSEMIDE 20 MILLIGRAM(S): 10 INJECTION, SOLUTION INTRAMUSCULAR; INTRAVENOUS at 05:35

## 2024-07-05 RX ADMIN — PIPERACILLIN SODIUM AND TAZOBACTAM SODIUM 25 GRAM(S): 3; .375 INJECTION, POWDER, LYOPHILIZED, FOR SOLUTION INTRAVENOUS at 13:15

## 2024-07-05 RX ADMIN — MUPIROCIN 1 APPLICATION(S): 20 OINTMENT TOPICAL at 17:54

## 2024-07-05 RX ADMIN — OXYMETAZOLINE HYDROCHLORIDE 1 SPRAY(S): 0.05 SPRAY NASAL at 05:36

## 2024-07-05 RX ADMIN — Medication 50 MILLIGRAM(S): at 05:36

## 2024-07-05 RX ADMIN — Medication 650 MILLIGRAM(S): at 05:34

## 2024-07-05 RX ADMIN — LOSARTAN POTASSIUM 50 MILLIGRAM(S): 100 TABLET, FILM COATED ORAL at 05:36

## 2024-07-05 RX ADMIN — PIPERACILLIN SODIUM AND TAZOBACTAM SODIUM 25 GRAM(S): 3; .375 INJECTION, POWDER, LYOPHILIZED, FOR SOLUTION INTRAVENOUS at 05:37

## 2024-07-05 RX ADMIN — TEMAZEPAM 15 MILLIGRAM(S): 30 CAPSULE ORAL at 21:21

## 2024-07-05 RX ADMIN — Medication 20 MILLIGRAM(S): at 13:14

## 2024-07-05 RX ADMIN — ENOXAPARIN SODIUM 40 MILLIGRAM(S): 100 INJECTION SUBCUTANEOUS at 07:50

## 2024-07-05 RX ADMIN — Medication 2 PUFF(S): at 05:37

## 2024-07-05 RX ADMIN — Medication 2 SPRAY(S): at 21:39

## 2024-07-05 RX ADMIN — IPRATROPIUM BROMIDE AND ALBUTEROL SULFATE 3 MILLILITER(S): .5; 3 SOLUTION RESPIRATORY (INHALATION) at 17:50

## 2024-07-05 NOTE — PROGRESS NOTE ADULT - ASSESSMENT
89 year old well known to me, seen many times over the last several years for recurrent episodes of pna, uti, confusion and diverticulitis and possible ischemic bowel   Pt has    Sarcoid  COPD  CHF  CAD  Pyelo  Diverticulitis   Presents again from assisted living with sob, which she currently denies   no hs of dysuria  chest xray essential clear  CT chest reviewed  atelectasis  GGC  mental status is at baseline      zosyn day5  Clinically stable, leukocytosis resolving  Bcx remain negative to date  Ucx with MRSA, candida- patient without urinary symptoms   recurrent atelesis and aspiration  not a good candidate for bronch or treatment for JAE if present complete   5 days of zosyn then monitor off after completion today  Would not target urine given MRSA and candida growth - lack of symptoms    Joshua La MD  Division of Infectious Diseases

## 2024-07-05 NOTE — PROVIDER CONTACT NOTE (MEDICATION) - ACTION/TREATMENT ORDERED:
NP said to reschedule the lovenox by 1 hour, and have the day team follow up. Medication rescheduled for 0800. Plan of care ongoing.

## 2024-07-05 NOTE — PROGRESS NOTE ADULT - ASSESSMENT
89F PMH COPD, sarcoidosis, chronic R hemidiaphragm paralysis, H/O recurrent aspiration events p/w dyspnea and hypoxemia  - Increased oxygen requirements recently, but now on 2LNC spO2 91%. Remains largely asymptomatic.  - Agree with empiric antibiotics, only "positive" culture is UCx positive for MRSA, but <100k CFU, so unclear significance. If this is going to be treated, would use vancomycin.  - Lungs currently with no wheezing, but scattered rhonchi / coarse breath sounds. c/w LABA/ICS, encourage airway clearance. If remains rhonchorous, may benefit from aggressive pulmonary toilet. Would keep on prednisone 20mg PO daily for now, and consider taper in the next few days.  - c/w DuoNEBs Q6h, but would change to PRN  - Patient prefers nebulizers to inhalers, and does the same at home.  - Continue to encourage OOB to chair, incentive spirometry  - Aspiration precautions  - We will continue to follow the patient with you. 89F PMH COPD, sarcoidosis, chronic R hemidiaphragm paralysis, H/O recurrent aspiration events p/w dyspnea and hypoxemia  - Increased oxygen requirements recently, but now on 2LNC spO2 91%. Remains largely asymptomatic.  - Agree with empiric antibiotics, only "positive" culture is UCx positive for MRSA, but <100k CFU, so unclear significance. If this is going to be treated, would use vancomycin.  - Lungs currently with no wheezing, but scattered rhonchi / coarse breath sounds. c/w LABA/ICS, encourage airway clearance. If remains rhonchorous, may benefit from aggressive pulmonary toilet. Would keep on prednisone 20mg PO daily for now, and consider taper in the next few days.  - c/w DuoNEBs Q6h, but would change to PRN  - Patient prefers nebulizers to inhalers, and does the same at home.  - Continue to encourage OOB to chair, incentive spirometry  - Aspiration precautions  - Currently on full AC with Lovenox. V/Q scan "intermediate risk" of PE, but patient's underlying severe centrilobular emphysema and chronic atelectasis d/t R hemidiaphragm paralysis make the study very difficult to make a definitive diagnosis of PE. Patient supposedly had an anaphylactoid reaction to contrast in the past, so if PE truly needs to be ruled out, then would need pre-medication and preferentially use low-osmolality or iso-osmolar contrast media.  - We will continue to follow the patient with you.

## 2024-07-06 LAB
ANION GAP SERPL CALC-SCNC: 13 MMOL/L — SIGNIFICANT CHANGE UP (ref 5–17)
BUN SERPL-MCNC: 36 MG/DL — HIGH (ref 7–23)
CALCIUM SERPL-MCNC: 8.7 MG/DL — SIGNIFICANT CHANGE UP (ref 8.4–10.5)
CHLORIDE SERPL-SCNC: 101 MMOL/L — SIGNIFICANT CHANGE UP (ref 96–108)
CO2 SERPL-SCNC: 25 MMOL/L — SIGNIFICANT CHANGE UP (ref 22–31)
CREAT SERPL-MCNC: 0.69 MG/DL — SIGNIFICANT CHANGE UP (ref 0.5–1.3)
CULTURE RESULTS: SIGNIFICANT CHANGE UP
CULTURE RESULTS: SIGNIFICANT CHANGE UP
EGFR: 83 ML/MIN/1.73M2 — SIGNIFICANT CHANGE UP
GLUCOSE SERPL-MCNC: 122 MG/DL — HIGH (ref 70–99)
HCT VFR BLD CALC: 35.6 % — SIGNIFICANT CHANGE UP (ref 34.5–45)
HGB BLD-MCNC: 10.6 G/DL — LOW (ref 11.5–15.5)
MAGNESIUM SERPL-MCNC: 2.6 MG/DL — SIGNIFICANT CHANGE UP (ref 1.6–2.6)
MCHC RBC-ENTMCNC: 28.9 PG — SIGNIFICANT CHANGE UP (ref 27–34)
MCHC RBC-ENTMCNC: 29.8 GM/DL — LOW (ref 32–36)
MCV RBC AUTO: 97 FL — SIGNIFICANT CHANGE UP (ref 80–100)
NRBC # BLD: 0 /100 WBCS — SIGNIFICANT CHANGE UP (ref 0–0)
PLATELET # BLD AUTO: 184 K/UL — SIGNIFICANT CHANGE UP (ref 150–400)
POTASSIUM SERPL-MCNC: 4.4 MMOL/L — SIGNIFICANT CHANGE UP (ref 3.5–5.3)
POTASSIUM SERPL-SCNC: 4.4 MMOL/L — SIGNIFICANT CHANGE UP (ref 3.5–5.3)
RBC # BLD: 3.67 M/UL — LOW (ref 3.8–5.2)
RBC # FLD: 17.2 % — HIGH (ref 10.3–14.5)
SODIUM SERPL-SCNC: 139 MMOL/L — SIGNIFICANT CHANGE UP (ref 135–145)
SPECIMEN SOURCE: SIGNIFICANT CHANGE UP
SPECIMEN SOURCE: SIGNIFICANT CHANGE UP
WBC # BLD: 12.46 K/UL — HIGH (ref 3.8–10.5)
WBC # FLD AUTO: 12.46 K/UL — HIGH (ref 3.8–10.5)

## 2024-07-06 PROCEDURE — 99232 SBSQ HOSP IP/OBS MODERATE 35: CPT

## 2024-07-06 RX ORDER — METHYLPREDNISOLONE ACETATE 20 MG/ML
40 VIAL (ML) INJECTION ONCE
Refills: 0 | Status: COMPLETED | OUTPATIENT
Start: 2024-07-06 | End: 2024-07-09

## 2024-07-06 RX ORDER — ACETAMINOPHEN 325 MG
650 TABLET ORAL ONCE
Refills: 0 | Status: COMPLETED | OUTPATIENT
Start: 2024-07-06 | End: 2024-07-06

## 2024-07-06 RX ORDER — DIPHENHYDRAMINE HCL 12.5MG/5ML
50 ELIXIR ORAL ONCE
Refills: 0 | Status: COMPLETED | OUTPATIENT
Start: 2024-07-06 | End: 2024-07-09

## 2024-07-06 RX ADMIN — IPRATROPIUM BROMIDE AND ALBUTEROL SULFATE 3 MILLILITER(S): .5; 3 SOLUTION RESPIRATORY (INHALATION) at 12:09

## 2024-07-06 RX ADMIN — LOSARTAN POTASSIUM 50 MILLIGRAM(S): 100 TABLET, FILM COATED ORAL at 05:10

## 2024-07-06 RX ADMIN — Medication 650 MILLIGRAM(S): at 23:39

## 2024-07-06 RX ADMIN — FUROSEMIDE 20 MILLIGRAM(S): 10 INJECTION, SOLUTION INTRAMUSCULAR; INTRAVENOUS at 05:10

## 2024-07-06 RX ADMIN — Medication 1 APPLICATION(S): at 12:10

## 2024-07-06 RX ADMIN — TEMAZEPAM 15 MILLIGRAM(S): 30 CAPSULE ORAL at 23:27

## 2024-07-06 RX ADMIN — Medication 2 SPRAY(S): at 05:11

## 2024-07-06 RX ADMIN — PANTOPRAZOLE SODIUM 40 MILLIGRAM(S): 40 INJECTION, POWDER, FOR SOLUTION INTRAVENOUS at 06:00

## 2024-07-06 RX ADMIN — Medication 2 PUFF(S): at 17:56

## 2024-07-06 RX ADMIN — IPRATROPIUM BROMIDE AND ALBUTEROL SULFATE 3 MILLILITER(S): .5; 3 SOLUTION RESPIRATORY (INHALATION) at 23:32

## 2024-07-06 RX ADMIN — ENOXAPARIN SODIUM 40 MILLIGRAM(S): 100 INJECTION SUBCUTANEOUS at 17:55

## 2024-07-06 RX ADMIN — Medication 20 MILLIGRAM(S): at 12:09

## 2024-07-06 RX ADMIN — MUPIROCIN 1 APPLICATION(S): 20 OINTMENT TOPICAL at 12:10

## 2024-07-06 RX ADMIN — IPRATROPIUM BROMIDE AND ALBUTEROL SULFATE 3 MILLILITER(S): .5; 3 SOLUTION RESPIRATORY (INHALATION) at 17:56

## 2024-07-06 RX ADMIN — ENOXAPARIN SODIUM 40 MILLIGRAM(S): 100 INJECTION SUBCUTANEOUS at 05:11

## 2024-07-06 RX ADMIN — ATORVASTATIN CALCIUM 20 MILLIGRAM(S): 20 TABLET, FILM COATED ORAL at 21:30

## 2024-07-06 RX ADMIN — MIRTAZAPINE 7.5 MILLIGRAM(S): 15 TABLET, FILM COATED ORAL at 21:29

## 2024-07-06 RX ADMIN — Medication 50 MILLIGRAM(S): at 05:10

## 2024-07-06 RX ADMIN — PREDNISONE 20 MILLIGRAM(S): 10 TABLET ORAL at 05:09

## 2024-07-06 RX ADMIN — Medication 2 PUFF(S): at 05:11

## 2024-07-06 RX ADMIN — Medication 650 MILLIGRAM(S): at 06:09

## 2024-07-06 RX ADMIN — IPRATROPIUM BROMIDE AND ALBUTEROL SULFATE 3 MILLILITER(S): .5; 3 SOLUTION RESPIRATORY (INHALATION) at 05:09

## 2024-07-06 RX ADMIN — OXYMETAZOLINE HYDROCHLORIDE 1 SPRAY(S): 0.05 SPRAY NASAL at 17:56

## 2024-07-06 RX ADMIN — Medication 2 SPRAY(S): at 14:39

## 2024-07-06 RX ADMIN — OXYMETAZOLINE HYDROCHLORIDE 1 SPRAY(S): 0.05 SPRAY NASAL at 05:11

## 2024-07-06 RX ADMIN — Medication 650 MILLIGRAM(S): at 05:09

## 2024-07-06 NOTE — PROGRESS NOTE ADULT - ASSESSMENT
89 year old well known to me, seen many times over the last several years for recurrent episodes of pna, uti, confusion and diverticulitis and possible ischemic bowel   Pt has    Sarcoid  COPD  CHF  CAD  Pyelo  Diverticulitis   Presents again from assisted living with sob, which she currently denies   no hs of dysuria  chest xray essential clear  CT chest reviewed  atelectasis  GGC  mental status is at baseline      zosyn day5  Clinically stable, leukocytosis resolving  Bcx remain negative to date  Ucx with MRSA, candida- patient without urinary symptoms   recurrent atelesis and aspiration  not a good candidate for bronch or treatment for JAE if present complete   Would not target urine given MRSA and candida growth - lack of symptoms  Discussed foot wound at length with patient today, advised of risks versus benefit of surgical intervention, patient states understanding, aide at bedside also states understanding  Patient at this time refuses any further intervention/surgery  Given exposed bone without any surgical intervention, 6 weeks of antibiotics would be futile as bone would be reinfected as soon as antibiotics are stopped  Therefore, patient did receive a short course of antibiotics which should be adequate for presumed SSTI associated with foot wound  Advised patient to follow-up closely with wound care, primary care  Remains at elevated risk for infection of the right foot given wound and exposed bone    Joshua La MD  Division of Infectious Diseases

## 2024-07-06 NOTE — PROGRESS NOTE ADULT - ASSESSMENT
89 year old woman,      h/o  emphysema, ,  c/c  L  hydroureter,  HTN.  depression   prior    h/o   C. difficile, diverticulitis,   pna        asthma/COPD, sarcoidosis,   c/c  diastolic  heart failure /  ulcer  left 2nd  toe /  no  osteo on prior  visit     prior   chart/  from psych  note,   faxed medication records from [facility], ,  mirtazapine 7.5mg po qhs, fluoxetine 20mg po daily, and temazepam 30mg po qHs for insomnia.    admitted  with    sob.  arrived   from [facility]       wbc  of  21,000  on  arrival,  was  afebrile.  from  uti,/  probable  pna,        elevated  d  dimer,  has   severe  contrast allergy   v/q  scan     h/o  underlying chronic lung disease./  emphysema      has       ulcerations, on  bony prominences  of hammer  toes.   appears not  infected          and,  per  podiatry, pt  may  have  osteo  , and  pt  has   refused  surg  intervention   defer  ab  to  ID  dr La      HTN/  HLD    c/c  diastolic  chf, on lasix    Anxiety,  seen by psych  on  prior   visit, on  paxil,  remeron./   temazapam.   prn  dose  lowered.  pt  frail, bmi  18      on lipitor, lasix. cozaar, toprol     dvt  ppx    v/q  scan noted,  intermediate  prob.  seen  by  house  pulm,  ?  need   for   a/c / defer  rx  plan  to house  pulm     discussed   with  team edouard barraza< from: [employer] W or [employer] Enhancing [employer] (04.08.24 @ 14:17) >  CONCLUSIONS:   1. Left ventricular cavity is normal in size. Left ventricular wall thickness is normal. Left ventricular systolic function is normal with an ejection fraction visually estimated at 55 to 60 %. There are no regional wall motion abnormalities seen.   2. Normal rightventricular cavity size, with normal wall thickness, and normal systolic function. Tricuspid annular plane systolic excursion (TAPSE) is 2.0 cm (normal >=1.7 cm).   3. The left atrium is severely dilated.   4. Mild mitral regurgitation.   5. Comparedto the transthoracic echocardiogram performed on 3/26/2024, regional wall motion abnormality not appreciated.  ________________________________________________________________________________________  < end of copied text >                           89 year old woman,      h/o  emphysema, ,  c/c  L  hydroureter,  HTN.  depression   prior    h/o   C. difficile, diverticulitis,   pna        asthma/COPD, sarcoidosis,   c/c  diastolic  heart failure /  ulcer  left 2nd  toe /  no  osteo on prior  visit     prior   chart/  from psych  note,   faxed medication records from [facility], ,  mirtazapine 7.5mg po qhs, fluoxetine 20mg po daily, and temazepam 30mg po qHs for insomnia.    admitted  with    sob.  arrived   from [facility]       wbc  of  21,000  on  arrival,  was  afebrile.  from  uti,/  probable  pna,        elevated  d  dimer,  has   severe  contrast allergy   v/q  scan     h/o  underlying chronic lung disease./  emphysema      has       ulcerations, on  bony prominences  of hammer  toes.   appears not  infected          and,  per  podiatry, pt  may  have  osteo  , and  pt  has   refused  surg  intervention   defer  ab  to  ID  dr La      HTN/  HLD    c/c  diastolic  chf, on lasix    Anxiety,  seen by psych  on  prior   visit, on  paxil,  remeron./   temazapam.   prn  dose  lowered.  pt  frail, bmi  18      on lipitor, lasix. cozaar, toprol     dvt  ppx    v/q  scan noted,  intermediate  prob.  seen  by  house  pulm,  ?  need   for   long  term   a/c / defer  rx  plan  to house  pulm /  currently  on lovenox  bid      discussed   with  team edouard barraza< from: [employer] W or WO Ultrasound Enhancing Agent (04.08.24 @ 14:17) >  CONCLUSIONS:   1. Left ventricular cavity is normal in size. Left ventricular wall thickness is normal. Left ventricular systolic function is normal with an ejection fraction visually estimated at 55 to 60 %. There are no regional wall motion abnormalities seen.   2. Normal rightventricular cavity size, with normal wall thickness, and normal systolic function. Tricuspid annular plane systolic excursion (TAPSE) is 2.0 cm (normal >=1.7 cm).   3. The left atrium is severely dilated.   4. Mild mitral regurgitation.   5. Comparedto the transthoracic echocardiogram performed on 3/26/2024, regional wall motion abnormality not appreciated.  ________________________________________________________________________________________  < end of copied text >                           89 year old woman,      h/o  emphysema, ,  c/c  L  hydroureter,  HTN.  depression   prior    h/o   C. difficile, diverticulitis,   pna        asthma/COPD, sarcoidosis,   c/c  diastolic  heart failure /  ulcer  left 2nd  toe /  no  osteo on prior  visit     prior   chart/  from psych  note,   faxed medication records from [facility], ,  mirtazapine 7.5mg po qhs, fluoxetine 20mg po daily, and temazepam 30mg po qHs for insomnia.    admitted  with    sob.  arrived   from [facility]       wbc  of  21,000  on  arrival,  was  afebrile.  from  uti,/  probable  pna,        elevated  d  dimer,  has   contrast allergy   v/q  scan     h/o  underlying chronic lung disease./  emphysema      has       ulcerations, on  bony prominences  of hammer  toes.   appears not  infected          and,  per  podiatry, pt  may  have  osteo  , and  pt  has   refused  surg  intervention   defer  ab  to  ID  dr La      HTN/  HLD    c/c  diastolic  chf, on lasix    Anxiety,  seen by psych  on  prior   visit, on  paxil,  remeron./   temazapam.   prn  dose  lowered.  pt  frail, bmi  18      on lipitor, lasix. cozaar, toprol     dvt  ppx    v/q  scan noted,  intermediate  prob.  seen  by  house  pulm,  ?  need   for   long  term   a/c / defer  rx  plan  to house  pulm /  currently  on lovenox  bid    ct  chest  angio,  with pre  meds      discussed   with  team edouard barraza< from: [employer] W or WO Ultrasound Enhancing Agent (04.08.24 @ 14:17) >  CONCLUSIONS:   1. Left ventricular cavity is normal in size. Left ventricular wall thickness is normal. Left ventricular systolic function is normal with an ejection fraction visually estimated at 55 to 60 %. There are no regional wall motion abnormalities seen.   2. Normal rightventricular cavity size, with normal wall thickness, and normal systolic function. Tricuspid annular plane systolic excursion (TAPSE) is 2.0 cm (normal >=1.7 cm).   3. The left atrium is severely dilated.   4. Mild mitral regurgitation.   5. Comparedto the transthoracic echocardiogram performed on 3/26/2024, regional wall motion abnormality not appreciated.  ________________________________________________________________________________________  < end of copied text >                           89 year old woman,      h/o  emphysema, ,  c/c  L  hydroureter,  HTN.  depression   prior    h/o   C. difficile, diverticulitis,   pna        asthma/COPD, sarcoidosis,   c/c  diastolic  heart failure /  ulcer  left 2nd  toe /  no  osteo on prior  visit     prior   chart/  from psych  note,   faxed medication records from [facility], ,  mirtazapine 7.5mg po qhs, fluoxetine 20mg po daily, and temazepam 30mg po qHs for insomnia.    admitted  with    sob.  arrived   from [facility]       wbc  of  21,000  on  arrival,  was  afebrile.  from  uti,/  probable  pna,        elevated  d  dimer,  has   contrast allergy   v/q  scan     h/o  underlying chronic lung disease./  emphysema      has       ulcerations, on  bony prominences  of hammer  toes.   appears not  infected          and,  per  podiatry, pt  may  have  osteo  , and  pt  has   refused  surg  intervention   defer  ab  to  ID  dr La      HTN/  HLD    c/c  diastolic  chf, on lasix    Anxiety,  seen by psych  on  prior   visit, on  paxil,  remeron./   temazapam.   prn  dose  lowered.  pt  frail, bmi  18      on lipitor, lasix. cozaar, toprol    v/q  scan noted,  intermediate  prob.  seen  by  house  pulm,  ?  need   for   long  term   a/c / defer  rx  plan  to house  pulm /  currently  on lovenox  bid    ct  chest  angio,  with pre  meds/  also  pe r pulm  fellow, await  ct       discussed   with  team edouard barraza< from: [employer] W or WO Ultrasound Enhancing Agent (04.08.24 @ 14:17) >  CONCLUSIONS:   1. Left ventricular cavity is normal in size. Left ventricular wall thickness is normal. Left ventricular systolic function is normal with an ejection fraction visually estimated at 55 to 60 %. There are no regional wall motion abnormalities seen.   2. Normal rightventricular cavity size, with normal wall thickness, and normal systolic function. Tricuspid annular plane systolic excursion (TAPSE) is 2.0 cm (normal >=1.7 cm).   3. The left atrium is severely dilated.   4. Mild mitral regurgitation.   5. Comparedto the transthoracic echocardiogram performed on 3/26/2024, regional wall motion abnormality not appreciated.  ________________________________________________________________________________________  < end of copied text >                           89 year old woman,      h/o  emphysema, ,  c/c  L  hydroureter,  HTN.  depression   prior    h/o   C. difficile, diverticulitis,   pna        asthma/COPD, sarcoidosis,   c/c  diastolic  heart failure /  ulcer  left 2nd  toe /  no  osteo on prior  visit     prior   chart/  from psych  note,   faxed medication records from [facility], ,  mirtazapine 7.5mg po qhs, fluoxetine 20mg po daily, and temazepam 30mg po qHs for insomnia.    admitted  with    sob.  arrived   from [facility]       wbc  of  21,000  on  arrival,  was  afebrile.  from  uti,/  probable  pna,        elevated  d  dimer,  has   contrast allergy   v/q  scan     h/o  underlying chronic lung disease./  emphysema      has       ulcerations, on  bony prominences  of hammer  toes.   appears not  infected          and,  per  podiatry, pt  may  have  osteo  , and  pt  has   refused  surg  intervention   defer  ab  to  ID  dr La      HTN/  HLD    c/c  diastolic  chf, on lasix    Anxiety,  seen by psych  on  prior   visit, on  paxil,  remeron./   temazapam.   prn  dose  lowered.  pt  frail, bmi  18      on lipitor, lasix. cozaar, toprol    v/q  scan noted,  intermediate  prob.  seen  by  house  pulm,  ?  need   for   long  term   a/c / defer  rx  plan  to house  pulm /  currently  on lovenox  bid    ct  chest  angio,  with pre  meds/  also  per  pulm  fellow   today   and  card,  ct  to be  done       discussed   with  team edouard zamora  will  assume  care           rad< from: [employer] W or WO Ultrasound Enhancing Agent (04.08.24 @ 14:17) >  CONCLUSIONS:   1. Left ventricular cavity is normal in size. Left ventricular wall thickness is normal. Left ventricular systolic function is normal with an ejection fraction visually estimated at 55 to 60 %. There are no regional wall motion abnormalities seen.   2. Normal rightventricular cavity size, with normal wall thickness, and normal systolic function. Tricuspid annular plane systolic excursion (TAPSE) is 2.0 cm (normal >=1.7 cm).   3. The left atrium is severely dilated.   4. Mild mitral regurgitation.   5. Comparedto the transthoracic echocardiogram performed on 3/26/2024, regional wall motion abnormality not appreciated.  ________________________________________________________________________________________  < end of copied text >

## 2024-07-07 RX ADMIN — LOSARTAN POTASSIUM 50 MILLIGRAM(S): 100 TABLET, FILM COATED ORAL at 06:38

## 2024-07-07 RX ADMIN — Medication 2 SPRAY(S): at 11:20

## 2024-07-07 RX ADMIN — Medication 2 SPRAY(S): at 21:22

## 2024-07-07 RX ADMIN — IPRATROPIUM BROMIDE AND ALBUTEROL SULFATE 3 MILLILITER(S): .5; 3 SOLUTION RESPIRATORY (INHALATION) at 11:18

## 2024-07-07 RX ADMIN — Medication 20 MILLIGRAM(S): at 11:19

## 2024-07-07 RX ADMIN — FUROSEMIDE 20 MILLIGRAM(S): 10 INJECTION, SOLUTION INTRAMUSCULAR; INTRAVENOUS at 06:37

## 2024-07-07 RX ADMIN — Medication 50 MILLIGRAM(S): at 06:38

## 2024-07-07 RX ADMIN — ENOXAPARIN SODIUM 40 MILLIGRAM(S): 100 INJECTION SUBCUTANEOUS at 18:01

## 2024-07-07 RX ADMIN — IPRATROPIUM BROMIDE AND ALBUTEROL SULFATE 3 MILLILITER(S): .5; 3 SOLUTION RESPIRATORY (INHALATION) at 18:02

## 2024-07-07 RX ADMIN — Medication 2 PUFF(S): at 18:02

## 2024-07-07 RX ADMIN — Medication 2 TABLET(S): at 21:22

## 2024-07-07 RX ADMIN — Medication 650 MILLIGRAM(S): at 00:30

## 2024-07-07 RX ADMIN — ATORVASTATIN CALCIUM 20 MILLIGRAM(S): 20 TABLET, FILM COATED ORAL at 21:22

## 2024-07-07 RX ADMIN — IPRATROPIUM BROMIDE AND ALBUTEROL SULFATE 3 MILLILITER(S): .5; 3 SOLUTION RESPIRATORY (INHALATION) at 06:38

## 2024-07-07 RX ADMIN — PANTOPRAZOLE SODIUM 40 MILLIGRAM(S): 40 INJECTION, POWDER, FOR SOLUTION INTRAVENOUS at 06:38

## 2024-07-07 RX ADMIN — ENOXAPARIN SODIUM 40 MILLIGRAM(S): 100 INJECTION SUBCUTANEOUS at 06:38

## 2024-07-07 RX ADMIN — MUPIROCIN 1 APPLICATION(S): 20 OINTMENT TOPICAL at 11:19

## 2024-07-07 RX ADMIN — Medication 1 APPLICATION(S): at 11:42

## 2024-07-07 RX ADMIN — PREDNISONE 20 MILLIGRAM(S): 10 TABLET ORAL at 06:37

## 2024-07-07 RX ADMIN — MIRTAZAPINE 7.5 MILLIGRAM(S): 15 TABLET, FILM COATED ORAL at 21:21

## 2024-07-08 PROCEDURE — 99232 SBSQ HOSP IP/OBS MODERATE 35: CPT

## 2024-07-08 RX ORDER — ACETAMINOPHEN 325 MG
650 TABLET ORAL ONCE
Refills: 0 | Status: COMPLETED | OUTPATIENT
Start: 2024-07-08 | End: 2024-07-08

## 2024-07-08 RX ADMIN — Medication 2 SPRAY(S): at 13:31

## 2024-07-08 RX ADMIN — LOSARTAN POTASSIUM 50 MILLIGRAM(S): 100 TABLET, FILM COATED ORAL at 06:26

## 2024-07-08 RX ADMIN — Medication 2 TABLET(S): at 21:28

## 2024-07-08 RX ADMIN — Medication 2 SPRAY(S): at 06:29

## 2024-07-08 RX ADMIN — Medication 650 MILLIGRAM(S): at 22:17

## 2024-07-08 RX ADMIN — Medication 50 MILLIGRAM(S): at 06:26

## 2024-07-08 RX ADMIN — IPRATROPIUM BROMIDE AND ALBUTEROL SULFATE 3 MILLILITER(S): .5; 3 SOLUTION RESPIRATORY (INHALATION) at 17:20

## 2024-07-08 RX ADMIN — PANTOPRAZOLE SODIUM 40 MILLIGRAM(S): 40 INJECTION, POWDER, FOR SOLUTION INTRAVENOUS at 06:26

## 2024-07-08 RX ADMIN — MUPIROCIN 1 APPLICATION(S): 20 OINTMENT TOPICAL at 09:06

## 2024-07-08 RX ADMIN — Medication 2 SPRAY(S): at 21:46

## 2024-07-08 RX ADMIN — Medication 2 PUFF(S): at 06:29

## 2024-07-08 RX ADMIN — IPRATROPIUM BROMIDE AND ALBUTEROL SULFATE 3 MILLILITER(S): .5; 3 SOLUTION RESPIRATORY (INHALATION) at 11:50

## 2024-07-08 RX ADMIN — IPRATROPIUM BROMIDE AND ALBUTEROL SULFATE 3 MILLILITER(S): .5; 3 SOLUTION RESPIRATORY (INHALATION) at 06:26

## 2024-07-08 RX ADMIN — MIRTAZAPINE 7.5 MILLIGRAM(S): 15 TABLET, FILM COATED ORAL at 21:29

## 2024-07-08 RX ADMIN — TEMAZEPAM 15 MILLIGRAM(S): 30 CAPSULE ORAL at 21:44

## 2024-07-08 RX ADMIN — ATORVASTATIN CALCIUM 20 MILLIGRAM(S): 20 TABLET, FILM COATED ORAL at 21:29

## 2024-07-08 RX ADMIN — FUROSEMIDE 20 MILLIGRAM(S): 10 INJECTION, SOLUTION INTRAMUSCULAR; INTRAVENOUS at 06:26

## 2024-07-08 RX ADMIN — Medication 1 APPLICATION(S): at 12:44

## 2024-07-08 RX ADMIN — ENOXAPARIN SODIUM 40 MILLIGRAM(S): 100 INJECTION SUBCUTANEOUS at 17:21

## 2024-07-08 RX ADMIN — PREDNISONE 20 MILLIGRAM(S): 10 TABLET ORAL at 06:26

## 2024-07-08 RX ADMIN — POLYETHYLENE GLYCOL 3350 17 GRAM(S): 1 POWDER ORAL at 11:49

## 2024-07-08 RX ADMIN — IPRATROPIUM BROMIDE AND ALBUTEROL SULFATE 3 MILLILITER(S): .5; 3 SOLUTION RESPIRATORY (INHALATION) at 00:24

## 2024-07-08 RX ADMIN — Medication 2 PUFF(S): at 17:21

## 2024-07-08 RX ADMIN — Medication 20 MILLIGRAM(S): at 11:50

## 2024-07-08 NOTE — PROGRESS NOTE ADULT - ASSESSMENT
89 year old well known to me, seen many times over the last several years for recurrent episodes of pna, uti, confusion and diverticulitis and possible ischemic bowel   Pt has    Sarcoid  COPD  CHF  CAD  Pyelo  Diverticulitis   Presents again from assisted living with sob, which she currently denies   no hs of dysuria  chest xray essential clear  CT chest reviewed  atelectasis  GGC  mental status is at baseline      zosyn day5  Clinically stable, leukocytosis resolving  Bcx remain negative to date  Ucx with MRSA, candida- patient without urinary symptoms   recurrent atelesis and aspiration  not a good candidate for bronch or treatment for JAE if present complete   Would not target urine given MRSA and candida growth - lack of symptoms  Discussed foot wound at length with patient today, advised of risks versus benefit of surgical intervention, patient states understanding, aide at bedside also states understanding  Patient at this time refuses any further intervention/surgery  Given exposed bone without any surgical intervention, 6 weeks of antibiotics would be futile as bone would be reinfected as soon as antibiotics are stopped         pt stable and wants to go home  off antibiotics   at risk for recurrent UTI and has chronic OM

## 2024-07-09 PROCEDURE — 71275 CT ANGIOGRAPHY CHEST: CPT | Mod: 26

## 2024-07-09 RX ORDER — ACETAMINOPHEN 325 MG
650 TABLET ORAL ONCE
Refills: 0 | Status: COMPLETED | OUTPATIENT
Start: 2024-07-09 | End: 2024-07-09

## 2024-07-09 RX ORDER — TEMAZEPAM 30 MG/1
15 CAPSULE ORAL AT BEDTIME
Refills: 0 | Status: DISCONTINUED | OUTPATIENT
Start: 2024-07-09 | End: 2024-07-11

## 2024-07-09 RX ADMIN — Medication 2 PUFF(S): at 06:03

## 2024-07-09 RX ADMIN — Medication 2 SPRAY(S): at 06:07

## 2024-07-09 RX ADMIN — LOSARTAN POTASSIUM 50 MILLIGRAM(S): 100 TABLET, FILM COATED ORAL at 06:02

## 2024-07-09 RX ADMIN — Medication 2 SPRAY(S): at 13:07

## 2024-07-09 RX ADMIN — IPRATROPIUM BROMIDE AND ALBUTEROL SULFATE 3 MILLILITER(S): .5; 3 SOLUTION RESPIRATORY (INHALATION) at 06:06

## 2024-07-09 RX ADMIN — MUPIROCIN 1 APPLICATION(S): 20 OINTMENT TOPICAL at 09:56

## 2024-07-09 RX ADMIN — IPRATROPIUM BROMIDE AND ALBUTEROL SULFATE 3 MILLILITER(S): .5; 3 SOLUTION RESPIRATORY (INHALATION) at 11:20

## 2024-07-09 RX ADMIN — Medication 1 APPLICATION(S): at 11:23

## 2024-07-09 RX ADMIN — MIRTAZAPINE 7.5 MILLIGRAM(S): 15 TABLET, FILM COATED ORAL at 21:57

## 2024-07-09 RX ADMIN — IPRATROPIUM BROMIDE AND ALBUTEROL SULFATE 3 MILLILITER(S): .5; 3 SOLUTION RESPIRATORY (INHALATION) at 00:00

## 2024-07-09 RX ADMIN — PANTOPRAZOLE SODIUM 40 MILLIGRAM(S): 40 INJECTION, POWDER, FOR SOLUTION INTRAVENOUS at 06:02

## 2024-07-09 RX ADMIN — IPRATROPIUM BROMIDE AND ALBUTEROL SULFATE 3 MILLILITER(S): .5; 3 SOLUTION RESPIRATORY (INHALATION) at 23:55

## 2024-07-09 RX ADMIN — Medication 20 MILLIGRAM(S): at 11:20

## 2024-07-09 RX ADMIN — FUROSEMIDE 20 MILLIGRAM(S): 10 INJECTION, SOLUTION INTRAMUSCULAR; INTRAVENOUS at 06:02

## 2024-07-09 RX ADMIN — ENOXAPARIN SODIUM 40 MILLIGRAM(S): 100 INJECTION SUBCUTANEOUS at 17:51

## 2024-07-09 RX ADMIN — Medication 40 MILLIGRAM(S): at 11:57

## 2024-07-09 RX ADMIN — Medication 2 TABLET(S): at 21:57

## 2024-07-09 RX ADMIN — ATORVASTATIN CALCIUM 20 MILLIGRAM(S): 20 TABLET, FILM COATED ORAL at 21:58

## 2024-07-09 RX ADMIN — ENOXAPARIN SODIUM 40 MILLIGRAM(S): 100 INJECTION SUBCUTANEOUS at 06:01

## 2024-07-09 RX ADMIN — Medication 50 MILLIGRAM(S): at 06:02

## 2024-07-09 RX ADMIN — Medication 2 PUFF(S): at 17:51

## 2024-07-09 RX ADMIN — Medication 50 MILLIGRAM(S): at 15:58

## 2024-07-09 RX ADMIN — PREDNISONE 20 MILLIGRAM(S): 10 TABLET ORAL at 06:02

## 2024-07-09 RX ADMIN — IPRATROPIUM BROMIDE AND ALBUTEROL SULFATE 3 MILLILITER(S): .5; 3 SOLUTION RESPIRATORY (INHALATION) at 17:51

## 2024-07-09 RX ADMIN — TEMAZEPAM 15 MILLIGRAM(S): 30 CAPSULE ORAL at 21:56

## 2024-07-10 ENCOUNTER — TRANSCRIPTION ENCOUNTER (OUTPATIENT)
Age: 89
End: 2024-07-10

## 2024-07-10 PROCEDURE — 99232 SBSQ HOSP IP/OBS MODERATE 35: CPT

## 2024-07-10 RX ORDER — TIOTROPIUM BROMIDE 18 UG/1
2 CAPSULE ORAL; RESPIRATORY (INHALATION)
Qty: 30 | Refills: 0 | DISCHARGE

## 2024-07-10 RX ORDER — MUPIROCIN 20 MG/G
1 OINTMENT TOPICAL
Qty: 1 | Refills: 0
Start: 2024-07-10

## 2024-07-10 RX ORDER — ACETAMINOPHEN 325 MG
2 TABLET ORAL
Refills: 0 | DISCHARGE

## 2024-07-10 RX ORDER — PREDNISONE 10 MG/1
1 TABLET ORAL
Qty: 30 | Refills: 0
Start: 2024-07-10 | End: 2024-08-08

## 2024-07-10 RX ORDER — IPRATROPIUM BROMIDE AND ALBUTEROL SULFATE .5; 3 MG/3ML; MG/3ML
3 SOLUTION RESPIRATORY (INHALATION)
Qty: 120 | Refills: 0
Start: 2024-07-10 | End: 2024-08-08

## 2024-07-10 RX ORDER — IPRATROPIUM BROMIDE AND ALBUTEROL SULFATE .5; 3 MG/3ML; MG/3ML
3 SOLUTION RESPIRATORY (INHALATION)
Qty: 360 | Refills: 0
Start: 2024-07-10 | End: 2024-08-08

## 2024-07-10 RX ORDER — POLYETHYLENE GLYCOL 3350 1 G/G
17 POWDER ORAL
Qty: 0 | Refills: 0 | DISCHARGE
Start: 2024-07-10

## 2024-07-10 RX ORDER — ENOXAPARIN SODIUM 100 MG/ML
30 INJECTION SUBCUTANEOUS EVERY 24 HOURS
Refills: 0 | Status: DISCONTINUED | OUTPATIENT
Start: 2024-07-10 | End: 2024-07-11

## 2024-07-10 RX ORDER — TRAZODONE HYDROCHLORIDE 50 MG/1
1 TABLET, FILM COATED ORAL
Refills: 0 | DISCHARGE

## 2024-07-10 RX ORDER — SENNOSIDES 8.6 MG
2 TABLET ORAL
Qty: 0 | Refills: 0 | DISCHARGE
Start: 2024-07-10

## 2024-07-10 RX ORDER — BUDESONIDE/FORMOTEROL FUMARATE 160-4.5MCG
2 HFA AEROSOL WITH ADAPTER (GRAM) INHALATION
Qty: 0 | Refills: 0 | DISCHARGE
Start: 2024-07-10

## 2024-07-10 RX ORDER — IPRATROPIUM BROMIDE AND ALBUTEROL SULFATE .5; 3 MG/3ML; MG/3ML
3 SOLUTION RESPIRATORY (INHALATION)
Qty: 360 | Refills: 0 | DISCHARGE

## 2024-07-10 RX ORDER — IPRATROPIUM BROMIDE AND ALBUTEROL SULFATE .5; 3 MG/3ML; MG/3ML
3 SOLUTION RESPIRATORY (INHALATION) EVERY 6 HOURS
Refills: 0 | Status: DISCONTINUED | OUTPATIENT
Start: 2024-07-10 | End: 2024-07-10

## 2024-07-10 RX ORDER — LOSARTAN POTASSIUM 100 MG/1
25 TABLET, FILM COATED ORAL DAILY
Refills: 0 | Status: DISCONTINUED | OUTPATIENT
Start: 2024-07-11 | End: 2024-07-11

## 2024-07-10 RX ORDER — SODIUM CHLORIDE 0.65 %
2 AEROSOL, SPRAY (ML) NASAL
Qty: 0 | Refills: 0 | DISCHARGE
Start: 2024-07-10

## 2024-07-10 RX ADMIN — Medication 650 MILLIGRAM(S): at 02:00

## 2024-07-10 RX ADMIN — IPRATROPIUM BROMIDE AND ALBUTEROL SULFATE 3 MILLILITER(S): .5; 3 SOLUTION RESPIRATORY (INHALATION) at 16:47

## 2024-07-10 RX ADMIN — MIRTAZAPINE 7.5 MILLIGRAM(S): 15 TABLET, FILM COATED ORAL at 22:36

## 2024-07-10 RX ADMIN — Medication 50 MILLIGRAM(S): at 08:26

## 2024-07-10 RX ADMIN — ATORVASTATIN CALCIUM 20 MILLIGRAM(S): 20 TABLET, FILM COATED ORAL at 22:36

## 2024-07-10 RX ADMIN — ENOXAPARIN SODIUM 40 MILLIGRAM(S): 100 INJECTION SUBCUTANEOUS at 05:30

## 2024-07-10 RX ADMIN — Medication 2 TABLET(S): at 22:36

## 2024-07-10 RX ADMIN — Medication 2 PUFF(S): at 05:31

## 2024-07-10 RX ADMIN — Medication 2 PUFF(S): at 16:47

## 2024-07-10 RX ADMIN — PANTOPRAZOLE SODIUM 40 MILLIGRAM(S): 40 INJECTION, POWDER, FOR SOLUTION INTRAVENOUS at 05:35

## 2024-07-10 RX ADMIN — Medication 2 SPRAY(S): at 22:36

## 2024-07-10 RX ADMIN — Medication 650 MILLIGRAM(S): at 03:00

## 2024-07-10 RX ADMIN — Medication 2 SPRAY(S): at 05:36

## 2024-07-10 RX ADMIN — PREDNISONE 20 MILLIGRAM(S): 10 TABLET ORAL at 05:32

## 2024-07-10 RX ADMIN — Medication 2 SPRAY(S): at 05:35

## 2024-07-10 RX ADMIN — Medication 20 MILLIGRAM(S): at 11:51

## 2024-07-10 RX ADMIN — Medication 1 APPLICATION(S): at 11:53

## 2024-07-10 RX ADMIN — IPRATROPIUM BROMIDE AND ALBUTEROL SULFATE 3 MILLILITER(S): .5; 3 SOLUTION RESPIRATORY (INHALATION) at 11:51

## 2024-07-10 RX ADMIN — MUPIROCIN 1 APPLICATION(S): 20 OINTMENT TOPICAL at 09:13

## 2024-07-10 RX ADMIN — LOSARTAN POTASSIUM 50 MILLIGRAM(S): 100 TABLET, FILM COATED ORAL at 08:26

## 2024-07-10 RX ADMIN — TEMAZEPAM 15 MILLIGRAM(S): 30 CAPSULE ORAL at 22:36

## 2024-07-10 RX ADMIN — FUROSEMIDE 20 MILLIGRAM(S): 10 INJECTION, SOLUTION INTRAMUSCULAR; INTRAVENOUS at 08:26

## 2024-07-10 RX ADMIN — IPRATROPIUM BROMIDE AND ALBUTEROL SULFATE 3 MILLILITER(S): .5; 3 SOLUTION RESPIRATORY (INHALATION) at 05:30

## 2024-07-10 RX ADMIN — Medication 2 SPRAY(S): at 13:03

## 2024-07-10 NOTE — PROVIDER CONTACT NOTE (OTHER) - DATE AND TIME:
10-Jul-2024 18:21
05-Jul-2024 05:28
06-Jul-2024 23:34
06-Jul-2024 05:00
04-Jul-2024 20:28
09-Jul-2024 16:20

## 2024-07-10 NOTE — PROVIDER CONTACT NOTE (OTHER) - ASSESSMENT
VSS on 2L via nasal cannula. Pain rated as 3/10. No other complaints.
pt c/o h/a
Pt denies any discomfort and is asymptomatic. Pt denies any dizziness, lightheadedness, or changes in vision.
VSS on 2L of humidified o2. Pt denies pain , dizziness, SOB. Moderate amount of blood noted on wash cloth and gown.
Pt has bleeding saturating sock. Pressure dressing applied.
VSS on 2L via nasal cannula. Pt c/o pain in her head.

## 2024-07-10 NOTE — PROVIDER CONTACT NOTE (OTHER) - BACKGROUND
Admit dx: arf 2/2 pna  pmh: pyelonephritis, cad, htn, hld, osteoporosis.
Admit dx: arf 2/2 pna  PMH: htn, hld, cad, osteoporosis
Pt has hx of COPD, CAD, HTN, asthma, HF who presented with AHRF 2/2 PNA
Pt has hx of HTN, COPD, HF, who was admitted with SOB and AHRF
Admit dx: arf 2/2 pna  pmh: htn, hld, cad, osteoporosis
acute respiratory failure

## 2024-07-10 NOTE — PROVIDER CONTACT NOTE (OTHER) - REASON
Patient had discharged cancelled by EMS for Blood Pressure of 86/48
Pt c/o headache
Pt c/o headache
Nose Bleed
Pt has bleeding from R foot 3rd digit wound
pt c/o h/a

## 2024-07-10 NOTE — DISCHARGE NOTE NURSING/CASE MANAGEMENT/SOCIAL WORK - NSDCPEFALRISK_GEN_ALL_CORE
For information on Fall & Injury Prevention, visit: [email or web address] OR  [email or web address] OR  https://www.cdc.gov/steadi/patient.html

## 2024-07-10 NOTE — PROGRESS NOTE ADULT - ASSESSMENT
89 y/o F w/COPD, sarcoidosis, chronic R hemidiaphragm paralysis, and recurrent aspiration events admitted for dyspnea and hypoxemia likely secondary to PNA. Symptoms improved. CTA negative for PE.     - Supplemental O2 as needed goal O2 sat 88-94%  - Abx as per ID  - Incentive spirometry, airway clearance  - Can discontinue steroids  - Continue symbicort, duonebs

## 2024-07-10 NOTE — DISCHARGE NOTE NURSING/CASE MANAGEMENT/SOCIAL WORK - PATIENT PORTAL LINK FT
You can access the FollowMyHealth Patient Portal offered by [facility] by registering at the following website: [email or web address]. By joining [facility] FollowMyHealth portal, you will also be able to view your health information using other applications (apps) compatible with our system.

## 2024-07-10 NOTE — PROGRESS NOTE ADULT - NUTRITIONAL ASSESSMENT
This patient has been assessed with a concern for Malnutrition and has been determined to have a diagnosis/diagnoses of Underweight (BMI < 19).    This patient is being managed with:   Diet Soft and Bite Sized-  Kosher  Supplement Feeding Modality:  Oral  Ensure Plus High Protein Cans or Servings Per Day:  1       Frequency:  Daily  Entered: Jul  3 2024 10:41AM  
This patient has been assessed with a concern for Malnutrition and has been determined to have a diagnosis/diagnoses of Underweight (BMI < 19).    This patient is being managed with:   Diet Regular-  Kosher  Supplement Feeding Modality:  Oral  Ensure Plus High Protein Cans or Servings Per Day:  1       Frequency:  Daily  Entered: Jul 10 2024 12:25PM  
This patient has been assessed with a concern for Malnutrition and has been determined to have a diagnosis/diagnoses of Underweight (BMI < 19).    This patient is being managed with:   Diet Soft and Bite Sized-  Kosher  Supplement Feeding Modality:  Oral  Ensure Plus High Protein Cans or Servings Per Day:  1       Frequency:  Daily  Entered: Jul 6 2024  9:18AM  
This patient has been assessed with a concern for Malnutrition and has been determined to have a diagnosis/diagnoses of Underweight (BMI < 19).    This patient is being managed with:   Diet Regular-  Kosher  Supplement Feeding Modality:  Oral  Ensure Plus High Protein Cans or Servings Per Day:  1       Frequency:  Daily  Entered: Jul 5 2024 10:29AM  
This patient has been assessed with a concern for Malnutrition and has been determined to have a diagnosis/diagnoses of Underweight (BMI < 19).    This patient is being managed with:   Diet Soft and Bite Sized-  Kosher  Supplement Feeding Modality:  Oral  Ensure Plus High Protein Cans or Servings Per Day:  1       Frequency:  Daily  Entered: Jul  3 2024 10:41AM  
This patient has been assessed with a concern for Malnutrition and has been determined to have a diagnosis/diagnoses of Underweight (BMI < 19).    This patient is being managed with:   Diet Soft and Bite Sized-  Kosher  Supplement Feeding Modality:  Oral  Ensure Plus High Protein Cans or Servings Per Day:  1       Frequency:  Daily  Entered: Jul 6 2024  9:18AM  

## 2024-07-10 NOTE — PROVIDER CONTACT NOTE (OTHER) - SITUATION
Pt c/o nose bleed.
Pt c/o headache
Pt c/o headache
Pt has bleeding through sock on R foot 3rd digit wound
pt c/o h/a
Pt was scheduled for d/c to [facility]. Upon EMS arrival for transport, pt had blood pressure of 86/48. Blood pressure performed by EMS, RN and NP. Pt is asymptomatic but EMS could not transport patient

## 2024-07-10 NOTE — DISCHARGE NOTE NURSING/CASE MANAGEMENT/SOCIAL WORK - NSDCFUADDAPPT_GEN_ALL_CORE_FT
Podiatry Discharge Instructions:  Follow up: Please follow up with Dr. Myles within 1 week of discharge from the hospital, please call [phone] for appointment and discuss that you recently were seen in the hospital.  Wound Care:  Please apply mupirocin to right digit wound 3x weekly followed by mitulyvn pad.   Weight bearing: Please weight bear as tolerated in a surgical shoe.  Antibiotics: Please continue as instructed.

## 2024-07-10 NOTE — PROVIDER CONTACT NOTE (OTHER) - NAME OF MD/NP/PA/DO NOTIFIED:
OLGA Healy
Emelin Reyes [place]onegro
Genia Marcus NP
NP Sandra Kaur
Genia Marcus, ACP
NP Emelin ReyesMonegro

## 2024-07-11 VITALS — SYSTOLIC BLOOD PRESSURE: 92 MMHG | DIASTOLIC BLOOD PRESSURE: 49 MMHG

## 2024-07-11 RX ORDER — IPRATROPIUM BROMIDE AND ALBUTEROL SULFATE .5; 3 MG/3ML; MG/3ML
3 SOLUTION RESPIRATORY (INHALATION)
Qty: 0 | Refills: 0 | DISCHARGE
Start: 2024-07-11

## 2024-07-11 RX ADMIN — Medication 2 SPRAY(S): at 06:38

## 2024-07-11 RX ADMIN — IPRATROPIUM BROMIDE AND ALBUTEROL SULFATE 3 MILLILITER(S): .5; 3 SOLUTION RESPIRATORY (INHALATION) at 06:38

## 2024-07-11 RX ADMIN — MUPIROCIN 1 APPLICATION(S): 20 OINTMENT TOPICAL at 12:31

## 2024-07-11 RX ADMIN — LOSARTAN POTASSIUM 25 MILLIGRAM(S): 100 TABLET, FILM COATED ORAL at 06:38

## 2024-07-11 RX ADMIN — PANTOPRAZOLE SODIUM 40 MILLIGRAM(S): 40 INJECTION, POWDER, FOR SOLUTION INTRAVENOUS at 06:45

## 2024-07-11 RX ADMIN — IPRATROPIUM BROMIDE AND ALBUTEROL SULFATE 3 MILLILITER(S): .5; 3 SOLUTION RESPIRATORY (INHALATION) at 16:52

## 2024-07-11 RX ADMIN — Medication 1 APPLICATION(S): at 12:26

## 2024-07-11 RX ADMIN — Medication 2 PUFF(S): at 06:37

## 2024-07-11 RX ADMIN — Medication 20 MILLIGRAM(S): at 12:23

## 2024-07-11 RX ADMIN — ENOXAPARIN SODIUM 30 MILLIGRAM(S): 100 INJECTION SUBCUTANEOUS at 06:38

## 2024-07-11 RX ADMIN — Medication 50 MILLIGRAM(S): at 06:38

## 2024-07-11 RX ADMIN — IPRATROPIUM BROMIDE AND ALBUTEROL SULFATE 3 MILLILITER(S): .5; 3 SOLUTION RESPIRATORY (INHALATION) at 12:24

## 2024-07-11 NOTE — CHART NOTE - NSCHARTNOTEFT_GEN_A_CORE
Culture - Urine (07.01.24 @ 10:49)   - Daptomycin: S 0.5  - Gentamicin: S 2 Should not be used as monotherapy  - Linezolid: S 2  - Oxacillin: R >2  - Penicillin: R >8  - Rifampin: I 2 Should not be used as monotherapy  - Tetracycline: S <=1  - Trimethoprim/Sulfamethoxazole: S <=0.5/9.5  - Vancomycin: S 1  Specimen Source: Clean Catch Clean Catch (Midstream)  Culture Results:   50,000 - 99,000 CFU/mL Methicillin Resistant Staphylococcus aureus   50,000 - 99,000 CFU/mL Candida tropicalis "Susceptibilities not performed"  Organism Identification: Methicillin resistant Staphylococcus aureus  Organism: Methicillin resistant Staphylococcus aureus  Method Type: JUANI      Plan per Dr Hermosillo  Vancomycin 750 mg IV now  Call ID in am     Will endorse to primary day team, attending to follow    Genia Marcus NP  [facility] [id number]
DC cancelled as per Dr Hermosillo as the bP was 86/48 when ambulance came for patient . Patient was asymptomatic. DC lasix and losartan decreased to 25 mg daily as per the discussion with Dr Hermosillo. Will follow up in am.  Sandra Kaur NP  Medicine ACP
R foot 3 rd digit dressing was ordered by pod with betadine. Informed pod that the patient is allergic to iodine contrast. Spoke to Dr Nicole  she recommended to change the order to mupirocin.   Sandra Kaur   Medicine ACP
Called by Dr Bowen and asked to order CTA with premed( hx contrast allegy) . VQ scan with possible PE. Pulmonary Dr Doyle made aware.   Also patient with 3 rd digit wound , poss OM as per podiatry and recommended for resection. ID to follow up in regards to antibiotic duration.  Mikel La.   Sandra Kaur NP  Medicine ACP
Seen the patient by Dr Hermosillo today. Asked to DC losartan and resume Lasix, changes made as requested.   Sandra Kaur NP  Medicine ACP

## 2024-07-11 NOTE — PROGRESS NOTE ADULT - PROVIDER SPECIALTY LIST ADULT
Cardiology
Family Medicine
Family Medicine
Infectious Disease
Pulmonology
Cardiology
Cardiology
Family Medicine
Family Medicine
Pulmonology
Cardiology
Family Medicine
Family Medicine
Internal Medicine
Family Medicine
Family Medicine
Infectious Disease
Podiatry

## 2024-07-11 NOTE — PROGRESS NOTE ADULT - SUBJECTIVE AND OBJECTIVE BOX
---___---___---___---___---___---___ ---___---___---___---___---___---___---___---___---                  M E D I C A L   A T T E N D I N G   P R O G R E S S   N O T E  ---___---___---___---___---___---___ ---___---___---___---___---___---___---___---___---        ================================================    ++CHIEF COMPLAINT:   Patient is a 89y old  Female who presents with a chief complaint of sob (07 Jul 2024 11:48)      Acute respiratory failure with hypoxia      ---___---___---___---___---___---  PAST MEDICAL / Surgical  HISTORY:  PAST MEDICAL & SURGICAL HISTORY:  HTN (hypertension)      Hypothyroidism      Osteoporosis      CAD (coronary artery disease)      COPD (chronic obstructive pulmonary disease)      Pulmonary sarcoidosis      H/O pyelonephritis      Acute diverticulitis          ---___---___---___---___---___---  FAMILY HISTORY:   FAMILY HISTORY:        ---___---___---___---___---___---  ALLERGIES:   Allergies    iodinated radiocontrast agents (Anaphylaxis)    Intolerances        ---___---___---___---___---___---  MEDICATIONS:  MEDICATIONS  (STANDING):  albuterol    90 MICROgram(s) HFA Inhaler 2 Puff(s) Inhalation every 8 hours  albuterol/ipratropium for Nebulization 3 milliLiter(s) Nebulizer every 6 hours  atorvastatin 20 milliGRAM(s) Oral at bedtime  budesonide  80 MICROgram(s)/formoterol 4.5 MICROgram(s) Inhaler 2 Puff(s) Inhalation two times a day  chlorhexidine 2% Cloths 1 Application(s) Topical daily  diphenhydrAMINE Injectable 50 milliGRAM(s) IV Push once  enoxaparin Injectable 40 milliGRAM(s) SubCutaneous every 12 hours  FLUoxetine 20 milliGRAM(s) Oral daily  furosemide    Tablet 20 milliGRAM(s) Oral daily  losartan 50 milliGRAM(s) Oral daily  methylPREDNISolone sodium succinate Injectable 40 milliGRAM(s) IV Push once  metoprolol succinate ER 50 milliGRAM(s) Oral daily  mirtazapine 7.5 milliGRAM(s) Oral at bedtime  mupirocin 2% Ointment 1 Application(s) Topical <User Schedule>  pantoprazole    Tablet 40 milliGRAM(s) Oral before breakfast  polyethylene glycol 3350 17 Gram(s) Oral daily  predniSONE   Tablet 20 milliGRAM(s) Oral daily  senna 2 Tablet(s) Oral at bedtime  sodium chloride 0.65% Nasal 2 Spray(s) Both Nostrils three times a day    MEDICATIONS  (PRN):  temazepam 15 milliGRAM(s) Oral at bedtime PRN Insomnia      ---___---___---___---___---___---  REVIEW OF SYSTEM:    GEN: no fever, no chills, no pain  RESP: no SOB, no cough, no sputum  CVS: no chest pain, no palpitations, no edema  GI: no abdominal pain, no nausea, no vomiting, no constipation, no diarrhea  : no dysurea, no frequency, no hematurea  Neuro: no headache, no dizziness  PSYCH: no anxiety, no depression  Derm : no itching, no rash    ---___---___---___---___---___---  VITAL SIGNS:  89y , CAPILLARY BLOOD GLUCOSE        T(C): 36.6 (07-07-24 @ 17:54), Max: 37 (07-07-24 @ 05:16)  HR: 72 (07-07-24 @ 17:54) (69 - 78)  BP: 128/75 (07-07-24 @ 17:54) (128/75 - 156/72)  RR: 18 (07-07-24 @ 17:54) (18 - 18)  SpO2: 90% (07-07-24 @ 17:54) (90% - 100%)  ---___---___---___---___---___---  PHYSICAL EXAM:    GEN: A&O X 3 , NAD , comfortable  HEENT: NCAT, PERRL, MMM, hearing intact  Neck: supple , no JVD  CVS: S1S2 , regular , No M/R/G appreciated  PULM: CTA B/L,  no W/R/R appreciated  ABD.: soft. non tender, non distended,  bowel sounds present  Extrem: intact pulses , no edema   Derm: No rash , no ecchymoses  PSYCH : normal mood,  no delusion not anxious     ---___---___---___---___---___---            LAB AND IMAGING:                          10.6   12.46 )-----------( 184      ( 06 Jul 2024 07:02 )             35.6               07-06    139  |  101  |  36<H>  ----------------------------<  122<H>  4.4   |  25  |  0.69    Ca    8.7      06 Jul 2024 07:02  Mg     2.6     07-06                              Urinalysis Basic - ( 06 Jul 2024 07:02 )    Color: x / Appearance: x / SG: x / pH: x  Gluc: 122 mg/dL / Ketone: x  / Bili: x / Urobili: x   Blood: x / Protein: x / Nitrite: x   Leuk Esterase: x / RBC: x / WBC x   Sq Epi: x / Non Sq Epi: x / Bacteria: x        [All pertinent / recent Imaging reviewed]         ---___---___---___---___---___---___ ---___---___---___---___---                         A S S E S S M E N T   A N D   P L A N :       as per pulmonary   Currently on full AC with Lovenox. V/Q scan "intermediate risk" of PE, but patient's underlying severe centrilobular emphysema and chronic atelectasis d/t R hemidiaphragm paralysis make the study very difficult to make a definitive diagnosis of PE. Patient supposedly had an anaphylactoid reaction to contrast in the past, so if PE truly needs to be ruled out, then would need pre-medication and preferentially use low-osmolality or iso-osmolar contrast media.      pneumonia     - Agree with empiric antibiotics,   - Currently with clear lungs, i.e., no wheezing. Can transition Solu-Medrol 20mg IV TID to prednisone 20mg PO daily  - c/w DuoNEBs Q6h, but would change to PRN. c/w budesonide NEBs Q12h  - Patient prefers nebulizers to inhalers, and does the same at home.  - Continue to encourage OOB to chair, incentive spirometry  - Aspiration precautions  - We will continue to follow the patient with you.  copd exacerbation  as above   id followig  anxiety   continue fluoxetine   ua (+) culture  appreciate id input   htn on toprol       hld on statin                   -GI/DVT Prophylaxis.    --------------------------------------------  Case discussed with   Education given on   ___________________________  Thank you,  Garry Hermosillo  [phone]
      ---___---___---___---___---___---___ ---___---___---___---___---___---___---___---___---                  M E D I C A L   A T T E N D I N G   P R O G R E S S   N O T E  ---___---___---___---___---___---___ ---___---___---___---___---___---___---___---___---        ================================================    ++CHIEF COMPLAINT:   Patient is a 89y old  Female who presents with a chief complaint of sob (10 Jul 2024 13:14)      Acute respiratory failure with hypoxia improved ct scan report read       ---___---___---___---___---___---  PAST MEDICAL / Surgical  HISTORY:  PAST MEDICAL & SURGICAL HISTORY:  HTN (hypertension)      Hypothyroidism      Osteoporosis      CAD (coronary artery disease)      COPD (chronic obstructive pulmonary disease)      Pulmonary sarcoidosis      H/O pyelonephritis      Acute diverticulitis          ---___---___---___---___---___---  FAMILY HISTORY:   FAMILY HISTORY:        ---___---___---___---___---___---  ALLERGIES:   Allergies    iodinated radiocontrast agents (Anaphylaxis)    Intolerances        ---___---___---___---___---___---  MEDICATIONS:  MEDICATIONS  (STANDING):  albuterol    90 MICROgram(s) HFA Inhaler 2 Puff(s) Inhalation every 8 hours  albuterol/ipratropium for Nebulization 3 milliLiter(s) Nebulizer every 6 hours  atorvastatin 20 milliGRAM(s) Oral at bedtime  budesonide  80 MICROgram(s)/formoterol 4.5 MICROgram(s) Inhaler 2 Puff(s) Inhalation two times a day  chlorhexidine 2% Cloths 1 Application(s) Topical daily  enoxaparin Injectable 30 milliGRAM(s) SubCutaneous every 24 hours  FLUoxetine 20 milliGRAM(s) Oral daily  metoprolol succinate ER 50 milliGRAM(s) Oral daily  mirtazapine 7.5 milliGRAM(s) Oral at bedtime  mupirocin 2% Ointment 1 Application(s) Topical <User Schedule>  pantoprazole    Tablet 40 milliGRAM(s) Oral before breakfast  polyethylene glycol 3350 17 Gram(s) Oral daily  senna 2 Tablet(s) Oral at bedtime  sodium chloride 0.65% Nasal 2 Spray(s) Both Nostrils three times a day    MEDICATIONS  (PRN):  temazepam 15 milliGRAM(s) Oral at bedtime PRN Insomnia      ---___---___---___---___---___---  REVIEW OF SYSTEM:    GEN: no fever, no chills, no pain  RESP: no SOB, no cough, no sputum  CVS: no chest pain, no palpitations, no edema  GI: no abdominal pain, no nausea, no vomiting, no constipation, no diarrhea  : no dysurea, no frequency, no hematurea  Neuro: no headache, no dizziness  PSYCH: no anxiety, no depression  Derm : no itching, no rash    ---___---___---___---___---___---  VITAL SIGNS:  89y , CAPILLARY BLOOD GLUCOSE        T(C): 36.6 (07-10-24 @ 17:34), Max: 36.6 (07-10-24 @ 00:49)  HR: 73 (07-10-24 @ 17:34) (69 - 76)  BP: 86/48 (07-10-24 @ 18:07) (86/48 - 107/64)  RR: 18 (07-10-24 @ 17:34) (18 - 20)  SpO2: 98% (07-10-24 @ 17:34) (93% - 98%)  ---___---___---___---___---___---  PHYSICAL EXAM:    GEN: A&O X 3 , NAD , comfortable  HEENT: NCAT, PERRL, MMM, hearing intact  Neck: supple , no JVD  CVS: S1S2 , regular , No M/R/G appreciated  PULM: CTA B/L,  no W/R/R appreciated  ABD.: soft. non tender, non distended,  bowel sounds present  Extrem: intact pulses , no edema   Derm: No rash , no ecchymoses  PSYCH : normal mood,  no delusion not anxious     ---___---___---___---___---___---            LAB AND IMAGING:                                                  [All pertinent / recent Imaging reviewed]         ---___---___---___---___---___---___ ---___---___---___---___---                         A S S E S S M E N T   A N D   P L A N :      HEALTH ISSUES - PROBLEM Dx:  finished abx   off steroids   hold arb and lasix  due to hypotension   copd exacerbation  as above   id followig  anxiety   continue fluoxetine   ua (+) culture  appreciate id input   htn on toprol  and arb  ct scan chest shows negative for PE   can dc the AC                ___________________________  Thank you,  Garry Hermosillo  [phone]
  Follow Up:  pneumonia, OM    Interval History/ROS:  Overnight: No acute events.  Patient remains afebrile.  Otherwise hemodynamically stable.  Latest labs show improved leukocytosis to 12.4, BMP with renal function within normal limits.  Blood cultures from 7/1/2024 is negative.  X-ray of the right foot does not show definitive evidence for acute osteomyelitis.    Patient seen examined at bedside.  Overall reports no new pain or discomfort.    Allergies  iodinated radiocontrast agents (Anaphylaxis)    ANTIMICROBIALS:      OTHER MEDS:  MEDICATIONS  (STANDING):  albuterol    90 MICROgram(s) HFA Inhaler 2 every 8 hours  albuterol/ipratropium for Nebulization 3 every 6 hours  atorvastatin 20 at bedtime  budesonide  80 MICROgram(s)/formoterol 4.5 MICROgram(s) Inhaler 2 two times a day  diphenhydrAMINE Injectable 50 once  enoxaparin Injectable 40 every 12 hours  FLUoxetine 20 daily  furosemide    Tablet 20 daily  losartan 50 daily  methylPREDNISolone sodium succinate Injectable 40 once  metoprolol succinate ER 50 daily  mirtazapine 7.5 at bedtime  pantoprazole    Tablet 40 before breakfast  polyethylene glycol 3350 17 daily  predniSONE   Tablet 20 daily  senna 2 at bedtime  temazepam 15 at bedtime PRN      Vital Signs Last 24 Hrs  T(C): 36.5 (06 Jul 2024 16:06), Max: 36.9 (06 Jul 2024 00:00)  T(F): 97.7 (06 Jul 2024 16:06), Max: 98.5 (06 Jul 2024 00:00)  HR: 78 (06 Jul 2024 16:06) (78 - 85)  BP: 159/80 (06 Jul 2024 16:06) (137/74 - 165/74)  BP(mean): --  RR: 18 (06 Jul 2024 16:06) (18 - 18)  SpO2: 93% (06 Jul 2024 16:06) (93% - 100%)    Parameters below as of 06 Jul 2024 16:06  Patient On (Oxygen Delivery Method): nasal cannula  O2 Flow (L/min): 3      PHYSICAL EXAM:  Eyes:JIM, EOMI  Ear/Nose/Throat: no oral lesion, no sinus tenderness on percussion	  Neck:no JVD, no lymphadenopathy, supple  Respiratory: CTA moises  Cardiovascular: S1S2 RRR, no murmurs  Gastrointestinal:soft, (+) BS, no HSM  Extremities:no e/e/c, R foot with wound, exposed bone 3rd toe                              10.6   12.46 )-----------( 184      ( 06 Jul 2024 07:02 )             35.6       07-06    139  |  101  |  36<H>  ----------------------------<  122<H>  4.4   |  25  |  0.69    Ca    8.7      06 Jul 2024 07:02  Mg     2.6     07-06        Urinalysis Basic - ( 06 Jul 2024 07:02 )    Color: x / Appearance: x / SG: x / pH: x  Gluc: 122 mg/dL / Ketone: x  / Bili: x / Urobili: x   Blood: x / Protein: x / Nitrite: x   Leuk Esterase: x / RBC: x / WBC x   Sq Epi: x / Non Sq Epi: x / Bacteria: x        MICROBIOLOGY:  v                  RADIOLOGY:  Imaging reviewed
Date of Service: 07-08-24 @ 08:59           CARDIOLOGY     PROGRESS  NOTE   ________________________________________________    CHIEF COMPLAINT:Patient is a 89y old  Female who presents with a chief complaint of sob (08 Jul 2024 08:35)    	  REVIEW OF SYSTEMS:  CONSTITUTIONAL: No fever, weight loss, or fatigue  EYES: No eye pain, visual disturbances, or discharge  ENT:  No difficulty hearing, tinnitus, vertigo; No sinus or throat pain  NECK: No pain or stiffness  RESPIRATORY: No cough, wheezing, chills or hemoptysis; No Shortness of Breath  CARDIOVASCULAR: No chest pain, palpitations, passing out, dizziness, or leg swelling  GASTROINTESTINAL: No abdominal or epigastric pain. No nausea, vomiting, or hematemesis; No diarrhea or constipation. No melena or hematochezia.  GENITOURINARY: No dysuria, frequency, hematuria, or incontinence  NEUROLOGICAL: No headaches, memory loss, loss of strength, numbness, or tremors  SKIN: No itching, burning, rashes, or lesions   LYMPH Nodes: No enlarged glands  ENDOCRINE: No heat or cold intolerance; No hair loss  MUSCULOSKELETAL: No joint pain or swelling; No muscle, back, or extremity pain  PSYCHIATRIC: No depression, anxiety, mood swings, or difficulty sleeping  HEME/LYMPH: No easy bruising, or bleeding gums  ALLERGY AND IMMUNOLOGIC: No hives or eczema	    [ ] All others negative	  [ ] Unable to obtain    PHYSICAL EXAM:  T(C): 36.4 (07-08-24 @ 05:00), Max: 36.6 (07-07-24 @ 09:53)  HR: 66 (07-08-24 @ 05:00) (66 - 78)  BP: 158/59 (07-08-24 @ 05:00) (128/75 - 158/59)  RR: 18 (07-08-24 @ 05:00) (18 - 18)  SpO2: 99% (07-08-24 @ 05:00) (90% - 100%)  Wt(kg): --  I&O's Summary    07 Jul 2024 07:01  -  08 Jul 2024 07:00  --------------------------------------------------------  IN: 0 mL / OUT: 400 mL / NET: -400 mL        Appearance: Normal	  HEENT:   Normal oral mucosa, PERRL, EOMI	  Lymphatic: No lymphadenopathy  Cardiovascular: Normal S1 S2, No JVD, No murmurs, No edema  Respiratory: Lungs clear to auscultation	  Psychiatry: A & O x 3, Mood & affect appropriate  Gastrointestinal:  Soft, Non-tender, + BS	  Skin: No rashes, No ecchymoses, No cyanosis	  Neurologic: Non-focal  Extremities: Normal range of motion, No clubbing, cyanosis or edema  Vascular: Peripheral pulses palpable 2+ bilaterally    MEDICATIONS  (STANDING):  albuterol    90 MICROgram(s) HFA Inhaler 2 Puff(s) Inhalation every 8 hours  albuterol/ipratropium for Nebulization 3 milliLiter(s) Nebulizer every 6 hours  atorvastatin 20 milliGRAM(s) Oral at bedtime  budesonide  80 MICROgram(s)/formoterol 4.5 MICROgram(s) Inhaler 2 Puff(s) Inhalation two times a day  chlorhexidine 2% Cloths 1 Application(s) Topical daily  diphenhydrAMINE Injectable 50 milliGRAM(s) IV Push once  enoxaparin Injectable 40 milliGRAM(s) SubCutaneous every 12 hours  FLUoxetine 20 milliGRAM(s) Oral daily  furosemide    Tablet 20 milliGRAM(s) Oral daily  losartan 50 milliGRAM(s) Oral daily  methylPREDNISolone sodium succinate Injectable 40 milliGRAM(s) IV Push once  metoprolol succinate ER 50 milliGRAM(s) Oral daily  mirtazapine 7.5 milliGRAM(s) Oral at bedtime  mupirocin 2% Ointment 1 Application(s) Topical <User Schedule>  pantoprazole    Tablet 40 milliGRAM(s) Oral before breakfast  polyethylene glycol 3350 17 Gram(s) Oral daily  predniSONE   Tablet 20 milliGRAM(s) Oral daily  senna 2 Tablet(s) Oral at bedtime  sodium chloride 0.65% Nasal 2 Spray(s) Both Nostrils three times a day      TELEMETRY: 	    ECG:  	  RADIOLOGY:  OTHER: 	  	  LABS:	 	    CARDIAC MARKERS:      proBNP:   Lipid Profile:   HgA1c:   TSH:         Assessment and plan  ---------------------------   88 yo F with a PMH of COPD, HTN, diverticulitis, asthma/COPD, sarcoidosis, hypothyroidism, heart failure, recent admission of resp failure requiring BIPAP presents from [facility]isted [facility] for acute onset of SOB and difficulty breathing. Per EMS pt was hypoxic on scene, was placed on NRB with improvement in oxygen saturation. Pt denies chest pain. No reported fever, chills.  hypoxia sec to underlying lung disease, VQ scan noted , will consider cta of the chest  pulmonary eval  hx of cad and chf will  adjust meds  ?pneumoniae will start on Zosyn / will discuss with pulmonary  Duoneb  continue all cardiac meds  chest x ray no evidence of chf  awaiting pulmonary evaluation/ cta  add lasix 20 mg daily sec to pulmonary HTN  replete K  pt with sig pulmonary disease, will discuss wiuth pulmonary any role of any meds for her chronic copd and sarcoidosis  ?rheumatology eval  discussed with home care ?compliance to meds , needs to taper steroid slowly or may need chronically  CTA was not done sec to pt contrast allergy, pre medical ad protocol unless pulmonary feels pt does not need   awaiuting confirmation about PE prior to dc pt on AC  awaiting CTA of the chest    	        
[facility] DIVISION OF PULMONARY, CRITICAL CARE and SLEEP MEDICINE  PULMONARY PROGRESS NOTE  OFFICE NUMBER: [phone]    PATIENT INFORMATION:  NAME: BISHNU SEGURA:  MRN: [id number]    CHIEF COMPLAINT: Patient is a 89y old  Female who presents with a chief complaint of sob (05 Jul 2024 16:21)      [x] INITIAL CONSULT, H&P, FAMILY HISTORY and PAST MEDICAL AND SURGICAL HISTORY REVIEWED    OVERNIGHT EVENTS or CHANGES TO HPI: Continuous oxygen requirements, on 2LNC.    # ROS:  CONSTITUTIONAL: no fever / chills  CARDIOVASCULAR: no chest pain, no palpitations  PULMONARY: no dyspnea, no cough  [ x ] REMAINING REVIEW OF SYSTEMS NEGATIVE    # Medications  MEDICATIONS  (STANDING):  albuterol    90 MICROgram(s) HFA Inhaler 2 Puff(s) Inhalation every 8 hours  albuterol/ipratropium for Nebulization 3 milliLiter(s) Nebulizer every 6 hours  atorvastatin 20 milliGRAM(s) Oral at bedtime  budesonide  80 MICROgram(s)/formoterol 4.5 MICROgram(s) Inhaler 2 Puff(s) Inhalation two times a day  chlorhexidine 2% Cloths 1 Application(s) Topical daily  enoxaparin Injectable 40 milliGRAM(s) SubCutaneous every 12 hours  FLUoxetine 20 milliGRAM(s) Oral daily  furosemide    Tablet 20 milliGRAM(s) Oral daily  losartan 50 milliGRAM(s) Oral daily  metoprolol succinate ER 50 milliGRAM(s) Oral daily  mirtazapine 7.5 milliGRAM(s) Oral at bedtime  mupirocin 2% Ointment 1 Application(s) Topical <User Schedule>  oxymetazoline 0.05% Nasal Spray 1 Spray(s) Both Nostrils two times a day  pantoprazole    Tablet 40 milliGRAM(s) Oral before breakfast  piperacillin/tazobactam IVPB.. 3.375 Gram(s) IV Intermittent every 8 hours  polyethylene glycol 3350 17 Gram(s) Oral daily  senna 2 Tablet(s) Oral at bedtime  sodium chloride 0.65% Nasal 2 Spray(s) Both Nostrils three times a day      MEDICATIONS  (PRN):  temazepam 15 milliGRAM(s) Oral at bedtime PRN Insomnia      ## O/E:  ICU Vital Signs Last 24 Hrs  T(C): 36.4 (05 Jul 2024 15:55), Max: 37.1 (05 Jul 2024 05:30)  T(F): 97.6 (05 Jul 2024 15:55), Max: 98.7 (05 Jul 2024 05:30)  HR: 74 (05 Jul 2024 15:55) (65 - 88)  BP: 136/69 (05 Jul 2024 15:55) (136/69 - 153/84)  BP(mean): --  ABP: --  ABP(mean): --  RR: 18 (05 Jul 2024 15:55) (18 - 18)  SpO2: 97% (05 Jul 2024 08:45) (97% - 99%)    O2 Parameters below as of 05 Jul 2024 15:55  Patient On (Oxygen Delivery Method): nasal cannula  O2 Flow (L/min): 2        I&O's Summary    04 Jul 2024 07:01  -  05 Jul 2024 07:00  --------------------------------------------------------  IN: 670 mL / OUT: 0 mL / NET: 670 mL        Gen: OOB to chair in no apparent distress  HEENT: PERRL, EOMI  Resp: coarse BS B/L with some inspiratory rhonchi  CVS: S1S2 no m/r/g  Abd: soft NT/ND +BS  Ext: no c/c/e  Neuro: A&Ox3    # Laboratory                        10.3   11.74 )-----------( 198      ( 04 Jul 2024 10:47 )             32.7         07-04    139  |  94<L>  |  30<H>  ----------------------------<  189<H>  3.3<L>   |  30  |  0.72    Ca    8.4      04 Jul 2024 10:47        ### BUN / Creatinine:  07-04 @ 10:47: 30 mg/dL / 0.72 mg/dL  07-02 @ 09:13: 30 mg/dL / 0.70 mg/dL  07-01 @ 10:19: 26 mg/dL / 0.74 mg/dL    [ x ] RADIOLOGY REVIEWED AND INTERPRETED BY ME    Thank you for allowing us to participate in the case of this patient.    
      ---___---___---___---___---___---___ ---___---___---___---___---___---___---___---___---                  M E D I C A L   A T T E N D I N G   P R O G R E S S   N O T E  ---___---___---___---___---___---___ ---___---___---___---___---___---___---___---___---        ================================================    ++CHIEF COMPLAINT:   Patient is a 89y old  Female who presents with a chief complaint of sob (02 Jul 2024 13:29)      Acute respiratory failure with hypoxia      ---___---___---___---___---___---  PAST MEDICAL / Surgical  HISTORY:  PAST MEDICAL & SURGICAL HISTORY:  HTN (hypertension)      Hypothyroidism      Osteoporosis      CAD (coronary artery disease)      COPD (chronic obstructive pulmonary disease)      Pulmonary sarcoidosis      H/O pyelonephritis      Acute diverticulitis          ---___---___---___---___---___---  FAMILY HISTORY:   FAMILY HISTORY:        ---___---___---___---___---___---  ALLERGIES:   Allergies    iodinated radiocontrast agents (Anaphylaxis)    Intolerances        ---___---___---___---___---___---  MEDICATIONS:  MEDICATIONS  (STANDING):  albuterol    90 MICROgram(s) HFA Inhaler 2 Puff(s) Inhalation every 8 hours  albuterol/ipratropium for Nebulization 3 milliLiter(s) Nebulizer every 6 hours  atorvastatin 20 milliGRAM(s) Oral at bedtime  budesonide  80 MICROgram(s)/formoterol 4.5 MICROgram(s) Inhaler 2 Puff(s) Inhalation two times a day  chlorhexidine 2% Cloths 1 Application(s) Topical daily  enoxaparin Injectable 40 milliGRAM(s) SubCutaneous every 12 hours  FLUoxetine 20 milliGRAM(s) Oral daily  furosemide    Tablet 20 milliGRAM(s) Oral daily  losartan 50 milliGRAM(s) Oral daily  methylPREDNISolone sodium succinate Injectable 20 milliGRAM(s) IV Push three times a day  metoprolol succinate ER 50 milliGRAM(s) Oral daily  mirtazapine 7.5 milliGRAM(s) Oral at bedtime  pantoprazole    Tablet 40 milliGRAM(s) Oral before breakfast  piperacillin/tazobactam IVPB.. 3.375 Gram(s) IV Intermittent every 8 hours  polyethylene glycol 3350 17 Gram(s) Oral daily  senna 2 Tablet(s) Oral at bedtime    MEDICATIONS  (PRN):  temazepam 15 milliGRAM(s) Oral at bedtime PRN Insomnia      ---___---___---___---___---___---  REVIEW OF SYSTEM:    GEN: no fever, no chills, no pain  RESP: no SOB, no cough, no sputum  CVS: no chest pain, no palpitations, no edema  GI: no abdominal pain, no nausea, no vomiting, no constipation, no diarrhea  : no dysurea, no frequency, no hematurea  Neuro: no headache, no dizziness  PSYCH: no anxiety, no depression  Derm : no itching, no rash    ---___---___---___---___---___---  VITAL SIGNS:  89y , CAPILLARY BLOOD GLUCOSE        T(C): 36.3 (07-02-24 @ 15:45), Max: 37 (07-01-24 @ 20:37)  HR: 76 (07-02-24 @ 15:45) (70 - 81)  BP: 99/56 (07-02-24 @ 15:45) (99/56 - 148/95)  RR: 16 (07-02-24 @ 15:45) (16 - 20)  SpO2: 95% (07-02-24 @ 15:45) (95% - 98%)  ---___---___---___---___---___---  PHYSICAL EXAM:    GEN: A&O X 3 , NAD , comfortable  HEENT: NCAT, PERRL, MMM, hearing intact  Neck: supple , no JVD  CVS: S1S2 , regular , No M/R/G appreciated  PULM: CTA B/L,  no W/R/R appreciated  ABD.: soft. non tender, non distended,  bowel sounds present  Extrem: intact pulses , no edema   Derm: No rash , no ecchymoses  PSYCH : normal mood,  no delusion not anxious     ---___---___---___---___---___---            LAB AND IMAGING:                          10.6   17.50 )-----------( 218      ( 02 Jul 2024 09:13 )             34.7               07-02    141  |  98  |  30<H>  ----------------------------<  195<H>  3.5   |  27  |  0.70    Ca    8.9      02 Jul 2024 09:13  Mg     2.2     07-01    TPro  6.8  /  Alb  3.9  /  TBili  0.3  /  DBili  x   /  AST  21  /  ALT  26  /  AlkPhos  111  07-01    PT/INR - ( 01 Jul 2024 10:19 )   PT: 10.6 sec;   INR: 0.96 ratio         PTT - ( 01 Jul 2024 10:19 )  PTT:28.9 sec                        Urinalysis Basic - ( 02 Jul 2024 09:13 )    Color: x / Appearance: x / SG: x / pH: x  Gluc: 195 mg/dL / Ketone: x  / Bili: x / Urobili: x   Blood: x / Protein: x / Nitrite: x   Leuk Esterase: x / RBC: x / WBC x   Sq Epi: x / Non Sq Epi: x / Bacteria: x        [All pertinent / recent Imaging reviewed]         ---___---___---___---___---___---___ ---___---___---___---___---                         A S S E S S M E N T   A N D   P L A N :      HEALTH ISSUES - PROBLEM Dx:     h/o  emphysema, ,  c/c  L  hydroureter,  HTN.  depression   prior    h/o   C. difficile, diverticulitis,   pna        asthma/COPD, sarcoidosis,   c/c  diastolic  heart failure /  ulcer  left 2nd  toe /  no  osteo on prior  visit     prior   chart/  from psych  note,   faxed medication records from [facility], ,  mirtazapine 7.5mg po qhs, fluoxetine 20mg po daily, and temazepam 30mg po qHs for insomnia.    admitted  with    sob.  arrives   from [facility]       wbc  of  21,000  on  arrival,  is  afebrile.  from  uti,/  probable  pna,           on  iv  rocephin/  iv steroid /  nebs           elevated  d  dimer,   await  ct  chest/  r/o pna //  has   severe  contrast allergy   v/q  scan. ordered     h/o  underlying chronic lung disease./  emphysema      has   superficial     ulcerations, on  bony prominences  of hammer  toes.  not  infected    HTN/  HLD    c/c  diastolic  chf, on lasix/ card   known to  pt    Anxiety,  seen by psych  on  priro  visit, on  paxil,  remeron./   temazapam.   prn  dose  lowered.  pt  frail, bmi  18      on lipitor, lasix. cozaar, toprol     dvt  ppx   id and pulm following            ___________________________  Thank you,  Garry Hermosillo  [phone]
      ---___---___---___---___---___---___ ---___---___---___---___---___---___---___---___---                  M E D I C A L   A T T E N D I N G   P R O G R E S S   N O T E  ---___---___---___---___---___---___ ---___---___---___---___---___---___---___---___---        ================================================    ++CHIEF COMPLAINT:   Patient is a 89y old  Female who presents with a chief complaint of sob (03 Jul 2024 13:27)      Acute respiratory failure with hypoxia      ---___---___---___---___---___---  PAST MEDICAL / Surgical  HISTORY:  PAST MEDICAL & SURGICAL HISTORY:  HTN (hypertension)      Hypothyroidism      Osteoporosis      CAD (coronary artery disease)      COPD (chronic obstructive pulmonary disease)      Pulmonary sarcoidosis      H/O pyelonephritis      Acute diverticulitis          ---___---___---___---___---___---  FAMILY HISTORY:   FAMILY HISTORY:        ---___---___---___---___---___---  ALLERGIES:   Allergies    iodinated radiocontrast agents (Anaphylaxis)    Intolerances        ---___---___---___---___---___---  MEDICATIONS:  MEDICATIONS  (STANDING):  albuterol    90 MICROgram(s) HFA Inhaler 2 Puff(s) Inhalation every 8 hours  albuterol/ipratropium for Nebulization 3 milliLiter(s) Nebulizer every 6 hours  atorvastatin 20 milliGRAM(s) Oral at bedtime  budesonide  80 MICROgram(s)/formoterol 4.5 MICROgram(s) Inhaler 2 Puff(s) Inhalation two times a day  chlorhexidine 2% Cloths 1 Application(s) Topical daily  enoxaparin Injectable 40 milliGRAM(s) SubCutaneous every 12 hours  FLUoxetine 20 milliGRAM(s) Oral daily  furosemide    Tablet 20 milliGRAM(s) Oral daily  losartan 50 milliGRAM(s) Oral daily  methylPREDNISolone sodium succinate Injectable 20 milliGRAM(s) IV Push three times a day  metoprolol succinate ER 50 milliGRAM(s) Oral daily  mirtazapine 7.5 milliGRAM(s) Oral at bedtime  mupirocin 2% Ointment 1 Application(s) Topical <User Schedule>  pantoprazole    Tablet 40 milliGRAM(s) Oral before breakfast  piperacillin/tazobactam IVPB.. 3.375 Gram(s) IV Intermittent every 8 hours  polyethylene glycol 3350 17 Gram(s) Oral daily  senna 2 Tablet(s) Oral at bedtime    MEDICATIONS  (PRN):  temazepam 15 milliGRAM(s) Oral at bedtime PRN Insomnia      ---___---___---___---___---___---  REVIEW OF SYSTEM:    GEN: no fever, no chills, no pain  RESP: no SOB, no cough, no sputum  CVS: no chest pain, no palpitations, no edema  GI: no abdominal pain, no nausea, no vomiting, no constipation, no diarrhea  : no dysurea, no frequency, no hematurea  Neuro: no headache, no dizziness  PSYCH: no anxiety, no depression  Derm : no itching, no rash    ---___---___---___---___---___---  VITAL SIGNS:  89y , CAPILLARY BLOOD GLUCOSE        T(C): 36.3 (07-03-24 @ 19:34), Max: 36.9 (07-02-24 @ 23:39)  HR: 75 (07-03-24 @ 19:34) (70 - 76)  BP: 101/59 (07-03-24 @ 19:34) (101/59 - 153/86)  RR: 18 (07-03-24 @ 19:34) (18 - 18)  SpO2: 97% (07-03-24 @ 19:34) (96% - 97%)  ---___---___---___---___---___---  PHYSICAL EXAM:    GEN: A&O X 3 , NAD , comfortable  HEENT: NCAT, PERRL, MMM, hearing intact  Neck: supple , no JVD  CVS: S1S2 , regular , No M/R/G appreciated  PULM: CTA B/L,  no W/R/R appreciated  ABD.: soft. non tender, non distended,  bowel sounds present  Extrem: intact pulses , no edema   Derm: No rash , no ecchymoses  PSYCH : normal mood,  no delusion not anxious     ---___---___---___---___---___---            LAB AND IMAGING:                          10.0   18.64 )-----------( 197      ( 03 Jul 2024 07:07 )             33.4               07-02    141  |  98  |  30<H>  ----------------------------<  195<H>  3.5   |  27  |  0.70    Ca    8.9      02 Jul 2024 09:13                              Urinalysis Basic - ( 02 Jul 2024 09:13 )    Color: x / Appearance: x / SG: x / pH: x  Gluc: 195 mg/dL / Ketone: x  / Bili: x / Urobili: x   Blood: x / Protein: x / Nitrite: x   Leuk Esterase: x / RBC: x / WBC x   Sq Epi: x / Non Sq Epi: x / Bacteria: x        [All pertinent / recent Imaging reviewed]         ---___---___---___---___---___---___ ---___---___---___---___---                         A S S E S S M E N T   A N D   P L A N :      HEALTH ISSUES - PROBLEM Dx:    pneumonia as per pulmonary   89F PMH COPD, sarcoidosis, chronic R hemidiaphragm paralysis, H/O recurrent aspiration events p/w dyspnea and hypoxemia  - Patient currently doing well on 2.5LNC (spO2 90-92%), largely asymptomatic  - Continue to titrate FiO2 as tolerated to maintain spO2 >90%. Patient may need oxygen on discharge, and needs to be evaluated by checking saturation at rest and/or exertion.  - Agree with empiric antibiotics, cultures pending.  - Currently with clear lungs, i.e., no wheezing. Can transition Solu-Medrol 20mg IV TID to prednisone 20mg PO daily  - c/w DuoNEBs Q6h, but would change to PRN. c/w budesonide NEBs Q12h  - Patient prefers nebulizers to inhalers, and does the same at home.  - Continue to encourage OOB to chair, incentive spirometry  - Aspiration precautions  - We will continue to follow the patient with you.  copd exacerbation  as above   id followig  anxiety   continue fluoxetine     htn on toprol       hld on statin       -GI/DVT Prophylaxis. as ordered    --------------------------------------------  Case discussed with   Education given on   ___________________________  Thank you,  Garry Hermosillo  [phone]
  Follow Up:  leukocytosis    Interval History/ROS:  Overnight: No acute events.  Patient remains afebrile.  Otherwise hemodynamically stable.  Latest labs show improved leukocytosis to 11.7, anemia 10.8/32.7.  BMP with renal function within normal limits.  Urine culture obtained 7/1/2024 with MRSA and Candida tropicalis.  Blood cultures obtained 7/1/2024 are negative.    Patient seen examined at bedside.  Denies any new pain or discomfort.  Allergies  iodinated radiocontrast agents (Anaphylaxis)        ANTIMICROBIALS:  piperacillin/tazobactam IVPB.. 3.375 every 8 hours      OTHER MEDS:  MEDICATIONS  (STANDING):  albuterol    90 MICROgram(s) HFA Inhaler 2 every 8 hours  albuterol/ipratropium for Nebulization 3 every 6 hours  atorvastatin 20 at bedtime  budesonide  80 MICROgram(s)/formoterol 4.5 MICROgram(s) Inhaler 2 two times a day  enoxaparin Injectable 40 every 12 hours  FLUoxetine 20 daily  furosemide    Tablet 20 daily  losartan 50 daily  methylPREDNISolone sodium succinate Injectable 20 three times a day  metoprolol succinate ER 50 daily  mirtazapine 7.5 at bedtime  pantoprazole    Tablet 40 before breakfast  polyethylene glycol 3350 17 daily  senna 2 at bedtime  temazepam 15 at bedtime PRN      Vital Signs Last 24 Hrs  T(C): 36.3 (05 Jul 2024 08:45), Max: 37.1 (05 Jul 2024 05:30)  T(F): 97.4 (05 Jul 2024 08:45), Max: 98.7 (05 Jul 2024 05:30)  HR: 88 (05 Jul 2024 08:45) (65 - 88)  BP: 153/74 (05 Jul 2024 08:45) (147/73 - 161/74)  BP(mean): --  RR: 18 (05 Jul 2024 08:45) (18 - 19)  SpO2: 97% (05 Jul 2024 08:45) (97% - 99%)    Parameters below as of 05 Jul 2024 08:45  Patient On (Oxygen Delivery Method): nasal cannula  O2 Flow (L/min): 2      PHYSICAL EXAM:  Eyes:JIM, EOMI  Ear/Nose/Throat: no oral lesion, no sinus tenderness on percussion	  Neck:no JVD, no lymphadenopathy, supple  Respiratory: CTA moises  Cardiovascular: S1S2 RRR, no murmurs  Gastrointestinal:soft, (+) BS, no HSM  Extremities:no e/e/c                            10.3   11.74 )-----------( 198      ( 04 Jul 2024 10:47 )             32.7       07-04    139  |  94<L>  |  30<H>  ----------------------------<  189<H>  3.3<L>   |  30  |  0.72    Ca    8.4      04 Jul 2024 10:47        Urinalysis Basic - ( 04 Jul 2024 10:47 )    Color: x / Appearance: x / SG: x / pH: x  Gluc: 189 mg/dL / Ketone: x  / Bili: x / Urobili: x   Blood: x / Protein: x / Nitrite: x   Leuk Esterase: x / RBC: x / WBC x   Sq Epi: x / Non Sq Epi: x / Bacteria: x        MICROBIOLOGY:  v    Culture - Urine (collected 01 Jul 2024 10:49)  Source: Clean Catch Clean Catch (Midstream)  Final Report (04 Jul 2024 18:32):    50,000 - 99,000 CFU/mL Methicillin Resistant Staphylococcus aureus    50,000 - 99,000 CFU/mL Candida tropicalis "Susceptibilities not performed"  Organism: Methicillin resistant Staphylococcus aureus (04 Jul 2024 18:32)  Organism: Methicillin resistant Staphylococcus aureus (04 Jul 2024 18:32)      -  Oxacillin: R >2      -  Gentamicin: S 2 Should not be used as monotherapy      -  Daptomycin: S 0.5      -  Linezolid: S 2      -  Vancomycin: S 1      -  Tetracycline: S <=1      Method Type: JUANI      -  Penicillin: R >8      -  Rifampin: I 2 Should not be used as monotherapy      -  Trimethoprim/Sulfamethoxazole: S <=0.5/9.5    Culture - Blood (collected 01 Jul 2024 10:05)  Source: .Blood Blood  Preliminary Report (04 Jul 2024 15:01):    No growth at 72 Hours    Culture - Blood (collected 01 Jul 2024 10:05)  Source: .Blood Blood  Preliminary Report (04 Jul 2024 15:01):    No growth at 72 Hours                    RADIOLOGY:  Imaging reviewed
  date of service: 07-06-24 @ 10:04  afebrile  REVIEW OF SYSTEMS:  CONSTITUTIONAL: No fever,  no  weight loss  ENT:  No  tinnitus,   no   vertigo  NECK: No pain or stiffness  RESPIRATORY: No cough, wheezing, chills or hemoptysis;    No Shortness of Breath  CARDIOVASCULAR: No chest pain, palpitations, dizziness  GASTROINTESTINAL: No abdominal or epigastric pain. No nausea, vomiting, or hematemesis; No diarrhea  No melena or hematochezia.  GENITOURINARY: No dysuria, frequency, hematuria, or incontinence  NEUROLOGICAL: No headaches  SKIN: No itching,  no   rash  LYMPH Nodes: No enlarged glands  ENDOCRINE: No heat or cold intolerance  MUSCULOSKELETAL: No joint pain or swelling  PSYCHIATRIC: No depression, anxiety  HEME/LYMPH: No easy bruising, or bleeding gums  ALLERGY AND IMMUNOLOGIC: No hives or eczema	    MEDICATIONS  (STANDING):  albuterol    90 MICROgram(s) HFA Inhaler 2 Puff(s) Inhalation every 8 hours  albuterol/ipratropium for Nebulization 3 milliLiter(s) Nebulizer every 6 hours  atorvastatin 20 milliGRAM(s) Oral at bedtime  budesonide  80 MICROgram(s)/formoterol 4.5 MICROgram(s) Inhaler 2 Puff(s) Inhalation two times a day  chlorhexidine 2% Cloths 1 Application(s) Topical daily  enoxaparin Injectable 40 milliGRAM(s) SubCutaneous every 12 hours  FLUoxetine 20 milliGRAM(s) Oral daily  furosemide    Tablet 20 milliGRAM(s) Oral daily  losartan 50 milliGRAM(s) Oral daily  metoprolol succinate ER 50 milliGRAM(s) Oral daily  mirtazapine 7.5 milliGRAM(s) Oral at bedtime  mupirocin 2% Ointment 1 Application(s) Topical <User Schedule>  oxymetazoline 0.05% Nasal Spray 1 Spray(s) Both Nostrils two times a day  pantoprazole    Tablet 40 milliGRAM(s) Oral before breakfast  polyethylene glycol 3350 17 Gram(s) Oral daily  predniSONE   Tablet 20 milliGRAM(s) Oral daily  senna 2 Tablet(s) Oral at bedtime  sodium chloride 0.65% Nasal 2 Spray(s) Both Nostrils three times a day    MEDICATIONS  (PRN):  temazepam 15 milliGRAM(s) Oral at bedtime PRN Insomnia      Vital Signs Last 24 Hrs  T(C): 36.4 (06 Jul 2024 09:04), Max: 36.9 (06 Jul 2024 00:00)  T(F): 97.6 (06 Jul 2024 09:04), Max: 98.5 (06 Jul 2024 00:00)  HR: 78 (06 Jul 2024 09:04) (74 - 85)  BP: 165/74 (06 Jul 2024 09:04) (136/69 - 165/74)  BP(mean): --  RR: 18 (06 Jul 2024 09:04) (18 - 18)  SpO2: 95% (06 Jul 2024 09:04) (95% - 100%)    Parameters below as of 06 Jul 2024 09:04  Patient On (Oxygen Delivery Method): nasal cannula  O2 Flow (L/min): 3    CAPILLARY BLOOD GLUCOSE        I&O's Summary    05 Jul 2024 07:01  -  06 Jul 2024 07:00  --------------------------------------------------------  IN: 100 mL / OUT: 0 mL / NET: 100 mL          Appearance: Normal	  HEENT:   Normal oral mucosa, PERRL, EOMI	  Lymphatic: No lymphadenopathy  Cardiovascular: Normal S1 S2, No JVD  Respiratory: Lungs clear to auscultation	  Gastrointestinal:  Soft, Non-tender, + BS	  Skin: No rash, No ecchymoses	  Extremities:   superficial toe  wounds. on  bony prominence  LABS:                        10.6   12.46 )-----------( 184      ( 06 Jul 2024 07:02 )             35.6     07-06    139  |  101  |  36<H>  ----------------------------<  122<H>  4.4   |  25  |  0.69    Ca    8.7      06 Jul 2024 07:02  Mg     2.6     07-06            Urinalysis Basic - ( 06 Jul 2024 07:02 )    Color: x / Appearance: x / SG: x / pH: x  Gluc: 122 mg/dL / Ketone: x  / Bili: x / Urobili: x   Blood: x / Protein: x / Nitrite: x   Leuk Esterase: x / RBC: x / WBC x   Sq Epi: x / Non Sq Epi: x / Bacteria: x                      Consultant(s) Notes Reviewed:      Care Discussed with Consultants/Other Providers:    
Date of Service: 07-03-24 @ 09:16           CARDIOLOGY     PROGRESS  NOTE   ________________________________________________    CHIEF COMPLAINT:Patient is a 89y old  Female who presents with a chief complaint of sob (02 Jul 2024 17:30)  no complain  	  REVIEW OF SYSTEMS:  CONSTITUTIONAL: No fever, weight loss, or fatigue  EYES: No eye pain, visual disturbances, or discharge  ENT:  No difficulty hearing, tinnitus, vertigo; No sinus or throat pain  NECK: No pain or stiffness  RESPIRATORY: No cough, wheezing, chills or hemoptysis; No Shortness of Breath  CARDIOVASCULAR: No chest pain, palpitations, passing out, dizziness, or leg swelling  GASTROINTESTINAL: No abdominal or epigastric pain. No nausea, vomiting, or hematemesis; No diarrhea or constipation. No melena or hematochezia.  GENITOURINARY: No dysuria, frequency, hematuria, or incontinence  NEUROLOGICAL: No headaches, memory loss, loss of strength, numbness, or tremors  SKIN: No itching, burning, rashes, or lesions   LYMPH Nodes: No enlarged glands  ENDOCRINE: No heat or cold intolerance; No hair loss  MUSCULOSKELETAL: No joint pain or swelling; No muscle, back, or extremity pain  PSYCHIATRIC: No depression, anxiety, mood swings, or difficulty sleeping  HEME/LYMPH: No easy bruising, or bleeding gums  ALLERGY AND IMMUNOLOGIC: No hives or eczema	    [ ] All others negative	  [ ] Unable to obtain    PHYSICAL EXAM:  T(C): 36.9 (07-02-24 @ 23:39), Max: 36.9 (07-02-24 @ 23:39)  HR: 76 (07-03-24 @ 05:05) (70 - 76)  BP: 153/86 (07-03-24 @ 05:05) (99/56 - 153/86)  RR: 18 (07-02-24 @ 23:39) (16 - 18)  SpO2: 97% (07-02-24 @ 23:39) (95% - 97%)  Wt(kg): --  I&O's Summary    02 Jul 2024 07:01  -  03 Jul 2024 07:00  --------------------------------------------------------  IN: 100 mL / OUT: 0 mL / NET: 100 mL        Appearance: dementia  HEENT:   Normal oral mucosa, PERRL, EOMI	  Lymphatic: No lymphadenopathy  Cardiovascular: Normal S1 S2, No JVD, + murmurs, No edema  Respiratory: rhonchi  Gastrointestinal:  Soft, Non-tender, + BS	  Skin: No rashes, No ecchymoses, No cyanosis	  Neurologic: Non-focal  Extremities: Normal range of motion, No clubbing, cyanosis or edema  Vascular: Peripheral pulses palpable 2+ bilaterally    MEDICATIONS  (STANDING):  albuterol    90 MICROgram(s) HFA Inhaler 2 Puff(s) Inhalation every 8 hours  albuterol/ipratropium for Nebulization 3 milliLiter(s) Nebulizer every 6 hours  atorvastatin 20 milliGRAM(s) Oral at bedtime  budesonide  80 MICROgram(s)/formoterol 4.5 MICROgram(s) Inhaler 2 Puff(s) Inhalation two times a day  chlorhexidine 2% Cloths 1 Application(s) Topical daily  enoxaparin Injectable 40 milliGRAM(s) SubCutaneous every 12 hours  FLUoxetine 20 milliGRAM(s) Oral daily  furosemide    Tablet 20 milliGRAM(s) Oral daily  losartan 50 milliGRAM(s) Oral daily  methylPREDNISolone sodium succinate Injectable 20 milliGRAM(s) IV Push three times a day  metoprolol succinate ER 50 milliGRAM(s) Oral daily  mirtazapine 7.5 milliGRAM(s) Oral at bedtime  pantoprazole    Tablet 40 milliGRAM(s) Oral before breakfast  piperacillin/tazobactam IVPB.. 3.375 Gram(s) IV Intermittent every 8 hours  polyethylene glycol 3350 17 Gram(s) Oral daily  senna 2 Tablet(s) Oral at bedtime      TELEMETRY: 	    ECG:  	  RADIOLOGY:  OTHER: 	  	  LABS:	 	    CARDIAC MARKERS:                                10.0   18.64 )-----------( 197      ( 03 Jul 2024 07:07 )             33.4     07-02    141  |  98  |  30<H>  ----------------------------<  195<H>  3.5   |  27  |  0.70    Ca    8.9      02 Jul 2024 09:13  Mg     2.2     07-01    TPro  6.8  /  Alb  3.9  /  TBili  0.3  /  DBili  x   /  AST  21  /  ALT  26  /  AlkPhos  111  07-01    proBNP:   Lipid Profile:   HgA1c:   TSH:   PT/INR - ( 01 Jul 2024 10:19 )   PT: 10.6 sec;   INR: 0.96 ratio         PTT - ( 01 Jul 2024 10:19 )  PTT:28.9 sec  < from: CT Angio Chest PE Protocol w/ IV Cont (09.26.23 @ 08:32) >  No pulmonary embolism.    Dilated pulmonary artery, suggestive of pulmonary hypertension.    Elevated right hemidiaphragm, also present on earliest available prior,         Assessment and plan  ---------------------------   88 yo F with a PMH of COPD, HTN, diverticulitis, asthma/COPD, sarcoidosis, hypothyroidism, heart failure, recent admission of resp failure requiring BIPAP presents from [facility]isted [facility] for acute onset of SOB and difficulty breathing. Per EMS pt was hypoxic on scene, was placed on NRB with improvement in oxygen saturation. Pt denies chest pain. No reported fever, chills.  hypoxia sec to underlying lung disease, VQ scan noted , will consider cta of the chest  pulmonary eval  hx of cad and chf will  adjust meds  ?pneumoniae will start on Zosyn / will discuss with pulmonary  Duoneb  continue all cardiac meds  chest x ray no evidence of chf  awaiting pulmonary evaluation/ cta  add lasix 20 mg daily sec to pulmonary HTN        	        
Date of Service: 07-05-24 @ 09:19           CARDIOLOGY     PROGRESS  NOTE   ________________________________________________    CHIEF COMPLAINT:Patient is a 89y old  Female who presents with a chief complaint of sob (04 Jul 2024 21:17)  no complain doing better  	  REVIEW OF SYSTEMS:  CONSTITUTIONAL: No fever, weight loss, or fatigue  EYES: No eye pain, visual disturbances, or discharge  ENT:  No difficulty hearing, tinnitus, vertigo; No sinus or throat pain  NECK: No pain or stiffness  RESPIRATORY: No cough, wheezing, chills or hemoptysis; No Shortness of Breath  CARDIOVASCULAR: No chest pain, palpitations, passing out, dizziness, or leg swelling  GASTROINTESTINAL: No abdominal or epigastric pain. No nausea, vomiting, or hematemesis; No diarrhea or constipation. No melena or hematochezia.  GENITOURINARY: No dysuria, frequency, hematuria, or incontinence  NEUROLOGICAL: No headaches, memory loss, loss of strength, numbness, or tremors  SKIN: No itching, burning, rashes, or lesions   LYMPH Nodes: No enlarged glands  ENDOCRINE: No heat or cold intolerance; No hair loss  MUSCULOSKELETAL: No joint pain or swelling; No muscle, back, or extremity pain  PSYCHIATRIC: No depression, anxiety, mood swings, or difficulty sleeping  HEME/LYMPH: No easy bruising, or bleeding gums  ALLERGY AND IMMUNOLOGIC: No hives or eczema	    [x ] All others negative	  [ ] Unable to obtain    PHYSICAL EXAM:  T(C): 36.3 (07-05-24 @ 08:45), Max: 37.1 (07-05-24 @ 05:30)  HR: 88 (07-05-24 @ 08:45) (65 - 88)  BP: 153/74 (07-05-24 @ 08:45) (147/73 - 161/74)  RR: 18 (07-05-24 @ 08:45) (18 - 19)  SpO2: 97% (07-05-24 @ 08:45) (97% - 99%)  Wt(kg): --  I&O's Summary    04 Jul 2024 07:01  -  05 Jul 2024 07:00  --------------------------------------------------------  IN: 670 mL / OUT: 0 mL / NET: 670 mL        Appearance: Normal	  HEENT:   Normal oral mucosa, PERRL, EOMI	  Lymphatic: No lymphadenopathy  Cardiovascular: Normal S1 S2, No JVD, + murmurs, No edema  Respiratory: rhonchi  Psychiatry: A & O x 3, Mood & affect appropriate  Gastrointestinal:  Soft, Non-tender, + BS	  Skin: No rashes, No ecchymoses, No cyanosis	  Extremities: Normal range of motion, No clubbing, cyanosis or edema      MEDICATIONS  (STANDING):  albuterol    90 MICROgram(s) HFA Inhaler 2 Puff(s) Inhalation every 8 hours  albuterol/ipratropium for Nebulization 3 milliLiter(s) Nebulizer every 6 hours  atorvastatin 20 milliGRAM(s) Oral at bedtime  budesonide  80 MICROgram(s)/formoterol 4.5 MICROgram(s) Inhaler 2 Puff(s) Inhalation two times a day  chlorhexidine 2% Cloths 1 Application(s) Topical daily  enoxaparin Injectable 40 milliGRAM(s) SubCutaneous every 12 hours  FLUoxetine 20 milliGRAM(s) Oral daily  furosemide    Tablet 20 milliGRAM(s) Oral daily  losartan 50 milliGRAM(s) Oral daily  methylPREDNISolone sodium succinate Injectable 20 milliGRAM(s) IV Push three times a day  metoprolol succinate ER 50 milliGRAM(s) Oral daily  mirtazapine 7.5 milliGRAM(s) Oral at bedtime  oxymetazoline 0.05% Nasal Spray 1 Spray(s) Both Nostrils two times a day  pantoprazole    Tablet 40 milliGRAM(s) Oral before breakfast  piperacillin/tazobactam IVPB.. 3.375 Gram(s) IV Intermittent every 8 hours  polyethylene glycol 3350 17 Gram(s) Oral daily  senna 2 Tablet(s) Oral at bedtime  sodium chloride 0.65% Nasal 2 Spray(s) Both Nostrils three times a day      TELEMETRY: 	    ECG:  	  RADIOLOGY:  OTHER: 	  	  LABS:	 	    CARDIAC MARKERS:                                10.3   11.74 )-----------( 198      ( 04 Jul 2024 10:47 )             32.7     07-04    139  |  94<L>  |  30<H>  ----------------------------<  189<H>  3.3<L>   |  30  |  0.72    Ca    8.4      04 Jul 2024 10:47      proBNP:   Lipid Profile:   HgA1c:   TSH:       - Patient currently doing well on 2.5LNC (spO2 90-92%), largely asymptomatic  - Continue to titrate FiO2 as tolerated to maintain spO2 >90%. Patient may need oxygen on discharge, and needs to be evaluated by checking saturation at rest and/or exertion.  - Agree with empiric antibiotics, cultures pending.  - Currently with clear lungs, i.e., no wheezing. Can transition Solu-Medrol 20mg IV TID to prednisone 20mg PO daily  - c/w DuoNEBs Q6h, but would change to PRN. c/w budesonide NEBs Q12h  - Patient prefers nebulizers to inhalers, and does the same at home.  - Continue to encourage OOB to chair, incentive spirometry  - Aspiration precautions      Assessment and plan  ---------------------------   88 yo F with a PMH of COPD, HTN, diverticulitis, asthma/COPD, sarcoidosis, hypothyroidism, heart failure, recent admission of resp failure requiring BIPAP presents from Assisted Living for acute onset of SOB and difficulty breathing. Per EMS pt was hypoxic on scene, was placed on NRB with improvement in oxygen saturation. Pt denies chest pain. No reported fever, chills.  hypoxia sec to underlying lung disease, VQ scan noted , will consider cta of the chest  pulmonary eval  hx of cad and chf will  adjust meds  ?pneumoniae will start on Zosyn / will discuss with pulmonary  Duoneb  continue all cardiac meds  chest x ray no evidence of chf  awaiting pulmonary evaluation/ cta  add lasix 20 mg daily sec to pulmonary HTN  replete K  pt with sig pulmonary disease, will discuss wiuth pulmonary any role of any meds for her chronic copd and sarcoidosis  ?rheumatology eval  discussed with home care ?compliance to meds , needs to taper steroid slowly or may need chronically  	        
Date of Service: 07-06-24 @ 13:56           CARDIOLOGY     PROGRESS  NOTE   ________________________________________________    CHIEF COMPLAINT:Patient is a 89y old  Female who presents with a chief complaint of sob (06 Jul 2024 10:04)  no complain  	  REVIEW OF SYSTEMS:  CONSTITUTIONAL: No fever, weight loss, or fatigue  EYES: No eye pain, visual disturbances, or discharge  ENT:  No difficulty hearing, tinnitus, vertigo; No sinus or throat pain  NECK: No pain or stiffness  RESPIRATORY: No cough, wheezing, chills or hemoptysis; decrease  Shortness of Breath  CARDIOVASCULAR: No chest pain, palpitations, passing out, dizziness, or leg swelling  GASTROINTESTINAL: No abdominal or epigastric pain. No nausea, vomiting, or hematemesis; No diarrhea or constipation. No melena or hematochezia.  GENITOURINARY: No dysuria, frequency, hematuria, or incontinence  NEUROLOGICAL: No headaches, memory loss, loss of strength, numbness, or tremors  SKIN: No itching, burning, rashes, or lesions   LYMPH Nodes: No enlarged glands  ENDOCRINE: No heat or cold intolerance; No hair loss  MUSCULOSKELETAL: No joint pain or swelling; No muscle, back, or extremity pain  PSYCHIATRIC: No depression, anxiety, mood swings, or difficulty sleeping  HEME/LYMPH: No easy bruising, or bleeding gums  ALLERGY AND IMMUNOLOGIC: No hives or eczema	    [x ] All others negative	  [ ] Unable to obtain    PHYSICAL EXAM:  T(C): 36.4 (07-06-24 @ 09:04), Max: 36.9 (07-06-24 @ 00:00)  HR: 78 (07-06-24 @ 09:04) (74 - 85)  BP: 165/74 (07-06-24 @ 09:04) (136/69 - 165/74)  RR: 18 (07-06-24 @ 09:04) (18 - 18)  SpO2: 95% (07-06-24 @ 09:04) (95% - 100%)  Wt(kg): --  I&O's Summary    05 Jul 2024 07:01  -  06 Jul 2024 07:00  --------------------------------------------------------  IN: 100 mL / OUT: 0 mL / NET: 100 mL        Appearance: Normal	  HEENT:   Normal oral mucosa, PERRL, EOMI	  Lymphatic: No lymphadenopathy  Cardiovascular: Normal S1 S2, No JVD, + murmurs, No edema  Respiratory: Lungs clear to auscultation	  Psychiatry: A & O x 3, Mood & affect appropriate  Gastrointestinal:  Soft, Non-tender, + BS	  Skin: No rashes, No ecchymoses, No cyanosis	  Neurologic: Non-focal  Extremities: Normal range of motion, No clubbing, cyanosis or edema  Vascular: Peripheral pulses palpable 2+ bilaterally    MEDICATIONS  (STANDING):  albuterol    90 MICROgram(s) HFA Inhaler 2 Puff(s) Inhalation every 8 hours  albuterol/ipratropium for Nebulization 3 milliLiter(s) Nebulizer every 6 hours  atorvastatin 20 milliGRAM(s) Oral at bedtime  budesonide  80 MICROgram(s)/formoterol 4.5 MICROgram(s) Inhaler 2 Puff(s) Inhalation two times a day  chlorhexidine 2% Cloths 1 Application(s) Topical daily  diphenhydrAMINE Injectable 50 milliGRAM(s) IV Push once  enoxaparin Injectable 40 milliGRAM(s) SubCutaneous every 12 hours  FLUoxetine 20 milliGRAM(s) Oral daily  furosemide    Tablet 20 milliGRAM(s) Oral daily  losartan 50 milliGRAM(s) Oral daily  methylPREDNISolone sodium succinate Injectable 40 milliGRAM(s) IV Push once  metoprolol succinate ER 50 milliGRAM(s) Oral daily  mirtazapine 7.5 milliGRAM(s) Oral at bedtime  mupirocin 2% Ointment 1 Application(s) Topical <User Schedule>  oxymetazoline 0.05% Nasal Spray 1 Spray(s) Both Nostrils two times a day  pantoprazole    Tablet 40 milliGRAM(s) Oral before breakfast  polyethylene glycol 3350 17 Gram(s) Oral daily  predniSONE   Tablet 20 milliGRAM(s) Oral daily  senna 2 Tablet(s) Oral at bedtime  sodium chloride 0.65% Nasal 2 Spray(s) Both Nostrils three times a day      TELEMETRY: 	    ECG:  	  RADIOLOGY:  OTHER: 	  	  LABS:	 	    CARDIAC MARKERS:                            10.6   12.46 )-----------( 184      ( 06 Jul 2024 07:02 )             35.6     07-06    139  |  101  |  36<H>  ----------------------------<  122<H>  4.4   |  25  |  0.69    Ca    8.7      06 Jul 2024 07:02  Mg     2.6     07-06      proBNP:   Lipid Profile:   HgA1c:   TSH:         Assessment and plan  ---------------------------   88 yo F with a PMH of COPD, HTN, diverticulitis, asthma/COPD, sarcoidosis, hypothyroidism, heart failure, recent admission of resp failure requiring BIPAP presents from Assisted Living for acute onset of SOB and difficulty breathing. Per EMS pt was hypoxic on scene, was placed on NRB with improvement in oxygen saturation. Pt denies chest pain. No reported fever, chills.  hypoxia sec to underlying lung disease, VQ scan noted , will consider cta of the chest  pulmonary eval  hx of cad and chf will  adjust meds  ?pneumoniae will start on Zosyn / will discuss with pulmonary  Duoneb  continue all cardiac meds  chest x ray no evidence of chf  awaiting pulmonary evaluation/ cta  add lasix 20 mg daily sec to pulmonary HTN  replete K  pt with sig pulmonary disease, will discuss wiuth pulmonary any role of any meds for her chronic copd and sarcoidosis  ?rheumatology eval  discussed with home care ?compliance to meds , needs to taper steroid slowly or may need chronically  CTA was not done sec to pt contrast allergy, pre medical ad protocol unless pulmonary feels pt does not need   	    	        
Date of Service: 07-09-24 @ 10:08           CARDIOLOGY     PROGRESS  NOTE   ________________________________________________    CHIEF COMPLAINT:Patient is a 89y old  Female who presents with a chief complaint of sob (08 Jul 2024 21:05)  no complain  	  REVIEW OF SYSTEMS:  CONSTITUTIONAL: No fever, weight loss, or fatigue  EYES: No eye pain, visual disturbances, or discharge  ENT:  No difficulty hearing, tinnitus, vertigo; No sinus or throat pain  NECK: No pain or stiffness  RESPIRATORY: No cough, wheezing, chills or hemoptysis; No Shortness of Breath  CARDIOVASCULAR: No chest pain, palpitations, passing out, dizziness, or leg swelling  GASTROINTESTINAL: No abdominal or epigastric pain. No nausea, vomiting, or hematemesis; No diarrhea or constipation. No melena or hematochezia.  GENITOURINARY: No dysuria, frequency, hematuria, or incontinence  NEUROLOGICAL: No headaches, memory loss, loss of strength, numbness, or tremors  SKIN: No itching, burning, rashes, or lesions   LYMPH Nodes: No enlarged glands  ENDOCRINE: No heat or cold intolerance; No hair loss  MUSCULOSKELETAL: No joint pain or swelling; No muscle, back, or extremity pain  PSYCHIATRIC: No depression, anxiety, mood swings, or difficulty sleeping  HEME/LYMPH: No easy bruising, or bleeding gums  ALLERGY AND IMMUNOLOGIC: No hives or eczema	    [x ] All others negative	  [ ] Unable to obtain    PHYSICAL EXAM:  T(C): 36.4 (07-09-24 @ 08:58), Max: 36.7 (07-08-24 @ 10:47)  HR: 91 (07-09-24 @ 08:58) (61 - 91)  BP: 150/56 (07-09-24 @ 08:58) (114/60 - 151/82)  RR: 18 (07-09-24 @ 08:58) (18 - 18)  SpO2: 95% (07-09-24 @ 08:58) (95% - 99%)  Wt(kg): --  I&O's Summary    08 Jul 2024 07:01  -  09 Jul 2024 07:00  --------------------------------------------------------  IN: 0 mL / OUT: 200 mL / NET: -200 mL        Appearance: Normal	  HEENT:   Normal oral mucosa, PERRL, EOMI	  Lymphatic: No lymphadenopathy  Cardiovascular: Normal S1 S2, No JVD, + murmurs, No edema  Respiratory: rhonchi  Gastrointestinal:  Soft, Non-tender, + BS	  Skin: No rashes, No ecchymoses, No cyanosis	  Extremities: Normal range of motion, No clubbing, cyanosis or edema  Vascular: Peripheral pulses palpable 2+ bilaterally    MEDICATIONS  (STANDING):  albuterol    90 MICROgram(s) HFA Inhaler 2 Puff(s) Inhalation every 8 hours  albuterol/ipratropium for Nebulization 3 milliLiter(s) Nebulizer every 6 hours  atorvastatin 20 milliGRAM(s) Oral at bedtime  budesonide  80 MICROgram(s)/formoterol 4.5 MICROgram(s) Inhaler 2 Puff(s) Inhalation two times a day  chlorhexidine 2% Cloths 1 Application(s) Topical daily  diphenhydrAMINE Injectable 50 milliGRAM(s) IV Push once  enoxaparin Injectable 40 milliGRAM(s) SubCutaneous every 12 hours  FLUoxetine 20 milliGRAM(s) Oral daily  furosemide    Tablet 20 milliGRAM(s) Oral daily  losartan 50 milliGRAM(s) Oral daily  methylPREDNISolone sodium succinate Injectable 40 milliGRAM(s) IV Push once  metoprolol succinate ER 50 milliGRAM(s) Oral daily  mirtazapine 7.5 milliGRAM(s) Oral at bedtime  mupirocin 2% Ointment 1 Application(s) Topical <User Schedule>  pantoprazole    Tablet 40 milliGRAM(s) Oral before breakfast  polyethylene glycol 3350 17 Gram(s) Oral daily  predniSONE   Tablet 20 milliGRAM(s) Oral daily  senna 2 Tablet(s) Oral at bedtime  sodium chloride 0.65% Nasal 2 Spray(s) Both Nostrils three times a day      TELEMETRY: 	    ECG:  	  RADIOLOGY:  OTHER: 	  	  LABS:	 	    CARDIAC MARKERS:      proBNP:   Lipid Profile:   HgA1c:   TSH:     ---------------------------   88 yo F with a PMH of COPD, HTN, diverticulitis, asthma/COPD, sarcoidosis, hypothyroidism, heart failure, recent admission of resp failure requiring BIPAP presents from Assisted Living for acute onset of SOB and difficulty breathing. Per EMS pt was hypoxic on scene, was placed on NRB with improvement in oxygen saturation. Pt denies chest pain. No reported fever, chills.  hypoxia sec to underlying lung disease, VQ scan noted , will consider cta of the chest  pulmonary eval  hx of cad and chf will  adjust meds  ?pneumoniae will start on Zosyn / will discuss with pulmonary  Duoneb  continue all cardiac meds  chest x ray no evidence of chf  awaiting pulmonary evaluation/ cta  add lasix 20 mg daily sec to pulmonary HTN  replete K  pt with sig pulmonary disease, will discuss wiuth pulmonary any role of any meds for her chronic copd and sarcoidosis  ?rheumatology eval  discussed with home care ?compliance to meds , needs to taper steroid slowly or may need chronically  CTA was not done sec to pt contrast allergy, pre medical ad protocol unless pulmonary feels pt does not need   awaiuting confirmation about PE prior to dc pt on AC  awaiting CTA of the chest  doing better with braething          	        
Patient is a 89y old  Female who presents with a chief complaint of sob (03 Jul 2024 20:36)       INTERVAL HPI/OVERNIGHT EVENTS:  Patient seen and evaluated at bedside.  Pt is resting comfortable in NAD. Denies N/V/F/C.      Allergies    iodinated radiocontrast agents (Anaphylaxis)    Intolerances        Vital Signs Last 24 Hrs  T(C): 36.7 (04 Jul 2024 08:46), Max: 36.7 (04 Jul 2024 08:46)  T(F): 98 (04 Jul 2024 08:46), Max: 98 (04 Jul 2024 08:46)  HR: 70 (04 Jul 2024 08:46) (70 - 75)  BP: 155/78 (04 Jul 2024 08:46) (101/59 - 155/78)  BP(mean): --  RR: 19 (04 Jul 2024 08:46) (18 - 19)  SpO2: 97% (04 Jul 2024 08:46) (96% - 97%)    Parameters below as of 04 Jul 2024 08:46  Patient On (Oxygen Delivery Method): nasal cannula  O2 Flow (L/min): 2      LABS:                        10.3   11.74 )-----------( 198      ( 04 Jul 2024 10:47 )             32.7     07-04    139  |  94<L>  |  30<H>  ----------------------------<  189<H>  3.3<L>   |  30  |  0.72    Ca    8.4      04 Jul 2024 10:47        Urinalysis Basic - ( 04 Jul 2024 10:47 )    Color: x / Appearance: x / SG: x / pH: x  Gluc: 189 mg/dL / Ketone: x  / Bili: x / Urobili: x   Blood: x / Protein: x / Nitrite: x   Leuk Esterase: x / RBC: x / WBC x   Sq Epi: x / Non Sq Epi: x / Bacteria: x      CAPILLARY BLOOD GLUCOSE          Lower Extremity Physical Exam:  Vascular: DP/PT 1/4, B/L, CFT <4 seconds B/L, Temperature gradient warm to cold, B/L.   Neuro: Epicritic sensation diminished to the level of digit, B/L.  Musculoskeletal/Ortho: unremarkable  Skin: R foot dorsal third digit wound to bone with exposed phalanx, no erythema, no purulence, no malodor, no drainage, no signs of acute infection. L foot no open wounds or sings of infection.    RADIOLOGY & ADDITIONAL TESTS:  < from: Xray Foot AP + Lateral + Oblique, Right (07.03.24 @ 13:35) >    ACC: [id number] EXAM:  XR FOOT COMP MIN 3 VIEWS RT   ORDERED BY:  KIMBERLEE PERAZA     PROCEDURE DATE:  07/03/2024          INTERPRETATION:  CLINICAL INDICATION: right foot toe wound    EXAM:  Portable frontal oblique lateral right foot from 7/3/2024 at 1335. No   similar prior studies available for comparison.    IMPRESSION:  No tracking soft tissue gas collections, radiopaque foreign bodies, or   definite radiographic evidence for acute osteomyelitis. However if   concern remains MRI suggested to further assess as warranted.    Hallux claw toe deformity and lesser hammertoe deformities.    Age-indeterminate right 4th metatarsal neck region impaction deformity,   correlate for point tenderness. Otherwise no dislocations or additional   suspected fractures.    Hallux IP joint arthritic change. Relatively preserved remaining joint   spaces and no joint margin erosions.    No calcaneal spurring and unremarkable distal Achilles tendon shadow.    Generalized osteopenia otherwise no discrete lytic or blastic lesions.    --- End of Report ---            ZOYA DEVINE MD; Attending Radiologist  This document has been electronically signed. Jul  3 2024  1:57PM    < end of copied text >  
infectious diseases progress note:    Patient is a 89y old  Female who presents with a chief complaint of sob (07 Jul 2024 21:27)        Acute respiratory failure with hypoxia               Allergies    iodinated radiocontrast agents (Anaphylaxis)    Intolerances        ANTIBIOTICS/RELEVANT:  antimicrobials    immunologic:    OTHER:  albuterol    90 MICROgram(s) HFA Inhaler 2 Puff(s) Inhalation every 8 hours  albuterol/ipratropium for Nebulization 3 milliLiter(s) Nebulizer every 6 hours  atorvastatin 20 milliGRAM(s) Oral at bedtime  budesonide  80 MICROgram(s)/formoterol 4.5 MICROgram(s) Inhaler 2 Puff(s) Inhalation two times a day  chlorhexidine 2% Cloths 1 Application(s) Topical daily  diphenhydrAMINE Injectable 50 milliGRAM(s) IV Push once  enoxaparin Injectable 40 milliGRAM(s) SubCutaneous every 12 hours  FLUoxetine 20 milliGRAM(s) Oral daily  furosemide    Tablet 20 milliGRAM(s) Oral daily  losartan 50 milliGRAM(s) Oral daily  methylPREDNISolone sodium succinate Injectable 40 milliGRAM(s) IV Push once  metoprolol succinate ER 50 milliGRAM(s) Oral daily  mirtazapine 7.5 milliGRAM(s) Oral at bedtime  mupirocin 2% Ointment 1 Application(s) Topical <User Schedule>  pantoprazole    Tablet 40 milliGRAM(s) Oral before breakfast  polyethylene glycol 3350 17 Gram(s) Oral daily  predniSONE   Tablet 20 milliGRAM(s) Oral daily  senna 2 Tablet(s) Oral at bedtime  sodium chloride 0.65% Nasal 2 Spray(s) Both Nostrils three times a day  temazepam 15 milliGRAM(s) Oral at bedtime PRN      Objective:  Vital Signs Last 24 Hrs  T(C): 36.4 (08 Jul 2024 05:00), Max: 36.6 (07 Jul 2024 09:53)  T(F): 97.6 (08 Jul 2024 05:00), Max: 97.8 (07 Jul 2024 09:53)  HR: 66 (08 Jul 2024 05:00) (66 - 78)  BP: 158/59 (08 Jul 2024 05:00) (128/75 - 158/59)  BP(mean): --  RR: 18 (08 Jul 2024 05:00) (18 - 18)  SpO2: 99% (08 Jul 2024 05:00) (90% - 100%)    Parameters below as of 08 Jul 2024 05:00  Patient On (Oxygen Delivery Method): nasal cannula           Eyes:JIM, EOMI  Ear/Nose/Throat: no oral lesion, no sinus tenderness on percussion	  Neck:no JVD, no lymphadenopathy, supple  Respiratory: CTA moises  Cardiovascular: S1S2 RRR, no murmurs  Gastrointestinal:soft, (+) BS, no HSM  Extremities:no e/e/c        LABS:                  MICROBIOLOGY:    RECENT CULTURES:  07-01 @ 10:49 Clean Catch Clean Catch (Midstream)   JUANI      Methicillin resistant Staphylococcus aureus  Methicillin resistant Staphylococcus aureus     50,000 - 99,000 CFU/mL Methicillin Resistant Staphylococcus aureus  50,000 - 99,000 CFU/mL Candida tropicalis "Susceptibilities not performed"    07-01 @ 10:05 .Blood Blood                No growth at 5 days          RESPIRATORY CULTURES:              RADIOLOGY & ADDITIONAL STUDIES:        Pager [phone]  After 5 pm/weekends or if no response :[phone]
infectious diseases progress note:    Patient is a 89y old  Female who presents with a chief complaint of sob (2024 23:03)        Acute respiratory failure with hypoxia               Allergies    iodinated radiocontrast agents (Anaphylaxis)    Intolerances        ANTIBIOTICS/RELEVANT:  antimicrobials  piperacillin/tazobactam IVPB.. 3.375 Gram(s) IV Intermittent every 8 hours    immunologic:    OTHER:  albuterol    90 MICROgram(s) HFA Inhaler 2 Puff(s) Inhalation every 8 hours  albuterol/ipratropium for Nebulization 3 milliLiter(s) Nebulizer every 6 hours  atorvastatin 20 milliGRAM(s) Oral at bedtime  budesonide  80 MICROgram(s)/formoterol 4.5 MICROgram(s) Inhaler 2 Puff(s) Inhalation two times a day  chlorhexidine 2% Cloths 1 Application(s) Topical daily  enoxaparin Injectable 40 milliGRAM(s) SubCutaneous every 12 hours  FLUoxetine 20 milliGRAM(s) Oral daily  furosemide    Tablet 20 milliGRAM(s) Oral daily  losartan 50 milliGRAM(s) Oral daily  methylPREDNISolone sodium succinate Injectable 20 milliGRAM(s) IV Push three times a day  metoprolol succinate ER 50 milliGRAM(s) Oral daily  mirtazapine 7.5 milliGRAM(s) Oral at bedtime  pantoprazole    Tablet 40 milliGRAM(s) Oral before breakfast  polyethylene glycol 3350 17 Gram(s) Oral daily  senna 2 Tablet(s) Oral at bedtime  temazepam 15 milliGRAM(s) Oral at bedtime PRN      Objective:  Vital Signs Last 24 Hrs  T(C): 36.4 (2024 21:28), Max: 37 (2024 20:37)  T(F): 97.5 (2024 21:28), Max: 98.6 (2024 20:37)  HR: 70 (2024 05:10) (70 - 96)  BP: 148/95 (2024 05:10) (113/62 - 161/78)  BP(mean): --  RR: 18 (2024 21:28) (18 - 25)  SpO2: 96% (2024 21:28) (80% - 100%)    Parameters below as of 2024 21:28  Patient On (Oxygen Delivery Method): nasal cannula  O2 Flow (L/min): 2         Eyes:JIM, EOMI  Ear/Nose/Throat: no oral lesion, no sinus tenderness on percussion	  Neck:no JVD, no lymphadenopathy, supple  Respiratory: CTA moises  Cardiovascular: S1S2 RRR, no murmurs  Gastrointestinal:soft, (+) BS, no HSM  Extremities:no e/e/c        LABS:                        11.6   21.03 )-----------( 259      ( 2024 10:19 )             37.9     07-    145  |  104  |  26<H>  ----------------------------<  129<H>  4.2   |  26  |  0.74    Ca    9.2      2024 10:19  Mg     2.2     07-    TPro  6.8  /  Alb  3.9  /  TBili  0.3  /  DBili  x   /  AST  21  /  ALT  26  /  AlkPhos  111  07-01    PT/INR - ( 2024 10:19 )   PT: 10.6 sec;   INR: 0.96 ratio         PTT - ( 2024 10:19 )  PTT:28.9 sec  Urinalysis Basic - ( 2024 10:49 )    Color: Yellow / Appearance: Cloudy / S.012 / pH: x  Gluc: x / Ketone: Negative mg/dL  / Bili: Negative / Urobili: 0.2 mg/dL   Blood: x / Protein: Negative mg/dL / Nitrite: Negative   Leuk Esterase: Large / RBC: 0 /HPF /  /HPF   Sq Epi: x / Non Sq Epi: 0 /HPF / Bacteria: Negative /HPF          MICROBIOLOGY:    RECENT CULTURES:        RESPIRATORY CULTURES:              RADIOLOGY & ADDITIONAL STUDIES:        Pager [phone]  After 5 pm/weekends or if no response :[phone]
      ---___---___---___---___---___---___ ---___---___---___---___---___---___---___---___---                  M E D I C A L   A T T E N D I N G   P R O G R E S S   N O T E  ---___---___---___---___---___---___ ---___---___---___---___---___---___---___---___---        ================================================    ++CHIEF COMPLAINT:   Patient is a 89y old  Female who presents with a chief complaint of sob (04 Jul 2024 12:02)      Acute respiratory failure with hypoxia      ---___---___---___---___---___---  PAST MEDICAL / Surgical  HISTORY:  PAST MEDICAL & SURGICAL HISTORY:  HTN (hypertension)      Hypothyroidism      Osteoporosis      CAD (coronary artery disease)      COPD (chronic obstructive pulmonary disease)      Pulmonary sarcoidosis      H/O pyelonephritis      Acute diverticulitis          ---___---___---___---___---___---  FAMILY HISTORY:   FAMILY HISTORY:        ---___---___---___---___---___---  ALLERGIES:   Allergies    iodinated radiocontrast agents (Anaphylaxis)    Intolerances        ---___---___---___---___---___---  MEDICATIONS:  MEDICATIONS  (STANDING):  albuterol    90 MICROgram(s) HFA Inhaler 2 Puff(s) Inhalation every 8 hours  albuterol/ipratropium for Nebulization 3 milliLiter(s) Nebulizer every 6 hours  atorvastatin 20 milliGRAM(s) Oral at bedtime  budesonide  80 MICROgram(s)/formoterol 4.5 MICROgram(s) Inhaler 2 Puff(s) Inhalation two times a day  chlorhexidine 2% Cloths 1 Application(s) Topical daily  enoxaparin Injectable 40 milliGRAM(s) SubCutaneous every 12 hours  FLUoxetine 20 milliGRAM(s) Oral daily  furosemide    Tablet 20 milliGRAM(s) Oral daily  losartan 50 milliGRAM(s) Oral daily  methylPREDNISolone sodium succinate Injectable 20 milliGRAM(s) IV Push three times a day  metoprolol succinate ER 50 milliGRAM(s) Oral daily  mirtazapine 7.5 milliGRAM(s) Oral at bedtime  mupirocin 2% Ointment 1 Application(s) Topical <User Schedule>  pantoprazole    Tablet 40 milliGRAM(s) Oral before breakfast  piperacillin/tazobactam IVPB.. 3.375 Gram(s) IV Intermittent every 8 hours  polyethylene glycol 3350 17 Gram(s) Oral daily  senna 2 Tablet(s) Oral at bedtime    MEDICATIONS  (PRN):  temazepam 15 milliGRAM(s) Oral at bedtime PRN Insomnia      ---___---___---___---___---___---  REVIEW OF SYSTEM:    GEN: no fever, no chills, no pain  RESP: no SOB, no cough, no sputum  CVS: no chest pain, no palpitations, no edema  GI: no abdominal pain, no nausea, no vomiting, no constipation, no diarrhea  : no dysurea, no frequency, no hematurea  Neuro: no headache, no dizziness  PSYCH: no anxiety, no depression  Derm : no itching, no rash    ---___---___---___---___---___---  VITAL SIGNS:  89y , CAPILLARY BLOOD GLUCOSE        T(C): 36.7 (07-04-24 @ 08:46), Max: 36.7 (07-04-24 @ 08:46)  HR: 70 (07-04-24 @ 08:46) (70 - 75)  BP: 155/78 (07-04-24 @ 08:46) (101/59 - 155/78)  RR: 19 (07-04-24 @ 08:46) (18 - 19)  SpO2: 97% (07-04-24 @ 08:46) (96% - 97%)  ---___---___---___---___---___---  PHYSICAL EXAM:    GEN: A&O X 3 , NAD , comfortable  HEENT: NCAT, PERRL, MMM, hearing intact  Neck: supple , no JVD  CVS: S1S2 , regular , No M/R/G appreciated  PULM: CTA B/L,  no W/R/R appreciated  ABD.: soft. non tender, non distended,  bowel sounds present  Extrem: intact pulses , no edema   Derm: No rash , no ecchymoses  PSYCH : normal mood,  no delusion not anxious     ---___---___---___---___---___---            LAB AND IMAGING:                          10.3   11.74 )-----------( 198      ( 04 Jul 2024 10:47 )             32.7               07-04    139  |  94<L>  |  30<H>  ----------------------------<  189<H>  3.3<L>   |  30  |  0.72    Ca    8.4      04 Jul 2024 10:47                              Urinalysis Basic - ( 04 Jul 2024 10:47 )    Color: x / Appearance: x / SG: x / pH: x  Gluc: 189 mg/dL / Ketone: x  / Bili: x / Urobili: x   Blood: x / Protein: x / Nitrite: x   Leuk Esterase: x / RBC: x / WBC x   Sq Epi: x / Non Sq Epi: x / Bacteria: x        [All pertinent / recent Imaging reviewed]         ---___---___---___---___---___---___ ---___---___---___---___---                         A S S E S S M E N T   A N D   P L A N :      HEALTH ISSUES - PROBLEM Dx:  - Patient currently doing well on 2.5LNC (spO2 90-92%), largely asymptomatic  - Continue to titrate FiO2 as tolerated to maintain spO2 >90%. Patient may need oxygen on discharge, and needs to be evaluated by checking saturation at rest and/or exertion.  - Agree with empiric antibiotics, cultures pending.  - Currently with clear lungs, i.e., no wheezing. Can transition Solu-Medrol 20mg IV TID to prednisone 20mg PO daily  - c/w DuoNEBs Q6h, but would change to PRN. c/w budesonide NEBs Q12h  - Patient prefers nebulizers to inhalers, and does the same at home.  - Continue to encourage OOB to chair, incentive spirometry  - Aspiration precautions  - We will continue to follow the patient with you.  copd exacerbation  as above   id followig  anxiety   continue fluoxetine     htn on toprol       hld on statin     hypokalemia repelte potassium     -GI/DVT Prophylaxis. as ordered                -GI/DVT Prophylaxis.    --------------------------------------------  Case discussed with   Education given on   ___________________________  Thank you,  Garry Hermosillo  [phone]
      ---___---___---___---___---___---___ ---___---___---___---___---___---___---___---___---                  M E D I C A L   A T T E N D I N G   P R O G R E S S   N O T E  ---___---___---___---___---___---___ ---___---___---___---___---___---___---___---___---        ================================================    ++CHIEF COMPLAINT:   Patient is a 89y old  Female who presents with a chief complaint of sob (05 Jul 2024 12:54)      Acute respiratory failure with hypoxia      ---___---___---___---___---___---  PAST MEDICAL / Surgical  HISTORY:  PAST MEDICAL & SURGICAL HISTORY:  HTN (hypertension)      Hypothyroidism      Osteoporosis      CAD (coronary artery disease)      COPD (chronic obstructive pulmonary disease)      Pulmonary sarcoidosis      H/O pyelonephritis      Acute diverticulitis          ---___---___---___---___---___---  FAMILY HISTORY:   FAMILY HISTORY:        ---___---___---___---___---___---  ALLERGIES:   Allergies    iodinated radiocontrast agents (Anaphylaxis)    Intolerances        ---___---___---___---___---___---  MEDICATIONS:  MEDICATIONS  (STANDING):  albuterol    90 MICROgram(s) HFA Inhaler 2 Puff(s) Inhalation every 8 hours  albuterol/ipratropium for Nebulization 3 milliLiter(s) Nebulizer every 6 hours  atorvastatin 20 milliGRAM(s) Oral at bedtime  budesonide  80 MICROgram(s)/formoterol 4.5 MICROgram(s) Inhaler 2 Puff(s) Inhalation two times a day  chlorhexidine 2% Cloths 1 Application(s) Topical daily  enoxaparin Injectable 40 milliGRAM(s) SubCutaneous every 12 hours  FLUoxetine 20 milliGRAM(s) Oral daily  furosemide    Tablet 20 milliGRAM(s) Oral daily  losartan 50 milliGRAM(s) Oral daily  methylPREDNISolone sodium succinate Injectable 20 milliGRAM(s) IV Push three times a day  metoprolol succinate ER 50 milliGRAM(s) Oral daily  mirtazapine 7.5 milliGRAM(s) Oral at bedtime  oxymetazoline 0.05% Nasal Spray 1 Spray(s) Both Nostrils two times a day  pantoprazole    Tablet 40 milliGRAM(s) Oral before breakfast  piperacillin/tazobactam IVPB.. 3.375 Gram(s) IV Intermittent every 8 hours  polyethylene glycol 3350 17 Gram(s) Oral daily  senna 2 Tablet(s) Oral at bedtime  sodium chloride 0.65% Nasal 2 Spray(s) Both Nostrils three times a day    MEDICATIONS  (PRN):  temazepam 15 milliGRAM(s) Oral at bedtime PRN Insomnia      ---___---___---___---___---___---  REVIEW OF SYSTEM:    GEN: no fever, no chills, no pain  RESP: no SOB, no cough, no sputum  CVS: no chest pain, no palpitations, no edema  GI: no abdominal pain, no nausea, no vomiting, no constipation, no diarrhea  : no dysurea, no frequency, no hematurea  Neuro: no headache, no dizziness  PSYCH: no anxiety, no depression  Derm : no itching, no rash    ---___---___---___---___---___---  VITAL SIGNS:  89y , CAPILLARY BLOOD GLUCOSE        T(C): 36.4 (07-05-24 @ 15:55), Max: 37.1 (07-05-24 @ 05:30)  HR: 74 (07-05-24 @ 15:55) (65 - 88)  BP: 136/69 (07-05-24 @ 15:55) (136/69 - 161/74)  RR: 18 (07-05-24 @ 15:55) (18 - 19)  SpO2: 97% (07-05-24 @ 08:45) (97% - 99%)  ---___---___---___---___---___---  PHYSICAL EXAM:    GEN: A&O X 3 , NAD , comfortable  HEENT: NCAT, PERRL, MMM, hearing intact  Neck: supple , no JVD  CVS: S1S2 , regular , No M/R/G appreciated  PULM: CTA B/L,  no W/R/R appreciated  ABD.: soft. non tender, non distended,  bowel sounds present  Extrem: intact pulses , no edema   Derm: No rash , no ecchymoses  PSYCH : normal mood,  no delusion not anxious     ---___---___---___---___---___---            LAB AND IMAGING:                          10.3   11.74 )-----------( 198      ( 04 Jul 2024 10:47 )             32.7               07-04    139  |  94<L>  |  30<H>  ----------------------------<  189<H>  3.3<L>   |  30  |  0.72    Ca    8.4      04 Jul 2024 10:47                              Urinalysis Basic - ( 04 Jul 2024 10:47 )    Color: x / Appearance: x / SG: x / pH: x  Gluc: 189 mg/dL / Ketone: x  / Bili: x / Urobili: x   Blood: x / Protein: x / Nitrite: x   Leuk Esterase: x / RBC: x / WBC x   Sq Epi: x / Non Sq Epi: x / Bacteria: x        [All pertinent / recent Imaging reviewed]         ---___---___---___---___---___---___ ---___---___---___---___---                         A S S E S S M E N T   A N D   P L A N :      HEALTH ISSUES - PROBLEM Dx:  Right-sided epistaxis    pneumonia     - Agree with empiric antibiotics,   - Currently with clear lungs, i.e., no wheezing. Can transition Solu-Medrol 20mg IV TID to prednisone 20mg PO daily  - c/w DuoNEBs Q6h, but would change to PRN. c/w budesonide NEBs Q12h  - Patient prefers nebulizers to inhalers, and does the same at home.  - Continue to encourage OOB to chair, incentive spirometry  - Aspiration precautions  - We will continue to follow the patient with you.  copd exacerbation  as above   id followig  anxiety   continue fluoxetine   ua (+) culture  appreciate id input   htn on toprol       hld on statin     hypokalemia repelte potassium     -GI/DVT Prophylaxis. as ordered                --------------------------------------------  Case discussed with   Education given on   ___________________________  Thank you,  Garry Hermosillo  [phone]
      ---___---___---___---___---___---___ ---___---___---___---___---___---___---___---___---                  M E D I C A L   A T T E N D I N G   P R O G R E S S   N O T E  ---___---___---___---___---___---___ ---___---___---___---___---___---___---___---___---        ================================================    ++CHIEF COMPLAINT:   Patient is a 89y old  Female who presents with a chief complaint of sob (08 Jul 2024 08:58)      Acute respiratory failure with hypoxia      ---___---___---___---___---___---  PAST MEDICAL / Surgical  HISTORY:  PAST MEDICAL & SURGICAL HISTORY:  HTN (hypertension)      Hypothyroidism      Osteoporosis      CAD (coronary artery disease)      COPD (chronic obstructive pulmonary disease)      Pulmonary sarcoidosis      H/O pyelonephritis      Acute diverticulitis          ---___---___---___---___---___---  FAMILY HISTORY:   FAMILY HISTORY:        ---___---___---___---___---___---  ALLERGIES:   Allergies    iodinated radiocontrast agents (Anaphylaxis)    Intolerances        ---___---___---___---___---___---  MEDICATIONS:  MEDICATIONS  (STANDING):  albuterol    90 MICROgram(s) HFA Inhaler 2 Puff(s) Inhalation every 8 hours  albuterol/ipratropium for Nebulization 3 milliLiter(s) Nebulizer every 6 hours  atorvastatin 20 milliGRAM(s) Oral at bedtime  budesonide  80 MICROgram(s)/formoterol 4.5 MICROgram(s) Inhaler 2 Puff(s) Inhalation two times a day  chlorhexidine 2% Cloths 1 Application(s) Topical daily  diphenhydrAMINE Injectable 50 milliGRAM(s) IV Push once  enoxaparin Injectable 40 milliGRAM(s) SubCutaneous every 12 hours  FLUoxetine 20 milliGRAM(s) Oral daily  furosemide    Tablet 20 milliGRAM(s) Oral daily  losartan 50 milliGRAM(s) Oral daily  methylPREDNISolone sodium succinate Injectable 40 milliGRAM(s) IV Push once  metoprolol succinate ER 50 milliGRAM(s) Oral daily  mirtazapine 7.5 milliGRAM(s) Oral at bedtime  mupirocin 2% Ointment 1 Application(s) Topical <User Schedule>  pantoprazole    Tablet 40 milliGRAM(s) Oral before breakfast  polyethylene glycol 3350 17 Gram(s) Oral daily  predniSONE   Tablet 20 milliGRAM(s) Oral daily  senna 2 Tablet(s) Oral at bedtime  sodium chloride 0.65% Nasal 2 Spray(s) Both Nostrils three times a day    MEDICATIONS  (PRN):  temazepam 15 milliGRAM(s) Oral at bedtime PRN Insomnia      ---___---___---___---___---___---  REVIEW OF SYSTEM:    GEN: no fever, no chills, no pain  RESP: no SOB, no cough, no sputum  CVS: no chest pain, no palpitations, no edema  GI: no abdominal pain, no nausea, no vomiting, no constipation, no diarrhea  : no dysurea, no frequency, no hematurea  Neuro: no headache, no dizziness  PSYCH: no anxiety, no depression  Derm : no itching, no rash    ---___---___---___---___---___---  VITAL SIGNS:  89y , CAPILLARY BLOOD GLUCOSE        T(C): 36.7 (07-08-24 @ 15:40), Max: 36.7 (07-08-24 @ 10:47)  HR: 74 (07-08-24 @ 15:40) (66 - 74)  BP: 120/63 (07-08-24 @ 15:40) (120/63 - 158/59)  RR: 18 (07-08-24 @ 15:40) (18 - 18)  SpO2: 96% (07-08-24 @ 15:40) (95% - 99%)  ---___---___---___---___---___---  PHYSICAL EXAM:    GEN: A&O X 3 , NAD , comfortable  HEENT: NCAT, PERRL, MMM, hearing intact  Neck: supple , no JVD  CVS: S1S2 , regular , No M/R/G appreciated  PULM: CTA B/L,  no W/R/R appreciated  ABD.: soft. non tender, non distended,  bowel sounds present  Extrem: intact pulses , no edema   Derm: No rash , no ecchymoses  PSYCH : normal mood,  no delusion not anxious     ---___---___---___---___---___---            LAB AND IMAGING:                                                  [All pertinent / recent Imaging reviewed]         ---___---___---___---___---___---___ ---___---___---___---___---                         A S S E S S M E N T   A N D   P L A N :      HEALTH ISSUES - PROBLEM Dx:  - Agree with empiric antibiotics,   - Currently with clear lungs, i.e., no wheezing. Can transition Solu-Medrol 20mg IV TID to prednisone 20mg PO daily  - c/w DuoNEBs Q6h, but would change to PRN. c/w budesonide NEBs Q12h  - Patient prefers nebulizers to inhalers, and does the same at home.  - Continue to encourage OOB to chair, incentive spirometry  - Aspiration precautions  - We will continue to follow the patient with you.  copd exacerbation  as above   id followig  anxiety   continue fluoxetine   ua (+) culture  appreciate id input   htn on toprol  and arb  due for ct scan of the chest   to rule out pE           ___________________________  Thank you,  Garry Hermosillo  [phone]
Date of Service: 07-02-24 @ 09:21           CARDIOLOGY     PROGRESS  NOTE   ________________________________________________    CHIEF COMPLAINT:Patient is a 89y old  Female who presents with a chief complaint of sob (02 Jul 2024 08:14)  on o2  	  REVIEW OF SYSTEMS:  CONSTITUTIONAL: No fever, weight loss, or fatigue  EYES: No eye pain, visual disturbances, or discharge  ENT:  No difficulty hearing, tinnitus, vertigo; No sinus or throat pain  NECK: No pain or stiffness  RESPIRATORY: No cough, wheezing, chills or hemoptysis; No Shortness of Breath  CARDIOVASCULAR: No chest pain, palpitations, passing out, dizziness, or leg swelling  GASTROINTESTINAL: No abdominal or epigastric pain. No nausea, vomiting, or hematemesis; No diarrhea or constipation. No melena or hematochezia.  GENITOURINARY: No dysuria, frequency, hematuria, or incontinence  NEUROLOGICAL: No headaches, memory loss, loss of strength, numbness, or tremors  SKIN: No itching, burning, rashes, or lesions   LYMPH Nodes: No enlarged glands  ENDOCRINE: No heat or cold intolerance; No hair loss  MUSCULOSKELETAL: No joint pain or swelling; No muscle, back, or extremity pain  PSYCHIATRIC: No depression, anxiety, mood swings, or difficulty sleeping  HEME/LYMPH: No easy bruising, or bleeding gums  ALLERGY AND IMMUNOLOGIC: No hives or eczema	    [x ] All others negative	  [ ] Unable to obtain    PHYSICAL EXAM:  T(C): 36.4 (07-01-24 @ 21:28), Max: 37 (07-01-24 @ 20:37)  HR: 70 (07-02-24 @ 05:10) (70 - 96)  BP: 148/95 (07-02-24 @ 05:10) (113/62 - 161/78)  RR: 18 (07-01-24 @ 21:28) (18 - 25)  SpO2: 96% (07-01-24 @ 21:28) (80% - 100%)  Wt(kg): --  I&O's Summary      Appearance: Normal	  HEENT:   Normal oral mucosa, PERRL, EOMI	  Lymphatic: No lymphadenopathy  Cardiovascular: Normal S1 S2, No JVD, + murmurs, No edema  Respiratory: rhonchi  Gastrointestinal:  Soft, Non-tender, + BS	  Skin: No rashes, No ecchymoses, No cyanosis	  Extremities: Normal range of motion, No clubbing, cyanosis or edema  Vascular: Peripheral pulses palpable 2+ bilaterally    MEDICATIONS  (STANDING):  albuterol    90 MICROgram(s) HFA Inhaler 2 Puff(s) Inhalation every 8 hours  albuterol/ipratropium for Nebulization 3 milliLiter(s) Nebulizer every 6 hours  atorvastatin 20 milliGRAM(s) Oral at bedtime  budesonide  80 MICROgram(s)/formoterol 4.5 MICROgram(s) Inhaler 2 Puff(s) Inhalation two times a day  chlorhexidine 2% Cloths 1 Application(s) Topical daily  enoxaparin Injectable 40 milliGRAM(s) SubCutaneous every 12 hours  FLUoxetine 20 milliGRAM(s) Oral daily  furosemide    Tablet 20 milliGRAM(s) Oral daily  losartan 50 milliGRAM(s) Oral daily  methylPREDNISolone sodium succinate Injectable 20 milliGRAM(s) IV Push three times a day  metoprolol succinate ER 50 milliGRAM(s) Oral daily  mirtazapine 7.5 milliGRAM(s) Oral at bedtime  pantoprazole    Tablet 40 milliGRAM(s) Oral before breakfast  piperacillin/tazobactam IVPB.. 3.375 Gram(s) IV Intermittent every 8 hours  polyethylene glycol 3350 17 Gram(s) Oral daily  senna 2 Tablet(s) Oral at bedtime      TELEMETRY: 	    ECG:  	  RADIOLOGY:  OTHER: 	  	  LABS:	 	    CARDIAC MARKERS:                                11.6   21.03 )-----------( 259      ( 01 Jul 2024 10:19 )             37.9     07-01    145  |  104  |  26<H>  ----------------------------<  129<H>  4.2   |  26  |  0.74    Ca    9.2      01 Jul 2024 10:19  Mg     2.2     07-01    TPro  6.8  /  Alb  3.9  /  TBili  0.3  /  DBili  x   /  AST  21  /  ALT  26  /  AlkPhos  111  07-01    proBNP:   Lipid Profile:   HgA1c:   TSH:   PT/INR - ( 01 Jul 2024 10:19 )   PT: 10.6 sec;   INR: 0.96 ratio         PTT - ( 01 Jul 2024 10:19 )  PTT:28.9 sec      Assessment and plan  ---------------------------   89 yo F with a PMH of COPD, HTN, diverticulitis, asthma/COPD, sarcoidosis, hypothyroidism, heart failure, recent admission of resp failure requiring BIPAP presents from Assisted Living for acute onset of SOB and difficulty breathing. Per EMS pt was hypoxic on scene, was placed on NRB with improvement in oxygen saturation. Pt denies chest pain. No reported fever, chills.  hypoxia sec to underlying lung disease, VQ scan noted , will consider cta of the chest  pulmonary eval  hx of cad and chf will  adjust meds  ?pneumoniae will start on Zosyn / will discuss with pulmonary  Duoneb  continue all cardiac meds  chest x ray no evidence of chf    	        
Date of Service: 07-04-24 @ 13:37           CARDIOLOGY     PROGRESS  NOTE   ________________________________________________    CHIEF COMPLAINT:Patient is a 89y old  Female who presents with a chief complaint of sob (04 Jul 2024 13:04)  no complain  	  REVIEW OF SYSTEMS:  CONSTITUTIONAL: No fever, weight loss, or fatigue  EYES: No eye pain, visual disturbances, or discharge  ENT:  No difficulty hearing, tinnitus, vertigo; No sinus or throat pain  NECK: No pain or stiffness  RESPIRATORY: No cough, wheezing, chills or hemoptysis; + Shortness of Breath  CARDIOVASCULAR: No chest pain, palpitations, passing out, dizziness, or leg swelling  GASTROINTESTINAL: No abdominal or epigastric pain. No nausea, vomiting, or hematemesis; No diarrhea or constipation. No melena or hematochezia.  GENITOURINARY: No dysuria, frequency, hematuria, or incontinence  NEUROLOGICAL: No headaches, memory loss, loss of strength, numbness, or tremors  SKIN: No itching, burning, rashes, or lesions   LYMPH Nodes: No enlarged glands  ENDOCRINE: No heat or cold intolerance; No hair loss  MUSCULOSKELETAL: No joint pain or swelling; No muscle, back, or extremity pain  PSYCHIATRIC: No depression, anxiety, mood swings, or difficulty sleeping  HEME/LYMPH: No easy bruising, or bleeding gums  ALLERGY AND IMMUNOLOGIC: No hives or eczema	    [x ] All others negative	  [ ] Unable to obtain    PHYSICAL EXAM:  T(C): 36.7 (07-04-24 @ 08:46), Max: 36.7 (07-04-24 @ 08:46)  HR: 70 (07-04-24 @ 08:46) (70 - 75)  BP: 155/78 (07-04-24 @ 08:46) (101/59 - 155/78)  RR: 19 (07-04-24 @ 08:46) (18 - 19)  SpO2: 97% (07-04-24 @ 08:46) (96% - 97%)  Wt(kg): --  I&O's Summary      Appearance: Normal	  HEENT:   Normal oral mucosa, PERRL, EOMI	  Lymphatic: No lymphadenopathy  Cardiovascular: Normal S1 S2, No JVD, + murmurs, No edema  Respiratory: rhonchi  Gastrointestinal:  Soft, Non-tender, + BS	  Skin: No rashes, No ecchymoses, No cyanosis	  Neurologic: Non-focal  Extremities: Normal range of motion, No clubbing, cyanosis or edema  Vascular: Peripheral pulses palpable 2+ bilaterally    MEDICATIONS  (STANDING):  albuterol    90 MICROgram(s) HFA Inhaler 2 Puff(s) Inhalation every 8 hours  albuterol/ipratropium for Nebulization 3 milliLiter(s) Nebulizer every 6 hours  atorvastatin 20 milliGRAM(s) Oral at bedtime  budesonide  80 MICROgram(s)/formoterol 4.5 MICROgram(s) Inhaler 2 Puff(s) Inhalation two times a day  chlorhexidine 2% Cloths 1 Application(s) Topical daily  enoxaparin Injectable 40 milliGRAM(s) SubCutaneous every 12 hours  FLUoxetine 20 milliGRAM(s) Oral daily  furosemide    Tablet 20 milliGRAM(s) Oral daily  losartan 50 milliGRAM(s) Oral daily  methylPREDNISolone sodium succinate Injectable 20 milliGRAM(s) IV Push three times a day  metoprolol succinate ER 50 milliGRAM(s) Oral daily  mirtazapine 7.5 milliGRAM(s) Oral at bedtime  mupirocin 2% Ointment 1 Application(s) Topical <User Schedule>  pantoprazole    Tablet 40 milliGRAM(s) Oral before breakfast  piperacillin/tazobactam IVPB.. 3.375 Gram(s) IV Intermittent every 8 hours  polyethylene glycol 3350 17 Gram(s) Oral daily  senna 2 Tablet(s) Oral at bedtime      TELEMETRY: 	    ECG:  	  RADIOLOGY:  OTHER: 	  	  LABS:	 	    CARDIAC MARKERS:                                10.3   11.74 )-----------( 198      ( 04 Jul 2024 10:47 )             32.7     07-04    139  |  94<L>  |  30<H>  ----------------------------<  189<H>  3.3<L>   |  30  |  0.72    Ca    8.4      04 Jul 2024 10:47      proBNP:   Lipid Profile:   HgA1c:   TSH:     - Patient currently doing well on 2.5LNC (spO2 90-92%), largely asymptomatic  - Continue to titrate FiO2 as tolerated to maintain spO2 >90%. Patient may need oxygen on discharge, and needs to be evaluated by checking saturation at rest and/or exertion.  - Agree with empiric antibiotics, cultures pending.  - Currently with clear lungs, i.e., no wheezing. Can transition Solu-Medrol 20mg IV TID to prednisone 20mg PO daily  - c/w DuoNEBs Q6h, but would change to PRN. c/w budesonide NEBs Q12h  - Patient prefers nebulizers to inhalers, and does the same at home.  - Continue to encourage OOB to chair, incentive spirometry  - Aspiration precautions    Assessment and plan  ---------------------------   88 yo F with a PMH of COPD, HTN, diverticulitis, asthma/COPD, sarcoidosis, hypothyroidism, heart failure, recent admission of resp failure requiring BIPAP presents from Assisted Living for acute onset of SOB and difficulty breathing. Per EMS pt was hypoxic on scene, was placed on NRB with improvement in oxygen saturation. Pt denies chest pain. No reported fever, chills.  hypoxia sec to underlying lung disease, VQ scan noted , will consider cta of the chest  pulmonary eval  hx of cad and chf will  adjust meds  ?pneumoniae will start on Zosyn / will discuss with pulmonary  Duoneb  continue all cardiac meds  chest x ray no evidence of chf  awaiting pulmonary evaluation/ cta  add lasix 20 mg daily sec to pulmonary HTN  replete K  pt with sig pulmonary disease, will discuss wiuth pulmonary any role of any meds for her chronic copd and sarcoidosis  ?rheumatology eval    	        
Date of Service: 07-07-24 @ 11:48           CARDIOLOGY     PROGRESS  NOTE   ________________________________________________    CHIEF COMPLAINT:Patient is a 89y old  Female who presents with a chief complaint of sob (06 Jul 2024 18:56)  no complain  	  REVIEW OF SYSTEMS:  CONSTITUTIONAL: No fever, weight loss, or fatigue  EYES: No eye pain, visual disturbances, or discharge  ENT:  No difficulty hearing, tinnitus, vertigo; No sinus or throat pain  NECK: No pain or stiffness  RESPIRATORY: No cough, wheezing, chills or hemoptysis; No Shortness of Breath  CARDIOVASCULAR: No chest pain, palpitations, passing out, dizziness, or leg swelling  GASTROINTESTINAL: No abdominal or epigastric pain. No nausea, vomiting, or hematemesis; No diarrhea or constipation. No melena or hematochezia.  GENITOURINARY: No dysuria, frequency, hematuria, or incontinence  NEUROLOGICAL: No headaches, memory loss, loss of strength, numbness, or tremors  SKIN: No itching, burning, rashes, or lesions   LYMPH Nodes: No enlarged glands  ENDOCRINE: No heat or cold intolerance; No hair loss  MUSCULOSKELETAL: No joint pain or swelling; No muscle, back, or extremity pain  PSYCHIATRIC: No depression, anxiety, mood swings, or difficulty sleeping  HEME/LYMPH: No easy bruising, or bleeding gums  ALLERGY AND IMMUNOLOGIC: No hives or eczema	    [x ] All others negative	  [ ] Unable to obtain    PHYSICAL EXAM:  T(C): 36.6 (07-07-24 @ 09:53), Max: 37 (07-07-24 @ 05:16)  HR: 78 (07-07-24 @ 09:53) (69 - 78)  BP: 142/81 (07-07-24 @ 09:53) (142/81 - 159/80)  RR: 18 (07-07-24 @ 09:53) (18 - 18)  SpO2: 100% (07-07-24 @ 09:53) (93% - 100%)  Wt(kg): --  I&O's Summary      Appearance: Normal	  HEENT:   Normal oral mucosa, PERRL, EOMI	  Lymphatic: No lymphadenopathy  Cardiovascular: Normal S1 S2, No JVD, + murmurs, No edema  Respiratory: Lungs clear to auscultation	  Psychiatry: A & O x 3, Mood & affect appropriate  Gastrointestinal:  Soft, Non-tender, + BS	  Skin: No rashes, No ecchymoses, No cyanosis	  Neurologic: Non-focal  Extremities: Normal range of motion, No clubbing, cyanosis or edema  Vascular: Peripheral pulses palpable 2+ bilaterally    MEDICATIONS  (STANDING):  albuterol    90 MICROgram(s) HFA Inhaler 2 Puff(s) Inhalation every 8 hours  albuterol/ipratropium for Nebulization 3 milliLiter(s) Nebulizer every 6 hours  atorvastatin 20 milliGRAM(s) Oral at bedtime  budesonide  80 MICROgram(s)/formoterol 4.5 MICROgram(s) Inhaler 2 Puff(s) Inhalation two times a day  chlorhexidine 2% Cloths 1 Application(s) Topical daily  diphenhydrAMINE Injectable 50 milliGRAM(s) IV Push once  enoxaparin Injectable 40 milliGRAM(s) SubCutaneous every 12 hours  FLUoxetine 20 milliGRAM(s) Oral daily  furosemide    Tablet 20 milliGRAM(s) Oral daily  losartan 50 milliGRAM(s) Oral daily  methylPREDNISolone sodium succinate Injectable 40 milliGRAM(s) IV Push once  metoprolol succinate ER 50 milliGRAM(s) Oral daily  mirtazapine 7.5 milliGRAM(s) Oral at bedtime  mupirocin 2% Ointment 1 Application(s) Topical <User Schedule>  pantoprazole    Tablet 40 milliGRAM(s) Oral before breakfast  polyethylene glycol 3350 17 Gram(s) Oral daily  predniSONE   Tablet 20 milliGRAM(s) Oral daily  senna 2 Tablet(s) Oral at bedtime  sodium chloride 0.65% Nasal 2 Spray(s) Both Nostrils three times a day      TELEMETRY: 	    ECG:  	  RADIOLOGY:  OTHER: 	  	  LABS:	 	    CARDIAC MARKERS:                                10.6   12.46 )-----------( 184      ( 06 Jul 2024 07:02 )             35.6     07-06    139  |  101  |  36<H>  ----------------------------<  122<H>  4.4   |  25  |  0.69    Ca    8.7      06 Jul 2024 07:02  Mg     2.6     07-06      proBNP:   Lipid Profile:   HgA1c:   TSH:         Assessment and plan  ---------------------------   89 yo F with a PMH of COPD, HTN, diverticulitis, asthma/COPD, sarcoidosis, hypothyroidism, heart failure, recent admission of resp failure requiring BIPAP presents from Assisted Living for acute onset of SOB and difficulty breathing. Per EMS pt was hypoxic on scene, was placed on NRB with improvement in oxygen saturation. Pt denies chest pain. No reported fever, chills.  hypoxia sec to underlying lung disease, VQ scan noted , will consider cta of the chest  pulmonary eval  hx of cad and chf will  adjust meds  ?pneumoniae will start on Zosyn / will discuss with pulmonary  Duoneb  continue all cardiac meds  chest x ray no evidence of chf  awaiting pulmonary evaluation/ cta  add lasix 20 mg daily sec to pulmonary HTN  replete K  pt with sig pulmonary disease, will discuss wiuth pulmonary any role of any meds for her chronic copd and sarcoidosis  ?rheumatology eval  discussed with home care ?compliance to meds , needs to taper steroid slowly or may need chronically  CTA was not done sec to pt contrast allergy, pre medical ad protocol unless pulmonary feels pt does not need   awaiuting confirmation about PE prior to dc pt on AC    	        
Date of Service: 07-10-24 @ 09:13           CARDIOLOGY     PROGRESS  NOTE   ________________________________________________    CHIEF COMPLAINT:Patient is a 89y old  Female who presents with a chief complaint of sob (09 Jul 2024 18:59)    	  REVIEW OF SYSTEMS:  CONSTITUTIONAL: No fever, weight loss, or fatigue  EYES: No eye pain, visual disturbances, or discharge  ENT:  No difficulty hearing, tinnitus, vertigo; No sinus or throat pain  NECK: No pain or stiffness  RESPIRATORY: No cough, wheezing, chills or hemoptysis; No Shortness of Breath  CARDIOVASCULAR: No chest pain, palpitations, passing out, dizziness, or leg swelling  GASTROINTESTINAL: No abdominal or epigastric pain. No nausea, vomiting, or hematemesis; No diarrhea or constipation. No melena or hematochezia.  GENITOURINARY: No dysuria, frequency, hematuria, or incontinence  NEUROLOGICAL: No headaches, memory loss, loss of strength, numbness, or tremors  SKIN: No itching, burning, rashes, or lesions   LYMPH Nodes: No enlarged glands  ENDOCRINE: No heat or cold intolerance; No hair loss  MUSCULOSKELETAL: No joint pain or swelling; No muscle, back, or extremity pain  PSYCHIATRIC: No depression, anxiety, mood swings, or difficulty sleeping  HEME/LYMPH: No easy bruising, or bleeding gums  ALLERGY AND IMMUNOLOGIC: No hives or eczema	    [ ] All others negative	  [ ] Unable to obtain    PHYSICAL EXAM:  T(C): 36.3 (07-10-24 @ 08:19), Max: 36.6 (07-10-24 @ 00:49)  HR: 76 (07-10-24 @ 08:19) (69 - 94)  BP: 107/64 (07-10-24 @ 08:19) (98/61 - 117/60)  RR: 18 (07-10-24 @ 08:19) (18 - 20)  SpO2: 96% (07-10-24 @ 08:19) (93% - 96%)  Wt(kg): --  I&O's Summary      Appearance: Normal	  HEENT:   Normal oral mucosa, PERRL, EOMI	  Lymphatic: No lymphadenopathy  Cardiovascular: Normal S1 S2, No JVD, No murmurs, No edema  Respiratory: Lungs clear to auscultation	  Psychiatry: A & O x 3, Mood & affect appropriate  Gastrointestinal:  Soft, Non-tender, + BS	  Skin: No rashes, No ecchymoses, No cyanosis	  Neurologic: Non-focal  Extremities: Normal range of motion, No clubbing, cyanosis or edema  Vascular: Peripheral pulses palpable 2+ bilaterally    MEDICATIONS  (STANDING):  albuterol    90 MICROgram(s) HFA Inhaler 2 Puff(s) Inhalation every 8 hours  albuterol/ipratropium for Nebulization 3 milliLiter(s) Nebulizer every 6 hours  atorvastatin 20 milliGRAM(s) Oral at bedtime  budesonide  80 MICROgram(s)/formoterol 4.5 MICROgram(s) Inhaler 2 Puff(s) Inhalation two times a day  chlorhexidine 2% Cloths 1 Application(s) Topical daily  enoxaparin Injectable 40 milliGRAM(s) SubCutaneous every 12 hours  FLUoxetine 20 milliGRAM(s) Oral daily  furosemide    Tablet 20 milliGRAM(s) Oral daily  losartan 50 milliGRAM(s) Oral daily  metoprolol succinate ER 50 milliGRAM(s) Oral daily  mirtazapine 7.5 milliGRAM(s) Oral at bedtime  mupirocin 2% Ointment 1 Application(s) Topical <User Schedule>  pantoprazole    Tablet 40 milliGRAM(s) Oral before breakfast  polyethylene glycol 3350 17 Gram(s) Oral daily  predniSONE   Tablet 20 milliGRAM(s) Oral daily  senna 2 Tablet(s) Oral at bedtime  sodium chloride 0.65% Nasal 2 Spray(s) Both Nostrils three times a day      TELEMETRY: 	    ECG:  	  RADIOLOGY:  OTHER: 	  	  LABS:	 	    CARDIAC MARKERS:    proBNP:   Lipid Profile:   HgA1c:   TSH:       < from: CT Angio Chest PE Protocol w/ IV Cont (07.09.24 @ 17:28) >  No pulmonary embolism to the segmental level. Evaluation of some of the   subsegmental pulmonary arteries is limited due to motion.    Persistent opacification of the right middle and lower lobe airways with   associated complete atelectasis of the right middle lobe and similar   partial atelectasis of the right lower lobe.    Dilated pulmonary artery measuring up to 3.8 cm, suggestive of pulmonary   arterial hypertension.    Emphysema.        Assessment and plan  ---------------------------   88 yo F with a PMH of COPD, HTN, diverticulitis, asthma/COPD, sarcoidosis, hypothyroidism, heart failure, recent admission of resp failure requiring BIPAP presents from Assisted Living for acute onset of SOB and difficulty breathing. Per EMS pt was hypoxic on scene, was placed on NRB with improvement in oxygen saturation. Pt denies chest pain. No reported fever, chills.  hypoxia sec to underlying lung disease, VQ scan noted , will consider cta of the chest  pulmonary eval  hx of cad and chf will  adjust meds  ?pneumoniae will start on Zosyn / will discuss with pulmonary  Duoneb  continue all cardiac meds  chest x ray no evidence of chf  awaiting pulmonary evaluation/ cta  add lasix 20 mg daily sec to pulmonary HTN  replete K  pt with sig pulmonary disease, will discuss wiuth pulmonary any role of any meds for her chronic copd and sarcoidosis  ?rheumatology eval  discussed with home care ?compliance to meds , needs to taper steroid slowly or may need chronically  CTA was not done sec to pt contrast allergy, pre medical ad protocol unless pulmonary feels pt does not need   awaiuting confirmation about PE prior to dc pt on AC  awaiting CTA of the chest result noted, no PE, +atlectasis  doing better with breathing, pulmonary follow up regarding atlectasis  continue Duoneb q 6h  decrease dvt prophylaxis      	        
Date of Service: 07-11-24 @ 09:49           CARDIOLOGY     PROGRESS  NOTE   ________________________________________________    CHIEF COMPLAINT:Patient is a 89y old  Female who presents with a chief complaint of sob (10 Jul 2024 20:36)    	  REVIEW OF SYSTEMS:  CONSTITUTIONAL: No fever, weight loss, or fatigue  EYES: No eye pain, visual disturbances, or discharge  ENT:  No difficulty hearing, tinnitus, vertigo; No sinus or throat pain  NECK: No pain or stiffness  RESPIRATORY: No cough, wheezing, chills or hemoptysis; No Shortness of Breath  CARDIOVASCULAR: No chest pain, palpitations, passing out, dizziness, or leg swelling  GASTROINTESTINAL: No abdominal or epigastric pain. No nausea, vomiting, or hematemesis; No diarrhea or constipation. No melena or hematochezia.  GENITOURINARY: No dysuria, frequency, hematuria, or incontinence  NEUROLOGICAL: No headaches, memory loss, loss of strength, numbness, or tremors  SKIN: No itching, burning, rashes, or lesions   LYMPH Nodes: No enlarged glands  ENDOCRINE: No heat or cold intolerance; No hair loss  MUSCULOSKELETAL: No joint pain or swelling; No muscle, back, or extremity pain  PSYCHIATRIC: No depression, anxiety, mood swings, or difficulty sleeping  HEME/LYMPH: No easy bruising, or bleeding gums  ALLERGY AND IMMUNOLOGIC: No hives or eczema	    [ ] All others negative	  [ ] Unable to obtain    PHYSICAL EXAM:  T(C): 36.9 (07-11-24 @ 04:35), Max: 36.9 (07-11-24 @ 04:35)  HR: 66 (07-11-24 @ 04:35) (66 - 73)  BP: 137/78 (07-11-24 @ 04:35) (86/48 - 137/78)  RR: 18 (07-11-24 @ 04:35) (18 - 18)  SpO2: 98% (07-11-24 @ 04:35) (98% - 98%)  Wt(kg): --  I&O's Summary    10 Jul 2024 07:01  -  11 Jul 2024 07:00  --------------------------------------------------------  IN: 0 mL / OUT: 400 mL / NET: -400 mL        Appearance: Normal	  HEENT:   Normal oral mucosa, PERRL, EOMI	  Lymphatic: No lymphadenopathy  Cardiovascular: Normal S1 S2, No JVD, No murmurs, No edema  Respiratory: Lungs clear to auscultation	  Psychiatry: A & O x 3, Mood & affect appropriate  Gastrointestinal:  Soft, Non-tender, + BS	  Skin: No rashes, No ecchymoses, No cyanosis	  Neurologic: Non-focal  Extremities: Normal range of motion, No clubbing, cyanosis or edema  Vascular: Peripheral pulses palpable 2+ bilaterally    MEDICATIONS  (STANDING):  albuterol    90 MICROgram(s) HFA Inhaler 2 Puff(s) Inhalation every 8 hours  albuterol/ipratropium for Nebulization 3 milliLiter(s) Nebulizer every 6 hours  atorvastatin 20 milliGRAM(s) Oral at bedtime  budesonide  80 MICROgram(s)/formoterol 4.5 MICROgram(s) Inhaler 2 Puff(s) Inhalation two times a day  chlorhexidine 2% Cloths 1 Application(s) Topical daily  enoxaparin Injectable 30 milliGRAM(s) SubCutaneous every 24 hours  FLUoxetine 20 milliGRAM(s) Oral daily  losartan 25 milliGRAM(s) Oral daily  metoprolol succinate ER 50 milliGRAM(s) Oral daily  mirtazapine 7.5 milliGRAM(s) Oral at bedtime  mupirocin 2% Ointment 1 Application(s) Topical <User Schedule>  pantoprazole    Tablet 40 milliGRAM(s) Oral before breakfast  polyethylene glycol 3350 17 Gram(s) Oral daily  senna 2 Tablet(s) Oral at bedtime  sodium chloride 0.65% Nasal 2 Spray(s) Both Nostrils three times a day      TELEMETRY: 	    ECG:  	  RADIOLOGY:  OTHER: 	  	  LABS:	 	    CARDIAC MARKERS:        proBNP:   Lipid Profile:   HgA1c:   TSH:         Assessment and plan  ---------------------------   89 yo F with a PMH of COPD, HTN, diverticulitis, asthma/COPD, sarcoidosis, hypothyroidism, heart failure, recent admission of resp failure requiring BIPAP presents from Assisted Living for acute onset of SOB and difficulty breathing. Per EMS pt was hypoxic on scene, was placed on NRB with improvement in oxygen saturation. Pt denies chest pain. No reported fever, chills.  hypoxia sec to underlying lung disease, VQ scan noted , will consider cta of the chest  pulmonary eval  hx of cad and chf will  adjust meds  ?pneumoniae will start on Zosyn / will discuss with pulmonary  Duoneb  continue all cardiac meds  chest x ray no evidence of chf  awaiting pulmonary evaluation/ cta  add lasix 20 mg daily sec to pulmonary HTN  replete K  pt with sig pulmonary disease, will discuss wiuth pulmonary any role of any meds for her chronic copd and sarcoidosis  ?rheumatology eval  discussed with home care ?compliance to meds , needs to taper steroid slowly or may need chronically  CTA was not done sec to pt contrast allergy, pre medical ad protocol unless pulmonary feels pt does not need   awaiuting confirmation about PE prior to dc pt on AC  awaiting CTA of the chest result noted, no PE, atelectasis  doing better with breathing, pulmonary follow up regarding atelectasis  continue Duoneb q 6h  decrease dvt prophylaxis  dc planning fu as out pt    	        
      ---___---___---___---___---___---___ ---___---___---___---___---___---___---___---___---                  M E D I C A L   A T T E N D I N G   P R O G R E S S   N O T E  ---___---___---___---___---___---___ ---___---___---___---___---___---___---___---___---        ================================================    ++CHIEF COMPLAINT:   Patient is a 89y old  Female who presents with a chief complaint of sob (09 Jul 2024 10:07)      Acute respiratory failure with hypoxia      ---___---___---___---___---___---  PAST MEDICAL / Surgical  HISTORY:  PAST MEDICAL & SURGICAL HISTORY:  HTN (hypertension)      Hypothyroidism      Osteoporosis      CAD (coronary artery disease)      COPD (chronic obstructive pulmonary disease)      Pulmonary sarcoidosis      H/O pyelonephritis      Acute diverticulitis          ---___---___---___---___---___---  FAMILY HISTORY:   FAMILY HISTORY:        ---___---___---___---___---___---  ALLERGIES:   Allergies    iodinated radiocontrast agents (Anaphylaxis)    Intolerances        ---___---___---___---___---___---  MEDICATIONS:  MEDICATIONS  (STANDING):  albuterol    90 MICROgram(s) HFA Inhaler 2 Puff(s) Inhalation every 8 hours  albuterol/ipratropium for Nebulization 3 milliLiter(s) Nebulizer every 6 hours  atorvastatin 20 milliGRAM(s) Oral at bedtime  budesonide  80 MICROgram(s)/formoterol 4.5 MICROgram(s) Inhaler 2 Puff(s) Inhalation two times a day  chlorhexidine 2% Cloths 1 Application(s) Topical daily  enoxaparin Injectable 40 milliGRAM(s) SubCutaneous every 12 hours  FLUoxetine 20 milliGRAM(s) Oral daily  furosemide    Tablet 20 milliGRAM(s) Oral daily  losartan 50 milliGRAM(s) Oral daily  metoprolol succinate ER 50 milliGRAM(s) Oral daily  mirtazapine 7.5 milliGRAM(s) Oral at bedtime  mupirocin 2% Ointment 1 Application(s) Topical <User Schedule>  pantoprazole    Tablet 40 milliGRAM(s) Oral before breakfast  polyethylene glycol 3350 17 Gram(s) Oral daily  predniSONE   Tablet 20 milliGRAM(s) Oral daily  senna 2 Tablet(s) Oral at bedtime  sodium chloride 0.65% Nasal 2 Spray(s) Both Nostrils three times a day    MEDICATIONS  (PRN):      ---___---___---___---___---___---  REVIEW OF SYSTEM:    GEN: no fever, no chills, no pain  RESP: no SOB, no cough, no sputum  CVS: no chest pain, no palpitations, no edema  GI: no abdominal pain, no nausea, no vomiting, no constipation, no diarrhea  : no dysurea, no frequency, no hematurea  Neuro: no headache, no dizziness  PSYCH: no anxiety, no depression  Derm : no itching, no rash    ---___---___---___---___---___---  VITAL SIGNS:  89y , CAPILLARY BLOOD GLUCOSE        T(C): 36.5 (07-09-24 @ 17:51), Max: 36.5 (07-09-24 @ 17:51)  HR: 94 (07-09-24 @ 17:51) (61 - 94)  BP: 117/60 (07-09-24 @ 17:51) (114/60 - 150/56)  RR: 20 (07-09-24 @ 17:51) (18 - 20)  SpO2: 93% (07-09-24 @ 17:51) (93% - 99%)  ---___---___---___---___---___---  PHYSICAL EXAM:    GEN: A&O X 3 , NAD , comfortable  HEENT: NCAT, PERRL, MMM, hearing intact  Neck: supple , no JVD  CVS: S1S2 , regular , No M/R/G appreciated  PULM: CTA B/L,  no W/R/R appreciated  ABD.: soft. non tender, non distended,  bowel sounds present  Extrem: intact pulses , no edema   Derm: No rash , no ecchymoses  PSYCH : normal mood,  no delusion not anxious     ---___---___---___---___---___---            LAB AND IMAGING:                                                  [All pertinent / recent Imaging reviewed]         ---___---___---___---___---___---___ ---___---___---___---___---                         A S S E S S M E N T   A N D   P L A N :      HEALTH ISSUES - PROBLEM Dx:  - Agree with empiric antibiotics,   - Currently with clear lungs, i.e., no wheezing. Can transition Solu-Medrol 20mg IV TID to prednisone 20mg PO daily  - c/w DuoNEBs Q6h, but would change to PRN. c/w budesonide NEBs Q12h  - Patient prefers nebulizers to inhalers, and does the same at home.  - Continue to encourage OOB to chair, incentive spirometry  - Aspiration precautions  - We will continue to follow the patient with you.  copd exacerbation  as above   id followig  anxiety   continue fluoxetine   ua (+) culture  appreciate id input   htn on toprol  and arb  ct scan chest shows negative for PE   can dc the AC      -GI/DVT Prophylaxis. as ordered for dvt prophylaxis  __  Thank you,  Garry Hermosillo  [phone]
CHIEF COMPLAINT: Dyspnea    Interval Events: No acute events. Feels well. No dyspnea, cough, chest pain, fevers, chills, nausea, emesis, or diarrhea. Wants to go home.     REVIEW OF SYSTEMS:  See above. ROS otherwise negative.     OBJECTIVE:  ICU Vital Signs Last 24 Hrs  T(C): 36.3 (10 Jul 2024 08:19), Max: 36.6 (10 Jul 2024 00:49)  T(F): 97.4 (10 Jul 2024 08:19), Max: 97.9 (10 Jul 2024 00:49)  HR: 76 (10 Jul 2024 08:19) (69 - 94)  BP: 107/64 (10 Jul 2024 08:19) (98/61 - 117/60)  BP(mean): --  ABP: --  ABP(mean): --  RR: 18 (10 Jul 2024 08:19) (18 - 20)  SpO2: 96% (10 Jul 2024 08:19) (93% - 96%)    O2 Parameters below as of 10 Jul 2024 08:19  Patient On (Oxygen Delivery Method): nasal cannula  O2 Flow (L/min): 2            CAPILLARY BLOOD GLUCOSE          PHYSICAL EXAM:  General: Elderly cachectic female sitting comfortably in chair, NAD  HEENT: NC/AT sclerae anicteric  Neck: Supple  Respiratory: No increased WOB, diminished breath sounds at R base  Cardiovascular: S1, S2  Abdomen: Soft, + BS  Extremities: WWP  Neurological: Awake, alert, follows commands  Psychiatry: Appropriate affect    HOSPITAL MEDICATIONS:  MEDICATIONS  (STANDING):  albuterol    90 MICROgram(s) HFA Inhaler 2 Puff(s) Inhalation every 8 hours  albuterol/ipratropium for Nebulization 3 milliLiter(s) Nebulizer every 6 hours  albuterol/ipratropium for Nebulization 3 milliLiter(s) Nebulizer every 6 hours  atorvastatin 20 milliGRAM(s) Oral at bedtime  budesonide  80 MICROgram(s)/formoterol 4.5 MICROgram(s) Inhaler 2 Puff(s) Inhalation two times a day  chlorhexidine 2% Cloths 1 Application(s) Topical daily  enoxaparin Injectable 30 milliGRAM(s) SubCutaneous every 24 hours  FLUoxetine 20 milliGRAM(s) Oral daily  furosemide    Tablet 20 milliGRAM(s) Oral daily  losartan 50 milliGRAM(s) Oral daily  metoprolol succinate ER 50 milliGRAM(s) Oral daily  mirtazapine 7.5 milliGRAM(s) Oral at bedtime  mupirocin 2% Ointment 1 Application(s) Topical <User Schedule>  pantoprazole    Tablet 40 milliGRAM(s) Oral before breakfast  polyethylene glycol 3350 17 Gram(s) Oral daily  predniSONE   Tablet 20 milliGRAM(s) Oral daily  senna 2 Tablet(s) Oral at bedtime  sodium chloride 0.65% Nasal 2 Spray(s) Both Nostrils three times a day    MEDICATIONS  (PRN):  temazepam 15 milliGRAM(s) Oral at bedtime PRN Insomnia      LABS:    Hgb Trend: 10.6<--, 10.3<--        Creatinine Trend: 0.69<--, 0.72<--, 0.70<--, 0.74<--, 0.76<--, 0.74<--            MICROBIOLOGY:       RADIOLOGY:  [x ] Reviewed and interpreted by me    PULMONARY FUNCTION TESTS:    EKG:

## 2024-07-12 ENCOUNTER — TRANSCRIPTION ENCOUNTER (OUTPATIENT)
Age: 89
End: 2024-07-12

## 2024-07-14 ENCOUNTER — INPATIENT (INPATIENT)
Facility: HOSPITAL | Age: 89
LOS: 3 days | Discharge: HOME HEALTH | DRG: 204 | End: 2024-07-18
Attending: FAMILY MEDICINE | Admitting: FAMILY MEDICINE
Payer: MEDICARE

## 2024-07-14 VITALS — HEIGHT: 62 IN

## 2024-07-14 DIAGNOSIS — R06.03 ACUTE RESPIRATORY DISTRESS: ICD-10-CM

## 2024-07-14 LAB
ALBUMIN SERPL ELPH-MCNC: 3.2 G/DL — LOW (ref 3.3–5)
ALP SERPL-CCNC: 116 U/L — SIGNIFICANT CHANGE UP (ref 40–120)
ALT FLD-CCNC: 26 U/L — SIGNIFICANT CHANGE UP (ref 10–45)
ANION GAP SERPL CALC-SCNC: 15 MMOL/L — SIGNIFICANT CHANGE UP (ref 5–17)
ANION GAP SERPL CALC-SCNC: 16 MMOL/L — SIGNIFICANT CHANGE UP (ref 5–17)
AST SERPL-CCNC: 29 U/L — SIGNIFICANT CHANGE UP (ref 10–40)
BASE EXCESS BLDV CALC-SCNC: -23.7 MMOL/L — LOW (ref -2–3)
BASE EXCESS BLDV CALC-SCNC: 0.2 MMOL/L — SIGNIFICANT CHANGE UP (ref -2–3)
BASE EXCESS BLDV CALC-SCNC: 4.6 MMOL/L — HIGH (ref -2–3)
BASOPHILS # BLD AUTO: 0.02 K/UL — SIGNIFICANT CHANGE UP (ref 0–0.2)
BASOPHILS NFR BLD AUTO: 0.3 % — SIGNIFICANT CHANGE UP (ref 0–2)
BILIRUB SERPL-MCNC: 0.3 MG/DL — SIGNIFICANT CHANGE UP (ref 0.2–1.2)
BUN SERPL-MCNC: 32 MG/DL — HIGH (ref 7–23)
BUN SERPL-MCNC: 33 MG/DL — HIGH (ref 7–23)
CA-I SERPL-SCNC: 1.21 MMOL/L — SIGNIFICANT CHANGE UP (ref 1.15–1.33)
CA-I SERPL-SCNC: 1.24 MMOL/L — SIGNIFICANT CHANGE UP (ref 1.15–1.33)
CA-I SERPL-SCNC: SIGNIFICANT CHANGE UP MMOL/L (ref 1.15–1.33)
CALCIUM SERPL-MCNC: 8.9 MG/DL — SIGNIFICANT CHANGE UP (ref 8.4–10.5)
CALCIUM SERPL-MCNC: 9 MG/DL — SIGNIFICANT CHANGE UP (ref 8.4–10.5)
CHLORIDE BLDV-SCNC: 100 MMOL/L — SIGNIFICANT CHANGE UP (ref 96–108)
CHLORIDE BLDV-SCNC: 104 MMOL/L — SIGNIFICANT CHANGE UP (ref 96–108)
CHLORIDE BLDV-SCNC: 108 MMOL/L — SIGNIFICANT CHANGE UP (ref 96–108)
CHLORIDE SERPL-SCNC: 102 MMOL/L — SIGNIFICANT CHANGE UP (ref 96–108)
CHLORIDE SERPL-SCNC: 104 MMOL/L — SIGNIFICANT CHANGE UP (ref 96–108)
CK MB CFR SERPL CALC: 2.7 NG/ML — SIGNIFICANT CHANGE UP (ref 0–3.8)
CK SERPL-CCNC: 31 U/L — SIGNIFICANT CHANGE UP (ref 25–170)
CO2 BLDV-SCNC: 11 MMOL/L — LOW (ref 22–26)
CO2 BLDV-SCNC: 29 MMOL/L — HIGH (ref 22–26)
CO2 BLDV-SCNC: 31 MMOL/L — HIGH (ref 22–26)
CO2 SERPL-SCNC: 19 MMOL/L — LOW (ref 22–31)
CO2 SERPL-SCNC: 23 MMOL/L — SIGNIFICANT CHANGE UP (ref 22–31)
CREAT SERPL-MCNC: 0.67 MG/DL — SIGNIFICANT CHANGE UP (ref 0.5–1.3)
CREAT SERPL-MCNC: 0.84 MG/DL — SIGNIFICANT CHANGE UP (ref 0.5–1.3)
EGFR: 67 ML/MIN/1.73M2 — SIGNIFICANT CHANGE UP
EGFR: 84 ML/MIN/1.73M2 — SIGNIFICANT CHANGE UP
EOSINOPHIL # BLD AUTO: 0.05 K/UL — SIGNIFICANT CHANGE UP (ref 0–0.5)
EOSINOPHIL NFR BLD AUTO: 0.8 % — SIGNIFICANT CHANGE UP (ref 0–6)
FLUAV AG NPH QL: SIGNIFICANT CHANGE UP
FLUBV AG NPH QL: SIGNIFICANT CHANGE UP
GAS PNL BLDV: 135 MMOL/L — LOW (ref 136–145)
GAS PNL BLDV: 139 MMOL/L — SIGNIFICANT CHANGE UP (ref 136–145)
GAS PNL BLDV: <107 MMOL/L — CRITICAL LOW (ref 136–145)
GAS PNL BLDV: SIGNIFICANT CHANGE UP
GLUCOSE BLDV-MCNC: 109 MG/DL — HIGH (ref 70–99)
GLUCOSE BLDV-MCNC: 291 MG/DL — HIGH (ref 70–99)
GLUCOSE BLDV-MCNC: 324 MG/DL — HIGH (ref 70–99)
GLUCOSE SERPL-MCNC: 294 MG/DL — HIGH (ref 70–99)
GLUCOSE SERPL-MCNC: 313 MG/DL — HIGH (ref 70–99)
HCO3 BLDV-SCNC: 28 MMOL/L — SIGNIFICANT CHANGE UP (ref 22–29)
HCO3 BLDV-SCNC: 29 MMOL/L — SIGNIFICANT CHANGE UP (ref 22–29)
HCO3 BLDV-SCNC: 9 MMOL/L — CRITICAL LOW (ref 22–29)
HCT VFR BLD CALC: 29.8 % — LOW (ref 34.5–45)
HCT VFR BLDA CALC: 29 % — LOW (ref 34.5–46.5)
HCT VFR BLDA CALC: 31 % — LOW (ref 34.5–46.5)
HCT VFR BLDA CALC: 32 % — LOW (ref 34.5–46.5)
HGB BLD CALC-MCNC: 10.3 G/DL — LOW (ref 11.7–16.1)
HGB BLD CALC-MCNC: 10.5 G/DL — LOW (ref 11.7–16.1)
HGB BLD CALC-MCNC: 9.8 G/DL — LOW (ref 11.7–16.1)
HGB BLD-MCNC: 9.4 G/DL — LOW (ref 11.5–15.5)
IMM GRANULOCYTES NFR BLD AUTO: 0.9 % — SIGNIFICANT CHANGE UP (ref 0–0.9)
LACTATE BLDV-MCNC: 2.1 MMOL/L — HIGH (ref 0.5–2)
LACTATE BLDV-MCNC: 4.9 MMOL/L — CRITICAL HIGH (ref 0.5–2)
LACTATE BLDV-MCNC: 7.2 MMOL/L — CRITICAL HIGH (ref 0.5–2)
LACTATE SERPL-SCNC: 4.5 MMOL/L — CRITICAL HIGH (ref 0.5–2)
LYMPHOCYTES # BLD AUTO: 0.1 K/UL — LOW (ref 1–3.3)
LYMPHOCYTES # BLD AUTO: 1.5 % — LOW (ref 13–44)
MAGNESIUM SERPL-MCNC: 2 MG/DL — SIGNIFICANT CHANGE UP (ref 1.6–2.6)
MAGNESIUM SERPL-MCNC: 2.1 MG/DL — SIGNIFICANT CHANGE UP (ref 1.6–2.6)
MCHC RBC-ENTMCNC: 29.5 PG — SIGNIFICANT CHANGE UP (ref 27–34)
MCHC RBC-ENTMCNC: 31.5 GM/DL — LOW (ref 32–36)
MCV RBC AUTO: 93.4 FL — SIGNIFICANT CHANGE UP (ref 80–100)
MONOCYTES # BLD AUTO: 0.25 K/UL — SIGNIFICANT CHANGE UP (ref 0–0.9)
MONOCYTES NFR BLD AUTO: 3.8 % — SIGNIFICANT CHANGE UP (ref 2–14)
MRSA PCR RESULT.: DETECTED
NEUTROPHILS # BLD AUTO: 6.06 K/UL — SIGNIFICANT CHANGE UP (ref 1.8–7.4)
NEUTROPHILS NFR BLD AUTO: 92.7 % — HIGH (ref 43–77)
NRBC # BLD: 1 /100 WBCS — HIGH (ref 0–0)
NT-PROBNP SERPL-SCNC: 952 PG/ML — HIGH (ref 0–300)
PCO2 BLDV: 37 MMHG — LOW (ref 39–42)
PCO2 BLDV: 50 MMHG — HIGH (ref 39–42)
PCO2 BLDV: 69 MMHG — HIGH (ref 39–42)
PH BLDV: 7.23 — LOW (ref 7.32–7.43)
PH BLDV: 7.49 — HIGH (ref 7.32–7.43)
PH BLDV: <6.9 — CRITICAL LOW (ref 7.32–7.43)
PLATELET # BLD AUTO: SIGNIFICANT CHANGE UP K/UL (ref 150–400)
PO2 BLDV: 165 MMHG — HIGH (ref 25–45)
PO2 BLDV: 25 MMHG — SIGNIFICANT CHANGE UP (ref 25–45)
PO2 BLDV: 86 MMHG — HIGH (ref 25–45)
POTASSIUM BLDV-SCNC: 3.9 MMOL/L — SIGNIFICANT CHANGE UP (ref 3.5–5.1)
POTASSIUM BLDV-SCNC: 4.5 MMOL/L — SIGNIFICANT CHANGE UP (ref 3.5–5.1)
POTASSIUM BLDV-SCNC: SIGNIFICANT CHANGE UP MMOL/L (ref 3.5–5.1)
POTASSIUM SERPL-MCNC: 4.3 MMOL/L — SIGNIFICANT CHANGE UP (ref 3.5–5.3)
POTASSIUM SERPL-MCNC: 4.7 MMOL/L — SIGNIFICANT CHANGE UP (ref 3.5–5.3)
POTASSIUM SERPL-SCNC: 4.3 MMOL/L — SIGNIFICANT CHANGE UP (ref 3.5–5.3)
POTASSIUM SERPL-SCNC: 4.7 MMOL/L — SIGNIFICANT CHANGE UP (ref 3.5–5.3)
PROCALCITONIN SERPL-MCNC: 0.3 NG/ML — HIGH (ref 0.02–0.1)
PROT SERPL-MCNC: 6 G/DL — SIGNIFICANT CHANGE UP (ref 6–8.3)
RBC # BLD: 3.19 M/UL — LOW (ref 3.8–5.2)
RBC # FLD: 17.7 % — HIGH (ref 10.3–14.5)
RSV RNA NPH QL NAA+NON-PROBE: SIGNIFICANT CHANGE UP
S AUREUS DNA NOSE QL NAA+PROBE: DETECTED
SAO2 % BLDV: 31.9 % — LOW (ref 67–88)
SAO2 % BLDV: 98 % — HIGH (ref 67–88)
SAO2 % BLDV: 98.7 % — HIGH (ref 67–88)
SARS-COV-2 RNA SPEC QL NAA+PROBE: SIGNIFICANT CHANGE UP
SODIUM SERPL-SCNC: 139 MMOL/L — SIGNIFICANT CHANGE UP (ref 135–145)
SODIUM SERPL-SCNC: 140 MMOL/L — SIGNIFICANT CHANGE UP (ref 135–145)
TROPONIN T, HIGH SENSITIVITY RESULT: 31 NG/L — SIGNIFICANT CHANGE UP (ref 0–51)
TROPONIN T, HIGH SENSITIVITY RESULT: 34 NG/L — SIGNIFICANT CHANGE UP (ref 0–51)
WBC # BLD: 6.54 K/UL — SIGNIFICANT CHANGE UP (ref 3.8–10.5)
WBC # FLD AUTO: 6.54 K/UL — SIGNIFICANT CHANGE UP (ref 3.8–10.5)

## 2024-07-14 PROCEDURE — 99291 CRITICAL CARE FIRST HOUR: CPT | Mod: GC

## 2024-07-14 PROCEDURE — 71045 X-RAY EXAM CHEST 1 VIEW: CPT | Mod: 26,76

## 2024-07-14 RX ORDER — FUROSEMIDE 10 MG/ML
20 INJECTION, SOLUTION INTRAMUSCULAR; INTRAVENOUS DAILY
Refills: 0 | Status: DISCONTINUED | OUTPATIENT
Start: 2024-07-14 | End: 2024-07-18

## 2024-07-14 RX ORDER — SODIUM CHLORIDE 0.9 % (FLUSH) 0.9 %
1000 SYRINGE (ML) INJECTION
Refills: 0 | Status: DISCONTINUED | OUTPATIENT
Start: 2024-07-14 | End: 2024-07-18

## 2024-07-14 RX ORDER — TEMAZEPAM 30 MG/1
15 CAPSULE ORAL AT BEDTIME
Refills: 0 | Status: DISCONTINUED | OUTPATIENT
Start: 2024-07-14 | End: 2024-07-18

## 2024-07-14 RX ORDER — VANCOMYCIN HYDROCHLORIDE 50 MG/ML
750 KIT ORAL ONCE
Refills: 0 | Status: COMPLETED | OUTPATIENT
Start: 2024-07-14 | End: 2024-07-14

## 2024-07-14 RX ORDER — METHYLPREDNISOLONE ACETATE 20 MG/ML
60 VIAL (ML) INJECTION ONCE
Refills: 0 | Status: COMPLETED | OUTPATIENT
Start: 2024-07-14 | End: 2024-07-14

## 2024-07-14 RX ORDER — TEMAZEPAM 30 MG/1
30 CAPSULE ORAL AT BEDTIME
Refills: 0 | Status: DISCONTINUED | OUTPATIENT
Start: 2024-07-14 | End: 2024-07-14

## 2024-07-14 RX ORDER — MUPIROCIN 20 MG/G
1 OINTMENT TOPICAL
Refills: 0 | Status: DISCONTINUED | OUTPATIENT
Start: 2024-07-14 | End: 2024-07-18

## 2024-07-14 RX ORDER — GUAIFENESIN 100 %
600 POWDER (GRAM) MISCELLANEOUS ONCE
Refills: 0 | Status: COMPLETED | OUTPATIENT
Start: 2024-07-14 | End: 2024-07-14

## 2024-07-14 RX ORDER — FUROSEMIDE 10 MG/ML
40 INJECTION, SOLUTION INTRAMUSCULAR; INTRAVENOUS ONCE
Refills: 0 | Status: COMPLETED | OUTPATIENT
Start: 2024-07-14 | End: 2024-07-14

## 2024-07-14 RX ORDER — METHYLPREDNISOLONE ACETATE 20 MG/ML
20 VIAL (ML) INJECTION THREE TIMES A DAY
Refills: 0 | Status: DISCONTINUED | OUTPATIENT
Start: 2024-07-14 | End: 2024-07-17

## 2024-07-14 RX ORDER — ALBUTEROL 90 MCG
2 AEROSOL REFILL (GRAM) INHALATION EVERY 6 HOURS
Refills: 0 | Status: DISCONTINUED | OUTPATIENT
Start: 2024-07-14 | End: 2024-07-14

## 2024-07-14 RX ORDER — BUDESONIDE/FORMOTEROL FUMARATE 160-4.5MCG
2 HFA AEROSOL WITH ADAPTER (GRAM) INHALATION
Refills: 0 | Status: DISCONTINUED | OUTPATIENT
Start: 2024-07-14 | End: 2024-07-18

## 2024-07-14 RX ORDER — SODIUM CHLORIDE 0.9 % (FLUSH) 0.9 %
500 SYRINGE (ML) INJECTION ONCE
Refills: 0 | Status: COMPLETED | OUTPATIENT
Start: 2024-07-14 | End: 2024-07-14

## 2024-07-14 RX ORDER — FLUOXETINE HCL 40 MG
20 CAPSULE ORAL DAILY
Refills: 0 | Status: DISCONTINUED | OUTPATIENT
Start: 2024-07-14 | End: 2024-07-18

## 2024-07-14 RX ORDER — LORAZEPAM 0.5 MG
0.5 TABLET ORAL ONCE
Refills: 0 | Status: DISCONTINUED | OUTPATIENT
Start: 2024-07-14 | End: 2024-07-14

## 2024-07-14 RX ORDER — HYDROCORTISONE 100 MG/60ML
50 SUSPENSION RECTAL ONCE
Refills: 0 | Status: DISCONTINUED | OUTPATIENT
Start: 2024-07-14 | End: 2024-07-14

## 2024-07-14 RX ORDER — SENNOSIDES 8.6 MG
2 TABLET ORAL AT BEDTIME
Refills: 0 | Status: DISCONTINUED | OUTPATIENT
Start: 2024-07-14 | End: 2024-07-18

## 2024-07-14 RX ORDER — IPRATROPIUM BROMIDE AND ALBUTEROL SULFATE .5; 3 MG/3ML; MG/3ML
3 SOLUTION RESPIRATORY (INHALATION)
Refills: 0 | Status: COMPLETED | OUTPATIENT
Start: 2024-07-14 | End: 2024-07-14

## 2024-07-14 RX ORDER — IPRATROPIUM BROMIDE AND ALBUTEROL SULFATE .5; 3 MG/3ML; MG/3ML
3 SOLUTION RESPIRATORY (INHALATION) ONCE
Refills: 0 | Status: COMPLETED | OUTPATIENT
Start: 2024-07-14 | End: 2024-07-14

## 2024-07-14 RX ORDER — ATORVASTATIN CALCIUM 20 MG/1
20 TABLET, FILM COATED ORAL AT BEDTIME
Refills: 0 | Status: DISCONTINUED | OUTPATIENT
Start: 2024-07-14 | End: 2024-07-18

## 2024-07-14 RX ORDER — HEPARIN SODIUM 50 [USP'U]/ML
5000 INJECTION, SOLUTION INTRAVENOUS EVERY 12 HOURS
Refills: 0 | Status: DISCONTINUED | OUTPATIENT
Start: 2024-07-14 | End: 2024-07-18

## 2024-07-14 RX ORDER — METOPROLOL TARTRATE 50 MG
50 TABLET ORAL DAILY
Refills: 0 | Status: DISCONTINUED | OUTPATIENT
Start: 2024-07-14 | End: 2024-07-18

## 2024-07-14 RX ORDER — PANTOPRAZOLE SODIUM 40 MG/10ML
40 INJECTION, POWDER, FOR SOLUTION INTRAVENOUS
Refills: 0 | Status: DISCONTINUED | OUTPATIENT
Start: 2024-07-14 | End: 2024-07-18

## 2024-07-14 RX ORDER — POLYETHYLENE GLYCOL 3350 1 G/G
17 POWDER ORAL DAILY
Refills: 0 | Status: DISCONTINUED | OUTPATIENT
Start: 2024-07-14 | End: 2024-07-18

## 2024-07-14 RX ADMIN — IPRATROPIUM BROMIDE AND ALBUTEROL SULFATE 3 MILLILITER(S): .5; 3 SOLUTION RESPIRATORY (INHALATION) at 07:51

## 2024-07-14 RX ADMIN — Medication 20 MILLIGRAM(S): at 22:03

## 2024-07-14 RX ADMIN — Medication 0.5 MILLIGRAM(S): at 06:47

## 2024-07-14 RX ADMIN — Medication 20 MILLIGRAM(S): at 14:18

## 2024-07-14 RX ADMIN — IPRATROPIUM BROMIDE AND ALBUTEROL SULFATE 3 MILLILITER(S): .5; 3 SOLUTION RESPIRATORY (INHALATION) at 07:39

## 2024-07-14 RX ADMIN — ATORVASTATIN CALCIUM 20 MILLIGRAM(S): 20 TABLET, FILM COATED ORAL at 22:03

## 2024-07-14 RX ADMIN — Medication 1000 MILLILITER(S): at 07:51

## 2024-07-14 RX ADMIN — FUROSEMIDE 40 MILLIGRAM(S): 10 INJECTION, SOLUTION INTRAMUSCULAR; INTRAVENOUS at 06:32

## 2024-07-14 RX ADMIN — VANCOMYCIN HYDROCHLORIDE 250 MILLIGRAM(S): KIT at 09:21

## 2024-07-14 RX ADMIN — POLYETHYLENE GLYCOL 3350 17 GRAM(S): 1 POWDER ORAL at 12:33

## 2024-07-14 RX ADMIN — Medication 2 TABLET(S): at 22:03

## 2024-07-14 RX ADMIN — Medication 2 PUFF(S): at 18:34

## 2024-07-14 RX ADMIN — TEMAZEPAM 15 MILLIGRAM(S): 30 CAPSULE ORAL at 22:03

## 2024-07-14 RX ADMIN — MUPIROCIN 1 APPLICATION(S): 20 OINTMENT TOPICAL at 18:35

## 2024-07-14 RX ADMIN — HEPARIN SODIUM 5000 UNIT(S): 50 INJECTION, SOLUTION INTRAVENOUS at 18:35

## 2024-07-14 RX ADMIN — Medication 60 MILLIGRAM(S): at 06:32

## 2024-07-14 RX ADMIN — IPRATROPIUM BROMIDE AND ALBUTEROL SULFATE 3 MILLILITER(S): .5; 3 SOLUTION RESPIRATORY (INHALATION) at 06:31

## 2024-07-14 RX ADMIN — Medication 20 MILLIGRAM(S): at 12:32

## 2024-07-14 RX ADMIN — Medication 600 MILLIGRAM(S): at 09:21

## 2024-07-14 NOTE — H&P ADULT - ASSESSMENT
hypoxia   dypsnea   copd   sarcodosis   on iv steroids  pulmonary consult     htn  cardiology following   continue meds fot htn     anxiety on mirtazipine and  restoril daily       hld on statin     heparin sc for dvt prophylaxis     spoke to daughter at bedside   palliative for goc  needed     id to be called for possible pneumonia  possibel aspiration vs mucus plug

## 2024-07-14 NOTE — ED PROVIDER NOTE - ATTENDING CONTRIBUTION TO CARE
MD Bearden:  patient seen and evaluated personally.   I agree with the History & Physical,  Impression & Plan other than what was detailed in my note.  MD Bearden  88 yo F with a PMH of COPD,on home O2, chronic R hemidiaphragm paralysis, h/o recurrent aspiration events, HTN, diverticulitis,  asthma/COPD, sarcoidosis, hypothyroidism, heart failure, presenting to ed w/ acute sob, came on suddenly, has happened before in past, discussed w/ daughter unclear on code status but dni, brought in on bipap in severe distress, accessory muscle use, lungs w/ decreased, abd soft nt, pt w/ accessory muscle use, severely tachypnic, finally able to get pulse ox on ear 100% on bipap, switched onto our bipap, no leg swelling, plan for cbc, cmp, trop, probnp, cxr, possible aspiration vs copd exac vs flash pulm edema, less likely acs nstemi, albuterol/ipra hour long, steroids, re eduardo.

## 2024-07-14 NOTE — H&P ADULT - NSHPLABSRESULTS_GEN_ALL_CORE
9.4    6.54  )-----------( Clumped    ( 14 Jul 2024 06:25 )             29.8     cxr     INTERPRETATION:  CLINICAL INDICATION: Chest pain    EXAM: 2 X-rays of the chest.    COMPARISON: Chest radiograph from 7/1/2024    FINDINGS:  Low lung volume.  Right mid lung field opacity.  There is no pleural effusion or pneumothorax.  The heart is not well evaluated in this position.  The visualized osseous structures demonstrate no acute pathology.  Status post loop recorder placement.    IMPRESSION:  Right lower lung pneumonia.        --- End of Report ---

## 2024-07-14 NOTE — H&P ADULT - HISTORY OF PRESENT ILLNESS
patient recently discharged and came back with another bout of hypoxia  and she has a history of anxiety, htn , copd  sarcoidosis , cdiff on abx ,

## 2024-07-14 NOTE — CONSULT NOTE ADULT - SUBJECTIVE AND OBJECTIVE BOX
Date of Service, 07-14-24 @ 10:32  CHIEF COMPLAINT:Patient is a 89y old  Female who presents with a chief complaint of     HPI:  This is a 89 year old female with pmh of COPD,on home O2, chronic R hemidiaphragm paralysis, h/o recurrent aspiration events, HTN, diverticulitis,  asthma/COPD, sarcoidosis, hypothyroidism, heart failure, presenting to ed w/ acute sob, came on suddenly, has happened before in past. BiPAP put on presentation. Initially tachypneic and however bipap improved her breathing and lowered her tachypnea. Saturating above 96%. No crackles/wheezing. No abdominal ttp. No lower extremity swelling/pain. DNI per daughter who is health care proxy. Will work up for HF exacerbation vs COPD exacerbation. Will give duoneb and lasix 40mg and solumedrol 60mg IV. Will obtain cxr. Will obtain labs including troponin and bnp    PAST MEDICAL & SURGICAL HISTORY:  HTN (hypertension)  Hypothyroidism  Osteoporosis  CAD (coronary artery disease)  COPD (chronic obstructive pulmonary disease)  Pulmonary sarcoidosis  H/O pyelonephritis  Acute diverticulitis      MEDICATIONS  (STANDING):  atorvastatin 20 milliGRAM(s) Oral at bedtime  budesonide  80 MICROgram(s)/formoterol 4.5 MICROgram(s) Inhaler 2 Puff(s) Inhalation two times a day  FLUoxetine 20 milliGRAM(s) Oral daily  furosemide    Tablet 20 milliGRAM(s) Oral daily  heparin   Injectable 5000 Unit(s) SubCutaneous every 12 hours  metoprolol succinate ER 50 milliGRAM(s) Oral daily  pantoprazole    Tablet 40 milliGRAM(s) Oral before breakfast  polyethylene glycol 3350 17 Gram(s) Oral daily  senna 2 Tablet(s) Oral at bedtime    MEDICATIONS  (PRN):      FAMILY HISTORY:      SOCIAL HISTORY:    [ ] Non-smoker  [ ] Smoker  [ ] Alcohol    Allergies    iodinated radiocontrast agents (Anaphylaxis)    Intolerances    	    REVIEW OF SYSTEMS:  CONSTITUTIONAL: No fever, weight loss, or fatigue  EYES: No eye pain, visual disturbances, or discharge  ENT:  No difficulty hearing, tinnitus, vertigo; No sinus or throat pain  NECK: No pain or stiffness  RESPIRATORY: No cough, wheezing, chills or hemoptysis; + Shortness of Breath  CARDIOVASCULAR: No chest pain, palpitations, passing out, dizziness, or leg swelling  GASTROINTESTINAL: No abdominal or epigastric pain. No nausea, vomiting, or hematemesis; No diarrhea or constipation. No melena or hematochezia.  GENITOURINARY: No dysuria, frequency, hematuria, or incontinence  NEUROLOGICAL: No headaches, memory loss, loss of strength, numbness, or tremors  SKIN: No itching, burning, rashes, or lesions   LYMPH Nodes: No enlarged glands  ENDOCRINE: No heat or cold intolerance; No hair loss  MUSCULOSKELETAL: No joint pain or swelling; No muscle, back, or extremity pain  PSYCHIATRIC: No depression, anxiety, mood swings, or difficulty sleeping  HEME/LYMPH: No easy bruising, or bleeding gums  ALLERGY AND IMMUNOLOGIC: No hives or eczema	    [ x] All others negative	  [ ] Unable to obtain    PHYSICAL EXAM:  T(C): 38.3 (07-14-24 @ 08:23), Max: 38.3 (07-14-24 @ 08:23)  HR: 74 (07-14-24 @ 10:01) (74 - 97)  BP: 109/58 (07-14-24 @ 10:01) (97/52 - 124/58)  RR: 25 (07-14-24 @ 10:01) (25 - 35)  SpO2: 97% (07-14-24 @ 10:01) (95% - 100%)  Wt(kg): --  I&O's Summary      Appearance: Normal	  HEENT:   Normal oral mucosa, PERRL, EOMI	  Lymphatic: No lymphadenopathy  Cardiovascular: Normal S1 S2, No JVD, + murmurs, No edema  Respiratory: rhonchi  Psychiatry: A & O x 3, Mood & affect appropriate  Gastrointestinal:  Soft, Non-tender, + BS	  Skin: No rashes, No ecchymoses, No cyanosis	  Neurologic: Non-focal  Extremities: Normal range of motion, No clubbing, cyanosis or edema  Vascular: Peripheral pulses palpable 2+ bilaterally    TELEMETRY: 	    ECG:  	  RADIOLOGY:  OTHER: 	  	  LABS:	 	    CARDIAC MARKERS:                              9.4    6.54  )-----------( Clumped    ( 14 Jul 2024 06:25 )             29.8     07-14    139  |  104  |  33<H>  ----------------------------<  313<H>  4.7   |  19<L>  |  0.84    Ca    8.9      14 Jul 2024 06:25  Mg     2.0     07-14    TPro  6.0  /  Alb  3.2<L>  /  TBili  0.3  /  DBili  x   /  AST  29  /  ALT  26  /  AlkPhos  116  07-14    proBNP:   Lipid Profile:   HgA1c:   TSH:       PREVIOUS DIAGNOSTIC TESTING:     < from: CT Angio Chest PE Protocol w/ IV Cont (07.09.24 @ 17:28) >    No pulmonary embolism through the segmental pulmonary arteries   bilaterally.    Unchanged atelectasis of the right middle andlower lobes, likely due to   a combination of compressive atelectasis from adjacent elevated   diaphragm, and post obstructive atelectasis from mucous impacted bronchi.        < from: TTE Limited W or WO Ultrasound Enhancing Agent (04.08.24 @ 14:17) >   1. Left ventricular cavity is normal in size. Left ventricular wall thickness is normal. Left ventricular systolic function is normal with an ejection fraction visually estimated at 55 to 60 %. There are no regional wall motion abnormalities seen.   2. Normal rightventricular cavity size, with normal wall thickness, and normal systolic function. Tricuspid annular plane systolic excursion (TAPSE) is 2.0 cm (normal >=1.7 cm).   3. The left atrium is severely dilated.   4. Mild mitral regurgitation.   5. Comparedto the transthoracic echocardiogram performed on 3/26/2024, regional wall motion abnormality not appreciated.

## 2024-07-14 NOTE — CHART NOTE - NSCHARTNOTEFT_GEN_A_CORE
Called by the nurse to report that patient c/o chestpain. Patient seen at bedside, report that she is having left hip. States that she does not have chestpain at present and she only report that she has hip pain.      Vital Signs Last 24 Hrs  T(C): 36.3 (14 Jul 2024 13:16), Max: 38.3 (14 Jul 2024 08:23)  T(F): 97.4 (14 Jul 2024 13:16), Max: 101 (14 Jul 2024 08:23)  HR: 75 (14 Jul 2024 17:25) (74 - 97)  BP: 112/80 (14 Jul 2024 17:25) (97/52 - 140/70)  BP(mean): 90 (14 Jul 2024 17:25) (65 - 90)  RR: 28 (14 Jul 2024 17:25) (20 - 35)  SpO2: 98% (14 Jul 2024 17:25) (95% - 100%)    Parameters below as of 14 Jul 2024 17:25  Patient On (Oxygen Delivery Method): nasal cannula  O2 Flow (L/min): 3        Labs:                          9.4    6.54  )-----------( Clumped    ( 14 Jul 2024 06:25 )             29.8     07-14    140  |  102  |  32<H>  ----------------------------<  294<H>  4.3   |  23  |  0.67    Ca    9.0      14 Jul 2024 16:22  Mg     2.1     07-14    TPro  6.0  /  Alb  3.2<L>  /  TBili  0.3  /  DBili  x   /  AST  29  /  ALT  26  /  AlkPhos  116  07-14      CARDIAC MARKERS ( 14 Jul 2024 16:22 )  x     / x     / 31 U/L / x     / 2.7 ng/mL        Physical Exam:  General: WN/WD NAD  Neurology: A&Ox3, nonfocal, CALLAWAY x 4  Head:  Normocephalic, atraumatic  Respiratory: Coarse bs  CV: RRR, S1S2, no murmur  Abdominal: Soft, NT, ND no palpable mass  MSK: No edema, diminished peripheral pulses, FROM all 4 extremity    Assessment & Plan:  HPI:  patient recently discharged and came back with another bout of hypoxia  and she has a history of anxiety, htn , copd  sarcoidosis , cdiff on abx   > Cardiac enzymes neg  > Stat EKG  > Rept Lactate  > Stat xrays B/L hip/pelvis  > Will update Dr Hermosillo

## 2024-07-14 NOTE — ED PROVIDER NOTE - PROGRESS NOTE DETAILS
Attending Suleiman:  pt re evaluated, wob improved significantly, however appears anxious, pulling at mask, pt has required ativan before as per daughter, will give small dose Oliva PGY3: Patient signed out to me by day team    Elaine - 89yR [B]  CHF, COPD, DNI, sarcoidosis  acute SOB > BiPAP, B lines left, IVC 1.5   no resp variation  aspiration pna?    Patient received DuoNebs x 2, furosemide 40 mg IV, Solu-Medrol 60 mg IV, lorazepam 0.5 mg IV, and 500 cc of normal saline.  Patient now off of BiPAP and satting ~94% on 2 LPM NC with occasional desaturations to 85%. CXR shows worsening opacification of right middle lobe compared to prior x-ray.  Concern for mucous plugging and atelectasis instead of COPD/CHF exacerbation.  Holding off on antibiotics until discussing with infectious diseases patient has had history of C. difficile and antibiotics recommendations were previously determined by ID.  Patient also has a RLE chronic wound that is managed by ID outpatient.  Patient elected not to have feet amputated.  Findings/decisions discussed with Dr. Hermosillo and patient will be admitted to his service.    Attending spoke with case management to initiate goals of care discussion and assess need for higher level of nursing care. Will also order mucinex. Dr. Hicks Note: had attending to attending conversation with Dr. Hermosillo, agree with admission for respiratory failure. Case management consulted to help initiate conversations with patient's family and [facility] assisted living, pt may benefit from a higher level of care or additional home resources.  Spoke to daughter at length about GOC, there is another daughter in [place] as well, this daughter is not the HCP, is an EMT, aware that her mother's prognosis is guarded, both daughters do NOT want nursing home care and want patient to live in her home (at [facility]), would consider private hire for a nurse (currently has an aide).  Possibly a hospital bed, upright positioning at night, and mucinex treatment would help reduce recurrent aspirations.   Pt remarkably improved post bipap, pt very anxious and unable to tolerate mask, pt transitioned back to face mask and now nasal cannula, s/p 3 nebs, steroids.  Pt much improved compared to initial.  However, pt now with fever spike to 101F, will obtain cultures, and given prior cultures (h/o MRSA, active foot infection), will give vanco.

## 2024-07-14 NOTE — ED ADULT NURSE REASSESSMENT NOTE - NS ED NURSE REASSESS COMMENT FT1
Report received from previous RN an. Pt continues to attempt to remove Bipap mask despite education. Dr. Hicks made aware, and states ok to remove and administer nebulizers with regular facial mask. Pt now satting at 96% with 2L NC usual home oxygen.    Pt turned and cleaned with fellow RN at bedside.

## 2024-07-14 NOTE — ED ADULT NURSE NOTE - NSICDXPASTMEDICALHX_GEN_ALL_CORE_FT
diffuse generalized rash; swollen eyes/rash PAST MEDICAL HISTORY:  Acute diverticulitis     CAD (coronary artery disease)     COPD (chronic obstructive pulmonary disease)     H/O pyelonephritis     HTN (hypertension)     Hypothyroidism     Osteoporosis     Pulmonary sarcoidosis

## 2024-07-14 NOTE — ED PROVIDER NOTE - CLINICAL SUMMARY MEDICAL DECISION MAKING FREE TEXT BOX
Jori Cosme, PGY3 -      This is a 89 year old female with pmh of COPD,on home O2, chronic R hemidiaphragm paralysis, h/o recurrent aspiration events, HTN, diverticulitis,  asthma/COPD, sarcoidosis, hypothyroidism, heart failure, presenting to ed w/ acute sob, came on suddenly, has happened before in past. BiPAP put on presentation. Initially tachypneic and however bipap improved her breathing and lowered her tachypnea. Saturating above 96%. No crackles/wheezing. No abdominal ttp. No lower extremity swelling/pain. DNI per daughter who is health care proxy. Will work up for HF exacerbation vs COPD exacerbation. Will give duoneb and lasix 40mg and solumedrol 60mg IV. Will obtain cxr. Will obtain labs including troponin and bnp. Dispo pending labs, imaging and reassessment, but most likely admission.

## 2024-07-14 NOTE — ED ADULT NURSE NOTE - OBJECTIVE STATEMENT
89YF PMHX of asthma/COPD (home O2), chronic R hemidiaphragm paralysis, HTN, diverticulitis, hypothyroidism, HF presents to the ED from nursing facility via EMS c/o hypoxia 70's rm air. per EMS, pt was satting 70s on rm air with 15L O2 and came to the ED w/ cpap. pt was here recently for similar sxs. per daughter, pt code status unclear but DNI.

## 2024-07-14 NOTE — H&P ADULT - NSHPPHYSICALEXAM_GEN_ALL_CORE
laying in stretcher   cv s1 s2   lungs decreased breath sound   ext no c/c/c  neuro unable to obtain patient sedated   eyes eomi   derm small lesion to thedorsum of toes

## 2024-07-14 NOTE — ED ADULT NURSE NOTE - NS ED NURSE DISCH DISPOSITION
Subjective   This patient is a 74-year-old female with a known history of COPD.  She called EMS this afternoon because of increasing shortness of breath.  She also states she had low saturations.  She normally wears oxygen by nasal cannula at 3 L.  When EMS put her on oxygen at the same rate she improved and his saturations normalized.  The patient denies chest pain, fever or other systemic symptoms.  She has a chronic cough.          Review of Systems   Respiratory: Positive for shortness of breath.    All other systems reviewed and are negative.      No past medical history on file.    No Known Allergies    Past Surgical History:   Procedure Laterality Date   • CHOLECYSTECTOMY     • HYSTERECTOMY         Family History   Problem Relation Age of Onset   • No Known Problems Mother    • No Known Problems Father        Social History     Socioeconomic History   • Marital status: Single     Spouse name: Not on file   • Number of children: Not on file   • Years of education: Not on file   • Highest education level: Not on file   Tobacco Use   • Smoking status: Current Every Day Smoker     Packs/day: 0.25     Years: 60.00     Pack years: 15.00   • Smokeless tobacco: Never Used   Substance and Sexual Activity   • Alcohol use: Never   • Drug use: Never   • Sexual activity: Defer           Objective   Physical Exam  Vitals and nursing note reviewed.   Constitutional:       Appearance: She is well-developed.   HENT:      Head: Normocephalic and atraumatic.      Right Ear: External ear normal.      Left Ear: External ear normal.      Nose: Nose normal.      Mouth/Throat:      Mouth: Mucous membranes are moist.      Pharynx: Oropharynx is clear.   Eyes:      Conjunctiva/sclera: Conjunctivae normal.   Cardiovascular:      Rate and Rhythm: Normal rate and regular rhythm.      Heart sounds: Normal heart sounds.   Pulmonary:      Effort: Pulmonary effort is normal.      Breath sounds: Normal breath sounds.   Abdominal:       General: Bowel sounds are normal.      Palpations: Abdomen is soft.   Musculoskeletal:         General: Normal range of motion.      Cervical back: Normal range of motion and neck supple.   Skin:     General: Skin is warm and dry.      Capillary Refill: Capillary refill takes less than 2 seconds.   Neurological:      Mental Status: She is alert and oriented to person, place, and time.   Psychiatric:         Behavior: Behavior normal.         Thought Content: Thought content normal.         Judgment: Judgment normal.         Procedures           ED Course                                           MDM  Number of Diagnoses or Management Options     Amount and/or Complexity of Data Reviewed  Clinical lab tests: ordered and reviewed  Tests in the radiology section of CPT®: ordered and reviewed  Independent visualization of images, tracings, or specimens: yes    Patient Progress  Patient progress: stable      Final diagnoses:   COPD exacerbation (CMS/[facility])       ED Disposition  ED Disposition     ED Disposition Condition Comment    Discharge Stable           Saeed Garcias MD  [address] [facility] [address] [phone]               Medication List      New Prescriptions    azithromycin 250 MG tablet  Commonly known as: ZITHROMAX  Take 2 tablets on the first day and then 1 tablet a day for the next 4 days     methylPREDNISolone 4 MG dose pack  Commonly known as: MEDROL  Take as directed on package instructions.           Where to Get Your Medications      These medications were sent to [facility] - [place], [place] - [address] [place] [address] - [phone]  - [phone] [address] [address]    Phone: [phone]   · azithromycin 250 MG tablet  · methylPREDNISolone 4 MG dose pack       The patient felt much better after breathing treatment IV steroids.  I believe this is a COPD exacerbation I will treat as such with steroids and antibiotics.  The patient saturations always stayed good in  the ED on her normal oxygen so we will discharge her home in stable condition to return to the ED if she gets worse in any way.     Willie Haider MD  08/15/21 [id number]     Admitted

## 2024-07-14 NOTE — ED ADULT NURSE NOTE - NSFALLRISKASMTTYPE_ED_ALL_ED
Initial (On Arrival) Methotrexate Pregnancy And Lactation Text: This medication is Pregnancy Category X and is known to cause fetal harm. This medication is excreted in breast milk.

## 2024-07-15 DIAGNOSIS — Z71.89 OTHER SPECIFIED COUNSELING: ICD-10-CM

## 2024-07-15 DIAGNOSIS — J96.02 ACUTE RESPIRATORY FAILURE WITH HYPERCAPNIA: ICD-10-CM

## 2024-07-15 DIAGNOSIS — Z51.5 ENCOUNTER FOR PALLIATIVE CARE: ICD-10-CM

## 2024-07-15 DIAGNOSIS — J96.01 ACUTE RESPIRATORY FAILURE WITH HYPOXIA: ICD-10-CM

## 2024-07-15 LAB
ANION GAP SERPL CALC-SCNC: 16 MMOL/L — SIGNIFICANT CHANGE UP (ref 5–17)
BUN SERPL-MCNC: 29 MG/DL — HIGH (ref 7–23)
CALCIUM SERPL-MCNC: 9.2 MG/DL — SIGNIFICANT CHANGE UP (ref 8.4–10.5)
CHLORIDE SERPL-SCNC: 102 MMOL/L — SIGNIFICANT CHANGE UP (ref 96–108)
CO2 SERPL-SCNC: 21 MMOL/L — LOW (ref 22–31)
CREAT SERPL-MCNC: 0.51 MG/DL — SIGNIFICANT CHANGE UP (ref 0.5–1.3)
EGFR: 90 ML/MIN/1.73M2 — SIGNIFICANT CHANGE UP
GAS PNL BLDV: SIGNIFICANT CHANGE UP
GLUCOSE SERPL-MCNC: 149 MG/DL — HIGH (ref 70–99)
HCT VFR BLD CALC: 35.4 % — SIGNIFICANT CHANGE UP (ref 34.5–45)
HGB BLD-MCNC: 10.7 G/DL — LOW (ref 11.5–15.5)
LACTATE SERPL-SCNC: 4.3 MMOL/L — CRITICAL HIGH (ref 0.5–2)
MCHC RBC-ENTMCNC: 28.4 PG — SIGNIFICANT CHANGE UP (ref 27–34)
MCHC RBC-ENTMCNC: 30.2 GM/DL — LOW (ref 32–36)
MCV RBC AUTO: 93.9 FL — SIGNIFICANT CHANGE UP (ref 80–100)
NRBC # BLD: 0 /100 WBCS — SIGNIFICANT CHANGE UP (ref 0–0)
PLATELET # BLD AUTO: 188 K/UL — SIGNIFICANT CHANGE UP (ref 150–400)
POTASSIUM SERPL-MCNC: 4.9 MMOL/L — SIGNIFICANT CHANGE UP (ref 3.5–5.3)
POTASSIUM SERPL-SCNC: 4.9 MMOL/L — SIGNIFICANT CHANGE UP (ref 3.5–5.3)
RBC # BLD: 3.77 M/UL — LOW (ref 3.8–5.2)
RBC # FLD: 17.4 % — HIGH (ref 10.3–14.5)
SODIUM SERPL-SCNC: 139 MMOL/L — SIGNIFICANT CHANGE UP (ref 135–145)
WBC # BLD: 15.62 K/UL — HIGH (ref 3.8–10.5)
WBC # FLD AUTO: 15.62 K/UL — HIGH (ref 3.8–10.5)

## 2024-07-15 PROCEDURE — 99223 1ST HOSP IP/OBS HIGH 75: CPT

## 2024-07-15 PROCEDURE — 99498 ADVNCD CARE PLAN ADDL 30 MIN: CPT | Mod: GC,25

## 2024-07-15 PROCEDURE — 99223 1ST HOSP IP/OBS HIGH 75: CPT | Mod: GC

## 2024-07-15 PROCEDURE — 99497 ADVNCD CARE PLAN 30 MIN: CPT | Mod: GC,25

## 2024-07-15 PROCEDURE — 99222 1ST HOSP IP/OBS MODERATE 55: CPT

## 2024-07-15 RX ORDER — ACETAMINOPHEN 325 MG
650 TABLET ORAL ONCE
Refills: 0 | Status: COMPLETED | OUTPATIENT
Start: 2024-07-15 | End: 2024-07-15

## 2024-07-15 RX ORDER — PIPERACILLIN SODIUM AND TAZOBACTAM SODIUM 3; .375 G/15ML; G/15ML
3.38 INJECTION, POWDER, LYOPHILIZED, FOR SOLUTION INTRAVENOUS EVERY 8 HOURS
Refills: 0 | Status: DISCONTINUED | OUTPATIENT
Start: 2024-07-15 | End: 2024-07-17

## 2024-07-15 RX ORDER — PIPERACILLIN SODIUM AND TAZOBACTAM SODIUM 3; .375 G/15ML; G/15ML
3.38 INJECTION, POWDER, LYOPHILIZED, FOR SOLUTION INTRAVENOUS ONCE
Refills: 0 | Status: COMPLETED | OUTPATIENT
Start: 2024-07-15 | End: 2024-07-15

## 2024-07-15 RX ADMIN — Medication 20 MILLIGRAM(S): at 11:35

## 2024-07-15 RX ADMIN — TEMAZEPAM 15 MILLIGRAM(S): 30 CAPSULE ORAL at 22:00

## 2024-07-15 RX ADMIN — FUROSEMIDE 20 MILLIGRAM(S): 10 INJECTION, SOLUTION INTRAMUSCULAR; INTRAVENOUS at 05:58

## 2024-07-15 RX ADMIN — PIPERACILLIN SODIUM AND TAZOBACTAM SODIUM 25 GRAM(S): 3; .375 INJECTION, POWDER, LYOPHILIZED, FOR SOLUTION INTRAVENOUS at 17:51

## 2024-07-15 RX ADMIN — Medication 20 MILLIGRAM(S): at 13:19

## 2024-07-15 RX ADMIN — Medication 20 MILLIGRAM(S): at 05:59

## 2024-07-15 RX ADMIN — Medication 2 TABLET(S): at 21:17

## 2024-07-15 RX ADMIN — Medication 650 MILLIGRAM(S): at 07:01

## 2024-07-15 RX ADMIN — PIPERACILLIN SODIUM AND TAZOBACTAM SODIUM 25 GRAM(S): 3; .375 INJECTION, POWDER, LYOPHILIZED, FOR SOLUTION INTRAVENOUS at 21:17

## 2024-07-15 RX ADMIN — MUPIROCIN 1 APPLICATION(S): 20 OINTMENT TOPICAL at 17:51

## 2024-07-15 RX ADMIN — HEPARIN SODIUM 5000 UNIT(S): 50 INJECTION, SOLUTION INTRAVENOUS at 05:59

## 2024-07-15 RX ADMIN — ATORVASTATIN CALCIUM 20 MILLIGRAM(S): 20 TABLET, FILM COATED ORAL at 21:17

## 2024-07-15 RX ADMIN — Medication 650 MILLIGRAM(S): at 05:58

## 2024-07-15 RX ADMIN — Medication 50 MILLIGRAM(S): at 05:58

## 2024-07-15 RX ADMIN — Medication 2 PUFF(S): at 18:02

## 2024-07-15 RX ADMIN — PANTOPRAZOLE SODIUM 40 MILLIGRAM(S): 40 INJECTION, POWDER, FOR SOLUTION INTRAVENOUS at 07:02

## 2024-07-15 RX ADMIN — MUPIROCIN 1 APPLICATION(S): 20 OINTMENT TOPICAL at 05:58

## 2024-07-15 RX ADMIN — PIPERACILLIN SODIUM AND TAZOBACTAM SODIUM 3.38 GRAM(S): 3; .375 INJECTION, POWDER, LYOPHILIZED, FOR SOLUTION INTRAVENOUS at 17:51

## 2024-07-15 RX ADMIN — HEPARIN SODIUM 5000 UNIT(S): 50 INJECTION, SOLUTION INTRAVENOUS at 17:51

## 2024-07-15 RX ADMIN — Medication 2 PUFF(S): at 05:59

## 2024-07-15 RX ADMIN — Medication 20 MILLIGRAM(S): at 21:17

## 2024-07-15 RX ADMIN — PIPERACILLIN SODIUM AND TAZOBACTAM SODIUM 200 GRAM(S): 3; .375 INJECTION, POWDER, LYOPHILIZED, FOR SOLUTION INTRAVENOUS at 13:19

## 2024-07-15 NOTE — DIETITIAN INITIAL EVALUATION ADULT - NS FNS DIET ORDER
No
Diet, Regular:   Kosher  Supplement Feeding Modality:  Oral  Ensure Plus High Protein Cans or Servings Per Day:  1       Frequency:  Daily (07-15-24 @ 04:13)

## 2024-07-15 NOTE — CONSULT NOTE ADULT - CONSULT REASON
89 year old female with pmh of COPD,on home O2, chronic R hemidiaphragm paralysis, h/o recurrent aspiration events, HTN, diverticulitis,  asthma, sarcoidosis- admitted resp failure, resquesting palliative consult for GOC 89 year old female with pmh of COPD,on home O2, chronic R hemidiaphragm paralysis, h/o recurrent aspiration events, HTN, diverticulitis,  asthma, sarcoidosis- admitted resp failure, requesting palliative consult for GOC

## 2024-07-15 NOTE — DIETITIAN INITIAL EVALUATION ADULT - PERTINENT LABORATORY DATA
07-15    139  |  102  |  29<H>  ----------------------------<  149<H>  4.9   |  21<L>  |  0.51    Ca    9.2      15 Jul 2024 07:13  Mg     2.1     07-14    TPro  6.0  /  Alb  3.2<L>  /  TBili  0.3  /  DBili  x   /  AST  29  /  ALT  26  /  AlkPhos  116  07-14  A1C with Estimated Average Glucose Result: 6.0 % (09-27-23 @ 06:44)

## 2024-07-15 NOTE — CONSULT NOTE ADULT - PROBLEM SELECTOR RECOMMENDATION 9
likely secondary to COPD exacerbation vs aspiration pneumonia   -repeated admissions for respiratory failure   -pending GOC discussion likely secondary to COPD exacerbation vs aspiration pneumonia   -pt with repeated admissions for respiratory failure   -pulm consult pending, input appreciated  -family looking for medical resources in the community to medically optimize her to prevent recurrent admissions. We discussed home palliative program for consideration today

## 2024-07-15 NOTE — CONSULT NOTE ADULT - PROBLEM SELECTOR RECOMMENDATION 2
GOC discussion pending discussion with daughter Deborah Full code     -see GOC section for more details   -pending conversation between daughters and mother to discuss code status Full code     -see GOC section for more details   -pending private conversation between daughters and mother to discuss code status, advised that if pt remains hospitalized while they reached consensus, we can be called back to assist with MOLST completion. Otherwise, pt's PCP or pulmonologist can assist with this in the outpatient setting

## 2024-07-15 NOTE — DIETITIAN INITIAL EVALUATION ADULT - ORAL INTAKE PTA/DIET HISTORY
Patient has a Private HHA at bedside. Patient states e usual weight was 105 lbs in June. RD requested current weight of RN   ( bed scale used) showing 97lbs currently. Patient follows a Kosher meal plan but also can eat tuna and eggsalad from  hospital   menu. Patient offered but refuses all liquid high calorie nutritional supplements (including ensure products).  Other high calorie items can be worked into her   meal plan like ice cream, and her favorite gingerale.  Patient was counseled on eating more often, eating peanut butter, ice cream and  HHA present, for talk of adding calories with smoothies, pudding, etc. Patient has her own teeth and tolerates whole foods and breads.

## 2024-07-15 NOTE — DIETITIAN INITIAL EVALUATION ADULT - OTHER INFO
patient has nasal cannula in place, possibly her SOB effects who much food she can tolerate at one time. Patient encouraged to eat small   frequent meals.

## 2024-07-15 NOTE — CONSULT NOTE ADULT - CONVERSATION/DISCUSSION
RADHA Discussed/Palliative Care Referral Xolair Pregnancy And Lactation Text: This medication is Pregnancy Category B and is considered safe during pregnancy. This medication is excreted in breast milk.

## 2024-07-15 NOTE — DIETITIAN NUTRITION RISK NOTIFICATION - TREATMENT: THE FOLLOWING DIET HAS BEEN RECOMMENDED
Diet, Regular:   Kosher  Supplement Feeding Modality:  Oral  Ensure Plus High Protein Cans or Servings Per Day:  1       Frequency:  Daily (07-15-24 @ 04:13) [Active]

## 2024-07-15 NOTE — DIETITIAN INITIAL EVALUATION ADULT - ADD RECOMMEND
patient to receive Kosher menu/meals and dairy items off regular menu to increase variety and caloric intake  patient and private HHA to order  Kosher compliant snacks

## 2024-07-15 NOTE — PATIENT PROFILE ADULT - PATIENT REPRESENTATIVE: ( YOU CAN CHOOSE ANY PERSON THAT CAN ASSIST YOU WITH YOUR HEALTH CARE PREFERENCES, DOES NOT HAVE TO BE A SPOUSE, IMMEDIATE FAMILY OR SIGNIFICANT OTHER/PARTNER)
Physical Therapy Daily Progress Note        VISIT#: 11      Florin Edmonds reports: Pt reports his shoulder is ok. He was sleeping and took his L arm and threw it into abduction and it woke him up. Reports he believes he is getting stronger but still hurting  Current Pain Level:   0 /10; Worst:   7-8/10; Best:  0/10  Location Of Pain: L shoulder  Response to Previous Session: No issues  Functional Deficits/Irritating Factors: Reaching outward, behind back and up. Pushing and pulling, lifting  Progression: fair  Compliance with HEP Reported: Yes    Objective   Presents: significant rounded shoulders  Increased sets/reps of:  none   Increased resistance on:  none  Added to Program: none   Home Exercises Given: Scapular retraction and depression several times a day      See Exercise, Manual, and Modality Logs for complete treatment.     Patient Education: Pt was educated on exercise biomechanical correctness, intensity, and speed. Posture education    Assessment:  During PROM today, pt was quite guarded and required mod cues to relax. Pt also has pain and decreased ROM in all planes of motion. Pt also required verbal and tactile cues for several exercises to prevent scapular elevation and UT substitution.  Pt will continue to benefit from skilled PT interventions to address current functional deficits and impairments.       Plan: Progress to/Continue with current program. Bilat flexion wall slide for thoracic extension        Manual Therapy:    15     mins  88689;  Therapeutic Exercise:    25     mins  43875;     Neuromuscular Phuc:    15    mins  53303;    Therapeutic Activity:     0     mins  52375;     Electrical Stimulation: __15____ mins [id number]  Ultrasound:   0 mins [id number]  Timed Treatment:   70   mins   Total Treatment:     75   mins    Mer Pérez PTA  [place] License # [id number]  Physical Therapist Assistant       yes

## 2024-07-15 NOTE — DIETITIAN INITIAL EVALUATION ADULT - PERTINENT MEDS FT
MEDICATIONS  (STANDING):  atorvastatin 20 milliGRAM(s) Oral at bedtime  budesonide  80 MICROgram(s)/formoterol 4.5 MICROgram(s) Inhaler 2 Puff(s) Inhalation two times a day  FLUoxetine 20 milliGRAM(s) Oral daily  furosemide    Tablet 20 milliGRAM(s) Oral daily  heparin   Injectable 5000 Unit(s) SubCutaneous every 12 hours  methylPREDNISolone sodium succinate Injectable 20 milliGRAM(s) IV Push three times a day  metoprolol succinate ER 50 milliGRAM(s) Oral daily  mupirocin 2% Nasal 1 Application(s) Both Nostrils two times a day  pantoprazole    Tablet 40 milliGRAM(s) Oral before breakfast  piperacillin/tazobactam IVPB.- 3.375 Gram(s) IV Intermittent once  piperacillin/tazobactam IVPB.. 3.375 Gram(s) IV Intermittent every 8 hours  polyethylene glycol 3350 17 Gram(s) Oral daily  senna 2 Tablet(s) Oral at bedtime  sodium chloride 0.9%. 1000 milliLiter(s) (75 mL/Hr) IV Continuous <Continuous>    MEDICATIONS  (PRN):  temazepam 15 milliGRAM(s) Oral at bedtime PRN Insomnia

## 2024-07-15 NOTE — CONSULT NOTE ADULT - ATTENDING COMMENTS
89F with COPD on home oxygen, pulmonary sarcoidosis, HTN, anxiety, recurrent cdiff  recently discharged discharged on 7/4 was brought in my EMS with the chief complaint of dyspnea, tachypnea. Admitted to medicine for respiratory failure requiring bipap.  GaP team consulted for GOC discussion     Patient seen this morning, awake and alert, able to answer questions though answers are frequently proven wrong by collaterals (aid at bedside, PICU RN). On exploration of code status with the patient, she clearly states her wishes for CPR and mechanical ventilation, however it is unclear if she understands that these measures are unlikely to bring her back to the quality of life that she has at this time. We also spoke to her 2 dtrs Deborah and Sandra, who are named on her POA document. They demonstrate understanding that if pt gets intubated, she's unlikely to come off the ventilator and pt would not find life prolonged indefinitely on machines an acceptable quality of life. Deborah also notes pt has always stated that she wants "everything done as much as possible." They wish to privately discuss code status with the patient and will contact us if the consensus becomes DNR/I.     At this time, the family wishes to pursue continued medical interventions to prevent re-admissions as much as possible. They are not ready to consider hospice transition. We discussed resources in the community such as home based palliative care, as well as exploration of resources with the pulmonology team to reach this goal. Family has our contact information should further needs arise.     Florina Gutierrez MD  GAP Team Consults  Please call if we can be of assistance, [phone]

## 2024-07-15 NOTE — CONSULT NOTE ADULT - ASSESSMENT
89 year old female with pmh of COPD,on home O2 2L NC, chronic R hemidiaphragm paralysis (unclear etiology), h/o recurrent witnessed aspiration events, COPD, sarcoidosis (reportedly remote dx but not on active tx), hypothyroidism, heart failure p/w AHRF and PNA.    Clinically improving tho lactate remains disproportionately high. As remains hemodynamically and clinically stable unclear if LA due to albuterol neb vs microcirculatory dysfxn,   Pt w/ witnessed recurrent aspiration events - remains likely cause of her RLL pna    - cont abx as per ID  - check sputum cx, bcx  - cont to monitor VS and lactate  - BD tx prn  - cont symbicort inh bid  - taper down steroids  - cont diuretics as per cardiology  - dvt ppx  - GOC discussions    
This is a 89 year old female with pmh of COPD,on home O2, chronic R hemidiaphragm paralysis, h/o recurrent aspiration events, HTN, diverticulitis,  asthma/COPD, sarcoidosis, hypothyroidism, heart failure, presenting to ed w/ acute sob, came on suddenly, has happened before in past. BiPAP put on presentation. Initially tachypneic and however bipap improved her breathing and lowered her tachypnea. Saturating above 96%. No crackles/wheezing. No abdominal ttp. No lower extremity swelling/pain. DNI per daughter who is health care proxy. Will work up for HF exacerbation vs COPD exacerbation. Will give duoneb and lasix 40mg and solumedrol 60mg IV. Will obtain cxr. Will obtain labs including troponin and bnp  pt with sig cardiac and lung disease with sig sarcoidosis and underlying lung disease  non compliant to  Duoneb  re start on steroid, any time off steroid is worsening, will discuss with pulmonary ?need of chronis steroid use  continue lasix 20 mg daily  dvt prophylaxis  
 89 year old well known to me, seen many times over the last several years for recurrent episodes of pna, uti, confusion and diverticulitis and possible ischemic bowel   Pt has    Sarcoid  COPD  CHF  CAD  Pyelo  Diverticulitis   Presents again from assisted living with sob, which she currently denies   no hs of dysuria  chest xray  right lower lobe  mental status is at baseline    start zosyn           
89 yo female with hx of COPD on home oxygen, pulmonary sarcoidosis, HTN, anxiety, recurrent cdiff  recently discharged discharged on 7/4 was brought in my EMS with the chief complaint of dyspnea, tachypnea. Admitted to medicine for respiratory failure requiring bipap.  noted to have monthly admissions for respiratory failure. GaP team consulted for GOC discussion.

## 2024-07-15 NOTE — CONSULT NOTE ADULT - SUBJECTIVE AND OBJECTIVE BOX
Date of Service:07-15-24 @ 14:32  HPI:  88 yo female with hx of COPD on home oxygen, pulmonary sarcoidosis, HTN, anxiety, recurrent cdiff  recently discharged discharged on 7/4 was brought in my EMS with the chief complaint of dyspnea, tachypnea. Admitted to medicine for respiratory failure requiring bipap.  GaP team consulted for GOC discussion.      No acute events overnight. Patient was seen and examined, aide was at bedside.  Patient was unable to verbalize why she was admitted. she denies any dyspnea, dyspnea on exertion, chest pain/discomfort, pain.  Last BM was 7/15 morning  per bedside RN.  see GOC section for further details     PERTINENT PM/SXH:   HTN (hypertension)  Hypothyroidism  Osteoporosis  CAD (coronary artery disease)  COPD (chronic obstructive pulmonary disease)  Pulmonary sarcoidosis  H/O pyelonephritis  Acute diverticulitis    FAMILY HISTORY:    Family Hx substance abuse [ ]yes [ ]no  ITEMS NOT CHECKED ARE NOT PRESENT    SOCIAL HISTORY:   Significant other/partner[ ]  Children[x]: 3 daughters:  Gnosticist/Spirituality:  Substance hx:  [ ]   Tobacco hx:  [ ]   Alcohol hx: [ ]   Home Opioid hx:  [ ] I-Stop Reference No:  Living Situation: [ ]Home  []Long term care  [ ]Rehab [x ]Other- [facility]     ADVANCE DIRECTIVES:    DNR/MOLST  [ ]  Living Will  [ ]   DECISION MAKER(s):  [ ] Health Care Proxy(s)  [ ] Surrogate(s)  [ ] Guardian           Name(s): Phone Number(s):    BASELINE (I)ADL(s) (prior to admission):  [place]: [ ]Total  [x] Moderate [ ]Dependent    Allergies    iodinated radiocontrast agents (Anaphylaxis)    Intolerances    MEDICATIONS  (STANDING):  atorvastatin 20 milliGRAM(s) Oral at bedtime  budesonide  80 MICROgram(s)/formoterol 4.5 MICROgram(s) Inhaler 2 Puff(s) Inhalation two times a day  FLUoxetine 20 milliGRAM(s) Oral daily  furosemide    Tablet 20 milliGRAM(s) Oral daily  heparin   Injectable 5000 Unit(s) SubCutaneous every 12 hours  methylPREDNISolone sodium succinate Injectable 20 milliGRAM(s) IV Push three times a day  metoprolol succinate ER 50 milliGRAM(s) Oral daily  mupirocin 2% Nasal 1 Application(s) Both Nostrils two times a day  pantoprazole    Tablet 40 milliGRAM(s) Oral before breakfast  piperacillin/tazobactam IVPB.- 3.375 Gram(s) IV Intermittent once  piperacillin/tazobactam IVPB.. 3.375 Gram(s) IV Intermittent every 8 hours  polyethylene glycol 3350 17 Gram(s) Oral daily  senna 2 Tablet(s) Oral at bedtime  sodium chloride 0.9%. 1000 milliLiter(s) (75 mL/Hr) IV Continuous <Continuous>    MEDICATIONS  (PRN):  temazepam 15 milliGRAM(s) Oral at bedtime PRN Insomnia    PRESENT SYMPTOMS: [ ]Unable to self-report  [ ] CPOT [ ] PAINADs [ ] RDOS  Source if other than patient:  [ ]Family   [ ]Team     Pain: [ ]yes [x ]no  QOL impact -   Location -                    Aggravating factors -  Quality -  Radiation -  Timing-  Severity (0-10 scale):  Minimal acceptable level (0-10 scale):     CPOT:    https://www.[facility].org/getattachment/[email or web address]/Critical-Care-Pain-Observation-Tool-(CPOT)    PAINAD Score: See PAINAD tool and score below     Dyspnea:                           [ ]Mild [ ]Moderate [ ]Severe    RDOS: See RDOS tool and score below   0 to 2  minimal or no respiratory distress   3  mild distress  4 to 6 moderate distress  >7 severe distress      Anxiety:                             [ ]Mild [ ]Moderate [ ]Severe  Fatigue:                             [ ]Mild [ ]Moderate [ ]Severe  Nausea:                             [ ]Mild [ ]Moderate [ ]Severe  Loss of appetite:              [ ]Mild [ ]Moderate [ ]Severe  Constipation:                    [ ]Mild [ ]Moderate [ ]Severe    PCSSQ[Palliative Care Spiritual Screening Question]   Severity (0-10):  Score of 4 or > indicate consideration of Chaplaincy referral.  Chaplaincy Referral: [ ] yes [ ] refused [ ] following [ ] Deferred     Caregiver [place]? : [ ] yes [x ] no [ ] Deferred [ ] Declined             Social work referral [ ] Patient & Family Centered Care Referral [ ]     Anticipatory Grief present?:  [ ] yes [ ] no  [x ] Deferred                  Social work referral [ ] Chaplaincy Referral [ ]  		  Other Symptoms:  [ ]All other review of systems negative     Palliative Performance Status Version 2:   See PPSv2 tool and score below          PHYSICAL EXAM:  Vital Signs Last 24 Hrs  T(C): 36.5 (15 Jul 2024 12:07), Max: 36.5 (15 Jul 2024 12:07)  T(F): 97.7 (15 Jul 2024 12:07), Max: 97.7 (15 Jul 2024 12:07)  HR: 65 (15 Jul 2024 12:07) (65 - 75)  BP: 131/58 (15 Jul 2024 12:07) (112/80 - 146/80)  BP(mean): 90 (14 Jul 2024 17:25) (90 - 90)  RR: 18 (15 Jul 2024 12:07) (18 - 28)  SpO2: 96% (15 Jul 2024 12:07) (94% - 100%)    Parameters below as of 15 Jul 2024 12:07  Patient On (Oxygen Delivery Method): nasal cannula  O2 Flow (L/min): 4   I&O's Summary    GENERAL: [ ]Cachexia    [x ]Alert  [x ]Oriented   [ ]Lethargic  [ ]Unarousable  [x ]Verbal  [ ]Non-Verbal  Behavioral:   [ ] Anxiety  [ ] Delirium [ ] Agitation [ ] Other  HEENT:  [x ]Normal   [ ]Dry mouth   [ ]ET Tube/Trach  [ ]Oral lesions  PULMONARY:   [x ]Clear [ ]Tachypnea  [ ]Audible excessive secretions   [ ]Rhonchi        [ ]Right [ ]Left [ ]Bilateral  [ ]Crackles        [ ]Right [ ]Left [ ]Bilateral  [ ]Wheezing     [ ]Right [ ]Left [ ]Bilateral  [ ]Diminished breath sounds [ ]right [ ]left [ ]bilateral  CARDIOVASCULAR:    [x ]Regular [ ]Irregular [ ]Tachy  [ ]Allen [ ]Murmur [ ]Other  GASTROINTESTINAL:  [x ]Soft  [ ]Distended   [x ]+BS  [ ]Non tender [ ]Tender  [ ]Other [ ]PEG [ ]OGT/ NGT  Last BM: 7/15  GENITOURINARY:  [ ]Normal [ ] Incontinent   [ ]Oliguria/Anuria   [ ]Connor [x] purwick   MUSCULOSKELETAL:   [ ]Normal   [x ]Weakness  [ ]Bed/Wheelchair bound [ ]Edema  NEUROLOGIC:   [ ]No focal deficits  [ ]Cognitive impairment  [ ]Dysphagia [ ]Dysarthria [ ]Paresis [ ]Other   SKIN:   [x ]Normal  [ ]Rash  [ ]Other  [ ]Pressure ulcer(s)       Present on admission [ ]y [ ]n    CRITICAL CARE:  [ ] Shock Present  [ ]Septic [ ]Cardiogenic [ ]Neurologic [ ]Hypovolemic  [ ]  Vasopressors [ ]  Inotropes   [ ]Respiratory failure present [ ]Mechanical ventilation [ ]Non-invasive ventilatory support [ ]High flow    [ ]Acute  [ ]Chronic [ ]Hypoxic  [ ]Hypercarbic [ ]Other  [ ]Other organ failure     LABS:                        10.7   15.62 )-----------( 188      ( 15 Jul 2024 07:11 )             35.4   07-15    139  |  102  |  29<H>  ----------------------------<  149<H>  4.9   |  21<L>  |  0.51    Ca    9.2      15 Jul 2024 07:13  Mg     2.1     07-14    TPro  6.0  /  Alb  3.2<L>  /  TBili  0.3  /  DBili  x   /  AST  29  /  ALT  26  /  AlkPhos  116  07-14      Urinalysis Basic - ( 15 Jul 2024 07:13 )    Color: x / Appearance: x / SG: x / pH: x  Gluc: 149 mg/dL / Ketone: x  / Bili: x / Urobili: x   Blood: x / Protein: x / Nitrite: x   Leuk Esterase: x / RBC: x / WBC x   Sq Epi: x / Non Sq Epi: x / Bacteria: x      RADIOLOGY & ADDITIONAL STUDIES:     ACC: [id number] EXAM:  XR CHEST PORTABLE URGENT 1V   ORDERED BY: KRISH BARRIOS     ACC: [id number] EXAM:  XR CHEST PORTABLE URGENT 1V   ORDERED BY: NAS GARCES     PROCEDURE DATE:  07/14/2024      INTERPRETATION:  CLINICAL INDICATION: Chest pain    EXAM: 2 X-rays of the chest.    COMPARISON: Chest radiograph from 7/1/2024    FINDINGS:  Low lung volume.  Right mid lung field opacity.  There is no pleural effusion or pneumothorax.  The heart is not well evaluated in this position.  The visualized osseous structures demonstrate no acute pathology.  Status post loop recorder placement.    IMPRESSION:  Right lower lung pneumonia.    --- End of Report ---    EVERETT DANIELS DO; Resident Radiologist  This document has been electronically signed.  MYRTLE BROWN MD; Attending Interventional Radiologist  This document has been electronically signed. Jul 14 2024  9:03AM      PROTEIN CALORIE MALNUTRITION PRESENT: [ ]mild [ ]moderate [ ]severe [ ]underweight [ ]morbid obesity  https://www.andeal.org/vault/5840/web/files/ONC/Table_Clinical%20Characteristics%20to%20Document%20Malnutrition-White%20JV%20et%20al%202012.pdf    Height (cm): 157.5 (07-14-24 @ 05:57), 157.5 (07-01-24 @ 09:46), 157.5 (06-18-24 @ 17:12)  Weight (kg): 45.4 (07-01-24 @ 09:46), 54.4 (06-18-24 @ 17:12), 50.5 (05-10-24 @ 16:36)  BMI (kg/m2): 18.3 (07-14-24 @ 05:57), 18.3 (07-01-24 @ 09:46), 21.9 (06-18-24 @ 17:12)    [ ]PPSV2 < or = to 30% [ ]significant weight loss  [ ]poor nutritional intake  [ ]anasarca[ ]Artificial Nutrition      Other REFERRALS:  [ ]Hospice  [ ]Child Life  [ ]Social Work  [ ]Case management [ ]Holistic Therapy        Date of Service:07-15-24 @ 14:32  HPI:  89 yo female with hx of COPD on home oxygen, pulmonary sarcoidosis, HTN, anxiety, recurrent cdiff  recently discharged discharged on 7/4 was brought in my EMS with the chief complaint of dyspnea, tachypnea. Admitted to medicine for respiratory failure requiring bipap.  GaP team consulted for GOC discussion.      No acute events overnight. Patient was seen and examined, aide was at bedside.  Patient was unable to verbalize why she was admitted. She denies any dyspnea, dyspnea on exertion, chest pain/discomfort, pain.  Last BM was 7/15 morning  per bedside RN.  see GOC section for further details     PERTINENT PM/SXH:   HTN (hypertension)  Hypothyroidism  Osteoporosis  CAD (coronary artery disease)  COPD (chronic obstructive pulmonary disease)  Pulmonary sarcoidosis  H/O pyelonephritis  Acute diverticulitis    FAMILY HISTORY:    Family Hx substance abuse [ ]yes [ ]no  ITEMS NOT CHECKED ARE NOT PRESENT    SOCIAL HISTORY:   Significant other/partner[ ]  Children[x]: 3 daughters:  Latter day/Spirituality:  Substance hx:  [ ]   Tobacco hx:  [ ]   Alcohol hx: [ ]   Home Opioid hx:  [ ] I-Stop Reference No:  Living Situation: [ ]Home  []Long term care  [ ]Rehab [x ]Other- [facility]     ADVANCE DIRECTIVES:    DNR/MOLST  [ ]  Living Will  [ ]   DECISION MAKER(s):  [ x] Health Care Proxy(s)  [ ] Surrogate(s)  [ ] Guardian           Name(s): Phone Number(s): domenica Panchal    BASELINE (I)ADL(s) (prior to admission):  [place]: [ ]Total  [x] Moderate [ ]Dependent    Allergies    iodinated radiocontrast agents (Anaphylaxis)    Intolerances    MEDICATIONS  (STANDING):  atorvastatin 20 milliGRAM(s) Oral at bedtime  budesonide  80 MICROgram(s)/formoterol 4.5 MICROgram(s) Inhaler 2 Puff(s) Inhalation two times a day  FLUoxetine 20 milliGRAM(s) Oral daily  furosemide    Tablet 20 milliGRAM(s) Oral daily  heparin   Injectable 5000 Unit(s) SubCutaneous every 12 hours  methylPREDNISolone sodium succinate Injectable 20 milliGRAM(s) IV Push three times a day  metoprolol succinate ER 50 milliGRAM(s) Oral daily  mupirocin 2% Nasal 1 Application(s) Both Nostrils two times a day  pantoprazole    Tablet 40 milliGRAM(s) Oral before breakfast  piperacillin/tazobactam IVPB.- 3.375 Gram(s) IV Intermittent once  piperacillin/tazobactam IVPB.. 3.375 Gram(s) IV Intermittent every 8 hours  polyethylene glycol 3350 17 Gram(s) Oral daily  senna 2 Tablet(s) Oral at bedtime  sodium chloride 0.9%. 1000 milliLiter(s) (75 mL/Hr) IV Continuous <Continuous>    MEDICATIONS  (PRN):  temazepam 15 milliGRAM(s) Oral at bedtime PRN Insomnia    PRESENT SYMPTOMS: [ ]Unable to self-report  [ ] CPOT [ ] PAINADs [ ] RDOS  Source if other than patient:  [ ]Family   [ ]Team     Pain: [ ]yes [x ]no  QOL impact -   Location -                    Aggravating factors -  Quality -  Radiation -  Timing-  Severity (0-10 scale):  Minimal acceptable level (0-10 scale):     CPOT:    https://www.[facility].org/getattachment/[email or web address]-[email or web address]/Critical-Care-Pain-Observation-Tool-(CPOT)    PAINAD Score: See PAINAD tool and score below     Dyspnea:                           [ ]Mild [ ]Moderate [ ]Severe    RDOS: See RDOS tool and score below   0 to 2  minimal or no respiratory distress   3  mild distress  4 to 6 moderate distress  >7 severe distress      Anxiety:                             [ ]Mild [ ]Moderate [ ]Severe  Fatigue:                             [ ]Mild [ ]Moderate [ ]Severe  Nausea:                             [ ]Mild [ ]Moderate [ ]Severe  Loss of appetite:              [ ]Mild [ ]Moderate [ ]Severe  Constipation:                    [ ]Mild [ ]Moderate [ ]Severe    PCSSQ[Palliative Care Spiritual Screening Question]   Severity (0-10):  Score of 4 or > indicate consideration of Chaplaincy referral.  Chaplaincy Referral: [ ] yes [ ] refused [ ] following [ ] Deferred     Caregiver [place]? : [ ] yes [x ] no [ ] Deferred [ ] Declined             Social work referral [ ] Patient & Family Centered Care Referral [ ]     Anticipatory Grief present?:  [ ] yes [ ] no  [x ] Deferred                  Social work referral [ ] Chaplaincy Referral [ ]  		  Other Symptoms:  [ ]All other review of systems negative     Palliative Performance Status Version 2:   See PPSv2 tool and score below          PHYSICAL EXAM:  Vital Signs Last 24 Hrs  T(C): 36.5 (15 Jul 2024 12:07), Max: 36.5 (15 Jul 2024 12:07)  T(F): 97.7 (15 Jul 2024 12:07), Max: 97.7 (15 Jul 2024 12:07)  HR: 65 (15 Jul 2024 12:07) (65 - 75)  BP: 131/58 (15 Jul 2024 12:07) (112/80 - 146/80)  BP(mean): 90 (14 Jul 2024 17:25) (90 - 90)  RR: 18 (15 Jul 2024 12:07) (18 - 28)  SpO2: 96% (15 Jul 2024 12:07) (94% - 100%)    Parameters below as of 15 Jul 2024 12:07  Patient On (Oxygen Delivery Method): nasal cannula  O2 Flow (L/min): 4   I&O's Summary    GENERAL: [ ]Cachexia    [x ]Alert  [x ]Oriented   [ ]Lethargic  [ ]Unarousable  [x ]Verbal  [ ]Non-Verbal  Behavioral:   [ ] Anxiety  [ ] Delirium [ ] Agitation [ ] Other  HEENT:  [x ]Normal   [ ]Dry mouth   [ ]ET Tube/Trach  [ ]Oral lesions  PULMONARY:   [x ]Clear [ ]Tachypnea  [ ]Audible excessive secretions   [ ]Rhonchi        [ ]Right [ ]Left [ ]Bilateral  [ ]Crackles        [ ]Right [ ]Left [ ]Bilateral  [ ]Wheezing     [ ]Right [ ]Left [ ]Bilateral  [ ]Diminished breath sounds [ ]right [ ]left [ ]bilateral  CARDIOVASCULAR:    [x ]Regular [ ]Irregular [ ]Tachy  [ ]Allen [ ]Murmur [ ]Other  GASTROINTESTINAL:  [x ]Soft  [ ]Distended   [x ]+BS  [ ]Non tender [ ]Tender  [ ]Other [ ]PEG [ ]OGT/ NGT  Last BM: 7/15  GENITOURINARY:  [ ]Normal [ ] Incontinent   [ ]Oliguria/Anuria   [ ]Connor [x] purwick   MUSCULOSKELETAL:   [ ]Normal   [x ]Weakness  [ ]Bed/Wheelchair bound [ ]Edema  NEUROLOGIC:   [ ]No focal deficits  [ ]Cognitive impairment  [ ]Dysphagia [ ]Dysarthria [ ]Paresis [ ]Other   SKIN:   [x ]Normal  [ ]Rash  [ ]Other  [ ]Pressure ulcer(s)       Present on admission [ ]y [ ]n    CRITICAL CARE:  [ ] Shock Present  [ ]Septic [ ]Cardiogenic [ ]Neurologic [ ]Hypovolemic  [ ]  Vasopressors [ ]  Inotropes   [ ]Respiratory failure present [ ]Mechanical ventilation [ ]Non-invasive ventilatory support [ ]High flow    [ ]Acute  [ ]Chronic [ ]Hypoxic  [ ]Hypercarbic [ ]Other  [ ]Other organ failure     LABS:                        10.7   15.62 )-----------( 188      ( 15 Jul 2024 07:11 )             35.4   07-15    139  |  102  |  29<H>  ----------------------------<  149<H>  4.9   |  21<L>  |  0.51    Ca    9.2      15 Jul 2024 07:13  Mg     2.1     07-14    TPro  6.0  /  Alb  3.2<L>  /  TBili  0.3  /  DBili  x   /  AST  29  /  ALT  26  /  AlkPhos  116  07-14      Urinalysis Basic - ( 15 Jul 2024 07:13 )    Color: x / Appearance: x / SG: x / pH: x  Gluc: 149 mg/dL / Ketone: x  / Bili: x / Urobili: x   Blood: x / Protein: x / Nitrite: x   Leuk Esterase: x / RBC: x / WBC x   Sq Epi: x / Non Sq Epi: x / Bacteria: x      RADIOLOGY & ADDITIONAL STUDIES:     ACC: [id number] EXAM:  XR CHEST PORTABLE URGENT 1V   ORDERED BY: KRISH BARRIOS     ACC: [id number] EXAM:  XR CHEST PORTABLE URGENT 1V   ORDERED BY: NAS GARCES     PROCEDURE DATE:  07/14/2024      INTERPRETATION:  CLINICAL INDICATION: Chest pain    EXAM: 2 X-rays of the chest.    COMPARISON: Chest radiograph from 7/1/2024    FINDINGS:  Low lung volume.  Right mid lung field opacity.  There is no pleural effusion or pneumothorax.  The heart is not well evaluated in this position.  The visualized osseous structures demonstrate no acute pathology.  Status post loop recorder placement.    IMPRESSION:  Right lower lung pneumonia.    --- End of Report ---    EVERETT DANIELS DO; Resident Radiologist  This document has been electronically signed.  MYRTLE BROWN MD; Attending Interventional Radiologist  This document has been electronically signed. Jul 14 2024  9:03AM      PROTEIN CALORIE MALNUTRITION PRESENT: [ ]mild [ ]moderate [ ]severe [ ]underweight [ ]morbid obesity  https://www.andeal.org/vault/2440/web/files/ONC/Table_Clinical%20Characteristics%20to%20Document%20Malnutrition-White%20JV%20et%20al%202012.pdf    Height (cm): 157.5 (07-14-24 @ 05:57), 157.5 (07-01-24 @ 09:46), 157.5 (06-18-24 @ 17:12)  Weight (kg): 45.4 (07-01-24 @ 09:46), 54.4 (06-18-24 @ 17:12), 50.5 (05-10-24 @ 16:36)  BMI (kg/m2): 18.3 (07-14-24 @ 05:57), 18.3 (07-01-24 @ 09:46), 21.9 (06-18-24 @ 17:12)    [ ]PPSV2 < or = to 30% [ ]significant weight loss  [ ]poor nutritional intake  [ ]anasarca[ ]Artificial Nutrition      Other REFERRALS:  [ ]Hospice  [ ]Child Life  [ ]Social Work  [ ]Case management [ ]Holistic Therapy

## 2024-07-15 NOTE — DIETITIAN INITIAL EVALUATION ADULT - NUTRITIONGOAL OUTCOME1
PROGRESS  REPORT    Progress reporting period is from 4/5/21 to 7/1/21.       SUBJECTIVE  Subjective changes noted by patient:  Morro has attended 8 visits of PT. He reports his knee has been less sore and inflamed. He occasionally gets fatigued in his lateral lower leg. He reports symptoms do not alter his function and he barely notices it. He can row and squat without lingering pain. He reports a 99% improvement overall   Current pain level is 0/10  .     Previous pain level was 5/10.   Changes in function:  Yes (See Goal flowsheet attached for changes in current functional level)  Adverse reaction to treatment or activity: None    OBJECTIVE  Changes noted in objective findings:  Yes,       KNEE:     PROM:    L  R   Hyperextension 0 0   Extension 0 0   Flexion 155 no click 155      Strength:    L R   HIP       Flex 5/5 5/5   Ext 5/5 5/5   Abd 5/5 4+/5   KNEE       Flex 5/5 5/5   Ext 5/5 5/5      Hip PROM:      L R   IR 30 30   ER 60 60           Functional: normal     Gait: no apparent gait antalgia     Observation: no apparent edema or scabbing at tibial tubercle.         AROM: (Major, Moderate, Minimal or Nil loss)  Movement Loss Tato Mod Min Nil Pain   Flexion      x      Extension    x        Side Gliding L      x      Side Gliding R       x               ASSESSMENT/PLAN  Updated problem list and treatment plan: Diagnosis 1:  Acute right knee pain, patellofemoral arthritis   Impaired balance - home program  Decreased proprioception - home program  Impaired muscle performance - home program  Decreased function - home program  STG/LTGs have been met or progress has been made towards goals:  Yes (See Goal flow sheet completed today.)  Assessment of Progress: The patient's condition is improving.  Self Management Plans:  Patient is independent in a home treatment program.  I have re-evaluated this patient and find that the nature, scope, duration and intensity of the therapy is appropriate for the medical  condition of the patient.  Morro continues to require the following intervention to meet STG and LTG's:  PT intervention is no longer required to meet STG/LTG.    Recommendations:  This patient is ready to be discharged from therapy and continue their home treatment program.    Please refer to the daily flowsheet for treatment today, total treatment time and time spent performing 1:1 timed codes.         pt to meet 75% of estimated nutrient needs

## 2024-07-15 NOTE — CONSULT NOTE ADULT - SUBJECTIVE AND OBJECTIVE BOX
HPI:  89 year old female with pmh of COPD,on home O2, chronic R hemidiaphragm paralysis, h/o recurrent aspiration events, HTN, diverticulitis,  asthma/COPD, sarcoidosis, hypothyroidism, heart failure, presenting to ed w/ acute sob, came on suddenly, has happened before in past. BiPAP put on presentation. Initially tachypneic and however bipap improved her breathing and lowered her tachypnea. Saturating above 96%.            Pt lives at [facility] and reports er breathing was impaired but now improving. Denies SOB now  Did not have wheezing. Aide reports prior cough but none now and no sputum      Pt reports no dysphagia but as per aide at bedside she frequently coughs w/ liquids    Pt reports that she currently has no SOB, cough or sputum, denies fever  Denies wheezing    Reports dx w/ sarcoidosis in 1990s via bronch but denies recent tx  Pt unsure of diaphragmatic dysfxn    ## Labs:  CBC:                        10.7   15.62 )-----------( 188      ( 15 Jul 2024 07:11 )             35.4     Chem:  07-15    139  |  102  |  29<H>  ----------------------------<  149<H>  4.9   |  21<L>  |  0.51    Ca    9.2      15 Jul 2024 07:13  Mg     2.1     07-14    TPro  6.0  /  Alb  3.2<L>  /  TBili  0.3  /  DBili  x   /  AST  29  /  ALT  26  /  AlkPhos  116  07-14    Coags:    culture blood:  --  07-14 @ 09:10            culture sputum:     No growth at 24 hours           culture urine:  -- 07-14 @ 09:10  culture blood:  --  07-14 @ 08:40            culture sputum:     No growth at 24 hours           culture urine:  -- 07-14 @ 08:40           ## Medications:  piperacillin/tazobactam IVPB.. 3.375 Gram(s) IV Intermittent every 8 hours    furosemide    Tablet 20 milliGRAM(s) Oral daily  metoprolol succinate ER 50 milliGRAM(s) Oral daily    budesonide  80 MICROgram(s)/formoterol 4.5 MICROgram(s) Inhaler 2 Puff(s) Inhalation two times a day    atorvastatin 20 milliGRAM(s) Oral at bedtime  methylPREDNISolone sodium succinate Injectable 20 milliGRAM(s) IV Push three times a day    heparin   Injectable 5000 Unit(s) SubCutaneous every 12 hours    pantoprazole    Tablet 40 milliGRAM(s) Oral before breakfast  polyethylene glycol 3350 17 Gram(s) Oral daily  senna 2 Tablet(s) Oral at bedtime    FLUoxetine 20 milliGRAM(s) Oral daily  temazepam 15 milliGRAM(s) Oral at bedtime PRN      ## Vitals:  T(C): 36.5 (07-15-24 @ 12:07), Max: 36.5 (07-15-24 @ 12:07)  HR: 65 (07-15-24 @ 12:07) (65 - 71)  BP: 131/58 (07-15-24 @ 12:07) (131/58 - 146/80)  BP(mean): --  RR: 18 (07-15-24 @ 12:07) (18 - 19)  SpO2: 96% (07-15-24 @ 12:07) (94% - 96%)  Wt(kg): --  Vent:   ABG:           ## P/E:  Gen: lying comfortably in bed in no apparent distress, 3L NC  Mouth: mmm  Neck: no accessory muscle use  Lungs: b/l ae, no wheeze, +basal crackles  Heart: s1s2 reg no murmur  Abd: +BS soft nontender  Ext: no edema, r foot wound  Neuro: aox2, occ confused, cochran    < from: Xray Chest 1 View- PORTABLE-Urgent (Xray Chest 1 View- PORTABLE-Urgent .) (07.14.24 @ 07:39) >    ACC: [id number] EXAM:  XR CHEST PORTABLE URGENT 1V   ORDERED BY: KRISH BARRIOS     ACC: [id number] EXAM:  XR CHEST PORTABLE URGENT 1V   ORDERED BY: NAS GARCES     PROCEDURE DATE:  07/14/2024          INTERPRETATION:  CLINICAL INDICATION: Chest pain    EXAM: 2 X-rays of the chest.    COMPARISON: Chest radiograph from 7/1/2024    FINDINGS:  Low lung volume.  Right mid lung field opacity.  There is no pleural effusion or pneumothorax.  The heart is not well evaluated in this position.  The visualized osseous structures demonstrate no acute pathology.  Status post loop recorder placement.    IMPRESSION:  Right lower lung pneumonia.    --- End of Report ---          EVERETT DANIELS DO; Resident Radiologist  This document has been electronically signed.  MYRTLE BROWN MD; Attending Interventional Radiologist  This document has been electronically signed. Jul 14 2024  9:03AM    < end of copied text >

## 2024-07-15 NOTE — CONSULT NOTE ADULT - CONVERSATION DETAILS
GaP team introduced themselves to Luiza. Linda was at bedside.  She was living at [facility].  She has 3 daughters; Sandra who lives in [place], Deborah who is an  who lives in [place].  Luiza reported feeling find and she was not able to state why she was admitted to the hospital. She ambulates using her walker.  She enjoys playing [id number]. She has expressed she would like CPR and intubation.  She expressed wanting to have the same "quality" of life which she has now.  During brief discussion with her daughter Deborah, she would like the patient to be discharged as soon as possible. Deborah also shared that her  is a physician. GaP team pending further [facility] discussion with daughter Deborah. GaP team introduced themselves to Luiza. Linda was at bedside.  She was living at [facility].  She has 3 daughters; Sandra who lives in [place], Deborah who is an  who lives in [place].  Luiza reported feeling fine and she was not able to state why she was admitted to the hospital. She ambulates using her walker.  She enjoys playing bingo. She has expressed she would like CPR and intubation.  She expressed wanting to have the same "quality" of life which she has now.  During brief discussion with her daughter Deborah, she would like the patient to be discharged as soon as possible. Deborah also shared that her  is a physician. Adrienne team pending further [facility] discussion with daughter Deborah.    Update as of 4pm:  Adrienne had a follow up discussion with Deborah and Sandra.  Sandra is a form EMT.  Deborah and Sandra both expressed that their goal is to keep their mother out of the hospital.  They report at baseline she is confused but she is ambulatory with her rolling walker; however the patient has a fear of falling and is not walking less.  When asked how we can support their goal, they questioned what kind of support can be offered to prevent future hospitalizations.  GaP team discussed the underlying lung pathology is irreversible and each hospitalization is an additional insult. Patient was initially DNI. GaP team discussed that DNi cannot be without DNR. Daughters want to pursue a discussion with their mother about code status. GaP team introduced themselves to Luiza. Linda was at bedside.  She was living at [facility].  She has 3 daughters; Sandra who lives in [place], Deborah who is an  who lives in [place].  Luiza reported feeling fine and she was not able to state why she was admitted to the hospital. She ambulates using her walker.  She enjoys playing [employer]. In terms of advance care planning, pt has expressed she would like CPR and intubation performed, though she wants to recover or maintain the same quality of life which she has now. When asked if unlikely that she would make such recovery following an event that requires CPR/MV, pt states she would still want CPR/MV attempted for a chance at recovery. During brief discussion with her daughter Deborah, she would like the patient to be discharged as soon as possible. Deborah also shared that her  is a physician.      Update as of 4pm:  GaP had a follow up discussion with Deborah and Sandra, who state to be pt's healthcare POA.  Sandra is a form EMT.  Deborah and Sandra both expressed that their goal is to keep their mother out of the hospital.  They report at baseline pt is confused but she is ambulatory with her rolling walker; however the patient has a fear of falling and is not walking less.  When asked how we can support their goal, they questioned what kind of support can be offered to prevent future hospitalizations.  GaP team discussed the underlying lung pathology is irreversible and each hospitalization is an additional insult. The dtrs show an understanding of this but wants to explore resources in the medical field to prevent recurrent admission as much as possible. We discussed home palliative program for consideration. We also briefly discussed hospice transition, though it does not seem like hospice is concordant with their goals at this time. Code status discussed as well, GaP team discussed that DNI cannot be without DNR. Daughters would like to privately discuss this with their mother before pursuing MOLST completion.

## 2024-07-15 NOTE — CONSULT NOTE ADULT - SUBJECTIVE AND OBJECTIVE BOX
Patient is a 89y old  Female who presents with a chief complaint of sob (14 Jul 2024 10:32)    HPI:  patient recently discharged and came back with another bout of hypoxia  and she has a history of anxiety, htn , copd  sarcoidosis , cdiff on abx ,  (14 Jul 2024 09:53)      PAST MEDICAL & SURGICAL HISTORY:  HTN (hypertension)      Hypothyroidism      Osteoporosis      CAD (coronary artery disease)      COPD (chronic obstructive pulmonary disease)      Pulmonary sarcoidosis      H/O pyelonephritis      Acute diverticulitis          Social history:    FAMILY HISTORY:    REVIEW OF SYSTEMS  General:	Denies any malaise fatigue or chills. Fevers absent    Skin:No rash  	  Ophthalmologic:Denies any visual complaints,discharge redness or photophobia  	        Allergic/Immunologic:	No hives or rash   Allergies    iodinated radiocontrast agents (Anaphylaxis)    Intolerances        Antimicrobials:          Vital Signs Last 24 Hrs  T(C): 36.5 (15 Jul 2024 12:07), Max: 36.5 (15 Jul 2024 12:07)  T(F): 97.7 (15 Jul 2024 12:07), Max: 97.7 (15 Jul 2024 12:07)  HR: 65 (15 Jul 2024 12:07) (65 - 80)  BP: 131/58 (15 Jul 2024 12:07) (112/80 - 146/80)  BP(mean): 90 (14 Jul 2024 17:25) (90 - 90)  RR: 18 (15 Jul 2024 12:07) (18 - 28)  SpO2: 96% (15 Jul 2024 12:07) (94% - 100%)    Parameters below as of 15 Jul 2024 12:07  Patient On (Oxygen Delivery Method): nasal cannula  O2 Flow (L/min): 4                  Eyes:PERRL EOMI.NO discharge or conjunctival injection    ENMT:No sinus tenderness.No thrush.No pharyngeal exudate or erythema.Fair dental hygiene    Neck:Supple,No LN,no JVD      Respiratory:Good air entry bilaterally,CTA    Cardiovascular:S1 S2 wnl, No murmurs,rub or gallops    Gastrointestinal:Soft BS(+) no tenderness no masses ,No rebound or guarding    Genitourinary:No CVA tendereness     Rectal:    Extremities:No cyanosis,clubbing or edema.    Vascular:peripheral pulses felt    Neurological:AAO X 3,No grossly focal deficits                                   10.7   15.62 )-----------( 188      ( 15 Jul 2024 07:11 )             35.4         07-15    139  |  102  |  29<H>  ----------------------------<  149<H>  4.9   |  21<L>  |  0.51    Ca    9.2      15 Jul 2024 07:13  Mg     2.1     07-14    TPro  6.0  /  Alb  3.2<L>  /  TBili  0.3  /  DBili  x   /  AST  29  /  ALT  26  /  AlkPhos  116  07-14      RECENT CULTURES:      MICROBIOLOGY:          Radiology:      Assessment:        Recommendations and Plan:    Pager [phone]  After 5 pm/weekends or if no response :[phone]

## 2024-07-15 NOTE — CONSULT NOTE ADULT - PROBLEM/RECOMMENDATION-2
Post-Care Instructions: I reviewed with the patient in detail post-care instructions. Patient is to keep the biopsy site dry overnight, and then apply bacitracin twice daily until healed. Patient may apply hydrogen peroxide soaks to remove any crusting. DISPLAY PLAN FREE TEXT Render Post-Care Instructions In Note?: no Hemostasis: Drysol X Size Of Lesion In Cm (Optional): 0 Wound Care: Bacitracin Size Of Margin In Cm (Margins Are Not Added To Billing Dimensions): - Medical Necessity Clause: This procedure was medically necessary because the lesion that was treated was: Biopsy Method: Dermablade Notification Instructions: Patient will be notified of biopsy results. However, patient instructed to call the office if not contacted within 2 weeks. Anesthesia Type: 1% lidocaine without epinephrine Was A Bandage Applied: Yes Detail Level: Detailed Billing Type: Third-Party Bill Medical Necessity Information: It is in your best interest to select a reason for this procedure from the list below. All of these items fulfill various CMS LCD requirements except the new and changing color options. Consent was obtained from the patient. The risks and benefits to therapy were discussed in detail. Specifically, the risks of infection, scarring, bleeding, prolonged wound healing, incomplete removal, allergy to anesthesia, nerve injury and recurrence were addressed. Prior to the procedure, the treatment site was clearly identified and confirmed by the patient. All components of Universal Protocol/PAUSE Rule completed. Path Notes (To The Dermatopathologist): Please check margins.

## 2024-07-15 NOTE — DIETITIAN INITIAL EVALUATION ADULT - REASON INDICATOR FOR ASSESSMENT
Assessment  89 year old female with pmh of COPD,on home O2, chronic R hemidiaphragm paralysis, h/o recurrent aspiration events, HTN, diverticulitis,  asthma/COPD, sarcoidosis, hypothyroidism, heart failure, presenting to ed w/ acute sob, came on suddenly, has happened before in past.

## 2024-07-15 NOTE — PROVIDER CONTACT NOTE (CRITICAL VALUE NOTIFICATION) - ACTION/TREATMENT ORDERED:
Will continue to monitor
Discussed with provider. Likely inaccurate specimen. As per Dr. Baptiste, ok to hold off on redrawing VBG at this time.

## 2024-07-16 LAB
ANION GAP SERPL CALC-SCNC: 12 MMOL/L — SIGNIFICANT CHANGE UP (ref 5–17)
BASE EXCESS BLDV CALC-SCNC: 3.1 MMOL/L — HIGH (ref -2–3)
BUN SERPL-MCNC: 34 MG/DL — HIGH (ref 7–23)
CA-I SERPL-SCNC: 1.19 MMOL/L — SIGNIFICANT CHANGE UP (ref 1.15–1.33)
CALCIUM SERPL-MCNC: 8.5 MG/DL — SIGNIFICANT CHANGE UP (ref 8.4–10.5)
CHLORIDE BLDV-SCNC: 104 MMOL/L — SIGNIFICANT CHANGE UP (ref 96–108)
CHLORIDE SERPL-SCNC: 104 MMOL/L — SIGNIFICANT CHANGE UP (ref 96–108)
CO2 BLDV-SCNC: 29 MMOL/L — HIGH (ref 22–26)
CO2 SERPL-SCNC: 25 MMOL/L — SIGNIFICANT CHANGE UP (ref 22–31)
CREAT SERPL-MCNC: 0.72 MG/DL — SIGNIFICANT CHANGE UP (ref 0.5–1.3)
EGFR: 80 ML/MIN/1.73M2 — SIGNIFICANT CHANGE UP
GAS PNL BLDV: 137 MMOL/L — SIGNIFICANT CHANGE UP (ref 136–145)
GAS PNL BLDV: SIGNIFICANT CHANGE UP
GLUCOSE BLDV-MCNC: 159 MG/DL — HIGH (ref 70–99)
GLUCOSE SERPL-MCNC: 163 MG/DL — HIGH (ref 70–99)
HCO3 BLDV-SCNC: 28 MMOL/L — SIGNIFICANT CHANGE UP (ref 22–29)
HCT VFR BLD CALC: 26.9 % — LOW (ref 34.5–45)
HCT VFR BLDA CALC: 27 % — LOW (ref 34.5–46.5)
HGB BLD CALC-MCNC: 9.1 G/DL — LOW (ref 11.7–16.1)
HGB BLD-MCNC: 8.8 G/DL — LOW (ref 11.5–15.5)
LACTATE BLDV-MCNC: 2.4 MMOL/L — HIGH (ref 0.5–2)
MCHC RBC-ENTMCNC: 29.5 PG — SIGNIFICANT CHANGE UP (ref 27–34)
MCHC RBC-ENTMCNC: 32.7 GM/DL — SIGNIFICANT CHANGE UP (ref 32–36)
MCV RBC AUTO: 90.3 FL — SIGNIFICANT CHANGE UP (ref 80–100)
NRBC # BLD: 0 /100 WBCS — SIGNIFICANT CHANGE UP (ref 0–0)
PCO2 BLDV: 43 MMHG — HIGH (ref 39–42)
PH BLDV: 7.42 — SIGNIFICANT CHANGE UP (ref 7.32–7.43)
PLATELET # BLD AUTO: 194 K/UL — SIGNIFICANT CHANGE UP (ref 150–400)
PO2 BLDV: 175 MMHG — HIGH (ref 25–45)
POTASSIUM BLDV-SCNC: 4 MMOL/L — SIGNIFICANT CHANGE UP (ref 3.5–5.1)
POTASSIUM SERPL-MCNC: 4 MMOL/L — SIGNIFICANT CHANGE UP (ref 3.5–5.3)
POTASSIUM SERPL-SCNC: 4 MMOL/L — SIGNIFICANT CHANGE UP (ref 3.5–5.3)
RBC # BLD: 2.98 M/UL — LOW (ref 3.8–5.2)
RBC # FLD: 17.3 % — HIGH (ref 10.3–14.5)
SAO2 % BLDV: 98.7 % — HIGH (ref 67–88)
SODIUM SERPL-SCNC: 141 MMOL/L — SIGNIFICANT CHANGE UP (ref 135–145)
WBC # BLD: 13.86 K/UL — HIGH (ref 3.8–10.5)
WBC # FLD AUTO: 13.86 K/UL — HIGH (ref 3.8–10.5)

## 2024-07-16 PROCEDURE — 99232 SBSQ HOSP IP/OBS MODERATE 35: CPT

## 2024-07-16 PROCEDURE — 99233 SBSQ HOSP IP/OBS HIGH 50: CPT

## 2024-07-16 RX ORDER — ACETAMINOPHEN 325 MG
650 TABLET ORAL ONCE
Refills: 0 | Status: COMPLETED | OUTPATIENT
Start: 2024-07-16 | End: 2024-07-16

## 2024-07-16 RX ADMIN — Medication 20 MILLIGRAM(S): at 22:23

## 2024-07-16 RX ADMIN — HEPARIN SODIUM 5000 UNIT(S): 50 INJECTION, SOLUTION INTRAVENOUS at 05:07

## 2024-07-16 RX ADMIN — PANTOPRAZOLE SODIUM 40 MILLIGRAM(S): 40 INJECTION, POWDER, FOR SOLUTION INTRAVENOUS at 05:07

## 2024-07-16 RX ADMIN — MUPIROCIN 1 APPLICATION(S): 20 OINTMENT TOPICAL at 17:47

## 2024-07-16 RX ADMIN — ATORVASTATIN CALCIUM 20 MILLIGRAM(S): 20 TABLET, FILM COATED ORAL at 22:23

## 2024-07-16 RX ADMIN — FUROSEMIDE 20 MILLIGRAM(S): 10 INJECTION, SOLUTION INTRAMUSCULAR; INTRAVENOUS at 05:07

## 2024-07-16 RX ADMIN — PIPERACILLIN SODIUM AND TAZOBACTAM SODIUM 25 GRAM(S): 3; .375 INJECTION, POWDER, LYOPHILIZED, FOR SOLUTION INTRAVENOUS at 13:58

## 2024-07-16 RX ADMIN — Medication 650 MILLIGRAM(S): at 03:29

## 2024-07-16 RX ADMIN — HEPARIN SODIUM 5000 UNIT(S): 50 INJECTION, SOLUTION INTRAVENOUS at 17:47

## 2024-07-16 RX ADMIN — PIPERACILLIN SODIUM AND TAZOBACTAM SODIUM 25 GRAM(S): 3; .375 INJECTION, POWDER, LYOPHILIZED, FOR SOLUTION INTRAVENOUS at 05:06

## 2024-07-16 RX ADMIN — Medication 20 MILLIGRAM(S): at 08:53

## 2024-07-16 RX ADMIN — PIPERACILLIN SODIUM AND TAZOBACTAM SODIUM 25 GRAM(S): 3; .375 INJECTION, POWDER, LYOPHILIZED, FOR SOLUTION INTRAVENOUS at 22:23

## 2024-07-16 RX ADMIN — Medication 20 MILLIGRAM(S): at 05:08

## 2024-07-16 RX ADMIN — MUPIROCIN 1 APPLICATION(S): 20 OINTMENT TOPICAL at 05:07

## 2024-07-16 RX ADMIN — Medication 50 MILLIGRAM(S): at 05:07

## 2024-07-16 RX ADMIN — TEMAZEPAM 15 MILLIGRAM(S): 30 CAPSULE ORAL at 23:29

## 2024-07-16 RX ADMIN — POLYETHYLENE GLYCOL 3350 17 GRAM(S): 1 POWDER ORAL at 08:54

## 2024-07-16 RX ADMIN — Medication 20 MILLIGRAM(S): at 14:02

## 2024-07-16 RX ADMIN — Medication 2 PUFF(S): at 17:47

## 2024-07-16 RX ADMIN — Medication 2 PUFF(S): at 05:11

## 2024-07-16 RX ADMIN — Medication 650 MILLIGRAM(S): at 04:30

## 2024-07-16 NOTE — PHYSICAL THERAPY INITIAL EVALUATION ADULT - NSPTDISCHREC_GEN_A_CORE
If pt d/c home would require home PT, assist with ALL mobility, & DME: RW, hospital bed, 3:1 commode, & w/c./Sub-acute Rehab

## 2024-07-16 NOTE — PHYSICAL THERAPY INITIAL EVALUATION ADULT - ADDITIONAL COMMENTS
Pt resides at [facility], states she was using rollator for ambulation and was independent functionally, required assist with some ADL's.

## 2024-07-16 NOTE — PROGRESS NOTE ADULT - ASSESSMENT
89 year old well known to me, seen many times over the last several years for recurrent episodes of pna, uti, confusion and diverticulitis and possible ischemic bowel   Pt has    Sarcoid  COPD  CHF  CAD  Pyelo  Diverticulitis   Presents again from assisted living with sob, which she currently denies   no hs of dysuria  chest xray  right lower lobe  mental status is at baseline  zosyn day 2  for aspiration pna  will switch to po ab if she continues to improve

## 2024-07-16 NOTE — PROGRESS NOTE ADULT - ASSESSMENT
89 year old female with pmh of COPD,on home O2 2L NC, chronic R hemidiaphragm paralysis (unclear etiology), h/o recurrent witnessed aspiration events, COPD, sarcoidosis (reportedly remote dx but not on active tx), hypothyroidism, heart failure p/w AHRF and PNA.    Clinically improving and lactate now imrpoving as well.     Pt w/ witnessed recurrent aspiration events - remains likely cause of her RLL pna    - cont abx as per ID  - check sputum cx, bcx  - cont to monitor VS and lactate  - swallow eval if not done   - BD tx prn  - cont symbicort inh bid  - taper down steroids  - cont diuretics as per cardiology  - dvt ppx  - GOC discussions

## 2024-07-16 NOTE — PHYSICAL THERAPY INITIAL EVALUATION ADULT - PERTINENT HX OF CURRENT PROBLEM, REHAB EVAL
Pt is 89 y.o. F with PMH  anxiety, htn , copd  sarcoidosis , cdiff on abx who presents with a chief complaint of acute respiratory distress, admitted for PNA.   7/14 CXR - RLL PNA.

## 2024-07-17 PROCEDURE — 99233 SBSQ HOSP IP/OBS HIGH 50: CPT

## 2024-07-17 PROCEDURE — 99232 SBSQ HOSP IP/OBS MODERATE 35: CPT

## 2024-07-17 RX ORDER — ACETAMINOPHEN 325 MG
650 TABLET ORAL ONCE
Refills: 0 | Status: COMPLETED | OUTPATIENT
Start: 2024-07-17 | End: 2024-07-17

## 2024-07-17 RX ORDER — PREDNISONE 10 MG/1
TABLET ORAL
Refills: 0 | Status: DISCONTINUED | OUTPATIENT
Start: 2024-07-17 | End: 2024-07-18

## 2024-07-17 RX ORDER — PREDNISONE 10 MG/1
20 TABLET ORAL DAILY
Refills: 0 | Status: CANCELLED | OUTPATIENT
Start: 2024-07-21 | End: 2024-07-18

## 2024-07-17 RX ORDER — PANTOPRAZOLE SODIUM 40 MG/10ML
1 INJECTION, POWDER, FOR SOLUTION INTRAVENOUS
Qty: 30 | Refills: 0 | DISCHARGE

## 2024-07-17 RX ORDER — METOPROLOL TARTRATE 50 MG
1 TABLET ORAL
Qty: 30 | Refills: 0 | DISCHARGE

## 2024-07-17 RX ORDER — PREDNISONE 10 MG/1
10 TABLET ORAL DAILY
Refills: 0 | Status: CANCELLED | OUTPATIENT
Start: 2024-07-23 | End: 2024-07-18

## 2024-07-17 RX ORDER — PREDNISONE 10 MG/1
40 TABLET ORAL DAILY
Refills: 0 | Status: COMPLETED | OUTPATIENT
Start: 2024-07-17 | End: 2024-07-18

## 2024-07-17 RX ORDER — PREDNISONE 10 MG/1
30 TABLET ORAL DAILY
Refills: 0 | Status: DISCONTINUED | OUTPATIENT
Start: 2024-07-19 | End: 2024-07-18

## 2024-07-17 RX ORDER — AMOXICILLIN/POTASSIUM CLAV 250-125 MG
1 TABLET ORAL
Refills: 0 | Status: DISCONTINUED | OUTPATIENT
Start: 2024-07-18 | End: 2024-07-18

## 2024-07-17 RX ADMIN — PANTOPRAZOLE SODIUM 40 MILLIGRAM(S): 40 INJECTION, POWDER, FOR SOLUTION INTRAVENOUS at 06:39

## 2024-07-17 RX ADMIN — HEPARIN SODIUM 5000 UNIT(S): 50 INJECTION, SOLUTION INTRAVENOUS at 17:08

## 2024-07-17 RX ADMIN — Medication 650 MILLIGRAM(S): at 06:38

## 2024-07-17 RX ADMIN — Medication 2 TABLET(S): at 21:58

## 2024-07-17 RX ADMIN — Medication 50 MILLIGRAM(S): at 05:51

## 2024-07-17 RX ADMIN — HEPARIN SODIUM 5000 UNIT(S): 50 INJECTION, SOLUTION INTRAVENOUS at 05:50

## 2024-07-17 RX ADMIN — Medication 20 MILLIGRAM(S): at 12:29

## 2024-07-17 RX ADMIN — PIPERACILLIN SODIUM AND TAZOBACTAM SODIUM 25 GRAM(S): 3; .375 INJECTION, POWDER, LYOPHILIZED, FOR SOLUTION INTRAVENOUS at 21:58

## 2024-07-17 RX ADMIN — Medication 20 MILLIGRAM(S): at 13:09

## 2024-07-17 RX ADMIN — PIPERACILLIN SODIUM AND TAZOBACTAM SODIUM 25 GRAM(S): 3; .375 INJECTION, POWDER, LYOPHILIZED, FOR SOLUTION INTRAVENOUS at 13:09

## 2024-07-17 RX ADMIN — MUPIROCIN 1 APPLICATION(S): 20 OINTMENT TOPICAL at 17:08

## 2024-07-17 RX ADMIN — Medication 20 MILLIGRAM(S): at 05:46

## 2024-07-17 RX ADMIN — PIPERACILLIN SODIUM AND TAZOBACTAM SODIUM 25 GRAM(S): 3; .375 INJECTION, POWDER, LYOPHILIZED, FOR SOLUTION INTRAVENOUS at 05:45

## 2024-07-17 RX ADMIN — TEMAZEPAM 15 MILLIGRAM(S): 30 CAPSULE ORAL at 22:49

## 2024-07-17 RX ADMIN — Medication 2 PUFF(S): at 17:07

## 2024-07-17 RX ADMIN — Medication 2 PUFF(S): at 05:45

## 2024-07-17 RX ADMIN — MUPIROCIN 1 APPLICATION(S): 20 OINTMENT TOPICAL at 05:51

## 2024-07-17 RX ADMIN — ATORVASTATIN CALCIUM 20 MILLIGRAM(S): 20 TABLET, FILM COATED ORAL at 21:58

## 2024-07-17 RX ADMIN — FUROSEMIDE 20 MILLIGRAM(S): 10 INJECTION, SOLUTION INTRAMUSCULAR; INTRAVENOUS at 05:51

## 2024-07-17 NOTE — OCCUPATIONAL THERAPY INITIAL EVALUATION ADULT - LIVES WITH, PROFILE
Pt admitted from senior care where she required assist for all ADL/mobility & was able to ambulate short distances with rollator and assist

## 2024-07-17 NOTE — DISCHARGE NOTE PROVIDER - PROVIDER TOKENS
PROVIDER:[TOKEN:[[id number]],FOLLOWUP:[1-3 days],ESTABLISHEDPATIENT:[T]],PROVIDER:[TOKEN:[[id number]],FOLLOWUP:[1 week],ESTABLISHEDPATIENT:[T]]

## 2024-07-17 NOTE — PROGRESS NOTE ADULT - ASSESSMENT
89 year old female with pmh of COPD,on home O2 2L NC, chronic R hemidiaphragm paralysis (unclear etiology), h/o recurrent witnessed aspiration events, COPD, sarcoidosis (reportedly remote dx but not on active tx), hypothyroidism, heart failure p/w AHRF and PNA.    Clinically improving and lactate now improving as well.   Pt w/ witnessed recurrent aspiration events - remains likely cause of her RLL pna    - cont abx as per ID  - swallow eval if not done   - BD tx prn  - cont symbicort inh bid  - taper down steroids, changing to po tomorrow  - cont diuretics as per cardiology  - dvt ppx  - sarcoidosis dx 30 years likely not active disease but can f/u in pulmonary clinic for monitoring

## 2024-07-17 NOTE — DISCHARGE NOTE PROVIDER - DETAILS OF MALNUTRITION DIAGNOSIS/DIAGNOSES
This patient has been assessed with a concern for Malnutrition and was treated during this hospitalization for the following Nutrition diagnosis/diagnoses:     -  07/15/2024: Severe protein-calorie malnutrition   -  07/15/2024: Underweight (BMI < 19)

## 2024-07-17 NOTE — OCCUPATIONAL THERAPY INITIAL EVALUATION ADULT - NSOTDISCHREC_GEN_A_CORE
If pt returns to [facility], will require assist for ALL ADL/transfers/mobility & home OT to address ADL/IADL, AE/DME, and fall prevention./Sub-acute Rehab

## 2024-07-17 NOTE — DISCHARGE NOTE PROVIDER - HOSPITAL COURSE
HPI:  patient recently discharged and came back with another bout of hypoxia  and she has a history of anxiety, htn , copd  sarcoidosis , cdiff on abx ,  (14 Jul 2024 09:53)    Hospital Course:  copd   sarcodosis   Pt is well known to the service.    on iv steroids  pulmonary consult   id called will start  abx for aspiration pneumonia   htn  cardiology following   continue meds fot htn     anxiety on mirtazipine and  restoril daily   family spoek to palliative patient is a full code  - cont abx as per ID  - check sputum cx, bcx  - cont to monitor VS and lactate  - swallow eval if not done   - BD tx prn  - cont symbicort inh bid  - taper down steroids  - cont diuretics as per cardiology  - dvt ppx  - GOC discussions     89 year old well known to me, seen many times over the last several years for recurrent episodes of pna, uti, confusion and diverticulitis and possible ischemic bowel   Pt has    Sarcoid  COPD  CHF  CAD  Pyelo  Diverticulitis   Presents again from assisted living with sob, which she currently denies   no hs of dysuria  chest xray  right lower lobe  mental status is at baseline  zosyn day 2  for aspiration pna  will switch to po ab if she continues to improve      Important Medication Changes and Reason:    Active or Pending Issues Requiring Follow-up:  Follow up with her pulmonologist, Dr. Earle Claudio, on Monday July 22, 2024.    Advanced Directives:   [ x] Full code  [ ] DNR  [ ] Hospice    Discharge Diagnoses:  Acute Respiratory Distress   COPD Exacerbation        HPI:  patient recently discharged and came back with another bout of hypoxia  and she has a history of anxiety, htn , copd  sarcoidosis , cdiff on abx ,  (14 Jul 2024 09:53)    Hospital Course:  copd   sarcodosis   Pt is well known to the service.  ID and Pulmonary were consulted.  Pt was started on IV solumedrol and IV Zosyn was started in the hospital. Zosyn was   started for aspiration pneumonia.  Pt will continue  mirtazipine and restoril daily for anxiety.  Palliative Care spoke with the family.  Pt is a full code.  Seen by Cardiology. Continue present home medications.      Pt will be discharged back to assisted living on Prednisone taper and augmentin to complete abx course.  She will follow up with her Pulmonologist, Dr. Avila within the week.        Important Medication Changes and Reason:    Active or Pending Issues Requiring Follow-up:  Follow up with her pulmonologist, Dr. Earle Claudio, on Monday July 22, 2024.    Advanced Directives:   [ x] Full code  [ ] DNR  [ ] Hospice    Discharge Diagnoses:  Acute Respiratory Distress   COPD Exacerbation        HPI:  patient recently discharged and came back with another bout of hypoxia  and she has a history of anxiety, htn , copd  sarcoidosis , cdiff on abx ,  (14 Jul 2024 09:53)    Hospital Course:  copd   sarcodosis   Pt is well known to the service.  ID and Pulmonary were consulted.  Pt was started on IV solumedrol and IV Zosyn was started in the hospital. Zosyn was   started for aspiration pneumonia.  Pt will continue  Mirtazapine and restoril daily for anxiety.  Palliative Care spoke with the family.  Pt is a full code.  Seen by Cardiology. Continue present home medications.      Pt will be discharged back to assisted living on Prednisone taper and Augmentin to complete abx course.  She will follow up with her Pulmonologist, Dr. Avila within the week.        Important Medication Changes and Reason:  Augmentin and prednisone - new meds     Active or Pending Issues Requiring Follow-up:  Follow up with her pulmonologist, Dr. Earle Claudio, on Monday July 22, 2024.    Advanced Directives:   [ x] Full code  [ ] DNR  [ ] Hospice    Discharge Diagnoses:  Acute Respiratory Distress   COPD Exacerbation        HPI:  patient recently discharged and came back with another bout of hypoxia  and she has a history of anxiety, htn , copd  sarcoidosis , cdiff on abx ,  (14 Jul 2024 09:53)    Hospital Course:  copd   sarcodosis   Pt is well known to the service.  ID and Pulmonary were consulted.  Pt was started on IV solumedrol and IV Zosyn was started in the hospital. Zosyn was   started for aspiration pneumonia.  Pt will continue  Mirtazapine and restoril daily for anxiety.  Palliative Care spoke with the family.  Pt is a full code.  Seen by Cardiology. Continue present home medications.    Pt will be discharged back to assisted living on Prednisone taper and Augmentin to complete abx course.  She will follow up with her Pulmonologist, Dr. Avila within the week.        Important Medication Changes and Reason:  Augmentin and prednisone - new meds     Active or Pending Issues Requiring Follow-up:  Follow up with her pulmonologist, Dr. Earle Claudio, on Monday July 22, 2024.    Advanced Directives:   [ x] Full code  [ ] DNR  [ ] Hospice    Discharge Diagnoses:  Acute Respiratory Distress   COPD Exacerbation

## 2024-07-17 NOTE — DISCHARGE NOTE PROVIDER - NSDCCPCAREPLAN_GEN_ALL_CORE_FT
PRINCIPAL DISCHARGE DIAGNOSIS  Diagnosis: Acute respiratory distress  Assessment and Plan of Treatment: Pulmonary consulted.   ID Consulted.   s/p IV abx; will complete abx course with PO augmentin.   s/p IV steroids.  Will complete course with PO prednisone.   Follow up with Dr. Earle Avila on Monday 7/22/24.

## 2024-07-17 NOTE — OCCUPATIONAL THERAPY INITIAL EVALUATION ADULT - NSOTDMEREC_GEN_A_CORE
If returning to [facility], recommend hospital bed & 3:1 commode. Pt's HHA states she owns rollator and w/c.

## 2024-07-17 NOTE — OCCUPATIONAL THERAPY INITIAL EVALUATION ADULT - PERTINENT HX OF CURRENT PROBLEM, REHAB EVAL
89 year old female with pmh of Copd, on home O2, chronic R hemidiaphragm paralysis, h/o recurrent aspiration events, HTN, diverticulitis,  asthma/COPD, sarcoidosis, hypothyroidism, heart failure, presenting to ed w/ acute sob, came on suddenly, has happened before in past. BiPAP put on presentation. Initially tachypneic and however bipap improved her breathing and lowered her tachypnea. Saturating above 96%.

## 2024-07-17 NOTE — CHART NOTE - NSCHARTNOTEFT_GEN_A_CORE
89 year old female with pmh of COPD,on home O2, chronic R hemidiaphragm paralysis, h/o recurrent aspiration events, HTN, diverticulitis,  asthma/COPD, sarcoidosis, hypothyroidism, heart failure, presenting to ed w/ acute sob, came on suddenly, has happened before in past.   Pt is scheduled for discharge back to assisted living however she will need a semi-electric hospital bed due to her deconditioning secondary to her chronic COPD Exacerbation (ICD-10 Code:  J44.1).  Pt requires the head of the bed to be elevated more than 30 degreees.  Pillows and wedges are not effective.  Pt requires positioning of the body in ways not feasible with an ordinary bed.  Patient requires frequent repositioning to alleviate pain.  The member can independently affect the adjustment by operating the control.       Ana Reeder DNP, ANP-BC  [facility]   Department of Medicine

## 2024-07-17 NOTE — DISCHARGE NOTE PROVIDER - CARE PROVIDER_API CALL
Earle Avila [place]  Pulmonary Disease  [address] [place] [id number]  Phone: [phone]  Fax: [phone]  Established Patient  Follow Up Time: 1-3 days    Garry Hermosillo  [address] [address]  Phone: [phone]  Fax: [phone]  Established Patient  Follow Up Time: 1 week

## 2024-07-17 NOTE — OCCUPATIONAL THERAPY INITIAL EVALUATION ADULT - DIAGNOSIS, OT EVAL
Pt presents with decreased strength, endurance, & balance, all impacting ability to perform ADLs and functional mobility. Oscar

## 2024-07-17 NOTE — PROGRESS NOTE ADULT - ASSESSMENT
89 year old well known to me, seen many times over the last several years for recurrent episodes of pna, uti, confusion and diverticulitis and possible ischemic bowel   Pt has    Sarcoid  COPD  CHF  CAD  Pyelo  Diverticulitis   Presents again from assisted living with sob, which she currently denies   no hs of dysuria  chest xray  right lower lobe  mental status is at baseline  zosyn day 3  for aspiration pna  will switch to po ab if she continues to improve  but is at risk for recurrent aspiration   can discharge to complete 8 days with AUGMENTIN when otherwise ready

## 2024-07-17 NOTE — GOALS OF CARE CONVERSATION - ADVANCED CARE PLANNING - CONVERSATION DETAILS
palliative care referral , future hospital stay and also recurrent admission in the future . advised she needs a speech and swallow eval . the daughter refused . also spoke to palliative who made the [facility] aware that she cannot be dni without  dnr

## 2024-07-17 NOTE — DISCHARGE NOTE PROVIDER - NSDCFUSCHEDAPPT_GEN_ALL_CORE_FT
[facility]  OPHTHALM [address] [facility]  Scheduled Appointment: 07/23/2024

## 2024-07-17 NOTE — DISCHARGE NOTE PROVIDER - NSDCFUADDAPPT_GEN_ALL_CORE_FT
APPTS ARE READY TO BE MADE: [x ] YES    Best Family or Patient Contact (if needed):    Additional Information about above appointments (if needed):    1:Need an appointment with her Pulmonologist, Dr. Earle Avila no later than Monday, July 22nd, 2024.      2:   3:     Other comments or requests:    APPTS ARE READY TO BE MADE: [x ] YES    Best Family or Patient Contact (if needed):    Additional Information about above appointments (if needed):    1:Need an appointment with her Pulmonologist, Dr. Earle Avila no later than Monday, July 22nd, 2024.      2:   3:     Other comments or requests:   Patient advises they do not want our assistance and prefer to coordinate the non - [facility] appointments on their own. No information was provided to the patient.

## 2024-07-17 NOTE — OCCUPATIONAL THERAPY INITIAL EVALUATION ADULT - RANGE OF MOTION EXAMINATION, UPPER EXTREMITY
b/l shoulder flexion AROM limited to <1/2 range due to arthritis baseline. Elbow/wrist/digits WFL except L digit extension limited due to arthritis. Educated on gentle stretching to achieve full ROM

## 2024-07-17 NOTE — DISCHARGE NOTE PROVIDER - NSDCMRMEDTOKEN_GEN_ALL_CORE_FT
atorvastatin 20 mg oral tablet: 1 tab(s) orally once a day (at bedtime)  budesonide-formoterol 80 mcg-4.5 mcg/inh inhalation aerosol: 2 puff(s) inhaled 2 times a day  FLUoxetine 20 mg oral capsule: 1 cap(s) orally once a day  furosemide 20 mg oral tablet: 1 tab(s) orally once a day  ipratropium-albuterol 0.5 mg-2.5 mg/3 mL inhalation solution: 3 milliliter(s) inhaled every 6 hours  metoprolol succinate 50 mg oral tablet, extended release: 1 tab(s) orally once a day  mirtazapine 7.5 mg oral tablet: 1 tab(s) orally once a day  mupirocin 2% topical ointment: Apply topically to affected area once a day  pantoprazole 40 mg oral delayed release tablet: 1 tab(s) orally once a day (before a meal)  polyethylene glycol 3350 oral powder for reconstitution: 17 gram(s) orally once a day  senna leaf extract oral tablet: 2 tab(s) orally once a day (at bedtime)  sodium chloride 0.65% nasal spray: 2 spray(s) nasal 3 times a day  temazepam 30 mg oral capsule: 1 cap(s) orally once a day (at bedtime) as per [facility] med record sent with the patient  Tylenol 500 mg oral tablet: 2 tab(s) orally once a day as needed for pain   atorvastatin 20 mg oral tablet: 1 tab(s) orally once a day (at bedtime)  budesonide-formoterol 80 mcg-4.5 mcg/inh inhalation aerosol: 2 puff(s) inhaled 2 times a day  FLUoxetine 20 mg oral capsule: 1 cap(s) orally once a day  furosemide 20 mg oral tablet: 1 tab(s) orally once a day  ipratropium-albuterol 0.5 mg-2.5 mg/3 mL inhalation solution: 3 milliliter(s) inhaled every 6 hours  metoprolol succinate 50 mg oral tablet, extended release: 1 tab(s) orally once a day  mirtazapine 7.5 mg oral tablet: 1 tab(s) orally once a day  mupirocin 2% topical ointment: Apply topically to affected area once a day  pantoprazole 40 mg oral delayed release tablet: 1 tab(s) orally once a day (before a meal)  polyethylene glycol 3350 oral powder for reconstitution: 17 gram(s) orally once a day  [facility] Bed: ICD-10 Code J44.1 (COPD Exacerbation)       Ht: 5&#x27;2&quot;    Wt: 97 lbs.     COLTON:  99      Directions:  Use as directed. MDD: 1  senna leaf extract oral tablet: 2 tab(s) orally once a day (at bedtime)  sodium chloride 0.65% nasal spray: 2 spray(s) nasal 3 times a day  temazepam 30 mg oral capsule: 1 cap(s) orally once a day (at bedtime) as per [facility] med record sent with the patient  Tylenol 500 mg oral tablet: 2 tab(s) orally once a day as needed for pain   atorvastatin 20 mg oral tablet: 1 tab(s) orally once a day (at bedtime)  budesonide-formoterol 80 mcg-4.5 mcg/inh inhalation aerosol: 2 puff(s) inhaled 2 times a day  FLUoxetine 20 mg oral capsule: 1 cap(s) orally once a day  furosemide 20 mg oral tablet: 1 tab(s) orally once a day  ipratropium-albuterol 0.5 mg-2.5 mg/3 mL inhalation solution: 3 milliliter(s) inhaled every 6 hours  metoprolol succinate 50 mg oral tablet, extended release: 1 tab(s) orally once a day  mupirocin 2% topical ointment: Apply topically to affected area once a day  pantoprazole 40 mg oral delayed release tablet: 1 tab(s) orally once a day (before a meal)  polyethylene glycol 3350 oral powder for reconstitution: 17 gram(s) orally once a day  [facility] Bed: ICD-10 Code J44.1 (COPD Exacerbation)       Ht: 5&#x27;2&quot;    Wt: 97 lbs.     COLTON:  99      Directions:  Use as directed. MDD: 1  senna leaf extract oral tablet: 2 tab(s) orally once a day (at bedtime)  sodium chloride 0.65% nasal spray: 2 spray(s) nasal 3 times a day   amoxicillin-clavulanate 875 mg-125 mg oral tablet: 1 tab(s) orally 2 times a day 1st dose starting tonight  atorvastatin 20 mg oral tablet: 1 tab(s) orally once a day (at bedtime)  budesonide-formoterol 80 mcg-4.5 mcg/inh inhalation aerosol: 2 puff(s) inhaled 2 times a day  FLUoxetine 20 mg oral capsule: 1 cap(s) orally once a day  furosemide 20 mg oral tablet: 1 tab(s) orally once a day  ipratropium-albuterol 0.5 mg-2.5 mg/3 mL inhalation solution: 3 milliliter(s) inhaled every 6 hours  metoprolol succinate 50 mg oral tablet, extended release: 1 tab(s) orally once a day  mupirocin 2% topical ointment: Apply topically to affected area once a day  pantoprazole 40 mg oral delayed release tablet: 1 tab(s) orally once a day (before a meal)  polyethylene glycol 3350 oral powder for reconstitution: 17 gram(s) orally once a day  predniSONE 10 mg oral tablet: 1 tab(s) orally once a day 30mg on 7/19-7/20, 20mg from 7/21-7/22, 10mg from 7/23-7/24  senna leaf extract oral tablet: 2 tab(s) orally once a day (at bedtime)  sodium chloride 0.65% nasal spray: 2 spray(s) nasal 3 times a day  temazepam 15 mg oral capsule: 1 cap(s) orally once a day (at bedtime) as needed for Insomnia MDD: 1 tab

## 2024-07-18 ENCOUNTER — TRANSCRIPTION ENCOUNTER (OUTPATIENT)
Age: 89
End: 2024-07-18

## 2024-07-18 VITALS
HEART RATE: 68 BPM | RESPIRATION RATE: 18 BRPM | DIASTOLIC BLOOD PRESSURE: 68 MMHG | SYSTOLIC BLOOD PRESSURE: 156 MMHG | OXYGEN SATURATION: 96 % | TEMPERATURE: 98 F

## 2024-07-18 LAB
HCT VFR BLD CALC: 32.5 % — LOW (ref 34.5–45)
HGB BLD-MCNC: 10 G/DL — LOW (ref 11.5–15.5)
MCHC RBC-ENTMCNC: 28.2 PG — SIGNIFICANT CHANGE UP (ref 27–34)
MCHC RBC-ENTMCNC: 30.8 GM/DL — LOW (ref 32–36)
MCV RBC AUTO: 91.8 FL — SIGNIFICANT CHANGE UP (ref 80–100)
NRBC # BLD: 0 /100 WBCS — SIGNIFICANT CHANGE UP (ref 0–0)
PLATELET # BLD AUTO: 185 K/UL — SIGNIFICANT CHANGE UP (ref 150–400)
RBC # BLD: 3.54 M/UL — LOW (ref 3.8–5.2)
RBC # FLD: 17.2 % — HIGH (ref 10.3–14.5)
WBC # BLD: 13.58 K/UL — HIGH (ref 3.8–10.5)
WBC # FLD AUTO: 13.58 K/UL — HIGH (ref 3.8–10.5)

## 2024-07-18 PROCEDURE — 97162 PT EVAL MOD COMPLEX 30 MIN: CPT

## 2024-07-18 PROCEDURE — 84295 ASSAY OF SERUM SODIUM: CPT

## 2024-07-18 PROCEDURE — 82803 BLOOD GASES ANY COMBINATION: CPT

## 2024-07-18 PROCEDURE — 87641 MR-STAPH DNA AMP PROBE: CPT

## 2024-07-18 PROCEDURE — 82435 ASSAY OF BLOOD CHLORIDE: CPT

## 2024-07-18 PROCEDURE — 94660 CPAP INITIATION&MGMT: CPT

## 2024-07-18 PROCEDURE — 97165 OT EVAL LOW COMPLEX 30 MIN: CPT

## 2024-07-18 PROCEDURE — 85025 COMPLETE CBC W/AUTO DIFF WBC: CPT

## 2024-07-18 PROCEDURE — 87040 BLOOD CULTURE FOR BACTERIA: CPT

## 2024-07-18 PROCEDURE — 80053 COMPREHEN METABOLIC PANEL: CPT

## 2024-07-18 PROCEDURE — 96374 THER/PROPH/DIAG INJ IV PUSH: CPT

## 2024-07-18 PROCEDURE — 82947 ASSAY GLUCOSE BLOOD QUANT: CPT

## 2024-07-18 PROCEDURE — 94640 AIRWAY INHALATION TREATMENT: CPT

## 2024-07-18 PROCEDURE — 82330 ASSAY OF CALCIUM: CPT

## 2024-07-18 PROCEDURE — 85027 COMPLETE CBC AUTOMATED: CPT

## 2024-07-18 PROCEDURE — 83735 ASSAY OF MAGNESIUM: CPT

## 2024-07-18 PROCEDURE — 83880 ASSAY OF NATRIURETIC PEPTIDE: CPT

## 2024-07-18 PROCEDURE — 80048 BASIC METABOLIC PNL TOTAL CA: CPT

## 2024-07-18 PROCEDURE — 84145 PROCALCITONIN (PCT): CPT

## 2024-07-18 PROCEDURE — 96375 TX/PRO/DX INJ NEW DRUG ADDON: CPT

## 2024-07-18 PROCEDURE — 84132 ASSAY OF SERUM POTASSIUM: CPT

## 2024-07-18 PROCEDURE — 87640 STAPH A DNA AMP PROBE: CPT

## 2024-07-18 PROCEDURE — 71045 X-RAY EXAM CHEST 1 VIEW: CPT

## 2024-07-18 PROCEDURE — 99285 EMERGENCY DEPT VISIT HI MDM: CPT | Mod: 25

## 2024-07-18 PROCEDURE — 82962 GLUCOSE BLOOD TEST: CPT

## 2024-07-18 PROCEDURE — 82553 CREATINE MB FRACTION: CPT

## 2024-07-18 PROCEDURE — 84484 ASSAY OF TROPONIN QUANT: CPT

## 2024-07-18 PROCEDURE — 36415 COLL VENOUS BLD VENIPUNCTURE: CPT

## 2024-07-18 PROCEDURE — 85018 HEMOGLOBIN: CPT

## 2024-07-18 PROCEDURE — 87637 SARSCOV2&INF A&B&RSV AMP PRB: CPT

## 2024-07-18 PROCEDURE — 85014 HEMATOCRIT: CPT

## 2024-07-18 PROCEDURE — 82550 ASSAY OF CK (CPK): CPT

## 2024-07-18 PROCEDURE — 83605 ASSAY OF LACTIC ACID: CPT

## 2024-07-18 RX ORDER — AMOXICILLIN/POTASSIUM CLAV 250-125 MG
1 TABLET ORAL
Qty: 10 | Refills: 0
Start: 2024-07-18 | End: 2024-07-22

## 2024-07-18 RX ORDER — ACETAMINOPHEN 325 MG
2 TABLET ORAL
Qty: 0 | Refills: 0 | DISCHARGE

## 2024-07-18 RX ORDER — TEMAZEPAM 30 MG/1
1 CAPSULE ORAL
Qty: 30 | Refills: 0
Start: 2024-07-18 | End: 2024-08-16

## 2024-07-18 RX ORDER — TEMAZEPAM 30 MG/1
1 CAPSULE ORAL
Qty: 0 | Refills: 0 | DISCHARGE

## 2024-07-18 RX ORDER — PREDNISONE 10 MG/1
1 TABLET ORAL
Qty: 12 | Refills: 0
Start: 2024-07-18

## 2024-07-18 RX ORDER — MIRTAZAPINE 15 MG/1
1 TABLET, FILM COATED ORAL
Refills: 0 | DISCHARGE

## 2024-07-18 RX ADMIN — Medication 1 TABLET(S): at 06:16

## 2024-07-18 RX ADMIN — MUPIROCIN 1 APPLICATION(S): 20 OINTMENT TOPICAL at 06:17

## 2024-07-18 RX ADMIN — Medication 50 MILLIGRAM(S): at 06:16

## 2024-07-18 RX ADMIN — Medication 20 MILLIGRAM(S): at 11:47

## 2024-07-18 RX ADMIN — FUROSEMIDE 20 MILLIGRAM(S): 10 INJECTION, SOLUTION INTRAMUSCULAR; INTRAVENOUS at 06:16

## 2024-07-18 RX ADMIN — Medication 2 PUFF(S): at 06:17

## 2024-07-18 RX ADMIN — PANTOPRAZOLE SODIUM 40 MILLIGRAM(S): 40 INJECTION, POWDER, FOR SOLUTION INTRAVENOUS at 06:16

## 2024-07-18 RX ADMIN — HEPARIN SODIUM 5000 UNIT(S): 50 INJECTION, SOLUTION INTRAVENOUS at 06:16

## 2024-07-18 RX ADMIN — PREDNISONE 40 MILLIGRAM(S): 10 TABLET ORAL at 06:16

## 2024-07-18 NOTE — DISCHARGE NOTE NURSING/CASE MANAGEMENT/SOCIAL WORK - NSDCFUADDAPPT_GEN_ALL_CORE_FT
APPTS ARE READY TO BE MADE: [x ] YES    Best Family or Patient Contact (if needed):    Additional Information about above appointments (if needed):    1:Need an appointment with her Pulmonologist, Dr. Earle Avila no later than Monday, July 22nd, 2024.      2:   3:     Other comments or requests:   Patient advises they do not want our assistance and prefer to coordinate the non - [facility] appointments on their own. No information was provided to the patient.

## 2024-07-18 NOTE — PROGRESS NOTE ADULT - SUBJECTIVE AND OBJECTIVE BOX
---___---___---___---___---___---___ ---___---___---___---___---___---___---___---___---                  M E D I C A L   A T T E N D I N G   P R O G R E S S   N O T E  ---___---___---___---___---___---___ ---___---___---___---___---___---___---___---___---        ================================================    ++CHIEF COMPLAINT:   Patient is a 89y old  Female who presents with a chief complaint of Acute respiratory distress     (15 Jul 2024 17:20)      Acute respiratory distress      ---___---___---___---___---___---  PAST MEDICAL / Surgical  HISTORY:  PAST MEDICAL & SURGICAL HISTORY:  HTN (hypertension)      Hypothyroidism      Osteoporosis      CAD (coronary artery disease)      COPD (chronic obstructive pulmonary disease)      Pulmonary sarcoidosis      H/O pyelonephritis      Acute diverticulitis          ---___---___---___---___---___---  FAMILY HISTORY:   FAMILY HISTORY:        ---___---___---___---___---___---  ALLERGIES:   Allergies    iodinated radiocontrast agents (Anaphylaxis)    Intolerances        ---___---___---___---___---___---  MEDICATIONS:  MEDICATIONS  (STANDING):  atorvastatin 20 milliGRAM(s) Oral at bedtime  budesonide  80 MICROgram(s)/formoterol 4.5 MICROgram(s) Inhaler 2 Puff(s) Inhalation two times a day  FLUoxetine 20 milliGRAM(s) Oral daily  furosemide    Tablet 20 milliGRAM(s) Oral daily  heparin   Injectable 5000 Unit(s) SubCutaneous every 12 hours  methylPREDNISolone sodium succinate Injectable 20 milliGRAM(s) IV Push three times a day  metoprolol succinate ER 50 milliGRAM(s) Oral daily  mupirocin 2% Nasal 1 Application(s) Both Nostrils two times a day  pantoprazole    Tablet 40 milliGRAM(s) Oral before breakfast  piperacillin/tazobactam IVPB.. 3.375 Gram(s) IV Intermittent every 8 hours  polyethylene glycol 3350 17 Gram(s) Oral daily  senna 2 Tablet(s) Oral at bedtime  sodium chloride 0.9%. 1000 milliLiter(s) (75 mL/Hr) IV Continuous <Continuous>    MEDICATIONS  (PRN):  temazepam 15 milliGRAM(s) Oral at bedtime PRN Insomnia      ---___---___---___---___---___---  REVIEW OF SYSTEM:    GEN: no fever, no chills, no pain  RESP: no SOB, no cough, no sputum  CVS: no chest pain, no palpitations, no edema  GI: no abdominal pain, no nausea, no vomiting, no constipation, no diarrhea  : no dysurea, no frequency, no hematurea  Neuro: no headache, no dizziness  PSYCH: no anxiety, no depression  Derm : no itching, no rash    ---___---___---___---___---___---  VITAL SIGNS:  89y , CAPILLARY BLOOD GLUCOSE      POCT Blood Glucose.: 320 mg/dL (14 Jul 2024 06:06)    T(C): 36.5 (07-15-24 @ 12:07), Max: 36.5 (07-15-24 @ 12:07)  HR: 65 (07-15-24 @ 12:07) (65 - 71)  BP: 131/58 (07-15-24 @ 12:07) (131/58 - 146/80)  RR: 18 (07-15-24 @ 12:07) (18 - 19)  SpO2: 96% (07-15-24 @ 12:07) (94% - 96%)  ---___---___---___---___---___---  PHYSICAL EXAM:    GEN: A&O X 3 , NAD , comfortable  HEENT: NCAT, PERRL, MMM, hearing intact  Neck: supple , no JVD  CVS: S1S2 , regular , No M/R/G appreciated  PULM: CTA B/L,  no W/R/R appreciated  ABD.: soft. non tender, non distended,  bowel sounds present  Extrem: intact pulses , no edema   Derm: No rash , no ecchymoses  PSYCH : normal mood,  no delusion not anxious     ---___---___---___---___---___---            LAB AND IMAGING:                          10.7   15.62 )-----------( 188      ( 15 Jul 2024 07:11 )             35.4               07-15    139  |  102  |  29<H>  ----------------------------<  149<H>  4.9   |  21<L>  |  0.51    Ca    9.2      15 Jul 2024 07:13  Mg     2.1     07-14    TPro  6.0  /  Alb  3.2<L>  /  TBili  0.3  /  DBili  x   /  AST  29  /  ALT  26  /  AlkPhos  116  07-14                CARDIAC MARKERS ( 14 Jul 2024 16:22 )  x     / x     / 31 U/L / x     / 2.7 ng/mL              Urinalysis Basic - ( 15 Jul 2024 07:13 )    Color: x / Appearance: x / SG: x / pH: x  Gluc: 149 mg/dL / Ketone: x  / Bili: x / Urobili: x   Blood: x / Protein: x / Nitrite: x   Leuk Esterase: x / RBC: x / WBC x   Sq Epi: x / Non Sq Epi: x / Bacteria: x        [All pertinent / recent Imaging reviewed]         ---___---___---___---___---___---___ ---___---___---___---___---                         A S S E S S M E N T   A N D   P L A N :      HEALTH ISSUES - PROBLEM Dx:  hypoxia   dypsnea   copd   sarcodosis   on iv steroids  pulmonary consult   id called will start  abx for aspiration pneumonia   htn  cardiology following   continue meds fot htn     anxiety on mirtazipine and  restoril daily   family spoek to palliative patient is a full code      hld on statin     heparin sc for dvt prophylaxis                   -GI/DVT Prophylaxis.    --------------------------------------------  Case discussed with   Education given on   ___________________________  Thank you,  Garry Hermosillo  [phone]
      ---___---___---___---___---___---___ ---___---___---___---___---___---___---___---___---                  M E D I C A L   A T T E N D I N G   P R O G R E S S   N O T E  ---___---___---___---___---___---___ ---___---___---___---___---___---___---___---___---        ================================================    ++CHIEF COMPLAINT:   Patient is a 89y old  Female who presents with a chief complaint of pna (2024 08:36)      Acute respiratory distress      ---___---___---___---___---___---  PAST MEDICAL / Surgical  HISTORY:  PAST MEDICAL & SURGICAL HISTORY:  HTN (hypertension)      Hypothyroidism      Osteoporosis      CAD (coronary artery disease)      COPD (chronic obstructive pulmonary disease)      Pulmonary sarcoidosis      H/O pyelonephritis      Acute diverticulitis          ---___---___---___---___---___---  FAMILY HISTORY:   FAMILY HISTORY:        ---___---___---___---___---___---  ALLERGIES:   Allergies    iodinated radiocontrast agents (Anaphylaxis)    Intolerances        ---___---___---___---___---___---  MEDICATIONS:  MEDICATIONS  (STANDING):  atorvastatin 20 milliGRAM(s) Oral at bedtime  budesonide  80 MICROgram(s)/formoterol 4.5 MICROgram(s) Inhaler 2 Puff(s) Inhalation two times a day  FLUoxetine 20 milliGRAM(s) Oral daily  furosemide    Tablet 20 milliGRAM(s) Oral daily  heparin   Injectable 5000 Unit(s) SubCutaneous every 12 hours  methylPREDNISolone sodium succinate Injectable 20 milliGRAM(s) IV Push three times a day  metoprolol succinate ER 50 milliGRAM(s) Oral daily  mupirocin 2% Nasal 1 Application(s) Both Nostrils two times a day  pantoprazole    Tablet 40 milliGRAM(s) Oral before breakfast  piperacillin/tazobactam IVPB.. 3.375 Gram(s) IV Intermittent every 8 hours  polyethylene glycol 3350 17 Gram(s) Oral daily  senna 2 Tablet(s) Oral at bedtime  sodium chloride 0.9%. 1000 milliLiter(s) (75 mL/Hr) IV Continuous <Continuous>    MEDICATIONS  (PRN):  temazepam 15 milliGRAM(s) Oral at bedtime PRN Insomnia      ---___---___---___---___---___---  REVIEW OF SYSTEM:    GEN: no fever, no chills, no pain  RESP: no SOB, no cough, no sputum  CVS: no chest pain, no palpitations, no edema  GI: no abdominal pain, no nausea, no vomiting, no constipation, no diarrhea  : no dysurea, no frequency, no hematurea  Neuro: no headache, no dizziness  PSYCH: no anxiety, no depression  Derm : no itching, no rash    ---___---___---___---___---___---  VITAL SIGNS:  89y , CAPILLARY BLOOD GLUCOSE        T(C): 36.6 (24 @ 14:40), Max: 36.6 (24 @ 14:40)  HR: 67 (24 @ 14:40) (65 - 71)  BP: 148/64 (24 @ 14:40) (148/64 - 156/71)  RR: 18 (24 @ 14:40) (18 - 18)  SpO2: 96% (24 @ 14:40) (93% - 99%)  ---___---___---___---___---___---  PHYSICAL EXAM:    GEN: A&O X 2 , NAD , comfortable  HEENT: NCAT, PERRL, MMM, hearing intact  Neck: supple , no JVD  CVS: S1S2 , regular , No M/R/G appreciated  PULM: CTA B/L,  no W/R/R appreciated  ABD.: soft. non tender, non distended,  bowel sounds present  Extrem: intact pulses , no edema   Derm: No rash , no ecchymoses  PSYCH : normal mood,  no delusion not anxious     ---___---___---___---___---___---            LAB AND IMAGIN.8    13.86 )-----------( 194      ( 2024 07:10 )             26.9               07-16    141  |  104  |  34<H>  ----------------------------<  163<H>  4.0   |  25  |  0.72    Ca    8.5      2024 07:06                              Urinalysis Basic - ( 2024 07:06 )    Color: x / Appearance: x / SG: x / pH: x  Gluc: 163 mg/dL / Ketone: x  / Bili: x / Urobili: x   Blood: x / Protein: x / Nitrite: x   Leuk Esterase: x / RBC: x / WBC x   Sq Epi: x / Non Sq Epi: x / Bacteria: x        [All pertinent / recent Imaging reviewed]         ---___---___---___---___---___---___ ---___---___---___---___---                         A S S E S S M E N T   A N D   P L A N :      HEALTH ISSUES - PROBLEM Dx:  copd   sarcodosis   on iv steroids  pulmonary consult   id called will start  abx for aspiration pneumonia   htn  cardiology following   continue meds fot htn     anxiety on mirtazipine and  restoril daily   family spoek to palliative patient is a full code      hld on statin     heparin sc for dvt prophylaxis     spok to daughter edouard linda over the phone advised her that mom should have speech ans swallow to  evaluate for dysphagia  she refused. they would like to take her back to assisted living asap              -GI/DVT Prophylaxis.    --------------------------------------------  Case discussed with   Education given on   ___________________________  Thank you,  Garry Hermosillo  [phone]
Date of Service: 07-15-24 @ 22:03           CARDIOLOGY     PROGRESS  NOTE   ________________________________________________    CHIEF COMPLAINT:Patient is a 89y old  Female who presents with a chief complaint of Acute respiratory distress  sob    	  REVIEW OF SYSTEMS:  CONSTITUTIONAL: No fever, weight loss, or fatigue  EYES: No eye pain, visual disturbances, or discharge  ENT:  No difficulty hearing, tinnitus, vertigo; No sinus or throat pain  NECK: No pain or stiffness  RESPIRATORY: No cough, wheezing, chills or hemoptysis; + Shortness of Breath  CARDIOVASCULAR: No chest pain, palpitations, passing out, dizziness, or leg swelling  GASTROINTESTINAL: No abdominal or epigastric pain. No nausea, vomiting, or hematemesis; No diarrhea or constipation. No melena or hematochezia.  GENITOURINARY: No dysuria, frequency, hematuria, or incontinence  NEUROLOGICAL: No headaches, memory loss, loss of strength, numbness, or tremors  SKIN: No itching, burning, rashes, or lesions   LYMPH Nodes: No enlarged glands  ENDOCRINE: No heat or cold intolerance; No hair loss  MUSCULOSKELETAL: No joint pain or swelling; No muscle, back, or extremity pain  PSYCHIATRIC: No depression, anxiety, mood swings, or difficulty sleeping  HEME/LYMPH: No easy bruising, or bleeding gums  ALLERGY AND IMMUNOLOGIC: No hives or eczema	    x[ ] All others negative	  [ ] Unable to obtain    PHYSICAL EXAM:  T(C): 36.4 (07-15-24 @ 20:00), Max: 36.5 (07-15-24 @ 12:07)  HR: 71 (07-15-24 @ 20:00) (65 - 71)  BP: 153/67 (07-15-24 @ 20:00) (131/58 - 153/67)  RR: 18 (07-15-24 @ 20:00) (18 - 18)  SpO2: 95% (07-15-24 @ 20:00) (94% - 96%)  Wt(kg): --  I&O's Summary      Appearance: Normal	  HEENT:   Normal oral mucosa, PERRL, EOMI	  Lymphatic: No lymphadenopathy  Cardiovascular: Normal S1 S2, No JVD, + murmurs, No edema  Respiratory: rhonchi  Psychiatry: A & O x 3, Mood & affect appropriate  Gastrointestinal:  Soft, Non-tender, + BS	  Skin: No rashes, No ecchymoses, No cyanosis	  Neurologic: Non-focal  Extremities: Normal range of motion, No clubbing, cyanosis or edema  Vascular: Peripheral pulses palpable 2+ bilaterally    MEDICATIONS  (STANDING):  atorvastatin 20 milliGRAM(s) Oral at bedtime  budesonide  80 MICROgram(s)/formoterol 4.5 MICROgram(s) Inhaler 2 Puff(s) Inhalation two times a day  FLUoxetine 20 milliGRAM(s) Oral daily  furosemide    Tablet 20 milliGRAM(s) Oral daily  heparin   Injectable 5000 Unit(s) SubCutaneous every 12 hours  methylPREDNISolone sodium succinate Injectable 20 milliGRAM(s) IV Push three times a day  metoprolol succinate ER 50 milliGRAM(s) Oral daily  mupirocin 2% Nasal 1 Application(s) Both Nostrils two times a day  pantoprazole    Tablet 40 milliGRAM(s) Oral before breakfast  piperacillin/tazobactam IVPB.. 3.375 Gram(s) IV Intermittent every 8 hours  polyethylene glycol 3350 17 Gram(s) Oral daily  senna 2 Tablet(s) Oral at bedtime  sodium chloride 0.9%. 1000 milliLiter(s) (75 mL/Hr) IV Continuous <Continuous>      TELEMETRY: 	    ECG:  	  RADIOLOGY:  OTHER: 	  	  LABS:	 	    CARDIAC MARKERS:  CARDIAC MARKERS ( 14 Jul 2024 16:22 )  x     / x     / 31 U/L / x     / 2.7 ng/mL                                10.7   15.62 )-----------( 188      ( 15 Jul 2024 07:11 )             35.4     07-15    139  |  102  |  29<H>  ----------------------------<  149<H>  4.9   |  21<L>  |  0.51    Ca    9.2      15 Jul 2024 07:13  Mg     2.1     07-14    TPro  6.0  /  Alb  3.2<L>  /  TBili  0.3  /  DBili  x   /  AST  29  /  ALT  26  /  AlkPhos  116  07-14    proBNP:   Lipid Profile:   HgA1c:   TSH:     - cont abx as per ID  - check sputum cx, bcx  - cont to monitor VS and lactate  - BD tx prn  - cont symbicort inh bid  - taper down steroids  - cont diuretics as per cardiology  - dvt ppx  - GOC discussions    Assessment and plan  ---------------------------  This is a 89 year old female with pmh of Copd, on home O2, chronic R hemidiaphragm paralysis, h/o recurrent aspiration events, HTN, diverticulitis,  asthma/COPD, sarcoidosis, hypothyroidism, heart failure, presenting to ed w/ acute sob, came on suddenly, has happened before in past. BiPAP put on presentation. Initially tachypneic and however bipap improved her breathing and lowered her tachypnea. Saturating above 96%. No crackles/wheezing. No abdominal ttp. No lower extremity swelling/pain. DNI per daughter who is health care proxy. Will work up for HF exacerbation vs COPD exacerbation. Will give duoneb and lasix 40mg and solumedrol 60mg IV. Will obtain cxr. Will obtain labs including troponin and bnp  pt with sig cardiac and lung disease with sig sarcoidosis and underlying lung disease  non compliant to  Duoneb  re start on steroid, any time off steroid is worsening, will discuss with pulmonary ?need of chronic steroid use  continue lasix 20 mg daily  dvt prophylaxis  will discuss with pulmonary, needs long term steroid  Duoneb  oob to the chair  speech and swallow  will discuss GOC with daughter      	        
Date of Service: 07-17-24 @ 10:15           CARDIOLOGY     PROGRESS  NOTE   ________________________________________________    CHIEF COMPLAINT:Patient is a 89y old  Female who presents with a chief complaint of pna (17 Jul 2024 08:34)  no complain  	  REVIEW OF SYSTEMS:  CONSTITUTIONAL: No fever, weight loss, or fatigue  EYES: No eye pain, visual disturbances, or discharge  ENT:  No difficulty hearing, tinnitus, vertigo; No sinus or throat pain  NECK: No pain or stiffness  RESPIRATORY: No cough, wheezing, chills or hemoptysis; No Shortness of Breath  CARDIOVASCULAR: No chest pain, palpitations, passing out, dizziness, or leg swelling  GASTROINTESTINAL: No abdominal or epigastric pain. No nausea, vomiting, or hematemesis; No diarrhea or constipation. No melena or hematochezia.  GENITOURINARY: No dysuria, frequency, hematuria, or incontinence  NEUROLOGICAL: No headaches, memory loss, loss of strength, numbness, or tremors  SKIN: No itching, burning, rashes, or lesions   LYMPH Nodes: No enlarged glands  ENDOCRINE: No heat or cold intolerance; No hair loss  MUSCULOSKELETAL: No joint pain or swelling; No muscle, back, or extremity pain  PSYCHIATRIC: No depression, anxiety, mood swings, or difficulty sleeping  HEME/LYMPH: No easy bruising, or bleeding gums  ALLERGY AND IMMUNOLOGIC: No hives or eczema	    [x ] All others negative	  [ ] Unable to obtain    PHYSICAL EXAM:  T(C): 36.6 (07-17-24 @ 05:45), Max: 36.6 (07-16-24 @ 14:40)  HR: 85 (07-17-24 @ 05:45) (64 - 95)  BP: 153/82 (07-17-24 @ 05:45) (148/64 - 167/83)  RR: 18 (07-17-24 @ 05:45) (18 - 18)  SpO2: 98% (07-17-24 @ 05:45) (95% - 98%)  Wt(kg): --  I&O's Summary    16 Jul 2024 07:01  -  17 Jul 2024 07:00  --------------------------------------------------------  IN: 0 mL / OUT: 450 mL / NET: -450 mL        Appearance: Normal	  HEENT:   Normal oral mucosa, PERRL, EOMI	  Lymphatic: No lymphadenopathy  Cardiovascular: Normal S1 S2, No JVD, + murmurs, No edema  Respiratory: rhonchi  Gastrointestinal:  Soft, Non-tender, + BS	  Skin: No rashes, No ecchymoses, No cyanosis	  Neurologic: Non-focal  Extremities: Normal range of motion, No clubbing, cyanosis or edema  Vascular: Peripheral pulses palpable 2+ bilaterally    MEDICATIONS  (STANDING):  atorvastatin 20 milliGRAM(s) Oral at bedtime  budesonide  80 MICROgram(s)/formoterol 4.5 MICROgram(s) Inhaler 2 Puff(s) Inhalation two times a day  FLUoxetine 20 milliGRAM(s) Oral daily  furosemide    Tablet 20 milliGRAM(s) Oral daily  heparin   Injectable 5000 Unit(s) SubCutaneous every 12 hours  methylPREDNISolone sodium succinate Injectable 20 milliGRAM(s) IV Push three times a day  metoprolol succinate ER 50 milliGRAM(s) Oral daily  mupirocin 2% Nasal 1 Application(s) Both Nostrils two times a day  pantoprazole    Tablet 40 milliGRAM(s) Oral before breakfast  piperacillin/tazobactam IVPB.. 3.375 Gram(s) IV Intermittent every 8 hours  polyethylene glycol 3350 17 Gram(s) Oral daily  senna 2 Tablet(s) Oral at bedtime  sodium chloride 0.9%. 1000 milliLiter(s) (75 mL/Hr) IV Continuous <Continuous>      TELEMETRY: 	    ECG:  	  RADIOLOGY:  OTHER: 	  	  LABS:	 	    CARDIAC MARKERS:                          8.8    13.86 )-----------( 194      ( 16 Jul 2024 07:10 )             26.9     07-16    141  |  104  |  34<H>  ----------------------------<  163<H>  4.0   |  25  |  0.72    Ca    8.5      16 Jul 2024 07:06      proBNP:   Lipid Profile:   HgA1c:   TSH:     GOAL- Pt to tolerate recommended textures during course w/ no s/sx of aspiration Pt/family/caregiver will demonstrate understanding and carryover of dysphagia management (safe swallow guidelines, dysphagia diet).      SLP at next level of care anticipated; SLP during this course recommended; may use thin liquids during therapy with single sips following oral care and provide additional swallow interventions as appropriate following this study; SLP may trial more regular solids at bedside pending pt acceptance as pt would not accept more than one bite during testing with MBS.    - cont abx as per ID  - check sputum cx, bcx  - cont to monitor VS and lactate  - swallow eval if not done   - BD tx prn  - cont symbicort inh bid  - taper down steroids  - cont diuretics as per cardiology  - dvt ppx    Assessment and plan  ---------------------------  This is a 89 year old female with pmh of Copd, on home O2, chronic R hemidiaphragm paralysis, h/o recurrent aspiration events, HTN, diverticulitis,  asthma/COPD, sarcoidosis, hypothyroidism, heart failure, presenting to ed w/ acute sob, came on suddenly, has happened before in past. BiPAP put on presentation. Initially tachypneic and however bipap improved her breathing and lowered her tachypnea. Saturating above 96%. No crackles/wheezing. No abdominal ttp. No lower extremity swelling/pain. DNI per daughter who is health care proxy. Will work up for HF exacerbation vs COPD exacerbation. Will give duoneb and lasix 40mg and solumedrol 60mg IV. Will obtain cxr. Will obtain labs including troponin and bnp  pt with sig cardiac and lung disease with sig sarcoidosis and underlying lung disease  non compliant to  Duoneb  re start on steroid, any time off steroid is worsening, will discuss with pulmonary ?need of chronic steroid use  continue lasix 20 mg daily  dvt prophylaxis  will discuss with pulmonary, needs long term steroid  Duoneb  oob to the chair  speech and swallow  will discuss GOC with daughter  abx for r lung pneumoniae  oob pauly the chair      	        
HPI:  patient recently discharged and came back with another bout of hypoxia  and she has a history of anxiety, htn , copd  sarcoidosis , cdiff on abx ,  (2024 09:53)      24 hr events:      ## Labs:  CBC:                        8.8    13.86 )-----------( 194      ( 2024 07:10 )             26.9     Chem:      141  |  104  |  34<H>  ----------------------------<  163<H>  4.0   |  25  |  0.72    Ca    8.5      2024 07:06  Mg     2.1     07-14        ## Medications:  piperacillin/tazobactam IVPB.. 3.375 Gram(s) IV Intermittent every 8 hours    furosemide    Tablet 20 milliGRAM(s) Oral daily  metoprolol succinate ER 50 milliGRAM(s) Oral daily    budesonide  80 MICROgram(s)/formoterol 4.5 MICROgram(s) Inhaler 2 Puff(s) Inhalation two times a day    atorvastatin 20 milliGRAM(s) Oral at bedtime  methylPREDNISolone sodium succinate Injectable 20 milliGRAM(s) IV Push three times a day    heparin   Injectable 5000 Unit(s) SubCutaneous every 12 hours    pantoprazole    Tablet 40 milliGRAM(s) Oral before breakfast  polyethylene glycol 3350 17 Gram(s) Oral daily  senna 2 Tablet(s) Oral at bedtime    FLUoxetine 20 milliGRAM(s) Oral daily  temazepam 15 milliGRAM(s) Oral at bedtime PRN      ## Vitals:  T(C): 36.3 (24 @ 04:42), Max: 36.4 (07-15-24 @ 20:00)  HR: 65 (24 @ 09:30) (65 - 71)  BP: 156/71 (24 @ 09:30) (152/78 - 156/71)  BP(mean): --  RR: 18 (24 @ 04:42) (18 - 18)  SpO2: 93% (24 @ 09:30) (93% - 99%)  Wt(kg): --  Vent:   AB-15 @ 07:01  -   @ 07:00  --------------------------------------------------------  IN: 0 mL / OUT: 500 mL / NET: -500 mL      Gen: lying comfortably in bed in no apparent distress, 2L NC  Mouth: mmm  Neck: no accessory muscle use  Lungs: b/l ae, no wheeze, +basal crackles  Heart: s1s2 reg no murmur  Abd: +BS soft nontender  Ext: no edema, r foot wound  Neuro: aox2, occ confused, sammie
      ---___---___---___---___---___---___ ---___---___---___---___---___---___---___---___---                  M E D I C A L   A T T E N D I N G   P R O G R E S S   N O T E  ---___---___---___---___---___---___ ---___---___---___---___---___---___---___---___---        ================================================    ++CHIEF COMPLAINT:   Patient is a 89y old  Female who presents with a chief complaint of hypoxia (2024 15:08)      Acute respiratory distress      ---___---___---___---___---___---  PAST MEDICAL / Surgical  HISTORY:  PAST MEDICAL & SURGICAL HISTORY:  HTN (hypertension)      Hypothyroidism      Osteoporosis      CAD (coronary artery disease)      COPD (chronic obstructive pulmonary disease)      Pulmonary sarcoidosis      H/O pyelonephritis      Acute diverticulitis          ---___---___---___---___---___---  FAMILY HISTORY:   FAMILY HISTORY:        ---___---___---___---___---___---  ALLERGIES:   Allergies    iodinated radiocontrast agents (Anaphylaxis)    Intolerances        ---___---___---___---___---___---  MEDICATIONS:  MEDICATIONS  (STANDING):  atorvastatin 20 milliGRAM(s) Oral at bedtime  budesonide  80 MICROgram(s)/formoterol 4.5 MICROgram(s) Inhaler 2 Puff(s) Inhalation two times a day  FLUoxetine 20 milliGRAM(s) Oral daily  furosemide    Tablet 20 milliGRAM(s) Oral daily  heparin   Injectable 5000 Unit(s) SubCutaneous every 12 hours  metoprolol succinate ER 50 milliGRAM(s) Oral daily  mupirocin 2% Nasal 1 Application(s) Both Nostrils two times a day  pantoprazole    Tablet 40 milliGRAM(s) Oral before breakfast  piperacillin/tazobactam IVPB.. 3.375 Gram(s) IV Intermittent every 8 hours  polyethylene glycol 3350 17 Gram(s) Oral daily  predniSONE   Tablet   Oral   predniSONE   Tablet 40 milliGRAM(s) Oral daily  senna 2 Tablet(s) Oral at bedtime  sodium chloride 0.9%. 1000 milliLiter(s) (75 mL/Hr) IV Continuous <Continuous>    MEDICATIONS  (PRN):  temazepam 15 milliGRAM(s) Oral at bedtime PRN Insomnia      ---___---___---___---___---___---  REVIEW OF SYSTEM:    GEN: no fever, no chills, no pain  RESP: no SOB, no cough, no sputum  CVS: no chest pain, no palpitations, no edema  GI: no abdominal pain, no nausea, no vomiting, no constipation, no diarrhea  : no dysurea, no frequency, no hematurea  Neuro: no headache, no dizziness  PSYCH: no anxiety, no depression  Derm : no itching, no rash    ---___---___---___---___---___---  VITAL SIGNS:  89y , CAPILLARY BLOOD GLUCOSE        T(C): 36.4 (24 @ 15:48), Max: 36.6 (24 @ 05:45)  HR: 72 (24 @ 14:31) (64 - 95)  BP: 151/68 (24 @ 12:00) (151/55 - 167/83)  RR: 18 (24 @ 12:00) (18 - 18)  SpO2: 93% (24 @ 14:31) (93% - 98%)  ---___---___---___---___---___---  PHYSICAL EXAM:    GEN: A&O X 3 , NAD , comfortable  HEENT: NCAT, PERRL, MMM, hearing intact  Neck: supple , no JVD  CVS: S1S2 , regular , No M/R/G appreciated  PULM: CTA B/L,  no W/R/R appreciated  ABD.: soft. non tender, non distended,  bowel sounds present  Extrem: intact pulses , no edema   Derm: No rash , no ecchymoses  PSYCH : normal mood,  no delusion not anxious     ---___---___---___---___---___---            LAB AND IMAGIN.8    13.86 )-----------( 194      ( 2024 07:10 )             26.9               07-16    141  |  104  |  34<H>  ----------------------------<  163<H>  4.0   |  25  |  0.72    Ca    8.5      2024 07:06                              Urinalysis Basic - ( 2024 07:06 )    Color: x / Appearance: x / SG: x / pH: x  Gluc: 163 mg/dL / Ketone: x  / Bili: x / Urobili: x   Blood: x / Protein: x / Nitrite: x   Leuk Esterase: x / RBC: x / WBC x   Sq Epi: x / Non Sq Epi: x / Bacteria: x        [All pertinent / recent Imaging reviewed]         ---___---___---___---___---___---___ ---___---___---___---___---                         A S S E S S M E N T   A N D   P L A N :      HEALTH ISSUES - PROBLEM Dx:  Acute respiratory failure with hypercapnia  on prednisone  taper  as per pulmonary   on  iv abx with  transition to po  ACP (advance care planning)  plese read palliative note   hld on statin   continue meds for anxiety and depression                  -GI/DVT Prophylaxis. as ordered    --------------------------------------------  Case discussed with   Education given on   ___________________________  Thank you,  Garry Hermosillo  [phone]
Date of Service: 07-16-24 @ 09:00           CARDIOLOGY     PROGRESS  NOTE   ________________________________________________    CHIEF COMPLAINT:Patient is a 89y old  Female who presents with a chief complaint of pna (16 Jul 2024 08:36)  no complain  	  REVIEW OF SYSTEMS:  CONSTITUTIONAL: No fever, weight loss, or fatigue  EYES: No eye pain, visual disturbances, or discharge  ENT:  No difficulty hearing, tinnitus, vertigo; No sinus or throat pain  NECK: No pain or stiffness  RESPIRATORY: No cough, wheezing, chills or hemoptysis; No Shortness of Breath  CARDIOVASCULAR: No chest pain, palpitations, passing out, dizziness, or leg swelling  GASTROINTESTINAL: No abdominal or epigastric pain. No nausea, vomiting, or hematemesis; No diarrhea or constipation. No melena or hematochezia.  GENITOURINARY: No dysuria, frequency, hematuria, or incontinence  NEUROLOGICAL: No headaches, memory loss, loss of strength, numbness, or tremors  SKIN: No itching, burning, rashes, or lesions   LYMPH Nodes: No enlarged glands  ENDOCRINE: No heat or cold intolerance; No hair loss  MUSCULOSKELETAL: No joint pain or swelling; No muscle, back, or extremity pain  PSYCHIATRIC: No depression, anxiety, mood swings, or difficulty sleeping  HEME/LYMPH: No easy bruising, or bleeding gums  ALLERGY AND IMMUNOLOGIC: No hives or eczema	    [x ] All others negative	  [ ] Unable to obtain    PHYSICAL EXAM:  T(C): 36.3 (07-16-24 @ 04:42), Max: 36.5 (07-15-24 @ 12:07)  HR: 66 (07-16-24 @ 04:42) (65 - 71)  BP: 152/78 (07-16-24 @ 04:42) (131/58 - 153/67)  RR: 18 (07-16-24 @ 04:42) (18 - 18)  SpO2: 99% (07-16-24 @ 04:42) (95% - 99%)  Wt(kg): --  I&O's Summary    15 Jul 2024 07:01  -  16 Jul 2024 07:00  --------------------------------------------------------  IN: 0 mL / OUT: 500 mL / NET: -500 mL        Appearance: Normal	  HEENT:   Normal oral mucosa, PERRL, EOMI	  Lymphatic: No lymphadenopathy  Cardiovascular: Normal S1 S2, No JVD, + murmurs, No edema  Respiratory: rhonchi  Psychiatry: A & O x 3, Mood & affect appropriate  Gastrointestinal:  Soft, Non-tender, + BS	  Skin: No rashes, No ecchymoses, No cyanosis	  Neurologic: Non-focal  Extremities: Normal range of motion, No clubbing, cyanosis or edema  Vascular: Peripheral pulses palpable 2+ bilaterally    MEDICATIONS  (STANDING):  atorvastatin 20 milliGRAM(s) Oral at bedtime  budesonide  80 MICROgram(s)/formoterol 4.5 MICROgram(s) Inhaler 2 Puff(s) Inhalation two times a day  FLUoxetine 20 milliGRAM(s) Oral daily  furosemide    Tablet 20 milliGRAM(s) Oral daily  heparin   Injectable 5000 Unit(s) SubCutaneous every 12 hours  methylPREDNISolone sodium succinate Injectable 20 milliGRAM(s) IV Push three times a day  metoprolol succinate ER 50 milliGRAM(s) Oral daily  mupirocin 2% Nasal 1 Application(s) Both Nostrils two times a day  pantoprazole    Tablet 40 milliGRAM(s) Oral before breakfast  piperacillin/tazobactam IVPB.. 3.375 Gram(s) IV Intermittent every 8 hours  polyethylene glycol 3350 17 Gram(s) Oral daily  senna 2 Tablet(s) Oral at bedtime  sodium chloride 0.9%. 1000 milliLiter(s) (75 mL/Hr) IV Continuous <Continuous>      TELEMETRY: 	    ECG:  	  RADIOLOGY:  OTHER: 	  	  LABS:	 	    CARDIAC MARKERS:  CARDIAC MARKERS ( 14 Jul 2024 16:22 )  x     / x     / 31 U/L / x     / 2.7 ng/mL                                8.8    13.86 )-----------( 194      ( 16 Jul 2024 07:10 )             26.9     07-16    141  |  104  |  34<H>  ----------------------------<  163<H>  4.0   |  25  |  0.72    Ca    8.5      16 Jul 2024 07:06  Mg     2.1     07-14      proBNP:   Lipid Profile:   HgA1c:   TSH:       < from: Xray Chest 1 View- PORTABLE-Urgent (Xray Chest 1 View- PORTABLE-Urgent .) (07.14.24 @ 07:39) >  Low lung volume.  Right mid lung field opacity.  There is no pleural effusion or pneumothorax.  The heart is not well evaluated in this position.  The visualized osseous structures demonstrate no acute pathology.  Status post loop recorder placement.    IMPRESSION:  Right lower lung pneumonia.    Assessment and plan  ---------------------------  This is a 89 year old female with pmh of Copd, on home O2, chronic R hemidiaphragm paralysis, h/o recurrent aspiration events, HTN, diverticulitis,  asthma/COPD, sarcoidosis, hypothyroidism, heart failure, presenting to ed w/ acute sob, came on suddenly, has happened before in past. BiPAP put on presentation. Initially tachypneic and however bipap improved her breathing and lowered her tachypnea. Saturating above 96%. No crackles/wheezing. No abdominal ttp. No lower extremity swelling/pain. DNI per daughter who is health care proxy. Will work up for HF exacerbation vs COPD exacerbation. Will give duoneb and lasix 40mg and solumedrol 60mg IV. Will obtain cxr. Will obtain labs including troponin and bnp  pt with sig cardiac and lung disease with sig sarcoidosis and underlying lung disease  non compliant to  Duoneb  re start on steroid, any time off steroid is worsening, will discuss with pulmonary ?need of chronic steroid use  continue lasix 20 mg daily  dvt prophylaxis  will discuss with pulmonary, needs long term steroid  Duoneb  oob to the chair  speech and swallow  will discuss GOC with daughter  abx for r lung pneumoniae  oob pauly the chair    	        
Date of Service: 07-18-24 @ 09:42           CARDIOLOGY     PROGRESS  NOTE   ________________________________________________    CHIEF COMPLAINT:Patient is a 89y old  Female who presents with a chief complaint of hypoxia (17 Jul 2024 15:08)  no complain  	  REVIEW OF SYSTEMS:  CONSTITUTIONAL: No fever, weight loss, or fatigue  EYES: No eye pain, visual disturbances, or discharge  ENT:  No difficulty hearing, tinnitus, vertigo; No sinus or throat pain  NECK: No pain or stiffness  RESPIRATORY: No cough, wheezing, chills or hemoptysis; No Shortness of Breath  CARDIOVASCULAR: No chest pain, palpitations, passing out, dizziness, or leg swelling  GASTROINTESTINAL: No abdominal or epigastric pain. No nausea, vomiting, or hematemesis; No diarrhea or constipation. No melena or hematochezia.  GENITOURINARY: No dysuria, frequency, hematuria, or incontinence  NEUROLOGICAL: No headaches, memory loss, loss of strength, numbness, or tremors  SKIN: No itching, burning, rashes, or lesions   LYMPH Nodes: No enlarged glands  ENDOCRINE: No heat or cold intolerance; No hair loss  MUSCULOSKELETAL: No joint pain or swelling; No muscle, back, or extremity pain  PSYCHIATRIC: No depression, anxiety, mood swings, or difficulty sleeping  HEME/LYMPH: No easy bruising, or bleeding gums  ALLERGY AND IMMUNOLOGIC: No hives or eczema	    [ x] All others negative	  [ ] Unable to obtain    PHYSICAL EXAM:  T(C): 36.9 (07-18-24 @ 09:06), Max: 36.9 (07-18-24 @ 09:06)  HR: 78 (07-18-24 @ 09:06) (65 - 78)  BP: 115/75 (07-18-24 @ 09:06) (115/75 - 178/64)  RR: 18 (07-18-24 @ 09:06) (18 - 18)  SpO2: 100% (07-18-24 @ 09:06) (93% - 100%)  Wt(kg): --  I&O's Summary      Appearance: Normal	  HEENT:   Normal oral mucosa, PERRL, EOMI	  Lymphatic: No lymphadenopathy  Cardiovascular: Normal S1 S2, No JVD, + murmurs, No edema  Respiratory:rhonchi  Gastrointestinal:  Soft, Non-tender, + BS	  Skin: No rashes, No ecchymoses, No cyanosis	  Extremities: Normal range of motion, No clubbing, cyanosis or edema  Vascular: Peripheral pulses palpable 2+ bilaterally    MEDICATIONS  (STANDING):  amoxicillin  875 milliGRAM(s)/clavulanate 1 Tablet(s) Oral two times a day  atorvastatin 20 milliGRAM(s) Oral at bedtime  budesonide  80 MICROgram(s)/formoterol 4.5 MICROgram(s) Inhaler 2 Puff(s) Inhalation two times a day  FLUoxetine 20 milliGRAM(s) Oral daily  furosemide    Tablet 20 milliGRAM(s) Oral daily  heparin   Injectable 5000 Unit(s) SubCutaneous every 12 hours  metoprolol succinate ER 50 milliGRAM(s) Oral daily  mupirocin 2% Nasal 1 Application(s) Both Nostrils two times a day  pantoprazole    Tablet 40 milliGRAM(s) Oral before breakfast  polyethylene glycol 3350 17 Gram(s) Oral daily  predniSONE   Tablet   Oral   senna 2 Tablet(s) Oral at bedtime  sodium chloride 0.9%. 1000 milliLiter(s) (75 mL/Hr) IV Continuous <Continuous>      TELEMETRY: 	    ECG:  	  RADIOLOGY:  OTHER: 	  	  LABS:	 	    CARDIAC MARKERS:      proBNP:   Lipid Profile:   HgA1c:   TSH:     - cont abx as per ID  - swallow eval if not done   - BD tx prn  - cont symbicort inh bid  - taper down steroids, changing to po tomorrow  - cont diuretics as per cardiology  - dvt ppx  - sarcoidosis dx 30 years likely not active disease but can f/u in pulmonary clinic for monitoring      Assessment and plan  ---------------------------  This is a 89 year old female with pmh of Copd, on home O2, chronic R hemidiaphragm paralysis, h/o recurrent aspiration events, HTN, diverticulitis,  asthma/COPD, sarcoidosis, hypothyroidism, heart failure, presenting to ed w/ acute sob, came on suddenly, has happened before in past. BiPAP put on presentation. Initially tachypneic and however bipap improved her breathing and lowered her tachypnea. Saturating above 96%. No crackles/wheezing. No abdominal ttp. No lower extremity swelling/pain. DNI per daughter who is health care proxy. Will work up for HF exacerbation vs COPD exacerbation. Will give duoneb and lasix 40mg and solumedrol 60mg IV. Will obtain cxr. Will obtain labs including troponin and bnp  pt with sig cardiac and lung disease with sig sarcoidosis and underlying lung disease  non compliant to  Duoneb  continue lasix 20 mg daily  dvt prophylaxis  will discuss with pulmonary, needs long term steroid  Duoneb  speech and swallow  will discuss GOC with daughter  abx for r lung pneumoniae  oob to  the chair, doing better    	        
infectious diseases progress note:    Patient is a 89y old  Female who presents with a chief complaint of Acute respiratory distress     (15 Jul 2024 17:20)        Acute respiratory distress                Allergies    iodinated radiocontrast agents (Anaphylaxis)    Intolerances        ANTIBIOTICS/RELEVANT:  antimicrobials  piperacillin/tazobactam IVPB.. 3.375 Gram(s) IV Intermittent every 8 hours    immunologic:    OTHER:  atorvastatin 20 milliGRAM(s) Oral at bedtime  budesonide  80 MICROgram(s)/formoterol 4.5 MICROgram(s) Inhaler 2 Puff(s) Inhalation two times a day  FLUoxetine 20 milliGRAM(s) Oral daily  furosemide    Tablet 20 milliGRAM(s) Oral daily  heparin   Injectable 5000 Unit(s) SubCutaneous every 12 hours  methylPREDNISolone sodium succinate Injectable 20 milliGRAM(s) IV Push three times a day  metoprolol succinate ER 50 milliGRAM(s) Oral daily  mupirocin 2% Nasal 1 Application(s) Both Nostrils two times a day  pantoprazole    Tablet 40 milliGRAM(s) Oral before breakfast  polyethylene glycol 3350 17 Gram(s) Oral daily  senna 2 Tablet(s) Oral at bedtime  sodium chloride 0.9%. 1000 milliLiter(s) IV Continuous <Continuous>  temazepam 15 milliGRAM(s) Oral at bedtime PRN      Objective:  Vital Signs Last 24 Hrs  T(C): 36.3 (16 Jul 2024 04:42), Max: 36.5 (15 Jul 2024 12:07)  T(F): 97.3 (16 Jul 2024 04:42), Max: 97.7 (15 Jul 2024 12:07)  HR: 66 (16 Jul 2024 04:42) (65 - 71)  BP: 152/78 (16 Jul 2024 04:42) (131/58 - 153/67)  BP(mean): --  RR: 18 (16 Jul 2024 04:42) (18 - 18)  SpO2: 99% (16 Jul 2024 04:42) (95% - 99%)    Parameters below as of 16 Jul 2024 04:42  Patient On (Oxygen Delivery Method): nasal cannula  O2 Flow (L/min): 2         Eyes:JIM, EOMI  Ear/Nose/Throat: no oral lesion, no sinus tenderness on percussion	  Neck:no JVD, no lymphadenopathy, supple  Respiratory: CTA moises  Cardiovascular: S1S2 RRR, no murmurs  Gastrointestinal:soft, (+) BS, no HSM  Extremities:no e/e/c        LABS:                        8.8    13.86 )-----------( 194      ( 16 Jul 2024 07:10 )             26.9     07-16    141  |  104  |  34<H>  ----------------------------<  163<H>  4.0   |  25  |  0.72    Ca    8.5      16 Jul 2024 07:06  Mg     2.1     07-14        Urinalysis Basic - ( 16 Jul 2024 07:06 )    Color: x / Appearance: x / SG: x / pH: x  Gluc: 163 mg/dL / Ketone: x  / Bili: x / Urobili: x   Blood: x / Protein: x / Nitrite: x   Leuk Esterase: x / RBC: x / WBC x   Sq Epi: x / Non Sq Epi: x / Bacteria: x          MICROBIOLOGY:    RECENT CULTURES:  07-14 @ 09:10 .Blood Blood-Peripheral                No growth at 24 hours    07-14 @ 08:40 .Blood Blood-Peripheral                No growth at 24 hours          RESPIRATORY CULTURES:              RADIOLOGY & ADDITIONAL STUDIES:        Pager [phone]  After 5 pm/weekends or if no response :[phone]
      ---___---___---___---___---___---___ ---___---___---___---___---___---___---___---___---                  M E D I C A L   A T T E N D I N G   P R O G R E S S   N O T E  ---___---___---___---___---___---___ ---___---___---___---___---___---___---___---___---        ================================================    ++CHIEF COMPLAINT:   Patient is a 89y old  Female who presents with a chief complaint of hypoxia (17 Jul 2024 15:08)      Acute respiratory distress      ---___---___---___---___---___---  PAST MEDICAL / Surgical  HISTORY:  PAST MEDICAL & SURGICAL HISTORY:  HTN (hypertension)      Hypothyroidism      Osteoporosis      CAD (coronary artery disease)      COPD (chronic obstructive pulmonary disease)      Pulmonary sarcoidosis      H/O pyelonephritis      Acute diverticulitis          ---___---___---___---___---___---  FAMILY HISTORY:   FAMILY HISTORY:        ---___---___---___---___---___---  ALLERGIES:   Allergies    iodinated radiocontrast agents (Anaphylaxis)    Intolerances        ---___---___---___---___---___---  MEDICATIONS:  MEDICATIONS  (STANDING):  amoxicillin  875 milliGRAM(s)/clavulanate 1 Tablet(s) Oral two times a day  atorvastatin 20 milliGRAM(s) Oral at bedtime  budesonide  80 MICROgram(s)/formoterol 4.5 MICROgram(s) Inhaler 2 Puff(s) Inhalation two times a day  FLUoxetine 20 milliGRAM(s) Oral daily  furosemide    Tablet 20 milliGRAM(s) Oral daily  heparin   Injectable 5000 Unit(s) SubCutaneous every 12 hours  metoprolol succinate ER 50 milliGRAM(s) Oral daily  mupirocin 2% Nasal 1 Application(s) Both Nostrils two times a day  pantoprazole    Tablet 40 milliGRAM(s) Oral before breakfast  polyethylene glycol 3350 17 Gram(s) Oral daily  predniSONE   Tablet   Oral   senna 2 Tablet(s) Oral at bedtime  sodium chloride 0.9%. 1000 milliLiter(s) (75 mL/Hr) IV Continuous <Continuous>    MEDICATIONS  (PRN):  temazepam 15 milliGRAM(s) Oral at bedtime PRN Insomnia      ---___---___---___---___---___---  REVIEW OF SYSTEM:    GEN: no fever, no chills, no pain  RESP: no SOB, no cough, no sputum  CVS: no chest pain, no palpitations, no edema  GI: no abdominal pain, no nausea, no vomiting, no constipation, no diarrhea  : no dysurea, no frequency, no hematurea  Neuro: no headache, no dizziness  PSYCH: no anxiety, no depression  Derm : no itching, no rash    ---___---___---___---___---___---  VITAL SIGNS:  89y , CAPILLARY BLOOD GLUCOSE        T(C): 36.9 (07-18-24 @ 09:06), Max: 36.9 (07-18-24 @ 09:06)  HR: 78 (07-18-24 @ 09:06) (65 - 78)  BP: 115/75 (07-18-24 @ 09:06) (115/75 - 178/64)  RR: 18 (07-18-24 @ 09:06) (18 - 18)  SpO2: 100% (07-18-24 @ 09:06) (93% - 100%)  ---___---___---___---___---___---  PHYSICAL EXAM:    GEN: A&O X 3 , NAD , comfortable  HEENT: NCAT, PERRL, MMM, hearing intact  Neck: supple , no JVD  CVS: S1S2 , regular , No M/R/G appreciated  PULM: CTA B/L,  no W/R/R appreciated  ABD.: soft. non tender, non distended,  bowel sounds present  Extrem: intact pulses , no edema   Derm: No rash , no ecchymoses  PSYCH : normal mood,  no delusion not anxious     ---___---___---___---___---___---            LAB AND IMAGING:                                                  [All pertinent / recent Imaging reviewed]         ---___---___---___---___---___---___ ---___---___---___---___---                         A S S E S S M E N T   A N D   P L A N :      HEALTH ISSUES - PROBLEM Dx:  Acute respiratory failure with hypercapnia  on prednisone  taper  as per pulmonary   on  iv abx with  transition to po  ACP (advance care planning)  plese read palliative note   hld on statin   continue meds for anxiety and depression        ___________________________  Thank you,  Garry Hermosillo  [phone]
infectious diseases progress note:    Patient is a 89y old  Female who presents with a chief complaint of pna (16 Jul 2024 08:36)        Acute respiratory distress             Allergies    iodinated radiocontrast agents (Anaphylaxis)    Intolerances        ANTIBIOTICS/RELEVANT:  antimicrobials  piperacillin/tazobactam IVPB.. 3.375 Gram(s) IV Intermittent every 8 hours    immunologic:    OTHER:  atorvastatin 20 milliGRAM(s) Oral at bedtime  budesonide  80 MICROgram(s)/formoterol 4.5 MICROgram(s) Inhaler 2 Puff(s) Inhalation two times a day  FLUoxetine 20 milliGRAM(s) Oral daily  furosemide    Tablet 20 milliGRAM(s) Oral daily  heparin   Injectable 5000 Unit(s) SubCutaneous every 12 hours  methylPREDNISolone sodium succinate Injectable 20 milliGRAM(s) IV Push three times a day  metoprolol succinate ER 50 milliGRAM(s) Oral daily  mupirocin 2% Nasal 1 Application(s) Both Nostrils two times a day  pantoprazole    Tablet 40 milliGRAM(s) Oral before breakfast  polyethylene glycol 3350 17 Gram(s) Oral daily  senna 2 Tablet(s) Oral at bedtime  sodium chloride 0.9%. 1000 milliLiter(s) IV Continuous <Continuous>  temazepam 15 milliGRAM(s) Oral at bedtime PRN      Objective:  Vital Signs Last 24 Hrs  T(C): 36.6 (17 Jul 2024 05:45), Max: 36.6 (16 Jul 2024 14:40)  T(F): 97.8 (17 Jul 2024 05:45), Max: 97.9 (16 Jul 2024 14:40)  HR: 85 (17 Jul 2024 05:45) (64 - 95)  BP: 153/82 (17 Jul 2024 05:45) (148/64 - 167/83)  BP(mean): --  RR: 18 (17 Jul 2024 05:45) (18 - 18)  SpO2: 98% (17 Jul 2024 05:45) (93% - 98%)    Parameters below as of 17 Jul 2024 05:45  Patient On (Oxygen Delivery Method): nasal cannula  O2 Flow (L/min): 2         Eyes:JIM, EOMI  Ear/Nose/Throat: no oral lesion, no sinus tenderness on percussion	  Neck:no JVD, no lymphadenopathy, supple  Respiratory: CTA moises  Cardiovascular: S1S2 RRR, no murmurs  Gastrointestinal:soft, (+) BS, no HSM  Extremities:no e/e/c        LABS:                        8.8    13.86 )-----------( 194      ( 16 Jul 2024 07:10 )             26.9     07-16    141  |  104  |  34<H>  ----------------------------<  163<H>  4.0   |  25  |  0.72    Ca    8.5      16 Jul 2024 07:06        Urinalysis Basic - ( 16 Jul 2024 07:06 )    Color: x / Appearance: x / SG: x / pH: x  Gluc: 163 mg/dL / Ketone: x  / Bili: x / Urobili: x   Blood: x / Protein: x / Nitrite: x   Leuk Esterase: x / RBC: x / WBC x   Sq Epi: x / Non Sq Epi: x / Bacteria: x          MICROBIOLOGY:    RECENT CULTURES:  07-14 @ 09:10 .Blood Blood-Peripheral                No growth at 48 Hours    07-14 @ 08:40 .Blood Blood-Peripheral                No growth at 48 Hours          RESPIRATORY CULTURES:              RADIOLOGY & ADDITIONAL STUDIES:        Pager [phone]  After 5 pm/weekends or if no response :[phone]
HPI:  patient recently discharged and came back with another bout of hypoxia  and she has a history of anxiety, htn , copd  sarcoidosis , cdiff on abx ,  (14 Jul 2024 09:53)      24 hr events:    sitting comfortably in chair      ## Labs:  CBC:                              8.8    13.86 )-----------( 194      ( 16 Jul 2024 07:10 )             26.9     07-16    141  |  104  |  34<H>  ----------------------------<  163<H>  4.0   |  25  |  0.72    Ca    8.5      16 Jul 2024 07:06      MEDICATIONS  (STANDING):  atorvastatin 20 milliGRAM(s) Oral at bedtime  budesonide  80 MICROgram(s)/formoterol 4.5 MICROgram(s) Inhaler 2 Puff(s) Inhalation two times a day  FLUoxetine 20 milliGRAM(s) Oral daily  furosemide    Tablet 20 milliGRAM(s) Oral daily  heparin   Injectable 5000 Unit(s) SubCutaneous every 12 hours  metoprolol succinate ER 50 milliGRAM(s) Oral daily  mupirocin 2% Nasal 1 Application(s) Both Nostrils two times a day  pantoprazole    Tablet 40 milliGRAM(s) Oral before breakfast  piperacillin/tazobactam IVPB.. 3.375 Gram(s) IV Intermittent every 8 hours  polyethylene glycol 3350 17 Gram(s) Oral daily  predniSONE   Tablet   Oral   predniSONE   Tablet 40 milliGRAM(s) Oral daily  senna 2 Tablet(s) Oral at bedtime  sodium chloride 0.9%. 1000 milliLiter(s) (75 mL/Hr) IV Continuous <Continuous>         Vital Signs Last 24 Hrs  T(C): 36.4 (17 Jul 2024 15:48), Max: 36.6 (17 Jul 2024 05:45)  T(F): 97.5 (17 Jul 2024 15:48), Max: 97.8 (17 Jul 2024 05:45)  HR: 72 (17 Jul 2024 14:31) (64 - 95)  BP: 151/68 (17 Jul 2024 12:00) (151/55 - 167/83)  BP(mean): --  ABP: --  ABP(mean): --  RR: 18 (17 Jul 2024 12:00) (18 - 18)  SpO2: 93% (17 Jul 2024 14:31) (93% - 98%)    O2 Parameters below as of 17 Jul 2024 14:31  Patient On (Oxygen Delivery Method): nasal cannula        Gen: lying comfortably in bed in no apparent distress, 2L NC  Mouth: mmm  Neck: no accessory muscle use  Lungs: b/l ae, no wheeze, +basal crackles  Heart: s1s2 reg no murmur  Abd: +BS soft nontender  Ext: no edema, r foot wound  Neuro: aox2, occ confused, cochran      < from: Xray Chest 1 View- PORTABLE-Urgent (Xray Chest 1 View- PORTABLE-Urgent .) (07.14.24 @ 07:39) >    ACC: [id number] EXAM:  XR CHEST PORTABLE URGENT 1V   ORDERED BY: KRISH BARRIOS     ACC: [id number] EXAM:  XR CHEST PORTABLE URGENT 1V   ORDERED BY: NAS GARCES     PROCEDURE DATE:  07/14/2024          INTERPRETATION:  CLINICAL INDICATION: Chest pain    EXAM: 2 X-rays of the chest.    COMPARISON: Chest radiograph from 7/1/2024    FINDINGS:  Low lung volume.  Right mid lung field opacity.  There is no pleural effusion or pneumothorax.  The heart is not well evaluated in this position.  The visualized osseous structures demonstrate no acute pathology.  Status post loop recorder placement.    IMPRESSION:  Right lower lung pneumonia.    --- End of Report ---          EVERETT DANIELS DO; Resident Radiologist  This document has been electronically signed.  MYRTLE BROWN MD; Attending Interventional Radiologist  This document has been electronically signed. Jul 14 2024  9:03AM    < end of copied text >

## 2024-07-18 NOTE — PROGRESS NOTE ADULT - PROVIDER SPECIALTY LIST ADULT
Cardiology
Cardiology
Infectious Disease
Infectious Disease
Cardiology
Cardiology
Family Medicine
Family Medicine
Pulmonology
Pulmonology
Family Medicine
Family Medicine

## 2024-07-18 NOTE — PROGRESS NOTE ADULT - NUTRITIONAL ASSESSMENT
Pt here with cough,  congestion and ST starting 12/22  Denies sob     
This patient has been assessed with a concern for Malnutrition and has been determined to have a diagnosis/diagnoses of Severe protein-calorie malnutrition and Underweight (BMI < 19).    This patient is being managed with:   Diet Regular-  Kosher  Supplement Feeding Modality:  Oral  Ensure Plus High Protein Cans or Servings Per Day:  1       Frequency:  Daily  Entered: Jul 15 2024  4:13AM  

## 2024-07-18 NOTE — DISCHARGE NOTE NURSING/CASE MANAGEMENT/SOCIAL WORK - PATIENT PORTAL LINK FT
You can access the FollowMyHealth Patient Portal offered by [facility] by registering at the following website: [email or web address]. By joining [facility]’s FollowMyHealth portal, you will also be able to view your health information using other applications (apps) compatible with our system.

## 2024-07-19 ENCOUNTER — TRANSCRIPTION ENCOUNTER (OUTPATIENT)
Age: 89
End: 2024-07-19

## 2024-07-22 ENCOUNTER — TRANSCRIPTION ENCOUNTER (OUTPATIENT)
Age: 89
End: 2024-07-22

## 2024-07-23 ENCOUNTER — APPOINTMENT (OUTPATIENT)
Dept: OPHTHALMOLOGY | Facility: CLINIC | Age: 89
End: 2024-07-23

## 2024-07-31 ENCOUNTER — TRANSCRIPTION ENCOUNTER (OUTPATIENT)
Age: 89
End: 2024-07-31

## 2024-08-05 ENCOUNTER — TRANSCRIPTION ENCOUNTER (OUTPATIENT)
Age: 89
End: 2024-08-05

## 2024-08-13 ENCOUNTER — TRANSCRIPTION ENCOUNTER (OUTPATIENT)
Age: 89
End: 2024-08-13

## 2024-08-21 ENCOUNTER — INPATIENT (INPATIENT)
Facility: HOSPITAL | Age: 89
LOS: 3 days | Discharge: HOME HEALTH | DRG: 204 | End: 2024-08-25
Attending: FAMILY MEDICINE | Admitting: FAMILY MEDICINE
Payer: MEDICARE

## 2024-08-21 VITALS
DIASTOLIC BLOOD PRESSURE: 71 MMHG | TEMPERATURE: 98 F | HEIGHT: 62 IN | RESPIRATION RATE: 24 BRPM | SYSTOLIC BLOOD PRESSURE: 140 MMHG

## 2024-08-21 DIAGNOSIS — R06.02 SHORTNESS OF BREATH: ICD-10-CM

## 2024-08-21 LAB
ALBUMIN SERPL ELPH-MCNC: 3.5 G/DL — SIGNIFICANT CHANGE UP (ref 3.3–5)
ALP SERPL-CCNC: 109 U/L — SIGNIFICANT CHANGE UP (ref 40–120)
ALT FLD-CCNC: 5 U/L — LOW (ref 10–45)
ANION GAP SERPL CALC-SCNC: 13 MMOL/L — SIGNIFICANT CHANGE UP (ref 5–17)
APTT BLD: 26.4 SEC — SIGNIFICANT CHANGE UP (ref 24.5–35.6)
AST SERPL-CCNC: 11 U/L — SIGNIFICANT CHANGE UP (ref 10–40)
BASOPHILS # BLD AUTO: 0.06 K/UL — SIGNIFICANT CHANGE UP (ref 0–0.2)
BASOPHILS NFR BLD AUTO: 0.5 % — SIGNIFICANT CHANGE UP (ref 0–2)
BILIRUB SERPL-MCNC: 0.4 MG/DL — SIGNIFICANT CHANGE UP (ref 0.2–1.2)
BUN SERPL-MCNC: 15 MG/DL — SIGNIFICANT CHANGE UP (ref 7–23)
CALCIUM SERPL-MCNC: 8.9 MG/DL — SIGNIFICANT CHANGE UP (ref 8.4–10.5)
CHLORIDE SERPL-SCNC: 102 MMOL/L — SIGNIFICANT CHANGE UP (ref 96–108)
CO2 SERPL-SCNC: 26 MMOL/L — SIGNIFICANT CHANGE UP (ref 22–31)
CREAT SERPL-MCNC: 0.64 MG/DL — SIGNIFICANT CHANGE UP (ref 0.5–1.3)
D DIMER BLD IA.RAPID-MCNC: 680 NG/ML DDU — HIGH
EGFR: 85 ML/MIN/1.73M2 — SIGNIFICANT CHANGE UP
EOSINOPHIL # BLD AUTO: 0.08 K/UL — SIGNIFICANT CHANGE UP (ref 0–0.5)
EOSINOPHIL NFR BLD AUTO: 0.6 % — SIGNIFICANT CHANGE UP (ref 0–6)
FLUAV AG NPH QL: SIGNIFICANT CHANGE UP
FLUBV AG NPH QL: SIGNIFICANT CHANGE UP
GAS PNL BLDV: SIGNIFICANT CHANGE UP
GAS PNL BLDV: SIGNIFICANT CHANGE UP
GLUCOSE SERPL-MCNC: 104 MG/DL — HIGH (ref 70–99)
HCT VFR BLD CALC: 30.4 % — LOW (ref 34.5–45)
HGB BLD-MCNC: 9.4 G/DL — LOW (ref 11.5–15.5)
IMM GRANULOCYTES NFR BLD AUTO: 0.6 % — SIGNIFICANT CHANGE UP (ref 0–0.9)
INR BLD: 1.07 RATIO — SIGNIFICANT CHANGE UP (ref 0.85–1.18)
LYMPHOCYTES # BLD AUTO: 0.7 K/UL — LOW (ref 1–3.3)
LYMPHOCYTES # BLD AUTO: 5.6 % — LOW (ref 13–44)
MCHC RBC-ENTMCNC: 28.8 PG — SIGNIFICANT CHANGE UP (ref 27–34)
MCHC RBC-ENTMCNC: 30.9 GM/DL — LOW (ref 32–36)
MCV RBC AUTO: 93.3 FL — SIGNIFICANT CHANGE UP (ref 80–100)
MONOCYTES # BLD AUTO: 0.73 K/UL — SIGNIFICANT CHANGE UP (ref 0–0.9)
MONOCYTES NFR BLD AUTO: 5.8 % — SIGNIFICANT CHANGE UP (ref 2–14)
NEUTROPHILS # BLD AUTO: 10.91 K/UL — HIGH (ref 1.8–7.4)
NEUTROPHILS NFR BLD AUTO: 86.9 % — HIGH (ref 43–77)
NRBC # BLD: 0 /100 WBCS — SIGNIFICANT CHANGE UP (ref 0–0)
NT-PROBNP SERPL-SCNC: 1308 PG/ML — HIGH (ref 0–300)
PLATELET # BLD AUTO: 152 K/UL — SIGNIFICANT CHANGE UP (ref 150–400)
POTASSIUM SERPL-MCNC: 3.3 MMOL/L — LOW (ref 3.5–5.3)
POTASSIUM SERPL-SCNC: 3.3 MMOL/L — LOW (ref 3.5–5.3)
PROT SERPL-MCNC: 6.4 G/DL — SIGNIFICANT CHANGE UP (ref 6–8.3)
PROTHROM AB SERPL-ACNC: 11.7 SEC — SIGNIFICANT CHANGE UP (ref 9.5–13)
RBC # BLD: 3.26 M/UL — LOW (ref 3.8–5.2)
RBC # FLD: 16.8 % — HIGH (ref 10.3–14.5)
RSV RNA NPH QL NAA+NON-PROBE: SIGNIFICANT CHANGE UP
SARS-COV-2 RNA SPEC QL NAA+PROBE: SIGNIFICANT CHANGE UP
SODIUM SERPL-SCNC: 141 MMOL/L — SIGNIFICANT CHANGE UP (ref 135–145)
TROPONIN T, HIGH SENSITIVITY RESULT: 36 NG/L — SIGNIFICANT CHANGE UP (ref 0–51)
TROPONIN T, HIGH SENSITIVITY RESULT: 38 NG/L — SIGNIFICANT CHANGE UP (ref 0–51)
WBC # BLD: 12.55 K/UL — HIGH (ref 3.8–10.5)
WBC # FLD AUTO: 12.55 K/UL — HIGH (ref 3.8–10.5)

## 2024-08-21 PROCEDURE — 99285 EMERGENCY DEPT VISIT HI MDM: CPT | Mod: FS

## 2024-08-21 PROCEDURE — 93971 EXTREMITY STUDY: CPT | Mod: 26,RT

## 2024-08-21 PROCEDURE — 99222 1ST HOSP IP/OBS MODERATE 55: CPT

## 2024-08-21 PROCEDURE — 71045 X-RAY EXAM CHEST 1 VIEW: CPT | Mod: 26

## 2024-08-21 RX ORDER — FUROSEMIDE 40 MG
20 TABLET ORAL ONCE
Refills: 0 | Status: COMPLETED | OUTPATIENT
Start: 2024-08-21 | End: 2024-08-21

## 2024-08-21 RX ORDER — POTASSIUM CHLORIDE 10 MEQ
40 TABLET, EXT RELEASE, PARTICLES/CRYSTALS ORAL ONCE
Refills: 0 | Status: COMPLETED | OUTPATIENT
Start: 2024-08-21 | End: 2024-08-21

## 2024-08-21 RX ORDER — ACETAMINOPHEN 325 MG/1
650 TABLET ORAL ONCE
Refills: 0 | Status: COMPLETED | OUTPATIENT
Start: 2024-08-21 | End: 2024-08-21

## 2024-08-21 RX ORDER — FLUOXETINE HCL 20 MG/5 ML
10 SOLUTION, ORAL ORAL DAILY
Refills: 0 | Status: DISCONTINUED | OUTPATIENT
Start: 2024-08-21 | End: 2024-08-22

## 2024-08-21 RX ORDER — METHYLPREDNISOLONE 4 MG
125 TABLET ORAL ONCE
Refills: 0 | Status: COMPLETED | OUTPATIENT
Start: 2024-08-21 | End: 2024-08-21

## 2024-08-21 RX ORDER — ACETAMINOPHEN 325 MG/1
650 TABLET ORAL ONCE
Refills: 0 | Status: DISCONTINUED | OUTPATIENT
Start: 2024-08-21 | End: 2024-08-21

## 2024-08-21 RX ORDER — POTASSIUM CHLORIDE 10 MEQ
10 TABLET, EXT RELEASE, PARTICLES/CRYSTALS ORAL
Refills: 0 | Status: COMPLETED | OUTPATIENT
Start: 2024-08-21 | End: 2024-08-21

## 2024-08-21 RX ORDER — METOPROLOL TARTRATE 100 MG/1
50 TABLET ORAL DAILY
Refills: 0 | Status: DISCONTINUED | OUTPATIENT
Start: 2024-08-21 | End: 2024-08-25

## 2024-08-21 RX ORDER — TEMAZEPAM 30 MG/1
7.5 CAPSULE ORAL AT BEDTIME
Refills: 0 | Status: DISCONTINUED | OUTPATIENT
Start: 2024-08-21 | End: 2024-08-25

## 2024-08-21 RX ORDER — IPRATROPIUM BROMIDE AND ALBUTEROL SULFATE .5; 3 MG/3ML; MG/3ML
3 SOLUTION RESPIRATORY (INHALATION) EVERY 6 HOURS
Refills: 0 | Status: DISCONTINUED | OUTPATIENT
Start: 2024-08-21 | End: 2024-08-25

## 2024-08-21 RX ORDER — SENNA 187 MG
2 TABLET ORAL AT BEDTIME
Refills: 0 | Status: DISCONTINUED | OUTPATIENT
Start: 2024-08-21 | End: 2024-08-25

## 2024-08-21 RX ORDER — IPRATROPIUM BROMIDE AND ALBUTEROL SULFATE .5; 3 MG/3ML; MG/3ML
3 SOLUTION RESPIRATORY (INHALATION) ONCE
Refills: 0 | Status: COMPLETED | OUTPATIENT
Start: 2024-08-21 | End: 2024-08-21

## 2024-08-21 RX ORDER — BUDESONIDE AND FORMOTEROL FUMARATE 80; 4.5 UG/1; UG/1
2 AEROSOL, METERED RESPIRATORY (INHALATION)
Refills: 0 | Status: DISCONTINUED | OUTPATIENT
Start: 2024-08-21 | End: 2024-08-25

## 2024-08-21 RX ORDER — FUROSEMIDE 40 MG
40 TABLET ORAL DAILY
Refills: 0 | Status: DISCONTINUED | OUTPATIENT
Start: 2024-08-21 | End: 2024-08-22

## 2024-08-21 RX ORDER — METHYLPREDNISOLONE 4 MG
20 TABLET ORAL EVERY 8 HOURS
Refills: 0 | Status: DISCONTINUED | OUTPATIENT
Start: 2024-08-21 | End: 2024-08-24

## 2024-08-21 RX ORDER — METHYLPREDNISOLONE 4 MG
20 TABLET ORAL EVERY 8 HOURS
Refills: 0 | Status: DISCONTINUED | OUTPATIENT
Start: 2024-08-21 | End: 2024-08-21

## 2024-08-21 RX ORDER — HEPARIN SODIUM,BOVINE 1000/ML
5000 VIAL (ML) INJECTION EVERY 12 HOURS
Refills: 0 | Status: DISCONTINUED | OUTPATIENT
Start: 2024-08-21 | End: 2024-08-25

## 2024-08-21 RX ADMIN — Medication 3 MILLIGRAM(S): at 22:58

## 2024-08-21 RX ADMIN — BUDESONIDE AND FORMOTEROL FUMARATE 2 PUFF(S): 80; 4.5 AEROSOL, METERED RESPIRATORY (INHALATION) at 17:43

## 2024-08-21 RX ADMIN — Medication 100 MILLIEQUIVALENT(S): at 15:29

## 2024-08-21 RX ADMIN — Medication 20 MILLIGRAM(S): at 09:56

## 2024-08-21 RX ADMIN — Medication 40 MILLIGRAM(S): at 11:39

## 2024-08-21 RX ADMIN — Medication 100 MILLIEQUIVALENT(S): at 12:00

## 2024-08-21 RX ADMIN — IPRATROPIUM BROMIDE AND ALBUTEROL SULFATE 3 MILLILITER(S): .5; 3 SOLUTION RESPIRATORY (INHALATION) at 22:58

## 2024-08-21 RX ADMIN — Medication 2 TABLET(S): at 22:27

## 2024-08-21 RX ADMIN — Medication 40 MILLIEQUIVALENT(S): at 22:58

## 2024-08-21 RX ADMIN — ACETAMINOPHEN 260 MILLIGRAM(S): 325 TABLET ORAL at 11:30

## 2024-08-21 RX ADMIN — ACETAMINOPHEN 650 MILLIGRAM(S): 325 TABLET ORAL at 22:27

## 2024-08-21 RX ADMIN — IPRATROPIUM BROMIDE AND ALBUTEROL SULFATE 3 MILLILITER(S): .5; 3 SOLUTION RESPIRATORY (INHALATION) at 08:37

## 2024-08-21 RX ADMIN — ACETAMINOPHEN 650 MILLIGRAM(S): 325 TABLET ORAL at 11:50

## 2024-08-21 RX ADMIN — Medication 20 MILLIGRAM(S): at 22:26

## 2024-08-21 RX ADMIN — Medication 100 MILLIEQUIVALENT(S): at 09:53

## 2024-08-21 RX ADMIN — IPRATROPIUM BROMIDE AND ALBUTEROL SULFATE 3 MILLILITER(S): .5; 3 SOLUTION RESPIRATORY (INHALATION) at 17:43

## 2024-08-21 RX ADMIN — Medication 20 MILLIGRAM(S): at 22:27

## 2024-08-21 NOTE — H&P ADULT - NSHPPHYSICALEXAM_GEN_ALL_CORE
T(C): 36.7 (08-21-24 @ 07:40), Max: 36.7 (08-21-24 @ 07:40)  HR: 82 (08-21-24 @ 08:45) (70 - 82)  BP: 147/98 (08-21-24 @ 08:45) (125/93 - 147/98)  RR: 24 (08-21-24 @ 08:45) (24 - 30)  SpO2: 100% (08-21-24 @ 08:45) (93% - 100%)  GENERAL: NAD, lying in bed comfortably  HEAD:  Atraumatic, normocephalic  EYES: EOMI, PERRLA, conjunctiva and sclera clear  NECK: Supple, trachea midline, no JVD  HEART: Regular rate and rhythm, no murmurs, rubs, or gallops  LUNGS: Unlabored respirations.  Clear to auscultation bilaterally, no crackles, wheezing, or rhonchi  ABDOMEN: Soft, nontender, nondistended, +BS  EXTREMITIES: 2+ peripheral pulses bilaterally. No clubbing, cyanosis,/  b/l  edema, R > left  NERVOUS SYSTEM:  A&Ox3, moving all extremities, no focal deficits   SKIN: No rashes or lesions T(C): 36.7 (08-21-24 @ 07:40), Max: 36.7 (08-21-24 @ 07:40)  HR: 82 (08-21-24 @ 08:45) (70 - 82)  BP: 147/98 (08-21-24 @ 08:45) (125/93 - 147/98)  RR: 24 (08-21-24 @ 08:45) (24 - 30)  SpO2: 100% (08-21-24 @ 08:45) (93% - 100%)  GENERAL: NAD, lying in bed comfortably  HEAD:  Atraumatic, normocephalic  EYES: EOMI, PERRLA, conjunctiva and sclera clear  NECK: Supple, trachea midline, no JVD  HEART: Regular rate and rhythm, no murmurs, rubs, or gallops  LUNGS:  few   rhonchi  ABDOMEN: Soft, nontender, nondistended, +BS  EXTREMITIES: 2+ peripheral pulses bilaterally. No clubbing, cyanosis,/  b/l  edema, R > left  NERVOUS SYSTEM:  A&Ox3, moving all extremities, no focal deficits   SKIN: No rashes or lesions

## 2024-08-21 NOTE — H&P ADULT - HISTORY OF PRESENT ILLNESS
: 89 year old female        with pmh of COPD,on home O2, chronic R hemidiaphragm paralysis,       h/o recurrent aspiration . HTN, diverticulitis,  asthma/COPD, sarcoidosis      hypothyroidism, heart failure,        presenting to the ER for  sob   that started this morning.     denies f/n/v/d, CP, HA, dizziness, abdominal pain, urinary symptoms.   denies  fevers/ cp/abd  pain   seen in  er, ah s k mild  tacypnes      89 year old female        with pmh of COPD,on home O2, chronic R hemidiaphragm paralysis,       HTN, diverticulitis,  asthma/COPD, sarcoidosis      hypothyroidism,   c/c  siatolic  heart failure,        presenting to the ER for  sob   that started this morning.     denies f/n/v/d, CP, HA, dizziness, abdominal pain, urinary symptoms.   denies  fevers/ cp/abd  pain   seen in  er,  pt  ha s  mild  tachypnes

## 2024-08-21 NOTE — ED PROVIDER NOTE - CPE EDP EYES NORM
normal...
Austin Moss DO (PEM Attending): Patient running away from home and refusing to go home with father.  No SI HI reported.  We will hold for social work and potential BH eval in the morning.

## 2024-08-21 NOTE — CONSULT NOTE ADULT - ASSESSMENT
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    #Shortness of breath/dyspnea  #Acute hypoxic respiratory failure  #Leukocytosis    Recommendations  Unclear if patient has pneumonia at this time given lack of fever, cough, sputum production, no reported aspiration event, negative imaging  Would monitor off antibiotics for now  If fever, worsening cough and start piperacillin/tazobactam  Obtain MRSA nares PCR  Consider CT chest  Pulmonology eval  Etiology more likely noninfectious given exam  Follow fever curve and WBC count    Joshua La MD  Division of Infectious Diseases  
89 year old female with pmh of COPD,on home O2, chronic R hemidiaphragm paralysis, h/o recurrent aspiration events, HTN, diverticulitis,  asthma/COPD, sarcoidosis, hypothyroidism, heart failure, presenting to the ER for SOB that started this morning. patient states experienced SOB when she woke up. states symptoms have since approved. recently admitted for similar. patient denies f/n/v/d, CP, HA, dizziness, abdominal pain, urinary symptoms  pt is well known to me with hx of underlying lung disease chf with increasing sob  pt with hx of sarcoidosis  start on iv solumedrol  + le edema, check venous doppler, negative  start lasix 20 mg iv daily  check chest x ray r/o recurrent pneumoniae  will adjust cardiac meds

## 2024-08-21 NOTE — ED ADULT NURSE NOTE - NSFALLHARMRISKINTERV_ED_ALL_ED
Assistance OOB with selected safe patient handling equipment if applicable/Assistance with ambulation/Communicate risk of Fall with Harm to all staff, patient, and family/Monitor gait and stability/Provide visual cue: red socks, yellow wristband, yellow gown, etc/Reinforce activity limits and safety measures with patient and family/Bed in lowest position, wheels locked, appropriate side rails in place/Call bell, personal items and telephone in reach/Instruct patient to call for assistance before getting out of bed/chair/stretcher/Non-slip footwear applied when patient is off stretcher/[place] to call system/Physically safe environment - no spills, clutter or unnecessary equipment/Purposeful Proactive Rounding/Room/bathroom lighting operational, light cord in reach

## 2024-08-21 NOTE — ED ADULT NURSE REASSESSMENT NOTE - NS ED NURSE REASSESS COMMENT FT1
Pt handoff received from Viviana QUINTANILLA. On assessment pt AxOx4. 3/3 potassium chloride administered. Pt AxOx4. Pt removed biPAP, SpO2 approx 84%. Pt placed on biPAP SpO2 increased Pt handoff received from Viviana QUINTANILLA. On assessment pt AxOx4. 3/3 potassium chloride administered. Pt AxOx4. Pt removed biPAP, SpO2 approx 84%. Pt placed on biPAP SpO2 increased to 96%. Repeat lactate not drawn from AM from previous nurse d/t pt not receiving fluids. Pt admitted to tele awaiting bed. Stretcher locked and in lowest position, appropriate side rails up. Pt instructed to notify RN if assistance is needed.

## 2024-08-21 NOTE — ED PROVIDER NOTE - OBJECTIVE STATEMENT
89 year old female with pmh of COPD,on home O2, chronic R hemidiaphragm paralysis, h/o recurrent aspiration events, HTN, diverticulitis,  asthma/COPD, sarcoidosis, hypothyroidism, heart failure, presenting to the ER for SOB that started this morning. patient states experienced SOB when she woke up. states symptoms have since approved. recently admitted for similar. patient denies f/n/v/d, CP, HA, dizziness, abdominal pain, urinary symptoms.

## 2024-08-21 NOTE — ED ADULT NURSE NOTE - AVIAN FLU EXPOSURE
Outpatient Physical Therapy  DISCHARGE SUMMARY NOTE      [facility] [address].  [address]  [place] [address]  Phone:  [phone]  Fax:  [phone]    Date of Visit: 11/01/2018    Patient: Mary Martinez  YOB: 1971  MRN: [id number]     Referring Provider: Dao Valdovinos M.D.  [id number] [place] [address]   Referring Diagnosis Other cervical disc degeneration, unspecified cervical region [M50.30]     Physical Therapy Occurrence Codes    Date of onset of impairment:  8/29/13   Date physical therapy care plan established or reviewed:  8/29/18   Date physical therapy treatment started:  8/29/18          Functional Limitation G-Codes and Severity Modifiers      Goal:     Discharge:         Your patient is being discharged from Physical Therapy with the following comments:   · Goals partially met    Comments:  Patient continues to report headaches and right UE radicular symptoms     Recommendations:  D/C from PT at this time. Recommend patient to continue to follow through with HEP.    Estela Caro, PT, DPT    Date: 11/1/2018   No

## 2024-08-21 NOTE — H&P ADULT - ASSESSMENT
89 year old woman,      h/o  emphysema, ,  c/c  L  hydroureter,  HTN.  depression   prior       h/o   C. difficile, diverticulitis,   pna        asthma/COPD, sarcoidosis,   c/c  diastolic  heart failure /  ulcer  left 2nd  toe /  no  osteo on prior  visit     prior   chart/  from psych  note,   faxed medication records from [place], ,  mirtazapine 7.5mg po qhs, fluoxetine 20mg po daily, and temazepam 30mg po qHs for insomnia.    admitted  with    sob,  with   wbc  of  12, ,000  on  arrival,  was  afebrile.      sob/ acute  on  c/c  diastolic     chf      h/o  c/c  elevated  d  dimer,        ct  chest  angio last  month/  seen by  tamiko  puldarlene,  then.  ct  chest  angio, was  negative  for  pe       h/o  underlying chronic lung disease./  emphysema        c/c   superficial   ulcerations, on  bony prominences  of hammer  toes,     HTN/  HLD    c/c  diastolic  chf, on lasix    Anxiety,   c/c  anemia    pedal  edema.  doppler  leg,  no  dvt/ swollen right  leg /  c/c    on iv lasix/  iv  solumedrol/  f/p  by  card/  brissa davis pulm  eval    dvt  ppx             rad< from: [employer] Limited W or WO Ultrasound Enhancing Agent (04.08.24 @ 14:17) >  CONCLUSIONS:   1. Left ventricular cavity is normal in size. Left ventricular wall thickness is normal. Left ventricular systolic function is normal with an ejection fraction visually estimated at 55 to 60 %. There are no regional wall motion abnormalities seen.   2. Normal rightventricular cavity size, with normal wall thickness, and normal systolic function. Tricuspid annular plane systolic excursion (TAPSE) is 2.0 cm (normal >=1.7 cm).   3. The left atrium is severely dilated.   4. Mild mitral regurgitation.   5. Comparedto the transthoracic echocardiogram performed on 3/26/2024, regional wall motion abnormality not appreciated.  ________________________________________________________________________________________  < end of copied text >                           89 year old woman,      h/o  emphysema, ,  c/c  L  hydroureter,  HTN.  depression   prior       h/o   C. difficile, diverticulitis,   pna        asthma/COPD, sarcoidosis,   c/c  diastolic  heart failure /  ulcer  left 2nd  toe    c/c   superficial   ulcerations, on  bony prominences  of hammer  toe    admitted  with    sob,  with   wbc  of  12, ,000  on  arrival,  was  afebrile.      sob/ acute  on  c/c  diastolic     chf  and  exacerbation of  copd        h/o  c/c  elevated  d  dimer,         ct  chest  angio last  month/  seen by  tamiko iraheta,  then.  ct  chest  angio, was  negative  for  pe       h/o  underlying c/c  lung disease./  Emphysema        c/c   superficial   ulcerations, on  bony prominences  of hammer  toes,     HTN/  HLD    c/c  diastolic  chf, on lasix/ known  to    card     Anxiety,   c/c  anemia    pedal  edema.  doppler  leg,  no  dvt/ swollen right  leg /  c/c      on iv lasix/  iv  solumedrol/    house  pulm  eval     dvt  ppx /  swallow  eval            rad< from: TTE Limited W or WO Ultrasound Enhancing Agent (04.08.24 @ 14:17) >  CONCLUSIONS:   1. Left ventricular cavity is normal in size. Left ventricular wall thickness is normal. Left ventricular systolic function is normal with an ejection fraction visually estimated at 55 to 60 %. There are no regional wall motion abnormalities seen.   2. Normal rightventricular cavity size, with normal wall thickness, and normal systolic function. Tricuspid annular plane systolic excursion (TAPSE) is 2.0 cm (normal >=1.7 cm).   3. The left atrium is severely dilated.   4. Mild mitral regurgitation.   5. Comparedto the transthoracic echocardiogram performed on 3/26/2024, regional wall motion abnormality not appreciated.  ________________________________________________________________________________________  < end of copied text >                           89 year old woman,      h/o  emphysema, ,  c/c  L  hydroureter,  HTN.  depression   prior       h/o   C. difficile, diverticulitis,   pna        asthma/COPD, sarcoidosis,   c/c  diastolic  heart failure /  ulcer  left 2nd  toe    c/c   superficial   ulcerations, on  bony prominences  of hammer  toe    admitted  with    sob,  with   wbc  of  12, ,000  on  arrival,  was  afebrile.      sob/ acute  on  c/c  diastolic     chf  and  exacerbation of  copd        h/o  c/c  elevated  d  dimer,         ct  chest  angio last  month/  seen by  tamiko iraheta,  then.  ct  chest  angio, was  negative  for  pe       h/o  underlying c/c  lung disease./  Emphysema        c/c   superficial   ulcerations, on  bony prominences  of hammer  toes,     HTN/  HLD    c/c  diastolic  chf, on lasix/ known  to    card     Anxiety,   c/c  anemia    pedal  edema.  doppler  leg,  no  dvt/ swollen right  leg /  c/c      on iv lasix/  iv  solumedrol/    house  pulm  eval     dvt  ppx /  swallow  eval   dr zamora  will  assume care            rad< from: TTE Limited W or WO Ultrasound Enhancing Agent (04.08.24 @ 14:17) >  CONCLUSIONS:   1. Left ventricular cavity is normal in size. Left ventricular wall thickness is normal. Left ventricular systolic function is normal with an ejection fraction visually estimated at 55 to 60 %. There are no regional wall motion abnormalities seen.   2. Normal rightventricular cavity size, with normal wall thickness, and normal systolic function. Tricuspid annular plane systolic excursion (TAPSE) is 2.0 cm (normal >=1.7 cm).   3. The left atrium is severely dilated.   4. Mild mitral regurgitation.   5. Comparedto the transthoracic echocardiogram performed on 3/26/2024, regional wall motion abnormality not appreciated.  ________________________________________________________________________________________  < end of copied text >

## 2024-08-21 NOTE — ED ADULT NURSE REASSESSMENT NOTE - NS ED NURSE REASSESS COMMENT FT1
1045 Respiratory therapist set up BIPAP again. Pt uncooperative, pulling mask off face. Requests water. c/o feeling thirsty. Ice chips given. NC 4lpm reapplied. 1125 Daughter and aide visiting pt. Pt TBA tele. no bed yet, NP Cindy Reyes notified pt remains tachypneic and pulling off BIPAP mask and will re eval pt. Additional Lasix to be given. Urine output 700cc clkear yellow urine in cannister drained from prima fit. Fall risk precautions maintained

## 2024-08-21 NOTE — CONSULT NOTE ADULT - SUBJECTIVE AND OBJECTIVE BOX
Date of Service, 08-21-24 @ 10:36  CHIEF COMPLAINT:Patient is a 89y old  Female who presents with a chief complaint of     HPI:  89 year old female with pmh of COPD,on home O2, chronic R hemidiaphragm paralysis, h/o recurrent aspiration events, HTN, diverticulitis,  asthma/COPD, sarcoidosis, hypothyroidism, heart failure, presenting to the ER for SOB that started this morning. patient states experienced SOB when she woke up. states symptoms have since approved. recently admitted for similar. patient denies f/n/v/d, CP, HA, dizziness, abdominal pain, urinary symptoms    PAST MEDICAL & SURGICAL HISTORY:  HTN (hypertension)  Hypothyroidism  Osteoporosis  CAD (coronary artery disease)  COPD (chronic obstructive pulmonary disease)  Pulmonary sarcoidosis  H/O pyelonephritis  Acute diverticulitis    MEDICATIONS  (STANDING):  acetaminophen   IVPB .. 650 milliGRAM(s) IV Intermittent once  atorvastatin 20 milliGRAM(s) Oral at bedtime  budesonide  80 MICROgram(s)/formoterol 4.5 MICROgram(s) Inhaler 2 Puff(s) Inhalation two times a day  FLUoxetine 10 milliGRAM(s) Oral daily  metoprolol succinate ER 50 milliGRAM(s) Oral daily  potassium chloride  10 mEq/100 mL IVPB 10 milliEquivalent(s) IV Intermittent every 1 hour  senna 2 Tablet(s) Oral at bedtime    MEDICATIONS  (PRN):  temazepam 7.5 milliGRAM(s) Oral at bedtime PRN Insomnia      FAMILY HISTORY:      SOCIAL HISTORY:    [ ] Non-smoker  [ ] Smoker  [ ] Alcohol    Allergies    iodinated radiocontrast agents (Anaphylaxis)    Intolerances    	    REVIEW OF SYSTEMS:  CONSTITUTIONAL: No fever, weight loss, or fatigue  EYES: No eye pain, visual disturbances, or discharge  ENT:  No difficulty hearing, tinnitus, vertigo; No sinus or throat pain  NECK: No pain or stiffness  RESPIRATORY: No cough, wheezing, chills or hemoptysis;+Shortness of Breath  CARDIOVASCULAR: No chest pain, palpitations, passing out, dizziness, or leg swelling  GASTROINTESTINAL: No abdominal or epigastric pain. No nausea, vomiting, or hematemesis; No diarrhea or constipation. No melena or hematochezia.  GENITOURINARY: No dysuria, frequency, hematuria, or incontinence  NEUROLOGICAL: No headaches, memory loss, loss of strength, numbness, or tremors  SKIN: No itching, burning, rashes, or lesions   LYMPH Nodes: No enlarged glands  ENDOCRINE: No heat or cold intolerance; No hair loss  MUSCULOSKELETAL: No joint pain or swelling; No muscle, back, or extremity pain  PSYCHIATRIC: No depression, anxiety, mood swings, or difficulty sleeping  HEME/LYMPH: No easy bruising, or bleeding gums  ALLERGY AND IMMUNOLOGIC: No hives or eczema	    [x ] All others negative	  [ ] Unable to obtain    PHYSICAL EXAM:  T(C): 36.7 (08-21-24 @ 07:40), Max: 36.7 (08-21-24 @ 07:40)  HR: 82 (08-21-24 @ 08:45) (70 - 82)  BP: 147/98 (08-21-24 @ 08:45) (125/93 - 147/98)  RR: 24 (08-21-24 @ 08:45) (24 - 30)  SpO2: 100% (08-21-24 @ 08:45) (93% - 100%)  Wt(kg): --  I&O's Summary      Appearance: Normal	  HEENT:   Normal oral mucosa, PERRL, EOMI	  Lymphatic: No lymphadenopathy  Cardiovascular: Normal S1 S2, No JVD, + murmurs, + edema  Respiratory: rhonchi  Gastrointestinal:  Soft, Non-tender, + BS	  Skin: No rashes, No ecchymoses, No cyanosis	  Neurologic: Non-focal  Extremities: Normal range of motion, No clubbing, cyanosis o, +edema  Vascular: Peripheral pulses palpable 2+ bilaterally    TELEMETRY: 	    ECG:  	  RADIOLOGY:  OTHER: 	  	  LABS:	 	    CARDIAC MARKERS:                              9.4    12.55 )-----------( 152      ( 21 Aug 2024 08:31 )             30.4     08-21    141  |  102  |  15  ----------------------------<  104<H>  3.3<L>   |  26  |  0.64    Ca    8.9      21 Aug 2024 08:31    TPro  6.4  /  Alb  3.5  /  TBili  0.4  /  DBili  x   /  AST  11  /  ALT  5<L>  /  AlkPhos  109  08-21    proBNP:   Lipid Profile:   HgA1c:   TSH:   PT/INR - ( 21 Aug 2024 10:03 )   PT: 11.7 sec;   INR: 1.07 ratio         PTT - ( 21 Aug 2024 10:03 )  PTT:26.4 sec    PREVIOUS DIAGNOSTIC TESTING:    < from: 12 Lead ECG (07.14.24 @ 16:09) >  Diagnosis Line NORMAL SINUS RHYTHM  MINIMAL VOLTAGE CRITERIA FOR LVH, MAY BE NORMAL VARIANT  NON-SPECIFIC ST/T ABNORMALITY  POOR R WAVE PROGRESSION  WHEN COMPARED WITH ECG OF 14-JUL-2024 05:57, (UNCONFIRMED)  CHANGES HAVE OCCURRED  -ST/T WAVE CHANGES        < from: TTE Limited W or WO Ultrasound Enhancing Agent (04.08.24 @ 14:17) >   1. Left ventricular cavity is normal in size. Left ventricular wall thickness is normal. Left ventricular systolic function is normal with an ejection fraction visually estimated at 55 to 60 %. There are no regional wall motion abnormalities seen.   2. Normal rightventricular cavity size, with normal wall thickness, and normal systolic function. Tricuspid annular plane systolic excursion (TAPSE) is 2.0 cm (normal >=1.7 cm).   3. The left atrium is severely dilated.   4. Mild mitral regurgitation.   5. Comparedto the transthoracic echocardiogram performed on 3/26/2024, regional wall motion abnormality not appreciated.    < from: VA Duplex Lower Ext Vein Scan, Right (08.21.24 @ 09:27) >  There is edema within the superficial soft tissues of the right lower   extremity    There isnormal compressibility of the right common femoral, femoral and   popliteal veins.  The contralateral common femoral vein is patent.  Doppler examination shows normal spontaneous and phasic flow.    No calf vein thrombosis is detected.    IMPRESSION:  No evidence of right lower extremity deep venous thrombosis.          
Patient is a 89y old  Female who presents with a chief complaint of sob (21 Aug 2024 10:35)    HPI:  89-year-old female with a past medical history of COPD, on home oxygen, chronic right-sided hemidiaphragm paralysis with history of recurrent aspiration events, diverticulitis, sarcoidosis, heart failure who was admitted to hospital due to shortness of breath.    Patient reports she developed shortness of breath on day of admission.  Reports that she has had no fevers, no chills, no nausea/vomiting.  Patient denies any other symptoms including chest pain, abdominal pain, dysuria.    In the ED, patient is afebrile.  Otherwise hemodynamically stable however requiring BiPAP.  Initial labs show leukocytosis 12.5, anemia 9.4/30.4, BMP with renal function within normal limits, hepatic function within normal limits.  Doppler ultrasound of the right lower extremity does not show any evidence for DVT.  Chest x-ray shows small bilateral effusions and atelectasis.  COVID/flu/RSV panel is negative.    Patient with recent admission to the hospital, discharged on 7/17/2024 where patient was treated for an aspiration pneumonia with piperacillin/tazobactam.        prior hospital charts reviewed [ x ]  primary team notes reviewed [  x]  other consultant notes reviewed x[  ]    PAST MEDICAL & SURGICAL HISTORY:  HTN (hypertension)      Hypothyroidism      Osteoporosis      CAD (coronary artery disease)      COPD (chronic obstructive pulmonary disease)      Pulmonary sarcoidosis      H/O pyelonephritis      Acute diverticulitis          Allergies  iodinated radiocontrast agents (Anaphylaxis)    ANTIMICROBIALS (past 90 days)  MEDICATIONS  (STANDING):          MEDICATIONS  (STANDING):  albuterol    90 MICROgram(s) HFA Inhaler 2 every 8 hours  albuterol/ipratropium for Nebulization 3 every 6 hours  atorvastatin 20 at bedtime  budesonide  80 MICROgram(s)/formoterol 4.5 MICROgram(s) Inhaler 2 two times a day  FLUoxetine 10 daily  furosemide   Injectable 40 daily  heparin   Injectable 5000 every 12 hours  methylPREDNISolone sodium succinate Injectable 20 every 8 hours  metoprolol succinate ER 50 daily  senna 2 at bedtime  temazepam 7.5 at bedtime PRN    SOCIAL HISTORY:     Lives at home. no reporte toabcco/alcohol/illicit drug use  FAMILY HISTORY:  No pertinent family history in first degree relatives    REVIEW OF SYSTEMS  [  ] ROS unobtainable because:    [x  ] All other systems negative except as noted below:	    Constitutional:  [ ] fever [ ] chills  [ ] weight loss  [ ] weakness  Skin:  [ ] rash [ ] phlebitis	  Eyes: [ ] icterus [ ] pain  [ ] discharge	  ENMT: [ ] sore throat  [ ] thrush [ ] ulcers [ ] exudates  Respiratory: [x ] dyspnea [ ] hemoptysis [ ] cough [ ] sputum	  Cardiovascular:  [ ] chest pain [ ] palpitations [ ] edema	  Gastrointestinal:  [ ] nausea [ ] vomiting [ ] diarrhea [ ] constipation [ ] pain	  Genitourinary:  [ ] dysuria [ ] frequency [ ] hematuria [ ] discharge [ ] flank pain  [ ] incontinence  Musculoskeletal:  [ ] myalgias [ ] arthralgias [ ] arthritis  [ ] back pain  Neurological:  [ ] headache [ ] seizures  [ ] confusion/altered mental status  Psychiatric:  [ ] anxiety [ ] depression	  Hematology/Lymphatics:  [ ] lymphadenopathy  Endocrine:  [ ] adrenal [ ] thyroid  Allergic/Immunologic:	 [ ] transplant [ ] seasonal    Vital Signs Last 24 Hrs  T(F): 97.4 (08-21-24 @ 16:26), Max: 98 (08-21-24 @ 07:40)  Vital Signs Last 24 Hrs  HR: 78 (08-21-24 @ 16:26) (70 - 82)  BP: 140/62 (08-21-24 @ 16:26) (94/46 - 147/98)  RR: 20 (08-21-24 @ 16:26)  SpO2: 98% (08-21-24 @ 16:26) (93% - 100%)  Wt(kg): --    PHYSICAL EXAM:  Constitutional: non-toxic, no distress biPAP+  HEAD/EYES: anicteric, no conjunctival injection  ENT:  supple, no thrush  Cardiovascular:   normal S1, S2, no murmur, no edema  Respiratory:  clear BS bilaterally, no wheezes, no rales  GI:  soft, non-tender, normal bowel sounds  :  no collado, no CVA tenderness  Musculoskeletal:  LLE edema  Neurologic: awake and alert, normal strength, no focal findings  Skin:  no rash, no erythema, no phlebitis  Heme/Onc: no lymphadenopathy   Psychiatric:  awake, alert, appropriate mood                            9.4    12.55 )-----------( 152      ( 21 Aug 2024 08:31 )             30.4   08-21    141  |  102  |  15  ----------------------------<  104<H>  3.3<L>   |  26  |  0.64    Ca    8.9      21 Aug 2024 08:31    TPro  6.4  /  Alb  3.5  /  TBili  0.4  /  DBili  x   /  AST  11  /  ALT  5<L>  /  AlkPhos  109  08-21    Urinalysis Basic - ( 21 Aug 2024 08:31 )    Color: x / Appearance: x / SG: x / pH: x  Gluc: 104 mg/dL / Ketone: x  / Bili: x / Urobili: x   Blood: x / Protein: x / Nitrite: x   Leuk Esterase: x / RBC: x / WBC x   Sq Epi: x / Non Sq Epi: x / Bacteria: x    MICROBIOLOGY:              RADIOLOGY:  imaging below personally reviewed and agree with findings

## 2024-08-21 NOTE — ED ADULT NURSE NOTE - OBJECTIVE STATEMENT
0725 89 yr old WF brought to ER via ambulance on stretcher from Assisted living facility for further eval and tx of SOB. PMH CHF and COPD. Tachypneic, wheezing., crackles 1/3 up bilat. Color pink. Skin W&D,  Fall risk precautions maintained.Pt wearing diaper. Per EMS pulse ox was 80% on room air and improved to 90% on 4lpm NC. A&Ox4. c/o HA and pain/itching underneath left breast. Cardiac monitor applied. EKG done. Resp paged for BIPAP. 0725 89 yr old WF brought to ER via ambulance on stretcher from Assisted living facility for further eval and tx of SOB. PMH CHF and COPD. Tachypneic, wheezing., crackles 1/3 up bilat. Color pink. Skin W&D,  2+pedal edema L>R. Fall risk precautions maintained. Pt wearing diaper. Per EMS pulse ox was 80% on room air and improved to 90% on 4lpm NC. A&Ox4. c/o HA and pain/itching underneath left breast. Cardiac monitor applied. EKG done. Resp paged for BIPAP.

## 2024-08-21 NOTE — ED ADULT TRIAGE NOTE - AVIAN FLU SYMPTOMS
No
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

## 2024-08-21 NOTE — ED PROVIDER NOTE - PROGRESS NOTE DETAILS
Yessenia Acevedo PA-C: patient on 4L however noted to be tachypneic. called for BIPAP. Yessenia Acevedo PA-C: patient with improvement of bipap. 02 100%. tachypnea improved. no use of accessory muscles. potassium 3.3. pro bnp 1308. will give potassium followed by Lasix. spoke with patient doctor, Dr. Hermosillo. will admit patient to his service. patient refused solumedrol. discussed with Dr. Dewitt. Yessenia Acevedo PA-C: patient with improvement of bipap. 02 100%. tachypnea improved. no use of accessory muscles. potassium 3.3. pro bnp 1308. will give potassium followed by Lasix. spoke with patient doctor, Dr. Hermosillo. will admit patient to his service. patient refused solumedrol. While in the ER patient ripped off her BiPAP mask she is screaming get this off of me.  She is unable to maintain the BiPAP on her face.  As a result it was discontinued will see patient's room air sat Yessenia Acevedo PA-C: spoke with patient daughter, Deborah. discussed patient care and patient will be admitted to hospital. daughter in agreement with plan.

## 2024-08-21 NOTE — ED PROVIDER NOTE - ATTENDING APP SHARED VISIT CONTRIBUTION OF CARE
89-year-old female who has history of anxiety, hypertension, COPD, sarcoidosis presents to the emergency department with shortness of breath.  Patient is hard of hearing and is a poor historian [facility].  She is with increased respiratory rate upon arrival.  She has history of recurrent aspiration.  Patient denies any fevers or chills.  She denies any chest pain.  Patient has no nausea or vomiting.  On exam patient is awake alert oriented x3.  She has low lung volumes on chest x-ray diminished breath sounds.  Patient with right greater than left lower extremity swelling.  Abdomen is soft nontender nondistended.  Patient with no CVA tenderness.  Plan for patient to have patient to have labs imaging and reassessment.  Findings concerning for possible aspiration, versus COPD exacerbation versus viral infection versus CHF.  Patient likely to be admitted as she has increased oxygen requirement.  Patient refused her Solu-Medrol

## 2024-08-21 NOTE — ED ADULT TRIAGE NOTE - NS ED NURSE BANDS TYPE
"Per Dr. Garsia, patient to have 14 day Zio monitor placed.  Diagnosis: Palpitations [R00.2]  Monitor placed: {YES / NO:353247::\"Yes\"}  Patient Instructed: {YES / NO:966610::\"Yes\"}  Patient verbalized understanding: {YES / NO:013220::\"Yes\"}  Holter # [id number]      "
Name band;

## 2024-08-22 LAB
ANION GAP SERPL CALC-SCNC: 12 MMOL/L — SIGNIFICANT CHANGE UP (ref 5–17)
BUN SERPL-MCNC: 14 MG/DL — SIGNIFICANT CHANGE UP (ref 7–23)
CALCIUM SERPL-MCNC: 9.1 MG/DL — SIGNIFICANT CHANGE UP (ref 8.4–10.5)
CHLORIDE SERPL-SCNC: 100 MMOL/L — SIGNIFICANT CHANGE UP (ref 96–108)
CO2 SERPL-SCNC: 27 MMOL/L — SIGNIFICANT CHANGE UP (ref 22–31)
CREAT SERPL-MCNC: 0.59 MG/DL — SIGNIFICANT CHANGE UP (ref 0.5–1.3)
EGFR: 87 ML/MIN/1.73M2 — SIGNIFICANT CHANGE UP
GLUCOSE SERPL-MCNC: 168 MG/DL — HIGH (ref 70–99)
HCT VFR BLD CALC: 31.1 % — LOW (ref 34.5–45)
HGB BLD-MCNC: 9.6 G/DL — LOW (ref 11.5–15.5)
MCHC RBC-ENTMCNC: 28.2 PG — SIGNIFICANT CHANGE UP (ref 27–34)
MCHC RBC-ENTMCNC: 30.9 GM/DL — LOW (ref 32–36)
MCV RBC AUTO: 91.2 FL — SIGNIFICANT CHANGE UP (ref 80–100)
MRSA PCR RESULT.: DETECTED
NRBC # BLD: 0 /100 WBCS — SIGNIFICANT CHANGE UP (ref 0–0)
PLATELET # BLD AUTO: 215 K/UL — SIGNIFICANT CHANGE UP (ref 150–400)
POTASSIUM SERPL-MCNC: 4 MMOL/L — SIGNIFICANT CHANGE UP (ref 3.5–5.3)
POTASSIUM SERPL-SCNC: 4 MMOL/L — SIGNIFICANT CHANGE UP (ref 3.5–5.3)
RBC # BLD: 3.41 M/UL — LOW (ref 3.8–5.2)
RBC # FLD: 16.5 % — HIGH (ref 10.3–14.5)
S AUREUS DNA NOSE QL NAA+PROBE: DETECTED
SODIUM SERPL-SCNC: 139 MMOL/L — SIGNIFICANT CHANGE UP (ref 135–145)
WBC # BLD: 6.94 K/UL — SIGNIFICANT CHANGE UP (ref 3.8–10.5)
WBC # FLD AUTO: 6.94 K/UL — SIGNIFICANT CHANGE UP (ref 3.8–10.5)

## 2024-08-22 PROCEDURE — 99232 SBSQ HOSP IP/OBS MODERATE 35: CPT

## 2024-08-22 RX ORDER — FUROSEMIDE 40 MG
20 TABLET ORAL DAILY
Refills: 0 | Status: DISCONTINUED | OUTPATIENT
Start: 2024-08-22 | End: 2024-08-24

## 2024-08-22 RX ORDER — PANTOPRAZOLE SODIUM 40 MG
40 TABLET, DELAYED RELEASE (ENTERIC COATED) ORAL
Refills: 0 | Status: DISCONTINUED | OUTPATIENT
Start: 2024-08-22 | End: 2024-08-25

## 2024-08-22 RX ORDER — MUPIROCIN 2 %
1 OINTMENT (GRAM) TOPICAL
Refills: 0 | Status: DISCONTINUED | OUTPATIENT
Start: 2024-08-22 | End: 2024-08-25

## 2024-08-22 RX ORDER — ACETAMINOPHEN 325 MG/1
650 TABLET ORAL EVERY 6 HOURS
Refills: 0 | Status: DISCONTINUED | OUTPATIENT
Start: 2024-08-22 | End: 2024-08-25

## 2024-08-22 RX ORDER — ACETAMINOPHEN 325 MG/1
650 TABLET ORAL ONCE
Refills: 0 | Status: COMPLETED | OUTPATIENT
Start: 2024-08-22 | End: 2024-08-22

## 2024-08-22 RX ORDER — FLUOXETINE HCL 20 MG/5 ML
30 SOLUTION, ORAL ORAL DAILY
Refills: 0 | Status: DISCONTINUED | OUTPATIENT
Start: 2024-08-22 | End: 2024-08-25

## 2024-08-22 RX ADMIN — ACETAMINOPHEN 650 MILLIGRAM(S): 325 TABLET ORAL at 20:40

## 2024-08-22 RX ADMIN — METOPROLOL TARTRATE 50 MILLIGRAM(S): 100 TABLET ORAL at 06:20

## 2024-08-22 RX ADMIN — ACETAMINOPHEN 650 MILLIGRAM(S): 325 TABLET ORAL at 00:22

## 2024-08-22 RX ADMIN — BUDESONIDE AND FORMOTEROL FUMARATE 2 PUFF(S): 80; 4.5 AEROSOL, METERED RESPIRATORY (INHALATION) at 06:20

## 2024-08-22 RX ADMIN — IPRATROPIUM BROMIDE AND ALBUTEROL SULFATE 3 MILLILITER(S): .5; 3 SOLUTION RESPIRATORY (INHALATION) at 18:25

## 2024-08-22 RX ADMIN — ACETAMINOPHEN 650 MILLIGRAM(S): 325 TABLET ORAL at 19:40

## 2024-08-22 RX ADMIN — TEMAZEPAM 7.5 MILLIGRAM(S): 30 CAPSULE ORAL at 22:16

## 2024-08-22 RX ADMIN — Medication 1 APPLICATION(S): at 18:26

## 2024-08-22 RX ADMIN — Medication 20 MILLIGRAM(S): at 21:32

## 2024-08-22 RX ADMIN — Medication 2 TABLET(S): at 21:32

## 2024-08-22 RX ADMIN — BUDESONIDE AND FORMOTEROL FUMARATE 2 PUFF(S): 80; 4.5 AEROSOL, METERED RESPIRATORY (INHALATION) at 18:34

## 2024-08-22 RX ADMIN — Medication 5000 UNIT(S): at 06:21

## 2024-08-22 RX ADMIN — Medication 20 MILLIGRAM(S): at 15:15

## 2024-08-22 RX ADMIN — ACETAMINOPHEN 650 MILLIGRAM(S): 325 TABLET ORAL at 07:16

## 2024-08-22 RX ADMIN — IPRATROPIUM BROMIDE AND ALBUTEROL SULFATE 3 MILLILITER(S): .5; 3 SOLUTION RESPIRATORY (INHALATION) at 06:22

## 2024-08-22 RX ADMIN — IPRATROPIUM BROMIDE AND ALBUTEROL SULFATE 3 MILLILITER(S): .5; 3 SOLUTION RESPIRATORY (INHALATION) at 11:52

## 2024-08-22 RX ADMIN — Medication 5000 UNIT(S): at 18:25

## 2024-08-22 RX ADMIN — Medication 40 MILLIGRAM(S): at 06:21

## 2024-08-22 RX ADMIN — Medication 20 MILLIGRAM(S): at 06:20

## 2024-08-22 NOTE — PATIENT PROFILE ADULT - STAGE
Patient is here for a Nurse Check.      Regimen: Completed carbo/taxol and XRT  Cycle/Day: C36D37    Margo NICHOLE is supervising clinician today.    ECO - No physically strenuous activity, but ambulatory and able to carry out light or sedentary work.    Nursing Assessment:  A comprehensive nursing assessment addressing the side-effects of chemotherapy was performed and the patient reports the following:    Nausea: patient denies  Vomiting: patient denies  Fever: patient denies  Chills: patient denies  Other signs of infection: patient denies  Bleeding: patient denies  Mucositis: patient denies  Diarrhea: patient denies  Constipation: patient denies  Anorexia: YES, patient reports ongoing dysphagia, tolerating soft/liquid diet  Dysuria: patient denies  Urinary Bleeding: patient denies  Cough: patient denies  Shortness of Breath: patient denies  Fatigue/Weakness: YES, ongoing fatigue  Numbness/Tingling: patient denies  Other Neuropathies: patient denies  Edema: patient denies  Rash: patient denies  Hand/Foot Syndrome: patient denies  Anxiety/Depression/Insomnia: patient denies  Pain: YES, ongoing throat pain with swallowing. Taking Norco PRN    Patient is requesting IVF, only taking in 12-20oz of fluid daily. Taking in one 8 oz protein drink daily.    Transfusion: No labs    Oral Chemotherapy: No    Education: Reinforced need for patient to increase fluid intake and increase protein drinks to keep weight on. Encouraged patient to take pain medication to control pain in an effort to get more liquid intake. Patient verbalized understanding.    Next appointment scheduled:   Future Appointments   Date Time Provider Department Center   2019  1:30 PM [facility] INFUSION RN [facility]   1/15/2019  2:45 PM [facility] LAB [facility]   1/15/2019  3:00 PM Allen Carlson MD [facility]   2019  3:30 PM Sharad Simpson MD [facility]   8/15/2019  8:30 AM Lakisha Stone MD [facility]     Patient  instructed to call the office with any questions or concerns.    Patient Discharged: patient discharged to home per self, ambulatory, with family member     suspected deep tissue injury

## 2024-08-22 NOTE — ADVANCED PRACTICE NURSE CONSULT - ASSESSMENT
Patient encountered on 4 Jorge. When wound care RN arrived on unit, patient was found lying in a low air loss pressure redistribution support surface style bed. Patient was alert and oriented and gave consent to skin consult. This patient is unable to turn independently and staff assistance x 2 was provided. The wound care RN was able to visualize an area of persistent nonblanchable deep red, maroon discoloration with purple hues. Once consult was complete, patient and family were educated regarding the need for routine turning and positioning to prevent pressure injuries and patient was placed in a left side-lying position utilizing pillow positioner assistive devices.   Patient encountered on 4 Jorge. When wound care RN arrived on unit, patient was found sitting in reclining chair at bedside with purewick external female urinary device in place to divert urine from skin. Patient was alert and oriented and gave consent to skin consult. Ms Elaine is unable to stand independently and staff assistance x 2 was provided. The wound care RN was able to visualize an area of persistent nonblanchable purple hued discoloration over B/L buttocks/sacral skin, area measures approximately 6cm x 6cm x 0cm- presentation is consistent with a deep tissue injury present on admission. B/L heels with hyperpigmentation measuring approximately 2cm x 2cm x 0cm- cannot rule out a deep tissue injury present on admission. Thickened, elongated toenails and hammer toes with wounds vs pressure injuries noted on dorsal aspects of toes- podiatry recommended. Once consult was complete, patient was educated regarding the need for routine turning and positioning to prevent pressure injuries and patient was placed back into the chair with a pillow under patient for cushioning and comfort.

## 2024-08-22 NOTE — ADVANCED PRACTICE NURSE CONSULT - RECOMMEDATIONS
Impression:     urinary incontinence  elongated, thickened toenails  B/L buttocks/sacral hyperpigmentation, cannot rule out a deep tissue injury present on admission   B/L heel hyperpigmentation, cannot rule out a deep tissue injury present on admission   B/L feet with toe wounds present on admission     Recommendations:     1) turn and position q2 and PRN utilizing offloading assistive devices  2) routine pericare daily and PRN soiling  3) encourage optimal nutrition  4) waffle cushion when oob to chair  5) B/L LE complete cair air fluidized boots or trista-lock pillow to offload heels/feet  6) triad protective barrier cream to B/L buttocks/sacrum daily and PRN soiling  7) incontinence management - consider external urinary catheter to divert urine from skin if incontinent  8) consider podiatry for treatment recommendations regarding feet/toes    Plan discussed with SOCORRO Mijares on unit     For questions/comments regarding the recommendations in this consult, please contact Fartun at [phone]. Wound care will not actively follow, please place future consults for new concerns. Thank you!   Impression:     urinary incontinence  elongated, thickened toenails  B/L buttocks/sacral deep tissue injury present on admission   B/L heel hyperpigmentation, cannot rule out a deep tissue injury present on admission   B/L feet with toe wounds present on admission     Recommendations:     1) turn and position q2 and PRN utilizing offloading assistive devices  2) routine pericare daily and PRN soiling  3) encourage optimal nutrition  4) waffle cushion when oob to chair  5) B/L LE complete cair air fluidized boots or trista-lock pillow to offload heels/feet  6) triad protective barrier cream to B/L buttocks/sacrum daily and PRN soiling  7) incontinence management - consider external urinary catheter to divert urine from skin if incontinent  8) consider podiatry for treatment recommendations regarding feet/toes    Plan discussed with SOCORRO Mijares on unit     For questions/comments regarding the recommendations in this consult, please contact F[facility]un at [phone]. Wound care will not actively follow, please place future consults for new concerns. Thank you!

## 2024-08-22 NOTE — PHARMACOTHERAPY INTERVENTION NOTE - COMMENTS
Performed medication reconciliation and home medication list updated in prescription writer/ outpatient medication review. Medications verified with med list provided from [facility].    Home medications:  acetaminophen 500 mg oral tablet: 2 tab(s) orally 2 times a day at 9am and 5pm  atorvastatin 20 mg oral tablet: 1 tab(s) orally once a day  budesonide 0.25 mg/2 mL inhalation suspension: 2 milliliter(s) by nebulizer 2 times a day 9am and 5pm  cyclopentolate 1% ophthalmic solution: 1 drop(s) in the left eye 2 times a day  DuoNeb 0.5 mg-2.5 mg/3 mL inhalation solution: 3 milliliter(s) by nebulizer every 6 hours as needed for  shortness of breath and/or wheezing  FLUoxetine 10 mg oral tablet: 1 tab(s) orally once a day in addition to 20mg tablet for total of 30mg daily  FLUoxetine 20 mg oral capsule: 1 cap(s) orally once a day in addition to 10mg tablet for total of 30mg daily  furosemide 20 mg oral tablet: 1 tab(s) orally once a day  metoprolol succinate 50 mg oral tablet, extended release: 1 tab(s) orally once a day  MiraLax oral powder for reconstitution: 17 gram(s) orally once a day  mupirocin 2% topical ointment: Apply topically to affected area once a day  pantoprazole 40 mg oral delayed release tablet: 1 tab(s) orally once a day  prednisoLONE acetate 1% ophthalmic suspension: 1 drop(s) in the left eye 2 times a day  temazepam 30 mg oral capsule: 1 cap(s) orally once a day (at bedtime)  traZODone 50 mg oral tablet: orally once a day at 5pm    Recommendations:  1) Patient takes fluoxetine 10mg + 20mg for total of 30mg once a day. Recommend reconciling.  2) Patient was taking prednisolone acetate 1% eye drops at [facility]. Recommend reconciling.     Mariluz Dillon, PharmD, BCPS  Clinical Pharmacy Specialist  [employer] (preferred) or [phone]

## 2024-08-22 NOTE — ADVANCED PRACTICE NURSE CONSULT - REASON FOR CONSULT
Wound care consult initiated by RN to assess patient's skin for a possible sacral deep tissue injury present on admission     Reason for Admission: sob  History of Present Illness:      89 year old female        with pmh of COPD,on home O2, chronic R hemidiaphragm paralysis,       HTN, diverticulitis,  asthma/COPD, sarcoidosis      hypothyroidism,   c/c  siatolic  heart failure,        presenting to the ER for  sob   that started this morning.     denies f/n/v/d, CP, HA, dizziness, abdominal pain, urinary symptoms.   denies  fevers/ cp/abd  pain   seen in  er,  pt  ha s  mild  tachypnes

## 2024-08-22 NOTE — PROGRESS NOTE ADULT - ASSESSMENT
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    #Shortness of breath/dyspnea  #Acute hypoxic respiratory failure  #Leukocytosis, resolved  MRSA nares positive    Recommendations  Leukocytosis now resolved  Clinically improved and is at her baseline per aide  Patient with no complaints  Continue to monitor off antibiotics  If fever, worsening cough and start piperacillin/tazobactam  Diuresis per primary team  Pulmonology eval  Etiology more likely noninfectious given exam  Follow fever curve and WBC count    ID to sign off. Please contact as issues arise.    Joshua La MD  Division of Infectious Diseases

## 2024-08-22 NOTE — PATIENT PROFILE ADULT - FALL HARM RISK - HARM RISK INTERVENTIONS
Assistance with ambulation/Assistance OOB with selected safe patient handling equipment/Communicate Risk of Fall with Harm to all staff/Monitor for mental status changes/Monitor gait and stability/Provide patient with walking aids - walker, cane, crutches/Reinforce activity limits and safety measures with patient and family/Review medications for side effects contributing to fall risk/Sit up slowly, dangle for a short time, stand at bedside before walking/Tailored Fall Risk Interventions/Use of alarms - bed, chair and/or voice tab/Visual Cue: Yellow wristband and red socks/Bed in lowest position, wheels locked, appropriate side rails in place/Call bell, personal items and telephone in reach/Instruct patient to call for assistance before getting out of bed or chair/Non-slip footwear when patient is out of bed/Mobile to call system/Physically safe environment - no spills, clutter or unnecessary equipment/Purposeful Proactive Rounding/Room/bathroom lighting operational, light cord in reach

## 2024-08-22 NOTE — PATIENT PROFILE ADULT - FUNCTIONAL SCREEN CURRENT LEVEL: SWALLOWING (IF SCORE 2 OR MORE FOR ANY ITEM, CONSULT REHAB SERVICES), MLM)
0 = swallows foods/liquids without difficulty
Identifies reasons for living/Future oriented/Supportive social network or family

## 2024-08-23 LAB
ANION GAP SERPL CALC-SCNC: 12 MMOL/L — SIGNIFICANT CHANGE UP (ref 5–17)
BUN SERPL-MCNC: 28 MG/DL — HIGH (ref 7–23)
CALCIUM SERPL-MCNC: 9 MG/DL — SIGNIFICANT CHANGE UP (ref 8.4–10.5)
CHLORIDE SERPL-SCNC: 96 MMOL/L — SIGNIFICANT CHANGE UP (ref 96–108)
CO2 SERPL-SCNC: 30 MMOL/L — SIGNIFICANT CHANGE UP (ref 22–31)
CREAT SERPL-MCNC: 0.69 MG/DL — SIGNIFICANT CHANGE UP (ref 0.5–1.3)
EGFR: 83 ML/MIN/1.73M2 — SIGNIFICANT CHANGE UP
GLUCOSE SERPL-MCNC: 167 MG/DL — HIGH (ref 70–99)
HCT VFR BLD CALC: 31 % — LOW (ref 34.5–45)
HGB BLD-MCNC: 9.7 G/DL — LOW (ref 11.5–15.5)
MCHC RBC-ENTMCNC: 28 PG — SIGNIFICANT CHANGE UP (ref 27–34)
MCHC RBC-ENTMCNC: 31.3 GM/DL — LOW (ref 32–36)
MCV RBC AUTO: 89.6 FL — SIGNIFICANT CHANGE UP (ref 80–100)
NRBC # BLD: 0 /100 WBCS — SIGNIFICANT CHANGE UP (ref 0–0)
PLATELET # BLD AUTO: 249 K/UL — SIGNIFICANT CHANGE UP (ref 150–400)
POTASSIUM SERPL-MCNC: 4 MMOL/L — SIGNIFICANT CHANGE UP (ref 3.5–5.3)
POTASSIUM SERPL-SCNC: 4 MMOL/L — SIGNIFICANT CHANGE UP (ref 3.5–5.3)
RBC # BLD: 3.46 M/UL — LOW (ref 3.8–5.2)
RBC # FLD: 16.4 % — HIGH (ref 10.3–14.5)
SODIUM SERPL-SCNC: 138 MMOL/L — SIGNIFICANT CHANGE UP (ref 135–145)
WBC # BLD: 10.07 K/UL — SIGNIFICANT CHANGE UP (ref 3.8–10.5)
WBC # FLD AUTO: 10.07 K/UL — SIGNIFICANT CHANGE UP (ref 3.8–10.5)

## 2024-08-23 RX ADMIN — ACETAMINOPHEN 650 MILLIGRAM(S): 325 TABLET ORAL at 21:26

## 2024-08-23 RX ADMIN — Medication 5000 UNIT(S): at 18:22

## 2024-08-23 RX ADMIN — Medication 20 MILLIGRAM(S): at 05:25

## 2024-08-23 RX ADMIN — METOPROLOL TARTRATE 50 MILLIGRAM(S): 100 TABLET ORAL at 05:18

## 2024-08-23 RX ADMIN — Medication 1 APPLICATION(S): at 05:17

## 2024-08-23 RX ADMIN — Medication 40 MILLIGRAM(S): at 05:18

## 2024-08-23 RX ADMIN — Medication 5000 UNIT(S): at 05:17

## 2024-08-23 RX ADMIN — TEMAZEPAM 7.5 MILLIGRAM(S): 30 CAPSULE ORAL at 22:35

## 2024-08-23 RX ADMIN — Medication 20 MILLIGRAM(S): at 05:18

## 2024-08-23 RX ADMIN — IPRATROPIUM BROMIDE AND ALBUTEROL SULFATE 3 MILLILITER(S): .5; 3 SOLUTION RESPIRATORY (INHALATION) at 18:21

## 2024-08-23 RX ADMIN — IPRATROPIUM BROMIDE AND ALBUTEROL SULFATE 3 MILLILITER(S): .5; 3 SOLUTION RESPIRATORY (INHALATION) at 00:06

## 2024-08-23 RX ADMIN — Medication 20 MILLIGRAM(S): at 14:08

## 2024-08-23 RX ADMIN — Medication 1 APPLICATION(S): at 18:21

## 2024-08-23 RX ADMIN — IPRATROPIUM BROMIDE AND ALBUTEROL SULFATE 3 MILLILITER(S): .5; 3 SOLUTION RESPIRATORY (INHALATION) at 05:18

## 2024-08-23 RX ADMIN — Medication 20 MILLIGRAM(S): at 21:30

## 2024-08-23 RX ADMIN — IPRATROPIUM BROMIDE AND ALBUTEROL SULFATE 3 MILLILITER(S): .5; 3 SOLUTION RESPIRATORY (INHALATION) at 12:19

## 2024-08-23 RX ADMIN — BUDESONIDE AND FORMOTEROL FUMARATE 2 PUFF(S): 80; 4.5 AEROSOL, METERED RESPIRATORY (INHALATION) at 18:21

## 2024-08-23 RX ADMIN — Medication 30 MILLIGRAM(S): at 12:19

## 2024-08-23 RX ADMIN — ACETAMINOPHEN 650 MILLIGRAM(S): 325 TABLET ORAL at 20:27

## 2024-08-23 NOTE — SWALLOW BEDSIDE ASSESSMENT ADULT - SWALLOW EVAL: RECOMMENDED DIET
This service unable to make recommendations at this time. Pt opting to pursue regular solid/thin liquid diet.

## 2024-08-23 NOTE — PHYSICAL THERAPY INITIAL EVALUATION ADULT - PERTINENT HX OF CURRENT PROBLEM, REHAB EVAL
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

## 2024-08-23 NOTE — PHYSICAL THERAPY INITIAL EVALUATION ADULT - ADDITIONAL COMMENTS
Per [facility] bedside: pt lives at [facility]. Pt has private hire 8hrs x5 days to assist with ADLs and hygiene, otherwise [facility] staff assists. Pt gets to and from wheelchair with assist by taking several steps (either with rolling walker or aide in front).

## 2024-08-23 NOTE — SWALLOW BEDSIDE ASSESSMENT ADULT - SLP PERTINENT HISTORY OF CURRENT PROBLEM
89 year old female with pmh of COPD,on home O2, chronic R hemidiaphragm paralysis, h/o recurrent aspiration events, HTN, diverticulitis,  asthma/COPD, sarcoidosis, hypothyroidism, heart failure, presenting to the ER for SOB that started this morning. patient states experienced SOB when she woke up. states symptoms have since approved. recently admitted for similar. patient denies f/n/v/d, CP, HA, dizziness, abdominal pain, urinary symptoms

## 2024-08-23 NOTE — PHYSICAL THERAPY INITIAL EVALUATION ADULT - ACTIVE RANGE OF MOTION EXAMINATION, REHAB EVAL
except L shoulder not tested pt states discomfort from arthritis/no Active ROM deficits were identified

## 2024-08-23 NOTE — SWALLOW BEDSIDE ASSESSMENT ADULT - COMMENTS
8/21 Respiratory therapist set up BIPAP. Pt uncooperative, pulling mask off face. Requests water. c/o feeling thirsty. Ice chips given. NC 4lpm reapplied. Daughter and aide visiting pt. Pt TBA tele. no bed yet, NP Cindy Reyes notified pt remains tachypneic and pulling off BIPAP mask and will re eval pt. Additional Lasix to be given. Chest x-ray shows small bilateral effusions and atelectasis.  COVID/flu/RSV panel is negative. Cardiology change lasix to 20 mg daily. 8/22 MRSA nares positive; wound care following for deep tissue injury.    Pt well known to this service with bedside swallow evaluations completed 10/1/23 and 3/28/24 at which time pt presenting with mild tip-pharyngeal dysphagia vs presbyphagia notable for slightly effortful mastication and delayed initiation of pharyngeal swallow trigger. Pt recommended for regular solids and thin liquids and instrumental exam not indicated. Bedside swallow evaluation on 5/11/24 recommending puree and moderately thick liquids with instrumental evaluation of swallow function given pt exhibiting overt s/s of laryngeal penetration/aspiration, failed dysphagia screen, and c/f aspiration PNA. MBS 5/13/24 w/ oropharyngeal dysphagia notable for impaired airway protection. Pt w/ deep laryngeal penetration/aspiration w/ less viscous liquids/thin liquids during the swallow. Pt insensate to airway invasion. Cannot rule out eventual residue descending subglottically throughout meal as pt adamantly declining further trials. Soft-bite sized and mildly thick liquid diet recommended. Thin liquids therapeutically with SLP only w/ trials of regular solid pending pt acceptance.

## 2024-08-24 LAB
ANION GAP SERPL CALC-SCNC: 14 MMOL/L — SIGNIFICANT CHANGE UP (ref 5–17)
BUN SERPL-MCNC: 34 MG/DL — HIGH (ref 7–23)
CALCIUM SERPL-MCNC: 8.5 MG/DL — SIGNIFICANT CHANGE UP (ref 8.4–10.5)
CHLORIDE SERPL-SCNC: 98 MMOL/L — SIGNIFICANT CHANGE UP (ref 96–108)
CO2 SERPL-SCNC: 27 MMOL/L — SIGNIFICANT CHANGE UP (ref 22–31)
CREAT SERPL-MCNC: 0.65 MG/DL — SIGNIFICANT CHANGE UP (ref 0.5–1.3)
EGFR: 85 ML/MIN/1.73M2 — SIGNIFICANT CHANGE UP
GLUCOSE SERPL-MCNC: 221 MG/DL — HIGH (ref 70–99)
MAGNESIUM SERPL-MCNC: 2.2 MG/DL — SIGNIFICANT CHANGE UP (ref 1.6–2.6)
POTASSIUM SERPL-MCNC: 3.7 MMOL/L — SIGNIFICANT CHANGE UP (ref 3.5–5.3)
POTASSIUM SERPL-SCNC: 3.7 MMOL/L — SIGNIFICANT CHANGE UP (ref 3.5–5.3)
SODIUM SERPL-SCNC: 139 MMOL/L — SIGNIFICANT CHANGE UP (ref 135–145)

## 2024-08-24 RX ORDER — PREDNISONE 10 MG
30 TABLET, DOSE PACK ORAL DAILY
Refills: 0 | Status: CANCELLED | OUTPATIENT
Start: 2024-08-29 | End: 2024-08-25

## 2024-08-24 RX ORDER — PREDNISONE 10 MG
40 TABLET, DOSE PACK ORAL DAILY
Refills: 0 | Status: DISCONTINUED | OUTPATIENT
Start: 2024-08-26 | End: 2024-08-25

## 2024-08-24 RX ORDER — PREDNISONE 10 MG
TABLET, DOSE PACK ORAL
Refills: 0 | Status: DISCONTINUED | OUTPATIENT
Start: 2024-08-24 | End: 2024-08-25

## 2024-08-24 RX ORDER — PREDNISONE 10 MG
5 TABLET, DOSE PACK ORAL DAILY
Refills: 0 | Status: CANCELLED | OUTPATIENT
Start: 2024-09-04 | End: 2024-08-25

## 2024-08-24 RX ORDER — PREDNISONE 10 MG
20 TABLET, DOSE PACK ORAL DAILY
Refills: 0 | Status: CANCELLED | OUTPATIENT
Start: 2024-08-31 | End: 2024-08-25

## 2024-08-24 RX ORDER — ACETAMINOPHEN 325 MG/1
325 TABLET ORAL ONCE
Refills: 0 | Status: COMPLETED | OUTPATIENT
Start: 2024-08-24 | End: 2024-08-24

## 2024-08-24 RX ORDER — PREDNISONE 10 MG
10 TABLET, DOSE PACK ORAL DAILY
Refills: 0 | Status: CANCELLED | OUTPATIENT
Start: 2024-09-02 | End: 2024-08-25

## 2024-08-24 RX ORDER — FUROSEMIDE 40 MG
20 TABLET ORAL
Refills: 0 | Status: DISCONTINUED | OUTPATIENT
Start: 2024-08-25 | End: 2024-08-25

## 2024-08-24 RX ORDER — PREDNISONE 10 MG
40 TABLET, DOSE PACK ORAL
Refills: 0 | Status: DISCONTINUED | OUTPATIENT
Start: 2024-08-24 | End: 2024-08-25

## 2024-08-24 RX ADMIN — Medication 20 MILLIGRAM(S): at 05:08

## 2024-08-24 RX ADMIN — ACETAMINOPHEN 650 MILLIGRAM(S): 325 TABLET ORAL at 16:15

## 2024-08-24 RX ADMIN — Medication 40 MILLIGRAM(S): at 05:06

## 2024-08-24 RX ADMIN — Medication 1 APPLICATION(S): at 18:52

## 2024-08-24 RX ADMIN — Medication 5000 UNIT(S): at 18:49

## 2024-08-24 RX ADMIN — Medication 1 APPLICATION(S): at 05:15

## 2024-08-24 RX ADMIN — ACETAMINOPHEN 325 MILLIGRAM(S): 325 TABLET ORAL at 05:48

## 2024-08-24 RX ADMIN — ACETAMINOPHEN 650 MILLIGRAM(S): 325 TABLET ORAL at 23:28

## 2024-08-24 RX ADMIN — ACETAMINOPHEN 650 MILLIGRAM(S): 325 TABLET ORAL at 15:19

## 2024-08-24 RX ADMIN — ACETAMINOPHEN 650 MILLIGRAM(S): 325 TABLET ORAL at 03:07

## 2024-08-24 RX ADMIN — Medication 20 MILLIGRAM(S): at 05:11

## 2024-08-24 RX ADMIN — ACETAMINOPHEN 325 MILLIGRAM(S): 325 TABLET ORAL at 06:08

## 2024-08-24 RX ADMIN — IPRATROPIUM BROMIDE AND ALBUTEROL SULFATE 3 MILLILITER(S): .5; 3 SOLUTION RESPIRATORY (INHALATION) at 18:48

## 2024-08-24 RX ADMIN — Medication 1 DROP(S): at 05:51

## 2024-08-24 RX ADMIN — Medication 40 MILLIGRAM(S): at 18:51

## 2024-08-24 RX ADMIN — ACETAMINOPHEN 650 MILLIGRAM(S): 325 TABLET ORAL at 22:57

## 2024-08-24 RX ADMIN — Medication 20 MILLIGRAM(S): at 21:12

## 2024-08-24 RX ADMIN — IPRATROPIUM BROMIDE AND ALBUTEROL SULFATE 3 MILLILITER(S): .5; 3 SOLUTION RESPIRATORY (INHALATION) at 00:50

## 2024-08-24 RX ADMIN — METOPROLOL TARTRATE 50 MILLIGRAM(S): 100 TABLET ORAL at 05:06

## 2024-08-24 RX ADMIN — Medication 1 DROP(S): at 18:49

## 2024-08-24 RX ADMIN — ACETAMINOPHEN 650 MILLIGRAM(S): 325 TABLET ORAL at 02:10

## 2024-08-24 RX ADMIN — Medication 5000 UNIT(S): at 05:13

## 2024-08-24 RX ADMIN — IPRATROPIUM BROMIDE AND ALBUTEROL SULFATE 3 MILLILITER(S): .5; 3 SOLUTION RESPIRATORY (INHALATION) at 05:16

## 2024-08-24 RX ADMIN — Medication 30 MILLIGRAM(S): at 12:48

## 2024-08-24 RX ADMIN — IPRATROPIUM BROMIDE AND ALBUTEROL SULFATE 3 MILLILITER(S): .5; 3 SOLUTION RESPIRATORY (INHALATION) at 12:48

## 2024-08-24 RX ADMIN — TEMAZEPAM 7.5 MILLIGRAM(S): 30 CAPSULE ORAL at 22:56

## 2024-08-24 RX ADMIN — Medication 2 TABLET(S): at 21:12

## 2024-08-24 NOTE — DIETITIAN INITIAL EVALUATION ADULT - ENERGY INTAKE
Pt reports good po intake during admission (per RN flowsheet, varying po intake during admission 0-75% meals); trend po intake as able.

## 2024-08-24 NOTE — PROVIDER CONTACT NOTE (OTHER) - ASSESSMENT
A&O x2-3, VSS, no chest pain, no SOB, no palpitations, no dizziness. Patient is on 2L NC. Complains of headache, 5/10

## 2024-08-24 NOTE — DIETITIAN INITIAL EVALUATION ADULT - ADD RECOMMEND
As medically feasible, provide multivitamin and Vitamin C for wound healing. Continue to trend labs, weight, skin integrity, and intake.

## 2024-08-24 NOTE — DIETITIAN INITIAL EVALUATION ADULT - OTHER CALCULATIONS
Fluid needs deferred to provider. Nutrient needs made with consideration for age and pressure injury

## 2024-08-24 NOTE — DIETITIAN INITIAL EVALUATION ADULT - OTHER INFO
Wt Hx:   Dosing wt 48.2 kG/106.2 lbs.   lbs and denies changes in wt PTA; reports always has been thin    Ht: 62 inches   IBW: 110 lbs    IBW%: 97%  Wt Hx per HIE (lbs): 102 (10/21/23), 102 (3/9/24), 100 (4/6/24), 119 (6/18/24?accuracy), 100 (7/2/24), 97 (7/16/24)    Nutrition-Related Concerns:   - Ordered for steroid which can elevate BG  - Being diuresed

## 2024-08-24 NOTE — DIETITIAN INITIAL EVALUATION ADULT - ORAL INTAKE PTA/DIET HISTORY
Pt reports eating well with no changes in appetite. Pt was follows low Na diet + Kosher. Denies micronutrient or oral nutrient supplement use. Denies Hx of chewing or swallowing issues (see speech-language pathology note from 8/23). Confirms no known food allergies.

## 2024-08-24 NOTE — DIETITIAN INITIAL EVALUATION ADULT - PERTINENT MEDS FT
MEDICATIONS  (STANDING):  albuterol    90 MICROgram(s) HFA Inhaler 2 Puff(s) Inhalation every 8 hours  albuterol/ipratropium for Nebulization 3 milliLiter(s) Nebulizer every 6 hours  atorvastatin 20 milliGRAM(s) Oral at bedtime  budesonide  80 MICROgram(s)/formoterol 4.5 MICROgram(s) Inhaler 2 Puff(s) Inhalation two times a day  FLUoxetine 30 milliGRAM(s) Oral daily  furosemide   Injectable 20 milliGRAM(s) IV Push daily  heparin   Injectable 5000 Unit(s) SubCutaneous every 12 hours  metoprolol succinate ER 50 milliGRAM(s) Oral daily  mupirocin 2% Nasal 1 Application(s) Both Nostrils two times a day  pantoprazole    Tablet 40 milliGRAM(s) Oral before breakfast  prednisoLONE acetate 1% Suspension 1 Drop(s) Left EYE two times a day  predniSONE   Tablet   Oral   predniSONE   Tablet 40 milliGRAM(s) Oral two times a day  senna 2 Tablet(s) Oral at bedtime    MEDICATIONS  (PRN):  acetaminophen     Tablet .. 650 milliGRAM(s) Oral every 6 hours PRN Moderate Pain (4 - 6)  temazepam 7.5 milliGRAM(s) Oral at bedtime PRN Insomnia

## 2024-08-24 NOTE — PROVIDER CONTACT NOTE (OTHER) - BACKGROUND
(Admit Diagnosis) Shortness of breath  89yoF PMHx anxiety, HTN, COPD (O2 dependent), sarcoidosis p/w shortness of breath.

## 2024-08-24 NOTE — PROGRESS NOTE ADULT - ASSESSMENT
89 year old woman,      h/o  emphysema, ,  c/c  L  hydroureter,  HTN.  depression   prior       h/o   C. difficile, diverticulitis,   pna        asthma/COPD, sarcoidosis,   c/c  diastolic  heart failure /  ulcer  left 2nd  toe    c/c   superficial   ulcerations, on  bony prominences  of hammer  toe    admitted  with    sob,  with   wbc  of  12, ,000  on  arrival,  was  afebrile.        sob/ acute  on  c/c  diastolic     chf  and  exacerbation of  copd        h/o  c/c  elevated  d  dimer,         ct  chest  angio last  month/  seen by  tamiko iraheta,  then.  ct  chest  angio, was  negative  for  pe       h/o  underlying c/c  lung disease./  Emphysema        c/c   superficial   ulcerations, on  bony prominences  of hammer  toes,     HTN/  HLD    c/c  diastolic  chf, on lasix/ known  to    card     Anxiety,   c/c  anemia    pedal  edema.  doppler  leg,  no  dvt/ swollen right  leg /  c/c      on iv lasix/    wa s on iv  solumedrol/    house  pulm  eval     dvt  ppx /  swallow  eval  rec  noted.  stated, unable  to  make  recommendations    on tapering prednisone,  iv lasix   dr zamora  will  assume care            rad< from: [employer] W or WO Ultrasound Enhancing Agent (04.08.24 @ 14:17) >  CONCLUSIONS:   1. Left ventricular cavity is normal in size. Left ventricular wall thickness is normal. Left ventricular systolic function is normal with an ejection fraction visually estimated at 55 to 60 %. There are no regional wall motion abnormalities seen.   2. Normal rightventricular cavity size, with normal wall thickness, and normal systolic function. Tricuspid annular plane systolic excursion (TAPSE) is 2.0 cm (normal >=1.7 cm).   3. The left atrium is severely dilated.   4. Mild mitral regurgitation.   5. Comparedto the transthoracic echocardiogram performed on 3/26/2024, regional wall motion abnormality not appreciated.  ________________________________________________________________________________________  < end of copied text >

## 2024-08-24 NOTE — DIETITIAN INITIAL EVALUATION ADULT - EDUCATION DIETARY MODIFICATIONS
RD encouraged adequate protein-energy intake; pt declined nutrition supplements. Pt made aware RD to remain available./(2) meets goals/outcomes/verbalization

## 2024-08-24 NOTE — DIETITIAN INITIAL EVALUATION ADULT - PERTINENT LABORATORY DATA
08-23    138  |  96  |  28<H>  ----------------------------<  167<H>  4.0   |  30  |  0.69    Ca    9.0      23 Aug 2024 09:28    A1C with Estimated Average Glucose Result: 6.0 % (09-27-23 @ 06:44)

## 2024-08-25 VITALS
TEMPERATURE: 98 F | HEART RATE: 74 BPM | DIASTOLIC BLOOD PRESSURE: 76 MMHG | RESPIRATION RATE: 18 BRPM | SYSTOLIC BLOOD PRESSURE: 149 MMHG | OXYGEN SATURATION: 95 %

## 2024-08-25 LAB
ANION GAP SERPL CALC-SCNC: 14 MMOL/L — SIGNIFICANT CHANGE UP (ref 5–17)
BUN SERPL-MCNC: 33 MG/DL — HIGH (ref 7–23)
CALCIUM SERPL-MCNC: 8.3 MG/DL — LOW (ref 8.4–10.5)
CHLORIDE SERPL-SCNC: 100 MMOL/L — SIGNIFICANT CHANGE UP (ref 96–108)
CO2 SERPL-SCNC: 28 MMOL/L — SIGNIFICANT CHANGE UP (ref 22–31)
CREAT SERPL-MCNC: 0.68 MG/DL — SIGNIFICANT CHANGE UP (ref 0.5–1.3)
EGFR: 84 ML/MIN/1.73M2 — SIGNIFICANT CHANGE UP
GLUCOSE SERPL-MCNC: 157 MG/DL — HIGH (ref 70–99)
HCT VFR BLD CALC: 28.9 % — LOW (ref 34.5–45)
HGB BLD-MCNC: 9 G/DL — LOW (ref 11.5–15.5)
MCHC RBC-ENTMCNC: 28.9 PG — SIGNIFICANT CHANGE UP (ref 27–34)
MCHC RBC-ENTMCNC: 31.1 GM/DL — LOW (ref 32–36)
MCV RBC AUTO: 92.9 FL — SIGNIFICANT CHANGE UP (ref 80–100)
NRBC # BLD: 0 /100 WBCS — SIGNIFICANT CHANGE UP (ref 0–0)
PLATELET # BLD AUTO: 215 K/UL — SIGNIFICANT CHANGE UP (ref 150–400)
POTASSIUM SERPL-MCNC: 4.1 MMOL/L — SIGNIFICANT CHANGE UP (ref 3.5–5.3)
POTASSIUM SERPL-SCNC: 4.1 MMOL/L — SIGNIFICANT CHANGE UP (ref 3.5–5.3)
RBC # BLD: 3.11 M/UL — LOW (ref 3.8–5.2)
RBC # FLD: 15.9 % — HIGH (ref 10.3–14.5)
SODIUM SERPL-SCNC: 142 MMOL/L — SIGNIFICANT CHANGE UP (ref 135–145)
WBC # BLD: 8.09 K/UL — SIGNIFICANT CHANGE UP (ref 3.8–10.5)
WBC # FLD AUTO: 8.09 K/UL — SIGNIFICANT CHANGE UP (ref 3.8–10.5)

## 2024-08-25 RX ORDER — FLUOXETINE HCL 20 MG/5 ML
1 SOLUTION, ORAL ORAL
Refills: 0 | DISCHARGE

## 2024-08-25 RX ORDER — BUDESONIDE 3 MG/1
2 CAPSULE ORAL
Refills: 0 | DISCHARGE

## 2024-08-25 RX ORDER — FLUOXETINE HCL 20 MG/5 ML
1 SOLUTION, ORAL ORAL
Qty: 30 | Refills: 0
Start: 2024-08-25 | End: 2024-09-23

## 2024-08-25 RX ORDER — PREDNISONE 10 MG
1 TABLET, DOSE PACK ORAL
Qty: 25 | Refills: 0
Start: 2024-08-25

## 2024-08-25 RX ORDER — FLUOXETINE HCL 20 MG/5 ML
1 SOLUTION, ORAL ORAL
Qty: 30 | Refills: 0 | DISCHARGE
Start: 2024-08-25 | End: 2024-09-23

## 2024-08-25 RX ORDER — TEMAZEPAM 30 MG/1
1 CAPSULE ORAL
Qty: 5 | Refills: 0
Start: 2024-08-25 | End: 2024-08-29

## 2024-08-25 RX ORDER — FUROSEMIDE 40 MG
1 TABLET ORAL
Qty: 30 | Refills: 0
Start: 2024-08-25 | End: 2024-09-23

## 2024-08-25 RX ORDER — METOPROLOL TARTRATE 100 MG/1
1 TABLET ORAL
Qty: 30 | Refills: 0
Start: 2024-08-25 | End: 2024-09-23

## 2024-08-25 RX ORDER — TEMAZEPAM 30 MG/1
1 CAPSULE ORAL
Refills: 0 | DISCHARGE

## 2024-08-25 RX ORDER — MUPIROCIN 2 %
1 OINTMENT (GRAM) TOPICAL
Refills: 0 | DISCHARGE

## 2024-08-25 RX ORDER — TRAZODONE HCL 50 MG
0 TABLET ORAL
Refills: 0 | DISCHARGE

## 2024-08-25 RX ORDER — MUPIROCIN 2 %
1 OINTMENT (GRAM) TOPICAL
Qty: 1 | Refills: 0
Start: 2024-08-25

## 2024-08-25 RX ORDER — METOPROLOL TARTRATE 100 MG/1
1 TABLET ORAL
Refills: 0 | DISCHARGE

## 2024-08-25 RX ORDER — BUDESONIDE AND FORMOTEROL FUMARATE 80; 4.5 UG/1; UG/1
2 AEROSOL, METERED RESPIRATORY (INHALATION)
Qty: 1 | Refills: 0
Start: 2024-08-25

## 2024-08-25 RX ORDER — FUROSEMIDE 40 MG
1 TABLET ORAL
Refills: 0 | DISCHARGE

## 2024-08-25 RX ORDER — PANTOPRAZOLE SODIUM 40 MG
1 TABLET, DELAYED RELEASE (ENTERIC COATED) ORAL
Qty: 30 | Refills: 0
Start: 2024-08-25 | End: 2024-09-23

## 2024-08-25 RX ORDER — PANTOPRAZOLE SODIUM 40 MG
1 TABLET, DELAYED RELEASE (ENTERIC COATED) ORAL
Refills: 0 | DISCHARGE

## 2024-08-25 RX ADMIN — Medication 5000 UNIT(S): at 05:38

## 2024-08-25 RX ADMIN — Medication 1 DROP(S): at 05:38

## 2024-08-25 RX ADMIN — IPRATROPIUM BROMIDE AND ALBUTEROL SULFATE 3 MILLILITER(S): .5; 3 SOLUTION RESPIRATORY (INHALATION) at 13:18

## 2024-08-25 RX ADMIN — ACETAMINOPHEN 650 MILLIGRAM(S): 325 TABLET ORAL at 09:46

## 2024-08-25 RX ADMIN — Medication 30 MILLIGRAM(S): at 13:18

## 2024-08-25 RX ADMIN — IPRATROPIUM BROMIDE AND ALBUTEROL SULFATE 3 MILLILITER(S): .5; 3 SOLUTION RESPIRATORY (INHALATION) at 05:38

## 2024-08-25 RX ADMIN — Medication 20 MILLIGRAM(S): at 05:38

## 2024-08-25 RX ADMIN — METOPROLOL TARTRATE 50 MILLIGRAM(S): 100 TABLET ORAL at 05:38

## 2024-08-25 RX ADMIN — Medication 40 MILLIGRAM(S): at 05:38

## 2024-08-25 RX ADMIN — Medication 20 MILLIGRAM(S): at 13:17

## 2024-08-25 RX ADMIN — Medication 1 APPLICATION(S): at 05:37

## 2024-08-25 RX ADMIN — Medication 40 MILLIGRAM(S): at 05:37

## 2024-08-25 NOTE — PROGRESS NOTE ADULT - PROVIDER SPECIALTY LIST ADULT
Cardiology
Family Medicine
Internal Medicine
Cardiology
Family Medicine
Infectious Disease

## 2024-08-25 NOTE — DISCHARGE NOTE PROVIDER - PROVIDER TOKENS
PROVIDER:[TOKEN:[[id number]],FOLLOWUP:[1 week]],PROVIDER:[TOKEN:[[id number]],FOLLOWUP:[1 week]],PROVIDER:[TOKEN:[[id number]],FOLLOWUP:[1 week]]

## 2024-08-25 NOTE — DISCHARGE NOTE PROVIDER - HOSPITAL COURSE
HPI:   89 year old female with pmh of COPD, Sarcoidosis, emphysema on home O2, chronic R hemidiaphragm paralysis, diastolic  heart failure, HTN, diverticulitis, C-diffasthma/COPD, sarcoidosis, hypothyroidism, L  hydroureter,  Cdiff,  ulcer  left 2nd  toe    c/c   superficial   ulcerations, on  bony prominences  of hammer  toe  presenting to the ER for  sob   that started this morning. denies f/n/v/d, CP, HA, dizziness, abdominal pain, urinary symptoms, denies  fevers/ cp/abd  pain  seen in  er,  pt  has  mild  tachypnea   (21 Aug 2024 10:31)    Hospital Course:  Admitted  with SOB, Leukocytosis  wbc  of  12, ,000  on  arrival, afebrile.   Admitted with Acute on Chronic Diastolic  HF and  COPD Exacerbation. Treated with intravenous Lasix transitioned to PO. Pt to continue oxygen via NC at 2L NC (pt uses home O2 at 2L NC)  Pt with pedal  edema, swollen right  leg doppler  leg,  no  dvt  Also noted with elevated  d  dimer, CT  chest  angio was  negative  for PE   For COPD Exacerbation - s/p IV Solumedrol, Pulmonary consulted - Transioned to PO Prednisone with Taper  Also with superficial   ulcerations, on  bony prominences  of hammer  toes    Important Medication Changes and Reason:  - Lasix increased to 20mg PO 2 x day for Acue on chronic Diastolic HF  - Prednisone taper for COPD exac    Active or Pending Issues Requiring Follow-up:  Follow up with PMD - Dr. Hermosillo in 1 week    Advanced Directives:   [x] Full code  [ ] DNR  [ ] Hospice    Discharge Diagnoses:  #  Acute on Chronic Diastolic  HF  #  COPD Exacerbation. HPI:   89 year old female with pmh of COPD, Sarcoidosis, emphysema on home O2, chronic R hemidiaphragm paralysis, diastolic  heart failure, HTN, diverticulitis, C-diffasthma/COPD, sarcoidosis, hypothyroidism, L  hydroureter,  Cdiff,  ulcer  left 2nd  toe    c/c   superficial   ulcerations, on  bony prominences  of hammer  toe  presenting to the ER for  sob   that started this morning. denies f/n/v/d, CP, HA, dizziness, abdominal pain, urinary symptoms, denies  fevers/ cp/abd  pain  seen in  er,  pt  has  mild  tachypnea   (21 Aug 2024 10:31)    Hospital Course:  Admitted  with SOB, Leukocytosis  wbc  of  12, ,000  on  arrival, afebrile.   Admitted with Acute on Chronic Diastolic  HF and  COPD Exacerbation. Treated with intravenous Lasix transitioned to PO. Pt to continue oxygen via NC at 2L NC (pt uses home O2 at 2L NC)  Pt with pedal  edema, swollen right  leg doppler  leg,  no  dvt  Also noted with elevated  d  dimer, CT  chest  angio was  negative  for PE   For COPD Exacerbation - s/p IV Solumedrol, Pulmonary consulted - Transitioned to PO Prednisone with Taper  Also with superficial   ulcerations, on  bony prominences  of hammer  toes    Important Medication Changes and Reason:  - Lasix increased to 20mg PO 2 x day for Acue on chronic Diastolic HF  - Prednisone taper for COPD exac    Active or Pending Issues Requiring Follow-up:  Follow up with PMD - Dr. Hermosillo in 1 week    Advanced Directives:   [x] Full code  [ ] DNR  [ ] Hospice    Discharge Diagnoses:  #  Acute on Chronic Diastolic  HF  #  COPD Exacerbation.   # Leukocytosis - resolved  # MRSA Amina HPI:   89 year old female with pmh of COPD, Sarcoidosis, emphysema on home O2, chronic R hemidiaphragm paralysis, diastolic  heart failure, HTN, diverticulitis, C-diff, asthma/COPD, sarcoidosis, hypothyroidism, L  hydroureter,  Cdiff,  ulcer  left 2nd  toe    c/c   superficial   ulcerations, on  bony prominences  of hammer  toe  presenting to the ER for  sob   that started this morning. denies f/n/v/d, CP, HA, dizziness, abdominal pain, urinary symptoms, denies  fevers/ cp/abd  pain  seen in  er,  pt  has  mild  tachypnea   (21 Aug 2024 10:31)    Hospital Course:  Admitted  with SOB, Leukocytosis  wbc  of  12, ,000  on  arrival, afebrile.   Admitted with Acute on Chronic Diastolic  HF and  COPD Exacerbation. Treated with intravenous Lasix transitioned to PO. Pt to continue oxygen via NC at 2L NC (pt uses home O2 at 2L NC)  Pt with pedal  edema, swollen right  leg doppler  leg,  no  dvt  Also noted with elevated  d  dimer, CT  chest  angio was  negative  for PE   For COPD Exacerbation - s/p IV Solumedrol, Pulmonary consulted - Transitioned to PO Prednisone with Taper  Also with superficial   ulcerations, on  bony prominences  of hammer  toes    Important Medication Changes and Reason:  - Lasix increased to 20mg PO 2 x day for Acue on chronic Diastolic HF  - Prednisone taper for COPD exac    Active or Pending Issues Requiring Follow-up:  Follow up with PMD - Dr. Hermosillo in 1 week    Advanced Directives:   [x] Full code  [ ] DNR  [ ] Hospice    Discharge Diagnoses:  #  Acute on Chronic Diastolic  HF  #  COPD Exacerbation.   # Leukocytosis - resolved  # MRSA Nares    Medically cleared by Dr. Hermosillo and Dr. Gardiner to discharge pt on prednisone taper and on Lasix 20mg daily.

## 2024-08-25 NOTE — DISCHARGE NOTE PROVIDER - NSDCCPCAREPLAN_GEN_ALL_CORE_FT
PRINCIPAL DISCHARGE DIAGNOSIS  Diagnosis: Acute on chronic diastolic heart failure  Assessment and Plan of Treatment: Presented with shortness of breath .   Admitted with Acute on Chronic Diastolic  HF and  COPD Exacerbation. Treated with intravenous Lasix transitioned to PO.   Pt to continue oxygen via NC at 2L NC (pt uses home O2 at 2L NC)  Pt with pedal  edema, swollen right  leg doppler  leg,  no  dvt  Also noted with elevated  d  dimer, CT  chest  angio was  negative  for clots in yor lungs  Weigh yourself daily.  If you gain 3lbs in 3 days, or 5lbs in a week call your Health Care Provider.  Do not eat or drink foods containing more than 2000mg of salt (sodium) in your diet every day.  Call your Health Care Provider if you have any swelling or increased swelling in your feet, ankles, and/or stomach.  Take all of your medication as directed.  If you become dizzy call your Health Care Provider.        SECONDARY DISCHARGE DIAGNOSES  Diagnosis: COPD exacerbation  Assessment and Plan of Treatment: Pt to continue oxygen via NC at 2L NC (pt uses home O2 at 2L NC)  Call your Health Care provider upon arrival home to make a follow up appointment within one week.  Take all inhalers as prescribed by your Health Care Provider.  Take steroids - Prednisone Taper as prescribed by your Health Care Provider.  Follow upwith pulmonary in 1 week  If your cough increases infrequency and severity and/or you have shortness of breath or increased shortness of breath call your Health Care Provider.  If you develop fever, chills, night sweats, malaise, and/or change in mental status call your Health care Provider.  Nutrition is very important.  Eat small frequent meals.  Increase your activity as tolerated.  Do not stay in bed all day      Diagnosis: Multiple open wounds of foot  Assessment and Plan of Treatment: urinary incontinence  elongated, thickened toenails  B/L feet with toe wounds present on admission   turn and position Safety checks completed every hour. Patient's safety maintained. IV site assessed every 2 hours and remain within defined limits. Patient turned and positioned every 2 hours, moisture barrier cream applied after each incontinence care, heels elevated off bed. Mouthcare done q2hrs and prn, suctioned ETT Q2hrs and PRN. and PRN utilizing offloading assistive devices  2) routine pericare daily and PRN soiling  3) encourage optimal nutrition  4) consider podiatry for treatment recommendations regarding feet/toes    Diagnosis: Leukocytosis  Assessment and Plan of Treatment: No source of infection  Now resolved    Diagnosis: Nose colonized with MRSA  Assessment and Plan of Treatment: Continue Mupirocin application for 2 more days     PRINCIPAL DISCHARGE DIAGNOSIS  Diagnosis: Acute on chronic diastolic heart failure  Assessment and Plan of Treatment: Presented with shortness of breath .   Admitted with Acute on Chronic Diastolic  HF and  COPD Exacerbation. Treated with intravenous Lasix transitioned to PO.   Pt to continue oxygen via NC at 2L NC (pt uses home O2 at 2L NC)  Pt with pedal  edema, swollen right  leg doppler  leg,  no  dvt  Also noted with elevated  d  dimer, CT  chest  angio was  negative  for clots in yor lungs  Weigh yourself daily.  If you gain 3lbs in 3 days, or 5lbs in a week call your Health Care Provider.  Do not eat or drink foods containing more than 2000mg of salt (sodium) in your diet every day.  Call your Health Care Provider if you have any swelling or increased swelling in your feet, ankles, and/or stomach.  Take all of your medication as directed.  If you become dizzy call your Health Care Provider.        SECONDARY DISCHARGE DIAGNOSES  Diagnosis: COPD exacerbation  Assessment and Plan of Treatment: Pt to continue oxygen via NC at 2L NC (pt uses home O2 at 2L NC)  Call your Health Care provider upon arrival home to make a follow up appointment within one week.  Take all inhalers as prescribed by your Health Care Provider.  Take steroids - Prednisone Taper as prescribed by your Health Care Provider.  Follow upwith pulmonary in 1 week  If your cough increases infrequency and severity and/or you have shortness of breath or increased shortness of breath call your Health Care Provider.  If you develop fever, chills, night sweats, malaise, and/or change in mental status call your Health care Provider.  Nutrition is very important.  Eat small frequent meals.  Increase your activity as tolerated.  Do not stay in bed all day      Diagnosis: Multiple open wounds of foot  Assessment and Plan of Treatment: Elongated, thickened toenails  B/L feet with toe wounds present on admission   turn and position. heels elevated off bed.   Encourage optimal nutrition  Elongated, thickened toenails  Consider podiatry for treatment recommendations regarding feet/toes    Diagnosis: Leukocytosis  Assessment and Plan of Treatment: No source of infection  Now resolved    Diagnosis: Nose colonized with MRSA  Assessment and Plan of Treatment: Continue Mupirocin application for 2 more days

## 2024-08-25 NOTE — DISCHARGE NOTE PROVIDER - NSDCMRMEDTOKEN_GEN_ALL_CORE_FT
acetaminophen 500 mg oral tablet: 2 tab(s) orally 2 times a day at 9am and 5pm  budesonide 0.25 mg/2 mL inhalation suspension: 2 milliliter(s) by nebulizer 2 times a day 9am and 5pm  cyclopentolate 1% ophthalmic solution: 1 drop(s) in the left eye 2 times a day  DuoNeb 0.5 mg-2.5 mg/3 mL inhalation solution: 3 milliliter(s) by nebulizer every 6 hours as needed for  shortness of breath and/or wheezing  furosemide 20 mg oral tablet: 1 tab(s) orally once a day  metoprolol succinate 50 mg oral tablet, extended release: 1 tab(s) orally once a day  MiraLax oral powder for reconstitution: 17 gram(s) orally once a day  mupirocin 2% topical ointment: Apply topically to affected area once a day  pantoprazole 40 mg oral delayed release tablet: 1 tab(s) orally once a day  prednisoLONE acetate 1% ophthalmic suspension: 1 drop(s) in the left eye 2 times a day  temazepam 30 mg oral capsule: 1 cap(s) orally once a day (at bedtime)   acetaminophen 500 mg oral tablet: 2 tab(s) orally 2 times a day at 9am and 5pm  albuterol 90 mcg/inh inhalation aerosol: 2 puff(s) inhaled every 8 hours  atorvastatin 20 mg oral tablet: 1 tab(s) orally once a day  budesonide-formoterol 80 mcg-4.5 mcg/inh inhalation aerosol: 2 puff(s) inhaled 2 times a day  cyclopentolate 1% ophthalmic solution: 1 drop(s) in the left eye 2 times a day  DuoNeb 0.5 mg-2.5 mg/3 mL inhalation solution: 3 milliliter(s) by nebulizer every 6 hours as needed for  shortness of breath and/or wheezing  FLUoxetine 10 mg oral tablet: 1 tab(s) orally once a day in addition to 20mg tablet for total of 30mg daily  FLUoxetine 20 mg oral capsule: 1 cap(s) orally once a day in addition to 10mg tablet for total of 30mg daily  furosemide 20 mg oral tablet: 1 tab(s) orally once a day  metoprolol succinate 50 mg oral tablet, extended release: 1 tab(s) orally once a day  MiraLax oral powder for reconstitution: 17 gram(s) orally once a day  mupirocin 2% topical ointment: Apply topically to affected area 2 times a day 2 more days  pantoprazole 40 mg oral delayed release tablet: 1 tab(s) orally once a day  prednisoLONE acetate 1% ophthalmic suspension: 1 drop(s) in the left eye 2 times a day  predniSONE 10 mg oral tablet: 1 tab(s) orally once a day TAPER DOSING  PREDNISONE 40mg (4 tabs) 2 x day on 8/26/24  PREDNISONE 40mg (4 tabs) 1 x day x 2 days 8/27, 8/28  PREDNISONE 30mg (3 tabs) 1 x day x 2 days 8/29, 8/30  PREDNISONE 20mg (2 tabs) 1 x day x 2 days 8/31, 9/1  PREDNISONE 10mg (2 tabs) 1 x day x 2 days9/2, 9/3  PREDNISONE 5mg (0.5 tab) 1 x day x 2 days 9/4, 9/5  temazepam 30 mg oral capsule: 1 cap(s) orally once a day (at bedtime) MDD: 1

## 2024-08-25 NOTE — DISCHARGE NOTE PROVIDER - NSDCFUSCHEDAPPT_GEN_ALL_CORE_FT
Zenobia Harrison Physician [facility] Patient Nathan  Scheduled Appointment: 08/30/2024

## 2024-08-25 NOTE — DISCHARGE NOTE PROVIDER - NSDCFUADDAPPT_GEN_ALL_CORE_FT
APPTS ARE READY TO BE MADE: [x] YES    Best Family or Patient Contact (if needed):    Additional Information about above appointments (if needed):    1: Dr. Gardiner in 1 week  2: Home pulmonary program in 1 week  3:     Other comments or requests:    APPTS ARE READY TO BE MADE: [x] YES    Best Family or Patient Contact (if needed):    Additional Information about above appointments (if needed):    1: [facility] - Cardiology, Dr. Gardiner in 1 week  2: [facility] - Home pulmonary program in 1 week and Pulmonary follow up in 1 week  3:     Other comments or requests:    APPTS ARE READY TO BE MADE: [x] YES    Best Family or Patient Contact (if needed):    Additional Information about above appointments (if needed):    1: [facility] - Cardiology, Dr. Gardiner in 1 week  2: [facility] - Home pulmonary program in 1 week and Pulmonary follow up in 1 week  3:     Other comments or requests:   Provided patient with provider referral information, however patient prefers to schedule the appointments on their own.

## 2024-08-25 NOTE — PROGRESS NOTE ADULT - SUBJECTIVE AND OBJECTIVE BOX
Date of Service: 08-23-24 @ 11:47           CARDIOLOGY     PROGRESS  NOTE   ________________________________________________    CHIEF COMPLAINT:Patient is a 89y old  Female who presents with a chief complaint of sob (22 Aug 2024 17:21)  doing  better  	  REVIEW OF SYSTEMS:  CONSTITUTIONAL: No fever, weight loss, or fatigue  EYES: No eye pain, visual disturbances, or discharge  ENT:  No difficulty hearing, tinnitus, vertigo; No sinus or throat pain  NECK: No pain or stiffness  RESPIRATORY: No cough, wheezing, chills or hemoptysis; decrease  Shortness of Breath  CARDIOVASCULAR: No chest pain, palpitations, passing out, dizziness, or leg swelling  GASTROINTESTINAL: No abdominal or epigastric pain. No nausea, vomiting, or hematemesis; No diarrhea or constipation. No melena or hematochezia.  GENITOURINARY: No dysuria, frequency, hematuria, or incontinence  NEUROLOGICAL: No headaches, memory loss, loss of strength, numbness, or tremors  SKIN: No itching, burning, rashes, or lesions   LYMPH Nodes: No enlarged glands  ENDOCRINE: No heat or cold intolerance; No hair loss  MUSCULOSKELETAL: No joint pain or swelling; No muscle, back, or extremity pain  PSYCHIATRIC: No depression, anxiety, mood swings, or difficulty sleeping  HEME/LYMPH: No easy bruising, or bleeding gums  ALLERGY AND IMMUNOLOGIC: No hives or eczema	    [x ] All others negative	  [ ] Unable to obtain    PHYSICAL EXAM:  T(C): 36.6 (08-23-24 @ 04:38), Max: 36.6 (08-22-24 @ 12:21)  HR: 73 (08-23-24 @ 08:45) (70 - 97)  BP: 122/70 (08-23-24 @ 04:38) (119/70 - 127/61)  RR: 18 (08-23-24 @ 08:45) (18 - 18)  SpO2: 98% (08-23-24 @ 08:45) (95% - 99%)  Wt(kg): --  I&O's Summary    22 Aug 2024 07:01  -  23 Aug 2024 07:00  --------------------------------------------------------  IN: 840 mL / OUT: 400 mL / NET: 440 mL    23 Aug 2024 07:01  -  23 Aug 2024 11:47  --------------------------------------------------------  IN: 24 mL / OUT: 0 mL / NET: 24 mL        Appearance: Normal	  HEENT:   Normal oral mucosa, PERRL, EOMI	  Lymphatic: No lymphadenopathy  Cardiovascular: Normal S1 S2, No JVD, No murmurs, decrease  edema  Respiratory: Lungs clear to auscultation	  Psychiatry: A & O x 3, Mood & affect appropriate  Gastrointestinal:  Soft, Non-tender, + BS	  Skin: No rashes, No ecchymoses, No cyanosis	  Neurologic: Non-focal  Extremities: Normal range of motion, No clubbing, cyanosis , decrease  edema      MEDICATIONS  (STANDING):  albuterol    90 MICROgram(s) HFA Inhaler 2 Puff(s) Inhalation every 8 hours  albuterol/ipratropium for Nebulization 3 milliLiter(s) Nebulizer every 6 hours  atorvastatin 20 milliGRAM(s) Oral at bedtime  budesonide  80 MICROgram(s)/formoterol 4.5 MICROgram(s) Inhaler 2 Puff(s) Inhalation two times a day  FLUoxetine 30 milliGRAM(s) Oral daily  furosemide   Injectable 20 milliGRAM(s) IV Push daily  heparin   Injectable 5000 Unit(s) SubCutaneous every 12 hours  methylPREDNISolone sodium succinate Injectable 20 milliGRAM(s) IV Push every 8 hours  metoprolol succinate ER 50 milliGRAM(s) Oral daily  mupirocin 2% Nasal 1 Application(s) Both Nostrils two times a day  pantoprazole    Tablet 40 milliGRAM(s) Oral before breakfast  senna 2 Tablet(s) Oral at bedtime      TELEMETRY: 	    ECG:  	  RADIOLOGY:  OTHER: 	  	  LABS:	 	    CARDIAC MARKERS:                            9.7    10.07 )-----------( 249      ( 23 Aug 2024 09:28 )             31.0     08-23    138  |  96  |  28<H>  ----------------------------<  167<H>  4.0   |  30  |  0.69    Ca    9.0      23 Aug 2024 09:28      proBNP:   Lipid Profile:   HgA1c:   TSH:         Assessment and plan  ---------------------------  89 year old female with pmh of COPD,on home O2, chronic R hemidiaphragm paralysis, h/o recurrent aspiration events, HTN, diverticulitis,  asthma/COPD, sarcoidosis, hypothyroidism, heart failure, presenting to the ER for SOB that started this morning. patient states experienced SOB when she woke up. states symptoms have since approved. recently admitted for similar. 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    	        
      ---___---___---___---___---___---___ ---___---___---___---___---___---___---___---___---                  M E D I C A L   A T T E N D I N G   P R O G R E S S   N O T E  ---___---___---___---___---___---___ ---___---___---___---___---___---___---___---___---        ================================================    ++CHIEF COMPLAINT:   Patient is a 89y old  Female who presents with a chief complaint of sob (22 Aug 2024 11:15)      Shortness of breath      ---___---___---___---___---___---  PAST MEDICAL / Surgical  HISTORY:  PAST MEDICAL & SURGICAL HISTORY:  HTN (hypertension)      Hypothyroidism      Osteoporosis      CAD (coronary artery disease)      COPD (chronic obstructive pulmonary disease)      Pulmonary sarcoidosis      H/O pyelonephritis      Acute diverticulitis          ---___---___---___---___---___---  FAMILY HISTORY:   FAMILY HISTORY:        ---___---___---___---___---___---  ALLERGIES:   Allergies    iodinated radiocontrast agents (Anaphylaxis)    Intolerances        ---___---___---___---___---___---  MEDICATIONS:  MEDICATIONS  (STANDING):  albuterol    90 MICROgram(s) HFA Inhaler 2 Puff(s) Inhalation every 8 hours  albuterol/ipratropium for Nebulization 3 milliLiter(s) Nebulizer every 6 hours  atorvastatin 20 milliGRAM(s) Oral at bedtime  budesonide  80 MICROgram(s)/formoterol 4.5 MICROgram(s) Inhaler 2 Puff(s) Inhalation two times a day  FLUoxetine 30 milliGRAM(s) Oral daily  furosemide   Injectable 20 milliGRAM(s) IV Push daily  heparin   Injectable 5000 Unit(s) SubCutaneous every 12 hours  methylPREDNISolone sodium succinate Injectable 20 milliGRAM(s) IV Push every 8 hours  metoprolol succinate ER 50 milliGRAM(s) Oral daily  mupirocin 2% Nasal 1 Application(s) Both Nostrils two times a day  pantoprazole    Tablet 40 milliGRAM(s) Oral before breakfast  senna 2 Tablet(s) Oral at bedtime    MEDICATIONS  (PRN):  temazepam 7.5 milliGRAM(s) Oral at bedtime PRN Insomnia      ---___---___---___---___---___---  REVIEW OF SYSTEM:    GEN: no fever, no chills, no pain  RESP: no SOB, no cough, no sputum  CVS: no chest pain, no palpitations, no edema  GI: no abdominal pain, no nausea, no vomiting, no constipation, no diarrhea  : no dysurea, no frequency, no hematurea  Neuro: no headache, no dizziness  PSYCH: no anxiety, no depression  Derm : no itching, no rash    ---___---___---___---___---___---  VITAL SIGNS:  89y , CAPILLARY BLOOD GLUCOSE        T(C): 36.6 (24 @ 12:21), Max: 36.6 (24 @ 20:10)  HR: 90 (24 @ 14:32) (69 - 97)  BP: 127/61 (24 @ 12:21) (114/69 - 137/67)  RR: 18 (24 @ 12:21) (18 - 22)  SpO2: 97% (24 @ 14:32) (93% - 98%)  ---___---___---___---___---___---  PHYSICAL EXAM:    GEN: A&O X 3 , NAD , comfortable  HEENT: NCAT, PERRL, MMM, hearing intact  Neck: supple , no JVD  CVS: S1S2 , regular , No M/R/G appreciated  PULM: CTA B/L,  no W/R/R appreciated  ABD.: soft. non tender, non distended,  bowel sounds present  Extrem: intact pulses , no edema   Derm: No rash , no ecchymoses  PSYCH : normal mood,  no delusion not anxious     ---___---___---___---___---___---            LAB AND IMAGIN.6    6.94  )-----------( 215      ( 22 Aug 2024 06:51 )             31.1               08-    139  |  100  |  14  ----------------------------<  168<H>  4.0   |  27  |  0.59    Ca    9.1      22 Aug 2024 06:51    TPro  6.4  /  Alb  3.5  /  TBili  0.4  /  DBili  x   /  AST  11  /  ALT  5<L>  /  AlkPhos  109  08-21    PT/INR - ( 21 Aug 2024 10:03 )   PT: 11.7 sec;   INR: 1.07 ratio         PTT - ( 21 Aug 2024 10:03 )  PTT:26.4 sec                        Urinalysis Basic - ( 22 Aug 2024 06:51 )    Color: x / Appearance: x / SG: x / pH: x  Gluc: 168 mg/dL / Ketone: x  / Bili: x / Urobili: x   Blood: x / Protein: x / Nitrite: x   Leuk Esterase: x / RBC: x / WBC x   Sq Epi: x / Non Sq Epi: x / Bacteria: x        [All pertinent / recent Imaging reviewed]         ---___---___---___---___---___---___ ---___---___---___---___---                         A S S E S S M E N T   A N D   P L A N :      HEALTH ISSUES - PROBLEM Dx:    admitted  with    sob,  with   wbc  of  12, ,000  on  arrival,  was  afebrile.      sob/ acute  on  c/c  diastolic     chf  and  exacerbation of  copd        h/o  c/c  elevated  d  dimer,         ct  chest  angio last  month/  seen by  tamiko  puldarlene,  then.  ct  chest  angio, was  negative  for  pe       h/o  underlying c/c  lung disease./  Emphysema        c/c   superficial   ulcerations, on  bony prominences  of hammer  toes,     HTN/  HLD    c/c  diastolic  chf, on lasix/ known  to    card     Anxiety,   c/c  anemia    pedal  edema.  doppler  leg,  no  dvt/ swollen right  leg /  c/c      on iv lasix/  iv  solumedrol/    house  pulm  eval     dvt  ppx        ___________________________  Thank you,  Garry Hermosillo  [phone]
      ---___---___---___---___---___---___ ---___---___---___---___---___---___---___---___---                  M E D I C A L   A T T E N D I N G   P R O G R E S S   N O T E  ---___---___---___---___---___---___ ---___---___---___---___---___---___---___---___---        ================================================    ++CHIEF COMPLAINT:   Patient is a 89y old  Female who presents with a chief complaint of sob (23 Aug 2024 11:47)      Shortness of breath      ---___---___---___---___---___---  PAST MEDICAL / Surgical  HISTORY:  PAST MEDICAL & SURGICAL HISTORY:  HTN (hypertension)      Hypothyroidism      Osteoporosis      CAD (coronary artery disease)      COPD (chronic obstructive pulmonary disease)      Pulmonary sarcoidosis      H/O pyelonephritis      Acute diverticulitis          ---___---___---___---___---___---  FAMILY HISTORY:   FAMILY HISTORY:        ---___---___---___---___---___---  ALLERGIES:   Allergies    iodinated radiocontrast agents (Anaphylaxis)    Intolerances        ---___---___---___---___---___---  MEDICATIONS:  MEDICATIONS  (STANDING):  albuterol    90 MICROgram(s) HFA Inhaler 2 Puff(s) Inhalation every 8 hours  albuterol/ipratropium for Nebulization 3 milliLiter(s) Nebulizer every 6 hours  atorvastatin 20 milliGRAM(s) Oral at bedtime  budesonide  80 MICROgram(s)/formoterol 4.5 MICROgram(s) Inhaler 2 Puff(s) Inhalation two times a day  FLUoxetine 30 milliGRAM(s) Oral daily  furosemide   Injectable 20 milliGRAM(s) IV Push daily  heparin   Injectable 5000 Unit(s) SubCutaneous every 12 hours  methylPREDNISolone sodium succinate Injectable 20 milliGRAM(s) IV Push every 8 hours  metoprolol succinate ER 50 milliGRAM(s) Oral daily  mupirocin 2% Nasal 1 Application(s) Both Nostrils two times a day  pantoprazole    Tablet 40 milliGRAM(s) Oral before breakfast  senna 2 Tablet(s) Oral at bedtime    MEDICATIONS  (PRN):  acetaminophen     Tablet .. 650 milliGRAM(s) Oral every 6 hours PRN Moderate Pain (4 - 6)  temazepam 7.5 milliGRAM(s) Oral at bedtime PRN Insomnia      ---___---___---___---___---___---  REVIEW OF SYSTEM:    GEN: no fever, no chills, no pain  RESP: no SOB, no cough, no sputum  CVS: no chest pain, no palpitations, no edema  GI: no abdominal pain, no nausea, no vomiting, no constipation, no diarrhea  : no dysurea, no frequency, no hematurea  Neuro: no headache, no dizziness  PSYCH: no anxiety, no depression  Derm : no itching, no rash    ---___---___---___---___---___---  VITAL SIGNS:  89y , CAPILLARY BLOOD GLUCOSE        T(C): 36.8 (24 @ 14:58), Max: 36.8 (24 @ 14:58)  HR: 77 (24 @ 15:01) (70 - 86)  BP: 104/59 (24 @ 12:20) (104/59 - 122/70)  RR: 18 (24 @ 15:01) (18 - 18)  SpO2: 95% (24 @ 15:01) (94% - 99%)  ---___---___---___---___---___---  PHYSICAL EXAM:    GEN: A&O X 3 , NAD , comfortable  HEENT: NCAT, PERRL, MMM, hearing intact  Neck: supple , no JVD  CVS: S1S2 , regular , No M/R/G appreciated  PULM: CTA B/L,  no W/R/R appreciated  ABD.: soft. non tender, non distended,  bowel sounds present  Extrem: intact pulses , no edema   Derm: No rash , no ecchymoses  PSYCH : normal mood,  no delusion not anxious     ---___---___---___---___---___---            LAB AND IMAGIN.7    10.07 )-----------( 249      ( 23 Aug 2024 09:28 )             31.0               08-    138  |  96  |  28<H>  ----------------------------<  167<H>  4.0   |  30  |  0.69    Ca    9.0      23 Aug 2024 09:28                              Urinalysis Basic - ( 23 Aug 2024 09:28 )    Color: x / Appearance: x / SG: x / pH: x  Gluc: 167 mg/dL / Ketone: x  / Bili: x / Urobili: x   Blood: x / Protein: x / Nitrite: x   Leuk Esterase: x / RBC: x / WBC x   Sq Epi: x / Non Sq Epi: x / Bacteria: x        [All pertinent / recent Imaging reviewed]         ---___---___---___---___---___---___ ---___---___---___---___---                         A S S E S S M E N T   A N D   P L A N :      HEALTH ISSUES - PROBLEM Dx:  copd exacerbation   on iv abx   switch to po abx if possible  change to po prednisone   htn stable   anxiety continue  continue lasix             -GI/DVT Prophylaxis.  as ordered  --------------------------------------------  Case discussed with   Education given on   ___________________________  Thank you,  Garry Hermosillo  [phone]
Date of Service: 08-25-24 @ 11:16           CARDIOLOGY     PROGRESS  NOTE   ________________________________________________    CHIEF COMPLAINT:Patient is a 89y old  Female who presents with a chief complaint of sob (24 Aug 2024 14:13)  doing better  	  REVIEW OF SYSTEMS:  CONSTITUTIONAL: No fever, weight loss, or fatigue  EYES: No eye pain, visual disturbances, or discharge  ENT:  No difficulty hearing, tinnitus, vertigo; No sinus or throat pain  NECK: No pain or stiffness  RESPIRATORY: No cough, wheezing, chills or hemoptysis; decrease  Shortness of Breath  CARDIOVASCULAR: No chest pain, palpitations, passing out, dizziness, or leg swelling  GASTROINTESTINAL: No abdominal or epigastric pain. No nausea, vomiting, or hematemesis; No diarrhea or constipation. No melena or hematochezia.  GENITOURINARY: No dysuria, frequency, hematuria, or incontinence  NEUROLOGICAL: No headaches, memory loss, loss of strength, numbness, or tremors  SKIN: No itching, burning, rashes, or lesions   LYMPH Nodes: No enlarged glands  ENDOCRINE: No heat or cold intolerance; No hair loss  MUSCULOSKELETAL: No joint pain or swelling; No muscle, back, or extremity pain  PSYCHIATRIC: No depression, anxiety, mood swings, or difficulty sleeping  HEME/LYMPH: No easy bruising, or bleeding gums  ALLERGY AND IMMUNOLOGIC: No hives or eczema	    [x ] All others negative	  [ ] Unable to obtain    PHYSICAL EXAM:  T(C): 36.6 (08-25-24 @ 05:22), Max: 36.6 (08-24-24 @ 11:59)  HR: 67 (08-25-24 @ 05:22) (65 - 103)  BP: 131/68 (08-25-24 @ 05:22) (121/67 - 137/68)  RR: 18 (08-25-24 @ 05:22) (18 - 18)  SpO2: 95% (08-25-24 @ 05:22) (95% - 99%)  Wt(kg): --  I&O's Summary    24 Aug 2024 07:01  -  25 Aug 2024 07:00  --------------------------------------------------------  IN: 580 mL / OUT: 1100 mL / NET: -520 mL        Appearance: Normal	  HEENT:   Normal oral mucosa, PERRL, EOMI	  Lymphatic: No lymphadenopathy  Cardiovascular: Normal S1 S2, No JVD, + murmurs, No edema  Respiratory: rhonchi  Psychiatry: A & O x 3, Mood & affect appropriate  Gastrointestinal:  Soft, Non-tender, + BS	  Skin: No rashes, No ecchymoses, No cyanosis	  Neurologic: Non-focal  Extremities: Normal range of motion, No clubbing, cyanosis or edema  Vascular: Peripheral pulses palpable 2+ bilaterally    MEDICATIONS  (STANDING):  albuterol    90 MICROgram(s) HFA Inhaler 2 Puff(s) Inhalation every 8 hours  albuterol/ipratropium for Nebulization 3 milliLiter(s) Nebulizer every 6 hours  atorvastatin 20 milliGRAM(s) Oral at bedtime  budesonide  80 MICROgram(s)/formoterol 4.5 MICROgram(s) Inhaler 2 Puff(s) Inhalation two times a day  FLUoxetine 30 milliGRAM(s) Oral daily  furosemide    Tablet 20 milliGRAM(s) Oral two times a day  heparin   Injectable 5000 Unit(s) SubCutaneous every 12 hours  metoprolol succinate ER 50 milliGRAM(s) Oral daily  mupirocin 2% Nasal 1 Application(s) Both Nostrils two times a day  pantoprazole    Tablet 40 milliGRAM(s) Oral before breakfast  prednisoLONE acetate 1% Suspension 1 Drop(s) Left EYE two times a day  predniSONE   Tablet   Oral   predniSONE   Tablet 40 milliGRAM(s) Oral two times a day  senna 2 Tablet(s) Oral at bedtime      TELEMETRY: 	    ECG:  	  RADIOLOGY:  OTHER: 	  	  LABS:	 	    CARDIAC MARKERS:                          9.0    8.09  )-----------( 215      ( 25 Aug 2024 06:12 )             28.9     08-25    142  |  100  |  33<H>  ----------------------------<  157<H>  4.1   |  28  |  0.68    Ca    8.3<L>      25 Aug 2024 06:12  Mg     2.2     08-24      proBNP:   Lipid Profile:   HgA1c:   TSH:         Assessment and plan  ---------------------------  89 year old female with pmh of COPD,on home O2, chronic R hemidiaphragm paralysis, h/o recurrent aspiration events, HTN, diverticulitis,  asthma/COPD, sarcoidosis, hypothyroidism, heart failure, presenting to the ER for SOB that started this morning. patient states experienced SOB when she woke up. states symptoms have since approved. recently admitted for similar. 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    	        
Date of Service: 08-22-24 @ 08:30           CARDIOLOGY     PROGRESS  NOTE   ________________________________________________    CHIEF COMPLAINT:Patient is a 89y old  Female who presents with a chief complaint of sob (21 Aug 2024 17:38)  still with sob  	  REVIEW OF SYSTEMS:  CONSTITUTIONAL: No fever, weight loss, or fatigue  EYES: No eye pain, visual disturbances, or discharge  ENT:  No difficulty hearing, tinnitus, vertigo; No sinus or throat pain  NECK: No pain or stiffness  RESPIRATORY: No cough, wheezing, chills or hemoptysis; + Shortness of Breath  CARDIOVASCULAR: No chest pain, palpitations, passing out, dizziness, or leg swelling  GASTROINTESTINAL: No abdominal or epigastric pain. No nausea, vomiting, or hematemesis; No diarrhea or constipation. No melena or hematochezia.  GENITOURINARY: No dysuria, frequency, hematuria, or incontinence  NEUROLOGICAL: No headaches, memory loss, loss of strength, numbness, or tremors  SKIN: No itching, burning, rashes, or lesions   LYMPH Nodes: No enlarged glands  ENDOCRINE: No heat or cold intolerance; No hair loss  MUSCULOSKELETAL: No joint pain or swelling; No muscle, back, or extremity pain  PSYCHIATRIC: No depression, anxiety, mood swings, or difficulty sleeping  HEME/LYMPH: No easy bruising, or bleeding gums  ALLERGY AND IMMUNOLOGIC: No hives or eczema	    [x ] All others negative	  [ ] Unable to obtain    PHYSICAL EXAM:  T(C): 36.4 (08-22-24 @ 06:22), Max: 36.6 (08-21-24 @ 15:30)  HR: 74 (08-22-24 @ 06:22) (69 - 82)  BP: 137/67 (08-22-24 @ 06:22) (94/46 - 147/98)  RR: 18 (08-22-24 @ 06:22) (18 - 32)  SpO2: 97% (08-22-24 @ 06:22) (93% - 100%)  Wt(kg): --  I&O's Summary    21 Aug 2024 07:01  -  22 Aug 2024 07:00  --------------------------------------------------------  IN: 0 mL / OUT: 900 mL / NET: -900 mL        Appearance: Normal	  HEENT:   Normal oral mucosa, PERRL, EOMI	  Lymphatic: No lymphadenopathy  Cardiovascular: Normal S1 S2, No JVD, + murmurs, + edema  Respiratory: Lungs clear to auscultation	  Psychiatry: A & O x 3, Mood & affect appropriate  Gastrointestinal:  Soft, Non-tender, + BS	  Skin: No rashes, No ecchymoses, No cyanosis	  Neurologic: Non-focal  Extremities: Normal range of motion, No clubbing, cyanosis , + edema  Vascular: Peripheral pulses palpable 2+ bilaterally    MEDICATIONS  (STANDING):  albuterol    90 MICROgram(s) HFA Inhaler 2 Puff(s) Inhalation every 8 hours  albuterol/ipratropium for Nebulization 3 milliLiter(s) Nebulizer every 6 hours  atorvastatin 20 milliGRAM(s) Oral at bedtime  budesonide  80 MICROgram(s)/formoterol 4.5 MICROgram(s) Inhaler 2 Puff(s) Inhalation two times a day  FLUoxetine 10 milliGRAM(s) Oral daily  furosemide   Injectable 40 milliGRAM(s) IV Push daily  heparin   Injectable 5000 Unit(s) SubCutaneous every 12 hours  methylPREDNISolone sodium succinate Injectable 20 milliGRAM(s) IV Push every 8 hours  metoprolol succinate ER 50 milliGRAM(s) Oral daily  senna 2 Tablet(s) Oral at bedtime      TELEMETRY: 	    ECG:  	  RADIOLOGY:  OTHER: 	  	  LABS:	 	    CARDIAC MARKERS:                                9.6    6.94  )-----------( 215      ( 22 Aug 2024 06:51 )             31.1     08-22    139  |  100  |  14  ----------------------------<  168<H>  4.0   |  27  |  0.59    Ca    9.1      22 Aug 2024 06:51    TPro  6.4  /  Alb  3.5  /  TBili  0.4  /  DBili  x   /  AST  11  /  ALT  5<L>  /  AlkPhos  109  08-21    proBNP:   Lipid Profile:   HgA1c:   TSH:   PT/INR - ( 21 Aug 2024 10:03 )   PT: 11.7 sec;   INR: 1.07 ratio         PTT - ( 21 Aug 2024 10:03 )  PTT:26.4 sec      < from: TTE Limited W or WO Ultrasound Enhancing Agent (04.08.24 @ 14:17) >      1. Left ventricular cavity is normal in size. Left ventricular wall thickness is normal. Left ventricular systolic function is normal with an ejection fraction visually estimated at 55 to 60 %. There are no regional wall motion abnormalities seen.   2. Normal rightventricular cavity size, with normal wall thickness, and normal systolic function. Tricuspid annular plane systolic excursion (TAPSE) is 2.0 cm (normal >=1.7 cm).   3. The left atrium is severely dilated.   4. Mild mitral regurgitation.   5. Comparedto the transthoracic echocardiogram performed on 3/26/2024, regional wall motion abnormality not appreciated.    < from: Xray Chest 1 View AP/PA (08.21.24 @ 07:51) >  FINDINGS:  Low lung volumes.  Loop recorder.  The heart is poorly assessed due to opacification magnification.  Diffuse reticular opacities bilaterally. Small right and possible small   left effusion.  No pneumothorax.  Severe bilateral glenohumeral arthropathy with chronic joint erosions.   Scoliosis.    IMPRESSION:  Small bilateral effusions and atelectasis.        Assessment and plan  ---------------------------  89 year old female with pmh of COPD,on home O2, chronic R hemidiaphragm paralysis, h/o recurrent aspiration events, HTN, diverticulitis,  asthma/COPD, sarcoidosis, hypothyroidism, heart failure, presenting to the ER for SOB that started this morning. patient states experienced SOB when she woke up. states symptoms have since approved. recently admitted for similar. 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    	        
Date of Service: 08-24-24 @ 11:57           CARDIOLOGY     PROGRESS  NOTE   ________________________________________________    CHIEF COMPLAINT:Patient is a 89y old  Female who presents with a chief complaint of Shortness of breath  no complain    	  REVIEW OF SYSTEMS:  CONSTITUTIONAL: No fever, weight loss, or fatigue  EYES: No eye pain, visual disturbances, or discharge  ENT:  No difficulty hearing, tinnitus, vertigo; No sinus or throat pain  NECK: No pain or stiffness  RESPIRATORY: No cough, wheezing, chills or hemoptysis; + Shortness of Breath  CARDIOVASCULAR: No chest pain, palpitations, passing out, dizziness, or leg swelling  GASTROINTESTINAL: No abdominal or epigastric pain. No nausea, vomiting, or hematemesis; No diarrhea or constipation. No melena or hematochezia.  GENITOURINARY: No dysuria, frequency, hematuria, or incontinence  NEUROLOGICAL: No headaches, memory loss, loss of strength, numbness, or tremors  SKIN: No itching, burning, rashes, or lesions   LYMPH Nodes: No enlarged glands  ENDOCRINE: No heat or cold intolerance; No hair loss  MUSCULOSKELETAL: No joint pain or swelling; No muscle, back, or extremity pain  PSYCHIATRIC: No depression, anxiety, mood swings, or difficulty sleeping  HEME/LYMPH: No easy bruising, or bleeding gums  ALLERGY AND IMMUNOLOGIC: No hives or eczema	    x[ ] All others negative	  [ ] Unable to obtain    PHYSICAL EXAM:  T(C): 36.4 (08-24-24 @ 04:45), Max: 36.8 (08-23-24 @ 14:58)  HR: 74 (08-24-24 @ 08:00) (68 - 77)  BP: 144/84 (08-24-24 @ 04:45) (104/59 - 149/66)  RR: 19 (08-24-24 @ 08:00) (18 - 19)  SpO2: 98% (08-24-24 @ 08:00) (94% - 98%)  Wt(kg): --  I&O's Summary    23 Aug 2024 07:01  -  24 Aug 2024 07:00  --------------------------------------------------------  IN: 504 mL / OUT: 700 mL / NET: -196 mL        Appearance: Normal	  HEENT:   Normal oral mucosa, PERRL, EOMI	  Lymphatic: No lymphadenopathy  Cardiovascular: Normal S1 S2, No JVD, + murmurs, No edema  Respiratory rhonchi  Psychiatry: A & O x 3, Mood & affect appropriate  Gastrointestinal:  Soft, Non-tender, + BS	  Skin: No rashes, No ecchymoses, No cyanosis	  Neurologic: Non-focal  Extremities: Normal range of motion, No clubbing, cyanosis or edema  Vascular: Peripheral pulses palpable 2+ bilaterally    MEDICATIONS  (STANDING):  albuterol    90 MICROgram(s) HFA Inhaler 2 Puff(s) Inhalation every 8 hours  albuterol/ipratropium for Nebulization 3 milliLiter(s) Nebulizer every 6 hours  atorvastatin 20 milliGRAM(s) Oral at bedtime  budesonide  80 MICROgram(s)/formoterol 4.5 MICROgram(s) Inhaler 2 Puff(s) Inhalation two times a day  FLUoxetine 30 milliGRAM(s) Oral daily  furosemide   Injectable 20 milliGRAM(s) IV Push daily  heparin   Injectable 5000 Unit(s) SubCutaneous every 12 hours  metoprolol succinate ER 50 milliGRAM(s) Oral daily  mupirocin 2% Nasal 1 Application(s) Both Nostrils two times a day  pantoprazole    Tablet 40 milliGRAM(s) Oral before breakfast  prednisoLONE acetate 1% Suspension 1 Drop(s) Left EYE two times a day  predniSONE   Tablet   Oral   predniSONE   Tablet 40 milliGRAM(s) Oral two times a day  senna 2 Tablet(s) Oral at bedtime      TELEMETRY: 	    ECG:  	  RADIOLOGY:  OTHER: 	  	  LABS:	 	    CARDIAC MARKERS:                          9.7    10.07 )-----------( 249      ( 23 Aug 2024 09:28 )             31.0     08-23    138  |  96  |  28<H>  ----------------------------<  167<H>  4.0   |  30  |  0.69    Ca    9.0      23 Aug 2024 09:28      proBNP:   Lipid Profile:   HgA1c:   TSH:     < from: Xray Chest 1 View AP/PA (08.21.24 @ 07:51) >  FINDINGS:  Low lung volumes.  Loop recorder.  The heart is poorly assessed due to opacification magnification.  Diffuse reticular opacities bilaterally. Small right and possible small   left effusion.  No pneumothorax.  Severe bilateral glenohumeral arthropathy with chronic joint erosions.   Scoliosis.    IMPRESSION:  Small bilateral effusions and atelectasis.    < from: TTE Limited W or WO Ultrasound Enhancing Agent (04.08.24 @ 14:17) >   1. Left ventricular cavity is normal in size. Left ventricular wall thickness is normal. Left ventricular systolic function is normal with an ejection fraction visually estimated at 55 to 60 %. There are no regional wall motion abnormalities seen.   2. Normal rightventricular cavity size, with normal wall thickness, and normal systolic function. Tricuspid annular plane systolic excursion (TAPSE) is 2.0 cm (normal >=1.7 cm).   3. The left atrium is severely dilated.   4. Mild mitral regurgitation.   5. Comparedto the transthoracic echocardiogram performed on 3/26/2024, regional wall motion abnormality not appreciated.      Assessment and plan  ---------------------------  89 year old female with pmh of COPD,on home O2, chronic R hemidiaphragm paralysis, h/o recurrent aspiration events, HTN, diverticulitis,  asthma/COPD, sarcoidosis, hypothyroidism, heart failure, presenting to the ER for SOB that started this morning. patient states experienced SOB when she woke up. states symptoms have since approved. recently admitted for similar. patient denies f/n/v/d, CP, HA, dizziness, abdominal pain, urinary symptoms  pt is well known to me with hx of underlying lung disease chf with increasing sob  HFPEF acute on chronic  pt with hx of sarcoidosis  start on iv solumedrol  + le edema, check venous doppler, negative  start lasix 20 mg iv daily  check chest x ray r/o recurrent pneumoniae  will adjust cardiac meds  change lasix to 20 mg daily  continue steroid q8h, taper slowly  continue lasix 20 mg daily IV  oob to the chair  continue current meds ?dc planning for Monday  taper steroid very slowly  duoneb    	        
  Follow Up:  hypoxia    Interval History/ROS:  Overnight: No acute events.  Patient remains afebrile.  Otherwise hemodynamically stable.  Latest labs show resolved leukocytosis to 6.9, BMP with renal function within normal limits.    Patient seen examined at bedside.  Denies any new pain or discomfort.      Allergies  iodinated radiocontrast agents (Anaphylaxis)        ANTIMICROBIALS:      OTHER MEDS:  MEDICATIONS  (STANDING):  albuterol    90 MICROgram(s) HFA Inhaler 2 every 8 hours  albuterol/ipratropium for Nebulization 3 every 6 hours  atorvastatin 20 at bedtime  budesonide  80 MICROgram(s)/formoterol 4.5 MICROgram(s) Inhaler 2 two times a day  FLUoxetine 10 daily  furosemide   Injectable 20 daily  heparin   Injectable 5000 every 12 hours  methylPREDNISolone sodium succinate Injectable 20 every 8 hours  metoprolol succinate ER 50 daily  pantoprazole    Tablet 40 before breakfast  senna 2 at bedtime  temazepam 7.5 at bedtime PRN      Vital Signs Last 24 Hrs  T(C): 36.4 (22 Aug 2024 06:22), Max: 36.6 (21 Aug 2024 15:30)  T(F): 97.5 (22 Aug 2024 06:22), Max: 97.9 (21 Aug 2024 20:10)  HR: 70 (22 Aug 2024 09:14) (69 - 81)  BP: 137/67 (22 Aug 2024 06:22) (94/46 - 140/62)  BP(mean): 87 (21 Aug 2024 15:30) (87 - 87)  RR: 18 (22 Aug 2024 06:22) (18 - 32)  SpO2: 96% (22 Aug 2024 09:14) (93% - 100%)    Parameters below as of 22 Aug 2024 06:22  Patient On (Oxygen Delivery Method): nasal cannula        PHYSICAL EXAM:  Constitutional: non-toxic, no distress biPAP+  HEAD/EYES: anicteric, no conjunctival injection  ENT:  supple, no thrush  Cardiovascular:   normal S1, S2, no murmur, no edema  Respiratory:  clear BS bilaterally, no wheezes, no rales  GI:  soft, non-tender, normal bowel sounds  :  no collado, no CVA tenderness  Musculoskeletal:  LLE edema  Neurologic: awake and alert, normal strength, no focal findings  Skin:  no rash, no erythema, no phlebitis  Heme/Onc: no lymphadenopathy   Psychiatric:  awake, alert, appropriate mood                            9.6    6.94  )-----------( 215      ( 22 Aug 2024 06:51 )             31.1       08-22    139  |  100  |  14  ----------------------------<  168<H>  4.0   |  27  |  0.59    Ca    9.1      22 Aug 2024 06:51    TPro  6.4  /  Alb  3.5  /  TBili  0.4  /  DBili  x   /  AST  11  /  ALT  5<L>  /  AlkPhos  109  08-21      Urinalysis Basic - ( 22 Aug 2024 06:51 )    Color: x / Appearance: x / SG: x / pH: x  Gluc: 168 mg/dL / Ketone: x  / Bili: x / Urobili: x   Blood: x / Protein: x / Nitrite: x   Leuk Esterase: x / RBC: x / WBC x   Sq Epi: x / Non Sq Epi: x / Bacteria: x        MICROBIOLOGY:  v                  RADIOLOGY:  Imaging reviewed
  date of service: 08-24-24 @ 14:13  [facility]  REVIEW OF SYSTEMS:  CONSTITUTIONAL: No fever,  no  weight loss  ENT:  No  tinnitus,   no   vertigo  NECK: No pain or stiffness  RESPIRATORY: No cough, wheezing, chills or hemoptysis;    No Shortness of Breath  CARDIOVASCULAR: No chest pain, palpitations, dizziness  GASTROINTESTINAL: No abdominal or epigastric pain. No nausea, vomiting, or hematemesis; No diarrhea  No melena or hematochezia.  GENITOURINARY: No dysuria, frequency, hematuria, or incontinence  NEUROLOGICAL: No headaches  SKIN: No itching,  no   rash  LYMPH Nodes: No enlarged glands  ENDOCRINE: No heat or cold intolerance  MUSCULOSKELETAL: No joint pain or swelling  PSYCHIATRIC: No depression, anxiety  HEME/LYMPH: No easy bruising, or bleeding gums  ALLERGY AND IMMUNOLOGIC: No hives or eczema	    MEDICATIONS  (STANDING):  albuterol    90 MICROgram(s) HFA Inhaler 2 Puff(s) Inhalation every 8 hours  albuterol/ipratropium for Nebulization 3 milliLiter(s) Nebulizer every 6 hours  atorvastatin 20 milliGRAM(s) Oral at bedtime  budesonide  80 MICROgram(s)/formoterol 4.5 MICROgram(s) Inhaler 2 Puff(s) Inhalation two times a day  FLUoxetine 30 milliGRAM(s) Oral daily  furosemide   Injectable 20 milliGRAM(s) IV Push daily  heparin   Injectable 5000 Unit(s) SubCutaneous every 12 hours  metoprolol succinate ER 50 milliGRAM(s) Oral daily  mupirocin 2% Nasal 1 Application(s) Both Nostrils two times a day  pantoprazole    Tablet 40 milliGRAM(s) Oral before breakfast  prednisoLONE acetate 1% Suspension 1 Drop(s) Left EYE two times a day  predniSONE   Tablet   Oral   predniSONE   Tablet 40 milliGRAM(s) Oral two times a day  senna 2 Tablet(s) Oral at bedtime    MEDICATIONS  (PRN):  acetaminophen     Tablet .. 650 milliGRAM(s) Oral every 6 hours PRN Moderate Pain (4 - 6)  temazepam 7.5 milliGRAM(s) Oral at bedtime PRN Insomnia      Vital Signs Last 24 Hrs  T(C): 36.6 (24 Aug 2024 11:59), Max: 36.8 (23 Aug 2024 14:58)  T(F): 97.9 (24 Aug 2024 11:59), Max: 98.2 (23 Aug 2024 14:58)  HR: 65 (24 Aug 2024 11:59) (65 - 77)  BP: 137/68 (24 Aug 2024 11:59) (137/68 - 149/66)  BP(mean): --  RR: 18 (24 Aug 2024 11:59) (18 - 19)  SpO2: 99% (24 Aug 2024 11:59) (95% - 99%)    Parameters below as of 24 Aug 2024 11:59  Patient On (Oxygen Delivery Method): nasal cannula  O2 Flow (L/min): 2    CAPILLARY BLOOD GLUCOSE        I&O's Summary    23 Aug 2024 07:01  -  24 Aug 2024 07:00  --------------------------------------------------------  IN: 504 mL / OUT: 700 mL / NET: -196 mL    24 Aug 2024 07:01  -  24 Aug 2024 14:13  --------------------------------------------------------  IN: 480 mL / OUT: 750 mL / NET: -270 mL          Appearance: Normal	  HEENT:   Normal oral mucosa, PERRL, EOMI	  Lymphatic: No lymphadenopathy  Cardiovascular: Normal S1 S2, No JVD  Respiratory: Lungs clear to auscultation	  Gastrointestinal:  Soft, Non-tender, + BS	  Skin: No rash, No ecchymoses	  Extremities:     LABS:                        9.7    10.07 )-----------( 249      ( 23 Aug 2024 09:28 )             31.0     08-24    139  |  98  |  34<H>  ----------------------------<  221<H>  3.7   |  27  |  0.65    Ca    8.5      24 Aug 2024 13:20  Mg     2.2     08-24            Urinalysis Basic - ( 24 Aug 2024 13:20 )    Color: x / Appearance: x / SG: x / pH: x  Gluc: 221 mg/dL / Ketone: x  / Bili: x / Urobili: x   Blood: x / Protein: x / Nitrite: x   Leuk Esterase: x / RBC: x / WBC x   Sq Epi: x / Non Sq Epi: x / Bacteria: x                      Consultant(s) Notes Reviewed:      Care Discussed with Consultants/Other Providers:

## 2024-08-25 NOTE — DISCHARGE NOTE PROVIDER - CARE PROVIDER_API CALL
[facility]  [address] [place] [address]  Phone: [phone]  Fax: [phone]  Follow Up Time: 1 week    Constanza Gardiner  Cardiovascular Disease  [address] [address]  Phone: [phone]  Fax: [phone]  Follow Up Time: 1 week    Earle Avila  Pulmonary Disease  [address] [place] [address]  Phone: [phone]  Fax: [phone]  Follow Up Time: 1 week

## 2024-08-25 NOTE — DISCHARGE NOTE NURSING/CASE MANAGEMENT/SOCIAL WORK - PATIENT PORTAL LINK FT
You can access the FollowMyHealth Patient Portal offered by [facility] by registering at the following website: [email or web address]. By joining [facility]’s FollowMyHealth portal, you will also be able to view your health information using other applications (apps) compatible with our system.

## 2024-08-25 NOTE — DISCHARGE NOTE NURSING/CASE MANAGEMENT/SOCIAL WORK - NSDCPEFALRISK_GEN_ALL_CORE
For information on Fall & Injury Prevention, visit: [email or web address] OR  [email or web address] OR  https://www.cdc.gov/steadi/patient.html

## 2024-08-26 ENCOUNTER — TRANSCRIPTION ENCOUNTER (OUTPATIENT)
Age: 89
End: 2024-08-26

## 2024-08-28 ENCOUNTER — TRANSCRIPTION ENCOUNTER (OUTPATIENT)
Age: 89
End: 2024-08-28

## 2024-08-28 NOTE — CHART NOTE - NSCHARTNOTEFT_GEN_A_CORE
RD CHF education chart note    Patient was visited by RD for CHF education. Heart failure education provided to the patient in detail. Discussed heart failure nutrition therapy, sodium and fluid intake, importance of diet adherence, daily weights monitoring with the patient. Reinforced importance of weight gain parameters and importance of contacting MD’s about weight changes. Provided handouts on heart failure nutrition therapy, reading heart healthy nutrition labels, heart healthy shopping tips and low sodium food list. Patient verbalized understanding demonstrated by teach back method. Pt made aware RD to remain available.     Shannon Martinez, MS, RD, CDN, CNSC, CDCES TEAMS
Patient was outreached but did not answer. A voicemail was left for the patient to return our call.

## 2024-08-30 ENCOUNTER — APPOINTMENT (OUTPATIENT)
Dept: CARE COORDINATION | Facility: HOME HEALTH | Age: 89
End: 2024-08-30
Payer: MEDICARE

## 2024-08-30 VITALS
RESPIRATION RATE: 16 BRPM | SYSTOLIC BLOOD PRESSURE: 108 MMHG | HEART RATE: 72 BPM | DIASTOLIC BLOOD PRESSURE: 54 MMHG | TEMPERATURE: 98.3 F | OXYGEN SATURATION: 94 %

## 2024-08-30 DIAGNOSIS — K21.9 GASTRO-ESOPHAGEAL REFLUX DISEASE W/OUT ESOPHAGITIS: ICD-10-CM

## 2024-08-30 DIAGNOSIS — F41.9 ANXIETY DISORDER, UNSPECIFIED: ICD-10-CM

## 2024-08-30 DIAGNOSIS — J44.1 CHRONIC OBSTRUCTIVE PULMONARY DISEASE WITH (ACUTE) EXACERBATION: ICD-10-CM

## 2024-08-30 DIAGNOSIS — I10 ESSENTIAL (PRIMARY) HYPERTENSION: ICD-10-CM

## 2024-08-30 DIAGNOSIS — I50.33 ACUTE ON CHRONIC DIASTOLIC (CONGESTIVE) HEART FAILURE: ICD-10-CM

## 2024-08-30 DIAGNOSIS — K59.00 CONSTIPATION, UNSPECIFIED: ICD-10-CM

## 2024-08-30 PROCEDURE — 99349 HOME/RES VST EST MOD MDM 40: CPT

## 2024-08-31 PROBLEM — I10 ESSENTIAL HYPERTENSION: Status: ACTIVE | Noted: 2024-08-31

## 2024-08-31 PROBLEM — K59.00 CONSTIPATION IN FEMALE: Status: ACTIVE | Noted: 2024-08-31

## 2024-08-31 PROBLEM — K21.9 CHRONIC GERD: Status: ACTIVE | Noted: 2024-08-31

## 2024-08-31 PROBLEM — I50.33 ACUTE ON CHRONIC DIASTOLIC CONGESTIVE HEART FAILURE: Status: ACTIVE | Noted: 2024-08-31

## 2024-08-31 PROBLEM — F41.9 ANXIETY: Status: ACTIVE | Noted: 2024-08-31

## 2024-08-31 PROBLEM — J44.1 COPD EXACERBATION: Status: ACTIVE | Noted: 2024-08-31

## 2024-08-31 RX ORDER — BUDESONIDE 0.25 MG/2ML
0.25 INHALANT ORAL TWICE DAILY
Qty: 2 | Refills: 0 | Status: ACTIVE | COMMUNITY
Start: 2024-08-31

## 2024-08-31 RX ORDER — METOPROLOL SUCCINATE 50 MG/1
50 TABLET, EXTENDED RELEASE ORAL DAILY
Refills: 0 | Status: ACTIVE | COMMUNITY
Start: 2024-08-31

## 2024-08-31 RX ORDER — BUDESONIDE AND FORMOTEROL FUMARATE 80; 4.5 UG/1; UG/1
80-4.5 AEROSOL, METERED RESPIRATORY (INHALATION) TWICE DAILY
Refills: 0 | Status: ACTIVE | COMMUNITY
Start: 2024-08-31

## 2024-08-31 RX ORDER — PANTOPRAZOLE 40 MG/1
40 TABLET, DELAYED RELEASE ORAL DAILY
Refills: 0 | Status: ACTIVE | COMMUNITY
Start: 2024-08-31

## 2024-08-31 RX ORDER — IPRATROPIUM BROMIDE AND ALBUTEROL SULFATE 2.5; .5 MG/3ML; MG/3ML
0.5-2.5 (3) SOLUTION RESPIRATORY (INHALATION) 4 TIMES DAILY
Refills: 0 | Status: ACTIVE | COMMUNITY
Start: 2024-08-31

## 2024-08-31 RX ORDER — FUROSEMIDE 20 MG/1
20 TABLET ORAL DAILY
Refills: 0 | Status: ACTIVE | COMMUNITY
Start: 2024-08-31

## 2024-08-31 RX ORDER — FLUOXETINE HYDROCHLORIDE 10 MG/1
10 TABLET ORAL DAILY
Refills: 0 | Status: ACTIVE | COMMUNITY
Start: 2024-08-31

## 2024-08-31 RX ORDER — PREDNISONE 10 MG/1
10 TABLET ORAL
Refills: 0 | Status: ACTIVE | COMMUNITY
Start: 2024-08-31

## 2024-08-31 RX ORDER — LORATADINE 10 MG
17 TABLET,DISINTEGRATING ORAL
Refills: 0 | Status: ACTIVE | COMMUNITY
Start: 2024-08-31

## 2024-08-31 NOTE — HISTORY OF PRESENT ILLNESS
[Post-hospitalization from ___ Hospital] : Post-hospitalization from [unfilled] Hospital [Admitted on: ___] : The patient was admitted on [unfilled] [Discharged on ___] : discharged on [unfilled] [Discharge Summary] : discharge summary [Discharge Med List] : discharge medication list [Med Reconciliation] : medication reconciliation has been completed [Patient Contacted By: ____] : and contacted by [unfilled] [FreeTextEntry2] : 89 year old female with pmh of COPD, Sarcoidosis, emphysema on home O2, chronic R hemidiaphragm paralysis, diastolic  heart failure, HTN, diverticulitis, C-diff, asthma/COPD, sarcoidosis, hypothyroidism, L  hydroureter,  Cdiff,  ulcer  left 2nd  toe    c/c   superficial   ulcerations, on  bony prominences  of hammer  toe presenting to the ER for  sob   that started this morning. denies f/n/v/d, CP, HA, dizziness, abdominal pain, urinary symptoms, denies  fevers/ cp/abd  pain seen in  er,  pt  has  mild  tachypnea  (21 Aug 2024 10:31) Hospital Course: Admitted  with SOB, Leukocytosis  wbc  of  12, ,000  on  arrival, afebrile.  Admitted with Acute on Chronic Diastolic  HF and  COPD Exacerbation. Treated with intravenous Lasix transitioned to PO. Pt to continue oxygen via NC at 2L NC (pt uses home O2 at 2L NC) Pt with pedal  edema, swollen right  leg doppler  leg,  no  dvt Also noted with elevated  d  dimer, CT  chest  angio was  negative  for PE  For COPD Exacerbation - s/p IV Solumedrol, Pulmonary consulted - Transitioned to PO Prednisone with Taper Also with superficial   ulcerations, on  bony prominences  of hammer  toes  Important Medication Changes and Reason: - Lasix increased to 20mg PO 2 x day for Acue on chronic Diastolic HF - Prednisone taper for COPD exac Active or Pending Issues Requiring Follow-up: Follow up with PMD - Dr. Hermosillo in 1 week Advanced Directives:  [x] Full code  [ ] DNR  [ ] Hospice Discharge Diagnoses: #  Acute on Chronic Diastolic  HF #  COPD Exacerbation.  # Leukocytosis - resolved # MRSA Nares Medically cleared by Dr. Hermosillo and Dr. Gardiner to discharge pt on prednisone taper and on Lasix 20mg daily.  89 y/o female seen today for TCM post discharge visit. She is awake, alert and oriented x 3. Pleasant and cooperative, answers questions and is able to state her needs and complaints. Oxygen at 2L NC in use.  Pt denies any SOB/VARELA no chest pain. Pt is pressing behind left ear. There is small red area to back of top of both ears, non tender, skin is intact no dry patches noted. No obvious signs of irritation around her ear or scalp/nape of neck.. Most likely related to use of nasal canula. She denies any itching. She states appetite is ok, she eats 1/2 of her meals and sleeps ok. Last BM yesterday denies straining. Denies dysuria. Denies incontinence. Observed using the toilet w/assistance. Observed drinking beverage several times during visit, no dysphagia or choking note. She has no complaints at this time. Receives medication from [facility] staff and has been seen by facility NP since arriving home.  24/7 phone numbers to [facility]

## 2024-08-31 NOTE — HEALTH RISK ASSESSMENT
[Yes] : Yes [4 or more  times a week (4 pts)] : 4 or more  times a week (4 points) [1 or 2 (0 pts)] : 1 or 2 (0 points) [Never (0 pts)] : Never (0 points) [Retired] : retired [] :  [Some assistance needed] : feeding [Full assistance needed] : managing finances [# Of Children ___] : has [unfilled] children [Designated Healthcare Proxy] : Designated healthcare proxy [Name: ___] : Health Care Proxy's Name: [unfilled]  [Relationship: ___] : Relationship: [unfilled] [Aggressive treatment] : aggressive treatment [I will adhere to the patient's wishes.] : I will adhere to the patient's wishes. [Time Spent: ___ minutes] : Time Spent: [unfilled] minutes [Never] : Never [NO] : No [Audit-CScore] : 4 [UIQ2Wcrkc] : 0 [de-identified] : wheel chair and rollator [FreeTextEntry1] : No added salt, meals provided by [facility], pt reports she eats about half of her meals since returning home. [Reports changes in hearing] : Reports no changes in hearing [Reports changes in vision] : Reports no changes in vision [Reports normal functional visual acuity (ie: able to read med bottle)] : Reports poor functional visual acuity.  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

## 2024-08-31 NOTE — REVIEW OF SYSTEMS
[Vision Problems] : vision problems [Hearing Loss] : hearing loss [Itching] : Itching [Negative] : Psychiatric [Earache] : no earache [Nasal Discharge] : no nasal discharge [Sore Throat] : no sore throat [Memory Loss] : no memory loss [FreeTextEntry3] : blind in left eye [de-identified] : intermittent to chest

## 2024-08-31 NOTE — CURRENT MEDS
[Takes medication as prescribed] : takes [None] : Patient does not have any barriers to medication adherence [Yes] : Reviewed medication list for presence of high-risk medications. [Opioids] : opioids [Sedatives] : sedatives [FreeTextEntry1] : Medication given by [facility] staff

## 2024-08-31 NOTE — HISTORY OF PRESENT ILLNESS
[Post-hospitalization from ___ Hospital] : Post-hospitalization from [unfilled] Hospital [Admitted on: ___] : The patient was admitted on [unfilled] [Discharged on ___] : discharged on [unfilled] [Discharge Summary] : discharge summary [Discharge Med List] : discharge medication list [Med Reconciliation] : medication reconciliation has been completed [Patient Contacted By: ____] : and contacted by [unfilled] [FreeTextEntry2] : 89 year old female with pmh of COPD, Sarcoidosis, emphysema on home O2, chronic R hemidiaphragm paralysis, diastolic  heart failure, HTN, diverticulitis, C-diff, asthma/COPD, sarcoidosis, hypothyroidism, L  hydroureter,  Cdiff,  ulcer  left 2nd  toe    c/c   superficial   ulcerations, on  bony prominences  of hammer  toe presenting to the ER for  sob   that started this morning. denies f/n/v/d, CP, HA, dizziness, abdominal pain, urinary symptoms, denies  fevers/ cp/abd  pain seen in  er,  pt  has  mild  tachypnea  (21 Aug 2024 10:31) Hospital Course: Admitted  with SOB, Leukocytosis  wbc  of  12, ,000  on  arrival, afebrile.  Admitted with Acute on Chronic Diastolic  HF and  COPD Exacerbation. Treated with intravenous Lasix transitioned to PO. Pt to continue oxygen via NC at 2L NC (pt uses home O2 at 2L NC) Pt with pedal  edema, swollen right  leg doppler  leg,  no  dvt Also noted with elevated  d  dimer, CT  chest  angio was  negative  for PE  For COPD Exacerbation - s/p IV Solumedrol, Pulmonary consulted - Transitioned to PO Prednisone with Taper Also with superficial   ulcerations, on  bony prominences  of hammer  toes  Important Medication Changes and Reason: - Lasix increased to 20mg PO 2 x day for Acue on chronic Diastolic HF - Prednisone taper for COPD exac Active or Pending Issues Requiring Follow-up: Follow up with PMD - Dr. Hermosillo in 1 week Advanced Directives:  [x] Full code  [ ] DNR  [ ] Hospice Discharge Diagnoses: #  Acute on Chronic Diastolic  HF #  COPD Exacerbation.  # Leukocytosis - resolved # MRSA Nares Medically cleared by Dr. Hermosillo and Dr. Gardiner to discharge pt on prednisone taper and on Lasix 20mg daily.  89 y/o female seen today for TCM post discharge visit. She is awake, alert and oriented x 3. Pleasant and cooperative, answers questions and is able to state her needs and complaints. Oxygen at 2L NC in use.  Pt denies any SOB/VARELA no chest pain. Pt is pressing behind left ear. There is small red area to back of top of both ears, non tender, skin is intact no dry patches noted. No obvious signs of irritation around her ear or scalp/nape of neck.. Most likely related to use of nasal canula. She denies any itching. She states appetite is ok, she eats 1/2 of her meals and sleeps ok. Last BM yesterday denies straining. Denies dysuria. Denies incontinence. Observed using the toilet w/assistance. Observed drinking beverage several times during visit, no dysphagia or choking note. She has no complaints at this time. Receives medication from [facility] staff and has been seen by facility NP since arriving home.  24/7 phone numbers to [facility]

## 2024-08-31 NOTE — PHYSICAL EXAM
[No Acute Distress] : no acute distress [Well-Appearing] : well-appearing [Normal Voice/Communication] : normal voice/communication [No Respiratory Distress] : no respiratory distress  [Clear to Auscultation] : lungs were clear to auscultation bilaterally [No Accessory Muscle Use] : no accessory muscle use [Pedal Pulses Present] : the pedal pulses are present [No CVA Tenderness] : no CVA  tenderness [No Spinal Tenderness] : no spinal tenderness [Normal Affect] : the affect was normal [Alert and Oriented x3] : oriented to person, place, and time [de-identified] : frail, elderly female, well groomed, responsive to questions [de-identified] : blind in left eye [de-identified] : moist oral mucosa, [place] pt is constantly rubbing behind left ear, denies pain, there is a small area of redness behind both ears most likely related to nasal canula [de-identified] : BLE trace edema [de-identified] : oxygen dependent 2L NC

## 2024-08-31 NOTE — REVIEW OF SYSTEMS
[Vision Problems] : vision problems [Hearing Loss] : hearing loss [Itching] : Itching [Negative] : Psychiatric [Earache] : no earache [Sore Throat] : no sore throat [Nasal Discharge] : no nasal discharge [Memory Loss] : no memory loss [de-identified] : intermittent to chest  [FreeTextEntry3] : blind in left eye

## 2024-08-31 NOTE — CURRENT MEDS
[Takes medication as prescribed] : takes [None] : Patient does not have any barriers to medication adherence [Yes] : Reviewed medication list for presence of high-risk medications. [Opioids] : opioids [Sedatives] : sedatives [FreeTextEntry1] : [facility]

## 2024-08-31 NOTE — COUNSELING
[Fall prevention counseling provided] : Fall prevention counseling provided [No throw rugs] : No throw rugs [Adequate lighting] : Adequate lighting [Use proper foot wear] : Use proper foot wear [Use recommended devices] : Use recommended devices [FreeTextEntry1] : Wheel chair and rollator

## 2024-08-31 NOTE — HEALTH RISK ASSESSMENT
[Yes] : Yes [4 or more  times a week (4 pts)] : 4 or more  times a week (4 points) [1 or 2 (0 pts)] : 1 or 2 (0 points) [Never (0 pts)] : Never (0 points) [Retired] : retired [] :  [Some assistance needed] : feeding [Full assistance needed] : managing finances [# Of Children ___] : has [unfilled] children [Designated Healthcare Proxy] : Designated healthcare proxy [Name: ___] : Health Care Proxy's Name: [unfilled]  [Relationship: ___] : Relationship: [unfilled] [Aggressive treatment] : aggressive treatment [I will adhere to the patient's wishes.] : I will adhere to the patient's wishes. [Time Spent: ___ minutes] : Time Spent: [unfilled] minutes [Never] : Never [NO] : No [Audit-CScore] : 4 [[id number]] : 0 [de-identified] : wheel chair and rollator [FreeTextEntry1] : No added salt, meals provided by [facility], pt reports she eats about half of her meals since returning home. [Reports changes in hearing] : Reports no changes in hearing [Reports changes in vision] : Reports no changes in vision [Reports normal functional visual acuity (ie: able to read med bottle)] : Reports poor functional visual acuity.  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

## 2024-08-31 NOTE — COUNSELING
[Fall prevention counseling provided] : Fall prevention counseling provided [Adequate lighting] : Adequate lighting [No throw rugs] : No throw rugs [Use proper foot wear] : Use proper foot wear [Use recommended devices] : Use recommended devices [FreeTextEntry1] : Wheel chair and rollator

## 2024-08-31 NOTE — PLAN
[FreeTextEntry1] : follow up with PCP  follow up with Dr Gardiner follow up with PULM Dr Avila call placed to daughter Deborah to discuss today's visit. Advised her of patient scratching behind her ear. Family will visit when able next week. Advised [facility] staff to provide padding behind her ears to relieve pressure from NC.

## 2024-08-31 NOTE — PHYSICAL EXAM
[No Acute Distress] : no acute distress [Well-Appearing] : well-appearing [Normal Voice/Communication] : normal voice/communication [No Respiratory Distress] : no respiratory distress  [Clear to Auscultation] : lungs were clear to auscultation bilaterally [No Accessory Muscle Use] : no accessory muscle use [Pedal Pulses Present] : the pedal pulses are present [No CVA Tenderness] : no CVA  tenderness [No Spinal Tenderness] : no spinal tenderness [Normal Affect] : the affect was normal [Alert and Oriented x3] : oriented to person, place, and time [de-identified] : frail, elderly female, well groomed, responsive to questions [de-identified] : blind in left eye [de-identified] : moist oral mucosa, [place] pt is constantly rubbing behind left ear, denies pain, there is a small area of redness behind both ears most likely related to nasal canula [de-identified] : oxygen dependent 2L NC [de-identified] : BLE trace edema

## 2024-09-06 ENCOUNTER — TRANSCRIPTION ENCOUNTER (OUTPATIENT)
Age: 89
End: 2024-09-06

## 2024-09-13 ENCOUNTER — INPATIENT (INPATIENT)
Facility: HOSPITAL | Age: 89
LOS: 6 days | Discharge: NURSING, REHAB OR LONG-TERM CARE | DRG: 556 | End: 2024-09-20
Attending: FAMILY MEDICINE | Admitting: FAMILY MEDICINE
Payer: MEDICARE

## 2024-09-13 VITALS
DIASTOLIC BLOOD PRESSURE: 72 MMHG | OXYGEN SATURATION: 98 % | WEIGHT: 125 LBS | HEIGHT: 62 IN | TEMPERATURE: 98 F | RESPIRATION RATE: 18 BRPM | SYSTOLIC BLOOD PRESSURE: 121 MMHG | HEART RATE: 114 BPM

## 2024-09-13 LAB
ADD ON TEST-SPECIMEN IN LAB: SIGNIFICANT CHANGE UP
ALBUMIN SERPL ELPH-MCNC: 3.4 G/DL — SIGNIFICANT CHANGE UP (ref 3.3–5)
ALP SERPL-CCNC: 108 U/L — SIGNIFICANT CHANGE UP (ref 40–120)
ALT FLD-CCNC: 6 U/L — LOW (ref 10–45)
ANION GAP SERPL CALC-SCNC: 12 MMOL/L — SIGNIFICANT CHANGE UP (ref 5–17)
APTT BLD: 26.8 SEC — SIGNIFICANT CHANGE UP (ref 24.5–35.6)
AST SERPL-CCNC: 12 U/L — SIGNIFICANT CHANGE UP (ref 10–40)
BASOPHILS # BLD AUTO: 0.04 K/UL — SIGNIFICANT CHANGE UP (ref 0–0.2)
BASOPHILS NFR BLD AUTO: 0.5 % — SIGNIFICANT CHANGE UP (ref 0–2)
BILIRUB SERPL-MCNC: 0.3 MG/DL — SIGNIFICANT CHANGE UP (ref 0.2–1.2)
BUN SERPL-MCNC: 18 MG/DL — SIGNIFICANT CHANGE UP (ref 7–23)
CALCIUM SERPL-MCNC: 8.9 MG/DL — SIGNIFICANT CHANGE UP (ref 8.4–10.5)
CHLORIDE SERPL-SCNC: 102 MMOL/L — SIGNIFICANT CHANGE UP (ref 96–108)
CO2 SERPL-SCNC: 25 MMOL/L — SIGNIFICANT CHANGE UP (ref 22–31)
CREAT SERPL-MCNC: 0.96 MG/DL — SIGNIFICANT CHANGE UP (ref 0.5–1.3)
EGFR: 57 ML/MIN/1.73M2 — LOW
EOSINOPHIL # BLD AUTO: 0.22 K/UL — SIGNIFICANT CHANGE UP (ref 0–0.5)
EOSINOPHIL NFR BLD AUTO: 2.6 % — SIGNIFICANT CHANGE UP (ref 0–6)
GLUCOSE SERPL-MCNC: 133 MG/DL — HIGH (ref 70–99)
HCT VFR BLD CALC: 31.9 % — LOW (ref 34.5–45)
HGB BLD-MCNC: 9.7 G/DL — LOW (ref 11.5–15.5)
IMM GRANULOCYTES NFR BLD AUTO: 0.5 % — SIGNIFICANT CHANGE UP (ref 0–0.9)
INR BLD: 1.07 RATIO — SIGNIFICANT CHANGE UP (ref 0.85–1.18)
LYMPHOCYTES # BLD AUTO: 1.13 K/UL — SIGNIFICANT CHANGE UP (ref 1–3.3)
LYMPHOCYTES # BLD AUTO: 13.3 % — SIGNIFICANT CHANGE UP (ref 13–44)
MCHC RBC-ENTMCNC: 28.2 PG — SIGNIFICANT CHANGE UP (ref 27–34)
MCHC RBC-ENTMCNC: 30.4 GM/DL — LOW (ref 32–36)
MCV RBC AUTO: 92.7 FL — SIGNIFICANT CHANGE UP (ref 80–100)
MONOCYTES # BLD AUTO: 0.51 K/UL — SIGNIFICANT CHANGE UP (ref 0–0.9)
MONOCYTES NFR BLD AUTO: 6 % — SIGNIFICANT CHANGE UP (ref 2–14)
NEUTROPHILS # BLD AUTO: 6.55 K/UL — SIGNIFICANT CHANGE UP (ref 1.8–7.4)
NEUTROPHILS NFR BLD AUTO: 77.1 % — HIGH (ref 43–77)
NRBC # BLD: 0 /100 WBCS — SIGNIFICANT CHANGE UP (ref 0–0)
NT-PROBNP SERPL-SCNC: 2314 PG/ML — HIGH (ref 0–300)
PLATELET # BLD AUTO: 173 K/UL — SIGNIFICANT CHANGE UP (ref 150–400)
POTASSIUM SERPL-MCNC: 4.1 MMOL/L — SIGNIFICANT CHANGE UP (ref 3.5–5.3)
POTASSIUM SERPL-SCNC: 4.1 MMOL/L — SIGNIFICANT CHANGE UP (ref 3.5–5.3)
PROT SERPL-MCNC: 6.3 G/DL — SIGNIFICANT CHANGE UP (ref 6–8.3)
PROTHROM AB SERPL-ACNC: 11.8 SEC — SIGNIFICANT CHANGE UP (ref 9.5–13)
RBC # BLD: 3.44 M/UL — LOW (ref 3.8–5.2)
RBC # FLD: 15.8 % — HIGH (ref 10.3–14.5)
SODIUM SERPL-SCNC: 139 MMOL/L — SIGNIFICANT CHANGE UP (ref 135–145)
TROPONIN T, HIGH SENSITIVITY RESULT: 28 NG/L — SIGNIFICANT CHANGE UP (ref 0–51)
WBC # BLD: 8.49 K/UL — SIGNIFICANT CHANGE UP (ref 3.8–10.5)
WBC # FLD AUTO: 8.49 K/UL — SIGNIFICANT CHANGE UP (ref 3.8–10.5)

## 2024-09-13 PROCEDURE — 99285 EMERGENCY DEPT VISIT HI MDM: CPT | Mod: GC

## 2024-09-13 PROCEDURE — 71045 X-RAY EXAM CHEST 1 VIEW: CPT | Mod: 26

## 2024-09-13 PROCEDURE — 93971 EXTREMITY STUDY: CPT | Mod: 26,RT

## 2024-09-13 PROCEDURE — 73590 X-RAY EXAM OF LOWER LEG: CPT | Mod: 26,RT

## 2024-09-13 RX ORDER — CEFAZOLIN SODIUM 2 G/100ML
1000 INJECTION, SOLUTION INTRAVENOUS ONCE
Refills: 0 | Status: COMPLETED | OUTPATIENT
Start: 2024-09-13 | End: 2024-09-13

## 2024-09-13 RX ADMIN — CEFAZOLIN SODIUM 100 MILLIGRAM(S): 2 INJECTION, SOLUTION INTRAVENOUS at 21:09

## 2024-09-13 NOTE — ED PROVIDER NOTE - NS_BEDUNITTYPES_ED_ALL_ED
67 y/o male with PMHx of CAD, s/p stents in 2007, HTN, HLD presented to [facility] today with c/o chest pressure and had coronary angiogram done today ( 10/27) Patient states he has on and off chest pain for over a month with exertion which relives at rest. Patient was referred for angiogram  and was done by Dr. Martinez,  which reveals  90% stenosis in LAD.  Patient is transferred to [facility] for PCI to LAD. Received ,Plavix 600mg, Heparin 3000 IV.
MEDICINE

## 2024-09-13 NOTE — ED PROVIDER NOTE - PATIENT PORTAL LINK FT
You can access the FollowMyHealth Patient Portal offered by [facility] by registering at the following website: [email or web address]. By joining [facility]’s FollowMyHealth portal, you will also be able to view your health information using other applications (apps) compatible with our system.

## 2024-09-13 NOTE — ED PROVIDER NOTE - NSFOLLOWUPINSTRUCTIONS_ED_ALL_ED_FT
You were seen in the Emergency Department for a swollen right leg that is consistent with cellulitis, a superficial skin infection. You were given the first dose of antibiotics and the rest of the course was sent to your pharmacy [facility]. You have no blood clot on ultrasound and your blood work is nonactionable. You are clear for discharge.    1) Continue all previously prescribed medications as directed.    2) Follow up with your primary care physician - take copies of your results.    3) Return to the Emergency Department for worsening or persistent symptoms, and/or ANY NEW OR CONCERNING SYMPTOMS.

## 2024-09-13 NOTE — ED PROVIDER NOTE - CLINICAL SUMMARY MEDICAL DECISION MAKING FREE TEXT BOX
89-year-old female with a warm swollen right  leg no history of trauma , symptoms on and off for 2 weeks , concern for DVT versus cellulitis , will obtain blood work x-rays ultrasound and reassess  ZR

## 2024-09-13 NOTE — ED PROVIDER NOTE - OBJECTIVE STATEMENT
89-year-old female with a history of hypertension, hyperlipidemia, CAD COPD   sent from [facility], assisted living, with the swelling culture right leg on and off for 2 weeks, she states pain is increasing working no trauma but legs feels warm to touch, no fever no chills

## 2024-09-13 NOTE — ED ADULT NURSE NOTE - PHONE #
Anesthesia Post Evaluation    Patient: Melo Fernandez    Brittny Score: No data recorded      
[phone]

## 2024-09-13 NOTE — ED PROVIDER NOTE - PROGRESS NOTE DETAILS
Jazmyn PGY3: Pt was reassessed and is doing well. Results, including any incidental findings, were discussed. Follow up and return precautions were discussed. Patient verbalized understanding Jazmyn PGY3: Patient reassessed and noted to be hypoxic to 84%. Placed on nasal cannula which corrected. Will admit for IV antibiotics and new oxygen requirement Jazmyn PGY3: Unable to reach Dr. Hermosillo after multiple phone calls over the past hour. Patient's D-Dimer elevated, when age adjusted, should be 440 currently 472. Patient low risk given negative LE duplex, can be premedicated for CTA as an inpatient if inpatient team deems appropriate. Tank Sampson MD PGY3: Patient was signed out to me from previous provider at signout. Discussed with dr hermosillo, then discussed with dr eli fowler as instructed by dr hermosillo. Dr. Fowler says admit patient under Dr. Hermosillo but she will care for pt during day until he takes over at night.

## 2024-09-13 NOTE — ED CLERICAL - NS ED CLERK NOTE PRE-ARRIVAL INFORMATION; ADDITIONAL PRE-ARRIVAL INFORMATION
This patient is enrolled in the Heart Failure STARS readmission reduction initiative and has active care navigation. This patient can be followed up by the care navigation team within 24 hours.  To arrange close follow-up or to obtain additional clinical information, please call the contact number above.   For patients followed by the NS cardiology heart failure team, please call the on call cardiomyopathy attending ([phone]) for ALL PATIENTS PRIOR to decision for admission or observation.   Consider CDU for management of CHF exacerbations per guidelines.

## 2024-09-13 NOTE — ED PROVIDER NOTE - ADMIT DISPOSITION PRESENT ON ADMISSION SEPSIS
Report received from TRENTON Todd.  Patient on NC 3LPM.  History of COPD, currently resting comfortably in stretcher.  Will continue to monitor. Yes

## 2024-09-13 NOTE — ED ADULT NURSE NOTE - OBJECTIVE STATEMENT
89yy female hypothyroidism, htn presents with right leg swelling. Pt BIBEMS who states pt states the swelling was going on for two weeks, it got better, but then returned and is now red and swollen again. Pt right leg is red swollen warm and tender to touch. Pt denies fever, chills, chest pain, shortness of breath.

## 2024-09-13 NOTE — ED PROVIDER NOTE - NS ED ATTENDING STATEMENT MOD
Airway patent, Nasal mucosa clear. Mouth with normal mucosa. Throat has no vesicles, no oropharyngeal exudates and uvula is midline.
Attending with

## 2024-09-14 DIAGNOSIS — M79.89 OTHER SPECIFIED SOFT TISSUE DISORDERS: ICD-10-CM

## 2024-09-14 LAB
D DIMER BLD IA.RAPID-MCNC: 472 NG/ML DDU — HIGH
ERYTHROCYTE [SEDIMENTATION RATE] IN BLOOD: 47 MM/HR — HIGH (ref 0–20)
FLUAV AG NPH QL: SIGNIFICANT CHANGE UP
FLUBV AG NPH QL: SIGNIFICANT CHANGE UP
RSV RNA NPH QL NAA+NON-PROBE: SIGNIFICANT CHANGE UP
SARS-COV-2 RNA SPEC QL NAA+PROBE: SIGNIFICANT CHANGE UP

## 2024-09-14 PROCEDURE — 99223 1ST HOSP IP/OBS HIGH 75: CPT | Mod: GC

## 2024-09-14 RX ORDER — BUDESONIDE 3 MG/1
0.5 CAPSULE ORAL
Refills: 0 | Status: DISCONTINUED | OUTPATIENT
Start: 2024-09-14 | End: 2024-09-20

## 2024-09-14 RX ORDER — BUDESONIDE 3 MG/1
2 CAPSULE ORAL
Refills: 0 | DISCHARGE

## 2024-09-14 RX ORDER — CYCLOPENTOLATE HCL 1 %
1 DROPS OPHTHALMIC (EYE)
Refills: 0 | Status: DISCONTINUED | OUTPATIENT
Start: 2024-09-14 | End: 2024-09-20

## 2024-09-14 RX ORDER — IPRATROPIUM BROMIDE AND ALBUTEROL SULFATE .5; 3 MG/3ML; MG/3ML
3 SOLUTION RESPIRATORY (INHALATION)
Refills: 0 | DISCHARGE

## 2024-09-14 RX ORDER — FLUOXETINE HCL 20 MG/5 ML
20 SOLUTION, ORAL ORAL DAILY
Refills: 0 | Status: DISCONTINUED | OUTPATIENT
Start: 2024-09-14 | End: 2024-09-15

## 2024-09-14 RX ORDER — METOPROLOL TARTRATE 100 MG/1
50 TABLET ORAL DAILY
Refills: 0 | Status: DISCONTINUED | OUTPATIENT
Start: 2024-09-14 | End: 2024-09-20

## 2024-09-14 RX ORDER — ACETAMINOPHEN 325 MG/1
650 TABLET ORAL EVERY 6 HOURS
Refills: 0 | Status: DISCONTINUED | OUTPATIENT
Start: 2024-09-14 | End: 2024-09-20

## 2024-09-14 RX ORDER — POLYETHYLENE GLYCOL 3350 17 G/17G
17 POWDER, FOR SOLUTION ORAL
Refills: 0 | DISCHARGE

## 2024-09-14 RX ORDER — ACETAMINOPHEN 325 MG/1
2 TABLET ORAL
Refills: 0 | DISCHARGE

## 2024-09-14 RX ORDER — TEMAZEPAM 30 MG/1
15 CAPSULE ORAL AT BEDTIME
Refills: 0 | Status: DISCONTINUED | OUTPATIENT
Start: 2024-09-14 | End: 2024-09-20

## 2024-09-14 RX ORDER — CEFAZOLIN SODIUM 2 G/100ML
1000 INJECTION, SOLUTION INTRAVENOUS EVERY 8 HOURS
Refills: 0 | Status: DISCONTINUED | OUTPATIENT
Start: 2024-09-14 | End: 2024-09-17

## 2024-09-14 RX ORDER — CEPHALEXIN 500 MG
1 CAPSULE ORAL
Qty: 21 | Refills: 0
Start: 2024-09-14 | End: 2024-09-20

## 2024-09-14 RX ORDER — FUROSEMIDE 40 MG
20 TABLET ORAL DAILY
Refills: 0 | Status: DISCONTINUED | OUTPATIENT
Start: 2024-09-14 | End: 2024-09-17

## 2024-09-14 RX ORDER — HEPARIN SODIUM,BOVINE 1000/ML
5000 VIAL (ML) INJECTION EVERY 12 HOURS
Refills: 0 | Status: DISCONTINUED | OUTPATIENT
Start: 2024-09-14 | End: 2024-09-20

## 2024-09-14 RX ORDER — CYCLOPENTOLATE HCL 1 %
1 DROPS OPHTHALMIC (EYE)
Refills: 0 | DISCHARGE

## 2024-09-14 RX ORDER — FLUTICASONE PROPIONATE AND SALMETEROL 250; 50 UG/1; UG/1
1 POWDER RESPIRATORY (INHALATION)
Refills: 0 | DISCHARGE

## 2024-09-14 RX ADMIN — METOPROLOL TARTRATE 50 MILLIGRAM(S): 100 TABLET ORAL at 10:30

## 2024-09-14 RX ADMIN — ACETAMINOPHEN 650 MILLIGRAM(S): 325 TABLET ORAL at 11:03

## 2024-09-14 RX ADMIN — TEMAZEPAM 15 MILLIGRAM(S): 30 CAPSULE ORAL at 23:11

## 2024-09-14 RX ADMIN — CEFAZOLIN SODIUM 100 MILLIGRAM(S): 2 INJECTION, SOLUTION INTRAVENOUS at 15:40

## 2024-09-14 RX ADMIN — CEFAZOLIN SODIUM 100 MILLIGRAM(S): 2 INJECTION, SOLUTION INTRAVENOUS at 23:11

## 2024-09-14 RX ADMIN — Medication 20 MILLIGRAM(S): at 23:11

## 2024-09-14 RX ADMIN — ACETAMINOPHEN 650 MILLIGRAM(S): 325 TABLET ORAL at 23:16

## 2024-09-14 RX ADMIN — Medication 20 MILLIGRAM(S): at 10:30

## 2024-09-14 NOTE — CONSULT NOTE ADULT - TIME BILLING
Review of medical records, labs, imaging, coordination of care and di not include time spent teaching.

## 2024-09-14 NOTE — H&P ADULT - HISTORY OF PRESENT ILLNESS
89-year-old female           with a history of hypertension, hyperlipidemia, CAD COPD             sent from [facility], assisted living, with    swelling   right leg on and off for 2 weeks      she states pain is increasing       no trauma .  denies   fever no chills

## 2024-09-14 NOTE — ED ADULT NURSE REASSESSMENT NOTE - NS ED NURSE REASSESS COMMENT FT1
Received report from previous shift RN. Patient A&Ox2, resting in bed on 3L NC with no acute distress noted. Patient denies current CP, SOB. Patient provided with meal and updated on plan of care. Resting in bed with no acute distress noted. Report given to shari QUINTANILLA. Patient transferred to shari

## 2024-09-14 NOTE — H&P ADULT - NSHPLABSRESULTS_GEN_ALL_CORE
LABS:                        9.7    8.49  )-----------( 173      ( 13 Sep 2024 21:00 )             31.9     09-13    139  |  102  |  18  ----------------------------<  133<H>  4.1   |  25  |  0.96    Ca    8.9      13 Sep 2024 21:00    TPro  6.3  /  Alb  3.4  /  TBili  0.3  /  DBili  x   /  AST  12  /  ALT  6<L>  /  AlkPhos  108  09-13    PT/INR - ( 13 Sep 2024 21:00 )   PT: 11.8 sec;   INR: 1.07 ratio         PTT - ( 13 Sep 2024 21:00 )  PTT:26.8 sec      Urinalysis Basic - ( 13 Sep 2024 21:00 )    Color: x / Appearance: x / SG: x / pH: x  Gluc: 133 mg/dL / Ketone: x  / Bili: x / Urobili: x   Blood: x / Protein: x / Nitrite: x   Leuk Esterase: x / RBC: x / WBC x   Sq Epi: x / Non Sq Epi: x / Bacteria: x

## 2024-09-14 NOTE — CONSULT NOTE ADULT - ATTENDING COMMENTS
88 yo F w/ PMHx hypertension, hyperlipidemia, CAD, sarcoidosis (no active disease, not on tx), HFpEF, COPD home 2L and chronic R hemidiaphragm paralysis sent from [facility], assisted living, with RLE cellulitis. Pulmonary consulted for patient requiring 3L rather than home 2L NC and elevated D-dimer. Pt w known elevated D Dimer (~800 -> ~600 -> 472) when checked over last 2 months and is actually trending down. Had prior V/Q scan of intermediate probability PE back in 07/2024.  Now signs/symptoms suggestive of PE at this time.  Would not pursue V/Q scan or CTA given prior testing results and clinically only complaint is leg cellulitis/infection and LE dopplers are negative. RVP quad negative. Found on 3L with O2Sa 100%, weaned to home 2L w/ O2Sat 98-99%. Appears to be well controlled on the Pulmicort neb she takes twice daily.  Does not like, nor remember to take the inhaler.  Would just continue with Pulmicort 0.5mg neb BID.    Pulmonary will sign off, reconsult with additional questions.    Katie Garay MD, MSCR

## 2024-09-14 NOTE — ED ADULT NURSE REASSESSMENT NOTE - NS ED NURSE REASSESS COMMENT FT1
Patient returned from nuclear medicine. Per tech, patient screaming and not laying down for exam so unable to complete. Report given to Ronna QUINTANILLA in [facility]ari, aware of  order.

## 2024-09-14 NOTE — PATIENT PROFILE ADULT - NSPRESCRUSEDDRG_GEN_A_NUR
Please notify patient that her cholesterol is a little bit elevated, and this has been consistently elevated for the past 4 years.  Due to her side effects from statin type cholesterol medication, she should discuss other cholesterol treatment options with Dr. Sanchez when she sees him.  Her kidney and liver function are completely normal and there is no sign of diabetes No

## 2024-09-14 NOTE — ED ADULT NURSE REASSESSMENT NOTE - NS ED NURSE REASSESS COMMENT FT1
Received report from previous shift RN. Patient A&Ox2, resting in bed on 3L NC with no acute distress noted. Patient denies current CP, SOB. Patient provided with meal and updated on plan of care. Resting in bed with no acute distress noted.

## 2024-09-14 NOTE — PATIENT PROFILE ADULT - FALL HARM RISK - CONCLUSION
[address] [place] [address] [facility]  Consultation Note     Admit Date: 10/15/2020 12:28 PM    Reason for Consult: Follow-up M avium complex; chest pressure    Attending Physician:  Kory Singleton DO    HISTORY OF PRESENT ILLNESS:             The history is obtained from extensive review of available past medical records. The patient is a 77 y.o. male who is known to the ID service. 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 He has been treated with IV Amikacin for approximately 6 weeks. He has also been on Clarithromycin, Ethambutol and Rifampin since August 2019. The patient presents to the ED on 10/15/2020 with chest discomfort, rhinorrhea, sore throat and a low-grade fever. An NP swab showed Rhinovirus/Enterovirus. He denies any sick contacts and has been basically quarantined with his wife. Antimycobacterial agents have been started. ID was asked to see him in consultation.     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 ([address] 08/15/2019    First seen by ID; treatment started 9/4/2019    Glucose intolerance 6/10/2019    Hilar adenopathy 6/10/2019    Hypophosphatemia 6/10/2019    Infection due to parainfluenza virus 3 6/10/2019    Left upper lobe pneumonia 10/12/2017    Pleural effusion on right 10/3/2019    Pulmonary emphysema with fibrosis of lung ([address] 11/15/2018    Pulmonary Mycobacterium avium complex (MAC) infection ([address] 07/12/2019    BAL results finally completed this date    Right lower lobe pneumonia 11/15/2018 Recurrent 4/23/19    S/P lumbar fusion 6/1/2019    Thoracic ascending aortic aneurysm ([facility] 11/15/2018    Proximal ascending aorta; 3.8 cm; 11/15/18     October 2019. Admitted to [facility] with fevers, chills, nausea and diarrhea. He had a productive cough. Seen by Dr. Reagan Cano. Amikacin was discontinued. Underwent a bronchoscopy and was discharged. Seen in follow-up in the office and was tolerating Ethambutol, Rifampin and Clarithromycin 3 times a week. June 2019. Readmitted to [facility] with cough, malaise and a fever of 101.1 °F.  Tested positive for parainfluenza 3 virus. Treated with Doxycycline. 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 Seen in follow-up on 7/12/2019. Respiratory cultures have turned positive for Mycobacterium avium complex and Penicillium. We requested sensitivities for MAC. The organism was sensitive to Linezolid, Clarithromycin, Moxifloxacin and Amikacin. Ethambutol and Rifampin were not tested because of \"inability of susceptibility test to predict outcome\". We started With albuterol, Rifampin, Clarithromycin and IV Amikacin. Seen in follow-up on 10/10/2020    May 2019. Admitted to [facility] with shortness of breath, with fever of 101 °F and chills. Treated with Vancomycin and Cefepime for a left upper lobe pneumonia. Seen by ID. He was also noted to have a necrotic wound in the lumbar spine from a previous surgery at [facility].  This was colonized with MSSA but did not require treatment other than enzymatic debridement. He was discharged on Levofloxacin.     Past Surgical History:        Procedure Laterality Date    ABSCESS DRAINAGE  2006    X2 RECTAL ABSCESS    BRONCHOSCOPY N/A 6/12/2019    BRONCHOSCOPY BRUSHINGS performed by Kirstin Bach MD at [facility] N/A 10/7/2019    BRONCHOSCOPY DIAGNOSTIC OR CELL [address] ONLY performed by Kirstin Bach MD at [address] [facility] COLONOSCOPY  2013    LUMBAR FUSION  2019    [facility]     Current Medications:   Scheduled Meds:   clarithromycin  1,000 mg Oral Once per day on     clotrimazole-betamethasone   Topical BID    ethambutol  800 mg Oral Once per day on     rifAMPin  600 mg Oral Once per day on     gabapentin  600 mg Oral Nightly    gabapentin  300 mg Oral BID    ibuprofen  200 mg Oral TID WC    Arformoterol Tartrate  15 mcg Nebulization BID    budesonide  250 mcg Nebulization BID    ipratropium-albuterol  1 ampule Inhalation Q4H WA    enoxaparin  40 mg Subcutaneous Daily    pantoprazole  40 mg Oral QAM AC     Continuous Infusions:   sodium chloride 100 mL/hr at 10/16/20 0809     PRN Meds:regadenoson, traMADol, acetaminophen, oxyCODONE-acetaminophen **AND** oxyCODONE    Allergies:  Patient has no known allergies.     Social History:   Social History     Socioeconomic History    Marital status:      Spouse name: None    Number of children: None    Years of education: None    Highest education level: None   Occupational History    None   Social Needs    Financial resource strain: None    Food insecurity     Worry: None     Inability: None    Transportation needs     Medical: None     Non-medical: None   Tobacco Use    Smoking status: Former Smoker     Packs/day: 2.00     Years: 46.00     Pack years: 92.00     Types: Cigarettes     Start date: 10/12/1968     Last attempt to quit: 10/12/2014     Years since quittin.0    Smokeless tobacco: Never Used   Substance and Sexual Activity    Alcohol use: No    Drug use: No    Sexual activity: Not Currently     Partners: Female   Lifestyle    Physical activity     Days per week: None     Minutes per session: None    Stress: None   Relationships    Social connections     Talks on phone: None     Gets together: None     Attends Jain service: None     Active member of club or organization: None     Attends meetings of clubs or organizations: None     Relationship status: None    Intimate partner violence     Fear of current or ex partner: None     Emotionally abused: None     Physically abused: None     Forced sexual activity: None   Other Topics Concern    None   Social History Narrative    None      Pets: 2 dogs and a parrot  Travel: None  Patient works as a     Family History:       Problem Relation Age of Onset    Other Mother          age 80    Other Father          age 80   . Otherwise non-pertinent to the chief complaint. REVIEW OF SYSTEMS:    Constitutional: Negative for fevers, chills, diaphoresis  Neurologic: Negative   Psychiatric: Negative  Rheumatologic: Negative   Endocrine: Negative  Hematologic: Negative  Immunologic: Negative  ENT: Negative  Respiratory: As in the HPI  Cardiovascular: As in the HPI  GI: Reports some loose stool  : Negative  Musculoskeletal: Negative  Skin: No rashes. PHYSICAL EXAM:    Vitals:   /70   Pulse 70   Temp 99.3 °F (37.4 °C) (Oral)   Resp 18   Ht 6' (1.829 m)   Wt 192 lb 8 oz (87.3 kg)   SpO2 90%   BMI 26.11 kg/m²   Constitutional: The patient is awake, alert, and oriented. No distress. Skin: Warm and dry. No rashes were noted. HEENT: Eyes show round, and reactive pupils. No jaundice. Moist mucous membranes, no ulcerations, no thrush. Neck: Supple to movements. No lymphadenopathy. Chest: No use of accessory muscles to breathe. Symmetrical expansion. Auscultation reveals no wheezing, crackles, or rhonchi. Cardiovascular: S1 and S2 are rhythmic and regular. No murmurs appreciated. Abdomen: Positive bowel sounds to auscultation. Benign to palpation. No masses felt. No hepatosplenomegaly. Extremities: No clubbing, no cyanosis, no edema.   Lines: peripheral      CBC+dif:  Recent Labs     10/15/20  1315 10/16/20  0615   WBC 4.1* 5.8   HGB 17.3* 17.1*   HCT 53.3 51.8   MCV 91.1 89.8    144   NEUTROABS 2.84 4.43     Lab Results   Component Value Date    CRP 1.8 (H) 10/16/2020    CRP 0.3 09/14/2020    CRP 0.3 06/16/2020      No results found for: CRPHS  Lab Results   Component Value Date    SEDRATE 0 10/16/2020    SEDRATE 0 09/14/2020    SEDRATE 1 06/16/2020     Lab Results   Component Value Date    ALT 7 10/16/2020    AST 15 10/16/2020    ALKPHOS 63 10/16/2020    BILITOT 0.3 10/16/2020     Lab Results   Component Value Date     10/16/2020    K 4.0 10/16/2020    K 3.8 10/02/2019     10/16/2020    CO2 23 10/16/2020    BUN 11 10/16/2020    CREATININE 0.7 10/16/2020    GFRAA >60 10/16/2020    LABGLOM >60 10/16/2020    GLUCOSE 87 10/16/2020    PROT 6.2 10/16/2020    LABALBU 3.5 10/16/2020    CALCIUM 9.1 10/16/2020    BILITOT 0.3 10/16/2020    ALKPHOS 63 10/16/2020    AST 15 10/16/2020    ALT 7 10/16/2020       Lab Results   Component Value Date    PROTIME 12.5 05/30/2019    INR 1.1 05/30/2019       Lab Results   Component Value Date    TSH 1.290 10/16/2020       Lab Results   Component Value Date    COLORU Yellow 10/02/2019    PHUR 5.0 10/02/2019    LABCAST FEW 10/02/2019    WBCUA 0-1 10/02/2019    RBCUA NONE 10/02/2019    MUCUS Present 04/22/2019    BACTERIA NONE 10/02/2019    CLARITYU Clear 10/02/2019    SPECGRAV >=1.030 10/02/2019    LEUKOCYTESUR Negative 10/02/2019    UROBILINOGEN 0.2 10/02/2019    BILIRUBINUR SMALL 10/02/2019    BLOODU Negative 10/02/2019    GLUCOSEU Negative 10/02/2019       Lab Results   Component Value Date    HCO3 21.3 05/28/2019    BE -1.8 05/28/2019    O2SAT 91.6 05/28/2019    PH 7.440 05/28/2019    PCO2 32.1 05/28/2019    PO2 59.1 05/28/2019     Radiology:  CT 10/15/2020, compared to October 2019:    Microbiology:  Pending  No results for input(s): BC in the last 72 hours. No results for input(s): ORG in the last 72 hours. No results for input(s): Shereen Vences in the last 72 hours. No results for input(s): STREPNEUMAGU in the last 72 hours.   No results for input(s): LP1UAG in the last 72 Fall with Harm Risk

## 2024-09-14 NOTE — H&P ADULT - NSHPPHYSICALEXAM_GEN_ALL_CORE
T(C): 36.7 (09-14-24 @ 07:42), Max: 36.7 (09-14-24 @ 03:45)  HR: 73 (09-14-24 @ 07:42) (73 - 114)  BP: 123/73 (09-14-24 @ 07:42) (121/72 - 132/68)  RR: 20 (09-14-24 @ 07:42) (18 - 20)  SpO2: 98% (09-14-24 @ 07:42) (98% - 100%)  GENERAL: NAD, lying in bed comfortably  HEAD:  Atraumatic, normocephalic  EYES: EOMI, PERRLA, conjunctiva and sclera clear  NECK: Supple, trachea midline, no JVD  HEART: Regular rate and rhythm, no murmurs, rubs, or gallops  LUNGS: Unlabored respirations.  Clear to auscultation bilaterally, no crackles, wheezing, or rhonchi  ABDOMEN: Soft, nontender, nondistended, +BS  EXTREMITIES: 2+ peripheral pulses bilaterally. No clubbing, cyanosis,       =.  redness,  edema  NERVOUS SYSTEM:  A&Ox3, moving all extremities, no focal deficits   SKIN: No rashes or lesions

## 2024-09-14 NOTE — PATIENT PROFILE ADULT - FALL HARM RISK - HARM RISK INTERVENTIONS

## 2024-09-14 NOTE — H&P ADULT - ASSESSMENT
89 year old woman,      h/o  emphysema, ,  c/c  L  hydroureter,  HTN.  depression   prior    h/o   C. difficile, diverticulitis,   pna      sarcoidosis,   c/c  diastolic  heart failyre,  eft 2nd  toe    c/c   superficial   ulcerations, on  bony prominences  of hammer  toe    admitted  with     swelling  leg.  cellulitis        on iv ancef      has   c/c  swelling  right  leg     c/c  diastolic chf     h/o  c/c  elevated  d  dimer,   and  on  arrival,  d  dimer  is  less  today,   and  ct  chest  angio  7/24,  no pe,   seen by  house  pulm then     COPD. h/o  underlying c/c  lung disease./  Emphysema        c/c   superficial   ulcerations, on  bony prominences  of hammer  toes,     HTN/  HLD    c/c  diastolic  chf, on lasix/ known  to    card     Anxiety,   c/c  anemia     dvt  ppx /  hous e pulm to  f/p,  known to  pt   dr zamora  will  assume care            rad< from: [employer] W or WO Ultrasound Enhancing Agent (04.08.24 @ 14:17) >  CONCLUSIONS:   1. Left ventricular cavity is normal in size. Left ventricular wall thickness is normal. Left ventricular systolic function is normal with an ejection fraction visually estimated at 55 to 60 %. There are no regional wall motion abnormalities seen.   2. Normal rightventricular cavity size, with normal wall thickness, and normal systolic function. Tricuspid annular plane systolic excursion (TAPSE) is 2.0 cm (normal >=1.7 cm).   3. The left atrium is severely dilated.   4. Mild mitral regurgitation.   5. Comparedto the transthoracic echocardiogram performed on 3/26/2024, regional wall motion abnormality not appreciated.  ________________________________________________________________________________________  < end of copied text >    from: CT Angio Chest PE Protocol w/ IV Cont (07.09.24 @ 17:28) >  IMPRESSION:  No pulmonary embolism through the segmental pulmonary arteries   bilaterally.  Unchanged atelectasis of the right middle andlower lobes, likely due to   a combination of compressive atelectasis from adjacent elevated   diaphragm, and post obstructive atelectasis from mucous impacted bronchi.  --- End of Report ---

## 2024-09-15 LAB
ANION GAP SERPL CALC-SCNC: 11 MMOL/L — SIGNIFICANT CHANGE UP (ref 5–17)
BUN SERPL-MCNC: 16 MG/DL — SIGNIFICANT CHANGE UP (ref 7–23)
CALCIUM SERPL-MCNC: 8.5 MG/DL — SIGNIFICANT CHANGE UP (ref 8.4–10.5)
CHLORIDE SERPL-SCNC: 104 MMOL/L — SIGNIFICANT CHANGE UP (ref 96–108)
CO2 SERPL-SCNC: 24 MMOL/L — SIGNIFICANT CHANGE UP (ref 22–31)
CREAT SERPL-MCNC: 0.83 MG/DL — SIGNIFICANT CHANGE UP (ref 0.5–1.3)
EGFR: 68 ML/MIN/1.73M2 — SIGNIFICANT CHANGE UP
GLUCOSE SERPL-MCNC: 84 MG/DL — SIGNIFICANT CHANGE UP (ref 70–99)
HCT VFR BLD CALC: 28.3 % — LOW (ref 34.5–45)
HGB BLD-MCNC: 8.7 G/DL — LOW (ref 11.5–15.5)
MCHC RBC-ENTMCNC: 27.5 PG — SIGNIFICANT CHANGE UP (ref 27–34)
MCHC RBC-ENTMCNC: 30.7 GM/DL — LOW (ref 32–36)
MCV RBC AUTO: 89.6 FL — SIGNIFICANT CHANGE UP (ref 80–100)
NRBC # BLD: 0 /100 WBCS — SIGNIFICANT CHANGE UP (ref 0–0)
PLATELET # BLD AUTO: 161 K/UL — SIGNIFICANT CHANGE UP (ref 150–400)
POTASSIUM SERPL-MCNC: 4.1 MMOL/L — SIGNIFICANT CHANGE UP (ref 3.5–5.3)
POTASSIUM SERPL-SCNC: 4.1 MMOL/L — SIGNIFICANT CHANGE UP (ref 3.5–5.3)
RBC # BLD: 3.16 M/UL — LOW (ref 3.8–5.2)
RBC # FLD: 15.7 % — HIGH (ref 10.3–14.5)
SODIUM SERPL-SCNC: 139 MMOL/L — SIGNIFICANT CHANGE UP (ref 135–145)
WBC # BLD: 5.12 K/UL — SIGNIFICANT CHANGE UP (ref 3.8–10.5)
WBC # FLD AUTO: 5.12 K/UL — SIGNIFICANT CHANGE UP (ref 3.8–10.5)

## 2024-09-15 RX ORDER — FLUOXETINE HCL 20 MG/5 ML
30 SOLUTION, ORAL ORAL DAILY
Refills: 0 | Status: DISCONTINUED | OUTPATIENT
Start: 2024-09-16 | End: 2024-09-20

## 2024-09-15 RX ORDER — HALOPERIDOL 1 MG
0.5 TABLET ORAL EVERY 12 HOURS
Refills: 0 | Status: DISCONTINUED | OUTPATIENT
Start: 2024-09-15 | End: 2024-09-20

## 2024-09-15 RX ORDER — TRAZODONE HCL 50 MG
50 TABLET ORAL AT BEDTIME
Refills: 0 | Status: DISCONTINUED | OUTPATIENT
Start: 2024-09-15 | End: 2024-09-16

## 2024-09-15 RX ADMIN — Medication 20 MILLIGRAM(S): at 06:02

## 2024-09-15 RX ADMIN — ACETAMINOPHEN 650 MILLIGRAM(S): 325 TABLET ORAL at 15:30

## 2024-09-15 RX ADMIN — Medication 20 MILLIGRAM(S): at 21:17

## 2024-09-15 RX ADMIN — METOPROLOL TARTRATE 50 MILLIGRAM(S): 100 TABLET ORAL at 05:57

## 2024-09-15 RX ADMIN — CEFAZOLIN SODIUM 100 MILLIGRAM(S): 2 INJECTION, SOLUTION INTRAVENOUS at 06:04

## 2024-09-15 RX ADMIN — Medication 50 MILLIGRAM(S): at 21:18

## 2024-09-15 RX ADMIN — ACETAMINOPHEN 650 MILLIGRAM(S): 325 TABLET ORAL at 21:18

## 2024-09-15 RX ADMIN — Medication 1 DROP(S): at 18:12

## 2024-09-15 RX ADMIN — Medication 1 DROP(S): at 06:00

## 2024-09-15 RX ADMIN — CEFAZOLIN SODIUM 100 MILLIGRAM(S): 2 INJECTION, SOLUTION INTRAVENOUS at 21:23

## 2024-09-15 RX ADMIN — BUDESONIDE 0.5 MILLIGRAM(S): 3 CAPSULE ORAL at 06:04

## 2024-09-15 RX ADMIN — Medication 5000 UNIT(S): at 05:57

## 2024-09-15 RX ADMIN — TEMAZEPAM 15 MILLIGRAM(S): 30 CAPSULE ORAL at 21:18

## 2024-09-15 RX ADMIN — Medication 5000 UNIT(S): at 18:11

## 2024-09-15 RX ADMIN — BUDESONIDE 0.5 MILLIGRAM(S): 3 CAPSULE ORAL at 18:11

## 2024-09-15 RX ADMIN — Medication 1 DROP(S): at 05:58

## 2024-09-15 RX ADMIN — ACETAMINOPHEN 650 MILLIGRAM(S): 325 TABLET ORAL at 16:01

## 2024-09-15 RX ADMIN — Medication 20 MILLIGRAM(S): at 13:06

## 2024-09-15 RX ADMIN — CEFAZOLIN SODIUM 100 MILLIGRAM(S): 2 INJECTION, SOLUTION INTRAVENOUS at 13:09

## 2024-09-15 NOTE — SWALLOW BEDSIDE ASSESSMENT ADULT - SLP GENERAL OBSERVATIONS
Encountered Pt sitting upright in bed on 2L O2 via NC. Pt is A&Ox4, verbally responsive, and able to follow simple commands.

## 2024-09-15 NOTE — BH CONSULTATION LIAISON ASSESSMENT NOTE - HPI (INCLUDE ILLNESS QUALITY, SEVERITY, DURATION, TIMING, CONTEXT, MODIFYING FACTORS, ASSOCIATED SIGNS AND SYMPTOMS)
The pt. is an 89 year old WF  with chronic anxiety, some decline in Short term memory in recent years. She was admitted here on 9/14/24 for 2 week history of leg pain and swelling. Found to have a cellulitis of her leg and hypoxia in the ER (Oxygen sat decreased to 84%). Psychiatry called as the pt. kept yelling out asking when she is going home. Daughter says that the pt. has been known to repeat questions at home but denies a history of dementia. She has had this happen before per daughter but mental status clears when she goes home from the hospital. 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 I reviewed the old charts and note that on May 12, 2024 she was seen by a psychiatrist in this hospital for medication management. She was diagnosed with generalized anxiety disorder in May 2024 while hospitalized here. Seen by pulmonary MD. No PE.

## 2024-09-15 NOTE — SWALLOW BEDSIDE ASSESSMENT ADULT - SLP PERTINENT HISTORY OF CURRENT PROBLEM
89y F w/ PMHx of HTN, HLD, CAD, sarcoidosis, chronic R hemidiaphragm paralysis, recurrent aspiration events, hypothyroidism, heart failure, and asthma/COPD on 2L home O2 presents from [facility] for RLE swelling on/off for the past 2 weeks w/ increasing pain. Admitted for RLE cellulitis. Cardiology following. Pulm consulted for increased O2 requirements, however, Pt tolerating home O2 well--signed off.

## 2024-09-15 NOTE — SWALLOW BEDSIDE ASSESSMENT ADULT - SWALLOW EVAL: PATIENT/FAMILY GOALS STATEMENT
Pt declined further ST services and wishes to accept the risks of aspiration/continue a regular diet/thin liquids

## 2024-09-15 NOTE — SWALLOW BEDSIDE ASSESSMENT ADULT - COMMENTS
IMAGIN/13 CXR: IMPRESSION: Trace bilateral pleural effusions with adjacent atelectasis.    ***Pt is well known to this service from prior hospitalizations. MBS performed 2024 notable for impaired airway protection. Diet of soft/bite-sized and mildly thick liquids recommended. Re-evaluation attempted 2024, however, Pt declined stating "I had this before, I don't want it again- I will eat normal food and drink my water".

## 2024-09-15 NOTE — SWALLOW BEDSIDE ASSESSMENT ADULT - SWALLOW EVAL: PROGNOSIS
Continued: Pt declines further ST services, this service will sign off at this time. Re-consult as medically appropriate.

## 2024-09-15 NOTE — SWALLOW BEDSIDE ASSESSMENT ADULT - SWALLOW EVAL: DIAGNOSIS
89y F w/ PMHx of chronic R hemidiaphragm paralysis, recurrent aspiration events, and asthma/COPD on 2L home O2 presents from [facility] for RLE swelling on/off for the past 2 weeks w/ increasing pain. Admitted for RLE cellulitis. Pt presents w/ a suspected pharyngeal dysphagia characterized by suspected delayed onset of pharyngeal swallow and reduced hyolaryngeal excursion upon palpation. No overt s/s of laryngeal penetration/aspiration noted across PO trials. Given hx of dysphagia, objective testing is recommended to further assess the swallow mechanism and r/o aspiration. Pt declined further swallow work-up at this time and declines modified consistencies which had been recommended s/p MBS 5/2024. Pt  independently recalled the risks of aspiration (including SOB, PNA, respiratory failure, and even death) and wishes to accept the risks of aspiration/continue a regular diet/thin liquids. Consider GOC conversation to further discuss code status and pleasure feeds.

## 2024-09-15 NOTE — BH CONSULTATION LIAISON ASSESSMENT NOTE - OTHER PAST PSYCHIATRIC HISTORY (INCLUDE DETAILS REGARDING ONSET, COURSE OF ILLNESS, INPATIENT/OUTPATIENT TREATMENT)
Anxious for years. Has chronic insomnia and daughter says that she takes Restoril for years. A review of the [facility] medication sheets states that she takes Prozac 30mg/d, Restoril 30mg qhs, and Trazodone 50mg q5:00 p.m.. Never saw a psychiatrist before May in this hospital. Never hospitalized psychiatrically. Never suicidal, manic, nor violent. In May 2024 she was given Mirtazepine 7.5mg qhs.

## 2024-09-15 NOTE — BH CONSULTATION LIAISON ASSESSMENT NOTE - SUMMARY
89 year old WF with history of Generalized Anxiety Disorder now hospitalized with cellulitis and hypoxia. She has baseline decline in short term memory. She has taken Temazepam chronically ("for years" per daughter) so we cannot abruptly stop it as she can go into CNS depressant withdrawal. However, we can lower the dose from 30mg qhs to 15mg qhs to prevent respiratory insufficiency. She denies pain so I doubt agitation is from pain. Cannot rule out early dementia given her history.  89 year old WF with history of Generalized Anxiety Disorder now hospitalized with cellulitis and hypoxia. She has baseline decline in short term memory. She has taken Temazepam chronically ("for years" per daughter) so we cannot abruptly stop it as she can go into CNS depressant withdrawal. However, we can lower the dose from 30mg qhs to 15mg qhs to prevent respiratory insufficiency. She denies pain so I doubt agitation is from pain. Cannot rule out early dementia given her history. Given her short term memory loss at baseline along with current infection, hearing deficit, and vision loss, she is at risk for delirium and sundowning.

## 2024-09-15 NOTE — BH CONSULTATION LIAISON ASSESSMENT NOTE - NSBHCONSULTRECOMMENDOTHER_PSY_A_CORE FT
Prozac 30mg p.o. qa.m.  Temazepam 15mg p.o. qhs  Trazodone 50mg p.o. qd at 5:00 p.m.  Nightlight, told daughter to bring in family photos to prevent sundowning.    Prozac 30mg p.o. qa.m.  Temazepam 15mg p.o. qhs (hold individual doses if sedated or if any sign of respiratory insufficiency)  Trazodone 50mg p.o. qd at 5:00 p.m. (hold individual doses if sedated or if BP is less than 90/60)  Nightlight, told daughter to bring in family photos to prevent sundowning.   Follow Oxygen saturation

## 2024-09-15 NOTE — BH CONSULTATION LIAISON ASSESSMENT NOTE - CURRENT MEDICATION
MEDICATIONS  (STANDING):  atorvastatin 20 milliGRAM(s) Oral at bedtime  buDESOnide    Inhalation Suspension 0.5 milliGRAM(s) Inhalation two times a day  ceFAZolin   IVPB 1000 milliGRAM(s) IV Intermittent every 8 hours  cyclopentolate 1% Solution 1 Drop(s) Both EYES two times a day  FLUoxetine 20 milliGRAM(s) Oral daily  furosemide   Injectable 20 milliGRAM(s) IV Push daily  heparin   Injectable 5000 Unit(s) SubCutaneous every 12 hours  metoprolol succinate ER 50 milliGRAM(s) Oral daily  prednisoLONE acetate 1% Suspension 1 Drop(s) Both EYES two times a day  temazepam 15 milliGRAM(s) Oral at bedtime    MEDICATIONS  (PRN):  acetaminophen     Tablet .. 650 milliGRAM(s) Oral every 6 hours PRN Mild Pain (1 - 3)  albuterol    90 MICROgram(s) HFA Inhaler 1 Puff(s) Inhalation every 8 hours PRN Bronchospasm

## 2024-09-15 NOTE — BH CONSULTATION LIAISON ASSESSMENT NOTE - NSBHCHARTREVIEWLAB_PSY_A_CORE FT
CBC Full  -  ( 15 Sep 2024 06:59 )  WBC Count : 5.12 K/uL  Hemoglobin : 8.7 g/dL  Hematocrit : 28.3 %  Platelet Count - Automated : 161 K/uL  Mean Cell Volume : 89.6 fl  Mean Cell Hemoglobin : 27.5 pg  Mean Cell Hemoglobin Concentration : 30.7 gm/dL  Auto Neutrophil # : x  Auto Lymphocyte # : x  Auto Monocyte # : x  Auto Eosinophil # : x  Auto Basophil # : x  Auto Neutrophil % : x  Auto Lymphocyte % : x  Auto Monocyte % : x  Auto Eosinophil % : x  Auto Basophil % : x    09-15    139  |  104  |  16  ----------------------------<  84  4.1   |  24  |  0.83    Ca    8.5      15 Sep 2024 06:56    TPro  6.3  /  Alb  3.4  /  TBili  0.3  /  DBili  x   /  AST  12  /  ALT  6<L>  /  AlkPhos  108  09-13

## 2024-09-15 NOTE — SWALLOW BEDSIDE ASSESSMENT ADULT - PHARYNGEAL PHASE
No overt s/s of laryngeal penetration/aspiration/Delayed pharyngeal swallow/Decreased laryngeal elevation

## 2024-09-15 NOTE — SWALLOW BEDSIDE ASSESSMENT ADULT - SWALLOW EVAL: RECOMMENDED DIET
Soft/bite-sized solids/mildly thick liquids per MBS 5/2024; Pt wishes to accept the risks of aspiration and consume a diet of regular solids/thin liquids

## 2024-09-15 NOTE — BH CONSULTATION LIAISON ASSESSMENT NOTE - DESCRIPTION
Originally from [place]. Moved to [place]. Has been living in an assisted living facility in [place] for the past year. Has three daughters, one in [place], one in [place], and one in [place]. She is . Former cigaret smoker. She says she drinks four glasses of wine weekly in the assisted living facility but daughter says it is nonalcohol wine. No illicit drug history.

## 2024-09-15 NOTE — BH CONSULTATION LIAISON ASSESSMENT NOTE - NSBHCHARTREVIEWVS_PSY_A_CORE FT
Vital Signs Last 24 Hrs  T(C): 36.8 (15 Sep 2024 09:09), Max: 37.1 (14 Sep 2024 20:52)  T(F): 98.2 (15 Sep 2024 09:09), Max: 98.8 (14 Sep 2024 20:52)  HR: 67 (15 Sep 2024 09:09) (67 - 80)  BP: 120/60 (15 Sep 2024 09:09) (86/51 - 166/89)  BP(mean): --  RR: 19 (15 Sep 2024 09:09) (18 - 19)  SpO2: 95% (15 Sep 2024 09:09) (95% - 98%)    Parameters below as of 15 Sep 2024 09:09  Patient On (Oxygen Delivery Method): nasal cannula

## 2024-09-16 LAB
ANION GAP SERPL CALC-SCNC: 12 MMOL/L — SIGNIFICANT CHANGE UP (ref 5–17)
BUN SERPL-MCNC: 15 MG/DL — SIGNIFICANT CHANGE UP (ref 7–23)
CALCIUM SERPL-MCNC: 8.7 MG/DL — SIGNIFICANT CHANGE UP (ref 8.4–10.5)
CHLORIDE SERPL-SCNC: 104 MMOL/L — SIGNIFICANT CHANGE UP (ref 96–108)
CO2 SERPL-SCNC: 26 MMOL/L — SIGNIFICANT CHANGE UP (ref 22–31)
CREAT SERPL-MCNC: 0.69 MG/DL — SIGNIFICANT CHANGE UP (ref 0.5–1.3)
EGFR: 83 ML/MIN/1.73M2 — SIGNIFICANT CHANGE UP
GLUCOSE SERPL-MCNC: 81 MG/DL — SIGNIFICANT CHANGE UP (ref 70–99)
HCT VFR BLD CALC: 27.8 % — LOW (ref 34.5–45)
HGB BLD-MCNC: 8.6 G/DL — LOW (ref 11.5–15.5)
MAGNESIUM SERPL-MCNC: 2.2 MG/DL — SIGNIFICANT CHANGE UP (ref 1.6–2.6)
MCHC RBC-ENTMCNC: 27.8 PG — SIGNIFICANT CHANGE UP (ref 27–34)
MCHC RBC-ENTMCNC: 30.9 GM/DL — LOW (ref 32–36)
MCV RBC AUTO: 90 FL — SIGNIFICANT CHANGE UP (ref 80–100)
NRBC # BLD: 0 /100 WBCS — SIGNIFICANT CHANGE UP (ref 0–0)
PLATELET # BLD AUTO: 164 K/UL — SIGNIFICANT CHANGE UP (ref 150–400)
POTASSIUM SERPL-MCNC: 3.4 MMOL/L — LOW (ref 3.5–5.3)
POTASSIUM SERPL-SCNC: 3.4 MMOL/L — LOW (ref 3.5–5.3)
RBC # BLD: 3.09 M/UL — LOW (ref 3.8–5.2)
RBC # FLD: 15.9 % — HIGH (ref 10.3–14.5)
SODIUM SERPL-SCNC: 142 MMOL/L — SIGNIFICANT CHANGE UP (ref 135–145)
WBC # BLD: 6.24 K/UL — SIGNIFICANT CHANGE UP (ref 3.8–10.5)
WBC # FLD AUTO: 6.24 K/UL — SIGNIFICANT CHANGE UP (ref 3.8–10.5)

## 2024-09-16 RX ORDER — TRAZODONE HCL 50 MG
50 TABLET ORAL
Refills: 0 | Status: DISCONTINUED | OUTPATIENT
Start: 2024-09-16 | End: 2024-09-20

## 2024-09-16 RX ORDER — ASCORBIC ACID/ASCORBATE SODIUM 500 MG
500 TABLET,CHEWABLE ORAL DAILY
Refills: 0 | Status: DISCONTINUED | OUTPATIENT
Start: 2024-09-16 | End: 2024-09-20

## 2024-09-16 RX ORDER — POTASSIUM CHLORIDE 10 MEQ
40 TABLET, EXT RELEASE, PARTICLES/CRYSTALS ORAL ONCE
Refills: 0 | Status: COMPLETED | OUTPATIENT
Start: 2024-09-16 | End: 2024-09-16

## 2024-09-16 RX ADMIN — Medication 1 DROP(S): at 17:02

## 2024-09-16 RX ADMIN — Medication 1 DROP(S): at 06:24

## 2024-09-16 RX ADMIN — Medication 30 MILLIGRAM(S): at 11:53

## 2024-09-16 RX ADMIN — TEMAZEPAM 15 MILLIGRAM(S): 30 CAPSULE ORAL at 22:07

## 2024-09-16 RX ADMIN — Medication 1 PUFF(S): at 07:58

## 2024-09-16 RX ADMIN — CEFAZOLIN SODIUM 100 MILLIGRAM(S): 2 INJECTION, SOLUTION INTRAVENOUS at 06:22

## 2024-09-16 RX ADMIN — Medication 1 TABLET(S): at 11:52

## 2024-09-16 RX ADMIN — CEFAZOLIN SODIUM 100 MILLIGRAM(S): 2 INJECTION, SOLUTION INTRAVENOUS at 22:07

## 2024-09-16 RX ADMIN — Medication 5000 UNIT(S): at 17:01

## 2024-09-16 RX ADMIN — CEFAZOLIN SODIUM 100 MILLIGRAM(S): 2 INJECTION, SOLUTION INTRAVENOUS at 13:35

## 2024-09-16 RX ADMIN — ACETAMINOPHEN 650 MILLIGRAM(S): 325 TABLET ORAL at 15:45

## 2024-09-16 RX ADMIN — Medication 20 MILLIGRAM(S): at 22:06

## 2024-09-16 RX ADMIN — Medication 20 MILLIGRAM(S): at 06:15

## 2024-09-16 RX ADMIN — BUDESONIDE 0.5 MILLIGRAM(S): 3 CAPSULE ORAL at 06:27

## 2024-09-16 RX ADMIN — Medication 40 MILLIEQUIVALENT(S): at 11:52

## 2024-09-16 RX ADMIN — ACETAMINOPHEN 650 MILLIGRAM(S): 325 TABLET ORAL at 06:17

## 2024-09-16 RX ADMIN — METOPROLOL TARTRATE 50 MILLIGRAM(S): 100 TABLET ORAL at 06:15

## 2024-09-16 RX ADMIN — Medication 5000 UNIT(S): at 06:24

## 2024-09-16 RX ADMIN — Medication 50 MILLIGRAM(S): at 17:01

## 2024-09-16 RX ADMIN — Medication 1 DROP(S): at 06:25

## 2024-09-16 RX ADMIN — ACETAMINOPHEN 650 MILLIGRAM(S): 325 TABLET ORAL at 16:46

## 2024-09-16 RX ADMIN — Medication 500 MILLIGRAM(S): at 11:52

## 2024-09-16 NOTE — DIETITIAN INITIAL EVALUATION ADULT - PERTINENT MEDS FT
MEDICATIONS  (STANDING):  atorvastatin 20 milliGRAM(s) Oral at bedtime  buDESOnide    Inhalation Suspension 0.5 milliGRAM(s) Inhalation two times a day  ceFAZolin   IVPB 1000 milliGRAM(s) IV Intermittent every 8 hours  cyclopentolate 1% Solution 1 Drop(s) Both EYES two times a day  FLUoxetine 30 milliGRAM(s) Oral daily  furosemide   Injectable 20 milliGRAM(s) IV Push daily  heparin   Injectable 5000 Unit(s) SubCutaneous every 12 hours  metoprolol succinate ER 50 milliGRAM(s) Oral daily  potassium chloride    Tablet ER 40 milliEquivalent(s) Oral once  prednisoLONE acetate 1% Suspension 1 Drop(s) Both EYES two times a day  temazepam 15 milliGRAM(s) Oral at bedtime  traZODone 50 milliGRAM(s) Oral <User Schedule>    MEDICATIONS  (PRN):  acetaminophen     Tablet .. 650 milliGRAM(s) Oral every 6 hours PRN Mild Pain (1 - 3)  albuterol    90 MICROgram(s) HFA Inhaler 1 Puff(s) Inhalation every 8 hours PRN Bronchospasm  haloperidol    Injectable 0.5 milliGRAM(s) IV Push every 12 hours PRN severe Agitation

## 2024-09-16 NOTE — BH CONSULTATION LIAISON PROGRESS NOTE - NSBHCONSULTRECOMMENDOTHER_PSY_A_CORE FT
Continue with current Fluoxetine 30mg/d, Trazodone 50mg q5p.m. and Temazepam 15mg qhs.   Follow oxygen saturation Continue with current Fluoxetine 30mg/d, Trazodone 50mg q5p.m. and Temazepam 15mg qhs.   Follow oxygen saturation  Hold Temazepam if oxygen saturation falls below 90% or if any sign of respiratory insufficiency.

## 2024-09-16 NOTE — DIETITIAN INITIAL EVALUATION ADULT - ORAL INTAKE PTA/DIET HISTORY
pt from assisted living facility. 1/2 cup tea at breakfast, KOSHER meal at lunch and dinner, whatever is served, she could not provided specifics.

## 2024-09-16 NOTE — DIETITIAN INITIAL EVALUATION ADULT - PERTINENT LABORATORY DATA
09-16    142  |  104  |  15  ----------------------------<  81  3.4<L>   |  26  |  0.69    Ca    8.7      16 Sep 2024 07:14  Mg     2.2     09-16    A1C with Estimated Average Glucose Result: 6.0 % (09-27-23 @ 06:44)

## 2024-09-17 LAB
ANION GAP SERPL CALC-SCNC: 12 MMOL/L — SIGNIFICANT CHANGE UP (ref 5–17)
BUN SERPL-MCNC: 14 MG/DL — SIGNIFICANT CHANGE UP (ref 7–23)
CALCIUM SERPL-MCNC: 9 MG/DL — SIGNIFICANT CHANGE UP (ref 8.4–10.5)
CHLORIDE SERPL-SCNC: 102 MMOL/L — SIGNIFICANT CHANGE UP (ref 96–108)
CO2 SERPL-SCNC: 26 MMOL/L — SIGNIFICANT CHANGE UP (ref 22–31)
CREAT SERPL-MCNC: 0.76 MG/DL — SIGNIFICANT CHANGE UP (ref 0.5–1.3)
EGFR: 75 ML/MIN/1.73M2 — SIGNIFICANT CHANGE UP
GLUCOSE SERPL-MCNC: 117 MG/DL — HIGH (ref 70–99)
HCT VFR BLD CALC: 31.7 % — LOW (ref 34.5–45)
HGB BLD-MCNC: 9.8 G/DL — LOW (ref 11.5–15.5)
MAGNESIUM SERPL-MCNC: 2.1 MG/DL — SIGNIFICANT CHANGE UP (ref 1.6–2.6)
MCHC RBC-ENTMCNC: 28.7 PG — SIGNIFICANT CHANGE UP (ref 27–34)
MCHC RBC-ENTMCNC: 30.9 GM/DL — LOW (ref 32–36)
MCV RBC AUTO: 92.7 FL — SIGNIFICANT CHANGE UP (ref 80–100)
NRBC # BLD: 0 /100 WBCS — SIGNIFICANT CHANGE UP (ref 0–0)
PLATELET # BLD AUTO: 157 K/UL — SIGNIFICANT CHANGE UP (ref 150–400)
POTASSIUM SERPL-MCNC: 3.8 MMOL/L — SIGNIFICANT CHANGE UP (ref 3.5–5.3)
POTASSIUM SERPL-SCNC: 3.8 MMOL/L — SIGNIFICANT CHANGE UP (ref 3.5–5.3)
RBC # BLD: 3.42 M/UL — LOW (ref 3.8–5.2)
RBC # FLD: 15.6 % — HIGH (ref 10.3–14.5)
SODIUM SERPL-SCNC: 140 MMOL/L — SIGNIFICANT CHANGE UP (ref 135–145)
WBC # BLD: 6.81 K/UL — SIGNIFICANT CHANGE UP (ref 3.8–10.5)
WBC # FLD AUTO: 6.81 K/UL — SIGNIFICANT CHANGE UP (ref 3.8–10.5)

## 2024-09-17 RX ORDER — CEPHALEXIN 500 MG
500 CAPSULE ORAL EVERY 12 HOURS
Refills: 0 | Status: DISCONTINUED | OUTPATIENT
Start: 2024-09-17 | End: 2024-09-20

## 2024-09-17 RX ADMIN — Medication 5000 UNIT(S): at 05:36

## 2024-09-17 RX ADMIN — CEFAZOLIN SODIUM 100 MILLIGRAM(S): 2 INJECTION, SOLUTION INTRAVENOUS at 05:37

## 2024-09-17 RX ADMIN — BUDESONIDE 0.5 MILLIGRAM(S): 3 CAPSULE ORAL at 05:37

## 2024-09-17 RX ADMIN — ACETAMINOPHEN 650 MILLIGRAM(S): 325 TABLET ORAL at 04:33

## 2024-09-17 RX ADMIN — Medication 5000 UNIT(S): at 17:44

## 2024-09-17 RX ADMIN — TEMAZEPAM 15 MILLIGRAM(S): 30 CAPSULE ORAL at 22:02

## 2024-09-17 RX ADMIN — Medication 50 MILLIGRAM(S): at 17:44

## 2024-09-17 RX ADMIN — METOPROLOL TARTRATE 50 MILLIGRAM(S): 100 TABLET ORAL at 05:36

## 2024-09-17 RX ADMIN — Medication 20 MILLIGRAM(S): at 05:36

## 2024-09-17 RX ADMIN — Medication 500 MILLIGRAM(S): at 11:43

## 2024-09-17 RX ADMIN — Medication 30 MILLIGRAM(S): at 11:43

## 2024-09-17 RX ADMIN — ACETAMINOPHEN 650 MILLIGRAM(S): 325 TABLET ORAL at 20:00

## 2024-09-17 RX ADMIN — CEFAZOLIN SODIUM 100 MILLIGRAM(S): 2 INJECTION, SOLUTION INTRAVENOUS at 14:01

## 2024-09-17 RX ADMIN — BUDESONIDE 0.5 MILLIGRAM(S): 3 CAPSULE ORAL at 17:44

## 2024-09-17 RX ADMIN — Medication 20 MILLIGRAM(S): at 22:02

## 2024-09-17 RX ADMIN — ACETAMINOPHEN 650 MILLIGRAM(S): 325 TABLET ORAL at 11:43

## 2024-09-17 RX ADMIN — Medication 1 DROP(S): at 05:37

## 2024-09-17 RX ADMIN — CEFAZOLIN SODIUM 100 MILLIGRAM(S): 2 INJECTION, SOLUTION INTRAVENOUS at 22:02

## 2024-09-17 RX ADMIN — Medication 1 TABLET(S): at 11:43

## 2024-09-17 RX ADMIN — Medication 1 DROP(S): at 05:36

## 2024-09-17 RX ADMIN — ACETAMINOPHEN 650 MILLIGRAM(S): 325 TABLET ORAL at 19:28

## 2024-09-17 RX ADMIN — Medication 1 DROP(S): at 17:45

## 2024-09-17 RX ADMIN — ACETAMINOPHEN 650 MILLIGRAM(S): 325 TABLET ORAL at 12:53

## 2024-09-17 NOTE — ADVANCED PRACTICE NURSE CONSULT - RECOMMEDATIONS
Impression  bilateral sacrum/buttock deep tissue injury, present on admission   bilateral heels blanchable erythema   right second toe, and left big toe lesion unknown etiology   Recommendations   1. Bilateral buttock deep tissue injury   Topical therapy- sacral/bilateral buttocks- cleanse w/incontinent cleanser, pat dry & apply carolina cream  twice daily & prn soiling . Monitor for changes .  2. Right and left heel - Elevate heels; apply Complete Cair air fluidized boots; ensure that the soles of the feet are not resting on the foot board of the bed.    for toe lesion consider podiatry for further management.   3  Incontinent management - incontinent cleanser, pads,  nancy care  BID  4. Maintain on an alternating air with low air loss surface   5. Turn & reposition every 2 hr; Use positioning pillow to turn and reposition, soft pillow between bony prominences; continue measures to decrease friction/shear/pressure.  6. Nutrition optimization.  7.  Place waffle cushion when out of bed to chair .   plan of care reviewed with covering staff RN Impression  bilateral sacrum/buttock deep tissue injury, present on admission   bilateral heels blanchable erythema   right second toe, and left big toe lesion unknown etiology   Recommendations   1. Bilateral buttock deep tissue injury   Topical therapy- sacral/bilateral buttocks- cleanse w/incontinent cleanser, pat dry & apply carolina cream  twice daily & prn soiling . Monitor for changes .  2. Right and left heel - Elevate heels; apply Complete Cair air fluidized boots; ensure that the soles of the feet are not resting on the foot board of the bed.    for toe lesion consider podiatry for further management.   3  Incontinent management - incontinent cleanser, pads,  nancy care  BID  4. Maintain on an alternating air with low air loss surface   5. Turn & reposition every 2 hr; Use positioning pillow to turn and reposition, soft pillow between bony prominences; continue measures to decrease friction/shear/pressure.  6. Nutrition optimization.  7.  Place waffle cushion when out of bed to chair .   plan of care reviewed with covering staff Kristen Cantrell)

## 2024-09-17 NOTE — BH CONSULTATION LIAISON PROGRESS NOTE - NSBHCONSULTMEDSEVERE_PSY_A_CORE FT
Haldol 0.50mg IV q12h prn severe agitation (hold if QTc is 500ms or greater)
Haldol 0.50mg IV q12h prn severe agitation (hold if QTc is 500ms or greater)

## 2024-09-17 NOTE — CHART NOTE - NSCHARTNOTEFT_GEN_A_CORE
RD CHF education chart note    Patient was visited by RD for CHF education. pt refused verbal education. RD left written education at bedside. Provided handouts on heart failure nutrition therapy, reading heart healthy nutrition labels, heart healthy shopping tips and low sodium food list. RD contact information left with patient for any future questioning.

## 2024-09-17 NOTE — BH CONSULTATION LIAISON PROGRESS NOTE - NSBHINDICATION_PSY_ALL_CORE
Enhanced supervision when family or home aid is not present (risk of sundowning given her hearing and visual deficits).

## 2024-09-17 NOTE — ADVANCED PRACTICE NURSE CONSULT - ASSESSMENT
arrived on unit, patient was found sitting high back chair , assistance x 2 provided to stand to view her skin.  the patient stated "she spends a lot time sitting in her chair at home".   patient bilateral sacrum / buttock with an area of non blanchable hyperpigmentation size approximately 6 cm x 6 cm x 0 cm, consistent with a deep tissue injury,  present on admission.  bilateral heel blanchable erythema     right second toe and left big toe with dry crusted lesion unknown etiology. consider podiatry for management.   patient  was educated  on the importance  for turning  and positioning  every 2 hours. The use of waffle cushion when out of bed  to chair,  and to shift her Weight every 2 hours while in chair. The importance a keeping her skin clean and dry and  to offload feet/heels , and optimal nutrition.

## 2024-09-17 NOTE — ADVANCED PRACTICE NURSE CONSULT - REASON FOR CONSULT
Consult to assess patient skin initiated by RN for sacrum bilateral heels , right second  toe pressure injury present on admission     History of Present Illness:  Reason for Admission: leg  swelling  History of Present Illness:      89-year-old female           with a history of hypertension, hyperlipidemia, CAD COPD             sent from [facility], assisted living, with    swelling   right leg on and off for 2 weeks      she states pain is increasing       no trauma .  denies   fever no chills

## 2024-09-18 LAB
ANION GAP SERPL CALC-SCNC: 12 MMOL/L — SIGNIFICANT CHANGE UP (ref 5–17)
BUN SERPL-MCNC: 18 MG/DL — SIGNIFICANT CHANGE UP (ref 7–23)
CALCIUM SERPL-MCNC: 8.6 MG/DL — SIGNIFICANT CHANGE UP (ref 8.4–10.5)
CHLORIDE SERPL-SCNC: 100 MMOL/L — SIGNIFICANT CHANGE UP (ref 96–108)
CO2 SERPL-SCNC: 26 MMOL/L — SIGNIFICANT CHANGE UP (ref 22–31)
CREAT SERPL-MCNC: 0.84 MG/DL — SIGNIFICANT CHANGE UP (ref 0.5–1.3)
EGFR: 67 ML/MIN/1.73M2 — SIGNIFICANT CHANGE UP
GLUCOSE SERPL-MCNC: 88 MG/DL — SIGNIFICANT CHANGE UP (ref 70–99)
HCT VFR BLD CALC: 28.8 % — LOW (ref 34.5–45)
HGB BLD-MCNC: 8.9 G/DL — LOW (ref 11.5–15.5)
MAGNESIUM SERPL-MCNC: 2 MG/DL — SIGNIFICANT CHANGE UP (ref 1.6–2.6)
MCHC RBC-ENTMCNC: 28.1 PG — SIGNIFICANT CHANGE UP (ref 27–34)
MCHC RBC-ENTMCNC: 30.9 GM/DL — LOW (ref 32–36)
MCV RBC AUTO: 90.9 FL — SIGNIFICANT CHANGE UP (ref 80–100)
NRBC # BLD: 0 /100 WBCS — SIGNIFICANT CHANGE UP (ref 0–0)
PLATELET # BLD AUTO: 162 K/UL — SIGNIFICANT CHANGE UP (ref 150–400)
POTASSIUM SERPL-MCNC: 3.5 MMOL/L — SIGNIFICANT CHANGE UP (ref 3.5–5.3)
POTASSIUM SERPL-SCNC: 3.5 MMOL/L — SIGNIFICANT CHANGE UP (ref 3.5–5.3)
RBC # BLD: 3.17 M/UL — LOW (ref 3.8–5.2)
RBC # FLD: 15.8 % — HIGH (ref 10.3–14.5)
SODIUM SERPL-SCNC: 138 MMOL/L — SIGNIFICANT CHANGE UP (ref 135–145)
WBC # BLD: 5.49 K/UL — SIGNIFICANT CHANGE UP (ref 3.8–10.5)
WBC # FLD AUTO: 5.49 K/UL — SIGNIFICANT CHANGE UP (ref 3.8–10.5)

## 2024-09-18 PROCEDURE — 99222 1ST HOSP IP/OBS MODERATE 55: CPT

## 2024-09-18 RX ORDER — FUROSEMIDE 40 MG
20 TABLET ORAL DAILY
Refills: 0 | Status: DISCONTINUED | OUTPATIENT
Start: 2024-09-18 | End: 2024-09-20

## 2024-09-18 RX ORDER — TEMAZEPAM 30 MG/1
1 CAPSULE ORAL
Refills: 0 | DISCHARGE

## 2024-09-18 RX ADMIN — Medication 1 DROP(S): at 17:30

## 2024-09-18 RX ADMIN — Medication 5000 UNIT(S): at 06:31

## 2024-09-18 RX ADMIN — METOPROLOL TARTRATE 50 MILLIGRAM(S): 100 TABLET ORAL at 06:31

## 2024-09-18 RX ADMIN — ACETAMINOPHEN 650 MILLIGRAM(S): 325 TABLET ORAL at 17:29

## 2024-09-18 RX ADMIN — Medication 1 TABLET(S): at 12:10

## 2024-09-18 RX ADMIN — Medication 500 MILLIGRAM(S): at 06:32

## 2024-09-18 RX ADMIN — ACETAMINOPHEN 650 MILLIGRAM(S): 325 TABLET ORAL at 06:31

## 2024-09-18 RX ADMIN — Medication 50 MILLIGRAM(S): at 17:31

## 2024-09-18 RX ADMIN — Medication 30 MILLIGRAM(S): at 12:10

## 2024-09-18 RX ADMIN — TEMAZEPAM 15 MILLIGRAM(S): 30 CAPSULE ORAL at 21:42

## 2024-09-18 RX ADMIN — BUDESONIDE 0.5 MILLIGRAM(S): 3 CAPSULE ORAL at 17:29

## 2024-09-18 RX ADMIN — Medication 1 DROP(S): at 06:31

## 2024-09-18 RX ADMIN — BUDESONIDE 0.5 MILLIGRAM(S): 3 CAPSULE ORAL at 06:32

## 2024-09-18 RX ADMIN — Medication 500 MILLIGRAM(S): at 17:29

## 2024-09-18 RX ADMIN — Medication 20 MILLIGRAM(S): at 12:10

## 2024-09-18 RX ADMIN — Medication 500 MILLIGRAM(S): at 12:12

## 2024-09-18 RX ADMIN — Medication 5000 UNIT(S): at 17:30

## 2024-09-18 RX ADMIN — Medication 20 MILLIGRAM(S): at 21:42

## 2024-09-18 NOTE — DISCHARGE NOTE PROVIDER - NSDCCPCAREPLAN_GEN_ALL_CORE_FT
PRINCIPAL DISCHARGE DIAGNOSIS  Diagnosis: Left leg cellulitis  Assessment and Plan of Treatment: complete po ABs to the finish, f/up with PCP      SECONDARY DISCHARGE DIAGNOSES  Diagnosis: CAD (coronary artery disease)  Assessment and Plan of Treatment: stable, cont current home meds    Diagnosis: Chronic dementia  Assessment and Plan of Treatment: stable, cont current home meds    Diagnosis: History of COPD  Assessment and Plan of Treatment: stable, cont current meds    Diagnosis: Chronic diastolic congestive heart failure  Assessment and Plan of Treatment: stable, cont lasix, f/up with card    Diagnosis: Anxiety  Assessment and Plan of Treatment: stable, cont current home meds     PRINCIPAL DISCHARGE DIAGNOSIS  Diagnosis: Left leg cellulitis  Assessment and Plan of Treatment: complete po ABs to the finish, f/up with PCP      SECONDARY DISCHARGE DIAGNOSES  Diagnosis: Acute on chronic diastolic congestive heart failure  Assessment and Plan of Treatment: stable, cont lasix, f/up with card    Diagnosis: CAD (coronary artery disease)  Assessment and Plan of Treatment: stable, cont current home meds    Diagnosis: Chronic dementia  Assessment and Plan of Treatment: stable, cont current home meds    Diagnosis: History of COPD  Assessment and Plan of Treatment: stable, cont current meds    Diagnosis: MANN (generalized anxiety disorder)  Assessment and Plan of Treatment: tinue with Prozac 30mg/d, Temazepam 15mg qhs, and Trazodone 50mg qd at 1700 hours    Diagnosis: Pressure ulcer  Assessment and Plan of Treatment: you were evaluated by wound care for pressure ulcers  bilateral sacrum/buttock deep tissue injury, present on admission   bilateral heels blanchable erythema   right second toe, and left big toe lesion unknown etiology   Recommendations   1. Bilateral buttock deep tissue injury   Topical therapy- sacral/bilateral buttocks- cleanse w/incontinent cleanser, pat dry & apply carolina cream  twice daily & prn soiling . Monitor for changes .  2. Right and left heel - Elevate heels; apply Complete Cair air fluidized boots; ensure that the soles of the feet are not resting on the foot board of the bed.           PRINCIPAL DISCHARGE DIAGNOSIS  Diagnosis: Left leg cellulitis  Assessment and Plan of Treatment: completed  po ABs, f/up with PCP      SECONDARY DISCHARGE DIAGNOSES  Diagnosis: Acute on chronic diastolic congestive heart failure  Assessment and Plan of Treatment: stable, cont lasix, f/up with card    Diagnosis: CAD (coronary artery disease)  Assessment and Plan of Treatment: stable, cont current home meds    Diagnosis: Chronic dementia  Assessment and Plan of Treatment: stable, cont current home meds    Diagnosis: History of COPD  Assessment and Plan of Treatment: stable, cont current meds    Diagnosis: MANN (generalized anxiety disorder)  Assessment and Plan of Treatment: tinue with Prozac 30mg/d, Temazepam 15mg qhs, and Trazodone 50mg qd at 1700 hours    Diagnosis: Pressure ulcer  Assessment and Plan of Treatment: you were evaluated by wound care for pressure ulcers  bilateral sacrum/buttock deep tissue injury, present on admission   bilateral heels blanchable erythema   right second toe, and left big toe lesion unknown etiology   Recommendations   1. Bilateral buttock deep tissue injury   Topical therapy- sacral/bilateral buttocks- cleanse w/incontinent cleanser, pat dry & apply carolina cream  twice daily & prn soiling . Monitor for changes .  2. Right and left heel - Elevate heels; apply Complete Cair air fluidized boots; ensure that the soles of the feet are not resting on the foot board of the bed.

## 2024-09-18 NOTE — DISCHARGE NOTE PROVIDER - NSDCFUADDAPPT_GEN_ALL_CORE_FT
APPTS ARE READY TO BE MADE: [x] YES    Best Family or Patient Contact (if needed):    Additional Information about above appointments (if needed):    1:   2:   3:     Other comments or requests:    APPTS ARE READY TO BE MADE: [x] YES    Best Family or Patient Contact (if needed):    Additional Information about above appointments (if needed):    1:   2:   3:     Other comments or requests:   Provided patient with provider referral information, however patient prefers to schedule the appointments on their own.

## 2024-09-18 NOTE — DISCHARGE NOTE PROVIDER - NSDCMRMEDTOKEN_GEN_ALL_CORE_FT
acetaminophen 500 mg oral tablet: 1 tab(s) orally 2 times a day and 1 tablet orally once a day as needed for pain  atorvastatin 20 mg oral tablet: 1 tab(s) orally once a day  budesonide 0.25 mg/2 mL inhalation suspension: 2 milliliter(s) by nebulizer 2 times a day  cyclopentolate 1% ophthalmic solution: 1 drop(s) in the left eye 2 times a day  FLUoxetine 10 mg oral capsule: 1 cap(s) orally once a day in addition to 20mg cap; Total of 30mg  FLUoxetine 20 mg oral capsule: 1 cap(s) orally once a day in addition to 10mg capsule for total of 30mg daily  fluticasone-salmeterol 55 mcg-14 mcg/inh inhalation powder: 1 puff(s) inhaled 2 times a day  furosemide 20 mg oral tablet: 1 tab(s) orally once a day  ipratropium-albuterol 0.5 mg-2.5 mg/3 mL inhalation solution: 3 milliliter(s) by nebulizer every 6 hours  metoprolol succinate 50 mg oral tablet, extended release: 1 tab(s) orally once a day  MiraLax oral powder for reconstitution: 17 gram(s) orally once a day  pantoprazole 40 mg oral delayed release tablet: 1 tab(s) orally once a day  prednisoLONE acetate 1% ophthalmic suspension: 1 drop(s) in the left eye 2 times a day  temazepam 15 mg oral capsule: 1 cap(s) orally once a day (at bedtime) as needed for  insomnia  temazepam 30 mg oral capsule: 1 cap(s) orally once a day (at bedtime) MDD: 1  traZODone 50 mg oral tablet: 1 tab(s) orally once a day  Ventolin HFA 90 mcg/inh inhalation aerosol: 2 puff(s) inhaled every 8 hours   acetaminophen 325 mg oral tablet: 2 tab(s) orally every 6 hours As needed Mild Pain (1 - 3)  ascorbic acid 500 mg oral tablet: 1 tab(s) orally once a day  atorvastatin 20 mg oral tablet: 1 tab(s) orally once a day  budesonide 0.25 mg/2 mL inhalation suspension: 2 milliliter(s) by nebulizer 2 times a day  cephalexin 500 mg oral capsule: 1 cap(s) orally every 12 hours last day 9/20/2024  cyclopentolate 1% ophthalmic solution: 1 drop(s) in the left eye 2 times a day  FLUoxetine 10 mg oral capsule: 3 cap(s) orally once a day  fluticasone-salmeterol 55 mcg-14 mcg/inh inhalation powder: 1 puff(s) inhaled 2 times a day  furosemide 20 mg oral tablet: 1 tab(s) orally once a day  ipratropium-albuterol 0.5 mg-2.5 mg/3 mL inhalation solution: 3 milliliter(s) by nebulizer every 6 hours as needed for  shortness of breath and/or wheezing  metoprolol succinate 50 mg oral tablet, extended release: 1 tab(s) orally once a day  MiraLax oral powder for reconstitution: 17 gram(s) orally once a day  Multiple Vitamins oral tablet: 1 tab(s) orally once a day  pantoprazole 40 mg oral delayed release tablet: 1 tab(s) orally once a day  prednisoLONE acetate 1% ophthalmic suspension: 1 drop(s) in the left eye 2 times a day  temazepam 15 mg oral capsule: 1 cap(s) orally once a day (at bedtime)  traZODone 50 mg oral tablet: 1 tab(s) orally once a day please give at 5 PM daily  Ventolin HFA 90 mcg/inh inhalation aerosol: 2 puff(s) inhaled every 8 hours   acetaminophen 325 mg oral tablet: 2 tab(s) orally every 6 hours As needed Mild Pain (1 - 3)  ascorbic acid 500 mg oral tablet: 1 tab(s) orally once a day  atorvastatin 20 mg oral tablet: 1 tab(s) orally once a day  budesonide 0.25 mg/2 mL inhalation suspension: 2 milliliter(s) by nebulizer 2 times a day  cyclopentolate 1% ophthalmic solution: 1 drop(s) in the left eye 2 times a day  FLUoxetine 10 mg oral capsule: 3 cap(s) orally once a day  FLUoxetine 10 mg oral capsule: 3 cap(s) orally once a day MDD: 30 mg  fluticasone-salmeterol 55 mcg-14 mcg/inh inhalation powder: 1 puff(s) inhaled 2 times a day  furosemide 20 mg oral tablet: 1 tab(s) orally once a day  ipratropium-albuterol 0.5 mg-2.5 mg/3 mL inhalation solution: 3 milliliter(s) by nebulizer every 6 hours as needed for  shortness of breath and/or wheezing  metoprolol succinate 50 mg oral tablet, extended release: 1 tab(s) orally once a day  MiraLax oral powder for reconstitution: 17 gram(s) orally once a day  Multiple Vitamins oral tablet: 1 tab(s) orally once a day  pantoprazole 40 mg oral delayed release tablet: 1 tab(s) orally once a day  prednisoLONE acetate 1% ophthalmic suspension: 1 drop(s) in the left eye 2 times a day  temazepam 15 mg oral capsule: 1 cap(s) orally once a day (at bedtime) MDD: 15 mg  temazepam 15 mg oral capsule: 1 cap(s) orally once a day (at bedtime)  Ventolin HFA 90 mcg/inh inhalation aerosol: 2 puff(s) inhaled every 8 hours as needed for  shortness of breath and/or wheezing   acetaminophen 325 mg oral tablet: 2 tab(s) orally every 6 hours As needed Mild Pain (1 - 3)  ascorbic acid 500 mg oral tablet: 1 tab(s) orally once a day  atorvastatin 20 mg oral tablet: 1 tab(s) orally once a day  budesonide 0.25 mg/2 mL inhalation suspension: 2 milliliter(s) by nebulizer 2 times a day  cyclopentolate 1% ophthalmic solution: 1 drop(s) in the left eye 2 times a day  FLUoxetine 10 mg oral capsule: 3 cap(s) orally once a day MDD: 30 mg  fluticasone-salmeterol 55 mcg-14 mcg/inh inhalation powder: 1 puff(s) inhaled 2 times a day  furosemide 20 mg oral tablet: 1 tab(s) orally once a day  ipratropium-albuterol 0.5 mg-2.5 mg/3 mL inhalation solution: 3 milliliter(s) by nebulizer every 6 hours as needed for  shortness of breath and/or wheezing  metoprolol succinate 50 mg oral tablet, extended release: 1 tab(s) orally once a day  MiraLax oral powder for reconstitution: 17 gram(s) orally once a day  Multiple Vitamins oral tablet: 1 tab(s) orally once a day  pantoprazole 40 mg oral delayed release tablet: 1 tab(s) orally once a day  prednisoLONE acetate 1% ophthalmic suspension: 1 drop(s) in the left eye 2 times a day  temazepam 15 mg oral capsule: 1 cap(s) orally once a day (at bedtime) MDD: 15 mg  traZODone 50 mg oral tablet: 1 tab(s) orally once a day please give at 5 PM daily MDD: 50 mg  Ventolin HFA 90 mcg/inh inhalation aerosol: 2 puff(s) inhaled every 8 hours as needed for  shortness of breath and/or wheezing

## 2024-09-18 NOTE — CONSULT NOTE ADULT - SUBJECTIVE AND OBJECTIVE BOX
CHIEF COMPLAINT:Patient is a 89y old  Female who presents with a chief complaint of leg  swelling (14 Sep 2024 12:18)      HPI:  89-year-old female with a history of hypertension, hyperlipidemia, CAD, COPD home 2L and chronic R hemidiaphragm paralysis sent from [facility], assisted living, with swelling right leg on and off for 2 weeks she states pain is increasing, no trauma .  denies   fever no chills (14 Sep 2024 08:52)    Pulmonary consulted for increase O2 requirement. Pt w known elevated D Dimer (~800 -> ~600 -> 472) and requiring 3L now from home 2L with question if CTA is needed to r/o PE. She is prescribed ICS/LABA at home but typically will only use the nebulizer per her report. She denies any dyspnea, new/worsening cough or sputum, fevers, wheezing.       PAST MEDICAL & SURGICAL HISTORY:  HTN (hypertension)      Hypothyroidism      Osteoporosis      CAD (coronary artery disease)      COPD (chronic obstructive pulmonary disease)      Pulmonary sarcoidosis      H/O pyelonephritis      Acute diverticulitis          FAMILY HISTORY:      SOCIAL HISTORY:  Smoking: former    Allergies    iodinated radiocontrast agents (Anaphylaxis)    Intolerances    lactose (Stomach Upset)      HOME MEDICATIONS:  Home Medications:  acetaminophen 500 mg oral tablet: 1 tab(s) orally 2 times a day and 1 tablet orally once a day as needed for pain (14 Sep 2024 14:40)  budesonide 0.25 mg/2 mL inhalation suspension: 2 milliliter(s) by nebulizer 2 times a day (14 Sep 2024 14:39)  cyclopentolate 1% ophthalmic solution: 1 drop(s) in the left eye 2 times a day (14 Sep 2024 14:47)  FLUoxetine 10 mg oral capsule: 1 cap(s) orally once a day in addition to 20mg cap; Total of 30mg (14 Sep 2024 14:43)  FLUoxetine 20 mg oral capsule: 1 cap(s) orally once a day in addition to 10mg capsule for total of 30mg daily (14 Sep 2024 14:44)  fluticasone-salmeterol 55 mcg-14 mcg/inh inhalation powder: 1 puff(s) inhaled 2 times a day (14 Sep 2024 14:45)  ipratropium-albuterol 0.5 mg-2.5 mg/3 mL inhalation solution: 3 milliliter(s) by nebulizer every 6 hours (14 Sep 2024 14:49)  MiraLax oral powder for reconstitution: 17 gram(s) orally once a day (14 Sep 2024 14:47)  prednisoLONE acetate 1% ophthalmic suspension: 1 drop(s) in the left eye 2 times a day (14 Sep 2024 14:47)  temazepam 15 mg oral capsule: 1 cap(s) orally once a day (at bedtime) as needed for  insomnia (14 Sep 2024 14:50)  traZODone 50 mg oral tablet: 1 tab(s) orally once a day (14 Sep 2024 14:50)  Ventolin HFA 90 mcg/inh inhalation aerosol: 2 puff(s) inhaled every 8 hours (14 Sep 2024 14:50)      REVIEW OF SYSTEMS:  Constitutional: [ x] negative [ ] fevers [ ] chills [ ] weight loss [ ] weight gain  HEENT: [x ] negative [ ] dry eyes [ ] eye irritation [ ] postnasal drip [ ] nasal congestion  CV: [x ] negative  [ ] chest pain [ ] orthopnea [ ] palpitations [ ] murmur  Resp: [x ] negative [ ] cough [ ] shortness of breath [ ] dyspnea [ ] wheezing [ ] sputum [ ] hemoptysis  GI: [x ] negative [ ] nausea [ ] vomiting [ ] diarrhea [ ] constipation [ ] abd pain [ ] dysphagia   : [ ] negative [ ] dysuria [ ] nocturia [ ] hematuria [ ] increased urinary frequency  Musculoskeletal: [x ] foot pain  Skin: [ ] negative [ ] rash [ ] itch  Neurological: [ ] negative [ ] headache [ ] dizziness [ ] syncope [ ] weakness [ ] numbness  Psychiatric: [ ] negative [ ] anxiety [ ] depression  Endocrine: [ ] negative [ ] diabetes [ ] thyroid problem  Hematologic/Lymphatic: [ ] negative [ ] anemia [ ] bleeding problem  Allergic/Immunologic: [ ] negative [ ] itchy eyes [ ] nasal discharge [ ] hives [ ] angioedema  [ ] All other systems negative  [ ] Unable to assess ROS because ________    OBJECTIVE:  ICU Vital Signs Last 24 Hrs  T(C): 36.4 (14 Sep 2024 13:18), Max: 36.7 (14 Sep 2024 03:45)  T(F): 97.6 (14 Sep 2024 13:18), Max: 98 (14 Sep 2024 03:45)  HR: 80 (14 Sep 2024 13:18) (72 - 114)  BP: 128/71 (14 Sep 2024 13:18) (121/72 - 145/78)  BP(mean): --  ABP: --  ABP(mean): --  RR: 18 (14 Sep 2024 13:18) (18 - 20)  SpO2: 98% (14 Sep 2024 13:18) (98% - 100%)    O2 Parameters below as of 14 Sep 2024 13:18  Patient On (Oxygen Delivery Method): nasal cannula  O2 Flow (L/min): 3            CAPILLARY BLOOD GLUCOSE          PHYSICAL EXAM:  Gen: NAD  HEENT: MMM, PERRL, EOMI  CV: RRR, no m/r/g  Lung: CTAB  Abd: Soft, NT, ND  Ext: WWP  Neuro: nonfocal    HOSPITAL MEDICATIONS:  Standing Meds:  atorvastatin 20 milliGRAM(s) Oral at bedtime  buDESOnide    Inhalation Suspension 0.5 milliGRAM(s) Inhalation two times a day  ceFAZolin   IVPB 1000 milliGRAM(s) IV Intermittent every 8 hours  cyclopentolate 1% Solution 1 Drop(s) Both EYES two times a day  FLUoxetine 20 milliGRAM(s) Oral daily  furosemide   Injectable 20 milliGRAM(s) IV Push daily  heparin   Injectable 5000 Unit(s) SubCutaneous every 12 hours  metoprolol succinate ER 50 milliGRAM(s) Oral daily  prednisoLONE acetate 1% Suspension 1 Drop(s) Both EYES two times a day  temazepam 15 milliGRAM(s) Oral at bedtime      PRN Meds:  acetaminophen     Tablet .. 650 milliGRAM(s) Oral every 6 hours PRN  albuterol    90 MICROgram(s) HFA Inhaler 1 Puff(s) Inhalation every 8 hours PRN      LABS:    The Labs were reviewed by me   The Radiology was reviewed by me    EKG tracing reviewed by me    09-13    139  |  102  |  18  ----------------------------<  133<H>  4.1   |  25  |  0.96    Ca    8.9      13 Sep 2024 21:00    TPro  6.3  /  Alb  3.4  /  TBili  0.3  /  DBili  x   /  AST  12  /  ALT  6<L>  /  AlkPhos  108  09-13          PT/INR - ( 13 Sep 2024 21:00 )   PT: 11.8 sec;   INR: 1.07 ratio         PTT - ( 13 Sep 2024 21:00 )  PTT:26.8 sec              Urinalysis Basic - ( 13 Sep 2024 21:00 )    Color: x / Appearance: x / SG: x / pH: x  Gluc: 133 mg/dL / Ketone: x  / Bili: x / Urobili: x   Blood: x / Protein: x / Nitrite: x   Leuk Esterase: x / RBC: x / WBC x   Sq Epi: x / Non Sq Epi: x / Bacteria: x                              9.7    8.49  )-----------( 173      ( 13 Sep 2024 21:00 )             31.9     CAPILLARY BLOOD GLUCOSE            MICROBIOLOGY:       RADIOLOGY:  [ ] Reviewed and interpreted by me    PULMONARY FUNCTION TESTS:    EKG:
 Patient is a 89y old  Female who presents with a chief complaint of leg  swelling (18 Sep 2024 09:54)    HPI:     89-year-old female           with a history of hypertension, hyperlipidemia, CAD COPD             sent from [facility], assisted living, with    swelling   right leg on and off for 2 weeks      she states pain is increasing       no trauma .  denies   fever no chills (14 Sep 2024 08:52)      PAST MEDICAL & SURGICAL HISTORY:  HTN (hypertension)      Hypothyroidism      Osteoporosis      CAD (coronary artery disease)      COPD (chronic obstructive pulmonary disease)      Pulmonary sarcoidosis      H/O pyelonephritis      Acute diverticulitis          Social history:    FAMILY HISTORY:            Allergic/Immunologic:	No hives or rash   Allergies    iodinated radiocontrast agents (Anaphylaxis)    Intolerances        Antimicrobials:    cephalexin 500 milliGRAM(s) Oral every 12 hours        Vital Signs Last 24 Hrs  T(C): 36.3 (18 Sep 2024 04:32), Max: 36.8 (17 Sep 2024 14:18)  T(F): 97.4 (18 Sep 2024 04:32), Max: 98.3 (17 Sep 2024 14:18)  HR: 61 (18 Sep 2024 04:32) (60 - 65)  BP: 133/60 (18 Sep 2024 04:32) (104/58 - 133/60)  BP(mean): --  RR: 18 (18 Sep 2024 04:32) (18 - 18)  SpO2: 98% (18 Sep 2024 04:32) (96% - 98%)    Parameters below as of 18 Sep 2024 04:32  Patient On (Oxygen Delivery Method): nasal cannula  O2 Flow (L/min): 3      PHYSICAL EXAM:Pleasant patient in no acute distress.           Neck:Supple,No LN,no JVD      Respiratory:Good air entry bilaterally,CTA    Cardiovascular:S1 S2 wnl, No murmurs,rub or gallops    Gastrointestinal:Soft BS(+) no tenderness no masses ,No rebound or guarding    Genitourinary:No CVA tendereness     Rectal:    Extremities:No cyanosis,clubbing or edema.    Vascular:peripheral pulses felt    Neurological:AAO X 3,No grossly focal deficits                                     8.9    5.49  )-----------( 162      ( 18 Sep 2024 07:04 )             28.8         09-18    138  |  100  |  18  ----------------------------<  88  3.5   |  26  |  0.84    Ca    8.6      18 Sep 2024 07:08  Mg     2.0     09-18        RECENT CULTURES:      MICROBIOLOGY:          Radiology:      Assessment:        Recommendations and Plan:    Pager [phone]  After 5 pm/weekends or if no response :[phone]                             
Date of Service, 09-14-24 @ 12:18  CHIEF COMPLAINT:Patient is a 89y old  Female who presents with a chief complaint of leg  swelling (14 Sep 2024 08:52)      HPI:  89-year-old female with a history of hypertension, hyperlipidemia, CAD COPD   sent from [facility], assisted living, with the swelling culture right leg on and off for 2 weeks, she states pain is increasing working no trauma but legs feels warm to touch, no fever no chills      PAST MEDICAL & SURGICAL HISTORY:  HTN (hypertension)  Hypothyroidism  Osteoporosis  CHF  CAD (coronary artery disease)  COPD (chronic obstructive pulmonary disease)  Pulmonary sarcoidosis  H/O pyelonephritis  Acute diverticulitis      MEDICATIONS  (STANDING):  atorvastatin 20 milliGRAM(s) Oral at bedtime  buDESOnide    Inhalation Suspension 0.5 milliGRAM(s) Inhalation two times a day  ceFAZolin   IVPB 1000 milliGRAM(s) IV Intermittent every 8 hours  cyclopentolate 1% Solution 1 Drop(s) Both EYES two times a day  FLUoxetine 20 milliGRAM(s) Oral daily  furosemide   Injectable 20 milliGRAM(s) IV Push daily  heparin   Injectable 5000 Unit(s) SubCutaneous every 12 hours  metoprolol succinate ER 50 milliGRAM(s) Oral daily  prednisoLONE acetate 1% Suspension 1 Drop(s) Both EYES two times a day  temazepam 15 milliGRAM(s) Oral at bedtime    MEDICATIONS  (PRN):  acetaminophen     Tablet .. 650 milliGRAM(s) Oral every 6 hours PRN Mild Pain (1 - 3)  albuterol    90 MICROgram(s) HFA Inhaler 1 Puff(s) Inhalation every 8 hours PRN Bronchospasm      FAMILY HISTORY:      SOCIAL HISTORY:    [x ] Non-smoker  [ ] Smoker  [ ] Alcohol    Allergies    iodinated radiocontrast agents (Anaphylaxis)    Intolerances    lactose (Stomach Upset)  	    REVIEW OF SYSTEMS:  CONSTITUTIONAL: No fever, weight loss, or fatigue  EYES: No eye pain, visual disturbances, or discharge  ENT:  No difficulty hearing, tinnitus, vertigo; No sinus or throat pain  NECK: No pain or stiffness  RESPIRATORY: No cough, wheezing, chills or hemoptysis; + mild  Shortness of Breath  CARDIOVASCULAR: No chest pain, palpitations, passing out, dizziness, + leg swelling  GASTROINTESTINAL: No abdominal or epigastric pain. No nausea, vomiting, or hematemesis; No diarrhea or constipation. No melena or hematochezia.  GENITOURINARY: No dysuria, frequency, hematuria, or incontinence  NEUROLOGICAL: No headaches, memory loss, loss of strength, numbness, or tremors  SKIN: No itching, burning, rashes, or lesions   LYMPH Nodes: No enlarged glands  ENDOCRINE: No heat or cold intolerance; No hair loss  MUSCULOSKELETAL: No joint pain or swelling; No muscle, back, or extremity pain  PSYCHIATRIC: No depression, anxiety, mood swings, or difficulty sleeping  HEME/LYMPH: No easy bruising, or bleeding gums  ALLERGY AND IMMUNOLOGIC: No hives or eczema	    [x ] All others negative	  [ ] Unable to obtain    PHYSICAL EXAM:  T(C): 36.3 (09-14-24 @ 09:39), Max: 36.7 (09-14-24 @ 03:45)  HR: 72 (09-14-24 @ 09:39) (72 - 114)  BP: 145/78 (09-14-24 @ 09:39) (121/72 - 145/78)  RR: 18 (09-14-24 @ 09:39) (18 - 20)  SpO2: 98% (09-14-24 @ 09:39) (98% - 100%)  Wt(kg): --  I&O's Summary      Appearance: Normal	  HEENT:   Normal oral mucosa, PERRL, EOMI	  Lymphatic: No lymphadenopathy  Cardiovascular: Normal S1 S2, No JVD,+murmurs, + edema  Respiratory: Lungs clear to auscultation	  Psychiatry: A & O x 3, Mood & affect appropriate  Gastrointestinal:  Soft, Non-tender, + BS	  Skin: No rashes, No ecchymoses, No cyanosis	  Neurologic: Non-focal  Extremities: Normal range of motion, No clubbing, cyanosis , +  edema, cellulitis  Vascular: Peripheral pulses palpable 2+ bilaterally    TELEMETRY: 	    ECG:  	  RADIOLOGY:  OTHER: 	  	  LABS:	 	    CARDIAC MARKERS:                          9.7    8.49  )-----------( 173      ( 13 Sep 2024 21:00 )             31.9     09-13    139  |  102  |  18  ----------------------------<  133<H>  4.1   |  25  |  0.96    Ca    8.9      13 Sep 2024 21:00    TPro  6.3  /  Alb  3.4  /  TBili  0.3  /  DBili  x   /  AST  12  /  ALT  6<L>  /  AlkPhos  108  09-13    proBNP:   Lipid Profile:   HgA1c:   TSH:   PT/INR - ( 13 Sep 2024 21:00 )   PT: 11.8 sec;   INR: 1.07 ratio         PTT - ( 13 Sep 2024 21:00 )  PTT:26.8 sec    PREVIOUS DIAGNOSTIC TESTING:    < from: 12 Lead ECG (07.14.24 @ 16:09) >  Diagnosis Line NORMAL SINUS RHYTHM  MINIMAL VOLTAGE CRITERIA FOR LVH, MAY BE NORMAL VARIANT  NON-SPECIFIC ST/T ABNORMALITY  POOR R WAVE PROGRESSION  WHEN COMPARED WITH ECG OF 14-JUL-2024 05:57, (UNCONFIRMED)  CHANGES HAVE OCCURRED  -ST/T WAVE CHANGES      < from: TTE Limited W or WO Ultrasound Enhancing Agent (04.08.24 @ 14:17) >   1. Left ventricular cavity is normal in size. Left ventricular wall thickness is normal. Left ventricular systolic function is normal with an ejection fraction visually estimated at 55 to 60 %. There are no regional wall motion abnormalities seen.   2. Normal rightventricular cavity size, with normal wall thickness, and normal systolic function. Tricuspid annular plane systolic excursion (TAPSE) is 2.0 cm (normal >=1.7 cm).   3. The left atrium is severely dilated.   4. Mild mitral regurgitation.   5. Comparedto the transthoracic echocardiogram performed on 3/26/2024, regional wall motion abnormality not appreciated.

## 2024-09-18 NOTE — DISCHARGE NOTE PROVIDER - HOSPITAL COURSE
HPI:     89-year-old female           with a history of hypertension, hyperlipidemia, CAD COPD             sent from [facility], assisted living, with    swelling   right leg on and off for 2 weeks      she states pain is increasing       no trauma .  denies   fever no chills (14 Sep 2024 08:52)      Hospital Course:    Weight - Discharge/Trend: 57.7 kg  Hospital course-- admitted with acute LLE cellulitis wo any trauma. Treated with iv ABs (Ancef), got better. She was consulted with ID, Card, Psych, S/S and pulm. She is hemodynamically stable to be discharged to [place] with 7 more days of Keflex per ID recomm. Dispo and disc meds were d/w Attending AMBREEN Garcia.    Documented EF: not recent available, last one done on 4/6/24, reduced EF , but no number    Guideline Directed Medical Therapy Prescribed/Reason why not prescribed:  B-Blocker: Toprol XL 50 mg daily  ARNI/ACE-I/ARB: none  MRA:  SGLT2 inhibitor:     Appointment scheduled within 7 days?  [ ] YES [x ] NO    Active or Pending Issues Requiring Follow-up: none    Advanced Directives:   [ x] Full code  [ ] DNR  [ ] Hospice    Discharge Diagnoses: LLE cellulitis, CAD, dementia, copd, rush ch CHF with no renal involvement, Anxiety  (Please specify acuity, type of heart failure, and if there was renal involvement)     HPI:     89-year-old female           with a history of hypertension, hyperlipidemia, CAD COPD             sent from [facility], assisted living, with    swelling   right leg on and off for 2 weeks      she states pain is increasing       no trauma .  denies   fever no chills (14 Sep 2024 08:52)      Hospital Course:    Weight - Discharge/Trend: 57.7 kg  Hospital course-- admitted with acute LLE cellulitis wo any trauma. Treated with iv ABs (Ancef), got better. She was consulted with ID, Card, Psych, S/S and pulm. 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 once euvolemic. switched to PO lasix. She is hemodynamically stable to be discharged to [facility] with 7 more days of Keflex per ID recomm Dispo and disc meds were d/w Attending AMBREEN Garcia.    Documented EF: not recent available, last one done on 4/6/24, reduced EF , but no number    Guideline Directed Medical Therapy Prescribed/Reason why not prescribed:  B-Blocker: Toprol XL 50 mg daily  ARNI/ACE-I/ARB: none  MRA:  SGLT2 inhibitor:     Appointment scheduled within 7 days?  [ ] YES [x ] NO    Active or Pending Issues Requiring Follow-up: none    Advanced Directives:   [ x] Full code  [ ] DNR  [ ] Hospice    Discharge Diagnoses: LLE cellulitis, CAD, dementia, copd, rush ch CHF with no renal involvement, Anxiety  (Please specify acuity, type of heart failure, and if there was renal involvement)

## 2024-09-18 NOTE — DISCHARGE NOTE PROVIDER - CARE PROVIDER_API CALL
[facility]  [address] [place] [address]  Phone: [phone]  Fax: [phone]  Follow Up Time:     Constanza Gardiner  Cardiovascular Disease  [address] [place] [address]  Phone: [phone]  Fax: [phone]  Follow Up Time:

## 2024-09-18 NOTE — BH CONSULTATION LIAISON PROGRESS NOTE - NSBHCONSULTRECOMMENDOTHER_PSY_A_CORE FT
Per NP, the pt. will return to her assisted living facility today  Continue with Prozac 30mg/d, Temazepam 15mg qhs, and Trazodone 50mg qd at 1700 hours

## 2024-09-18 NOTE — CONSULT NOTE ADULT - ASSESSMENT
89 year old with   dementia  CAD  recurrent diverticulitis   sarcoid  recurrent pyelonephritis  COPD  pt had cellulitis on the left lower extremity but area looks fine and it has resolved    agree with discharge to complete a 7 day course with keflex 
89 year old female with pmh of COPD,on home O2, chronic R hemidiaphragm paralysis, h/o recurrent aspiration events, HTN, diverticulitis,  asthma/COPD, sarcoidosis, hypothyroidism, heart failure, presenting to the ER with the swelling  right leg on and off for 2 weeks, she states pain is increasing working no trauma but legs feels warm to touch, no fever no chills   patient denies f/n/v/d, CP, HA, dizziness, abdominal pain, urinary symptoms  pt is well known to me with hx of underlying lung disease chf with LE edema nd redness  HFPEF acute on chronic with the swelling culture right leg re start on diuretics  pt with hx of sarcoidosis  + le edema, check venous doppler, negative on the last admission  start lasix 20 mg iv daily  will adjust cardiac meds  oob to the chair  continue current meds  duoneb  IV bax  will ad norvasc if bp remains elevated  
89-year-old female with a history of hypertension, hyperlipidemia, CAD, COPD home 2L and chronic R hemidiaphragm paralysis sent from [facility], assisted living, with RLE cellulitis    #COPD  Pulmonary consulted for increase O2 requirement. Pt w known elevated D Dimer (~800 -> ~600 -> 472) and requiring 3L now from home 2L with question if CTA is needed to r/o PE. She is prescribed ICS/LABA at home but typically will only use the nebulizer per her report. She denies any dyspnea, new/worsening cough or sputum, fevers, wheezing.   On 2L NC saturating high 90s at time of pulmonary evaluation  continue symbicort while inpatient and resume home regimen on d/c  Noted downtrending d-dimer without s/s of PE on H&P, would not pursue v/q or CTPA  spO2 goal >88%  Pulmonary will sign off, reconsult with additional questions

## 2024-09-19 ENCOUNTER — TRANSCRIPTION ENCOUNTER (OUTPATIENT)
Age: 89
End: 2024-09-19

## 2024-09-19 RX ORDER — CEPHALEXIN 500 MG
1 CAPSULE ORAL
Qty: 0 | Refills: 0 | DISCHARGE
Start: 2024-09-19

## 2024-09-19 RX ORDER — TRAZODONE HCL 50 MG
1 TABLET ORAL
Qty: 0 | Refills: 0 | DISCHARGE

## 2024-09-19 RX ORDER — IPRATROPIUM BROMIDE AND ALBUTEROL SULFATE .5; 3 MG/3ML; MG/3ML
3 SOLUTION RESPIRATORY (INHALATION)
Qty: 0 | Refills: 0 | DISCHARGE

## 2024-09-19 RX ORDER — ACETAMINOPHEN 325 MG/1
1 TABLET ORAL
Refills: 0 | DISCHARGE

## 2024-09-19 RX ORDER — FLUOXETINE HCL 20 MG/5 ML
3 SOLUTION, ORAL ORAL
Qty: 90 | Refills: 0
Start: 2024-09-19 | End: 2024-10-18

## 2024-09-19 RX ORDER — CEPHALEXIN 500 MG
1 CAPSULE ORAL
Qty: 3 | Refills: 0
Start: 2024-09-19 | End: 2024-09-20

## 2024-09-19 RX ORDER — FLUOXETINE HCL 20 MG/5 ML
1 SOLUTION, ORAL ORAL
Refills: 0 | DISCHARGE

## 2024-09-19 RX ORDER — FLUOXETINE HCL 20 MG/5 ML
3 SOLUTION, ORAL ORAL
Qty: 0 | Refills: 0 | DISCHARGE
Start: 2024-09-19

## 2024-09-19 RX ORDER — TEMAZEPAM 30 MG/1
1 CAPSULE ORAL
Qty: 30 | Refills: 0
Start: 2024-09-19 | End: 2024-10-18

## 2024-09-19 RX ORDER — ASCORBIC ACID/ASCORBATE SODIUM 500 MG
1 TABLET,CHEWABLE ORAL
Qty: 0 | Refills: 0 | DISCHARGE
Start: 2024-09-19

## 2024-09-19 RX ORDER — ACETAMINOPHEN 325 MG/1
2 TABLET ORAL
Qty: 0 | Refills: 0 | DISCHARGE
Start: 2024-09-19

## 2024-09-19 RX ADMIN — Medication 500 MILLIGRAM(S): at 18:38

## 2024-09-19 RX ADMIN — Medication 500 MILLIGRAM(S): at 12:27

## 2024-09-19 RX ADMIN — Medication 20 MILLIGRAM(S): at 21:37

## 2024-09-19 RX ADMIN — Medication 1 DROP(S): at 18:27

## 2024-09-19 RX ADMIN — Medication 1 TABLET(S): at 12:27

## 2024-09-19 RX ADMIN — ACETAMINOPHEN 650 MILLIGRAM(S): 325 TABLET ORAL at 12:28

## 2024-09-19 RX ADMIN — Medication 50 MILLIGRAM(S): at 18:39

## 2024-09-19 RX ADMIN — BUDESONIDE 0.5 MILLIGRAM(S): 3 CAPSULE ORAL at 18:27

## 2024-09-19 RX ADMIN — ACETAMINOPHEN 650 MILLIGRAM(S): 325 TABLET ORAL at 18:39

## 2024-09-19 RX ADMIN — Medication 1 DROP(S): at 06:20

## 2024-09-19 RX ADMIN — TEMAZEPAM 15 MILLIGRAM(S): 30 CAPSULE ORAL at 21:37

## 2024-09-19 RX ADMIN — Medication 5000 UNIT(S): at 06:20

## 2024-09-19 RX ADMIN — Medication 30 MILLIGRAM(S): at 12:29

## 2024-09-19 RX ADMIN — Medication 500 MILLIGRAM(S): at 06:20

## 2024-09-19 RX ADMIN — Medication 5000 UNIT(S): at 18:42

## 2024-09-19 RX ADMIN — Medication 20 MILLIGRAM(S): at 06:20

## 2024-09-19 RX ADMIN — BUDESONIDE 0.5 MILLIGRAM(S): 3 CAPSULE ORAL at 06:20

## 2024-09-19 RX ADMIN — ACETAMINOPHEN 650 MILLIGRAM(S): 325 TABLET ORAL at 06:21

## 2024-09-19 RX ADMIN — METOPROLOL TARTRATE 50 MILLIGRAM(S): 100 TABLET ORAL at 06:20

## 2024-09-19 NOTE — BH CONSULTATION LIAISON PROGRESS NOTE - NSBHCONSULTFOLLOWAFTERCARE_PSY_A_CORE FT
Spinal Block      Patient location during procedure: OR  Start Time: 2/17/2019 7:39 PM  Stop Time: 2/17/2019 7:40 PM  Indication:at surgeon's request  Performed By  Anesthesiologist: Jose Chauhan MD  Preanesthetic Checklist  Completed: patient identified, site marked, surgical consent, pre-op evaluation, timeout performed, IV checked, risks and benefits discussed and monitors and equipment checked  Spinal Block Prep:  Patient Position:sitting  Sterile Tech:cap, gloves, sterile barriers and mask  Prep:Betadine  Patient Monitoring:EKG, continuous pulse oximetry and blood pressure monitoring  Spinal Block Procedure  Approach:midline  Guidance:palpation technique  Location:L2-L3  Needle Type:Emiliano  Needle Gauge:25 G  Placement of Spinal needle event:cerebrospinal fluid aspirated  Paresthesia: no  Fluid Appearance:clear  Medications: bupivacaine PF (MARCAINE) 0.75 % injection Hyperbaric, 1.6 mL  morphine PF (ASTRAMORPH) injection, 0.2 mg  fentaNYL citrate (PF) (SUBLIMAZE) injection, 20 mcg  Med Administered at 2/17/2019 7:40 PM   Post Assessment  Patient Tolerance:patient tolerated the procedure well with no apparent complications  Complications no            
The psychiatrist at the pt's assisted living facility can follow the pt. there. 
The psychiatrist at the pt's rehab or assisted living facility can follow her there. 
The psychiatrist at her assisted living facility can follow up there. 
Have the pt. follow up next week with the psychiatrist at her assisted living facility.

## 2024-09-19 NOTE — BH CONSULTATION LIAISON PROGRESS NOTE - NSBHFUPINTERVALHXFT_PSY_A_CORE
Sleep and appetite wnl. Anxiety in remission. Discharge to home on hold as the pt. needs rehab per NP. 
No report of agitation. Slept well last night and she says she has an appetite today. Tolerating her psychiatric medicines. No anxiety today. 
Per the pt's RN there is no agitation overnight or today but the pt. is anxious. Slept well per pt. and is eating well. No complaint of pain. Compliant with medicines and no adverse reactions.
No report of agitation, confusion, or sx. of Temazepam withdrawal. Eating and sleeping well. No pain. Oxygen sat now 94% and the pt. is comfortable.

## 2024-09-19 NOTE — PHYSICAL THERAPY INITIAL EVALUATION ADULT - ADDITIONAL COMMENTS
Pt resides in [facility], (+)elevator, PTA, pt has HHA 5days/8hours, ambulatory with RW, owns wheelchair, shower chair, has home PT.

## 2024-09-19 NOTE — BH CONSULTATION LIAISON PROGRESS NOTE - MSE UNSTRUCTURED FT
Elderly WF in chair with nurses and aid in the room. The pt. is calm, hard of hearing, but alert and oriented x 3. I and J fair. Left hand tremor noted when she picks up a cup. Speech is coherent with normal rate and volume. No hallucinations or delusions. No suicidal or homicidal ideation or plan. Mood is euthymic and affect appropriate. Attention and concentration, short term memory, and long term memory wnl. 
Elderly WF sitting in a chair. Calm, alert, and oriented x 3. Pleasant and cooperative. I and J are fair. No psychomotor abnormalities. No diaphoresis. Speech is coherent with normal rate and volume. Hard of hearing. No hallucinations or delusions. She denied suicidal and homicidal ideation and plan. Mood is "ok" and affect appropriate. Attention and concentration, short term memory, and long term memory intact. 
Elderly WF in bed sitting up eating lunch. Aid at bedside. The pt. is calm, alert, and oriented x 3. I and J are fair. Speech is coherent with normal rate and volume. Hard of hearing. No diaphoresis or tremors. No hallucinations or delusions. No suicidal or homicidal ideation or plan. Mood is euthymic and affect full range and appropriate. Attention and concentration, STM, and LTM fair. 
Elderly WF in chair at bedside eating lunch. Her home aid is with her now. The pt. is calm, awake, alert, and oriented x 3. Hard of hearing. No diaphoresis or tremors. Speech is coherent with normal rate and volume. No hallucinations or delusions. No suicidal or homicidal ideation or plan. Mood is "ok" and affect appropriate and full range. Attention and concentration, short term memory, and long term memory wnl.

## 2024-09-19 NOTE — PHYSICAL THERAPY INITIAL EVALUATION ADULT - PERTINENT HX OF CURRENT PROBLEM, REHAB EVAL
Pt is 89F admitted 9/14/24 PMHx   hypertension, hyperlipidemia, CAD COPD, sent from [facility], assisted living, with   swelling   right leg on and off for 2 weeks,  she states pain is increasing.      XRAY CHEST: Trace bilateral pleural effusions with adjacent atelectasis. XRAY TIB/FIB: No acute fracture or dislocation. Partiallyimaged lobulated calcification along the medial femoralcondyle may be related to calcific capsulitis. Chondrocalcinosis. Mild arthrosisof the knee.Diffuse subcutaneous edema. No tracking gas.

## 2024-09-19 NOTE — DISCHARGE NOTE NURSING/CASE MANAGEMENT/SOCIAL WORK - PATIENT PORTAL LINK FT
You can access the FollowMyHealth Patient Portal offered by [facility] by registering at the following website: [email or web address]. By joining [facility] FollowMyHealth portal, you will also be able to view your health information using other applications (apps) compatible with our system.

## 2024-09-19 NOTE — PHYSICAL THERAPY INITIAL EVALUATION ADULT - GENERAL OBSERVATIONS, REHAB EVAL
received OOB seated in chair, A&OX4, following simple commands, willing to participate, a/w LE swelling, +supplemental 02 via NC.

## 2024-09-19 NOTE — BH CONSULTATION LIAISON PROGRESS NOTE - NSBHCHARTREVIEWVS_PSY_A_CORE FT
Vital Signs Last 24 Hrs  T(C): 36.7 (17 Sep 2024 04:22), Max: 36.9 (16 Sep 2024 13:50)  T(F): 98 (17 Sep 2024 04:22), Max: 98.4 (16 Sep 2024 13:50)  HR: 70 (17 Sep 2024 04:22) (70 - 71)  BP: 151/60 (17 Sep 2024 04:22) (120/62 - 151/60)  BP(mean): --  RR: 18 (17 Sep 2024 04:22) (18 - 18)  SpO2: 94% (17 Sep 2024 04:22) (94% - 98%)    Parameters below as of 17 Sep 2024 04:22  Patient On (Oxygen Delivery Method): nasal cannula  O2 Flow (L/min): 3  
Vital Signs Last 24 Hrs  T(C): 36.6 (16 Sep 2024 04:42), Max: 36.6 (16 Sep 2024 04:42)  T(F): 97.9 (16 Sep 2024 04:42), Max: 97.9 (16 Sep 2024 04:42)  HR: 68 (16 Sep 2024 04:42) (68 - 73)  BP: 117/69 (16 Sep 2024 04:42) (117/69 - 134/65)  BP(mean): --  RR: 18 (16 Sep 2024 04:42) (18 - 18)  SpO2: 94% (16 Sep 2024 04:42) (94% - 94%)    Parameters below as of 16 Sep 2024 04:42  Patient On (Oxygen Delivery Method): nasal cannula  O2 Flow (L/min): 3  
Vital Signs Last 24 Hrs  T(C): 36.7 (19 Sep 2024 04:45), Max: 36.7 (19 Sep 2024 04:45)  T(F): 98 (19 Sep 2024 04:45), Max: 98 (19 Sep 2024 04:45)  HR: 67 (19 Sep 2024 10:51) (60 - 67)  BP: 105/57 (19 Sep 2024 10:51) (88/51 - 142/66)  BP(mean): --  RR: 18 (19 Sep 2024 04:45) (18 - 18)  SpO2: 98% (19 Sep 2024 10:51) (96% - 98%)    Parameters below as of 19 Sep 2024 10:51  Patient On (Oxygen Delivery Method): nasal cannula  O2 Flow (L/min): 2  
Vital Signs Last 24 Hrs  T(C): 36.4 (18 Sep 2024 12:17), Max: 36.8 (17 Sep 2024 14:18)  T(F): 97.5 (18 Sep 2024 12:17), Max: 98.3 (17 Sep 2024 14:18)  HR: 64 (18 Sep 2024 12:17) (60 - 65)  BP: 113/62 (18 Sep 2024 12:17) (104/58 - 133/60)  BP(mean): --  RR: 18 (18 Sep 2024 12:17) (18 - 18)  SpO2: 95% (18 Sep 2024 12:17) (95% - 98%)    Parameters below as of 18 Sep 2024 12:17  Patient On (Oxygen Delivery Method): nasal cannula

## 2024-09-19 NOTE — BH CONSULTATION LIAISON PROGRESS NOTE - CURRENT MEDICATION
MEDICATIONS  (STANDING):  ascorbic acid 500 milliGRAM(s) Oral daily  atorvastatin 20 milliGRAM(s) Oral at bedtime  buDESOnide    Inhalation Suspension 0.5 milliGRAM(s) Inhalation two times a day  ceFAZolin   IVPB 1000 milliGRAM(s) IV Intermittent every 8 hours  cyclopentolate 1% Solution 1 Drop(s) Both EYES two times a day  FLUoxetine 30 milliGRAM(s) Oral daily  furosemide   Injectable 20 milliGRAM(s) IV Push daily  heparin   Injectable 5000 Unit(s) SubCutaneous every 12 hours  metoprolol succinate ER 50 milliGRAM(s) Oral daily  multivitamin 1 Tablet(s) Oral daily  prednisoLONE acetate 1% Suspension 1 Drop(s) Both EYES two times a day  temazepam 15 milliGRAM(s) Oral at bedtime  traZODone 50 milliGRAM(s) Oral <User Schedule>    MEDICATIONS  (PRN):  acetaminophen     Tablet .. 650 milliGRAM(s) Oral every 6 hours PRN Mild Pain (1 - 3)  albuterol    90 MICROgram(s) HFA Inhaler 1 Puff(s) Inhalation every 8 hours PRN Bronchospasm  haloperidol    Injectable 0.5 milliGRAM(s) IV Push every 12 hours PRN severe Agitation  
MEDICATIONS  (STANDING):  ascorbic acid 500 milliGRAM(s) Oral daily  atorvastatin 20 milliGRAM(s) Oral at bedtime  buDESOnide    Inhalation Suspension 0.5 milliGRAM(s) Inhalation two times a day  cephalexin 500 milliGRAM(s) Oral every 12 hours  cyclopentolate 1% Solution 1 Drop(s) Both EYES two times a day  FLUoxetine 30 milliGRAM(s) Oral daily  furosemide    Tablet 20 milliGRAM(s) Oral daily  heparin   Injectable 5000 Unit(s) SubCutaneous every 12 hours  metoprolol succinate ER 50 milliGRAM(s) Oral daily  multivitamin 1 Tablet(s) Oral daily  prednisoLONE acetate 1% Suspension 1 Drop(s) Both EYES two times a day  temazepam 15 milliGRAM(s) Oral at bedtime  traZODone 50 milliGRAM(s) Oral <User Schedule>    MEDICATIONS  (PRN):  acetaminophen     Tablet .. 650 milliGRAM(s) Oral every 6 hours PRN Mild Pain (1 - 3)  albuterol    90 MICROgram(s) HFA Inhaler 1 Puff(s) Inhalation every 8 hours PRN Bronchospasm  haloperidol    Injectable 0.5 milliGRAM(s) IV Push every 12 hours PRN severe Agitation  
MEDICATIONS  (STANDING):  ascorbic acid 500 milliGRAM(s) Oral daily  atorvastatin 20 milliGRAM(s) Oral at bedtime  buDESOnide    Inhalation Suspension 0.5 milliGRAM(s) Inhalation two times a day  ceFAZolin   IVPB 1000 milliGRAM(s) IV Intermittent every 8 hours  cyclopentolate 1% Solution 1 Drop(s) Both EYES two times a day  FLUoxetine 30 milliGRAM(s) Oral daily  furosemide   Injectable 20 milliGRAM(s) IV Push daily  heparin   Injectable 5000 Unit(s) SubCutaneous every 12 hours  metoprolol succinate ER 50 milliGRAM(s) Oral daily  multivitamin 1 Tablet(s) Oral daily  prednisoLONE acetate 1% Suspension 1 Drop(s) Both EYES two times a day  temazepam 15 milliGRAM(s) Oral at bedtime  traZODone 50 milliGRAM(s) Oral <User Schedule>    MEDICATIONS  (PRN):  acetaminophen     Tablet .. 650 milliGRAM(s) Oral every 6 hours PRN Mild Pain (1 - 3)  albuterol    90 MICROgram(s) HFA Inhaler 1 Puff(s) Inhalation every 8 hours PRN Bronchospasm  haloperidol    Injectable 0.5 milliGRAM(s) IV Push every 12 hours PRN severe Agitation  
MEDICATIONS  (STANDING):  ascorbic acid 500 milliGRAM(s) Oral daily  atorvastatin 20 milliGRAM(s) Oral at bedtime  buDESOnide    Inhalation Suspension 0.5 milliGRAM(s) Inhalation two times a day  cephalexin 500 milliGRAM(s) Oral every 12 hours  cyclopentolate 1% Solution 1 Drop(s) Both EYES two times a day  FLUoxetine 30 milliGRAM(s) Oral daily  furosemide    Tablet 20 milliGRAM(s) Oral daily  heparin   Injectable 5000 Unit(s) SubCutaneous every 12 hours  metoprolol succinate ER 50 milliGRAM(s) Oral daily  multivitamin 1 Tablet(s) Oral daily  prednisoLONE acetate 1% Suspension 1 Drop(s) Both EYES two times a day  temazepam 15 milliGRAM(s) Oral at bedtime  traZODone 50 milliGRAM(s) Oral <User Schedule>    MEDICATIONS  (PRN):  acetaminophen     Tablet .. 650 milliGRAM(s) Oral every 6 hours PRN Mild Pain (1 - 3)  albuterol    90 MICROgram(s) HFA Inhaler 1 Puff(s) Inhalation every 8 hours PRN Bronchospasm  haloperidol    Injectable 0.5 milliGRAM(s) IV Push every 12 hours PRN severe Agitation

## 2024-09-19 NOTE — BH CONSULTATION LIAISON PROGRESS NOTE - NSICDXBHPRIMARYDX_PSY_ALL_CORE
Generalized anxiety disorder   F41.1  

## 2024-09-19 NOTE — BH CONSULTATION LIAISON PROGRESS NOTE - NSBHATTESTBILLING_PSY_A_CORE
19570-Yaxlvyhjyg OBS or IP - moderate complexity OR 35-49 mins
01298-Euxcsbufeb OBS or IP - moderate complexity OR 35-49 mins
55820-Dknqziutni OBS or IP - moderate complexity OR 35-49 mins
92435-Ldbrnbsmul OBS or IP - moderate complexity OR 35-49 mins

## 2024-09-19 NOTE — BH CONSULTATION LIAISON PROGRESS NOTE - NSBHASSESSMENTFT_PSY_ALL_CORE
MANN  Tolerating current medicines.  No signs of withdrawal from Restoril (lower dose). 
MANN  Rule out early dementia  Doing well on current psychiatric medicine regimen
Adjustment disorder (pt. is frustrated being here). MANN. MCI vs. early dementia given history from daughter of short term memory decline. Tolerating current psychiatric medicines. 
Tolerating current psychiatric medicines so no dose adjustment is needed.

## 2024-09-19 NOTE — BH CONSULTATION LIAISON PROGRESS NOTE - NSBHCHARTREVIEWLAB_PSY_A_CORE FT
CBC Full  -  ( 18 Sep 2024 07:04 )  WBC Count : 5.49 K/uL  Hemoglobin : 8.9 g/dL  Hematocrit : 28.8 %  Platelet Count - Automated : 162 K/uL  Mean Cell Volume : 90.9 fl  Mean Cell Hemoglobin : 28.1 pg  Mean Cell Hemoglobin Concentration : 30.9 gm/dL  Auto Neutrophil # : x  Auto Lymphocyte # : x  Auto Monocyte # : x  Auto Eosinophil # : x  Auto Basophil # : x  Auto Neutrophil % : x  Auto Lymphocyte % : x  Auto Monocyte % : x  Auto Eosinophil % : x  Auto Basophil % : x    09-18    138  |  100  |  18  ----------------------------<  88  3.5   |  26  |  0.84    Ca    8.6      18 Sep 2024 07:08  Mg     2.0     09-18    
CBC Full  -  ( 18 Sep 2024 07:04 )  WBC Count : 5.49 K/uL  Hemoglobin : 8.9 g/dL  Hematocrit : 28.8 %  Platelet Count - Automated : 162 K/uL  Mean Cell Volume : 90.9 fl  Mean Cell Hemoglobin : 28.1 pg  Mean Cell Hemoglobin Concentration : 30.9 gm/dL  Auto Neutrophil # : x  Auto Lymphocyte # : x  Auto Monocyte # : x  Auto Eosinophil # : x  Auto Basophil # : x  Auto Neutrophil % : x  Auto Lymphocyte % : x  Auto Monocyte % : x  Auto Eosinophil % : x  Auto Basophil % : x    09-18    138  |  100  |  18  ----------------------------<  88  3.5   |  26  |  0.84    Ca    8.6      18 Sep 2024 07:08  Mg     2.0     09-18    
CBC Full  -  ( 17 Sep 2024 07:15 )  WBC Count : 6.81 K/uL  Hemoglobin : 9.8 g/dL  Hematocrit : 31.7 %  Platelet Count - Automated : 157 K/uL  Mean Cell Volume : 92.7 fl  Mean Cell Hemoglobin : 28.7 pg  Mean Cell Hemoglobin Concentration : 30.9 gm/dL  Auto Neutrophil # : x  Auto Lymphocyte # : x  Auto Monocyte # : x  Auto Eosinophil # : x  Auto Basophil # : x  Auto Neutrophil % : x  Auto Lymphocyte % : x  Auto Monocyte % : x  Auto Eosinophil % : x  Auto Basophil % : x    09-17    140  |  102  |  14  ----------------------------<  117[H]  3.8   |  26  |  0.76    Ca    9.0      17 Sep 2024 07:13  Mg     2.1     09-17

## 2024-09-19 NOTE — BH CONSULTATION LIAISON PROGRESS NOTE - NSBHCONSULTPRIMARYDISCUSSYES_PSY_A_CORE FT
discussed with P.A. the above. 
Discussed the above with BHARTI Worley. 
Discussed the above with Ashley Brar NP. 
Discussed the above with the pt's NP Yana Kingston

## 2024-09-19 NOTE — BH CONSULTATION LIAISON PROGRESS NOTE - NSBHTIMEACTIVITIESPERFORMED_PSY_A_CORE
Reviewed the chart, examined the pt., and discussed with the pt's RN and P.A..
Reviewed the chart, examined the pt., and discussed with the physician assistant. 
Reviewed the chart, examined the pt., and discussed with NP. 
Reviewed the chart, examined the pt., and discussed with the pt's NP.

## 2024-09-20 VITALS
RESPIRATION RATE: 18 BRPM | HEART RATE: 71 BPM | SYSTOLIC BLOOD PRESSURE: 127 MMHG | DIASTOLIC BLOOD PRESSURE: 62 MMHG | TEMPERATURE: 98 F | OXYGEN SATURATION: 95 %

## 2024-09-20 RX ORDER — TRAZODONE HCL 50 MG
1 TABLET ORAL
Qty: 30 | Refills: 0
Start: 2024-09-20 | End: 2024-10-19

## 2024-09-20 RX ORDER — TEMAZEPAM 30 MG/1
1 CAPSULE ORAL
Qty: 0 | Refills: 0 | DISCHARGE

## 2024-09-20 RX ADMIN — Medication 5000 UNIT(S): at 05:49

## 2024-09-20 RX ADMIN — Medication 1 TABLET(S): at 11:52

## 2024-09-20 RX ADMIN — Medication 1 DROP(S): at 05:50

## 2024-09-20 RX ADMIN — Medication 500 MILLIGRAM(S): at 05:50

## 2024-09-20 RX ADMIN — ACETAMINOPHEN 650 MILLIGRAM(S): 325 TABLET ORAL at 12:52

## 2024-09-20 RX ADMIN — Medication 20 MILLIGRAM(S): at 05:49

## 2024-09-20 RX ADMIN — Medication 30 MILLIGRAM(S): at 11:52

## 2024-09-20 RX ADMIN — Medication 500 MILLIGRAM(S): at 11:52

## 2024-09-20 RX ADMIN — METOPROLOL TARTRATE 50 MILLIGRAM(S): 100 TABLET ORAL at 05:50

## 2024-09-20 RX ADMIN — BUDESONIDE 0.5 MILLIGRAM(S): 3 CAPSULE ORAL at 05:50

## 2024-09-20 RX ADMIN — ACETAMINOPHEN 650 MILLIGRAM(S): 325 TABLET ORAL at 11:52

## 2024-09-20 NOTE — PROGRESS NOTE ADULT - SUBJECTIVE AND OBJECTIVE BOX
---___---___---___---___---___---___ ---___---___---___---___---___---___---___---___---                  M E D I C A L   A T T E N D I N G   P R O G R E S S   N O T E  ---___---___---___---___---___---___ ---___---___---___---___---___---___---___---___---        ================================================    ++CHIEF COMPLAINT:   Patient is a 89y old  Female who presents with a chief complaint of Other disorders of soft tissue     (16 Sep 2024 10:32)      Other disorders of soft tissue      ---___---___---___---___---___---  PAST MEDICAL / Surgical  HISTORY:  PAST MEDICAL & SURGICAL HISTORY:  HTN (hypertension)      Hypothyroidism      Osteoporosis      CAD (coronary artery disease)      COPD (chronic obstructive pulmonary disease)      Pulmonary sarcoidosis      H/O pyelonephritis      Acute diverticulitis          ---___---___---___---___---___---  FAMILY HISTORY:   FAMILY HISTORY:        ---___---___---___---___---___---  ALLERGIES:   Allergies    iodinated radiocontrast agents (Anaphylaxis)    Intolerances        ---___---___---___---___---___---  MEDICATIONS:  MEDICATIONS  (STANDING):  ascorbic acid 500 milliGRAM(s) Oral daily  atorvastatin 20 milliGRAM(s) Oral at bedtime  buDESOnide    Inhalation Suspension 0.5 milliGRAM(s) Inhalation two times a day  ceFAZolin   IVPB 1000 milliGRAM(s) IV Intermittent every 8 hours  cyclopentolate 1% Solution 1 Drop(s) Both EYES two times a day  FLUoxetine 30 milliGRAM(s) Oral daily  furosemide   Injectable 20 milliGRAM(s) IV Push daily  heparin   Injectable 5000 Unit(s) SubCutaneous every 12 hours  metoprolol succinate ER 50 milliGRAM(s) Oral daily  multivitamin 1 Tablet(s) Oral daily  prednisoLONE acetate 1% Suspension 1 Drop(s) Both EYES two times a day  temazepam 15 milliGRAM(s) Oral at bedtime  traZODone 50 milliGRAM(s) Oral <User Schedule>    MEDICATIONS  (PRN):  acetaminophen     Tablet .. 650 milliGRAM(s) Oral every 6 hours PRN Mild Pain (1 - 3)  albuterol    90 MICROgram(s) HFA Inhaler 1 Puff(s) Inhalation every 8 hours PRN Bronchospasm  haloperidol    Injectable 0.5 milliGRAM(s) IV Push every 12 hours PRN severe Agitation      ---___---___---___---___---___---  REVIEW OF SYSTEM:    GEN: no fever, no chills, no pain  RESP: no SOB, no cough, no sputum  CVS: no chest pain, no palpitations, no edema  GI: no abdominal pain, no nausea, no vomiting, no constipation, no diarrhea  : no dysurea, no frequency, no hematurea  Neuro: no headache, no dizziness  PSYCH: no anxiety, no depression  Derm : no itching, no rash    ---___---___---___---___---___---  VITAL SIGNS:  89y , CAPILLARY BLOOD GLUCOSE        T(C): 36.9 (24 @ 13:50), Max: 36.9 (24 @ 13:50)  HR: 70 (24 @ 13:50) (68 - 70)  BP: 120/69 (24 @ 13:50) (117/69 - 120/69)  RR: 18 (24 @ 13:50) (18 - 18)  SpO2: 96% (24 @ 13:50) (94% - 96%)  ---___---___---___---___---___---  PHYSICAL EXAM:    GEN: A&O X 1 , NAD , comfortable  HEENT: NCAT, PERRL, MMM, hearing intact  Neck: supple , no JVD  CVS: S1S2 , regular , No M/R/G appreciated  PULM: CTA B/L,  no W/R/R appreciated  ABD.: soft. non tender, non distended,  bowel sounds present  Extrem: intact pulses , no edema  erythema to the legs noted  Derm: No rash , no ecchymoses  PSYCH : normal mood,  no delusion not anxious     ---___---___---___---___---___---            LAB AND IMAGIN.6    6.24  )-----------( 164      ( 16 Sep 2024 07:14 )             27.8                   142  |  104  |  15  ----------------------------<  81  3.4<L>   |  26  |  0.69    Ca    8.7      16 Sep 2024 07:14  Mg     2.2                                   Urinalysis Basic - ( 16 Sep 2024 07:14 )    Color: x / Appearance: x / SG: x / pH: x  Gluc: 81 mg/dL / Ketone: x  / Bili: x / Urobili: x   Blood: x / Protein: x / Nitrite: x   Leuk Esterase: x / RBC: x / WBC x   Sq Epi: x / Non Sq Epi: x / Bacteria: x        [All pertinent / recent Imaging reviewed]         ---___---___---___---___---___---___ ---___---___---___---___---                         A S S E S S M E N T   A N D   P L A N :      HEALTH ISSUES - PROBLEM Dx:  admitted  with     swelling  leg.  cellulitis        on iv ancef      has   c/c  swelling  right  leg     c/c  diastolic chf     h/o  c/c  elevated  d  dimer,   and  on  arrival,  d  dimer  is  less  today,   and  ct  chest  angio  ,  no pe,   seen by  house  pulm then     COPD. h/o  underlying c/c  lung disease./  Emphysema        c/c   superficial   ulcerations, on  bony prominences  of hammer  toes,     HTN/  HLD    c/c  diastolic  chf, on lasix/ known  to    card     Anxiety,   c/c  anemia     dvt  ppx /  hous e pulm to  f/p,  known to  pt    Thank you,  Garyr Hermosillo  [phone]
      ---___---___---___---___---___---___ ---___---___---___---___---___---___---___---___---                  M E D I C A L   A T T E N D I N G   P R O G R E S S   N O T E  ---___---___---___---___---___---___ ---___---___---___---___---___---___---___---___---        ================================================    ++CHIEF COMPLAINT:   Patient is a 89y old  Female who presents with a chief complaint of leg  swelling (15 Sep 2024 16:22)      Other disorders of soft tissue      ---___---___---___---___---___---  PAST MEDICAL / Surgical  HISTORY:  PAST MEDICAL & SURGICAL HISTORY:  HTN (hypertension)      Hypothyroidism      Osteoporosis      CAD (coronary artery disease)      COPD (chronic obstructive pulmonary disease)      Pulmonary sarcoidosis      H/O pyelonephritis      Acute diverticulitis          ---___---___---___---___---___---  FAMILY HISTORY:   FAMILY HISTORY:        ---___---___---___---___---___---  ALLERGIES:   Allergies    iodinated radiocontrast agents (Anaphylaxis)    Intolerances    lactose (Stomach Upset)      ---___---___---___---___---___---  MEDICATIONS:  MEDICATIONS  (STANDING):  atorvastatin 20 milliGRAM(s) Oral at bedtime  buDESOnide    Inhalation Suspension 0.5 milliGRAM(s) Inhalation two times a day  ceFAZolin   IVPB 1000 milliGRAM(s) IV Intermittent every 8 hours  cyclopentolate 1% Solution 1 Drop(s) Both EYES two times a day  furosemide   Injectable 20 milliGRAM(s) IV Push daily  heparin   Injectable 5000 Unit(s) SubCutaneous every 12 hours  metoprolol succinate ER 50 milliGRAM(s) Oral daily  prednisoLONE acetate 1% Suspension 1 Drop(s) Both EYES two times a day  temazepam 15 milliGRAM(s) Oral at bedtime  traZODone 50 milliGRAM(s) Oral at bedtime    MEDICATIONS  (PRN):  acetaminophen     Tablet .. 650 milliGRAM(s) Oral every 6 hours PRN Mild Pain (1 - 3)  albuterol    90 MICROgram(s) HFA Inhaler 1 Puff(s) Inhalation every 8 hours PRN Bronchospasm  haloperidol    Injectable 0.5 milliGRAM(s) IV Push every 12 hours PRN severe Agitation      ---___---___---___---___---___---  REVIEW OF SYSTEM:    GEN: no fever, no chills, no pain  RESP: no SOB, no cough, no sputum  CVS: no chest pain, no palpitations, no edema  GI: no abdominal pain, no nausea, no vomiting, no constipation, no diarrhea  : no dysurea, no frequency, no hematurea  Neuro: no headache, no dizziness  PSYCH: no anxiety, no depression  Derm : no itching, no rash    ---___---___---___---___---___---  VITAL SIGNS:  89y , CAPILLARY BLOOD GLUCOSE        T(C): 36.4 (09-15-24 @ 19:37), Max: 37.1 (24 @ 20:52)  HR: 73 (09-15-24 @ 19:37) (67 - 75)  BP: 134/65 (09-15-24 @ 19:37) (86/51 - 166/89)  RR: 18 (09-15-24 @ 19:37) (18 - 19)  SpO2: 94% (09-15-24 @ 19:37) (94% - 98%)  ---___---___---___---___---___---  PHYSICAL EXAM:    GEN: A&O X 3 , NAD , comfortable  HEENT: NCAT, PERRL, MMM, hearing intact  Neck: supple , no JVD  CVS: S1S2 , regular , No M/R/G appreciated  PULM: CTA B/L,  no W/R/R appreciated  ABD.: soft. non tender, non distended,  bowel sounds present  Extrem: intact pulses , minimal edema   Derm: No rash , no ecchymoses  PSYCH : normal mood,  no delusion not anxious     ---___---___---___---___---___---            LAB AND IMAGIN.7    5.12  )-----------( 161      ( 15 Sep 2024 06:59 )             28.3               -15    139  |  104  |  16  ----------------------------<  84  4.1   |  24  |  0.83    Ca    8.5      15 Sep 2024 06:56    TPro  6.3  /  Alb  3.4  /  TBili  0.3  /  DBili  x   /  AST  12  /  ALT  6<L>  /  AlkPhos  108  09-13    PT/INR - ( 13 Sep 2024 21:00 )   PT: 11.8 sec;   INR: 1.07 ratio         PTT - ( 13 Sep 2024 21:00 )  PTT:26.8 sec                        Urinalysis Basic - ( 15 Sep 2024 06:56 )    Color: x / Appearance: x / SG: x / pH: x  Gluc: 84 mg/dL / Ketone: x  / Bili: x / Urobili: x   Blood: x / Protein: x / Nitrite: x   Leuk Esterase: x / RBC: x / WBC x   Sq Epi: x / Non Sq Epi: x / Bacteria: x        [All pertinent / recent Imaging reviewed]         ---___---___---___---___---___---___ ---___---___---___---___---                         A S S E S S M E N T   A N D   P L A N :      HEALTH ISSUES - PROBLEM Dx:      89 year old woman,      h/o  emphysema, ,  c/c  L  hydroureter,  HTN.  depression   prior    h/o   C. difficile, diverticulitis,   pna      sarcoidosis,   c/c  diastolic  heart failyre,  eft 2nd  toe    c/c   superficial   ulcerations, on  bony prominences  of hammer  toe    admitted  with     swelling  leg.  cellulitis        on iv ancef      has   c/c  swelling  right  leg     c/c  diastolic chf     h/o  c/c  elevated  d  dimer,   and  on  arrival,  d  dimer  is  less  today,   and  ct  chest  angio  ,  no pe,   seen by  house  pulm then     COPD. h/o  underlying c/c  lung disease./  Emphysema        c/c   superficial   ulcerations, on  bony prominences  of hammer  toes,     HTN/  HLD    c/c  diastolic  chf, on lasix/ known  to    card     Anxiety,   c/c  anemia     dvt  ppx /  hous e pulm to  f/p,  known to  pt                -GI/DVT Prophylaxis.    --------------------------------------------  Case discussed with   Education given on   ___________________________  Thank you,  Garry Hermosillo  [phone]
      ---___---___---___---___---___---___ ---___---___---___---___---___---___---___---___---                  M E D I C A L   A T T E N D I N G   P R O G R E S S   N O T E  ---___---___---___---___---___---___ ---___---___---___---___---___---___---___---___---        ================================================    ++CHIEF COMPLAINT:   Patient is a 89y old  Female who presents with a chief complaint of leg  swelling (18 Sep 2024 13:02)      Other disorders of soft tissue      ---___---___---___---___---___---  PAST MEDICAL / Surgical  HISTORY:  PAST MEDICAL & SURGICAL HISTORY:  HTN (hypertension)      Hypothyroidism      Osteoporosis      CAD (coronary artery disease)      COPD (chronic obstructive pulmonary disease)      Pulmonary sarcoidosis      H/O pyelonephritis      Acute diverticulitis          ---___---___---___---___---___---  FAMILY HISTORY:   FAMILY HISTORY:        ---___---___---___---___---___---  ALLERGIES:   Allergies    iodinated radiocontrast agents (Anaphylaxis)    Intolerances        ---___---___---___---___---___---  MEDICATIONS:  MEDICATIONS  (STANDING):  ascorbic acid 500 milliGRAM(s) Oral daily  atorvastatin 20 milliGRAM(s) Oral at bedtime  buDESOnide    Inhalation Suspension 0.5 milliGRAM(s) Inhalation two times a day  cephalexin 500 milliGRAM(s) Oral every 12 hours  cyclopentolate 1% Solution 1 Drop(s) Both EYES two times a day  FLUoxetine 30 milliGRAM(s) Oral daily  furosemide    Tablet 20 milliGRAM(s) Oral daily  heparin   Injectable 5000 Unit(s) SubCutaneous every 12 hours  metoprolol succinate ER 50 milliGRAM(s) Oral daily  multivitamin 1 Tablet(s) Oral daily  prednisoLONE acetate 1% Suspension 1 Drop(s) Both EYES two times a day  temazepam 15 milliGRAM(s) Oral at bedtime  traZODone 50 milliGRAM(s) Oral <User Schedule>    MEDICATIONS  (PRN):  acetaminophen     Tablet .. 650 milliGRAM(s) Oral every 6 hours PRN Mild Pain (1 - 3)  albuterol    90 MICROgram(s) HFA Inhaler 1 Puff(s) Inhalation every 8 hours PRN Bronchospasm  haloperidol    Injectable 0.5 milliGRAM(s) IV Push every 12 hours PRN severe Agitation      ---___---___---___---___---___---  REVIEW OF SYSTEM:    GEN: no fever, no chills, no pain  RESP: no SOB, no cough, no sputum  CVS: no chest pain, no palpitations, no edema  GI: no abdominal pain, no nausea, no vomiting, no constipation, no diarrhea  : no dysurea, no frequency, no hematurea  Neuro: no headache, no dizziness  PSYCH: no anxiety, no depression  Derm : no itching, no rash    ---___---___---___---___---___---  VITAL SIGNS:  89y , CAPILLARY BLOOD GLUCOSE        T(C): 36.4 (24 @ 12:17), Max: 36.5 (24 @ 22:10)  HR: 64 (24 @ 12:17) (60 - 64)  BP: 113/62 (24 @ 12:17) (110/66 - 133/60)  RR: 18 (24 @ 12:17) (18 - 18)  SpO2: 95% (24 @ 12:17) (95% - 98%)  ---___---___---___---___---___---  PHYSICAL EXAM:    GEN: A&O X 3 , NAD , comfortable  HEENT: NCAT, PERRL, MMM, hearing intact  Neck: supple , no JVD  CVS: S1S2 , regular , No M/R/G appreciated  PULM: CTA B/L,  no W/R/R appreciated  ABD.: soft. non tender, non distended,  bowel sounds present  Extrem: intact pulses , no edema   Derm: No rash , no ecchymoses  PSYCH : normal mood,  no delusion not anxious     ---___---___---___---___---___---            LAB AND IMAGIN.9    5.49  )-----------( 162      ( 18 Sep 2024 07:04 )             28.8                   138  |  100  |  18  ----------------------------<  88  3.5   |  26  |  0.84    Ca    8.6      18 Sep 2024 07:08  Mg     2.0                                   Urinalysis Basic - ( 18 Sep 2024 07:08 )    Color: x / Appearance: x / SG: x / pH: x  Gluc: 88 mg/dL / Ketone: x  / Bili: x / Urobili: x   Blood: x / Protein: x / Nitrite: x   Leuk Esterase: x / RBC: x / WBC x   Sq Epi: x / Non Sq Epi: x / Bacteria: x        [All pertinent / recent Imaging reviewed]         ---___---___---___---___---___---___ ---___---___---___---___---                         A S S E S S M E N T   A N D   P L A N :      HEALTH ISSUES - PROBLEM Dx:      c/c  diastolic chf     h/o  c/c  elevated  d  dimer,       COPD. h/o  underlying c/c  lung disease./  Emphysema        c/c   superficial   ulcerations, on  bony prominences  of hammer  toes,     HTN/  HLD    c/c  diastolic  chf, on lasix/ known  to    card     Anxiety, continue meds    c/c  anemia     dvt  ppx  start dc planning      ___________________________  Thank you,  Garry Hermosillo  [phone]
      ---___---___---___---___---___---___ ---___---___---___---___---___---___---___---___---                  M E D I C A L   A T T E N D I N G   P R O G R E S S   N O T E  ---___---___---___---___---___---___ ---___---___---___---___---___---___---___---___---        ================================================    ++CHIEF COMPLAINT:   Patient is a 89y old  Female who presents with a chief complaint of leg  swelling (19 Sep 2024 08:47)      Other disorders of soft tissue      ---___---___---___---___---___---  PAST MEDICAL / Surgical  HISTORY:  PAST MEDICAL & SURGICAL HISTORY:  HTN (hypertension)      Hypothyroidism      Osteoporosis      CAD (coronary artery disease)      COPD (chronic obstructive pulmonary disease)      Pulmonary sarcoidosis      H/O pyelonephritis      Acute diverticulitis          ---___---___---___---___---___---  FAMILY HISTORY:   FAMILY HISTORY:        ---___---___---___---___---___---  ALLERGIES:   Allergies    iodinated radiocontrast agents (Anaphylaxis)    Intolerances        ---___---___---___---___---___---  MEDICATIONS:  MEDICATIONS  (STANDING):  ascorbic acid 500 milliGRAM(s) Oral daily  atorvastatin 20 milliGRAM(s) Oral at bedtime  buDESOnide    Inhalation Suspension 0.5 milliGRAM(s) Inhalation two times a day  cephalexin 500 milliGRAM(s) Oral every 12 hours  cyclopentolate 1% Solution 1 Drop(s) Both EYES two times a day  FLUoxetine 30 milliGRAM(s) Oral daily  furosemide    Tablet 20 milliGRAM(s) Oral daily  heparin   Injectable 5000 Unit(s) SubCutaneous every 12 hours  metoprolol succinate ER 50 milliGRAM(s) Oral daily  multivitamin 1 Tablet(s) Oral daily  prednisoLONE acetate 1% Suspension 1 Drop(s) Both EYES two times a day  temazepam 15 milliGRAM(s) Oral at bedtime  traZODone 50 milliGRAM(s) Oral <User Schedule>    MEDICATIONS  (PRN):  acetaminophen     Tablet .. 650 milliGRAM(s) Oral every 6 hours PRN Mild Pain (1 - 3)  albuterol    90 MICROgram(s) HFA Inhaler 1 Puff(s) Inhalation every 8 hours PRN Bronchospasm  haloperidol    Injectable 0.5 milliGRAM(s) IV Push every 12 hours PRN severe Agitation      ---___---___---___---___---___---  REVIEW OF SYSTEM:    GEN: no fever, no chills, no pain  RESP: no SOB, no cough, no sputum  CVS: no chest pain, no palpitations, no edema  GI: no abdominal pain, no nausea, no vomiting, no constipation, no diarrhea  : no dysurea, no frequency, no hematurea  Neuro: no headache, no dizziness  PSYCH: no anxiety, no depression  Derm : no itching, no rash    ---___---___---___---___---___---  VITAL SIGNS:  89y , CAPILLARY BLOOD GLUCOSE        T(C): 36.6 (24 @ 19:49), Max: 36.7 (24 @ 04:45)  HR: 67 (24 @ 19:49) (60 - 67)  BP: 110/61 (24 @ 19:49) (102/52 - 142/66)  RR: 18 (24 @ 19:49) (18 - 18)  SpO2: 95% (24 @ 19:49) (95% - 98%)  ---___---___---___---___---___---  PHYSICAL EXAM:    GEN: A&O X 2 , NAD , comfortable  HEENT: NCAT, PERRL, MMM, hearing intact  Neck: supple , no JVD  CVS: S1S2 , regular , No M/R/G appreciated  PULM: CTA B/L,  no W/R/R appreciated  ABD.: soft. non tender, non distended,  bowel sounds present  Extrem: intact pulses , no edema   Derm: No rash , no ecchymoses  PSYCH : normal mood,  no delusion not anxious     ---___---___---___---___---___---            LAB AND IMAGIN.9    5.49  )-----------( 162      ( 18 Sep 2024 07:04 )             28.8                   138  |  100  |  18  ----------------------------<  88  3.5   |  26  |  0.84    Ca    8.6      18 Sep 2024 07:08  Mg     2.0                                   Urinalysis Basic - ( 18 Sep 2024 07:08 )    Color: x / Appearance: x / SG: x / pH: x  Gluc: 88 mg/dL / Ketone: x  / Bili: x / Urobili: x   Blood: x / Protein: x / Nitrite: x   Leuk Esterase: x / RBC: x / WBC x   Sq Epi: x / Non Sq Epi: x / Bacteria: x        [All pertinent / recent Imaging reviewed]         ---___---___---___---___---___---___ ---___---___---___---___---                         A S S E S S M E N T   A N D   P L A N :      HEALTH ISSUES - PROBLEM Dx:    h/o  c/c  elevated  d  dimer,       COPD. h/o  underlying c/c  lung disease./  Emphysema        c/c   superficial   ulcerations, on  bony prominences  of hammer  toes,     HTN/  HLD    c/c  diastolic  chf, on lasix/ known  to    card     Anxiety, continue meds    c/c  anemia     dvt  ppx  start dc planning              -GI/DVT Prophylaxis.    --------------------------------------------  Case discussed with   Education given on   ___________________________  Thank you,  Garry Hermosillo  [phone]
Date of Service: 09-15-24 @ 16:22           CARDIOLOGY     PROGRESS  NOTE   ________________________________________________    CHIEF COMPLAINT:Patient is a 89y old  Female who presents with a chief complaint of leg  swelling (14 Sep 2024 19:22)  no complain  	  REVIEW OF SYSTEMS:  CONSTITUTIONAL: No fever, weight loss, or fatigue  EYES: No eye pain, visual disturbances, or discharge  ENT:  No difficulty hearing, tinnitus, vertigo; No sinus or throat pain  NECK: No pain or stiffness  RESPIRATORY: No cough, wheezing, chills or hemoptysis; No Shortness of Breath  CARDIOVASCULAR: No chest pain, palpitations, passing out, dizziness, or leg swelling  GASTROINTESTINAL: No abdominal or epigastric pain. No nausea, vomiting, or hematemesis; No diarrhea or constipation. No melena or hematochezia.  GENITOURINARY: No dysuria, frequency, hematuria, or incontinence  NEUROLOGICAL: No headaches, memory loss, loss of strength, numbness, or tremors  SKIN: No itching, burning, rashes, or lesions   LYMPH Nodes: No enlarged glands  ENDOCRINE: No heat or cold intolerance; No hair loss  MUSCULOSKELETAL: No joint pain or swelling; No muscle, back, or extremity pain  PSYCHIATRIC: No depression, anxiety, mood swings, or difficulty sleeping  HEME/LYMPH: No easy bruising, or bleeding gums  ALLERGY AND IMMUNOLOGIC: No hives or eczema	    [x ] All others negative	  [ ] Unable to obtain    PHYSICAL EXAM:  T(C): 36.8 (09-15-24 @ 09:09), Max: 37.1 (09-14-24 @ 20:52)  HR: 67 (09-15-24 @ 09:09) (67 - 75)  BP: 120/60 (09-15-24 @ 09:09) (86/51 - 166/89)  RR: 19 (09-15-24 @ 09:09) (19 - 19)  SpO2: 95% (09-15-24 @ 09:09) (95% - 98%)  Wt(kg): --  I&O's Summary    15 Sep 2024 07:01  -  15 Sep 2024 16:22  --------------------------------------------------------  IN: 240 mL / OUT: 600 mL / NET: -360 mL        Appearance: Normal	  HEENT:   Normal oral mucosa, PERRL, EOMI	  Lymphatic: No lymphadenopathy  Cardiovascular: Normal S1 S2, No JVD, + murmurs, No edema  Respiratory: rhonchi  Gastrointestinal:  Soft, Non-tender, + BS	  Skin: No rashes, No ecchymoses, No cyanosis	  Extremities: +RLE edema, redness RLE, +edema bl  Vascular: Peripheral pulses palpable 2+ bilaterally    MEDICATIONS  (STANDING):  atorvastatin 20 milliGRAM(s) Oral at bedtime  buDESOnide    Inhalation Suspension 0.5 milliGRAM(s) Inhalation two times a day  ceFAZolin   IVPB 1000 milliGRAM(s) IV Intermittent every 8 hours  cyclopentolate 1% Solution 1 Drop(s) Both EYES two times a day  furosemide   Injectable 20 milliGRAM(s) IV Push daily  heparin   Injectable 5000 Unit(s) SubCutaneous every 12 hours  metoprolol succinate ER 50 milliGRAM(s) Oral daily  prednisoLONE acetate 1% Suspension 1 Drop(s) Both EYES two times a day  temazepam 15 milliGRAM(s) Oral at bedtime  traZODone 50 milliGRAM(s) Oral at bedtime      TELEMETRY: 	    ECG:  	  RADIOLOGY:  OTHER: 	  	  LABS:	 	    CARDIAC MARKERS:                        8.7    5.12  )-----------( 161      ( 15 Sep 2024 06:59 )             28.3     09-15    139  |  104  |  16  ----------------------------<  84  4.1   |  24  |  0.83    Ca    8.5      15 Sep 2024 06:56    TPro  6.3  /  Alb  3.4  /  TBili  0.3  /  DBili  x   /  AST  12  /  ALT  6<L>  /  AlkPhos  108  09-13    proBNP:   Lipid Profile:   HgA1c:   TSH:   PT/INR - ( 13 Sep 2024 21:00 )   PT: 11.8 sec;   INR: 1.07 ratio         PTT - ( 13 Sep 2024 21:00 )  PTT:26.8 sec      Assessment and plan  ---------------------------  89 year old female with pmh of COPD,on home O2, chronic R hemidiaphragm paralysis, h/o recurrent aspiration events, HTN, diverticulitis,  asthma/COPD, sarcoidosis, hypothyroidism, heart failure, presenting to the ER with the swelling  right leg on and off for 2 weeks, she states pain is increasing working no trauma but legs feels warm to touch, no fever no chills   patient denies f/n/v/d, CP, HA, dizziness, abdominal pain, urinary symptoms  pt is well known to me with hx of underlying lung disease chf with LE edema nd redness  HFPEF acute on chronic with the swelling culture right leg re start on diuretics  pt with hx of sarcoidosis  + le edema, check venous doppler, negative on the last admission  start lasix 20 mg iv daily  will adjust cardiac meds  oob to the chair  continue current meds  duoneb  IV bax  will add Norvasc if bp remains elevated  oob to the chair    	        
Date of Service: 09-16-24 @ 10:08           CARDIOLOGY     PROGRESS  NOTE   ________________________________________________    CHIEF COMPLAINT:Patient is a 89y old  Female who presents with a chief complaint of leg  swelling (15 Sep 2024 20:40)  no complain  	  REVIEW OF SYSTEMS:  CONSTITUTIONAL: No fever, weight loss, or fatigue  EYES: No eye pain, visual disturbances, or discharge  ENT:  No difficulty hearing, tinnitus, vertigo; No sinus or throat pain  NECK: No pain or stiffness  RESPIRATORY: No cough, wheezing, chills or hemoptysis; No Shortness of Breath  CARDIOVASCULAR: No chest pain, palpitations, passing out, dizziness, or leg swelling  GASTROINTESTINAL: No abdominal or epigastric pain. No nausea, vomiting, or hematemesis; No diarrhea or constipation. No melena or hematochezia.  GENITOURINARY: No dysuria, frequency, hematuria, or incontinence  NEUROLOGICAL: No headaches, memory loss, loss of strength, numbness, or tremors  SKIN: No itching, burning, rashes, or lesions   LYMPH Nodes: No enlarged glands  ENDOCRINE: No heat or cold intolerance; No hair loss  MUSCULOSKELETAL: No joint pain or swelling; No muscle, back, or extremity pain  PSYCHIATRIC: No depression, anxiety, mood swings, or difficulty sleeping  HEME/LYMPH: No easy bruising, or bleeding gums  ALLERGY AND IMMUNOLOGIC: No hives or eczema	    [ ] All others negative	  [x ] Unable to obtain    PHYSICAL EXAM:  T(C): 36.6 (09-16-24 @ 04:42), Max: 36.6 (09-16-24 @ 04:42)  HR: 68 (09-16-24 @ 04:42) (68 - 73)  BP: 117/69 (09-16-24 @ 04:42) (117/69 - 134/65)  RR: 18 (09-16-24 @ 04:42) (18 - 18)  SpO2: 94% (09-16-24 @ 04:42) (94% - 94%)  Wt(kg): --  I&O's Summary    15 Sep 2024 07:01  -  16 Sep 2024 07:00  --------------------------------------------------------  IN: 240 mL / OUT: 900 mL / NET: -660 mL        Appearance: Normal	  HEENT:   Normal oral mucosa, PERRL, EOMI	  Lymphatic: No lymphadenopathy  Cardiovascular: Normal S1 S2, No JVD, + murmurs, + decrease  edema  Respiratory: rhonchi  Psychiatry: A & O x 3, Mood & affect appropriate  Gastrointestinal:  Soft, Non-tender, + BS	  Skin: No rashes, No ecchymoses, No cyanosis	  Neurologic: Non-focal  Extremities: Normal range of motion, +bl edema R>l , decrease erythema  Vascular: + pvd    MEDICATIONS  (STANDING):  atorvastatin 20 milliGRAM(s) Oral at bedtime  buDESOnide    Inhalation Suspension 0.5 milliGRAM(s) Inhalation two times a day  ceFAZolin   IVPB 1000 milliGRAM(s) IV Intermittent every 8 hours  cyclopentolate 1% Solution 1 Drop(s) Both EYES two times a day  FLUoxetine 30 milliGRAM(s) Oral daily  furosemide   Injectable 20 milliGRAM(s) IV Push daily  heparin   Injectable 5000 Unit(s) SubCutaneous every 12 hours  metoprolol succinate ER 50 milliGRAM(s) Oral daily  potassium chloride    Tablet ER 40 milliEquivalent(s) Oral once  prednisoLONE acetate 1% Suspension 1 Drop(s) Both EYES two times a day  temazepam 15 milliGRAM(s) Oral at bedtime  traZODone 50 milliGRAM(s) Oral <User Schedule>      TELEMETRY: 	    ECG:  	  RADIOLOGY:  OTHER: 	  	  LABS:	 	    CARDIAC MARKERS:                          8.6    6.24  )-----------( 164      ( 16 Sep 2024 07:14 )             27.8     09-16    142  |  104  |  15  ----------------------------<  81  3.4<L>   |  26  |  0.69    Ca    8.7      16 Sep 2024 07:14  Mg     2.2     09-16      proBNP:   Lipid Profile:   HgA1c:   TSH:     < from: [facility] Duplex Lower Ext Vein Scan, Right (09.13.24 @ 21:49) >  There is normal compressibility of the right common femoral, femoral and   popliteal veins.  The contralateral common femoralvein is patent.  Doppler examination shows normal spontaneous and phasic flow.    No calf vein thrombosis is detected.    IMPRESSION:  No evidence of right lower extremity deep venous thrombosis.      Assessment and plan  ---------------------------  89 year old female with pmh of COPD,on home O2, chronic R hemidiaphragm paralysis, h/o recurrent aspiration events, HTN, diverticulitis,  asthma/COPD, sarcoidosis, hypothyroidism, heart failure, presenting to the ER with the swelling  right leg on and off for 2 weeks, she states pain is increasing working no trauma but legs feels warm to touch, no fever no chills   patient denies f/n/v/d, CP, HA, dizziness, abdominal pain, urinary symptoms  pt is well known to me with hx of underlying lung disease chf with LE edema nd redness  HFPEF acute on chronic with the swelling culture right leg re start on diuretics  pt with hx of sarcoidosis  + le edema, check venous doppler, negative on the last admission  will adjust cardiac meds  oob to the chair  continue current meds  Duoneb  IV bax  will add Norvasc if bp remains elevated  oob to the chair      	        
Date of Service: 09-17-24 @ 22:11           CARDIOLOGY     PROGRESS  NOTE   ________________________________________________    CHIEF COMPLAINT:Patient is a 89y old  Female who presents with a chief complaint of leg  swelling (17 Sep 2024 19:50)  npo complain  	  REVIEW OF SYSTEMS:  CONSTITUTIONAL: No fever, weight loss, or fatigue  EYES: No eye pain, visual disturbances, or discharge  ENT:  No difficulty hearing, tinnitus, vertigo; No sinus or throat pain  NECK: No pain or stiffness  RESPIRATORY: No cough, wheezing, chills or hemoptysis; No Shortness of Breath  CARDIOVASCULAR: No chest pain, palpitations, passing out, dizziness, or leg swelling  GASTROINTESTINAL: No abdominal or epigastric pain. No nausea, vomiting, or hematemesis; No diarrhea or constipation. No melena or hematochezia.  GENITOURINARY: No dysuria, frequency, hematuria, or incontinence  NEUROLOGICAL: No headaches, memory loss, loss of strength, numbness, or tremors  SKIN: No itching, burning, rashes, or lesions   LYMPH Nodes: No enlarged glands  ENDOCRINE: No heat or cold intolerance; No hair loss  MUSCULOSKELETAL: No joint pain or swelling; No muscle, back, or extremity pain  PSYCHIATRIC: No depression, anxiety, mood swings, or difficulty sleeping  HEME/LYMPH: No easy bruising, or bleeding gums  ALLERGY AND IMMUNOLOGIC: No hives or eczema	    [x ] All others negative	  [ ] Unable to obtain    PHYSICAL EXAM:  T(C): 36.8 (09-17-24 @ 14:18), Max: 36.8 (09-17-24 @ 14:18)  HR: 65 (09-17-24 @ 14:18) (65 - 70)  BP: 104/58 (09-17-24 @ 14:18) (104/58 - 151/60)  RR: 18 (09-17-24 @ 14:18) (18 - 18)  SpO2: 96% (09-17-24 @ 14:18) (94% - 96%)  Wt(kg): --  I&O's Summary    16 Sep 2024 07:01  -  17 Sep 2024 07:00  --------------------------------------------------------  IN: 0 mL / OUT: 650 mL / NET: -650 mL        Appearance: Normal	  HEENT:   Normal oral mucosa, PERRL, EOMI	  Lymphatic: No lymphadenopathy  Cardiovascular: Normal S1 S2, No JVD, +murmurs, No edema  Respiratory: rhonchi  Psychiatry: A & O x 3, Mood & affect appropriate  Gastrointestinal:  Soft, Non-tender, + BS	  Skin: No rashes, No ecchymoses, No cyanosis	  Extremities: Normal range of motion, No clubbing, cyanosis or edema  Vascular: Peripheral pulses palpable 2+ bilaterally    MEDICATIONS  (STANDING):  ascorbic acid 500 milliGRAM(s) Oral daily  atorvastatin 20 milliGRAM(s) Oral at bedtime  buDESOnide    Inhalation Suspension 0.5 milliGRAM(s) Inhalation two times a day  ceFAZolin   IVPB 1000 milliGRAM(s) IV Intermittent every 8 hours  cyclopentolate 1% Solution 1 Drop(s) Both EYES two times a day  FLUoxetine 30 milliGRAM(s) Oral daily  furosemide   Injectable 20 milliGRAM(s) IV Push daily  heparin   Injectable 5000 Unit(s) SubCutaneous every 12 hours  metoprolol succinate ER 50 milliGRAM(s) Oral daily  multivitamin 1 Tablet(s) Oral daily  prednisoLONE acetate 1% Suspension 1 Drop(s) Both EYES two times a day  temazepam 15 milliGRAM(s) Oral at bedtime  traZODone 50 milliGRAM(s) Oral <User Schedule>      TELEMETRY: 	    ECG:  	  RADIOLOGY:  OTHER: 	  	  LABS:	 	    CARDIAC MARKERS:                          9.8    6.81  )-----------( 157      ( 17 Sep 2024 07:15 )             31.7     09-17    140  |  102  |  14  ----------------------------<  117[H]  3.8   |  26  |  0.76    Ca    9.0      17 Sep 2024 07:13  Mg     2.1     09-17      proBNP:   Lipid Profile:   HgA1c:   TSH:         Assessment and plan  ---------------------------  89 year old female with pmh of COPD,on home O2, chronic R hemidiaphragm paralysis, h/o recurrent aspiration events, HTN, diverticulitis,  asthma/COPD, sarcoidosis, hypothyroidism, heart failure, presenting to the ER with the swelling  right leg on and off for 2 weeks, she states pain is increasing working no trauma but legs feels warm to touch, no fever no chills   patient denies f/n/v/d, CP, HA, dizziness, abdominal pain, urinary symptoms  pt is well known to me with hx of underlying lung disease chf with LE edema nd redness  HFPEF acute on chronic with the swelling culture right leg re start on diuretics  pt with hx of sarcoidosis  + le edema, check venous doppler, negative on the last admission  will adjust cardiac meds  oob to the chair  continue current meds  Duoneb  will add Norvasc if bp remains elevated  oob to the chair  cellulitis has much improved, may dc on po abx for few more days  change lasix to po    	        
Date of Service: 09-20-24 @ 10:46           CARDIOLOGY     PROGRESS  NOTE   ________________________________________________    CHIEF COMPLAINT:Patient is a 89y old  Female who presents with a chief complaint of leg  swelling (19 Sep 2024 21:01)  no complain  	  REVIEW OF SYSTEMS:  CONSTITUTIONAL: No fever, weight loss, or fatigue  EYES: No eye pain, visual disturbances, or discharge  ENT:  No difficulty hearing, tinnitus, vertigo; No sinus or throat pain  NECK: No pain or stiffness  RESPIRATORY: No cough, wheezing, chills or hemoptysis; No Shortness of Breath  CARDIOVASCULAR: No chest pain, palpitations, passing out, dizziness, or leg swelling  GASTROINTESTINAL: No abdominal or epigastric pain. No nausea, vomiting, or hematemesis; No diarrhea or constipation. No melena or hematochezia.  GENITOURINARY: No dysuria, frequency, hematuria, or incontinence  NEUROLOGICAL: No headaches, memory loss, loss of strength, numbness, or tremors  SKIN: No itching, burning, rashes, or lesions   LYMPH Nodes: No enlarged glands  ENDOCRINE: No heat or cold intolerance; No hair loss  MUSCULOSKELETAL: No joint pain or swelling; No muscle, back, or extremity pain  PSYCHIATRIC: No depression, anxiety, mood swings, or difficulty sleeping  HEME/LYMPH: No easy bruising, or bleeding gums  ALLERGY AND IMMUNOLOGIC: No hives or eczema	    [x ] All others negative	  [ ] Unable to obtain    PHYSICAL EXAM:  T(C): 36.6 (09-20-24 @ 04:40), Max: 36.6 (09-19-24 @ 13:41)  HR: 66 (09-20-24 @ 04:40) (64 - 67)  BP: 123/61 (09-20-24 @ 04:40) (102/52 - 123/61)  RR: 18 (09-20-24 @ 04:40) (18 - 18)  SpO2: 99% (09-20-24 @ 04:40) (95% - 99%)  Wt(kg): --  I&O's Summary    19 Sep 2024 07:01  -  20 Sep 2024 07:00  --------------------------------------------------------  IN: 0 mL / OUT: 350 mL / NET: -350 mL        Appearance: Normal	  HEENT:   Normal oral mucosa, PERRL, EOMI	  Lymphatic: No lymphadenopathy  Cardiovascular: Normal S1 S2, No JVD, + murmurs, No edema  Respiratory:rhonchi  Psychiatry: A & O x 3, Mood & affect appropriate  Gastrointestinal:  Soft, Non-tender, + BS	  Skin: No rashes, No ecchymoses, No cyanosis	  Extremities: Normal range of motion, No clubbing, cyanosis or edema  Vascular: Peripheral pulses palpable 2+ bilaterally    MEDICATIONS  (STANDING):  ascorbic acid 500 milliGRAM(s) Oral daily  atorvastatin 20 milliGRAM(s) Oral at bedtime  buDESOnide    Inhalation Suspension 0.5 milliGRAM(s) Inhalation two times a day  cephalexin 500 milliGRAM(s) Oral every 12 hours  cyclopentolate 1% Solution 1 Drop(s) Both EYES two times a day  FLUoxetine 30 milliGRAM(s) Oral daily  furosemide    Tablet 20 milliGRAM(s) Oral daily  heparin   Injectable 5000 Unit(s) SubCutaneous every 12 hours  metoprolol succinate ER 50 milliGRAM(s) Oral daily  multivitamin 1 Tablet(s) Oral daily  prednisoLONE acetate 1% Suspension 1 Drop(s) Both EYES two times a day  temazepam 15 milliGRAM(s) Oral at bedtime  traZODone 50 milliGRAM(s) Oral <User Schedule>      TELEMETRY: 	    ECG:  	  RADIOLOGY:  OTHER: 	  	  LABS:	 	    CARDIAC MARKERS:    proBNP:   Lipid Profile:   HgA1c:   TSH:         Assessment and plan  ---------------------------  89 year old female with pmh of COPD,on home O2, chronic R hemidiaphragm paralysis, h/o recurrent aspiration events, HTN, diverticulitis,  asthma/COPD, sarcoidosis, hypothyroidism, heart failure, presenting to the ER with the swelling  right leg on and off for 2 weeks, she states pain is increasing working no trauma but legs feels warm to touch, no fever no chills   patient denies f/n/v/d, CP, HA, dizziness, abdominal pain, urinary symptoms  pt is well known to me with hx of underlying lung disease chf with LE edema nd redness  HFPEF acute on chronic with the swelling culture right leg re start on diuretics  pt with hx of sarcoidosis  + le edema, check venous doppler, negative on the last admission  will adjust cardiac meds  oob to the chair  continue current meds  Duoneb  will add Norvasc if bp remains elevated  oob to the chair  cellulitis has much improved, may dc on po abx for few more days  change lasix to po  no objection to dc  cardiac wise  care coordination noted  dc keflex    	        
      ---___---___---___---___---___---___ ---___---___---___---___---___---___---___---___---                  M E D I C A L   A T T E N D I N G   P R O G R E S S   N O T E  ---___---___---___---___---___---___ ---___---___---___---___---___---___---___---___---        ================================================    ++CHIEF COMPLAINT:   Patient is a 89y old  Female who presents with a chief complaint of leg  swelling (16 Sep 2024 20:25)      Other disorders of soft tissue      ---___---___---___---___---___---  PAST MEDICAL / Surgical  HISTORY:  PAST MEDICAL & SURGICAL HISTORY:  HTN (hypertension)      Hypothyroidism      Osteoporosis      CAD (coronary artery disease)      COPD (chronic obstructive pulmonary disease)      Pulmonary sarcoidosis      H/O pyelonephritis      Acute diverticulitis          ---___---___---___---___---___---  FAMILY HISTORY:   FAMILY HISTORY:        ---___---___---___---___---___---  ALLERGIES:   Allergies    iodinated radiocontrast agents (Anaphylaxis)    Intolerances        ---___---___---___---___---___---  MEDICATIONS:  MEDICATIONS  (STANDING):  ascorbic acid 500 milliGRAM(s) Oral daily  atorvastatin 20 milliGRAM(s) Oral at bedtime  buDESOnide    Inhalation Suspension 0.5 milliGRAM(s) Inhalation two times a day  ceFAZolin   IVPB 1000 milliGRAM(s) IV Intermittent every 8 hours  cyclopentolate 1% Solution 1 Drop(s) Both EYES two times a day  FLUoxetine 30 milliGRAM(s) Oral daily  furosemide   Injectable 20 milliGRAM(s) IV Push daily  heparin   Injectable 5000 Unit(s) SubCutaneous every 12 hours  metoprolol succinate ER 50 milliGRAM(s) Oral daily  multivitamin 1 Tablet(s) Oral daily  prednisoLONE acetate 1% Suspension 1 Drop(s) Both EYES two times a day  temazepam 15 milliGRAM(s) Oral at bedtime  traZODone 50 milliGRAM(s) Oral <User Schedule>    MEDICATIONS  (PRN):  acetaminophen     Tablet .. 650 milliGRAM(s) Oral every 6 hours PRN Mild Pain (1 - 3)  albuterol    90 MICROgram(s) HFA Inhaler 1 Puff(s) Inhalation every 8 hours PRN Bronchospasm  haloperidol    Injectable 0.5 milliGRAM(s) IV Push every 12 hours PRN severe Agitation      ---___---___---___---___---___---  REVIEW OF SYSTEM:    GEN: no fever, no chills, no pain  RESP: no SOB, no cough, no sputum  CVS: no chest pain, no palpitations, no edema  GI: no abdominal pain, no nausea, no vomiting, no constipation, no diarrhea  : no dysurea, no frequency, no hematurea  Neuro: no headache, no dizziness  PSYCH: no anxiety, no depression  Derm : no itching, no rash    ---___---___---___---___---___---  VITAL SIGNS:  89y , CAPILLARY BLOOD GLUCOSE        T(C): 36.8 (24 @ 14:18), Max: 36.8 (24 @ 14:18)  HR: 65 (24 @ 14:18) (65 - 71)  BP: 104/58 (24 @ 14:18) (104/58 - 151/60)  RR: 18 (24 @ 14:18) (18 - 18)  SpO2: 96% (24 @ 14:18) (94% - 98%)  ---___---___---___---___---___---  PHYSICAL EXAM:    GEN: A&O X 1 , NAD , comfortable  HEENT: NCAT, PERRL, MMM, hearing intact  Neck: supple , no JVD  CVS: S1S2 , regular , No M/R/G appreciated  PULM: CTA B/L,  no W/R/R appreciated  ABD.: soft. non tender, non distended,  bowel sounds present  Extrem: intact pulses , no edema   Derm: No rash , no ecchymoses  PSYCH : normal mood,  no delusion not anxious     ---___---___---___---___---___---            LAB AND IMAGIN.8    6.81  )-----------( 157      ( 17 Sep 2024 07:15 )             31.7                   140  |  102  |  14  ----------------------------<  117[H]  3.8   |  26  |  0.76    Ca    9.0      17 Sep 2024 07:13  Mg     2.1                                   Urinalysis Basic - ( 17 Sep 2024 07:13 )    Color: x / Appearance: x / SG: x / pH: x  Gluc: 117 mg/dL / Ketone: x  / Bili: x / Urobili: x   Blood: x / Protein: x / Nitrite: x   Leuk Esterase: x / RBC: x / WBC x   Sq Epi: x / Non Sq Epi: x / Bacteria: x        [All pertinent / recent Imaging reviewed]         ---___---___---___---___---___---___ ---___---___---___---___---                         A S S E S S M E N T   A N D   P L A N :      HEALTH ISSUES - PROBLEM Dx:       on iv ancef change to po as per id       has   c/c  swelling  right  leg     c/c  diastolic chf     h/o  c/c  elevated  d  dimer,   and  on  arrival,  d  dimer  is  less  today,   and  ct  chest  angio  ,  no pe,   seen by  house  pulm then     COPD. h/o  underlying c/c  lung disease./  Emphysema        c/c   superficial   ulcerations, on  bony prominences  of hammer  toes,     HTN/  HLD    c/c  diastolic  chf, on lasix/ known  to    card     Anxiety, continue meds    c/c  anemia     dvt  ppx  start dc planning  --------------------------------------------     ___________________________  Thank you,  Garry Hermosillo  [phone]
      ---___---___---___---___---___---___ ---___---___---___---___---___---___---___---___---                  M E D I C A L   A T T E N D I N G   P R O G R E S S   N O T E  ---___---___---___---___---___---___ ---___---___---___---___---___---___---___---___---        ================================================    ++CHIEF COMPLAINT:   Patient is a 89y old  Female who presents with a chief complaint of leg  swelling (20 Sep 2024 10:46)      Other disorders of soft tissue      ---___---___---___---___---___---  PAST MEDICAL / Surgical  HISTORY:  PAST MEDICAL & SURGICAL HISTORY:  HTN (hypertension)      Hypothyroidism      Osteoporosis      CAD (coronary artery disease)      COPD (chronic obstructive pulmonary disease)      Pulmonary sarcoidosis      H/O pyelonephritis      Acute diverticulitis          ---___---___---___---___---___---  FAMILY HISTORY:   FAMILY HISTORY:        ---___---___---___---___---___---  ALLERGIES:   Allergies    iodinated radiocontrast agents (Anaphylaxis)    Intolerances        ---___---___---___---___---___---  MEDICATIONS:  MEDICATIONS  (STANDING):  ascorbic acid 500 milliGRAM(s) Oral daily  atorvastatin 20 milliGRAM(s) Oral at bedtime  buDESOnide    Inhalation Suspension 0.5 milliGRAM(s) Inhalation two times a day  cyclopentolate 1% Solution 1 Drop(s) Both EYES two times a day  FLUoxetine 30 milliGRAM(s) Oral daily  furosemide    Tablet 20 milliGRAM(s) Oral daily  heparin   Injectable 5000 Unit(s) SubCutaneous every 12 hours  metoprolol succinate ER 50 milliGRAM(s) Oral daily  multivitamin 1 Tablet(s) Oral daily  prednisoLONE acetate 1% Suspension 1 Drop(s) Both EYES two times a day  temazepam 15 milliGRAM(s) Oral at bedtime  traZODone 50 milliGRAM(s) Oral <User Schedule>    MEDICATIONS  (PRN):  acetaminophen     Tablet .. 650 milliGRAM(s) Oral every 6 hours PRN Mild Pain (1 - 3)  albuterol    90 MICROgram(s) HFA Inhaler 1 Puff(s) Inhalation every 8 hours PRN Bronchospasm  haloperidol    Injectable 0.5 milliGRAM(s) IV Push every 12 hours PRN severe Agitation      ---___---___---___---___---___---  REVIEW OF SYSTEM:    GEN: no fever, no chills, no pain  RESP: no SOB, no cough, no sputum  CVS: no chest pain, no palpitations, no edema  GI: no abdominal pain, no nausea, no vomiting, no constipation, no diarrhea  : no dysurea, no frequency, no hematurea  Neuro: no headache, no dizziness  PSYCH: no anxiety, no depression  Derm : no itching, no rash    ---___---___---___---___---___---  VITAL SIGNS:  89y , CAPILLARY BLOOD GLUCOSE        T(C): 36.6 (09-20-24 @ 04:40), Max: 36.6 (09-19-24 @ 13:41)  HR: 66 (09-20-24 @ 04:40) (64 - 67)  BP: 123/61 (09-20-24 @ 04:40) (102/52 - 123/61)  RR: 18 (09-20-24 @ 04:40) (18 - 18)  SpO2: 99% (09-20-24 @ 04:40) (95% - 99%)  ---___---___---___---___---___---  PHYSICAL EXAM:    GEN: A&O X 3 , NAD , comfortable  HEENT: NCAT, PERRL, MMM, hearing intact  Neck: supple , no JVD  CVS: S1S2 , regular , No M/R/G appreciated  PULM: CTA B/L,  no W/R/R appreciated  ABD.: soft. non tender, non distended,  bowel sounds present  Extrem: intact pulses , no edema   Derm: No rash , no ecchymoses  PSYCH : normal mood,  no delusion not anxious     ---___---___---___---___---___---            LAB AND IMAGING:                      ---___---___---___---___---___---___ ---___---___---___---___---___---___---___---___---                  M E D I C A L   A T T E N D I N G   P R O G R E S S   N O T E  ---___---___---___---___---___---___ ---___---___---___---___---___---___---___---___---        ================================================    ++CHIEF COMPLAINT:   Patient is a 89y old  Female who presents with a chief complaint of leg  swelling (20 Sep 2024 10:46)      Other disorders of soft tissue      ---___---___---___---___---___---  PAST MEDICAL / Surgical  HISTORY:  PAST MEDICAL & SURGICAL HISTORY:  HTN (hypertension)      Hypothyroidism      Osteoporosis      CAD (coronary artery disease)      COPD (chronic obstructive pulmonary disease)      Pulmonary sarcoidosis      H/O pyelonephritis      Acute diverticulitis          ---___---___---___---___---___---  FAMILY HISTORY:   FAMILY HISTORY:        ---___---___---___---___---___---  ALLERGIES:   Allergies    iodinated radiocontrast agents (Anaphylaxis)    Intolerances        ---___---___---___---___---___---  MEDICATIONS:  MEDICATIONS  (STANDING):  ascorbic acid 500 milliGRAM(s) Oral daily  atorvastatin 20 milliGRAM(s) Oral at bedtime  buDESOnide    Inhalation Suspension 0.5 milliGRAM(s) Inhalation two times a day  cyclopentolate 1% Solution 1 Drop(s) Both EYES two times a day  FLUoxetine 30 milliGRAM(s) Oral daily  furosemide    Tablet 20 milliGRAM(s) Oral daily  heparin   Injectable 5000 Unit(s) SubCutaneous every 12 hours  metoprolol succinate ER 50 milliGRAM(s) Oral daily  multivitamin 1 Tablet(s) Oral daily  prednisoLONE acetate 1% Suspension 1 Drop(s) Both EYES two times a day  temazepam 15 milliGRAM(s) Oral at bedtime  traZODone 50 milliGRAM(s) Oral <User Schedule>    MEDICATIONS  (PRN):  acetaminophen     Tablet .. 650 milliGRAM(s) Oral every 6 hours PRN Mild Pain (1 - 3)  albuterol    90 MICROgram(s) HFA Inhaler 1 Puff(s) Inhalation every 8 hours PRN Bronchospasm  haloperidol    Injectable 0.5 milliGRAM(s) IV Push every 12 hours PRN severe Agitation      ---___---___---___---___---___---  REVIEW OF SYSTEM:    GEN: no fever, no chills, no pain  RESP: no SOB, no cough, no sputum  CVS: no chest pain, no palpitations, no edema  GI: no abdominal pain, no nausea, no vomiting, no constipation, no diarrhea  : no dysurea, no frequency, no hematurea  Neuro: no headache, no dizziness  PSYCH: no anxiety, no depression  Derm : no itching, no rash    ---___---___---___---___---___---  VITAL SIGNS:  89y , CAPILLARY BLOOD GLUCOSE        T(C): 36.6 (09-20-24 @ 04:40), Max: 36.6 (09-19-24 @ 13:41)  HR: 66 (09-20-24 @ 04:40) (64 - 67)  BP: 123/61 (09-20-24 @ 04:40) (102/52 - 123/61)  RR: 18 (09-20-24 @ 04:40) (18 - 18)  SpO2: 99% (09-20-24 @ 04:40) (95% - 99%)  ---___---___---___---___---___---  PHYSICAL EXAM:    GEN: A&O X 2 , NAD , comfortable  HEENT: NCAT, PERRL, MMM, hearing intact  Neck: supple , no JVD  CVS: S1S2 , regular , No M/R/G appreciated  PULM: CTA B/L,  no W/R/R appreciated  ABD.: soft. non tender, non distended,  bowel sounds present  Extrem: intact pulses , no edema   Derm: No rash , no ecchymoses  PSYCH : normal mood,  no delusion not anxious     ---___---___---___---___---___---            LAB AND IMAGING:                                                  [All pertinent / recent Imaging reviewed]         ---___---___---___---___---___---___ ---___---___---___---___---                         A S S E S S M E N T   A N D   P L A N :      HEALTH ISSUES - PROBLEM Dx:         c/c   superficial   ulcerations, on  bony prominences  of hammer  toes,     HTN/  HLD    c/c  diastolic  chf, on lasix/ known  to    card     Anxiety, continue meds    c/c  anemia     dvt  ppx  start dc planning  cellulitis resolved dc home     ___________________________  Thank you,  Garry Hermosillo  [phone]                                  [All pertinent / recent Imaging reviewed]         ---___---___---___---___---___---___ ---___---___---___---___---                         A S S E S S M E N T   A N D   P L A N :      HEALTH ISSUES - PROBLEM Dx:                -GI/DVT Prophylaxis.    --------------------------------------------  Case discussed with   Education given on   ___________________________  Thank you,  Garry Hermosillo  [phone]
Date of Service: 09-18-24 @ 09:54           CARDIOLOGY     PROGRESS  NOTE   ________________________________________________    CHIEF COMPLAINT:Patient is a 89y old  Female who presents with a chief complaint of leg  swelling (17 Sep 2024 22:10)  no complain  	  REVIEW OF SYSTEMS:  CONSTITUTIONAL: No fever, weight loss, or fatigue  EYES: No eye pain, visual disturbances, or discharge  ENT:  No difficulty hearing, tinnitus, vertigo; No sinus or throat pain  NECK: No pain or stiffness  RESPIRATORY: No cough, wheezing, chills or hemoptysis; No Shortness of Breath  CARDIOVASCULAR: No chest pain, palpitations, passing out, dizziness, or leg swelling  GASTROINTESTINAL: No abdominal or epigastric pain. No nausea, vomiting, or hematemesis; No diarrhea or constipation. No melena or hematochezia.  GENITOURINARY: No dysuria, frequency, hematuria, or incontinence  NEUROLOGICAL: No headaches, memory loss, loss of strength, numbness, or tremors  SKIN: No itching, burning, rashes, or lesions   LYMPH Nodes: No enlarged glands  ENDOCRINE: No heat or cold intolerance; No hair loss  MUSCULOSKELETAL: No joint pain or swelling; No muscle, back, or extremity pain  PSYCHIATRIC: No depression, anxiety, mood swings, or difficulty sleeping  HEME/LYMPH: No easy bruising, or bleeding gums  ALLERGY AND IMMUNOLOGIC: No hives or eczema	    [x ] All others negative	  [ ] Unable to obtain    PHYSICAL EXAM:  T(C): 36.3 (09-18-24 @ 04:32), Max: 36.8 (09-17-24 @ 14:18)  HR: 61 (09-18-24 @ 04:32) (60 - 65)  BP: 133/60 (09-18-24 @ 04:32) (104/58 - 133/60)  RR: 18 (09-18-24 @ 04:32) (18 - 18)  SpO2: 98% (09-18-24 @ 04:32) (96% - 98%)  Wt(kg): --  I&O's Summary    17 Sep 2024 07:01  -  18 Sep 2024 07:00  --------------------------------------------------------  IN: 0 mL / OUT: 100 mL / NET: -100 mL        Appearance: Normal	  HEENT:   Normal oral mucosa, PERRL, EOMI	  Lymphatic: No lymphadenopathy  Cardiovascular: Normal S1 S2, No JVD, N+murmurs, No edema  Respiratory: Lungs clear to auscultation	  Psychiatry: A & O x 3, Mood & affect appropriate  Gastrointestinal:  Soft, Non-tender, + BS	  Skin: No rashes, No ecchymoses, No cyanosis	  Neurologic: Non-focal  Extremities: Normal range of motion, No clubbing, cyanosis or edema  Vascular: Peripheral pulses palpable 2+ bilaterally    MEDICATIONS  (STANDING):  ascorbic acid 500 milliGRAM(s) Oral daily  atorvastatin 20 milliGRAM(s) Oral at bedtime  buDESOnide    Inhalation Suspension 0.5 milliGRAM(s) Inhalation two times a day  cephalexin 500 milliGRAM(s) Oral every 12 hours  cyclopentolate 1% Solution 1 Drop(s) Both EYES two times a day  FLUoxetine 30 milliGRAM(s) Oral daily  heparin   Injectable 5000 Unit(s) SubCutaneous every 12 hours  metoprolol succinate ER 50 milliGRAM(s) Oral daily  multivitamin 1 Tablet(s) Oral daily  prednisoLONE acetate 1% Suspension 1 Drop(s) Both EYES two times a day  temazepam 15 milliGRAM(s) Oral at bedtime  traZODone 50 milliGRAM(s) Oral <User Schedule>      TELEMETRY: 	    ECG:  	  RADIOLOGY:  OTHER: 	  	  LABS:	 	    CARDIAC MARKERS:                                8.9    5.49  )-----------( 162      ( 18 Sep 2024 07:04 )             28.8     09-18    138  |  100  |  18  ----------------------------<  88  3.5   |  26  |  0.84    Ca    8.6      18 Sep 2024 07:08  Mg     2.0     09-18      proBNP:   Lipid Profile:   HgA1c:   TSH:         Assessment and plan  ---------------------------  89 year old female with pmh of COPD,on home O2, chronic R hemidiaphragm paralysis, h/o recurrent aspiration events, HTN, diverticulitis,  asthma/COPD, sarcoidosis, hypothyroidism, heart failure, presenting to the ER with the swelling  right leg on and off for 2 weeks, she states pain is increasing working no trauma but legs feels warm to touch, no fever no chills   patient denies f/n/v/d, CP, HA, dizziness, abdominal pain, urinary symptoms  pt is well known to me with hx of underlying lung disease chf with LE edema nd redness  HFPEF acute on chronic with the swelling culture right leg re start on diuretics  pt with hx of sarcoidosis  + le edema, check venous doppler, negative on the last admission  will adjust cardiac meds  oob to the chair  continue current meds  Duoneb  will add Norvasc if bp remains elevated  oob to the chair  cellulitis has much improved, may dc on po abx for few more days  change lasix to po  no objection to dc  cardiac wise    	        
Date of Service: 09-19-24 @ 08:47           CARDIOLOGY     PROGRESS  NOTE   ________________________________________________    CHIEF COMPLAINT:Patient is a 89y old  Female who presents with a chief complaint of leg  swelling (18 Sep 2024 19:56)  no complain  	  REVIEW OF SYSTEMS:  CONSTITUTIONAL: No fever, weight loss, or fatigue  EYES: No eye pain, visual disturbances, or discharge  ENT:  No difficulty hearing, tinnitus, vertigo; No sinus or throat pain  NECK: No pain or stiffness  RESPIRATORY: No cough, wheezing, chills or hemoptysis; No Shortness of Breath  CARDIOVASCULAR: No chest pain, palpitations, passing out, dizziness, or leg swelling  GASTROINTESTINAL: No abdominal or epigastric pain. No nausea, vomiting, or hematemesis; No diarrhea or constipation. No melena or hematochezia.  GENITOURINARY: No dysuria, frequency, hematuria, or incontinence  NEUROLOGICAL: No headaches, memory loss, loss of strength, numbness, or tremors  SKIN: No itching, burning, rashes, or lesions   LYMPH Nodes: No enlarged glands  ENDOCRINE: No heat or cold intolerance; No hair loss  MUSCULOSKELETAL: No joint pain or swelling; No muscle, back, or extremity pain  PSYCHIATRIC: No depression, anxiety, mood swings, or difficulty sleeping  HEME/LYMPH: No easy bruising, or bleeding gums  ALLERGY AND IMMUNOLOGIC: No hives or eczema	    [ x] All others negative	  [ ] Unable to obtain    PHYSICAL EXAM:  T(C): 36.7 (09-19-24 @ 04:45), Max: 36.7 (09-19-24 @ 04:45)  HR: 60 (09-19-24 @ 04:45) (60 - 64)  BP: 137/66 (09-19-24 @ 04:45) (88/51 - 142/66)  RR: 18 (09-19-24 @ 04:45) (18 - 18)  SpO2: 96% (09-19-24 @ 04:45) (95% - 96%)  Wt(kg): --  I&O's Summary    18 Sep 2024 07:01  -  19 Sep 2024 07:00  --------------------------------------------------------  IN: 0 mL / OUT: 180 mL / NET: -180 mL        Appearance: Normal	  HEENT:   Normal oral mucosa, PERRL, EOMI	  Lymphatic: No lymphadenopathy  Cardiovascular: Normal S1 S2, No JVD, + murmurs, No edema  Respiratory: rhonchi  Psychiatry: A & O x 3, Mood & affect appropriate  Gastrointestinal:  Soft, Non-tender, + BS	  Skin: No rashes, No ecchymoses, No cyanosis	  Extremities: Normal range of motion, No clubbing, cyanosis or edema  Vascular: Peripheral pulses palpable 2+ bilaterally    MEDICATIONS  (STANDING):  ascorbic acid 500 milliGRAM(s) Oral daily  atorvastatin 20 milliGRAM(s) Oral at bedtime  buDESOnide    Inhalation Suspension 0.5 milliGRAM(s) Inhalation two times a day  cephalexin 500 milliGRAM(s) Oral every 12 hours  cyclopentolate 1% Solution 1 Drop(s) Both EYES two times a day  FLUoxetine 30 milliGRAM(s) Oral daily  furosemide    Tablet 20 milliGRAM(s) Oral daily  heparin   Injectable 5000 Unit(s) SubCutaneous every 12 hours  metoprolol succinate ER 50 milliGRAM(s) Oral daily  multivitamin 1 Tablet(s) Oral daily  prednisoLONE acetate 1% Suspension 1 Drop(s) Both EYES two times a day  temazepam 15 milliGRAM(s) Oral at bedtime  traZODone 50 milliGRAM(s) Oral <User Schedule>      TELEMETRY: 	    ECG:  	  RADIOLOGY:  OTHER: 	  	  LABS:	 	    CARDIAC MARKERS:                          8.9    5.49  )-----------( 162      ( 18 Sep 2024 07:04 )             28.8     09-18    138  |  100  |  18  ----------------------------<  88  3.5   |  26  |  0.84    Ca    8.6      18 Sep 2024 07:08  Mg     2.0     09-18      proBNP:   Lipid Profile:   HgA1c:   TSH:     1. Bilateral buttock deep tissue injury   Topical therapy- sacral/bilateral buttocks- cleanse w/incontinent cleanser, pat dry & apply carolina cream  twice daily & prn soiling . Monitor for changes .  2. Right and left heel - Elevate heels; apply Complete Cair air fluidized boots; ensure that the soles of the feet are not resting on the foot board of the bed.    for toe lesion consider podiatry for further management.   3  Incontinent management - incontinent cleanser, pads,  nancy care  BID  4. Maintain on an alternating air with low air loss surface   5. Turn & reposition every 2 hr; Use positioning pillow to turn and reposition, soft pillow between bony prominences; continue measures to decrease friction/shear/pressure.  6. Nutrition optimization.  7.  Place waffle cushion when out of bed to chair .     Assessment and plan  ---------------------------  89 year old female with pmh of COPD,on home O2, chronic R hemidiaphragm paralysis, h/o recurrent aspiration events, HTN, diverticulitis,  asthma/COPD, sarcoidosis, hypothyroidism, heart failure, presenting to the ER with the swelling  right leg on and off for 2 weeks, she states pain is increasing working no trauma but legs feels warm to touch, no fever no chills   patient denies f/n/v/d, CP, HA, dizziness, abdominal pain, urinary symptoms  pt is well known to me with hx of underlying lung disease chf with LE edema nd redness  HFPEF acute on chronic with the swelling culture right leg re start on diuretics  pt with hx of sarcoidosis  + le edema, check venous doppler, negative on the last admission  will adjust cardiac meds  oob to the chair  continue current meds  Duoneb  will add Norvasc if bp remains elevated  oob to the chair  cellulitis has much improved, may dc on po abx for few more days  change lasix to po  no objection to dc  cardiac wise  care coordination noted

## 2024-09-20 NOTE — PROGRESS NOTE ADULT - PROVIDER SPECIALTY LIST ADULT
Cardiology
Family Medicine
Family Medicine
Cardiology
Cardiology
Family Medicine
Cardiology
Family Medicine

## 2024-09-20 NOTE — PROGRESS NOTE ADULT - NUTRITIONAL ASSESSMENT
This patient has been assessed with a concern for Malnutrition and has been determined to have a diagnosis/diagnoses of Severe protein-calorie malnutrition.    This patient is being managed with:   Diet Regular-  Kosher  Entered: Sep 15 2024 12:09PM  

## 2024-09-20 NOTE — PROGRESS NOTE ADULT - REASON FOR ADMISSION
leg  swelling

## 2024-09-23 ENCOUNTER — TRANSCRIPTION ENCOUNTER (OUTPATIENT)
Age: 89
End: 2024-09-23

## 2024-09-24 ENCOUNTER — TRANSCRIPTION ENCOUNTER (OUTPATIENT)
Age: 89
End: 2024-09-24

## 2024-09-25 ENCOUNTER — TRANSCRIPTION ENCOUNTER (OUTPATIENT)
Age: 89
End: 2024-09-25

## 2024-09-27 NOTE — ED ADULT NURSE NOTE - CAS TRG GEN SKIN CONDITION
Patient had bloodwork done yesterday with K+ 6.2. Denies chest pain. Denies any compaints.    Warm/Dry

## 2024-09-29 PROCEDURE — 83735 ASSAY OF MAGNESIUM: CPT

## 2024-09-29 PROCEDURE — 85610 PROTHROMBIN TIME: CPT

## 2024-09-29 PROCEDURE — 84132 ASSAY OF SERUM POTASSIUM: CPT

## 2024-09-29 PROCEDURE — 94640 AIRWAY INHALATION TREATMENT: CPT

## 2024-09-29 PROCEDURE — 97161 PT EVAL LOW COMPLEX 20 MIN: CPT

## 2024-09-29 PROCEDURE — 71250 CT THORAX DX C-: CPT | Mod: MC

## 2024-09-29 PROCEDURE — 87641 MR-STAPH DNA AMP PROBE: CPT

## 2024-09-29 PROCEDURE — 85730 THROMBOPLASTIN TIME PARTIAL: CPT

## 2024-09-29 PROCEDURE — 94660 CPAP INITIATION&MGMT: CPT

## 2024-09-29 PROCEDURE — 85014 HEMATOCRIT: CPT

## 2024-09-29 PROCEDURE — 87637 SARSCOV2&INF A&B&RSV AMP PRB: CPT

## 2024-09-29 PROCEDURE — 96374 THER/PROPH/DIAG INJ IV PUSH: CPT

## 2024-09-29 PROCEDURE — 36415 COLL VENOUS BLD VENIPUNCTURE: CPT

## 2024-09-29 PROCEDURE — 85027 COMPLETE CBC AUTOMATED: CPT

## 2024-09-29 PROCEDURE — 82435 ASSAY OF BLOOD CHLORIDE: CPT

## 2024-09-29 PROCEDURE — 73590 X-RAY EXAM OF LOWER LEG: CPT

## 2024-09-29 PROCEDURE — 80053 COMPREHEN METABOLIC PANEL: CPT

## 2024-09-29 PROCEDURE — 82803 BLOOD GASES ANY COMBINATION: CPT

## 2024-09-29 PROCEDURE — 83605 ASSAY OF LACTIC ACID: CPT

## 2024-09-29 PROCEDURE — 80048 BASIC METABOLIC PNL TOTAL CA: CPT

## 2024-09-29 PROCEDURE — 93923 UPR/LXTR ART STDY 3+ LVLS: CPT

## 2024-09-29 PROCEDURE — 87086 URINE CULTURE/COLONY COUNT: CPT

## 2024-09-29 PROCEDURE — 85652 RBC SED RATE AUTOMATED: CPT

## 2024-09-29 PROCEDURE — 85379 FIBRIN DEGRADATION QUANT: CPT

## 2024-09-29 PROCEDURE — 93971 EXTREMITY STUDY: CPT

## 2024-09-29 PROCEDURE — 73630 X-RAY EXAM OF FOOT: CPT

## 2024-09-29 PROCEDURE — 87186 SC STD MICRODIL/AGAR DIL: CPT

## 2024-09-29 PROCEDURE — 92610 EVALUATE SWALLOWING FUNCTION: CPT

## 2024-09-29 PROCEDURE — 99285 EMERGENCY DEPT VISIT HI MDM: CPT | Mod: 25

## 2024-09-29 PROCEDURE — 78582 LUNG VENTILAT&PERFUS IMAGING: CPT | Mod: MC

## 2024-09-29 PROCEDURE — 83880 ASSAY OF NATRIURETIC PEPTIDE: CPT

## 2024-09-29 PROCEDURE — 93005 ELECTROCARDIOGRAM TRACING: CPT

## 2024-09-29 PROCEDURE — 81001 URINALYSIS AUTO W/SCOPE: CPT

## 2024-09-29 PROCEDURE — 84484 ASSAY OF TROPONIN QUANT: CPT

## 2024-09-29 PROCEDURE — 87040 BLOOD CULTURE FOR BACTERIA: CPT

## 2024-09-29 PROCEDURE — 71045 X-RAY EXAM CHEST 1 VIEW: CPT

## 2024-09-29 PROCEDURE — 87077 CULTURE AEROBIC IDENTIFY: CPT

## 2024-09-29 PROCEDURE — 97162 PT EVAL MOD COMPLEX 30 MIN: CPT

## 2024-09-29 PROCEDURE — 85018 HEMOGLOBIN: CPT

## 2024-09-29 PROCEDURE — 85025 COMPLETE CBC W/AUTO DIFF WBC: CPT

## 2024-09-29 PROCEDURE — 96375 TX/PRO/DX INJ NEW DRUG ADDON: CPT

## 2024-09-29 PROCEDURE — 87640 STAPH A DNA AMP PROBE: CPT

## 2024-09-29 PROCEDURE — A9567: CPT

## 2024-09-29 PROCEDURE — 86140 C-REACTIVE PROTEIN: CPT

## 2024-09-29 PROCEDURE — 82947 ASSAY GLUCOSE BLOOD QUANT: CPT

## 2024-09-29 PROCEDURE — 82330 ASSAY OF CALCIUM: CPT

## 2024-09-29 PROCEDURE — 84295 ASSAY OF SERUM SODIUM: CPT

## 2024-09-29 PROCEDURE — 71275 CT ANGIOGRAPHY CHEST: CPT | Mod: MC

## 2024-09-29 PROCEDURE — A9540: CPT

## 2024-10-05 ENCOUNTER — EMERGENCY (EMERGENCY)
Facility: HOSPITAL | Age: 89
LOS: 1 days | Discharge: HOME | End: 2024-10-05
Attending: EMERGENCY MEDICINE
Payer: MEDICARE

## 2024-10-05 VITALS
DIASTOLIC BLOOD PRESSURE: 62 MMHG | TEMPERATURE: 98 F | SYSTOLIC BLOOD PRESSURE: 150 MMHG | RESPIRATION RATE: 16 BRPM | OXYGEN SATURATION: 95 % | HEART RATE: 82 BPM

## 2024-10-05 VITALS
OXYGEN SATURATION: 97 % | HEIGHT: 62 IN | RESPIRATION RATE: 22 BRPM | SYSTOLIC BLOOD PRESSURE: 138 MMHG | WEIGHT: 95.02 LBS | DIASTOLIC BLOOD PRESSURE: 72 MMHG | HEART RATE: 73 BPM | TEMPERATURE: 97 F

## 2024-10-05 LAB
ALBUMIN SERPL ELPH-MCNC: 3.6 G/DL — SIGNIFICANT CHANGE UP (ref 3.3–5)
ALP SERPL-CCNC: 94 U/L — SIGNIFICANT CHANGE UP (ref 40–120)
ALT FLD-CCNC: 5 U/L — LOW (ref 10–45)
ANION GAP SERPL CALC-SCNC: 13 MMOL/L — SIGNIFICANT CHANGE UP (ref 5–17)
AST SERPL-CCNC: 14 U/L — SIGNIFICANT CHANGE UP (ref 10–40)
BASOPHILS # BLD AUTO: 0.04 K/UL — SIGNIFICANT CHANGE UP (ref 0–0.2)
BASOPHILS NFR BLD AUTO: 0.4 % — SIGNIFICANT CHANGE UP (ref 0–2)
BILIRUB SERPL-MCNC: 0.4 MG/DL — SIGNIFICANT CHANGE UP (ref 0.2–1.2)
BUN SERPL-MCNC: 17 MG/DL — SIGNIFICANT CHANGE UP (ref 7–23)
CALCIUM SERPL-MCNC: 9.5 MG/DL — SIGNIFICANT CHANGE UP (ref 8.4–10.5)
CHLORIDE SERPL-SCNC: 104 MMOL/L — SIGNIFICANT CHANGE UP (ref 96–108)
CO2 SERPL-SCNC: 25 MMOL/L — SIGNIFICANT CHANGE UP (ref 22–31)
CREAT SERPL-MCNC: 0.79 MG/DL — SIGNIFICANT CHANGE UP (ref 0.5–1.3)
EGFR: 72 ML/MIN/1.73M2 — SIGNIFICANT CHANGE UP
EOSINOPHIL # BLD AUTO: 0.06 K/UL — SIGNIFICANT CHANGE UP (ref 0–0.5)
EOSINOPHIL NFR BLD AUTO: 0.6 % — SIGNIFICANT CHANGE UP (ref 0–6)
FLUAV AG NPH QL: SIGNIFICANT CHANGE UP
FLUBV AG NPH QL: SIGNIFICANT CHANGE UP
GLUCOSE SERPL-MCNC: 95 MG/DL — SIGNIFICANT CHANGE UP (ref 70–99)
HCT VFR BLD CALC: 32.7 % — LOW (ref 34.5–45)
HGB BLD-MCNC: 9.9 G/DL — LOW (ref 11.5–15.5)
IMM GRANULOCYTES NFR BLD AUTO: 0.4 % — SIGNIFICANT CHANGE UP (ref 0–0.9)
LYMPHOCYTES # BLD AUTO: 1.02 K/UL — SIGNIFICANT CHANGE UP (ref 1–3.3)
LYMPHOCYTES # BLD AUTO: 9.7 % — LOW (ref 13–44)
MCHC RBC-ENTMCNC: 27.8 PG — SIGNIFICANT CHANGE UP (ref 27–34)
MCHC RBC-ENTMCNC: 30.3 GM/DL — LOW (ref 32–36)
MCV RBC AUTO: 91.9 FL — SIGNIFICANT CHANGE UP (ref 80–100)
MONOCYTES # BLD AUTO: 0.77 K/UL — SIGNIFICANT CHANGE UP (ref 0–0.9)
MONOCYTES NFR BLD AUTO: 7.3 % — SIGNIFICANT CHANGE UP (ref 2–14)
NEUTROPHILS # BLD AUTO: 8.62 K/UL — HIGH (ref 1.8–7.4)
NEUTROPHILS NFR BLD AUTO: 81.6 % — HIGH (ref 43–77)
NRBC # BLD: 0 /100 WBCS — SIGNIFICANT CHANGE UP (ref 0–0)
NT-PROBNP SERPL-SCNC: 807 PG/ML — HIGH (ref 0–300)
PLATELET # BLD AUTO: 175 K/UL — SIGNIFICANT CHANGE UP (ref 150–400)
POTASSIUM SERPL-MCNC: 4.1 MMOL/L — SIGNIFICANT CHANGE UP (ref 3.5–5.3)
POTASSIUM SERPL-SCNC: 4.1 MMOL/L — SIGNIFICANT CHANGE UP (ref 3.5–5.3)
PROT SERPL-MCNC: 6.5 G/DL — SIGNIFICANT CHANGE UP (ref 6–8.3)
RBC # BLD: 3.56 M/UL — LOW (ref 3.8–5.2)
RBC # FLD: 15.7 % — HIGH (ref 10.3–14.5)
RSV RNA NPH QL NAA+NON-PROBE: SIGNIFICANT CHANGE UP
SARS-COV-2 RNA SPEC QL NAA+PROBE: SIGNIFICANT CHANGE UP
SODIUM SERPL-SCNC: 142 MMOL/L — SIGNIFICANT CHANGE UP (ref 135–145)
TROPONIN T, HIGH SENSITIVITY RESULT: 40 NG/L — SIGNIFICANT CHANGE UP (ref 0–51)
TROPONIN T, HIGH SENSITIVITY RESULT: 40 NG/L — SIGNIFICANT CHANGE UP (ref 0–51)
WBC # BLD: 10.55 K/UL — HIGH (ref 3.8–10.5)
WBC # FLD AUTO: 10.55 K/UL — HIGH (ref 3.8–10.5)

## 2024-10-05 PROCEDURE — 71045 X-RAY EXAM CHEST 1 VIEW: CPT | Mod: 26

## 2024-10-05 PROCEDURE — 99284 EMERGENCY DEPT VISIT MOD MDM: CPT | Mod: FS

## 2024-10-05 NOTE — ED PROVIDER NOTE - PHYSICAL EXAMINATION
CONSTITUTIONAL: Patient is awake, alert. Patient is well appearing and in no acute distress  HEAD: NCAT  NECK: supple, FROM  LUNGS: CTA b/l, no wheezing or rales   HEART: RRR.+S1S2   ABDOMEN: Soft, non-distended, nttp,  no rebound or guarding  EXTREMITY: No edema or calf tenderness b/l, FROM upper and lower ext b/l  NEURO: No focal deficits CONSTITUTIONAL: Patient is awake, alert. Patient is well appearing and in no acute distress  HEAD: NCAT  NECK: supple, FROM  LUNGS: CTA b/l, no wheezing or rales, saturating >95% on RA however placed on 2L O2 per chart review  HEART: RRR.+S1S2   ABDOMEN: Soft, non-distended, nttp,  no rebound or guarding  EXTREMITY: No edema or calf tenderness b/l, FROM upper and lower ext b/l  NEURO: No focal deficits

## 2024-10-05 NOTE — ED PROVIDER NOTE - NSFOLLOWUPINSTRUCTIONS_ED_ALL_ED_FT
You were seen in the emergency department for evaluation of episode of shortness of breath earlier this morning.  You had lab work as well as a chest x-ray and EKG which did not reveal a any acute concerns    1. Please follow up with your Primary Care Doctor after discharge, bring a copy of your results to follow up appointment for review     2. Please rest, stay hydrated and continue all at home medications as previously prescribed    3. Return to ED for any new or worsened symptoms of concern

## 2024-10-05 NOTE — ED PROVIDER NOTE - OBJECTIVE STATEMENT
89 year old female  with history of hypertension, hyperlipidemia, CAD, HF, chronic right hemidiaphragm paralysis, hypothyroidism, dementia  and COPD presents emergency department from the [facility] for evaluation of acute shortness of breath.  Patient reportedly getting her medications this morning and began to have shortness of breath.  Patient states that she recalls having a brief episode of shortness of breath which has completely resolved reports feeling well now.  She is reported to be on baseline home O2 and has not required additional oxygen maintaining sat 97% en route. Pt denies cough, congestion, chest pain, abdominal pain, n/v

## 2024-10-05 NOTE — ED PROVIDER NOTE - CLINICAL SUMMARY MEDICAL DECISION MAKING FREE TEXT BOX
89 year old female  with history of hypertension, hyperlipidemia, CAD, HF, chronic right hemidiaphragm paralysis, hypothyroidism, dementia  and COPD presents emergency department after reporting she was short of breath to staff this AM. Pt does not recall being short of breathig and denies as present. History limited due to dementia. Pt placed on 2L home O2 given chart review. less likely COPD exacerbation vs. CHF exacerbation given clinical apperance however will obtain labs. Will obtain labs including trop to eval for ACS equivalent. Less likely PNA given no infectious symptoms at this time however given limited history, will use labs and xray to risk-stratify.

## 2024-10-05 NOTE — ED ADULT NURSE NOTE - OBJECTIVE STATEMENT
None
89F BIBEMS from [facility] assisted living facility with h/o Acute diverticulitis CAD COPD  pyelonephritis HTN Hypothyroidism Osteoporosis Pulmonary sarcoidosis, came to ED with c/o SOB while at the facility which was resolved as per patient and EMS. patient on chronic O2 at 2LNC secondary to COPD, denies any chest pain or diaphoresis. patient hard of hearing. VS WDL in the ED. No wheezing noted,  No trauma or acute distress noted. Rt Hand 20g IV access inserted, labs sent pending results.

## 2024-10-05 NOTE — ED PROVIDER NOTE - PATIENT PORTAL LINK FT
You can access the FollowMyHealth Patient Portal offered by [facility] by registering at the following website: [email or web address]. By joining "[employer]’s FollowMyHealth portal, you will also be able to view your health information using other applications (apps) compatible with our system.

## 2024-10-05 NOTE — ED PROVIDER NOTE - PROGRESS NOTE DETAILS
Pt has been asymptomatic while in the emergency department labs reviewed without acute concerning findings troponin stable at 40 and chest x-ray clear.  Atrial staff member at bedside.  Will discharge patient back to her assisted living facility  Lily Foster PA-C

## 2024-11-20 PROCEDURE — 84484 ASSAY OF TROPONIN QUANT: CPT

## 2024-11-20 PROCEDURE — 87637 SARSCOV2&INF A&B&RSV AMP PRB: CPT

## 2024-11-20 PROCEDURE — 85025 COMPLETE CBC W/AUTO DIFF WBC: CPT

## 2024-11-20 PROCEDURE — 82947 ASSAY GLUCOSE BLOOD QUANT: CPT

## 2024-11-20 PROCEDURE — 84295 ASSAY OF SERUM SODIUM: CPT

## 2024-11-20 PROCEDURE — 99285 EMERGENCY DEPT VISIT HI MDM: CPT | Mod: 25

## 2024-11-20 PROCEDURE — 71045 X-RAY EXAM CHEST 1 VIEW: CPT

## 2024-11-20 PROCEDURE — 84132 ASSAY OF SERUM POTASSIUM: CPT

## 2024-11-20 PROCEDURE — 85018 HEMOGLOBIN: CPT

## 2024-11-20 PROCEDURE — 82435 ASSAY OF BLOOD CHLORIDE: CPT

## 2024-11-20 PROCEDURE — 36000 PLACE NEEDLE IN VEIN: CPT

## 2024-11-20 PROCEDURE — 83605 ASSAY OF LACTIC ACID: CPT

## 2024-11-20 PROCEDURE — 82803 BLOOD GASES ANY COMBINATION: CPT

## 2024-11-20 PROCEDURE — 80053 COMPREHEN METABOLIC PANEL: CPT

## 2024-11-20 PROCEDURE — 82330 ASSAY OF CALCIUM: CPT

## 2024-11-20 PROCEDURE — 93005 ELECTROCARDIOGRAM TRACING: CPT

## 2024-11-20 PROCEDURE — 85014 HEMATOCRIT: CPT

## 2024-11-20 PROCEDURE — 83880 ASSAY OF NATRIURETIC PEPTIDE: CPT

## 2024-11-23 ENCOUNTER — INPATIENT (INPATIENT)
Facility: HOSPITAL | Age: 89
LOS: 8 days | Discharge: NURSING, REHAB OR LONG-TERM CARE | DRG: 690 | End: 2024-12-02
Attending: FAMILY MEDICINE | Admitting: FAMILY MEDICINE
Payer: MEDICARE

## 2024-11-23 VITALS
HEART RATE: 65 BPM | OXYGEN SATURATION: 90 % | HEIGHT: 62 IN | DIASTOLIC BLOOD PRESSURE: 68 MMHG | TEMPERATURE: 97 F | RESPIRATION RATE: 19 BRPM | WEIGHT: 110.01 LBS | SYSTOLIC BLOOD PRESSURE: 122 MMHG

## 2024-11-23 LAB
ALBUMIN SERPL ELPH-MCNC: 3.7 G/DL — SIGNIFICANT CHANGE UP (ref 3.3–5)
ALP SERPL-CCNC: 119 U/L — SIGNIFICANT CHANGE UP (ref 40–120)
ALT FLD-CCNC: <5 U/L — LOW (ref 10–45)
ANION GAP SERPL CALC-SCNC: 13 MMOL/L — SIGNIFICANT CHANGE UP (ref 5–17)
APPEARANCE UR: ABNORMAL
APTT BLD: 32.2 SEC — SIGNIFICANT CHANGE UP (ref 24.5–35.6)
AST SERPL-CCNC: 11 U/L — SIGNIFICANT CHANGE UP (ref 10–40)
BACTERIA # UR AUTO: NEGATIVE /HPF — SIGNIFICANT CHANGE UP
BASOPHILS # BLD AUTO: 0.05 K/UL — SIGNIFICANT CHANGE UP (ref 0–0.2)
BASOPHILS NFR BLD AUTO: 0.5 % — SIGNIFICANT CHANGE UP (ref 0–2)
BILIRUB SERPL-MCNC: 0.2 MG/DL — SIGNIFICANT CHANGE UP (ref 0.2–1.2)
BILIRUB UR-MCNC: NEGATIVE — SIGNIFICANT CHANGE UP
BUN SERPL-MCNC: 14 MG/DL — SIGNIFICANT CHANGE UP (ref 7–23)
CALCIUM SERPL-MCNC: 8.8 MG/DL — SIGNIFICANT CHANGE UP (ref 8.4–10.5)
CAST: 3 /LPF — SIGNIFICANT CHANGE UP (ref 0–4)
CHLORIDE SERPL-SCNC: 105 MMOL/L — SIGNIFICANT CHANGE UP (ref 96–108)
CO2 SERPL-SCNC: 25 MMOL/L — SIGNIFICANT CHANGE UP (ref 22–31)
COLOR SPEC: YELLOW — SIGNIFICANT CHANGE UP
CREAT SERPL-MCNC: 0.67 MG/DL — SIGNIFICANT CHANGE UP (ref 0.5–1.3)
DIFF PNL FLD: ABNORMAL
EGFR: 84 ML/MIN/1.73M2 — SIGNIFICANT CHANGE UP
EOSINOPHIL # BLD AUTO: 0.16 K/UL — SIGNIFICANT CHANGE UP (ref 0–0.5)
EOSINOPHIL NFR BLD AUTO: 1.7 % — SIGNIFICANT CHANGE UP (ref 0–6)
FLUAV AG NPH QL: SIGNIFICANT CHANGE UP
FLUBV AG NPH QL: SIGNIFICANT CHANGE UP
GAS PNL BLDV: SIGNIFICANT CHANGE UP
GLUCOSE SERPL-MCNC: 120 MG/DL — HIGH (ref 70–99)
GLUCOSE UR QL: NEGATIVE MG/DL — SIGNIFICANT CHANGE UP
HCT VFR BLD CALC: 32.5 % — LOW (ref 34.5–45)
HGB BLD-MCNC: 9.9 G/DL — LOW (ref 11.5–15.5)
IMM GRANULOCYTES NFR BLD AUTO: 0.4 % — SIGNIFICANT CHANGE UP (ref 0–0.9)
INR BLD: 1.09 RATIO — SIGNIFICANT CHANGE UP (ref 0.85–1.16)
KETONES UR-MCNC: NEGATIVE MG/DL — SIGNIFICANT CHANGE UP
LACTATE SERPL-SCNC: 1.5 MMOL/L — SIGNIFICANT CHANGE UP (ref 0.5–2)
LEUKOCYTE ESTERASE UR-ACNC: ABNORMAL
LYMPHOCYTES # BLD AUTO: 1.28 K/UL — SIGNIFICANT CHANGE UP (ref 1–3.3)
LYMPHOCYTES # BLD AUTO: 13.7 % — SIGNIFICANT CHANGE UP (ref 13–44)
MAGNESIUM SERPL-MCNC: 2.1 MG/DL — SIGNIFICANT CHANGE UP (ref 1.6–2.6)
MCHC RBC-ENTMCNC: 27.1 PG — SIGNIFICANT CHANGE UP (ref 27–34)
MCHC RBC-ENTMCNC: 30.5 G/DL — LOW (ref 32–36)
MCV RBC AUTO: 89 FL — SIGNIFICANT CHANGE UP (ref 80–100)
MONOCYTES # BLD AUTO: 0.8 K/UL — SIGNIFICANT CHANGE UP (ref 0–0.9)
MONOCYTES NFR BLD AUTO: 8.5 % — SIGNIFICANT CHANGE UP (ref 2–14)
NEUTROPHILS # BLD AUTO: 7.03 K/UL — SIGNIFICANT CHANGE UP (ref 1.8–7.4)
NEUTROPHILS NFR BLD AUTO: 75.2 % — SIGNIFICANT CHANGE UP (ref 43–77)
NITRITE UR-MCNC: NEGATIVE — SIGNIFICANT CHANGE UP
NRBC # BLD: 0 /100 WBCS — SIGNIFICANT CHANGE UP (ref 0–0)
PH UR: 6 — SIGNIFICANT CHANGE UP (ref 5–8)
PHOSPHATE SERPL-MCNC: 4.1 MG/DL — SIGNIFICANT CHANGE UP (ref 2.5–4.5)
PLATELET # BLD AUTO: 192 K/UL — SIGNIFICANT CHANGE UP (ref 150–400)
POTASSIUM SERPL-MCNC: 3.4 MMOL/L — LOW (ref 3.5–5.3)
POTASSIUM SERPL-SCNC: 3.4 MMOL/L — LOW (ref 3.5–5.3)
PROT SERPL-MCNC: 6.3 G/DL — SIGNIFICANT CHANGE UP (ref 6–8.3)
PROT UR-MCNC: 30 MG/DL
PROTHROM AB SERPL-ACNC: 12.5 SEC — SIGNIFICANT CHANGE UP (ref 9.9–13.4)
RBC # BLD: 3.65 M/UL — LOW (ref 3.8–5.2)
RBC # FLD: 15.5 % — HIGH (ref 10.3–14.5)
RBC CASTS # UR COMP ASSIST: 2 /HPF — SIGNIFICANT CHANGE UP (ref 0–4)
REVIEW: SIGNIFICANT CHANGE UP
RSV RNA NPH QL NAA+NON-PROBE: SIGNIFICANT CHANGE UP
SARS-COV-2 RNA SPEC QL NAA+PROBE: SIGNIFICANT CHANGE UP
SODIUM SERPL-SCNC: 143 MMOL/L — SIGNIFICANT CHANGE UP (ref 135–145)
SP GR SPEC: 1.02 — SIGNIFICANT CHANGE UP (ref 1–1.03)
SQUAMOUS # UR AUTO: 6 /HPF — HIGH (ref 0–5)
TROPONIN T, HIGH SENSITIVITY RESULT: 33 NG/L — SIGNIFICANT CHANGE UP (ref 0–51)
TROPONIN T, HIGH SENSITIVITY RESULT: 35 NG/L — SIGNIFICANT CHANGE UP (ref 0–51)
UROBILINOGEN FLD QL: 0.2 MG/DL — SIGNIFICANT CHANGE UP (ref 0.2–1)
WBC # BLD: 9.36 K/UL — SIGNIFICANT CHANGE UP (ref 3.8–10.5)
WBC # FLD AUTO: 9.36 K/UL — SIGNIFICANT CHANGE UP (ref 3.8–10.5)
WBC UR QL: >998 /HPF — HIGH (ref 0–5)
YEAST-LIKE CELLS: PRESENT

## 2024-11-23 PROCEDURE — 70450 CT HEAD/BRAIN W/O DYE: CPT | Mod: 26,MC

## 2024-11-23 PROCEDURE — 99285 EMERGENCY DEPT VISIT HI MDM: CPT | Mod: GC

## 2024-11-23 PROCEDURE — 71045 X-RAY EXAM CHEST 1 VIEW: CPT | Mod: 26

## 2024-11-23 PROCEDURE — 71250 CT THORAX DX C-: CPT | Mod: 26,MC

## 2024-11-23 RX ORDER — ACETAMINOPHEN 500MG 500 MG/1
750 TABLET, COATED ORAL ONCE
Refills: 0 | Status: COMPLETED | OUTPATIENT
Start: 2024-11-23 | End: 2024-11-23

## 2024-11-23 RX ORDER — SODIUM CHLORIDE 9 MG/ML
500 INJECTION, SOLUTION INTRAMUSCULAR; INTRAVENOUS; SUBCUTANEOUS ONCE
Refills: 0 | Status: COMPLETED | OUTPATIENT
Start: 2024-11-23 | End: 2024-11-23

## 2024-11-23 RX ADMIN — ACETAMINOPHEN 500MG 300 MILLIGRAM(S): 500 TABLET, COATED ORAL at 20:54

## 2024-11-23 RX ADMIN — SODIUM CHLORIDE 500 MILLILITER(S): 9 INJECTION, SOLUTION INTRAMUSCULAR; INTRAVENOUS; SUBCUTANEOUS at 20:31

## 2024-11-23 NOTE — ED ADULT NURSE NOTE - OBJECTIVE STATEMENT
89 y.o F A&OX2-3 w. PMH of HTN, HLD, CAD, CHF, hypothyroidism, dementia, COPD, on 2L at baseline, presents to ED complaining of AMS. Pt BIBEMS from [facility] assisted living after an episode of altered mental status starting around 6:15 today. Per aide at bedside, pt was eating dinner and became "slumped over, mumbling and less responsive." States that during that episode, pt did not know her name and was disoriented. Pt has slowly been regaining mental status since this episode. pt is A&Ox3 at baseline. Pt is denying any complaints at this time. VSS at this time. Per daughter at bedside, pt usually confused if she has UTI. Bed placed in lowest position, respirations are even and unlabored. Pt is well appearing and in no acute distress. Family and aide at bedside at this time.

## 2024-11-23 NOTE — ED ADULT NURSE REASSESSMENT NOTE - NS ED NURSE REASSESS COMMENT FT1
Patient straight cathed for urine using sterile technique. Second RN present to confirm sterility. Explained procedure as it was being done - Pt tolerated procedure well. Sterile specimens collected and sent to lab as ordered. drained aprox 250ml of urine. Comfort and safety provided.

## 2024-11-23 NOTE — ED ADULT TRIAGE NOTE - MEANS OF ARRIVAL
Chief Complaint   Patient presents with   • Office Visit   • Thyroid Problem     referred by dr guzman, check thyroid.   • Other     had U/S and labs completed       HPI   57 year old female with no significant PMHx referred by Dr. Zane Guzman presenting for initial evaluation and management of abnormal thyroid function results, here with her . I spoke to pt in Lithuanian and English. She went to the doctor for swelling in the neck and tenderness in the thyroid. She had a thyroid US and labs done. In the past she has had a thyroid US as well. Pt denies ever getting a biopsy or taking thyroid medications. No heat or cold intolerance. No changes in bowel habits. Pt denies any tremors or palpitations. No difficulty swallowing or breathing. She has some anxiety and she is on zoloft. No FHx of thyroid CA. No radiation exposure.     FHx of thyroid problems: none  Other FHx: none  SHx: pt is  with 4 kids, her youngest child is 16 y/o,   PMHx: as above  PSHx: none    General: no fatigue, no fever, no weight loss, no appetite changes   Eye: no pain, no redness, no diplopia, no blurry vision   Resp: no dyspnea, no cough   CV: no chest pain, no palpitations, no pedal edema   GI: no abdominal pain, no diarrhea, no nausea, no vomiting   MSK: no myalgias, no joint pains   Neuro: no numbness or tingling in feet, no confusion, no HA   Urinary: no polyuria   Skin: no rash   Psych: no depression, + anxiety      Allergies  ALLERGIES:  No Known Allergies    Current Meds     Current Outpatient Medications   Medication Sig Dispense Refill   • losartan (COZAAR) 50 MG tablet Take 50 mg by mouth daily.     • sertraline (ZOLOFT) 50 MG tablet Take 50 mg by mouth daily.       No current facility-administered medications for this visit.        Active Problems  There is no problem list on file for this patient.      Past Medical History  No past medical history on file.    Surgical History  No past surgical history on  file.    Family History  No family history on file.    Social History  Social History     Socioeconomic History   • Marital status: /Civil Union     Spouse name: Not on file   • Number of children: Not on file   • Years of education: Not on file   • Highest education level: Not on file   Occupational History   • Not on file   Social Needs   • Financial resource strain: Not on file   • Food insecurity     Worry: Not on file     Inability: Not on file   • Transportation needs     Medical: Not on file     Non-medical: Not on file   Tobacco Use   • Smoking status: Never Smoker   • Smokeless tobacco: Never Used   Substance and Sexual Activity   • Alcohol use: Not on file   • Drug use: Not on file   • Sexual activity: Not on file   Lifestyle   • Physical activity     Days per week: Not on file     Minutes per session: Not on file   • Stress: Not on file   Relationships   • Social connections     Talks on phone: Not on file     Gets together: Not on file     Attends Restoration service: Not on file     Active member of club or organization: Not on file     Attends meetings of clubs or organizations: Not on file     Relationship status: Not on file   • Intimate partner violence     Fear of current or ex partner: Not on file     Emotionally abused: Not on file     Physically abused: Not on file     Forced sexual activity: Not on file   Other Topics Concern   • Not on file   Social History Narrative   • Not on file       Review  Past medical history, problem list, family medical history, surgical history and social history reviewed.      Vitals  Visit Vitals  BP (!) 168/96 (BP Location: RUE - Right upper extremity, Patient Position: Sitting, Cuff Size: Regular)   Pulse 68   Temp 97.5 °F (36.4 °C) (Temporal)       Physical Exam  General: No apparent distress, well appearing, no pallor, no icterus   HEENT: EOMI, no proptosis, thyroid is not significantly enlarged, no nodules palpable  Chest: CTA, no crackles or rhonchi    CVS: S1 S2, no murmur   Abd: Soft, non tender   Ext: No edema   CNS: Conscious, alert, oriented, speech intact, reflexes wnl   Skin: No rash     Assessment  There are no active problems to display for this patient.      Discussion/Summary  Endocrine Impression:   57 year old female with no significant PMHx referred by Dr. Zane Judge presenting for initial evaluation and management of abnormal thyroid function results, here with her . I spoke to pt in Romanian and English.     Thyroid nodules  - No FHx of thyroid CA. No radiation exposure.  - TSH 3.27 in 7/2020  - TT3 77.79 in 7/2020, low  - FT4 1.22 in 7/2020  - Pt is not on any thyroid medications  - US of thyroid in 7/2020 showed a nodule in the central portion of the right lobe measuring 4 mm x 7 mm x 10 mm. There is also a cyst in the upper pole of the left lobe measuring 3 mm in size. There is a nodule in the medial portion of the left lobe, near the isthmus, measuring 4 mm x 8 mm x 8 mm in size.   - No intervention needed at this time as nodules are subcentimeter  - Will give pt information on thyroid nodules  - Will monitor for now  - Follow up in 6 months    Prediabetes  - A1c 6.1 in 7/2020  - Creatinine 1.0 in 7/2020   - Avoid sugary foods and drinks, limit carb intake    HTN, uncontrolled  - /96  - Continue Losartan 50 mg daily, advised compliance with medication  - Advised pt to reduce salt intake    DLD  - Chol 231, TriG 123, HDL 52,  in 7/2020  - AST 14, ALT 10, ALK Phos 94 in 7/2020  - Advised pt to consider statins with PCP    - Vitamin D 28.9 in 7/2020    Anxiety, controlled  - Continue zoloft    I thank Dr. Zane Judgefor letting me participate in the care of this patient. Please do not hesitate to contact me if you have any questions about this patient.       Plan  Management Plan and Instructions:  - Avoid sugary food and drinks, limit carb intake  - Talk to primary physician about statins  - Reduce salt intake  -  Follow up in 6 months     Scribe Attestation  Scribe Attestation: Entered by Joelle Mora, acting as scribe for Dr. Benitez.   Provider Attestation: The documentation recorded by the scribe accurately reflects the service I personally performed and the decisions made by me, Dr. Ria Benitez.    stretcher

## 2024-11-23 NOTE — ED PROVIDER NOTE - CHILD ABUSE FACILITY
[facility] I have reviewed and confirmed nurses' notes for patient's medications, allergies, medical history, and surgical history.

## 2024-11-23 NOTE — ED PROVIDER NOTE - CLINICAL SUMMARY MEDICAL DECISION MAKING FREE TEXT BOX
Elvis Castaneda MD, PGY3  89-year-old female with past medical history of hypertension, hyperlipidemia, CAD, CHF, right hemidiaphragm paralysis, hypothyroidism, dementia, COPD on 2 L nasal cannula at home presenting to emergency department brought in by EMS from [facility] for episode of altered mental status today at 6:30 PM.  Patient accompanied by aide who is providing additional history.  States that patient was in her normal mental state at 615, was eating dinner and became slumped over, mumbling, less responsive.  States during that episode patient did not know what her name was and was confused.  Patient did not have any falls, head trauma.  Patient has slowly been regaining mental status since then.  Alert and oriented x 3 at baseline.  Patient able to state her name and that she is in the hospital, unclear why she is here.  Denies any recent fever, cough, vomiting, abdominal pain, extremity edema, rash.    Gen: No acute distress  HEENT: +cataract surgery left eye. Rt eye pupils 3mm reactive, round. +subconjunctival hemorrhage Rt eye.  no nasal discharge, mucous membranes dry. No scalp hematoma.   CV: RRR, +S1/S2, no M/R/G, 2+ radial pulses b/l  Resp: CTAB, no W/R/R, no accessory muscle use, no increased work of breathing  GI: Abdomen soft non-distended, NTTP  MSK:  no LE edema  Neuro: A&Ox3, following commands, moving all four extremities spontaneously  Psych: appropriate mood    In emergency department patient is hemodynamically stable, afebrile.  Patient in no acute distress.  Patient alert and oriented x 3 at my time of examination.  Patient with dry mucous membranes.  Rest of exam unremarkable and as noted above.  Differential including but not limited to seizure, TIA, infectious etiologies, anemia, electrolyte abnormalities.  Plan to obtain labs, urine, CT head, chest x-ray, put on cardiac monitor, on baseline 2 L nasal cannula.  Patient will likely require admission, will continue to reassess.    Discussed with patients daughter who is health care proxy. Pt is full code.

## 2024-11-23 NOTE — ED PROVIDER NOTE - ATTENDING CONTRIBUTION TO CARE
attending Kobi: 89yF h/o HTN, HLD, CAD, CHF, right hemidiaphragm paralysis, hypothyroidism, dementia, COPD on 2 L nasal cannula at baseline BIBEMS after episode of AMS today around 6:30pm. Aide at bedside providing additional history.  States that patient was in her normal mental state at 615, was eating dinner and became slumped over, mumbling, less responsive.  States during that episode patient did not know what her name was and was confused.  No trauma. Pt now at baseline. Exam as above.  Will obtain ekg, place on tele, labs, UA, CTH, anticipate admission

## 2024-11-23 NOTE — ED ADULT NURSE NOTE - NSFALLUNIVINTERV_ED_ALL_ED
Bed/Stretcher in lowest position, wheels locked, appropriate side rails in place/Call bell, personal items and telephone in reach/Instruct patient to call for assistance before getting out of bed/chair/stretcher/Non-slip footwear applied when patient is off stretcher/[place] to call system/Physically safe environment - no spills, clutter or unnecessary equipment/Purposeful proactive rounding/Room/bathroom lighting operational, light cord in reach

## 2024-11-24 DIAGNOSIS — E87.6 HYPOKALEMIA: ICD-10-CM

## 2024-11-24 DIAGNOSIS — G92 TOXIC ENCEPHALOPATHY: ICD-10-CM

## 2024-11-24 DIAGNOSIS — J44.9 CHRONIC OBSTRUCTIVE PULMONARY DISEASE, UNSPECIFIED: ICD-10-CM

## 2024-11-24 DIAGNOSIS — I25.10 ATHEROSCLEROTIC HEART DISEASE OF NATIVE CORONARY ARTERY WITHOUT ANGINA PECTORIS: ICD-10-CM

## 2024-11-24 DIAGNOSIS — E03.9 HYPOTHYROIDISM, UNSPECIFIED: ICD-10-CM

## 2024-11-24 DIAGNOSIS — I10 ESSENTIAL (PRIMARY) HYPERTENSION: ICD-10-CM

## 2024-11-24 DIAGNOSIS — N39.0 URINARY TRACT INFECTION, SITE NOT SPECIFIED: ICD-10-CM

## 2024-11-24 DIAGNOSIS — E78.5 HYPERLIPIDEMIA, UNSPECIFIED: ICD-10-CM

## 2024-11-24 DIAGNOSIS — I50.32 CHRONIC DIASTOLIC (CONGESTIVE) HEART FAILURE: ICD-10-CM

## 2024-11-24 LAB
ANION GAP SERPL CALC-SCNC: 13 MMOL/L — SIGNIFICANT CHANGE UP (ref 5–17)
BUN SERPL-MCNC: 14 MG/DL — SIGNIFICANT CHANGE UP (ref 7–23)
CALCIUM SERPL-MCNC: 9 MG/DL — SIGNIFICANT CHANGE UP (ref 8.4–10.5)
CHLORIDE SERPL-SCNC: 105 MMOL/L — SIGNIFICANT CHANGE UP (ref 96–108)
CO2 SERPL-SCNC: 27 MMOL/L — SIGNIFICANT CHANGE UP (ref 22–31)
CREAT SERPL-MCNC: 0.75 MG/DL — SIGNIFICANT CHANGE UP (ref 0.5–1.3)
EGFR: 77 ML/MIN/1.73M2 — SIGNIFICANT CHANGE UP
GLUCOSE SERPL-MCNC: 97 MG/DL — SIGNIFICANT CHANGE UP (ref 70–99)
HCT VFR BLD CALC: 35.5 % — SIGNIFICANT CHANGE UP (ref 34.5–45)
HGB BLD-MCNC: 10.6 G/DL — LOW (ref 11.5–15.5)
INR BLD: 1.07 RATIO — SIGNIFICANT CHANGE UP (ref 0.85–1.16)
MAGNESIUM SERPL-MCNC: 2.2 MG/DL — SIGNIFICANT CHANGE UP (ref 1.6–2.6)
MCHC RBC-ENTMCNC: 26.3 PG — LOW (ref 27–34)
MCHC RBC-ENTMCNC: 29.9 G/DL — LOW (ref 32–36)
MCV RBC AUTO: 88.1 FL — SIGNIFICANT CHANGE UP (ref 80–100)
NRBC # BLD: 0 /100 WBCS — SIGNIFICANT CHANGE UP (ref 0–0)
PLATELET # BLD AUTO: 174 K/UL — SIGNIFICANT CHANGE UP (ref 150–400)
POTASSIUM SERPL-MCNC: 3.7 MMOL/L — SIGNIFICANT CHANGE UP (ref 3.5–5.3)
POTASSIUM SERPL-SCNC: 3.7 MMOL/L — SIGNIFICANT CHANGE UP (ref 3.5–5.3)
PROTHROM AB SERPL-ACNC: 12.3 SEC — SIGNIFICANT CHANGE UP (ref 9.9–13.4)
RBC # BLD: 4.03 M/UL — SIGNIFICANT CHANGE UP (ref 3.8–5.2)
RBC # FLD: 15.7 % — HIGH (ref 10.3–14.5)
SODIUM SERPL-SCNC: 145 MMOL/L — SIGNIFICANT CHANGE UP (ref 135–145)
WBC # BLD: 9.14 K/UL — SIGNIFICANT CHANGE UP (ref 3.8–10.5)
WBC # FLD AUTO: 9.14 K/UL — SIGNIFICANT CHANGE UP (ref 3.8–10.5)

## 2024-11-24 PROCEDURE — 99223 1ST HOSP IP/OBS HIGH 75: CPT

## 2024-11-24 RX ORDER — CYANOCOBALAMIN/FOLIC AC/VIT B6 1-2.2-25MG
1 TABLET ORAL DAILY
Refills: 0 | Status: DISCONTINUED | OUTPATIENT
Start: 2024-11-24 | End: 2024-12-02

## 2024-11-24 RX ORDER — MULTIVIT WITH MINERALS/LUTEIN
500 TABLET ORAL DAILY
Refills: 0 | Status: DISCONTINUED | OUTPATIENT
Start: 2024-11-24 | End: 2024-12-02

## 2024-11-24 RX ORDER — IPRATROPIUM BROMIDE AND ALBUTEROL SULFATE 2.5; .5 MG/3ML; MG/3ML
3 SOLUTION RESPIRATORY (INHALATION) ONCE
Refills: 0 | Status: COMPLETED | OUTPATIENT
Start: 2024-11-24 | End: 2024-11-24

## 2024-11-24 RX ORDER — ONDANSETRON HYDROCHLORIDE 4 MG/1
4 TABLET, FILM COATED ORAL EVERY 8 HOURS
Refills: 0 | Status: DISCONTINUED | OUTPATIENT
Start: 2024-11-24 | End: 2024-12-02

## 2024-11-24 RX ORDER — FLUTICASONE PROPIONATE AND SALMETEROL XINAFOATE 45; 21 UG/1; UG/1
1 AEROSOL, METERED RESPIRATORY (INHALATION)
Refills: 0 | Status: DISCONTINUED | OUTPATIENT
Start: 2024-11-24 | End: 2024-12-02

## 2024-11-24 RX ORDER — METOPROLOL TARTRATE 100 MG/1
50 TABLET, FILM COATED ORAL DAILY
Refills: 0 | Status: DISCONTINUED | OUTPATIENT
Start: 2024-11-24 | End: 2024-12-02

## 2024-11-24 RX ORDER — CEFTRIAXONE SODIUM 1 G
1000 VIAL (EA) INJECTION EVERY 24 HOURS
Refills: 0 | Status: COMPLETED | OUTPATIENT
Start: 2024-11-24 | End: 2024-11-26

## 2024-11-24 RX ORDER — ACETAMINOPHEN, DIPHENHYDRAMINE HCL, PHENYLEPHRINE HCL 325; 25; 5 MG/1; MG/1; MG/1
3 TABLET ORAL AT BEDTIME
Refills: 0 | Status: DISCONTINUED | OUTPATIENT
Start: 2024-11-24 | End: 2024-12-02

## 2024-11-24 RX ORDER — CEFTRIAXONE SODIUM 1 G
1000 VIAL (EA) INJECTION ONCE
Refills: 0 | Status: COMPLETED | OUTPATIENT
Start: 2024-11-24 | End: 2024-11-24

## 2024-11-24 RX ORDER — IPRATROPIUM BROMIDE AND ALBUTEROL SULFATE 2.5; .5 MG/3ML; MG/3ML
3 SOLUTION RESPIRATORY (INHALATION) EVERY 6 HOURS
Refills: 0 | Status: DISCONTINUED | OUTPATIENT
Start: 2024-11-24 | End: 2024-12-02

## 2024-11-24 RX ORDER — PANTOPRAZOLE SODIUM 40 MG/1
40 TABLET, DELAYED RELEASE ORAL
Refills: 0 | Status: DISCONTINUED | OUTPATIENT
Start: 2024-11-24 | End: 2024-12-02

## 2024-11-24 RX ORDER — PREDNISOLONE ACETATE 1.2 MG/ML
1 SUSPENSION/ DROPS OPHTHALMIC
Refills: 0 | Status: DISCONTINUED | OUTPATIENT
Start: 2024-11-24 | End: 2024-12-02

## 2024-11-24 RX ORDER — ENOXAPARIN SODIUM 30 MG/.3ML
40 INJECTION SUBCUTANEOUS EVERY 24 HOURS
Refills: 0 | Status: DISCONTINUED | OUTPATIENT
Start: 2024-11-24 | End: 2024-12-02

## 2024-11-24 RX ORDER — BUDESONIDE 0.25 MG/2ML
0.25 SUSPENSION RESPIRATORY (INHALATION)
Refills: 0 | Status: DISCONTINUED | OUTPATIENT
Start: 2024-11-24 | End: 2024-12-02

## 2024-11-24 RX ORDER — POLYETHYLENE GLYCOL 3350 17 G/17G
17 POWDER, FOR SOLUTION ORAL DAILY
Refills: 0 | Status: DISCONTINUED | OUTPATIENT
Start: 2024-11-24 | End: 2024-12-02

## 2024-11-24 RX ORDER — CYCLOPENTOLATE HCL 1 %
1 DROPS OPHTHALMIC (EYE)
Refills: 0 | Status: DISCONTINUED | OUTPATIENT
Start: 2024-11-24 | End: 2024-12-02

## 2024-11-24 RX ORDER — MAGNESIUM, ALUMINUM HYDROXIDE 200-225/5
30 SUSPENSION, ORAL (FINAL DOSE FORM) ORAL EVERY 4 HOURS
Refills: 0 | Status: DISCONTINUED | OUTPATIENT
Start: 2024-11-24 | End: 2024-12-02

## 2024-11-24 RX ORDER — TRAZODONE HYDROCHLORIDE 150 MG/1
50 TABLET ORAL AT BEDTIME
Refills: 0 | Status: DISCONTINUED | OUTPATIENT
Start: 2024-11-24 | End: 2024-12-02

## 2024-11-24 RX ORDER — POTASSIUM CHLORIDE 600 MG/1
20 TABLET, EXTENDED RELEASE ORAL ONCE
Refills: 0 | Status: COMPLETED | OUTPATIENT
Start: 2024-11-24 | End: 2024-11-24

## 2024-11-24 RX ORDER — ALBUTEROL 90 MCG
2 AEROSOL (GRAM) INHALATION EVERY 8 HOURS
Refills: 0 | Status: DISCONTINUED | OUTPATIENT
Start: 2024-11-24 | End: 2024-11-28

## 2024-11-24 RX ORDER — FUROSEMIDE 40 MG/1
20 TABLET ORAL DAILY
Refills: 0 | Status: DISCONTINUED | OUTPATIENT
Start: 2024-11-24 | End: 2024-12-02

## 2024-11-24 RX ORDER — ACETAMINOPHEN 500MG 500 MG/1
650 TABLET, COATED ORAL EVERY 6 HOURS
Refills: 0 | Status: DISCONTINUED | OUTPATIENT
Start: 2024-11-24 | End: 2024-12-02

## 2024-11-24 RX ORDER — TEMAZEPAM 15 MG
15 CAPSULE ORAL AT BEDTIME
Refills: 0 | Status: DISCONTINUED | OUTPATIENT
Start: 2024-11-24 | End: 2024-12-01

## 2024-11-24 RX ADMIN — PREDNISOLONE ACETATE 1 DROP(S): 1.2 SUSPENSION/ DROPS OPHTHALMIC at 05:49

## 2024-11-24 RX ADMIN — ENOXAPARIN SODIUM 40 MILLIGRAM(S): 30 INJECTION SUBCUTANEOUS at 18:11

## 2024-11-24 RX ADMIN — METOPROLOL TARTRATE 50 MILLIGRAM(S): 100 TABLET, FILM COATED ORAL at 05:45

## 2024-11-24 RX ADMIN — BUDESONIDE 0.25 MILLIGRAM(S): 0.25 SUSPENSION RESPIRATORY (INHALATION) at 05:47

## 2024-11-24 RX ADMIN — FLUTICASONE PROPIONATE AND SALMETEROL XINAFOATE 1 DOSE(S): 45; 21 AEROSOL, METERED RESPIRATORY (INHALATION) at 18:12

## 2024-11-24 RX ADMIN — POTASSIUM CHLORIDE 20 MILLIEQUIVALENT(S): 600 TABLET, EXTENDED RELEASE ORAL at 06:25

## 2024-11-24 RX ADMIN — ACETAMINOPHEN 500MG 650 MILLIGRAM(S): 500 TABLET, COATED ORAL at 06:55

## 2024-11-24 RX ADMIN — BUDESONIDE 0.25 MILLIGRAM(S): 0.25 SUSPENSION RESPIRATORY (INHALATION) at 18:12

## 2024-11-24 RX ADMIN — Medication 100 MILLIGRAM(S): at 01:33

## 2024-11-24 RX ADMIN — Medication 20 MILLIGRAM(S): at 21:39

## 2024-11-24 RX ADMIN — FLUTICASONE PROPIONATE AND SALMETEROL XINAFOATE 1 DOSE(S): 45; 21 AEROSOL, METERED RESPIRATORY (INHALATION) at 06:54

## 2024-11-24 RX ADMIN — Medication 2 PUFF(S): at 21:39

## 2024-11-24 RX ADMIN — Medication 1 DROP(S): at 18:12

## 2024-11-24 RX ADMIN — TRAZODONE HYDROCHLORIDE 50 MILLIGRAM(S): 150 TABLET ORAL at 21:39

## 2024-11-24 RX ADMIN — Medication 15 MILLIGRAM(S): at 22:03

## 2024-11-24 RX ADMIN — PREDNISOLONE ACETATE 1 DROP(S): 1.2 SUSPENSION/ DROPS OPHTHALMIC at 18:11

## 2024-11-24 RX ADMIN — Medication 1 TABLET(S): at 12:22

## 2024-11-24 RX ADMIN — Medication 2 PUFF(S): at 17:13

## 2024-11-24 RX ADMIN — Medication 30 MILLIGRAM(S): at 12:22

## 2024-11-24 RX ADMIN — Medication 500 MILLIGRAM(S): at 12:22

## 2024-11-24 RX ADMIN — ACETAMINOPHEN 500MG 650 MILLIGRAM(S): 500 TABLET, COATED ORAL at 12:23

## 2024-11-24 RX ADMIN — POLYETHYLENE GLYCOL 3350 17 GRAM(S): 17 POWDER, FOR SOLUTION ORAL at 12:22

## 2024-11-24 RX ADMIN — FUROSEMIDE 20 MILLIGRAM(S): 40 TABLET ORAL at 05:50

## 2024-11-24 RX ADMIN — Medication 100 MILLIGRAM(S): at 18:11

## 2024-11-24 RX ADMIN — IPRATROPIUM BROMIDE AND ALBUTEROL SULFATE 3 MILLILITER(S): 2.5; .5 SOLUTION RESPIRATORY (INHALATION) at 06:25

## 2024-11-24 RX ADMIN — Medication 1 DROP(S): at 05:50

## 2024-11-24 RX ADMIN — IPRATROPIUM BROMIDE AND ALBUTEROL SULFATE 3 MILLILITER(S): 2.5; .5 SOLUTION RESPIRATORY (INHALATION) at 06:54

## 2024-11-24 RX ADMIN — PANTOPRAZOLE SODIUM 40 MILLIGRAM(S): 40 TABLET, DELAYED RELEASE ORAL at 06:25

## 2024-11-24 NOTE — PHYSICAL THERAPY INITIAL EVALUATION ADULT - PERTINENT HX OF CURRENT PROBLEM, REHAB EVAL
89y F pmh htn, hld, CAD, diastolic CHF, right hemidiaphragm paralysis, hypothyroidism, dementia, COPD on 2 L NC presenting from [facility] for episode of AMS, found to have UTI, being admitted.

## 2024-11-24 NOTE — H&P ADULT - PROBLEM SELECTOR PLAN 6
- Advair   - Duo- neb prn   - C/w baseline supplemental O2  2L NC - Advair   - Duo- neb prn   - Ventolin   - C/w home O2  2L NC - Advair   - Budesonide  - Ventolin   - Duo- neb prn   - C/w home O2  2L NC

## 2024-11-24 NOTE — CONSULT NOTE ADULT - ASSESSMENT
89y F pmh htn, hld, CAD, diastolic CHF, right hemidiaphragm paralysis, hypothyroidism, dementia, COPD on 2 L NC presenting from [facility] for episode of altered mental status today at 6:30 PM. Per aid who is providing hx patient was in her normal mental state at 615, was eating dinner and became slumped over, mumbling, less responsive.  States during that episode patient did not know her name and was confused. Patient has now returned to baseline mentation ( A&Ox3).  pt is well known to me with hx of pleural effusion, sarcoidosis and emphysema with lethargy and sob  ua, + pt with hx of MRSA in urine, may need ID eval  continue current cardiac m,eds  will add Norvasc if bp remains elevated  continue Lasix  Duoneb

## 2024-11-24 NOTE — PHYSICAL THERAPY INITIAL EVALUATION ADULT - GENERAL OBSERVATIONS, REHAB EVAL
Pt received semisupine in bed, +IVL, c/o back itching, RN Flex aware. A&O x3, follows 100% of simple commands.

## 2024-11-24 NOTE — H&P ADULT - NSHPLABSRESULTS_GEN_ALL_CORE
9.9    9.36  )-----------( 192      ( 23 Nov 2024 20:05 )             32.5       11-23    143  |  105  |  14  ----------------------------<  120[H]  3.4[L]   |  25  |  0.67    Ca    8.8      23 Nov 2024 20:05  Phos  4.1     11-23  Mg     2.1     11-23    TPro  6.3  /  Alb  3.7  /  TBili  0.2  /  DBili  x   /  AST  11  /  ALT  <5[L]  /  AlkPhos  119  11-23    Lactate, Blood: 1.5 mmol/L (11.23.24 @ 20:49)    Troponin T, High Sensitivity Result: 33: ng/L (11.23.24 @ 20:05)  Troponin T, High Sensitivity Result: 35: ng/L (11.23.24 @ 23:04)    FluA/FluB/RSV/COVID PCR (11.23.24 @ 20:49)    SARS-CoV-2 Result: NotDetec.    Influenza A Result: NotDetec    Influenza B Result: NotDetec    Resp Syn Virus Result: NotDetec      Urinalysis + Microscopic Examination (11.23.24 @ 23:04)    pH Urine: 6.0    Urine Appearance: Turbid    Color: Yellow    Specific Gravity: 1.019    Protein, Urine: 30 mg/dL    Glucose Qualitative, Urine: Negative mg/dL    Ketone - Urine: Negative mg/dL    Blood, Urine: Moderate    Bilirubin: Negative    Urobilinogen: 0.2 mg/dL    Leukocyte Esterase Concentration: Large    Nitrite: Negative    Review: Reviewed    White Blood Cell - Urine: >998 /HPF    Red Blood Cell - Urine: 2 /HPF    Bacteria: Negative /HPF    Cast: 3 /LPF    Epithelial Cells: 6 /HPF    Yeast-like Cells: Present    - - - - - - - - - - - - - - - - - - - - - - - - - - - - - - - - - - - - - - - - - - - - - - - - - - - -       EKG PERSONALLY REVIEWED:  NSR 60 bpm     IMAGES PERSONALLY REVIEWED:     < from: Xray Chest 1 View- PORTABLE-Urgent (11.23.24 @ 21:31) >  IMPRESSION:  Right basilar partial atelectasis. Small left pleural effusion with left   basilar subsegmental atelectasis.    < from: CT Head No Cont (11.23.24 @ 23:39) >  IMPRESSION:  No acute intracranial hemorrhage, mass effect, or midline shift.    < from: CT Chest No Cont (11.23.24 @ 23:40) >  IMPRESSION:  Centrilobular upper lobe emphysema. Mild bibasilar atelectasis.

## 2024-11-24 NOTE — H&P ADULT - ASSESSMENT
89y F pmh htn, hld, CAD, diastolic CHF, right hemidiaphragm paralysis, hypothyroidism, dementia, COPD on 2 L NC presenting from [facility] for episode of AMS, found to have UTI, being admitted

## 2024-11-24 NOTE — CONSULT NOTE ADULT - SUBJECTIVE AND OBJECTIVE BOX
Date of Service, 11-24-24 @ 11:06  CHIEF COMPLAINT:Patient is a 89y old  Female who presents with a chief complaint of lethargy (24 Nov 2024 05:13)      HPI:  89y F pmh htn, hld, CAD, diastolic CHF, right hemidiaphragm paralysis, hypothyroidism, dementia, COPD on 2 L NC presenting from [facility] assisted living for episode of altered mental status today at 6:30 PM. Per aid who is providing hx patient was in her normal mental state at 615, was eating dinner and became slumped over, mumbling, less responsive.  States during that episode patient did not know her name and was confused. Patient has now returned to baseline mentation ( A&Ox3)      PAST MEDICAL & SURGICAL HISTORY:  HTN (hypertension)  Hypothyroidism  Osteoporosis  CAD (coronary artery disease)  COPD (chronic obstructive pulmonary disease)  Pulmonary sarcoidosis  H/O pyelonephritis  Acute diverticulitis    MEDICATIONS  (STANDING):  albuterol    90 MICROgram(s) HFA Inhaler 2 Puff(s) Inhalation every 8 hours  ascorbic acid 500 milliGRAM(s) Oral daily  atorvastatin 20 milliGRAM(s) Oral at bedtime  buDESOnide    Inhalation Suspension 0.25 milliGRAM(s) Inhalation two times a day  cefTRIAXone   IVPB 1000 milliGRAM(s) IV Intermittent every 24 hours  cyclopentolate 1% Solution 1 Drop(s) Left EYE two times a day  enoxaparin Injectable 40 milliGRAM(s) SubCutaneous every 24 hours  FLUoxetine 30 milliGRAM(s) Oral daily  fluticasone propionate/ salmeterol 100-50 MICROgram(s) Diskus 1 Dose(s) Inhalation two times a day  furosemide    Tablet 20 milliGRAM(s) Oral daily  metoprolol succinate ER 50 milliGRAM(s) Oral daily  multivitamin 1 Tablet(s) Oral daily  pantoprazole    Tablet 40 milliGRAM(s) Oral before breakfast  polyethylene glycol 3350 17 Gram(s) Oral daily  prednisoLONE acetate 1% Suspension 1 Drop(s) Left EYE two times a day  temazepam 15 milliGRAM(s) Oral at bedtime  traZODone 50 milliGRAM(s) Oral at bedtime    MEDICATIONS  (PRN):  acetaminophen     Tablet .. 650 milliGRAM(s) Oral every 6 hours PRN Temp greater or equal to 38C (100.4F), Mild Pain (1 - 3)  albuterol/ipratropium for Nebulization 3 milliLiter(s) Nebulizer every 6 hours PRN Shortness of Breath and/or Wheezing  aluminum hydroxide/magnesium hydroxide/simethicone Suspension 30 milliLiter(s) Oral every 4 hours PRN Dyspepsia  melatonin 3 milliGRAM(s) Oral at bedtime PRN Insomnia  ondansetron Injectable 4 milliGRAM(s) IV Push every 8 hours PRN Nausea and/or Vomiting      FAMILY HISTORY:      SOCIAL HISTORY:    [x ] Non-smoker  [ ] Smoker  [ ] Alcohol    Allergies    iodinated radiocontrast agents (Anaphylaxis)    Intolerances    	    REVIEW OF SYSTEMS:  CONSTITUTIONAL: No fever, weight loss, or fatigue  EYES: No eye pain, visual disturbances, or discharge  ENT:  No difficulty hearing, tinnitus, vertigo; No sinus or throat pain  NECK: No pain or stiffness  RESPIRATORY: No cough, wheezing, chills or hemoptysis; + Shortness of Breath  CARDIOVASCULAR: No chest pain, palpitations, passing out, dizziness, or leg swelling  GASTROINTESTINAL: No abdominal or epigastric pain. No nausea, vomiting, or hematemesis; No diarrhea or constipation. No melena or hematochezia.  GENITOURINARY: No dysuria, frequency, hematuria, or incontinence  NEUROLOGICAL: No headaches, memory loss, loss of strength, numbness, or tremors  SKIN: No itching, burning, rashes, or lesions   LYMPH Nodes: No enlarged glands  ENDOCRINE: No heat or cold intolerance; No hair loss  MUSCULOSKELETAL: No joint pain or swelling; No muscle, back, or extremity pain  PSYCHIATRIC: No depression, anxiety, mood swings, or difficulty sleeping  HEME/LYMPH: No easy bruising, or bleeding gums  ALLERGY AND IMMUNOLOGIC: No hives or eczema	    [ x] All others negative	  [ ] Unable to obtain    PHYSICAL EXAM:  T(C): 36.6 (11-24-24 @ 09:26), Max: 37 (11-24-24 @ 04:50)  HR: 80 (11-24-24 @ 09:26) (63 - 80)  BP: 149/66 (11-24-24 @ 09:26) (122/68 - 149/66)  RR: 18 (11-24-24 @ 09:26) (18 - 19)  SpO2: 95% (11-24-24 @ 09:26) (90% - 100%)  Wt(kg): --  I&O's Summary      Appearance: Normal	  HEENT:   Normal oral mucosa, PERRL, EOMI	  Lymphatic: No lymphadenopathy  Cardiovascular: Normal S1 S2, No JVD, + murmurs, No edema  Respiratory: rhonchi  Gastrointestinal:  Soft, Non-tender, + BS	  Skin: No rashes, No ecchymoses, No cyanosis	  Extremities: Normal range of motion, No clubbing, cyanosis or edema  Vascular: Peripheral pulses palpable 2+ bilaterally    TELEMETRY: 	    ECG:  	  RADIOLOGY:  OTHER: 	  	  LABS:	 	    CARDIAC MARKERS:                          10.6   9.14  )-----------( 174      ( 24 Nov 2024 07:32 )             35.5     11-24    145  |  105  |  14  ----------------------------<  97  3.7   |  27  |  0.75    Ca    9.0      24 Nov 2024 07:32  Phos  4.1     11-23  Mg     2.2     11-24    TPro  6.3  /  Alb  3.7  /  TBili  0.2  /  DBili  x   /  AST  11  /  ALT  <5[L]  /  AlkPhos  119  11-23    proBNP:   Lipid Profile:   HgA1c:   TSH:   PT/INR - ( 24 Nov 2024 07:32 )   PT: 12.3 sec;   INR: 1.07 ratio         PTT - ( 23 Nov 2024 20:05 )  PTT:32.2 sec    PREVIOUS DIAGNOSTIC TESTING:      < from: 12 Lead ECG (11.04.24 @ 05:31) >  Diagnosis Line SINUS RHYTHM WITH OCCASIONAL PREMATURE VENTRICULAR COMPLEXES  LEFT ANTERIOR FASCICULAR BLOCK  SEPTAL INFARCT (CITED ON OR BEFORE 13-SEP-2024)  ST & T WAVE ABNORMALITY, CONSIDER ANTERIOR ISCHEMIA  ABNORMAL ECG  WHEN COMPARED WITH ECG OF 05-OCT-2024 11:00,  SIGNIFICANT CHANGES HAVE OCCURRED  PVCs     < from: TTE Limited W or WO Ultrasound Enhancing Agent (04.08.24 @ 14:17) >   1. Left ventricular cavity is normal in size. Left ventricular wall thickness is normal. Left ventricular systolic function is normal with an ejection fraction visually estimated at 55 to 60 %. There are no regional wall motion abnormalities seen.   2. Normal rightventricular cavity size, with normal wall thickness, and normal systolic function. Tricuspid annular plane systolic excursion (TAPSE) is 2.0 cm (normal >=1.7 cm).   3. The left atrium is severely dilated.   4. Mild mitral regurgitation.   5. Comparedto the transthoracic echocardiogram performed on 3/26/2024, regional wall motion abnormality not appreciated.    < from: CT Chest No Cont (11.23.24 @ 23:40) >  LUNGS AND LARGE AIRWAYS: Patent central airways. Centrilobular upper lobe   emphysema. Mild bibasilar atelectasis. 2 mm right middle lobe lung nodule   (305:48).  PLEURA: No pleural effusion.  VESSELS: Aortic calcifications. Coronary artery calcifications. 4.1 cm   ascending aortic aneurysm. Marked enlargement of the main pulmonary   artery at 4.5 cm a nonspecific finding but can be seen in pulmonary   hypertension among other entities.  HEART: Cardiomegaly. No pericardial effusion.  MEDIASTINUM AND RASHID: No lymphadenopathy.  CHEST WALL AND LOWER NECK: Within normal limits.  VISUALIZED UPPER ABDOMEN: Within normal limits.  BONES: Degenerative changes. Prior left shoulder surgery.    IMPRESSION:    Centrilobular upper lobe emphysema. Mild bibasilar atelectasis.    Urinalysis + Microscopic Examination (11.23.24 @ 23:04)    pH Urine: 6.0   Urine Appearance: Turbid   Color: Yellow   Specific Gravity: 1.019   Protein, Urine: 30 mg/dL   Glucose Qualitative, Urine: Negative mg/dL   Ketone - Urine: Negative mg/dL   Blood, Urine: Moderate   Bilirubin: Negative   Urobilinogen: 0.2 mg/dL   Leukocyte Esterase Concentration: Large   Nitrite: Negative   Review: Reviewed   White Blood Cell - Urine: >998 /HPF   Red Blood Cell - Urine: 2 /HPF   Bacteria: Negative /HPF   Cast: 3 /LPF   Epithelial Cells: 6 /HPF   Yeast-like Cells: Presen    Culture - Urine (07.01.24 @ 10:49)    -  Rifampin: I 2 Should not be used as monotherapy   -  Tetracycline: S <=1   -  Trimethoprim/Sulfamethoxazole: S <=0.5/9.5   -  Vancomycin: S 1   -  Daptomycin: S 0.5   -  Gentamicin: S 2 Should not be used as monotherapy   -  Linezolid: S 2   -  Oxacillin: R >2   -  Penicillin: R >8   Specimen Source: Clean Catch Clean Catch (Midstream)   Culture Results:   50,000 - 99,000 CFU/mL Methicillin Resistant Staphylococcus aureus  50,000 - 99,000 CFU/mL Candida tropicalis "Susceptibilities not performed"   Organism Identification: Methicillin resistant Staphylococcus aureus   Organism: Methicillin resistant Staphylococcus aureus   Method Type: JUANI

## 2024-11-24 NOTE — PHYSICAL THERAPY INITIAL EVALUATION ADULT - NSPTDISCHREC_GEN_A_CORE
if pt returns home to retirement, pt would require assist for all functional mobility and HPT. Pt owns RW and wheelchair/[facility]acute [facility]

## 2024-11-24 NOTE — H&P ADULT - PROBLEM SELECTOR PLAN 2
- Initially presents for AMS now resolved   - Labs unremarkable except mild hypoK (to be repleted), flu/rsv/covid neg   - UA: positive    - CXR: right basilar atelectasis, small left pleural effusion   - CTH: neg   - AMS likely 2/2 UTI, management as above   - F/u Bcx, Ucx

## 2024-11-24 NOTE — PHYSICAL THERAPY INITIAL EVALUATION ADULT - TRANSFER TRAINING, PT EVAL
GOAL: Patient will perform sit to stand transfers to rolling walker supervision with proper hand placement in 2 weeks.

## 2024-11-24 NOTE — H&P ADULT - PROBLEM SELECTOR PLAN 1
1952 lungs clear copious nasal congestion. coughed and vomited undigested milk in triage. tylenol ordered at the triage RN's request. Yamile Tineo MS, RN, CPNP-PC
- Afebrile, VSS, nontoxic.  - UA: large LE,  +Yeast   - Started on ceftriaxone 1g in ED  - C/w abx -- ceftriaxone 1g  - F/u Ucx

## 2024-11-24 NOTE — H&P ADULT - NSHPPHYSICALEXAM_GEN_ALL_CORE
T(C): 36.5 (11-24-24 @ 06:22), Max: 37 (11-24-24 @ 04:50)  HR: 74 (11-24-24 @ 06:22) (63 - 74)  BP: 143/73 (11-24-24 @ 06:22) (122/68 - 143/73)  RR: 18 (11-24-24 @ 06:22) (18 - 19)  SpO2: 96% (11-24-24 @ 06:22) (90% - 100%)    CONSTITUTIONAL: Well groomed, no apparent distress  EYES: PERRLA , EOMI  ENMT: MMM. [place]  RESP: No respiratory distress, no use of accessory muscles; expiratory wheeze, on 2L NC   CV: +S1S2, RRR, 1+ LE edema  GI: Soft, NTND, no RGR  MSK: Moves all extremities spontaneously  SKIN: No rashes or ulcers noted  PSYCH: A+O x 3, period of confusions

## 2024-11-24 NOTE — H&P ADULT - HISTORY OF PRESENT ILLNESS
89y F pmh htn, hld, CAD, diastolic CHF, right hemidiaphragm paralysis, hypothyroidism, dementia, COPD on 2 L NC presenting from [facility] for episode of altered mental status today at 6:30 PM. Per aid who is providing hx patient was in her normal mental state at 615, was eating dinner and became slumped over, mumbling, less responsive.  States during that episode patient did not know her name and was confused. Patient has now returned to baseline mentation ( A&Ox3)    ROS: Denies HA, CP, SOB, palpitation, N/V/D, fever, cough, chills, dizziness, fall, head trauma, sick contact, change in bowel or urinary habits   A 10-system ROS was performed and is negative except as noted above and/or in the HPI.    ED: Mild hypoK, UA (+), CTH neg.  Given CTX, IVF

## 2024-11-24 NOTE — H&P ADULT - PROBLEM SELECTOR PLAN 4
Provider has been informed, Will discuss more on appt on Friday    - Lasix   - Metoprolol  - Atorvastatin  - I&O, daily wt

## 2024-11-24 NOTE — PATIENT PROFILE ADULT - DO YOU LACK THE NECESSARY SUPPORT TO HELP YOU COPE WITH LIFE CHALLENGES?
Case Management Discharge Planning Note    Patient name Stephanie Oats  Location 7T /7T -01 MRN [id number]  : 1962 Date 2023       Current Admission Date: 2023  Current Admission Diagnosis:SVT (supraventricular tachycardia) [facility])   Patient Active Problem List    Diagnosis Date Noted   • SVT (supraventricular tachycardia) ([address]) 2023   • Hyponatremia 2023   • Alcohol use disorder, severe, dependence ([address]) 2023   • Aneurysm ([address]) 10/03/2022   • Combined systolic and diastolic cardiac dysfunction 2022   • Essential hypertension 2022   • Alcohol abuse 2022   • Anticoagulant long-term use 2022   • Poor dental hygiene 2022   • Noncompliance 2022   • Brain aneurysm 2022   • COPD (chronic obstructive pulmonary disease) ([address]) 08/10/2022   • Hepatic steatosis 2022   • Alcohol intoxication ([address])    • Paroxysmal atrial fibrillation ([address]) 2017   • Tobacco abuse 2017   • Atrial flutter ([address]) 06/10/2016   • Cardiomyopathy, 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  [address] [place] [address]  Phone: [phone] Fax: [phone]    Primary Care Provider: Dontae Coleman MD    Primary Insurance: [facility] [employer]  Secondary Insurance: [employer]havna G[employer] [place]    DISCHARGE DETAILS:     CM called HOST to check on bed search. Per HOST [facility] has no beds today & [facility] in [address] is still reviewing pt at this time. CM to continue to follow pt care & d/c. Pt & attending made aware. no

## 2024-11-24 NOTE — PHYSICAL THERAPY INITIAL EVALUATION ADULT - ADDITIONAL COMMENTS
Per patient, resides at [facility]. Has [facility]HA 5 days/week x 8hrs/day.  Able to ambulate short distances within apartment with RW and assist.  Requires assist and wheelchair for meals in dining room.

## 2024-11-25 LAB
ANION GAP SERPL CALC-SCNC: 12 MMOL/L — SIGNIFICANT CHANGE UP (ref 5–17)
BUN SERPL-MCNC: 15 MG/DL — SIGNIFICANT CHANGE UP (ref 7–23)
CALCIUM SERPL-MCNC: 8.9 MG/DL — SIGNIFICANT CHANGE UP (ref 8.4–10.5)
CHLORIDE SERPL-SCNC: 104 MMOL/L — SIGNIFICANT CHANGE UP (ref 96–108)
CO2 SERPL-SCNC: 26 MMOL/L — SIGNIFICANT CHANGE UP (ref 22–31)
CREAT SERPL-MCNC: 0.78 MG/DL — SIGNIFICANT CHANGE UP (ref 0.5–1.3)
CULTURE RESULTS: ABNORMAL
D DIMER BLD IA.RAPID-MCNC: 480 NG/ML DDU — HIGH
EGFR: 73 ML/MIN/1.73M2 — SIGNIFICANT CHANGE UP
GLUCOSE SERPL-MCNC: 82 MG/DL — SIGNIFICANT CHANGE UP (ref 70–99)
HCT VFR BLD CALC: 31.7 % — LOW (ref 34.5–45)
HCT VFR BLD CALC: 32.9 % — LOW (ref 34.5–45)
HGB BLD-MCNC: 9.4 G/DL — LOW (ref 11.5–15.5)
HGB BLD-MCNC: 9.8 G/DL — LOW (ref 11.5–15.5)
MAGNESIUM SERPL-MCNC: 2 MG/DL — SIGNIFICANT CHANGE UP (ref 1.6–2.6)
MCHC RBC-ENTMCNC: 25.7 PG — LOW (ref 27–34)
MCHC RBC-ENTMCNC: 26 PG — LOW (ref 27–34)
MCHC RBC-ENTMCNC: 29.7 G/DL — LOW (ref 32–36)
MCHC RBC-ENTMCNC: 29.8 G/DL — LOW (ref 32–36)
MCV RBC AUTO: 86.1 FL — SIGNIFICANT CHANGE UP (ref 80–100)
MCV RBC AUTO: 87.6 FL — SIGNIFICANT CHANGE UP (ref 80–100)
NRBC # BLD: 0 /100 WBCS — SIGNIFICANT CHANGE UP (ref 0–0)
NRBC # BLD: 0 /100 WBCS — SIGNIFICANT CHANGE UP (ref 0–0)
PLATELET # BLD AUTO: 160 K/UL — SIGNIFICANT CHANGE UP (ref 150–400)
PLATELET # BLD AUTO: 172 K/UL — SIGNIFICANT CHANGE UP (ref 150–400)
POTASSIUM SERPL-MCNC: 3.7 MMOL/L — SIGNIFICANT CHANGE UP (ref 3.5–5.3)
POTASSIUM SERPL-SCNC: 3.7 MMOL/L — SIGNIFICANT CHANGE UP (ref 3.5–5.3)
RBC # BLD: 3.62 M/UL — LOW (ref 3.8–5.2)
RBC # BLD: 3.82 M/UL — SIGNIFICANT CHANGE UP (ref 3.8–5.2)
RBC # FLD: 15.8 % — HIGH (ref 10.3–14.5)
RBC # FLD: 15.8 % — HIGH (ref 10.3–14.5)
SODIUM SERPL-SCNC: 142 MMOL/L — SIGNIFICANT CHANGE UP (ref 135–145)
SPECIMEN SOURCE: SIGNIFICANT CHANGE UP
WBC # BLD: 11.3 K/UL — HIGH (ref 3.8–10.5)
WBC # BLD: 8.35 K/UL — SIGNIFICANT CHANGE UP (ref 3.8–10.5)
WBC # FLD AUTO: 11.3 K/UL — HIGH (ref 3.8–10.5)
WBC # FLD AUTO: 8.35 K/UL — SIGNIFICANT CHANGE UP (ref 3.8–10.5)

## 2024-11-25 PROCEDURE — 71045 X-RAY EXAM CHEST 1 VIEW: CPT | Mod: 26

## 2024-11-25 PROCEDURE — 99222 1ST HOSP IP/OBS MODERATE 55: CPT | Mod: 25

## 2024-11-25 RX ORDER — METHYLPREDNISOLONE SOD SUCC 125 MG
20 VIAL (EA) INJECTION EVERY 8 HOURS
Refills: 0 | Status: COMPLETED | OUTPATIENT
Start: 2024-11-26 | End: 2024-11-28

## 2024-11-25 RX ORDER — METHYLPREDNISOLONE SOD SUCC 125 MG
VIAL (EA) INJECTION
Refills: 0 | Status: COMPLETED | OUTPATIENT
Start: 2024-11-25 | End: 2024-11-28

## 2024-11-25 RX ORDER — METHYLPREDNISOLONE SOD SUCC 125 MG
40 VIAL (EA) INJECTION EVERY 8 HOURS
Refills: 0 | Status: COMPLETED | OUTPATIENT
Start: 2024-11-25 | End: 2024-11-26

## 2024-11-25 RX ADMIN — ACETAMINOPHEN, DIPHENHYDRAMINE HCL, PHENYLEPHRINE HCL 3 MILLIGRAM(S): 325; 25; 5 TABLET ORAL at 21:18

## 2024-11-25 RX ADMIN — Medication 1 DROP(S): at 17:13

## 2024-11-25 RX ADMIN — ACETAMINOPHEN 500MG 650 MILLIGRAM(S): 500 TABLET, COATED ORAL at 06:05

## 2024-11-25 RX ADMIN — Medication 2 PUFF(S): at 13:48

## 2024-11-25 RX ADMIN — PREDNISOLONE ACETATE 1 DROP(S): 1.2 SUSPENSION/ DROPS OPHTHALMIC at 05:01

## 2024-11-25 RX ADMIN — Medication 100 MILLIGRAM(S): at 17:12

## 2024-11-25 RX ADMIN — POLYETHYLENE GLYCOL 3350 17 GRAM(S): 17 POWDER, FOR SOLUTION ORAL at 11:05

## 2024-11-25 RX ADMIN — PANTOPRAZOLE SODIUM 40 MILLIGRAM(S): 40 TABLET, DELAYED RELEASE ORAL at 05:01

## 2024-11-25 RX ADMIN — BUDESONIDE 0.25 MILLIGRAM(S): 0.25 SUSPENSION RESPIRATORY (INHALATION) at 05:00

## 2024-11-25 RX ADMIN — PREDNISOLONE ACETATE 1 DROP(S): 1.2 SUSPENSION/ DROPS OPHTHALMIC at 17:13

## 2024-11-25 RX ADMIN — Medication 15 MILLIGRAM(S): at 21:36

## 2024-11-25 RX ADMIN — Medication 2 PUFF(S): at 21:37

## 2024-11-25 RX ADMIN — ACETAMINOPHEN 500MG 650 MILLIGRAM(S): 500 TABLET, COATED ORAL at 06:55

## 2024-11-25 RX ADMIN — TRAZODONE HYDROCHLORIDE 50 MILLIGRAM(S): 150 TABLET ORAL at 21:18

## 2024-11-25 RX ADMIN — Medication 30 MILLIGRAM(S): at 11:05

## 2024-11-25 RX ADMIN — Medication 20 MILLIGRAM(S): at 21:17

## 2024-11-25 RX ADMIN — FUROSEMIDE 20 MILLIGRAM(S): 40 TABLET ORAL at 05:01

## 2024-11-25 RX ADMIN — Medication 40 MILLIGRAM(S): at 21:17

## 2024-11-25 RX ADMIN — Medication 1 DROP(S): at 05:01

## 2024-11-25 RX ADMIN — ACETAMINOPHEN 500MG 650 MILLIGRAM(S): 500 TABLET, COATED ORAL at 21:18

## 2024-11-25 RX ADMIN — Medication 500 MILLIGRAM(S): at 11:05

## 2024-11-25 RX ADMIN — Medication 1 TABLET(S): at 11:05

## 2024-11-25 RX ADMIN — BUDESONIDE 0.25 MILLIGRAM(S): 0.25 SUSPENSION RESPIRATORY (INHALATION) at 17:12

## 2024-11-25 RX ADMIN — IPRATROPIUM BROMIDE AND ALBUTEROL SULFATE 3 MILLILITER(S): 2.5; .5 SOLUTION RESPIRATORY (INHALATION) at 11:05

## 2024-11-25 RX ADMIN — ENOXAPARIN SODIUM 40 MILLIGRAM(S): 30 INJECTION SUBCUTANEOUS at 17:12

## 2024-11-25 RX ADMIN — ACETAMINOPHEN 500MG 650 MILLIGRAM(S): 500 TABLET, COATED ORAL at 21:46

## 2024-11-25 RX ADMIN — FLUTICASONE PROPIONATE AND SALMETEROL XINAFOATE 1 DOSE(S): 45; 21 AEROSOL, METERED RESPIRATORY (INHALATION) at 05:01

## 2024-11-25 RX ADMIN — METOPROLOL TARTRATE 50 MILLIGRAM(S): 100 TABLET, FILM COATED ORAL at 05:01

## 2024-11-25 RX ADMIN — ACETAMINOPHEN 500MG 650 MILLIGRAM(S): 500 TABLET, COATED ORAL at 13:35

## 2024-11-25 RX ADMIN — Medication 2 PUFF(S): at 05:01

## 2024-11-25 NOTE — CONSULT NOTE ADULT - ASSESSMENT
89y F pmh htn, hld, CAD, diastolic CHF, right hemidiaphragm paralysis, hypothyroidism, dementia, COPD on 2 L NC presenting from [facility] for episode of altered mental status with incidental finding of UTI and ID consulted    ID hx UCX: 7/1 50,000 MRSA, 5/10 Candida trop., 3/20 10,000 ecoli,     Afebrile  WBC WNL  ua >998 wbc, large leuks, neg bacteria  Currently on ceftriaxone 1g qd 1/24-->  Ucx sent    Plan 89y F pmh htn, hld, CAD, diastolic CHF, right hemidiaphragm paralysis, hypothyroidism, dementia, COPD on 2 L NC presenting from [facility] for episode of altered mental status with incidental finding of UTI and ID consulted    ID hx UCX: 7/1 50,000 MRSA, 5/10 Candida trop., 3/20 10,000 ecoli,     Afebrile  WBC WNL  ua >998 wbc, large leuks, neg bacteria  Currently on ceftriaxone 1g qd 1/24-->  11/23 bld cx neg x 2  Ucx sent  Limited RVP neg  9/13 Lower extrem duplex -neg  11/23 CT scan   Centrilobular upper lobe emphysema. Mild bibasilar atelectasis.    Plan  Patient with uti with cultures pending now on NRB  Bld cx neg   Continue ceftriaxone for now  Follow up Ucx  Continue to monitor for fever  Continue to monitor WBC  Plan d/w Dr. Dominguez 89y F pmh htn, hld, CAD, diastolic CHF, right hemidiaphragm paralysis, hypothyroidism, dementia, COPD on 2 L NC presenting from [facility] for episode of altered mental status with incidental finding of UTI and ID consulted    ID hx UCX: 7/1 50,000 MRSA, 5/10 Candida trop., 3/20 10,000 ecoli,     Afebrile  WBC WNL  ua >998 wbc, large leuks, neg bacteria  Currently on ceftriaxone 1g qd 1/24-->  11/23 bld cx neg x 2  Ucx sent  Limited RVP neg  9/13 Lower extreme duplex -neg  11/23 CT scan   Centrilobular upper lobe emphysema. Mild bibasilar atelectasis.    Plan  Patient with uti with cultures pending now on NRB  Bld cx neg   Continue ceftriaxone for now  Follow up Ucx  Continue to monitor for fever  Continue to monitor WBC  Plan d/w Dr. Dominguez

## 2024-11-25 NOTE — CONSULT NOTE ADULT - ASSESSMENT
89y F pmh htn, hld, CAD, diastolic CHF, right hemidiaphragm paralysis, hypothyroidism, dementia, COPD on 2 L NC presenting from [facility] for episode of altered mental status today at 6:30 PM. Per aid who is providing hx patient was in her normal mental state at 615, was eating dinner and became slumped over, mumbling, less responsive.  States during that episode patient did not know her name and was confused. Patient has now returned to baseline mentation ( A&Ox3)  ROS: Denies HA, CP, SOB, palpitation, N/V/D, fever, cough, chills, dizziness, fall, head trauma, sick contact, change in bowel or urinary habits   A 10-system ROS was performed and is negative except as noted above and/or in the HPI.  ED: Mild hypoK, UA (+), CTH neg.  Given CTX, IVF   (24 Nov 2024 05:13)   now pulm called for hypoxic resp failure   pt was on 5 L of oxygen  when i saw her:  able to understand simple questions:   no resp distress:  de is having any underlying condition;     Hypoxic resp failure   HTN  DIASTOLIC CHF  HYPOTHYROIDISM   COPD   DEMENTIA:     Hypoxic resp failure   -CT CHEST : Centrilobular upper lobe emphysema. Mild bibasilar atelectasis.  -Her abg IS EXCELLENT:  NO CO2 RETENTION:   CHECK D DIMER   CTA CHEST  ;  DO BEDSIDE DOPPLERS:   -change to high flow if oxygen requirement has increased;   -? risk for aspiration:  do speech and swalloe  start steroids too     HTN  -controlled    DIASTOLIC CHF  -she is on lasix     HYPOTHYROIDISM   -on levothyroxine    COPD   -cont BD:   add steroids     DEMENTIA:   supportive care:     jamie acp  : call MICU if she deteriorates further  89y F pmh htn, hld, CAD, diastolic CHF, right hemidiaphragm paralysis, hypothyroidism, dementia, COPD on 2 L NC presenting from [facility] for episode of altered mental status today at 6:30 PM. Per aid who is providing hx patient was in her normal mental state at 615, was eating dinner and became slumped over, mumbling, less responsive.  States during that episode patient did not know her name and was confused. Patient has now returned to baseline mentation ( A&Ox3)  ROS: Denies HA, CP, SOB, palpitation, N/V/D, fever, cough, chills, dizziness, fall, head trauma, sick contact, change in bowel or urinary habits   A 10-system ROS was performed and is negative except as noted above and/or in the HPI.  ED: Mild hypoK, UA (+), CTH neg.  Given CTX, IVF   (24 Nov 2024 05:13)   now pulm called for hypoxic resp failure   pt was on 5 L of oxygen  when i saw her:  able to understand simple questions:   no resp distress:  de is having any underlying condition;     Hypoxic resp failure   HTN  DIASTOLIC CHF  HYPOTHYROIDISM   COPD   DEMENTIA:     Hypoxic resp failure   -CT CHEST : Centrilobular upper lobe emphysema. Mild bibasilar atelectasis.  -Her vbg is reasonable:  has chr retention od pco2:   CHECK D DIMER   CTA CHEST  ;  DO BEDSIDE DOPPLERS:   -change to high flow if oxygen requirement has increased;   -? risk for aspiration:  do speech and swalloe  start steroids too     HTN  -controlled    DIASTOLIC CHF  -she is on lasix     HYPOTHYROIDISM   -on levothyroxine    COPD   -cont BD:   add steroids     DEMENTIA:   supportive care:     dw acp  : call MICU if she deteriorates further

## 2024-11-25 NOTE — CONSULT NOTE ADULT - SUBJECTIVE AND OBJECTIVE BOX
Patient is a 89y old  Female who presents with a chief complaint of lethargy (25 Nov 2024 08:56)    HPI:  89y F pmh htn, hld, CAD, diastolic CHF, right hemidiaphragm paralysis, hypothyroidism, dementia, COPD on 2 L NC presenting from [facility] assisted living for episode of altered mental status today at 6:30 PM. Per aid who is providing hx patient was in her normal mental state at 615, was eating dinner and became slumped over, mumbling, less responsive.  States during that episode patient did not know her name and was confused. Patient has now returned to baseline mentation ( A&Ox3)    ROS: Denies HA, CP, SOB, palpitation, N/V/D, fever, cough, chills, dizziness, fall, head trauma, sick contact, change in bowel or urinary habits   A 10-system ROS was performed and is negative except as noted above and/or in the HPI.    ED: Mild hypoK, UA (+), CTH neg.  Given CTX, IVF   (24 Nov 2024 05:13)     REVIEW OF SYSTEMS  [  ] ROS unobtainable because:    [ x ] All other systems negative except as noted below  Constitutional:  [ ] fever [ ] chills  [ ] weight loss  [ ]night sweat  [ ]poor appetite/PO intake [ ]fatigue   Skin:  [ ] rash [ ] phlebitis	  Eyes: [ ] icterus [ ] pain  [ ] discharge	  ENMT: [ ] sore throat  [ ] thrush [ ] ulcers [ ] exudates [ ]anosmia  Respiratory: [ ] dyspnea [ ] hemoptysis [ ] cough [ ] sputum	  Cardiovascular:  [ ] chest pain [ ] palpitations [ ] edema	  Gastrointestinal:  [ ] nausea [ ] vomiting [ ] diarrhea [ ] constipation [ ] pain	  Genitourinary:  [ ] dysuria [ ] frequency [ ] hematuria [ ] discharge [ ] flank pain  [ ] incontinence  Musculoskeletal:  [ ] myalgias [ ] arthralgias [ ] arthritis  [ ] back pain  Neurological:  [ ] headache [ ] weakness [ ] seizures  [ ] confusion/altered mental status  prior hospital charts reviewed [V]  primary team notes reviewed [V]  other consultant notes reviewed [V]    PAST MEDICAL & SURGICAL HISTORY:  HTN (hypertension)      Hypothyroidism      Osteoporosis      CAD (coronary artery disease)      COPD (chronic obstructive pulmonary disease)      Pulmonary sarcoidosis      H/O pyelonephritis      Acute diverticulitis          SOCIAL HISTORY:  - Denied smoking/vaping/alcohol/recreational drug use    FAMILY HISTORY:      Allergies  iodinated radiocontrast agents (Anaphylaxis)        ANTIMICROBIALS:  cefTRIAXone   IVPB 1000 every 24 hours      ANTIMICROBIALS (past 90 days):  MEDICATIONS  (STANDING):  cefTRIAXone   IVPB   100 mL/Hr IV Intermittent (11-24-24 @ 01:33)    cefTRIAXone   IVPB   100 mL/Hr IV Intermittent (11-24-24 @ 18:11)        OTHER MEDS:   MEDICATIONS  (STANDING):  acetaminophen     Tablet .. 650 every 6 hours PRN  albuterol    90 MICROgram(s) HFA Inhaler 2 every 8 hours  albuterol/ipratropium for Nebulization 3 every 6 hours PRN  aluminum hydroxide/magnesium hydroxide/simethicone Suspension 30 every 4 hours PRN  atorvastatin 20 at bedtime  buDESOnide    Inhalation Suspension 0.25 two times a day  enoxaparin Injectable 40 every 24 hours  FLUoxetine 30 daily  fluticasone propionate/ salmeterol 100-50 MICROgram(s) Diskus 1 two times a day  furosemide    Tablet 20 daily  melatonin 3 at bedtime PRN  metoprolol succinate ER 50 daily  ondansetron Injectable 4 every 8 hours PRN  pantoprazole    Tablet 40 before breakfast  polyethylene glycol 3350 17 daily  temazepam 15 at bedtime  traZODone 50 at bedtime      VITALS:  Vital Signs Last 24 Hrs  T(F): 97.5 (11-25-24 @ 12:18), Max: 98.6 (11-24-24 @ 04:50)    Vital Signs Last 24 Hrs  HR: 83 (11-25-24 @ 12:18) (68 - 83)  BP: 100/52 (11-25-24 @ 12:18) (100/52 - 144/83)  RR: 18 (11-25-24 @ 12:18)  SpO2: 98% (11-25-24 @ 12:18) (98% - 99%)  Wt(kg): --    EXAM:    GA: NAD,   HEENT: oral cavity no lesion  CV: nl S1/S2, no RMG  Lungs: CTAB, No distress  Abd: BS+, soft, nontender, no rebounding pain  Ext: no edema  Neuro: No focal deficits  Skin: Intact  IV: no phlebitis  Labs:                        9.4    8.35  )-----------( 172      ( 25 Nov 2024 07:29 )             31.7     11-25    142  |  104  |  15  ----------------------------<  82  3.7   |  26  |  0.78    Ca    8.9      25 Nov 2024 07:29  Phos  4.1     11-23  Mg     2.0     11-25    TPro  6.3  /  Alb  3.7  /  TBili  0.2  /  DBili  x   /  AST  11  /  ALT  <5[L]  /  AlkPhos  119  11-23    WBC Trend:  WBC Count: 8.35 (11-25-24 @ 07:29)  WBC Count: 9.14 (11-24-24 @ 07:32)  WBC Count: 9.36 (11-23-24 @ 20:05)    Auto Neutrophil #: 7.03 K/uL (11-23-24 @ 20:05)  Auto Neutrophil #: 9.37 K/uL (11-04-24 @ 05:38)  Auto Neutrophil #: 8.62 K/uL (10-05-24 @ 10:35)  Auto Neutrophil #: 6.55 K/uL (09-13-24 @ 21:00)  Auto Neutrophil #: 10.91 K/uL (08-21-24 @ 08:31)    Creatine Trend:  Creatinine: 0.78 (11-25)  Creatinine: 0.75 (11-24)  Creatinine: 0.67 (11-23)    Liver Biochemical Testing Trend:  Alanine Aminotransferase (ALT/SGPT): <5 *L* (11-23)  Alanine Aminotransferase (ALT/SGPT): 6 *L* (11-04)  Alanine Aminotransferase (ALT/SGPT): 5 *L* (10-05)  Alanine Aminotransferase (ALT/SGPT): 6 *L* (09-13)  Alanine Aminotransferase (ALT/SGPT): 5 *L* (08-21)  Aspartate Aminotransferase (AST/SGOT): 11 (11-23-24 @ 20:05)  Aspartate Aminotransferase (AST/SGOT): 14 (11-04-24 @ 05:38)  Aspartate Aminotransferase (AST/SGOT): 14 (10-05-24 @ 10:35)  Aspartate Aminotransferase (AST/SGOT): 12 (09-13-24 @ 21:00)  Aspartate Aminotransferase (AST/SGOT): 11 (08-21-24 @ 08:31)  Bilirubin Total: 0.2 (11-23)  Bilirubin Total: 0.3 (11-04)  Bilirubin Total: 0.4 (10-05)  Bilirubin Total: 0.3 (09-13)  Bilirubin Total: 0.4 (08-21)    Trend LDH    Auto Eosinophil %: 1.7 % (11-23-24 @ 20:05)    Urinalysis Basic - ( 25 Nov 2024 07:29 )    Color: x / Appearance: x / SG: x / pH: x  Gluc: 82 mg/dL / Ketone: x  / Bili: x / Urobili: x   Blood: x / Protein: x / Nitrite: x   Leuk Esterase: x / RBC: x / WBC x   Sq Epi: x / Non Sq Epi: x / Bacteria: x      MICROBIOLOGY:    MRSA PCR Result.: Detected (08-22-24 @ 07:03)  MRSA PCR Result.: Detected (07-14-24 @ 09:46)  MRSA PCR Result.: NotDetec (03-30-24 @ 07:16)      Culture - Blood (collected 23 Nov 2024 20:30)  Source: .Blood BLOOD  Preliminary Report:    No growth at 24 hours    Culture - Blood (collected 23 Nov 2024 20:15)  Source: .Blood BLOOD  Preliminary Report:    No growth at 24 hours    Culture - Blood (collected 14 Jul 2024 09:10)  Source: .Blood Blood-Peripheral  Final Report:    No growth at 5 days    Culture - Blood (collected 14 Jul 2024 08:40)  Source: .Blood Blood-Peripheral  Final Report:    No growth at 5 days    Culture - Urine (collected 01 Jul 2024 10:49)  Source: Clean Catch Clean Catch (Midstream)  Final Report:    50,000 - 99,000 CFU/mL Methicillin Resistant Staphylococcus aureus    50,000 - 99,000 CFU/mL Candida tropicalis "Susceptibilities not performed"  Organism: Methicillin resistant Staphylococcus aureus  Organism: Methicillin resistant Staphylococcus aureus    Sensitivities:      Method Type: JUANI      -  Daptomycin: S 0.5      -  Gentamicin: S 2 Should not be used as monotherapy      -  Linezolid: S 2      -  Oxacillin: R >2      -  Penicillin: R >8      -  Rifampin: I 2 Should not be used as monotherapy      -  Tetracycline: S <=1      -  Trimethoprim/Sulfamethoxazole: S <=0.5/9.5      -  Vancomycin: S 1    Culture - Blood (collected 01 Jul 2024 10:05)  Source: .Blood Blood  Final Report:    No growth at 5 days    Culture - Blood (collected 01 Jul 2024 10:05)  Source: .Blood Blood  Final Report:    No growth at 5 days    Culture - Blood (collected 18 Jun 2024 17:50)  Source: .Blood Blood-Peripheral  Final Report:    No growth at 5 days    Culture - Blood (collected 18 Jun 2024 17:30)  Source: .Blood Blood-Peripheral  Final Report:    No growth at 5 days    Culture - Urine (collected 10 May 2024 08:31)  Source: Catheterized Catheterized  Final Report:    50,000 - 99,000 CFU/mL Candida tropicalis    "Susceptibilities not performed"    Troponin T, High Sensitivity Result: 35 (11-23)  Troponin T, High Sensitivity Result: 33 (11-23)    Lactate, Blood: 1.5 (11-23 @ 20:49)  Blood Gas Venous - Lactate: 1.6 (11-23 @ 19:47)      Sedimentation Rate, Erythrocyte: 47 mm/hr (09-13-24 @ 21:54)  Sedimentation Rate, Erythrocyte: 12 mm/hr (07-04-24 @ 10:47)  Sedimentation Rate, Erythrocyte: 33 mm/hr (06-18-24 @ 18:08)    CSF:      RADIOLOGY:  < from: CT Chest No Cont (11.23.24 @ 23:40) >    IMPRESSION:    Centrilobular upper lobe emphysema. Mild bibasilar atelectasis.    < end of copied text >   Patient is a 89y old  Female who presents with a chief complaint of lethargy (25 Nov 2024 08:56)    HPI:  89y F pmh htn, hld, CAD, diastolic CHF, right hemidiaphragm paralysis, hypothyroidism, dementia, COPD on 2 L NC presenting from [facility] assisted living for episode of altered mental status today at 6:30 PM. Per aid who is providing hx patient was in her normal mental state at 615, was eating dinner and became slumped over, mumbling, less responsive.  States during that episode patient did not know her name and was confused. Patient has now returned to baseline mentation ( A&Ox3)    ROS: Denies HA, CP, SOB, palpitation, N/V/D, fever, cough, chills, dizziness, fall, head trauma, sick contact, change in bowel or urinary habits   A 10-system ROS was performed and is negative except as noted above and/or in the HPI.    ED: Mild hypoK, UA (+), CTH neg.  Given CTX, IVF   (24 Nov 2024 05:13)     REVIEW OF SYSTEMS  [ x ] ROS unobtainable because:  dementia  [  ] All other systems negative except as noted below  Constitutional:  [ ] fever [ ] chills  [ ] weight loss  [ ]night sweat  [ ]poor appetite/PO intake [ ]fatigue   Skin:  [ ] rash [ ] phlebitis	  Eyes: [ ] icterus [ ] pain  [ ] discharge	  ENMT: [ ] sore throat  [ ] thrush [ ] ulcers [ ] exudates [ ]anosmia  Respiratory: [ ] dyspnea [ ] hemoptysis [ ] cough [ ] sputum	  Cardiovascular:  [ ] chest pain [ ] palpitations [ ] edema	  Gastrointestinal:  [ ] nausea [ ] vomiting [ ] diarrhea [ ] constipation [ ] pain	  Genitourinary:  [ ] dysuria [ ] frequency [ ] hematuria [ ] discharge [ ] flank pain  [ ] incontinence  Musculoskeletal:  [ ] myalgias [ ] arthralgias [ ] arthritis  [ ] back pain  Neurological:  [ ] headache [ ] weakness [ ] seizures  [ ] confusion/altered mental status  prior hospital charts reviewed [V]  primary team notes reviewed [V]  other consultant notes reviewed [V]    PAST MEDICAL & SURGICAL HISTORY:  HTN (hypertension)      Hypothyroidism      Osteoporosis      CAD (coronary artery disease)      COPD (chronic obstructive pulmonary disease)      Pulmonary sarcoidosis      H/O pyelonephritis      Acute diverticulitis          SOCIAL HISTORY:  - Denied smoking/vaping/alcohol/recreational drug use    FAMILY HISTORY:      Allergies  iodinated radiocontrast agents (Anaphylaxis)        ANTIMICROBIALS:  cefTRIAXone   IVPB 1000 every 24 hours      ANTIMICROBIALS (past 90 days):  MEDICATIONS  (STANDING):  cefTRIAXone   IVPB   100 mL/Hr IV Intermittent (11-24-24 @ 01:33)    cefTRIAXone   IVPB   100 mL/Hr IV Intermittent (11-24-24 @ 18:11)        OTHER MEDS:   MEDICATIONS  (STANDING):  acetaminophen     Tablet .. 650 every 6 hours PRN  albuterol    90 MICROgram(s) HFA Inhaler 2 every 8 hours  albuterol/ipratropium for Nebulization 3 every 6 hours PRN  aluminum hydroxide/magnesium hydroxide/simethicone Suspension 30 every 4 hours PRN  atorvastatin 20 at bedtime  buDESOnide    Inhalation Suspension 0.25 two times a day  enoxaparin Injectable 40 every 24 hours  FLUoxetine 30 daily  fluticasone propionate/ salmeterol 100-50 MICROgram(s) Diskus 1 two times a day  furosemide    Tablet 20 daily  melatonin 3 at bedtime PRN  metoprolol succinate ER 50 daily  ondansetron Injectable 4 every 8 hours PRN  pantoprazole    Tablet 40 before breakfast  polyethylene glycol 3350 17 daily  temazepam 15 at bedtime  traZODone 50 at bedtime      VITALS:  Vital Signs Last 24 Hrs  T(F): 97.5 (11-25-24 @ 12:18), Max: 98.6 (11-24-24 @ 04:50)    Vital Signs Last 24 Hrs  HR: 83 (11-25-24 @ 12:18) (68 - 83)  BP: 100/52 (11-25-24 @ 12:18) (100/52 - 144/83)  RR: 18 (11-25-24 @ 12:18)  SpO2: 98% (11-25-24 @ 12:18) (98% - 99%)  Wt(kg): --    EXAM:    GA: NAD,   HEENT: oral cavity no lesion  CV: nl S1/S2, no RMG  Lungs: diminished air entry on NRB  Abd: BS+, soft, nontender, no rebounding pain  Ext: no edema  Neuro: No focal deficits  Skin: Intact  IV: no phlebitis  Labs:                        9.4    8.35  )-----------( 172      ( 25 Nov 2024 07:29 )             31.7     11-25    142  |  104  |  15  ----------------------------<  82  3.7   |  26  |  0.78    Ca    8.9      25 Nov 2024 07:29  Phos  4.1     11-23  Mg     2.0     11-25    TPro  6.3  /  Alb  3.7  /  TBili  0.2  /  DBili  x   /  AST  11  /  ALT  <5[L]  /  AlkPhos  119  11-23    WBC Trend:  WBC Count: 8.35 (11-25-24 @ 07:29)  WBC Count: 9.14 (11-24-24 @ 07:32)  WBC Count: 9.36 (11-23-24 @ 20:05)    Auto Neutrophil #: 7.03 K/uL (11-23-24 @ 20:05)  Auto Neutrophil #: 9.37 K/uL (11-04-24 @ 05:38)  Auto Neutrophil #: 8.62 K/uL (10-05-24 @ 10:35)  Auto Neutrophil #: 6.55 K/uL (09-13-24 @ 21:00)  Auto Neutrophil #: 10.91 K/uL (08-21-24 @ 08:31)    Creatine Trend:  Creatinine: 0.78 (11-25)  Creatinine: 0.75 (11-24)  Creatinine: 0.67 (11-23)    Liver Biochemical Testing Trend:  Alanine Aminotransferase (ALT/SGPT): <5 *L* (11-23)  Alanine Aminotransferase (ALT/SGPT): 6 *L* (11-04)  Alanine Aminotransferase (ALT/SGPT): 5 *L* (10-05)  Alanine Aminotransferase (ALT/SGPT): 6 *L* (09-13)  Alanine Aminotransferase (ALT/SGPT): 5 *L* (08-21)  Aspartate Aminotransferase (AST/SGOT): 11 (11-23-24 @ 20:05)  Aspartate Aminotransferase (AST/SGOT): 14 (11-04-24 @ 05:38)  Aspartate Aminotransferase (AST/SGOT): 14 (10-05-24 @ 10:35)  Aspartate Aminotransferase (AST/SGOT): 12 (09-13-24 @ 21:00)  Aspartate Aminotransferase (AST/SGOT): 11 (08-21-24 @ 08:31)  Bilirubin Total: 0.2 (11-23)  Bilirubin Total: 0.3 (11-04)  Bilirubin Total: 0.4 (10-05)  Bilirubin Total: 0.3 (09-13)  Bilirubin Total: 0.4 (08-21)    Trend LDH    Auto Eosinophil %: 1.7 % (11-23-24 @ 20:05)    Urinalysis Basic - ( 25 Nov 2024 07:29 )    Color: x / Appearance: x / SG: x / pH: x  Gluc: 82 mg/dL / Ketone: x  / Bili: x / Urobili: x   Blood: x / Protein: x / Nitrite: x   Leuk Esterase: x / RBC: x / WBC x   Sq Epi: x / Non Sq Epi: x / Bacteria: x      MICROBIOLOGY:    MRSA PCR Result.: Detected (08-22-24 @ 07:03)  MRSA PCR Result.: Detected (07-14-24 @ 09:46)  MRSA PCR Result.: NotDetec (03-30-24 @ 07:16)      Culture - Blood (collected 23 Nov 2024 20:30)  Source: .Blood BLOOD  Preliminary Report:    No growth at 24 hours    Culture - Blood (collected 23 Nov 2024 20:15)  Source: .Blood BLOOD  Preliminary Report:    No growth at 24 hours    Culture - Blood (collected 14 Jul 2024 09:10)  Source: .Blood Blood-Peripheral  Final Report:    No growth at 5 days    Culture - Blood (collected 14 Jul 2024 08:40)  Source: .Blood Blood-Peripheral  Final Report:    No growth at 5 days    Culture - Urine (collected 01 Jul 2024 10:49)  Source: Clean Catch Clean Catch (Midstream)  Final Report:    50,000 - 99,000 CFU/mL Methicillin Resistant Staphylococcus aureus    50,000 - 99,000 CFU/mL Candida tropicalis "Susceptibilities not performed"  Organism: Methicillin resistant Staphylococcus aureus  Organism: Methicillin resistant Staphylococcus aureus    Sensitivities:      Method Type: JUANI      -  Daptomycin: S 0.5      -  Gentamicin: S 2 Should not be used as monotherapy      -  Linezolid: S 2      -  Oxacillin: R >2      -  Penicillin: R >8      -  Rifampin: I 2 Should not be used as monotherapy      -  Tetracycline: S <=1      -  Trimethoprim/Sulfamethoxazole: S <=0.5/9.5      -  Vancomycin: S 1    Culture - Blood (collected 01 Jul 2024 10:05)  Source: .Blood Blood  Final Report:    No growth at 5 days    Culture - Blood (collected 01 Jul 2024 10:05)  Source: .Blood Blood  Final Report:    No growth at 5 days    Culture - Blood (collected 18 Jun 2024 17:50)  Source: .Blood Blood-Peripheral  Final Report:    No growth at 5 days    Culture - Blood (collected 18 Jun 2024 17:30)  Source: .Blood Blood-Peripheral  Final Report:    No growth at 5 days    Culture - Urine (collected 10 May 2024 08:31)  Source: Catheterized Catheterized  Final Report:    50,000 - 99,000 CFU/mL Candida tropicalis    "Susceptibilities not performed"    Troponin T, High Sensitivity Result: 35 (11-23)  Troponin T, High Sensitivity Result: 33 (11-23)    Lactate, Blood: 1.5 (11-23 @ 20:49)  Blood Gas Venous - Lactate: 1.6 (11-23 @ 19:47)      Sedimentation Rate, Erythrocyte: 47 mm/hr (09-13-24 @ 21:54)  Sedimentation Rate, Erythrocyte: 12 mm/hr (07-04-24 @ 10:47)  Sedimentation Rate, Erythrocyte: 33 mm/hr (06-18-24 @ 18:08)    CSF:      RADIOLOGY:  < from: CT Chest No Cont (11.23.24 @ 23:40) >    IMPRESSION:    Centrilobular upper lobe emphysema. Mild bibasilar atelectasis.    < end of copied text >

## 2024-11-25 NOTE — CONSULT NOTE ADULT - SUBJECTIVE AND OBJECTIVE BOX
11-25-24 @ 14:34    Patient is a 89y old  Female who presents with a chief complaint of lethargy (25 Nov 2024 08:56)      HPI:  89y F pmh htn, hld, CAD, diastolic CHF, right hemidiaphragm paralysis, hypothyroidism, dementia, COPD on 2 L NC presenting from [facility] assisted living for episode of altered mental status today at 6:30 PM. Per aid who is providing hx patient was in her normal mental state at 615, was eating dinner and became slumped over, mumbling, less responsive.  States during that episode patient did not know her name and was confused. Patient has now returned to baseline mentation ( A&Ox3)    ROS: Denies HA, CP, SOB, palpitation, N/V/D, fever, cough, chills, dizziness, fall, head trauma, sick contact, change in bowel or urinary habits   A 10-system ROS was performed and is negative except as noted above and/or in the HPI.    ED: Mild hypoK, UA (+), CTH neg.  Given CTX, IVF   (24 Nov 2024 05:13)   no wpulm called for hypoxic resp failure   pt was on 5 L of oxygen  when i saw her:  able to understand simple questions:   no resp distress:  de is having any underlying condition;     ?FOLLOWING PRESENT  [x ] Hx of PE/DVT, [y ] Hx COPD, [ ]x Hx of Asthma, [ y] Hx of Hospitalization, x[ ]  Hx of BiPAP/CPAP use, [ ]x Hx of YESENIA    Allergies    iodinated radiocontrast agents (Anaphylaxis)    Intolerances        PAST MEDICAL & SURGICAL HISTORY:  HTN (hypertension)      Hypothyroidism      Osteoporosis      CAD (coronary artery disease)      COPD (chronic obstructive pulmonary disease)      Pulmonary sarcoidosis      H/O pyelonephritis      Acute diverticulitis          FAMILY HISTORY:      Social History: [ x ] TOBACCO                  [ x ] ETOH                                 [  x] IVDA/DRUGS    REVIEW OF SYSTEMS      General:	x    Skin/Breast:x  	  Ophthalmologic:x  	  ENMT:	x    Respiratory and Thorax:  ams,  hypoxic resp failure  	  Cardiovascular:	x    Gastrointestinal:	x    Genitourinary:	x    Musculoskeletal:	x    Neurological:	x    Psychiatric:	x    Hematology/Lymphatics:	x    Endocrine:	x  x  Allergic/Immunologic:	    MEDICATIONS  (STANDING):  albuterol    90 MICROgram(s) HFA Inhaler 2 Puff(s) Inhalation every 8 hours  ascorbic acid 500 milliGRAM(s) Oral daily  atorvastatin 20 milliGRAM(s) Oral at bedtime  buDESOnide    Inhalation Suspension 0.25 milliGRAM(s) Inhalation two times a day  cefTRIAXone   IVPB 1000 milliGRAM(s) IV Intermittent every 24 hours  cyclopentolate 1% Solution 1 Drop(s) Left EYE two times a day  enoxaparin Injectable 40 milliGRAM(s) SubCutaneous every 24 hours  FLUoxetine 30 milliGRAM(s) Oral daily  fluticasone propionate/ salmeterol 100-50 MICROgram(s) Diskus 1 Dose(s) Inhalation two times a day  furosemide    Tablet 20 milliGRAM(s) Oral daily  metoprolol succinate ER 50 milliGRAM(s) Oral daily  multivitamin 1 Tablet(s) Oral daily  pantoprazole    Tablet 40 milliGRAM(s) Oral before breakfast  polyethylene glycol 3350 17 Gram(s) Oral daily  prednisoLONE acetate 1% Suspension 1 Drop(s) Left EYE two times a day  temazepam 15 milliGRAM(s) Oral at bedtime  traZODone 50 milliGRAM(s) Oral at bedtime    MEDICATIONS  (PRN):  acetaminophen     Tablet .. 650 milliGRAM(s) Oral every 6 hours PRN Temp greater or equal to 38C (100.4F), Mild Pain (1 - 3)  albuterol/ipratropium for Nebulization 3 milliLiter(s) Nebulizer every 6 hours PRN Shortness of Breath and/or Wheezing  aluminum hydroxide/magnesium hydroxide/simethicone Suspension 30 milliLiter(s) Oral every 4 hours PRN Dyspepsia  melatonin 3 milliGRAM(s) Oral at bedtime PRN Insomnia  ondansetron Injectable 4 milliGRAM(s) IV Push every 8 hours PRN Nausea and/or Vomiting       Vital Signs Last 24 Hrs  T(C): 36.4 (25 Nov 2024 12:18), Max: 36.8 (25 Nov 2024 05:12)  T(F): 97.5 (25 Nov 2024 12:18), Max: 98.2 (25 Nov 2024 05:12)  HR: 83 (25 Nov 2024 12:18) (68 - 83)  BP: 100/52 (25 Nov 2024 12:18) (100/52 - 144/83)  BP(mean): --  RR: 18 (25 Nov 2024 12:18) (18 - 18)  SpO2: 98% (25 Nov 2024 12:18) (98% - 99%)    Parameters below as of 25 Nov 2024 12:18  Patient On (Oxygen Delivery Method): nasal cannula  O2 Flow (L/min): 5  Orthostatic VS          I&O's Summary      Physical Exam:   GENERAL: NAD, well-groomed, well-developed  HEENT: JIM/   Atraumatic, Normocephalic  ENMT: No tonsillar erythema, exudates, or enlargement; Moist mucous membranes, Good dentition, No lesions  NECK: Supple, No JVD, Normal thyroid  CHEST/LUNG: no wheezing:  bibasilar cracekls+  CVS: Regular rate and rhythm; No murmurs, rubs, or gallops  GI: : Soft, Nontender, Nondistended; Bowel sounds present  NERVOUS SYSTEM:  Alert & awake and responsive  EXTREMITIES: - edema  LYMPH: No lymphadenopathy noted  SKIN: No rashes or lesions  ENDOCRINOLOGY: No Thyromegaly  PSYCH: Appropriate    Labs:  4.3<58<4>>31<<7.345>>4.3<<3><<4><<5<<319>>                            9.4    8.35  )-----------( 172      ( 25 Nov 2024 07:29 )             31.7                         10.6   9.14  )-----------( 174      ( 24 Nov 2024 07:32 )             35.5                         9.9    9.36  )-----------( 192      ( 23 Nov 2024 20:05 )             32.5     11-25    142  |  104  |  15  ----------------------------<  82  3.7   |  26  |  0.78  11-24    145  |  105  |  14  ----------------------------<  97  3.7   |  27  |  0.75  11-23    143  |  105  |  14  ----------------------------<  120[H]  3.4[L]   |  25  |  0.67    Ca    8.9      25 Nov 2024 07:29  Ca    9.0      24 Nov 2024 07:32  Ca    8.8      23 Nov 2024 20:05  Phos  4.1     11-23  Mg     2.0     11-25  Mg     2.2     11-24  Mg     2.1     11-23    TPro  6.3  /  Alb  3.7  /  TBili  0.2  /  DBili  x   /  AST  11  /  ALT  <5[L]  /  AlkPhos  119  11-23    CAPILLARY BLOOD GLUCOSE        LIVER FUNCTIONS - ( 23 Nov 2024 20:05 )  Alb: 3.7 g/dL / Pro: 6.3 g/dL / ALK PHOS: 119 U/L / ALT: <5 U/L / AST: 11 U/L / GGT: x           PT/INR - ( 24 Nov 2024 07:32 )   PT: 12.3 sec;   INR: 1.07 ratio         PTT - ( 23 Nov 2024 20:05 )  PTT:32.2 sec  Urinalysis Basic - ( 25 Nov 2024 07:29 )    Color: x / Appearance: x / SG: x / pH: x  Gluc: 82 mg/dL / Ketone: x  / Bili: x / Urobili: x   Blood: x / Protein: x / Nitrite: x   Leuk Esterase: x / RBC: x / WBC x   Sq Epi: x / Non Sq Epi: x / Bacteria: x      Culture - Blood (collected 23 Nov 2024 20:30)  Source: .Blood BLOOD  Preliminary Report (25 Nov 2024 02:02):    No growth at 24 hours    Culture - Blood (collected 23 Nov 2024 20:15)  Source: .Blood BLOOD  Preliminary Report (25 Nov 2024 02:02):    No growth at 24 hours      D DImerLactate, Blood: 1.5 mmol/L (11-23 @ 20:49)        Studies  Chest X-RAY  CT SCAN Chest   CT Abdomen  Venous Dopplers: LE:   Others    rad< from: CT Chest No Cont (11.23.24 @ 23:40) >  VESSELS: Aortic calcifications. Coronary artery calcifications. 4.1 cm   ascending aortic aneurysm. Marked enlargement of the main pulmonary   artery at 4.5 cm a nonspecific finding but can be seen in pulmonary   hypertension among other entities.  HEART: Cardiomegaly. No pericardial effusion.  MEDIASTINUM AND RASHID: No lymphadenopathy.  CHEST WALL AND LOWER NECK: Within normal limits.  VISUALIZED UPPER ABDOMEN: Within normal limits.  BONES: Degenerative changes. Prior left shoulder surgery.    IMPRESSION:    Centrilobular upper lobe emphysema. Mild bibasilar atelectasis.      --- End of Report ---            SUSAN MANCIA MD; Attending Radiologist  This document has been electronically signed. Nov 24 2024 12:42AM    < end of copied text >  < from: Xray Chest 1 View- PORTABLE-Urgent (11.23.24 @ 21:31) >  Right basilar atelectasis. Left basilar subsegmental atelectasis.  There is no pneumothorax. Small left pleural effusion.  No acute bony abnormality.      IMPRESSION:  Right basilar partial atelectasis. Small left pleural effusion with left   basilar subsegmental atelectasis..    --- End of Report ---          NARAYAN CENTENO MD; Resident Radiologist  This document has been electronically signed.  TIMBO MILLER MD; Attending Interventional Radiologist  This document has been electronically signed. Nov 24 2024  8:17AM    < end of copied text >

## 2024-11-25 NOTE — CONSULT NOTE ADULT - NS ATTEND AMEND GEN_ALL_CORE FT
89 y/o F w/ PMhx of HTN, HLD, CAD, dCHF, R hemidiaphragm paralysis, hypothyroidism, dementia, COPD on 2L NC, presenting to [facility] for AMS.   In ED, pt was afebrile. On 2-3 NC, now 5L NC.   Labs: WBC 9, U/A w/ pyuria to 998.   BCx NGTD x 48 hours  CT w/ emphysema, CTH negative     #Respiratory failure   #UTI  #Metabolic encephalopathy, improved     Overall admitted for metabolic encephalopathy, now improved. Pt w/o any urinary symptoms, however grossly positive U/A, given this would treat for now. Afebrile, mild leukocytosis, now on steroids for respiratory failure.    Recommend:   1. C/w Ceftriaxone 1 g q24 for now, plan on 3-5 day course  2. F/u UCx   3. Appreciate pulmonary recs for respiratory failure    Thank you for this consult. Inpatient ID team will follow.    Xavier Dominguez M.D.  Attending Physician  Division of Infectious Diseases  Department of Medicine    Please contact through MS Teams message.  Office: [phone] (after 5 PM or weekend)

## 2024-11-25 NOTE — PROGRESS NOTE ADULT - SUBJECTIVE AND OBJECTIVE BOX
Date of Service: 11-25-24 @ 08:56           CARDIOLOGY     PROGRESS  NOTE   ________________________________________________    CHIEF COMPLAINT:Patient is a 89y old  Female who presents with a chief complaint of lethargy (24 Nov 2024 11:05)  doing better  	  REVIEW OF SYSTEMS:  CONSTITUTIONAL: No fever, weight loss, or fatigue  EYES: No eye pain, visual disturbances, or discharge  ENT:  No difficulty hearing, tinnitus, vertigo; No sinus or throat pain  NECK: No pain or stiffness  RESPIRATORY: No cough, wheezing, chills or hemoptysis; decrease  Shortness of Breath  CARDIOVASCULAR: No chest pain, palpitations, passing out, dizziness, or leg swelling  GASTROINTESTINAL: No abdominal or epigastric pain. No nausea, vomiting, or hematemesis; No diarrhea or constipation. No melena or hematochezia.  GENITOURINARY: No dysuria, frequency, hematuria, or incontinence  NEUROLOGICAL: No headaches, memory loss, loss of strength, numbness, or tremors  SKIN: No itching, burning, rashes, or lesions   LYMPH Nodes: No enlarged glands  ENDOCRINE: No heat or cold intolerance; No hair loss  MUSCULOSKELETAL: No joint pain or swelling; No muscle, back, or extremity pain  PSYCHIATRIC: No depression, anxiety, mood swings, or difficulty sleeping  HEME/LYMPH: No easy bruising, or bleeding gums  ALLERGY AND IMMUNOLOGIC: No hives or eczema	    [x ] All others negative	  [ ] Unable to obtain    PHYSICAL EXAM:  T(C): 36.8 (11-25-24 @ 05:12), Max: 36.8 (11-25-24 @ 05:12)  HR: 74 (11-25-24 @ 05:12) (68 - 80)  BP: 133/68 (11-25-24 @ 05:12) (133/68 - 149/66)  RR: 18 (11-25-24 @ 05:12) (18 - 19)  SpO2: 98% (11-25-24 @ 05:12) (94% - 99%)  Wt(kg): --  I&O's Summary      Appearance: Normal	  HEENT:   Normal oral mucosa, PERRL, EOMI	  Lymphatic: No lymphadenopathy  Cardiovascular: Normal S1 S2, No JVD, + murmurs, No edema  Respiratory: rhonchi  Psychiatry: A & O x 3, Mood & affect appropriate  Gastrointestinal:  Soft, Non-tender, + BS	  Skin: No rashes, No ecchymoses, No cyanosis	  Neurologic: Non-focal  Extremities: Normal range of motion, No clubbing, cyanosis or edema  Vascular: Peripheral pulses palpable 2+ bilaterally    MEDICATIONS  (STANDING):  albuterol    90 MICROgram(s) HFA Inhaler 2 Puff(s) Inhalation every 8 hours  ascorbic acid 500 milliGRAM(s) Oral daily  atorvastatin 20 milliGRAM(s) Oral at bedtime  buDESOnide    Inhalation Suspension 0.25 milliGRAM(s) Inhalation two times a day  cefTRIAXone   IVPB 1000 milliGRAM(s) IV Intermittent every 24 hours  cyclopentolate 1% Solution 1 Drop(s) Left EYE two times a day  enoxaparin Injectable 40 milliGRAM(s) SubCutaneous every 24 hours  FLUoxetine 30 milliGRAM(s) Oral daily  fluticasone propionate/ salmeterol 100-50 MICROgram(s) Diskus 1 Dose(s) Inhalation two times a day  furosemide    Tablet 20 milliGRAM(s) Oral daily  metoprolol succinate ER 50 milliGRAM(s) Oral daily  multivitamin 1 Tablet(s) Oral daily  pantoprazole    Tablet 40 milliGRAM(s) Oral before breakfast  polyethylene glycol 3350 17 Gram(s) Oral daily  prednisoLONE acetate 1% Suspension 1 Drop(s) Left EYE two times a day  temazepam 15 milliGRAM(s) Oral at bedtime  traZODone 50 milliGRAM(s) Oral at bedtime      TELEMETRY: 	    ECG:  	  RADIOLOGY:  OTHER: 	  	  LABS:	 	    CARDIAC MARKERS:                                9.4    8.35  )-----------( 172      ( 25 Nov 2024 07:29 )             31.7     11-25    142  |  104  |  15  ----------------------------<  82  3.7   |  26  |  0.78    Ca    8.9      25 Nov 2024 07:29  Phos  4.1     11-23  Mg     2.0     11-25    TPro  6.3  /  Alb  3.7  /  TBili  0.2  /  DBili  x   /  AST  11  /  ALT  <5[L]  /  AlkPhos  119  11-23    proBNP:   Lipid Profile:   HgA1c:   TSH:   PT/INR - ( 24 Nov 2024 07:32 )   PT: 12.3 sec;   INR: 1.07 ratio         PTT - ( 23 Nov 2024 20:05 )  PTT:32.2 sec    Urinalysis + Microscopic Examination (11.23.24 @ 23:04)    pH Urine: 6.0   Urine Appearance: Turbid   Color: Yellow   Specific Gravity: 1.019   Protein, Urine: 30 mg/dL   Glucose Qualitative, Urine: Negative mg/dL   Ketone - Urine: Negative mg/dL   Blood, Urine: Moderate   Bilirubin: Negative   Urobilinogen: 0.2 mg/dL   Leukocyte Esterase Concentration: Large   Nitrite: Negative   Review: Reviewed   White Blood Cell - Urine: >998 /HPF   Red Blood Cell - Urine: 2 /HPF   Bacteria: Negative /HPF   Cast: 3 /LPF   Epithelial Cells: 6 /HPF   Yeast-like Cells: Present          Assessment and plan  ---------------------------  89y F pmh htn, hld, CAD, diastolic CHF, right hemidiaphragm paralysis, hypothyroidism, dementia, COPD on 2 L NC presenting from [facility] assisted living for episode of altered mental status today at 6:30 PM. Per aid who is providing hx patient was in her normal mental state at 615, was eating dinner and became slumped over, mumbling, less responsive.  States during that episode patient did not know her name and was confused. Patient has now returned to baseline mentation ( A&Ox3).  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

## 2024-11-25 NOTE — PROGRESS NOTE ADULT - SUBJECTIVE AND OBJECTIVE BOX
---___---___---___---___---___---___ ---___---___---___---___---___---___---___---___---                  M E D I C A L   A T T E N D I N G   P R O G R E S S   N O T E  ---___---___---___---___---___---___ ---___---___---___---___---___---___---___---___---        ================================================    ++CHIEF COMPLAINT:   Patient is a 89y old  Female who presents with a chief complaint of lethargy (2024 14:33)      Urinary tract infection      ---___---___---___---___---___---  PAST MEDICAL / Surgical  HISTORY:  PAST MEDICAL & SURGICAL HISTORY:  HTN (hypertension)      Hypothyroidism      Osteoporosis      CAD (coronary artery disease)      COPD (chronic obstructive pulmonary disease)      Pulmonary sarcoidosis      H/O pyelonephritis      Acute diverticulitis          ---___---___---___---___---___---  FAMILY HISTORY:   FAMILY HISTORY:        ---___---___---___---___---___---  ALLERGIES:   Allergies    iodinated radiocontrast agents (Anaphylaxis)    Intolerances        ---___---___---___---___---___---  MEDICATIONS:  MEDICATIONS  (STANDING):  albuterol    90 MICROgram(s) HFA Inhaler 2 Puff(s) Inhalation every 8 hours  ascorbic acid 500 milliGRAM(s) Oral daily  atorvastatin 20 milliGRAM(s) Oral at bedtime  buDESOnide    Inhalation Suspension 0.25 milliGRAM(s) Inhalation two times a day  cefTRIAXone   IVPB 1000 milliGRAM(s) IV Intermittent every 24 hours  cyclopentolate 1% Solution 1 Drop(s) Left EYE two times a day  enoxaparin Injectable 40 milliGRAM(s) SubCutaneous every 24 hours  FLUoxetine 30 milliGRAM(s) Oral daily  fluticasone propionate/ salmeterol 100-50 MICROgram(s) Diskus 1 Dose(s) Inhalation two times a day  furosemide    Tablet 20 milliGRAM(s) Oral daily  methylPREDNISolone sodium succinate Injectable 40 milliGRAM(s) IV Push every 8 hours  methylPREDNISolone sodium succinate Injectable   IV Push   metoprolol succinate ER 50 milliGRAM(s) Oral daily  multivitamin 1 Tablet(s) Oral daily  pantoprazole    Tablet 40 milliGRAM(s) Oral before breakfast  polyethylene glycol 3350 17 Gram(s) Oral daily  prednisoLONE acetate 1% Suspension 1 Drop(s) Left EYE two times a day  temazepam 15 milliGRAM(s) Oral at bedtime  traZODone 50 milliGRAM(s) Oral at bedtime    MEDICATIONS  (PRN):  acetaminophen     Tablet .. 650 milliGRAM(s) Oral every 6 hours PRN Temp greater or equal to 38C (100.4F), Mild Pain (1 - 3)  albuterol/ipratropium for Nebulization 3 milliLiter(s) Nebulizer every 6 hours PRN Shortness of Breath and/or Wheezing  aluminum hydroxide/magnesium hydroxide/simethicone Suspension 30 milliLiter(s) Oral every 4 hours PRN Dyspepsia  melatonin 3 milliGRAM(s) Oral at bedtime PRN Insomnia  ondansetron Injectable 4 milliGRAM(s) IV Push every 8 hours PRN Nausea and/or Vomiting      ---___---___---___---___---___---  REVIEW OF SYSTEM:    GEN: no fever, no chills, no pain  RESP: no SOB, no cough, no sputum  CVS: no chest pain, no palpitations, no edema  GI: no abdominal pain, no nausea, no vomiting, no constipation, no diarrhea  : no dysurea, no frequency, no hematurea  Neuro: no headache, no dizziness  PSYCH: no anxiety, no depression  Derm : no itching, no rash    ---___---___---___---___---___---  VITAL SIGNS:  89y , CAPILLARY BLOOD GLUCOSE        T(C): 36.4 (24 @ 12:18), Max: 36.8 (24 @ 05:12)  HR: 83 (24 @ 12:18) (68 - 83)  BP: 100/52 (24 @ 12:18) (100/52 - 144/83)  RR: 18 (24 @ 12:18) (18 - 18)  SpO2: 98% (24 @ 12:18) (98% - 99%)  ---___---___---___---___---___---  PHYSICAL EXAM:    GEN: A&O X 3 , NAD , comfortable  HEENT: NCAT, PERRL, MMM, hearing intact  Neck: supple , no JVD  CVS: S1S2 , regular , No M/R/G appreciated  PULM: CTA B/L,  no W/R/R appreciated  ABD.: soft. non tender, non distended,  bowel sounds present  Extrem: intact pulses , no edema   Derm: No rash , no ecchymoses  PSYCH : normal mood,  no delusion not anxious     ---___---___---___---___---___---            LAB AND IMAGIN.8    11.30 )-----------( 160      ( 2024 15:58 )             32.9                   142  |  104  |  15  ----------------------------<  82  3.7   |  26  |  0.78    Ca    8.9      2024 07:29  Phos  4.1       Mg     2.0         TPro  6.3  /  Alb  3.7  /  TBili  0.2  /  DBili  x   /  AST  11  /  ALT  <5[L]  /  AlkPhos  119      PT/INR - ( 2024 07:32 )   PT: 12.3 sec;   INR: 1.07 ratio         PTT - ( 2024 20:05 )  PTT:32.2 sec                        Urinalysis Basic - ( 2024 07:29 )    Color: x / Appearance: x / SG: x / pH: x  Gluc: 82 mg/dL / Ketone: x  / Bili: x / Urobili: x   Blood: x / Protein: x / Nitrite: x   Leuk Esterase: x / RBC: x / WBC x   Sq Epi: x / Non Sq Epi: x / Bacteria: x        [All pertinent / recent Imaging reviewed]         ---___---___---___---___---___---___ ---___---___---___---___---                         A S S E S S M E N T   A N D   P L A N :      HEALTH ISSUES - PROBLEM Dx:    h/o  emphysema, ,  c/c  L  hydroureter,  HTN.  depression   prior       h/o   C. difficile, diverticulitis,   pna        asthma/COPD, sarcoidosis,   c/c  diastolic  heart failure /  ulcer  left 2nd  toe    c/c   superficial   ulcerations, on  bony prominences  of hammer  toe    admitted  with    sob,  with   wbc  of  12, ,000  on  arrival,  was  afebrile.        sob/ acute  on  c/c  diastolic     chf  and  exacerbation of  copd        h/o  c/c  elevated  d  dimer,         ct  chest  angio last  month/  seen by  tamiko  pulgo colon.  ct  chest  angio, was  negative  for  pe       h/o  underlying c/c  lung disease./  Emphysema        c/c   superficial   ulcerations, on  bony prominences  of hammer  toes,     HTN/  HLD    c/c  diastolic  chf, on lasix/ known  to    card     Anxiety,   c/c  anemia    pedal  edema.  doppler  leg,  no  dvt/ swollen right  leg /  c/c      on iv lasix/    wa s on iv  solumedrol/    house  pulm  eval     dvt  ppx /  swallow  eval  rec  noted.  stated, unable  to  make  recommendations    on tapering prednisone,  iv lasix        ___________________________  Thank you,  Garry Hermosillo  [phone]

## 2024-11-26 LAB
ANION GAP SERPL CALC-SCNC: 15 MMOL/L — SIGNIFICANT CHANGE UP (ref 5–17)
ANION GAP SERPL CALC-SCNC: 17 MMOL/L — SIGNIFICANT CHANGE UP (ref 5–17)
BASE EXCESS BLDA CALC-SCNC: 5 MMOL/L — HIGH (ref -2–3)
BUN SERPL-MCNC: 17 MG/DL — SIGNIFICANT CHANGE UP (ref 7–23)
BUN SERPL-MCNC: 18 MG/DL — SIGNIFICANT CHANGE UP (ref 7–23)
CALCIUM SERPL-MCNC: 9 MG/DL — SIGNIFICANT CHANGE UP (ref 8.4–10.5)
CALCIUM SERPL-MCNC: 9.1 MG/DL — SIGNIFICANT CHANGE UP (ref 8.4–10.5)
CHLORIDE SERPL-SCNC: 100 MMOL/L — SIGNIFICANT CHANGE UP (ref 96–108)
CHLORIDE SERPL-SCNC: 102 MMOL/L — SIGNIFICANT CHANGE UP (ref 96–108)
CO2 BLDA-SCNC: 31 MMOL/L — HIGH (ref 19–24)
CO2 SERPL-SCNC: 23 MMOL/L — SIGNIFICANT CHANGE UP (ref 22–31)
CO2 SERPL-SCNC: 24 MMOL/L — SIGNIFICANT CHANGE UP (ref 22–31)
CREAT SERPL-MCNC: 0.76 MG/DL — SIGNIFICANT CHANGE UP (ref 0.5–1.3)
CREAT SERPL-MCNC: 0.8 MG/DL — SIGNIFICANT CHANGE UP (ref 0.5–1.3)
EGFR: 71 ML/MIN/1.73M2 — SIGNIFICANT CHANGE UP
EGFR: 75 ML/MIN/1.73M2 — SIGNIFICANT CHANGE UP
GAS PNL BLDA: SIGNIFICANT CHANGE UP
GLUCOSE SERPL-MCNC: 145 MG/DL — HIGH (ref 70–99)
GLUCOSE SERPL-MCNC: 153 MG/DL — HIGH (ref 70–99)
HCO3 BLDA-SCNC: 30 MMOL/L — HIGH (ref 21–28)
HCT VFR BLD CALC: 32.6 % — LOW (ref 34.5–45)
HGB BLD-MCNC: 10 G/DL — LOW (ref 11.5–15.5)
HOROWITZ INDEX BLDA+IHG-RTO: 40 — SIGNIFICANT CHANGE UP
MAGNESIUM SERPL-MCNC: 2.1 MG/DL — SIGNIFICANT CHANGE UP (ref 1.6–2.6)
MAGNESIUM SERPL-MCNC: 2.2 MG/DL — SIGNIFICANT CHANGE UP (ref 1.6–2.6)
MCHC RBC-ENTMCNC: 27 PG — SIGNIFICANT CHANGE UP (ref 27–34)
MCHC RBC-ENTMCNC: 30.7 G/DL — LOW (ref 32–36)
MCV RBC AUTO: 88.1 FL — SIGNIFICANT CHANGE UP (ref 80–100)
NRBC # BLD: 0 /100 WBCS — SIGNIFICANT CHANGE UP (ref 0–0)
PCO2 BLDA: 44 MMHG — SIGNIFICANT CHANGE UP (ref 32–45)
PH BLDA: 7.44 — SIGNIFICANT CHANGE UP (ref 7.35–7.45)
PHOSPHATE SERPL-MCNC: 4.6 MG/DL — HIGH (ref 2.5–4.5)
PLATELET # BLD AUTO: 164 K/UL — SIGNIFICANT CHANGE UP (ref 150–400)
PO2 BLDA: 66 MMHG — LOW (ref 83–108)
POTASSIUM SERPL-MCNC: 4.4 MMOL/L — SIGNIFICANT CHANGE UP (ref 3.5–5.3)
POTASSIUM SERPL-MCNC: 4.5 MMOL/L — SIGNIFICANT CHANGE UP (ref 3.5–5.3)
POTASSIUM SERPL-SCNC: 4.4 MMOL/L — SIGNIFICANT CHANGE UP (ref 3.5–5.3)
POTASSIUM SERPL-SCNC: 4.5 MMOL/L — SIGNIFICANT CHANGE UP (ref 3.5–5.3)
RBC # BLD: 3.7 M/UL — LOW (ref 3.8–5.2)
RBC # FLD: 15.7 % — HIGH (ref 10.3–14.5)
SAO2 % BLDA: 94.1 % — SIGNIFICANT CHANGE UP (ref 94–98)
SODIUM SERPL-SCNC: 139 MMOL/L — SIGNIFICANT CHANGE UP (ref 135–145)
SODIUM SERPL-SCNC: 142 MMOL/L — SIGNIFICANT CHANGE UP (ref 135–145)
WBC # BLD: 9.42 K/UL — SIGNIFICANT CHANGE UP (ref 3.8–10.5)
WBC # FLD AUTO: 9.42 K/UL — SIGNIFICANT CHANGE UP (ref 3.8–10.5)

## 2024-11-26 PROCEDURE — 99232 SBSQ HOSP IP/OBS MODERATE 35: CPT

## 2024-11-26 PROCEDURE — 93970 EXTREMITY STUDY: CPT | Mod: 26

## 2024-11-26 RX ORDER — LORAZEPAM 2 MG/1
1 TABLET ORAL ONCE
Refills: 0 | Status: DISCONTINUED | OUTPATIENT
Start: 2024-11-26 | End: 2024-11-26

## 2024-11-26 RX ADMIN — Medication 40 MILLIGRAM(S): at 13:44

## 2024-11-26 RX ADMIN — ACETAMINOPHEN 500MG 650 MILLIGRAM(S): 500 TABLET, COATED ORAL at 21:56

## 2024-11-26 RX ADMIN — BUDESONIDE 0.25 MILLIGRAM(S): 0.25 SUSPENSION RESPIRATORY (INHALATION) at 17:36

## 2024-11-26 RX ADMIN — PREDNISOLONE ACETATE 1 DROP(S): 1.2 SUSPENSION/ DROPS OPHTHALMIC at 17:36

## 2024-11-26 RX ADMIN — FLUTICASONE PROPIONATE AND SALMETEROL XINAFOATE 1 DOSE(S): 45; 21 AEROSOL, METERED RESPIRATORY (INHALATION) at 06:20

## 2024-11-26 RX ADMIN — Medication 30 MILLIGRAM(S): at 13:44

## 2024-11-26 RX ADMIN — METOPROLOL TARTRATE 50 MILLIGRAM(S): 100 TABLET, FILM COATED ORAL at 06:11

## 2024-11-26 RX ADMIN — ENOXAPARIN SODIUM 40 MILLIGRAM(S): 30 INJECTION SUBCUTANEOUS at 17:35

## 2024-11-26 RX ADMIN — ACETAMINOPHEN 500MG 650 MILLIGRAM(S): 500 TABLET, COATED ORAL at 06:32

## 2024-11-26 RX ADMIN — Medication 2 PUFF(S): at 15:45

## 2024-11-26 RX ADMIN — Medication 20 MILLIGRAM(S): at 21:48

## 2024-11-26 RX ADMIN — Medication 40 MILLIGRAM(S): at 06:11

## 2024-11-26 RX ADMIN — Medication 15 MILLIGRAM(S): at 22:44

## 2024-11-26 RX ADMIN — ACETAMINOPHEN 500MG 650 MILLIGRAM(S): 500 TABLET, COATED ORAL at 22:20

## 2024-11-26 RX ADMIN — Medication 500 MILLIGRAM(S): at 13:45

## 2024-11-26 RX ADMIN — BUDESONIDE 0.25 MILLIGRAM(S): 0.25 SUSPENSION RESPIRATORY (INHALATION) at 06:09

## 2024-11-26 RX ADMIN — Medication 1 TABLET(S): at 15:45

## 2024-11-26 RX ADMIN — TRAZODONE HYDROCHLORIDE 50 MILLIGRAM(S): 150 TABLET ORAL at 21:48

## 2024-11-26 RX ADMIN — FLUTICASONE PROPIONATE AND SALMETEROL XINAFOATE 1 DOSE(S): 45; 21 AEROSOL, METERED RESPIRATORY (INHALATION) at 17:36

## 2024-11-26 RX ADMIN — ACETAMINOPHEN 500MG 650 MILLIGRAM(S): 500 TABLET, COATED ORAL at 06:10

## 2024-11-26 RX ADMIN — Medication 100 MILLIGRAM(S): at 17:35

## 2024-11-26 RX ADMIN — Medication 1 DROP(S): at 06:11

## 2024-11-26 RX ADMIN — Medication 2 PUFF(S): at 21:48

## 2024-11-26 RX ADMIN — PREDNISOLONE ACETATE 1 DROP(S): 1.2 SUSPENSION/ DROPS OPHTHALMIC at 06:11

## 2024-11-26 RX ADMIN — Medication 1 DROP(S): at 17:36

## 2024-11-26 RX ADMIN — PANTOPRAZOLE SODIUM 40 MILLIGRAM(S): 40 TABLET, DELAYED RELEASE ORAL at 06:11

## 2024-11-26 RX ADMIN — FUROSEMIDE 20 MILLIGRAM(S): 40 TABLET ORAL at 06:11

## 2024-11-26 RX ADMIN — Medication 2 PUFF(S): at 06:10

## 2024-11-26 NOTE — PROGRESS NOTE ADULT - SUBJECTIVE AND OBJECTIVE BOX
Date of Service: 11-26-24 @ 17:38    Patient is a 89y old  Female who presents with a chief complaint of lethargy (26 Nov 2024 11:02)      Any change in ROS: Sh eis on 2 L of oxygen :  alert and awake:  very cachectic       MEDICATIONS  (STANDING):  albuterol    90 MICROgram(s) HFA Inhaler 2 Puff(s) Inhalation every 8 hours  ascorbic acid 500 milliGRAM(s) Oral daily  atorvastatin 20 milliGRAM(s) Oral at bedtime  buDESOnide    Inhalation Suspension 0.25 milliGRAM(s) Inhalation two times a day  cyclopentolate 1% Solution 1 Drop(s) Left EYE two times a day  enoxaparin Injectable 40 milliGRAM(s) SubCutaneous every 24 hours  FLUoxetine 30 milliGRAM(s) Oral daily  fluticasone propionate/ salmeterol 100-50 MICROgram(s) Diskus 1 Dose(s) Inhalation two times a day  furosemide    Tablet 20 milliGRAM(s) Oral daily  methylPREDNISolone sodium succinate Injectable 20 milliGRAM(s) IV Push every 8 hours  methylPREDNISolone sodium succinate Injectable   IV Push   metoprolol succinate ER 50 milliGRAM(s) Oral daily  multivitamin 1 Tablet(s) Oral daily  pantoprazole    Tablet 40 milliGRAM(s) Oral before breakfast  polyethylene glycol 3350 17 Gram(s) Oral daily  prednisoLONE acetate 1% Suspension 1 Drop(s) Left EYE two times a day  temazepam 15 milliGRAM(s) Oral at bedtime  traZODone 50 milliGRAM(s) Oral at bedtime    MEDICATIONS  (PRN):  acetaminophen     Tablet .. 650 milliGRAM(s) Oral every 6 hours PRN Temp greater or equal to 38C (100.4F), Mild Pain (1 - 3)  albuterol/ipratropium for Nebulization 3 milliLiter(s) Nebulizer every 6 hours PRN Shortness of Breath and/or Wheezing  aluminum hydroxide/magnesium hydroxide/simethicone Suspension 30 milliLiter(s) Oral every 4 hours PRN Dyspepsia  melatonin 3 milliGRAM(s) Oral at bedtime PRN Insomnia  ondansetron Injectable 4 milliGRAM(s) IV Push every 8 hours PRN Nausea and/or Vomiting    Vital Signs Last 24 Hrs  T(C): 36.4 (26 Nov 2024 12:52), Max: 36.5 (25 Nov 2024 19:40)  T(F): 97.5 (26 Nov 2024 12:52), Max: 97.7 (25 Nov 2024 19:40)  HR: 72 (26 Nov 2024 12:52) (64 - 80)  BP: 123/55 (26 Nov 2024 12:52) (118/71 - 138/70)  BP(mean): --  RR: 18 (26 Nov 2024 12:52) (18 - 18)  SpO2: 98% (26 Nov 2024 12:52) (94% - 98%)    Parameters below as of 26 Nov 2024 12:52  Patient On (Oxygen Delivery Method): nasal cannula        I&O's Summary        Physical Exam:   GENERAL: NAD, well-groomed, well-developed  HEENT: JIM/   Atraumatic, Normocephalic  ENMT: No tonsillar erythema, exudates, or enlargement; Moist mucous membranes, Good dentition, No lesions  NECK: Supple, No JVD, Normal thyroid  CHEST/LUNG: Clear to auscultaion-  CVS: Regular rate and rhythm; No murmurs, rubs, or gallops  GI: : Soft, Nontender, Nondistended; Bowel sounds present  NERVOUS SYSTEM:  Alert & awake  EXTREMITIES:  2+ Peripheral Pulses, No clubbing, cyanosis, or edema  LYMPH: No lymphadenopathy noted  SKIN: No rashes or lesions  ENDOCRINOLOGY: No Thyromegaly  PSYCH: Appropriate    Labs:  ABG - ( 26 Nov 2024 05:25 )  pH, Arterial: 7.44  pH, Blood: x     /  pCO2: 44    /  pO2: 66    / HCO3: 30    / Base Excess: 5.0   /  SaO2: 94.1            31                            10.0   9.42  )-----------( 164      ( 26 Nov 2024 07:20 )             32.6                         9.8    11.30 )-----------( 160      ( 25 Nov 2024 15:58 )             32.9                         9.4    8.35  )-----------( 172      ( 25 Nov 2024 07:29 )             31.7                         10.6   9.14  )-----------( 174      ( 24 Nov 2024 07:32 )             35.5                         9.9    9.36  )-----------( 192      ( 23 Nov 2024 20:05 )             32.5     11-26    142  |  102  |  17  ----------------------------<  145[H]  4.5   |  23  |  0.76  11-25    142  |  104  |  15  ----------------------------<  82  3.7   |  26  |  0.78  11-24    145  |  105  |  14  ----------------------------<  97  3.7   |  27  |  0.75  11-23    143  |  105  |  14  ----------------------------<  120[H]  3.4[L]   |  25  |  0.67    Ca    9.0      26 Nov 2024 07:22  Ca    8.9      25 Nov 2024 07:29  Phos  4.6     11-26  Mg     2.1     11-26  Mg     2.0     11-25    TPro  6.3  /  Alb  3.7  /  TBili  0.2  /  DBili  x   /  AST  11  /  ALT  <5[L]  /  AlkPhos  119  11-23    CAPILLARY BLOOD GLUCOSE              Urinalysis Basic - ( 26 Nov 2024 07:22 )    Color: x / Appearance: x / SG: x / pH: x  Gluc: 145 mg/dL / Ketone: x  / Bili: x / Urobili: x   Blood: x / Protein: x / Nitrite: x   Leuk Esterase: x / RBC: x / WBC x   Sq Epi: x / Non Sq Epi: x / Bacteria: x      D-Dimer Assay, Quantitative: 480 ng/mL DDU (11-25 @ 15:58)  Lactate, Blood: 1.5 mmol/L (11-23 @ 20:49)        RECENT CULTURES:  11-23 @ 23:04 Clean Catch Clean Catch (Midstream)         rad< from: [facility] Duplex Lower Ext Vein Scan, Bilat (11.26.24 @ 11:18) >  Doppler examination shows normal spontaneous and phasic flow.  No RIGHT calf vein thrombosis is detected.    LEFT:  Normal compressibility of the LEFT common femoral, femoral and popliteal   veins.  Doppler examination shows normal spontaneous and phasic flow.  No LEFT calf vein thrombosis is detected.    IMPRESSION:  No evidence of deep venous thrombosis in either lower extremity.            --- End of Report ---    < end of copied text >  < from: CT Chest No Cont (11.23.24 @ 23:40) >  hypertension among other entities.  HEART: Cardiomegaly. No pericardial effusion.  MEDIASTINUM AND RASHID: No lymphadenopathy.  CHEST WALL AND LOWER NECK: Within normal limits.  VISUALIZED UPPER ABDOMEN: Within normal limits.  BONES: Degenerative changes. Prior left shoulder surgery.    IMPRESSION:    Centrilobular upper lobe emphysema. Mild bibasilar atelectasis.      --- End of Report ---            SUSAN MANCIA MD; Attending Radiologist  This document has been electronically signed. Nov 24 2024 12:42AM    < end of copied text >         10,000 - 49,000 CFU/mL Candida tropicalis  "Susceptibilities not performed"    11-23 @ 20:30 .Blood BLOOD                No growth at 48 Hours    11-23 @ 20:15 .Blood BLOOD                No growth at 48 Hours          RESPIRATORY CULTURES:          Studies  Chest X-RAY  CT SCAN Chest   Venous Dopplers: LE:   CT Abdomen  Others

## 2024-11-26 NOTE — SWALLOW BEDSIDE ASSESSMENT ADULT - COMMENTS
***Pt is well known to this service from prior hospitalizations. MBS performed 5/2024 notable for impaired airway protection. Diet of soft/bite-sized and mildly thick liquids recommended. Re-evaluation attempted 8/2024, however, Pt declined stating "I had this before, I don't want it again- I will eat normal food and drink my water". Seen 9/2024 for bedside swallow evaluation; exam notable for suspected delayed onset of pharyngeal swallow and reduced hyolaryngeal excursion upon palpation. Objective testing recommended, however, Pt declined further swallow work-up and modified consistencies. Pt accepted the risks of aspiration and requested to continue a regular diet/thin liquids. GOC conversation recommended to further assess the swallow mechanism and r/o aspiration.

## 2024-11-26 NOTE — DIETITIAN INITIAL EVALUATION ADULT - PERTINENT MEDS FT
MEDICATIONS  (STANDING):  albuterol    90 MICROgram(s) HFA Inhaler 2 Puff(s) Inhalation every 8 hours  ascorbic acid 500 milliGRAM(s) Oral daily  atorvastatin 20 milliGRAM(s) Oral at bedtime  buDESOnide    Inhalation Suspension 0.25 milliGRAM(s) Inhalation two times a day  cefTRIAXone   IVPB 1000 milliGRAM(s) IV Intermittent every 24 hours  cyclopentolate 1% Solution 1 Drop(s) Left EYE two times a day  enoxaparin Injectable 40 milliGRAM(s) SubCutaneous every 24 hours  FLUoxetine 30 milliGRAM(s) Oral daily  fluticasone propionate/ salmeterol 100-50 MICROgram(s) Diskus 1 Dose(s) Inhalation two times a day  furosemide    Tablet 20 milliGRAM(s) Oral daily  methylPREDNISolone sodium succinate Injectable 40 milliGRAM(s) IV Push every 8 hours  methylPREDNISolone sodium succinate Injectable 20 milliGRAM(s) IV Push every 8 hours  methylPREDNISolone sodium succinate Injectable   IV Push   metoprolol succinate ER 50 milliGRAM(s) Oral daily  multivitamin 1 Tablet(s) Oral daily  pantoprazole    Tablet 40 milliGRAM(s) Oral before breakfast  polyethylene glycol 3350 17 Gram(s) Oral daily  prednisoLONE acetate 1% Suspension 1 Drop(s) Left EYE two times a day  temazepam 15 milliGRAM(s) Oral at bedtime  traZODone 50 milliGRAM(s) Oral at bedtime    MEDICATIONS  (PRN):  acetaminophen     Tablet .. 650 milliGRAM(s) Oral every 6 hours PRN Temp greater or equal to 38C (100.4F), Mild Pain (1 - 3)  albuterol/ipratropium for Nebulization 3 milliLiter(s) Nebulizer every 6 hours PRN Shortness of Breath and/or Wheezing  aluminum hydroxide/magnesium hydroxide/simethicone Suspension 30 milliLiter(s) Oral every 4 hours PRN Dyspepsia  melatonin 3 milliGRAM(s) Oral at bedtime PRN Insomnia  ondansetron Injectable 4 milliGRAM(s) IV Push every 8 hours PRN Nausea and/or Vomiting

## 2024-11-26 NOTE — SWALLOW BEDSIDE ASSESSMENT ADULT - ASR SWALLOW REFERRAL
Consider GOC conversation to discuss advanced directives and Pt/family wishes re: provisions of nutrition/hydration/medication

## 2024-11-26 NOTE — SWALLOW BEDSIDE ASSESSMENT ADULT - SLP GENERAL OBSERVATIONS
Encountered Pt sitting semi-reclined in bed on 5L O2 via NC. Pt is A&Ox3, verbally responsive, and able to follow simple commands. Pt benefits from verbal repetition 2/2 hearing loss. RN and SLP repositioned Pt upright in bed for PO trials.

## 2024-11-26 NOTE — SWALLOW BEDSIDE ASSESSMENT ADULT - SWALLOW EVAL: DIAGNOSIS
89y F w/ PMHx significant for CHF, right hemidiaphragm paralysis, dementia, and COPD on 2L O2 NC presenting from [facility] for episode of AMS. Pt found to have UTI. Pt presents w/ a suspected pharyngeal dysphagia characterized by suspected delayed onset of pharyngeal swallow and reduced hyolaryngeal excursion upon palpation. No overt s/s of laryngeal penetration/aspiration noted across PO trials. Mildly prolonged yet functional mastication noted w/ regular solids. Given hx of dysphagia, objective testing is recommended to further assess the swallow mechanism and r/o aspiration. Pt declined further swallow work-up at this time and declines modified consistencies which had been recommended s/p MBS 5/2024. Pt wishes to accept the risks of aspiration and continue a regular diet/thin liquids; message left w/ daughter (Sandra) to further discuss. Consider GOC conversation to further discuss advanced directives and Pt/family wishes re: provisions of nutrition/hydration/medication.

## 2024-11-26 NOTE — PROGRESS NOTE ADULT - SUBJECTIVE AND OBJECTIVE BOX
Infectious Diseases Follow Up:    Patient is a 89y old  Female who presents with a chief complaint of lethargy (26 Nov 2024 08:28)      Interval History/ROS:  Afebrile ON, on 5L NC  Pt w/o acute complaints     Allergies  iodinated radiocontrast agents (Anaphylaxis)        ANTIMICROBIALS:  cefTRIAXone   IVPB 1000 every 24 hours      Current Abx:     Previous Abx     OTHER MEDS:  MEDICATIONS  (STANDING):  acetaminophen     Tablet .. 650 every 6 hours PRN  albuterol    90 MICROgram(s) HFA Inhaler 2 every 8 hours  albuterol/ipratropium for Nebulization 3 every 6 hours PRN  aluminum hydroxide/magnesium hydroxide/simethicone Suspension 30 every 4 hours PRN  atorvastatin 20 at bedtime  buDESOnide    Inhalation Suspension 0.25 two times a day  enoxaparin Injectable 40 every 24 hours  FLUoxetine 30 daily  fluticasone propionate/ salmeterol 100-50 MICROgram(s) Diskus 1 two times a day  furosemide    Tablet 20 daily  melatonin 3 at bedtime PRN  methylPREDNISolone sodium succinate Injectable 40 every 8 hours  methylPREDNISolone sodium succinate Injectable 20 every 8 hours  methylPREDNISolone sodium succinate Injectable    metoprolol succinate ER 50 daily  ondansetron Injectable 4 every 8 hours PRN  pantoprazole    Tablet 40 before breakfast  polyethylene glycol 3350 17 daily  temazepam 15 at bedtime  traZODone 50 at bedtime      Vital Signs Last 24 Hrs  T(C): 36.3 (26 Nov 2024 04:06), Max: 36.5 (25 Nov 2024 19:40)  T(F): 97.4 (26 Nov 2024 04:06), Max: 97.7 (25 Nov 2024 19:40)  HR: 64 (26 Nov 2024 04:06) (64 - 83)  BP: 118/71 (26 Nov 2024 04:06) (100/52 - 138/70)  BP(mean): --  RR: 18 (26 Nov 2024 04:06) (18 - 18)  SpO2: 94% (26 Nov 2024 04:06) (94% - 98%)    Parameters below as of 26 Nov 2024 04:06  Patient On (Oxygen Delivery Method): nasal cannula  O2 Flow (L/min): 5      PHYSICAL EXAM:  GENERAL: NAD, thin, elderly   HEAD:  Atraumatic, Normocephalic  EYES: EOMI,conjunctiva and sclera clear  CHEST/LUNG: On 5L NC, clear to auscultation bilaterally; No wheeze  HEART: Regular rate and rhythm; No murmurs, rubs, or gallops  ABDOMEN: Soft, Nontender, Nondistended; Bowel sounds present                            10.0   9.42  )-----------( 164      ( 26 Nov 2024 07:20 )             32.6       11-26    142  |  102  |  17  ----------------------------<  145[H]  4.5   |  23  |  0.76    Ca    9.0      26 Nov 2024 07:22  Phos  4.6     11-26  Mg     2.1     11-26        Urinalysis Basic - ( 26 Nov 2024 07:22 )    Color: x / Appearance: x / SG: x / pH: x  Gluc: 145 mg/dL / Ketone: x  / Bili: x / Urobili: x   Blood: x / Protein: x / Nitrite: x   Leuk Esterase: x / RBC: x / WBC x   Sq Epi: x / Non Sq Epi: x / Bacteria: x        MICROBIOLOGY:  v  Clean Catch Clean Catch (Midstream)  11-23-24   10,000 - 49,000 CFU/mL Candida tropicalis  "Susceptibilities not performed"  --  --      .Blood BLOOD  11-23-24   No growth at 48 Hours  --  --      .Blood BLOOD  11-23-24   No growth at 48 Hours  --  --                RADIOLOGY:

## 2024-11-26 NOTE — PROGRESS NOTE ADULT - SUBJECTIVE AND OBJECTIVE BOX
Date of Service: 11-26-24 @ 08:28           CARDIOLOGY     PROGRESS  NOTE   ________________________________________________    CHIEF COMPLAINT:Patient is a 89y old  Female who presents with a chief complaint of lethargy (25 Nov 2024 16:37)  doing better  	  REVIEW OF SYSTEMS:  CONSTITUTIONAL: No fever, weight loss, or fatigue  EYES: No eye pain, visual disturbances, or discharge  ENT:  No difficulty hearing, tinnitus, vertigo; No sinus or throat pain  NECK: No pain or stiffness  RESPIRATORY: No cough, wheezing, chills or hemoptysis; No Shortness of Breath  CARDIOVASCULAR: No chest pain, palpitations, passing out, dizziness, or leg swelling  GASTROINTESTINAL: No abdominal or epigastric pain. No nausea, vomiting, or hematemesis; No diarrhea or constipation. No melena or hematochezia.  GENITOURINARY: No dysuria, frequency, hematuria, or incontinence  NEUROLOGICAL: No headaches, memory loss, loss of strength, numbness, or tremors  SKIN: No itching, burning, rashes, or lesions   LYMPH Nodes: No enlarged glands  ENDOCRINE: No heat or cold intolerance; No hair loss  MUSCULOSKELETAL: No joint pain or swelling; No muscle, back, or extremity pain  PSYCHIATRIC: No depression, anxiety, mood swings, or difficulty sleeping  HEME/LYMPH: No easy bruising, or bleeding gums  ALLERGY AND IMMUNOLOGIC: No hives or eczema	    [ ] All others negative	  [ ] Unable to obtain    PHYSICAL EXAM:  T(C): 36.3 (11-26-24 @ 04:06), Max: 36.5 (11-25-24 @ 19:40)  HR: 64 (11-26-24 @ 04:06) (64 - 83)  BP: 118/71 (11-26-24 @ 04:06) (100/52 - 138/70)  RR: 18 (11-26-24 @ 04:06) (18 - 18)  SpO2: 94% (11-26-24 @ 04:06) (94% - 98%)  Wt(kg): --  I&O's Summary      Appearance: Normal	  HEENT:   Normal oral mucosa, PERRL, EOMI	  Lymphatic: No lymphadenopathy  Cardiovascular: Normal S1 S2, No JVD, + murmurs, No edema  Respiratory: rhonchi  Psychiatry: A & O x 3, Mood & affect appropriate  Gastrointestinal:  Soft, Non-tender, + BS	  Skin: No rashes, No ecchymoses, No cyanosis	  Extremities: Normal range of motion, No clubbing, cyanosis or edema  Vascular: Peripheral pulses palpable 2+ bilaterally    MEDICATIONS  (STANDING):  albuterol    90 MICROgram(s) HFA Inhaler 2 Puff(s) Inhalation every 8 hours  ascorbic acid 500 milliGRAM(s) Oral daily  atorvastatin 20 milliGRAM(s) Oral at bedtime  buDESOnide    Inhalation Suspension 0.25 milliGRAM(s) Inhalation two times a day  cefTRIAXone   IVPB 1000 milliGRAM(s) IV Intermittent every 24 hours  cyclopentolate 1% Solution 1 Drop(s) Left EYE two times a day  enoxaparin Injectable 40 milliGRAM(s) SubCutaneous every 24 hours  FLUoxetine 30 milliGRAM(s) Oral daily  fluticasone propionate/ salmeterol 100-50 MICROgram(s) Diskus 1 Dose(s) Inhalation two times a day  furosemide    Tablet 20 milliGRAM(s) Oral daily  methylPREDNISolone sodium succinate Injectable 40 milliGRAM(s) IV Push every 8 hours  methylPREDNISolone sodium succinate Injectable 20 milliGRAM(s) IV Push every 8 hours  methylPREDNISolone sodium succinate Injectable   IV Push   metoprolol succinate ER 50 milliGRAM(s) Oral daily  multivitamin 1 Tablet(s) Oral daily  pantoprazole    Tablet 40 milliGRAM(s) Oral before breakfast  polyethylene glycol 3350 17 Gram(s) Oral daily  prednisoLONE acetate 1% Suspension 1 Drop(s) Left EYE two times a day  temazepam 15 milliGRAM(s) Oral at bedtime  traZODone 50 milliGRAM(s) Oral at bedtime      TELEMETRY: 	    ECG:  	  RADIOLOGY:  OTHER: 	  	  LABS:	 	    CARDIAC MARKERS:                          10.0   9.42  )-----------( 164      ( 26 Nov 2024 07:20 )             32.6     11-26    142  |  102  |  17  ----------------------------<  145[H]  4.5   |  23  |  0.76    Ca    9.0      26 Nov 2024 07:22  Phos  4.6     11-26  Mg     2.1     11-26      proBNP:   Lipid Profile:   HgA1c:   TSH:         Assessment and plan  ---------------------------  89y F pmh htn, hld, CAD, diastolic CHF, right hemidiaphragm paralysis, hypothyroidism, dementia, COPD on 2 L NC presenting from [facility] assisted living for episode of altered mental status today at 6:30 PM. Per aid who is providing hx patient was in her normal mental state at 615, was eating dinner and became slumped over, mumbling, less responsive.  States during that episode patient did not know her name and was confused. Patient has now returned to baseline mentation ( A&Ox3).  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

## 2024-11-26 NOTE — DIETITIAN INITIAL EVALUATION ADULT - PROBLEM SELECTOR PLAN 1
- Afebrile, VSS, nontoxic.  - UA: large LE,  +Yeast   - Started on ceftriaxone 1g in ED  - C/w abx -- ceftriaxone 1g  - F/u Ucx

## 2024-11-26 NOTE — DIETITIAN INITIAL EVALUATION ADULT - PERTINENT LABORATORY DATA
11-25    142  |  104  |  15  ----------------------------<  82  3.7   |  26  |  0.78    Ca    8.9      25 Nov 2024 07:29  Mg     2.0     11-25

## 2024-11-26 NOTE — SWALLOW BEDSIDE ASSESSMENT ADULT - ASR SWALLOW RECOMMEND DIAG
to objectively assess the swallow mechanism and r/o aspiration; Pt adamantly declining further swallow work-up/VFSS/MBS

## 2024-11-26 NOTE — SWALLOW BEDSIDE ASSESSMENT ADULT - SLP PERTINENT HISTORY OF CURRENT PROBLEM
89y F w/ PMHx of HTN, HLD, CAD, diastolic CHF, right hemidiaphragm paralysis, hypothyroidism, dementia, and COPD on 2L O2 NC presenting from [facility] for episode of AMS. +UTI. +Toxic encephalopathy likely 2/2 UTI. ID following. Pulmonology consulted for hypoxic respiratory failure requiring 5L O2 via NC. VBG reasonable. CT CHEST: Centrilobular upper lobe emphysema. Mild bibasilar atelectasis. At risk for aspiration, speech/swallow requested.

## 2024-11-26 NOTE — PROGRESS NOTE ADULT - SUBJECTIVE AND OBJECTIVE BOX
---___---___---___---___---___---___ ---___---___---___---___---___---___---___---___---                  M E D I C A L   A T T E N D I N G   P R O G R E S S   N O T E  ---___---___---___---___---___---___ ---___---___---___---___---___---___---___---___---        ================================================    ++CHIEF COMPLAINT:   Patient is a 89y old  Female who presents with a chief complaint of lethargy (26 Nov 2024 17:38)      Urinary tract infection      ---___---___---___---___---___---  PAST MEDICAL / Surgical  HISTORY:  PAST MEDICAL & SURGICAL HISTORY:  HTN (hypertension)      Hypothyroidism      Osteoporosis      CAD (coronary artery disease)      COPD (chronic obstructive pulmonary disease)      Pulmonary sarcoidosis      H/O pyelonephritis      Acute diverticulitis          ---___---___---___---___---___---  FAMILY HISTORY:   FAMILY HISTORY:        ---___---___---___---___---___---  ALLERGIES:   Allergies    iodinated radiocontrast agents (Anaphylaxis)    Intolerances        ---___---___---___---___---___---  MEDICATIONS:  MEDICATIONS  (STANDING):  albuterol    90 MICROgram(s) HFA Inhaler 2 Puff(s) Inhalation every 8 hours  ascorbic acid 500 milliGRAM(s) Oral daily  atorvastatin 20 milliGRAM(s) Oral at bedtime  buDESOnide    Inhalation Suspension 0.25 milliGRAM(s) Inhalation two times a day  cyclopentolate 1% Solution 1 Drop(s) Left EYE two times a day  enoxaparin Injectable 40 milliGRAM(s) SubCutaneous every 24 hours  FLUoxetine 30 milliGRAM(s) Oral daily  fluticasone propionate/ salmeterol 100-50 MICROgram(s) Diskus 1 Dose(s) Inhalation two times a day  furosemide    Tablet 20 milliGRAM(s) Oral daily  methylPREDNISolone sodium succinate Injectable 20 milliGRAM(s) IV Push every 8 hours  methylPREDNISolone sodium succinate Injectable   IV Push   metoprolol succinate ER 50 milliGRAM(s) Oral daily  multivitamin 1 Tablet(s) Oral daily  pantoprazole    Tablet 40 milliGRAM(s) Oral before breakfast  polyethylene glycol 3350 17 Gram(s) Oral daily  prednisoLONE acetate 1% Suspension 1 Drop(s) Left EYE two times a day  temazepam 15 milliGRAM(s) Oral at bedtime  traZODone 50 milliGRAM(s) Oral at bedtime    MEDICATIONS  (PRN):  acetaminophen     Tablet .. 650 milliGRAM(s) Oral every 6 hours PRN Temp greater or equal to 38C (100.4F), Mild Pain (1 - 3)  albuterol/ipratropium for Nebulization 3 milliLiter(s) Nebulizer every 6 hours PRN Shortness of Breath and/or Wheezing  aluminum hydroxide/magnesium hydroxide/simethicone Suspension 30 milliLiter(s) Oral every 4 hours PRN Dyspepsia  melatonin 3 milliGRAM(s) Oral at bedtime PRN Insomnia  ondansetron Injectable 4 milliGRAM(s) IV Push every 8 hours PRN Nausea and/or Vomiting      ---___---___---___---___---___---  REVIEW OF SYSTEM:    GEN: no fever, no chills, no pain  RESP: no SOB, no cough, no sputum  CVS: no chest pain, no palpitations, no edema  GI: no abdominal pain, no nausea, no vomiting, no constipation, no diarrhea  : no dysurea, no frequency, no hematurea  Neuro: no headache, no dizziness  PSYCH: no anxiety, no depression  Derm : no itching, no rash    ---___---___---___---___---___---  VITAL SIGNS:  89y , CAPILLARY BLOOD GLUCOSE        T(C): 36.3 (11-26-24 @ 19:42), Max: 36.4 (11-26-24 @ 12:52)  HR: 66 (11-26-24 @ 19:42) (64 - 72)  BP: 116/61 (11-26-24 @ 19:42) (116/61 - 123/55)  RR: 18 (11-26-24 @ 19:42) (18 - 18)  SpO2: 96% (11-26-24 @ 19:42) (94% - 98%)  ---___---___---___---___---___---  PHYSICAL EXAM:    GEN: A&O X 1 , NAD , comfortable  HEENT: NCAT, PERRL, MMM, hearing intact  Neck: supple , no JVD  CVS: S1S2 , regular , No M/R/G appreciated  PULM: CTA B/L,  no W/R/R appreciated  ABD.: soft. non tender, non distended,  bowel sounds present  Extrem: intact pulses , no edema   Derm: No rash , no ecchymoses  PSYCH : normal mood,  no delusion not anxious     ---___---___---___---___---___---            LAB AND IMAGING:                          10.0   9.42  )-----------( 164      ( 26 Nov 2024 07:20 )             32.6               11-26    142  |  102  |  17  ----------------------------<  145[H]  4.5   |  23  |  0.76    Ca    9.0      26 Nov 2024 07:22  Phos  4.6     11-26  Mg     2.1     11-26                            ABG - ( 26 Nov 2024 05:25 )  pH, Arterial: 7.44  pH, Blood: x     /  pCO2: 44    /  pO2: 66    / HCO3: 30    / Base Excess: 5.0   /  SaO2: 94.1              Urinalysis Basic - ( 26 Nov 2024 07:22 )    Color: x / Appearance: x / SG: x / pH: x  Gluc: 145 mg/dL / Ketone: x  / Bili: x / Urobili: x   Blood: x / Protein: x / Nitrite: x   Leuk Esterase: x / RBC: x / WBC x   Sq Epi: x / Non Sq Epi: x / Bacteria: x        [All pertinent / recent Imaging reviewed]         ---___---___---___---___---___---___ ---___---___---___---___---                         A S S E S S M E N T   A N D   P L A N :      HEALTH ISSUES - PROBLEM Dx:  Acute UTI  on iv abx  CAD (coronary artery disease)  continue statin  COPD (chronic obstructive pulmonary disease)  on nebulizers   and prednisolone  HTN (hypertension)  continue current medication   HLD (hyperlipidemia)  on statin      Chronic diastolic heart failure continue diuresis            -GI/DVT Prophylaxis. as ordered    --------------------------------------------  Case discussed with   Education given on   ___________________________  Thank you,  Garry Hermosillo  [phone]

## 2024-11-27 LAB
ANION GAP SERPL CALC-SCNC: 13 MMOL/L — SIGNIFICANT CHANGE UP (ref 5–17)
ANION GAP SERPL CALC-SCNC: 16 MMOL/L — SIGNIFICANT CHANGE UP (ref 5–17)
BUN SERPL-MCNC: 23 MG/DL — SIGNIFICANT CHANGE UP (ref 7–23)
BUN SERPL-MCNC: 24 MG/DL — HIGH (ref 7–23)
CALCIUM SERPL-MCNC: 8.9 MG/DL — SIGNIFICANT CHANGE UP (ref 8.4–10.5)
CALCIUM SERPL-MCNC: 9.3 MG/DL — SIGNIFICANT CHANGE UP (ref 8.4–10.5)
CHLORIDE SERPL-SCNC: 97 MMOL/L — SIGNIFICANT CHANGE UP (ref 96–108)
CHLORIDE SERPL-SCNC: 99 MMOL/L — SIGNIFICANT CHANGE UP (ref 96–108)
CO2 SERPL-SCNC: 25 MMOL/L — SIGNIFICANT CHANGE UP (ref 22–31)
CO2 SERPL-SCNC: 26 MMOL/L — SIGNIFICANT CHANGE UP (ref 22–31)
CREAT SERPL-MCNC: 0.62 MG/DL — SIGNIFICANT CHANGE UP (ref 0.5–1.3)
CREAT SERPL-MCNC: 0.65 MG/DL — SIGNIFICANT CHANGE UP (ref 0.5–1.3)
EGFR: 85 ML/MIN/1.73M2 — SIGNIFICANT CHANGE UP
EGFR: 86 ML/MIN/1.73M2 — SIGNIFICANT CHANGE UP
GLUCOSE SERPL-MCNC: 162 MG/DL — HIGH (ref 70–99)
GLUCOSE SERPL-MCNC: 167 MG/DL — HIGH (ref 70–99)
HCT VFR BLD CALC: 36.4 % — SIGNIFICANT CHANGE UP (ref 34.5–45)
HGB BLD-MCNC: 11 G/DL — LOW (ref 11.5–15.5)
MAGNESIUM SERPL-MCNC: 2.2 MG/DL — SIGNIFICANT CHANGE UP (ref 1.6–2.6)
MCHC RBC-ENTMCNC: 26.8 PG — LOW (ref 27–34)
MCHC RBC-ENTMCNC: 30.2 G/DL — LOW (ref 32–36)
MCV RBC AUTO: 88.6 FL — SIGNIFICANT CHANGE UP (ref 80–100)
NRBC # BLD: 0 /100 WBCS — SIGNIFICANT CHANGE UP (ref 0–0)
PLATELET # BLD AUTO: 209 K/UL — SIGNIFICANT CHANGE UP (ref 150–400)
POTASSIUM SERPL-MCNC: 4.4 MMOL/L — SIGNIFICANT CHANGE UP (ref 3.5–5.3)
POTASSIUM SERPL-MCNC: 4.4 MMOL/L — SIGNIFICANT CHANGE UP (ref 3.5–5.3)
POTASSIUM SERPL-SCNC: 4.4 MMOL/L — SIGNIFICANT CHANGE UP (ref 3.5–5.3)
POTASSIUM SERPL-SCNC: 4.4 MMOL/L — SIGNIFICANT CHANGE UP (ref 3.5–5.3)
RBC # BLD: 4.11 M/UL — SIGNIFICANT CHANGE UP (ref 3.8–5.2)
RBC # FLD: 15.6 % — HIGH (ref 10.3–14.5)
SODIUM SERPL-SCNC: 137 MMOL/L — SIGNIFICANT CHANGE UP (ref 135–145)
SODIUM SERPL-SCNC: 139 MMOL/L — SIGNIFICANT CHANGE UP (ref 135–145)
WBC # BLD: 9.01 K/UL — SIGNIFICANT CHANGE UP (ref 3.8–10.5)
WBC # FLD AUTO: 9.01 K/UL — SIGNIFICANT CHANGE UP (ref 3.8–10.5)

## 2024-11-27 RX ADMIN — BUDESONIDE 0.25 MILLIGRAM(S): 0.25 SUSPENSION RESPIRATORY (INHALATION) at 17:09

## 2024-11-27 RX ADMIN — Medication 1 DROP(S): at 05:03

## 2024-11-27 RX ADMIN — Medication 1 DROP(S): at 17:05

## 2024-11-27 RX ADMIN — PREDNISOLONE ACETATE 1 DROP(S): 1.2 SUSPENSION/ DROPS OPHTHALMIC at 17:05

## 2024-11-27 RX ADMIN — Medication 15 MILLIGRAM(S): at 22:41

## 2024-11-27 RX ADMIN — Medication 20 MILLIGRAM(S): at 13:01

## 2024-11-27 RX ADMIN — FLUTICASONE PROPIONATE AND SALMETEROL XINAFOATE 1 DOSE(S): 45; 21 AEROSOL, METERED RESPIRATORY (INHALATION) at 05:02

## 2024-11-27 RX ADMIN — Medication 20 MILLIGRAM(S): at 22:41

## 2024-11-27 RX ADMIN — TRAZODONE HYDROCHLORIDE 50 MILLIGRAM(S): 150 TABLET ORAL at 23:47

## 2024-11-27 RX ADMIN — METOPROLOL TARTRATE 50 MILLIGRAM(S): 100 TABLET, FILM COATED ORAL at 05:03

## 2024-11-27 RX ADMIN — Medication 500 MILLIGRAM(S): at 14:14

## 2024-11-27 RX ADMIN — PANTOPRAZOLE SODIUM 40 MILLIGRAM(S): 40 TABLET, DELAYED RELEASE ORAL at 05:03

## 2024-11-27 RX ADMIN — Medication 2 PUFF(S): at 05:02

## 2024-11-27 RX ADMIN — ACETAMINOPHEN 500MG 650 MILLIGRAM(S): 500 TABLET, COATED ORAL at 16:19

## 2024-11-27 RX ADMIN — FUROSEMIDE 20 MILLIGRAM(S): 40 TABLET ORAL at 05:03

## 2024-11-27 RX ADMIN — FLUTICASONE PROPIONATE AND SALMETEROL XINAFOATE 1 DOSE(S): 45; 21 AEROSOL, METERED RESPIRATORY (INHALATION) at 17:04

## 2024-11-27 RX ADMIN — ACETAMINOPHEN 500MG 650 MILLIGRAM(S): 500 TABLET, COATED ORAL at 22:40

## 2024-11-27 RX ADMIN — BUDESONIDE 0.25 MILLIGRAM(S): 0.25 SUSPENSION RESPIRATORY (INHALATION) at 05:02

## 2024-11-27 RX ADMIN — Medication 30 MILLIGRAM(S): at 13:01

## 2024-11-27 RX ADMIN — Medication 20 MILLIGRAM(S): at 05:02

## 2024-11-27 RX ADMIN — ENOXAPARIN SODIUM 40 MILLIGRAM(S): 30 INJECTION SUBCUTANEOUS at 17:04

## 2024-11-27 RX ADMIN — Medication 20 MILLIGRAM(S): at 23:47

## 2024-11-27 RX ADMIN — POLYETHYLENE GLYCOL 3350 17 GRAM(S): 17 POWDER, FOR SOLUTION ORAL at 13:01

## 2024-11-27 RX ADMIN — IPRATROPIUM BROMIDE AND ALBUTEROL SULFATE 3 MILLILITER(S): 2.5; .5 SOLUTION RESPIRATORY (INHALATION) at 13:02

## 2024-11-27 RX ADMIN — PREDNISOLONE ACETATE 1 DROP(S): 1.2 SUSPENSION/ DROPS OPHTHALMIC at 05:03

## 2024-11-27 RX ADMIN — ACETAMINOPHEN 500MG 650 MILLIGRAM(S): 500 TABLET, COATED ORAL at 05:03

## 2024-11-27 RX ADMIN — Medication 1 TABLET(S): at 13:02

## 2024-11-27 RX ADMIN — Medication 2 PUFF(S): at 23:00

## 2024-11-27 RX ADMIN — Medication 2 PUFF(S): at 14:15

## 2024-11-27 RX ADMIN — ACETAMINOPHEN 500MG 650 MILLIGRAM(S): 500 TABLET, COATED ORAL at 06:25

## 2024-11-27 NOTE — PROGRESS NOTE ADULT - SUBJECTIVE AND OBJECTIVE BOX
Date of Service: 11-27-24 @ 15:12           CARDIOLOGY     PROGRESS  NOTE   ________________________________________________    CHIEF COMPLAINT:Patient is a 89y old  Female who presents with a chief complaint of lethargy (26 Nov 2024 20:42)  doing better  	  REVIEW OF SYSTEMS:  CONSTITUTIONAL: No fever, weight loss, or fatigue  EYES: No eye pain, visual disturbances, or discharge  ENT:  No difficulty hearing, tinnitus, vertigo; No sinus or throat pain  NECK: No pain or stiffness  RESPIRATORY: No cough, wheezing, chills or hemoptysis; No Shortness of Breath  CARDIOVASCULAR: No chest pain, palpitations, passing out, dizziness, or leg swelling  GASTROINTESTINAL: No abdominal or epigastric pain. No nausea, vomiting, or hematemesis; No diarrhea or constipation. No melena or hematochezia.  GENITOURINARY: No dysuria, frequency, hematuria, or incontinence  NEUROLOGICAL: No headaches, memory loss, loss of strength, numbness, or tremors  SKIN: No itching, burning, rashes, or lesions   LYMPH Nodes: No enlarged glands  ENDOCRINE: No heat or cold intolerance; No hair loss  MUSCULOSKELETAL: No joint pain or swelling; No muscle, back, or extremity pain  PSYCHIATRIC: No depression, anxiety, mood swings, or difficulty sleeping  HEME/LYMPH: No easy bruising, or bleeding gums  ALLERGY AND IMMUNOLOGIC: No hives or eczema	    [x ] All others negative	  [ ] Unable to obtain    PHYSICAL EXAM:  T(C): 36.4 (11-27-24 @ 11:31), Max: 36.4 (11-27-24 @ 04:20)  HR: 64 (11-27-24 @ 11:31) (64 - 68)  BP: 132/58 (11-27-24 @ 11:31) (116/61 - 135/72)  RR: 18 (11-27-24 @ 11:31) (18 - 18)  SpO2: 96% (11-27-24 @ 11:31) (94% - 96%)  Wt(kg): --  I&O's Summary    26 Nov 2024 07:01  -  27 Nov 2024 07:00  --------------------------------------------------------  IN: 0 mL / OUT: 350 mL / NET: -350 mL        Appearance: Normal	  HEENT:   Normal oral mucosa, PERRL, EOMI	  Lymphatic: No lymphadenopathy  Cardiovascular: Normal S1 S2, No JVD,+ murmurs, No edema  Respiratory: rhonchi  Psychiatry: A & O x 3, Mood & affect appropriate  Gastrointestinal:  Soft, Non-tender, + BS	  Skin: No rashes, No ecchymoses, No cyanosis	  Extremities: Normal range of motion, No clubbing, cyanosis or edema  Vascular: Peripheral pulses palpable 2+ bilaterally    MEDICATIONS  (STANDING):  albuterol    90 MICROgram(s) HFA Inhaler 2 Puff(s) Inhalation every 8 hours  ascorbic acid 500 milliGRAM(s) Oral daily  atorvastatin 20 milliGRAM(s) Oral at bedtime  buDESOnide    Inhalation Suspension 0.25 milliGRAM(s) Inhalation two times a day  cyclopentolate 1% Solution 1 Drop(s) Left EYE two times a day  enoxaparin Injectable 40 milliGRAM(s) SubCutaneous every 24 hours  FLUoxetine 30 milliGRAM(s) Oral daily  fluticasone propionate/ salmeterol 100-50 MICROgram(s) Diskus 1 Dose(s) Inhalation two times a day  furosemide    Tablet 20 milliGRAM(s) Oral daily  methylPREDNISolone sodium succinate Injectable 20 milliGRAM(s) IV Push every 8 hours  methylPREDNISolone sodium succinate Injectable   IV Push   metoprolol succinate ER 50 milliGRAM(s) Oral daily  multivitamin 1 Tablet(s) Oral daily  pantoprazole    Tablet 40 milliGRAM(s) Oral before breakfast  polyethylene glycol 3350 17 Gram(s) Oral daily  prednisoLONE acetate 1% Suspension 1 Drop(s) Left EYE two times a day  temazepam 15 milliGRAM(s) Oral at bedtime  traZODone 50 milliGRAM(s) Oral at bedtime      TELEMETRY: 	    ECG:  	  RADIOLOGY:  OTHER: 	  	  LABS:	 	    CARDIAC MARKERS:                                11.0   9.01  )-----------( 209      ( 27 Nov 2024 07:01 )             36.4     11-27    139  |  97  |  24[H]  ----------------------------<  167[H]  4.4   |  26  |  0.65    Ca    9.3      27 Nov 2024 07:02  Phos  4.6     11-26  Mg     2.2     11-27      proBNP:   Lipid Profile:   HgA1c:   TSH:           Assessment and plan  ---------------------------  89y F pmh htn, hld, CAD, diastolic CHF, right hemidiaphragm paralysis, hypothyroidism, dementia, COPD on 2 L NC presenting from [facility] for episode of altered mental status today at 6:30 PM. Per aid who is providing hx patient was in her normal mental state at 615, was eating dinner and became slumped over, mumbling, less responsive.  States during that episode patient did not know her name and was confused. Patient has now returned to baseline mentation ( A&Ox3).  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

## 2024-11-27 NOTE — PROGRESS NOTE ADULT - SUBJECTIVE AND OBJECTIVE BOX
---___---___---___---___---___---___ ---___---___---___---___---___---___---___---___---                  M E D I C A L   A T T E N D I N G   P R O G R E S S   N O T E  ---___---___---___---___---___---___ ---___---___---___---___---___---___---___---___---        ================================================    ++CHIEF COMPLAINT:   Patient is a 89y old  Female who presents with a chief complaint of lethargy (2024 16:41)      Urinary tract infection      ---___---___---___---___---___---  PAST MEDICAL / Surgical  HISTORY:  PAST MEDICAL & SURGICAL HISTORY:  HTN (hypertension)      Hypothyroidism      Osteoporosis      CAD (coronary artery disease)      COPD (chronic obstructive pulmonary disease)      Pulmonary sarcoidosis      H/O pyelonephritis      Acute diverticulitis          ---___---___---___---___---___---  FAMILY HISTORY:   FAMILY HISTORY:        ---___---___---___---___---___---  ALLERGIES:   Allergies    iodinated radiocontrast agents (Anaphylaxis)    Intolerances        ---___---___---___---___---___---  MEDICATIONS:  MEDICATIONS  (STANDING):  albuterol    90 MICROgram(s) HFA Inhaler 2 Puff(s) Inhalation every 8 hours  ascorbic acid 500 milliGRAM(s) Oral daily  atorvastatin 20 milliGRAM(s) Oral at bedtime  buDESOnide    Inhalation Suspension 0.25 milliGRAM(s) Inhalation two times a day  cyclopentolate 1% Solution 1 Drop(s) Left EYE two times a day  enoxaparin Injectable 40 milliGRAM(s) SubCutaneous every 24 hours  FLUoxetine 30 milliGRAM(s) Oral daily  fluticasone propionate/ salmeterol 100-50 MICROgram(s) Diskus 1 Dose(s) Inhalation two times a day  furosemide    Tablet 20 milliGRAM(s) Oral daily  methylPREDNISolone sodium succinate Injectable 20 milliGRAM(s) IV Push every 8 hours  methylPREDNISolone sodium succinate Injectable   IV Push   metoprolol succinate ER 50 milliGRAM(s) Oral daily  multivitamin 1 Tablet(s) Oral daily  pantoprazole    Tablet 40 milliGRAM(s) Oral before breakfast  polyethylene glycol 3350 17 Gram(s) Oral daily  prednisoLONE acetate 1% Suspension 1 Drop(s) Left EYE two times a day  temazepam 15 milliGRAM(s) Oral at bedtime  traZODone 50 milliGRAM(s) Oral at bedtime    MEDICATIONS  (PRN):  acetaminophen     Tablet .. 650 milliGRAM(s) Oral every 6 hours PRN Temp greater or equal to 38C (100.4F), Mild Pain (1 - 3)  albuterol/ipratropium for Nebulization 3 milliLiter(s) Nebulizer every 6 hours PRN Shortness of Breath and/or Wheezing  aluminum hydroxide/magnesium hydroxide/simethicone Suspension 30 milliLiter(s) Oral every 4 hours PRN Dyspepsia  melatonin 3 milliGRAM(s) Oral at bedtime PRN Insomnia  ondansetron Injectable 4 milliGRAM(s) IV Push every 8 hours PRN Nausea and/or Vomiting      ---___---___---___---___---___---  REVIEW OF SYSTEM:    unable to obtain    ---___---___---___---___---___---  VITAL SIGNS:  89y , CAPILLARY BLOOD GLUCOSE        T(C): 36.4 (24 @ 11:31), Max: 36.4 (24 @ 04:20)  HR: 64 (24 @ 11:31) (64 - 68)  BP: 132/58 (24 @ 11:31) (116/61 - 135/72)  RR: 18 (24 @ 11:31) (18 - 18)  SpO2: 96% (24 @ 11:31) (94% - 96%)  ---___---___---___---___---___---  PHYSICAL EXAM:    GEN: A&O X 3 , NAD , comfortable  HEENT: NCAT, PERRL, MMM, hearing intact  Neck: supple , no JVD  CVS: S1S2 , regular , No M/R/G appreciated  PULM: CTA B/L,  no W/R/R appreciated  ABD.: soft. non tender, non distended,  bowel sounds present  Extrem: intact pulses , no edema   Derm: No rash , no ecchymoses  PSYCH : normal mood,  no delusion not anxious     ---___---___---___---___---___---            LAB AND IMAGIN.0   9.01  )-----------( 209      ( 2024 07:01 )             36.4                   139  |  97  |  24[H]  ----------------------------<  167[H]  4.4   |  26  |  0.65    Ca    9.3      2024 07:02  Phos  4.6       Mg     2.2                                 ABG - ( 2024 05:25 )  pH, Arterial: 7.44  pH, Blood: x     /  pCO2: 44    /  pO2: 66    / HCO3: 30    / Base Excess: 5.0   /  SaO2: 94.1              Urinalysis Basic - ( 2024 07:02 )    Color: x / Appearance: x / SG: x / pH: x  Gluc: 167 mg/dL / Ketone: x  / Bili: x / Urobili: x   Blood: x / Protein: x / Nitrite: x   Leuk Esterase: x / RBC: x / WBC x   Sq Epi: x / Non Sq Epi: x / Bacteria: x        [All pertinent / recent Imaging reviewed]         ---___---___---___---___---___---___ ---___---___---___---___---                         A S S E S S M E N T   A N D   P L A N :      HEALTH ISSUES - PROBLEM Dx:  Acute UTI continue iv abx     CAD (coronary artery disease)  on statin   COPD (chronic obstructive pulmonary disease)  continue nebulizers  HTN (hypertension) stable  continue antiypertensive    HLD (hyperlipidemia)on statin     Toxic encephalopathy should improve when uti     Hypokalemia  replenish  Chronic diastolic heart failure                  -GI/DVT Prophylaxis. asoredered    --------------------------------------------     ___________________________  Thank you,  Garyr Hermosillo  [phone]

## 2024-11-27 NOTE — PROGRESS NOTE ADULT - SUBJECTIVE AND OBJECTIVE BOX
Date of Service: 11-27-24 @ 16:41    Patient is a 89y old  Female who presents with a chief complaint of lethargy (27 Nov 2024 15:11)      Any change in ROS: on 2 L :  sitting in ch air:  looks comfortable    MEDICATIONS  (STANDING):  albuterol    90 MICROgram(s) HFA Inhaler 2 Puff(s) Inhalation every 8 hours  ascorbic acid 500 milliGRAM(s) Oral daily  atorvastatin 20 milliGRAM(s) Oral at bedtime  buDESOnide    Inhalation Suspension 0.25 milliGRAM(s) Inhalation two times a day  cyclopentolate 1% Solution 1 Drop(s) Left EYE two times a day  enoxaparin Injectable 40 milliGRAM(s) SubCutaneous every 24 hours  FLUoxetine 30 milliGRAM(s) Oral daily  fluticasone propionate/ salmeterol 100-50 MICROgram(s) Diskus 1 Dose(s) Inhalation two times a day  furosemide    Tablet 20 milliGRAM(s) Oral daily  methylPREDNISolone sodium succinate Injectable 20 milliGRAM(s) IV Push every 8 hours  methylPREDNISolone sodium succinate Injectable   IV Push   metoprolol succinate ER 50 milliGRAM(s) Oral daily  multivitamin 1 Tablet(s) Oral daily  pantoprazole    Tablet 40 milliGRAM(s) Oral before breakfast  polyethylene glycol 3350 17 Gram(s) Oral daily  prednisoLONE acetate 1% Suspension 1 Drop(s) Left EYE two times a day  temazepam 15 milliGRAM(s) Oral at bedtime  traZODone 50 milliGRAM(s) Oral at bedtime    MEDICATIONS  (PRN):  acetaminophen     Tablet .. 650 milliGRAM(s) Oral every 6 hours PRN Temp greater or equal to 38C (100.4F), Mild Pain (1 - 3)  albuterol/ipratropium for Nebulization 3 milliLiter(s) Nebulizer every 6 hours PRN Shortness of Breath and/or Wheezing  aluminum hydroxide/magnesium hydroxide/simethicone Suspension 30 milliLiter(s) Oral every 4 hours PRN Dyspepsia  melatonin 3 milliGRAM(s) Oral at bedtime PRN Insomnia  ondansetron Injectable 4 milliGRAM(s) IV Push every 8 hours PRN Nausea and/or Vomiting    Vital Signs Last 24 Hrs  T(C): 36.4 (27 Nov 2024 11:31), Max: 36.4 (27 Nov 2024 04:20)  T(F): 97.5 (27 Nov 2024 11:31), Max: 97.6 (27 Nov 2024 04:20)  HR: 64 (27 Nov 2024 11:31) (64 - 68)  BP: 132/58 (27 Nov 2024 11:31) (116/61 - 135/72)  BP(mean): --  RR: 18 (27 Nov 2024 11:31) (18 - 18)  SpO2: 96% (27 Nov 2024 11:31) (94% - 96%)    Parameters below as of 27 Nov 2024 11:31  Patient On (Oxygen Delivery Method): nasal cannula        I&O's Summary    26 Nov 2024 07:01  -  27 Nov 2024 07:00  --------------------------------------------------------  IN: 0 mL / OUT: 350 mL / NET: -350 mL          Physical Exam:   GENERAL: NAD, well-groomed, well-developed  HEENT: JIM/   Atraumatic, Normocephalic  ENMT: No tonsillar erythema, exudates, or enlargement; Moist mucous membranes, Good dentition, No lesions  NECK: Supple, No JVD, Normal thyroid  CHEST/LUNG: poor air entry    CVS: Regular rate and rhythm; No murmurs, rubs, or gallops  GI: : Soft, Nontender, Nondistended; Bowel sounds present  NERVOUS SYSTEM:  Alert & awake  EXTREMITIES:  2+ Peripheral Pulses, No clubbing, cyanosis, or edema  LYMPH: No lymphadenopathy noted  SKIN: No rashes or lesions  ENDOCRINOLOGY: No Thyromegaly  PSYCH: Appropriate    Labs:  ABG - ( 26 Nov 2024 05:25 )  pH, Arterial: 7.44  pH, Blood: x     /  pCO2: 44    /  pO2: 66    / HCO3: 30    / Base Excess: 5.0   /  SaO2: 94.1            31                            11.0   9.01  )-----------( 209      ( 27 Nov 2024 07:01 )             36.4                         10.0   9.42  )-----------( 164      ( 26 Nov 2024 07:20 )             32.6                         9.8    11.30 )-----------( 160      ( 25 Nov 2024 15:58 )             32.9                         9.4    8.35  )-----------( 172      ( 25 Nov 2024 07:29 )             31.7                         10.6   9.14  )-----------( 174      ( 24 Nov 2024 07:32 )             35.5                         9.9    9.36  )-----------( 192      ( 23 Nov 2024 20:05 )             32.5     11-27    139  |  97  |  24[H]  ----------------------------<  167[H]  4.4   |  26  |  0.65  11-26    142  |  102  |  17  ----------------------------<  145[H]  4.5   |  23  |  0.76  11-25    142  |  104  |  15  ----------------------------<  82  3.7   |  26  |  0.78  11-24    145  |  105  |  14  ----------------------------<  97  3.7   |  27  |  0.75  11-23    143  |  105  |  14  ----------------------------<  120[H]  3.4[L]   |  25  |  0.67    Ca    9.3      27 Nov 2024 07:02  Ca    9.0      26 Nov 2024 07:22  Phos  4.6     11-26  Mg     2.2     11-27  Mg     2.1     11-26    TPro  6.3  /  Alb  3.7  /  TBili  0.2  /  DBili  x   /  AST  11  /  ALT  <5[L]  /  AlkPhos  119  11-23    CAPILLARY BLOOD GLUCOSE              Urinalysis Basic - ( 27 Nov 2024 07:02 )    Color: x / Appearance: x / SG: x / pH: x  Gluc: 167 mg/dL / Ketone: x  / Bili: x / Urobili: x   Blood: x / Protein: x / Nitrite: x   Leuk Esterase: x / RBC: x / WBC x   Sq Epi: x / Non Sq Epi: x / Bacteria: x      D-Dimer Assay, Quantitative: 480 ng/mL DDU (11-25 @ 15:58)  Lactate, Blood: 1.5 mmol/L (11-23 @ 20:49)        RECENT CULTURES:  11-23 @ 23:04 Clean Catch Clean Catch (Midstream)                10,000 - 49,000 CFU/mL Candida tropicalis  "Susceptibilities not performed"    11-23 @ 20:30 .Blood BLOOD       ra< from: [facility] Duplex Lower Ext Vein Scan, Bilat (11.26.24 @ 11:18) >  T calf vein thrombosis is detected.    IMPRESSION:  No evidence of deep venous thrombosis in either lower extremity.            --- End of Report ---      < end of copied text >  < from: CT Chest No Cont (11.23.24 @ 23:40) >  CHEST WALL AND LOWER NECK: Within normal limits.  VISUALIZED UPPER ABDOMEN: Within normal limits.  BONES: Degenerative changes. Prior left shoulder surgery.    IMPRESSION:    Centrilobular upper lobe emphysema. Mild bibasilar atelectasis.      --- End of Report ---            SUSAN MANCIA MD; Attending Radiologist  This document has been electronically signed. Nov 24 2024 12:42AM    < end of copied text >           No growth at 72 Hours    11-23 @ 20:15 .Blood BLOOD                No growth at 72 Hours          RESPIRATORY CULTURES:          Studies  Chest X-RAY  CT SCAN Chest   Venous Dopplers: LE:   CT Abdomen  Others

## 2024-11-28 LAB
ANION GAP SERPL CALC-SCNC: 14 MMOL/L — SIGNIFICANT CHANGE UP (ref 5–17)
BUN SERPL-MCNC: 30 MG/DL — HIGH (ref 7–23)
CALCIUM SERPL-MCNC: 8.8 MG/DL — SIGNIFICANT CHANGE UP (ref 8.4–10.5)
CHLORIDE SERPL-SCNC: 99 MMOL/L — SIGNIFICANT CHANGE UP (ref 96–108)
CO2 SERPL-SCNC: 28 MMOL/L — SIGNIFICANT CHANGE UP (ref 22–31)
CREAT SERPL-MCNC: 0.66 MG/DL — SIGNIFICANT CHANGE UP (ref 0.5–1.3)
EGFR: 84 ML/MIN/1.73M2 — SIGNIFICANT CHANGE UP
GLUCOSE SERPL-MCNC: 175 MG/DL — HIGH (ref 70–99)
MAGNESIUM SERPL-MCNC: 2.4 MG/DL — SIGNIFICANT CHANGE UP (ref 1.6–2.6)
POTASSIUM SERPL-MCNC: 4.2 MMOL/L — SIGNIFICANT CHANGE UP (ref 3.5–5.3)
POTASSIUM SERPL-SCNC: 4.2 MMOL/L — SIGNIFICANT CHANGE UP (ref 3.5–5.3)
SODIUM SERPL-SCNC: 141 MMOL/L — SIGNIFICANT CHANGE UP (ref 135–145)

## 2024-11-28 RX ADMIN — ACETAMINOPHEN 500MG 650 MILLIGRAM(S): 500 TABLET, COATED ORAL at 05:59

## 2024-11-28 RX ADMIN — Medication 2 PUFF(S): at 05:30

## 2024-11-28 RX ADMIN — Medication 1 DROP(S): at 05:31

## 2024-11-28 RX ADMIN — ENOXAPARIN SODIUM 40 MILLIGRAM(S): 30 INJECTION SUBCUTANEOUS at 17:09

## 2024-11-28 RX ADMIN — IPRATROPIUM BROMIDE AND ALBUTEROL SULFATE 3 MILLILITER(S): 2.5; .5 SOLUTION RESPIRATORY (INHALATION) at 05:32

## 2024-11-28 RX ADMIN — BUDESONIDE 0.25 MILLIGRAM(S): 0.25 SUSPENSION RESPIRATORY (INHALATION) at 17:09

## 2024-11-28 RX ADMIN — PANTOPRAZOLE SODIUM 40 MILLIGRAM(S): 40 TABLET, DELAYED RELEASE ORAL at 06:31

## 2024-11-28 RX ADMIN — FLUTICASONE PROPIONATE AND SALMETEROL XINAFOATE 1 DOSE(S): 45; 21 AEROSOL, METERED RESPIRATORY (INHALATION) at 17:09

## 2024-11-28 RX ADMIN — Medication 20 MILLIGRAM(S): at 05:30

## 2024-11-28 RX ADMIN — Medication 30 MILLIGRAM(S): at 13:01

## 2024-11-28 RX ADMIN — FLUTICASONE PROPIONATE AND SALMETEROL XINAFOATE 1 DOSE(S): 45; 21 AEROSOL, METERED RESPIRATORY (INHALATION) at 06:02

## 2024-11-28 RX ADMIN — Medication 1 DROP(S): at 17:10

## 2024-11-28 RX ADMIN — Medication 15 MILLIGRAM(S): at 22:15

## 2024-11-28 RX ADMIN — PREDNISOLONE ACETATE 1 DROP(S): 1.2 SUSPENSION/ DROPS OPHTHALMIC at 05:30

## 2024-11-28 RX ADMIN — Medication 2 PUFF(S): at 13:05

## 2024-11-28 RX ADMIN — Medication 20 MILLIGRAM(S): at 21:31

## 2024-11-28 RX ADMIN — BUDESONIDE 0.25 MILLIGRAM(S): 0.25 SUSPENSION RESPIRATORY (INHALATION) at 06:02

## 2024-11-28 RX ADMIN — FUROSEMIDE 20 MILLIGRAM(S): 40 TABLET ORAL at 05:30

## 2024-11-28 RX ADMIN — POLYETHYLENE GLYCOL 3350 17 GRAM(S): 17 POWDER, FOR SOLUTION ORAL at 13:00

## 2024-11-28 RX ADMIN — Medication 500 MILLIGRAM(S): at 13:01

## 2024-11-28 RX ADMIN — IPRATROPIUM BROMIDE AND ALBUTEROL SULFATE 3 MILLILITER(S): 2.5; .5 SOLUTION RESPIRATORY (INHALATION) at 13:01

## 2024-11-28 RX ADMIN — Medication 20 MILLIGRAM(S): at 13:01

## 2024-11-28 RX ADMIN — PREDNISOLONE ACETATE 1 DROP(S): 1.2 SUSPENSION/ DROPS OPHTHALMIC at 17:10

## 2024-11-28 RX ADMIN — ACETAMINOPHEN 500MG 650 MILLIGRAM(S): 500 TABLET, COATED ORAL at 21:31

## 2024-11-28 RX ADMIN — Medication 1 TABLET(S): at 13:01

## 2024-11-28 RX ADMIN — TRAZODONE HYDROCHLORIDE 50 MILLIGRAM(S): 150 TABLET ORAL at 21:31

## 2024-11-28 RX ADMIN — METOPROLOL TARTRATE 50 MILLIGRAM(S): 100 TABLET, FILM COATED ORAL at 05:30

## 2024-11-28 NOTE — PROGRESS NOTE ADULT - SUBJECTIVE AND OBJECTIVE BOX
Date of Service: 11-28-24 @ 09:17           CARDIOLOGY     PROGRESS  NOTE   ________________________________________________    CHIEF COMPLAINT:Patient is a 89y old  Female who presents with a chief complaint of lethargy (27 Nov 2024 18:09)  no complain  	  REVIEW OF SYSTEMS:  CONSTITUTIONAL: No fever, weight loss, or fatigue  EYES: No eye pain, visual disturbances, or discharge  ENT:  No difficulty hearing, tinnitus, vertigo; No sinus or throat pain  NECK: No pain or stiffness  RESPIRATORY: No cough, wheezing, chills or hemoptysis; No Shortness of Breath  CARDIOVASCULAR: No chest pain, palpitations, passing out, dizziness, or leg swelling  GASTROINTESTINAL: No abdominal or epigastric pain. No nausea, vomiting, or hematemesis; No diarrhea or constipation. No melena or hematochezia.  GENITOURINARY: No dysuria, frequency, hematuria, or incontinence  NEUROLOGICAL: No headaches, memory loss, loss of strength, numbness, or tremors  SKIN: No itching, burning, rashes, or lesions   LYMPH Nodes: No enlarged glands  ENDOCRINE: No heat or cold intolerance; No hair loss  MUSCULOSKELETAL: No joint pain or swelling; No muscle, back, or extremity pain  PSYCHIATRIC: No depression, anxiety, mood swings, or difficulty sleeping  HEME/LYMPH: No easy bruising, or bleeding gums  ALLERGY AND IMMUNOLOGIC: No hives or eczema	    [x ] All others negative	  [ ] Unable to obtain    PHYSICAL EXAM:  T(C): 36.5 (11-28-24 @ 04:30), Max: 36.5 (11-27-24 @ 19:54)  HR: 62 (11-28-24 @ 04:30) (62 - 68)  BP: 119/63 (11-28-24 @ 04:30) (119/63 - 132/58)  RR: 18 (11-28-24 @ 04:30) (18 - 18)  SpO2: 98% (11-28-24 @ 04:30) (96% - 98%)  Wt(kg): --  I&O's Summary      Appearance: Normal	  HEENT:   Normal oral mucosa, PERRL, EOMI	  Lymphatic: No lymphadenopathy  Cardiovascular: Normal S1 S2, No JVD, + murmurs, No edema  Respiratory:rhonchi  Psychiatry: A & O x 3, Mood & affect appropriate  Gastrointestinal:  Soft, Non-tender, + BS	  Skin: No rashes, No ecchymoses, No cyanosis	  Extremities: Normal range of motion, No clubbing, cyanosis or edema  Vascular: Peripheral pulses palpable 2+ bilaterally    MEDICATIONS  (STANDING):  albuterol    90 MICROgram(s) HFA Inhaler 2 Puff(s) Inhalation every 8 hours  ascorbic acid 500 milliGRAM(s) Oral daily  atorvastatin 20 milliGRAM(s) Oral at bedtime  buDESOnide    Inhalation Suspension 0.25 milliGRAM(s) Inhalation two times a day  cyclopentolate 1% Solution 1 Drop(s) Left EYE two times a day  enoxaparin Injectable 40 milliGRAM(s) SubCutaneous every 24 hours  FLUoxetine 30 milliGRAM(s) Oral daily  fluticasone propionate/ salmeterol 100-50 MICROgram(s) Diskus 1 Dose(s) Inhalation two times a day  furosemide    Tablet 20 milliGRAM(s) Oral daily  methylPREDNISolone sodium succinate Injectable 20 milliGRAM(s) IV Push every 8 hours  methylPREDNISolone sodium succinate Injectable   IV Push   metoprolol succinate ER 50 milliGRAM(s) Oral daily  multivitamin 1 Tablet(s) Oral daily  pantoprazole    Tablet 40 milliGRAM(s) Oral before breakfast  polyethylene glycol 3350 17 Gram(s) Oral daily  prednisoLONE acetate 1% Suspension 1 Drop(s) Left EYE two times a day  temazepam 15 milliGRAM(s) Oral at bedtime  traZODone 50 milliGRAM(s) Oral at bedtime      TELEMETRY: 	    ECG:  	  RADIOLOGY:  OTHER: 	  	  LABS:	 	    CARDIAC MARKERS:                          11.0   9.01  )-----------( 209      ( 27 Nov 2024 07:01 )             36.4     11-28    141  |  99  |  30[H]  ----------------------------<  175[H]  4.2   |  28  |  0.66    Ca    8.8      28 Nov 2024 07:13  Mg     2.4     11-28      proBNP:   Lipid Profile:   HgA1c:   TSH:         Assessment and plan  ---------------------------  89y F pmh htn, hld, CAD, diastolic CHF, right hemidiaphragm paralysis, hypothyroidism, dementia, COPD on 2 L NC presenting from [facility] assisted living for episode of altered mental status today at 6:30 PM. Per aid who is providing hx patient was in her normal mental state at 615, was eating dinner and became slumped over, mumbling, less responsive.  States during that episode patient did not know her name and was confused. Patient has now returned to baseline mentation ( A&Ox3).  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

## 2024-11-28 NOTE — PROGRESS NOTE ADULT - SUBJECTIVE AND OBJECTIVE BOX
---___---___---___---___---___---___ ---___---___---___---___---___---___---___---___---                  M E D I C A L   A T T E N D I N G   P R O G R E S S   N O T E  ---___---___---___---___---___---___ ---___---___---___---___---___---___---___---___---        ================================================    ++CHIEF COMPLAINT:   Patient is a 89y old  Female who presents with a chief complaint of lethargy (2024 09:16)      Urinary tract infection      ---___---___---___---___---___---  PAST MEDICAL / Surgical  HISTORY:  PAST MEDICAL & SURGICAL HISTORY:  HTN (hypertension)      Hypothyroidism      Osteoporosis      CAD (coronary artery disease)      COPD (chronic obstructive pulmonary disease)      Pulmonary sarcoidosis      H/O pyelonephritis      Acute diverticulitis          ---___---___---___---___---___---  FAMILY HISTORY:   FAMILY HISTORY:        ---___---___---___---___---___---  ALLERGIES:   Allergies    iodinated radiocontrast agents (Anaphylaxis)    Intolerances        ---___---___---___---___---___---  MEDICATIONS:  MEDICATIONS  (STANDING):  albuterol    90 MICROgram(s) HFA Inhaler 2 Puff(s) Inhalation every 8 hours  ascorbic acid 500 milliGRAM(s) Oral daily  atorvastatin 20 milliGRAM(s) Oral at bedtime  buDESOnide    Inhalation Suspension 0.25 milliGRAM(s) Inhalation two times a day  cyclopentolate 1% Solution 1 Drop(s) Left EYE two times a day  enoxaparin Injectable 40 milliGRAM(s) SubCutaneous every 24 hours  FLUoxetine 30 milliGRAM(s) Oral daily  fluticasone propionate/ salmeterol 100-50 MICROgram(s) Diskus 1 Dose(s) Inhalation two times a day  furosemide    Tablet 20 milliGRAM(s) Oral daily  metoprolol succinate ER 50 milliGRAM(s) Oral daily  multivitamin 1 Tablet(s) Oral daily  pantoprazole    Tablet 40 milliGRAM(s) Oral before breakfast  polyethylene glycol 3350 17 Gram(s) Oral daily  prednisoLONE acetate 1% Suspension 1 Drop(s) Left EYE two times a day  temazepam 15 milliGRAM(s) Oral at bedtime  traZODone 50 milliGRAM(s) Oral at bedtime    MEDICATIONS  (PRN):  acetaminophen     Tablet .. 650 milliGRAM(s) Oral every 6 hours PRN Temp greater or equal to 38C (100.4F), Mild Pain (1 - 3)  albuterol/ipratropium for Nebulization 3 milliLiter(s) Nebulizer every 6 hours PRN Shortness of Breath and/or Wheezing  aluminum hydroxide/magnesium hydroxide/simethicone Suspension 30 milliLiter(s) Oral every 4 hours PRN Dyspepsia  melatonin 3 milliGRAM(s) Oral at bedtime PRN Insomnia  ondansetron Injectable 4 milliGRAM(s) IV Push every 8 hours PRN Nausea and/or Vomiting      ---___---___---___---___---___---  REVIEW OF SYSTEM:    GEN: no fever, no chills, no pain  RESP: no SOB, no cough, no sputum  CVS: no chest pain, no palpitations, no edema  GI: no abdominal pain, no nausea, no vomiting, no constipation, no diarrhea  : no dysurea, no frequency, no hematurea  Neuro: no headache, no dizziness  PSYCH: no anxiety, no depression  Derm : no itching, no rash    ---___---___---___---___---___---  VITAL SIGNS:  89y , CAPILLARY BLOOD GLUCOSE        T(C): 36.5 (24 @ 04:30), Max: 36.5 (24 @ 19:54)  HR: 62 (24 @ 04:30) (62 - 68)  BP: 119/63 (24 @ 04:30) (119/63 - 128/66)  RR: 18 (24 @ 04:30) (18 - 18)  SpO2: 98% (24 @ 04:30) (98% - 98%)  ---___---___---___---___---___---  PHYSICAL EXAM:    GEN: A&O X 1 , NAD , comfortable  HEENT: NCAT, PERRL, MMM, hearing intact  Neck: supple , no JVD  CVS: S1S2 , regular , No M/R/G appreciated  PULM: CTA B/L,  no W/R/R appreciated  ABD.: soft. non tender, non distended,  bowel sounds present  Extrem: intact pulses , no edema   Derm: No rash , no ecchymoses  PSYCH : normal mood,  no delusion not anxious     ---___---___---___---___---___---            LAB AND IMAGIN.0   9.01  )-----------( 209      ( 2024 07:01 )             36.4                   141  |  99  |  30[H]  ----------------------------<  175[H]  4.2   |  28  |  0.66    Ca    8.8      2024 07:13  Mg     2.4                                   Urinalysis Basic - ( 2024 07:13 )    Color: x / Appearance: x / SG: x / pH: x  Gluc: 175 mg/dL / Ketone: x  / Bili: x / Urobili: x   Blood: x / Protein: x / Nitrite: x   Leuk Esterase: x / RBC: x / WBC x   Sq Epi: x / Non Sq Epi: x / Bacteria: x        [All pertinent / recent Imaging reviewed]         ---___---___---___---___---___---___ ---___---___---___---___---                         A S S E S S M E N T   A N D   P L A N :      HEALTH ISSUES - PROBLEM Dx:  Acute UTI  on iv abx  CAD (coronary artery disease)  continue statin  COPD (chronic obstructive pulmonary disease)  on nebulizers   and prednisolone  HTN (hypertension)  continue current medication   HLD (hyperlipidemia)  on statin                     ___________________________  Thank you,  Garry Hermosillo  [phone]

## 2024-11-28 NOTE — PROGRESS NOTE ADULT - SUBJECTIVE AND OBJECTIVE BOX
Date of Service: 11-28-24 @ 13:26    Patient is a 89y old  Female who presents with a chief complaint of lethargy (28 Nov 2024 13:09)      Any change in ROS: has new left sided subconjunctival hemorrhage:   sheis awake and alert and siting in  chair:       MEDICATIONS  (STANDING):  albuterol    90 MICROgram(s) HFA Inhaler 2 Puff(s) Inhalation every 8 hours  ascorbic acid 500 milliGRAM(s) Oral daily  atorvastatin 20 milliGRAM(s) Oral at bedtime  buDESOnide    Inhalation Suspension 0.25 milliGRAM(s) Inhalation two times a day  cyclopentolate 1% Solution 1 Drop(s) Left EYE two times a day  enoxaparin Injectable 40 milliGRAM(s) SubCutaneous every 24 hours  FLUoxetine 30 milliGRAM(s) Oral daily  fluticasone propionate/ salmeterol 100-50 MICROgram(s) Diskus 1 Dose(s) Inhalation two times a day  furosemide    Tablet 20 milliGRAM(s) Oral daily  metoprolol succinate ER 50 milliGRAM(s) Oral daily  multivitamin 1 Tablet(s) Oral daily  pantoprazole    Tablet 40 milliGRAM(s) Oral before breakfast  polyethylene glycol 3350 17 Gram(s) Oral daily  prednisoLONE acetate 1% Suspension 1 Drop(s) Left EYE two times a day  temazepam 15 milliGRAM(s) Oral at bedtime  traZODone 50 milliGRAM(s) Oral at bedtime    MEDICATIONS  (PRN):  acetaminophen     Tablet .. 650 milliGRAM(s) Oral every 6 hours PRN Temp greater or equal to 38C (100.4F), Mild Pain (1 - 3)  albuterol/ipratropium for Nebulization 3 milliLiter(s) Nebulizer every 6 hours PRN Shortness of Breath and/or Wheezing  aluminum hydroxide/magnesium hydroxide/simethicone Suspension 30 milliLiter(s) Oral every 4 hours PRN Dyspepsia  melatonin 3 milliGRAM(s) Oral at bedtime PRN Insomnia  ondansetron Injectable 4 milliGRAM(s) IV Push every 8 hours PRN Nausea and/or Vomiting    Vital Signs Last 24 Hrs  T(C): 36.5 (28 Nov 2024 04:30), Max: 36.5 (27 Nov 2024 19:54)  T(F): 97.7 (28 Nov 2024 04:30), Max: 97.7 (27 Nov 2024 19:54)  HR: 62 (28 Nov 2024 04:30) (62 - 68)  BP: 119/63 (28 Nov 2024 04:30) (119/63 - 128/66)  BP(mean): --  RR: 18 (28 Nov 2024 04:30) (18 - 18)  SpO2: 98% (28 Nov 2024 04:30) (98% - 98%)    Parameters below as of 28 Nov 2024 04:30  Patient On (Oxygen Delivery Method): nasal cannula  O2 Flow (L/min): 5      I&O's Summary        Physical Exam:   GENERAL: NAD, well-groomed, well-developed  HEENT: JIM/   Atraumatic, Normocephalic  ENMT: No tonsillar erythema, exudates, or enlargement; Moist mucous membranes, Good dentition, No lesions  NECK: Supple, No JVD, Normal thyroid  CHEST/LUNG: Clear to auscultaion  CVS: Regular rate and rhythm; No murmurs, rubs, or gallops  GI: : Soft, Nontender, Nondistended; Bowel sounds present  NERVOUS SYSTEM:  Alert & awake:  wants to go   EXTREMITIES: - edema  LYMPH: No lymphadenopathy noted  SKIN: No rashes or lesions  ENDOCRINOLOGY: No Thyromegaly  PSYCH: calm   Labs:  31                            11.0   9.01  )-----------( 209      ( 27 Nov 2024 07:01 )             36.4                         10.0   9.42  )-----------( 164      ( 26 Nov 2024 07:20 )             32.6                         9.8    11.30 )-----------( 160      ( 25 Nov 2024 15:58 )             32.9                         9.4    8.35  )-----------( 172      ( 25 Nov 2024 07:29 )             31.7     11-28    141  |  99  |  30[H]  ----------------------------<  175[H]  4.2   |  28  |  0.66  11-27    139  |  97  |  24[H]  ----------------------------<  167[H]  4.4   |  26  |  0.65  11-26    142  |  102  |  17  ----------------------------<  145[H]  4.5   |  23  |  0.76  11-25    142  |  104  |  15  ----------------------------<  82  3.7   |  26  |  0.78    Ca    8.8      28 Nov 2024 07:13  Ca    9.3      27 Nov 2024 07:02  Mg     2.4     11-28  Mg     2.2     11-27      CAPILLARY BLOOD GLUCOSE        rad< from: [facility] Duplex Lower Ext Vein Scan, Bilat (11.26.24 @ 11:18) >  LEFT:  Normal compressibility of the LEFT common femoral, femoral and popliteal   veins.  Doppler examination shows normal spontaneous and phasic flow.  No LEFT calf vein thrombosis is detected.    IMPRESSION:  No evidence of deep venous thrombosis in either lower extremity.            --- End of Report ---      < end of copied text >  < from: Xray Chest 1 View- PORTABLE-Urgent (Xray Chest 1 View- PORTABLE-Urgent .) (11.25.24 @ 15:56) >  FINDINGS:  Left chest loop recorder.  The heart size is not well evaluated in this projection.  Bibasilar atelectasis. Otherwise, the lungs are clear.  There is no pneumothorax or pleural effusion.  There are no acute osseous abnormalities. Chronic appearing bilateral   shoulder deformities, likely degenerative.    IMPRESSION:  Bibasilar atelectasis. Otherwise, the lungs are clear.    --- End of Report ---        < end of copied text >  < from: CT Chest No Cont (11.23.24 @ 23:40) >    LUNGS AND LARGE AIRWAYS: Patent central airways. Centrilobular upper lobe   emphysema. Mild bibasilar atelectasis. 2 mm right middle lobe lung nodule   (305:48).  PLEURA: No pleural effusion.  VESSELS: Aortic calcifications. Coronary artery calcifications. 4.1 cm   ascending aortic aneurysm. Marked enlargement of the main pulmonary   artery at 4.5 cm a nonspecific finding but can be seen in pulmonary   hypertension among other entities.  HEART: Cardiomegaly. No pericardial effusion.  MEDIASTINUM AND RASHID: No lymphadenopathy.  CHEST WALL AND LOWER NECK: Within normal limits.  VISUALIZED UPPER ABDOMEN: Within normal limits.  BONES: Degenerative changes. Prior left shoulder surgery.    IMPRESSION:    Centrilobular upper lobe emphysema. Mild bibasilar atelectasis.      --- End of Report ---      < end of copied text >        Urinalysis Basic - ( 28 Nov 2024 07:13 )    Color: x / Appearance: x / SG: x / pH: x  Gluc: 175 mg/dL / Ketone: x  / Bili: x / Urobili: x   Blood: x / Protein: x / Nitrite: x   Leuk Esterase: x / RBC: x / WBC x   Sq Epi: x / Non Sq Epi: x / Bacteria: x      D-Dimer Assay, Quantitative: 480 ng/mL DDU (11-25 @ 15:58)        RECENT CULTURES:  11-23 @ 23:04 Clean Catch Clean Catch (Midstream)                10,000 - 49,000 CFU/mL Candida tropicalis  "Susceptibilities not performed"    11-23 @ 20:30 .Blood BLOOD                No growth at 4 days    11-23 @ 20:15 .Blood BLOOD                No growth at 4 days          RESPIRATORY CULTURES:          Studies  Chest X-RAY  CT SCAN Chest   Venous Dopplers: LE:   CT Abdomen  Others

## 2024-11-29 LAB
ANION GAP SERPL CALC-SCNC: 12 MMOL/L — SIGNIFICANT CHANGE UP (ref 5–17)
ANION GAP SERPL CALC-SCNC: 13 MMOL/L — SIGNIFICANT CHANGE UP (ref 5–17)
BUN SERPL-MCNC: 30 MG/DL — HIGH (ref 7–23)
BUN SERPL-MCNC: 31 MG/DL — HIGH (ref 7–23)
CALCIUM SERPL-MCNC: 8.4 MG/DL — SIGNIFICANT CHANGE UP (ref 8.4–10.5)
CALCIUM SERPL-MCNC: 8.6 MG/DL — SIGNIFICANT CHANGE UP (ref 8.4–10.5)
CHLORIDE SERPL-SCNC: 100 MMOL/L — SIGNIFICANT CHANGE UP (ref 96–108)
CHLORIDE SERPL-SCNC: 100 MMOL/L — SIGNIFICANT CHANGE UP (ref 96–108)
CO2 SERPL-SCNC: 28 MMOL/L — SIGNIFICANT CHANGE UP (ref 22–31)
CO2 SERPL-SCNC: 29 MMOL/L — SIGNIFICANT CHANGE UP (ref 22–31)
CREAT SERPL-MCNC: 0.63 MG/DL — SIGNIFICANT CHANGE UP (ref 0.5–1.3)
CREAT SERPL-MCNC: 0.64 MG/DL — SIGNIFICANT CHANGE UP (ref 0.5–1.3)
CULTURE RESULTS: SIGNIFICANT CHANGE UP
CULTURE RESULTS: SIGNIFICANT CHANGE UP
EGFR: 85 ML/MIN/1.73M2 — SIGNIFICANT CHANGE UP
EGFR: 85 ML/MIN/1.73M2 — SIGNIFICANT CHANGE UP
GLUCOSE SERPL-MCNC: 112 MG/DL — HIGH (ref 70–99)
GLUCOSE SERPL-MCNC: 115 MG/DL — HIGH (ref 70–99)
HCT VFR BLD CALC: 30.9 % — LOW (ref 34.5–45)
HGB BLD-MCNC: 9.5 G/DL — LOW (ref 11.5–15.5)
MAGNESIUM SERPL-MCNC: 2.5 MG/DL — SIGNIFICANT CHANGE UP (ref 1.6–2.6)
MAGNESIUM SERPL-MCNC: 2.5 MG/DL — SIGNIFICANT CHANGE UP (ref 1.6–2.6)
MCHC RBC-ENTMCNC: 26.9 PG — LOW (ref 27–34)
MCHC RBC-ENTMCNC: 30.7 G/DL — LOW (ref 32–36)
MCV RBC AUTO: 87.5 FL — SIGNIFICANT CHANGE UP (ref 80–100)
NRBC # BLD: 0 /100 WBCS — SIGNIFICANT CHANGE UP (ref 0–0)
PHOSPHATE SERPL-MCNC: 3 MG/DL — SIGNIFICANT CHANGE UP (ref 2.5–4.5)
PLATELET # BLD AUTO: 161 K/UL — SIGNIFICANT CHANGE UP (ref 150–400)
POTASSIUM SERPL-MCNC: 3.9 MMOL/L — SIGNIFICANT CHANGE UP (ref 3.5–5.3)
POTASSIUM SERPL-MCNC: 3.9 MMOL/L — SIGNIFICANT CHANGE UP (ref 3.5–5.3)
POTASSIUM SERPL-SCNC: 3.9 MMOL/L — SIGNIFICANT CHANGE UP (ref 3.5–5.3)
POTASSIUM SERPL-SCNC: 3.9 MMOL/L — SIGNIFICANT CHANGE UP (ref 3.5–5.3)
RBC # BLD: 3.53 M/UL — LOW (ref 3.8–5.2)
RBC # FLD: 15.6 % — HIGH (ref 10.3–14.5)
SODIUM SERPL-SCNC: 141 MMOL/L — SIGNIFICANT CHANGE UP (ref 135–145)
SODIUM SERPL-SCNC: 141 MMOL/L — SIGNIFICANT CHANGE UP (ref 135–145)
SPECIMEN SOURCE: SIGNIFICANT CHANGE UP
SPECIMEN SOURCE: SIGNIFICANT CHANGE UP
WBC # BLD: 8.08 K/UL — SIGNIFICANT CHANGE UP (ref 3.8–10.5)
WBC # FLD AUTO: 8.08 K/UL — SIGNIFICANT CHANGE UP (ref 3.8–10.5)

## 2024-11-29 RX ADMIN — FUROSEMIDE 20 MILLIGRAM(S): 40 TABLET ORAL at 05:07

## 2024-11-29 RX ADMIN — Medication 500 MILLIGRAM(S): at 14:36

## 2024-11-29 RX ADMIN — Medication 30 MILLIGRAM(S): at 11:55

## 2024-11-29 RX ADMIN — ACETAMINOPHEN 500MG 650 MILLIGRAM(S): 500 TABLET, COATED ORAL at 09:48

## 2024-11-29 RX ADMIN — METOPROLOL TARTRATE 50 MILLIGRAM(S): 100 TABLET, FILM COATED ORAL at 05:08

## 2024-11-29 RX ADMIN — ACETAMINOPHEN 500MG 650 MILLIGRAM(S): 500 TABLET, COATED ORAL at 04:06

## 2024-11-29 RX ADMIN — BUDESONIDE 0.25 MILLIGRAM(S): 0.25 SUSPENSION RESPIRATORY (INHALATION) at 05:08

## 2024-11-29 RX ADMIN — PREDNISOLONE ACETATE 1 DROP(S): 1.2 SUSPENSION/ DROPS OPHTHALMIC at 17:51

## 2024-11-29 RX ADMIN — POLYETHYLENE GLYCOL 3350 17 GRAM(S): 17 POWDER, FOR SOLUTION ORAL at 11:55

## 2024-11-29 RX ADMIN — BUDESONIDE 0.25 MILLIGRAM(S): 0.25 SUSPENSION RESPIRATORY (INHALATION) at 17:13

## 2024-11-29 RX ADMIN — TRAZODONE HYDROCHLORIDE 50 MILLIGRAM(S): 150 TABLET ORAL at 22:58

## 2024-11-29 RX ADMIN — Medication 15 MILLIGRAM(S): at 22:58

## 2024-11-29 RX ADMIN — Medication 20 MILLIGRAM(S): at 22:58

## 2024-11-29 RX ADMIN — FLUTICASONE PROPIONATE AND SALMETEROL XINAFOATE 1 DOSE(S): 45; 21 AEROSOL, METERED RESPIRATORY (INHALATION) at 17:51

## 2024-11-29 RX ADMIN — Medication 1 TABLET(S): at 11:55

## 2024-11-29 RX ADMIN — ENOXAPARIN SODIUM 40 MILLIGRAM(S): 30 INJECTION SUBCUTANEOUS at 17:18

## 2024-11-29 RX ADMIN — PANTOPRAZOLE SODIUM 40 MILLIGRAM(S): 40 TABLET, DELAYED RELEASE ORAL at 05:06

## 2024-11-29 RX ADMIN — ACETAMINOPHEN 500MG 650 MILLIGRAM(S): 500 TABLET, COATED ORAL at 17:13

## 2024-11-29 RX ADMIN — Medication 1 DROP(S): at 05:09

## 2024-11-29 RX ADMIN — PREDNISOLONE ACETATE 1 DROP(S): 1.2 SUSPENSION/ DROPS OPHTHALMIC at 05:09

## 2024-11-29 RX ADMIN — FLUTICASONE PROPIONATE AND SALMETEROL XINAFOATE 1 DOSE(S): 45; 21 AEROSOL, METERED RESPIRATORY (INHALATION) at 05:08

## 2024-11-29 RX ADMIN — Medication 1 DROP(S): at 17:13

## 2024-11-29 NOTE — PROGRESS NOTE ADULT - SUBJECTIVE AND OBJECTIVE BOX
Date of Service: 11-29-24 @ 15:14    Patient is a 89y old  Female who presents with a chief complaint of lethargy (29 Nov 2024 08:27)      Any change in ROS: Sitting in ch air:  no sob:    on 2 Lof oxygen  satting at 100%":       MEDICATIONS  (STANDING):  ascorbic acid 500 milliGRAM(s) Oral daily  atorvastatin 20 milliGRAM(s) Oral at bedtime  buDESOnide    Inhalation Suspension 0.25 milliGRAM(s) Inhalation two times a day  cyclopentolate 1% Solution 1 Drop(s) Left EYE two times a day  enoxaparin Injectable 40 milliGRAM(s) SubCutaneous every 24 hours  FLUoxetine 30 milliGRAM(s) Oral daily  fluticasone propionate/ salmeterol 100-50 MICROgram(s) Diskus 1 Dose(s) Inhalation two times a day  furosemide    Tablet 20 milliGRAM(s) Oral daily  metoprolol succinate ER 50 milliGRAM(s) Oral daily  multivitamin 1 Tablet(s) Oral daily  pantoprazole    Tablet 40 milliGRAM(s) Oral before breakfast  polyethylene glycol 3350 17 Gram(s) Oral daily  prednisoLONE acetate 1% Suspension 1 Drop(s) Left EYE two times a day  temazepam 15 milliGRAM(s) Oral at bedtime  traZODone 50 milliGRAM(s) Oral at bedtime    MEDICATIONS  (PRN):  acetaminophen     Tablet .. 650 milliGRAM(s) Oral every 6 hours PRN Temp greater or equal to 38C (100.4F), Mild Pain (1 - 3)  albuterol/ipratropium for Nebulization 3 milliLiter(s) Nebulizer every 6 hours PRN Shortness of Breath and/or Wheezing  aluminum hydroxide/magnesium hydroxide/simethicone Suspension 30 milliLiter(s) Oral every 4 hours PRN Dyspepsia  melatonin 3 milliGRAM(s) Oral at bedtime PRN Insomnia  ondansetron Injectable 4 milliGRAM(s) IV Push every 8 hours PRN Nausea and/or Vomiting    Vital Signs Last 24 Hrs  T(C): 37 (29 Nov 2024 12:30), Max: 37 (29 Nov 2024 12:30)  T(F): 98.6 (29 Nov 2024 12:30), Max: 98.6 (29 Nov 2024 12:30)  HR: 64 (29 Nov 2024 12:30) (64 - 66)  BP: 112/70 (29 Nov 2024 12:30) (112/70 - 149/87)  BP(mean): --  RR: 18 (29 Nov 2024 15:03) (18 - 18)  SpO2: 100% (29 Nov 2024 15:03) (97% - 100%)    Parameters below as of 29 Nov 2024 15:03  Patient On (Oxygen Delivery Method): nasal cannula  O2 Flow (L/min): 2      I&O's Summary    28 Nov 2024 07:01  -  29 Nov 2024 07:00  --------------------------------------------------------  IN: 0 mL / OUT: 275 mL / NET: -275 mL          Physical Exam:   GENERAL:cachetic thin women   HEENT: JIM/   Atraumatic, Normocephalic  ENMT: No tonsillar erythema, exudates, or enlargement; Moist mucous membranes, Good dentition, No lesions  NECK: Supple, No JVD, Normal thyroid  CHEST/LUNG: Clear to auscultaion  CVS: Regular rate and rhythm; No murmurs, rubs, or gallops  GI: : Soft, Nontender, Nondistended; Bowel sounds present  NERVOUS SYSTEM:  Alert & awake:  confused:   EXTREMITIES:  - edema  LYMPH: No lymphadenopathy noted  SKIN: No rashes or lesions  ENDOCRINOLOGY: No Thyromegaly  PSYCH: dementia    Labs:  31                            9.5    8.08  )-----------( 161      ( 29 Nov 2024 06:44 )             30.9                         11.0   9.01  )-----------( 209      ( 27 Nov 2024 07:01 )             36.4                         10.0   9.42  )-----------( 164      ( 26 Nov 2024 07:20 )             32.6                         9.8    11.30 )-----------( 160      ( 25 Nov 2024 15:58 )             32.9     11-29    141  |  100  |  30[H]  ----------------------------<  115[H]  3.9   |  28  |  0.64  11-28    141  |  99  |  30[H]  ----------------------------<  175[H]  4.2   |  28  |  0.66  11-27    139  |  97  |  24[H]  ----------------------------<  167[H]  4.4   |  26  |  0.65  11-26    142  |  102  |  17  ----------------------------<  145[H]  4.5   |  23  |  0.76    Ca    8.6      29 Nov 2024 06:44  Ca    8.8      28 Nov 2024 07:13  Phos  3.0     11-29  Mg     2.5     11-29  Mg     2.4     11-28      CAPILLARY BLOOD GLUCOSE              Urinalysis Basic - ( 29 Nov 2024 06:44 )    Color: x / Appearance: x / SG: x / pH: x  Gluc: 115 mg/dL / Ketone: x  / Bili: x / Urobili: x   Blood: x / Protein: x / Nitrite: x   Leuk Esterase: x / RBC: x / WBC x   Sq Epi: x / Non Sq Epi: x / Bacteria: x      D-Dimer Assay, Quantitative: 480 ng/mL DDU (11-25 @ 15:58)        RECENT CULTURES:  11-23 @ 23:04 Clean Catch Clean Catch (Midstream)                10,000 - 49,000 CFU/mL Candida tropicalis  "Susceptibilities not performed"    11-23 @ 20:30 .Blood BLOOD         rad< from: [facility] Duplex Lower Ext Vein Scan, Bilat (11.26.24 @ 11:18) >  lower extremities.    RIGHT:  Normal compressibility of the RIGHT common femoral, femoral and popliteal   veins.  Doppler examination shows normal spontaneous and phasic flow.  No RIGHT calf vein thrombosis is detected.    LEFT:  Normal compressibility of the LEFT common femoral, femoral and popliteal   veins.  Doppler examination shows normal spontaneous and phasic flow.  No LEFT calf vein thrombosis is detected.    IMPRESSION:  No evidence of deep venous thrombosis in either lower extremity.            --- End of Report ---    < end of copied text >  < from: Xray Chest 1 View- PORTABLE-Urgent (Xray Chest 1 View- PORTABLE-Urgent .) (11.25.24 @ 15:56) >  The heart size is not well evaluated in this projection.  Bibasilar atelectasis. Otherwise, the lungs are clear.  There is no pneumothorax or pleural effusion.  There are no acute osseous abnormalities. Chronic appearing bilateral   shoulder deformities, likely degenerative.    IMPRESSION:  Bibasilar atelectasis. Otherwise, the lungs are clear.    --- End of Report ---           ROMIE DURBIN MD; Resident Radiologist  This document has been electronically signed.  AVTAR SANCHEZ MD; Attending Radiologist  This document has been electronically signed. Nov 26 2024  1:43PM    < end of copied text >         No growth at 5 days    11-23 @ 20:15 .Blood BLOOD                No growth at 5 days          RESPIRATORY CULTURES:  rad< from: [facility] Duplex Lower Ext Vein Scan, Bilat (11.26.24 @ 11:18) >  veins.  Doppler examination shows normal spontaneous and phasic flow.  No RIGHT calf vein thrombosis is detected.    LEFT:  Normal compressibility of the LEFT common femoral, femoral and popliteal   veins.  Doppler examination shows normal spontaneous and phasic flow.  No LEFT calf vein thrombosis is detected.    IMPRESSION:  No evidence of deep venous thrombosis in either lower extremity.            --- End of Report ---      < end of copied text >  < from: CT Chest No Cont (11.23.24 @ 23:40) >  emphysema. Mild bibasilar atelectasis. 2 mm right middle lobe lung nodule   (305:48).  PLEURA: No pleural effusion.  VESSELS: Aortic calcifications. Coronary artery calcifications. 4.1 cm   ascending aortic aneurysm. Marked enlargement of the main pulmonary   artery at 4.5 cm a nonspecific finding but can be seen in pulmonary   hypertension among other entities.  HEART: Cardiomegaly. No pericardial effusion.  MEDIASTINUM AND RASHID: No lymphadenopathy.  CHEST WALL AND LOWER NECK: Within normal limits.  VISUALIZED UPPER ABDOMEN: Within normal limits.  BONES: Degenerative changes. Prior left shoulder surgery.    IMPRESSION:    Centrilobular upper lobe emphysema. Mild bibasilar atelectasis.      --- End of Report ---            SUSAN MANCIA MD; Attending Radiologist  This document has been electronically signed. Nov 24 2024 12:42AM    < end of copied text >          Studies  Chest X-RAY  CT SCAN Chest   Venous Dopplers: LE:   CT Abdomen  Others

## 2024-11-29 NOTE — PROGRESS NOTE ADULT - SUBJECTIVE AND OBJECTIVE BOX
Date of Service: 11-29-24 @ 15:17           CARDIOLOGY     PROGRESS  NOTE   ________________________________________________    CHIEF COMPLAINT:Patient is a 89y old  Female who presents with a chief complaint of lethargy (29 Nov 2024 08:27)  no complain  	  REVIEW OF SYSTEMS:  CONSTITUTIONAL: No fever, weight loss, or fatigue  EYES: No eye pain, visual disturbances, or discharge  ENT:  No difficulty hearing, tinnitus, vertigo; No sinus or throat pain  NECK: No pain or stiffness  RESPIRATORY: No cough, wheezing, chills or hemoptysis; No Shortness of Breath  CARDIOVASCULAR: No chest pain, palpitations, passing out, dizziness, or leg swelling  GASTROINTESTINAL: No abdominal or epigastric pain. No nausea, vomiting, or hematemesis; No diarrhea or constipation. No melena or hematochezia.  GENITOURINARY: No dysuria, frequency, hematuria, or incontinence  NEUROLOGICAL: No headaches, memory loss, loss of strength, numbness, or tremors  SKIN: No itching, burning, rashes, or lesions   LYMPH Nodes: No enlarged glands  ENDOCRINE: No heat or cold intolerance; No hair loss  MUSCULOSKELETAL: No joint pain or swelling; No muscle, back, or extremity pain  PSYCHIATRIC: No depression, anxiety, mood swings, or difficulty sleeping  HEME/LYMPH: No easy bruising, or bleeding gums  ALLERGY AND IMMUNOLOGIC: No hives or eczema	    [x ] All others negative	  [ ] Unable to obtain    PHYSICAL EXAM:  T(C): 37 (11-29-24 @ 12:30), Max: 37 (11-29-24 @ 12:30)  HR: 64 (11-29-24 @ 12:30) (64 - 66)  BP: 112/70 (11-29-24 @ 12:30) (112/70 - 149/87)  RR: 18 (11-29-24 @ 15:03) (18 - 18)  SpO2: 100% (11-29-24 @ 15:03) (97% - 100%)  Wt(kg): --  I&O's Summary    28 Nov 2024 07:01  -  29 Nov 2024 07:00  --------------------------------------------------------  IN: 0 mL / OUT: 275 mL / NET: -275 mL        Appearance: Normal	  HEENT:   Normal oral mucosa, PERRL, EOMI	  Lymphatic: No lymphadenopathy  Cardiovascular: Normal S1 S2, No JVD, + murmurs, No edema  Respiratory: rhonchi  Psychiatry: A & O x 3, Mood & affect appropriate  Gastrointestinal:  Soft, Non-tender, + BS	  Skin: No rashes, No ecchymoses, No cyanosis	  Extremities: Normal range of motion, No clubbing, cyanosis or edema  Vascular: Peripheral pulses palpable 2+ bilaterally    MEDICATIONS  (STANDING):  ascorbic acid 500 milliGRAM(s) Oral daily  atorvastatin 20 milliGRAM(s) Oral at bedtime  buDESOnide    Inhalation Suspension 0.25 milliGRAM(s) Inhalation two times a day  cyclopentolate 1% Solution 1 Drop(s) Left EYE two times a day  enoxaparin Injectable 40 milliGRAM(s) SubCutaneous every 24 hours  FLUoxetine 30 milliGRAM(s) Oral daily  fluticasone propionate/ salmeterol 100-50 MICROgram(s) Diskus 1 Dose(s) Inhalation two times a day  furosemide    Tablet 20 milliGRAM(s) Oral daily  metoprolol succinate ER 50 milliGRAM(s) Oral daily  multivitamin 1 Tablet(s) Oral daily  pantoprazole    Tablet 40 milliGRAM(s) Oral before breakfast  polyethylene glycol 3350 17 Gram(s) Oral daily  prednisoLONE acetate 1% Suspension 1 Drop(s) Left EYE two times a day  temazepam 15 milliGRAM(s) Oral at bedtime  traZODone 50 milliGRAM(s) Oral at bedtime      TELEMETRY: 	    ECG:  	  RADIOLOGY:  OTHER: 	  	  LABS:	 	    CARDIAC MARKERS:                            9.5    8.08  )-----------( 161      ( 29 Nov 2024 06:44 )             30.9     11-29    141  |  100  |  30[H]  ----------------------------<  115[H]  3.9   |  28  |  0.64    Ca    8.6      29 Nov 2024 06:44  Phos  3.0     11-29  Mg     2.5     11-29      proBNP:   Lipid Profile:   HgA1c:   TSH:     Notes: Chart reviewed. Patient remains in house on iv solumedrol. patient  remains on 4-5 liters n/c case management remains available to assist in  discharge planning.    Culture - Urine (11.23.24 @ 23:04)    Specimen Source: Clean Catch Clean Catch (Midstream)   Culture Results:   10,000 - 49,000 CFU/mL Candida tropicalis  "Susceptibilities not performed"      Assessment and plan  ---------------------------  89y F pmh htn, hld, CAD, diastolic CHF, right hemidiaphragm paralysis, hypothyroidism, dementia, COPD on 2 L NC presenting from [facility] assisted living for episode of altered mental status today at 6:30 PM. Per aid who is providing hx patient was in her normal mental state at 615, was eating dinner and became slumped over, mumbling, less responsive.  States during that episode patient did not know her name and was confused. Patient has now returned to baseline mentation ( A&Ox3).  pt is well known to me with hx of pleural effusion, sarcoidosis and emphysema with lethargy and sob  ua, + pt with hx of MRSA in urine, may need ID eval  continue current cardiac m,eds  will add Norvasc if bp remains elevated  continue Lasix  Duoneb  uti, continue abx  check cultures  physical therapy  chest x ray noted  doing much better, increase ambulation/ physical therapy  continue LASIX  off steroid  change lasix to po, doing much bettere  dc planning

## 2024-11-29 NOTE — PROGRESS NOTE ADULT - SUBJECTIVE AND OBJECTIVE BOX
---___---___---___---___---___---___ ---___---___---___---___---___---___---___---___---                  M E D I C A L   A T T E N D I N G   P R O G R E S S   N O T E  ---___---___---___---___---___---___ ---___---___---___---___---___---___---___---___---        ================================================    ++CHIEF COMPLAINT:   Patient is a 89y old  Female who presents with a chief complaint of lethargy (2024 13:26)      Urinary tract infection      ---___---___---___---___---___---  PAST MEDICAL / Surgical  HISTORY:  PAST MEDICAL & SURGICAL HISTORY:  HTN (hypertension)      Hypothyroidism      Osteoporosis      CAD (coronary artery disease)      COPD (chronic obstructive pulmonary disease)      Pulmonary sarcoidosis      H/O pyelonephritis      Acute diverticulitis          ---___---___---___---___---___---  FAMILY HISTORY:   FAMILY HISTORY:        ---___---___---___---___---___---  ALLERGIES:   Allergies    iodinated radiocontrast agents (Anaphylaxis)    Intolerances        ---___---___---___---___---___---  MEDICATIONS:  MEDICATIONS  (STANDING):  ascorbic acid 500 milliGRAM(s) Oral daily  atorvastatin 20 milliGRAM(s) Oral at bedtime  buDESOnide    Inhalation Suspension 0.25 milliGRAM(s) Inhalation two times a day  cyclopentolate 1% Solution 1 Drop(s) Left EYE two times a day  enoxaparin Injectable 40 milliGRAM(s) SubCutaneous every 24 hours  FLUoxetine 30 milliGRAM(s) Oral daily  fluticasone propionate/ salmeterol 100-50 MICROgram(s) Diskus 1 Dose(s) Inhalation two times a day  furosemide    Tablet 20 milliGRAM(s) Oral daily  metoprolol succinate ER 50 milliGRAM(s) Oral daily  multivitamin 1 Tablet(s) Oral daily  pantoprazole    Tablet 40 milliGRAM(s) Oral before breakfast  polyethylene glycol 3350 17 Gram(s) Oral daily  prednisoLONE acetate 1% Suspension 1 Drop(s) Left EYE two times a day  temazepam 15 milliGRAM(s) Oral at bedtime  traZODone 50 milliGRAM(s) Oral at bedtime    MEDICATIONS  (PRN):  acetaminophen     Tablet .. 650 milliGRAM(s) Oral every 6 hours PRN Temp greater or equal to 38C (100.4F), Mild Pain (1 - 3)  albuterol/ipratropium for Nebulization 3 milliLiter(s) Nebulizer every 6 hours PRN Shortness of Breath and/or Wheezing  aluminum hydroxide/magnesium hydroxide/simethicone Suspension 30 milliLiter(s) Oral every 4 hours PRN Dyspepsia  melatonin 3 milliGRAM(s) Oral at bedtime PRN Insomnia  ondansetron Injectable 4 milliGRAM(s) IV Push every 8 hours PRN Nausea and/or Vomiting      ---___---___---___---___---___---  REVIEW OF SYSTEM:    GEN: no fever, no chills, no pain  RESP: no SOB, no cough, no sputum  CVS: no chest pain, no palpitations, no edema  GI: no abdominal pain, no nausea, no vomiting, no constipation, no diarrhea  : no dysurea, no frequency, no hematurea  Neuro: no headache, no dizziness  PSYCH: no anxiety, no depression  Derm : no itching, no rash    ---___---___---___---___---___---  VITAL SIGNS:  89y , CAPILLARY BLOOD GLUCOSE        T(C): 36.5 (24 @ 04:00), Max: 36.8 (24 @ 20:00)  HR: 64 (24 @ 04:00) (64 - 66)  BP: 135/68 (24 @ 04:00) (135/68 - 149/87)  RR: 18 (24 @ 04:00) (18 - 18)  SpO2: 98% (24 @ 04:00) (98% - 100%)  ---___---___---___---___---___---  PHYSICAL EXAM:    GEN: A&O X 1 , NAD , comfortable  HEENT: NCAT, PERRL, MMM, hearing intact  Neck: supple , no JVD  CVS: S1S2 , regular , No M/R/G appreciated  PULM: CTA B/L,  no W/R/R appreciated  ABD.: soft. non tender, non distended,  bowel sounds present  Extrem: intact pulses , no edema   Derm: No rash , no ecchymoses  PSYCH : normal mood,  no delusion not anxious     ---___---___---___---___---___---            LAB AND IMAGIN.5    8.08  )-----------( 161      ( 2024 06:44 )             30.9                   141  |  100  |  30[H]  ----------------------------<  115[H]  3.9   |  28  |  0.64    Ca    8.6      2024 06:44  Phos  3.0       Mg     2.5                                   Urinalysis Basic - ( 2024 06:44 )    Color: x / Appearance: x / SG: x / pH: x  Gluc: 115 mg/dL / Ketone: x  / Bili: x / Urobili: x   Blood: x / Protein: x / Nitrite: x   Leuk Esterase: x / RBC: x / WBC x   Sq Epi: x / Non Sq Epi: x / Bacteria: x        [All pertinent / recent Imaging reviewed]         ---___---___---___---___---___---___ ---___---___---___---___---                         A S S E S S M E N T   A N D   P L A N :      HEALTH ISSUES - PROBLEM Dx:  Acute UTI finished course of abx     COPD (chronic obstructive pulmonary disease)  continue steroids and also nebulizers maintain o2   HTN (hypertension) stable     HLD (hyperlipidemia) continue meds    Toxic encephalopathy multifactorial including but not limited to copd exacerbation  concomitant uti     Hypokalemia  resolved  Chronic diastolic heart failure  continue recommendations as per cardiology                 -GI/DVT Prophylaxis.    --------------------------------------------  Case discussed with   Education given on   ___________________________  Thank you,  Garry Hermosillo  [phone]

## 2024-11-30 LAB
ANION GAP SERPL CALC-SCNC: 13 MMOL/L — SIGNIFICANT CHANGE UP (ref 5–17)
BUN SERPL-MCNC: 32 MG/DL — HIGH (ref 7–23)
CALCIUM SERPL-MCNC: 8.6 MG/DL — SIGNIFICANT CHANGE UP (ref 8.4–10.5)
CHLORIDE SERPL-SCNC: 100 MMOL/L — SIGNIFICANT CHANGE UP (ref 96–108)
CO2 SERPL-SCNC: 29 MMOL/L — SIGNIFICANT CHANGE UP (ref 22–31)
CREAT SERPL-MCNC: 0.72 MG/DL — SIGNIFICANT CHANGE UP (ref 0.5–1.3)
EGFR: 80 ML/MIN/1.73M2 — SIGNIFICANT CHANGE UP
GLUCOSE SERPL-MCNC: 83 MG/DL — SIGNIFICANT CHANGE UP (ref 70–99)
HCT VFR BLD CALC: 34.2 % — LOW (ref 34.5–45)
HGB BLD-MCNC: 10.3 G/DL — LOW (ref 11.5–15.5)
MAGNESIUM SERPL-MCNC: 2.5 MG/DL — SIGNIFICANT CHANGE UP (ref 1.6–2.6)
MCHC RBC-ENTMCNC: 26.1 PG — LOW (ref 27–34)
MCHC RBC-ENTMCNC: 30.1 G/DL — LOW (ref 32–36)
MCV RBC AUTO: 86.6 FL — SIGNIFICANT CHANGE UP (ref 80–100)
NRBC # BLD: 0 /100 WBCS — SIGNIFICANT CHANGE UP (ref 0–0)
PLATELET # BLD AUTO: 184 K/UL — SIGNIFICANT CHANGE UP (ref 150–400)
POTASSIUM SERPL-MCNC: 3.3 MMOL/L — LOW (ref 3.5–5.3)
POTASSIUM SERPL-SCNC: 3.3 MMOL/L — LOW (ref 3.5–5.3)
RBC # BLD: 3.95 M/UL — SIGNIFICANT CHANGE UP (ref 3.8–5.2)
RBC # FLD: 15.9 % — HIGH (ref 10.3–14.5)
SODIUM SERPL-SCNC: 142 MMOL/L — SIGNIFICANT CHANGE UP (ref 135–145)
WBC # BLD: 8.24 K/UL — SIGNIFICANT CHANGE UP (ref 3.8–10.5)
WBC # FLD AUTO: 8.24 K/UL — SIGNIFICANT CHANGE UP (ref 3.8–10.5)

## 2024-11-30 RX ORDER — POTASSIUM CHLORIDE 600 MG/1
20 TABLET, EXTENDED RELEASE ORAL
Refills: 0 | Status: COMPLETED | OUTPATIENT
Start: 2024-11-30 | End: 2024-11-30

## 2024-11-30 RX ORDER — INFLUENZA VIRUS VACCINE 15; 15; 15; 15 UG/.5ML; UG/.5ML; UG/.5ML; UG/.5ML
0.5 SUSPENSION INTRAMUSCULAR ONCE
Refills: 0 | Status: DISCONTINUED | OUTPATIENT
Start: 2024-11-30 | End: 2024-12-02

## 2024-11-30 RX ADMIN — BUDESONIDE 0.25 MILLIGRAM(S): 0.25 SUSPENSION RESPIRATORY (INHALATION) at 17:41

## 2024-11-30 RX ADMIN — POLYETHYLENE GLYCOL 3350 17 GRAM(S): 17 POWDER, FOR SOLUTION ORAL at 11:48

## 2024-11-30 RX ADMIN — FLUTICASONE PROPIONATE AND SALMETEROL XINAFOATE 1 DOSE(S): 45; 21 AEROSOL, METERED RESPIRATORY (INHALATION) at 05:43

## 2024-11-30 RX ADMIN — Medication 15 MILLIGRAM(S): at 22:45

## 2024-11-30 RX ADMIN — ACETAMINOPHEN 500MG 650 MILLIGRAM(S): 500 TABLET, COATED ORAL at 11:47

## 2024-11-30 RX ADMIN — ACETAMINOPHEN 500MG 650 MILLIGRAM(S): 500 TABLET, COATED ORAL at 17:42

## 2024-11-30 RX ADMIN — ACETAMINOPHEN 500MG 650 MILLIGRAM(S): 500 TABLET, COATED ORAL at 12:30

## 2024-11-30 RX ADMIN — FUROSEMIDE 20 MILLIGRAM(S): 40 TABLET ORAL at 05:42

## 2024-11-30 RX ADMIN — TRAZODONE HYDROCHLORIDE 50 MILLIGRAM(S): 150 TABLET ORAL at 22:36

## 2024-11-30 RX ADMIN — Medication 30 MILLIGRAM(S): at 11:47

## 2024-11-30 RX ADMIN — POTASSIUM CHLORIDE 20 MILLIEQUIVALENT(S): 600 TABLET, EXTENDED RELEASE ORAL at 11:47

## 2024-11-30 RX ADMIN — Medication 1 DROP(S): at 05:43

## 2024-11-30 RX ADMIN — Medication 1 DROP(S): at 17:42

## 2024-11-30 RX ADMIN — METOPROLOL TARTRATE 50 MILLIGRAM(S): 100 TABLET, FILM COATED ORAL at 05:42

## 2024-11-30 RX ADMIN — ACETAMINOPHEN 500MG 650 MILLIGRAM(S): 500 TABLET, COATED ORAL at 05:42

## 2024-11-30 RX ADMIN — POTASSIUM CHLORIDE 20 MILLIEQUIVALENT(S): 600 TABLET, EXTENDED RELEASE ORAL at 13:01

## 2024-11-30 RX ADMIN — ENOXAPARIN SODIUM 40 MILLIGRAM(S): 30 INJECTION SUBCUTANEOUS at 17:42

## 2024-11-30 RX ADMIN — Medication 1 TABLET(S): at 11:47

## 2024-11-30 RX ADMIN — PANTOPRAZOLE SODIUM 40 MILLIGRAM(S): 40 TABLET, DELAYED RELEASE ORAL at 05:44

## 2024-11-30 RX ADMIN — Medication 500 MILLIGRAM(S): at 11:47

## 2024-11-30 RX ADMIN — FLUTICASONE PROPIONATE AND SALMETEROL XINAFOATE 1 DOSE(S): 45; 21 AEROSOL, METERED RESPIRATORY (INHALATION) at 17:41

## 2024-11-30 RX ADMIN — Medication 20 MILLIGRAM(S): at 22:36

## 2024-11-30 RX ADMIN — PREDNISOLONE ACETATE 1 DROP(S): 1.2 SUSPENSION/ DROPS OPHTHALMIC at 17:42

## 2024-11-30 RX ADMIN — BUDESONIDE 0.25 MILLIGRAM(S): 0.25 SUSPENSION RESPIRATORY (INHALATION) at 05:45

## 2024-11-30 RX ADMIN — PREDNISOLONE ACETATE 1 DROP(S): 1.2 SUSPENSION/ DROPS OPHTHALMIC at 05:43

## 2024-11-30 NOTE — PROGRESS NOTE ADULT - SUBJECTIVE AND OBJECTIVE BOX
Date of Service: 11-30-24 @ 10:15           CARDIOLOGY     PROGRESS  NOTE   ________________________________________________    CHIEF COMPLAINT:Patient is a 89y old  Female who presents with a chief complaint of lethargy (30 Nov 2024 07:03)  no complain  	  REVIEW OF SYSTEMS:  CONSTITUTIONAL: No fever, weight loss, or fatigue  EYES: No eye pain, visual disturbances, or discharge  ENT:  No difficulty hearing, tinnitus, vertigo; No sinus or throat pain  NECK: No pain or stiffness  RESPIRATORY: No cough, wheezing, chills or hemoptysis; decrease Shortness of Breath  CARDIOVASCULAR: No chest pain, palpitations, passing out, dizziness, or leg swelling  GASTROINTESTINAL: No abdominal or epigastric pain. No nausea, vomiting, or hematemesis; No diarrhea or constipation. No melena or hematochezia.  GENITOURINARY: No dysuria, frequency, hematuria, or incontinence  NEUROLOGICAL: No headaches, memory loss, loss of strength, numbness, or tremors  SKIN: No itching, burning, rashes, or lesions   LYMPH Nodes: No enlarged glands  ENDOCRINE: No heat or cold intolerance; No hair loss  MUSCULOSKELETAL: No joint pain or swelling; No muscle, back, or extremity pain  PSYCHIATRIC: No depression, anxiety, mood swings, or difficulty sleeping  HEME/LYMPH: No easy bruising, or bleeding gums  ALLERGY AND IMMUNOLOGIC: No hives or eczema	    [x ] All others negative	  [ ] Unable to obtain    PHYSICAL EXAM:  T(C): 36.8 (11-30-24 @ 05:32), Max: 37 (11-29-24 @ 12:30)  HR: 62 (11-30-24 @ 05:32) (62 - 67)  BP: 151/74 (11-30-24 @ 05:32) (112/70 - 151/74)  RR: 18 (11-30-24 @ 05:32) (18 - 18)  SpO2: 94% (11-30-24 @ 05:32) (93% - 100%)  Wt(kg): --  I&O's Summary      Appearance: Normal	  HEENT:   Normal oral mucosa, PERRL, EOMI	  Lymphatic: No lymphadenopathy  Cardiovascular: Normal S1 S2, No JVD, + murmurs, No edema  Respiratory: rhonchi  Psychiatry: A & O x 2, Mood & affect appropriate  Gastrointestinal:  Soft, Non-tender, + BS	  Skin: No rashes, No ecchymoses, No cyanosis	  Extremities: Normal range of motion, No clubbing, cyanosis or edema  Vascular: Peripheral pulses palpable 2+ bilaterally    MEDICATIONS  (STANDING):  ascorbic acid 500 milliGRAM(s) Oral daily  atorvastatin 20 milliGRAM(s) Oral at bedtime  buDESOnide    Inhalation Suspension 0.25 milliGRAM(s) Inhalation two times a day  cyclopentolate 1% Solution 1 Drop(s) Left EYE two times a day  enoxaparin Injectable 40 milliGRAM(s) SubCutaneous every 24 hours  FLUoxetine 30 milliGRAM(s) Oral daily  fluticasone propionate/ salmeterol 100-50 MICROgram(s) Diskus 1 Dose(s) Inhalation two times a day  furosemide    Tablet 20 milliGRAM(s) Oral daily  influenza  Vaccine (HIGH DOSE) 0.5 milliLiter(s) IntraMuscular once  metoprolol succinate ER 50 milliGRAM(s) Oral daily  multivitamin 1 Tablet(s) Oral daily  pantoprazole    Tablet 40 milliGRAM(s) Oral before breakfast  polyethylene glycol 3350 17 Gram(s) Oral daily  prednisoLONE acetate 1% Suspension 1 Drop(s) Left EYE two times a day  temazepam 15 milliGRAM(s) Oral at bedtime  traZODone 50 milliGRAM(s) Oral at bedtime      TELEMETRY: 	    ECG:  	  RADIOLOGY:  OTHER: 	  	  LABS:	 	    CARDIAC MARKERS:                                10.3   8.24  )-----------( 184      ( 30 Nov 2024 06:55 )             34.2     11-30    142  |  100  |  32[H]  ----------------------------<  83  3.3[L]   |  29  |  0.72    Ca    8.6      30 Nov 2024 06:55  Phos  3.0     11-29  Mg     2.5     11-30      proBNP:   Lipid Profile:   HgA1c:   TSH:         Assessment and plan  ---------------------------  89y F pmh htn, hld, CAD, diastolic CHF, right hemidiaphragm paralysis, hypothyroidism, dementia, COPD on 2 L NC presenting from [facility] assisted living for episode of altered mental status today at 6:30 PM. Per aid who is providing hx patient was in her normal mental state at 615, was eating dinner and became slumped over, mumbling, less responsive.  States during that episode patient did not know her name and was confused. Patient has now returned to baseline mentation ( A&Ox3).  pt is well known to me with hx of pleural effusion, sarcoidosis and emphysema with lethargy and sob  ua, + pt with hx of MRSA in urine, may need ID eval  continue current cardiac m,eds  will add Norvasc if bp remains elevated  continue Lasix  Duoneb  uti, continue abx  check cultures  physical therapy  chest x ray noted  doing much better, increase ambulation/ physical therapy  continue LASIX  off steroid  change lasix to po, doing much bettere  dc planning  replete K

## 2024-11-30 NOTE — PROGRESS NOTE ADULT - SUBJECTIVE AND OBJECTIVE BOX
date of service: 11-30-24 @ 07:03  [facility]  REVIEW OF SYSTEMS:  CONSTITUTIONAL: No fever,  no  weight loss  ENT:  No  tinnitus,   no   vertigo  NECK: No pain or stiffness  RESPIRATORY: No cough, wheezing, chills or hemoptysis;    No Shortness of Breath  CARDIOVASCULAR: No chest pain, palpitations, dizziness  GASTROINTESTINAL: No abdominal or epigastric pain. No nausea, vomiting, or hematemesis; No diarrhea  No melena or hematochezia.  GENITOURINARY: No dysuria, frequency, hematuria, or incontinence  NEUROLOGICAL: No headaches  SKIN: No itching,  no   rash  LYMPH Nodes: No enlarged glands  ENDOCRINE: No heat or cold intolerance  MUSCULOSKELETAL: No joint pain or swelling  PSYCHIATRIC: No depression, anxiety  HEME/LYMPH: No easy bruising, or bleeding gums  ALLERGY AND IMMUNOLOGIC: No hives or eczema	    MEDICATIONS  (STANDING):  ascorbic acid 500 milliGRAM(s) Oral daily  atorvastatin 20 milliGRAM(s) Oral at bedtime  buDESOnide    Inhalation Suspension 0.25 milliGRAM(s) Inhalation two times a day  cyclopentolate 1% Solution 1 Drop(s) Left EYE two times a day  enoxaparin Injectable 40 milliGRAM(s) SubCutaneous every 24 hours  FLUoxetine 30 milliGRAM(s) Oral daily  fluticasone propionate/ salmeterol 100-50 MICROgram(s) Diskus 1 Dose(s) Inhalation two times a day  furosemide    Tablet 20 milliGRAM(s) Oral daily  metoprolol succinate ER 50 milliGRAM(s) Oral daily  multivitamin 1 Tablet(s) Oral daily  pantoprazole    Tablet 40 milliGRAM(s) Oral before breakfast  polyethylene glycol 3350 17 Gram(s) Oral daily  prednisoLONE acetate 1% Suspension 1 Drop(s) Left EYE two times a day  temazepam 15 milliGRAM(s) Oral at bedtime  traZODone 50 milliGRAM(s) Oral at bedtime    MEDICATIONS  (PRN):  acetaminophen     Tablet .. 650 milliGRAM(s) Oral every 6 hours PRN Temp greater or equal to 38C (100.4F), Mild Pain (1 - 3)  albuterol/ipratropium for Nebulization 3 milliLiter(s) Nebulizer every 6 hours PRN Shortness of Breath and/or Wheezing  aluminum hydroxide/magnesium hydroxide/simethicone Suspension 30 milliLiter(s) Oral every 4 hours PRN Dyspepsia  melatonin 3 milliGRAM(s) Oral at bedtime PRN Insomnia  ondansetron Injectable 4 milliGRAM(s) IV Push every 8 hours PRN Nausea and/or Vomiting      Vital Signs Last 24 Hrs  T(C): 36.8 (30 Nov 2024 05:32), Max: 37 (29 Nov 2024 12:30)  T(F): 98.2 (30 Nov 2024 05:32), Max: 98.6 (29 Nov 2024 12:30)  HR: 62 (30 Nov 2024 05:32) (62 - 67)  BP: 151/74 (30 Nov 2024 05:32) (112/70 - 151/74)  BP(mean): --  RR: 18 (30 Nov 2024 05:32) (18 - 18)  SpO2: 94% (30 Nov 2024 05:32) (93% - 100%)    Parameters below as of 30 Nov 2024 05:32  Patient On (Oxygen Delivery Method): nasal cannula  O2 Flow (L/min): 3    CAPILLARY BLOOD GLUCOSE        I&O's Summary        Appearance: Normal	  HEENT:   Normal oral mucosa, PERRL, EOMI	  Lymphatic: No lymphadenopathy  Cardiovascular: Normal S1 S2, No JVD  Respiratory: Lungs clear to auscultation	  Gastrointestinal:  Soft, Non-tender, + BS	  Skin: No rash, No ecchymoses	  Extremities:     LABS:                        9.5    8.08  )-----------( 161      ( 29 Nov 2024 06:44 )             30.9     11-29    141  |  100  |  30[H]  ----------------------------<  115[H]  3.9   |  28  |  0.64    Ca    8.6      29 Nov 2024 06:44  Phos  3.0     11-29  Mg     2.5     11-29            Urinalysis Basic - ( 29 Nov 2024 06:44 )    Color: x / Appearance: x / SG: x / pH: x  Gluc: 115 mg/dL / Ketone: x  / Bili: x / Urobili: x   Blood: x / Protein: x / Nitrite: x   Leuk Esterase: x / RBC: x / WBC x   Sq Epi: x / Non Sq Epi: x / Bacteria: x                      Consultant(s) Notes Reviewed:      Care Discussed with Consultants/Other Providers:

## 2024-12-01 LAB
ANION GAP SERPL CALC-SCNC: 13 MMOL/L — SIGNIFICANT CHANGE UP (ref 5–17)
BUN SERPL-MCNC: 28 MG/DL — HIGH (ref 7–23)
CALCIUM SERPL-MCNC: 9 MG/DL — SIGNIFICANT CHANGE UP (ref 8.4–10.5)
CHLORIDE SERPL-SCNC: 100 MMOL/L — SIGNIFICANT CHANGE UP (ref 96–108)
CO2 SERPL-SCNC: 26 MMOL/L — SIGNIFICANT CHANGE UP (ref 22–31)
CREAT SERPL-MCNC: 0.78 MG/DL — SIGNIFICANT CHANGE UP (ref 0.5–1.3)
EGFR: 73 ML/MIN/1.73M2 — SIGNIFICANT CHANGE UP
GLUCOSE SERPL-MCNC: 92 MG/DL — SIGNIFICANT CHANGE UP (ref 70–99)
MAGNESIUM SERPL-MCNC: 2.3 MG/DL — SIGNIFICANT CHANGE UP (ref 1.6–2.6)
POTASSIUM SERPL-MCNC: 4 MMOL/L — SIGNIFICANT CHANGE UP (ref 3.5–5.3)
POTASSIUM SERPL-SCNC: 4 MMOL/L — SIGNIFICANT CHANGE UP (ref 3.5–5.3)
SODIUM SERPL-SCNC: 139 MMOL/L — SIGNIFICANT CHANGE UP (ref 135–145)

## 2024-12-01 RX ORDER — TEMAZEPAM 15 MG
15 CAPSULE ORAL AT BEDTIME
Refills: 0 | Status: DISCONTINUED | OUTPATIENT
Start: 2024-12-02 | End: 2024-12-02

## 2024-12-01 RX ADMIN — PREDNISOLONE ACETATE 1 DROP(S): 1.2 SUSPENSION/ DROPS OPHTHALMIC at 05:33

## 2024-12-01 RX ADMIN — BUDESONIDE 0.25 MILLIGRAM(S): 0.25 SUSPENSION RESPIRATORY (INHALATION) at 18:17

## 2024-12-01 RX ADMIN — PANTOPRAZOLE SODIUM 40 MILLIGRAM(S): 40 TABLET, DELAYED RELEASE ORAL at 05:31

## 2024-12-01 RX ADMIN — Medication 500 MILLIGRAM(S): at 12:56

## 2024-12-01 RX ADMIN — Medication 15 MILLIGRAM(S): at 21:17

## 2024-12-01 RX ADMIN — FLUTICASONE PROPIONATE AND SALMETEROL XINAFOATE 1 DOSE(S): 45; 21 AEROSOL, METERED RESPIRATORY (INHALATION) at 18:18

## 2024-12-01 RX ADMIN — Medication 1 DROP(S): at 18:17

## 2024-12-01 RX ADMIN — Medication 30 MILLIGRAM(S): at 12:56

## 2024-12-01 RX ADMIN — ENOXAPARIN SODIUM 40 MILLIGRAM(S): 30 INJECTION SUBCUTANEOUS at 18:17

## 2024-12-01 RX ADMIN — PREDNISOLONE ACETATE 1 DROP(S): 1.2 SUSPENSION/ DROPS OPHTHALMIC at 18:18

## 2024-12-01 RX ADMIN — Medication 20 MILLIGRAM(S): at 21:17

## 2024-12-01 RX ADMIN — Medication 1 TABLET(S): at 12:56

## 2024-12-01 RX ADMIN — Medication 1 DROP(S): at 05:33

## 2024-12-01 RX ADMIN — METOPROLOL TARTRATE 50 MILLIGRAM(S): 100 TABLET, FILM COATED ORAL at 05:28

## 2024-12-01 RX ADMIN — BUDESONIDE 0.25 MILLIGRAM(S): 0.25 SUSPENSION RESPIRATORY (INHALATION) at 05:33

## 2024-12-01 RX ADMIN — TRAZODONE HYDROCHLORIDE 50 MILLIGRAM(S): 150 TABLET ORAL at 21:17

## 2024-12-01 RX ADMIN — ACETAMINOPHEN, DIPHENHYDRAMINE HCL, PHENYLEPHRINE HCL 3 MILLIGRAM(S): 325; 25; 5 TABLET ORAL at 21:17

## 2024-12-01 RX ADMIN — FLUTICASONE PROPIONATE AND SALMETEROL XINAFOATE 1 DOSE(S): 45; 21 AEROSOL, METERED RESPIRATORY (INHALATION) at 05:36

## 2024-12-01 RX ADMIN — FUROSEMIDE 20 MILLIGRAM(S): 40 TABLET ORAL at 05:29

## 2024-12-01 NOTE — PROGRESS NOTE ADULT - SUBJECTIVE AND OBJECTIVE BOX
Date of Service: 12-01-24 @ 14:04    Patient is a 89y old  Female who presents with a chief complaint of lethargy (01 Dec 2024 10:08)      Any change in ROS: seems to be doing  ok :  no sob  on 2 L of oxygen : responds to simple commands:   not resp distress     MEDICATIONS  (STANDING):  ascorbic acid 500 milliGRAM(s) Oral daily  atorvastatin 20 milliGRAM(s) Oral at bedtime  buDESOnide    Inhalation Suspension 0.25 milliGRAM(s) Inhalation two times a day  cyclopentolate 1% Solution 1 Drop(s) Left EYE two times a day  enoxaparin Injectable 40 milliGRAM(s) SubCutaneous every 24 hours  FLUoxetine 30 milliGRAM(s) Oral daily  fluticasone propionate/ salmeterol 100-50 MICROgram(s) Diskus 1 Dose(s) Inhalation two times a day  furosemide    Tablet 20 milliGRAM(s) Oral daily  influenza  Vaccine (HIGH DOSE) 0.5 milliLiter(s) IntraMuscular once  metoprolol succinate ER 50 milliGRAM(s) Oral daily  multivitamin 1 Tablet(s) Oral daily  pantoprazole    Tablet 40 milliGRAM(s) Oral before breakfast  polyethylene glycol 3350 17 Gram(s) Oral daily  prednisoLONE acetate 1% Suspension 1 Drop(s) Left EYE two times a day  temazepam 15 milliGRAM(s) Oral at bedtime  traZODone 50 milliGRAM(s) Oral at bedtime    MEDICATIONS  (PRN):  acetaminophen     Tablet .. 650 milliGRAM(s) Oral every 6 hours PRN Temp greater or equal to 38C (100.4F), Mild Pain (1 - 3)  albuterol/ipratropium for Nebulization 3 milliLiter(s) Nebulizer every 6 hours PRN Shortness of Breath and/or Wheezing  aluminum hydroxide/magnesium hydroxide/simethicone Suspension 30 milliLiter(s) Oral every 4 hours PRN Dyspepsia  melatonin 3 milliGRAM(s) Oral at bedtime PRN Insomnia  ondansetron Injectable 4 milliGRAM(s) IV Push every 8 hours PRN Nausea and/or Vomiting    Vital Signs Last 24 Hrs  T(C): 36.3 (01 Dec 2024 13:03), Max: 36.4 (30 Nov 2024 20:12)  T(F): 97.4 (01 Dec 2024 13:03), Max: 97.5 (30 Nov 2024 20:12)  HR: 66 (01 Dec 2024 13:03) (62 - 76)  BP: 107/57 (01 Dec 2024 13:03) (107/57 - 126/69)  BP(mean): --  RR: 18 (01 Dec 2024 13:03) (17 - 18)  SpO2: 95% (01 Dec 2024 13:03) (93% - 95%)    Parameters below as of 01 Dec 2024 13:03  Patient On (Oxygen Delivery Method): nasal cannula  O2 Flow (L/min): 2      I&O's Summary    01 Dec 2024 07:01  -  01 Dec 2024 14:04  --------------------------------------------------------  IN: 550 mL / OUT: 600 mL / NET: -50 mL          Physical Exam:   GENERAL: cachectic  HEENT: JIM/   Atraumatic, Normocephalic  ENMT: No tonsillar erythema, exudates, or enlargement; Moist mucous membranes, Good dentition, No lesions  NECK: Supple, No JVD, Normal thyroid  CHEST/LUNG: Clear to auscultaio  CVS: Regular rate and rhythm; No murmurs, rubs, or gallops  GI: : - present  NERVOUS SYSTEM:  Alert & awake  EXTREMITIES:- edema  LYMPH: No lymphadenopathy noted  SKIN: No rashes or lesions  ENDOCRINOLOGY: No Thyromegaly  PSYCH: calm     Labs:                              10.3   8.24  )-----------( 184      ( 30 Nov 2024 06:55 )             34.2                         9.5    8.08  )-----------( 161      ( 29 Nov 2024 06:44 )             30.9     12-01    139  |  100  |  28[H]  ----------------------------<  92  4.0   |  26  |  0.78  11-30    142  |  100  |  32[H]  ----------------------------<  83  3.3[L]   |  29  |  0.72  11-29    141  |  100  |  30[H]  ----------------------------<  115[H]  3.9   |  28  |  0.64  11-28    141  |  99  |  30[H]  ----------------------------<  175[H]  4.2   |  28  |  0.66    Ca    9.0      01 Dec 2024 06:48  Ca    8.6      30 Nov 2024 06:55  Mg     2.3     12-01  Mg     2.5     11-30      CAPILLARY BLOOD GLUCOSE              Urinalysis Basic - ( 01 Dec 2024 06:48 )    Color: x / Appearance: x / SG: x / pH: x  Gluc: 92 mg/dL / Ketone: x  / Bili: x / Urobili: x   Blood: x / Protein: x / Nitrite: x   Leuk Esterase: x / RBC: x / WBC x   Sq Epi: x / Non Sq Epi: x / Bacteria: x      D-Dimer Assay, Quantitative: 480 ng/mL DDU (11-25 @ 15:58)    rad< from: [facility] Duplex Lower Ext Vein Scan, Bilat (11.26.24 @ 11:18) >  Doppler examination shows normal spontaneous and phasic flow.  No LEFT calf vein thrombosis is detected.    IMPRESSION:  No evidence of deep venous thrombosis in either lower extremity.            --- End of Report ---            ANUEL HELLER MD; Attending Interventional Radiologist  This document has been electronically signed. Nov 26 2024 11:    < end of copied text >  < from: Xray Chest 1 View- PORTABLE-Urgent (Xray Chest 1 View- PORTABLE-Urgent .) (11.25.24 @ 15:56) >  The heart size is not well evaluated in this projection.  Bibasilar atelectasis. Otherwise, the lungs are clear.  There is no pneumothorax or pleural effusion.  There are no acute osseous abnormalities. Chronic appearing bilateral   shoulder deformities, likely degenerative.    IMPRESSION:  Bibasilar atelectasis. Otherwise, the lungs are clear.    --- End of Report ---           ROMIE DURBIN MD; Resident Radiologist  This document has been electronically signed.  AVTAR SANCHEZ MD; Attending Radiologist  This document has been electronically signed. Nov 26 2024  1:43PM    < end of copied text >  < from: CT Chest No Cont (11.23.24 @ 23:40) >  HEART: Cardiomegaly. No pericardial effusion.  MEDIASTINUM AND RASHID: No lymphadenopathy.  CHEST WALL AND LOWER NECK: Within normal limits.  VISUALIZED UPPER ABDOMEN: Within normal limits.  BONES: Degenerative changes. Prior left shoulder surgery.    IMPRESSION:    Centrilobular upper lobe emphysema. Mild bibasilar atelectasis.      --- End of Report ---            SUSAN MANCIA MD; Attending Radiologist  This document has been electronically signed. Nov 24 2024 12:42AM    < end of copied text >      RECENT CULTURES:        RESPIRATORY CULTURES:          Studies  Chest X-RAY  CT SCAN Chest   Venous Dopplers: LE:   CT Abdomen  Others

## 2024-12-01 NOTE — PROGRESS NOTE ADULT - SUBJECTIVE AND OBJECTIVE BOX
---___---___---___---___---___---___ ---___---___---___---___---___---___---___---___---                  M E D I C A L   A T T E N D I N G   P R O G R E S S   N O T E  ---___---___---___---___---___---___ ---___---___---___---___---___---___---___---___---        ================================================    ++CHIEF COMPLAINT:   Patient is a 89y old  Female who presents with a chief complaint of lethargy (30 Nov 2024 10:15)      Urinary tract infection      ---___---___---___---___---___---  PAST MEDICAL / Surgical  HISTORY:  PAST MEDICAL & SURGICAL HISTORY:  HTN (hypertension)      Hypothyroidism      Osteoporosis      CAD (coronary artery disease)      COPD (chronic obstructive pulmonary disease)      Pulmonary sarcoidosis      H/O pyelonephritis      Acute diverticulitis          ---___---___---___---___---___---  FAMILY HISTORY:   FAMILY HISTORY:        ---___---___---___---___---___---  ALLERGIES:   Allergies    iodinated radiocontrast agents (Anaphylaxis)    Intolerances        ---___---___---___---___---___---  MEDICATIONS:  MEDICATIONS  (STANDING):  ascorbic acid 500 milliGRAM(s) Oral daily  atorvastatin 20 milliGRAM(s) Oral at bedtime  buDESOnide    Inhalation Suspension 0.25 milliGRAM(s) Inhalation two times a day  cyclopentolate 1% Solution 1 Drop(s) Left EYE two times a day  enoxaparin Injectable 40 milliGRAM(s) SubCutaneous every 24 hours  FLUoxetine 30 milliGRAM(s) Oral daily  fluticasone propionate/ salmeterol 100-50 MICROgram(s) Diskus 1 Dose(s) Inhalation two times a day  furosemide    Tablet 20 milliGRAM(s) Oral daily  influenza  Vaccine (HIGH DOSE) 0.5 milliLiter(s) IntraMuscular once  metoprolol succinate ER 50 milliGRAM(s) Oral daily  multivitamin 1 Tablet(s) Oral daily  pantoprazole    Tablet 40 milliGRAM(s) Oral before breakfast  polyethylene glycol 3350 17 Gram(s) Oral daily  prednisoLONE acetate 1% Suspension 1 Drop(s) Left EYE two times a day  temazepam 15 milliGRAM(s) Oral at bedtime  traZODone 50 milliGRAM(s) Oral at bedtime    MEDICATIONS  (PRN):  acetaminophen     Tablet .. 650 milliGRAM(s) Oral every 6 hours PRN Temp greater or equal to 38C (100.4F), Mild Pain (1 - 3)  albuterol/ipratropium for Nebulization 3 milliLiter(s) Nebulizer every 6 hours PRN Shortness of Breath and/or Wheezing  aluminum hydroxide/magnesium hydroxide/simethicone Suspension 30 milliLiter(s) Oral every 4 hours PRN Dyspepsia  melatonin 3 milliGRAM(s) Oral at bedtime PRN Insomnia  ondansetron Injectable 4 milliGRAM(s) IV Push every 8 hours PRN Nausea and/or Vomiting      ---___---___---___---___---___---  REVIEW OF SYSTEM:    GEN: no fever, no chills, no pain  RESP: no SOB, no cough, no sputum  CVS: no chest pain, no palpitations, no edema  GI: no abdominal pain, no nausea, no vomiting, no constipation, no diarrhea  : no dysurea, no frequency, no hematurea  Neuro: no headache, no dizziness  PSYCH: no anxiety, no depression  Derm : no itching, no rash    ---___---___---___---___---___---  VITAL SIGNS:  89y , CAPILLARY BLOOD GLUCOSE        T(C): 36 (12-01-24 @ 04:25), Max: 36.5 (11-30-24 @ 11:51)  HR: 62 (12-01-24 @ 04:25) (62 - 76)  BP: 126/69 (12-01-24 @ 05:44) (109/53 - 126/69)  RR: 18 (12-01-24 @ 04:25) (17 - 18)  SpO2: 93% (12-01-24 @ 04:25) (93% - 94%)  ---___---___---___---___---___---  PHYSICAL EXAM:    GEN: A&O X 3 , NAD , comfortable  HEENT: NCAT, PERRL, MMM, hearing intact  Neck: supple , no JVD  CVS: S1S2 , regular , No M/R/G appreciated  PULM: CTA B/L,  no W/R/R appreciated  ABD.: soft. non tender, non distended,  bowel sounds present  Extrem: intact pulses , no edema   Derm: No rash , no ecchymoses  PSYCH : normal mood,  no delusion not anxious     ---___---___---___---___---___---            LAB AND IMAGING:                          10.3   8.24  )-----------( 184      ( 30 Nov 2024 06:55 )             34.2               12-01    139  |  100  |  28[H]  ----------------------------<  92  4.0   |  26  |  0.78    Ca    9.0      01 Dec 2024 06:48  Mg     2.3     12-01                              Urinalysis Basic - ( 01 Dec 2024 06:48 )    Color: x / Appearance: x / SG: x / pH: x  Gluc: 92 mg/dL / Ketone: x  / Bili: x / Urobili: x   Blood: x / Protein: x / Nitrite: x   Leuk Esterase: x / RBC: x / WBC x   Sq Epi: x / Non Sq Epi: x / Bacteria: x        [All pertinent / recent Imaging reviewed]         ---___---___---___---___---___---___ ---___---___---___---___---                         A S S E S S M E N T   A N D   P L A N :      HEALTH ISSUES - PROBLEM Dx:  Acute UTI finished course of abx     COPD (chronic obstructive pulmonary disease)  continue steroids and also nebulizers maintain o2   HTN (hypertension) stable     HLD (hyperlipidemia) continue meds    Toxic encephalopathy multifactorial including but not limited to copd exacerbation  concomitant uti     Hypokalemia  resolved  Chronic diastolic heart failure  continue recommendations as per cardiology     start dc plannigng back to skilled nursing                  ___________________________  Thank you,  Garry Hermosillo  [phone]       ---___---___---___---___---___---___ ---___---___---___---___---___---___---___---___---                  M E D I C A L   A T T E N D I N G   P R O G R E S S   N O T E  ---___---___---___---___---___---___ ---___---___---___---___---___---___---___---___---        ================================================    ++CHIEF COMPLAINT:   Patient is a 89y old  Female who presents with a chief complaint of lethargy (30 Nov 2024 10:15)      Urinary tract infection      ---___---___---___---___---___---  PAST MEDICAL / Surgical  HISTORY:  PAST MEDICAL & SURGICAL HISTORY:  HTN (hypertension)      Hypothyroidism      Osteoporosis      CAD (coronary artery disease)      COPD (chronic obstructive pulmonary disease)      Pulmonary sarcoidosis      H/O pyelonephritis      Acute diverticulitis          ---___---___---___---___---___---  FAMILY HISTORY:   FAMILY HISTORY:        ---___---___---___---___---___---  ALLERGIES:   Allergies    iodinated radiocontrast agents (Anaphylaxis)    Intolerances        ---___---___---___---___---___---  MEDICATIONS:  MEDICATIONS  (STANDING):  ascorbic acid 500 milliGRAM(s) Oral daily  atorvastatin 20 milliGRAM(s) Oral at bedtime  buDESOnide    Inhalation Suspension 0.25 milliGRAM(s) Inhalation two times a day  cyclopentolate 1% Solution 1 Drop(s) Left EYE two times a day  enoxaparin Injectable 40 milliGRAM(s) SubCutaneous every 24 hours  FLUoxetine 30 milliGRAM(s) Oral daily  fluticasone propionate/ salmeterol 100-50 MICROgram(s) Diskus 1 Dose(s) Inhalation two times a day  furosemide    Tablet 20 milliGRAM(s) Oral daily  influenza  Vaccine (HIGH DOSE) 0.5 milliLiter(s) IntraMuscular once  metoprolol succinate ER 50 milliGRAM(s) Oral daily  multivitamin 1 Tablet(s) Oral daily  pantoprazole    Tablet 40 milliGRAM(s) Oral before breakfast  polyethylene glycol 3350 17 Gram(s) Oral daily  prednisoLONE acetate 1% Suspension 1 Drop(s) Left EYE two times a day  temazepam 15 milliGRAM(s) Oral at bedtime  traZODone 50 milliGRAM(s) Oral at bedtime    MEDICATIONS  (PRN):  acetaminophen     Tablet .. 650 milliGRAM(s) Oral every 6 hours PRN Temp greater or equal to 38C (100.4F), Mild Pain (1 - 3)  albuterol/ipratropium for Nebulization 3 milliLiter(s) Nebulizer every 6 hours PRN Shortness of Breath and/or Wheezing  aluminum hydroxide/magnesium hydroxide/simethicone Suspension 30 milliLiter(s) Oral every 4 hours PRN Dyspepsia  melatonin 3 milliGRAM(s) Oral at bedtime PRN Insomnia  ondansetron Injectable 4 milliGRAM(s) IV Push every 8 hours PRN Nausea and/or Vomiting      ---___---___---___---___---___---  REVIEW OF SYSTEM:    GEN: no fever, no chills, no pain  RESP: no SOB, no cough, no sputum  CVS: no chest pain, no palpitations, no edema  GI: no abdominal pain, no nausea, no vomiting, no constipation, no diarrhea  : no dysurea, no frequency, no hematurea  Neuro: no headache, no dizziness  PSYCH: no anxiety, no depression  Derm : no itching, no rash    ---___---___---___---___---___---  VITAL SIGNS:  89y , CAPILLARY BLOOD GLUCOSE        T(C): 36 (12-01-24 @ 04:25), Max: 36.5 (11-30-24 @ 11:51)  HR: 62 (12-01-24 @ 04:25) (62 - 76)  BP: 126/69 (12-01-24 @ 05:44) (109/53 - 126/69)  RR: 18 (12-01-24 @ 04:25) (17 - 18)  SpO2: 93% (12-01-24 @ 04:25) (93% - 94%)  ---___---___---___---___---___---  PHYSICAL EXAM:    GEN: A&O X 1 , NAD , comfortable  HEENT: NCAT, PERRL, MMM, hearing intact  Neck: supple , no JVD  CVS: S1S2 , regular , No M/R/G appreciated  PULM: CTA B/L,  no W/R/R appreciated  ABD.: soft. non tender, non distended,  bowel sounds present  Extrem: intact pulses , no edema   Derm: No rash , no ecchymoses  PSYCH : normal mood,  no delusion not anxious     ---___---___---___---___---___---            LAB AND IMAGING:                          10.3   8.24  )-----------( 184      ( 30 Nov 2024 06:55 )             34.2               12-01    139  |  100  |  28[H]  ----------------------------<  92  4.0   |  26  |  0.78    Ca    9.0      01 Dec 2024 06:48  Mg     2.3     12-01                              Urinalysis Basic - ( 01 Dec 2024 06:48 )    Color: x / Appearance: x / SG: x / pH: x  Gluc: 92 mg/dL / Ketone: x  / Bili: x / Urobili: x   Blood: x / Protein: x / Nitrite: x   Leuk Esterase: x / RBC: x / WBC x   Sq Epi: x / Non Sq Epi: x / Bacteria: x        [All pertinent / recent Imaging reviewed]         ---___---___---___---___---___---___ ---___---___---___---___---                         A S S E S S M E N T   A N D   P L A N :      HEALTH ISSUES - PROBLEM Dx:  Acute UTI finished course of abx     COPD (chronic obstructive pulmonary disease)  continue steroids and also nebulizers maintain o2   HTN (hypertension) stable     HLD (hyperlipidemia) continue meds    Toxic encephalopathy multifactorial including but not limited to copd exacerbation  concomitant uti     Hypokalemia  resolved  Chronic diastolic heart failure  continue recommendations as per cardiology     start dc plannigng back to half-way                  ___________________________  Thank you,  Garry Hermosillo  [phone]

## 2024-12-01 NOTE — PROGRESS NOTE ADULT - SUBJECTIVE AND OBJECTIVE BOX
Date of Service: 12-01-24 @ 10:08           CARDIOLOGY     PROGRESS  NOTE   ________________________________________________    CHIEF COMPLAINT:Patient is a 89y old  Female who presents with a chief complaint of lethargy (01 Dec 2024 09:04)  no complain  	  REVIEW OF SYSTEMS:  CONSTITUTIONAL: No fever, weight loss, or fatigue  EYES: No eye pain, visual disturbances, or discharge  ENT:  No difficulty hearing, tinnitus, vertigo; No sinus or throat pain  NECK: No pain or stiffness  RESPIRATORY: No cough, wheezing, chills or hemoptysis; No Shortness of Breath  CARDIOVASCULAR: No chest pain, palpitations, passing out, dizziness, or leg swelling  GASTROINTESTINAL: No abdominal or epigastric pain. No nausea, vomiting, or hematemesis; No diarrhea or constipation. No melena or hematochezia.  GENITOURINARY: No dysuria, frequency, hematuria, or incontinence  NEUROLOGICAL: No headaches, memory loss, loss of strength, numbness, or tremors  SKIN: No itching, burning, rashes, or lesions   LYMPH Nodes: No enlarged glands  ENDOCRINE: No heat or cold intolerance; No hair loss  MUSCULOSKELETAL: No joint pain or swelling; No muscle, back, or extremity pain  PSYCHIATRIC: No depression, anxiety, mood swings, or difficulty sleeping  HEME/LYMPH: No easy bruising, or bleeding gums  ALLERGY AND IMMUNOLOGIC: No hives or eczema	    [x ] All others negative	  [ ] Unable to obtain    PHYSICAL EXAM:  T(C): 36 (12-01-24 @ 04:25), Max: 36.5 (11-30-24 @ 11:51)  HR: 62 (12-01-24 @ 04:25) (62 - 76)  BP: 126/69 (12-01-24 @ 05:44) (109/53 - 126/69)  RR: 18 (12-01-24 @ 04:25) (17 - 18)  SpO2: 93% (12-01-24 @ 04:25) (93% - 94%)  Wt(kg): --  I&O's Summary      Appearance: Normal	  HEENT:   Normal oral mucosa, PERRL, EOMI	  Lymphatic: No lymphadenopathy  Cardiovascular: Normal S1 S2, No JVD, + murmurs, No edema  Respiratory: rhonchi  Gastrointestinal:  Soft, Non-tender, + BS	  Skin: No rashes, No ecchymoses, No cyanosis	  Extremities: Normal range of motion, No clubbing, cyanosis or edema  Vascular: Peripheral pulses palpable 2+ bilaterally    MEDICATIONS  (STANDING):  ascorbic acid 500 milliGRAM(s) Oral daily  atorvastatin 20 milliGRAM(s) Oral at bedtime  buDESOnide    Inhalation Suspension 0.25 milliGRAM(s) Inhalation two times a day  cyclopentolate 1% Solution 1 Drop(s) Left EYE two times a day  enoxaparin Injectable 40 milliGRAM(s) SubCutaneous every 24 hours  FLUoxetine 30 milliGRAM(s) Oral daily  fluticasone propionate/ salmeterol 100-50 MICROgram(s) Diskus 1 Dose(s) Inhalation two times a day  furosemide    Tablet 20 milliGRAM(s) Oral daily  influenza  Vaccine (HIGH DOSE) 0.5 milliLiter(s) IntraMuscular once  metoprolol succinate ER 50 milliGRAM(s) Oral daily  multivitamin 1 Tablet(s) Oral daily  pantoprazole    Tablet 40 milliGRAM(s) Oral before breakfast  polyethylene glycol 3350 17 Gram(s) Oral daily  prednisoLONE acetate 1% Suspension 1 Drop(s) Left EYE two times a day  temazepam 15 milliGRAM(s) Oral at bedtime  traZODone 50 milliGRAM(s) Oral at bedtime      TELEMETRY: 	    ECG:  	  RADIOLOGY:  OTHER: 	  	  LABS:	 	    CARDIAC MARKERS:                                10.3   8.24  )-----------( 184      ( 30 Nov 2024 06:55 )             34.2     12-01    139  |  100  |  28[H]  ----------------------------<  92  4.0   |  26  |  0.78    Ca    9.0      01 Dec 2024 06:48  Mg     2.3     12-01      proBNP:   Lipid Profile:   HgA1c:   TSH:     Notes: Chart reviewed. Patient remains in house on iv solumedrol. patient  remains on 4-5 liters n/c case management remains available to assist in  discharge planning.    Assessment and plan  ---------------------------  89y F pmh htn, hld, CAD, diastolic CHF, right hemidiaphragm paralysis, hypothyroidism, dementia, COPD on 2 L NC [facility] for episode of altered mental status today at 6:30 PM. Per aid who is providing hx patient was in her normal mental state at 615, was eating dinner and became slumped over, mumbling, less responsive.  States during that episode patient did not know her name and was confused. Patient has now returned to baseline mentation ( A&Ox3).  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

## 2024-12-02 ENCOUNTER — TRANSCRIPTION ENCOUNTER (OUTPATIENT)
Age: 89
End: 2024-12-02

## 2024-12-02 VITALS
TEMPERATURE: 98 F | HEART RATE: 67 BPM | DIASTOLIC BLOOD PRESSURE: 75 MMHG | OXYGEN SATURATION: 98 % | SYSTOLIC BLOOD PRESSURE: 115 MMHG | RESPIRATION RATE: 19 BRPM

## 2024-12-02 PROCEDURE — 82803 BLOOD GASES ANY COMBINATION: CPT

## 2024-12-02 PROCEDURE — 93970 EXTREMITY STUDY: CPT

## 2024-12-02 PROCEDURE — 70450 CT HEAD/BRAIN W/O DYE: CPT | Mod: MC

## 2024-12-02 PROCEDURE — 83735 ASSAY OF MAGNESIUM: CPT

## 2024-12-02 PROCEDURE — 0241U: CPT

## 2024-12-02 PROCEDURE — 85025 COMPLETE CBC W/AUTO DIFF WBC: CPT

## 2024-12-02 PROCEDURE — 36600 WITHDRAWAL OF ARTERIAL BLOOD: CPT

## 2024-12-02 PROCEDURE — 82435 ASSAY OF BLOOD CHLORIDE: CPT

## 2024-12-02 PROCEDURE — 84100 ASSAY OF PHOSPHORUS: CPT

## 2024-12-02 PROCEDURE — 36415 COLL VENOUS BLD VENIPUNCTURE: CPT

## 2024-12-02 PROCEDURE — 82947 ASSAY GLUCOSE BLOOD QUANT: CPT

## 2024-12-02 PROCEDURE — 87086 URINE CULTURE/COLONY COUNT: CPT

## 2024-12-02 PROCEDURE — 87077 CULTURE AEROBIC IDENTIFY: CPT

## 2024-12-02 PROCEDURE — 85730 THROMBOPLASTIN TIME PARTIAL: CPT

## 2024-12-02 PROCEDURE — 96374 THER/PROPH/DIAG INJ IV PUSH: CPT

## 2024-12-02 PROCEDURE — 84295 ASSAY OF SERUM SODIUM: CPT

## 2024-12-02 PROCEDURE — 85027 COMPLETE CBC AUTOMATED: CPT

## 2024-12-02 PROCEDURE — 71250 CT THORAX DX C-: CPT | Mod: MC

## 2024-12-02 PROCEDURE — 85014 HEMATOCRIT: CPT

## 2024-12-02 PROCEDURE — 99285 EMERGENCY DEPT VISIT HI MDM: CPT | Mod: 25

## 2024-12-02 PROCEDURE — 84484 ASSAY OF TROPONIN QUANT: CPT

## 2024-12-02 PROCEDURE — 85018 HEMOGLOBIN: CPT

## 2024-12-02 PROCEDURE — 82330 ASSAY OF CALCIUM: CPT

## 2024-12-02 PROCEDURE — 83605 ASSAY OF LACTIC ACID: CPT

## 2024-12-02 PROCEDURE — 94640 AIRWAY INHALATION TREATMENT: CPT

## 2024-12-02 PROCEDURE — 87040 BLOOD CULTURE FOR BACTERIA: CPT

## 2024-12-02 PROCEDURE — 97530 THERAPEUTIC ACTIVITIES: CPT

## 2024-12-02 PROCEDURE — 80053 COMPREHEN METABOLIC PANEL: CPT

## 2024-12-02 PROCEDURE — 71045 X-RAY EXAM CHEST 1 VIEW: CPT

## 2024-12-02 PROCEDURE — 80048 BASIC METABOLIC PNL TOTAL CA: CPT

## 2024-12-02 PROCEDURE — 87637 SARSCOV2&INF A&B&RSV AMP PRB: CPT

## 2024-12-02 PROCEDURE — 97110 THERAPEUTIC EXERCISES: CPT

## 2024-12-02 PROCEDURE — 84132 ASSAY OF SERUM POTASSIUM: CPT

## 2024-12-02 PROCEDURE — 85610 PROTHROMBIN TIME: CPT

## 2024-12-02 PROCEDURE — 97161 PT EVAL LOW COMPLEX 20 MIN: CPT

## 2024-12-02 PROCEDURE — 92610 EVALUATE SWALLOWING FUNCTION: CPT

## 2024-12-02 PROCEDURE — 81001 URINALYSIS AUTO W/SCOPE: CPT

## 2024-12-02 PROCEDURE — 85379 FIBRIN DEGRADATION QUANT: CPT

## 2024-12-02 RX ORDER — PANTOPRAZOLE SODIUM 40 MG/1
1 TABLET, DELAYED RELEASE ORAL
Qty: 0 | Refills: 0 | DISCHARGE
Start: 2024-12-02

## 2024-12-02 RX ORDER — POLYETHYLENE GLYCOL 3350 17 G/17G
17 POWDER, FOR SOLUTION ORAL
Qty: 0 | Refills: 0 | DISCHARGE
Start: 2024-12-02

## 2024-12-02 RX ORDER — CYCLOPENTOLATE HCL 1 %
1 DROPS OPHTHALMIC (EYE)
Qty: 0 | Refills: 0 | DISCHARGE
Start: 2024-12-02

## 2024-12-02 RX ORDER — TRAZODONE HYDROCHLORIDE 150 MG/1
1 TABLET ORAL
Qty: 0 | Refills: 0 | DISCHARGE
Start: 2024-12-02

## 2024-12-02 RX ORDER — METOPROLOL TARTRATE 100 MG/1
1 TABLET, FILM COATED ORAL
Qty: 0 | Refills: 0 | DISCHARGE
Start: 2024-12-02

## 2024-12-02 RX ORDER — CYANOCOBALAMIN/FOLIC AC/VIT B6 1-2.2-25MG
1 TABLET ORAL
Qty: 0 | Refills: 0 | DISCHARGE
Start: 2024-12-02

## 2024-12-02 RX ORDER — MULTIVIT WITH MINERALS/LUTEIN
1 TABLET ORAL
Qty: 0 | Refills: 0 | DISCHARGE
Start: 2024-12-02

## 2024-12-02 RX ORDER — TEMAZEPAM 15 MG
1 CAPSULE ORAL
Qty: 0 | Refills: 0 | DISCHARGE
Start: 2024-12-02

## 2024-12-02 RX ORDER — FUROSEMIDE 40 MG/1
1 TABLET ORAL
Qty: 0 | Refills: 0 | DISCHARGE
Start: 2024-12-02

## 2024-12-02 RX ORDER — PREDNISOLONE ACETATE 1.2 MG/ML
1 SUSPENSION/ DROPS OPHTHALMIC
Refills: 0 | DISCHARGE
Start: 2024-12-02

## 2024-12-02 RX ADMIN — Medication 1 DROP(S): at 06:26

## 2024-12-02 RX ADMIN — ACETAMINOPHEN 500MG 650 MILLIGRAM(S): 500 TABLET, COATED ORAL at 01:37

## 2024-12-02 RX ADMIN — BUDESONIDE 0.25 MILLIGRAM(S): 0.25 SUSPENSION RESPIRATORY (INHALATION) at 06:02

## 2024-12-02 RX ADMIN — PREDNISOLONE ACETATE 1 DROP(S): 1.2 SUSPENSION/ DROPS OPHTHALMIC at 06:26

## 2024-12-02 RX ADMIN — ACETAMINOPHEN 500MG 650 MILLIGRAM(S): 500 TABLET, COATED ORAL at 02:47

## 2024-12-02 RX ADMIN — PREDNISOLONE ACETATE 1 DROP(S): 1.2 SUSPENSION/ DROPS OPHTHALMIC at 17:11

## 2024-12-02 RX ADMIN — BUDESONIDE 0.25 MILLIGRAM(S): 0.25 SUSPENSION RESPIRATORY (INHALATION) at 17:12

## 2024-12-02 RX ADMIN — PANTOPRAZOLE SODIUM 40 MILLIGRAM(S): 40 TABLET, DELAYED RELEASE ORAL at 07:40

## 2024-12-02 RX ADMIN — FLUTICASONE PROPIONATE AND SALMETEROL XINAFOATE 1 DOSE(S): 45; 21 AEROSOL, METERED RESPIRATORY (INHALATION) at 17:11

## 2024-12-02 RX ADMIN — ACETAMINOPHEN 500MG 650 MILLIGRAM(S): 500 TABLET, COATED ORAL at 17:11

## 2024-12-02 RX ADMIN — FUROSEMIDE 20 MILLIGRAM(S): 40 TABLET ORAL at 06:02

## 2024-12-02 RX ADMIN — Medication 1 DROP(S): at 17:11

## 2024-12-02 RX ADMIN — METOPROLOL TARTRATE 50 MILLIGRAM(S): 100 TABLET, FILM COATED ORAL at 05:59

## 2024-12-02 RX ADMIN — Medication 30 MILLIGRAM(S): at 11:38

## 2024-12-02 RX ADMIN — Medication 500 MILLIGRAM(S): at 11:37

## 2024-12-02 RX ADMIN — POLYETHYLENE GLYCOL 3350 17 GRAM(S): 17 POWDER, FOR SOLUTION ORAL at 11:38

## 2024-12-02 RX ADMIN — Medication 1 TABLET(S): at 11:38

## 2024-12-02 RX ADMIN — FLUTICASONE PROPIONATE AND SALMETEROL XINAFOATE 1 DOSE(S): 45; 21 AEROSOL, METERED RESPIRATORY (INHALATION) at 06:26

## 2024-12-02 NOTE — PROGRESS NOTE ADULT - REASON FOR ADMISSION
lethargy

## 2024-12-02 NOTE — PROGRESS NOTE ADULT - ASSESSMENT
89y F pmh htn, hld, CAD, diastolic CHF, right hemidiaphragm paralysis, hypothyroidism, dementia, COPD on 2 L NC presenting from [facility] for episode of altered mental status today at 6:30 PM. Per aid who is providing hx patient was in her normal mental state at 615, was eating dinner and became slumped over, mumbling, less responsive.  States during that episode patient did not know her name and was confused. Patient has now returned to baseline mentation ( A&Ox3)  ROS: Denies HA, CP, SOB, palpitation, N/V/D, fever, cough, chills, dizziness, fall, head trauma, sick contact, change in bowel or urinary habits   A 10-system ROS was performed and is negative except as noted above and/or in the HPI.  ED: Mild hypoK, UA (+), CTH neg.  Given CTX, IVF   (24 Nov 2024 05:13)   now pulm called for hypoxic resp failure   pt was on 5 L of oxygen  when i saw her:  able to understand simple questions:   no resp distress:  de is having any underlying condition;     Hypoxic resp failure   HTN  DIASTOLIC CHF  HYPOTHYROIDISM   COPD   DEMENTIA:     Hypoxic resp failure   -CT CHEST : Centrilobular upper lobe emphysema. Mild bibasilar atelectasis.  -Her vbg is reasonable:  has chr retention od pco2:   CHECK D DIMER   CTA CHEST  ;  DO BEDSIDE DOPPLERS:   -change to high flow if oxygen requirement has increased;   -? risk for aspiration:  do speech and swalloe  start steroids too     11/26:  cont current care:   vq can she could not tolerate   MBS pending now:     11/27: pulm ansari seems raine doing pretty good;   on 2 L of oxygen  sitting in chair:  sterods as above   cont BD     11/28:  -seems to be doing  ok : seems comfortable:   on 2 L of oxygen :  cont BD:     11/29: seems pretty stable to me:   remains on 2 Lofo xygen :  no resp distress:   no wheezing:   cont current rx:  and dc planning       12/1: seems stable pulm wise:   remains on 2 Lof oxygen   no sob:   no wheezing:     HTN  -controlled    DIASTOLIC CHF  -she is on lasix     HYPOTHYROIDISM   -on levothyroxine    COPD   -cont BD:   -finished  steroids     DEMENTIA:   supportive care:     DW acp
    89 year old woman,      h/o  emphysema, ,  c/c  L  hydroureter,  HTN.  depression   prior    h/o   C. difficile, diverticulitis,   pna      sarcoidosis,   c/c  diastolic  heart failyre,  eft 2nd  toe    c/c   superficial   ulcerations, on  bony prominences  of hammer  toe    admitted  with   ams     ucx,  < 50k candida tropicalis.        completedCeftriaxone 1 g   and   candida tropicalis 10-49k CFU, unlikely source of UTI will not treat.     has   c/c  swelling  right  leg     c/c  diastolic chf     h/o  c/c  elevated  d  dimer,     COPD. h/o  underlying c/c  lung disease./  Emphysema        c/c   superficial   ulcerations, on  bony prominences  of hammer  toes,     HTN/  HLD    c/c  diastolic  chf,           on lasix    Anxiety,   c/c  anemia     dvt  ppx    dr zamora  will  assume care            rad< from: TTE Limited W or WO Ultrasound Enhancing Agent (04.08.24 @ 14:17) >  CONCLUSIONS:   1. Left ventricular cavity is normal in size. Left ventricular wall thickness is normal. Left ventricular systolic function is normal with an ejection fraction visually estimated at 55 to 60 %. There are no regional wall motion abnormalities seen.   2. Normal rightventricular cavity size, with normal wall thickness, and normal systolic function. Tricuspid annular plane systolic excursion (TAPSE) is 2.0 cm (normal >=1.7 cm).   3. The left atrium is severely dilated.   4. Mild mitral regurgitation.   5. Comparedto the transthoracic echocardiogram performed on 3/26/2024, regional wall motion abnormality not appreciated.  ________________________________________________________________________________________  < end of copied text >    from: CT Angio Chest PE Protocol w/ IV Cont (07.09.24 @ 17:28) >  IMPRESSION:  No pulmonary embolism through the segmental pulmonary arteries   bilaterally.  Unchanged atelectasis of the right middle andlower lobes, likely due to   a combination of compressive atelectasis from adjacent elevated   diaphragm, and post obstructive atelectasis from mucous impacted bronchi.  --- End of Report ---                  
88 y/o F w/ PMhx of HTN, HLD, CAD, dCHF, R hemidiaphragm paralysis, hypothyroidism, dementia, COPD on 2L NC, presenting to [facility] for AMS.   In ED, pt was afebrile. On 2-3 NC, now 5L NC.   Labs: WBC 9, U/A w/ pyuria to 998.   BCx NGTD x 48 hours  CT w/ emphysema, CTH negative     #Respiratory failure   #UTI  #Metabolic encephalopathy, improved     Overall admitted for metabolic encephalopathy, now improved. Pt w/o any urinary symptoms, however grossly positive U/A, given this would treat for now. Afebrile, mild leukocytosis, now on steroids for respiratory failure.  UCx w/ < 50k candida tropicalis.     Recommend:   1. C/w Ceftriaxone 1 g q24 x 3 day course ending today  2. F/u UCx, denny tropicalis 10-49k CFU, unlikely source of UTI will not treat.    3. Appreciate pulmonary recs for respiratory failure    Thank you for this consult. Inpatient ID consult team will sign off.    Further changes in lab values, imaging studies, or clinical status will not be known to ID inpatient consultants unless specifically communicated by primary team.    Xavier Dominguez MD  Attending Physician  Division of Infectious Diseases  Department of Medicine    Please contact through MS Teams message.  Office: [phone] (after 5 PM or weekend)  
89y F pmh htn, hld, CAD, diastolic CHF, right hemidiaphragm paralysis, hypothyroidism, dementia, COPD on 2 L NC [facility] for episode of altered mental status today at 6:30 PM. Per aid who is providing hx patient was in her normal mental state at 615, was eating dinner and became slumped over, mumbling, less responsive.  States during that episode patient did not know her name and was confused. Patient has now returned to baseline mentation ( A&Ox3)  ROS: Denies HA, CP, SOB, palpitation, N/V/D, fever, cough, chills, dizziness, fall, head trauma, sick contact, change in bowel or urinary habits   A 10-system ROS was performed and is negative except as noted above and/or in the HPI.  ED: Mild hypoK, UA (+), CTH neg.  Given CTX, IVF   (24 Nov 2024 05:13)   now pulm called for hypoxic resp failure   pt was on 5 L of oxygen  when i saw her:  able to understand simple questions:   no resp distress:  de is having any underlying condition;     Hypoxic resp failure   HTN  DIASTOLIC CHF  HYPOTHYROIDISM   COPD   DEMENTIA:     Hypoxic resp failure   -CT CHEST : Centrilobular upper lobe emphysema. Mild bibasilar atelectasis.  -Her vbg is reasonable:  has chr retention od pco2:   CHECK D DIMER   CTA CHEST  ;  DO BEDSIDE DOPPLERS:   -change to high flow if oxygen requirement has increased;   -? risk for aspiration:  do speech and swalloe  start steroids too     11/26:  cont current care:   vq can she could not tolerate   MBS pending now:     11/27: pulm ansari seems raine doing pretty good;   on 2 L of oxygen  sitting in chair:  sterods as above   cont BD     HTN  -controlled    DIASTOLIC CHF  -she is on lasix     HYPOTHYROIDISM   -on levothyroxine    COPD   -cont BD:   add steroids     DEMENTIA:   supportive care:     called acp
89y F pmh htn, hld, CAD, diastolic CHF, right hemidiaphragm paralysis, hypothyroidism, dementia, COPD on 2 L NC presenting from [facility] for episode of altered mental status today at 6:30 PM. Per aid who is providing hx patient was in her normal mental state at 615, was eating dinner and became slumped over, mumbling, less responsive.  States during that episode patient did not know her name and was confused. Patient has now returned to baseline mentation ( A&Ox3)  ROS: Denies HA, CP, SOB, palpitation, N/V/D, fever, cough, chills, dizziness, fall, head trauma, sick contact, change in bowel or urinary habits   A 10-system ROS was performed and is negative except as noted above and/or in the HPI.  ED: Mild hypoK, UA (+), CTH neg.  Given CTX, IVF   (24 Nov 2024 05:13)   now pulm called for hypoxic resp failure   pt was on 5 L of oxygen  when i saw her:  able to understand simple questions:   no resp distress:  de is having any underlying condition;     Hypoxic resp failure   HTN  DIASTOLIC CHF  HYPOTHYROIDISM   COPD   DEMENTIA:     Hypoxic resp failure   -CT CHEST : Centrilobular upper lobe emphysema. Mild bibasilar atelectasis.  -Her vbg is reasonable:  has chr retention od pco2:   CHECK D DIMER   CTA CHEST  ;  DO BEDSIDE DOPPLERS:   -change to high flow if oxygen requirement has increased;   -? risk for aspiration:  do speech and swalloe  start steroids too     11/26:  cont current care:   vq can she could not tolerate   MBS pending now:     11/27: pulm ansari seems raine doing pretty good;   on 2 L of oxygen  sitting in chair:  sterods as above   cont BD     11/28:  -seems to be doing  ok : seems comfortable:   on 2 L of oxygen :  cont BD:     11/29: seems pretty stable to me:   remains on 2 Lofo xygen :  no resp distress:   no wheezing:   cont current rx:  and dc planning     HTN  -controlled    DIASTOLIC CHF  -she is on lasix     HYPOTHYROIDISM   -on levothyroxine    COPD   -cont BD:   -finished  steroids     DEMENTIA:   supportive care:     DW acp
89y F pmh htn, hld, CAD, diastolic CHF, right hemidiaphragm paralysis, hypothyroidism, dementia, COPD on 2 L NC presenting from [facility] for episode of altered mental status today at 6:30 PM. Per aid who is providing hx patient was in her normal mental state at 615, was eating dinner and became slumped over, mumbling, less responsive.  States during that episode patient did not know her name and was confused. Patient has now returned to baseline mentation ( A&Ox3)  ROS: Denies HA, CP, SOB, palpitation, N/V/D, fever, cough, chills, dizziness, fall, head trauma, sick contact, change in bowel or urinary habits   A 10-system ROS was performed and is negative except as noted above and/or in the HPI.  ED: Mild hypoK, UA (+), CTH neg.  Given CTX, IVF   (24 Nov 2024 05:13)   now pulm called for hypoxic resp failure   pt was on 5 L of oxygen  when i saw her:  able to understand simple questions:   no resp distress:  de is having any underlying condition;     Hypoxic resp failure   HTN  DIASTOLIC CHF  HYPOTHYROIDISM   COPD   DEMENTIA:     Hypoxic resp failure   -CT CHEST : Centrilobular upper lobe emphysema. Mild bibasilar atelectasis.  -Her vbg is reasonable:  has chr retention od pco2:   CHECK D DIMER   CTA CHEST  ;  DO BEDSIDE DOPPLERS:   -change to high flow if oxygen requirement has increased;   -? risk for aspiration:  do speech and swalloe  start steroids too     11/26:  cont current care:   vq can she could not tolerate   MBS pending now:     11/27: pulm ansari seems raine doing pretty good;   on 2 L of oxygen  sitting in chair:  sterods as above   cont BD     11/28:  -seems to be doing  ok : seems comfortable:   on 2 L of oxygen :  cont BD:     HTN  -controlled    DIASTOLIC CHF  -she is on lasix     HYPOTHYROIDISM   -on levothyroxine    COPD   -cont BD:   -finished  steroids     DEMENTIA:   supportive care:     DW acp
89y F pmh htn, hld, CAD, diastolic CHF, right hemidiaphragm paralysis, hypothyroidism, dementia, COPD on 2 L NC [facility] for episode of altered mental status today at 6:30 PM. Per aid who is providing hx patient was in her normal mental state at 615, was eating dinner and became slumped over, mumbling, less responsive.  States during that episode patient did not know her name and was confused. Patient has now returned to baseline mentation ( A&Ox3)  ROS: Denies HA, CP, SOB, palpitation, N/V/D, fever, cough, chills, dizziness, fall, head trauma, sick contact, change in bowel or urinary habits   A 10-system ROS was performed and is negative except as noted above and/or in the HPI.  ED: Mild hypoK, UA (+), CTH neg.  Given CTX, IVF   (2024 05:13)   now pulm called for hypoxic resp failure   pt was on 5 L of oxygen  when i saw her:  able to understand simple questions:   no resp distress:  de is having any underlying condition;     Hypoxic resp failure   HTN  DIASTOLIC CHF  HYPOTHYROIDISM   COPD   DEMENTIA:     Hypoxic resp failure   -CT CHEST : Centrilobular upper lobe emphysema. Mild bibasilar atelectasis.  -Her vbg is reasonable:  has chr retention od pco2:   CHECK D DIMER   CTA CHEST  ;  DO BEDSIDE DOPPLERS:   -change to high flow if oxygen requirement has increased;   -? risk for aspiration:  do speech and swalloe  start steroids too     :  cont current care:   vq can she could not tolerate   MBS pending now:     : pulm ansari seems raine doing pretty good;   on 2 L of oxygen  sitting in chair:  sterods as above   cont BD     :  -seems to be doing  ok : seems comfortable:   on 2 L of oxygen :  cont BD:     : seems pretty stable to me:   remains on 2 Lofo xygen :  no resp distress:   no wheezing:   cont current rx:  and dc planning       : seems stable pulm wise:   remains on 2 Lof oxygen   no sob:   no wheezin/2: she seems pretty good:   she is alert and awake and was on room air when i saw her/sitting in c hiar:   no sob: no resp distress:  home health aide at bedside:       HTN  -controlled    DIASTOLIC CHF  -she is on lasix     HYPOTHYROIDISM   -on levothyroxine    COPD   -cont BD:   -finished  steroids     DEMENTIA:   supportive care:     KATHY acp
89y F pmh htn, hld, CAD, diastolic CHF, right hemidiaphragm paralysis, hypothyroidism, dementia, COPD on 2 L NC presenting from [facility] for episode of altered mental status today at 6:30 PM. Per aid who is providing hx patient was in her normal mental state at 615, was eating dinner and became slumped over, mumbling, less responsive.  States during that episode patient did not know her name and was confused. Patient has now returned to baseline mentation ( A&Ox3)  ROS: Denies HA, CP, SOB, palpitation, N/V/D, fever, cough, chills, dizziness, fall, head trauma, sick contact, change in bowel or urinary habits   A 10-system ROS was performed and is negative except as noted above and/or in the HPI.  ED: Mild hypoK, UA (+), CTH neg.  Given CTX, IVF   (24 Nov 2024 05:13)   now pulm called for hypoxic resp failure   pt was on 5 L of oxygen  when i saw her:  able to understand simple questions:   no resp distress:  de is having any underlying condition;     Hypoxic resp failure   HTN  DIASTOLIC CHF  HYPOTHYROIDISM   COPD   DEMENTIA:     Hypoxic resp failure   -CT CHEST : Centrilobular upper lobe emphysema. Mild bibasilar atelectasis.  -Her vbg is reasonable:  has chr retention od pco2:   CHECK D DIMER   CTA CHEST  ;  DO BEDSIDE DOPPLERS:   -change to high flow if oxygen requirement has increased;   -? risk for aspiration:  do speech and swalloe  start steroids too     11/26:  cont current care:   vq can she could not tolerate   MBS pending now:     HTN  -controlled    DIASTOLIC CHF  -she is on lasix     HYPOTHYROIDISM   -on levothyroxine    COPD   -cont BD:   add steroids     DEMENTIA:   supportive care:     dw acp  : she looks better today

## 2024-12-02 NOTE — PROGRESS NOTE ADULT - SUBJECTIVE AND OBJECTIVE BOX
Date of Service: 12-02-24 @ 16:07    Patient is a 89y old  Female who presents with a chief complaint of lethargy (02 Dec 2024 13:34)      Any change in ROS: she seems OK:  no sob:    on room air at this time     MEDICATIONS  (STANDING):  ascorbic acid 500 milliGRAM(s) Oral daily  atorvastatin 20 milliGRAM(s) Oral at bedtime  buDESOnide    Inhalation Suspension 0.25 milliGRAM(s) Inhalation two times a day  cyclopentolate 1% Solution 1 Drop(s) Left EYE two times a day  enoxaparin Injectable 40 milliGRAM(s) SubCutaneous every 24 hours  FLUoxetine 30 milliGRAM(s) Oral daily  fluticasone propionate/ salmeterol 100-50 MICROgram(s) Diskus 1 Dose(s) Inhalation two times a day  furosemide    Tablet 20 milliGRAM(s) Oral daily  influenza  Vaccine (HIGH DOSE) 0.5 milliLiter(s) IntraMuscular once  metoprolol succinate ER 50 milliGRAM(s) Oral daily  multivitamin 1 Tablet(s) Oral daily  pantoprazole    Tablet 40 milliGRAM(s) Oral before breakfast  polyethylene glycol 3350 17 Gram(s) Oral daily  prednisoLONE acetate 1% Suspension 1 Drop(s) Left EYE two times a day  temazepam 15 milliGRAM(s) Oral at bedtime  traZODone 50 milliGRAM(s) Oral at bedtime    MEDICATIONS  (PRN):  acetaminophen     Tablet .. 650 milliGRAM(s) Oral every 6 hours PRN Temp greater or equal to 38C (100.4F), Mild Pain (1 - 3)  albuterol/ipratropium for Nebulization 3 milliLiter(s) Nebulizer every 6 hours PRN Shortness of Breath and/or Wheezing  aluminum hydroxide/magnesium hydroxide/simethicone Suspension 30 milliLiter(s) Oral every 4 hours PRN Dyspepsia  melatonin 3 milliGRAM(s) Oral at bedtime PRN Insomnia  ondansetron Injectable 4 milliGRAM(s) IV Push every 8 hours PRN Nausea and/or Vomiting    Vital Signs Last 24 Hrs  T(C): 36.4 (02 Dec 2024 12:02), Max: 36.8 (01 Dec 2024 20:28)  T(F): 97.6 (02 Dec 2024 12:02), Max: 98.3 (01 Dec 2024 20:28)  HR: 66 (02 Dec 2024 12:02) (66 - 70)  BP: 99/66 (02 Dec 2024 12:02) (99/66 - 115/64)  BP(mean): --  RR: 18 (02 Dec 2024 12:02) (18 - 18)  SpO2: 98% (02 Dec 2024 12:02) (96% - 98%)    Parameters below as of 02 Dec 2024 12:02  Patient On (Oxygen Delivery Method): room air        I&O's Summary    01 Dec 2024 07:01  -  02 Dec 2024 07:00  --------------------------------------------------------  IN: 550 mL / OUT: 1000 mL / NET: -450 mL          Physical Exam:   GENERAL:cacechtic  HEENT: JIM/   Atraumatic, Normocephalic  ENMT: No tonsillar erythema, exudates, or enlargement; Moist mucous membranes, Good dentition, No lesions  NECK: Supple, No JVD, Normal thyroid  CHEST/LUNG: Clear to auscultaion  CVS: Regular rate and rhythm; No murmurs, rubs, or gallops  GI: : Soft, Nontender, Nondistended; Bowel sounds present  NERVOUS SYSTEM:  Alert & Oriented X0  EXTREMITIES:  2+ Peripheral Pulses, No clubbing, cyanosis, or edema  LYMPH: No lymphadenopathy noted  SKIN: No rashes or lesions  ENDOCRINOLOGY: No Thyromegaly  PSYCH: calm     Labs:                              10.3   8.24  )-----------( 184      ( 30 Nov 2024 06:55 )             34.2                         9.5    8.08  )-----------( 161      ( 29 Nov 2024 06:44 )             30.9     12-01    139  |  100  |  28[H]  ----------------------------<  92  4.0   |  26  |  0.78  11-30    142  |  100  |  32[H]  ----------------------------<  83  3.3[L]   |  29  |  0.72  11-29    141  |  100  |  30[H]  ----------------------------<  115[H]  3.9   |  28  |  0.64    Ca    9.0      01 Dec 2024 06:48  Mg     2.3     12-01      CAPILLARY BLOOD GLUCOSE              Urinalysis Basic - ( 01 Dec 2024 06:48 )    Color: x / Appearance: x / SG: x / pH: x  Gluc: 92 mg/dL / Ketone: x  / Bili: x / Urobili: x   Blood: x / Protein: x / Nitrite: x   Leuk Esterase: x / RBC: x / WBC x   Sq Epi: x / Non Sq Epi: x / Bacteria: x        rad< from: [facility] Duplex Lower Ext Vein Scan, Bilat (11.26.24 @ 11:18) >  Normal compressibility of the RIGHT common femoral, femoral and popliteal   veins.  Doppler examination shows normal spontaneous and phasic flow.  No RIGHT calf vein thrombosis is detected.    LEFT:  Normal compressibility of the LEFT common femoral, femoral and popliteal   veins.  Doppler examination shows normal spontaneous and phasic flow.  No LEFT calf vein thrombosis is detected.    IMPRESSION:  No evidence of deep venous thrombosis in either lower extremity.            --- End of Report ---            ANUEL HELLER MD; Attending Interventional Radiologist  This document has been electronically signed. Nov 26 2024 11:47AM    < end of copied text >  < from: Xray Chest 1 View- PORTABLE-Urgent (Xray Chest 1 View- PORTABLE-Urgent .) (11.25.24 @ 15:56) >  FINDINGS:  Left chest loop recorder.  The heart size is not well evaluated in this projection.  Bibasilar atelectasis. Otherwise, the lungs are clear.  There is no pneumothorax or pleural effusion.  There are no acute osseous abnormalities. Chronic appearing bilateral   shoulder deformities, likely degenerative.    IMPRESSION:  Bibasilar atelectasis. Otherwise, the lungs are clear.    --- End of Report ---           ROMIE DURBIN MD; Resident Radiologist  This document has been electronically signed.  AVTAR SANCHEZ MD; Attending Radiologist  This document has been electronically signed. Nov 26 2024  1:43PM    < end of copied text >  < from: CT Chest No Cont (11.23.24 @ 23:40) >  FINDINGS:    LUNGS AND LARGE AIRWAYS: Patent central airways. Centrilobular upper lobe   emphysema. Mild bibasilar atelectasis. 2 mm right middle lobe lung nodule   (305:48).  PLEURA: No pleural effusion.  VESSELS: Aortic calcifications. Coronary artery calcifications. 4.1 cm   ascending aortic aneurysm. Marked enlargement of the main pulmonary   artery at 4.5 cm a nonspecific finding but can be seen in pulmonary   hypertension among other entities.  HEART: Cardiomegaly. No pericardial effusion.  MEDIASTINUM AND RASHID: No lymphadenopathy.  CHEST WALL AND LOWER NECK: Within normal limits.  VISUALIZED UPPER ABDOMEN: Within normal limits.  BONES: Degenerative changes. Prior left shoulder surgery.    IMPRESSION:    Centrilobular upper lobe emphysema. Mild bibasilar atelectasis.      --- End of Report ---            SUSAN MANCIA MD; Attending Radiologist  This document has been electronically signed. Nov 24 2024 12:42AM    < end of copied text >      RECENT CULTURES:        RESPIRATORY CULTURES:          Studies  Chest X-RAY  CT SCAN Chest   Venous Dopplers: LE:   CT Abdomen  Others

## 2024-12-02 NOTE — DISCHARGE NOTE PROVIDER - CARE PROVIDER_API CALL
[facility]  [address] [place] [address]  Phone: [phone]  Fax: [phone]  Follow Up Time: 2 weeks    Constanza Gardiner  Cardiovascular Disease  [address] [place] [id number]  Phone: [phone]  Fax: [phone]  Follow Up Time: Routine

## 2024-12-02 NOTE — DISCHARGE NOTE PROVIDER - PROVIDER TOKENS
PROVIDER:[TOKEN:[[id number]],FOLLOWUP:[2 weeks]],PROVIDER:[TOKEN:[[id number]],FOLLOWUP:[Routine]]

## 2024-12-02 NOTE — DISCHARGE NOTE NURSING/CASE MANAGEMENT/SOCIAL WORK - PATIENT PORTAL LINK FT
You can access the FollowMyHealth Patient Portal offered by [facility] by registering at the following website: [email or web address]. By joining [employer]’s FollowMyHealth portal, you will also be able to view your health information using other applications (apps) compatible with our system.

## 2024-12-02 NOTE — PROGRESS NOTE ADULT - SUBJECTIVE AND OBJECTIVE BOX
Date of Service: 12-02-24 @ 09:44           CARDIOLOGY     PROGRESS  NOTE   ________________________________________________    CHIEF COMPLAINT:Patient is a 89y old  Female who presents with a chief complaint of lethargy (01 Dec 2024 14:04)  no complain  	  REVIEW OF SYSTEMS:  CONSTITUTIONAL: No fever, weight loss, or fatigue  EYES: No eye pain, visual disturbances, or discharge  ENT:  No difficulty hearing, tinnitus, vertigo; No sinus or throat pain  NECK: No pain or stiffness  RESPIRATORY: No cough, wheezing, chills or hemoptysis; No Shortness of Breath  CARDIOVASCULAR: No chest pain, palpitations, passing out, dizziness, or leg swelling  GASTROINTESTINAL: No abdominal or epigastric pain. No nausea, vomiting, or hematemesis; No diarrhea or constipation. No melena or hematochezia.  GENITOURINARY: No dysuria, frequency, hematuria, or incontinence  NEUROLOGICAL: No headaches, memory loss, loss of strength, numbness, or tremors  SKIN: No itching, burning, rashes, or lesions   LYMPH Nodes: No enlarged glands  ENDOCRINE: No heat or cold intolerance; No hair loss  MUSCULOSKELETAL: No joint pain or swelling; No muscle, back, or extremity pain  PSYCHIATRIC: No depression, anxiety, mood swings, or difficulty sleeping  HEME/LYMPH: No easy bruising, or bleeding gums  ALLERGY AND IMMUNOLOGIC: No hives or eczema	    [x ] All others negative	  [ ] Unable to obtain    PHYSICAL EXAM:  T(C): 36.3 (12-02-24 @ 04:28), Max: 36.8 (12-01-24 @ 20:28)  HR: 69 (12-02-24 @ 04:28) (66 - 70)  BP: 115/64 (12-02-24 @ 04:28) (107/57 - 115/64)  RR: 18 (12-02-24 @ 04:28) (18 - 18)  SpO2: 96% (12-02-24 @ 04:28) (95% - 97%)  Wt(kg): --  I&O's Summary    01 Dec 2024 07:01  -  02 Dec 2024 07:00  --------------------------------------------------------  IN: 550 mL / OUT: 1000 mL / NET: -450 mL        Appearance: Normal	  HEENT:   Normal oral mucosa, PERRL, EOMI	  Lymphatic: No lymphadenopathy  Cardiovascular: Normal S1 S2, No JVD, + murmurs, No edema  Respiratory: rhonchi  Psychiatry: A & O x 2, Mood & affect appropriate  Gastrointestinal:  Soft, Non-tender, + BS	  Skin: No rashes, No ecchymoses, No cyanosis	  Neurologic: Non-focal  Extremities: Normal range of motion, No clubbing, cyanosis or edema  Vascular: Peripheral pulses palpable 2+ bilaterally    MEDICATIONS  (STANDING):  ascorbic acid 500 milliGRAM(s) Oral daily  atorvastatin 20 milliGRAM(s) Oral at bedtime  buDESOnide    Inhalation Suspension 0.25 milliGRAM(s) Inhalation two times a day  cyclopentolate 1% Solution 1 Drop(s) Left EYE two times a day  enoxaparin Injectable 40 milliGRAM(s) SubCutaneous every 24 hours  FLUoxetine 30 milliGRAM(s) Oral daily  fluticasone propionate/ salmeterol 100-50 MICROgram(s) Diskus 1 Dose(s) Inhalation two times a day  furosemide    Tablet 20 milliGRAM(s) Oral daily  influenza  Vaccine (HIGH DOSE) 0.5 milliLiter(s) IntraMuscular once  metoprolol succinate ER 50 milliGRAM(s) Oral daily  multivitamin 1 Tablet(s) Oral daily  pantoprazole    Tablet 40 milliGRAM(s) Oral before breakfast  polyethylene glycol 3350 17 Gram(s) Oral daily  prednisoLONE acetate 1% Suspension 1 Drop(s) Left EYE two times a day  temazepam 15 milliGRAM(s) Oral at bedtime  traZODone 50 milliGRAM(s) Oral at bedtime      TELEMETRY: 	    ECG:  	  RADIOLOGY:  OTHER: 	  	  LABS:	 	    CARDIAC MARKERS:            12-01    139  |  100  |  28[H]  ----------------------------<  92  4.0   |  26  |  0.78    Ca    9.0      01 Dec 2024 06:48  Mg     2.3     12-01      proBNP:   Lipid Profile:   HgA1c:   TSH:         Assessment and plan  ---------------------------  89y F pmh htn, hld, CAD, diastolic CHF, right hemidiaphragm paralysis, hypothyroidism, dementia, COPD on 2 L NC presenting from [facility] assisted living for episode of altered mental status today at 6:30 PM. Per aid who is providing hx patient was in her normal mental state at 615, was eating dinner and became slumped over, mumbling, less responsive.  States during that episode patient did not know her name and was confused. Patient has now returned to baseline mentation ( A&Ox3).  pt is well known to me with hx of pleural effusion, sarcoidosis and emphysema with lethargy and sob  ua, + pt with hx of MRSA in urine, may need ID eval  continue current cardiac m,eds  will add Norvasc if bp remains elevated  continue Lasix  Duoneb  uti, continue abx  check cultures  physical therapy  chest x ray noted  doing much better, increase ambulation/ physical therapy  continue LASIX  off steroid  change lasix to po, doing much better  dc planning  replete K  dc planning., may taper  o2 as tolerated, now  on 2 L nc  dc planning today

## 2024-12-02 NOTE — DISCHARGE NOTE NURSING/CASE MANAGEMENT/SOCIAL WORK - FINANCIAL ASSISTANCE
[facility] provides services at a reduced cost to those who are determined to be eligible through [facility]’s financial assistance program. Information regarding [facility]’s financial assistance program can be found by going to [email or web address] or by calling [phone].

## 2024-12-02 NOTE — DISCHARGE NOTE PROVIDER - HOSPITAL COURSE
HPI:  89y F pmh htn, hld, CAD, diastolic CHF, right hemidiaphragm paralysis, hypothyroidism, dementia, COPD on 2 L NC presenting from [facility] assisted living for episode of altered mental status today at 6:30 PM. Per aid who is providing hx patient was in her normal mental state at 615, was eating dinner and became slumped over, mumbling, less responsive.  States during that episode patient did not know her name and was confused. Patient has now returned to baseline mentation ( A&Ox3)    ROS: Denies HA, CP, SOB, palpitation, N/V/D, fever, cough, chills, dizziness, fall, head trauma, sick contact, change in bowel or urinary habits   A 10-system ROS was performed and is negative except as noted above and/or in the HPI.    ED: Mild hypoK, UA (+), CTH neg.  Given CTX, IVF   (24 Nov 2024 05:13)    Hospital Course:      Important Medication Changes and Reason:    Active or Pending Issues Requiring Follow-up:    Advanced Directives:   [ ] Full code  [ ] DNR  [ ] Hospice    Discharge Diagnoses:         HPI:  89y F pmh htn, hld, CAD, diastolic CHF, right hemidiaphragm paralysis, hypothyroidism, dementia, COPD on 2 L NC presenting from [facility] assisted living for episode of altered mental status today at 6:30 PM. Per aid who is providing hx patient was in her normal mental state at 615, was eating dinner and became slumped over, mumbling, less responsive.  States during that episode patient did not know her name and was confused. Patient has now returned to baseline mentation ( A&Ox3)    ROS: Denies HA, CP, SOB, palpitation, N/V/D, fever, cough, chills, dizziness, fall, head trauma, sick contact, change in bowel or urinary habits   A 10-system ROS was performed and is negative except as noted above and/or in the HPI.    ED: Mild hypoK, UA (+), CTH neg.  Given CTX, IVF   (24 Nov 2024 05:13)    Hospital Course:  89y F pmh htn, hld, CAD, diastolic CHF, right hemidiaphragm paralysis, hypothyroidism, dementia, COPD on 2 L NC presenting from [facility] assisted living for episode of altered mental status today at 6:30 PM. Per aid who is providing hx patient was in her normal mental state at 615, was eating dinner and became slumped over, mumbling, less responsive.  States during that episode patient did not know her name and was confused. Patient has now returned to baseline mentation ( A&Ox3).  pt is well known to me with hx of pleural effusion, sarcoidosis and emphysema with lethargy and sob  ua, + pt with hx of MRSA in urine, may need ID eval  continue current cardiac m,eds  will add Norvasc if bp remains elevated  continue Lasix  Duoneb  uti, continue abx  check cultures  physical therapy  chest x ray noted  doing much better, increase ambulation/ physical therapy  continue LASIX  off steroid  change lasix to po, doing much better  dc planning  replete K  dc planning., may taper  o2 as tolerated, now  on 2 L nc    Important Medication Changes and Reason:    Active or Pending Issues Requiring Follow-up:    Advanced Directives:   [x ] Full code  [ ] DNR  [ ] Hospice    Discharge Diagnoses:         HPI:  89y F pmh htn, hld, CAD, diastolic CHF, right hemidiaphragm paralysis, hypothyroidism, dementia, COPD on 2 L NC presenting from [facility] assisted living for episode of altered mental status today at 6:30 PM. Per aid who is providing hx patient was in her normal mental state at 615, was eating dinner and became slumped over, mumbling, less responsive.  States during that episode patient did not know her name and was confused. Patient has now returned to baseline mentation ( A&Ox3)    ROS: Denies HA, CP, SOB, palpitation, N/V/D, fever, cough, chills, dizziness, fall, head trauma, sick contact, change in bowel or urinary habits   A 10-system ROS was performed and is negative except as noted above and/or in the HPI.    ED: Mild hypoK, UA (+), CTH neg.  Given CTX, IVF   (24 Nov 2024 05:13)    Hospital Course:  89y F pmh htn, hld, CAD, diastolic CHF, right hemidiaphragm paralysis, hypothyroidism, dementia, COPD on 2 L NC presenting from [facility] assisted [facility] for episode of altered mental status today at 6:30 PM. Per aid who is providing hx patient was in her normal mental state at 615, was eating dinner and became slumped over, mumbling, less responsive.  States during that episode patient did not know her name and was confused. Patient has now returned to baseline mentation ( A&Ox3).  pt is well known to me with hx of pleural effusion, sarcoidosis and emphysema with lethargy and sob  ua, + pt with hx of MRSA in urine, may need ID eval  continue current cardiac meds  will add Norvasc if bp remains elevated  continue Lasix  Duoneb  uti, continue abx  check cultures  physical therapy  chest x ray noted  doing much better, increase ambulation/ physical therapy  continue LASIX  off steroid  change lasix to po, doing much better  dc planning  replete K  dc planning., may taper  o2 as tolerated, now  on 2 L nc    Important Medication Changes and Reason:  none  Active or Pending Issues Requiring Follow-up:  none  Advanced Directives:   [x ] Full code  [ ] DNR  [ ] Hospice    Discharge Diagnoses:  UTI  copd exac

## 2024-12-02 NOTE — CHART NOTE - NSCHARTNOTEFT_GEN_A_CORE
NUTRITION FOLLOW UP NOTE    PATIENT SEEN FOR: Consult for MST score 2 or >, PO follow up    SOURCE: [] Patient  [x] Current Medical Record  [x] RN  [] Family/support person at bedside  [x] Patient unavailable/inappropriate: patient with cognitive limitation  [] Other:    CHART REVIEWED/EVENTS NOTED.  [] No changes to nutrition care plan to note  [x] Nutrition Status:  - Status post Speech Language Pathologist (), Defer diet to medical team at this time, recommend modified barium swallow for further evaluation.     DIET ORDER:   Diet, DASH/TLC:   Sodium & Cholesterol Restricted  Kosher (24)    CURRENT DIET ORDER IS:  [] Appropriate:  [] Inadequate:  [x] Other: see recommendations below    NUTRITION INTAKE/PROVISION:  [x] PO: Patient seen at the bedside. Breakfast tray observed untouched, per discussion with RN, patient with somewhat improved PO intake, consumes ~50% of her meals. Patient likely meeting <75% of estimated nutritional needs. No other GI distress reported during time of visit.   [] Enteral Nutrition:  [] Parenteral Nutrition:    ANTHROPOMETRICS:  Drug Dosing Weight  Height (cm): 157.5 (2024 19:27)  Weight (kg): 49.9 (2024 19:27)  BMI (kg/m2): 20.1 (2024 19:27)  BSA (m2): 1.48 (2024 19:27)  Weights:     Daily Weight in k.1 (12-02), Weight in k.8 (-28), Weight in k.8 (-), Weight in k.7 (-)   ** Weight noted trending down, likely secondary to fluid shift, scale discrepancy, and inadequate PO intake, will continue to monitor weight trends in-house.    MEDICATIONS:  ascorbic acid 500 milliGRAM(s) Oral daily  atorvastatin 20 milliGRAM(s) Oral at bedtime  buDESOnide    Inhalation Suspension 0.25 milliGRAM(s) Inhalation two times a day  cyclopentolate 1% Solution 1 Drop(s) Left EYE two times a day  enoxaparin Injectable 40 milliGRAM(s) SubCutaneous every 24 hours  FLUoxetine 30 milliGRAM(s) Oral daily  fluticasone propionate/ salmeterol 100-50 MICROgram(s) Diskus 1 Dose(s) Inhalation two times a day  furosemide    Tablet 20 milliGRAM(s) Oral daily  influenza  Vaccine (HIGH DOSE) 0.5 milliLiter(s) IntraMuscular once  metoprolol succinate ER 50 milliGRAM(s) Oral daily  multivitamin 1 Tablet(s) Oral daily  pantoprazole    Tablet 40 milliGRAM(s) Oral before breakfast  polyethylene glycol 3350 17 Gram(s) Oral daily  prednisoLONE acetate 1% Suspension 1 Drop(s) Left EYE two times a day  temazepam 15 milliGRAM(s) Oral at bedtime  traZODone 50 milliGRAM(s) Oral at bedtime    MEDICATIONS  (PRN):  acetaminophen     Tablet .. 650 milliGRAM(s) Oral every 6 hours PRN Temp greater or equal to 38C (100.4F), Mild Pain (1 - 3)  albuterol/ipratropium for Nebulization 3 milliLiter(s) Nebulizer every 6 hours PRN Shortness of Breath and/or Wheezing  aluminum hydroxide/magnesium hydroxide/simethicone Suspension 30 milliLiter(s) Oral every 4 hours PRN Dyspepsia  melatonin 3 milliGRAM(s) Oral at bedtime PRN Insomnia  ondansetron Injectable 4 milliGRAM(s) IV Push every 8 hours PRN Nausea and/or Vomiting      NUTRITIONALLY PERTINENT LABS:   Na139 mmol/L Glu 92 mg/dL K+ 4.0 mmol/L Cr  0.78 mg/dL BUN 28 mg/dL[H] 11-29 Phos 3.0 mg/dL    NUTRITIONALLY PERTINENT MEDICATIONS/LABS:  [x] Reviewed  [x] Relevant notes on medications/labs:  - Hyperkalemia previously noted, now WNL  - CV: on lasix  - GI: ordered for miralax,   - Ordered for multivitamin and ascorbic acid    EDEMA:  [x] Reviewed  [x] Relevant notes: + 1 bilateral leg per nursing flowsheet ()    GI/ I&O:  [x] Reviewed  [x] Relevant notes: No GI distress noted.  [] Other:    SKIN:   [x] No pressure injuries documented, per nursing flowsheet  [] Pressure injury previously noted  [] Change in pressure injury documentation:      ESTIMATED NEEDS:  Energy:   8175-8441 kcal/day (28-33 kcal/kg)  Protein:  47.3-60.2 g/day (1.1-1.4 g/kg)  Fluid:   ml/day or [x] defer to team  Based on: dry weight: 43.1()    NUTRITION DIAGNOSIS:  [x] Prior Dx: other, inability to manage self care  [x] New Dx: inadequate protein-energy intake related to decreased related to decreased ability to consume adequate protein-energy intake in setting of cognitive limitation as evidenced by meeting <75% of estimated nutritional needs,     Goal: Pt will meet >75% estimated nutrient needs as tolerated.     EDUCATION:  [] Yes:  [x] Not appropriate/warranted: education not warranted at this time, patient with cognitive limitation    NUTRITION CARE PLAN:  1. Diet: Recommend to liberalize diet to free of dietary restrictions in setting of inadequate PO intake  2. Recommend Ensure plus high protein 1x daily (350kcal, 20g proteins) to optimize PO intake  3. Continue multivitamin and ascorbic acid as ordered  4. Continue to monitor PO intake, weight trend, electrolytes, blood glucose, labs, BMs in-house     [] Achieved - Continue current nutrition intervention(s)  [] Current medical condition precludes nutrition intervention at this time.    MONITORING AND EVALUATION:   RD remains available upon request and will follow up per protocol.    Name  Lavinia Bean, KAYLEN, CDN / TEAMS Available on [facility] TEAMS NUTRITION FOLLOW UP NOTE    PATIENT SEEN FOR: Consult for MST score 2 or >, PO follow up    SOURCE: [] Patient  [x] Current Medical Record  [x] RN  [] Family/support person at bedside  [x] Patient unavailable/inappropriate: patient with cognitive limitation  [] Other:    CHART REVIEWED/EVENTS NOTED.  [] No changes to nutrition care plan to note  [x] Nutrition Status:  - Status post Speech Language Pathologist (), Defer diet to medical team at this time, recommend modified barium swallow for further evaluation.     DIET ORDER:   Diet, DASH/TLC:   Sodium & Cholesterol Restricted  Kosher (24)    CURRENT DIET ORDER IS:  [] Appropriate:  [] Inadequate:  [x] Other: see recommendations below    NUTRITION INTAKE/PROVISION:  [x] PO: Patient seen at the bedside. Breakfast tray observed untouched, per discussion with RN, patient with somewhat improved PO intake, consumes ~50% of her meals. Patient likely meeting <75% of estimated nutritional needs. No other GI distress reported during time of visit.   [] Enteral Nutrition:  [] Parenteral Nutrition:    ANTHROPOMETRICS:  Drug Dosing Weight  Height (cm): 157.5 (2024 19:27)  Weight (kg): 49.9 (2024 19:27)  BMI (kg/m2): 20.1 (2024 19:27)  BSA (m2): 1.48 (2024 19:27)  Weights:     Daily Weight in k.1 (12-02), Weight in k.8 (-28), Weight in k.8 (-), Weight in k.7 (-)   ** Weight noted trending down, likely secondary to fluid shift, scale discrepancy, and inadequate PO intake, will continue to monitor weight trends in-house.    MEDICATIONS:  ascorbic acid 500 milliGRAM(s) Oral daily  atorvastatin 20 milliGRAM(s) Oral at bedtime  buDESOnide    Inhalation Suspension 0.25 milliGRAM(s) Inhalation two times a day  cyclopentolate 1% Solution 1 Drop(s) Left EYE two times a day  enoxaparin Injectable 40 milliGRAM(s) SubCutaneous every 24 hours  FLUoxetine 30 milliGRAM(s) Oral daily  fluticasone propionate/ salmeterol 100-50 MICROgram(s) Diskus 1 Dose(s) Inhalation two times a day  furosemide    Tablet 20 milliGRAM(s) Oral daily  influenza  Vaccine (HIGH DOSE) 0.5 milliLiter(s) IntraMuscular once  metoprolol succinate ER 50 milliGRAM(s) Oral daily  multivitamin 1 Tablet(s) Oral daily  pantoprazole    Tablet 40 milliGRAM(s) Oral before breakfast  polyethylene glycol 3350 17 Gram(s) Oral daily  prednisoLONE acetate 1% Suspension 1 Drop(s) Left EYE two times a day  temazepam 15 milliGRAM(s) Oral at bedtime  traZODone 50 milliGRAM(s) Oral at bedtime    MEDICATIONS  (PRN):  acetaminophen     Tablet .. 650 milliGRAM(s) Oral every 6 hours PRN Temp greater or equal to 38C (100.4F), Mild Pain (1 - 3)  albuterol/ipratropium for Nebulization 3 milliLiter(s) Nebulizer every 6 hours PRN Shortness of Breath and/or Wheezing  aluminum hydroxide/magnesium hydroxide/simethicone Suspension 30 milliLiter(s) Oral every 4 hours PRN Dyspepsia  melatonin 3 milliGRAM(s) Oral at bedtime PRN Insomnia  ondansetron Injectable 4 milliGRAM(s) IV Push every 8 hours PRN Nausea and/or Vomiting      NUTRITIONALLY PERTINENT LABS:   Na139 mmol/L Glu 92 mg/dL K+ 4.0 mmol/L Cr  0.78 mg/dL BUN 28 mg/dL[H] 11-29 Phos 3.0 mg/dL    NUTRITIONALLY PERTINENT MEDICATIONS/LABS:  [x] Reviewed  [x] Relevant notes on medications/labs:  - Hyperkalemia previously noted, now WNL  - CV: on lasix  - GI: ordered for miralax,   - Ordered for multivitamin and ascorbic acid    EDEMA:  [x] Reviewed  [x] Relevant notes: + 1 bilateral leg per nursing flowsheet ()    GI/ I&O:  [x] Reviewed  [x] Relevant notes: No GI distress noted.  [] Other:    SKIN:   [x] No pressure injuries documented, per nursing flowsheet  [] Pressure injury previously noted  [] Change in pressure injury documentation:      ESTIMATED NEEDS:  Energy:   9354-6664 kcal/day (28-33 kcal/kg)  Protein:  47.3-60.2 g/day (1.1-1.4 g/kg)  Fluid:   ml/day or [x] defer to team  Based on: dry weight: 43.1()    NUTRITION DIAGNOSIS:  [x] Prior Dx: other, inability to manage self care  [x] New Dx: inadequate protein-energy intake related to decreased related to decreased ability to consume adequate protein-energy intake in setting of cognitive limitation as evidenced by meeting <75% of estimated nutritional needs,     Goal: Pt will meet >75% estimated nutrient needs as tolerated.     EDUCATION:  [] Yes:  [x] Not appropriate/warranted: education not warranted at this time, patient with cognitive limitation    NUTRITION CARE PLAN:  1. Diet: Recommend to liberalize diet to free of dietary restrictions in setting of inadequate PO intake  2. Recommend Ensure plus high protein 1x daily (350kcal, 20g proteins) to optimize PO intake  3. Continue multivitamin and ascorbic acid as ordered  4. Continue to monitor PO intake, weight trend, electrolytes, blood glucose, labs, BMs in-house   5. Patient at risk for malnutrition    [] Achieved - Continue current nutrition intervention(s)  [] Current medical condition precludes nutrition intervention at this time.    MONITORING AND EVALUATION:   RD remains available upon request and will follow up per protocol.    Name  Lavinia Bean, KAYLEN, CDN / TEAMS Available on [facility] TEAMS

## 2024-12-02 NOTE — DISCHARGE NOTE PROVIDER - NSDCFUADDAPPT_GEN_ALL_CORE_FT
APPTS ARE READY TO BE MADE: [X] YES    Best Family or Patient Contact (if needed):    Additional Information about above appointments (if needed):    1: pcp  2: cards  3:     Other comments or requests:    APPTS ARE READY TO BE MADE: [X] YES    Best Family or Patient Contact (if needed):    Additional Information about above appointments (if needed):    1: pcp  2: cards  3:     Other comments or requests:   Patient was outreached but did not answer nor could a voicemail be left.

## 2024-12-02 NOTE — DISCHARGE NOTE PROVIDER - NSDCCPCAREPLAN_GEN_ALL_CORE_FT
PRINCIPAL DISCHARGE DIAGNOSIS  Diagnosis: Acute UTI  Assessment and Plan of Treatment: You were treated for UTI with IV antibiotics  You had a bladder and/or kidney infection.  If you are discharged on antibiotics, you will need to finish the medication as prescribed to reduced the likelihood that the infection will recur.  Avoid medications that will cause urinary retention such as benadryl whenever possible.  Drink adequate fluids - there is no harm in drinking cranberry juice.  Females should urinate right after sex.  Contact your doctor if you experience new symptoms or continued symptoms after treatment, such as pain or burning with urination, frequent urination, urinary urgency, blood in the urine, fever, back pain, and/or nausea vomiting.        SECONDARY DISCHARGE DIAGNOSES  Diagnosis: COPD (chronic obstructive pulmonary disease)  Assessment and Plan of Treatment: Avoid environmental allergens and asthma triggers.  Participate in physical activity as tolerated.  Seek immediate medical attention if the shortness of breath does not improve with asthma treatments, or if you experience chest pain or palpitations.  Call an ambulance if your lips or nail beds become a bluish color.      Diagnosis: Anxiety disorder  Assessment and Plan of Treatment: Continue anxiety medications   Make appointments to follow up with your out patient physicians.  Bring all discharge paperwork including discharge medication list to your follow up appointments.

## 2024-12-02 NOTE — PROGRESS NOTE ADULT - PROVIDER SPECIALTY LIST ADULT
Cardiology
Infectious Disease
Pulmonology
Cardiology
Cardiology
Family Medicine
Pulmonology
Cardiology
Cardiology
Family Medicine
Internal Medicine
Pulmonology
Pulmonology

## 2024-12-02 NOTE — DISCHARGE NOTE NURSING/CASE MANAGEMENT/SOCIAL WORK - NSDCFUADDAPPT_GEN_ALL_CORE_FT
APPTS ARE READY TO BE MADE: [X] YES    Best Family or Patient Contact (if needed):    Additional Information about above appointments (if needed):    1: pcp  2: cards  3:     Other comments or requests:

## 2024-12-02 NOTE — DISCHARGE NOTE PROVIDER - NSDCMRMEDTOKEN_GEN_ALL_CORE_FT
acetaminophen 325 mg oral tablet: 2 tab(s) orally every 6 hours As needed Mild Pain (1 - 3)  ascorbic acid 500 mg oral tablet: 1 tab(s) orally once a day  atorvastatin 20 mg oral tablet: 1 tab(s) orally once a day (at bedtime)  budesonide 0.25 mg/2 mL inhalation suspension: 2 milliliter(s) by nebulizer 2 times a day  cyclopentolate 1% ophthalmic solution: 1 drop(s) to each affected eye 2 times a day  FLUoxetine 10 mg oral capsule: 3 cap(s) orally once a day  fluticasone-salmeterol 55 mcg-14 mcg/inh inhalation powder: 1 puff(s) inhaled 2 times a day  furosemide 20 mg oral tablet: 1 tab(s) orally once a day  ipratropium-albuterol 0.5 mg-2.5 mg/3 mL inhalation solution: 3 milliliter(s) by nebulizer every 6 hours as needed for  shortness of breath and/or wheezing  metoprolol succinate 50 mg oral tablet, extended release: 1 tab(s) orally once a day  Multiple Vitamins oral tablet: 1 tab(s) orally once a day  pantoprazole 40 mg oral delayed release tablet: 1 tab(s) orally once a day (before a meal)  polyethylene glycol 3350 oral powder for reconstitution: 17 gram(s) orally once a day  prednisoLONE acetate 1% ophthalmic suspension: 1 drop(s) to each affected eye 2 times a day  temazepam 15 mg oral capsule: 1 cap(s) orally once a day (at bedtime)  traZODone 50 mg oral tablet: 1 tab(s) orally once a day (at bedtime)  Ventolin HFA 90 mcg/inh inhalation aerosol: 2 puff(s) inhaled every 8 hours as needed for  shortness of breath and/or wheezing

## 2024-12-02 NOTE — DISCHARGE NOTE PROVIDER - NSDCFUSCHEDAPPT_GEN_ALL_CORE_FT
[facility] Physician [facility] [address] [facility]  Scheduled Appointment: 12/06/2024

## 2024-12-06 ENCOUNTER — NON-APPOINTMENT (OUTPATIENT)
Age: 89
End: 2024-12-06

## 2024-12-06 ENCOUNTER — APPOINTMENT (OUTPATIENT)
Dept: OPHTHALMOLOGY | Facility: CLINIC | Age: 89
End: 2024-12-06
Payer: MEDICARE

## 2024-12-06 PROCEDURE — 92002 INTRM OPH EXAM NEW PATIENT: CPT

## 2024-12-13 ENCOUNTER — INPATIENT (INPATIENT)
Facility: HOSPITAL | Age: 89
LOS: 10 days | Discharge: NURSING, REHAB OR LONG-TERM CARE | DRG: 195 | End: 2024-12-24
Attending: FAMILY MEDICINE | Admitting: FAMILY MEDICINE
Payer: MEDICARE

## 2024-12-13 VITALS
HEIGHT: 62 IN | OXYGEN SATURATION: 97 % | SYSTOLIC BLOOD PRESSURE: 113 MMHG | HEART RATE: 94 BPM | DIASTOLIC BLOOD PRESSURE: 52 MMHG | RESPIRATION RATE: 22 BRPM

## 2024-12-13 DIAGNOSIS — J18.9 PNEUMONIA, UNSPECIFIED ORGANISM: ICD-10-CM

## 2024-12-13 LAB
ALBUMIN SERPL ELPH-MCNC: 3.8 G/DL — SIGNIFICANT CHANGE UP (ref 3.3–5)
ALP SERPL-CCNC: 134 U/L — HIGH (ref 40–120)
ALT FLD-CCNC: 16 U/L — SIGNIFICANT CHANGE UP (ref 10–45)
ANION GAP SERPL CALC-SCNC: 20 MMOL/L — HIGH (ref 5–17)
ANISOCYTOSIS BLD QL: SLIGHT — SIGNIFICANT CHANGE UP
APTT BLD: 31.2 SEC — SIGNIFICANT CHANGE UP (ref 24.5–35.6)
AST SERPL-CCNC: 21 U/L — SIGNIFICANT CHANGE UP (ref 10–40)
BASOPHILS # BLD AUTO: 0 K/UL — SIGNIFICANT CHANGE UP (ref 0–0.2)
BASOPHILS NFR BLD AUTO: 0 % — SIGNIFICANT CHANGE UP (ref 0–2)
BILIRUB SERPL-MCNC: 0.6 MG/DL — SIGNIFICANT CHANGE UP (ref 0.2–1.2)
BUN SERPL-MCNC: 25 MG/DL — HIGH (ref 7–23)
BURR CELLS BLD QL SMEAR: PRESENT — SIGNIFICANT CHANGE UP
CALCIUM SERPL-MCNC: 9.1 MG/DL — SIGNIFICANT CHANGE UP (ref 8.4–10.5)
CHLORIDE SERPL-SCNC: 102 MMOL/L — SIGNIFICANT CHANGE UP (ref 96–108)
CO2 SERPL-SCNC: 21 MMOL/L — LOW (ref 22–31)
CREAT SERPL-MCNC: 0.87 MG/DL — SIGNIFICANT CHANGE UP (ref 0.5–1.3)
EGFR: 64 ML/MIN/1.73M2 — SIGNIFICANT CHANGE UP
ELLIPTOCYTES BLD QL SMEAR: SLIGHT — SIGNIFICANT CHANGE UP
EOSINOPHIL # BLD AUTO: 0 K/UL — SIGNIFICANT CHANGE UP (ref 0–0.5)
EOSINOPHIL NFR BLD AUTO: 0 % — SIGNIFICANT CHANGE UP (ref 0–6)
FLUAV AG NPH QL: SIGNIFICANT CHANGE UP
FLUBV AG NPH QL: SIGNIFICANT CHANGE UP
GAS PNL BLDV: SIGNIFICANT CHANGE UP
GLUCOSE SERPL-MCNC: 136 MG/DL — HIGH (ref 70–99)
HCT VFR BLD CALC: 36.2 % — SIGNIFICANT CHANGE UP (ref 34.5–45)
HGB BLD-MCNC: 10.6 G/DL — LOW (ref 11.5–15.5)
INR BLD: 1.1 RATIO — SIGNIFICANT CHANGE UP (ref 0.85–1.16)
LACTATE SERPL-SCNC: 2.1 MMOL/L — HIGH (ref 0.5–2)
LYMPHOCYTES # BLD AUTO: 1.18 K/UL — SIGNIFICANT CHANGE UP (ref 1–3.3)
LYMPHOCYTES # BLD AUTO: 5.2 % — LOW (ref 13–44)
MANUAL SMEAR VERIFICATION: SIGNIFICANT CHANGE UP
MCHC RBC-ENTMCNC: 25.8 PG — LOW (ref 27–34)
MCHC RBC-ENTMCNC: 29.3 G/DL — LOW (ref 32–36)
MCV RBC AUTO: 88.1 FL — SIGNIFICANT CHANGE UP (ref 80–100)
MICROCYTES BLD QL: SLIGHT — SIGNIFICANT CHANGE UP
MONOCYTES # BLD AUTO: 1 K/UL — HIGH (ref 0–0.9)
MONOCYTES NFR BLD AUTO: 4.4 % — SIGNIFICANT CHANGE UP (ref 2–14)
NEUTROPHILS # BLD AUTO: 20.48 K/UL — HIGH (ref 1.8–7.4)
NEUTROPHILS NFR BLD AUTO: 85.1 % — HIGH (ref 43–77)
NEUTS BAND # BLD: 5.3 % — SIGNIFICANT CHANGE UP (ref 0–8)
NT-PROBNP SERPL-SCNC: 5563 PG/ML — HIGH (ref 0–300)
OVALOCYTES BLD QL SMEAR: SLIGHT — SIGNIFICANT CHANGE UP
PLAT MORPH BLD: NORMAL — SIGNIFICANT CHANGE UP
PLATELET # BLD AUTO: 175 K/UL — SIGNIFICANT CHANGE UP (ref 150–400)
POIKILOCYTOSIS BLD QL AUTO: SLIGHT — SIGNIFICANT CHANGE UP
POTASSIUM SERPL-MCNC: 4 MMOL/L — SIGNIFICANT CHANGE UP (ref 3.5–5.3)
POTASSIUM SERPL-SCNC: 4 MMOL/L — SIGNIFICANT CHANGE UP (ref 3.5–5.3)
PROT SERPL-MCNC: 6.8 G/DL — SIGNIFICANT CHANGE UP (ref 6–8.3)
PROTHROM AB SERPL-ACNC: 12.6 SEC — SIGNIFICANT CHANGE UP (ref 9.9–13.4)
RBC # BLD: 4.11 M/UL — SIGNIFICANT CHANGE UP (ref 3.8–5.2)
RBC # FLD: 16.2 % — HIGH (ref 10.3–14.5)
RBC BLD AUTO: ABNORMAL
RSV RNA NPH QL NAA+NON-PROBE: SIGNIFICANT CHANGE UP
SARS-COV-2 RNA SPEC QL NAA+PROBE: SIGNIFICANT CHANGE UP
SODIUM SERPL-SCNC: 143 MMOL/L — SIGNIFICANT CHANGE UP (ref 135–145)
TROPONIN T, HIGH SENSITIVITY RESULT: 48 NG/L — SIGNIFICANT CHANGE UP (ref 0–51)
TROPONIN T, HIGH SENSITIVITY RESULT: 54 NG/L — HIGH (ref 0–51)
WBC # BLD: 22.66 K/UL — HIGH (ref 3.8–10.5)
WBC # FLD AUTO: 22.66 K/UL — HIGH (ref 3.8–10.5)

## 2024-12-13 PROCEDURE — 99291 CRITICAL CARE FIRST HOUR: CPT | Mod: GC

## 2024-12-13 PROCEDURE — 71045 X-RAY EXAM CHEST 1 VIEW: CPT | Mod: 26

## 2024-12-13 RX ORDER — IPRATROPIUM BROMIDE AND ALBUTEROL SULFATE .5; 2.5 MG/3ML; MG/3ML
3 SOLUTION RESPIRATORY (INHALATION) EVERY 6 HOURS
Refills: 0 | Status: DISCONTINUED | OUTPATIENT
Start: 2024-12-13 | End: 2024-12-24

## 2024-12-13 RX ORDER — CYCLOPENTOLATE HCL 1 %
1 DROPS OPHTHALMIC (EYE)
Refills: 0 | Status: DISCONTINUED | OUTPATIENT
Start: 2024-12-13 | End: 2024-12-24

## 2024-12-13 RX ORDER — HEPARIN SODIUM 1000 [USP'U]/ML
5000 INJECTION, SOLUTION INTRAVENOUS; SUBCUTANEOUS EVERY 12 HOURS
Refills: 0 | Status: DISCONTINUED | OUTPATIENT
Start: 2024-12-13 | End: 2024-12-24

## 2024-12-13 RX ORDER — TEMAZEPAM 15 MG/1
7.5 CAPSULE ORAL AT BEDTIME
Refills: 0 | Status: DISCONTINUED | OUTPATIENT
Start: 2024-12-13 | End: 2024-12-13

## 2024-12-13 RX ORDER — ACETAMINOPHEN 80 MG/.8ML
1 SOLUTION/ DROPS ORAL
Refills: 0 | DISCHARGE

## 2024-12-13 RX ORDER — FUROSEMIDE 20 MG
20 TABLET ORAL
Refills: 0 | Status: DISCONTINUED | OUTPATIENT
Start: 2024-12-13 | End: 2024-12-16

## 2024-12-13 RX ORDER — VANCOMYCIN HYDROCHLORIDE 5 G/100ML
1000 INJECTION, POWDER, LYOPHILIZED, FOR SOLUTION INTRAVENOUS ONCE
Refills: 0 | Status: COMPLETED | OUTPATIENT
Start: 2024-12-13 | End: 2024-12-13

## 2024-12-13 RX ORDER — TIOTROPIUM BROMIDE MONOHYDRATE 18 UG/1
2 CAPSULE ORAL; RESPIRATORY (INHALATION) DAILY
Refills: 0 | Status: DISCONTINUED | OUTPATIENT
Start: 2024-12-13 | End: 2024-12-24

## 2024-12-13 RX ORDER — CEFTRIAXONE SODIUM 1 G/1
1000 INJECTION, POWDER, FOR SOLUTION INTRAMUSCULAR; INTRAVENOUS EVERY 24 HOURS
Refills: 0 | Status: COMPLETED | OUTPATIENT
Start: 2024-12-13 | End: 2024-12-17

## 2024-12-13 RX ORDER — PREDNISOLONE ACETATE 10 MG/ML
1 SUSPENSION OPHTHALMIC
Refills: 0 | Status: DISCONTINUED | OUTPATIENT
Start: 2024-12-13 | End: 2024-12-24

## 2024-12-13 RX ORDER — BUDESONIDE 0.5 MG/2ML
0.25 INHALANT ORAL
Refills: 0 | Status: DISCONTINUED | OUTPATIENT
Start: 2024-12-13 | End: 2024-12-17

## 2024-12-13 RX ORDER — SODIUM CHLORIDE 9 MG/ML
500 INJECTION, SOLUTION INTRAMUSCULAR; INTRAVENOUS; SUBCUTANEOUS ONCE
Refills: 0 | Status: COMPLETED | OUTPATIENT
Start: 2024-12-13 | End: 2024-12-13

## 2024-12-13 RX ORDER — TRAZODONE HYDROCHLORIDE 150 MG/1
50 TABLET ORAL AT BEDTIME
Refills: 0 | Status: DISCONTINUED | OUTPATIENT
Start: 2024-12-13 | End: 2024-12-24

## 2024-12-13 RX ORDER — CYCLOPENTOLATE HCL 1 %
1 DROPS OPHTHALMIC (EYE)
Refills: 0 | DISCHARGE

## 2024-12-13 RX ORDER — ALBUTEROL SULFATE 90 UG/1
2 INHALANT RESPIRATORY (INHALATION) EVERY 6 HOURS
Refills: 0 | Status: DISCONTINUED | OUTPATIENT
Start: 2024-12-13 | End: 2024-12-13

## 2024-12-13 RX ORDER — ONDANSETRON 4 MG/1
1 TABLET ORAL
Refills: 0 | DISCHARGE

## 2024-12-13 RX ORDER — ALBUTEROL SULFATE 90 UG/1
1 INHALANT RESPIRATORY (INHALATION)
Refills: 0 | DISCHARGE

## 2024-12-13 RX ORDER — AZITHROMYCIN MONOHYDRATE 200 MG/5ML
500 POWDER, FOR SUSPENSION ORAL ONCE
Refills: 0 | Status: DISCONTINUED | OUTPATIENT
Start: 2024-12-13 | End: 2024-12-23

## 2024-12-13 RX ORDER — IPRATROPIUM BROMIDE AND ALBUTEROL SULFATE .5; 2.5 MG/3ML; MG/3ML
3 SOLUTION RESPIRATORY (INHALATION)
Refills: 0 | DISCHARGE

## 2024-12-13 RX ORDER — METOPROLOL TARTRATE 50 MG
50 TABLET ORAL DAILY
Refills: 0 | Status: DISCONTINUED | OUTPATIENT
Start: 2024-12-13 | End: 2024-12-24

## 2024-12-13 RX ORDER — ATORVASTATIN CALCIUM 40 MG/1
20 TABLET, FILM COATED ORAL AT BEDTIME
Refills: 0 | Status: DISCONTINUED | OUTPATIENT
Start: 2024-12-13 | End: 2024-12-24

## 2024-12-13 RX ORDER — SODIUM CHLORIDE 9 MG/ML
250 INJECTION, SOLUTION INTRAVENOUS ONCE
Refills: 0 | Status: DISCONTINUED | OUTPATIENT
Start: 2024-12-13 | End: 2024-12-13

## 2024-12-13 RX ORDER — PIPERACILLIN AND TAZOBACTAM 3; .375 G/15ML; G/15ML
3.38 INJECTION, POWDER, LYOPHILIZED, FOR SOLUTION INTRAVENOUS ONCE
Refills: 0 | Status: COMPLETED | OUTPATIENT
Start: 2024-12-13 | End: 2024-12-13

## 2024-12-13 RX ADMIN — ATORVASTATIN CALCIUM 20 MILLIGRAM(S): 40 TABLET, FILM COATED ORAL at 22:39

## 2024-12-13 RX ADMIN — TRAZODONE HYDROCHLORIDE 50 MILLIGRAM(S): 150 TABLET ORAL at 22:39

## 2024-12-13 RX ADMIN — HEPARIN SODIUM 5000 UNIT(S): 1000 INJECTION, SOLUTION INTRAVENOUS; SUBCUTANEOUS at 19:48

## 2024-12-13 RX ADMIN — PIPERACILLIN AND TAZOBACTAM 200 GRAM(S): 3; .375 INJECTION, POWDER, LYOPHILIZED, FOR SOLUTION INTRAVENOUS at 15:33

## 2024-12-13 RX ADMIN — VANCOMYCIN HYDROCHLORIDE 250 MILLIGRAM(S): 5 INJECTION, POWDER, LYOPHILIZED, FOR SOLUTION INTRAVENOUS at 17:14

## 2024-12-13 RX ADMIN — CEFTRIAXONE SODIUM 100 MILLIGRAM(S): 1 INJECTION, POWDER, FOR SOLUTION INTRAMUSCULAR; INTRAVENOUS at 22:39

## 2024-12-13 RX ADMIN — IPRATROPIUM BROMIDE AND ALBUTEROL SULFATE 3 MILLILITER(S): .5; 2.5 SOLUTION RESPIRATORY (INHALATION) at 23:25

## 2024-12-13 RX ADMIN — SODIUM CHLORIDE 500 MILLILITER(S): 9 INJECTION, SOLUTION INTRAMUSCULAR; INTRAVENOUS; SUBCUTANEOUS at 15:23

## 2024-12-13 NOTE — ED ADULT NURSE NOTE - OBJECTIVE STATEMENT
Patient BIBEMS from NH for hypoxia. As per EMS, O2 sat was in 70s at facility. Patient has hx COPD and is on 2L NC at baseline. Patient arrived on NRB placed by EMS. Patient denies any complaints at this time. Patient placed on 2L NC on arrival to ED, saturating at baseline. Patient placed on cardiac monitor with continuous pulse ox. Patient afebrile on arrival to ED. Patient at baseline mental status. No signs of acute distress at this time. Plan of care in progress.

## 2024-12-13 NOTE — ED PROVIDER NOTE - COVID-19  TEST TYPE
MOLECULAR PCR
Hep B, unspecified formulation [inactive]; 2009/9/4 ; Jac Yost);   influenza, unspecified formulation [inactive]; 2009/9/4 ; Jac Yost);

## 2024-12-13 NOTE — PROVIDER CONTACT NOTE (OTHER) - BACKGROUND
89 year old woman PMH HTN, HLD, CAD, diastolic HF, hypoithyroidism, dementia, COPD on 2L presenting with hypoxia.

## 2024-12-13 NOTE — H&P ADULT - NSHPLABSRESULTS_GEN_ALL_CORE
LABS:                        10.6   22.66 )-----------( 175      ( 13 Dec 2024 14:14 )             36.2     12-13    143  |  102  |  25[H]  ----------------------------<  136[H]  4.0   |  21[L]  |  0.87    Ca    9.1      13 Dec 2024 14:14    TPro  6.8  /  Alb  3.8  /  TBili  0.6  /  DBili  x   /  AST  21  /  ALT  16  /  AlkPhos  134[H]  12-13    PT/INR - ( 13 Dec 2024 14:14 )   PT: 12.6 sec;   INR: 1.10 ratio         PTT - ( 13 Dec 2024 14:14 )  PTT:31.2 sec      Urinalysis Basic - ( 13 Dec 2024 14:14 )    Color: x / Appearance: x / SG: x / pH: x  Gluc: 136 mg/dL / Ketone: x  / Bili: x / Urobili: x   Blood: x / Protein: x / Nitrite: x   Leuk Esterase: x / RBC: x / WBC x   Sq Epi: x / Non Sq Epi: x / Bacteria: x          12-13 @ 13:50  4.0  20

## 2024-12-13 NOTE — ED PROVIDER NOTE - ATTENDING CONTRIBUTION TO CARE
I, Jagdish Guerra MD, Emergency Medicine Attending Physician, personally saw and examined the patient and I personally made/approve the management plan and take responsibility for the patient management.    MDM: 89-year-old female with history as seen in HPI above, who was brought in by EMS for hypoxia.  Patient normally on 2 L nasal cannula 65% at the atria, was placed on nonrebreather.  However patient has been denying any symptoms including fevers, chills, chest pain, shortness of breath, cough, leg swelling.  Patient reported to be at her baseline mental status.    On examination, patient with elevated respiratory rate and borderline elevated rectal temperature, oxygenating at 88 to 90% on room air, placed on nasal cannula 2 L at her baseline. Cardiac examination RRR, lungs with Rales to the left lower lung.  Abdomen is soft and nontender.  No pitting edema.  Neurovascularly intact in all 4 extremities.  Cranial nerves III-XII intact, no pronator drift, normal speech and gait.    Will obtain labs to evaluate for hematologic disorder, metabolic derangements, hepatic and renal function, and screen for infection.  Will obtain chest x-ray to evaluate for acute cardiopulmonary pathology.  Will keep patient on cardiac monitor, obtain EKG and troponin to evaluate for possible cardiac abnormality including ACS and arrhythmia.    My independent interpretation of the EKG shows:  Normal sinus rhythm with rate of 69 bpm, left axis deviation, no ST elevations or depressions.    Labs with leukocytosis of 22, electrolytes nonactionable, elevated troponin T and proBNP and procalcitonin.  Patient was seen by cardiologist Dr. Adria Gardiner.  No plan for anticoagulation or emergent LHC, will keep patient on telemetry and obtain repeat troponin.    Chest x-ray with left lung base hazy opacity concerning for pneumonia.  Will treat with empiric antibiotics.  Patient with elevated lactate meeting severe sepsis criteria, but due to history of heart failure, will provide small amount of fluids and reassess.      The patient will need to be admitted to the hospital for continued evaluation and management.  Discussed with the accepting physician regarding the initial presentation, diagnostic studies, treatments given in the ED, and current plan of care.  The patient was accepted by and endorsed to the medicine team with cardiology consultation.    Critical Care Billing: Severe sepsis, pneumonia  Upon my evaluation, this patient had a high probability of imminent or life-threatening deterioration, which required my direct attention, intervention, and personal management.  The patient has a  medical condition that impairs one or more vital organ systems.  Frequent personal assessment and adjustment of medical interventions was performed.      I have personally provided 35 minutes of critical care time exclusive of time spent on separately billable procedures. Time includes review of laboratory data, radiology results, discussion with consultants, patient and family; monitoring for potential decompensation, as well as time spent retrieving data and reviewing the chart and documenting the visit. Interventions were performed as documented above.

## 2024-12-13 NOTE — ED PROVIDER NOTE - OBJECTIVE STATEMENT
89 year old woman PMH HTN, HLD, CAD, diastolic HF, hypoithyroidism, dementia, COPD on 2L presenting with hypoxia. She was transferred by her facility when she was found to be hypoxic to 60s on her home O2. She is denying any fevers, chills, chest pain, SOB, abd pain, N/V/D/C, dysuria , cough, URI symptoms, sick contacts, travel. She is alert and oriented.

## 2024-12-13 NOTE — ED ADULT NURSE REASSESSMENT NOTE - NS ED NURSE REASSESS COMMENT FT1
patient's family states she wants patient to go to geriatric floor. Patient currently being transported to 6 tower. MARITZA solis made aware via teams to discuss with patient's family.

## 2024-12-13 NOTE — ED ADULT NURSE NOTE - NSFALLHARMRISKINTERV_ED_ALL_ED
Assistance OOB with selected safe patient handling equipment if applicable/Assistance with ambulation/Communicate risk of Fall with Harm to all staff, patient, and family/Monitor gait and stability/Provide visual cue: red socks, yellow wristband, yellow gown, etc/Reinforce activity limits and safety measures with patient and family/Bed in lowest position, wheels locked, appropriate side rails in place/Call bell, personal items and telephone in reach/Instruct patient to call for assistance before getting out of bed/chair/stretcher/Non-slip footwear applied when patient is off stretcher/[place] to call system/Physically safe environment - no spills, clutter or unnecessary equipment/Purposeful Proactive Rounding/Room/bathroom lighting operational, light cord in reach

## 2024-12-13 NOTE — H&P ADULT - ASSESSMENT
89 year old woman,      h/o  emphysema, ,  c/c  L  hydroureter,  HTN.  depression   prior    h/o   C. difficile, diverticulitis,   pna      sarcoidosis,   c/c  diastolic  heart failure,     c/c   superficial   ulcerations, on  bony prominences  of hammer  toe    admitted  with    hypoxia. elevated  wbc      cxr,   effusion.  infiltrate   and   elevated  troponin, form  demand  ischemia/  card  f/ p       cap,  on iv  rocephin    has   c/c  swelling  right  leg     c/c  diastolic chf , on  lasix    h/o  c/c  elevated  d  dimer,        COPD. h/o  underlying c/c  lung disease./  Emphysema, on  home  oxygen         c/c   superficial   ulcerations, on  bony prominences  of hammer  toes,     HTN/  HLD     Anxiety,   c/c  anemia     dvt  ppx               rad< from: TTE Limited W or WO Ultrasound Enhancing Agent (04.08.24 @ 14:17) >  CONCLUSIONS:   1. Left ventricular cavity is normal in size. Left ventricular wall thickness is normal. Left ventricular systolic function is normal with an ejection fraction visually estimated at 55 to 60 %. There are no regional wall motion abnormalities seen.   2. Normal rightventricular cavity size, with normal wall thickness, and normal systolic function. Tricuspid annular plane systolic excursion (TAPSE) is 2.0 cm (normal >=1.7 cm).   3. The left atrium is severely dilated.   4. Mild mitral regurgitation.   5. Comparedto the transthoracic echocardiogram performed on 3/26/2024, regional wall motion abnormality not appreciated.  ________________________________________________________________________________________  < end of copied text >    from: CT Angio Chest PE Protocol w/ IV Cont (07.09.24 @ 17:28) >  IMPRESSION:  No pulmonary embolism through the segmental pulmonary arteries   bilaterally.  Unchanged atelectasis of the right middle andlower lobes, likely due to   a combination of compressive atelectasis from adjacent elevated   diaphragm, and post obstructive atelectasis from mucous impacted bronchi.  --- End of Report ---                       89 year old woman,      h/o  emphysema, ,  c/c  L  hydroureter,  HTN.  depression   prior    h/o   C. difficile, diverticulitis,   pna      sarcoidosis,   c/c  diastolic  heart failure,     c/c   superficial   ulcerations, on  bony prominences  of hammer  toe    admitted  with    hypoxia. elevated  wbc      cxr,   effusion.  infiltrate   and   elevated  troponin, form  demand  ischemia/   known to  card  f/ p       cap,  on iv  rocephin / trend  lactate   has   c/c  swelling  right  leg     c/c  diastolic chf , on  lasix    h/o  c/c  elevated  d  dimer,    COPD. h/o  underlying c/c  lung disease./  Emphysema, on  home  oxygen       bony prominences  of hammer  toes,     HTN/  HLD     Anxiety,   c/c  anemia     dvt  ppx /  aide  at  bedisde              rad< from: TTE Limited W or WO Ultrasound Enhancing Agent (04.08.24 @ 14:17) >  CONCLUSIONS:   1. Left ventricular cavity is normal in size. Left ventricular wall thickness is normal. Left ventricular systolic function is normal with an ejection fraction visually estimated at 55 to 60 %. There are no regional wall motion abnormalities seen.   2. Normal rightventricular cavity size, with normal wall thickness, and normal systolic function. Tricuspid annular plane systolic excursion (TAPSE) is 2.0 cm (normal >=1.7 cm).   3. The left atrium is severely dilated.   4. Mild mitral regurgitation.   5. Comparedto the transthoracic echocardiogram performed on 3/26/2024, regional wall motion abnormality not appreciated.  ________________________________________________________________________________________  < end of copied text >    from: CT Angio Chest PE Protocol w/ IV Cont (07.09.24 @ 17:28) >  IMPRESSION:  No pulmonary embolism through the segmental pulmonary arteries   bilaterally.  Unchanged atelectasis of the right middle andlower lobes, likely due to   a combination of compressive atelectasis from adjacent elevated   diaphragm, and post obstructive atelectasis from mucous impacted bronchi.  --- End of Report ---

## 2024-12-13 NOTE — ED PROVIDER NOTE - PHYSICAL EXAMINATION
Physical Exam:  Gen: mild distress, AOx3, nontoxic appearing  Head: NCAT  HEENT: EOMI, PEERLA, normal conjunctiva, tongue midline, oral mucosa moist  Lung: crackles in LLL, otherwise CTAB, mild respiratory distress, no wheezes/rhonchi B/L, speaking in full sentences  CV: RRR, no murmurs, rubs or gallops  Abd: soft, NT, ND, no guarding, no rigidity, no rebound tenderness, no CVA tenderness  MSK: no visible deformities, ROM normal in UE/LE, no neck / back pain, calf tenderness  Neuro: No focal sensory or motor deficits  Skin: Warm, well perfused, no rash, no leg swelling

## 2024-12-13 NOTE — ED PROVIDER NOTE - CLINICAL SUMMARY MEDICAL DECISION MAKING FREE TEXT BOX
89 year old woman PMH HTN, HLD, CAD, diastolic HF, hypoithyroidism, dementia, COPD on 2L presenting with hypoxia, crackles on LLL concerning for COPD exacerbation vs PNA vs bronchitis vs URI, less likely ACS vs CHF. CBC< CMP, vbg, CXR, trop, bnp, fl.covid, procal.

## 2024-12-13 NOTE — H&P ADULT - HISTORY OF PRESENT ILLNESS
· 89 year old woman       PMH HTN, HLD, CAD, diastolic HF    , hypoithyroidism, dementia, COPD on 2L     presenting with hypoxia.   She was transferred by her facility when she was found to be hypoxic to 60s on her home O2.    denying any fevers, chills, chest pain,  abd pain, N/V/D/C, dysuria , cough, URI symptoms, sick contacts, travel.  is alert and oriented.

## 2024-12-13 NOTE — H&P ADULT - NSHPPHYSICALEXAM_GEN_ALL_CORE
T(C): 37.7 (12-13-24 @ 14:06), Max: 37.7 (12-13-24 @ 14:06)  HR: 68 (12-13-24 @ 14:06) (68 - 94)  BP: 120/57 (12-13-24 @ 14:06) (113/52 - 120/57)  RR: 24 (12-13-24 @ 14:06) (22 - 24)  SpO2: 93% (12-13-24 @ 14:06) (93% - 97%)  GENERAL: NAD, lying in bed comfortably  HEAD:  Atraumatic, normocephalic  EYES: EOMI, PERRLA, conjunctiva and sclera clear  NECK: Supple, trachea midline, no JVD  HEART: Regular rate and rhythm, no murmurs, rubs, or gallops  LUNGS: Unlabored respirations.  Clear to auscultation bilaterally, no crackles, wheezing, or rhonchi  ABDOMEN: Soft, nontender, nondistended, +BS  EXTREMITIES: 2+ peripheral pulses bilaterally. No clubbing, cyanosis, or edema  NERVOUS SYSTEM:  A&Ox3, moving all extremities, no focal deficits   SKIN: No rashes or lesions

## 2024-12-14 LAB
ANION GAP SERPL CALC-SCNC: 10 MMOL/L — SIGNIFICANT CHANGE UP (ref 5–17)
APPEARANCE UR: ABNORMAL
BACTERIA # UR AUTO: ABNORMAL /HPF
BILIRUB UR-MCNC: NEGATIVE — SIGNIFICANT CHANGE UP
BUN SERPL-MCNC: 20 MG/DL — SIGNIFICANT CHANGE UP (ref 7–23)
CALCIUM SERPL-MCNC: 8.4 MG/DL — SIGNIFICANT CHANGE UP (ref 8.4–10.5)
CAST: 8 /LPF — HIGH (ref 0–4)
CHLORIDE SERPL-SCNC: 101 MMOL/L — SIGNIFICANT CHANGE UP (ref 96–108)
CO2 SERPL-SCNC: 27 MMOL/L — SIGNIFICANT CHANGE UP (ref 22–31)
COLOR SPEC: YELLOW — SIGNIFICANT CHANGE UP
CREAT SERPL-MCNC: 0.78 MG/DL — SIGNIFICANT CHANGE UP (ref 0.5–1.3)
DIFF PNL FLD: ABNORMAL
EGFR: 73 ML/MIN/1.73M2 — SIGNIFICANT CHANGE UP
GLUCOSE SERPL-MCNC: 86 MG/DL — SIGNIFICANT CHANGE UP (ref 70–99)
GLUCOSE UR QL: NEGATIVE MG/DL — SIGNIFICANT CHANGE UP
HCT VFR BLD CALC: 26.8 % — LOW (ref 34.5–45)
HCT VFR BLD CALC: 31.6 % — LOW (ref 34.5–45)
HGB BLD-MCNC: 8.3 G/DL — LOW (ref 11.5–15.5)
HGB BLD-MCNC: 9.5 G/DL — LOW (ref 11.5–15.5)
KETONES UR-MCNC: NEGATIVE MG/DL — SIGNIFICANT CHANGE UP
LACTATE BLDV-MCNC: 1.6 MMOL/L — SIGNIFICANT CHANGE UP (ref 0.5–2)
LEUKOCYTE ESTERASE UR-ACNC: ABNORMAL
MCHC RBC-ENTMCNC: 26.1 PG — LOW (ref 27–34)
MCHC RBC-ENTMCNC: 26.7 PG — LOW (ref 27–34)
MCHC RBC-ENTMCNC: 30.1 G/DL — LOW (ref 32–36)
MCHC RBC-ENTMCNC: 31 G/DL — LOW (ref 32–36)
MCV RBC AUTO: 86.2 FL — SIGNIFICANT CHANGE UP (ref 80–100)
MCV RBC AUTO: 86.8 FL — SIGNIFICANT CHANGE UP (ref 80–100)
NITRITE UR-MCNC: NEGATIVE — SIGNIFICANT CHANGE UP
NRBC # BLD: 0 /100 WBCS — SIGNIFICANT CHANGE UP (ref 0–0)
NRBC # BLD: 0 /100 WBCS — SIGNIFICANT CHANGE UP (ref 0–0)
PH UR: 5.5 — SIGNIFICANT CHANGE UP (ref 5–8)
PLATELET # BLD AUTO: 131 K/UL — LOW (ref 150–400)
PLATELET # BLD AUTO: 133 K/UL — LOW (ref 150–400)
POTASSIUM SERPL-MCNC: 3.2 MMOL/L — LOW (ref 3.5–5.3)
POTASSIUM SERPL-SCNC: 3.2 MMOL/L — LOW (ref 3.5–5.3)
PROT UR-MCNC: 100 MG/DL
RBC # BLD: 3.11 M/UL — LOW (ref 3.8–5.2)
RBC # BLD: 3.64 M/UL — LOW (ref 3.8–5.2)
RBC # FLD: 16.3 % — HIGH (ref 10.3–14.5)
RBC # FLD: 16.3 % — HIGH (ref 10.3–14.5)
RBC CASTS # UR COMP ASSIST: 3 /HPF — SIGNIFICANT CHANGE UP (ref 0–4)
REVIEW: SIGNIFICANT CHANGE UP
SODIUM SERPL-SCNC: 138 MMOL/L — SIGNIFICANT CHANGE UP (ref 135–145)
SP GR SPEC: 1.02 — SIGNIFICANT CHANGE UP (ref 1–1.03)
SQUAMOUS # UR AUTO: 6 /HPF — HIGH (ref 0–5)
UROBILINOGEN FLD QL: 1 MG/DL — SIGNIFICANT CHANGE UP (ref 0.2–1)
WBC # BLD: 14.18 K/UL — HIGH (ref 3.8–10.5)
WBC # BLD: 15.04 K/UL — HIGH (ref 3.8–10.5)
WBC # FLD AUTO: 14.18 K/UL — HIGH (ref 3.8–10.5)
WBC # FLD AUTO: 15.04 K/UL — HIGH (ref 3.8–10.5)
WBC UR QL: >998 /HPF — HIGH (ref 0–5)
YEAST-LIKE CELLS: PRESENT

## 2024-12-14 RX ORDER — ACETAMINOPHEN 80 MG/.8ML
650 SOLUTION/ DROPS ORAL ONCE
Refills: 0 | Status: COMPLETED | OUTPATIENT
Start: 2024-12-14 | End: 2024-12-14

## 2024-12-14 RX ORDER — POTASSIUM CHLORIDE 600 MG/1
40 TABLET, FILM COATED, EXTENDED RELEASE ORAL ONCE
Refills: 0 | Status: COMPLETED | OUTPATIENT
Start: 2024-12-14 | End: 2024-12-14

## 2024-12-14 RX ORDER — ACETAMINOPHEN 80 MG/.8ML
650 SOLUTION/ DROPS ORAL EVERY 6 HOURS
Refills: 0 | Status: DISCONTINUED | OUTPATIENT
Start: 2024-12-14 | End: 2024-12-24

## 2024-12-14 RX ORDER — INFLUENZA A VIRUS A/WISCONSIN/588/2019 (H1N1) RECOMBINANT HEMAGGLUTININ ANTIGEN, INFLUENZA A VIRUS A/DARWIN/6/2021 (H3N2) RECOMBINANT HEMAGGLUTININ ANTIGEN, INFLUENZA B VIRUS B/AUSTRIA/1359417/2021 RECOMBINANT HEMAGGLUTININ ANTIGEN, AND INFLUENZA B VIRUS B/PHUKET/3073/2013 RECOMBINANT HEMAGGLUTININ ANTIGEN 45; 45; 45; 45 UG/.5ML; UG/.5ML; UG/.5ML; UG/.5ML
0.5 INJECTION INTRAMUSCULAR ONCE
Refills: 0 | Status: DISCONTINUED | OUTPATIENT
Start: 2024-12-14 | End: 2024-12-24

## 2024-12-14 RX ADMIN — BUDESONIDE 0.25 MILLIGRAM(S): 0.5 INHALANT ORAL at 16:59

## 2024-12-14 RX ADMIN — HEPARIN SODIUM 5000 UNIT(S): 1000 INJECTION, SOLUTION INTRAVENOUS; SUBCUTANEOUS at 17:00

## 2024-12-14 RX ADMIN — CEFTRIAXONE SODIUM 100 MILLIGRAM(S): 1 INJECTION, POWDER, FOR SOLUTION INTRAMUSCULAR; INTRAVENOUS at 22:31

## 2024-12-14 RX ADMIN — ACETAMINOPHEN 650 MILLIGRAM(S): 80 SOLUTION/ DROPS ORAL at 21:33

## 2024-12-14 RX ADMIN — ACETAMINOPHEN 650 MILLIGRAM(S): 80 SOLUTION/ DROPS ORAL at 13:08

## 2024-12-14 RX ADMIN — PREDNISOLONE ACETATE 1 DROP(S): 10 SUSPENSION OPHTHALMIC at 17:00

## 2024-12-14 RX ADMIN — BUDESONIDE 0.25 MILLIGRAM(S): 0.5 INHALANT ORAL at 06:11

## 2024-12-14 RX ADMIN — Medication 1 DROP(S): at 17:00

## 2024-12-14 RX ADMIN — Medication 1 DROP(S): at 06:11

## 2024-12-14 RX ADMIN — IPRATROPIUM BROMIDE AND ALBUTEROL SULFATE 3 MILLILITER(S): .5; 2.5 SOLUTION RESPIRATORY (INHALATION) at 16:59

## 2024-12-14 RX ADMIN — HEPARIN SODIUM 5000 UNIT(S): 1000 INJECTION, SOLUTION INTRAVENOUS; SUBCUTANEOUS at 06:11

## 2024-12-14 RX ADMIN — TRAZODONE HYDROCHLORIDE 50 MILLIGRAM(S): 150 TABLET ORAL at 21:36

## 2024-12-14 RX ADMIN — IPRATROPIUM BROMIDE AND ALBUTEROL SULFATE 3 MILLILITER(S): .5; 2.5 SOLUTION RESPIRATORY (INHALATION) at 23:24

## 2024-12-14 RX ADMIN — IPRATROPIUM BROMIDE AND ALBUTEROL SULFATE 3 MILLILITER(S): .5; 2.5 SOLUTION RESPIRATORY (INHALATION) at 12:30

## 2024-12-14 RX ADMIN — ACETAMINOPHEN 650 MILLIGRAM(S): 80 SOLUTION/ DROPS ORAL at 02:58

## 2024-12-14 RX ADMIN — PREDNISOLONE ACETATE 1 DROP(S): 10 SUSPENSION OPHTHALMIC at 06:12

## 2024-12-14 RX ADMIN — Medication 10 MILLIGRAM(S): at 12:30

## 2024-12-14 RX ADMIN — ACETAMINOPHEN 650 MILLIGRAM(S): 80 SOLUTION/ DROPS ORAL at 14:00

## 2024-12-14 RX ADMIN — Medication 20 MILLIGRAM(S): at 12:31

## 2024-12-14 RX ADMIN — ATORVASTATIN CALCIUM 20 MILLIGRAM(S): 40 TABLET, FILM COATED ORAL at 21:36

## 2024-12-14 RX ADMIN — IPRATROPIUM BROMIDE AND ALBUTEROL SULFATE 3 MILLILITER(S): .5; 2.5 SOLUTION RESPIRATORY (INHALATION) at 06:11

## 2024-12-14 RX ADMIN — POTASSIUM CHLORIDE 40 MILLIEQUIVALENT(S): 600 TABLET, FILM COATED, EXTENDED RELEASE ORAL at 17:00

## 2024-12-14 RX ADMIN — ACETAMINOPHEN 650 MILLIGRAM(S): 80 SOLUTION/ DROPS ORAL at 03:58

## 2024-12-14 RX ADMIN — ACETAMINOPHEN 650 MILLIGRAM(S): 80 SOLUTION/ DROPS ORAL at 20:33

## 2024-12-14 RX ADMIN — TIOTROPIUM BROMIDE MONOHYDRATE 2 PUFF(S): 18 CAPSULE ORAL; RESPIRATORY (INHALATION) at 12:30

## 2024-12-14 NOTE — PATIENT PROFILE ADULT - FALL HARM RISK - HARM RISK INTERVENTIONS
Assistance with ambulation/Assistance OOB with selected safe patient handling equipment/Communicate Risk of Fall with Harm to all staff/Discuss with provider need for PT consult/Monitor for mental status changes/Monitor gait and stability/Provide patient with walking aids - walker, cane, crutches/Reinforce activity limits and safety measures with patient and family/Reorient to person, place and time as needed/Review medications for side effects contributing to fall risk/Sit up slowly, dangle for a short time, stand at bedside before walking/Tailored Fall Risk Interventions/Use of alarms - bed, chair and/or voice tab/Visual Cue: Yellow wristband and red socks/Bed in lowest position, wheels locked, appropriate side rails in place/Call bell, personal items and telephone in reach/Instruct patient to call for assistance before getting out of bed or chair/Non-slip footwear when patient is out of bed/[place] to call system/Physically safe environment - no spills, clutter or unnecessary equipment/Purposeful Proactive Rounding/Room/bathroom lighting operational, light cord in reach

## 2024-12-14 NOTE — PATIENT PROFILE ADULT - FUNCTIONAL ASSESSMENT - BASIC MOBILITY 6.
2-calculated by average/Not able to assess (calculate score using [facility] averaging method)  2-calculated by average/Not able to assess (calculate score using [facility] averaging method)

## 2024-12-14 NOTE — PATIENT PROFILE ADULT - MONEY FOR FOOD
Called to bedside for patient experiencing shortness of breath  Patient reports shortness of breath began around 0430 this AM upon getting out of bed for the first time after delivery  States that she only experiences shortness of breath when ambulating  Denies any shortness of breath at rest   Reports that shortness of breath has been consistent in nature and has not worsened since onset this morning  Denies any chest pain, dizziness, lightheadedness, calf pain or tenderness  Vitals currently stable with heart rate of 80, blood pressure 118/65  Patient's starting hemoglobin 11 4  EBL for delivery 150 cc  Per night team delivery was uncomplicated with small laceration  On examination no calf swelling noted  Heart regular rate with no obvious arrhythmia  Lungs clear to auscultation bilaterally  Uterus firm at 1 below the umbilicus  No bleeding noted on fundal exam   Will plan for stat CBC  Dr Shahab Tomlin aware  no

## 2024-12-15 LAB
ANION GAP SERPL CALC-SCNC: 13 MMOL/L — SIGNIFICANT CHANGE UP (ref 5–17)
BUN SERPL-MCNC: 15 MG/DL — SIGNIFICANT CHANGE UP (ref 7–23)
CALCIUM SERPL-MCNC: 9 MG/DL — SIGNIFICANT CHANGE UP (ref 8.4–10.5)
CHLORIDE SERPL-SCNC: 102 MMOL/L — SIGNIFICANT CHANGE UP (ref 96–108)
CO2 SERPL-SCNC: 27 MMOL/L — SIGNIFICANT CHANGE UP (ref 22–31)
CREAT SERPL-MCNC: 0.66 MG/DL — SIGNIFICANT CHANGE UP (ref 0.5–1.3)
EGFR: 84 ML/MIN/1.73M2 — SIGNIFICANT CHANGE UP
GLUCOSE SERPL-MCNC: 87 MG/DL — SIGNIFICANT CHANGE UP (ref 70–99)
HCT VFR BLD CALC: 31.5 % — LOW (ref 34.5–45)
HGB BLD-MCNC: 9.5 G/DL — LOW (ref 11.5–15.5)
MAGNESIUM SERPL-MCNC: 2 MG/DL — SIGNIFICANT CHANGE UP (ref 1.6–2.6)
MCHC RBC-ENTMCNC: 26.5 PG — LOW (ref 27–34)
MCHC RBC-ENTMCNC: 30.2 G/DL — LOW (ref 32–36)
MCV RBC AUTO: 87.7 FL — SIGNIFICANT CHANGE UP (ref 80–100)
NRBC # BLD: 0 /100 WBCS — SIGNIFICANT CHANGE UP (ref 0–0)
PHOSPHATE SERPL-MCNC: 3.1 MG/DL — SIGNIFICANT CHANGE UP (ref 2.5–4.5)
PLATELET # BLD AUTO: 137 K/UL — LOW (ref 150–400)
POTASSIUM SERPL-MCNC: 4 MMOL/L — SIGNIFICANT CHANGE UP (ref 3.5–5.3)
POTASSIUM SERPL-SCNC: 4 MMOL/L — SIGNIFICANT CHANGE UP (ref 3.5–5.3)
RBC # BLD: 3.59 M/UL — LOW (ref 3.8–5.2)
RBC # FLD: 16.2 % — HIGH (ref 10.3–14.5)
SODIUM SERPL-SCNC: 142 MMOL/L — SIGNIFICANT CHANGE UP (ref 135–145)
WBC # BLD: 9.66 K/UL — SIGNIFICANT CHANGE UP (ref 3.8–10.5)
WBC # FLD AUTO: 9.66 K/UL — SIGNIFICANT CHANGE UP (ref 3.8–10.5)

## 2024-12-15 RX ADMIN — TIOTROPIUM BROMIDE MONOHYDRATE 2 PUFF(S): 18 CAPSULE ORAL; RESPIRATORY (INHALATION) at 13:08

## 2024-12-15 RX ADMIN — ACETAMINOPHEN 650 MILLIGRAM(S): 80 SOLUTION/ DROPS ORAL at 14:09

## 2024-12-15 RX ADMIN — Medication 1 DROP(S): at 17:25

## 2024-12-15 RX ADMIN — Medication 20 MILLIGRAM(S): at 06:05

## 2024-12-15 RX ADMIN — HEPARIN SODIUM 5000 UNIT(S): 1000 INJECTION, SOLUTION INTRAVENOUS; SUBCUTANEOUS at 17:25

## 2024-12-15 RX ADMIN — BUDESONIDE 0.25 MILLIGRAM(S): 0.5 INHALANT ORAL at 06:06

## 2024-12-15 RX ADMIN — IPRATROPIUM BROMIDE AND ALBUTEROL SULFATE 3 MILLILITER(S): .5; 2.5 SOLUTION RESPIRATORY (INHALATION) at 17:25

## 2024-12-15 RX ADMIN — Medication 50 MILLIGRAM(S): at 06:06

## 2024-12-15 RX ADMIN — CEFTRIAXONE SODIUM 100 MILLIGRAM(S): 1 INJECTION, POWDER, FOR SOLUTION INTRAMUSCULAR; INTRAVENOUS at 22:03

## 2024-12-15 RX ADMIN — Medication 20 MILLIGRAM(S): at 13:09

## 2024-12-15 RX ADMIN — Medication 1 DROP(S): at 06:05

## 2024-12-15 RX ADMIN — IPRATROPIUM BROMIDE AND ALBUTEROL SULFATE 3 MILLILITER(S): .5; 2.5 SOLUTION RESPIRATORY (INHALATION) at 23:41

## 2024-12-15 RX ADMIN — IPRATROPIUM BROMIDE AND ALBUTEROL SULFATE 3 MILLILITER(S): .5; 2.5 SOLUTION RESPIRATORY (INHALATION) at 06:06

## 2024-12-15 RX ADMIN — BUDESONIDE 0.25 MILLIGRAM(S): 0.5 INHALANT ORAL at 17:25

## 2024-12-15 RX ADMIN — ATORVASTATIN CALCIUM 20 MILLIGRAM(S): 40 TABLET, FILM COATED ORAL at 21:29

## 2024-12-15 RX ADMIN — IPRATROPIUM BROMIDE AND ALBUTEROL SULFATE 3 MILLILITER(S): .5; 2.5 SOLUTION RESPIRATORY (INHALATION) at 13:09

## 2024-12-15 RX ADMIN — ACETAMINOPHEN 650 MILLIGRAM(S): 80 SOLUTION/ DROPS ORAL at 13:09

## 2024-12-15 RX ADMIN — ACETAMINOPHEN 650 MILLIGRAM(S): 80 SOLUTION/ DROPS ORAL at 07:32

## 2024-12-15 RX ADMIN — Medication 10 MILLIGRAM(S): at 13:09

## 2024-12-15 RX ADMIN — PREDNISOLONE ACETATE 1 DROP(S): 10 SUSPENSION OPHTHALMIC at 06:05

## 2024-12-15 RX ADMIN — PREDNISOLONE ACETATE 1 DROP(S): 10 SUSPENSION OPHTHALMIC at 17:25

## 2024-12-15 RX ADMIN — HEPARIN SODIUM 5000 UNIT(S): 1000 INJECTION, SOLUTION INTRAVENOUS; SUBCUTANEOUS at 06:04

## 2024-12-15 RX ADMIN — ACETAMINOPHEN 650 MILLIGRAM(S): 80 SOLUTION/ DROPS ORAL at 06:32

## 2024-12-15 RX ADMIN — TRAZODONE HYDROCHLORIDE 50 MILLIGRAM(S): 150 TABLET ORAL at 21:29

## 2024-12-16 PROCEDURE — 99222 1ST HOSP IP/OBS MODERATE 55: CPT

## 2024-12-16 PROCEDURE — 99221 1ST HOSP IP/OBS SF/LOW 40: CPT

## 2024-12-16 RX ORDER — FUROSEMIDE 20 MG
40 TABLET ORAL DAILY
Refills: 0 | Status: DISCONTINUED | OUTPATIENT
Start: 2024-12-16 | End: 2024-12-24

## 2024-12-16 RX ORDER — SENNOSIDES 8.6 MG/1
2 TABLET, FILM COATED ORAL AT BEDTIME
Refills: 0 | Status: DISCONTINUED | OUTPATIENT
Start: 2024-12-16 | End: 2024-12-24

## 2024-12-16 RX ORDER — POLYETHYLENE GLYCOL 3350 17 G/DOSE
17 POWDER (GRAM) ORAL DAILY
Refills: 0 | Status: DISCONTINUED | OUTPATIENT
Start: 2024-12-16 | End: 2024-12-24

## 2024-12-16 RX ORDER — ASCORBIC ACID 1000 MG
500 TABLET ORAL DAILY
Refills: 0 | Status: DISCONTINUED | OUTPATIENT
Start: 2024-12-16 | End: 2024-12-24

## 2024-12-16 RX ORDER — B COMPLEX, C NO.20/FOLIC ACID 1 MG
1 CAPSULE ORAL DAILY
Refills: 0 | Status: DISCONTINUED | OUTPATIENT
Start: 2024-12-16 | End: 2024-12-24

## 2024-12-16 RX ADMIN — IPRATROPIUM BROMIDE AND ALBUTEROL SULFATE 3 MILLILITER(S): .5; 2.5 SOLUTION RESPIRATORY (INHALATION) at 05:55

## 2024-12-16 RX ADMIN — Medication 1 TABLET(S): at 12:28

## 2024-12-16 RX ADMIN — BUDESONIDE 0.25 MILLIGRAM(S): 0.5 INHALANT ORAL at 05:55

## 2024-12-16 RX ADMIN — Medication 1 DROP(S): at 05:54

## 2024-12-16 RX ADMIN — Medication 1 DROP(S): at 17:14

## 2024-12-16 RX ADMIN — ATORVASTATIN CALCIUM 20 MILLIGRAM(S): 40 TABLET, FILM COATED ORAL at 21:11

## 2024-12-16 RX ADMIN — HEPARIN SODIUM 5000 UNIT(S): 1000 INJECTION, SOLUTION INTRAVENOUS; SUBCUTANEOUS at 17:14

## 2024-12-16 RX ADMIN — IPRATROPIUM BROMIDE AND ALBUTEROL SULFATE 3 MILLILITER(S): .5; 2.5 SOLUTION RESPIRATORY (INHALATION) at 12:28

## 2024-12-16 RX ADMIN — BUDESONIDE 0.25 MILLIGRAM(S): 0.5 INHALANT ORAL at 17:14

## 2024-12-16 RX ADMIN — TIOTROPIUM BROMIDE MONOHYDRATE 2 PUFF(S): 18 CAPSULE ORAL; RESPIRATORY (INHALATION) at 12:28

## 2024-12-16 RX ADMIN — SENNOSIDES 2 TABLET(S): 8.6 TABLET, FILM COATED ORAL at 21:11

## 2024-12-16 RX ADMIN — Medication 50 MILLIGRAM(S): at 05:54

## 2024-12-16 RX ADMIN — IPRATROPIUM BROMIDE AND ALBUTEROL SULFATE 3 MILLILITER(S): .5; 2.5 SOLUTION RESPIRATORY (INHALATION) at 17:15

## 2024-12-16 RX ADMIN — Medication 17 GRAM(S): at 12:28

## 2024-12-16 RX ADMIN — ACETAMINOPHEN 650 MILLIGRAM(S): 80 SOLUTION/ DROPS ORAL at 22:05

## 2024-12-16 RX ADMIN — TRAZODONE HYDROCHLORIDE 50 MILLIGRAM(S): 150 TABLET ORAL at 21:11

## 2024-12-16 RX ADMIN — ACETAMINOPHEN 650 MILLIGRAM(S): 80 SOLUTION/ DROPS ORAL at 21:54

## 2024-12-16 RX ADMIN — IPRATROPIUM BROMIDE AND ALBUTEROL SULFATE 3 MILLILITER(S): .5; 2.5 SOLUTION RESPIRATORY (INHALATION) at 23:19

## 2024-12-16 RX ADMIN — Medication 10 MILLIGRAM(S): at 12:28

## 2024-12-16 RX ADMIN — HEPARIN SODIUM 5000 UNIT(S): 1000 INJECTION, SOLUTION INTRAVENOUS; SUBCUTANEOUS at 05:55

## 2024-12-16 RX ADMIN — PREDNISOLONE ACETATE 1 DROP(S): 10 SUSPENSION OPHTHALMIC at 05:54

## 2024-12-16 RX ADMIN — Medication 20 MILLIGRAM(S): at 05:54

## 2024-12-16 RX ADMIN — Medication 500 MILLIGRAM(S): at 12:29

## 2024-12-16 RX ADMIN — PREDNISOLONE ACETATE 1 DROP(S): 10 SUSPENSION OPHTHALMIC at 17:13

## 2024-12-16 RX ADMIN — CEFTRIAXONE SODIUM 100 MILLIGRAM(S): 1 INJECTION, POWDER, FOR SOLUTION INTRAMUSCULAR; INTRAVENOUS at 21:44

## 2024-12-16 NOTE — PHYSICAL THERAPY INITIAL EVALUATION ADULT - ADDITIONAL COMMENTS
Per care coordination note, pt is a resident of the [facility] of [facility] in [place] and also has a HHA for 12 hrs/day for 7 days/wk. Pt was completely dependent with all ADLs and mobility prior to admission.

## 2024-12-16 NOTE — DIETITIAN INITIAL EVALUATION ADULT - PERTINENT LABORATORY DATA
12-15    142  |  102  |  15  ----------------------------<  87  4.0   |  27  |  0.66    Ca    9.0      15 Dec 2024 06:52  Phos  3.1     12-15  Mg     2.0     12-15

## 2024-12-16 NOTE — PHYSICAL THERAPY INITIAL EVALUATION ADULT - PERTINENT HX OF CURRENT PROBLEM, REHAB EVAL
89 year old woman PMH HTN, HLD, CAD, diastolic HF, hypothyroidism, dementia, COPD on 2L presenting with hypoxia. She was transferred by her facility when she was found to be hypoxic to 60s on her home O2. She is denying any fevers, chills, chest pain, SOB, abd pain, N/V/D/C, dysuria , cough, URI symptoms, sick contacts, travel. She is alert and oriented.

## 2024-12-16 NOTE — DIETITIAN INITIAL EVALUATION ADULT - REASON INDICATOR FOR ASSESSMENT
Patient is in need of a new Urology referral as his insurance company has misplace the referral they had. Patient is currently being seen in [place] at the [facility] and would like the referral sent there. Please call with any question.      Hannah Parson on 5/16/2024 at 11:29 AM    
mst score 2 or >

## 2024-12-16 NOTE — PHYSICAL THERAPY INITIAL EVALUATION ADULT - TRANSFER SAFETY CONCERNS NOTED: SIT/STAND, REHAB EVAL
+retropulsion/decreased balance during turns/decreased safety awareness/losing balance/decreased sequencing ability/decreased weight-shifting ability

## 2024-12-16 NOTE — DIETITIAN INITIAL EVALUATION ADULT - PERTINENT MEDS FT
MEDICATIONS  (STANDING):  albuterol/ipratropium for Nebulization 3 milliLiter(s) Nebulizer every 6 hours  atorvastatin 20 milliGRAM(s) Oral at bedtime  azithromycin  IVPB 500 milliGRAM(s) IV Intermittent once  buDESOnide    Inhalation Suspension 0.25 milliGRAM(s) Inhalation two times a day  cefTRIAXone   IVPB 1000 milliGRAM(s) IV Intermittent every 24 hours  cyclopentolate 1% Solution 1 Drop(s) Both EYES two times a day  FLUoxetine 10 milliGRAM(s) Oral daily  furosemide   Injectable 20 milliGRAM(s) IV Push two times a day  heparin   Injectable 5000 Unit(s) SubCutaneous every 12 hours  influenza  Vaccine (HIGH DOSE) 0.5 milliLiter(s) IntraMuscular once  metoprolol succinate ER 50 milliGRAM(s) Oral daily  prednisoLONE acetate 1% Suspension 1 Drop(s) Both EYES two times a day  tiotropium 2.5 MICROgram(s) Inhaler 2 Puff(s) Inhalation daily  traZODone 50 milliGRAM(s) Oral at bedtime    MEDICATIONS  (PRN):  acetaminophen     Tablet .. 650 milliGRAM(s) Oral every 6 hours PRN Mild Pain (1 - 3)  temazepam 7.5 milliGRAM(s) Oral at bedtime PRN Insomnia

## 2024-12-16 NOTE — PHYSICAL THERAPY INITIAL EVALUATION ADULT - TRANSFER TRAINING, PT EVAL
GOAL: Patient will perform sit to stand transfers minAx1 at rolling walker with proper hand placement in 2 weeks.

## 2024-12-17 DIAGNOSIS — R09.02 HYPOXEMIA: ICD-10-CM

## 2024-12-17 DIAGNOSIS — J44.9 CHRONIC OBSTRUCTIVE PULMONARY DISEASE, UNSPECIFIED: ICD-10-CM

## 2024-12-17 LAB
ANION GAP SERPL CALC-SCNC: 15 MMOL/L — SIGNIFICANT CHANGE UP (ref 5–17)
ANION GAP SERPL CALC-SCNC: 24 MMOL/L — HIGH (ref 5–17)
BUN SERPL-MCNC: 14 MG/DL — SIGNIFICANT CHANGE UP (ref 7–23)
BUN SERPL-MCNC: 15 MG/DL — SIGNIFICANT CHANGE UP (ref 7–23)
CALCIUM SERPL-MCNC: 9.2 MG/DL — SIGNIFICANT CHANGE UP (ref 8.4–10.5)
CALCIUM SERPL-MCNC: <3 MG/DL — CRITICAL LOW (ref 8.4–10.5)
CHLORIDE SERPL-SCNC: 93 MMOL/L — LOW (ref 96–108)
CHLORIDE SERPL-SCNC: 98 MMOL/L — SIGNIFICANT CHANGE UP (ref 96–108)
CO2 SERPL-SCNC: 21 MMOL/L — LOW (ref 22–31)
CO2 SERPL-SCNC: 27 MMOL/L — SIGNIFICANT CHANGE UP (ref 22–31)
CREAT SERPL-MCNC: 0.69 MG/DL — SIGNIFICANT CHANGE UP (ref 0.5–1.3)
CREAT SERPL-MCNC: 0.78 MG/DL — SIGNIFICANT CHANGE UP (ref 0.5–1.3)
EGFR: 73 ML/MIN/1.73M2 — SIGNIFICANT CHANGE UP
EGFR: 83 ML/MIN/1.73M2 — SIGNIFICANT CHANGE UP
FLUAV AG NPH QL: SIGNIFICANT CHANGE UP
FLUBV AG NPH QL: SIGNIFICANT CHANGE UP
GLUCOSE SERPL-MCNC: 84 MG/DL — SIGNIFICANT CHANGE UP (ref 70–99)
GLUCOSE SERPL-MCNC: 96 MG/DL — SIGNIFICANT CHANGE UP (ref 70–99)
HCT VFR BLD CALC: 31.8 % — LOW (ref 34.5–45)
HGB BLD-MCNC: 9.7 G/DL — LOW (ref 11.5–15.5)
MCHC RBC-ENTMCNC: 26.2 PG — LOW (ref 27–34)
MCHC RBC-ENTMCNC: 30.5 G/DL — LOW (ref 32–36)
MCV RBC AUTO: 85.9 FL — SIGNIFICANT CHANGE UP (ref 80–100)
NRBC # BLD: 0 /100 WBCS — SIGNIFICANT CHANGE UP (ref 0–0)
PLATELET # BLD AUTO: 153 K/UL — SIGNIFICANT CHANGE UP (ref 150–400)
POTASSIUM SERPL-MCNC: 3.7 MMOL/L — SIGNIFICANT CHANGE UP (ref 3.5–5.3)
POTASSIUM SERPL-MCNC: >9 MMOL/L — CRITICAL HIGH (ref 3.5–5.3)
POTASSIUM SERPL-SCNC: 3.7 MMOL/L — SIGNIFICANT CHANGE UP (ref 3.5–5.3)
POTASSIUM SERPL-SCNC: >9 MMOL/L — CRITICAL HIGH (ref 3.5–5.3)
RBC # BLD: 3.7 M/UL — LOW (ref 3.8–5.2)
RBC # FLD: 16.3 % — HIGH (ref 10.3–14.5)
RSV RNA NPH QL NAA+NON-PROBE: SIGNIFICANT CHANGE UP
SARS-COV-2 RNA SPEC QL NAA+PROBE: SIGNIFICANT CHANGE UP
SODIUM SERPL-SCNC: 138 MMOL/L — SIGNIFICANT CHANGE UP (ref 135–145)
SODIUM SERPL-SCNC: 140 MMOL/L — SIGNIFICANT CHANGE UP (ref 135–145)
WBC # BLD: 6.04 K/UL — SIGNIFICANT CHANGE UP (ref 3.8–10.5)
WBC # FLD AUTO: 6.04 K/UL — SIGNIFICANT CHANGE UP (ref 3.8–10.5)

## 2024-12-17 PROCEDURE — 99233 SBSQ HOSP IP/OBS HIGH 50: CPT

## 2024-12-17 RX ORDER — BUDESONIDE 0.5 MG/2ML
0.5 INHALANT ORAL
Refills: 0 | Status: DISCONTINUED | OUTPATIENT
Start: 2024-12-17 | End: 2024-12-23

## 2024-12-17 RX ORDER — POTASSIUM CHLORIDE 600 MG/1
20 TABLET, FILM COATED, EXTENDED RELEASE ORAL ONCE
Refills: 0 | Status: COMPLETED | OUTPATIENT
Start: 2024-12-17 | End: 2024-12-17

## 2024-12-17 RX ADMIN — Medication 50 MILLIGRAM(S): at 05:25

## 2024-12-17 RX ADMIN — Medication 1 DROP(S): at 05:24

## 2024-12-17 RX ADMIN — Medication 1 TABLET(S): at 12:31

## 2024-12-17 RX ADMIN — IPRATROPIUM BROMIDE AND ALBUTEROL SULFATE 3 MILLILITER(S): .5; 2.5 SOLUTION RESPIRATORY (INHALATION) at 19:18

## 2024-12-17 RX ADMIN — CEFTRIAXONE SODIUM 100 MILLIGRAM(S): 1 INJECTION, POWDER, FOR SOLUTION INTRAMUSCULAR; INTRAVENOUS at 22:07

## 2024-12-17 RX ADMIN — IPRATROPIUM BROMIDE AND ALBUTEROL SULFATE 3 MILLILITER(S): .5; 2.5 SOLUTION RESPIRATORY (INHALATION) at 05:25

## 2024-12-17 RX ADMIN — TRAZODONE HYDROCHLORIDE 50 MILLIGRAM(S): 150 TABLET ORAL at 22:08

## 2024-12-17 RX ADMIN — ATORVASTATIN CALCIUM 20 MILLIGRAM(S): 40 TABLET, FILM COATED ORAL at 22:08

## 2024-12-17 RX ADMIN — BUDESONIDE 0.25 MILLIGRAM(S): 0.5 INHALANT ORAL at 05:25

## 2024-12-17 RX ADMIN — SENNOSIDES 2 TABLET(S): 8.6 TABLET, FILM COATED ORAL at 22:08

## 2024-12-17 RX ADMIN — Medication 500 MILLIGRAM(S): at 12:31

## 2024-12-17 RX ADMIN — BUDESONIDE 0.5 MILLIGRAM(S): 0.5 INHALANT ORAL at 19:19

## 2024-12-17 RX ADMIN — Medication 40 MILLIGRAM(S): at 05:25

## 2024-12-17 RX ADMIN — PREDNISOLONE ACETATE 1 DROP(S): 10 SUSPENSION OPHTHALMIC at 05:24

## 2024-12-17 RX ADMIN — PREDNISOLONE ACETATE 1 DROP(S): 10 SUSPENSION OPHTHALMIC at 19:18

## 2024-12-17 RX ADMIN — Medication 17 GRAM(S): at 12:31

## 2024-12-17 RX ADMIN — TIOTROPIUM BROMIDE MONOHYDRATE 2 PUFF(S): 18 CAPSULE ORAL; RESPIRATORY (INHALATION) at 19:19

## 2024-12-17 RX ADMIN — Medication 1 DROP(S): at 19:18

## 2024-12-17 RX ADMIN — HEPARIN SODIUM 5000 UNIT(S): 1000 INJECTION, SOLUTION INTRAVENOUS; SUBCUTANEOUS at 19:18

## 2024-12-17 RX ADMIN — HEPARIN SODIUM 5000 UNIT(S): 1000 INJECTION, SOLUTION INTRAVENOUS; SUBCUTANEOUS at 05:25

## 2024-12-17 RX ADMIN — Medication 10 MILLIGRAM(S): at 12:31

## 2024-12-17 RX ADMIN — IPRATROPIUM BROMIDE AND ALBUTEROL SULFATE 3 MILLILITER(S): .5; 2.5 SOLUTION RESPIRATORY (INHALATION) at 12:31

## 2024-12-17 RX ADMIN — POTASSIUM CHLORIDE 20 MILLIEQUIVALENT(S): 600 TABLET, FILM COATED, EXTENDED RELEASE ORAL at 19:19

## 2024-12-17 NOTE — CONSULT NOTE ADULT - PROBLEM SELECTOR RECOMMENDATION 9
-Increase in O2 requirements from baseline (2LNC), currently requiring 5LNC  -CXR with L base hazy opacity  -Check CT chest non-contrast   -Check LE duplex  -RSV/COVID/Flu negative  -Continue ABX  -Duoneb q6h  -Keep sats >90% with O2 (currently 5LNC). Wean back to baseline (2LNC) as tolerated, keep sats >88%.

## 2024-12-17 NOTE — CONSULT NOTE ADULT - NS ATTEND AMEND GEN_ALL_CORE FT
pt with lll infiltrate on cxr  suggest ct chest wo contrast  continue abx  continue b dilators  keep sat>90% w o2 as needed

## 2024-12-17 NOTE — CONSULT NOTE ADULT - SUBJECTIVE AND OBJECTIVE BOX
Wound SURGERY CONSULT NOTE    HPI:  · 89 year old woman with PMH HTN, HLD, CAD, diastolic HF, hypoithyroidism, dementia, COPD on 2L presenting with hypoxia.   She was transferred by her facility when she was found to be hypoxic to 60s on her home O2. She is denying any fevers, chills, chest pain,  abd pain, N/V/D/C, dysuria , cough, URI symptoms, sick contacts, travel.  is alert and oriented.  (13 Dec 2024 16:07)    Request for wound care evaluation of the sacrum and bilateral buttocks received from nursing. Ms. Elaine was encountered on an alternating air with low air loss surface. She is incontinent of stool and urine. Her immobility, inactivity, incontinence of bowel and bladder in addition to poor nutritional status all contribute to her risk of pressure injury development and hinder healing. Principles of pressure injury prevention including but not limited to turning and positioning reviewed with patient.     PAST MEDICAL & SURGICAL HISTORY:  HTN (hypertension)  Hypothyroidism  Osteoporosis  CAD (coronary artery disease)  COPD (chronic obstructive pulmonary disease)  Pulmonary sarcoidosis  H/O pyelonephritis  Acute diverticulitis    REVIEW OF SYSTEMS  CONSTITUTIONAL: + weakness, no fevers or chills  EYES/ENT: No visual changes;  No vertigo or throat pain   MOUTH: No oral lesion, moist  NECK: No pain or stiffness  RESPIRATORY: No cough, wheezing, hemoptysis; + shortness of breath  CARDIOVASCULAR: No chest pain or palpitations  GASTROINTESTINAL: No abdominal or epigastric pain. No nausea, vomiting, or hematemesis; No diarrhea or constipation. No melena or hematochezia.  GENITOURINARY: No dysuria, frequency or hematuria  NEUROLOGICAL: no numbness  SKIN: No itching, rashes  PSYCH: no confusion or altered mental status    MEDICATIONS  (STANDING):  albuterol/ipratropium for Nebulization 3 milliLiter(s) Nebulizer every 6 hours  ascorbic acid 500 milliGRAM(s) Oral daily  atorvastatin 20 milliGRAM(s) Oral at bedtime  azithromycin  IVPB 500 milliGRAM(s) IV Intermittent once  buDESOnide    Inhalation Suspension 0.25 milliGRAM(s) Inhalation two times a day  cefTRIAXone   IVPB 1000 milliGRAM(s) IV Intermittent every 24 hours  cyclopentolate 1% Solution 1 Drop(s) Both EYES two times a day  FLUoxetine 10 milliGRAM(s) Oral daily  furosemide    Tablet 40 milliGRAM(s) Oral daily  heparin   Injectable 5000 Unit(s) SubCutaneous every 12 hours  influenza  Vaccine (HIGH DOSE) 0.5 milliLiter(s) IntraMuscular once  metoprolol succinate ER 50 milliGRAM(s) Oral daily  multivitamin 1 Tablet(s) Oral daily  polyethylene glycol 3350 17 Gram(s) Oral daily  prednisoLONE acetate 1% Suspension 1 Drop(s) Both EYES two times a day  senna 2 Tablet(s) Oral at bedtime  tiotropium 2.5 MICROgram(s) Inhaler 2 Puff(s) Inhalation daily  traZODone 50 milliGRAM(s) Oral at bedtime    MEDICATIONS  (PRN):  acetaminophen     Tablet .. 650 milliGRAM(s) Oral every 6 hours PRN Mild Pain (1 - 3)  temazepam 7.5 milliGRAM(s) Oral at bedtime PRN Insomnia    Allergies    iodinated radiocontrast agents (Anaphylaxis)    Intolerances    SOCIAL HISTORY:  ; Denies smoking, ETOH, drugs    FAMILY HISTORY:  no pertinent family history among first degree relatives    Vital Signs Last 24 Hrs  T(C): 37.1 (16 Dec 2024 04:04), Max: 37.1 (16 Dec 2024 04:04)  T(F): 98.7 (16 Dec 2024 04:04), Max: 98.7 (16 Dec 2024 04:04)  HR: 88 (16 Dec 2024 04:04) (65 - 88)  BP: 146/62 (16 Dec 2024 04:04) (121/38 - 156/65)  BP(mean): --  RR: 18 (16 Dec 2024 04:04) (18 - 18)  SpO2: 93% (16 Dec 2024 04:04) (93% - 98%)    Parameters below as of 16 Dec 2024 04:04  Patient On (Oxygen Delivery Method): nasal cannula  O2 Flow (L/min): 5    Physical Exam:  General: alert, thin  Ophthamology: sclera clear  ENMT: moist mucous membranes, trachea midline  Respiratory: equal chest rise with respirations  Gastrointestinal: soft NT/ND  Neurology: verbal,  following commands  Psych: calm, cooperative  Musculoskeletal: no contractures  Vascular: BLE edema equal  Skin:  Sacrum/bilateral buttocks dusky, maroon discolored intact skin L 7cm x 7cm x none, no drainage   No odor, increased warmth, tenderness, induration, fluctuance, erythema      LABS:  12-15    142  |  102  |  15  ----------------------------<  87  4.0   |  27  |  0.66    Ca    9.0      15 Dec 2024 06:52  Phos  3.1     12-15  Mg     2.0     12-15                            9.5    9.66  )-----------( 137      ( 15 Dec 2024 06:52 )             31.5       Urinalysis Basic - ( 15 Dec 2024 06:52 )    Color: x / Appearance: x / SG: x / pH: x  Gluc: 87 mg/dL / Ketone: x  / Bili: x / Urobili: x   Blood: x / Protein: x / Nitrite: x   Leuk Esterase: x / RBC: x / WBC x   Sq Epi: x / Non Sq Epi: x / Bacteria: x      
Patient is a 89y old  Female who presents with a chief complaint of Pneumonia due to infectious organism     (16 Dec 2024 08:50)    HPI:  · 89 year old woman       PMH HTN, HLD, CAD, diastolic HF    , hypoithyroidism, dementia, COPD on 2L     presenting with hypoxia.   She was transferred by her facility when she was found to be hypoxic to 60s on her home O2.    denying any fevers, chills, chest pain,  abd pain, N/V/D/C, dysuria , cough, URI symptoms, sick contacts, travel.  is alert and oriented.   (13 Dec 2024 16:07)      PAST MEDICAL & SURGICAL HISTORY:  HTN (hypertension)      Hypothyroidism      Osteoporosis      CAD (coronary artery disease)      COPD (chronic obstructive pulmonary disease)      Pulmonary sarcoidosis      H/O pyelonephritis      Acute diverticulitis          Social history:    FAMILY HISTORY:          Intolerances        Antimicrobials:    azithromycin  IVPB 500 milliGRAM(s) IV Intermittent once  cefTRIAXone   IVPB 1000 milliGRAM(s) IV Intermittent every 24 hours        Vital Signs Last 24 Hrs  T(C): 37.1 (16 Dec 2024 04:04), Max: 37.1 (16 Dec 2024 04:04)  T(F): 98.7 (16 Dec 2024 04:04), Max: 98.7 (16 Dec 2024 04:04)  HR: 88 (16 Dec 2024 04:04) (65 - 139)  BP: 146/62 (16 Dec 2024 04:04) (107/57 - 156/65)  BP(mean): --  RR: 18 (16 Dec 2024 04:04) (18 - 18)  SpO2: 93% (16 Dec 2024 04:04) (93% - 98%)    Parameters below as of 16 Dec 2024 04:04  Patient On (Oxygen Delivery Method): nasal cannula  O2 Flow (L/min): 5              ENMT:No sinus tenderness.No thrush.No pharyngeal exudate or erythema.Fair dental hygiene    Neck:Supple,No LN,no JVD      Respiratory:Good air entry bilaterally,CTA    Cardiovascular:S1 S2 wnl, No murmurs,rub or gallops    Gastrointestinal:Soft BS(+) no tenderness no masses ,No rebound or guarding    Genitourinary:No CVA tendereness     Rectal:    Extremities:No cyanosis,clubbing or edema.    Vascular:peripheral pulses felt    Neurological:AAO X 3,No grossly focal deficits                                    9.5    9.66  )-----------( 137      ( 15 Dec 2024 06:52 )             31.5         12-15    142  |  102  |  15  ----------------------------<  87  4.0   |  27  |  0.66    Ca    9.0      15 Dec 2024 06:52  Phos  3.1     12-15  Mg     2.0     12-15        RECENT CULTURES:  12-13 @ 13:50  .Blood BLOOD  --  --  --    No growth at 48 Hours  --  12-13 @ 13:40  .Blood BLOOD  --  --  --    No growth at 48 Hours  --      MICROBIOLOGY:  Culture Results:   No growth at 48 Hours (12-13 @ 13:50)  Culture Results:   No growth at 48 Hours (12-13 @ 13:40)          Radiology:      Assessment:        Recommendations and Plan:    Pager [phone]  After 5 pm/weekends or if no response :[phone]      
Date of Service, 12-13-24 @ 20:24  CHIEF COMPLAINT:Patient is a 89y old  Female who presents with a chief complaint of hypoxia (13 Dec 2024 16:07)      HPI:   89 year old woman PMH HTN, HLD, CAD, diastolic HF, hypoithyroidism, dementia, COPD on 2L presenting with hypoxia. She was transferred by her facility when she was found to be hypoxic to 60s on her home O2. She is denying any fevers, chills, chest pain, SOB, abd pain, N/V/D/C, dysuria , cough, URI symptoms, sick contacts, travel. She is alert and oriented.      PAST MEDICAL & SURGICAL HISTORY:  HTN (hypertension)  Hypothyroidism  Osteoporosis  CAD (coronary artery disease)  COPD (chronic obstructive pulmonary disease)  Pulmonary sarcoidosis  H/O pyelonephritis  Acute diverticulitis    MEDICATIONS  (STANDING):  albuterol    90 MICROgram(s) HFA Inhaler 2 Puff(s) Inhalation every 6 hours  atorvastatin 20 milliGRAM(s) Oral at bedtime  azithromycin  IVPB 500 milliGRAM(s) IV Intermittent once  buDESOnide    Inhalation Suspension 0.25 milliGRAM(s) Inhalation two times a day  cefTRIAXone   IVPB 1000 milliGRAM(s) IV Intermittent every 24 hours  cyclopentolate 1% Solution 1 Drop(s) Both EYES two times a day  FLUoxetine 10 milliGRAM(s) Oral daily  furosemide   Injectable 20 milliGRAM(s) IV Push two times a day  heparin   Injectable 5000 Unit(s) SubCutaneous every 12 hours  metoprolol succinate ER 50 milliGRAM(s) Oral daily  prednisoLONE acetate 1% Suspension 1 Drop(s) Both EYES two times a day  tiotropium 2.5 MICROgram(s) Inhaler 2 Puff(s) Inhalation daily  traZODone 50 milliGRAM(s) Oral at bedtime    MEDICATIONS  (PRN):  temazepam 7.5 milliGRAM(s) Oral at bedtime PRN Insomnia      FAMILY HISTORY:      SOCIAL HISTORY:    [x ] Non-smoker  [ ] Smoker  [ ] Alcohol    Allergies    iodinated radiocontrast agents (Anaphylaxis)    Intolerances    	    REVIEW OF SYSTEMS:  CONSTITUTIONAL: No fever, weight loss, or fatigue  EYES: No eye pain, visual disturbances, or discharge  ENT:  No difficulty hearing, tinnitus, vertigo; No sinus or throat pain  NECK: No pain or stiffness  RESPIRATORY: No cough, wheezing, chills or hemoptysis; + Shortness of Breath  CARDIOVASCULAR: No chest pain, palpitations, passing out, dizziness, + mi;ld leg swelling  GASTROINTESTINAL: No abdominal or epigastric pain. No nausea, vomiting, or hematemesis; No diarrhea or constipation. No melena or hematochezia.  GENITOURINARY: No dysuria, frequency, hematuria, or incontinence  NEUROLOGICAL: No headaches, memory loss, loss of strength, numbness, or tremors  SKIN: No itching, burning, rashes, or lesions   LYMPH Nodes: No enlarged glands  ENDOCRINE: No heat or cold intolerance; No hair loss  MUSCULOSKELETAL: No joint pain or swelling; No muscle, back, or extremity pain  PSYCHIATRIC: No depression, anxiety, mood swings, or difficulty sleeping  HEME/LYMPH: No easy bruising, or bleeding gums  ALLERGY AND IMMUNOLOGIC: No hives or eczema	    [ ] All others negative	  [ ] Unable to obtain    PHYSICAL EXAM:  T(C): 37 (12-13-24 @ 19:34), Max: 37.7 (12-13-24 @ 14:06)  HR: 85 (12-13-24 @ 19:34) (68 - 94)  BP: 101/44 (12-13-24 @ 19:34) (98/54 - 120/57)  RR: 22 (12-13-24 @ 17:24) (22 - 24)  SpO2: 92% (12-13-24 @ 17:24) (92% - 97%)  Wt(kg): --  I&O's Summary      Appearance: Normal	  HEENT:   Normal oral mucosa, PERRL, EOMI	  Lymphatic: No lymphadenopathy  Cardiovascular: Normal S1 S2, No JVD, + murmurs, No edema  Respiratoryrhonchi  Psychiatry: A & O x 3, Mood & affect appropriate  Gastrointestinal:  Soft, Non-tender, + BS	  Skin: No rashes, No ecchymoses, No cyanosis	  Neurologic: Non-focal  Extremities: Normal range of motion, No clubbing, cyanosis or edema  Vascular: Peripheral pulses palpable 2+ bilaterally    TELEMETRY: 	    ECG:  	  RADIOLOGY:  OTHER: 	  	  LABS:	 	    CARDIAC MARKERS:                              10.6   22.66 )-----------( 175      ( 13 Dec 2024 14:14 )             36.2     12-13    143  |  102  |  25[H]  ----------------------------<  136[H]  4.0   |  21[L]  |  0.87    Ca    9.1      13 Dec 2024 14:14    TPro  6.8  /  Alb  3.8  /  TBili  0.6  /  DBili  x   /  AST  21  /  ALT  16  /  AlkPhos  134[H]  12-13    proBNP:   Lipid Profile:   HgA1c:   TSH:   PT/INR - ( 13 Dec 2024 14:14 )   PT: 12.6 sec;   INR: 1.10 ratio         PTT - ( 13 Dec 2024 14:14 )  PTT:31.2 sec    PREVIOUS DIAGNOSTIC TESTING:      < from: 12 Lead ECG (11.25.24 @ 14:37) >  Diagnosis Line NORMAL SINUS RHYTHM  NONSPECIFIC T WAVE ABNORMALITY  ABNORMAL ECG  WHEN COMPARED WITH ECG OF 23-NOV-2024 20:00,  NO SIGNIFICANT CHANGE WAS FOUND      < from: Xray Chest 1 View- PORTABLE-Urgent (12.13.24 @ 14:37) >  FINDINGS:  Loop recorder.  Low lung volumes. Haziness over the left lung base may represent small   layering effusion and/or infiltrate.  There is no pneumothorax.  The heart size is not well evaluated in this position  No acute osseous abnormality. Chronic appearing shoulder deformities.   Scoliosis.    IMPRESSION:  Haziness over the left lung base may represent small layering effusion   and/or infiltrate.    < from: CT Chest No Cont (11.23.24 @ 23:40) >  LUNGS AND LARGE AIRWAYS: Patent central airways. Centrilobular upper lobe   emphysema. Mild bibasilar atelectasis. 2 mm right middle lobe lung nodule   (305:48).  PLEURA: No pleural effusion.  VESSELS: Aortic calcifications. Coronary artery calcifications. 4.1 cm   ascending aortic aneurysm. Marked enlargement of the main pulmonary   artery at 4.5 cm a nonspecific finding but can be seen in pulmonary   hypertension among other entities.  HEART: Cardiomegaly. No pericardial effusion.  MEDIASTINUM AND RASHID: No lymphadenopathy.  CHEST WALL AND LOWER NECK: Within normal limits.  VISUALIZED UPPER ABDOMEN: Within normal limits.  BONES: Degenerative changes. Prior left shoulder surgery.    IMPRESSION:    Centrilobular upper lobe emphysema. Mild bibasilar atelectasis.        < from: TTE Limited W or WO Ultrasound Enhancing Agent (04.08.24 @ 14:17) >   1. Left ventricular cavity is normal in size. Left ventricular wall thickness is normal. Left ventricular systolic function is normal with an ejection fraction visually estimated at 55 to 60 %. There are no regional wall motion abnormalities seen.   2. Normal rightventricular cavity size, with normal wall thickness, and normal systolic function. Tricuspid annular plane systolic excursion (TAPSE) is 2.0 cm (normal >=1.7 cm).   3. The left atrium is severely dilated.   4. Mild mitral regurgitation.   5. Comparedto the transthoracic echocardiogram performed on 3/26/2024, regional wall motion abnormality not appreciated.                
PULMONARY CONSULT    HPI: 89 y/o F with PMH of HTN, HLD, CAD, CHF, hypothyroidism, dementia, COPD on home O2 (2LNC). Presents from assisted living facility with hypoxia. Leukocytosis to 22K on admission. RSV/COVID/Flu negative. CXR with L sided opacity. Pulmonary called to consult for hypoxia. Pt awake & alert but poor historian. ROS limited. Denies CP, SOB at this time. Breathing nonlabored on 5LNC.     PAST MEDICAL & SURGICAL HISTORY:  HTN (hypertension)  Hypothyroidism  Osteoporosis  CAD (coronary artery disease)  COPD (chronic obstructive pulmonary disease)  Pulmonary sarcoidosis  H/O pyelonephritis  Acute diverticulitis    Allergies    iodinated radiocontrast agents (Anaphylaxis)    FAMILY HISTORY: non contributory     Social history: former     Review of Systems: limited; as above     Medications:  MEDICATIONS  (STANDING):  albuterol/ipratropium for Nebulization 3 milliLiter(s) Nebulizer every 6 hours  ascorbic acid 500 milliGRAM(s) Oral daily  atorvastatin 20 milliGRAM(s) Oral at bedtime  azithromycin  IVPB 500 milliGRAM(s) IV Intermittent once  buDESOnide    Inhalation Suspension 0.25 milliGRAM(s) Inhalation two times a day  cefTRIAXone   IVPB 1000 milliGRAM(s) IV Intermittent every 24 hours  cyclopentolate 1% Solution 1 Drop(s) Both EYES two times a day  FLUoxetine 10 milliGRAM(s) Oral daily  furosemide    Tablet 40 milliGRAM(s) Oral daily  heparin   Injectable 5000 Unit(s) SubCutaneous every 12 hours  influenza  Vaccine (HIGH DOSE) 0.5 milliLiter(s) IntraMuscular once  metoprolol succinate ER 50 milliGRAM(s) Oral daily  multivitamin 1 Tablet(s) Oral daily  polyethylene glycol 3350 17 Gram(s) Oral daily  prednisoLONE acetate 1% Suspension 1 Drop(s) Both EYES two times a day  senna 2 Tablet(s) Oral at bedtime  tiotropium 2.5 MICROgram(s) Inhaler 2 Puff(s) Inhalation daily  traZODone 50 milliGRAM(s) Oral at bedtime    MEDICATIONS  (PRN):  acetaminophen     Tablet .. 650 milliGRAM(s) Oral every 6 hours PRN Mild Pain (1 - 3)  temazepam 7.5 milliGRAM(s) Oral at bedtime PRN Insomnia    Vital Signs Last 24 Hrs  T(C): 36.4 (17 Dec 2024 10:30), Max: 36.8 (16 Dec 2024 14:12)  T(F): 97.6 (17 Dec 2024 10:30), Max: 98.3 (16 Dec 2024 14:12)  HR: 66 (17 Dec 2024 10:30) (58 - 85)  BP: 115/59 (17 Dec 2024 10:30) (111/53 - 136/64)  BP(mean): --  RR: 18 (17 Dec 2024 10:30) (18 - 18)  SpO2: 98% (17 Dec 2024 10:30) (95% - 98%)    Parameters below as of 17 Dec 2024 10:30  Patient On (Oxygen Delivery Method): nasal cannula  O2 Flow (L/min): 5      LABS:                        9.7    6.04  )-----------( 153      ( 17 Dec 2024 06:52 )             31.8     12-17    140  |  98  |  14  ----------------------------<  96  3.7   |  27  |  0.69    Ca    9.2      17 Dec 2024 10:20        Urinalysis Basic - ( 17 Dec 2024 10:20 )    Color: x / Appearance: x / SG: x / pH: x  Gluc: 96 mg/dL / Ketone: x  / Bili: x / Urobili: x   Blood: x / Protein: x / Nitrite: x   Leuk Esterase: x / RBC: x / WBC x   Sq Epi: x / Non Sq Epi: x / Bacteria: x        CULTURES: (if applicable)     (collected 12-13-24 @ 13:50)  Source: .Blood BLOOD  Preliminary Report (12-16-24 @ 19:01):    No growth at 72 Hours     (collected 12-13-24 @ 13:40)  Source: .Blood BLOOD  Preliminary Report (12-16-24 @ 19:01):    No growth at 72 Hours      Physical Examination:    General: No acute distress.      HEENT: Pupils equal, reactive to light.  Symmetric.    PULM: Crackles L base; no wheeze     CVS: S1, S2    ABD: Soft, nondistended, nontender, normoactive bowel sounds, no masses    EXT: No edema, nontender    SKIN: Warm and well perfused, no rashes noted.    NEURO: Alert, disoriented     RADIOLOGY REVIEWED  CXR: L hazy opacity

## 2024-12-17 NOTE — CONSULT NOTE ADULT - ASSESSMENT
88 y/o F w/ PMhx of HTN, HLD, CAD, dCHF, R hemidiaphragm paralysis, hypothyroidism, dementia, COPD on 2L NC, presenting to [facility] for [facility].     pt was afebrile.       #Respiratory failure sob  #UTI  #COPD   # CAD     pt with multiple previous admissions related  to UTI, pna, confusion admitted again with hypoxia  and cough  no definite pna on chest x ray and wbc falling   This may be a COPD exacerbation     I would normally be concerned about HAP but pt seems to be responding to ceftriaxone, so will not change it  check legionella urine AG  and dc Zithromax if negative         
89 y/o F with PMH of HTN, HLD, CAD, CHF, hypothyroidism, dementia, COPD on home O2 (2LNC). Presents from assisted living facility with hypoxia. Leukocytosis to 22K on admission. RSV/COVID/Flu negative. CXR with L sided opacity. Pulmonary called to consult for hypoxia. Pt awake & alert but poor historian. ROS limited. Denies CP, SOB at this time. Breathing nonlabored on 5LNC. 
Impression:    Sacral/bilateral Buttocks deep tissue injury present on admission  Incontinence of bowel and bladder  Incontinence Dermatitis    Recommend:  1.) topical therapy: sacral/buttock wound – cleanse with incontinence cleanser, pat dry, apply Jennifer ointment BID and PRN for incontinent episodes  2.) Incontinence Management - incontinence cleanser, pads, pericare BID  3.) Maintain on an alternating air with low air loss surface  4.) Turn and reposition Q 2 hours  5.) Nutrition optimization  6.) Offload heels/feet with complete cair air fluidized boots/pillows; ensure that the soles of the feet are not resting on the foot board of the bed.  7.) chair cushion for chair sitting    Care as per medicine. Will not actively follow but will remain available. Please recall for new issues or deterioration.  Upon discharge [facility] [address] [place] [address] [phone]  Thank you for this consult  JEOVANY Bacon, CWOCN via TEAMS    Nights/ Weekends/ Holidays please call:  General Surgery Consult pager ([phone]) for emergencies  
 89 year old woman PMH HTN, HLD, CAD, diastolic HF, hypoithyroidism, dementia, COPD on 2L presenting with hypoxia. She was transferred by her facility when she was found to be hypoxic to 60s on her home O2. She is denying any fevers, chills, chest pain, SOB, abd pain, N/V/D/C, dysuria , cough, URI symptoms, sick contacts, travel. She is alert and oriented.  pt is well known to me with hx of htn, chf hfpef , sarcoidosis and severe copd/emphysema s/p multiple admissions with increasing sob and o2 sat of 80% on home o2.  no chest pain. fevever  exacerbation of copd/ chf  iv solumedrol  iv lasix  increase wbc , check cultures  check flu with multiple patients fron nursing home  will adjust cardiac meds  py with sig CA calcification , no aggressive measures

## 2024-12-17 NOTE — CONSULT NOTE ADULT - PROBLEM SELECTOR RECOMMENDATION 3
-CXR with L base hazy opacity  -Check CT chest non-contrast  -ABX as per ID  -RSV/COVID/Flu negative, will add on full RVP if able (swab from 12/13). -CXR with L base hazy opacity  -Check CT chest non-contrast  -ABX as per ID  -Check repeat Flu/COVID/RSV panel. Will add on full RVP once sent (RSV/COVID/Flu negative on 12/13, unable to add full RVP to this sample).

## 2024-12-17 NOTE — CONSULT NOTE ADULT - PROBLEM SELECTOR RECOMMENDATION 2
-By hx, on home O2  -Duoneb q6h  -Continue Pulmicort BID for now. If patient can use MDI can transition back to Advair (home med)  -Spiriva qd  -VBG in AM   -CT chest non-contrast

## 2024-12-18 LAB
ANION GAP SERPL CALC-SCNC: 13 MMOL/L — SIGNIFICANT CHANGE UP (ref 5–17)
BASE EXCESS BLDV CALC-SCNC: 6.6 MMOL/L — HIGH (ref -2–3)
BUN SERPL-MCNC: 20 MG/DL — SIGNIFICANT CHANGE UP (ref 7–23)
CALCIUM SERPL-MCNC: 9.5 MG/DL — SIGNIFICANT CHANGE UP (ref 8.4–10.5)
CHLORIDE SERPL-SCNC: 97 MMOL/L — SIGNIFICANT CHANGE UP (ref 96–108)
CO2 BLDV-SCNC: 37 MMOL/L — HIGH (ref 22–26)
CO2 SERPL-SCNC: 28 MMOL/L — SIGNIFICANT CHANGE UP (ref 22–31)
CREAT SERPL-MCNC: 0.8 MG/DL — SIGNIFICANT CHANGE UP (ref 0.5–1.3)
CULTURE RESULTS: SIGNIFICANT CHANGE UP
CULTURE RESULTS: SIGNIFICANT CHANGE UP
EGFR: 71 ML/MIN/1.73M2 — SIGNIFICANT CHANGE UP
GLUCOSE SERPL-MCNC: 79 MG/DL — SIGNIFICANT CHANGE UP (ref 70–99)
HCO3 BLDV-SCNC: 35 MMOL/L — HIGH (ref 22–29)
HCT VFR BLD CALC: 31.8 % — LOW (ref 34.5–45)
HGB BLD-MCNC: 9.7 G/DL — LOW (ref 11.5–15.5)
MAGNESIUM SERPL-MCNC: 2 MG/DL — SIGNIFICANT CHANGE UP (ref 1.6–2.6)
MCHC RBC-ENTMCNC: 26.7 PG — LOW (ref 27–34)
MCHC RBC-ENTMCNC: 30.5 G/DL — LOW (ref 32–36)
MCV RBC AUTO: 87.6 FL — SIGNIFICANT CHANGE UP (ref 80–100)
NRBC # BLD: 0 /100 WBCS — SIGNIFICANT CHANGE UP (ref 0–0)
PCO2 BLDV: 66 MMHG — HIGH (ref 39–42)
PH BLDV: 7.33 — SIGNIFICANT CHANGE UP (ref 7.32–7.43)
PLATELET # BLD AUTO: 176 K/UL — SIGNIFICANT CHANGE UP (ref 150–400)
PO2 BLDV: 42 MMHG — SIGNIFICANT CHANGE UP (ref 25–45)
POTASSIUM SERPL-MCNC: 3.3 MMOL/L — LOW (ref 3.5–5.3)
POTASSIUM SERPL-SCNC: 3.3 MMOL/L — LOW (ref 3.5–5.3)
RBC # BLD: 3.63 M/UL — LOW (ref 3.8–5.2)
RBC # FLD: 16 % — HIGH (ref 10.3–14.5)
SAO2 % BLDV: 66.2 % — LOW (ref 67–88)
SODIUM SERPL-SCNC: 138 MMOL/L — SIGNIFICANT CHANGE UP (ref 135–145)
SPECIMEN SOURCE: SIGNIFICANT CHANGE UP
SPECIMEN SOURCE: SIGNIFICANT CHANGE UP
WBC # BLD: 7.68 K/UL — SIGNIFICANT CHANGE UP (ref 3.8–10.5)
WBC # FLD AUTO: 7.68 K/UL — SIGNIFICANT CHANGE UP (ref 3.8–10.5)

## 2024-12-18 PROCEDURE — 99232 SBSQ HOSP IP/OBS MODERATE 35: CPT

## 2024-12-18 PROCEDURE — 93970 EXTREMITY STUDY: CPT | Mod: 26

## 2024-12-18 RX ORDER — POTASSIUM CHLORIDE 600 MG/1
40 TABLET, FILM COATED, EXTENDED RELEASE ORAL ONCE
Refills: 0 | Status: COMPLETED | OUTPATIENT
Start: 2024-12-18 | End: 2024-12-18

## 2024-12-18 RX ADMIN — Medication 10 MILLIGRAM(S): at 12:33

## 2024-12-18 RX ADMIN — SENNOSIDES 2 TABLET(S): 8.6 TABLET, FILM COATED ORAL at 21:15

## 2024-12-18 RX ADMIN — HEPARIN SODIUM 5000 UNIT(S): 1000 INJECTION, SOLUTION INTRAVENOUS; SUBCUTANEOUS at 17:37

## 2024-12-18 RX ADMIN — BUDESONIDE 0.5 MILLIGRAM(S): 0.5 INHALANT ORAL at 05:31

## 2024-12-18 RX ADMIN — IPRATROPIUM BROMIDE AND ALBUTEROL SULFATE 3 MILLILITER(S): .5; 2.5 SOLUTION RESPIRATORY (INHALATION) at 05:28

## 2024-12-18 RX ADMIN — ACETAMINOPHEN 650 MILLIGRAM(S): 80 SOLUTION/ DROPS ORAL at 21:31

## 2024-12-18 RX ADMIN — Medication 50 MILLIGRAM(S): at 05:29

## 2024-12-18 RX ADMIN — IPRATROPIUM BROMIDE AND ALBUTEROL SULFATE 3 MILLILITER(S): .5; 2.5 SOLUTION RESPIRATORY (INHALATION) at 12:34

## 2024-12-18 RX ADMIN — TIOTROPIUM BROMIDE MONOHYDRATE 2 PUFF(S): 18 CAPSULE ORAL; RESPIRATORY (INHALATION) at 12:33

## 2024-12-18 RX ADMIN — Medication 100 MILLIGRAM(S): at 17:33

## 2024-12-18 RX ADMIN — Medication 1 DROP(S): at 05:30

## 2024-12-18 RX ADMIN — IPRATROPIUM BROMIDE AND ALBUTEROL SULFATE 3 MILLILITER(S): .5; 2.5 SOLUTION RESPIRATORY (INHALATION) at 00:37

## 2024-12-18 RX ADMIN — HEPARIN SODIUM 5000 UNIT(S): 1000 INJECTION, SOLUTION INTRAVENOUS; SUBCUTANEOUS at 05:28

## 2024-12-18 RX ADMIN — Medication 40 MILLIGRAM(S): at 05:29

## 2024-12-18 RX ADMIN — ACETAMINOPHEN 650 MILLIGRAM(S): 80 SOLUTION/ DROPS ORAL at 19:49

## 2024-12-18 RX ADMIN — Medication 1 TABLET(S): at 12:33

## 2024-12-18 RX ADMIN — IPRATROPIUM BROMIDE AND ALBUTEROL SULFATE 3 MILLILITER(S): .5; 2.5 SOLUTION RESPIRATORY (INHALATION) at 17:43

## 2024-12-18 RX ADMIN — IPRATROPIUM BROMIDE AND ALBUTEROL SULFATE 3 MILLILITER(S): .5; 2.5 SOLUTION RESPIRATORY (INHALATION) at 23:28

## 2024-12-18 RX ADMIN — BUDESONIDE 0.5 MILLIGRAM(S): 0.5 INHALANT ORAL at 17:42

## 2024-12-18 RX ADMIN — Medication 100 MILLIGRAM(S): at 09:36

## 2024-12-18 RX ADMIN — TRAZODONE HYDROCHLORIDE 50 MILLIGRAM(S): 150 TABLET ORAL at 21:15

## 2024-12-18 RX ADMIN — ACETAMINOPHEN 650 MILLIGRAM(S): 80 SOLUTION/ DROPS ORAL at 01:37

## 2024-12-18 RX ADMIN — ACETAMINOPHEN 650 MILLIGRAM(S): 80 SOLUTION/ DROPS ORAL at 00:07

## 2024-12-18 RX ADMIN — ATORVASTATIN CALCIUM 20 MILLIGRAM(S): 40 TABLET, FILM COATED ORAL at 21:15

## 2024-12-18 RX ADMIN — POTASSIUM CHLORIDE 40 MILLIEQUIVALENT(S): 600 TABLET, FILM COATED, EXTENDED RELEASE ORAL at 18:32

## 2024-12-18 RX ADMIN — Medication 1 DROP(S): at 17:41

## 2024-12-18 RX ADMIN — PREDNISOLONE ACETATE 1 DROP(S): 10 SUSPENSION OPHTHALMIC at 17:40

## 2024-12-18 RX ADMIN — Medication 500 MILLIGRAM(S): at 12:34

## 2024-12-18 RX ADMIN — Medication 17 GRAM(S): at 12:34

## 2024-12-18 RX ADMIN — PREDNISOLONE ACETATE 1 DROP(S): 10 SUSPENSION OPHTHALMIC at 05:31

## 2024-12-19 LAB
ANION GAP SERPL CALC-SCNC: 14 MMOL/L — SIGNIFICANT CHANGE UP (ref 5–17)
BUN SERPL-MCNC: 22 MG/DL — SIGNIFICANT CHANGE UP (ref 7–23)
CALCIUM SERPL-MCNC: 9.6 MG/DL — SIGNIFICANT CHANGE UP (ref 8.4–10.5)
CHLORIDE SERPL-SCNC: 99 MMOL/L — SIGNIFICANT CHANGE UP (ref 96–108)
CO2 SERPL-SCNC: 27 MMOL/L — SIGNIFICANT CHANGE UP (ref 22–31)
CREAT SERPL-MCNC: 0.82 MG/DL — SIGNIFICANT CHANGE UP (ref 0.5–1.3)
EGFR: 69 ML/MIN/1.73M2 — SIGNIFICANT CHANGE UP
FLUAV AG NPH QL: SIGNIFICANT CHANGE UP
FLUBV AG NPH QL: SIGNIFICANT CHANGE UP
GLUCOSE SERPL-MCNC: 87 MG/DL — SIGNIFICANT CHANGE UP (ref 70–99)
LEGIONELLA AG UR QL: NEGATIVE — SIGNIFICANT CHANGE UP
LEGIONELLA AG UR QL: NEGATIVE — SIGNIFICANT CHANGE UP
MAGNESIUM SERPL-MCNC: 2.1 MG/DL — SIGNIFICANT CHANGE UP (ref 1.6–2.6)
POTASSIUM SERPL-MCNC: 3.7 MMOL/L — SIGNIFICANT CHANGE UP (ref 3.5–5.3)
POTASSIUM SERPL-SCNC: 3.7 MMOL/L — SIGNIFICANT CHANGE UP (ref 3.5–5.3)
RSV RNA NPH QL NAA+NON-PROBE: SIGNIFICANT CHANGE UP
SARS-COV-2 RNA SPEC QL NAA+PROBE: SIGNIFICANT CHANGE UP
SODIUM SERPL-SCNC: 140 MMOL/L — SIGNIFICANT CHANGE UP (ref 135–145)

## 2024-12-19 PROCEDURE — 71045 X-RAY EXAM CHEST 1 VIEW: CPT | Mod: 26

## 2024-12-19 PROCEDURE — 99232 SBSQ HOSP IP/OBS MODERATE 35: CPT

## 2024-12-19 RX ORDER — POTASSIUM CHLORIDE 600 MG/1
20 TABLET, FILM COATED, EXTENDED RELEASE ORAL DAILY
Refills: 0 | Status: DISCONTINUED | OUTPATIENT
Start: 2024-12-19 | End: 2024-12-24

## 2024-12-19 RX ADMIN — TIOTROPIUM BROMIDE MONOHYDRATE 2 PUFF(S): 18 CAPSULE ORAL; RESPIRATORY (INHALATION) at 12:14

## 2024-12-19 RX ADMIN — Medication 10 MILLIGRAM(S): at 12:13

## 2024-12-19 RX ADMIN — PREDNISOLONE ACETATE 1 DROP(S): 10 SUSPENSION OPHTHALMIC at 05:03

## 2024-12-19 RX ADMIN — IPRATROPIUM BROMIDE AND ALBUTEROL SULFATE 3 MILLILITER(S): .5; 2.5 SOLUTION RESPIRATORY (INHALATION) at 05:04

## 2024-12-19 RX ADMIN — POTASSIUM CHLORIDE 20 MILLIEQUIVALENT(S): 600 TABLET, FILM COATED, EXTENDED RELEASE ORAL at 12:13

## 2024-12-19 RX ADMIN — HEPARIN SODIUM 5000 UNIT(S): 1000 INJECTION, SOLUTION INTRAVENOUS; SUBCUTANEOUS at 18:10

## 2024-12-19 RX ADMIN — ACETAMINOPHEN 650 MILLIGRAM(S): 80 SOLUTION/ DROPS ORAL at 20:01

## 2024-12-19 RX ADMIN — Medication 1 TABLET(S): at 12:14

## 2024-12-19 RX ADMIN — ATORVASTATIN CALCIUM 20 MILLIGRAM(S): 40 TABLET, FILM COATED ORAL at 21:14

## 2024-12-19 RX ADMIN — Medication 40 MILLIGRAM(S): at 05:04

## 2024-12-19 RX ADMIN — TRAZODONE HYDROCHLORIDE 50 MILLIGRAM(S): 150 TABLET ORAL at 21:14

## 2024-12-19 RX ADMIN — Medication 1 DROP(S): at 18:13

## 2024-12-19 RX ADMIN — Medication 17 GRAM(S): at 12:13

## 2024-12-19 RX ADMIN — SENNOSIDES 2 TABLET(S): 8.6 TABLET, FILM COATED ORAL at 21:14

## 2024-12-19 RX ADMIN — IPRATROPIUM BROMIDE AND ALBUTEROL SULFATE 3 MILLILITER(S): .5; 2.5 SOLUTION RESPIRATORY (INHALATION) at 18:06

## 2024-12-19 RX ADMIN — Medication 100 MILLIGRAM(S): at 05:03

## 2024-12-19 RX ADMIN — Medication 50 MILLIGRAM(S): at 05:04

## 2024-12-19 RX ADMIN — BUDESONIDE 0.5 MILLIGRAM(S): 0.5 INHALANT ORAL at 05:04

## 2024-12-19 RX ADMIN — HEPARIN SODIUM 5000 UNIT(S): 1000 INJECTION, SOLUTION INTRAVENOUS; SUBCUTANEOUS at 05:04

## 2024-12-19 RX ADMIN — Medication 100 MILLIGRAM(S): at 18:08

## 2024-12-19 RX ADMIN — ACETAMINOPHEN 650 MILLIGRAM(S): 80 SOLUTION/ DROPS ORAL at 06:31

## 2024-12-19 RX ADMIN — BUDESONIDE 0.5 MILLIGRAM(S): 0.5 INHALANT ORAL at 18:06

## 2024-12-19 RX ADMIN — IPRATROPIUM BROMIDE AND ALBUTEROL SULFATE 3 MILLILITER(S): .5; 2.5 SOLUTION RESPIRATORY (INHALATION) at 12:13

## 2024-12-19 RX ADMIN — Medication 500 MILLIGRAM(S): at 12:14

## 2024-12-19 RX ADMIN — PREDNISOLONE ACETATE 1 DROP(S): 10 SUSPENSION OPHTHALMIC at 18:13

## 2024-12-19 RX ADMIN — Medication 1 DROP(S): at 05:03

## 2024-12-20 LAB
ANION GAP SERPL CALC-SCNC: 13 MMOL/L — SIGNIFICANT CHANGE UP (ref 5–17)
BUN SERPL-MCNC: 24 MG/DL — HIGH (ref 7–23)
CALCIUM SERPL-MCNC: 9.8 MG/DL — SIGNIFICANT CHANGE UP (ref 8.4–10.5)
CHLORIDE SERPL-SCNC: 100 MMOL/L — SIGNIFICANT CHANGE UP (ref 96–108)
CO2 SERPL-SCNC: 26 MMOL/L — SIGNIFICANT CHANGE UP (ref 22–31)
CREAT SERPL-MCNC: 0.87 MG/DL — SIGNIFICANT CHANGE UP (ref 0.5–1.3)
EGFR: 64 ML/MIN/1.73M2 — SIGNIFICANT CHANGE UP
GLUCOSE SERPL-MCNC: 101 MG/DL — HIGH (ref 70–99)
POTASSIUM SERPL-MCNC: 4.1 MMOL/L — SIGNIFICANT CHANGE UP (ref 3.5–5.3)
POTASSIUM SERPL-SCNC: 4.1 MMOL/L — SIGNIFICANT CHANGE UP (ref 3.5–5.3)
SODIUM SERPL-SCNC: 139 MMOL/L — SIGNIFICANT CHANGE UP (ref 135–145)

## 2024-12-20 PROCEDURE — 99232 SBSQ HOSP IP/OBS MODERATE 35: CPT

## 2024-12-20 PROCEDURE — 71045 X-RAY EXAM CHEST 1 VIEW: CPT | Mod: 26

## 2024-12-20 RX ADMIN — BUDESONIDE 0.5 MILLIGRAM(S): 0.5 INHALANT ORAL at 06:16

## 2024-12-20 RX ADMIN — ACETAMINOPHEN 650 MILLIGRAM(S): 80 SOLUTION/ DROPS ORAL at 22:05

## 2024-12-20 RX ADMIN — Medication 100 MILLIGRAM(S): at 17:39

## 2024-12-20 RX ADMIN — Medication 100 MILLIGRAM(S): at 06:18

## 2024-12-20 RX ADMIN — Medication 50 MILLIGRAM(S): at 06:16

## 2024-12-20 RX ADMIN — Medication 40 MILLIGRAM(S): at 06:16

## 2024-12-20 RX ADMIN — PREDNISOLONE ACETATE 1 DROP(S): 10 SUSPENSION OPHTHALMIC at 06:15

## 2024-12-20 RX ADMIN — PREDNISOLONE ACETATE 1 DROP(S): 10 SUSPENSION OPHTHALMIC at 17:40

## 2024-12-20 RX ADMIN — IPRATROPIUM BROMIDE AND ALBUTEROL SULFATE 3 MILLILITER(S): .5; 2.5 SOLUTION RESPIRATORY (INHALATION) at 23:53

## 2024-12-20 RX ADMIN — IPRATROPIUM BROMIDE AND ALBUTEROL SULFATE 3 MILLILITER(S): .5; 2.5 SOLUTION RESPIRATORY (INHALATION) at 06:16

## 2024-12-20 RX ADMIN — TRAZODONE HYDROCHLORIDE 50 MILLIGRAM(S): 150 TABLET ORAL at 22:06

## 2024-12-20 RX ADMIN — IPRATROPIUM BROMIDE AND ALBUTEROL SULFATE 3 MILLILITER(S): .5; 2.5 SOLUTION RESPIRATORY (INHALATION) at 17:50

## 2024-12-20 RX ADMIN — ACETAMINOPHEN 650 MILLIGRAM(S): 80 SOLUTION/ DROPS ORAL at 11:31

## 2024-12-20 RX ADMIN — Medication 1 DROP(S): at 17:39

## 2024-12-20 RX ADMIN — POTASSIUM CHLORIDE 20 MILLIEQUIVALENT(S): 600 TABLET, FILM COATED, EXTENDED RELEASE ORAL at 11:23

## 2024-12-20 RX ADMIN — Medication 17 GRAM(S): at 11:23

## 2024-12-20 RX ADMIN — ACETAMINOPHEN 650 MILLIGRAM(S): 80 SOLUTION/ DROPS ORAL at 11:23

## 2024-12-20 RX ADMIN — BUDESONIDE 0.5 MILLIGRAM(S): 0.5 INHALANT ORAL at 17:50

## 2024-12-20 RX ADMIN — ATORVASTATIN CALCIUM 20 MILLIGRAM(S): 40 TABLET, FILM COATED ORAL at 22:06

## 2024-12-20 RX ADMIN — Medication 10 MILLIGRAM(S): at 11:23

## 2024-12-20 RX ADMIN — HEPARIN SODIUM 5000 UNIT(S): 1000 INJECTION, SOLUTION INTRAVENOUS; SUBCUTANEOUS at 06:16

## 2024-12-20 RX ADMIN — HEPARIN SODIUM 5000 UNIT(S): 1000 INJECTION, SOLUTION INTRAVENOUS; SUBCUTANEOUS at 17:51

## 2024-12-20 RX ADMIN — IPRATROPIUM BROMIDE AND ALBUTEROL SULFATE 3 MILLILITER(S): .5; 2.5 SOLUTION RESPIRATORY (INHALATION) at 00:00

## 2024-12-20 RX ADMIN — SENNOSIDES 2 TABLET(S): 8.6 TABLET, FILM COATED ORAL at 22:06

## 2024-12-20 RX ADMIN — Medication 1 DROP(S): at 06:15

## 2024-12-20 RX ADMIN — Medication 1 TABLET(S): at 11:23

## 2024-12-21 RX ADMIN — BUDESONIDE 0.5 MILLIGRAM(S): 0.5 INHALANT ORAL at 06:22

## 2024-12-21 RX ADMIN — Medication 100 MILLIGRAM(S): at 18:41

## 2024-12-21 RX ADMIN — TRAZODONE HYDROCHLORIDE 50 MILLIGRAM(S): 150 TABLET ORAL at 22:06

## 2024-12-21 RX ADMIN — TIOTROPIUM BROMIDE MONOHYDRATE 2 PUFF(S): 18 CAPSULE ORAL; RESPIRATORY (INHALATION) at 13:16

## 2024-12-21 RX ADMIN — Medication 17 GRAM(S): at 13:16

## 2024-12-21 RX ADMIN — Medication 500 MILLIGRAM(S): at 13:14

## 2024-12-21 RX ADMIN — ATORVASTATIN CALCIUM 20 MILLIGRAM(S): 40 TABLET, FILM COATED ORAL at 22:06

## 2024-12-21 RX ADMIN — PREDNISOLONE ACETATE 1 DROP(S): 10 SUSPENSION OPHTHALMIC at 18:51

## 2024-12-21 RX ADMIN — Medication 1 DROP(S): at 06:22

## 2024-12-21 RX ADMIN — ACETAMINOPHEN 650 MILLIGRAM(S): 80 SOLUTION/ DROPS ORAL at 22:06

## 2024-12-21 RX ADMIN — IPRATROPIUM BROMIDE AND ALBUTEROL SULFATE 3 MILLILITER(S): .5; 2.5 SOLUTION RESPIRATORY (INHALATION) at 06:26

## 2024-12-21 RX ADMIN — Medication 1 TABLET(S): at 13:30

## 2024-12-21 RX ADMIN — IPRATROPIUM BROMIDE AND ALBUTEROL SULFATE 3 MILLILITER(S): .5; 2.5 SOLUTION RESPIRATORY (INHALATION) at 18:40

## 2024-12-21 RX ADMIN — HEPARIN SODIUM 5000 UNIT(S): 1000 INJECTION, SOLUTION INTRAVENOUS; SUBCUTANEOUS at 06:26

## 2024-12-21 RX ADMIN — PREDNISOLONE ACETATE 1 DROP(S): 10 SUSPENSION OPHTHALMIC at 06:22

## 2024-12-21 RX ADMIN — POTASSIUM CHLORIDE 20 MILLIEQUIVALENT(S): 600 TABLET, FILM COATED, EXTENDED RELEASE ORAL at 13:15

## 2024-12-21 RX ADMIN — SENNOSIDES 2 TABLET(S): 8.6 TABLET, FILM COATED ORAL at 22:06

## 2024-12-21 RX ADMIN — Medication 100 MILLIGRAM(S): at 06:21

## 2024-12-21 RX ADMIN — Medication 40 MILLIGRAM(S): at 06:27

## 2024-12-21 RX ADMIN — HEPARIN SODIUM 5000 UNIT(S): 1000 INJECTION, SOLUTION INTRAVENOUS; SUBCUTANEOUS at 18:40

## 2024-12-21 RX ADMIN — BUDESONIDE 0.5 MILLIGRAM(S): 0.5 INHALANT ORAL at 18:41

## 2024-12-21 RX ADMIN — Medication 1 DROP(S): at 18:51

## 2024-12-21 RX ADMIN — IPRATROPIUM BROMIDE AND ALBUTEROL SULFATE 3 MILLILITER(S): .5; 2.5 SOLUTION RESPIRATORY (INHALATION) at 13:15

## 2024-12-21 RX ADMIN — Medication 10 MILLIGRAM(S): at 13:15

## 2024-12-21 RX ADMIN — Medication 50 MILLIGRAM(S): at 06:26

## 2024-12-22 LAB
ANION GAP SERPL CALC-SCNC: 13 MMOL/L — SIGNIFICANT CHANGE UP (ref 5–17)
BUN SERPL-MCNC: 27 MG/DL — HIGH (ref 7–23)
CALCIUM SERPL-MCNC: 9.8 MG/DL — SIGNIFICANT CHANGE UP (ref 8.4–10.5)
CHLORIDE SERPL-SCNC: 100 MMOL/L — SIGNIFICANT CHANGE UP (ref 96–108)
CO2 SERPL-SCNC: 27 MMOL/L — SIGNIFICANT CHANGE UP (ref 22–31)
CREAT SERPL-MCNC: 0.86 MG/DL — SIGNIFICANT CHANGE UP (ref 0.5–1.3)
EGFR: 65 ML/MIN/1.73M2 — SIGNIFICANT CHANGE UP
GLUCOSE SERPL-MCNC: 102 MG/DL — HIGH (ref 70–99)
HCT VFR BLD CALC: 35.3 % — SIGNIFICANT CHANGE UP (ref 34.5–45)
HGB BLD-MCNC: 10.8 G/DL — LOW (ref 11.5–15.5)
MCHC RBC-ENTMCNC: 26.9 PG — LOW (ref 27–34)
MCHC RBC-ENTMCNC: 30.6 G/DL — LOW (ref 32–36)
MCV RBC AUTO: 88 FL — SIGNIFICANT CHANGE UP (ref 80–100)
NRBC # BLD: 0 /100 WBCS — SIGNIFICANT CHANGE UP (ref 0–0)
PLATELET # BLD AUTO: 248 K/UL — SIGNIFICANT CHANGE UP (ref 150–400)
POTASSIUM SERPL-MCNC: 3.9 MMOL/L — SIGNIFICANT CHANGE UP (ref 3.5–5.3)
POTASSIUM SERPL-SCNC: 3.9 MMOL/L — SIGNIFICANT CHANGE UP (ref 3.5–5.3)
RBC # BLD: 4.01 M/UL — SIGNIFICANT CHANGE UP (ref 3.8–5.2)
RBC # FLD: 16.9 % — HIGH (ref 10.3–14.5)
SODIUM SERPL-SCNC: 140 MMOL/L — SIGNIFICANT CHANGE UP (ref 135–145)
WBC # BLD: 8.72 K/UL — SIGNIFICANT CHANGE UP (ref 3.8–10.5)
WBC # FLD AUTO: 8.72 K/UL — SIGNIFICANT CHANGE UP (ref 3.8–10.5)

## 2024-12-22 RX ADMIN — BUDESONIDE 0.5 MILLIGRAM(S): 0.5 INHALANT ORAL at 06:28

## 2024-12-22 RX ADMIN — Medication 100 MILLIGRAM(S): at 18:30

## 2024-12-22 RX ADMIN — ATORVASTATIN CALCIUM 20 MILLIGRAM(S): 40 TABLET, FILM COATED ORAL at 21:08

## 2024-12-22 RX ADMIN — Medication 40 MILLIGRAM(S): at 06:23

## 2024-12-22 RX ADMIN — Medication 500 MILLIGRAM(S): at 13:13

## 2024-12-22 RX ADMIN — ACETAMINOPHEN 650 MILLIGRAM(S): 80 SOLUTION/ DROPS ORAL at 13:10

## 2024-12-22 RX ADMIN — POTASSIUM CHLORIDE 20 MILLIEQUIVALENT(S): 600 TABLET, FILM COATED, EXTENDED RELEASE ORAL at 13:10

## 2024-12-22 RX ADMIN — TIOTROPIUM BROMIDE MONOHYDRATE 2 PUFF(S): 18 CAPSULE ORAL; RESPIRATORY (INHALATION) at 13:13

## 2024-12-22 RX ADMIN — IPRATROPIUM BROMIDE AND ALBUTEROL SULFATE 3 MILLILITER(S): .5; 2.5 SOLUTION RESPIRATORY (INHALATION) at 23:55

## 2024-12-22 RX ADMIN — IPRATROPIUM BROMIDE AND ALBUTEROL SULFATE 3 MILLILITER(S): .5; 2.5 SOLUTION RESPIRATORY (INHALATION) at 13:14

## 2024-12-22 RX ADMIN — Medication 1 DROP(S): at 06:28

## 2024-12-22 RX ADMIN — IPRATROPIUM BROMIDE AND ALBUTEROL SULFATE 3 MILLILITER(S): .5; 2.5 SOLUTION RESPIRATORY (INHALATION) at 06:23

## 2024-12-22 RX ADMIN — PREDNISOLONE ACETATE 1 DROP(S): 10 SUSPENSION OPHTHALMIC at 06:28

## 2024-12-22 RX ADMIN — HEPARIN SODIUM 5000 UNIT(S): 1000 INJECTION, SOLUTION INTRAVENOUS; SUBCUTANEOUS at 18:30

## 2024-12-22 RX ADMIN — TRAZODONE HYDROCHLORIDE 50 MILLIGRAM(S): 150 TABLET ORAL at 21:08

## 2024-12-22 RX ADMIN — ACETAMINOPHEN 650 MILLIGRAM(S): 80 SOLUTION/ DROPS ORAL at 06:28

## 2024-12-22 RX ADMIN — ACETAMINOPHEN 650 MILLIGRAM(S): 80 SOLUTION/ DROPS ORAL at 14:15

## 2024-12-22 RX ADMIN — Medication 1 DROP(S): at 18:31

## 2024-12-22 RX ADMIN — IPRATROPIUM BROMIDE AND ALBUTEROL SULFATE 3 MILLILITER(S): .5; 2.5 SOLUTION RESPIRATORY (INHALATION) at 00:00

## 2024-12-22 RX ADMIN — SENNOSIDES 2 TABLET(S): 8.6 TABLET, FILM COATED ORAL at 21:08

## 2024-12-22 RX ADMIN — Medication 17 GRAM(S): at 13:13

## 2024-12-22 RX ADMIN — BUDESONIDE 0.5 MILLIGRAM(S): 0.5 INHALANT ORAL at 18:31

## 2024-12-22 RX ADMIN — PREDNISOLONE ACETATE 1 DROP(S): 10 SUSPENSION OPHTHALMIC at 18:31

## 2024-12-22 RX ADMIN — HEPARIN SODIUM 5000 UNIT(S): 1000 INJECTION, SOLUTION INTRAVENOUS; SUBCUTANEOUS at 06:23

## 2024-12-22 RX ADMIN — Medication 10 MILLIGRAM(S): at 13:10

## 2024-12-22 RX ADMIN — Medication 1 TABLET(S): at 13:13

## 2024-12-22 RX ADMIN — ACETAMINOPHEN 650 MILLIGRAM(S): 80 SOLUTION/ DROPS ORAL at 21:08

## 2024-12-22 RX ADMIN — IPRATROPIUM BROMIDE AND ALBUTEROL SULFATE 3 MILLILITER(S): .5; 2.5 SOLUTION RESPIRATORY (INHALATION) at 18:30

## 2024-12-22 RX ADMIN — Medication 100 MILLIGRAM(S): at 06:23

## 2024-12-23 PROCEDURE — 99232 SBSQ HOSP IP/OBS MODERATE 35: CPT

## 2024-12-23 RX ORDER — FLUTICASONE PROPIONATE AND SALMETEROL 50; 500 UG/1; UG/1
1 POWDER ORAL; RESPIRATORY (INHALATION)
Qty: 1 | Refills: 0
Start: 2024-12-23 | End: 2025-01-21

## 2024-12-23 RX ORDER — FLUTICASONE PROPIONATE AND SALMETEROL 50; 500 UG/1; UG/1
1 POWDER ORAL; RESPIRATORY (INHALATION)
Refills: 0 | DISCHARGE

## 2024-12-23 RX ORDER — ASCORBIC ACID 1000 MG
1 TABLET ORAL
Qty: 30 | Refills: 0
Start: 2024-12-23 | End: 2025-01-21

## 2024-12-23 RX ORDER — TEMAZEPAM 15 MG/1
1 CAPSULE ORAL
Qty: 0 | Refills: 0 | DISCHARGE

## 2024-12-23 RX ORDER — SENNOSIDES 8.6 MG/1
2 TABLET, FILM COATED ORAL
Qty: 60 | Refills: 0
Start: 2024-12-23 | End: 2025-01-21

## 2024-12-23 RX ORDER — B COMPLEX, C NO.20/FOLIC ACID 1 MG
1 CAPSULE ORAL
Qty: 30 | Refills: 0
Start: 2024-12-23 | End: 2025-01-21

## 2024-12-23 RX ORDER — POTASSIUM CHLORIDE 600 MG/1
2 TABLET, FILM COATED, EXTENDED RELEASE ORAL
Qty: 0 | Refills: 0 | DISCHARGE
Start: 2024-12-23

## 2024-12-23 RX ORDER — TIOTROPIUM BROMIDE MONOHYDRATE 18 UG/1
2 CAPSULE ORAL; RESPIRATORY (INHALATION)
Qty: 1 | Refills: 0
Start: 2024-12-23 | End: 2025-01-21

## 2024-12-23 RX ORDER — POLYETHYLENE GLYCOL 3350 17 G/DOSE
17 POWDER (GRAM) ORAL
Qty: 0 | Refills: 0 | DISCHARGE
Start: 2024-12-23

## 2024-12-23 RX ORDER — FLUTICASONE PROPIONATE AND SALMETEROL 50; 500 UG/1; UG/1
1 POWDER ORAL; RESPIRATORY (INHALATION)
Refills: 0 | Status: DISCONTINUED | OUTPATIENT
Start: 2024-12-23 | End: 2024-12-24

## 2024-12-23 RX ORDER — FUROSEMIDE 20 MG
1 TABLET ORAL
Qty: 30 | Refills: 0
Start: 2024-12-23 | End: 2025-01-21

## 2024-12-23 RX ADMIN — SENNOSIDES 2 TABLET(S): 8.6 TABLET, FILM COATED ORAL at 21:23

## 2024-12-23 RX ADMIN — BUDESONIDE 0.5 MILLIGRAM(S): 0.5 INHALANT ORAL at 06:01

## 2024-12-23 RX ADMIN — Medication 500 MILLIGRAM(S): at 11:27

## 2024-12-23 RX ADMIN — HEPARIN SODIUM 5000 UNIT(S): 1000 INJECTION, SOLUTION INTRAVENOUS; SUBCUTANEOUS at 17:22

## 2024-12-23 RX ADMIN — IPRATROPIUM BROMIDE AND ALBUTEROL SULFATE 3 MILLILITER(S): .5; 2.5 SOLUTION RESPIRATORY (INHALATION) at 06:01

## 2024-12-23 RX ADMIN — ACETAMINOPHEN 650 MILLIGRAM(S): 80 SOLUTION/ DROPS ORAL at 18:26

## 2024-12-23 RX ADMIN — ACETAMINOPHEN 650 MILLIGRAM(S): 80 SOLUTION/ DROPS ORAL at 06:40

## 2024-12-23 RX ADMIN — Medication 17 GRAM(S): at 11:27

## 2024-12-23 RX ADMIN — TRAZODONE HYDROCHLORIDE 50 MILLIGRAM(S): 150 TABLET ORAL at 21:23

## 2024-12-23 RX ADMIN — POTASSIUM CHLORIDE 20 MILLIEQUIVALENT(S): 600 TABLET, FILM COATED, EXTENDED RELEASE ORAL at 11:27

## 2024-12-23 RX ADMIN — TIOTROPIUM BROMIDE MONOHYDRATE 2 PUFF(S): 18 CAPSULE ORAL; RESPIRATORY (INHALATION) at 12:58

## 2024-12-23 RX ADMIN — ACETAMINOPHEN 650 MILLIGRAM(S): 80 SOLUTION/ DROPS ORAL at 17:22

## 2024-12-23 RX ADMIN — Medication 40 MILLIGRAM(S): at 06:01

## 2024-12-23 RX ADMIN — Medication 1 TABLET(S): at 11:27

## 2024-12-23 RX ADMIN — IPRATROPIUM BROMIDE AND ALBUTEROL SULFATE 3 MILLILITER(S): .5; 2.5 SOLUTION RESPIRATORY (INHALATION) at 17:22

## 2024-12-23 RX ADMIN — Medication 100 MILLIGRAM(S): at 06:03

## 2024-12-23 RX ADMIN — ATORVASTATIN CALCIUM 20 MILLIGRAM(S): 40 TABLET, FILM COATED ORAL at 21:37

## 2024-12-23 RX ADMIN — FLUTICASONE PROPIONATE AND SALMETEROL 1 DOSE(S): 50; 500 POWDER ORAL; RESPIRATORY (INHALATION) at 17:26

## 2024-12-23 RX ADMIN — IPRATROPIUM BROMIDE AND ALBUTEROL SULFATE 3 MILLILITER(S): .5; 2.5 SOLUTION RESPIRATORY (INHALATION) at 11:27

## 2024-12-23 RX ADMIN — HEPARIN SODIUM 5000 UNIT(S): 1000 INJECTION, SOLUTION INTRAVENOUS; SUBCUTANEOUS at 06:01

## 2024-12-23 RX ADMIN — Medication 1 DROP(S): at 06:01

## 2024-12-23 RX ADMIN — Medication 50 MILLIGRAM(S): at 06:05

## 2024-12-23 RX ADMIN — PREDNISOLONE ACETATE 1 DROP(S): 10 SUSPENSION OPHTHALMIC at 06:00

## 2024-12-23 RX ADMIN — Medication 1 DROP(S): at 17:26

## 2024-12-23 RX ADMIN — Medication 10 MILLIGRAM(S): at 11:27

## 2024-12-23 RX ADMIN — PREDNISOLONE ACETATE 1 DROP(S): 10 SUSPENSION OPHTHALMIC at 17:26

## 2024-12-23 NOTE — PROGRESS NOTE ADULT - PROBLEM SELECTOR PLAN 3
-CXR with L base hazy opacity  -F/u CT chest  -ABX as per ID  -Repeat Flu/COVID/RSV panel negative. Will add on full RVP to be complete.
-CXR with L base hazy opacity  -F/u CT chest non-contrast  -ABX as per ID  -Check repeat Flu/COVID/RSV panel. Will add on full RVP once sent (RSV/COVID/Flu negative on 12/13, unable to add full RVP to this sample).
-CXR with L base hazy opacity  -ABX as per ID  -Pt refused CT chest. Repeat CXR ordered.
-CXR with L base hazy opacity  -Pt refused CT chest. Repeat CXR with improvement.   -S/p course of abx as per ID

## 2024-12-23 NOTE — PROGRESS NOTE ADULT - ASSESSMENT
88 y/o F w/ PMhx of HTN, HLD, CAD, dCHF, R hemidiaphragm paralysis, hypothyroidism, dementia, COPD on 2L NC, presenting to [facility] for AMS.     pt was afebrile.       #Respiratory failure sob  #UTI  #COPD   # CAD     pt with multiple previous admissions related  to UTI, pna, confusion admitted again with hypoxia  and cough  no definite pna on chest x ray and wbc falling   This may be a COPD exacerbation     I would normally be concerned about HAP but pt seems to be responding to ceftriaxone,       currently on higher fio2 than yesterday  ( 5 liters)   legionella ag  pending  will switch to cefepime     
89 y/o F w/ PMhx of HTN, HLD, CAD, dCHF, R hemidiaphragm paralysis, hypothyroidism, dementia, COPD on 2L NC, presenting to [facility] for AMS.     pt was afebrile.       #Respiratory failure sob  #UTI  #COPD   # CAD     pt with multiple previous admissions related  to UTI, pna, confusion admitted again with hypoxia  and cough  no definite pna on chest x ray and wbc falling   This may be a COPD exacerbation      ,       currently on  ( 5 liters)        cefepime day 3    overall better  would like to complete antibiotics early next week         
    89 year old woman,      h/o  emphysema, ,  c/c  L  hydroureter,  HTN.  depression   prior    h/o   C. difficile, diverticulitis,   pna      sarcoidosis,   c/c  diastolic  heart failure,     c/c   superficial   ulcerations, on  bony prominences  of hammer  toe    admitted  with    hypoxia. elevated  wbc      cxr,   effusion.  infiltrate   and   elevated  troponin, form  demand  ischemia/   known to  card  f/ p       cap,  on iv  rocephin / trend  lactate   has   c/c  swelling  right  leg     c/c  diastolic chf , on  lasix    h/o  c/c  elevated  d  dimer,    COPD. h/o  underlying c/c  lung disease./  Emphysema, on  home  oxygen      HTN/  HLD     Anxiety,   c/c  anemia     dvt  ppx     wbc decerasing, hb noted  /  follow  hb  dr zamora to assume  care              rad< from: [employer]TE Limited W or WO Ultrasound Enhancing Agent (04.08.24 @ 14:17) >  CONCLUSIONS:   1. Left ventricular cavity is normal in size. Left ventricular wall thickness is normal. Left ventricular systolic function is normal with an ejection fraction visually estimated at 55 to 60 %. There are no regional wall motion abnormalities seen.   2. Normal rightventricular cavity size, with normal wall thickness, and normal systolic function. Tricuspid annular plane systolic excursion (TAPSE) is 2.0 cm (normal >=1.7 cm).   3. The left atrium is severely dilated.   4. Mild mitral regurgitation.   5. Comparedto the transthoracic echocardiogram performed on 3/26/2024, regional wall motion abnormality not appreciated.  ________________________________________________________________________________________  < end of copied text >    from: CT Angio Chest PE Protocol w/ IV Cont (07.09.24 @ 17:28) >  IMPRESSION:  No pulmonary embolism through the segmental pulmonary arteries   bilaterally.  Unchanged atelectasis of the right middle andlower lobes, likely due to   a combination of compressive atelectasis from adjacent elevated   diaphragm, and post obstructive atelectasis from mucous impacted bronchi.  --- End of Report ---                  
88 y/o F w/ PMhx of HTN, HLD, CAD, dCHF, R hemidiaphragm paralysis, hypothyroidism, dementia, COPD on 2L NC, presenting to [facility] for AMS.     pt was afebrile.       #Respiratory failure sob  #UTI  #COPD   # CAD     pt with multiple previous admissions related  to UTI, pna, confusion admitted again with hypoxia  and cough  no definite pna on chest x ray and wbc falling   This may be a COPD exacerbation      ,       currently on  ( 5 liters)   legionella ag still pending     cefepime day 2     
88 y/o F with PMH of HTN, HLD, CAD, CHF, hypothyroidism, dementia, COPD on home O2 (2LNC). Presents from assisted living facility with hypoxia. Leukocytosis to 22K on admission. RSV/COVID/Flu negative. CXR with L sided opacity. Pulmonary called to consult for hypoxia. Pt awake & alert but poor historian. ROS limited. Denies CP, SOB at this time. Breathing nonlabored on 5LNC. 
    89 year old woman,      h/o  emphysema, ,  c/c  L  hydroureter,  HTN.  depression   prior    h/o   C. difficile, diverticulitis,   pna      sarcoidosis,   c/c  diastolic  heart failure,     c/c   superficial   ulcerations, on  bony prominences  of hammer  toe    admitted  with    hypoxia. elevated  wbc      cxr,   effusion.  infiltrate   and   elevated  troponin, form  demand  ischemia/   known to  card  f/ p       cap,  on iv  rocephin / trend  lactate   has   c/c  swelling  right  leg     c/c  diastolic chf , on  lasix    h/o  c/c  elevated  d  dimer,    COPD. h/o  underlying c/c  lung disease./  Emphysema, on  home  oxygen      HTN/  HLD     Anxiety,   c/c  anemia     dvt  ppx     wbc  normal,  on  cefepime  pe r id     d/c  plans in progress  dr zamora to assume  care              rad< from: TTE Limited W or WO Ultrasound Enhancing Agent (04.08.24 @ 14:17) >  CONCLUSIONS:   1. Left ventricular cavity is normal in size. Left ventricular wall thickness is normal. Left ventricular systolic function is normal with an ejection fraction visually estimated at 55 to 60 %. There are no regional wall motion abnormalities seen.   2. Normal rightventricular cavity size, with normal wall thickness, and normal systolic function. Tricuspid annular plane systolic excursion (TAPSE) is 2.0 cm (normal >=1.7 cm).   3. The left atrium is severely dilated.   4. Mild mitral regurgitation.   5. Comparedto the transthoracic echocardiogram performed on 3/26/2024, regional wall motion abnormality not appreciated.  ________________________________________________________________________________________  < end of copied text >    from: CT Angio Chest PE Protocol w/ IV Cont (07.09.24 @ 17:28) >  IMPRESSION:  No pulmonary embolism through the segmental pulmonary arteries   bilaterally.  Unchanged atelectasis of the right middle andlower lobes, likely due to   a combination of compressive atelectasis from adjacent elevated   diaphragm, and post obstructive atelectasis from mucous impacted bronchi.  --- End of Report ---                  
89 y/o F w/ PMhx of HTN, HLD, CAD, dCHF, R hemidiaphragm paralysis, hypothyroidism, dementia, COPD on 2L NC, presenting to [facility] for AMS.     pt was afebrile.       #Respiratory failure sob  #UTI  #COPD   # CAD     pt with multiple previous admissions related  to UTI, pna, confusion admitted again with hypoxia  and cough  no definite pna on chest x ray and wbc falling   This may be a COPD exacerbation     I would normally be concerned about HAP but pt seems to be responding to ceftriaxone,    seems to be at baseline  if hypoxia gets back to baseline s. we cant switch to po antibiotics   
89 y/o F w/ PMhx of HTN, HLD, CAD, dCHF, R hemidiaphragm paralysis, hypothyroidism, dementia, COPD on 2L NC, presenting to [facility] for AMS.     pt was afebrile.       #Respiratory failure sob  #UTI  #COPD   # CAD     pt with multiple previous admissions related  to UTI, pna, confusion admitted again with hypoxia  and cough  no definite pna on chest x ray and wbc falling   This may be a COPD exacerbation      ,              follow up chest xray improved  will dc antibiotics          
88 y/o F with PMH of HTN, HLD, CAD, CHF, hypothyroidism, dementia, COPD on home O2 (2LNC). Presents from assisted living facility with hypoxia. Leukocytosis to 22K on admission. RSV/COVID/Flu negative. CXR with L sided opacity. Pulmonary called to consult for hypoxia. Pt awake & alert but poor historian. ROS limited. Denies CP, SOB at this time. Breathing nonlabored on 5LNC. 
88 y/o F with PMH of HTN, HLD, CAD, CHF, hypothyroidism, dementia, COPD on home O2 (2LNC). Presents from assisted living facility with hypoxia. Leukocytosis to 22K on admission. RSV/COVID/Flu negative. CXR with L sided opacity. Pulmonary called to consult for hypoxia. Pt awake & alert but poor historian. ROS limited. Denies CP, SOB at this time. Breathing nonlabored on 5LNC. 
89 y/o F with PMH of HTN, HLD, CAD, CHF, hypothyroidism, dementia, COPD on home O2 (2LNC). Presents from assisted living facility with hypoxia. Leukocytosis to 22K on admission. RSV/COVID/Flu negative. CXR with L sided opacity. Pulmonary called to consult for hypoxia. Pt awake & alert but poor historian. ROS limited. Denies CP, SOB at this time. Breathing nonlabored on 5LNC.

## 2024-12-23 NOTE — PROGRESS NOTE ADULT - PROBLEM SELECTOR PLAN 2
-By hx, on home O2  -Duoneb q6h  -Continue Pulmicort BID for now. If patient can use MDI can transition back to Advair (home med)  -Spiriva qd  -VBG 12/18 acceptable   -F/u CT chest non contrast.
-By hx, on home O2  -Duoneb q6h  -Continue Pulmicort BID for now. If patient can use MDI can transition back to Advair (home med)  -Spiriva qd  -VBG 12/18 acceptable
-By hx, on home O2  -Duoneb q6h, change to q6h PRN on discharge   -D/c pulmicort, start Advair BID (home med)  -Spiriva qd  -VBG 12/18 acceptable
-By hx, on home O2  -Duoneb q6h  -Continue Pulmicort BID for now. If patient can use MDI can transition back to Advair (home med)  -Spiriva qd  -VBG 12/18 acceptable   -F/u CT chest non contrast

## 2024-12-23 NOTE — PROGRESS NOTE ADULT - PROBLEM SELECTOR PLAN 1
-Increase in O2 requirements from baseline (2LNC), currently requiring 5LNC  -CXR with L base hazy opacity  -F/u CT chest non-contrast   -LE duplex neg for DVT   -RSV/COVID/Flu negative  -Continue ABX as per ID   -Continue Duoneb q6h  -Keep sats >90% with O2 (currently 4LNC). Continue to wean o2 back to her baseline requirements (2LNC) as tolerated.
-Increase in O2 requirements from baseline (2LNC), currently requiring 5LNC  -CXR with L base hazy opacity  -F/u CT chest non-contrast   -F/u LE duplex  -RSV/COVID/Flu negative  -Continue ABX  -Continue Duoneb q6h  -Decrase o2 from 5LNC to 4LNC. Continue to wean o2 back to her baseline requirements (2LNC) as tolerated. keep sats >90%.
-Increase in O2 requirements from baseline (2LNC), currently requiring 5LNC  -CXR with L base hazy opacity  -LE duplex neg for DVT   -RSV/COVID/Flu negative  -Continue ABX as per ID   -Continue Duoneb q6h  -Keep sats >90% with O2 (currently 3LNC). Continue to wean o2 back to her baseline requirements (2LNC) as tolerated  -Pt refused CT chest. Repeat CXR ordered.
-Increase in O2 requirements from baseline (now improved)  -CXR with L base hazy opacity  -LE duplex neg for DVT   -RSV/COVID/Flu negative  -S/p ABX as per ID   -Continue Duoneb q6h  -Keep sats >90% with O2 (currently 2LNC, which is her baseline o2 requirements).

## 2024-12-24 ENCOUNTER — TRANSCRIPTION ENCOUNTER (OUTPATIENT)
Age: 89
End: 2024-12-24

## 2024-12-24 VITALS
RESPIRATION RATE: 18 BRPM | OXYGEN SATURATION: 96 % | HEART RATE: 72 BPM | TEMPERATURE: 98 F | SYSTOLIC BLOOD PRESSURE: 106 MMHG | DIASTOLIC BLOOD PRESSURE: 64 MMHG | WEIGHT: 91.93 LBS

## 2024-12-24 PROCEDURE — 71045 X-RAY EXAM CHEST 1 VIEW: CPT

## 2024-12-24 PROCEDURE — 81001 URINALYSIS AUTO W/SCOPE: CPT

## 2024-12-24 PROCEDURE — 87637 SARSCOV2&INF A&B&RSV AMP PRB: CPT

## 2024-12-24 PROCEDURE — 82330 ASSAY OF CALCIUM: CPT

## 2024-12-24 PROCEDURE — 87040 BLOOD CULTURE FOR BACTERIA: CPT

## 2024-12-24 PROCEDURE — 85014 HEMATOCRIT: CPT

## 2024-12-24 PROCEDURE — 80048 BASIC METABOLIC PNL TOTAL CA: CPT

## 2024-12-24 PROCEDURE — 36415 COLL VENOUS BLD VENIPUNCTURE: CPT

## 2024-12-24 PROCEDURE — 84100 ASSAY OF PHOSPHORUS: CPT

## 2024-12-24 PROCEDURE — 82435 ASSAY OF BLOOD CHLORIDE: CPT

## 2024-12-24 PROCEDURE — 85018 HEMOGLOBIN: CPT

## 2024-12-24 PROCEDURE — 82947 ASSAY GLUCOSE BLOOD QUANT: CPT

## 2024-12-24 PROCEDURE — 80053 COMPREHEN METABOLIC PANEL: CPT

## 2024-12-24 PROCEDURE — 87449 NOS EACH ORGANISM AG IA: CPT

## 2024-12-24 PROCEDURE — 85730 THROMBOPLASTIN TIME PARTIAL: CPT

## 2024-12-24 PROCEDURE — 97161 PT EVAL LOW COMPLEX 20 MIN: CPT

## 2024-12-24 PROCEDURE — 93970 EXTREMITY STUDY: CPT

## 2024-12-24 PROCEDURE — 94640 AIRWAY INHALATION TREATMENT: CPT

## 2024-12-24 PROCEDURE — 99285 EMERGENCY DEPT VISIT HI MDM: CPT

## 2024-12-24 PROCEDURE — 83880 ASSAY OF NATRIURETIC PEPTIDE: CPT

## 2024-12-24 PROCEDURE — 85025 COMPLETE CBC W/AUTO DIFF WBC: CPT

## 2024-12-24 PROCEDURE — 85027 COMPLETE CBC AUTOMATED: CPT

## 2024-12-24 PROCEDURE — 84295 ASSAY OF SERUM SODIUM: CPT

## 2024-12-24 PROCEDURE — 96374 THER/PROPH/DIAG INJ IV PUSH: CPT

## 2024-12-24 PROCEDURE — 84484 ASSAY OF TROPONIN QUANT: CPT

## 2024-12-24 PROCEDURE — 85610 PROTHROMBIN TIME: CPT

## 2024-12-24 PROCEDURE — 84145 PROCALCITONIN (PCT): CPT

## 2024-12-24 PROCEDURE — 82803 BLOOD GASES ANY COMBINATION: CPT

## 2024-12-24 PROCEDURE — 83735 ASSAY OF MAGNESIUM: CPT

## 2024-12-24 PROCEDURE — 84132 ASSAY OF SERUM POTASSIUM: CPT

## 2024-12-24 PROCEDURE — 83605 ASSAY OF LACTIC ACID: CPT

## 2024-12-24 RX ORDER — POTASSIUM CHLORIDE 600 MG/1
1 TABLET, FILM COATED, EXTENDED RELEASE ORAL
Qty: 30 | Refills: 0
Start: 2024-12-24 | End: 2025-01-22

## 2024-12-24 RX ADMIN — Medication 40 MILLIGRAM(S): at 05:29

## 2024-12-24 RX ADMIN — Medication 50 MILLIGRAM(S): at 05:29

## 2024-12-24 RX ADMIN — FLUTICASONE PROPIONATE AND SALMETEROL 1 DOSE(S): 50; 500 POWDER ORAL; RESPIRATORY (INHALATION) at 05:32

## 2024-12-24 RX ADMIN — PREDNISOLONE ACETATE 1 DROP(S): 10 SUSPENSION OPHTHALMIC at 05:30

## 2024-12-24 RX ADMIN — POTASSIUM CHLORIDE 20 MILLIEQUIVALENT(S): 600 TABLET, FILM COATED, EXTENDED RELEASE ORAL at 11:49

## 2024-12-24 RX ADMIN — TIOTROPIUM BROMIDE MONOHYDRATE 2 PUFF(S): 18 CAPSULE ORAL; RESPIRATORY (INHALATION) at 11:50

## 2024-12-24 RX ADMIN — IPRATROPIUM BROMIDE AND ALBUTEROL SULFATE 3 MILLILITER(S): .5; 2.5 SOLUTION RESPIRATORY (INHALATION) at 00:28

## 2024-12-24 RX ADMIN — Medication 500 MILLIGRAM(S): at 11:50

## 2024-12-24 RX ADMIN — Medication 1 TABLET(S): at 11:50

## 2024-12-24 RX ADMIN — ACETAMINOPHEN 650 MILLIGRAM(S): 80 SOLUTION/ DROPS ORAL at 02:37

## 2024-12-24 RX ADMIN — IPRATROPIUM BROMIDE AND ALBUTEROL SULFATE 3 MILLILITER(S): .5; 2.5 SOLUTION RESPIRATORY (INHALATION) at 05:34

## 2024-12-24 RX ADMIN — IPRATROPIUM BROMIDE AND ALBUTEROL SULFATE 3 MILLILITER(S): .5; 2.5 SOLUTION RESPIRATORY (INHALATION) at 11:50

## 2024-12-24 RX ADMIN — Medication 17 GRAM(S): at 11:50

## 2024-12-24 RX ADMIN — Medication 10 MILLIGRAM(S): at 11:49

## 2024-12-24 RX ADMIN — Medication 1 DROP(S): at 05:31

## 2024-12-24 RX ADMIN — HEPARIN SODIUM 5000 UNIT(S): 1000 INJECTION, SOLUTION INTRAVENOUS; SUBCUTANEOUS at 05:31

## 2024-12-24 NOTE — DISCHARGE NOTE PROVIDER - NSDCFUADDAPPT_GEN_ALL_CORE_FT
APPTS ARE READY TO BE MADE: [x ] YES    Best Family or Patient Contact (if needed):    Additional Information about above appointments (if needed):    1:   2:   3:     Other comments or requests:    APPTS ARE READY TO BE MADE: [x ] YES    Best Family or Patient Contact (if needed):    Additional Information about above appointments (if needed):    1:   2:   3:     Other comments or requests:       Provided patient with provider referral information, however patient prefers to schedule the appointments on their own. Cardio and PCP    Appointment was scheduled by our team on the patient's behalf through the provider's office. Dr. Myles on 1/21/25 at 10:30am. [address]

## 2024-12-24 NOTE — DISCHARGE NOTE PROVIDER - CARE PROVIDER_API CALL
Johann Sosa  Internal Medicine  [address] [place] [address], [address]  [place] [address]  Phone: [phone]  Fax: [phone]  Established Patient  Follow Up Time: 1 week   Johann Sosa  Internal Medicine  [address], [address]  [place] [address]  Phone: [phone]  Fax: [phone]  Established Patient  Follow Up Time: 1 week    Constanza Gardiner  Cardiovascular Disease  [address] [facility] [address]  [place] [address]  Phone: [phone]  Fax: [phone]  Follow Up Time:     Wound Care,   [address] [place] [address]  [phone]  Phone: [phone]  Fax: (   )    -  Follow Up Time:

## 2024-12-24 NOTE — DISCHARGE NOTE NURSING/CASE MANAGEMENT/SOCIAL WORK - PATIENT PORTAL LINK FT
You can access the FollowMyHealth Patient Portal offered by [facility] by registering at the following website: [email or web address]. By joining [employer]’s FollowMyHealth portal, you will also be able to view your health information using other applications (apps) compatible with our system.

## 2024-12-24 NOTE — DISCHARGE NOTE PROVIDER - NSDCFUSCHEDAPPT_GEN_ALL_CORE_FT
Zenobia Harrison Physician [facility] Patient Nathan  Scheduled Appointment: 12/30/2024

## 2024-12-24 NOTE — DISCHARGE NOTE PROVIDER - NSDCFUADDINST_GEN_ALL_CORE_FT
Wound Care    Impression:    Sacral/bilateral Buttocks deep tissue injury present on admission  Incontinence of bowel and bladder  Incontinence Dermatitis    Recommend:  1.) topical therapy: sacral/buttock wound – cleanse with incontinence cleanser, pat dry, apply Jennifer ointment BID and PRN for incontinent episodes  2.) Incontinence Management - incontinence cleanser, pads, pericare BID  3.) Maintain on an alternating air with low air loss surface  4.) Turn and reposition Q 2 hours  5.) Nutrition optimization  6.) Offload heels/feet with complete cair air fluidized boots/pillows; ensure that the soles of the feet are not resting on the foot board of the bed.  7.) chair cushion for chair sitting    Upon discharge f/u as outpatient at Wound Center [address] [facility] [phone]

## 2024-12-24 NOTE — DISCHARGE NOTE NURSING/CASE MANAGEMENT/SOCIAL WORK - FINANCIAL ASSISTANCE
[facility] provides services at a reduced cost to those who are determined to be eligible through [facility]’s financial assistance program. Information regarding [facility]’s financial assistance program can be found by going to [email or web address] or by calling [phone].

## 2024-12-24 NOTE — PROGRESS NOTE ADULT - SUBJECTIVE AND OBJECTIVE BOX
---___---___---___---___---___---___ ---___---___---___---___---___---___---___---___---                  M E D I C A L   A T T E N D I N G   P R O G R E S S   N O T E  ---___---___---___---___---___---___ ---___---___---___---___---___---___---___---___---        ================================================    ++CHIEF COMPLAINT:   Patient is a 89y old  Female who presents with a chief complaint of hypoxia (16 Dec 2024 11:38)      Pneumonia due to infectious organism      ---___---___---___---___---___---  PAST MEDICAL / Surgical  HISTORY:  PAST MEDICAL & SURGICAL HISTORY:  HTN (hypertension)      Hypothyroidism      Osteoporosis      CAD (coronary artery disease)      COPD (chronic obstructive pulmonary disease)      Pulmonary sarcoidosis      H/O pyelonephritis      Acute diverticulitis          ---___---___---___---___---___---  FAMILY HISTORY:   FAMILY HISTORY:        ---___---___---___---___---___---  ALLERGIES:   Allergies    iodinated radiocontrast agents (Anaphylaxis)    Intolerances        ---___---___---___---___---___---  MEDICATIONS:  MEDICATIONS  (STANDING):  albuterol/ipratropium for Nebulization 3 milliLiter(s) Nebulizer every 6 hours  ascorbic acid 500 milliGRAM(s) Oral daily  atorvastatin 20 milliGRAM(s) Oral at bedtime  azithromycin  IVPB 500 milliGRAM(s) IV Intermittent once  buDESOnide    Inhalation Suspension 0.25 milliGRAM(s) Inhalation two times a day  cefTRIAXone   IVPB 1000 milliGRAM(s) IV Intermittent every 24 hours  cyclopentolate 1% Solution 1 Drop(s) Both EYES two times a day  FLUoxetine 10 milliGRAM(s) Oral daily  furosemide    Tablet 40 milliGRAM(s) Oral daily  heparin   Injectable 5000 Unit(s) SubCutaneous every 12 hours  influenza  Vaccine (HIGH DOSE) 0.5 milliLiter(s) IntraMuscular once  metoprolol succinate ER 50 milliGRAM(s) Oral daily  multivitamin 1 Tablet(s) Oral daily  polyethylene glycol 3350 17 Gram(s) Oral daily  prednisoLONE acetate 1% Suspension 1 Drop(s) Both EYES two times a day  senna 2 Tablet(s) Oral at bedtime  tiotropium 2.5 MICROgram(s) Inhaler 2 Puff(s) Inhalation daily  traZODone 50 milliGRAM(s) Oral at bedtime    MEDICATIONS  (PRN):  acetaminophen     Tablet .. 650 milliGRAM(s) Oral every 6 hours PRN Mild Pain (1 - 3)  temazepam 7.5 milliGRAM(s) Oral at bedtime PRN Insomnia      ---___---___---___---___---___---  REVIEW OF SYSTEM:    GEN: no fever, no chills, no pain  RESP: no SOB, no cough, no sputum  CVS: no chest pain, no palpitations, no edema  GI: no abdominal pain, no nausea, no vomiting, no constipation, no diarrhea  : no dysurea, no frequency, no hematurea  Neuro: no headache, no dizziness  PSYCH: no anxiety, no depression  Derm : no itching, no rash    ---___---___---___---___---___---  VITAL SIGNS:  89y , CAPILLARY BLOOD GLUCOSE        T(C): 36.6 (24 @ 20:14), Max: 37.1 (24 @ 04:04)  HR: 76 (24 @ 20:14) (70 - 88)  BP: 111/53 (24 @ 20:14) (111/53 - 146/62)  RR: 18 (24 @ 20:14) (18 - 18)  SpO2: 96% (24 @ 20:14) (93% - 97%)  ---___---___---___---___---___---  PHYSICAL EXAM:    GEN: A&O X 3 , NAD , comfortable  HEENT: NCAT, PERRL, MMM, hearing intact  Neck: supple , no JVD  CVS: S1S2 , regular , No M/R/G appreciated  PULM: CTA B/L,  no W/R/R appreciated  ABD.: soft. non tender, non distended,  bowel sounds present  Extrem: intact pulses , no edema   Derm: No rash , no ecchymoses  PSYCH : normal mood,  no delusion not anxious     ---___---___---___---___---___---            LAB AND IMAGIN.5    9.66  )-----------( 137      ( 15 Dec 2024 06:52 )             31.5               12-15    142  |  102  |  15  ----------------------------<  87  4.0   |  27  |  0.66    Ca    9.0      15 Dec 2024 06:52  Phos  3.1     12-15  Mg     2.0     12-15                              Urinalysis Basic - ( 15 Dec 2024 06:52 )    Color: x / Appearance: x / SG: x / pH: x  Gluc: 87 mg/dL / Ketone: x  / Bili: x / Urobili: x   Blood: x / Protein: x / Nitrite: x   Leuk Esterase: x / RBC: x / WBC x   Sq Epi: x / Non Sq Epi: x / Bacteria: x        [All pertinent / recent Imaging reviewed]         ---___---___---___---___---___---___ ---___---___---___---___---                         A S S E S S M E N T   A N D   P L A N :      HEALTH ISSUES - PROBLEM Dx:    hld continue statin   htn  continue current medication   copd exacerbation  on iv abx and steroids  formal pulmonary consult   depression  continue antidepressant   anxiety on ativan            -GI/DVT Prophylaxis.as oredered       ___________________________  Thank you,  Garry Hermosillo  [phone]
      ---___---___---___---___---___---___ ---___---___---___---___---___---___---___---___---                  M E D I C A L   A T T E N D I N G   P R O G R E S S   N O T E  ---___---___---___---___---___---___ ---___---___---___---___---___---___---___---___---        ================================================    ++CHIEF COMPLAINT:   Patient is a 89y old  Female who presents with a chief complaint of hypoxia (17 Dec 2024 12:23)      Pneumonia due to infectious organism      ---___---___---___---___---___---  PAST MEDICAL / Surgical  HISTORY:  PAST MEDICAL & SURGICAL HISTORY:  HTN (hypertension)      Hypothyroidism      Osteoporosis      CAD (coronary artery disease)      COPD (chronic obstructive pulmonary disease)      Pulmonary sarcoidosis      H/O pyelonephritis      Acute diverticulitis          ---___---___---___---___---___---  FAMILY HISTORY:   FAMILY HISTORY:        ---___---___---___---___---___---  ALLERGIES:   Allergies    iodinated radiocontrast agents (Anaphylaxis)    Intolerances        ---___---___---___---___---___---  MEDICATIONS:  MEDICATIONS  (STANDING):  albuterol/ipratropium for Nebulization 3 milliLiter(s) Nebulizer every 6 hours  ascorbic acid 500 milliGRAM(s) Oral daily  atorvastatin 20 milliGRAM(s) Oral at bedtime  azithromycin  IVPB 500 milliGRAM(s) IV Intermittent once  buDESOnide    Inhalation Suspension 0.5 milliGRAM(s) Inhalation two times a day  cefTRIAXone   IVPB 1000 milliGRAM(s) IV Intermittent every 24 hours  cyclopentolate 1% Solution 1 Drop(s) Both EYES two times a day  FLUoxetine 10 milliGRAM(s) Oral daily  furosemide    Tablet 40 milliGRAM(s) Oral daily  heparin   Injectable 5000 Unit(s) SubCutaneous every 12 hours  influenza  Vaccine (HIGH DOSE) 0.5 milliLiter(s) IntraMuscular once  metoprolol succinate ER 50 milliGRAM(s) Oral daily  multivitamin 1 Tablet(s) Oral daily  polyethylene glycol 3350 17 Gram(s) Oral daily  potassium chloride    Tablet ER 20 milliEquivalent(s) Oral once  prednisoLONE acetate 1% Suspension 1 Drop(s) Both EYES two times a day  senna 2 Tablet(s) Oral at bedtime  tiotropium 2.5 MICROgram(s) Inhaler 2 Puff(s) Inhalation daily  traZODone 50 milliGRAM(s) Oral at bedtime    MEDICATIONS  (PRN):  acetaminophen     Tablet .. 650 milliGRAM(s) Oral every 6 hours PRN Mild Pain (1 - 3)  temazepam 7.5 milliGRAM(s) Oral at bedtime PRN Insomnia      ---___---___---___---___---___---  REVIEW OF SYSTEM:    GEN: no fever, no chills, no pain  RESP: no SOB, no cough, no sputum  CVS: no chest pain, no palpitations, no edema  GI: no abdominal pain, no nausea, no vomiting, no constipation, no diarrhea  : no dysurea, no frequency, no hematurea  Neuro: no headache, no dizziness  PSYCH: no anxiety, no depression  Derm : no itching, no rash    ---___---___---___---___---___---  VITAL SIGNS:  89y , CAPILLARY BLOOD GLUCOSE        T(C): 36.4 (24 @ 16:34), Max: 36.6 (24 @ 20:14)  HR: 78 (24 @ 16:34) (58 - 80)  BP: 112/62 (24 @ 16:34) (111/53 - 136/64)  RR: 18 (24 @ 16:34) (18 - 18)  SpO2: 94% (24 @ 16:34) (94% - 98%)  ---___---___---___---___---___---  PHYSICAL EXAM:    GEN: A&O X 3 , NAD , comfortable  HEENT: NCAT, PERRL, MMM, hearing intact  Neck: supple , no JVD  CVS: S1S2 , regular , No M/R/G appreciated  PULM: CTA B/L,  no W/R/R appreciated  ABD.: soft. non tender, non distended,  bowel sounds present  Extrem: intact pulses , no edema   Derm: No rash , no ecchymoses  PSYCH : normal mood,  no delusion not anxious     ---___---___---___---___---___---            LAB AND IMAGIN.7    6.04  )-----------( 153      ( 17 Dec 2024 06:52 )             31.8               12-    140  |  98  |  14  ----------------------------<  96  3.7   |  27  |  0.69    Ca    9.2      17 Dec 2024 10:20                              Urinalysis Basic - ( 17 Dec 2024 10:20 )    Color: x / Appearance: x / SG: x / pH: x  Gluc: 96 mg/dL / Ketone: x  / Bili: x / Urobili: x   Blood: x / Protein: x / Nitrite: x   Leuk Esterase: x / RBC: x / WBC x   Sq Epi: x / Non Sq Epi: x / Bacteria: x        [All pertinent / recent Imaging reviewed]         ---___---___---___---___---___---___ ---___---___---___---___---                         A S S E S S M E N T   A N D   P L A N :      HEALTH ISSUES - PROBLEM Dx:  Hypoxia  COPD (chronic obstructive pulmonary disease)  Pneumonia    appreciate id and pulmonary input  continue nebulizers   continue iv abx   increase baseline oxygen   anxiety /depression is stable on the current medication           -GI/DVT Prophylaxis. as ordered    --------------------------------------------  Case discussed with   Education given on   ___________________________  Thank you,  Garry Hermosillo  [phone]
      ---___---___---___---___---___---___ ---___---___---___---___---___---___---___---___---                  M E D I C A L   A T T E N D I N G   P R O G R E S S   N O T E  ---___---___---___---___---___---___ ---___---___---___---___---___---___---___---___---        ================================================    ++CHIEF COMPLAINT:   Patient is a 89y old  Female who presents with a chief complaint of hypoxia (19 Dec 2024 12:10)      Pneumonia due to infectious organism      ---___---___---___---___---___---  PAST MEDICAL / Surgical  HISTORY:  PAST MEDICAL & SURGICAL HISTORY:  HTN (hypertension)      Hypothyroidism      Osteoporosis      CAD (coronary artery disease)      COPD (chronic obstructive pulmonary disease)      Pulmonary sarcoidosis      H/O pyelonephritis      Acute diverticulitis          ---___---___---___---___---___---  FAMILY HISTORY:   FAMILY HISTORY:        ---___---___---___---___---___---  ALLERGIES:   Allergies    iodinated radiocontrast agents (Anaphylaxis)    Intolerances        ---___---___---___---___---___---  MEDICATIONS:  MEDICATIONS  (STANDING):  albuterol/ipratropium for Nebulization 3 milliLiter(s) Nebulizer every 6 hours  ascorbic acid 500 milliGRAM(s) Oral daily  atorvastatin 20 milliGRAM(s) Oral at bedtime  azithromycin  IVPB 500 milliGRAM(s) IV Intermittent once  buDESOnide    Inhalation Suspension 0.5 milliGRAM(s) Inhalation two times a day  cefepime   IVPB      cefepime   IVPB 1000 milliGRAM(s) IV Intermittent every 12 hours  cyclopentolate 1% Solution 1 Drop(s) Both EYES two times a day  FLUoxetine 10 milliGRAM(s) Oral daily  furosemide    Tablet 40 milliGRAM(s) Oral daily  heparin   Injectable 5000 Unit(s) SubCutaneous every 12 hours  influenza  Vaccine (HIGH DOSE) 0.5 milliLiter(s) IntraMuscular once  metoprolol succinate ER 50 milliGRAM(s) Oral daily  multivitamin 1 Tablet(s) Oral daily  polyethylene glycol 3350 17 Gram(s) Oral daily  potassium chloride    Tablet ER 20 milliEquivalent(s) Oral daily  prednisoLONE acetate 1% Suspension 1 Drop(s) Both EYES two times a day  senna 2 Tablet(s) Oral at bedtime  tiotropium 2.5 MICROgram(s) Inhaler 2 Puff(s) Inhalation daily  traZODone 50 milliGRAM(s) Oral at bedtime    MEDICATIONS  (PRN):  acetaminophen     Tablet .. 650 milliGRAM(s) Oral every 6 hours PRN Mild Pain (1 - 3)  temazepam 7.5 milliGRAM(s) Oral at bedtime PRN Insomnia      ---___---___---___---___---___---  REVIEW OF SYSTEM:  unable to obtain    ---___---___---___---___---___---  VITAL SIGNS:  89y , CAPILLARY BLOOD GLUCOSE        T(C): 36.6 (24 @ 16:00), Max: 37.1 (24 @ 20:00)  HR: 66 (24 @ 16:00) (65 - 78)  BP: 119/68 (24 @ 16:00) (103/58 - 128/72)  RR: 20 (24 @ 17:53) (18 - 20)  SpO2: 98% (24 @ 17:53) (97% - 99%)  ---___---___---___---___---___---  PHYSICAL EXAM:    GEN: A&O X 3 , NAD , comfortable  HEENT: NCAT, PERRL, MMM, hearing intact  Neck: supple , no JVD  CVS: S1S2 , regular , No M/R/G appreciated  PULM: CTA B/L,  no W/R/R appreciated  ABD.: soft. non tender, non distended,  bowel sounds present  Extrem: intact pulses , no edema   Derm: No rash , no ecchymoses  PSYCH : normal mood,  no delusion not anxious     ---___---___---___---___---___---            LAB AND IMAGIN.7    7.68  )-----------( 176      ( 18 Dec 2024 07:29 )             31.8               12-    140  |  99  |  22  ----------------------------<  87  3.7   |  27  |  0.82    Ca    9.6      19 Dec 2024 07:12  Mg     2.1     -                              Urinalysis Basic - ( 19 Dec 2024 07:12 )    Color: x / Appearance: x / SG: x / pH: x  Gluc: 87 mg/dL / Ketone: x  / Bili: x / Urobili: x   Blood: x / Protein: x / Nitrite: x   Leuk Esterase: x / RBC: x / WBC x   Sq Epi: x / Non Sq Epi: x / Bacteria: x        [All pertinent / recent Imaging reviewed]         ---___---___---___---___---___---___ ---___---___---___---___---                         A S S E S S M E N T   A N D   P L A N :      HEALTH ISSUES - PROBLEM Dx:   Hypoxia  COPD (chronic obstructive pulmonary disease)  Pneumonia    appreciate id and pulmonary input  continue nebulizers   continue iv abx   increase baseline oxygen   anxiety /depression is stable on the current medication       -GI/DVT Prophylaxis. as ordered      start to wean off o2  start dc planning                   -GI/DVT Prophylaxis.    --------------------------------------------  Case discussed with   Education given on   ___________________________  Thank you,  Garry Hermosillo  [phone]
      ---___---___---___---___---___---___ ---___---___---___---___---___---___---___---___---                  M E D I C A L   A T T E N D I N G   P R O G R E S S   N O T E  ---___---___---___---___---___---___ ---___---___---___---___---___---___---___---___---        ================================================    ++CHIEF COMPLAINT:   Patient is a 89y old  Female who presents with a chief complaint of hypoxia (23 Dec 2024 13:19)      Pneumonia due to infectious organism      ---___---___---___---___---___---  PAST MEDICAL / Surgical  HISTORY:  PAST MEDICAL & SURGICAL HISTORY:  HTN (hypertension)      Hypothyroidism      Osteoporosis      CAD (coronary artery disease)      COPD (chronic obstructive pulmonary disease)      Pulmonary sarcoidosis      H/O pyelonephritis      Acute diverticulitis          ---___---___---___---___---___---  FAMILY HISTORY:   FAMILY HISTORY:        ---___---___---___---___---___---  ALLERGIES:   Allergies    iodinated radiocontrast agents (Anaphylaxis)    Intolerances        ---___---___---___---___---___---  MEDICATIONS:  MEDICATIONS  (STANDING):  albuterol/ipratropium for Nebulization 3 milliLiter(s) Nebulizer every 6 hours  ascorbic acid 500 milliGRAM(s) Oral daily  atorvastatin 20 milliGRAM(s) Oral at bedtime  cyclopentolate 1% Solution 1 Drop(s) Both EYES two times a day  FLUoxetine 10 milliGRAM(s) Oral daily  fluticasone propionate/ salmeterol 250-50 MICROgram(s) Diskus 1 Dose(s) Inhalation two times a day  furosemide    Tablet 40 milliGRAM(s) Oral daily  heparin   Injectable 5000 Unit(s) SubCutaneous every 12 hours  influenza  Vaccine (HIGH DOSE) 0.5 milliLiter(s) IntraMuscular once  metoprolol succinate ER 50 milliGRAM(s) Oral daily  multivitamin 1 Tablet(s) Oral daily  polyethylene glycol 3350 17 Gram(s) Oral daily  potassium chloride    Tablet ER 20 milliEquivalent(s) Oral daily  prednisoLONE acetate 1% Suspension 1 Drop(s) Both EYES two times a day  senna 2 Tablet(s) Oral at bedtime  tiotropium 2.5 MICROgram(s) Inhaler 2 Puff(s) Inhalation daily  traZODone 50 milliGRAM(s) Oral at bedtime    MEDICATIONS  (PRN):  acetaminophen     Tablet .. 650 milliGRAM(s) Oral every 6 hours PRN Mild Pain (1 - 3)      ---___---___---___---___---___---  REVIEW OF SYSTEM:    unable to obtain    ---___---___---___---___---___---  VITAL SIGNS:  89y , CAPILLARY BLOOD GLUCOSE        T(C): 36.6 (12-23-24 @ 20:05), Max: 36.6 (12-23-24 @ 12:17)  HR: 73 (12-23-24 @ 20:05) (61 - 73)  BP: 101/56 (12-23-24 @ 20:05) (101/56 - 135/60)  RR: 18 (12-23-24 @ 20:05) (18 - 19)  SpO2: 95% (12-23-24 @ 20:05) (93% - 99%)  ---___---___---___---___---___---  PHYSICAL EXAM:    GEN: A&O X 1, NAD , comfortable  HEENT: NCAT, PERRL, MMM, hearing intact  Neck: supple , no JVD  CVS: S1S2 , regular , No M/R/G appreciated  PULM: CTA B/L,  no W/R/R appreciated  ABD.: soft. non tender, non distended,  bowel sounds present  Extrem: intact pulses , no edema   Derm: No rash , no ecchymoses  PSYCH : normal mood,  no delusion not anxious     ---___---___---___---___---___---            LAB AND IMAGING:                          10.8   8.72  )-----------( 248      ( 22 Dec 2024 06:52 )             35.3               12-22    140  |  100  |  27[H]  ----------------------------<  102[H]  3.9   |  27  |  0.86    Ca    9.8      22 Dec 2024 06:52                              Urinalysis Basic - ( 22 Dec 2024 06:52 )    Color: x / Appearance: x / SG: x / pH: x  Gluc: 102 mg/dL / Ketone: x  / Bili: x / Urobili: x   Blood: x / Protein: x / Nitrite: x   Leuk Esterase: x / RBC: x / WBC x   Sq Epi: x / Non Sq Epi: x / Bacteria: x        [All pertinent / recent Imaging reviewed]         ---___---___---___---___---___---___ ---___---___---___---___---                         A S S E S S M E N T   A N D   P L A N :      HEALTH ISSUES - PROBLEM Dx:    admitted  with    hypoxia. elevated  wbc      cxr,   effusion.  infiltrate   and   elevated  troponin, form  demand  ischemia/   known to  card  f/ p       cap,  on iv  rocephin / trend  lactate   has   c/c  swelling  right  leg     c/c  diastolic chf , on  lasix    h/o  c/c  elevated  d  dimer,    COPD. h/o  underlying c/c  lung disease./  Emphysema, on  home  oxygen      HTN/  HLD     Anxiety,   c/c  anemia     dvt  ppx     wbc  normal,  on  cefepime  pe r id     d/c  plans in progress                  -GI/DVT Prophylaxis.    --------------------------------------------  Case discussed with   Education given on   ___________________________  Thank you,  Garry Hermosillo  [phone]
Date of Service: 12-14-24 @ 11:43           CARDIOLOGY     PROGRESS  NOTE   ________________________________________________    CHIEF COMPLAINT:Patient is a 89y old  Female who presents with a chief complaint of hypoxia (13 Dec 2024 20:23)  no complain  	  REVIEW OF SYSTEMS:  CONSTITUTIONAL: No fever, weight loss, or fatigue  EYES: No eye pain, visual disturbances, or discharge  ENT:  No difficulty hearing, tinnitus, vertigo; No sinus or throat pain  NECK: No pain or stiffness  RESPIRATORY: No cough, wheezing, chills or hemoptysis; No Shortness of Breath  CARDIOVASCULAR: No chest pain, palpitations, passing out, dizziness, or leg swelling  GASTROINTESTINAL: No abdominal or epigastric pain. No nausea, vomiting, or hematemesis; No diarrhea or constipation. No melena or hematochezia.  GENITOURINARY: No dysuria, frequency, hematuria, or incontinence  NEUROLOGICAL: No headaches, memory loss, loss of strength, numbness, or tremors  SKIN: No itching, burning, rashes, or lesions   LYMPH Nodes: No enlarged glands  ENDOCRINE: No heat or cold intolerance; No hair loss  MUSCULOSKELETAL: No joint pain or swelling; No muscle, back, or extremity pain  PSYCHIATRIC: No depression, anxiety, mood swings, or difficulty sleeping  HEME/LYMPH: No easy bruising, or bleeding gums  ALLERGY AND IMMUNOLOGIC: No hives or eczema	    [x ] All others negative	  [ ] Unable to obtain    PHYSICAL EXAM:  T(C): 36.6 (12-14-24 @ 10:43), Max: 37.7 (12-13-24 @ 14:06)  HR: 76 (12-14-24 @ 10:43) (62 - 94)  BP: 110/54 (12-14-24 @ 10:43) (94/54 - 120/57)  RR: 22 (12-14-24 @ 10:43) (21 - 24)  SpO2: 95% (12-14-24 @ 10:43) (88% - 97%)  Wt(kg): --  I&O's Summary      Appearance: Normal	  HEENT:   Normal oral mucosa, PERRL, EOMI	  Lymphatic: No lymphadenopathy  Cardiovascular: Normal S1 S2, No JVD, + murmurs, No edema  Respiratory rhonchi  Psychiatry: A & O x 3, Mood & affect appropriate  Gastrointestinal:  Soft, Non-tender, + BS	  Skin: No rashes, No ecchymoses, No cyanosis	  Neurologic: Non-focal  Extremities: Normal range of motion, No clubbing, cyanosis or edema  Vascular: Peripheral pulses palpable 2+ bilaterally    MEDICATIONS  (STANDING):  albuterol/ipratropium for Nebulization 3 milliLiter(s) Nebulizer every 6 hours  atorvastatin 20 milliGRAM(s) Oral at bedtime  azithromycin  IVPB 500 milliGRAM(s) IV Intermittent once  buDESOnide    Inhalation Suspension 0.25 milliGRAM(s) Inhalation two times a day  cefTRIAXone   IVPB 1000 milliGRAM(s) IV Intermittent every 24 hours  cyclopentolate 1% Solution 1 Drop(s) Both EYES two times a day  FLUoxetine 10 milliGRAM(s) Oral daily  furosemide   Injectable 20 milliGRAM(s) IV Push two times a day  heparin   Injectable 5000 Unit(s) SubCutaneous every 12 hours  influenza  Vaccine (HIGH DOSE) 0.5 milliLiter(s) IntraMuscular once  metoprolol succinate ER 50 milliGRAM(s) Oral daily  prednisoLONE acetate 1% Suspension 1 Drop(s) Both EYES two times a day  tiotropium 2.5 MICROgram(s) Inhaler 2 Puff(s) Inhalation daily  traZODone 50 milliGRAM(s) Oral at bedtime      TELEMETRY: 	    ECG:  	  RADIOLOGY:  OTHER: 	  	  LABS:	 	    CARDIAC MARKERS:                                8.3    15.04 )-----------( 133      ( 14 Dec 2024 06:56 )             26.8     12-14    138  |  101  |  20  ----------------------------<  86  3.2[L]   |  27  |  0.78    Ca    8.4      14 Dec 2024 06:56    TPro  6.8  /  Alb  3.8  /  TBili  0.6  /  DBili  x   /  AST  21  /  ALT  16  /  AlkPhos  134[H]  12-13    proBNP:   Lipid Profile:   HgA1c:   TSH:   PT/INR - ( 13 Dec 2024 14:14 )   PT: 12.6 sec;   INR: 1.10 ratio         PTT - ( 13 Dec 2024 14:14 )  PTT:31.2 sec    < from: Xray Chest 1 View- PORTABLE-Urgent (12.13.24 @ 14:37) >  IMPRESSION:  Haziness over the left lung base may represent small layering effusion   and/or infiltrate.        Assessment and plan  ---------------------------   89 year old woman PMH HTN, HLD, CAD, diastolic HF, hypoithyroidism, dementia, COPD on 2L presenting with hypoxia. She was transferred by her facility when she was found to be hypoxic to 60s on her home O2. She is denying any fevers, chills, chest pain, SOB, abd pain, N/V/D/C, dysuria , cough, URI symptoms, sick contacts, travel. She is alert and oriented.  pt is well known to me with hx of htn, chf hfpef , sarcoidosis and severe copd/emphysema s/p multiple admissions with increasing sob and o2 sat of 80% on home o2.  no chest pain. fevever  exacerbation of copd/ chf  iv solumedrol  iv lasix  increase wbc , check cultures  check flu with multiple patients fron nursing home  will adjust cardiac meds  pt with sig CA calcification , no aggressive measures, ASA daily  will taper steroid slowly    	        
Date of Service: 12-20-24 @ 13:36           CARDIOLOGY     PROGRESS  NOTE   ________________________________________________    CHIEF COMPLAINT:Patient is a 89y old  Female who presents with a chief complaint of hypoxia (20 Dec 2024 08:53)  no complain  	  REVIEW OF SYSTEMS:  CONSTITUTIONAL: No fever, weight loss, or fatigue  EYES: No eye pain, visual disturbances, or discharge  ENT:  No difficulty hearing, tinnitus, vertigo; No sinus or throat pain  NECK: No pain or stiffness  RESPIRATORY: No cough, wheezing, chills or hemoptysis; No Shortness of Breath  CARDIOVASCULAR: No chest pain, palpitations, passing out, dizziness, or leg swelling  GASTROINTESTINAL: No abdominal or epigastric pain. No nausea, vomiting, or hematemesis; No diarrhea or constipation. No melena or hematochezia.  GENITOURINARY: No dysuria, frequency, hematuria, or incontinence  NEUROLOGICAL: No headaches, memory loss, loss of strength, numbness, or tremors  SKIN: No itching, burning, rashes, or lesions   LYMPH Nodes: No enlarged glands  ENDOCRINE: No heat or cold intolerance; No hair loss  MUSCULOSKELETAL: No joint pain or swelling; No muscle, back, or extremity pain  PSYCHIATRIC: No depression, anxiety, mood swings, or difficulty sleeping  HEME/LYMPH: No easy bruising, or bleeding gums  ALLERGY AND IMMUNOLOGIC: No hives or eczema	    [x ] All others negative	  [ ] Unable to obtain    PHYSICAL EXAM:  T(C): 36.4 (12-20-24 @ 12:12), Max: 36.8 (12-19-24 @ 19:58)  HR: 100 (12-20-24 @ 12:12) (66 - 100)  BP: 135/79 (12-20-24 @ 12:12) (110/79 - 135/79)  RR: 18 (12-20-24 @ 12:12) (18 - 20)  SpO2: 95% (12-20-24 @ 12:12) (94% - 98%)  Wt(kg): --  I&O's Summary    19 Dec 2024 07:01  -  20 Dec 2024 07:00  --------------------------------------------------------  IN: 0 mL / OUT: 400 mL / NET: -400 mL        Appearance: Normal	  HEENT:   Normal oral mucosa, PERRL, EOMI	  Lymphatic: No lymphadenopathy  Cardiovascular: Normal S1 S2, No JVD, + murmurs, No edema  Respiratory: rhonchi  Gastrointestinal:  Soft, Non-tender, + BS	  Skin: No rashes, No ecchymoses, No cyanosis	  Extremities: Normal range of motion, No clubbing, cyanosis or edema  Vascular: Peripheral pulses palpable 2+ bilaterally    MEDICATIONS  (STANDING):  albuterol/ipratropium for Nebulization 3 milliLiter(s) Nebulizer every 6 hours  ascorbic acid 500 milliGRAM(s) Oral daily  atorvastatin 20 milliGRAM(s) Oral at bedtime  azithromycin  IVPB 500 milliGRAM(s) IV Intermittent once  buDESOnide    Inhalation Suspension 0.5 milliGRAM(s) Inhalation two times a day  cefepime   IVPB      cefepime   IVPB 1000 milliGRAM(s) IV Intermittent every 12 hours  cyclopentolate 1% Solution 1 Drop(s) Both EYES two times a day  FLUoxetine 10 milliGRAM(s) Oral daily  furosemide    Tablet 40 milliGRAM(s) Oral daily  heparin   Injectable 5000 Unit(s) SubCutaneous every 12 hours  influenza  Vaccine (HIGH DOSE) 0.5 milliLiter(s) IntraMuscular once  metoprolol succinate ER 50 milliGRAM(s) Oral daily  multivitamin 1 Tablet(s) Oral daily  polyethylene glycol 3350 17 Gram(s) Oral daily  potassium chloride    Tablet ER 20 milliEquivalent(s) Oral daily  prednisoLONE acetate 1% Suspension 1 Drop(s) Both EYES two times a day  senna 2 Tablet(s) Oral at bedtime  tiotropium 2.5 MICROgram(s) Inhaler 2 Puff(s) Inhalation daily  traZODone 50 milliGRAM(s) Oral at bedtime      TELEMETRY: 	    ECG:  	  RADIOLOGY:  OTHER: 	  	  LABS:	 	    CARDIAC MARKERS:            12-20    139  |  100  |  24[H]  ----------------------------<  101[H]  4.1   |  26  |  0.87    Ca    9.8      20 Dec 2024 07:18  Mg     2.1     12-19      proBNP:   Lipid Profile:   HgA1c:   TSH:         Assessment and plan  ---------------------------   89 year old woman PMH HTN, HLD, CAD, diastolic HF, hypoithyroidism, dementia, COPD on 2L presenting with hypoxia. She was transferred by her facility when she was found to be hypoxic to 60s on her home O2. She is denying any fevers, chills, chest pain, SOB, abd pain, N/V/D/C, dysuria , cough, URI symptoms, sick contacts, travel. She is alert and oriented.  pt is well known to me with hx of htn, chf hfpef , sarcoidosis and severe copd/emphysema s/p multiple admissions with increasing sob and o2 sat of 80% on home o2.  no chest pain. fevever  exacerbation of copd/ chf  iv solumedrol  iv lasix  increase wbc , check cultures  check flu with multiple patients fron nursing home  will adjust cardiac meds  pt with sig CA calcification , no aggressive measures, ASA daily  will taper steroid slowly  will try to taper o2 as tolerated  switching lasix to po 40 mg daily  increase ambulation fu o2 level, pt on home o2  ID noted started on Cefepime  continue diuretics, replete K  dc planning as per ID, still on Cefepime    	        
Date of Service: 12-21-24 @ 08:54           CARDIOLOGY     PROGRESS  NOTE   ________________________________________________    CHIEF COMPLAINT:Patient is a 89y old  Female who presents with a chief complaint of hypoxia (20 Dec 2024 15:17)  no complain  	  REVIEW OF SYSTEMS:  CONSTITUTIONAL: No fever, weight loss, or fatigue  EYES: No eye pain, visual disturbances, or discharge  ENT:  No difficulty hearing, tinnitus, vertigo; No sinus or throat pain  NECK: No pain or stiffness  RESPIRATORY: No cough, wheezing, chills or hemoptysis; No Shortness of Breath  CARDIOVASCULAR: No chest pain, palpitations, passing out, dizziness, or leg swelling  GASTROINTESTINAL: No abdominal or epigastric pain. No nausea, vomiting, or hematemesis; No diarrhea or constipation. No melena or hematochezia.  GENITOURINARY: No dysuria, frequency, hematuria, or incontinence  NEUROLOGICAL: No headaches, memory loss, loss of strength, numbness, or tremors  SKIN: No itching, burning, rashes, or lesions   LYMPH Nodes: No enlarged glands  ENDOCRINE: No heat or cold intolerance; No hair loss  MUSCULOSKELETAL: No joint pain or swelling; No muscle, back, or extremity pain  PSYCHIATRIC: No depression, anxiety, mood swings, or difficulty sleeping  HEME/LYMPH: No easy bruising, or bleeding gums  ALLERGY AND IMMUNOLOGIC: No hives or eczema	    [ ] All others negative	  [ ] Unable to obtain    PHYSICAL EXAM:  T(C): 36.3 (12-21-24 @ 08:10), Max: 36.9 (12-20-24 @ 16:20)  HR: 71 (12-21-24 @ 08:10) (61 - 100)  BP: 105/57 (12-21-24 @ 08:10) (102/63 - 135/79)  RR: 18 (12-21-24 @ 08:10) (18 - 18)  SpO2: 94% (12-21-24 @ 08:10) (91% - 95%)  Wt(kg): --  I&O's Summary    20 Dec 2024 07:01  -  21 Dec 2024 07:00  --------------------------------------------------------  IN: 0 mL / OUT: 250 mL / NET: -250 mL        Appearance: Normal	  HEENT:   Normal oral mucosa, PERRL, EOMI	  Lymphatic: No lymphadenopathy  Cardiovascular: Normal S1 S2, No JVD,+murmurs, No edema  Respiratory: Lungs clear to auscultation	  Psychiatry: A & O x 3, Mood & affect appropriate  Gastrointestinal:  Soft, Non-tender, + BS	  Skin: No rashes, No ecchymoses, No cyanosis	  Neurologic: Non-focal  Extremities: Normal range of motion, No clubbing, cyanosis or edema  Vascular: Peripheral pulses palpable 2+ bilaterally    MEDICATIONS  (STANDING):  albuterol/ipratropium for Nebulization 3 milliLiter(s) Nebulizer every 6 hours  ascorbic acid 500 milliGRAM(s) Oral daily  atorvastatin 20 milliGRAM(s) Oral at bedtime  azithromycin  IVPB 500 milliGRAM(s) IV Intermittent once  buDESOnide    Inhalation Suspension 0.5 milliGRAM(s) Inhalation two times a day  cefepime   IVPB      cefepime   IVPB 1000 milliGRAM(s) IV Intermittent every 12 hours  cyclopentolate 1% Solution 1 Drop(s) Both EYES two times a day  FLUoxetine 10 milliGRAM(s) Oral daily  furosemide    Tablet 40 milliGRAM(s) Oral daily  heparin   Injectable 5000 Unit(s) SubCutaneous every 12 hours  influenza  Vaccine (HIGH DOSE) 0.5 milliLiter(s) IntraMuscular once  metoprolol succinate ER 50 milliGRAM(s) Oral daily  multivitamin 1 Tablet(s) Oral daily  polyethylene glycol 3350 17 Gram(s) Oral daily  potassium chloride    Tablet ER 20 milliEquivalent(s) Oral daily  prednisoLONE acetate 1% Suspension 1 Drop(s) Both EYES two times a day  senna 2 Tablet(s) Oral at bedtime  tiotropium 2.5 MICROgram(s) Inhaler 2 Puff(s) Inhalation daily  traZODone 50 milliGRAM(s) Oral at bedtime      TELEMETRY: 	    ECG:  	  RADIOLOGY:  OTHER: 	  	  LABS:	 	    CARDIAC MARKERS:    12-20    139  |  100  |  24[H]  ----------------------------<  101[H]  4.1   |  26  |  0.87    Ca    9.8      20 Dec 2024 07:18      proBNP:   Lipid Profile:   HgA1c:   TSH:     Assessment and plan  ---------------------------   89 year old woman PMH HTN, HLD, CAD, diastolic HF, hypoithyroidism, dementia, COPD on 2L presenting with hypoxia. She was transferred by her facility when she was found to be hypoxic to 60s on her home O2. She is denying any fevers, chills, chest pain, SOB, abd pain, N/V/D/C, dysuria , cough, URI symptoms, sick contacts, travel. She is alert and oriented.  pt is well known to me with hx of htn, chf hfpef , sarcoidosis and severe copd/emphysema s/p multiple admissions with increasing sob and o2 sat of 80% on home o2.  no chest pain. fevever  exacerbation of copd/ chf  iv solumedrol  iv lasix  increase wbc , check cultures  check flu with multiple patients fron nursing home  will adjust cardiac meds  pt with sig CA calcification , no aggressive measures, ASA daily  will taper steroid slowly  will try to taper o2 as tolerated  switching lasix to po 40 mg daily  increase ambulation fu o2 level, pt on home o2  ID noted started on Cefepime  continue diuretics, replete K  dc planning as per ID, still on Cefepime  increase ambulation  continue lasix    	        
Date of Service: 12-24-24 @ 07:08           CARDIOLOGY     PROGRESS  NOTE   ________________________________________________    CHIEF COMPLAINT:Patient is a 89y old  Female who presents with a chief complaint of hypoxia (23 Dec 2024 20:56)  no complain  	  REVIEW OF SYSTEMS:  CONSTITUTIONAL: No fever, weight loss, or fatigue  EYES: No eye pain, visual disturbances, or discharge  ENT:  No difficulty hearing, tinnitus, vertigo; No sinus or throat pain  NECK: No pain or stiffness  RESPIRATORY: No cough, wheezing, chills or hemoptysis; No Shortness of Breath  CARDIOVASCULAR: No chest pain, palpitations, passing out, dizziness, or leg swelling  GASTROINTESTINAL: No abdominal or epigastric pain. No nausea, vomiting, or hematemesis; No diarrhea or constipation. No melena or hematochezia.  GENITOURINARY: No dysuria, frequency, hematuria, or incontinence  NEUROLOGICAL: No headaches, memory loss, loss of strength, numbness, or tremors  SKIN: No itching, burning, rashes, or lesions   LYMPH Nodes: No enlarged glands  ENDOCRINE: No heat or cold intolerance; No hair loss  MUSCULOSKELETAL: No joint pain or swelling; No muscle, back, or extremity pain  PSYCHIATRIC: No depression, anxiety, mood swings, or difficulty sleeping  HEME/LYMPH: No easy bruising, or bleeding gums  ALLERGY AND IMMUNOLOGIC: No hives or eczema	    [ x] All others negative	  [ ] Unable to obtain    PHYSICAL EXAM:  T(C): 36.5 (12-24-24 @ 05:08), Max: 36.6 (12-23-24 @ 12:17)  HR: 72 (12-24-24 @ 05:08) (61 - 73)  BP: 106/64 (12-24-24 @ 05:08) (101/56 - 135/60)  RR: 18 (12-24-24 @ 05:08) (18 - 19)  SpO2: 96% (12-24-24 @ 05:08) (93% - 96%)  Wt(kg): --  I&O's Summary    23 Dec 2024 07:01  -  24 Dec 2024 07:00  --------------------------------------------------------  IN: 0 mL / OUT: 200 mL / NET: -200 mL        Appearance: Normal	  HEENT:   Normal oral mucosa, PERRL, EOMI	  Lymphatic: No lymphadenopathy  Cardiovascular: Normal S1 S2, No JVD, +murmurs, No edema  Respiratory: Lungs clear to auscultation	  Psychiatry: A & O x 2, Mood & affect appropriate  Gastrointestinal:  Soft, Non-tender, + BS	  Skin: No rashes, No ecchymoses, No cyanosis	  Extremities: Normal range of motion, No clubbing, cyanosis or edema  Vascular: Peripheral pulses palpable 2+ bilaterally    MEDICATIONS  (STANDING):  albuterol/ipratropium for Nebulization 3 milliLiter(s) Nebulizer every 6 hours  ascorbic acid 500 milliGRAM(s) Oral daily  atorvastatin 20 milliGRAM(s) Oral at bedtime  cyclopentolate 1% Solution 1 Drop(s) Both EYES two times a day  FLUoxetine 10 milliGRAM(s) Oral daily  fluticasone propionate/ salmeterol 250-50 MICROgram(s) Diskus 1 Dose(s) Inhalation two times a day  furosemide    Tablet 40 milliGRAM(s) Oral daily  heparin   Injectable 5000 Unit(s) SubCutaneous every 12 hours  influenza  Vaccine (HIGH DOSE) 0.5 milliLiter(s) IntraMuscular once  metoprolol succinate ER 50 milliGRAM(s) Oral daily  multivitamin 1 Tablet(s) Oral daily  polyethylene glycol 3350 17 Gram(s) Oral daily  potassium chloride    Tablet ER 20 milliEquivalent(s) Oral daily  prednisoLONE acetate 1% Suspension 1 Drop(s) Both EYES two times a day  senna 2 Tablet(s) Oral at bedtime  tiotropium 2.5 MICROgram(s) Inhaler 2 Puff(s) Inhalation daily  traZODone 50 milliGRAM(s) Oral at bedtime      TELEMETRY: 	    ECG:  	  RADIOLOGY:  OTHER: 	  	  LABS:	 	    CARDIAC MARKERS:        proBNP:   Lipid Profile:   HgA1c:   TSH:         Assessment and plan  ---------------------------   89 year old woman PMH HTN, HLD, CAD, diastolic HF, hypoithyroidism, dementia, COPD on 2L presenting with hypoxia. She was transferred by her facility when she was found to be hypoxic to 60s on her home O2. She is denying any fevers, chills, chest pain, SOB, abd pain, N/V/D/C, dysuria , cough, URI symptoms, sick contacts, travel. She is alert and oriented.  pt is well known to me with hx of htn, chf hfpef , sarcoidosis and severe copd/emphysema s/p multiple admissions with increasing sob and o2 sat of 80% on home o2.  no chest pain. fever  exacerbation of copd/ CHF  increase wbc , check cultures  check flu with multiple patients from nursing home  will adjust cardiac meds  pt with sig CA calcification , no aggressive measures, ASA daily  will try to taper o2 as tolerated  switching lasix to po 40 mg daily  increase ambulation fu o2 level, pt on home o2  continue diuretics, replete K  increase ambulation  dc planning ?today  fu renal function    	        
Follow-up Pulm Progress Note    No new respiratory events overnight.  Denies SOB/CP.   Sats 96% on 4LNC  o2 lowered to 2LNC with sats low/mid 90s     Medications:  MEDICATIONS  (STANDING):  albuterol/ipratropium for Nebulization 3 milliLiter(s) Nebulizer every 6 hours  ascorbic acid 500 milliGRAM(s) Oral daily  atorvastatin 20 milliGRAM(s) Oral at bedtime  buDESOnide    Inhalation Suspension 0.5 milliGRAM(s) Inhalation two times a day  cyclopentolate 1% Solution 1 Drop(s) Both EYES two times a day  FLUoxetine 10 milliGRAM(s) Oral daily  furosemide    Tablet 40 milliGRAM(s) Oral daily  heparin   Injectable 5000 Unit(s) SubCutaneous every 12 hours  influenza  Vaccine (HIGH DOSE) 0.5 milliLiter(s) IntraMuscular once  metoprolol succinate ER 50 milliGRAM(s) Oral daily  multivitamin 1 Tablet(s) Oral daily  polyethylene glycol 3350 17 Gram(s) Oral daily  potassium chloride    Tablet ER 20 milliEquivalent(s) Oral daily  prednisoLONE acetate 1% Suspension 1 Drop(s) Both EYES two times a day  senna 2 Tablet(s) Oral at bedtime  tiotropium 2.5 MICROgram(s) Inhaler 2 Puff(s) Inhalation daily  traZODone 50 milliGRAM(s) Oral at bedtime    MEDICATIONS  (PRN):  acetaminophen     Tablet .. 650 milliGRAM(s) Oral every 6 hours PRN Mild Pain (1 - 3)          Vital Signs Last 24 Hrs  T(C): 36.6 (23 Dec 2024 12:17), Max: 36.9 (22 Dec 2024 14:21)  T(F): 97.8 (23 Dec 2024 12:17), Max: 98.4 (22 Dec 2024 14:21)  HR: 68 (23 Dec 2024 12:17) (61 - 79)  BP: 120/79 (23 Dec 2024 12:17) (92/52 - 123/67)  BP(mean): --  RR: 19 (23 Dec 2024 12:17) (18 - 19)  SpO2: 93% (23 Dec 2024 12:17) (93% - 99%)    Parameters below as of 23 Dec 2024 12:17  Patient On (Oxygen Delivery Method): nasal cannula  O2 Flow (L/min): 3            12-22 @ 07:01  -  12-23 @ 07:00  --------------------------------------------------------  IN: 0 mL / OUT: 300 mL / NET: -300 mL          LABS:                        10.8   8.72  )-----------( 248      ( 22 Dec 2024 06:52 )             35.3     12-22    140  |  100  |  27[H]  ----------------------------<  102[H]  3.9   |  27  |  0.86    Ca    9.8      22 Dec 2024 06:52            CAPILLARY BLOOD GLUCOSE          Urinalysis Basic - ( 22 Dec 2024 06:52 )    Color: x / Appearance: x / SG: x / pH: x  Gluc: 102 mg/dL / Ketone: x  / Bili: x / Urobili: x   Blood: x / Protein: x / Nitrite: x   Leuk Esterase: x / RBC: x / WBC x   Sq Epi: x / Non Sq Epi: x / Bacteria: x                      CULTURES:    Culture - Blood (collected 12-13-24 @ 13:50)  Source: .Blood BLOOD  Final Report (12-18-24 @ 19:00):    No growth at 5 days    Culture - Blood (collected 12-13-24 @ 13:40)  Source: .Blood BLOOD  Final Report (12-18-24 @ 19:00):    No growth at 5 days      Physical Examination:  PULM: Decreased BS, no wheeze, trace scattered rhonchi - clears with cough   CVS: S1, S2 heard    RADIOLOGY REVIEWED  CXR: bibasilar atelectasis       
infectious diseases progress note:    Patient is a 89y old  Female who presents with a chief complaint of hypoxia (17 Dec 2024 07:40)        Pneumonia due to infectious organism               Allergies    iodinated radiocontrast agents (Anaphylaxis)    Intolerances        ANTIBIOTICS/RELEVANT:  antimicrobials  azithromycin  IVPB 500 milliGRAM(s) IV Intermittent once  cefTRIAXone   IVPB 1000 milliGRAM(s) IV Intermittent every 24 hours    immunologic:  influenza  Vaccine (HIGH DOSE) 0.5 milliLiter(s) IntraMuscular once    OTHER:  acetaminophen     Tablet .. 650 milliGRAM(s) Oral every 6 hours PRN  albuterol/ipratropium for Nebulization 3 milliLiter(s) Nebulizer every 6 hours  ascorbic acid 500 milliGRAM(s) Oral daily  atorvastatin 20 milliGRAM(s) Oral at bedtime  buDESOnide    Inhalation Suspension 0.25 milliGRAM(s) Inhalation two times a day  cyclopentolate 1% Solution 1 Drop(s) Both EYES two times a day  FLUoxetine 10 milliGRAM(s) Oral daily  furosemide    Tablet 40 milliGRAM(s) Oral daily  heparin   Injectable 5000 Unit(s) SubCutaneous every 12 hours  metoprolol succinate ER 50 milliGRAM(s) Oral daily  multivitamin 1 Tablet(s) Oral daily  polyethylene glycol 3350 17 Gram(s) Oral daily  prednisoLONE acetate 1% Suspension 1 Drop(s) Both EYES two times a day  senna 2 Tablet(s) Oral at bedtime  temazepam 7.5 milliGRAM(s) Oral at bedtime PRN  tiotropium 2.5 MICROgram(s) Inhaler 2 Puff(s) Inhalation daily  traZODone 50 milliGRAM(s) Oral at bedtime      Objective:  Vital Signs Last 24 Hrs  T(C): 36.2 (17 Dec 2024 04:25), Max: 36.8 (16 Dec 2024 14:12)  T(F): 97.2 (17 Dec 2024 04:25), Max: 98.3 (16 Dec 2024 14:12)  HR: 58 (17 Dec 2024 04:25) (58 - 85)  BP: 125/65 (17 Dec 2024 04:25) (111/53 - 136/64)  BP(mean): --  RR: 18 (17 Dec 2024 04:25) (18 - 18)  SpO2: 96% (17 Dec 2024 04:25) (95% - 98%)    Parameters below as of 17 Dec 2024 04:25  Patient On (Oxygen Delivery Method): nasal cannula  O2 Flow (L/min): 5       Ear/Nose/Throat: no oral lesion, no sinus tenderness on percussion	  Neck:no JVD, no lymphadenopathy, supple  Respiratory: CTA moises  Cardiovascular: S1S2 RRR, no murmurs  Gastrointestinal:soft, (+) BS, no HSM  Extremities:no e/e/c        LABS:                        9.7    6.04  )-----------( 153      ( 17 Dec 2024 06:52 )             31.8     12-17    138  |  93[L]  |  15  ----------------------------<  84  >9.0[HH]   |  21[L]  |  0.78    Ca    <3.0[LL]      17 Dec 2024 06:50        Urinalysis Basic - ( 17 Dec 2024 06:50 )    Color: x / Appearance: x / SG: x / pH: x  Gluc: 84 mg/dL / Ketone: x  / Bili: x / Urobili: x   Blood: x / Protein: x / Nitrite: x   Leuk Esterase: x / RBC: x / WBC x   Sq Epi: x / Non Sq Epi: x / Bacteria: x          MICROBIOLOGY:    RECENT CULTURES:  12-13 @ 13:50 .Blood BLOOD                No growth at 72 Hours    12-13 @ 13:40 .Blood BLOOD                No growth at 72 Hours          RESPIRATORY CULTURES:              RADIOLOGY & ADDITIONAL STUDIES:        Pager [phone]  After 5 pm/weekends or if no response :[phone]
infectious diseases progress note:    Patient is a 89y old  Female who presents with a chief complaint of hypoxia (23 Dec 2024 07:55)        Pneumonia due to infectious organism             Allergies    iodinated radiocontrast agents (Anaphylaxis)    Intolerances        ANTIBIOTICS/RELEVANT:  antimicrobials  azithromycin  IVPB 500 milliGRAM(s) IV Intermittent once  cefepime   IVPB      cefepime   IVPB 1000 milliGRAM(s) IV Intermittent every 12 hours    immunologic:  influenza  Vaccine (HIGH DOSE) 0.5 milliLiter(s) IntraMuscular once    OTHER:  acetaminophen     Tablet .. 650 milliGRAM(s) Oral every 6 hours PRN  albuterol/ipratropium for Nebulization 3 milliLiter(s) Nebulizer every 6 hours  ascorbic acid 500 milliGRAM(s) Oral daily  atorvastatin 20 milliGRAM(s) Oral at bedtime  buDESOnide    Inhalation Suspension 0.5 milliGRAM(s) Inhalation two times a day  cyclopentolate 1% Solution 1 Drop(s) Both EYES two times a day  FLUoxetine 10 milliGRAM(s) Oral daily  furosemide    Tablet 40 milliGRAM(s) Oral daily  heparin   Injectable 5000 Unit(s) SubCutaneous every 12 hours  metoprolol succinate ER 50 milliGRAM(s) Oral daily  multivitamin 1 Tablet(s) Oral daily  polyethylene glycol 3350 17 Gram(s) Oral daily  potassium chloride    Tablet ER 20 milliEquivalent(s) Oral daily  prednisoLONE acetate 1% Suspension 1 Drop(s) Both EYES two times a day  senna 2 Tablet(s) Oral at bedtime  tiotropium 2.5 MICROgram(s) Inhaler 2 Puff(s) Inhalation daily  traZODone 50 milliGRAM(s) Oral at bedtime      Objective:  Vital Signs Last 24 Hrs  T(C): 36.4 (23 Dec 2024 08:43), Max: 36.9 (22 Dec 2024 14:21)  T(F): 97.5 (23 Dec 2024 08:43), Max: 98.4 (22 Dec 2024 14:21)  HR: 61 (23 Dec 2024 08:43) (61 - 79)  BP: 113/57 (23 Dec 2024 08:43) (92/52 - 123/67)  BP(mean): --  RR: 18 (23 Dec 2024 08:43) (18 - 19)  SpO2: 96% (23 Dec 2024 08:43) (93% - 99%)    Parameters below as of 23 Dec 2024 08:43  Patient On (Oxygen Delivery Method): nasal cannula  O2 Flow (L/min): 3      PHYSICAL EXAM:     Ear/Nose/Throat: no oral lesion, no sinus tenderness on percussion	  Neck:no JVD, no lymphadenopathy, supple  Respiratory: CTA moises  Cardiovascular: S1S2 RRR, no murmurs  Gastrointestinal:soft, (+) BS, no HSM  Extremities:no e/e/c        LABS:                        10.8   8.72  )-----------( 248      ( 22 Dec 2024 06:52 )             35.3     12-22    140  |  100  |  27[H]  ----------------------------<  102[H]  3.9   |  27  |  0.86    Ca    9.8      22 Dec 2024 06:52        Urinalysis Basic - ( 22 Dec 2024 06:52 )    Color: x / Appearance: x / SG: x / pH: x  Gluc: 102 mg/dL / Ketone: x  / Bili: x / Urobili: x   Blood: x / Protein: x / Nitrite: x   Leuk Esterase: x / RBC: x / WBC x   Sq Epi: x / Non Sq Epi: x / Bacteria: x          MICROBIOLOGY:    RECENT CULTURES:        RESPIRATORY CULTURES:              RADIOLOGY & ADDITIONAL STUDIES:        Pager [phone]  After 5 pm/weekends or if no response :[phone]
      ---___---___---___---___---___---___ ---___---___---___---___---___---___---___---___---                  M E D I C A L   A T T E N D I N G   P R O G R E S S   N O T E  ---___---___---___---___---___---___ ---___---___---___---___---___---___---___---___---        ================================================    ++CHIEF COMPLAINT:   Patient is a 89y old  Female who presents with a chief complaint of hypoxia (22 Dec 2024 11:08)      Pneumonia due to infectious organism      ---___---___---___---___---___---  PAST MEDICAL / Surgical  HISTORY:  PAST MEDICAL & SURGICAL HISTORY:  HTN (hypertension)      Hypothyroidism      Osteoporosis      CAD (coronary artery disease)      COPD (chronic obstructive pulmonary disease)      Pulmonary sarcoidosis      H/O pyelonephritis      Acute diverticulitis          ---___---___---___---___---___---  FAMILY HISTORY:   FAMILY HISTORY:        ---___---___---___---___---___---  ALLERGIES:   Allergies    iodinated radiocontrast agents (Anaphylaxis)    Intolerances        ---___---___---___---___---___---  MEDICATIONS:  MEDICATIONS  (STANDING):  albuterol/ipratropium for Nebulization 3 milliLiter(s) Nebulizer every 6 hours  ascorbic acid 500 milliGRAM(s) Oral daily  atorvastatin 20 milliGRAM(s) Oral at bedtime  azithromycin  IVPB 500 milliGRAM(s) IV Intermittent once  buDESOnide    Inhalation Suspension 0.5 milliGRAM(s) Inhalation two times a day  cefepime   IVPB      cefepime   IVPB 1000 milliGRAM(s) IV Intermittent every 12 hours  cyclopentolate 1% Solution 1 Drop(s) Both EYES two times a day  FLUoxetine 10 milliGRAM(s) Oral daily  furosemide    Tablet 40 milliGRAM(s) Oral daily  heparin   Injectable 5000 Unit(s) SubCutaneous every 12 hours  influenza  Vaccine (HIGH DOSE) 0.5 milliLiter(s) IntraMuscular once  metoprolol succinate ER 50 milliGRAM(s) Oral daily  multivitamin 1 Tablet(s) Oral daily  polyethylene glycol 3350 17 Gram(s) Oral daily  potassium chloride    Tablet ER 20 milliEquivalent(s) Oral daily  prednisoLONE acetate 1% Suspension 1 Drop(s) Both EYES two times a day  senna 2 Tablet(s) Oral at bedtime  tiotropium 2.5 MICROgram(s) Inhaler 2 Puff(s) Inhalation daily  traZODone 50 milliGRAM(s) Oral at bedtime    MEDICATIONS  (PRN):  acetaminophen     Tablet .. 650 milliGRAM(s) Oral every 6 hours PRN Mild Pain (1 - 3)      ---___---___---___---___---___---  REVIEW OF SYSTEM:    GEN: no fever, no chills, no pain  RESP: no SOB, no cough, no sputum  CVS: no chest pain, no palpitations, no edema  GI: no abdominal pain, no nausea, no vomiting, no constipation, no diarrhea  : no dysurea, no frequency, no hematurea  Neuro: no headache, no dizziness  PSYCH: no anxiety, no depression  Derm : no itching, no rash    ---___---___---___---___---___---  VITAL SIGNS:  89y , CAPILLARY BLOOD GLUCOSE        T(C): 36.3 (12-22-24 @ 16:13), Max: 36.9 (12-22-24 @ 14:21)  HR: 79 (12-22-24 @ 16:13) (62 - 79)  BP: 106/61 (12-22-24 @ 16:13) (92/52 - 130/63)  RR: 18 (12-22-24 @ 16:13) (18 - 19)  SpO2: 94% (12-22-24 @ 16:13) (94% - 100%)  ---___---___---___---___---___---  PHYSICAL EXAM:    GEN: A&O X 3 , NAD , comfortable  HEENT: NCAT, PERRL, MMM, hearing intact  Neck: supple , no JVD  CVS: S1S2 , regular , No M/R/G appreciated  PULM: CTA B/L,  no W/R/R appreciated  ABD.: soft. non tender, non distended,  bowel sounds present  Extrem: intact pulses , no edema   Derm: No rash , no ecchymoses  PSYCH : normal mood,  no delusion not anxious     ---___---___---___---___---___---            LAB AND IMAGING:                          10.8   8.72  )-----------( 248      ( 22 Dec 2024 06:52 )             35.3               12-22    140  |  100  |  27[H]  ----------------------------<  102[H]  3.9   |  27  |  0.86    Ca    9.8      22 Dec 2024 06:52                              Urinalysis Basic - ( 22 Dec 2024 06:52 )    Color: x / Appearance: x / SG: x / pH: x  Gluc: 102 mg/dL / Ketone: x  / Bili: x / Urobili: x   Blood: x / Protein: x / Nitrite: x   Leuk Esterase: x / RBC: x / WBC x   Sq Epi: x / Non Sq Epi: x / Bacteria: x        [All pertinent / recent Imaging reviewed]         ---___---___---___---___---___---___ ---___---___---___---___---                         A S S E S S M E N T   A N D   P L A N :      HEALTH ISSUES - PROBLEM Dx:    admitted  with    hypoxia. elevated  wbc      cxr,   effusion.  infiltrate   and   elevated  troponin, form  demand  ischemia/   known to  card  f/ p       cap,  on iv  rocephin / trend  lactate   has   c/c  swelling  right  leg     c/c  diastolic chf , on  lasix    h/o  c/c  elevated  d  dimer,    COPD. h/o  underlying c/c  lung disease./  Emphysema, on  home  oxygen      HTN/  HLD     Anxiety,   c/c  anemia     dvt  ppx     wbc  normal,  on  cefepime  pe r id     d/c  plans in progress               --------------------------------------------  Case discussed with   Education given on   ___________________________  Thank you,  Garry Hermosillo  [phone]
      ---___---___---___---___---___---___ ---___---___---___---___---___---___---___---___---                  M E D I C A L   A T T E N D I N G   P R O G R E S S   N O T E  ---___---___---___---___---___---___ ---___---___---___---___---___---___---___---___---        ================================================    ++CHIEF COMPLAINT:   Patient is a 89y old  Female who presents with a chief complaint of hypoxia (24 Dec 2024 08:44)      Pneumonia due to infectious organism      ---___---___---___---___---___---  PAST MEDICAL / Surgical  HISTORY:  PAST MEDICAL & SURGICAL HISTORY:  HTN (hypertension)      Hypothyroidism      Osteoporosis      CAD (coronary artery disease)      COPD (chronic obstructive pulmonary disease)      Pulmonary sarcoidosis      H/O pyelonephritis      Acute diverticulitis          ---___---___---___---___---___---  FAMILY HISTORY:   FAMILY HISTORY:        ---___---___---___---___---___---  ALLERGIES:   Allergies    iodinated radiocontrast agents (Anaphylaxis)    Intolerances        ---___---___---___---___---___---  MEDICATIONS:  MEDICATIONS  (STANDING):  albuterol/ipratropium for Nebulization 3 milliLiter(s) Nebulizer every 6 hours  ascorbic acid 500 milliGRAM(s) Oral daily  atorvastatin 20 milliGRAM(s) Oral at bedtime  cyclopentolate 1% Solution 1 Drop(s) Both EYES two times a day  FLUoxetine 10 milliGRAM(s) Oral daily  fluticasone propionate/ salmeterol 250-50 MICROgram(s) Diskus 1 Dose(s) Inhalation two times a day  furosemide    Tablet 40 milliGRAM(s) Oral daily  heparin   Injectable 5000 Unit(s) SubCutaneous every 12 hours  influenza  Vaccine (HIGH DOSE) 0.5 milliLiter(s) IntraMuscular once  metoprolol succinate ER 50 milliGRAM(s) Oral daily  multivitamin 1 Tablet(s) Oral daily  polyethylene glycol 3350 17 Gram(s) Oral daily  potassium chloride    Tablet ER 20 milliEquivalent(s) Oral daily  prednisoLONE acetate 1% Suspension 1 Drop(s) Both EYES two times a day  senna 2 Tablet(s) Oral at bedtime  tiotropium 2.5 MICROgram(s) Inhaler 2 Puff(s) Inhalation daily  traZODone 50 milliGRAM(s) Oral at bedtime    MEDICATIONS  (PRN):  acetaminophen     Tablet .. 650 milliGRAM(s) Oral every 6 hours PRN Mild Pain (1 - 3)      ---___---___---___---___---___---  REVIEW OF SYSTEM:    unable to obtain    ---___---___---___---___---___---  VITAL SIGNS:  89y , CAPILLARY BLOOD GLUCOSE        T(C): 36.5 (12-24-24 @ 05:08), Max: 36.6 (12-23-24 @ 12:17)  HR: 72 (12-24-24 @ 05:08) (68 - 73)  BP: 106/64 (12-24-24 @ 05:08) (101/56 - 135/60)  RR: 18 (12-24-24 @ 05:08) (18 - 19)  SpO2: 96% (12-24-24 @ 05:08) (93% - 96%)  ---___---___---___---___---___---  PHYSICAL EXAM:    GEN: A&O X 3 , NAD , comfortable on oxygen   HEENT: NCAT, PERRL, MMM, hearing intact  Neck: supple , no JVD  CVS: S1S2 , regular , No M/R/G appreciated  PULM: CTA B/L,  no W/R/R appreciated  ABD.: soft. non tender, non distended,  bowel sounds present  Extrem: intact pulses , no edema   Derm: No rash , no ecchymoses  PSYCH : normal mood,  no delusion  anxious     ---___---___---___---___---___---            LAB AND IMAGING:                                                  [All pertinent / recent Imaging reviewed]         ---___---___---___---___---___---___ ---___---___---___---___---                         A S S E S S M E N T   A N AMINTA   P L A N :      HEALTH ISSUES - PROBLEM Dx:        ---___---___---___---___---___---___ ---___---___---___---___---___---___---___---___---                  M E D I C A L   A T T E N D I N G   P R O G R E S S   N O T E  ---___---___---___---___---___---___ ---___---___---___---___---___---___---___---___---        ================================================    ++CHIEF COMPLAINT:   Patient is a 89y old  Female who presents with a chief complaint of hypoxia (24 Dec 2024 08:44)      Pneumonia due to infectious organism      ---___---___---___---___---___---  PAST MEDICAL / Surgical  HISTORY:  PAST MEDICAL & SURGICAL HISTORY:  HTN (hypertension)      Hypothyroidism      Osteoporosis      CAD (coronary artery disease)      COPD (chronic obstructive pulmonary disease)      Pulmonary sarcoidosis      H/O pyelonephritis      Acute diverticulitis          ---___---___---___---___---___---  FAMILY HISTORY:   FAMILY HISTORY:        ---___---___---___---___---___---  ALLERGIES:   Allergies    iodinated radiocontrast agents (Anaphylaxis)    Intolerances        ---___---___---___---___---___---  MEDICATIONS:  MEDICATIONS  (STANDING):  albuterol/ipratropium for Nebulization 3 milliLiter(s) Nebulizer every 6 hours  ascorbic acid 500 milliGRAM(s) Oral daily  atorvastatin 20 milliGRAM(s) Oral at bedtime  cyclopentolate 1% Solution 1 Drop(s) Both EYES two times a day  FLUoxetine 10 milliGRAM(s) Oral daily  fluticasone propionate/ salmeterol 250-50 MICROgram(s) Diskus 1 Dose(s) Inhalation two times a day  furosemide    Tablet 40 milliGRAM(s) Oral daily  heparin   Injectable 5000 Unit(s) SubCutaneous every 12 hours  influenza  Vaccine (HIGH DOSE) 0.5 milliLiter(s) IntraMuscular once  metoprolol succinate ER 50 milliGRAM(s) Oral daily  multivitamin 1 Tablet(s) Oral daily  polyethylene glycol 3350 17 Gram(s) Oral daily  potassium chloride    Tablet ER 20 milliEquivalent(s) Oral daily  prednisoLONE acetate 1% Suspension 1 Drop(s) Both EYES two times a day  senna 2 Tablet(s) Oral at bedtime  tiotropium 2.5 MICROgram(s) Inhaler 2 Puff(s) Inhalation daily  traZODone 50 milliGRAM(s) Oral at bedtime    MEDICATIONS  (PRN):  acetaminophen     Tablet .. 650 milliGRAM(s) Oral every 6 hours PRN Mild Pain (1 - 3)      ---___---___---___---___---___---  REVIEW OF SYSTEM:    unable to obtain  ---___---___---___---___---___---  VITAL SIGNS:  89y , CAPILLARY BLOOD GLUCOSE        T(C): 36.5 (12-24-24 @ 05:08), Max: 36.6 (12-23-24 @ 12:17)  HR: 72 (12-24-24 @ 05:08) (68 - 73)  BP: 106/64 (12-24-24 @ 05:08) (101/56 - 135/60)  RR: 18 (12-24-24 @ 05:08) (18 - 19)  SpO2: 96% (12-24-24 @ 05:08) (93% - 96%)  ---___---___---___---___---___---  PHYSICAL EXAM:    GEN: A&O X 3 , NAD , comfortable  HEENT: NCAT, PERRL, MMM, hearing intact  Neck: supple , no JVD  CVS: S1S2 , regular , No M/R/G appreciated  PULM: CTA B/L,  no W/R/R appreciated  ABD.: soft. non tender, non distended,  bowel sounds present  Extrem: intact pulses , no edema   Derm: No rash , no ecchymoses  PSYCH : normal mood,  no delusion not anxious     ---___---___---___---___---___---            LAB AND IMAGING:                                                  [All pertinent / recent Imaging reviewed]         ---___---___---___---___---___---___ ---___---___---___---___---                         A S S E S S M E N T   A N D   P L A N :      HEALTH ISSUES - PROBLEM Dx:  admitted  with    hypoxia. elevated  wbc      cxr,   effusion.  infiltrate   and   elevated  troponin, form  demand  ischemia/   known to  card  f/ p       cap,  on iv  rocephin / trend  lactate   has   c/c  swelling  right  leg     c/c  diastolic chf , on  lasix    h/o  c/c  elevated  d  dimer,    COPD. h/o  underlying c/c  lung disease./  Emphysema, on  home  oxygen      HTN/  HLD     Anxiety, continue anxiolytics and also continue antidepressant    c/c  anemia     dvt  ppx     wbc  normal,  finished abx     d/c  plans in progress                   ___________________________  Thank you,  Garry Hermosillo  [phone]    --------------------------------------------  Case discussed with   Education given on   ___________________________  Thank you,  Garry Hermosillo  [phone]
  date of service: 12-21-24 @ 14:01  afberile  REVIEW OF SYSTEMS:  CONSTITUTIONAL: No fever,  no  weight loss  ENT:  No  tinnitus,   no   vertigo  NECK: No pain or stiffness  RESPIRATORY: No cough, wheezing, chills or hemoptysis;    No Shortness of Breath  CARDIOVASCULAR: No chest pain, palpitations, dizziness  GASTROINTESTINAL: No abdominal or epigastric pain. No nausea, vomiting, or hematemesis; No diarrhea  No melena or hematochezia.  GENITOURINARY: No dysuria, frequency, hematuria, or incontinence  NEUROLOGICAL: No headaches  SKIN: No itching,  no   rash  LYMPH Nodes: No enlarged glands  ENDOCRINE: No heat or cold intolerance  MUSCULOSKELETAL: No joint pain or swelling  PSYCHIATRIC: No depression, anxiety  HEME/LYMPH: No easy bruising, or bleeding gums  ALLERGY AND IMMUNOLOGIC: No hives or eczema	    MEDICATIONS  (STANDING):  albuterol/ipratropium for Nebulization 3 milliLiter(s) Nebulizer every 6 hours  ascorbic acid 500 milliGRAM(s) Oral daily  atorvastatin 20 milliGRAM(s) Oral at bedtime  azithromycin  IVPB 500 milliGRAM(s) IV Intermittent once  buDESOnide    Inhalation Suspension 0.5 milliGRAM(s) Inhalation two times a day  cefepime   IVPB      cefepime   IVPB 1000 milliGRAM(s) IV Intermittent every 12 hours  cyclopentolate 1% Solution 1 Drop(s) Both EYES two times a day  FLUoxetine 10 milliGRAM(s) Oral daily  furosemide    Tablet 40 milliGRAM(s) Oral daily  heparin   Injectable 5000 Unit(s) SubCutaneous every 12 hours  influenza  Vaccine (HIGH DOSE) 0.5 milliLiter(s) IntraMuscular once  metoprolol succinate ER 50 milliGRAM(s) Oral daily  multivitamin 1 Tablet(s) Oral daily  polyethylene glycol 3350 17 Gram(s) Oral daily  potassium chloride    Tablet ER 20 milliEquivalent(s) Oral daily  prednisoLONE acetate 1% Suspension 1 Drop(s) Both EYES two times a day  senna 2 Tablet(s) Oral at bedtime  tiotropium 2.5 MICROgram(s) Inhaler 2 Puff(s) Inhalation daily  traZODone 50 milliGRAM(s) Oral at bedtime    MEDICATIONS  (PRN):  acetaminophen     Tablet .. 650 milliGRAM(s) Oral every 6 hours PRN Mild Pain (1 - 3)      Vital Signs Last 24 Hrs  T(C): 36.8 (21 Dec 2024 12:15), Max: 36.9 (20 Dec 2024 16:20)  T(F): 98.2 (21 Dec 2024 12:15), Max: 98.4 (20 Dec 2024 16:20)  HR: 72 (21 Dec 2024 12:15) (61 - 83)  BP: 118/75 (21 Dec 2024 12:15) (102/63 - 125/67)  BP(mean): --  RR: 18 (21 Dec 2024 12:15) (18 - 18)  SpO2: 94% (21 Dec 2024 12:15) (91% - 95%)    Parameters below as of 21 Dec 2024 12:15  Patient On (Oxygen Delivery Method): nasal cannula  O2 Flow (L/min): 4    CAPILLARY BLOOD GLUCOSE        I&O's Summary    20 Dec 2024 07:01  -  21 Dec 2024 07:00  --------------------------------------------------------  IN: 0 mL / OUT: 250 mL / NET: -250 mL          Appearance: Normal	  HEENT:   Normal oral mucosa, PERRL, EOMI	  Lymphatic: No lymphadenopathy  Cardiovascular: Normal S1 S2, No JVD  Respiratory: Lungs clear to auscultation	  Gastrointestinal:  Soft, Non-tender, + BS	  Skin: No rash, No ecchymoses	  Extremities:     LABS:    12-20    139  |  100  |  24[H]  ----------------------------<  101[H]  4.1   |  26  |  0.87    Ca    9.8      20 Dec 2024 07:18            Urinalysis Basic - ( 20 Dec 2024 07:18 )    Color: x / Appearance: x / SG: x / pH: x  Gluc: 101 mg/dL / Ketone: x  / Bili: x / Urobili: x   Blood: x / Protein: x / Nitrite: x   Leuk Esterase: x / RBC: x / WBC x   Sq Epi: x / Non Sq Epi: x / Bacteria: x                      Consultant(s) Notes Reviewed:      Care Discussed with Consultants/Other Providers:    
Date of Service: 12-15-24 @ 10:10           CARDIOLOGY     PROGRESS  NOTE   ________________________________________________    CHIEF COMPLAINT:Patient is a 89y old  Female who presents with a chief complaint of hypoxia (14 Dec 2024 12:51)  doing better  	  REVIEW OF SYSTEMS:  CONSTITUTIONAL: No fever, weight loss, or fatigue  EYES: No eye pain, visual disturbances, or discharge  ENT:  No difficulty hearing, tinnitus, vertigo; No sinus or throat pain  NECK: No pain or stiffness  RESPIRATORY: No cough, wheezing, chills or hemoptysis; + Shortness of Breath  CARDIOVASCULAR: No chest pain, palpitations, passing out, dizziness, or leg swelling  GASTROINTESTINAL: No abdominal or epigastric pain. No nausea, vomiting, or hematemesis; No diarrhea or constipation. No melena or hematochezia.  GENITOURINARY: No dysuria, frequency, hematuria, or incontinence  NEUROLOGICAL: No headaches, memory loss, loss of strength, numbness, or tremors  SKIN: No itching, burning, rashes, or lesions   LYMPH Nodes: No enlarged glands  ENDOCRINE: No heat or cold intolerance; No hair loss  MUSCULOSKELETAL: No joint pain or swelling; No muscle, back, or extremity pain  PSYCHIATRIC: No depression, anxiety, mood swings, or difficulty sleeping  HEME/LYMPH: No easy bruising, or bleeding gums  ALLERGY AND IMMUNOLOGIC: No hives or eczema	    [x ] All others negative	  [ ] Unable to obtain    PHYSICAL EXAM:  T(C): 36.5 (12-15-24 @ 08:22), Max: 36.7 (12-14-24 @ 13:31)  HR: 79 (12-15-24 @ 08:22) (62 - 79)  BP: 123/59 (12-15-24 @ 08:22) (98/51 - 136/63)  RR: 18 (12-15-24 @ 08:22) (18 - 22)  SpO2: 97% (12-15-24 @ 08:22) (94% - 100%)  Wt(kg): --  I&O's Summary    14 Dec 2024 07:01  -  15 Dec 2024 07:00  --------------------------------------------------------  IN: 300 mL / OUT: 150 mL / NET: 150 mL        Appearance: Normal	  HEENT:   Normal oral mucosa, PERRL, EOMI	  Lymphatic: No lymphadenopathy  Cardiovascular: Normal S1 S2, No JVD, + murmurs, No edema  Respiratory: rhonchi  Psychiatry: A & O x 3, Mood & affect appropriate  Gastrointestinal:  Soft, Non-tender, + BS	  Skin: No rashes, No ecchymoses, No cyanosis	  Neurologic: Non-focal  Extremities: Normal range of motion, No clubbing, cyanosis or edema  Vascular: Peripheral pulses palpable 2+ bilaterally    MEDICATIONS  (STANDING):  albuterol/ipratropium for Nebulization 3 milliLiter(s) Nebulizer every 6 hours  atorvastatin 20 milliGRAM(s) Oral at bedtime  azithromycin  IVPB 500 milliGRAM(s) IV Intermittent once  buDESOnide    Inhalation Suspension 0.25 milliGRAM(s) Inhalation two times a day  cefTRIAXone   IVPB 1000 milliGRAM(s) IV Intermittent every 24 hours  cyclopentolate 1% Solution 1 Drop(s) Both EYES two times a day  FLUoxetine 10 milliGRAM(s) Oral daily  furosemide   Injectable 20 milliGRAM(s) IV Push two times a day  heparin   Injectable 5000 Unit(s) SubCutaneous every 12 hours  influenza  Vaccine (HIGH DOSE) 0.5 milliLiter(s) IntraMuscular once  metoprolol succinate ER 50 milliGRAM(s) Oral daily  prednisoLONE acetate 1% Suspension 1 Drop(s) Both EYES two times a day  tiotropium 2.5 MICROgram(s) Inhaler 2 Puff(s) Inhalation daily  traZODone 50 milliGRAM(s) Oral at bedtime      TELEMETRY: 	    ECG:  	  RADIOLOGY:  OTHER: 	  	  LABS:	 	    CARDIAC MARKERS:                                9.5    9.66  )-----------( 137      ( 15 Dec 2024 06:52 )             31.5     12-15    142  |  102  |  15  ----------------------------<  87  4.0   |  27  |  0.66    Ca    9.0      15 Dec 2024 06:52  Phos  3.1     12-15  Mg     2.0     12-15    TPro  6.8  /  Alb  3.8  /  TBili  0.6  /  DBili  x   /  AST  21  /  ALT  16  /  AlkPhos  134[H]  12-13    proBNP:   Lipid Profile:   HgA1c:   TSH:   PT/INR - ( 13 Dec 2024 14:14 )   PT: 12.6 sec;   INR: 1.10 ratio         PTT - ( 13 Dec 2024 14:14 )  PTT:31.2 sec      Assessment and plan  ---------------------------   89 year old woman PMH HTN, HLD, CAD, diastolic HF, hypoithyroidism, dementia, COPD on 2L presenting with hypoxia. She was transferred by her facility when she was found to be hypoxic to 60s on her home O2. She is denying any fevers, chills, chest pain, SOB, abd pain, N/V/D/C, dysuria , cough, URI symptoms, sick contacts, travel. She is alert and oriented.  pt is well known to me with hx of htn, chf hfpef , sarcoidosis and severe copd/emphysema s/p multiple admissions with increasing sob and o2 sat of 80% on home o2.  no chest pain. fevever  exacerbation of copd/ chf  iv solumedrol  iv lasix  increase wbc , check cultures  check flu with multiple patients fron nursing home  will adjust cardiac meds  pt with sig CA calcification , no aggressive measures, ASA daily  will taper steroid slowly  will try to taper o2 as tolerated  ?switching lasix to po in am  increase ambulation fu o2 level    	        
Date of Service: 12-16-24 @ 09:22           CARDIOLOGY     PROGRESS  NOTE   ________________________________________________    CHIEF COMPLAINT:Patient is a 89y old  Female who presents with a chief complaint of hypoxia (16 Dec 2024 09:07)  no complain  	  REVIEW OF SYSTEMS:  CONSTITUTIONAL: No fever, weight loss, or fatigue  EYES: No eye pain, visual disturbances, or discharge  ENT:  No difficulty hearing, tinnitus, vertigo; No sinus or throat pain  NECK: No pain or stiffness  RESPIRATORY: No cough, wheezing, chills or hemoptysis; No Shortness of Breath  CARDIOVASCULAR: No chest pain, palpitations, passing out, dizziness, or leg swelling  GASTROINTESTINAL: No abdominal or epigastric pain. No nausea, vomiting, or hematemesis; No diarrhea or constipation. No melena or hematochezia.  GENITOURINARY: No dysuria, frequency, hematuria, or incontinence  NEUROLOGICAL: No headaches, memory loss, loss of strength, numbness, or tremors  SKIN: No itching, burning, rashes, or lesions   LYMPH Nodes: No enlarged glands  ENDOCRINE: No heat or cold intolerance; No hair loss  MUSCULOSKELETAL: No joint pain or swelling; No muscle, back, or extremity pain  PSYCHIATRIC: No depression, anxiety, mood swings, or difficulty sleeping  HEME/LYMPH: No easy bruising, or bleeding gums  ALLERGY AND IMMUNOLOGIC: No hives or eczema	    [ ] All others negative	  [ x] Unable to obtain    PHYSICAL EXAM:  T(C): 37.1 (12-16-24 @ 04:04), Max: 37.1 (12-16-24 @ 04:04)  HR: 88 (12-16-24 @ 04:04) (65 - 139)  BP: 146/62 (12-16-24 @ 04:04) (107/57 - 156/65)  RR: 18 (12-16-24 @ 04:04) (18 - 18)  SpO2: 93% (12-16-24 @ 04:04) (93% - 98%)  Wt(kg): --  I&O's Summary    15 Dec 2024 07:01  -  16 Dec 2024 07:00  --------------------------------------------------------  IN: 0 mL / OUT: 500 mL / NET: -500 mL        Appearance: Normal	  HEENT:   Normal oral mucosa, PERRL, EOMI	  Lymphatic: No lymphadenopathy  Cardiovascular: Normal S1 S2, No JVD, +murmurs, No edema  Respiratory: decrease bs  Gastrointestinal:  Soft, Non-tender, + BS	  Skin: No rashes, No ecchymoses, No cyanosis	  Neurologic: Non-focal  Extremities: Normal range of motion, No clubbing, cyanosis or edema  Vascular: Peripheral pulses palpable 2+ bilaterally    MEDICATIONS  (STANDING):  albuterol/ipratropium for Nebulization 3 milliLiter(s) Nebulizer every 6 hours  ascorbic acid 500 milliGRAM(s) Oral daily  atorvastatin 20 milliGRAM(s) Oral at bedtime  azithromycin  IVPB 500 milliGRAM(s) IV Intermittent once  buDESOnide    Inhalation Suspension 0.25 milliGRAM(s) Inhalation two times a day  cefTRIAXone   IVPB 1000 milliGRAM(s) IV Intermittent every 24 hours  cyclopentolate 1% Solution 1 Drop(s) Both EYES two times a day  FLUoxetine 10 milliGRAM(s) Oral daily  furosemide   Injectable 20 milliGRAM(s) IV Push two times a day  heparin   Injectable 5000 Unit(s) SubCutaneous every 12 hours  influenza  Vaccine (HIGH DOSE) 0.5 milliLiter(s) IntraMuscular once  metoprolol succinate ER 50 milliGRAM(s) Oral daily  multivitamin 1 Tablet(s) Oral daily  polyethylene glycol 3350 17 Gram(s) Oral daily  prednisoLONE acetate 1% Suspension 1 Drop(s) Both EYES two times a day  senna 2 Tablet(s) Oral at bedtime  tiotropium 2.5 MICROgram(s) Inhaler 2 Puff(s) Inhalation daily  traZODone 50 milliGRAM(s) Oral at bedtime      TELEMETRY: 	    ECG:  	  RADIOLOGY:  OTHER: 	  	  LABS:	 	    CARDIAC MARKERS:                          9.5    9.66  )-----------( 137      ( 15 Dec 2024 06:52 )             31.5     12-15    142  |  102  |  15  ----------------------------<  87  4.0   |  27  |  0.66    Ca    9.0      15 Dec 2024 06:52  Phos  3.1     12-15  Mg     2.0     12-15      proBNP:   Lipid Profile:   HgA1c:   TSH:           Assessment and plan  ---------------------------   89 year old woman PMH HTN, HLD, CAD, diastolic HF, hypoithyroidism, dementia, COPD on 2L presenting with hypoxia. She was transferred by her facility when she was found to be hypoxic to 60s on her home O2. She is denying any fevers, chills, chest pain, SOB, abd pain, N/V/D/C, dysuria , cough, URI symptoms, sick contacts, travel. She is alert and oriented.  pt is well known to me with hx of htn, chf hfpef , sarcoidosis and severe copd/emphysema s/p multiple admissions with increasing sob and o2 sat of 80% on home o2.  no chest pain. fevever  exacerbation of copd/ chf  iv solumedrol  iv lasix  increase wbc , check cultures  check flu with multiple patients fron nursing home  will adjust cardiac meds  pt with sig CA calcification , no aggressive measures, ASA daily  will taper steroid slowly  will try to taper o2 as tolerated  ?switching lasix to po   increase ambulation fu o2 level    	        
infectious diseases progress note:    Patient is a 89y old  Female who presents with a chief complaint of hypoxia (19 Dec 2024 18:26)        Pneumonia due to infectious organism               Allergies    iodinated radiocontrast agents (Anaphylaxis)    Intolerances        ANTIBIOTICS/RELEVANT:  antimicrobials  azithromycin  IVPB 500 milliGRAM(s) IV Intermittent once  cefepime   IVPB      cefepime   IVPB 1000 milliGRAM(s) IV Intermittent every 12 hours    immunologic:  influenza  Vaccine (HIGH DOSE) 0.5 milliLiter(s) IntraMuscular once    OTHER:  acetaminophen     Tablet .. 650 milliGRAM(s) Oral every 6 hours PRN  albuterol/ipratropium for Nebulization 3 milliLiter(s) Nebulizer every 6 hours  ascorbic acid 500 milliGRAM(s) Oral daily  atorvastatin 20 milliGRAM(s) Oral at bedtime  buDESOnide    Inhalation Suspension 0.5 milliGRAM(s) Inhalation two times a day  cyclopentolate 1% Solution 1 Drop(s) Both EYES two times a day  FLUoxetine 10 milliGRAM(s) Oral daily  furosemide    Tablet 40 milliGRAM(s) Oral daily  heparin   Injectable 5000 Unit(s) SubCutaneous every 12 hours  metoprolol succinate ER 50 milliGRAM(s) Oral daily  multivitamin 1 Tablet(s) Oral daily  polyethylene glycol 3350 17 Gram(s) Oral daily  potassium chloride    Tablet ER 20 milliEquivalent(s) Oral daily  prednisoLONE acetate 1% Suspension 1 Drop(s) Both EYES two times a day  senna 2 Tablet(s) Oral at bedtime  temazepam 7.5 milliGRAM(s) Oral at bedtime PRN  tiotropium 2.5 MICROgram(s) Inhaler 2 Puff(s) Inhalation daily  traZODone 50 milliGRAM(s) Oral at bedtime      Objective:  Vital Signs Last 24 Hrs  T(C): 36.3 (20 Dec 2024 08:33), Max: 36.8 (19 Dec 2024 19:58)  T(F): 97.3 (20 Dec 2024 08:33), Max: 98.3 (19 Dec 2024 19:58)  HR: 73 (20 Dec 2024 08:33) (65 - 81)  BP: 127/69 (20 Dec 2024 08:33) (107/74 - 127/69)  BP(mean): --  RR: 18 (20 Dec 2024 08:33) (18 - 20)  SpO2: 95% (20 Dec 2024 08:33) (94% - 99%)    Parameters below as of 20 Dec 2024 08:33  Patient On (Oxygen Delivery Method): nasal cannula  O2 Flow (L/min): 5       Ear/Nose/Throat: no oral lesion, no sinus tenderness on percussion	  Neck:no JVD, no lymphadenopathy, supple  Respiratory: CTA moises  Cardiovascular: S1S2 RRR, no murmurs  Gastrointestinal:soft, (+) BS, no HSM  Extremities:no e/e/c        LABS:    12-20    139  |  100  |  24[H]  ----------------------------<  101[H]  4.1   |  26  |  0.87    Ca    9.8      20 Dec 2024 07:18  Mg     2.1     12-19        Urinalysis Basic - ( 20 Dec 2024 07:18 )    Color: x / Appearance: x / SG: x / pH: x  Gluc: 101 mg/dL / Ketone: x  / Bili: x / Urobili: x   Blood: x / Protein: x / Nitrite: x   Leuk Esterase: x / RBC: x / WBC x   Sq Epi: x / Non Sq Epi: x / Bacteria: x          MICROBIOLOGY:    RECENT CULTURES:  12-13 @ 13:50 .Blood BLOOD                No growth at 5 days    12-13 @ 13:40 .Blood BLOOD                No growth at 5 days          RESPIRATORY CULTURES:              RADIOLOGY & ADDITIONAL STUDIES:        Pager [phone]  After 5 pm/weekends or if no response :[phone]
      ---___---___---___---___---___---___ ---___---___---___---___---___---___---___---___---                  M E D I C A L   A T T E N D I N G   P R O G R E S S   N O T E  ---___---___---___---___---___---___ ---___---___---___---___---___---___---___---___---        ================================================    ++CHIEF COMPLAINT:   Patient is a 89y old  Female who presents with a chief complaint of hypoxia (15 Dec 2024 10:10)      Pneumonia due to infectious organism      ---___---___---___---___---___---  PAST MEDICAL / Surgical  HISTORY:  PAST MEDICAL & SURGICAL HISTORY:  HTN (hypertension)      Hypothyroidism      Osteoporosis      CAD (coronary artery disease)      COPD (chronic obstructive pulmonary disease)      Pulmonary sarcoidosis      H/O pyelonephritis      Acute diverticulitis          ---___---___---___---___---___---  FAMILY HISTORY:   FAMILY HISTORY:        ---___---___---___---___---___---  ALLERGIES:   Allergies    iodinated radiocontrast agents (Anaphylaxis)    Intolerances        ---___---___---___---___---___---  MEDICATIONS:  MEDICATIONS  (STANDING):  albuterol/ipratropium for Nebulization 3 milliLiter(s) Nebulizer every 6 hours  atorvastatin 20 milliGRAM(s) Oral at bedtime  azithromycin  IVPB 500 milliGRAM(s) IV Intermittent once  buDESOnide    Inhalation Suspension 0.25 milliGRAM(s) Inhalation two times a day  cefTRIAXone   IVPB 1000 milliGRAM(s) IV Intermittent every 24 hours  cyclopentolate 1% Solution 1 Drop(s) Both EYES two times a day  FLUoxetine 10 milliGRAM(s) Oral daily  furosemide   Injectable 20 milliGRAM(s) IV Push two times a day  heparin   Injectable 5000 Unit(s) SubCutaneous every 12 hours  influenza  Vaccine (HIGH DOSE) 0.5 milliLiter(s) IntraMuscular once  metoprolol succinate ER 50 milliGRAM(s) Oral daily  prednisoLONE acetate 1% Suspension 1 Drop(s) Both EYES two times a day  tiotropium 2.5 MICROgram(s) Inhaler 2 Puff(s) Inhalation daily  traZODone 50 milliGRAM(s) Oral at bedtime    MEDICATIONS  (PRN):  acetaminophen     Tablet .. 650 milliGRAM(s) Oral every 6 hours PRN Mild Pain (1 - 3)  temazepam 7.5 milliGRAM(s) Oral at bedtime PRN Insomnia      ---___---___---___---___---___---  REVIEW OF SYSTEM:    GEN: no fever, no chills, no pain  RESP: no SOB, no cough, no sputum  CVS: no chest pain, no palpitations, no edema  GI: no abdominal pain, no nausea, no vomiting, no constipation, no diarrhea  : no dysurea, no frequency, no hematurea  Neuro: no headache, no dizziness  PSYCH: no anxiety, no depression  Derm : no itching, no rash    ---___---___---___---___---___---  VITAL SIGNS:  89y , CAPILLARY BLOOD GLUCOSE        T(C): 36.3 (12-15-24 @ 16:17), Max: 36.5 (24 @ 20:38)  HR: 65 (12-15-24 @ 16:17) (65 - 139)  BP: 156/65 (12-15-24 @ 16:17) (107/57 - 156/65)  RR: 18 (12-15-24 @ 16:17) (18 - 18)  SpO2: 98% (12-15-24 @ 16:17) (94% - 100%)  ---___---___---___---___---___---  PHYSICAL EXAM:    GEN: A&O X 3 , NAD , comfortable  HEENT: NCAT, PERRL, MMM, hearing intact  Neck: supple , no JVD  CVS: S1S2 , regular , No M/R/G appreciated  PULM: CTA B/L,  no W/R/R appreciated  ABD.: soft. non tender, non distended,  bowel sounds present  Extrem: intact pulses , no edema   Derm: No rash , no ecchymoses  PSYCH : normal mood,  no delusion not anxious     ---___---___---___---___---___---            LAB AND IMAGIN.5    9.66  )-----------( 137      ( 15 Dec 2024 06:52 )             31.5               12-15    142  |  102  |  15  ----------------------------<  87  4.0   |  27  |  0.66    Ca    9.0      15 Dec 2024 06:52  Phos  3.1     12-15  Mg     2.0     12-15                              Urinalysis Basic - ( 15 Dec 2024 06:52 )    Color: x / Appearance: x / SG: x / pH: x  Gluc: 87 mg/dL / Ketone: x  / Bili: x / Urobili: x   Blood: x / Protein: x / Nitrite: x   Leuk Esterase: x / RBC: x / WBC x   Sq Epi: x / Non Sq Epi: x / Bacteria: x        [All pertinent / recent Imaging reviewed]         ---___---___---___---___---___---___ ---___---___---___---___---                         A S S E S S M E N T   A N D   P L A N :      HEALTH ISSUES - PROBLEM Dx:     h/o  emphysema, ,  c/c  L  hydroureter,  HTN.  depression   prior    h/o   C. difficile, diverticulitis,   pna      sarcoidosis,   c/c  diastolic  heart failure,     c/c   superficial   ulcerations, on  bony prominences  of hammer  toe    admitted  with    hypoxia. elevated  wbc      cxr,   effusion.  infiltrate   and   elevated  troponin, form  demand  ischemia/   known to  card  f/ p       cap,  on iv  rocephin / trend  lactate   has   c/c  swelling  right  leg     c/c  diastolic chf , on  lasix    h/o  c/c  elevated  d  dimer,    COPD. h/o  underlying c/c  lung disease./  Emphysema, on  home  oxygen      HTN/  HLD     Anxiety,   c/c  anemia     dvt  ppx     wbc decerasing, hb noted  /  follow  hb                ___________________________  Thank you,  Garry Hermosillo  [phone]
      ---___---___---___---___---___---___ ---___---___---___---___---___---___---___---___---                  M E D I C A L   A T T E N D I N G   P R O G R E S S   N O T E  ---___---___---___---___---___---___ ---___---___---___---___---___---___---___---___---        ================================================    ++CHIEF COMPLAINT:   Patient is a 89y old  Female who presents with a chief complaint of hypoxia (20 Dec 2024 13:35)      Pneumonia due to infectious organism      ---___---___---___---___---___---  PAST MEDICAL / Surgical  HISTORY:  PAST MEDICAL & SURGICAL HISTORY:  HTN (hypertension)      Hypothyroidism      Osteoporosis      CAD (coronary artery disease)      COPD (chronic obstructive pulmonary disease)      Pulmonary sarcoidosis      H/O pyelonephritis      Acute diverticulitis          ---___---___---___---___---___---  FAMILY HISTORY:   FAMILY HISTORY:        ---___---___---___---___---___---  ALLERGIES:   Allergies    iodinated radiocontrast agents (Anaphylaxis)    Intolerances        ---___---___---___---___---___---  MEDICATIONS:  MEDICATIONS  (STANDING):  albuterol/ipratropium for Nebulization 3 milliLiter(s) Nebulizer every 6 hours  ascorbic acid 500 milliGRAM(s) Oral daily  atorvastatin 20 milliGRAM(s) Oral at bedtime  azithromycin  IVPB 500 milliGRAM(s) IV Intermittent once  buDESOnide    Inhalation Suspension 0.5 milliGRAM(s) Inhalation two times a day  cefepime   IVPB      cefepime   IVPB 1000 milliGRAM(s) IV Intermittent every 12 hours  cyclopentolate 1% Solution 1 Drop(s) Both EYES two times a day  FLUoxetine 10 milliGRAM(s) Oral daily  furosemide    Tablet 40 milliGRAM(s) Oral daily  heparin   Injectable 5000 Unit(s) SubCutaneous every 12 hours  influenza  Vaccine (HIGH DOSE) 0.5 milliLiter(s) IntraMuscular once  metoprolol succinate ER 50 milliGRAM(s) Oral daily  multivitamin 1 Tablet(s) Oral daily  polyethylene glycol 3350 17 Gram(s) Oral daily  potassium chloride    Tablet ER 20 milliEquivalent(s) Oral daily  prednisoLONE acetate 1% Suspension 1 Drop(s) Both EYES two times a day  senna 2 Tablet(s) Oral at bedtime  tiotropium 2.5 MICROgram(s) Inhaler 2 Puff(s) Inhalation daily  traZODone 50 milliGRAM(s) Oral at bedtime    MEDICATIONS  (PRN):  acetaminophen     Tablet .. 650 milliGRAM(s) Oral every 6 hours PRN Mild Pain (1 - 3)  temazepam 7.5 milliGRAM(s) Oral at bedtime PRN Insomnia      ---___---___---___---___---___---  REVIEW OF SYSTEM:    unable to obtain    ---___---___---___---___---___---  VITAL SIGNS:  89y , CAPILLARY BLOOD GLUCOSE        T(C): 36.4 (24 @ 12:12), Max: 36.8 (24 @ 19:58)  HR: 100 (24 @ 12:12) (66 - 100)  BP: 135/79 (24 @ 12:12) (110/79 - 135/79)  RR: 18 (24 @ 12:12) (18 - 20)  SpO2: 95% (24 @ 12:12) (94% - 98%)  ---___---___---___---___---___---  PHYSICAL EXAM:    GEN: A&O X 3 , NAD , comfortable  HEENT: NCAT, PERRL, MMM, hearing intact  Neck: supple , no JVD  CVS: S1S2 , regular , No M/R/G appreciated  PULM: CTA B/L,  no W/R/R appreciated  ABD.: soft. non tender, non distended,  bowel sounds present  Extrem: intact pulses , no edema   Derm: No rash , no ecchymoses  PSYCH : normal mood,  no delusion not anxious     ---___---___---___---___---___---            LAB AND IMAGIN-20    139  |  100  |  24[H]  ----------------------------<  101[H]  4.1   |  26  |  0.87    Ca    9.8      20 Dec 2024 07:18  Mg     2.1                                   Urinalysis Basic - ( 20 Dec 2024 07:18 )    Color: x / Appearance: x / SG: x / pH: x  Gluc: 101 mg/dL / Ketone: x  / Bili: x / Urobili: x   Blood: x / Protein: x / Nitrite: x   Leuk Esterase: x / RBC: x / WBC x   Sq Epi: x / Non Sq Epi: x / Bacteria: x        [All pertinent / recent Imaging reviewed]         ---___---___---___---___---___---___ ---___---___---___---___---                         A S S E S S M E N T   A N D   P L A N :      HEALTH ISSUES - PROBLEM Dx:  copd exacerbation   \on oxygen   iv abx early next week    albuterol/ipratropium for Nebulization 3 milliLiter(s) Nebulizer every 6 hours for copd   atorvastatin 20 milliGRAM(s) Oral at bedtime for hld   azithromycin  IVPB 500 milliGRAM(s) IV Intermittent once for copd exacerbation  buDESOnide    Inhalation Suspension 0.5 milliGRAM(s) Inhalation two times a day copd exacrbeation  cefepime   IVPB      cefepime   IVPB 1000 milliGRAM(s) IV Intermittent every 12 hours  cyclopentolate 1% Solution 1 Drop(s) Both EYES two times a day  FLUoxetine 10 milliGRAM(s) Oral daily for depression   furosemide    Tablet 40 milliGRAM(s) Oral daily  heparin   Injectable 5000 Unit(s) SubCutaneous every 12 hours for dvt   influenza  Vaccine (HIGH DOSE) 0.5 milliLiter(s) IntraMuscular once  metoprolol succinate ER 50 milliGRAM(s) Oral daily for htn   multivitamin 1 Tablet(s) Oral daily  polyethylene glycol 3350 17 Gram(s) Oral daily  potassium chloride    Tablet ER 20 milliEquivalent(s) Oral daily  prednisoLONE acetate 1% Suspension 1 Drop(s) Both EYES two times a day  senna 2 Tablet(s) Oral at bedtime for constipation  tiotropium 2.5 MICROgram(s) Inhaler 2 Puff(s) Inhalation daily  traZODone 50 milliGRAM(s) Oral at bedtime for insomnia           ___________________________  Thank you,  Garry Hermosillo  [phone]
     date of service: 12-14-24 @ 12:51  [facility]  REVIEW OF SYSTEMS:  CONSTITUTIONAL: No fever,  no  weight loss  ENT:  No  tinnitus,   no   vertigo  NECK: No pain or stiffness  RESPIRATORY: No cough, wheezing, chills or hemoptysis;    No Shortness of Breath  CARDIOVASCULAR: No chest pain, palpitations, dizziness  GASTROINTESTINAL: No abdominal or epigastric pain. No nausea, vomiting, or hematemesis; No diarrhea  No melena or hematochezia.  GENITOURINARY: No dysuria, frequency, hematuria, or incontinence  NEUROLOGICAL: No headaches  SKIN: No itching,  no   rash  LYMPH Nodes: No enlarged glands  ENDOCRINE: No heat or cold intolerance  MUSCULOSKELETAL: No joint pain or swelling  PSYCHIATRIC: No depression, anxiety  HEME/LYMPH: No easy bruising, or bleeding gums  ALLERGY AND IMMUNOLOGIC: No hives or eczema	    MEDICATIONS  (STANDING):  albuterol/ipratropium for Nebulization 3 milliLiter(s) Nebulizer every 6 hours  atorvastatin 20 milliGRAM(s) Oral at bedtime  azithromycin  IVPB 500 milliGRAM(s) IV Intermittent once  buDESOnide    Inhalation Suspension 0.25 milliGRAM(s) Inhalation two times a day  cefTRIAXone   IVPB 1000 milliGRAM(s) IV Intermittent every 24 hours  cyclopentolate 1% Solution 1 Drop(s) Both EYES two times a day  FLUoxetine 10 milliGRAM(s) Oral daily  furosemide   Injectable 20 milliGRAM(s) IV Push two times a day  heparin   Injectable 5000 Unit(s) SubCutaneous every 12 hours  influenza  Vaccine (HIGH DOSE) 0.5 milliLiter(s) IntraMuscular once  metoprolol succinate ER 50 milliGRAM(s) Oral daily  potassium chloride    Tablet ER 40 milliEquivalent(s) Oral once  prednisoLONE acetate 1% Suspension 1 Drop(s) Both EYES two times a day  tiotropium 2.5 MICROgram(s) Inhaler 2 Puff(s) Inhalation daily  traZODone 50 milliGRAM(s) Oral at bedtime    MEDICATIONS  (PRN):  acetaminophen     Tablet .. 650 milliGRAM(s) Oral every 6 hours PRN Mild Pain (1 - 3)  temazepam 7.5 milliGRAM(s) Oral at bedtime PRN Insomnia      Vital Signs Last 24 Hrs  T(C): 36.6 (14 Dec 2024 10:43), Max: 37.7 (13 Dec 2024 14:06)  T(F): 97.8 (14 Dec 2024 10:43), Max: 99.9 (13 Dec 2024 14:06)  HR: 76 (14 Dec 2024 10:43) (62 - 85)  BP: 110/54 (14 Dec 2024 10:43) (94/54 - 120/57)  BP(mean): --  RR: 22 (14 Dec 2024 10:43) (21 - 24)  SpO2: 95% (14 Dec 2024 10:43) (88% - 95%)    Parameters below as of 14 Dec 2024 10:43  Patient On (Oxygen Delivery Method): nasal cannula  O2 Flow (L/min): 5    CAPILLARY BLOOD GLUCOSE        I&O's Summary        Appearance: Normal	  HEENT:   Normal oral mucosa, PERRL, EOMI	  Lymphatic: No lymphadenopathy  Cardiovascular: Normal S1 S2, No JVD  Respiratory: Lungs clear to auscultation	  Gastrointestinal:  Soft, Non-tender, + BS	  Skin: No rash, No ecchymoses	  Extremities:     LABS:                        8.3    15.04 )-----------( 133      ( 14 Dec 2024 06:56 )             26.8     12-14    138  |  101  |  20  ----------------------------<  86  3.2[L]   |  27  |  0.78    Ca    8.4      14 Dec 2024 06:56    TPro  6.8  /  Alb  3.8  /  TBili  0.6  /  DBili  x   /  AST  21  /  ALT  16  /  AlkPhos  134[H]  12-13    PT/INR - ( 13 Dec 2024 14:14 )   PT: 12.6 sec;   INR: 1.10 ratio         PTT - ( 13 Dec 2024 14:14 )  PTT:31.2 sec      Urinalysis Basic - ( 14 Dec 2024 06:56 )    Color: x / Appearance: x / SG: x / pH: x  Gluc: 86 mg/dL / Ketone: x  / Bili: x / Urobili: x   Blood: x / Protein: x / Nitrite: x   Leuk Esterase: x / RBC: x / WBC x   Sq Epi: x / Non Sq Epi: x / Bacteria: x      Lactate, Blood: 2.1 mmol/L (12-13 @ 17:36)      12-13 @ 13:50  4.0  20              Consultant(s) Notes Reviewed:      Care Discussed with Consultants/Other Providers:    
Date of Service: 12-17-24 @ 07:41           CARDIOLOGY     PROGRESS  NOTE   ________________________________________________    CHIEF COMPLAINT:Patient is a 89y old  Female who presents with a chief complaint of hypoxia (16 Dec 2024 22:43)  doing better  	  REVIEW OF SYSTEMS:  CONSTITUTIONAL: No fever, weight loss, or fatigue  EYES: No eye pain, visual disturbances, or discharge  ENT:  No difficulty hearing, tinnitus, vertigo; No sinus or throat pain  NECK: No pain or stiffness  RESPIRATORY: No cough, wheezing, chills or hemoptysis; decrease  Shortness of Breath  CARDIOVASCULAR: No chest pain, palpitations, passing out, dizziness, or leg swelling  GASTROINTESTINAL: No abdominal or epigastric pain. No nausea, vomiting, or hematemesis; No diarrhea or constipation. No melena or hematochezia.  GENITOURINARY: No dysuria, frequency, hematuria, or incontinence  NEUROLOGICAL: No headaches, memory loss, loss of strength, numbness, or tremors  SKIN: No itching, burning, rashes, or lesions   LYMPH Nodes: No enlarged glands  ENDOCRINE: No heat or cold intolerance; No hair loss  MUSCULOSKELETAL: No joint pain or swelling; No muscle, back, or extremity pain  PSYCHIATRIC: No depression, anxiety, mood swings, or difficulty sleeping  HEME/LYMPH: No easy bruising, or bleeding gums  ALLERGY AND IMMUNOLOGIC: No hives or eczema	    [ ] All others negative	  [ ] Unable to obtain    PHYSICAL EXAM:  T(C): 36.2 (12-17-24 @ 04:25), Max: 36.8 (12-16-24 @ 14:12)  HR: 58 (12-17-24 @ 04:25) (58 - 85)  BP: 125/65 (12-17-24 @ 04:25) (111/53 - 136/64)  RR: 18 (12-17-24 @ 04:25) (18 - 18)  SpO2: 96% (12-17-24 @ 04:25) (95% - 98%)  Wt(kg): --  I&O's Summary      Appearance: Normal	  HEENT:   Normal oral mucosa, PERRL, EOMI	  Lymphatic: No lymphadenopathy  Cardiovascular: Normal S1 S2, No JVD, + murmurs, No edema  Respiratory: rhonchi  Psychiatry: A & O x 2, Mood & affect appropriate  Gastrointestinal:  Soft, Non-tender, + BS	  Skin: No rashes, No ecchymoses, No cyanosis	  Neurologic: Non-focal  Extremities: Normal range of motion, No clubbing, cyanosis or edema  Vascular: Peripheral pulses palpable 2+ bilaterally    MEDICATIONS  (STANDING):  albuterol/ipratropium for Nebulization 3 milliLiter(s) Nebulizer every 6 hours  ascorbic acid 500 milliGRAM(s) Oral daily  atorvastatin 20 milliGRAM(s) Oral at bedtime  azithromycin  IVPB 500 milliGRAM(s) IV Intermittent once  buDESOnide    Inhalation Suspension 0.25 milliGRAM(s) Inhalation two times a day  cefTRIAXone   IVPB 1000 milliGRAM(s) IV Intermittent every 24 hours  cyclopentolate 1% Solution 1 Drop(s) Both EYES two times a day  FLUoxetine 10 milliGRAM(s) Oral daily  furosemide    Tablet 40 milliGRAM(s) Oral daily  heparin   Injectable 5000 Unit(s) SubCutaneous every 12 hours  influenza  Vaccine (HIGH DOSE) 0.5 milliLiter(s) IntraMuscular once  metoprolol succinate ER 50 milliGRAM(s) Oral daily  multivitamin 1 Tablet(s) Oral daily  polyethylene glycol 3350 17 Gram(s) Oral daily  prednisoLONE acetate 1% Suspension 1 Drop(s) Both EYES two times a day  senna 2 Tablet(s) Oral at bedtime  tiotropium 2.5 MICROgram(s) Inhaler 2 Puff(s) Inhalation daily  traZODone 50 milliGRAM(s) Oral at bedtime      TELEMETRY: 	    ECG:  	  RADIOLOGY:  OTHER: 	  	  LABS:	 	    CARDIAC MARKERS:                          9.7    6.04  )-----------( 153      ( 17 Dec 2024 06:52 )             31.8     12-17    138  |  93[L]  |  15  ----------------------------<  84  x    |  21[L]  |  0.78    Ca    <3.0[LL]      17 Dec 2024 06:50      proBNP:   Lipid Profile:   HgA1c:   TSH:     < from: Xray Chest 1 View- PORTABLE-Urgent (12.13.24 @ 14:37) >  FINDINGS:  Loop recorder.  Low lung volumes. Haziness over the left lung base may represent small   layering effusion and/or infiltrate.  There is no pneumothorax.  The heart size is not well evaluated in this position  No acute osseous abnormality. Chronic appearing shoulder deformities.   Scoliosis.    IMPRESSION:  Haziness over the left lung base may represent small layering effusion   and/or infiltrate.      Assessment and plan  ---------------------------   89 year old woman PMH HTN, HLD, CAD, diastolic HF, hypoithyroidism, dementia, COPD on 2L presenting with hypoxia. She was transferred by her facility when she was found to be hypoxic to 60s on her home O2. She is denying any fevers, chills, chest pain, SOB, abd pain, N/V/D/C, dysuria , cough, URI symptoms, sick contacts, travel. She is alert and oriented.  pt is well known to me with hx of htn, chf hfpef , sarcoidosis and severe copd/emphysema s/p multiple admissions with increasing sob and o2 sat of 80% on home o2.  no chest pain. fevever  exacerbation of copd/ chf  iv solumedrol  iv lasix  increase wbc , check cultures  check flu with multiple patients fron nursing home  will adjust cardiac meds  pt with sig CA calcification , no aggressive measures, ASA daily  will taper steroid slowly  will try to taper o2 as tolerated  ?switching lasix to po 40 mg daily  increase ambulation fu o2 level, pt on home o2  DC planning    	        
Date of Service: 12-18-24 @ 09:47           CARDIOLOGY     PROGRESS  NOTE   ________________________________________________    CHIEF COMPLAINT:Patient is a 89y old  Female who presents with a chief complaint of hypoxia (18 Dec 2024 08:26)  no complain  	  REVIEW OF SYSTEMS:  CONSTITUTIONAL: No fever, weight loss, or fatigue  EYES: No eye pain, visual disturbances, or discharge  ENT:  No difficulty hearing, tinnitus, vertigo; No sinus or throat pain  NECK: No pain or stiffness  RESPIRATORY: No cough, wheezing, chills or hemoptysis; No Shortness of Breath  CARDIOVASCULAR: No chest pain, palpitations, passing out, dizziness, or leg swelling  GASTROINTESTINAL: No abdominal or epigastric pain. No nausea, vomiting, or hematemesis; No diarrhea or constipation. No melena or hematochezia.  GENITOURINARY: No dysuria, frequency, hematuria, or incontinence  NEUROLOGICAL: No headaches, memory loss, loss of strength, numbness, or tremors  SKIN: No itching, burning, rashes, or lesions   LYMPH Nodes: No enlarged glands  ENDOCRINE: No heat or cold intolerance; No hair loss  MUSCULOSKELETAL: No joint pain or swelling; No muscle, back, or extremity pain  PSYCHIATRIC: No depression, anxiety, mood swings, or difficulty sleeping  HEME/LYMPH: No easy bruising, or bleeding gums  ALLERGY AND IMMUNOLOGIC: No hives or eczema	    [ x] All others negative	  [ ] Unable to obtain    PHYSICAL EXAM:  T(C): 36.3 (12-18-24 @ 08:00), Max: 36.8 (12-18-24 @ 00:10)  HR: 61 (12-18-24 @ 08:00) (61 - 82)  BP: 122/57 (12-18-24 @ 08:00) (112/53 - 128/63)  RR: 18 (12-18-24 @ 08:00) (18 - 18)  SpO2: 98% (12-18-24 @ 08:00) (94% - 98%)  Wt(kg): --  I&O's Summary    17 Dec 2024 07:01  -  18 Dec 2024 07:00  --------------------------------------------------------  IN: 0 mL / OUT: 350 mL / NET: -350 mL        Appearance: Normal	  HEENT:   Normal oral mucosa, PERRL, EOMI	  Lymphatic: No lymphadenopathy  Cardiovascular: Normal S1 S2, No JVD, + murmurs, No edema  Respiratory: Lungs clear to auscultation	  Psychiatry: A & O x 3, Mood & affect appropriate  Gastrointestinal:  Soft, Non-tender, + BS	  Skin: No rashes, No ecchymoses, No cyanosis	  Neurologic: Non-focal  Extremities: Normal range of motion, No clubbing, cyanosis or edema  Vascular: Peripheral pulses palpable 2+ bilaterally    MEDICATIONS  (STANDING):  albuterol/ipratropium for Nebulization 3 milliLiter(s) Nebulizer every 6 hours  ascorbic acid 500 milliGRAM(s) Oral daily  atorvastatin 20 milliGRAM(s) Oral at bedtime  azithromycin  IVPB 500 milliGRAM(s) IV Intermittent once  buDESOnide    Inhalation Suspension 0.5 milliGRAM(s) Inhalation two times a day  cefepime   IVPB      cefepime   IVPB 1000 milliGRAM(s) IV Intermittent every 12 hours  cyclopentolate 1% Solution 1 Drop(s) Both EYES two times a day  FLUoxetine 10 milliGRAM(s) Oral daily  furosemide    Tablet 40 milliGRAM(s) Oral daily  heparin   Injectable 5000 Unit(s) SubCutaneous every 12 hours  influenza  Vaccine (HIGH DOSE) 0.5 milliLiter(s) IntraMuscular once  metoprolol succinate ER 50 milliGRAM(s) Oral daily  multivitamin 1 Tablet(s) Oral daily  polyethylene glycol 3350 17 Gram(s) Oral daily  prednisoLONE acetate 1% Suspension 1 Drop(s) Both EYES two times a day  senna 2 Tablet(s) Oral at bedtime  tiotropium 2.5 MICROgram(s) Inhaler 2 Puff(s) Inhalation daily  traZODone 50 milliGRAM(s) Oral at bedtime      TELEMETRY: 	    ECG:  	  RADIOLOGY:  OTHER: 	  	  LABS:	 	    CARDIAC MARKERS:                                9.7    7.68  )-----------( 176      ( 18 Dec 2024 07:29 )             31.8     12-18    138  |  97  |  20  ----------------------------<  79  3.3[L]   |  28  |  0.80    Ca    9.5      18 Dec 2024 07:31  Mg     2.0     12-18      proBNP:   Lipid Profile:   HgA1c:   TSH:        pt with multiple previous admissions related  to UTI, pna, confusion admitted again with hypoxia  and cough  no definite pna on chest x ray and wbc falling   This may be a COPD exacerbation     I would normally be concerned about HAP but pt seems to be responding to ceftriaxone,       currently on higher fio2 than yesterday  ( 5 liters)   legionella ag  pending  will switch to cefepime       Assessment and plan  ---------------------------   89 year old woman PMH HTN, HLD, CAD, diastolic HF, hypoithyroidism, dementia, COPD on 2L presenting with hypoxia. She was transferred by her facility when she was found to be hypoxic to 60s on her home O2. She is denying any fevers, chills, chest pain, SOB, abd pain, N/V/D/C, dysuria , cough, URI symptoms, sick contacts, travel. She is alert and oriented.  pt is well known to me with hx of htn, chf hfpef , sarcoidosis and severe copd/emphysema s/p multiple admissions with increasing sob and o2 sat of 80% on home o2.  no chest pain. fevever  exacerbation of copd/ chf  iv solumedrol  iv lasix  increase wbc , check cultures  check flu with multiple patients fron nursing home  will adjust cardiac meds  pt with sig CA calcification , no aggressive measures, ASA daily  will taper steroid slowly  will try to taper o2 as tolerated  ?switching lasix to po 40 mg daily  increase ambulation fu o2 level, pt on home o2  ID noted started on Cefepime  continue diuretics  	        
Date of Service: 12-19-24 @ 06:42           CARDIOLOGY     PROGRESS  NOTE   ________________________________________________    CHIEF COMPLAINT:Patient is a 89y old  Female who presents with a chief complaint of hypoxia (18 Dec 2024 20:14)  no complain  	  REVIEW OF SYSTEMS:  CONSTITUTIONAL: No fever, weight loss, or fatigue  EYES: No eye pain, visual disturbances, or discharge  ENT:  No difficulty hearing, tinnitus, vertigo; No sinus or throat pain  NECK: No pain or stiffness  RESPIRATORY: No cough, wheezing, chills or hemoptysis; No Shortness of Breath  CARDIOVASCULAR: No chest pain, palpitations, passing out, dizziness, or leg swelling  GASTROINTESTINAL: No abdominal or epigastric pain. No nausea, vomiting, or hematemesis; No diarrhea or constipation. No melena or hematochezia.  GENITOURINARY: No dysuria, frequency, hematuria, or incontinence  NEUROLOGICAL: No headaches, memory loss, loss of strength, numbness, or tremors  SKIN: No itching, burning, rashes, or lesions   LYMPH Nodes: No enlarged glands  ENDOCRINE: No heat or cold intolerance; No hair loss  MUSCULOSKELETAL: No joint pain or swelling; No muscle, back, or extremity pain  PSYCHIATRIC: No depression, anxiety, mood swings, or difficulty sleeping  HEME/LYMPH: No easy bruising, or bleeding gums  ALLERGY AND IMMUNOLOGIC: No hives or eczema	    [x ] All others negative	  [ ] Unable to obtain    PHYSICAL EXAM:  T(C): 36.8 (12-19-24 @ 04:44), Max: 37.1 (12-18-24 @ 20:00)  HR: 67 (12-19-24 @ 04:44) (61 - 78)  BP: 128/72 (12-19-24 @ 04:44) (103/58 - 128/72)  RR: 18 (12-19-24 @ 04:44) (18 - 18)  SpO2: 98% (12-19-24 @ 04:44) (90% - 98%)  Wt(kg): --  I&O's Summary    17 Dec 2024 07:01  -  18 Dec 2024 07:00  --------------------------------------------------------  IN: 0 mL / OUT: 350 mL / NET: -350 mL        Appearance: Normal	  HEENT:   Normal oral mucosa, PERRL, EOMI	  Lymphatic: No lymphadenopathy  Cardiovascular: Normal S1 S2, No JVD, + murmurs, No edema  Respiratory: rhonchi  Psychiatry: A & O x 3, Mood & affect appropriate  Gastrointestinal:  Soft, Non-tender, + BS	  Skin: No rashes, No ecchymoses, No cyanosis	  Neurologic: Non-focal  Extremities: No clubbing, cyanosis or edema  Vascular: Peripheral pulses palpable 2+ bilaterally    MEDICATIONS  (STANDING):  albuterol/ipratropium for Nebulization 3 milliLiter(s) Nebulizer every 6 hours  ascorbic acid 500 milliGRAM(s) Oral daily  atorvastatin 20 milliGRAM(s) Oral at bedtime  azithromycin  IVPB 500 milliGRAM(s) IV Intermittent once  buDESOnide    Inhalation Suspension 0.5 milliGRAM(s) Inhalation two times a day  cefepime   IVPB      cefepime   IVPB 1000 milliGRAM(s) IV Intermittent every 12 hours  cyclopentolate 1% Solution 1 Drop(s) Both EYES two times a day  FLUoxetine 10 milliGRAM(s) Oral daily  furosemide    Tablet 40 milliGRAM(s) Oral daily  heparin   Injectable 5000 Unit(s) SubCutaneous every 12 hours  influenza  Vaccine (HIGH DOSE) 0.5 milliLiter(s) IntraMuscular once  metoprolol succinate ER 50 milliGRAM(s) Oral daily  multivitamin 1 Tablet(s) Oral daily  polyethylene glycol 3350 17 Gram(s) Oral daily  prednisoLONE acetate 1% Suspension 1 Drop(s) Both EYES two times a day  senna 2 Tablet(s) Oral at bedtime  tiotropium 2.5 MICROgram(s) Inhaler 2 Puff(s) Inhalation daily  traZODone 50 milliGRAM(s) Oral at bedtime      TELEMETRY: 	    ECG:  	  RADIOLOGY:  OTHER: 	  	  LABS:	 	    CARDIAC MARKERS:                                9.7    7.68  )-----------( 176      ( 18 Dec 2024 07:29 )             31.8     12-18    138  |  97  |  20  ----------------------------<  79  3.3[L]   |  28  |  0.80    Ca    9.5      18 Dec 2024 07:31  Mg     2.0     12-18      proBNP:   Lipid Profile:   HgA1c:   TSH:         Assessment and plan  ---------------------------   89 year old woman PMH HTN, HLD, CAD, diastolic HF, hypoithyroidism, dementia, COPD on 2L presenting with hypoxia. She was transferred by her facility when she was found to be hypoxic to 60s on her home O2. She is denying any fevers, chills, chest pain, SOB, abd pain, N/V/D/C, dysuria , cough, URI symptoms, sick contacts, travel. She is alert and oriented.  pt is well known to me with hx of htn, chf hfpef , sarcoidosis and severe copd/emphysema s/p multiple admissions with increasing sob and o2 sat of 80% on home o2.  no chest pain. fevever  exacerbation of copd/ chf  iv solumedrol  iv lasix  increase wbc , check cultures  check flu with multiple patients fron nursing home  will adjust cardiac meds  pt with sig CA calcification , no aggressive measures, ASA daily  will taper steroid slowly  will try to taper o2 as tolerated  ?switching lasix to po 40 mg daily  increase ambulation fu o2 level, pt on home o2  ID noted started on Cefepime  continue diuretics, replete K  dc planning as per ID  	    	        
Date of Service: 12-22-24 @ 11:08           CARDIOLOGY     PROGRESS  NOTE   ________________________________________________    CHIEF COMPLAINT:Patient is a 89y old  Female who presents with a chief complaint of hypoxia (21 Dec 2024 14:01)  no complain  	  REVIEW OF SYSTEMS:  CONSTITUTIONAL: No fever, weight loss, or fatigue  EYES: No eye pain, visual disturbances, or discharge  ENT:  No difficulty hearing, tinnitus, vertigo; No sinus or throat pain  NECK: No pain or stiffness  RESPIRATORY: No cough, wheezing, chills or hemoptysis; No Shortness of Breath  CARDIOVASCULAR: No chest pain, palpitations, passing out, dizziness, or leg swelling  GASTROINTESTINAL: No abdominal or epigastric pain. No nausea, vomiting, or hematemesis; No diarrhea or constipation. No melena or hematochezia.  GENITOURINARY: No dysuria, frequency, hematuria, or incontinence  NEUROLOGICAL: No headaches, memory loss, loss of strength, numbness, or tremors  SKIN: No itching, burning, rashes, or lesions   LYMPH Nodes: No enlarged glands  ENDOCRINE: No heat or cold intolerance; No hair loss  MUSCULOSKELETAL: No joint pain or swelling; No muscle, back, or extremity pain  PSYCHIATRIC: No depression, anxiety, mood swings, or difficulty sleeping  HEME/LYMPH: No easy bruising, or bleeding gums  ALLERGY AND IMMUNOLOGIC: No hives or eczema	    [x ] All others negative	  [ ] Unable to obtain    PHYSICAL EXAM:  T(C): 36.4 (12-22-24 @ 04:46), Max: 36.9 (12-21-24 @ 16:30)  HR: 68 (12-22-24 @ 04:46) (68 - 75)  BP: 126/58 (12-22-24 @ 04:46) (100/55 - 130/63)  RR: 18 (12-22-24 @ 04:46) (18 - 19)  SpO2: 100% (12-22-24 @ 04:46) (93% - 100%)  Wt(kg): --  I&O's Summary      Appearance: Normal	  HEENT:   Normal oral mucosa, PERRL, EOMI	  Lymphatic: No lymphadenopathy  Cardiovascular: Normal S1 S2, No JVD, + murmurs, No edema  Respiratory: Lungs clear to auscultation	  Gastrointestinal:  Soft, Non-tender, + BS	  Skin: No rashes, No ecchymoses, No cyanosis	  Neurologic: Non-focal  Extremities: Normal range of motion, No clubbing, cyanosis or edema  Vascular: Peripheral pulses palpable 2+ bilaterally    MEDICATIONS  (STANDING):  albuterol/ipratropium for Nebulization 3 milliLiter(s) Nebulizer every 6 hours  ascorbic acid 500 milliGRAM(s) Oral daily  atorvastatin 20 milliGRAM(s) Oral at bedtime  azithromycin  IVPB 500 milliGRAM(s) IV Intermittent once  buDESOnide    Inhalation Suspension 0.5 milliGRAM(s) Inhalation two times a day  cefepime   IVPB      cefepime   IVPB 1000 milliGRAM(s) IV Intermittent every 12 hours  cyclopentolate 1% Solution 1 Drop(s) Both EYES two times a day  FLUoxetine 10 milliGRAM(s) Oral daily  furosemide    Tablet 40 milliGRAM(s) Oral daily  heparin   Injectable 5000 Unit(s) SubCutaneous every 12 hours  influenza  Vaccine (HIGH DOSE) 0.5 milliLiter(s) IntraMuscular once  metoprolol succinate ER 50 milliGRAM(s) Oral daily  multivitamin 1 Tablet(s) Oral daily  polyethylene glycol 3350 17 Gram(s) Oral daily  potassium chloride    Tablet ER 20 milliEquivalent(s) Oral daily  prednisoLONE acetate 1% Suspension 1 Drop(s) Both EYES two times a day  senna 2 Tablet(s) Oral at bedtime  tiotropium 2.5 MICROgram(s) Inhaler 2 Puff(s) Inhalation daily  traZODone 50 milliGRAM(s) Oral at bedtime      TELEMETRY: 	    ECG:  	  RADIOLOGY:  OTHER: 	  	  LABS:	 	    CARDIAC MARKERS:                                10.8   8.72  )-----------( 248      ( 22 Dec 2024 06:52 )             35.3     12-22    140  |  100  |  27[H]  ----------------------------<  102[H]  3.9   |  27  |  0.86    Ca    9.8      22 Dec 2024 06:52      proBNP:   Lipid Profile:   HgA1c:   TSH:         Assessment and plan  ---------------------------   89 year old woman PMH HTN, HLD, CAD, diastolic HF, hypoithyroidism, dementia, COPD on 2L presenting with hypoxia. She was transferred by her facility when she was found to be hypoxic to 60s on her home O2. She is denying any fevers, chills, chest pain, SOB, abd pain, N/V/D/C, dysuria , cough, URI symptoms, sick contacts, travel. She is alert and oriented.  pt is well known to me with hx of htn, chf hfpef , sarcoidosis and severe copd/emphysema s/p multiple admissions with increasing sob and o2 sat of 80% on home o2.  no chest pain. fevever  exacerbation of copd/ chf  increase wbc , check cultures  check flu with multiple patients fron nursing home  will adjust cardiac meds  pt with sig CA calcification , no aggressive measures, ASA daily  will try to taper o2 as tolerated  switching lasix to po 40 mg daily  increase ambulation fu o2 level, pt on home o2  ID noted started on Cefepime  continue diuretics, replete K  dc planning as per ID, still on Cefepime  increase ambulation      	        
Date of Service: 12-23-24 @ 07:56           CARDIOLOGY     PROGRESS  NOTE   ________________________________________________    CHIEF COMPLAINT:Patient is a 89y old  Female who presents with a chief complaint of hypoxia (22 Dec 2024 17:16)  no complain  	  REVIEW OF SYSTEMS:  CONSTITUTIONAL: No fever, weight loss, or fatigue  EYES: No eye pain, visual disturbances, or discharge  ENT:  No difficulty hearing, tinnitus, vertigo; No sinus or throat pain  NECK: No pain or stiffness  RESPIRATORY: No cough, wheezing, chills or hemoptysis; No Shortness of Breath  CARDIOVASCULAR: No chest pain, palpitations, passing out, dizziness, or leg swelling  GASTROINTESTINAL: No abdominal or epigastric pain. No nausea, vomiting, or hematemesis; No diarrhea or constipation. No melena or hematochezia.  GENITOURINARY: No dysuria, frequency, hematuria, or incontinence  NEUROLOGICAL: No headaches, memory loss, loss of strength, numbness, or tremors  SKIN: No itching, burning, rashes, or lesions   LYMPH Nodes: No enlarged glands  ENDOCRINE: No heat or cold intolerance; No hair loss  MUSCULOSKELETAL: No joint pain or swelling; No muscle, back, or extremity pain  PSYCHIATRIC: No depression, anxiety, mood swings, or difficulty sleeping  HEME/LYMPH: No easy bruising, or bleeding gums  ALLERGY AND IMMUNOLOGIC: No hives or eczema	    [x ] All others negative	  [ ] Unable to obtain    PHYSICAL EXAM:  T(C): 36.3 (12-23-24 @ 04:49), Max: 36.9 (12-22-24 @ 14:21)  HR: 73 (12-23-24 @ 04:49) (62 - 79)  BP: 103/60 (12-23-24 @ 04:49) (92/52 - 123/67)  RR: 18 (12-23-24 @ 04:49) (18 - 19)  SpO2: 95% (12-23-24 @ 04:49) (93% - 99%)  Wt(kg): --  I&O's Summary    22 Dec 2024 07:01  -  23 Dec 2024 07:00  --------------------------------------------------------  IN: 0 mL / OUT: 300 mL / NET: -300 mL        Appearance: Normal	  HEENT:   Normal oral mucosa, PERRL, EOMI	  Lymphatic: No lymphadenopathy  Cardiovascular: Normal S1 S2, No JVD, + murmurs, No edema  Respiratory: rhonchi  Psychiatry: A & O x 3, Mood & affect appropriate  Gastrointestinal:  Soft, Non-tender, + BS	  Skin: No rashes, No ecchymoses, No cyanosis	  Extremities: Normal range of motion, No clubbing, cyanosis or edema  Vascular: Peripheral pulses palpable 2+ bilaterally    MEDICATIONS  (STANDING):  albuterol/ipratropium for Nebulization 3 milliLiter(s) Nebulizer every 6 hours  ascorbic acid 500 milliGRAM(s) Oral daily  atorvastatin 20 milliGRAM(s) Oral at bedtime  azithromycin  IVPB 500 milliGRAM(s) IV Intermittent once  buDESOnide    Inhalation Suspension 0.5 milliGRAM(s) Inhalation two times a day  cefepime   IVPB      cefepime   IVPB 1000 milliGRAM(s) IV Intermittent every 12 hours  cyclopentolate 1% Solution 1 Drop(s) Both EYES two times a day  FLUoxetine 10 milliGRAM(s) Oral daily  furosemide    Tablet 40 milliGRAM(s) Oral daily  heparin   Injectable 5000 Unit(s) SubCutaneous every 12 hours  influenza  Vaccine (HIGH DOSE) 0.5 milliLiter(s) IntraMuscular once  metoprolol succinate ER 50 milliGRAM(s) Oral daily  multivitamin 1 Tablet(s) Oral daily  polyethylene glycol 3350 17 Gram(s) Oral daily  potassium chloride    Tablet ER 20 milliEquivalent(s) Oral daily  prednisoLONE acetate 1% Suspension 1 Drop(s) Both EYES two times a day  senna 2 Tablet(s) Oral at bedtime  tiotropium 2.5 MICROgram(s) Inhaler 2 Puff(s) Inhalation daily  traZODone 50 milliGRAM(s) Oral at bedtime      TELEMETRY: 	    ECG:  	  RADIOLOGY:  OTHER: 	  	  LABS:	 	    CARDIAC MARKERS:                        10.8   8.72  )-----------( 248      ( 22 Dec 2024 06:52 )             35.3     12-22    140  |  100  |  27[H]  ----------------------------<  102[H]  3.9   |  27  |  0.86    Ca    9.8      22 Dec 2024 06:52      proBNP:   Lipid Profile:   HgA1c:   TSH:       cefepime day 3    overall better  would like to complete antibiotics early next week    Assessment and plan  ---------------------------   89 year old woman PMH HTN, HLD, CAD, diastolic HF, hypoithyroidism, dementia, COPD on 2L presenting with hypoxia. She was transferred by her facility when she was found to be hypoxic to 60s on her home O2. She is denying any fevers, chills, chest pain, SOB, abd pain, N/V/D/C, dysuria , cough, URI symptoms, sick contacts, travel. She is alert and oriented.  pt is well known to me with hx of htn, chf hfpef , sarcoidosis and severe copd/emphysema s/p multiple admissions with increasing sob and o2 sat of 80% on home o2.  no chest pain. fever  exacerbation of copd/ CHF  increase wbc , check cultures  check flu with multiple patients from nursing home  will adjust cardiac meds  pt with sig CA calcification , no aggressive measures, ASA daily  will try to taper o2 as tolerated  switching lasix to po 40 mg daily  increase ambulation fu o2 level, pt on home o2  ID noted started on Cefepime  continue diuretics, replete K  dc planning as per ID, still on Cefepime  increase ambulation  dc planning      	        
infectious diseases progress note:    Patient is a 89y old  Female who presents with a chief complaint of hypoxia (17 Dec 2024 18:27)        Pneumonia due to infectious organism           ss    Allergies    iodinated radiocontrast agents (Anaphylaxis)    Intolerances        ANTIBIOTICS/RELEVANT:  antimicrobials  azithromycin  IVPB 500 milliGRAM(s) IV Intermittent once    immunologic:  influenza  Vaccine (HIGH DOSE) 0.5 milliLiter(s) IntraMuscular once    OTHER:  acetaminophen     Tablet .. 650 milliGRAM(s) Oral every 6 hours PRN  albuterol/ipratropium for Nebulization 3 milliLiter(s) Nebulizer every 6 hours  ascorbic acid 500 milliGRAM(s) Oral daily  atorvastatin 20 milliGRAM(s) Oral at bedtime  buDESOnide    Inhalation Suspension 0.5 milliGRAM(s) Inhalation two times a day  cyclopentolate 1% Solution 1 Drop(s) Both EYES two times a day  FLUoxetine 10 milliGRAM(s) Oral daily  furosemide    Tablet 40 milliGRAM(s) Oral daily  heparin   Injectable 5000 Unit(s) SubCutaneous every 12 hours  metoprolol succinate ER 50 milliGRAM(s) Oral daily  multivitamin 1 Tablet(s) Oral daily  polyethylene glycol 3350 17 Gram(s) Oral daily  prednisoLONE acetate 1% Suspension 1 Drop(s) Both EYES two times a day  senna 2 Tablet(s) Oral at bedtime  temazepam 7.5 milliGRAM(s) Oral at bedtime PRN  tiotropium 2.5 MICROgram(s) Inhaler 2 Puff(s) Inhalation daily  traZODone 50 milliGRAM(s) Oral at bedtime      Objective:  Vital Signs Last 24 Hrs  T(C): 36.4 (18 Dec 2024 04:20), Max: 36.8 (18 Dec 2024 00:10)  T(F): 97.5 (18 Dec 2024 04:20), Max: 98.2 (18 Dec 2024 00:10)  HR: 61 (18 Dec 2024 04:20) (61 - 82)  BP: 112/53 (18 Dec 2024 04:20) (112/53 - 128/63)  BP(mean): --  RR: 18 (18 Dec 2024 04:20) (18 - 18)  SpO2: 97% (18 Dec 2024 04:20) (94% - 98%)    Parameters below as of 18 Dec 2024 04:20  Patient On (Oxygen Delivery Method): nasal cannula  O2 Flow (L/min): 5         Eyes:JIM, EOMI  Ear/Nose/Throat: no oral lesion, no sinus tenderness on percussion	  Neck:no JVD, no lymphadenopathy, supple  Respiratory: CTA moises  Cardiovascular: S1S2 RRR, no murmurs  Gastrointestinal:soft, (+) BS, no HSM  Extremities:no e/e/c        LABS:                        9.7    7.68  )-----------( 176      ( 18 Dec 2024 07:29 )             31.8     12-18    138  |  97  |  20  ----------------------------<  79  3.3[L]   |  28  |  0.80    Ca    9.5      18 Dec 2024 07:31  Mg     2.0     12-18        Urinalysis Basic - ( 18 Dec 2024 07:31 )    Color: x / Appearance: x / SG: x / pH: x  Gluc: 79 mg/dL / Ketone: x  / Bili: x / Urobili: x   Blood: x / Protein: x / Nitrite: x   Leuk Esterase: x / RBC: x / WBC x   Sq Epi: x / Non Sq Epi: x / Bacteria: x          MICROBIOLOGY:    RECENT CULTURES:  12-13 @ 13:50 .Blood BLOOD                No growth at 4 days    12-13 @ 13:40 .Blood BLOOD                No growth at 4 days          RESPIRATORY CULTURES:              RADIOLOGY & ADDITIONAL STUDIES:        Pager [phone]  After 5 pm/weekends or if no response :[phone]
      ---___---___---___---___---___---___ ---___---___---___---___---___---___---___---___---                  M E D I C A L   A T T E N D I N G   P R O G R E S S   N O T E  ---___---___---___---___---___---___ ---___---___---___---___---___---___---___---___---        ================================================    ++CHIEF COMPLAINT:   Patient is a 89y old  Female who presents with a chief complaint of hypoxia (18 Dec 2024 12:00)      Pneumonia due to infectious organism      ---___---___---___---___---___---  PAST MEDICAL / Surgical  HISTORY:  PAST MEDICAL & SURGICAL HISTORY:  HTN (hypertension)      Hypothyroidism      Osteoporosis      CAD (coronary artery disease)      COPD (chronic obstructive pulmonary disease)      Pulmonary sarcoidosis      H/O pyelonephritis      Acute diverticulitis          ---___---___---___---___---___---  FAMILY HISTORY:   FAMILY HISTORY:        ---___---___---___---___---___---  ALLERGIES:   Allergies    iodinated radiocontrast agents (Anaphylaxis)    Intolerances        ---___---___---___---___---___---  MEDICATIONS:  MEDICATIONS  (STANDING):  albuterol/ipratropium for Nebulization 3 milliLiter(s) Nebulizer every 6 hours  ascorbic acid 500 milliGRAM(s) Oral daily  atorvastatin 20 milliGRAM(s) Oral at bedtime  azithromycin  IVPB 500 milliGRAM(s) IV Intermittent once  buDESOnide    Inhalation Suspension 0.5 milliGRAM(s) Inhalation two times a day  cefepime   IVPB      cefepime   IVPB 1000 milliGRAM(s) IV Intermittent every 12 hours  cyclopentolate 1% Solution 1 Drop(s) Both EYES two times a day  FLUoxetine 10 milliGRAM(s) Oral daily  furosemide    Tablet 40 milliGRAM(s) Oral daily  heparin   Injectable 5000 Unit(s) SubCutaneous every 12 hours  influenza  Vaccine (HIGH DOSE) 0.5 milliLiter(s) IntraMuscular once  metoprolol succinate ER 50 milliGRAM(s) Oral daily  multivitamin 1 Tablet(s) Oral daily  polyethylene glycol 3350 17 Gram(s) Oral daily  prednisoLONE acetate 1% Suspension 1 Drop(s) Both EYES two times a day  senna 2 Tablet(s) Oral at bedtime  tiotropium 2.5 MICROgram(s) Inhaler 2 Puff(s) Inhalation daily  traZODone 50 milliGRAM(s) Oral at bedtime    MEDICATIONS  (PRN):  acetaminophen     Tablet .. 650 milliGRAM(s) Oral every 6 hours PRN Mild Pain (1 - 3)  temazepam 7.5 milliGRAM(s) Oral at bedtime PRN Insomnia      ---___---___---___---___---___---  REVIEW OF SYSTEM:    GEN: no fever, no chills, no pain  RESP: no SOB, no cough, no sputum  CVS: no chest pain, no palpitations, no edema  GI: no abdominal pain, no nausea, no vomiting, no constipation, no diarrhea  : no dysurea, no frequency, no hematurea  Neuro: no headache, no dizziness  PSYCH: no anxiety, no depression  Derm : no itching, no rash    ---___---___---___---___---___---  VITAL SIGNS:  89y , CAPILLARY BLOOD GLUCOSE        T(C): 36.6 (24 @ 16:30), Max: 36.8 (24 @ 00:10)  HR: 74 (24 @ 19:54) (61 - 82)  BP: 105/57 (24 @ 16:30) (105/57 - 128/63)  RR: 18 (24 @ 16:30) (18 - 18)  SpO2: 90% (24 @ 16:30) (90% - 98%)  ---___---___---___---___---___---  PHYSICAL EXAM:    GEN: A&O X 3 , NAD , comfortable  HEENT: NCAT, PERRL, MMM, hearing intact  Neck: supple , no JVD  CVS: S1S2 , regular , No M/R/G appreciated  PULM: CTA B/L,  no W/R/R appreciated  ABD.: soft. non tender, non distended,  bowel sounds present  Extrem: intact pulses , no edema   Derm: No rash , no ecchymoses  PSYCH : normal mood,  no delusion not anxious     ---___---___---___---___---___---            LAB AND IMAGIN.7    7.68  )-----------( 176      ( 18 Dec 2024 07:29 )             31.8               -    138  |  97  |  20  ----------------------------<  79  3.3[L]   |  28  |  0.80    Ca    9.5      18 Dec 2024 07:31  Mg     2.0                                   Urinalysis Basic - ( 18 Dec 2024 07:31 )    Color: x / Appearance: x / SG: x / pH: x  Gluc: 79 mg/dL / Ketone: x  / Bili: x / Urobili: x   Blood: x / Protein: x / Nitrite: x   Leuk Esterase: x / RBC: x / WBC x   Sq Epi: x / Non Sq Epi: x / Bacteria: x        [All pertinent / recent Imaging reviewed]         ---___---___---___---___---___---___ ---___---___---___---___---                         A S S E S S M E N T   A N D   P L A N :      HEALTH ISSUES - PROBLEM Dx:  Hypoxia  COPD (chronic obstructive pulmonary disease)  Pneumonia    appreciate id and pulmonary input  continue nebulizers   continue iv abx   increase baseline oxygen   anxiety /depression is stable on the current medication       -GI/DVT Prophylaxis. as ordered            --------------------------------------------  Case discussed with  daughter edouard linda   Education given on   ___________________________  Thank you,  Garry Hermosillo  [phone]
infectious diseases progress note:    Patient is a 89y old  Female who presents with a chief complaint of hypoxia (19 Dec 2024 06:42)        Pneumonia due to infectious organism               Allergies    iodinated radiocontrast agents (Anaphylaxis)    Intolerances        ANTIBIOTICS/RELEVANT:  antimicrobials  azithromycin  IVPB 500 milliGRAM(s) IV Intermittent once  cefepime   IVPB      cefepime   IVPB 1000 milliGRAM(s) IV Intermittent every 12 hours    immunologic:  influenza  Vaccine (HIGH DOSE) 0.5 milliLiter(s) IntraMuscular once    OTHER:  acetaminophen     Tablet .. 650 milliGRAM(s) Oral every 6 hours PRN  albuterol/ipratropium for Nebulization 3 milliLiter(s) Nebulizer every 6 hours  ascorbic acid 500 milliGRAM(s) Oral daily  atorvastatin 20 milliGRAM(s) Oral at bedtime  buDESOnide    Inhalation Suspension 0.5 milliGRAM(s) Inhalation two times a day  cyclopentolate 1% Solution 1 Drop(s) Both EYES two times a day  FLUoxetine 10 milliGRAM(s) Oral daily  furosemide    Tablet 40 milliGRAM(s) Oral daily  heparin   Injectable 5000 Unit(s) SubCutaneous every 12 hours  metoprolol succinate ER 50 milliGRAM(s) Oral daily  multivitamin 1 Tablet(s) Oral daily  polyethylene glycol 3350 17 Gram(s) Oral daily  potassium chloride    Tablet ER 20 milliEquivalent(s) Oral daily  prednisoLONE acetate 1% Suspension 1 Drop(s) Both EYES two times a day  senna 2 Tablet(s) Oral at bedtime  temazepam 7.5 milliGRAM(s) Oral at bedtime PRN  tiotropium 2.5 MICROgram(s) Inhaler 2 Puff(s) Inhalation daily  traZODone 50 milliGRAM(s) Oral at bedtime      Objective:  Vital Signs Last 24 Hrs  T(C): 36.8 (19 Dec 2024 04:44), Max: 37.1 (18 Dec 2024 20:00)  T(F): 98.3 (19 Dec 2024 04:44), Max: 98.8 (18 Dec 2024 20:00)  HR: 67 (19 Dec 2024 04:44) (63 - 78)  BP: 128/72 (19 Dec 2024 04:44) (103/58 - 128/72)  BP(mean): --  RR: 18 (19 Dec 2024 04:44) (18 - 18)  SpO2: 98% (19 Dec 2024 04:44) (90% - 98%)    Parameters below as of 19 Dec 2024 04:44  Patient On (Oxygen Delivery Method): nasal cannula  O2 Flow (L/min): 5         Eyes:JIM, EOMI  Ear/Nose/Throat: no oral lesion, no sinus tenderness on percussion	  Neck:no JVD, no lymphadenopathy, supple  Respiratory: CTA moises  Cardiovascular: S1S2 RRR, no murmurs  Gastrointestinal:soft, (+) BS, no HSM  Extremities:no e/e/c        LABS:                        9.7    7.68  )-----------( 176      ( 18 Dec 2024 07:29 )             31.8     12-19    140  |  99  |  22  ----------------------------<  87  3.7   |  27  |  0.82    Ca    9.6      19 Dec 2024 07:12  Mg     2.1     12-19        Urinalysis Basic - ( 19 Dec 2024 07:12 )    Color: x / Appearance: x / SG: x / pH: x  Gluc: 87 mg/dL / Ketone: x  / Bili: x / Urobili: x   Blood: x / Protein: x / Nitrite: x   Leuk Esterase: x / RBC: x / WBC x   Sq Epi: x / Non Sq Epi: x / Bacteria: x          MICROBIOLOGY:    RECENT CULTURES:  12-13 @ 13:50 .Blood BLOOD                No growth at 5 days    12-13 @ 13:40 .Blood BLOOD                No growth at 5 days          RESPIRATORY CULTURES:              RADIOLOGY & ADDITIONAL STUDIES:        Pager [phone]  After 5 pm/weekends or if no response :[phone]
Follow-up Pulm Progress Note    No new respiratory events overnight.  Denies SOB/CP.   O2 sats high 90s on 4LNC    Medications:  MEDICATIONS  (STANDING):  albuterol/ipratropium for Nebulization 3 milliLiter(s) Nebulizer every 6 hours  ascorbic acid 500 milliGRAM(s) Oral daily  atorvastatin 20 milliGRAM(s) Oral at bedtime  azithromycin  IVPB 500 milliGRAM(s) IV Intermittent once  buDESOnide    Inhalation Suspension 0.5 milliGRAM(s) Inhalation two times a day  cefepime   IVPB      cefepime   IVPB 1000 milliGRAM(s) IV Intermittent every 12 hours  cyclopentolate 1% Solution 1 Drop(s) Both EYES two times a day  FLUoxetine 10 milliGRAM(s) Oral daily  furosemide    Tablet 40 milliGRAM(s) Oral daily  heparin   Injectable 5000 Unit(s) SubCutaneous every 12 hours  influenza  Vaccine (HIGH DOSE) 0.5 milliLiter(s) IntraMuscular once  metoprolol succinate ER 50 milliGRAM(s) Oral daily  multivitamin 1 Tablet(s) Oral daily  polyethylene glycol 3350 17 Gram(s) Oral daily  potassium chloride    Tablet ER 20 milliEquivalent(s) Oral daily  prednisoLONE acetate 1% Suspension 1 Drop(s) Both EYES two times a day  senna 2 Tablet(s) Oral at bedtime  tiotropium 2.5 MICROgram(s) Inhaler 2 Puff(s) Inhalation daily  traZODone 50 milliGRAM(s) Oral at bedtime    MEDICATIONS  (PRN):  acetaminophen     Tablet .. 650 milliGRAM(s) Oral every 6 hours PRN Mild Pain (1 - 3)  temazepam 7.5 milliGRAM(s) Oral at bedtime PRN Insomnia          Vital Signs Last 24 Hrs  T(C): 36.4 (19 Dec 2024 08:20), Max: 37.1 (18 Dec 2024 20:00)  T(F): 97.5 (19 Dec 2024 08:20), Max: 98.8 (18 Dec 2024 20:00)  HR: 65 (19 Dec 2024 08:20) (63 - 78)  BP: 120/58 (19 Dec 2024 08:20) (103/58 - 128/72)  BP(mean): --  RR: 18 (19 Dec 2024 08:20) (18 - 18)  SpO2: 99% (19 Dec 2024 08:20) (90% - 99%)    Parameters below as of 19 Dec 2024 08:20  Patient On (Oxygen Delivery Method): nasal cannula  O2 Flow (L/min): 4        VBG pH 7.33 12-18 @ 07:30    VBG pCO2 66 12-18 @ 07:30    VBG O2 sat 66.2 12-18 @ 07:30    VBG lactate -- 12-18 @ 07:30          LABS:                        9.7    7.68  )-----------( 176      ( 18 Dec 2024 07:29 )             31.8     12-19    140  |  99  |  22  ----------------------------<  87  3.7   |  27  |  0.82    Ca    9.6      19 Dec 2024 07:12  Mg     2.1     12-19            CAPILLARY BLOOD GLUCOSE          Urinalysis Basic - ( 19 Dec 2024 07:12 )    Color: x / Appearance: x / SG: x / pH: x  Gluc: 87 mg/dL / Ketone: x  / Bili: x / Urobili: x   Blood: x / Protein: x / Nitrite: x   Leuk Esterase: x / RBC: x / WBC x   Sq Epi: x / Non Sq Epi: x / Bacteria: x                      CULTURES: (if applicable)    Culture - Blood (collected 12-13-24 @ 13:50)  Source: .Blood BLOOD  Final Report (12-18-24 @ 19:00):    No growth at 5 days    Culture - Blood (collected 12-13-24 @ 13:40)  Source: .Blood BLOOD  Final Report (12-18-24 @ 19:00):    No growth at 5 days      Physical Examination:  PULM: crackles L base, no wheezing   CVS: S1, S2 heard    RADIOLOGY REVIEWED  CXR: L hazy opacity     
Follow-up Pulm Progress Note    No new respiratory events overnight.  Denies SOB/CP.   o2 sats 98% on 5LNC    Medications:  MEDICATIONS  (STANDING):  albuterol/ipratropium for Nebulization 3 milliLiter(s) Nebulizer every 6 hours  ascorbic acid 500 milliGRAM(s) Oral daily  atorvastatin 20 milliGRAM(s) Oral at bedtime  azithromycin  IVPB 500 milliGRAM(s) IV Intermittent once  buDESOnide    Inhalation Suspension 0.5 milliGRAM(s) Inhalation two times a day  cefepime   IVPB      cefepime   IVPB 1000 milliGRAM(s) IV Intermittent every 12 hours  cyclopentolate 1% Solution 1 Drop(s) Both EYES two times a day  FLUoxetine 10 milliGRAM(s) Oral daily  furosemide    Tablet 40 milliGRAM(s) Oral daily  heparin   Injectable 5000 Unit(s) SubCutaneous every 12 hours  influenza  Vaccine (HIGH DOSE) 0.5 milliLiter(s) IntraMuscular once  metoprolol succinate ER 50 milliGRAM(s) Oral daily  multivitamin 1 Tablet(s) Oral daily  polyethylene glycol 3350 17 Gram(s) Oral daily  prednisoLONE acetate 1% Suspension 1 Drop(s) Both EYES two times a day  senna 2 Tablet(s) Oral at bedtime  tiotropium 2.5 MICROgram(s) Inhaler 2 Puff(s) Inhalation daily  traZODone 50 milliGRAM(s) Oral at bedtime    MEDICATIONS  (PRN):  acetaminophen     Tablet .. 650 milliGRAM(s) Oral every 6 hours PRN Mild Pain (1 - 3)  temazepam 7.5 milliGRAM(s) Oral at bedtime PRN Insomnia          Vital Signs Last 24 Hrs  T(C): 36.3 (18 Dec 2024 08:00), Max: 36.8 (18 Dec 2024 00:10)  T(F): 97.4 (18 Dec 2024 08:00), Max: 98.2 (18 Dec 2024 00:10)  HR: 61 (18 Dec 2024 08:00) (61 - 82)  BP: 122/57 (18 Dec 2024 08:00) (112/53 - 128/63)  BP(mean): --  RR: 18 (18 Dec 2024 08:00) (18 - 18)  SpO2: 98% (18 Dec 2024 08:00) (94% - 98%)    Parameters below as of 18 Dec 2024 08:00  Patient On (Oxygen Delivery Method): nasal cannula  O2 Flow (L/min): 5        VBG pH 7.33 12-18 @ 07:30    VBG pCO2 66 12-18 @ 07:30    VBG O2 sat 66.2 12-18 @ 07:30    VBG lactate -- 12-18 @ 07:30      12-17 @ 07:01  -  12-18 @ 07:00  --------------------------------------------------------  IN: 0 mL / OUT: 350 mL / NET: -350 mL          LABS:                        9.7    7.68  )-----------( 176      ( 18 Dec 2024 07:29 )             31.8     12-18    138  |  97  |  20  ----------------------------<  79  3.3[L]   |  28  |  0.80    Ca    9.5      18 Dec 2024 07:31  Mg     2.0     12-18            CAPILLARY BLOOD GLUCOSE          Urinalysis Basic - ( 18 Dec 2024 07:31 )    Color: x / Appearance: x / SG: x / pH: x  Gluc: 79 mg/dL / Ketone: x  / Bili: x / Urobili: x   Blood: x / Protein: x / Nitrite: x   Leuk Esterase: x / RBC: x / WBC x   Sq Epi: x / Non Sq Epi: x / Bacteria: x              CULTURES:     Culture - Blood (collected 12-13-24 @ 13:50)  Source: .Blood BLOOD  Preliminary Report (12-17-24 @ 19:01):    No growth at 4 days    Culture - Blood (collected 12-13-24 @ 13:40)  Source: .Blood BLOOD  Preliminary Report (12-17-24 @ 19:01):    No growth at 4 days              Physical Examination:  PULM: crackles L base, no wheezing   CVS: S1, S2 heard    RADIOLOGY REVIEWED  CXR: L hazy opacity   
Follow-up Pulm Progress Note    No new respiratory events overnight.  Denies SOB/CP.     Medications:  MEDICATIONS  (STANDING):  albuterol/ipratropium for Nebulization 3 milliLiter(s) Nebulizer every 6 hours  ascorbic acid 500 milliGRAM(s) Oral daily  atorvastatin 20 milliGRAM(s) Oral at bedtime  azithromycin  IVPB 500 milliGRAM(s) IV Intermittent once  buDESOnide    Inhalation Suspension 0.5 milliGRAM(s) Inhalation two times a day  cefepime   IVPB      cefepime   IVPB 1000 milliGRAM(s) IV Intermittent every 12 hours  cyclopentolate 1% Solution 1 Drop(s) Both EYES two times a day  FLUoxetine 10 milliGRAM(s) Oral daily  furosemide    Tablet 40 milliGRAM(s) Oral daily  heparin   Injectable 5000 Unit(s) SubCutaneous every 12 hours  influenza  Vaccine (HIGH DOSE) 0.5 milliLiter(s) IntraMuscular once  metoprolol succinate ER 50 milliGRAM(s) Oral daily  multivitamin 1 Tablet(s) Oral daily  polyethylene glycol 3350 17 Gram(s) Oral daily  potassium chloride    Tablet ER 20 milliEquivalent(s) Oral daily  prednisoLONE acetate 1% Suspension 1 Drop(s) Both EYES two times a day  senna 2 Tablet(s) Oral at bedtime  tiotropium 2.5 MICROgram(s) Inhaler 2 Puff(s) Inhalation daily  traZODone 50 milliGRAM(s) Oral at bedtime    MEDICATIONS  (PRN):  acetaminophen     Tablet .. 650 milliGRAM(s) Oral every 6 hours PRN Mild Pain (1 - 3)  temazepam 7.5 milliGRAM(s) Oral at bedtime PRN Insomnia          Vital Signs Last 24 Hrs  T(C): 36.4 (20 Dec 2024 12:12), Max: 36.8 (19 Dec 2024 19:58)  T(F): 97.5 (20 Dec 2024 12:12), Max: 98.3 (19 Dec 2024 19:58)  HR: 100 (20 Dec 2024 12:12) (66 - 100)  BP: 135/79 (20 Dec 2024 12:12) (110/79 - 135/79)  BP(mean): --  RR: 18 (20 Dec 2024 12:12) (18 - 20)  SpO2: 95% (20 Dec 2024 12:12) (94% - 98%)    Parameters below as of 20 Dec 2024 12:12  Patient On (Oxygen Delivery Method): nasal cannula  O2 Flow (L/min): 3            12-19 @ 07:01  -  12-20 @ 07:00  --------------------------------------------------------  IN: 0 mL / OUT: 400 mL / NET: -400 mL          LABS:    12-20    139  |  100  |  24[H]  ----------------------------<  101[H]  4.1   |  26  |  0.87    Ca    9.8      20 Dec 2024 07:18  Mg     2.1     12-19            CAPILLARY BLOOD GLUCOSE          Urinalysis Basic - ( 20 Dec 2024 07:18 )    Color: x / Appearance: x / SG: x / pH: x  Gluc: 101 mg/dL / Ketone: x  / Bili: x / Urobili: x   Blood: x / Protein: x / Nitrite: x   Leuk Esterase: x / RBC: x / WBC x   Sq Epi: x / Non Sq Epi: x / Bacteria: x                      CULTURES: (if applicable)    Culture - Blood (collected 12-13-24 @ 13:50)  Source: .Blood BLOOD  Final Report (12-18-24 @ 19:00):    No growth at 5 days    Culture - Blood (collected 12-13-24 @ 13:40)  Source: .Blood BLOOD  Final Report (12-18-24 @ 19:00):    No growth at 5 days      Physical Examination:  PULM: Decreased BS, no wheeze   CVS: S1, S2 heard    RADIOLOGY REVIEWED  CXR: LLL opacity

## 2024-12-24 NOTE — PROGRESS NOTE ADULT - REASON FOR ADMISSION
hypoxia

## 2024-12-24 NOTE — DISCHARGE NOTE PROVIDER - PROVIDER TOKENS
PROVIDER:[TOKEN:[[id number]],FOLLOWUP:[1 week],ESTABLISHEDPATIENT:[T]] PROVIDER:[TOKEN:[[id number]],FOLLOWUP:[1 week],ESTABLISHEDPATIENT:[T]],PROVIDER:[TOKEN:[[id number]]],FREE:[LAST:[Wound Care],PHONE:[phone]],FAX:[(   )    -],ADDRESS:[[address] [place] [address]  [phone]]]

## 2024-12-24 NOTE — DISCHARGE NOTE PROVIDER - HOSPITAL COURSE
HPI:  · 89 year old woman       PMH HTN, HLD, CAD, diastolic HF    , hypoithyroidism, dementia, COPD on 2L     presenting with hypoxia.   She was transferred by her facility when she was found to be hypoxic to 60s on her home O2.    denying any fevers, chills, chest pain,  abd pain, N/V/D/C, dysuria , cough, URI symptoms, sick contacts, travel.  is alert and oriented.   (13 Dec 2024 16:07)    Hospital Course:      Important Medication Changes and Reason:    Active or Pending Issues Requiring Follow-up:    Advanced Directives:   [ ] Full code  [ ] DNR  [ ] Hospice    Discharge Diagnoses:         HPI:  · 89 year old woman       PMH HTN, HLD, CAD, diastolic HF    , hypoithyroidism, dementia, COPD on 2L     presenting with hypoxia.   She was transferred by her facility when she was found to be hypoxic to 60s on her home O2.    denying any fevers, chills, chest pain,  abd pain, N/V/D/C, dysuria , cough, URI symptoms, sick contacts, travel.  is alert and oriented.   (13 Dec 2024 16:07)    Hospital Course:  Pt admitted for hypoxia requiring increased O2 requirements from baseline. Cardiology, Pulmonology, and ID were consulted. CXR w/ L base hazy opacity. LE duplex neg for DVT. RVP, BCx and Legionella neg. Pt was started on IV abx, now discontinued as CXR with no definite PNA. Repeat CXR with improvement. Likely COPD exacerbation per ID.   Leukocytosis resolved.   Pt also with hx of CHF - s/p IV Lasix, now on PO Lasix 40 mg QD per Cardiology.   Evaluated by PT and recommended for home PT.   Pt is now saturating well on baseline O2 and is medically cleared and stable for discharge.   Discharge and medication reconciliation reviewed with attending.     Important Medication Changes and Reason:    Active or Pending Issues Requiring Follow-up:    Advanced Directives:   [x ] Full code  [ ] DNR  [ ] Hospice    Discharge Diagnoses:  COPD exacerbation  CHF       HPI:  · 89 year old woman       PMH HTN, HLD, CAD, diastolic HF    , hypoithyroidism, dementia, COPD on 2L     presenting with hypoxia.   She was transferred by her facility when she was found to be hypoxic to 60s on her home O2.    denying any fevers, chills, chest pain,  abd pain, N/V/D/C, dysuria , cough, URI symptoms, sick contacts, travel.  is alert and oriented.   (13 Dec 2024 16:07)    Hospital Course:  Pt admitted for hypoxia requiring increased O2 requirements from baseline. Cardiology, Pulmonology, and ID were consulted. CXR w/ L base hazy opacity. LE duplex neg for DVT. RVP, BCx and Legionella neg. Pt was started on IV abx, now discontinued as CXR with no definite PNA. Repeat CXR with improvement. Likely COPD exacerbation per ID.   Leukocytosis resolved.   Pt also with hx of CHF - s/p IV Lasix, now on PO Lasix 40 mg QD per Cardiology.   Evaluated by PT and recommended for home PT.   Pt is now saturating well on baseline O2 and is medically cleared and stable for discharge.   Discharge and medication reconciliation reviewed with attending.     Important Medication Changes and Reason: see med rec    Active or Pending Issues Requiring Follow-up: f/u PCP, cards, wound care    Advanced Directives:   [x ] Full code  [ ] DNR  [ ] Hospice    Discharge Diagnoses:  COPD exacerbation  CHF

## 2024-12-24 NOTE — DISCHARGE NOTE PROVIDER - NSDCMRMEDTOKEN_GEN_ALL_CORE_FT
acetaminophen 500 mg oral tablet: 1 tab(s) orally 2 times a day  ascorbic acid 500 mg oral tablet: 1 tab(s) orally once a day  atorvastatin 20 mg oral tablet: 1 tab(s) orally once a day (at bedtime)  cyclopentolate 1% ophthalmic solution: 1 drop(s) in the left eye 2 times a day  FLUoxetine 10 mg oral capsule: 1 cap(s) orally once a day (Total dose 30mg)  FLUoxetine 10 mg oral capsule: 1 cap(s) orally once a day  FLUoxetine 20 mg oral capsule: 1 cap(s) orally once a day (Total dose 30 mg)  fluticasone-salmeterol 250 mcg-50 mcg inhalation powder: 1 puff(s) inhaled every 12 hours  furosemide 20 mg oral tablet: 1 tab(s) orally once a day  furosemide 40 mg oral tablet: 1 tab(s) orally once a day  ipratropium-albuterol 0.5 mg-2.5 mg/3 mL inhalation solution: 3 milliliter(s) by nebulizer every 6 hours  metoprolol succinate 50 mg oral tablet, extended release: 1 tab(s) orally once a day  Multiple Vitamins oral tablet: 1 tab(s) orally once a day  ondansetron 4 mg oral tablet: 1 tab(s) orally every 8 hours as needed  pantoprazole 40 mg oral delayed release tablet: 1 tab(s) orally once a day (before a meal)  polyethylene glycol 3350 oral powder for reconstitution: 17 gram(s) orally once a day  polyethylene glycol 3350 oral powder for reconstitution: 17 gram(s) orally once a day  potassium chloride 20 mEq oral tablet, extended release: 2 tab(s) orally once a day  prednisoLONE acetate 1% ophthalmic suspension: 1 drop(s) in the left eye 2 times a day  senna leaf extract oral tablet: 2 tab(s) orally once a day (at bedtime)  temazepam 30 mg oral capsule: 1 cap(s) orally once a day (at bedtime)  tiotropium 2.5 mcg/inh inhalation aerosol: 2 puff(s) inhaled once a day  traZODone 50 mg oral tablet: 1 tab(s) orally once a day (at bedtime)  Ventolin 90 mcg/inh inhalation aerosol: 1 puff(s) inhaled every 8 hours   acetaminophen 500 mg oral tablet: 1 tab(s) orally 2 times a day  ascorbic acid 500 mg oral tablet: 1 tab(s) orally once a day  atorvastatin 20 mg oral tablet: 1 tab(s) orally once a day (at bedtime)  cyclopentolate 1% ophthalmic solution: 1 drop(s) in the left eye 2 times a day  FLUoxetine 10 mg oral capsule: 1 cap(s) orally once a day  FLUoxetine 20 mg oral capsule: 1 cap(s) orally once a day (Total dose 30 mg)  fluticasone-salmeterol 250 mcg-50 mcg inhalation powder: 1 puff(s) inhaled every 12 hours  furosemide 40 mg oral tablet: 1 tab(s) orally once a day  ipratropium-albuterol 0.5 mg-2.5 mg/3 mL inhalation solution: 3 milliliter(s) by nebulizer every 6 hours  metoprolol succinate 50 mg oral tablet, extended release: 1 tab(s) orally once a day  Multiple Vitamins oral tablet: 1 tab(s) orally once a day  ondansetron 4 mg oral tablet: 1 tab(s) orally every 8 hours as needed  pantoprazole 40 mg oral delayed release tablet: 1 tab(s) orally once a day (before a meal)  polyethylene glycol 3350 oral powder for reconstitution: 17 gram(s) orally once a day  potassium chloride 20 mEq oral tablet, extended release: 1 tab(s) orally once a day  prednisoLONE acetate 1% ophthalmic suspension: 1 drop(s) in the left eye 2 times a day  senna leaf extract oral tablet: 2 tab(s) orally once a day (at bedtime)  temazepam 30 mg oral capsule: 1 cap(s) orally once a day (at bedtime)  tiotropium 2.5 mcg/inh inhalation aerosol: 2 puff(s) inhaled once a day  traZODone 50 mg oral tablet: 1 tab(s) orally once a day (at bedtime)  Ventolin 90 mcg/inh inhalation aerosol: 1 puff(s) inhaled every 8 hours

## 2024-12-24 NOTE — PROGRESS NOTE ADULT - PROVIDER SPECIALTY LIST ADULT
Cardiology
Family Medicine
Internal Medicine
Cardiology
Family Medicine
Family Medicine
Pulmonology
Cardiology
Cardiology
Family Medicine
Infectious Disease
Pulmonology
Infectious Disease
Internal Medicine
Pulmonology
Pulmonology
Family Medicine

## 2024-12-24 NOTE — DISCHARGE NOTE PROVIDER - NSDCCPCAREPLAN_GEN_ALL_CORE_FT
PRINCIPAL DISCHARGE DIAGNOSIS  Diagnosis: COPD (chronic obstructive pulmonary disease)  Assessment and Plan of Treatment: Avoid environmental allergens and asthma triggers.  Participate in physical activity as tolerated.  Seek immediate medical attention if the shortness of breath does not improve with asthma treatments, or if you experience chest pain or palpitations.  Call an ambulance if your lips or nail beds become a bluish color.        SECONDARY DISCHARGE DIAGNOSES  Diagnosis: Pneumonia  Assessment and Plan of Treatment: You completed antibiotics for ?pneumonia. Further antibiotics discontinued as symptoms likely from COPD exacerbation.   Pneumonia is a lung infection that can cause a fever, cough, and trouble breathing.  Nutrition is important, eat small frequent meals.  Get lots of rest and drink fluids.  Call your health care provider upon arrival home from hospital and make a follow up appointment for one week.  If your cough worsens, you develop fever greater than 101', you have shaking chills, a fast heartbeat, trouble breathing and/or feel your are breathing much faster than usual, call your healthcare provider.  Make sure you wash your hands frequently.      Diagnosis: Chronic CHF  Assessment and Plan of Treatment: Take all medications as prescribed.  Stop smoking if you currently If you are on medication to help you quit smoking, be sure to take it as prescribed. Find healthy ways to deal with stress, such as exercise (check with your healthcare provider first), deep breathing, meditation, or enjoyable healthy hobbies.  Avoid situations that may cause you to smoke a cigarette.  Look for help with quitting; you are not alone. A resource to help you stop smoking is the [facility] – phone number [phone]., and avoid high altitudes.  Weigh yourself daily.  If you gain 3lbs in 3 days, or 5lbs in a week call your Health Care Provider.  Eat a low sodium diet.  If you have pulmonary hypertension and you are a female of child bearing age, talk to your caregiver about using birth control pills or getting pregnant.  Call your Health Care Provider if you have any swelling or increased swelling in your feet, ankles, and/or stomach.  If you experience dizziness, chest pain, or shortness of breath, seek immediate medical attention.       PRINCIPAL DISCHARGE DIAGNOSIS  Diagnosis: COPD (chronic obstructive pulmonary disease)  Assessment and Plan of Treatment: Avoid environmental allergens and asthma triggers.  Participate in physical activity as tolerated.  Seek immediate medical attention if the shortness of breath does not improve with asthma treatments, or if you experience chest pain or palpitations.  Call an ambulance if your lips or nail beds become a bluish color.        SECONDARY DISCHARGE DIAGNOSES  Diagnosis: Pneumonia  Assessment and Plan of Treatment: You completed antibiotics for pneumonia. Further antibiotics discontinued as symptoms likely from COPD exacerbation.   Pneumonia is a lung infection that can cause a fever, cough, and trouble breathing.  Nutrition is important, eat small frequent meals.  Get lots of rest and drink fluids.  Call your health care provider upon arrival home from hospital and make a follow up appointment for one week.  If your cough worsens, you develop fever greater than 101', you have shaking chills, a fast heartbeat, trouble breathing and/or feel your are breathing much faster than usual, call your healthcare provider.  Make sure you wash your hands frequently.      Diagnosis: Chronic CHF  Assessment and Plan of Treatment: Take all medications as prescribed.  Stop smoking if you currently If you are on medication to help you quit smoking, be sure to take it as prescribed. Find healthy ways to deal with stress, such as exercise (check with your healthcare provider first), deep breathing, meditation, or enjoyable healthy hobbies.  Avoid situations that may cause you to smoke a cigarette.  Look for help with quitting; you are not alone. A resource to help you stop smoking is the [facility] – phone number [phone]., and avoid high altitudes.  Weigh yourself daily.  If you gain 3lbs in 3 days, or 5lbs in a week call your Health Care Provider.  Eat a low sodium diet.  If you have pulmonary hypertension and you are a female of child bearing age, talk to your caregiver about using birth control pills or getting pregnant.  Call your Health Care Provider if you have any swelling or increased swelling in your feet, ankles, and/or stomach.  If you experience dizziness, chest pain, or shortness of breath, seek immediate medical attention.

## 2024-12-26 ENCOUNTER — TRANSCRIPTION ENCOUNTER (OUTPATIENT)
Age: 89
End: 2024-12-26

## 2024-12-30 ENCOUNTER — APPOINTMENT (OUTPATIENT)
Dept: CARE COORDINATION | Facility: HOME HEALTH | Age: 89
End: 2024-12-30
Payer: MEDICARE

## 2024-12-30 VITALS
RESPIRATION RATE: 16 BRPM | TEMPERATURE: 97.9 F | SYSTOLIC BLOOD PRESSURE: 103 MMHG | HEART RATE: 78 BPM | OXYGEN SATURATION: 94 % | DIASTOLIC BLOOD PRESSURE: 56 MMHG

## 2024-12-30 DIAGNOSIS — I50.33 ACUTE ON CHRONIC DIASTOLIC (CONGESTIVE) HEART FAILURE: ICD-10-CM

## 2024-12-30 DIAGNOSIS — J44.1 CHRONIC OBSTRUCTIVE PULMONARY DISEASE WITH (ACUTE) EXACERBATION: ICD-10-CM

## 2024-12-30 DIAGNOSIS — F03.90 UNSPECIFIED DEMENTIA W/OUT BEHAVIORAL DISTURBANCE: ICD-10-CM

## 2024-12-30 PROCEDURE — 99495 TRANSJ CARE MGMT MOD F2F 14D: CPT

## 2024-12-31 PROBLEM — F03.90 DEMENTIA WITHOUT BEHAVIORAL DISTURBANCE, PSYCHOTIC DISTURBANCE, MOOD DISTURBANCE, OR ANXIETY, UNSPECIFIED DEMENTIA SEVERITY, UNSPECIFIED DEMENTIA TYPE: Status: ACTIVE | Noted: 2024-12-31

## 2024-12-31 RX ORDER — TEMAZEPAM 15 MG/1
15 CAPSULE ORAL
Qty: 30 | Refills: 0 | Status: ACTIVE | COMMUNITY
Start: 2024-09-19

## 2024-12-31 RX ORDER — ALBUTEROL SULFATE 90 UG/1
108 (90 BASE) AEROSOL, METERED RESPIRATORY (INHALATION)
Qty: 18 | Refills: 0 | Status: ACTIVE | COMMUNITY
Start: 2024-08-25

## 2024-12-31 RX ORDER — TIOTROPIUM BROMIDE INHALATION SPRAY 3.12 UG/1
2.5 SPRAY, METERED RESPIRATORY (INHALATION)
Qty: 4 | Refills: 0 | Status: ACTIVE | COMMUNITY
Start: 2024-12-23

## 2024-12-31 RX ORDER — PREDNISOLONE ACETATE 10 MG/ML
1 SUSPENSION/ DROPS OPHTHALMIC
Qty: 5 | Refills: 0 | Status: ACTIVE | COMMUNITY
Start: 2024-12-06

## 2024-12-31 RX ORDER — FUROSEMIDE 40 MG/1
40 TABLET ORAL
Qty: 30 | Refills: 0 | Status: ACTIVE | COMMUNITY
Start: 2024-12-23

## 2024-12-31 RX ORDER — ONDANSETRON 4 MG/1
4 TABLET ORAL
Qty: 30 | Refills: 0 | Status: ACTIVE | COMMUNITY
Start: 2024-12-10

## 2025-01-21 ENCOUNTER — APPOINTMENT (OUTPATIENT)
Dept: WOUND CARE | Facility: CLINIC | Age: 89
End: 2025-01-21

## 2025-03-05 NOTE — BH CONSULTATION LIAISON ASSESSMENT NOTE - NSBHROSSTATEMENT_PSY_A_CORE
Office Visit    Assessment and Plan      1. 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, not intractable      Orders Placed This Encounter    rizatriptan (MAXALT) 10 MG tablet    ketorolac (TORADOL) injection 30 mg   The patient was evaluated in the office after the Toradol injection for 20 minutes no adverse reaction patient released from office  If headache will not be responded to Maxalt we will try gabapentin or Topamax      CHIEF COMPLAINT    Chief Complaint   Patient presents with    Migraine     X few months-states migraines have increased, had vomiting, vision disturbance-tried Ibuprofen         History of present illness    She is here today for persistent headache it seems to be she has classic headache with aura nausea vomiting and then on the top she has additionally some milligram constant headache aggravated by change position when she is bending over.She has photosensitivity and she needs to wear dark glasses all the time.  She wore behind computer which aggravate her headache as well.  Has an appointment to see a neurologist.  Already been seen neuro-ophthalmologist because patient headache associated with significant visual disturbances.  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  I have reviewed the allergies, medication list, and past medical, family, and social history sections listed in the above medical record as well as any pertinent nursing notes.     I have reviewed pertinent recent labs.    REVIEW OF SYSTEMS:  Constitutional: No fevers or chills. + fatigue. No abnormal weight gain or loss.  Respiratory: No shortness of breath. No cough. No wheezing.  Cardiovascular: No chest pain. No palpitations. No edema. No syncope.  Gastrointestinal: No abdominal pain. No nausea. No vomiting. No diarrhea. No constipation. No  blood in stool.    All other review of systems negative, except for those noted.     Physical ExamINATION    Vital Signs:    Vitals:    03/05/25 1100   BP: 118/70   Pulse: (!) 108   SpO2: 96%   Weight: 63.5 kg (140 lb)   Height: 5' 2\" (1.575 m)   Body mass index is 25.61 kg/m².   General:  Well-developed, well-nourished.  In no apparent distress.  Looks tired wearing dark glasses  Eyes:  PERRL, EOMI (Pupils equal, round, reactive to light, extraocular movements intact).  Conjunctivae pink.  Sclerae anicteric.  No nystagmus  HENT:  Normocephalic, atraumatic. Bilateral external ears are normal. Mucosal membranes moist. External nose is normal.    Respiratory:  Normal respiratory effort. No chest wall tenderness. Lungs clear to auscultation bilaterally. Symmetrical chest expansion.  Cardiovascular:  Regular rate and rhythm. No murmurs, no rubs, no gallops. Normal S1 and S2. No S3 or S4. No JVD (jugular venous distention). No carotid bruits. No peripheral edema.   Neurological exam nonfocal, ambulated independent slowly  Past Medical History:   Diagnosis Date    Depressive disorder     Endometriosis     Heart palpitations     Lupus (systemic lupus erythematosus)  (CMD)                 .

## 2025-03-10 NOTE — PHYSICAL THERAPY INITIAL EVALUATION ADULT - STANDING BALANCE: STATIC
History & Physical prior to GI Procedure    HISTORY OF PRESENT ILLNESS:    51 year old female with history of stress incontinence presenting for screening colonoscopy      MEDICAL HISTORY  Significant Medical/Surgical History:   Past Medical History:   Diagnosis Date    Closed fracture of rib(s), unspecified     from a fall    Depression     Hyperlipidemia     Shingles 4/2015    Somatic dysfunction of sacroiliac joint 10/5/2017       Past Surgical History:   Procedure Laterality Date    Dental surgery procedure  9/2006    [place]    [place]         Past Complications with Sedation/Anesthesia: nil    Possible Pregnancy (last menstrual period): na    Significant Family History:  Family History   Problem Relation Age of Onset    Other Mother         Myasthenia Gravis    Heart Father         MI    Gastrointestinal Sister         Crohn's    NEGATIVE FAMILY HX OF Brother     NEGATIVE FAMILY HX OF Brother     NEGATIVE FAMILY HX OF Brother        Social History     Socioeconomic History    Marital status: /Civil Union     Spouse name: Not on file    Number of children: Not on file    Years of education: Not on file    Highest education level: Not on file   Occupational History    Not on file   Tobacco Use    Smoking status: Never    Smokeless tobacco: Never   Substance and Sexual Activity    Alcohol use: No     Alcohol/week: 0.0 standard drinks of alcohol    Drug use: No    Sexual activity: Yes     Partners: Male     Comment: none   Other Topics Concern    Not on file   Social History Narrative    Not on file     Social Determinants of Health     Financial Resource Strain: Not on file   Food Insecurity: Not on file   Transportation Needs: Not on file   Physical Activity: Not on file   Stress: Not on file   Social Connections: Not on file   Interpersonal Safety: Low Risk  (2/28/2025)    Interpersonal Safety     How often physically hurt: Never     How often insulted or talked down to: Never      How often threatened with harm: Never     How often scream or curse at: Never       ALLERGIES:   Allergen Reactions    Mushroom   (Food) PRURITUS       Current Facility-Administered Medications   Medication Dose Route Frequency Provider Last Rate Last Admin    lidocaine 1 % injection 5 mg  5 mg Subcutaneous PRN Josr Jaeger CRNA        sodium chloride 0.9 % injection 10 mL  10 mL Intravenous PRN Josr Jaeger CRNA        sodium chloride 0.9 % injection 2 mL  2 mL Intracatheter 2 times per day Josr Jaeger CRNA        lactated ringers infusion   Intravenous Continuous Josr Jaeger CRNA 10 mL/hr at 03/10/25 0714 New Bag at 03/10/25 0714    ondansetron (ZOFRAN ODT) disintegrating tablet 4 mg  4 mg Oral Q12H PRN Yelitza Osorio MD        Or    ondansetron (ZOFRAN) injection 4 mg  4 mg Intravenous Q12H PRN Yelitza Osorio MD        sodium chloride 0.9 % injection 10 mL  10 mL Intravenous PRN Yelitza Osorio MD        sodium chloride 0.9 % injection 2 mL  2 mL Intracatheter 2 times per day Yelitza Osorio MD        sodium chloride 0.9% infusion   Intravenous Continuous Yelitza Osorio MD             REVIEW OF SYSTEMS:  A complete Review of Systems was performed and found to be negative except for what was stated in the History of Present Illness.    PHYSICAL EXAM:  Vitals:  Visit Vitals  /81 (BP Location: RUE - Right upper extremity, Patient Position: Semi-Craig's)   Pulse 71   Temp 96.3 °F (35.7 °C) (Temporal)   Resp 18   Ht 5' 8\" (1.727 m)   Wt 76.8 kg (169 lb 5 oz)   SpO2 98%   BMI 25.74 kg/m²        Constitutional:  Alert and oriented x3, no acute distress.  Pulmonary:  Clear to auscultation bilaterally.  Cardiovascular:  S1 and S2, no murmur, regular rate on auscultation.   Abdomen:  Soft, nontender, no distention, bowel sounds present.  No hepatosplenomegaly, fullness, guarding or rebound noted.      Assessment and Plan  Patient was advised of the risks, complications, and benefits of the procedure  including but not limited to bleeding, perforation, medication reaction, infection, and surgery.  The patient was advised of the alternatives of the procedures as well as the possibility that a small cancerous polyp or tumor could be missed.  The patient does understand and agrees to the procedure.  Patient is a candidate for planned procedure Colonoscopy for screening  Plan for Sedation:  ERROL Osorio MD     fair balance

## 2025-05-29 NOTE — PATIENT PROFILE ADULT - FUNCTIONAL ASSESSMENT - BASIC MOBILITY 6.
[Nutrition/ Exercise/ Weight Management] : nutrition, exercise, weight management [Breast Self Exam] : breast self exam [Pregnancy Options] : pregnancy options [Medication Management] : medication management  2-calculated by average/Not able to assess (calculate score using [facility] averaging method)  2-calculated by average/Not able to assess (calculate score using [facility] averaging method)
